# Patient Record
Sex: FEMALE | Race: BLACK OR AFRICAN AMERICAN | NOT HISPANIC OR LATINO
[De-identification: names, ages, dates, MRNs, and addresses within clinical notes are randomized per-mention and may not be internally consistent; named-entity substitution may affect disease eponyms.]

---

## 2017-03-21 ENCOUNTER — APPOINTMENT (OUTPATIENT)
Dept: OPHTHALMOLOGY | Facility: CLINIC | Age: 47
End: 2017-03-21

## 2017-03-21 PROBLEM — Z00.00 ENCOUNTER FOR PREVENTIVE HEALTH EXAMINATION: Status: ACTIVE | Noted: 2017-03-21

## 2017-08-11 ENCOUNTER — EMERGENCY (EMERGENCY)
Facility: HOSPITAL | Age: 47
LOS: 1 days | Discharge: HOME | End: 2017-08-11
Attending: EMERGENCY MEDICINE | Admitting: EMERGENCY MEDICINE
Payer: COMMERCIAL

## 2017-08-11 VITALS
HEART RATE: 98 BPM | OXYGEN SATURATION: 98 % | RESPIRATION RATE: 16 BRPM | SYSTOLIC BLOOD PRESSURE: 114 MMHG | DIASTOLIC BLOOD PRESSURE: 78 MMHG | TEMPERATURE: 98 F

## 2017-08-11 VITALS
DIASTOLIC BLOOD PRESSURE: 89 MMHG | RESPIRATION RATE: 18 BRPM | OXYGEN SATURATION: 100 % | HEART RATE: 89 BPM | TEMPERATURE: 98 F | SYSTOLIC BLOOD PRESSURE: 137 MMHG

## 2017-08-11 LAB
ALBUMIN SERPL ELPH-MCNC: 4 G/DL — SIGNIFICANT CHANGE UP (ref 3.3–5)
ALP SERPL-CCNC: 54 U/L — SIGNIFICANT CHANGE UP (ref 40–120)
ALT FLD-CCNC: 15 U/L — SIGNIFICANT CHANGE UP (ref 4–33)
AST SERPL-CCNC: 23 U/L — SIGNIFICANT CHANGE UP (ref 4–32)
BASE EXCESS BLDV CALC-SCNC: 0.7 MMOL/L — SIGNIFICANT CHANGE UP
BASOPHILS # BLD AUTO: 0.01 K/UL — SIGNIFICANT CHANGE UP (ref 0–0.2)
BASOPHILS NFR BLD AUTO: 0.2 % — SIGNIFICANT CHANGE UP (ref 0–2)
BILIRUB SERPL-MCNC: 0.2 MG/DL — SIGNIFICANT CHANGE UP (ref 0.2–1.2)
BLOOD GAS VENOUS - CREATININE: 0.87 MG/DL — SIGNIFICANT CHANGE UP (ref 0.5–1.3)
BUN SERPL-MCNC: 11 MG/DL — SIGNIFICANT CHANGE UP (ref 7–23)
CALCIUM SERPL-MCNC: 8.9 MG/DL — SIGNIFICANT CHANGE UP (ref 8.4–10.5)
CHLORIDE BLDV-SCNC: 106 MMOL/L — SIGNIFICANT CHANGE UP (ref 96–108)
CHLORIDE SERPL-SCNC: 103 MMOL/L — SIGNIFICANT CHANGE UP (ref 98–107)
CO2 SERPL-SCNC: 23 MMOL/L — SIGNIFICANT CHANGE UP (ref 22–31)
CREAT SERPL-MCNC: 0.85 MG/DL — SIGNIFICANT CHANGE UP (ref 0.5–1.3)
EOSINOPHIL # BLD AUTO: 0 K/UL — SIGNIFICANT CHANGE UP (ref 0–0.5)
EOSINOPHIL NFR BLD AUTO: 0 % — SIGNIFICANT CHANGE UP (ref 0–6)
GAS PNL BLDV: 139 MMOL/L — SIGNIFICANT CHANGE UP (ref 136–146)
GLUCOSE BLDV-MCNC: 104 — HIGH (ref 70–99)
GLUCOSE SERPL-MCNC: 95 MG/DL — SIGNIFICANT CHANGE UP (ref 70–99)
HCO3 BLDV-SCNC: 24 MMOL/L — SIGNIFICANT CHANGE UP (ref 20–27)
HCT VFR BLD CALC: 42.6 % — SIGNIFICANT CHANGE UP (ref 34.5–45)
HCT VFR BLDV CALC: 45 % — SIGNIFICANT CHANGE UP (ref 34.5–45)
HGB BLD-MCNC: 14.5 G/DL — SIGNIFICANT CHANGE UP (ref 11.5–15.5)
HGB BLDV-MCNC: 14.7 G/DL — SIGNIFICANT CHANGE UP (ref 11.5–15.5)
IMM GRANULOCYTES # BLD AUTO: 0.01 # — SIGNIFICANT CHANGE UP
IMM GRANULOCYTES NFR BLD AUTO: 0.2 % — SIGNIFICANT CHANGE UP (ref 0–1.5)
LACTATE BLDV-MCNC: 0.9 MMOL/L — SIGNIFICANT CHANGE UP (ref 0.5–2)
LIDOCAIN IGE QN: 33.9 U/L — SIGNIFICANT CHANGE UP (ref 7–60)
LYMPHOCYTES # BLD AUTO: 2.71 K/UL — SIGNIFICANT CHANGE UP (ref 1–3.3)
LYMPHOCYTES # BLD AUTO: 45.9 % — HIGH (ref 13–44)
MCHC RBC-ENTMCNC: 29.7 PG — SIGNIFICANT CHANGE UP (ref 27–34)
MCHC RBC-ENTMCNC: 34 % — SIGNIFICANT CHANGE UP (ref 32–36)
MCV RBC AUTO: 87.3 FL — SIGNIFICANT CHANGE UP (ref 80–100)
MONOCYTES # BLD AUTO: 0.47 K/UL — SIGNIFICANT CHANGE UP (ref 0–0.9)
MONOCYTES NFR BLD AUTO: 8 % — SIGNIFICANT CHANGE UP (ref 2–14)
NEUTROPHILS # BLD AUTO: 2.71 K/UL — SIGNIFICANT CHANGE UP (ref 1.8–7.4)
NEUTROPHILS NFR BLD AUTO: 45.7 % — SIGNIFICANT CHANGE UP (ref 43–77)
NRBC # FLD: 0 — SIGNIFICANT CHANGE UP
PCO2 BLDV: 43 MMHG — SIGNIFICANT CHANGE UP (ref 41–51)
PH BLDV: 7.39 PH — SIGNIFICANT CHANGE UP (ref 7.32–7.43)
PLATELET # BLD AUTO: 132 K/UL — LOW (ref 150–400)
PMV BLD: 9.9 FL — SIGNIFICANT CHANGE UP (ref 7–13)
PO2 BLDV: 35 MMHG — SIGNIFICANT CHANGE UP (ref 35–40)
POTASSIUM BLDV-SCNC: 4 MMOL/L — SIGNIFICANT CHANGE UP (ref 3.4–4.5)
POTASSIUM SERPL-MCNC: 4.5 MMOL/L — SIGNIFICANT CHANGE UP (ref 3.5–5.3)
POTASSIUM SERPL-SCNC: 4.5 MMOL/L — SIGNIFICANT CHANGE UP (ref 3.5–5.3)
PROT SERPL-MCNC: 7.5 G/DL — SIGNIFICANT CHANGE UP (ref 6–8.3)
RBC # BLD: 4.88 M/UL — SIGNIFICANT CHANGE UP (ref 3.8–5.2)
RBC # FLD: 16.1 % — HIGH (ref 10.3–14.5)
SAO2 % BLDV: 66 % — SIGNIFICANT CHANGE UP (ref 60–85)
SODIUM SERPL-SCNC: 139 MMOL/L — SIGNIFICANT CHANGE UP (ref 135–145)
WBC # BLD: 5.91 K/UL — SIGNIFICANT CHANGE UP (ref 3.8–10.5)
WBC # FLD AUTO: 5.91 K/UL — SIGNIFICANT CHANGE UP (ref 3.8–10.5)

## 2017-08-11 PROCEDURE — 99285 EMERGENCY DEPT VISIT HI MDM: CPT | Mod: 25

## 2017-08-11 PROCEDURE — 76705 ECHO EXAM OF ABDOMEN: CPT | Mod: 26

## 2017-08-11 PROCEDURE — 93010 ELECTROCARDIOGRAM REPORT: CPT

## 2017-08-11 PROCEDURE — 71020: CPT | Mod: 26

## 2017-08-11 PROCEDURE — 73130 X-RAY EXAM OF HAND: CPT | Mod: 26,RT

## 2017-08-11 RX ORDER — SODIUM CHLORIDE 9 MG/ML
1000 INJECTION INTRAMUSCULAR; INTRAVENOUS; SUBCUTANEOUS ONCE
Qty: 0 | Refills: 0 | Status: COMPLETED | OUTPATIENT
Start: 2017-08-11 | End: 2017-08-11

## 2017-08-11 RX ORDER — FAMOTIDINE 10 MG/ML
20 INJECTION INTRAVENOUS ONCE
Qty: 0 | Refills: 0 | Status: COMPLETED | OUTPATIENT
Start: 2017-08-11 | End: 2017-08-11

## 2017-08-11 RX ORDER — ACETAMINOPHEN 500 MG
650 TABLET ORAL ONCE
Qty: 0 | Refills: 0 | Status: COMPLETED | OUTPATIENT
Start: 2017-08-11 | End: 2017-08-11

## 2017-08-11 RX ADMIN — Medication 100 MILLIGRAM(S): at 15:04

## 2017-08-11 RX ADMIN — Medication 30 MILLILITER(S): at 15:04

## 2017-08-11 RX ADMIN — Medication 650 MILLIGRAM(S): at 15:04

## 2017-08-11 RX ADMIN — FAMOTIDINE 20 MILLIGRAM(S): 10 INJECTION INTRAVENOUS at 15:04

## 2017-08-11 RX ADMIN — SODIUM CHLORIDE 1000 MILLILITER(S): 9 INJECTION INTRAMUSCULAR; INTRAVENOUS; SUBCUTANEOUS at 14:10

## 2017-08-11 NOTE — ED PROVIDER NOTE - PMH
GERD (Gastroesophageal Reflux Disease)    GERD (Gastroesophageal Reflux Disease)    Hydrosalpinx    Ulcer

## 2017-08-11 NOTE — ED ADULT TRIAGE NOTE - CHIEF COMPLAINT QUOTE
epigastric pain x 1 mo.  uri symptoms x 3 days,reports upon cough,pain increases. denies n,v.. reports injury r 1st finger since injury 3 wks ago

## 2017-08-11 NOTE — ED ADULT NURSE NOTE - OBJECTIVE STATEMENT
pt c.o. epigastric pain, abd soft, non tender on palpation. states she has been having epigastric pain on and off for 1 month. denies fever, vomiting, diarrhea.  placed labs sent. to [facility] via wheelchair. to return to .

## 2017-08-11 NOTE — ED PROVIDER NOTE - OBJECTIVE STATEMENT
46F PMH GERD (not on any medications) p/w several complaints. Pt first notes 1-2 months epigastric discomfort with no clear exacerbating or relieving factors. Was told by her PMD to see a GI specialist for evaluation but has been unable to follow up. +Tolerating normal PO intake. Taking ibuprofen and left over oxycodone at home without improvement in pain. No NVD. No fevers or recent travel. No chest pain or SOB. Pt also notes 3 weeks pain to palmar surface of thenar eminence of R hand since slamming that hand in a car door 3 weeks ago. No numbness/weakness, but notes pain to palmar region and snapping sensation of IP joint to R thumb. ROM intact.   Pt also c/o 3 days of rhinorrhea and nasal congestion. No fever/chills. Has been coughing (nonproductive) and notes epigastric pain is aggravated by coughing.   Pt also states has been having several weeks of vaginal bleeding (2 pads/day). LMP in february. +sexually active. No lower abd pain or cramping. No clots passed. Periods have been becoming more irregular and is concerned she may be entering menopause. No chest pain, lightheadedness or SOB.

## 2017-08-11 NOTE — ED PROVIDER NOTE - ATTENDING CONTRIBUTION TO CARE
46F presents with complaints of epigastric pain, hand pain and vaginal bleeding.  Reports 1mo of epigastric pain, unresponsive to NSAIDs and oxycodone.  Unchanged w PO intake.  H/o GERD, no longer on medications.  Seen by PCP who referred her to GI, but has not yet followed up.  Also reports pain in R hand since slamming her thumb in the door 3wks ago, has sensation of snapping sensation when moving her thumb, has not been seen for the injury previously.  Also reports several weeks of daily vaginal bleeding, 2 pads a day .  No abd cramping.  Last LMP was 6mo ago.  On exam well appearing, nad, mmm, OP clear, rrr, lungs clear, abd minimal epigastric ttp, no rash, FROM R thumb,  2+ pulses, no edema, alert and oriented, speech clear. Labs reassuring without biliary disease or anemia, RUQ u/s negative for gallstones.  CXR and hand XR no fracture or acute pathology.  EKG no acute ischemia.  Plan for o/p referral to hand, OB and GI.

## 2017-08-11 NOTE — ED PROVIDER NOTE - MEDICAL DECISION MAKING DETAILS
46F with multiple complaints:  1) Epigastric pain--will evaluate with cmp to assess bilirubin and LFTs, lipase for r/o pancreatitis, blood gas to evaluate lactate, US for eval for cholelithiasis. Give pepcid/maalox/IVF. Give GI f/u.   2) R thumb pain -- XR to r/o fx, tylenol for pain, hand f/u.   3) URI symptoms-- tessalon perle for cough since exacerbates epigastric pain.   4) Vaginal bleeding -- ucg to evaluate for pregnancy, cbc to assess for anemia. gyn f/u.

## 2021-04-05 ENCOUNTER — EMERGENCY (EMERGENCY)
Facility: HOSPITAL | Age: 51
LOS: 1 days | Discharge: HOME | End: 2021-04-05
Attending: INTERNAL MEDICINE | Admitting: INTERNAL MEDICINE
Payer: COMMERCIAL

## 2021-04-05 VITALS
SYSTOLIC BLOOD PRESSURE: 120 MMHG | DIASTOLIC BLOOD PRESSURE: 81 MMHG | OXYGEN SATURATION: 99 % | HEIGHT: 66 IN | TEMPERATURE: 98 F | HEART RATE: 90 BPM | RESPIRATION RATE: 16 BRPM

## 2021-04-05 VITALS
SYSTOLIC BLOOD PRESSURE: 104 MMHG | RESPIRATION RATE: 17 BRPM | TEMPERATURE: 99 F | OXYGEN SATURATION: 100 % | DIASTOLIC BLOOD PRESSURE: 72 MMHG | HEART RATE: 86 BPM

## 2021-04-05 LAB
ANION GAP SERPL CALC-SCNC: 9 MMOL/L — SIGNIFICANT CHANGE UP (ref 7–14)
BASOPHILS # BLD AUTO: 0.04 K/UL — SIGNIFICANT CHANGE UP (ref 0–0.2)
BASOPHILS NFR BLD AUTO: 0.5 % — SIGNIFICANT CHANGE UP (ref 0–2)
BUN SERPL-MCNC: 20 MG/DL — SIGNIFICANT CHANGE UP (ref 7–23)
CALCIUM SERPL-MCNC: 9.2 MG/DL — SIGNIFICANT CHANGE UP (ref 8.4–10.5)
CHLORIDE SERPL-SCNC: 104 MMOL/L — SIGNIFICANT CHANGE UP (ref 98–107)
CO2 SERPL-SCNC: 25 MMOL/L — SIGNIFICANT CHANGE UP (ref 22–31)
CREAT SERPL-MCNC: 0.93 MG/DL — SIGNIFICANT CHANGE UP (ref 0.5–1.3)
EOSINOPHIL # BLD AUTO: 0 K/UL — SIGNIFICANT CHANGE UP (ref 0–0.5)
EOSINOPHIL NFR BLD AUTO: 0 % — SIGNIFICANT CHANGE UP (ref 0–6)
GLUCOSE SERPL-MCNC: 104 MG/DL — HIGH (ref 70–99)
HCT VFR BLD CALC: 39.3 % — SIGNIFICANT CHANGE UP (ref 34.5–45)
HGB BLD-MCNC: 13 G/DL — SIGNIFICANT CHANGE UP (ref 11.5–15.5)
IANC: 3.07 K/UL — SIGNIFICANT CHANGE UP (ref 1.5–8.5)
IMM GRANULOCYTES NFR BLD AUTO: 0.4 % — SIGNIFICANT CHANGE UP (ref 0–1.5)
LYMPHOCYTES # BLD AUTO: 3.96 K/UL — HIGH (ref 1–3.3)
LYMPHOCYTES # BLD AUTO: 50.8 % — HIGH (ref 13–44)
MCHC RBC-ENTMCNC: 29.1 PG — SIGNIFICANT CHANGE UP (ref 27–34)
MCHC RBC-ENTMCNC: 33.1 GM/DL — SIGNIFICANT CHANGE UP (ref 32–36)
MCV RBC AUTO: 88.1 FL — SIGNIFICANT CHANGE UP (ref 80–100)
MONOCYTES # BLD AUTO: 0.7 K/UL — SIGNIFICANT CHANGE UP (ref 0–0.9)
MONOCYTES NFR BLD AUTO: 9 % — SIGNIFICANT CHANGE UP (ref 2–14)
NEUTROPHILS # BLD AUTO: 3.07 K/UL — SIGNIFICANT CHANGE UP (ref 1.8–7.4)
NEUTROPHILS NFR BLD AUTO: 39.3 % — LOW (ref 43–77)
NRBC # BLD: 0 /100 WBCS — SIGNIFICANT CHANGE UP
NRBC # FLD: 0 K/UL — SIGNIFICANT CHANGE UP
PLATELET # BLD AUTO: 157 K/UL — SIGNIFICANT CHANGE UP (ref 150–400)
POTASSIUM SERPL-MCNC: 4.2 MMOL/L — SIGNIFICANT CHANGE UP (ref 3.5–5.3)
POTASSIUM SERPL-SCNC: 4.2 MMOL/L — SIGNIFICANT CHANGE UP (ref 3.5–5.3)
RBC # BLD: 4.46 M/UL — SIGNIFICANT CHANGE UP (ref 3.8–5.2)
RBC # FLD: 16.1 % — HIGH (ref 10.3–14.5)
SODIUM SERPL-SCNC: 138 MMOL/L — SIGNIFICANT CHANGE UP (ref 135–145)
TROPONIN T, HIGH SENSITIVITY RESULT: <6 NG/L — SIGNIFICANT CHANGE UP
WBC # BLD: 7.8 K/UL — SIGNIFICANT CHANGE UP (ref 3.8–10.5)
WBC # FLD AUTO: 7.8 K/UL — SIGNIFICANT CHANGE UP (ref 3.8–10.5)

## 2021-04-05 PROCEDURE — 71046 X-RAY EXAM CHEST 2 VIEWS: CPT | Mod: 26

## 2021-04-05 PROCEDURE — 99285 EMERGENCY DEPT VISIT HI MDM: CPT | Mod: 25

## 2021-04-05 PROCEDURE — 93010 ELECTROCARDIOGRAM REPORT: CPT

## 2021-04-05 PROCEDURE — 73120 X-RAY EXAM OF HAND: CPT | Mod: 26,RT

## 2021-04-05 RX ORDER — FAMOTIDINE 10 MG/ML
20 INJECTION INTRAVENOUS ONCE
Refills: 0 | Status: COMPLETED | OUTPATIENT
Start: 2021-04-05 | End: 2021-04-05

## 2021-04-05 RX ORDER — FAMOTIDINE 10 MG/ML
1 INJECTION INTRAVENOUS
Qty: 60 | Refills: 0
Start: 2021-04-05

## 2021-04-05 RX ORDER — IBUPROFEN 200 MG
1 TABLET ORAL
Qty: 24 | Refills: 0
Start: 2021-04-05

## 2021-04-05 NOTE — ED PROVIDER NOTE - OBJECTIVE STATEMENT
50F Hx GERD, Ulcer P/W Left chest discomfort present for several months, more frequent and severe over the past week, episodes last 20-30 minutes, most recent this AM.  Pain is burning, non-radiating, provoked by eating or by drinking spicy foods.  No abdominal pain. No bloody or melanous stools. Passing normal stool and flatus.  No lower extremity swelling.

## 2021-04-05 NOTE — ED PROVIDER NOTE - PROGRESS NOTE DETAILS
Dr. Underwood: Labs, imaging and EKG unremarkable.  Patient feels improved.  Tolerated PO well.  The patient's questions were answered and return precautions, follow-up instructions, and further care discussed.

## 2021-04-05 NOTE — ED ADULT NURSE NOTE - OBJECTIVE STATEMENT
Patient received in RW area c/o chest pain. Pt alert and oriented X 4, ambulatory on assessment, Phx of GERD, ulcers. Pt DC on pepcid. MD Underwood at bedside for discharge paper work. IV pulled.

## 2021-04-05 NOTE — ED PROVIDER NOTE - NS ED ROS FT
Constitutional: No fever or Chills.  No unexpected weight loss.  Head, Eyes, Ears, Nose, Throat: No visual changes.  No tongue or throat swelling or pain.  Neck: No neck stiffness.  Pulmonary: No shortness of breath or cough.  No wheezing.  Cardiovascular: No chest pain, palpitations, or diaphoresis.  Abdominal: No abdominal pain. No nausea, vomiting, diarrhea.  No bloody or melenic stools.  Genitourinary: No dysuria or hematuria.  No discharge.  Endocrine: No frequent urination or unusual thirst.  No hair or skin changes.  Hematologic: No lymph node swelling, easy bruising, or bleeding.  Dermatologic: No rashes.  Allergic: No new exposures, mucosal swelling, or pruritis.  Neurologic: No headache, weakness, visual changes, speech changes, or sensory abnormalities.  No fainting episodes.  No gait imbalance.  Psychiatric: No depression or insomnia.

## 2021-04-05 NOTE — ED PROVIDER NOTE - CLINICAL SUMMARY MEDICAL DECISION MAKING FREE TEXT BOX
50F Hx GERD, Ulcer P/W Left chest discomfort.  Likely GERD vs gastritis.  Will R/O ACS.  Low suspicion for pneumothorax but will R/O.  Not consistent with clinically significant PE given lack of tachycardia or hypoxemia, no risk factors.  No evidence of infectious etiology at this time.    Plan for labs, EKG, CXR, pepcid, re-eval.

## 2021-04-05 NOTE — ED PROVIDER NOTE - PATIENT PORTAL LINK FT
You can access the FollowMyHealth Patient Portal offered by [facility] by registering at the following website: [email or web address]. By joining [employer]’s FollowMyHealth portal, you will also be able to view your health information using other applications (apps) compatible with our system.

## 2021-12-09 ENCOUNTER — EMERGENCY (EMERGENCY)
Facility: HOSPITAL | Age: 51
LOS: 1 days | Discharge: HOME | End: 2021-12-09
Attending: EMERGENCY MEDICINE | Admitting: EMERGENCY MEDICINE
Payer: COMMERCIAL

## 2021-12-09 VITALS
OXYGEN SATURATION: 99 % | HEART RATE: 95 BPM | TEMPERATURE: 98 F | SYSTOLIC BLOOD PRESSURE: 127 MMHG | RESPIRATION RATE: 15 BRPM | DIASTOLIC BLOOD PRESSURE: 83 MMHG | HEIGHT: 66 IN

## 2021-12-09 VITALS
OXYGEN SATURATION: 100 % | SYSTOLIC BLOOD PRESSURE: 125 MMHG | HEART RATE: 87 BPM | TEMPERATURE: 98 F | DIASTOLIC BLOOD PRESSURE: 85 MMHG | RESPIRATION RATE: 16 BRPM

## 2021-12-09 LAB
ALBUMIN SERPL ELPH-MCNC: 3.9 G/DL — SIGNIFICANT CHANGE UP (ref 3.3–5)
ALP SERPL-CCNC: 76 U/L — SIGNIFICANT CHANGE UP (ref 40–120)
ALT FLD-CCNC: 8 U/L — SIGNIFICANT CHANGE UP (ref 4–33)
ANION GAP SERPL CALC-SCNC: 10 MMOL/L — SIGNIFICANT CHANGE UP (ref 7–14)
APTT BLD: 33.4 SEC — SIGNIFICANT CHANGE UP (ref 27–36.3)
AST SERPL-CCNC: 12 U/L — SIGNIFICANT CHANGE UP (ref 4–32)
BASOPHILS # BLD AUTO: 0.03 K/UL — SIGNIFICANT CHANGE UP (ref 0–0.2)
BASOPHILS NFR BLD AUTO: 0.4 % — SIGNIFICANT CHANGE UP (ref 0–2)
BILIRUB SERPL-MCNC: <0.2 MG/DL — SIGNIFICANT CHANGE UP (ref 0.2–1.2)
BUN SERPL-MCNC: 15 MG/DL — SIGNIFICANT CHANGE UP (ref 7–23)
CALCIUM SERPL-MCNC: 9.1 MG/DL — SIGNIFICANT CHANGE UP (ref 8.4–10.5)
CHLORIDE SERPL-SCNC: 106 MMOL/L — SIGNIFICANT CHANGE UP (ref 98–107)
CO2 SERPL-SCNC: 23 MMOL/L — SIGNIFICANT CHANGE UP (ref 22–31)
CREAT SERPL-MCNC: 0.91 MG/DL — SIGNIFICANT CHANGE UP (ref 0.5–1.3)
EOSINOPHIL # BLD AUTO: 0 K/UL — SIGNIFICANT CHANGE UP (ref 0–0.5)
EOSINOPHIL NFR BLD AUTO: 0 % — SIGNIFICANT CHANGE UP (ref 0–6)
GLUCOSE SERPL-MCNC: 117 MG/DL — HIGH (ref 70–99)
HCG SERPL-ACNC: <5 MIU/ML — SIGNIFICANT CHANGE UP
HCT VFR BLD CALC: 39.8 % — SIGNIFICANT CHANGE UP (ref 34.5–45)
HGB BLD-MCNC: 13.7 G/DL — SIGNIFICANT CHANGE UP (ref 11.5–15.5)
IANC: 3.67 K/UL — SIGNIFICANT CHANGE UP (ref 1.5–8.5)
IMM GRANULOCYTES NFR BLD AUTO: 0.4 % — SIGNIFICANT CHANGE UP (ref 0–1.5)
INR BLD: 1.18 RATIO — HIGH (ref 0.88–1.16)
LYMPHOCYTES # BLD AUTO: 3.4 K/UL — HIGH (ref 1–3.3)
LYMPHOCYTES # BLD AUTO: 43.3 % — SIGNIFICANT CHANGE UP (ref 13–44)
MAGNESIUM SERPL-MCNC: 2 MG/DL — SIGNIFICANT CHANGE UP (ref 1.6–2.6)
MCHC RBC-ENTMCNC: 30 PG — SIGNIFICANT CHANGE UP (ref 27–34)
MCHC RBC-ENTMCNC: 34.4 GM/DL — SIGNIFICANT CHANGE UP (ref 32–36)
MCV RBC AUTO: 87.1 FL — SIGNIFICANT CHANGE UP (ref 80–100)
MONOCYTES # BLD AUTO: 0.72 K/UL — SIGNIFICANT CHANGE UP (ref 0–0.9)
MONOCYTES NFR BLD AUTO: 9.2 % — SIGNIFICANT CHANGE UP (ref 2–14)
NEUTROPHILS # BLD AUTO: 3.67 K/UL — SIGNIFICANT CHANGE UP (ref 1.8–7.4)
NEUTROPHILS NFR BLD AUTO: 46.7 % — SIGNIFICANT CHANGE UP (ref 43–77)
NRBC # BLD: 0 /100 WBCS — SIGNIFICANT CHANGE UP
NRBC # FLD: 0 K/UL — SIGNIFICANT CHANGE UP
PLATELET # BLD AUTO: 190 K/UL — SIGNIFICANT CHANGE UP (ref 150–400)
POTASSIUM SERPL-MCNC: 3.9 MMOL/L — SIGNIFICANT CHANGE UP (ref 3.5–5.3)
POTASSIUM SERPL-SCNC: 3.9 MMOL/L — SIGNIFICANT CHANGE UP (ref 3.5–5.3)
PROT SERPL-MCNC: 7.2 G/DL — SIGNIFICANT CHANGE UP (ref 6–8.3)
PROTHROM AB SERPL-ACNC: 13.3 SEC — SIGNIFICANT CHANGE UP (ref 10.6–13.6)
RBC # BLD: 4.57 M/UL — SIGNIFICANT CHANGE UP (ref 3.8–5.2)
RBC # FLD: 15.6 % — HIGH (ref 10.3–14.5)
SODIUM SERPL-SCNC: 139 MMOL/L — SIGNIFICANT CHANGE UP (ref 135–145)
TROPONIN T, HIGH SENSITIVITY RESULT: <6 NG/L — SIGNIFICANT CHANGE UP
WBC # BLD: 7.85 K/UL — SIGNIFICANT CHANGE UP (ref 3.8–10.5)
WBC # FLD AUTO: 7.85 K/UL — SIGNIFICANT CHANGE UP (ref 3.8–10.5)

## 2021-12-09 PROCEDURE — 71045 X-RAY EXAM CHEST 1 VIEW: CPT | Mod: 26

## 2021-12-09 PROCEDURE — 99285 EMERGENCY DEPT VISIT HI MDM: CPT

## 2021-12-09 RX ORDER — ONDANSETRON 8 MG/1
4 TABLET, FILM COATED ORAL ONCE
Refills: 0 | Status: COMPLETED | OUTPATIENT
Start: 2021-12-09 | End: 2021-12-09

## 2021-12-09 RX ORDER — FAMOTIDINE 10 MG/ML
20 INJECTION INTRAVENOUS ONCE
Refills: 0 | Status: COMPLETED | OUTPATIENT
Start: 2021-12-09 | End: 2021-12-09

## 2021-12-09 RX ORDER — SUCRALFATE 1 G
1 TABLET ORAL ONCE
Refills: 0 | Status: COMPLETED | OUTPATIENT
Start: 2021-12-09 | End: 2021-12-09

## 2021-12-09 RX ADMIN — Medication 30 MILLILITER(S): at 10:38

## 2021-12-09 RX ADMIN — FAMOTIDINE 20 MILLIGRAM(S): 10 INJECTION INTRAVENOUS at 10:38

## 2021-12-09 RX ADMIN — ONDANSETRON 4 MILLIGRAM(S): 8 TABLET, FILM COATED ORAL at 10:38

## 2021-12-09 RX ADMIN — Medication 1 GRAM(S): at 10:38

## 2021-12-09 NOTE — ED PROVIDER NOTE - NSPTACCESSSVCSAPPTDETAILS_ED_ALL_ED_FT
In 1-2 weeks to be evaluated for Endoscopy for PUD 1. Gastroenterology: In 1-2 weeks to be evaluated for Endoscopy for PUD  2. Primary care physician - within 1 week pls. Chest x-ray concerning for possible R lung mass, needs to arrange for outpatient CT. States that she will be leaving for vacation on 12/26, would like to see a PCP before then

## 2021-12-09 NOTE — ED POST DISCHARGE NOTE - DETAILS
Attempted to contact pt as above Discussed CT findings with patient, states she is a previous smoker, was not aware of mass. Knows to f/u with a PCP and possible pulmonologist  to arrange for outpatient CT and further eval. Left pt's information in urgent referral f/u with discharge center; pt would like to establish care with new PCP as she is leaving for vacation on 12/26/21 her PCP is away. Discussed CXR findings with patient, states she is a previous smoker, was not aware of possible mass. Knows to f/u with a PCP and possible pulmonologist  to arrange for outpatient CT and further eval. Left pt's information in urgent referral f/u with discharge center; pt would like to establish care with new PCP as she is leaving for vacation on 12/26/21 her PCP is away. Discussed CXR findings with patient, states she is a previous smoker, was not aware of possible mass. States she feels well, denies cardiac/respiratory symptoms at present. Knows to f/u with a PCP and possible pulmonologist  to arrange for outpatient CT and further eval. Left pt's information in urgent referral f/u with discharge center; pt would like to establish care with new PCP as she is leaving for vacation on 12/26/21 her PCP is away. Discussed CXR findings with patient, states she is a previous smoker, was not aware of possible mass. States she feels well, denies cardiac/respiratory symptoms at present. Knows to f/u with a PCP and possible pulmonologist  to arrange for outpatient CT and further eval. Left pt's information in urgent referral f/u with discharge center; pt would like to establish care with new PCP as she is leaving for vacation on 12/26/21 and her PCP will be away for extended period of time.

## 2021-12-09 NOTE — ED PROVIDER NOTE - CLINICAL SUMMARY MEDICAL DECISION MAKING FREE TEXT BOX
Sanaz, PGY3: Likely gastritis/GERD/Ulcer (pt with history, not on ppi's, taking nsaids), low suspicion ACS (not exertional, pt with recent negative stress), low supspicion PNA/infx, low suspicion ptx, low suspicion of PE (not tachy/not hypoxic, denies sob). Will do labs, cxr, ekg/trop, gi cocktail. Likely tbdc GI f/u.

## 2021-12-09 NOTE — ED PROVIDER NOTE - ATTENDING CONTRIBUTION TO CARE
agree with resident note    "50F PMH GERD (takes sucralfate), presents to the ED with L sided chest pain worse with swallowing liquids, burning sensation, pt has had it before, states she has it for the past few months. Denies exertional component, denies any associated diaphoresis/sob. Pt states she's been taking ibuprofen for R thumb pain (dx with fx in the recent past). States sucrulfate only slightly helps. Pt is active smoker. Pt had stress tests 4 months ago reportedly negative. Denies any fevers/chills, uri sxs, cough, abd pain, v/d, dark/bloody stools, urinary sxs, orhtopnea/leg swelling. Pt seen in the past for similar presentation, never followed with GI."    pt states for 4-5 months feels lightheaded on postural change.  Has had stress test which was negative.      PE: well appearing; VSS; CTAB/L; s1 s2 no m/r/g abd soft/NT/ND exT: no edema Neuro: CNs intact 5/5 motor Ue and LE; sensation intact; FTN wnl; no drift; EOMI;    Imp: chest pain, lightheadedness, no neuro deficits; will get labs, CXR, EKG wnl (LAE?)  follow up with cards

## 2021-12-09 NOTE — ED PROVIDER NOTE - PHYSICAL EXAMINATION
GENERAL: no acute distress, non-toxic appearing  HEENT: normal conjunctiva, oral mucosa moist  CARDIAC: regular rate and regular rhythm, bp reassuring  PULM: clear to ascultation bilaterally, no appreciable crackles, rales, rhonchi, or wheezing, sats 100% on RA, no increased work of breathing  GI: abdomen nondistended, soft, nontender  : no CVA tenderness, no suprapubic tenderness  NEURO: alert and oriented x 3, normal speech, moving all extremities without lateralization  MSK: no visible deformities, no peripheral edema, calf tenderness/redness/swelling  SKIN: no visible rashes  PSYCH: appropriate mood and affect

## 2021-12-09 NOTE — ED PROVIDER NOTE - PROGRESS NOTE DETAILS
Sanaz, PGY3: pt feels better after meds, trop <6, low suspicion cardiac etiology, rapid referral placed for GI f/u, explained to pt anticipatory guidance, pending cxr Sanaz, PGY3: xray having difficulty uploading images, reviewed CXR in xray suite: no focal consolidations, no PTX, no cardiomegaly or pulm edema

## 2021-12-09 NOTE — ED ADULT NURSE NOTE - OBJECTIVE STATEMENT
Chong RN: Received pt to Rm 1 with c/o chest pain and abdominal pain intermittent x 6 months. Pt reports pain is worse after drinking anything. Pt denies N/V, fever, cough, shortness of breath. Pt denies past medical hx. Pt is A&ox4, skin warm dry unremarkable, + strong regular radial pulses bi laterally. Pt changed into gown and placed on cardiac monitor showing NSR, #18g IV placed to R forearm, lab work collected as ordered. Report given to primary RN. MD at bedside for eval.

## 2021-12-09 NOTE — ED PROVIDER NOTE - WR ORDER STATUS 2
Health Maintenance Due   Topic Date Due   • COVID-19 Vaccine (1) Never done   • Meningococcal Vaccine (2 - 2-dose series) 11/02/2020   • Influenza Vaccine (1) 09/01/2021   • Annual Physical (ages 3-18)  10/14/2021       Patient is due for topics as listed above but is not proceeding with Immunization(s) COVID-19, Influenza and Meningococcal at this time.     Recent PHQ 2/9 Score    PHQ 2:  Date Peds PHQ 2 Score Peds PHQ 2 Interpretation   12/8/2021 0 No further screening needed       PHQ 9:  Date Peds PHQ 9 Score Peds PHQ 9 Interpretation   10/14/2020 6 Mild Depression        Performed

## 2021-12-09 NOTE — ED POST DISCHARGE NOTE - RESULT SUMMARY
Received call from radiologist regarding c/f RUL mass. Patient seen in ED for L CP and felt improved upon discharge, provided rapid GI f/u. Attempted to contact pt regarding CXR findings, left callback number [phone] for both numbers listed in chart. Patient should be informed to arrange for outpatient CT chest.

## 2021-12-09 NOTE — ED PROVIDER NOTE - PATIENT PORTAL LINK FT
You can access the FollowMyHealth Patient Portal offered by [facility] by registering at the following website: [email or web address]. By joining [employer]’s FollowMyHealth portal, you will also be able to view your health information using other applications (apps) compatible with our system.

## 2021-12-09 NOTE — ED ADULT TRIAGE NOTE - CHIEF COMPLAINT QUOTE
pt c/o L sided cp x 6mo with intermittent dizziness. Pt denies sob, betts, palpations. Pt denies pmhx

## 2021-12-09 NOTE — ED PROVIDER NOTE - NSFOLLOWUPCLINICS_GEN_ALL_ED_FT
[facility]  Cardiology  [address] [place] [address]  Phone: [phone]  Fax:   Follow Up Time: 4-6 Days    [facility]  Gastroenterology  [address] [place] [id number]  Phone: [phone]  Fax:   Follow Up Time: 7-10 Days

## 2021-12-09 NOTE — ED PROVIDER NOTE - NSFOLLOWUPINSTRUCTIONS_ED_ALL_ED_FT
- Please follow up with your Primary Care Doctor within 48 hours. Bring your results from today.    - Please follow up with a Cardiologist within 72 hours. Bring your results from today.    - Please follow up with Gastroenterologist. A rapid referral was made for you, you should receive a phone call within 48 hours with an upcoming appointment. If you don't receive this call please call the phone number provided.     - Please stop taking Ibuprofen for pain as this may make your symptoms worse. You may take Tylenol for pain (read package insert for dosing instructions).     - You may take over the counter Maalox or Mylanta for your symptoms -- read package insert for dosing instructions.    - You may also take over the counter Pepcid Complete for your symptoms -- read package insert for dosing instructions.     - Be sure to return to the ED if you develop new, worsening, or any distressing symptoms.

## 2021-12-09 NOTE — ED PROVIDER NOTE - OBJECTIVE STATEMENT
50F PMH GERD (takes sucralfate), presents to the ED with L sided chest pain worse with swallowing liquids, burning sensation, pt has had it before, states she has it for the past few months. Denies exertional component, denies any associated diaphoresis/sob. Pt states she's been taking ibuprofen for R thumb pain (dx with fx in the recent past). States sucrulfate only slightly helps. Pt is active smoker. Pt had stress tests 4 months ago reportedly negative. Denies any fevers/chills, uri sxs, cough, abd pain, v/d, dark/bloody stools, urinary sxs, orhtopnea/leg swelling. Pt seen in the past for similar presentation, never followed with GI.

## 2021-12-15 ENCOUNTER — APPOINTMENT (OUTPATIENT)
Dept: GASTROENTEROLOGY | Facility: CLINIC | Age: 51
End: 2021-12-15
Payer: COMMERCIAL

## 2021-12-15 VITALS
DIASTOLIC BLOOD PRESSURE: 73 MMHG | SYSTOLIC BLOOD PRESSURE: 109 MMHG | OXYGEN SATURATION: 99 % | HEIGHT: 66 IN | RESPIRATION RATE: 18 BRPM | WEIGHT: 166 LBS | TEMPERATURE: 97 F | HEART RATE: 89 BPM | BODY MASS INDEX: 26.68 KG/M2

## 2021-12-15 PROCEDURE — 99204 OFFICE O/P NEW MOD 45 MIN: CPT

## 2021-12-15 NOTE — PHYSICAL EXAM
[General Appearance - Alert] : alert [General Appearance - In No Acute Distress] : in no acute distress [Sclera] : the sclera and conjunctiva were normal [PERRL With Normal Accommodation] : pupils were equal in size, round, and reactive to light [Extraocular Movements] : extraocular movements were intact [Outer Ear] : the ears and nose were normal in appearance [Oropharynx] : the oropharynx was normal [Neck Appearance] : the appearance of the neck was normal [Neck Cervical Mass (___cm)] : no neck mass was observed [Jugular Venous Distention Increased] : there was no jugular-venous distention [Thyroid Diffuse Enlargement] : the thyroid was not enlarged [Thyroid Nodule] : there were no palpable thyroid nodules [] : no respiratory distress [Auscultation Breath Sounds / Voice Sounds] : lungs were clear to auscultation bilaterally [Heart Rate And Rhythm] : heart rate was normal and rhythm regular [Heart Sounds] : normal S1 and S2 [Heart Sounds Gallop] : no gallops [Murmurs] : no murmurs [Heart Sounds Pericardial Friction Rub] : no pericardial rub [Flat] : flat [Normal] : normal to percussion [Epigastric] : in the epigastric area [Cervical Lymph Nodes Enlarged Posterior Bilaterally] : posterior cervical [Cervical Lymph Nodes Enlarged Anterior Bilaterally] : anterior cervical [Supraclavicular Lymph Nodes Enlarged Bilaterally] : supraclavicular [Axillary Lymph Nodes Enlarged Bilaterally] : axillary [Femoral Lymph Nodes Enlarged Bilaterally] : femoral [Inguinal Lymph Nodes Enlarged Bilaterally] : inguinal [No CVA Tenderness] : no ~M costovertebral angle tenderness [No Spinal Tenderness] : no spinal tenderness [Abnormal Walk] : normal gait [Nail Clubbing] : no clubbing  or cyanosis of the fingernails [Musculoskeletal - Swelling] : no joint swelling seen [Motor Tone] : muscle strength and tone were normal [Deep Tendon Reflexes (DTR)] : deep tendon reflexes were 2+ and symmetric [Sensation] : the sensory exam was normal to light touch and pinprick [No Focal Deficits] : no focal deficits [Oriented To Time, Place, And Person] : oriented to person, place, and time [Impaired Insight] : insight and judgment were intact [Affect] : the affect was normal [Dilated Collat. Veins] : no collateral vein dilation [Striae] : no striae [Scar] : no scars [Firm] : not firm [Rigid] : not rigid [Rebound] : no rebound [Guarding] : no guarding [Maki's] : a negative Amki's sign

## 2021-12-15 NOTE — REVIEW OF SYSTEMS
[As Noted in HPI] : as noted in HPI [Negative] : Heme/Lymph [Abdominal Pain] : abdominal pain [Heartburn] : heartburn

## 2021-12-15 NOTE — ASSESSMENT
[FreeTextEntry1] : Attending Attestation \par \par GERD \par A low acid / reflux diet was discussed in great detail including not smoking, not drinking alcohol, and not\par consuming foods that irritate the esophagus. It is helpful to eat small meals throughout the day instead\par of large meals. You should avoid eating before bedtime or lying down after you eat. It can be helpful to\par raise the head of your bed six inches. Additionally, you should maintain a healthy weight and good\par posture.. The patient was given written material to take home and review. \par \par Pantoprazole 40 mg PO daily. Medication reviewed side effects and adverse reactions. \par \par EGD and Colonoscopy procedure ordered The risks benefits alternatives and complications of the procedure/s were explained to the patient at length. The patient was agreeable and we will proceed. Preparation reviewed side effects and adverse reactions to prep. \par \par Time spent with patient 45 min \par \par Patient verbalized understanding of all information provided. All questions answered and reviewed. \par \par

## 2021-12-15 NOTE — HISTORY OF PRESENT ILLNESS
[Heartburn] : heartburn worsened Quality 130: Documentation Of Current Medications In The Medical Record: Current Medications Documented [Nausea] : denies nausea Quality 131: Pain Assessment And Follow-Up: Pain assessment using a standardized tool is documented as negative, no follow-up plan required [Vomiting] : denies vomiting Detail Level: Detailed [Diarrhea] : denies diarrhea Quality 110: Preventive Care And Screening: Influenza Immunization: Influenza Immunization Administered during Influenza season [Constipation] : denies constipation [Yellow Skin Or Eyes (Jaundice)] : denies jaundice [Abdominal Pain] : abdominal pain worsened [Abdominal Swelling] : denies abdominal swelling [Rectal Pain] : denies rectal pain [GERD] : gastroesophageal reflux disease [Hiatus Hernia] : no hiatus hernia [Peptic Ulcer Disease] : no peptic ulcer disease [Pancreatitis] : no pancreatitis [Cholelithiasis] : no cholelithiasis [Kidney Stone] : no kidney stone [Inflammatory Bowel Disease] : no inflammatory bowel disease [Irritable Bowel Syndrome] : no irritable bowel syndrome [Diverticulitis] : no diverticulitis [Alcohol Abuse] : no alcohol abuse [Malignancy] : no malignancy [Abdominal Surgery] : no abdominal surgery [Appendectomy] : no appendectomy [Cholecystectomy] : no cholecystectomy [de-identified] : 50F PMH GERD (takes sucralfate), presents to the ED\par with L sided chest pain worse with swallowing liquids, burning sensation, pt\par has had it before, states she has it for the past few months. Denies exertional\par component, denies any associated diaphoresis/sob. Pt states she's been taking\par ibuprofen for R thumb pain (dx with fx in the recent past). States sucrulfate\par only slightly helps. Pt is active smoker. Pt had stress tests 4 months ago\par reportedly negative. Denies any fevers/chills, uri sxs, cough, abd pain, v/d,\par dark/bloody stools, urinary sxs, orhtopnea/leg swelling. Pt seen in the past\par for similar presentation, never followed with GI.\par

## 2022-01-18 ENCOUNTER — APPOINTMENT (OUTPATIENT)
Dept: PULMONOLOGY | Facility: CLINIC | Age: 52
End: 2022-01-18

## 2022-01-26 ENCOUNTER — APPOINTMENT (OUTPATIENT)
Dept: GASTROENTEROLOGY | Facility: CLINIC | Age: 52
End: 2022-01-26

## 2022-01-31 ENCOUNTER — APPOINTMENT (OUTPATIENT)
Dept: GASTROENTEROLOGY | Facility: AMBULATORY MEDICAL SERVICES | Age: 52
End: 2022-01-31
Payer: COMMERCIAL

## 2022-01-31 PROCEDURE — 43239 EGD BIOPSY SINGLE/MULTIPLE: CPT

## 2022-01-31 PROCEDURE — 45380 COLONOSCOPY AND BIOPSY: CPT

## 2022-03-29 ENCOUNTER — APPOINTMENT (OUTPATIENT)
Dept: GASTROENTEROLOGY | Facility: CLINIC | Age: 52
End: 2022-03-29
Payer: COMMERCIAL

## 2022-03-29 VITALS
HEART RATE: 97 BPM | TEMPERATURE: 97 F | DIASTOLIC BLOOD PRESSURE: 80 MMHG | RESPIRATION RATE: 18 BRPM | SYSTOLIC BLOOD PRESSURE: 120 MMHG | BODY MASS INDEX: 26.36 KG/M2 | OXYGEN SATURATION: 96 % | HEIGHT: 66 IN | WEIGHT: 164 LBS

## 2022-03-29 DIAGNOSIS — Z20.822 CONTACT WITH AND (SUSPECTED) EXPOSURE TO COVID-19: ICD-10-CM

## 2022-03-29 DIAGNOSIS — Z12.11 ENCOUNTER FOR SCREENING FOR MALIGNANT NEOPLASM OF COLON: ICD-10-CM

## 2022-03-29 DIAGNOSIS — K21.9 GASTRO-ESOPHAGEAL REFLUX DISEASE W/OUT ESOPHAGITIS: ICD-10-CM

## 2022-03-29 DIAGNOSIS — R10.9 UNSPECIFIED ABDOMINAL PAIN: ICD-10-CM

## 2022-03-29 PROCEDURE — 99214 OFFICE O/P EST MOD 30 MIN: CPT

## 2022-03-29 RX ORDER — SODIUM SULFATE, POTASSIUM SULFATE, MAGNESIUM SULFATE 17.5; 3.13; 1.6 G/ML; G/ML; G/ML
17.5-3.13-1.6 SOLUTION, CONCENTRATE ORAL
Qty: 1 | Refills: 0 | Status: DISCONTINUED | COMMUNITY
Start: 2022-01-25 | End: 2022-03-29

## 2022-03-29 NOTE — HISTORY OF PRESENT ILLNESS
[de-identified] : 50F PMH GERD (takes sucralfate), presents to the ED\par with L sided chest pain worse with swallowing liquids, burning sensation, pt\par has had it before, states she has it for the past few months. Denies exertional\par component, denies any associated diaphoresis/sob. Pt states she's been taking\par ibuprofen for R thumb pain (dx with fx in the recent past). States sucrulfate\par only slightly helps. Pt is active smoker. Pt had stress tests 4 months ago\par reportedly negative. Denies any fevers/chills, uri sxs, cough, abd pain, v/d,\par dark/bloody stools, urinary sxs, orhtopnea/leg swelling. Pt seen in the past\par for similar presentation, never followed with GI.\par

## 2022-03-29 NOTE — ASSESSMENT
[FreeTextEntry1] : \par GERD \par A low acid / reflux diet was discussed in great detail including not smoking, not drinking alcohol, and not\par consuming foods that irritate the esophagus. It is helpful to eat small meals throughout the day instead\par of large meals. You should avoid eating before bedtime or lying down after you eat. It can be helpful to\par raise the head of your bed six inches. Additionally, you should maintain a healthy weight and good\par posture.. The patient was given written material to take home and review. \par \par Pantoprazole 40 mg PO daily. Medication reviewed side effects and adverse reactions. \par \par

## 2022-03-29 NOTE — PHYSICAL EXAM
[General Appearance - Alert] : alert [General Appearance - In No Acute Distress] : in no acute distress [Sclera] : the sclera and conjunctiva were normal [PERRL With Normal Accommodation] : pupils were equal in size, round, and reactive to light [Extraocular Movements] : extraocular movements were intact [Outer Ear] : the ears and nose were normal in appearance [Oropharynx] : the oropharynx was normal [Neck Appearance] : the appearance of the neck was normal [Neck Cervical Mass (___cm)] : no neck mass was observed [Jugular Venous Distention Increased] : there was no jugular-venous distention [Thyroid Diffuse Enlargement] : the thyroid was not enlarged [Thyroid Nodule] : there were no palpable thyroid nodules [] : no respiratory distress [Auscultation Breath Sounds / Voice Sounds] : lungs were clear to auscultation bilaterally [Heart Rate And Rhythm] : heart rate was normal and rhythm regular [Heart Sounds] : normal S1 and S2 [Heart Sounds Gallop] : no gallops [Murmurs] : no murmurs [Heart Sounds Pericardial Friction Rub] : no pericardial rub [Flat] : flat [Normal] : normal to percussion [Epigastric] : in the epigastric area [Cervical Lymph Nodes Enlarged Posterior Bilaterally] : posterior cervical [Cervical Lymph Nodes Enlarged Anterior Bilaterally] : anterior cervical [Supraclavicular Lymph Nodes Enlarged Bilaterally] : supraclavicular [Axillary Lymph Nodes Enlarged Bilaterally] : axillary [Femoral Lymph Nodes Enlarged Bilaterally] : femoral [Inguinal Lymph Nodes Enlarged Bilaterally] : inguinal [No CVA Tenderness] : no ~M costovertebral angle tenderness [No Spinal Tenderness] : no spinal tenderness [Abnormal Walk] : normal gait [Nail Clubbing] : no clubbing  or cyanosis of the fingernails [Musculoskeletal - Swelling] : no joint swelling seen [Motor Tone] : muscle strength and tone were normal [Deep Tendon Reflexes (DTR)] : deep tendon reflexes were 2+ and symmetric [Sensation] : the sensory exam was normal to light touch and pinprick [No Focal Deficits] : no focal deficits [Oriented To Time, Place, And Person] : oriented to person, place, and time [Impaired Insight] : insight and judgment were intact [Affect] : the affect was normal [Dilated Collat. Veins] : no collateral vein dilation [Striae] : no striae [Scar] : no scars [Firm] : not firm [Rigid] : not rigid [Rebound] : no rebound [Guarding] : no guarding [Maki's] : a negative Maki's sign

## 2022-04-21 ENCOUNTER — RX RENEWAL (OUTPATIENT)
Age: 52
End: 2022-04-21

## 2022-05-04 ENCOUNTER — APPOINTMENT (OUTPATIENT)
Dept: PULMONOLOGY | Facility: CLINIC | Age: 52
End: 2022-05-04

## 2022-05-18 ENCOUNTER — APPOINTMENT (OUTPATIENT)
Dept: PULMONOLOGY | Facility: CLINIC | Age: 52
End: 2022-05-18
Payer: COMMERCIAL

## 2022-05-18 VITALS
SYSTOLIC BLOOD PRESSURE: 120 MMHG | HEIGHT: 66 IN | OXYGEN SATURATION: 98 % | WEIGHT: 154 LBS | DIASTOLIC BLOOD PRESSURE: 76 MMHG | TEMPERATURE: 98 F | BODY MASS INDEX: 24.75 KG/M2 | HEART RATE: 106 BPM

## 2022-05-18 DIAGNOSIS — F17.200 NICOTINE DEPENDENCE, UNSPECIFIED, UNCOMPLICATED: ICD-10-CM

## 2022-05-18 DIAGNOSIS — R07.89 OTHER CHEST PAIN: ICD-10-CM

## 2022-05-18 LAB — POCT - HEMOGLOBIN (HGB), QUANTITATIVE, TRANSCUTANEOUS: 14.9

## 2022-05-18 PROCEDURE — ZZZZZ: CPT

## 2022-05-18 PROCEDURE — 94727 GAS DIL/WSHOT DETER LNG VOL: CPT

## 2022-05-18 PROCEDURE — 88738 HGB QUANT TRANSCUTANEOUS: CPT

## 2022-05-18 PROCEDURE — 71046 X-RAY EXAM CHEST 2 VIEWS: CPT

## 2022-05-18 PROCEDURE — 94010 BREATHING CAPACITY TEST: CPT

## 2022-05-18 PROCEDURE — 94729 DIFFUSING CAPACITY: CPT

## 2022-05-18 PROCEDURE — 99215 OFFICE O/P EST HI 40 MIN: CPT | Mod: 25

## 2022-05-19 PROBLEM — F17.200 CURRENT SMOKER: Status: ACTIVE | Noted: 2021-12-15

## 2022-05-20 NOTE — PHYSICAL EXAM
[No Acute Distress] : no acute distress [Normal Oropharynx] : normal oropharynx [Normal Appearance] : normal appearance [No Neck Mass] : no neck mass [Normal Rate/Rhythm] : normal rate/rhythm [Normal S1, S2] : normal s1, s2 [No Murmurs] : no murmurs [No Resp Distress] : no resp distress [No Abnormalities] : no abnormalities [Benign] : benign [Normal Gait] : normal gait [No Cyanosis] : no cyanosis [No Edema] : no edema [FROM] : FROM [Normal Color/ Pigmentation] : normal color/ pigmentation [No Focal Deficits] : no focal deficits [Oriented x3] : oriented x3 [Normal Affect] : normal affect [Normal to Percussion] : normal to percussion [TextBox_68] : Occasional wheeze on right.  Good air entry. [TextBox_105] : Clubbing

## 2022-05-20 NOTE — ASSESSMENT
[FreeTextEntry1] : Obtain CT PET scan.\par Follow-up post pet imaging for reevaluation.\par Discontinue smoking.\par Further recommendations after review of above.

## 2022-05-20 NOTE — DISCUSSION/SUMMARY
[FreeTextEntry1] : Abnormal CXR. \par Increasing lesion right upper lobe suspicious for primary bronchogenic carcinoma.\par Current every day smoker.\par Chest discomfort may be related to above.

## 2022-05-20 NOTE — HISTORY OF PRESENT ILLNESS
[Current] : current [TextBox_4] : HARJEET WHITMORE is a 51 year old  F referred for pulmonary evaluation for abnormal CXR\par \par Having GERD. Had studies. Told negative. Improved on Pantoprazole. \par Some mild cough 1 week. Not chronic\par No CP or SOB.\par Some wheeze past week with cough. Was ill last week. Illness improved.\par Now occ. nocturnal cough. Mucous white. \par COVID negative.\par Occasional right sided chest discomfort.\par \par Past pulmonary history. N\par Occupational Exposure. N\par Family history of pulmonary disease. N\par Recent travel N\par Pets N\par \par Restarted smoking few months ago. \par  [TextBox_11] : 1/4 [TextBox_13] : 24

## 2022-05-20 NOTE — PROCEDURE
[FreeTextEntry1] : Pulmonary function testing of May 18, 2022.\par Normal Flow Rates Normal Lung Volumes. There is a moderate diffusion impairment. \par \par \par  1 / 1 Kirill Martines   \par  \par Report date: 12/9/2021 \par  \par  View Order\par \par \par (Report matches study selected on Patient History pane)\par \par \par   \par \par \par \par \par ACC: [id number] EXAM: XR CHEST AP OR PA 1V\par \par PROCEDURE DATE: 12/09/2021\par \par \par \par INTERPRETATION: CLINICAL INFORMATION: Chest pain.\par \par TECHNIQUE: PA view of the chest.\par \par COMPARISON: Chest radiograph from 4/5/2021 and 8/11/2017\par \par FINDINGS:\par \par A right upper lung rounded opacity is increased in size and conspicuity from prior imaging. No pleural effusion or pneumothorax. Heart size is normal. No acute bone or soft tissue pathology.\par \par IMPRESSION:\par \par A right upper lung rounded opacity is increased in size and conspicuity from prior imaging. Findings are concerning for neoplastic etiology. A CT chest is recommended for further evaluation.\par \par Dr. SHANNON Ly was informed with readback confirmation on 12/9/2021 at 3:32 PM.\par \par --- End of Report ---\par \par \par \par \par CIRO FRITZ MD; Resident Radiology\par This document has been electronically signed.\par KIRILL MARTINES MD; Attending Radiologist\par This document has been electronically signed. Dec 9 2021 5:01PM

## 2022-05-25 ENCOUNTER — APPOINTMENT (OUTPATIENT)
Dept: NUCLEAR MEDICINE | Facility: IMAGING CENTER | Age: 52
End: 2022-05-25

## 2022-06-01 ENCOUNTER — OUTPATIENT (OUTPATIENT)
Dept: OUTPATIENT SERVICES | Facility: HOSPITAL | Age: 52
LOS: 1 days | End: 2022-06-01
Payer: COMMERCIAL

## 2022-06-01 ENCOUNTER — APPOINTMENT (OUTPATIENT)
Dept: NUCLEAR MEDICINE | Facility: IMAGING CENTER | Age: 52
End: 2022-06-01
Payer: COMMERCIAL

## 2022-06-01 DIAGNOSIS — R91.8 OTHER NONSPECIFIC ABNORMAL FINDING OF LUNG FIELD: ICD-10-CM

## 2022-06-01 PROCEDURE — 78815 PET IMAGE W/CT SKULL-THIGH: CPT | Mod: 26,PI

## 2022-06-01 PROCEDURE — 78815 PET IMAGE W/CT SKULL-THIGH: CPT

## 2022-06-01 PROCEDURE — A9552: CPT

## 2022-06-15 ENCOUNTER — APPOINTMENT (OUTPATIENT)
Dept: PULMONOLOGY | Facility: CLINIC | Age: 52
End: 2022-06-15
Payer: COMMERCIAL

## 2022-06-15 VITALS
BODY MASS INDEX: 24.75 KG/M2 | HEART RATE: 99 BPM | DIASTOLIC BLOOD PRESSURE: 76 MMHG | OXYGEN SATURATION: 95 % | SYSTOLIC BLOOD PRESSURE: 109 MMHG | HEIGHT: 66 IN | WEIGHT: 154 LBS | TEMPERATURE: 97.7 F

## 2022-06-15 PROCEDURE — 99213 OFFICE O/P EST LOW 20 MIN: CPT | Mod: 25

## 2022-06-17 ENCOUNTER — NON-APPOINTMENT (OUTPATIENT)
Age: 52
End: 2022-06-17

## 2022-06-17 LAB
ALBUMIN SERPL ELPH-MCNC: 4.3 G/DL
ALP BLD-CCNC: 77 U/L
ALT SERPL-CCNC: 8 U/L
ANION GAP SERPL CALC-SCNC: 13 MMOL/L
APTT BLD: 33.8 SEC
AST SERPL-CCNC: 17 U/L
BASOPHILS # BLD AUTO: 0.03 K/UL
BASOPHILS NFR BLD AUTO: 0.3 %
BILIRUB SERPL-MCNC: 0.2 MG/DL
BUN SERPL-MCNC: 19 MG/DL
CALCIUM SERPL-MCNC: 9.5 MG/DL
CHLORIDE SERPL-SCNC: 105 MMOL/L
CO2 SERPL-SCNC: 23 MMOL/L
CREAT SERPL-MCNC: 0.98 MG/DL
EGFR: 69 ML/MIN/1.73M2
EOSINOPHIL # BLD AUTO: 0 K/UL
EOSINOPHIL NFR BLD AUTO: 0 %
GLUCOSE SERPL-MCNC: 121 MG/DL
HCT VFR BLD CALC: 38.2 %
HGB BLD-MCNC: 12.5 G/DL
IMM GRANULOCYTES NFR BLD AUTO: 0.2 %
INR PPP: 1.18 RATIO
LYMPHOCYTES # BLD AUTO: 3.32 K/UL
LYMPHOCYTES NFR BLD AUTO: 38.2 %
MAN DIFF?: NORMAL
MCHC RBC-ENTMCNC: 28.5 PG
MCHC RBC-ENTMCNC: 32.7 GM/DL
MCV RBC AUTO: 87 FL
MONOCYTES # BLD AUTO: 0.79 K/UL
MONOCYTES NFR BLD AUTO: 9.1 %
NEUTROPHILS # BLD AUTO: 4.54 K/UL
NEUTROPHILS NFR BLD AUTO: 52.2 %
PLATELET # BLD AUTO: 235 K/UL
POTASSIUM SERPL-SCNC: 4.3 MMOL/L
PROT SERPL-MCNC: 7.8 G/DL
PT BLD: 14 SEC
RBC # BLD: 4.39 M/UL
RBC # FLD: 17.9 %
SODIUM SERPL-SCNC: 141 MMOL/L
WBC # FLD AUTO: 8.7 K/UL

## 2022-06-20 ENCOUNTER — APPOINTMENT (OUTPATIENT)
Dept: PULMONOLOGY | Facility: CLINIC | Age: 52
End: 2022-06-20
Payer: COMMERCIAL

## 2022-06-20 ENCOUNTER — OUTPATIENT (OUTPATIENT)
Dept: OUTPATIENT SERVICES | Facility: HOSPITAL | Age: 52
LOS: 1 days | End: 2022-06-20

## 2022-06-20 VITALS
HEIGHT: 66 IN | BODY MASS INDEX: 25.71 KG/M2 | OXYGEN SATURATION: 99 % | WEIGHT: 160 LBS | DIASTOLIC BLOOD PRESSURE: 83 MMHG | RESPIRATION RATE: 16 BRPM | HEART RATE: 94 BPM | TEMPERATURE: 97.2 F | SYSTOLIC BLOOD PRESSURE: 125 MMHG

## 2022-06-20 VITALS
SYSTOLIC BLOOD PRESSURE: 102 MMHG | OXYGEN SATURATION: 99 % | WEIGHT: 160.06 LBS | DIASTOLIC BLOOD PRESSURE: 72 MMHG | RESPIRATION RATE: 14 BRPM | HEIGHT: 66 IN | HEART RATE: 79 BPM | TEMPERATURE: 97 F

## 2022-06-20 DIAGNOSIS — R94.2 ABNORMAL RESULTS OF PULMONARY FUNCTION STUDIES: ICD-10-CM

## 2022-06-20 DIAGNOSIS — Z98.890 OTHER SPECIFIED POSTPROCEDURAL STATES: Chronic | ICD-10-CM

## 2022-06-20 DIAGNOSIS — R05.9 COUGH, UNSPECIFIED: ICD-10-CM

## 2022-06-20 DIAGNOSIS — R91.8 OTHER NONSPECIFIC ABNORMAL FINDING OF LUNG FIELD: ICD-10-CM

## 2022-06-20 DIAGNOSIS — Z01.818 ENCOUNTER FOR OTHER PREPROCEDURAL EXAMINATION: ICD-10-CM

## 2022-06-20 DIAGNOSIS — R59.0 LOCALIZED ENLARGED LYMPH NODES: ICD-10-CM

## 2022-06-20 LAB
HCG SERPL-ACNC: <5 MIU/ML — SIGNIFICANT CHANGE UP
RAPID RVP RESULT: NOT DETECTED
SARS-COV-2 RNA PNL RESP NAA+PROBE: NOT DETECTED

## 2022-06-20 PROCEDURE — 99205 OFFICE O/P NEW HI 60 MIN: CPT

## 2022-06-20 PROCEDURE — 99406 BEHAV CHNG SMOKING 3-10 MIN: CPT

## 2022-06-20 RX ORDER — SODIUM CHLORIDE 9 MG/ML
1000 INJECTION, SOLUTION INTRAVENOUS
Refills: 0 | Status: DISCONTINUED | OUTPATIENT
Start: 2022-06-22 | End: 2022-07-06

## 2022-06-20 NOTE — DISCUSSION/SUMMARY
[FreeTextEntry1] : CT and PET scan highly suggestive of primary bronchogenic carcinoma with lymph node involvement.\par Current every day smoker.\par Chest discomfort may be related to above.

## 2022-06-20 NOTE — HISTORY OF PRESENT ILLNESS
[Current] : current [TextBox_4] : \par Interventional Pulmonology Consultation/Visit Note\par \par This is a 51-year-old female referred to the interventional pulmonology clinic for mediastinal adenopathy lung mass abnormal PET CT.  The history of cough for 1 to 2 weeks and was seen by her pulmonologist where imaging was done and the lung mass and adenopathy was noted.  The patient did have an x-ray about 6 months ago which demonstrated a right upper lobe pulmonary nodule but has not received any follow-up for the same.  Given the nodule CT was obtained which started showed a right upper lobe mass with associated mediastinal adenopathy.  A PET/CT was performed which showed intense uptake in the right upper lobe lesion as well as in the mediastinum.\par \par The patient was thus referred to the interventional pulmonology clinic for flexible bronchoscopy with endobronchial ultrasound-guided lymph node biopsy.  The patient currently is minimally symptomatic.  Does have active history of smoking.  Is working on quitting.

## 2022-06-20 NOTE — PHYSICAL EXAM
[No Acute Distress] : no acute distress [Normal Oropharynx] : normal oropharynx [Normal Appearance] : normal appearance [No Neck Mass] : no neck mass [Normal Rate/Rhythm] : normal rate/rhythm [Normal S1, S2] : normal s1, s2 [No Murmurs] : no murmurs [No Resp Distress] : no resp distress [Normal to Percussion] : normal to percussion [No Abnormalities] : no abnormalities [Benign] : benign [Normal Gait] : normal gait [No Cyanosis] : no cyanosis [No Edema] : no edema [FROM] : FROM [Normal Color/ Pigmentation] : normal color/ pigmentation [No Focal Deficits] : no focal deficits [Oriented x3] : oriented x3 [Normal Affect] : normal affect [TextBox_68] : Occasional wheeze on right.  Good air entry. [TextBox_105] : Clubbing

## 2022-06-20 NOTE — ASSESSMENT
[FreeTextEntry1] : Discussed results with patient.\par Recommend fiberoptic bronchoscopy with EBUS for diagnosis and staging.  Procedure was discussed as well will be risks and benefits.  Patient agrees and will schedule.\par Discontinue smoking.\par \par June 20, 2022.\par Addendum: No medical or pulmonary contraindications to Bronchoscopy.

## 2022-06-20 NOTE — HISTORY OF PRESENT ILLNESS
[Current] : current [TextBox_4] : Right chest sore\par Sore lying on right side.\par Smoking.\par Denies shortness of breath.\par \par  [TextBox_11] : 1/4 [TextBox_13] : 24

## 2022-06-20 NOTE — H&P PST ADULT - HISTORY OF PRESENT ILLNESS
52y/o female scheduled for flexible endobronchial US bronchoscopy on 6/22/2022.  Pt states, "cxr shows small mass on right lung 12/2021.  Now having further evaluation."

## 2022-06-20 NOTE — H&P PST ADULT - NSANTHOSAYNRD_GEN_A_CORE
No. VAL screening performed.  STOP BANG Legend: 0-2 = LOW Risk; 3-4 = INTERMEDIATE Risk; 5-8 = HIGH Risk

## 2022-06-20 NOTE — REVIEW OF SYSTEMS
[Fever] : no fever [Fatigue] : no fatigue [Dry Eyes] : no dry eyes [Cough] : cough [Hemoptysis] : no hemoptysis [Sputum] : no sputum [Dyspnea] : no dyspnea [Pleuritic Pain] : no pleuritic pain [Wheezing] : no wheezing [SOB on Exertion] : no sob on exertion [Chest Discomfort] : no chest discomfort [Orthopnea] : no orthopnea [Palpitations] : no palpitations [Hay Fever] : no hay fever [GERD] : gerd [Negative] : Endocrine

## 2022-06-20 NOTE — ASSESSMENT
[FreeTextEntry1] : This a 51-year-old female with active smoking history who was presented to the interventional pulmonology clinic for abnormal lung findings including a lung mass and mediastinal adenopathy.  The primary concern at this point is a malignancy with locally advanced disease involving the lymph nodes.\par \par The patient will need a diagnostic and staging EBUS to both determine the histopathology as well as the extent of disease.  The patient does not have any extrathoracic disease on the PET scan.  Once the biopsy is performed the patient will need referral to medical oncology radiation oncology and thoracic surgery depending upon the histologic findings.\par \par We discussed the role of flexible bronchoscopy with endobronchial sound guided lymph node biopsy for diagnosis and staging. The risk and benefits of EBUS including the risk of bleeding and risk of pneumothorax was discussed with the patient and he demonstrated understanding.  The patient is agreeable to proceed with the planned procedure.\par \par She had labs drawn in the last week.  She will need medical clearance.  COVID swab was done in the office today.  She is planned for procedure on this Wednesday.  Further plans based upon the histopathology results.  She will need MRI brain to finish staging if this is a malignancy.\par \par Patrice Castaneda MD\par Director of Interventional Pulmonology & Bronchoscopy\par . \par Division of Pulmonary, Critical Care & Sleep Medicine\par [facility].\par \par \par

## 2022-06-20 NOTE — REASON FOR VISIT
[Consultation] : a consultation [TextBox_44] : Interventional pulmonology consultation for lung nodule mediastinal adenopathy flexible bronchoscopy with EBUS [TextBox_13] : Dr. Grayson Savage

## 2022-06-20 NOTE — COUNSELING
[Cessation strategies including cessation program discussed] : Cessation strategies including cessation program discussed [Encouraged to pick a quit date and identify support needed to quit] : Encouraged to pick a quit date and identify support needed to quit [Yes] : Willing to quit smoking [FreeTextEntry1] : 8

## 2022-06-20 NOTE — DISCUSSION/SUMMARY
[FreeTextEntry1] : Patient CT scan and PET scan were independently reviewed.  FDG avid right upper lobe partially necrotic lung mass with associated mediastinal and right perihilar adenopathy concerning for metastatic disease.

## 2022-06-20 NOTE — H&P PST ADULT - PROBLEM SELECTOR PLAN 1
Pt scheduled for flexible endobronchial US bronchoscopy on 6/22/2022.  labs done results pending, Urine cup provided, pt instructed to obtain preop covid testing.  Preop teaching done, pt able to verbalize understanding.   medications day of procedure- pepcid

## 2022-06-20 NOTE — CONSULT LETTER
[Dear  ___] : Dear  [unfilled], [Consult Letter:] : I had the pleasure of evaluating your patient, [unfilled]. [Please see my note below.] : Please see my note below. [Consult Closing:] : Thank you very much for allowing me to participate in the care of this patient.  If you have any questions, please do not hesitate to contact me. [Sincerely,] : Sincerely, [FreeTextEntry3] : Patrice Castaneda MD\par Director of Interventional Pulmonology & Bronchoscopy\par . \par Division of Pulmonary, Critical Care & Sleep Medicine\par [facility].\par \par

## 2022-06-20 NOTE — PROCEDURE
[FreeTextEntry1] : Pulmonary function testing of May 18, 2022.\par Normal Flow Rates Normal Lung Volumes. There is a moderate diffusion impairment. \par \par \par  1 / 1 Kirill Martines   \par  \par Report date: 12/9/2021 \par  \par  View Order\par \par \par (Report matches study selected on Patient History pane)\par \par \par   \par \par \par \par \par ACC: [id number] EXAM: XR CHEST AP OR PA 1V\par \par PROCEDURE DATE: 12/09/2021\par \par \par \par INTERPRETATION: CLINICAL INFORMATION: Chest pain.\par \par TECHNIQUE: PA view of the chest.\par \par COMPARISON: Chest radiograph from 4/5/2021 and 8/11/2017\par \par FINDINGS:\par \par A right upper lung rounded opacity is increased in size and conspicuity from prior imaging. No pleural effusion or pneumothorax. Heart size is normal. No acute bone or soft tissue pathology.\par \par IMPRESSION:\par \par A right upper lung rounded opacity is increased in size and conspicuity from prior imaging. Findings are concerning for neoplastic etiology. A CT chest is recommended for further evaluation.\par \par Dr. SHANNON Ly was informed with readback confirmation on 12/9/2021 at 3:32 PM.\par \par --- End of Report ---\par \par \par \par \par CIRO FRITZ MD; Resident Radiology\par This document has been electronically signed.\par KIRILL MARTINES MD; Attending Radiologist\par This document has been electronically signed. Dec 9 2021 5:01PM

## 2022-06-20 NOTE — H&P PST ADULT - RESPIRATORY
normal/clear to auscultation bilaterally/no wheezes/no rales/no rhonchi/breath sounds equal/good air movement

## 2022-06-22 ENCOUNTER — TRANSCRIPTION ENCOUNTER (OUTPATIENT)
Age: 52
End: 2022-06-22

## 2022-06-22 ENCOUNTER — RESULT REVIEW (OUTPATIENT)
Age: 52
End: 2022-06-22

## 2022-06-22 ENCOUNTER — OUTPATIENT (OUTPATIENT)
Dept: OUTPATIENT SERVICES | Facility: HOSPITAL | Age: 52
LOS: 1 days | Discharge: HOME | End: 2022-06-22
Payer: COMMERCIAL

## 2022-06-22 ENCOUNTER — APPOINTMENT (OUTPATIENT)
Dept: PULMONOLOGY | Facility: HOSPITAL | Age: 52
End: 2022-06-22

## 2022-06-22 VITALS
DIASTOLIC BLOOD PRESSURE: 83 MMHG | OXYGEN SATURATION: 97 % | RESPIRATION RATE: 16 BRPM | HEART RATE: 89 BPM | SYSTOLIC BLOOD PRESSURE: 111 MMHG

## 2022-06-22 VITALS
HEART RATE: 97 BPM | TEMPERATURE: 98 F | DIASTOLIC BLOOD PRESSURE: 72 MMHG | OXYGEN SATURATION: 97 % | SYSTOLIC BLOOD PRESSURE: 110 MMHG | WEIGHT: 160.06 LBS | HEIGHT: 66 IN | RESPIRATION RATE: 17 BRPM

## 2022-06-22 DIAGNOSIS — Z98.890 OTHER SPECIFIED POSTPROCEDURAL STATES: Chronic | ICD-10-CM

## 2022-06-22 DIAGNOSIS — R59.0 LOCALIZED ENLARGED LYMPH NODES: ICD-10-CM

## 2022-06-22 LAB — HCG UR QL: NEGATIVE — SIGNIFICANT CHANGE UP

## 2022-06-22 PROCEDURE — 88173 CYTOPATH EVAL FNA REPORT: CPT | Mod: 26

## 2022-06-22 PROCEDURE — 31645 BRNCHSC W/THER ASPIR 1ST: CPT | Mod: GC

## 2022-06-22 PROCEDURE — 88305 TISSUE EXAM BY PATHOLOGIST: CPT | Mod: 26

## 2022-06-22 PROCEDURE — 31641 BRONCHOSCOPY TREAT BLOCKAGE: CPT | Mod: GC

## 2022-06-22 PROCEDURE — 31652 BRONCH EBUS SAMPLNG 1/2 NODE: CPT | Mod: GC

## 2022-06-22 PROCEDURE — 88342 IMHCHEM/IMCYTCHM 1ST ANTB: CPT | Mod: 26,59

## 2022-06-22 PROCEDURE — 88341 IMHCHEM/IMCYTCHM EA ADD ANTB: CPT | Mod: 26,59

## 2022-06-22 PROCEDURE — 88360 TUMOR IMMUNOHISTOCHEM/MANUAL: CPT | Mod: 26

## 2022-06-22 RX ORDER — FENTANYL CITRATE 50 UG/ML
25 INJECTION INTRAVENOUS
Refills: 0 | Status: DISCONTINUED | OUTPATIENT
Start: 2022-06-22 | End: 2022-06-22

## 2022-06-22 RX ORDER — ONDANSETRON 8 MG/1
4 TABLET, FILM COATED ORAL ONCE
Refills: 0 | Status: DISCONTINUED | OUTPATIENT
Start: 2022-06-22 | End: 2022-07-06

## 2022-06-22 RX ORDER — FENTANYL CITRATE 50 UG/ML
50 INJECTION INTRAVENOUS
Refills: 0 | Status: DISCONTINUED | OUTPATIENT
Start: 2022-06-22 | End: 2022-06-22

## 2022-06-22 RX ORDER — SODIUM CHLORIDE 9 MG/ML
1000 INJECTION, SOLUTION INTRAVENOUS
Refills: 0 | Status: DISCONTINUED | OUTPATIENT
Start: 2022-06-22 | End: 2022-07-06

## 2022-06-22 RX ADMIN — SODIUM CHLORIDE 75 MILLILITER(S): 9 INJECTION, SOLUTION INTRAVENOUS at 13:44

## 2022-06-22 NOTE — BRIEF OPERATIVE NOTE - COMMENTS
Extrinsic compression of anterior trachea noted. Pt will need to be evaluated for stent in future. She should follow up with interventional pulmonology and oncology

## 2022-06-22 NOTE — ASU DISCHARGE PLAN (ADULT/PEDIATRIC) - PROCEDURE
Flexible bronchoscopy with EBUS guided biopsy of 4R and 11L lymph nodes. Argon plasma coagulation of RUL

## 2022-06-22 NOTE — ASU DISCHARGE PLAN (ADULT/PEDIATRIC) - CARE PROVIDER_API CALL
Patrice Castaneda)  Critical Care Medicine; Internal Medicine; Pulmonary Disease  [address] [place] [address]  Phone: [phone]  Fax: [phone]  Follow Up Time:

## 2022-06-22 NOTE — ASU DISCHARGE PLAN (ADULT/PEDIATRIC) - FOLLOW UP APPOINTMENTS
[id number] may also call Recovery Room (PACU) 24/7 @ [phone]/[facility]

## 2022-06-22 NOTE — ASU DISCHARGE PLAN (ADULT/PEDIATRIC) - ASU DC SPECIAL INSTRUCTIONSFT
Pt will need to follow up with Dr. Castaneda 7/18/22 Pt will need to follow up with Dr. Castaneda 7/18/22. She should also follow up with Oncology

## 2022-06-22 NOTE — ASU DISCHARGE PLAN (ADULT/PEDIATRIC) - NURSING INSTRUCTIONS
You received IV Tylenol for pain management at _1230__. Please DO NOT take any Tylenol (Acetaminophen) containing products, such as Vicodin, Percocet, Excedrin, and cold medications for the next 6 hours (until _630__ PM). DO NOT TAKE MORE THAN 3000 MG OF TYLENOL in a 24 hour period.

## 2022-06-22 NOTE — BRIEF OPERATIVE NOTE - OPERATION/FINDINGS
Flexible bronchoscopy with EBUS guided biopsy of 4R and 11L lymph nodes. Argon plasma coagulation of RUL Flexible bronchoscopy with EBUS guided biopsy of 4R and 11L lymph nodes. Argon plasma coagulation of RUL endobronchial lesion    #[id number]

## 2022-06-22 NOTE — BRIEF OPERATIVE NOTE - NSICDXBRIEFPROCEDURE_GEN_ALL_CORE_FT
PROCEDURES:  Flexible bronchoscopy 22-Jun-2022 12:48:24  Rojelio Solis  Bronchoscopy, with argon plasma coagulation 22-Jun-2022 12:48:41  Rojelio Solis  Bronchoscopy, with EBUS and 1 or 2 lymph node sampling 22-Jun-2022 12:49:12  Rojelio Solis

## 2022-06-23 LAB — NON-GYNECOLOGICAL CYTOLOGY STUDY: SIGNIFICANT CHANGE UP

## 2022-06-30 PROBLEM — R91.8 OTHER NONSPECIFIC ABNORMAL FINDING OF LUNG FIELD: Chronic | Status: ACTIVE | Noted: 2022-06-20

## 2022-07-01 ENCOUNTER — APPOINTMENT (OUTPATIENT)
Dept: PULMONOLOGY | Facility: CLINIC | Age: 52
End: 2022-07-01

## 2022-07-01 DIAGNOSIS — C34.90 MALIGNANT NEOPLASM OF UNSPECIFIED PART OF UNSPECIFIED BRONCHUS OR LUNG: ICD-10-CM

## 2022-07-01 DIAGNOSIS — R59.0 LOCALIZED ENLARGED LYMPH NODES: ICD-10-CM

## 2022-07-01 PROCEDURE — 99214 OFFICE O/P EST MOD 30 MIN: CPT | Mod: 95

## 2022-07-05 ENCOUNTER — OUTPATIENT (OUTPATIENT)
Dept: OUTPATIENT SERVICES | Facility: HOSPITAL | Age: 52
LOS: 1 days | Discharge: HOME | End: 2022-07-05

## 2022-07-05 DIAGNOSIS — D02.20 CARCINOMA IN SITU OF UNSPECIFIED BRONCHUS AND LUNG: ICD-10-CM

## 2022-07-05 DIAGNOSIS — Z98.890 OTHER SPECIFIED POSTPROCEDURAL STATES: Chronic | ICD-10-CM

## 2022-07-07 ENCOUNTER — NON-APPOINTMENT (OUTPATIENT)
Age: 52
End: 2022-07-07

## 2022-07-07 ENCOUNTER — RESULT REVIEW (OUTPATIENT)
Age: 52
End: 2022-07-07

## 2022-07-07 ENCOUNTER — APPOINTMENT (OUTPATIENT)
Dept: HEMATOLOGY ONCOLOGY | Facility: CLINIC | Age: 52
End: 2022-07-07

## 2022-07-07 VITALS
RESPIRATION RATE: 16 BRPM | TEMPERATURE: 97.2 F | BODY MASS INDEX: 25.65 KG/M2 | SYSTOLIC BLOOD PRESSURE: 93 MMHG | OXYGEN SATURATION: 99 % | WEIGHT: 159.61 LBS | DIASTOLIC BLOOD PRESSURE: 60 MMHG | HEIGHT: 65.98 IN | HEART RATE: 87 BPM

## 2022-07-07 DIAGNOSIS — Z87.19 PERSONAL HISTORY OF OTHER DISEASES OF THE DIGESTIVE SYSTEM: ICD-10-CM

## 2022-07-07 LAB
BASOPHILS # BLD AUTO: 0.03 K/UL — SIGNIFICANT CHANGE UP (ref 0–0.2)
BASOPHILS NFR BLD AUTO: 0.4 % — SIGNIFICANT CHANGE UP (ref 0–2)
EOSINOPHIL # BLD AUTO: 0.14 K/UL — SIGNIFICANT CHANGE UP (ref 0–0.5)
EOSINOPHIL NFR BLD AUTO: 2 % — SIGNIFICANT CHANGE UP (ref 0–6)
HCT VFR BLD CALC: 35.5 % — SIGNIFICANT CHANGE UP (ref 34.5–45)
HGB BLD-MCNC: 11.5 G/DL — SIGNIFICANT CHANGE UP (ref 11.5–15.5)
IMM GRANULOCYTES NFR BLD AUTO: 0.3 % — SIGNIFICANT CHANGE UP (ref 0–1.5)
LYMPHOCYTES # BLD AUTO: 2.46 K/UL — SIGNIFICANT CHANGE UP (ref 1–3.3)
LYMPHOCYTES # BLD AUTO: 34.3 % — SIGNIFICANT CHANGE UP (ref 13–44)
MCHC RBC-ENTMCNC: 28.2 PG — SIGNIFICANT CHANGE UP (ref 27–34)
MCHC RBC-ENTMCNC: 32.4 G/DL — SIGNIFICANT CHANGE UP (ref 32–36)
MCV RBC AUTO: 87 FL — SIGNIFICANT CHANGE UP (ref 80–100)
MONOCYTES # BLD AUTO: 0.68 K/UL — SIGNIFICANT CHANGE UP (ref 0–0.9)
MONOCYTES NFR BLD AUTO: 9.5 % — SIGNIFICANT CHANGE UP (ref 2–14)
NEUTROPHILS # BLD AUTO: 3.84 K/UL — SIGNIFICANT CHANGE UP (ref 1.8–7.4)
NEUTROPHILS NFR BLD AUTO: 53.5 % — SIGNIFICANT CHANGE UP (ref 43–77)
NRBC # BLD: 0 /100 WBCS — SIGNIFICANT CHANGE UP (ref 0–0)
PLATELET # BLD AUTO: 221 K/UL — SIGNIFICANT CHANGE UP (ref 150–400)
RBC # BLD: 4.08 M/UL — SIGNIFICANT CHANGE UP (ref 3.8–5.2)
RBC # FLD: 17.1 % — HIGH (ref 10.3–14.5)
WBC # BLD: 7.17 K/UL — SIGNIFICANT CHANGE UP (ref 3.8–10.5)
WBC # FLD AUTO: 7.17 K/UL — SIGNIFICANT CHANGE UP (ref 3.8–10.5)

## 2022-07-07 PROCEDURE — 99205 OFFICE O/P NEW HI 60 MIN: CPT

## 2022-07-07 PROCEDURE — G2212 PROLONG OUTPT/OFFICE VIS: CPT

## 2022-07-07 RX ORDER — FOLIC ACID 1 MG/1
1 TABLET ORAL DAILY
Qty: 30 | Refills: 5 | Status: COMPLETED | COMMUNITY
Start: 2022-07-07 | End: 2023-01-01

## 2022-07-07 NOTE — PHYSICAL EXAM
[Fully active, able to carry on all pre-disease performance without restriction] : Status 0 - Fully active, able to carry on all pre-disease performance without restriction [Normal] : affect appropriate [de-identified] : MMM O/P Clear [de-identified] : No icterus  [de-identified] : S1 S2  [de-identified] : Supple No LAD  [de-identified] : No edema [de-identified] : Soft NT/ND No masses [de-identified] : No spine/CVA tenderness

## 2022-07-07 NOTE — RESULTS/DATA
[FreeTextEntry1] : Images Reviewed/Interpreted:\par \par -CXR 12/9/21:  A right upper lung rounded opacity is increased in size and conspicuity from prior imaging. Findings are concerning for neoplastic etiology. A CT chest is recommended for further evaluation.\par \par -PET/CT 6/1/22:  Abnormal FDG-PET/CT scan.\par 1. FDG avid lobulated partially necrotic right upper lung mass compatible with malignancy.\par 2. FDG avid mediastinal and right perihilar masses, suspicious for metastases.\par \par

## 2022-07-07 NOTE — ASSESSMENT
[Palliative Care Plan] : not applicable at this time [FreeTextEntry1] : NSCLC with adenocarcinoma histology with clinically stage IIIB (T4N2) disease with bulky intrathoracic lymphadenopathy that is unresectable.  Tumor tested PD-L1 negative.  Recommend:\par – Obtain an MRI of her brain to complete her staging work-up.  This was already ordered by Dr. Marie and authorization was obtained.  Provided her with the phone number to schedule an appointment.\par – Follow-up molecular testing on her biopsy, which is currently pending.\par – Recommend treatment with definitive concurrent chemotherapy and thoracic RT.  Patient needs to see radiation oncology; request for appointment was sent\par – Recommend systemic chemotherapy with a combination of carboplatin AUC 5 and pemetrexed 500 mg per metered squared administered IV every 3 weeks x 4 planned cycles.\par – Risks, benefits and side effects of systemic therapy were discussed with the patient who agreed to proceed and signed the consent form; drug info sheets provided.\par – The appropriate antiemetic and prophylactic medications were prescribed\par – Obtain blood work in the office today in preparation for systemic therapy.\par – We will review her case at thoracic tumor board\par – We will arrange for the start of chemotherapy for next week.  Assuming radiation oncology agrees with this management, her treatment can roll into concurrent thoracic RT.\par – Patient will need follow-up following C1 of chemotherapy to assess tolerability midcycle.\par – Patient does have compression of the distal trachea from the enlarged pretracheal lymph node noted during bronchoscopy which will require clinical and radiologic follow-up and possibly airway intervention.\par – All questions answered to her apparent satisfaction

## 2022-07-07 NOTE — HISTORY OF PRESENT ILLNESS
[Disease: _____________________] : Disease: [unfilled] [T: ___] : T[unfilled] [N: ___] : N[unfilled] [AJCC Stage: ____] : AJCC Stage: [unfilled] [de-identified] : Ms. Rojas is a 51F with a past medical history of GERD and current everyday smoker (1/4 PPD x 24 years) who presents to the medical oncology clinic for an initial evaluation. She was referred for evaluation of a mass in the upper lobe of her right lung, biopsy proven to be adenocarcinoma with neuroendocrine features. Her history goes back about 6 months when she was evaluated for chronic GERD and a cough, for which a CXR was done and demonstrated a nodule. No follow up was done at that time. She represented in May for persistent GERD and a cough. A CXR was done and revealed a large RUL mass increased in size from the previous study. Her pulmonologist then sent her for a PET/CT, which revealed an intensely FDG-avid RUL mass. She was referred to interventional pulmonology who obtained a tissue sample, confirming NSCLC, adenocarcinoma subtype. She is pending a MRI Brain for staging, but clinically is a Stage IIIB, T4 N2 M0. She has great funtional status, ECOG 0, lives at home with her fiance, works as a home health aid. \par \par She reports a cough that is mostly dry, occasionally productive of whitish sputum.  No hemoptysis.  Denies SOB.  Weight stable.  \par \par All other ROS otherwise as outlined or N/C.   [de-identified] : – Lymph node, 4R EBUS guided FNA 6/22/2022: Positive for malignant cells.  Metastatic malignant neoplasm.  IHC is positive for TTF-1 and synaptophysin while negative for p40, chromogranin and CD56.  Ki-67 is 80%.  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  Molecular studies are pending.

## 2022-07-08 LAB
ALBUMIN SERPL ELPH-MCNC: 4.3 G/DL
ALP BLD-CCNC: 80 U/L
ALT SERPL-CCNC: 7 U/L
ANION GAP SERPL CALC-SCNC: 11 MMOL/L
AST SERPL-CCNC: 15 U/L
BILIRUB SERPL-MCNC: <0.2 MG/DL
BUN SERPL-MCNC: 20 MG/DL
CALCIUM SERPL-MCNC: 9.4 MG/DL
CEA SERPL-MCNC: 1.8 NG/ML
CHLORIDE SERPL-SCNC: 105 MMOL/L
CO2 SERPL-SCNC: 25 MMOL/L
CREAT SERPL-MCNC: 1 MG/DL
EGFR: 68 ML/MIN/1.73M2
GLUCOSE SERPL-MCNC: 104 MG/DL
HBV CORE IGG+IGM SER QL: NONREACTIVE
HBV SURFACE AB SER QL: NONREACTIVE
HBV SURFACE AG SER QL: NONREACTIVE
HCV AB SER QL: NONREACTIVE
HCV S/CO RATIO: 0.17 S/CO
LDH SERPL-CCNC: 224 U/L
POTASSIUM SERPL-SCNC: 4.6 MMOL/L
PROT SERPL-MCNC: 7.6 G/DL
SODIUM SERPL-SCNC: 142 MMOL/L

## 2022-07-11 PROBLEM — C34.90 MALIGNANT NEOPLASM OF UNSPECIFIED PART OF UNSPECIFIED BRONCHUS OR LUNG: Chronic | Status: ACTIVE | Noted: 2022-07-08

## 2022-07-11 PROBLEM — C34.90 ENDOBRONCHIAL CANCER: Status: ACTIVE | Noted: 2022-07-11

## 2022-07-11 PROBLEM — R59.0 MEDIASTINAL ADENOPATHY: Status: ACTIVE | Noted: 2022-06-20

## 2022-07-11 NOTE — REASON FOR VISIT
[Home] : at home, [unfilled] , at the time of the visit. [Medical Office: ([facility])___] : at the medical office located in  [Patient] : the patient [Consultation] : a consultation [TextBox_44] : Interventional pulmonology consultation for lung nodule mediastinal adenopathy flexible bronchoscopy with EBUS [TextBox_13] : Dr. Grayson Savage

## 2022-07-11 NOTE — PHYSICAL EXAM
[No Acute Distress] : no acute distress [No Resp Distress] : no resp distress [Oriented x3] : oriented x3 [Normal Insight/judgment] : normal insight/judgment [TextBox_2] : Limited exam - Tele visit [TextBox_44] : Limited exam - Tele visit [TextBox_11] : Limited exam - Tele visit [TextBox_54] : Limited exam - Tele visit [TextBox_68] : Limited exam - Tele visit [TextBox_80] : Limited exam - Tele visit [TextBox_89] : Limited exam - Tele visit [TextBox_99] : Limited exam - Tele visit [TextBox_105] : Limited exam - Tele visit [TextBox_125] : Limited exam - Tele visit [TextBox_132] : Limited exam - Tele visit [TextBox_140] : Limited exam - Tele visit

## 2022-07-11 NOTE — ASSESSMENT
[FreeTextEntry1] : This a 51-year-old female with active smoking history who was presented to the interventional pulmonology clinic for abnormal lung findings including a lung mass and mediastinal adenopathy concerning for lung cancer.\par \par The patient underwent flexible bronchoscopy with endobronchial sound guided lymph node biopsy.  The pathology is demonstrating non-small cell carcinoma with neuroendocrine features.  Molecular testing is pending.  Ki-67 is 80%.  PD-L1 is negative.  On the bronchoscopy the patient was also noted to have endobronchial component in the right upper lobe with extrinsic compression of the right upper lobe.  She underwent ablation of the endobronchial component.  There is also noted to be narrowing of the distal trachea from the pretracheal lymph node.  This was noted to be about 40%.  The patient does not currently have any symptoms of airway compression or dyspnea from the airway compression.  She will require close monitoring of the airway clinically and radiographically.\par \par She was referred to the thoracic medical oncology service for consideration for systemic therapy as well as referred to radiation oncology.  She should be considered for radiation treatment given airway narrowing and airway involvement.  She will require continued close clinical, radiographic and possible bronchoscopic surveillance to evaluate the airway. \par \par The patient was advised to contact the clinic immediately for any worsening of dyspnea was noted or any hemoptysis was noted.  All the questions were answered.  MRI of the brain was ordered to complete staging.  Emotional support was provided.  Continued smoking cessation was encouraged.  Case was discussed extensively with the thoracic oncology and radiation oncology teams.\par \par Patrice Castaneda MD\par Director of Interventional Pulmonology & Bronchoscopy\par . \par Division of Pulmonary, Critical Care & Sleep Medicine\par [facility].\par \par \par

## 2022-07-11 NOTE — HISTORY OF PRESENT ILLNESS
[Current] : current [TextBox_4] : \par Interventional Pulmonology Consultation/Visit Note\par \par This is a 51-year-old female referred to the interventional pulmonology clinic for mediastinal adenopathy lung mass abnormal PET CT.  The history of cough for 1 to 2 weeks and was seen by her pulmonologist where imaging was done and the lung mass and adenopathy was noted.  The patient did have an x-ray about 6 months ago which demonstrated a right upper lobe pulmonary nodule but has not received any follow-up for the same.  Given the nodule CT was obtained which started showed a right upper lobe mass with associated mediastinal adenopathy.  A PET/CT was performed which showed intense uptake in the right upper lobe lesion as well as in the mediastinum.\par \par The patient was thus referred to the interventional pulmonology clinic for flexible bronchoscopy with endobronchial ultrasound-guided lymph node biopsy.  The patient underwent flexible bronchoscopy with airway inspection tumor ablation as well as endobronchial ultrasound-guided lymph node biopsy.  She was noted to extrinsic compression of the right upper lobe with endobronchial component.  In addition she was noted to extrinsic compression of the distal trachea from the enlarged pretracheal l lymphadenopathy.  The patient tolerated the procedure well.\par \par Currently she has a cough which is mostly dry occasionally productive of whitish sputum.  No hemoptysis.  Denies shortness of breath.  Is anxious about her diagnosis.

## 2022-07-11 NOTE — REVIEW OF SYSTEMS
[Fever] : no fever [Fatigue] : no fatigue [Dry Eyes] : no dry eyes [Cough] : cough [Hemoptysis] : no hemoptysis [Sputum] : no sputum [Dyspnea] : no dyspnea [Wheezing] : no wheezing [Pleuritic Pain] : no pleuritic pain [SOB on Exertion] : no sob on exertion [Chest Discomfort] : no chest discomfort [Orthopnea] : no orthopnea [Palpitations] : no palpitations [Hay Fever] : no hay fever [GERD] : gerd [Negative] : Endocrine

## 2022-07-12 ENCOUNTER — NON-APPOINTMENT (OUTPATIENT)
Age: 52
End: 2022-07-12

## 2022-07-13 ENCOUNTER — NON-APPOINTMENT (OUTPATIENT)
Age: 52
End: 2022-07-13

## 2022-07-15 ENCOUNTER — APPOINTMENT (OUTPATIENT)
Dept: MRI IMAGING | Facility: CLINIC | Age: 52
End: 2022-07-15

## 2022-07-15 ENCOUNTER — OUTPATIENT (OUTPATIENT)
Dept: OUTPATIENT SERVICES | Facility: HOSPITAL | Age: 52
LOS: 1 days | End: 2022-07-15
Payer: COMMERCIAL

## 2022-07-15 ENCOUNTER — NON-APPOINTMENT (OUTPATIENT)
Age: 52
End: 2022-07-15

## 2022-07-15 DIAGNOSIS — Z98.890 OTHER SPECIFIED POSTPROCEDURAL STATES: Chronic | ICD-10-CM

## 2022-07-15 DIAGNOSIS — R05.9 COUGH, UNSPECIFIED: ICD-10-CM

## 2022-07-15 DIAGNOSIS — R91.8 OTHER NONSPECIFIC ABNORMAL FINDING OF LUNG FIELD: ICD-10-CM

## 2022-07-15 DIAGNOSIS — R94.8 ABNORMAL RESULTS OF FUNCTION STUDIES OF OTHER ORGANS AND SYSTEMS: ICD-10-CM

## 2022-07-15 PROCEDURE — 70553 MRI BRAIN STEM W/O & W/DYE: CPT

## 2022-07-15 PROCEDURE — 70553 MRI BRAIN STEM W/O & W/DYE: CPT | Mod: 26

## 2022-07-18 ENCOUNTER — APPOINTMENT (OUTPATIENT)
Dept: HEMATOLOGY ONCOLOGY | Facility: CLINIC | Age: 52
End: 2022-07-18

## 2022-07-18 ENCOUNTER — APPOINTMENT (OUTPATIENT)
Dept: INFUSION THERAPY | Facility: HOSPITAL | Age: 52
End: 2022-07-18

## 2022-07-18 ENCOUNTER — NON-APPOINTMENT (OUTPATIENT)
Age: 52
End: 2022-07-18

## 2022-07-18 DIAGNOSIS — R11.2 NAUSEA WITH VOMITING, UNSPECIFIED: ICD-10-CM

## 2022-07-18 DIAGNOSIS — C34.11 MALIGNANT NEOPLASM OF UPPER LOBE, RIGHT BRONCHUS OR LUNG: ICD-10-CM

## 2022-07-18 DIAGNOSIS — Z51.11 ENCOUNTER FOR ANTINEOPLASTIC CHEMOTHERAPY: ICD-10-CM

## 2022-07-18 PROCEDURE — 93010 ELECTROCARDIOGRAM REPORT: CPT

## 2022-07-19 ENCOUNTER — APPOINTMENT (OUTPATIENT)
Dept: RADIATION ONCOLOGY | Facility: CLINIC | Age: 52
End: 2022-07-19

## 2022-07-21 ENCOUNTER — NON-APPOINTMENT (OUTPATIENT)
Age: 52
End: 2022-07-21

## 2022-08-03 ENCOUNTER — APPOINTMENT (OUTPATIENT)
Dept: HEMATOLOGY ONCOLOGY | Facility: CLINIC | Age: 52
End: 2022-08-03

## 2022-08-03 ENCOUNTER — RESULT REVIEW (OUTPATIENT)
Age: 52
End: 2022-08-03

## 2022-08-03 VITALS
WEIGHT: 155.43 LBS | HEIGHT: 65.98 IN | HEART RATE: 82 BPM | OXYGEN SATURATION: 98 % | RESPIRATION RATE: 16 BRPM | SYSTOLIC BLOOD PRESSURE: 106 MMHG | BODY MASS INDEX: 24.98 KG/M2 | TEMPERATURE: 97.2 F | DIASTOLIC BLOOD PRESSURE: 69 MMHG

## 2022-08-03 LAB
BASOPHILS # BLD AUTO: 0.01 K/UL — SIGNIFICANT CHANGE UP (ref 0–0.2)
BASOPHILS NFR BLD AUTO: 0.2 % — SIGNIFICANT CHANGE UP (ref 0–2)
EOSINOPHIL # BLD AUTO: 0 K/UL — SIGNIFICANT CHANGE UP (ref 0–0.5)
EOSINOPHIL NFR BLD AUTO: 0 % — SIGNIFICANT CHANGE UP (ref 0–6)
HCT VFR BLD CALC: 35.8 % — SIGNIFICANT CHANGE UP (ref 34.5–45)
HGB BLD-MCNC: 12 G/DL — SIGNIFICANT CHANGE UP (ref 11.5–15.5)
IMM GRANULOCYTES NFR BLD AUTO: 0.2 % — SIGNIFICANT CHANGE UP (ref 0–1.5)
LYMPHOCYTES # BLD AUTO: 3.08 K/UL — SIGNIFICANT CHANGE UP (ref 1–3.3)
LYMPHOCYTES # BLD AUTO: 59.7 % — HIGH (ref 13–44)
MCHC RBC-ENTMCNC: 29.3 PG — SIGNIFICANT CHANGE UP (ref 27–34)
MCHC RBC-ENTMCNC: 33.5 G/DL — SIGNIFICANT CHANGE UP (ref 32–36)
MCV RBC AUTO: 87.5 FL — SIGNIFICANT CHANGE UP (ref 80–100)
MONOCYTES # BLD AUTO: 0.57 K/UL — SIGNIFICANT CHANGE UP (ref 0–0.9)
MONOCYTES NFR BLD AUTO: 11 % — SIGNIFICANT CHANGE UP (ref 2–14)
NEUTROPHILS # BLD AUTO: 1.49 K/UL — LOW (ref 1.8–7.4)
NEUTROPHILS NFR BLD AUTO: 28.9 % — LOW (ref 43–77)
NRBC # BLD: 0 /100 WBCS — SIGNIFICANT CHANGE UP (ref 0–0)
PLATELET # BLD AUTO: 154 K/UL — SIGNIFICANT CHANGE UP (ref 150–400)
RBC # BLD: 4.09 M/UL — SIGNIFICANT CHANGE UP (ref 3.8–5.2)
RBC # FLD: 16.6 % — HIGH (ref 10.3–14.5)
WBC # BLD: 5.16 K/UL — SIGNIFICANT CHANGE UP (ref 3.8–10.5)
WBC # FLD AUTO: 5.16 K/UL — SIGNIFICANT CHANGE UP (ref 3.8–10.5)

## 2022-08-03 PROCEDURE — 99214 OFFICE O/P EST MOD 30 MIN: CPT

## 2022-08-03 NOTE — PHYSICAL EXAM
[Fully active, able to carry on all pre-disease performance without restriction] : Status 0 - Fully active, able to carry on all pre-disease performance without restriction [Normal] : affect appropriate [de-identified] : No icterus  [de-identified] : MMM O/P Clear [de-identified] : Supple No LAD  [de-identified] : S1 S2  [de-identified] : No edema [de-identified] : No spine/CVA tenderness

## 2022-08-03 NOTE — HISTORY OF PRESENT ILLNESS
[Disease: _____________________] : Disease: [unfilled] [T: ___] : T[unfilled] [N: ___] : N[unfilled] [AJCC Stage: ____] : AJCC Stage: [unfilled] [de-identified] : 51F with significant smoking history who was evaluated for chronic GERD and a cough going on for about 6 months prior to her initial consultation.  A CXR was done and demonstrated a nodule. No follow up was done at that time. She returned in May 2022 for persistent GERD and a cough. A CXR was done and revealed a large RUL mass increased in size from the previous study. Her pulmonologist then sent her for a PET/CT, which revealed an intensely FDG-avid RUL mass. 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  Patient with clinical Stage IIIB, T4 N2 M0 disease. Pathology reviewed at tumor board and clarified the tumor represents non-small cell lung cancer, either adenocarcinoma with neuroendocrine features versus large cell neuroendocrine cancer.  Brain MRI was negative.  Tumor tested PDL1 Negative and molecular testing is negative for actionable mutations.  Patient started treatment with carboplatin/pemetrexed in mid-July 2022.    [de-identified] : – Lymph node, 4R EBUS guided FNA 6/22/2022: Positive for malignant cells.  Metastatic malignant neoplasm.  IHC is positive for TTF-1 and synaptophysin while negative for p40, chromogranin and CD56.  Ki-67 is 80%.  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  Foundation:  Positive for TP53, STK11, among others.   [de-identified] : Patient is status post C1 of chemotherapy with carboplatin/pemetrexed on 7/18.\par Dry cough has resolved.  Reports a decrease in her appetite.  Weight is roughly stable.  She has been waking up earlier than usual but reports still feeling refreshed.  Experiencing mild nausea for which metoclopramide has been helpful.\par \par Brain MRI was negative for metastatic disease.\par Molecular testing negative for actionable mutations (Positive for TP53, STK11, among others).  \par Patient has not yet seen Rad-Onc.

## 2022-08-03 NOTE — RESULTS/DATA
Patent
[FreeTextEntry1] : -CXR 12/9/21:  A right upper lung rounded opacity is increased in size and conspicuity from prior imaging. Findings are concerning for neoplastic etiology. A CT chest is recommended for further evaluation.\par \par -PET/CT 6/1/22:  Abnormal FDG-PET/CT scan.\par 1. FDG avid lobulated partially necrotic right upper lung mass compatible with malignancy.\par 2. FDG avid mediastinal and right perihilar masses, suspicious for metastases.\par \par Images Reviewed/Interpreted:\par \par –MRI Brain 7/15/22:  NO EVIDENCE OF A MASS, ABNORMAL ENHANCEMENT, OR CURRENT EVIDENCE OF ACUTE ISCHEMIA. CHRONIC WHITE MATTER ISCHEMIC CHANGES\par \par

## 2022-08-03 NOTE — ASSESSMENT
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  Tumor tested PD-L1 negative and is negative for actionable mutations (TP53, STK11 positive, among others).  Started chemotherapy with Carboplatin/Pemetrexed in mid-July 2022.  \par Now s/p C1 on 7/18.  Recommend:\par – Continue systemic chemotherapy as scheduled.  For C2 next week.  Plan for 4 cycles.  \par –Repeat labs today\par – Rad-Onc consultation is coming up.  Recommend treatment with definitive concurrent chemotherapy and thoracic RT as outlined in her initial consultation note.  \par – Follow-up following C2 of chemotherapy midcycle or sooner should problems arise\par – Patient does have compression of the distal trachea from the enlarged pretracheal lymph node noted during bronchoscopy which will require clinical and radiologic follow-up and possibly airway intervention.

## 2022-08-04 LAB
ALBUMIN SERPL ELPH-MCNC: 4.3 G/DL
ALP BLD-CCNC: 78 U/L
ALT SERPL-CCNC: 14 U/L
ANION GAP SERPL CALC-SCNC: 9 MMOL/L
AST SERPL-CCNC: 16 U/L
BILIRUB SERPL-MCNC: 0.2 MG/DL
BUN SERPL-MCNC: 17 MG/DL
CALCIUM SERPL-MCNC: 9.9 MG/DL
CHLORIDE SERPL-SCNC: 103 MMOL/L
CO2 SERPL-SCNC: 24 MMOL/L
CREAT SERPL-MCNC: 0.83 MG/DL
EGFR: 85 ML/MIN/1.73M2
GLUCOSE SERPL-MCNC: 86 MG/DL
POTASSIUM SERPL-SCNC: 4.6 MMOL/L
PROT SERPL-MCNC: 7.7 G/DL
SODIUM SERPL-SCNC: 137 MMOL/L

## 2022-08-05 ENCOUNTER — OUTPATIENT (OUTPATIENT)
Dept: OUTPATIENT SERVICES | Facility: HOSPITAL | Age: 52
LOS: 1 days | Discharge: HOME | End: 2022-08-05

## 2022-08-05 DIAGNOSIS — Z98.890 OTHER SPECIFIED POSTPROCEDURAL STATES: Chronic | ICD-10-CM

## 2022-08-09 ENCOUNTER — NON-APPOINTMENT (OUTPATIENT)
Age: 52
End: 2022-08-09

## 2022-08-09 ENCOUNTER — RESULT REVIEW (OUTPATIENT)
Age: 52
End: 2022-08-09

## 2022-08-09 ENCOUNTER — APPOINTMENT (OUTPATIENT)
Dept: INFUSION THERAPY | Facility: HOSPITAL | Age: 52
End: 2022-08-09

## 2022-08-09 ENCOUNTER — APPOINTMENT (OUTPATIENT)
Dept: RADIATION ONCOLOGY | Facility: CLINIC | Age: 52
End: 2022-08-09

## 2022-08-09 ENCOUNTER — APPOINTMENT (OUTPATIENT)
Dept: HEMATOLOGY ONCOLOGY | Facility: CLINIC | Age: 52
End: 2022-08-09

## 2022-08-09 VITALS
OXYGEN SATURATION: 100 % | DIASTOLIC BLOOD PRESSURE: 76 MMHG | WEIGHT: 152.78 LBS | HEART RATE: 88 BPM | TEMPERATURE: 97.34 F | RESPIRATION RATE: 17 BRPM | SYSTOLIC BLOOD PRESSURE: 111 MMHG | BODY MASS INDEX: 25.45 KG/M2 | HEIGHT: 65 IN

## 2022-08-09 LAB
BASOPHILS # BLD AUTO: 0.02 K/UL — SIGNIFICANT CHANGE UP (ref 0–0.2)
BASOPHILS NFR BLD AUTO: 0.4 % — SIGNIFICANT CHANGE UP (ref 0–2)
EOSINOPHIL # BLD AUTO: 0.01 K/UL — SIGNIFICANT CHANGE UP (ref 0–0.5)
EOSINOPHIL NFR BLD AUTO: 0.2 % — SIGNIFICANT CHANGE UP (ref 0–6)
HCT VFR BLD CALC: 39.2 % — SIGNIFICANT CHANGE UP (ref 34.5–45)
HGB BLD-MCNC: 13.4 G/DL — SIGNIFICANT CHANGE UP (ref 11.5–15.5)
IMM GRANULOCYTES NFR BLD AUTO: 1.5 % — SIGNIFICANT CHANGE UP (ref 0–1.5)
LYMPHOCYTES # BLD AUTO: 1.75 K/UL — SIGNIFICANT CHANGE UP (ref 1–3.3)
LYMPHOCYTES # BLD AUTO: 32.4 % — SIGNIFICANT CHANGE UP (ref 13–44)
MCHC RBC-ENTMCNC: 29.1 PG — SIGNIFICANT CHANGE UP (ref 27–34)
MCHC RBC-ENTMCNC: 34.2 G/DL — SIGNIFICANT CHANGE UP (ref 32–36)
MCV RBC AUTO: 85 FL — SIGNIFICANT CHANGE UP (ref 80–100)
MONOCYTES # BLD AUTO: 0.36 K/UL — SIGNIFICANT CHANGE UP (ref 0–0.9)
MONOCYTES NFR BLD AUTO: 6.7 % — SIGNIFICANT CHANGE UP (ref 2–14)
NEUTROPHILS # BLD AUTO: 3.18 K/UL — SIGNIFICANT CHANGE UP (ref 1.8–7.4)
NEUTROPHILS NFR BLD AUTO: 58.8 % — SIGNIFICANT CHANGE UP (ref 43–77)
NRBC # BLD: 0 /100 WBCS — SIGNIFICANT CHANGE UP (ref 0–0)
PLATELET # BLD AUTO: 224 K/UL — SIGNIFICANT CHANGE UP (ref 150–400)
RBC # BLD: 4.61 M/UL — SIGNIFICANT CHANGE UP (ref 3.8–5.2)
RBC # FLD: 16.9 % — HIGH (ref 10.3–14.5)
WBC # BLD: 5.4 K/UL — SIGNIFICANT CHANGE UP (ref 3.8–10.5)
WBC # FLD AUTO: 5.4 K/UL — SIGNIFICANT CHANGE UP (ref 3.8–10.5)

## 2022-08-09 PROCEDURE — 77263 THER RADIOLOGY TX PLNG CPLX: CPT

## 2022-08-09 PROCEDURE — 99205 OFFICE O/P NEW HI 60 MIN: CPT | Mod: 25

## 2022-08-09 NOTE — HISTORY OF PRESENT ILLNESS
[FreeTextEntry1] : 50 y/o F with PMH of GERD and current active smoker (1/4 PPD x 24 yrs) with stage IIIB lung adenocarcinoma \par \par \par Oncologic history:\par \par She evaluated for chronic GERD and a cough late 2021, for which a CXR was done and demonstrated A right upper lung rounded opacity is increased in size and conspicuity from prior imaging. Findings are concerning for neoplastic etiology.\par \par Cough and GERD continued in May 2022 and repeat CXR showed large RUL mass increased in size from the previous study.\par \par Patient's pulmonary ordered PET done 6/1/22 which showed FDG avid lobulated partially necrotic right upper lung mass compatible with malignancy (5 x 4.1 x 7.9 cm (AP x TR , (SUV 14.1).  FDG avid mediastinal and right perihilar masses (Reference 2.4 x 2.4 cm right upper paratracheal lymph node (SUV 14.5), 5 x 2.6 cm precarinal mass (SUV 15.9) and 2.6 x 3 cm right perihilar mass (SUV 16.7) suspicious for metastases.\par \par \par flex bronch with EBUS s/p biopsy of 4R (+) and 11L (-) LN as well as RUL endobronchial lesion w/Dr Castaneda on 6/22/2022\par confirming NSCLC, adenocarcinoma subtype.\par \par Patient consulted Dr Beckman on 7/7/2022 and started on definitive chemotherapy w/Carboplatin/Pemetrexed on 7/18/2022\par \par His case was discussed at thoracic tumor board on 7/13/22 and confirmed not to be small cell but either adenocarcinoma with neuroendocrine features vs large cell neuroendocrine cancer. \par \par MR brain from  7/15/2022 showed chronic white matter ischemic changes but no mass, abnormal enhancement or current evidence of acute ischemia. \par \par patient presents to discuss thoracic RT on 8/9/2022. Today, she has 3 lbs weight loss and  less appetite,\par \par \par

## 2022-08-09 NOTE — VITALS
[Maximal Pain Intensity: 0/10] : 0/10 [Least Pain Intensity: 0/10] : 0/10 [100: Normal, no complaints, no evidence of disease.] : 100: Normal, no complaints, no evidence of disease. [ECOG Performance Status: 0 - Fully active, able to carry on all pre-disease performance without restriction] : Performance Status: 0 - Fully active, able to carry on all pre-disease performance without restriction [Date: ____________] : Patient's last distress assessment performed on [unfilled]. [2 - Distress Level] : Distress Level: 2

## 2022-08-09 NOTE — REVIEW OF SYSTEMS
[Fatigue] : fatigue [Constipation: Grade 0] : Constipation: Grade 0 [Diarrhea: Grade 0] : Diarrhea: Grade 0 [Dyspepsia: Grade 0] : Dyspepsia: Grade 0 [Dysphagia: Grade 0] : Dysphagia: Grade 0 [Esophagitis: Grade 0] : Esophagitis: Grade 0 [Cough: Grade 0] : Cough: Grade 0 [Dyspnea: Grade 0] : Dyspnea: Grade 0 [Hiccups: Grade 0] : Hiccups: Grade 0 [Cough] : no cough [SOB on Exertion] : no shortness of breath during exertion [Abdominal Pain] : no abdominal pain [Vomiting] : no vomiting

## 2022-08-17 ENCOUNTER — NON-APPOINTMENT (OUTPATIENT)
Age: 52
End: 2022-08-17

## 2022-08-17 PROCEDURE — 77334 RADIATION TREATMENT AID(S): CPT | Mod: 26

## 2022-08-19 PROCEDURE — 77470 SPECIAL RADIATION TREATMENT: CPT | Mod: 26

## 2022-08-23 ENCOUNTER — APPOINTMENT (OUTPATIENT)
Dept: HEMATOLOGY ONCOLOGY | Facility: CLINIC | Age: 52
End: 2022-08-23

## 2022-08-25 ENCOUNTER — APPOINTMENT (OUTPATIENT)
Dept: HEMATOLOGY ONCOLOGY | Facility: CLINIC | Age: 52
End: 2022-08-25

## 2022-08-25 VITALS
HEART RATE: 88 BPM | WEIGHT: 154.32 LBS | HEIGHT: 65 IN | OXYGEN SATURATION: 98 % | BODY MASS INDEX: 25.71 KG/M2 | TEMPERATURE: 97.9 F | RESPIRATION RATE: 18 BRPM | SYSTOLIC BLOOD PRESSURE: 101 MMHG | DIASTOLIC BLOOD PRESSURE: 67 MMHG

## 2022-08-25 PROCEDURE — 99214 OFFICE O/P EST MOD 30 MIN: CPT

## 2022-08-29 PROCEDURE — 77301 RADIOTHERAPY DOSE PLAN IMRT: CPT | Mod: 26

## 2022-08-29 PROCEDURE — 77300 RADIATION THERAPY DOSE PLAN: CPT | Mod: 26

## 2022-08-29 PROCEDURE — 77338 DESIGN MLC DEVICE FOR IMRT: CPT | Mod: 26

## 2022-08-30 ENCOUNTER — APPOINTMENT (OUTPATIENT)
Dept: INFUSION THERAPY | Facility: HOSPITAL | Age: 52
End: 2022-08-30

## 2022-08-30 ENCOUNTER — APPOINTMENT (OUTPATIENT)
Dept: HEMATOLOGY ONCOLOGY | Facility: CLINIC | Age: 52
End: 2022-08-30

## 2022-08-30 ENCOUNTER — RESULT REVIEW (OUTPATIENT)
Age: 52
End: 2022-08-30

## 2022-08-30 LAB
BASOPHILS # BLD AUTO: 0.01 K/UL — SIGNIFICANT CHANGE UP (ref 0–0.2)
BASOPHILS NFR BLD AUTO: 0.2 % — SIGNIFICANT CHANGE UP (ref 0–2)
EOSINOPHIL # BLD AUTO: 0 K/UL — SIGNIFICANT CHANGE UP (ref 0–0.5)
EOSINOPHIL NFR BLD AUTO: 0 % — SIGNIFICANT CHANGE UP (ref 0–6)
HCT VFR BLD CALC: 34.9 % — SIGNIFICANT CHANGE UP (ref 34.5–45)
HGB BLD-MCNC: 11.8 G/DL — SIGNIFICANT CHANGE UP (ref 11.5–15.5)
IMM GRANULOCYTES NFR BLD AUTO: 0.5 % — SIGNIFICANT CHANGE UP (ref 0–1.5)
LYMPHOCYTES # BLD AUTO: 1.93 K/UL — SIGNIFICANT CHANGE UP (ref 1–3.3)
LYMPHOCYTES # BLD AUTO: 30.7 % — SIGNIFICANT CHANGE UP (ref 13–44)
MCHC RBC-ENTMCNC: 29.5 PG — SIGNIFICANT CHANGE UP (ref 27–34)
MCHC RBC-ENTMCNC: 33.8 G/DL — SIGNIFICANT CHANGE UP (ref 32–36)
MCV RBC AUTO: 87.3 FL — SIGNIFICANT CHANGE UP (ref 80–100)
MONOCYTES # BLD AUTO: 0.54 K/UL — SIGNIFICANT CHANGE UP (ref 0–0.9)
MONOCYTES NFR BLD AUTO: 8.6 % — SIGNIFICANT CHANGE UP (ref 2–14)
NEUTROPHILS # BLD AUTO: 3.77 K/UL — SIGNIFICANT CHANGE UP (ref 1.8–7.4)
NEUTROPHILS NFR BLD AUTO: 60 % — SIGNIFICANT CHANGE UP (ref 43–77)
NRBC # BLD: 0 /100 WBCS — SIGNIFICANT CHANGE UP (ref 0–0)
PLATELET # BLD AUTO: 195 K/UL — SIGNIFICANT CHANGE UP (ref 150–400)
RBC # BLD: 4 M/UL — SIGNIFICANT CHANGE UP (ref 3.8–5.2)
RBC # FLD: 18 % — HIGH (ref 10.3–14.5)
WBC # BLD: 6.28 K/UL — SIGNIFICANT CHANGE UP (ref 3.8–10.5)
WBC # FLD AUTO: 6.28 K/UL — SIGNIFICANT CHANGE UP (ref 3.8–10.5)

## 2022-08-31 LAB
ALBUMIN SERPL ELPH-MCNC: 4 G/DL — SIGNIFICANT CHANGE UP (ref 3.3–5)
ALP SERPL-CCNC: 75 U/L — SIGNIFICANT CHANGE UP (ref 40–120)
ALT FLD-CCNC: 11 U/L — SIGNIFICANT CHANGE UP (ref 10–45)
ANION GAP SERPL CALC-SCNC: 13 MMOL/L — SIGNIFICANT CHANGE UP (ref 5–17)
AST SERPL-CCNC: 15 U/L — SIGNIFICANT CHANGE UP (ref 10–40)
BILIRUB SERPL-MCNC: <0.2 MG/DL — SIGNIFICANT CHANGE UP (ref 0.2–1.2)
BUN SERPL-MCNC: 15 MG/DL — SIGNIFICANT CHANGE UP (ref 7–23)
CALCIUM SERPL-MCNC: 9.7 MG/DL — SIGNIFICANT CHANGE UP (ref 8.4–10.5)
CHLORIDE SERPL-SCNC: 106 MMOL/L — SIGNIFICANT CHANGE UP (ref 96–108)
CO2 SERPL-SCNC: 22 MMOL/L — SIGNIFICANT CHANGE UP (ref 22–31)
CREAT SERPL-MCNC: 0.81 MG/DL — SIGNIFICANT CHANGE UP (ref 0.5–1.3)
EGFR: 87 ML/MIN/1.73M2 — SIGNIFICANT CHANGE UP
GLUCOSE SERPL-MCNC: 118 MG/DL — HIGH (ref 70–99)
POTASSIUM SERPL-MCNC: 4.7 MMOL/L — SIGNIFICANT CHANGE UP (ref 3.5–5.3)
POTASSIUM SERPL-SCNC: 4.7 MMOL/L — SIGNIFICANT CHANGE UP (ref 3.5–5.3)
PROT SERPL-MCNC: 7.4 G/DL — SIGNIFICANT CHANGE UP (ref 6–8.3)
SODIUM SERPL-SCNC: 141 MMOL/L — SIGNIFICANT CHANGE UP (ref 135–145)

## 2022-09-01 PROCEDURE — 77387B: CUSTOM | Mod: 26

## 2022-09-01 NOTE — RESULTS/DATA
[FreeTextEntry1] : -CXR 12/9/21:  A right upper lung rounded opacity is increased in size and conspicuity from prior imaging. Findings are concerning for neoplastic etiology. A CT chest is recommended for further evaluation.\par \par -PET/CT 6/1/22:  Abnormal FDG-PET/CT scan.\par 1. FDG avid lobulated partially necrotic right upper lung mass compatible with malignancy.\par 2. FDG avid mediastinal and right perihilar masses, suspicious for metastases.\par \par Images Reviewed/Interpreted:\par \par –MRI Brain 7/15/22:  NO EVIDENCE OF A MASS, ABNORMAL ENHANCEMENT, OR CURRENT EVIDENCE OF ACUTE ISCHEMIA. CHRONIC WHITE MATTER ISCHEMIC CHANGES\par \par

## 2022-09-01 NOTE — PHYSICAL EXAM
[Fully active, able to carry on all pre-disease performance without restriction] : Status 0 - Fully active, able to carry on all pre-disease performance without restriction [Normal] : affect appropriate [de-identified] : No icterus  [de-identified] : MMM O/P Clear [de-identified] : Supple No LAD  [de-identified] : S1 S2  [de-identified] : No edema [de-identified] : No spine/CVA tenderness

## 2022-09-01 NOTE — HISTORY OF PRESENT ILLNESS
[Disease: _____________________] : Disease: [unfilled] [T: ___] : T[unfilled] [N: ___] : N[unfilled] [AJCC Stage: ____] : AJCC Stage: [unfilled] [de-identified] : 51F with significant smoking history who was evaluated for chronic GERD and a cough going on for about 6 months prior to her initial consultation.  A CXR was done and demonstrated a nodule. No follow up was done at that time. She returned in May 2022 for persistent GERD and a cough. A CXR was done and revealed a large RUL mass increased in size from the previous study. Her pulmonologist then sent her for a PET/CT, which revealed an intensely FDG-avid RUL mass. 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  Patient with clinical Stage IIIB, T4 N2 M0 disease. Pathology reviewed at tumor board and clarified the tumor represents non-small cell lung cancer, either adenocarcinoma with neuroendocrine features versus large cell neuroendocrine cancer.  Brain MRI was negative.  Tumor tested PDL1 Negative and molecular testing is negative for actionable mutations.  Patient started treatment with carboplatin/pemetrexed in mid-July 2022.    [de-identified] : – Lymph node, 4R EBUS guided FNA 6/22/2022: Positive for malignant cells.  Metastatic malignant neoplasm.  IHC is positive for TTF-1 and synaptophysin while negative for p40, chromogranin and CD56.  Ki-67 is 80%.  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  Foundation:  Positive for TP53, STK11, among others.   [de-identified] : Patient is status post C2 of chemotherapy with carboplatin/pemetrexed on 8/9.\par .\par Reports feeling well. Dry cough has resolved.  Reports a decrease in her appetite.  Weight is roughly stable.  Experiencing mild nausea for which metoclopramide has been helpful. Denies D/C.\par \par Patient had consult with Rad-Onc earlier today and expresses concern regarding RT; reassurance provided.

## 2022-09-01 NOTE — ASSESSMENT
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  Tumor tested PD-L1 negative and is negative for actionable mutations (TP53, STK11 positive, among others).  Started chemotherapy with Carboplatin/Pemetrexed in mid-July 2022.  \par Now s/p C1 on 7/18.  Recommend:\par – Continue systemic chemotherapy as scheduled.  For C3 next week.  Plan for 4 cycles.  \par –Repeat labs today\par – Rad-Onc consultation earlier today.  Recommend treatment with definitive concurrent chemotherapy and thoracic RT as outlined in her initial consultation note.  \par – Follow-up following C3 of chemotherapy midcycle or sooner should problems arise\par – Patient does have compression of the distal trachea from the enlarged pretracheal lymph node noted during bronchoscopy which will require clinical and radiologic follow-up and possibly airway intervention.  \par -Appointments scheduled appropriately.

## 2022-09-02 PROCEDURE — 77387B: CUSTOM | Mod: 26

## 2022-09-06 ENCOUNTER — NON-APPOINTMENT (OUTPATIENT)
Age: 52
End: 2022-09-06

## 2022-09-07 PROCEDURE — 77387B: CUSTOM | Mod: 26

## 2022-09-08 ENCOUNTER — NON-APPOINTMENT (OUTPATIENT)
Age: 52
End: 2022-09-08

## 2022-09-09 ENCOUNTER — NON-APPOINTMENT (OUTPATIENT)
Age: 52
End: 2022-09-09

## 2022-09-09 ENCOUNTER — OUTPATIENT (OUTPATIENT)
Dept: OUTPATIENT SERVICES | Facility: HOSPITAL | Age: 52
LOS: 1 days | Discharge: HOME | End: 2022-09-09

## 2022-09-09 VITALS
HEART RATE: 77 BPM | RESPIRATION RATE: 18 BRPM | TEMPERATURE: 97.16 F | SYSTOLIC BLOOD PRESSURE: 108 MMHG | OXYGEN SATURATION: 100 % | BODY MASS INDEX: 25.66 KG/M2 | DIASTOLIC BLOOD PRESSURE: 72 MMHG | WEIGHT: 154 LBS | HEIGHT: 65 IN

## 2022-09-09 DIAGNOSIS — C34.11 MALIGNANT NEOPLASM OF UPPER LOBE, RIGHT BRONCHUS OR LUNG: ICD-10-CM

## 2022-09-09 DIAGNOSIS — Z98.890 OTHER SPECIFIED POSTPROCEDURAL STATES: Chronic | ICD-10-CM

## 2022-09-09 PROCEDURE — 77387B: CUSTOM | Mod: 26

## 2022-09-09 RX ORDER — ONDANSETRON 8 MG/1
8 TABLET ORAL
Qty: 28 | Refills: 0 | Status: DISCONTINUED | COMMUNITY
Start: 2022-09-06 | End: 2022-09-09

## 2022-09-12 PROCEDURE — 77387B: CUSTOM | Mod: 26

## 2022-09-12 PROCEDURE — 77427 RADIATION TX MANAGEMENT X5: CPT

## 2022-09-13 ENCOUNTER — NON-APPOINTMENT (OUTPATIENT)
Age: 52
End: 2022-09-13

## 2022-09-13 PROCEDURE — 77387B: CUSTOM | Mod: 26

## 2022-09-13 NOTE — REVIEW OF SYSTEMS
[Constipation: Grade 0] : Constipation: Grade 0 [Diarrhea: Grade 0] : Diarrhea: Grade 0 [Dyspepsia: Grade 0] : Dyspepsia: Grade 0 [Dysphagia: Grade 0] : Dysphagia: Grade 0 [Esophagitis: Grade 0] : Esophagitis: Grade 0 [Fatigue: Grade 1 - Fatigue relieved by rest] : Fatigue: Grade 1 - Fatigue relieved by rest [Cough: Grade 0] : Cough: Grade 0 [Dyspnea: Grade 0] : Dyspnea: Grade 0 [Hiccups: Grade 0] : Hiccups: Grade 0

## 2022-09-14 ENCOUNTER — APPOINTMENT (OUTPATIENT)
Dept: HEMATOLOGY ONCOLOGY | Facility: CLINIC | Age: 52
End: 2022-09-14

## 2022-09-14 VITALS
TEMPERATURE: 97 F | OXYGEN SATURATION: 100 % | SYSTOLIC BLOOD PRESSURE: 105 MMHG | WEIGHT: 157.41 LBS | HEART RATE: 75 BPM | HEIGHT: 65 IN | DIASTOLIC BLOOD PRESSURE: 72 MMHG | BODY MASS INDEX: 26.23 KG/M2 | RESPIRATION RATE: 18 BRPM

## 2022-09-14 PROCEDURE — 99215 OFFICE O/P EST HI 40 MIN: CPT

## 2022-09-14 PROCEDURE — 77387B: CUSTOM | Mod: 26

## 2022-09-15 ENCOUNTER — NON-APPOINTMENT (OUTPATIENT)
Age: 52
End: 2022-09-15

## 2022-09-15 VITALS
SYSTOLIC BLOOD PRESSURE: 100 MMHG | DIASTOLIC BLOOD PRESSURE: 69 MMHG | OXYGEN SATURATION: 100 % | HEART RATE: 78 BPM | RESPIRATION RATE: 16 BRPM | BODY MASS INDEX: 26.19 KG/M2 | WEIGHT: 157.41 LBS

## 2022-09-15 PROCEDURE — 77014: CPT | Mod: 26

## 2022-09-15 NOTE — HISTORY OF PRESENT ILLNESS
[Disease: _____________________] : Disease: [unfilled] [T: ___] : T[unfilled] [N: ___] : N[unfilled] [AJCC Stage: ____] : AJCC Stage: [unfilled] [de-identified] : 51F with significant smoking history who was evaluated for chronic GERD and a cough going on for about 6 months prior to her initial consultation.  A CXR was done and demonstrated a nodule. No follow up was done at that time. She returned in May 2022 for persistent GERD and a cough. A CXR was done and revealed a large RUL mass increased in size from the previous study. Her pulmonologist then sent her for a PET/CT, which revealed an intensely FDG-avid RUL mass. 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  Patient with clinical Stage IIIB, T4 N2 M0 disease. Pathology reviewed at tumor board and clarified the tumor represents non-small cell lung cancer, either adenocarcinoma with neuroendocrine features versus large cell neuroendocrine cancer.  Brain MRI was negative.  Tumor tested PDL1 Negative and molecular testing is negative for actionable mutations.  Patient started treatment with carboplatin/pemetrexed in mid-July 2022.    [de-identified] : – Lymph node, 4R EBUS guided FNA 6/22/2022: Positive for malignant cells.  Metastatic malignant neoplasm.  IHC is positive for TTF-1 and synaptophysin while negative for p40, chromogranin and CD56.  Ki-67 is 80%.  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  Foundation:  Positive for TP53, STK11, among others.   [de-identified] : Patient is status post C3 of chemotherapy with carboplatin/pemetrexed on 8/30.\par RT planned 9/1-10/13. \par .\par Reports feeling well. Endorses left sided back pain and weakness as well as left axillary discomfort that began on 9/6 and has now completely resolved. Dry cough has resolved. Appetite improved and weight stable. \par Has right arm discomfort where IV had been placed; ?thrombophlebitis. \par States that she has some pelvic discomfort and requests GYN referral. Denies dysuria/F/C. \par Denies D/C/N/V.\par \par Patient had consult with Rad-Onc earlier today and expresses concern regarding RT; reassurance provided.

## 2022-09-15 NOTE — DISEASE MANAGEMENT
[Clinical] : TNM Stage: c [IIIB] : IIIB [TTNM] : 4 [NTNM] : 2 [MTNM] : 0 [de-identified] : 8fx/1600 cGy [de-identified] : 30 fx/6000 cGy [de-identified] : R lung

## 2022-09-15 NOTE — DISEASE MANAGEMENT
[TTNM] : 4 [NTNM] : 2 [MTNM] : 0 [de-identified] : 6fx/1200 cGy [de-identified] : 30 fx/6000 cGy [de-identified] : R lung

## 2022-09-15 NOTE — RESULTS/DATA
[FreeTextEntry1] : -CXR 12/9/21:  A right upper lung rounded opacity is increased in size and conspicuity from prior imaging. Findings are concerning for neoplastic etiology. A CT chest is recommended for further evaluation.\par \par -PET/CT 6/1/22:  Abnormal FDG-PET/CT scan.\par 1. FDG avid lobulated partially necrotic right upper lung mass compatible with malignancy.\par 2. FDG avid mediastinal and right perihilar masses, suspicious for metastases.\par \par –MRI Brain 7/15/22:  NO EVIDENCE OF A MASS, ABNORMAL ENHANCEMENT, OR CURRENT EVIDENCE OF ACUTE ISCHEMIA. CHRONIC WHITE MATTER ISCHEMIC CHANGES\par \par

## 2022-09-15 NOTE — HISTORY OF PRESENT ILLNESS
[FreeTextEntry1] : 50 yo F with newly diagnosed [id number], Stage IIIB adenocarcinoma of the RUL lung with unresectable bulky intrathoracic lymphadenopathy, PDL1 negative and negative for actionable mutations, presents for consideration of definitive concurrent chemoRT and is s/p one cycle of carbo/pemetrexed. KPS is 100\par \par Given her stage with unresectable bulky intrathoracic lymphadenopathy, relative good performance status and pulmonary function, definitive concurrent chemoRT was recommended with IMRT 60Gy/30fx starting 9/1/2022\par \par 9/8/2022:  Patient presents for OTV s/p 6/30 fx to R lung. Instead of switching zofran, she used reglan more frequently. She does not have nausea or dizziness now. Denies SOB, dysphagia. \par \par 9/15/2022:  Patient is seen for OTV s/p 8/30 fx to R lung.  She feels well overall and her nausea is well controlled, ends up taking antiemetics 1-2 times a day.  Her weight is stable as well as appetite.  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

## 2022-09-15 NOTE — HISTORY OF PRESENT ILLNESS
[FreeTextEntry1] : 52 yo F with newly diagnosed [id number], Stage IIIB adenocarcinoma of the RUL lung with unresectable bulky intrathoracic lymphadenopathy, PDL1 negative and negative for actionable mutations, presents for consideration of definitive concurrent chemoRT and is s/p one cycle of carbo/pemetrexed. KPS is 100\par \par Given her stage with unresectable bulky intrathoracic lymphadenopathy, relative good performance status and pulmonary function, definitive concurrent chemoRT was recommended with IMRT 60Gy/30fx starting 9/1/2022\par \par 9/8/2022:  Patient presents for OTV s/p 6/30 fx to R lung. Instead of switching zofran, she used reglan more frequently. She does not have nausea or dizziness now. Denies SOB, dysphagia. \par \par

## 2022-09-15 NOTE — ASSESSMENT
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  Tumor tested PD-L1 negative and is negative for actionable mutations (TP53, STK11 positive, among others).  Started chemotherapy with Carboplatin/Pemetrexed in mid-July 2022.  \par Now s/p C3 on 8/30.  Recommend:\par – Continue systemic chemotherapy as scheduled.  For C4 next week.  Plan for 4 cycles.  \par –Repeat labs today\par -Right forearm throbophlebitis? recommend U/S RUE; will arrange for 9/15 per patient request. Ibuprofen 200mg bid and warm soaks for now. \par – Rad-Onc consultation earlier today.  Recommend treatment with definitive concurrent chemotherapy and thoracic RT as outlined in her initial consultation note.  \par – Follow-up following C4 of chemotherapy midcycle or sooner should problems arise\par – Patient does have compression of the distal trachea from the enlarged pretracheal lymph node noted during bronchoscopy which will require clinical and radiologic follow-up and possibly airway intervention.  \par -Appointments scheduled appropriately.

## 2022-09-15 NOTE — PHYSICAL EXAM
[Fully active, able to carry on all pre-disease performance without restriction] : Status 0 - Fully active, able to carry on all pre-disease performance without restriction [Normal] : affect appropriate [de-identified] : No icterus  [de-identified] : MMM O/P Clear [de-identified] : Supple No LAD  [de-identified] : S1 S2  [de-identified] : No edema [de-identified] : No spine/CVA tenderness

## 2022-09-16 ENCOUNTER — RESULT REVIEW (OUTPATIENT)
Age: 52
End: 2022-09-16

## 2022-09-16 ENCOUNTER — APPOINTMENT (OUTPATIENT)
Dept: HEMATOLOGY ONCOLOGY | Facility: CLINIC | Age: 52
End: 2022-09-16

## 2022-09-16 LAB
BASOPHILS # BLD AUTO: 0 K/UL — SIGNIFICANT CHANGE UP (ref 0–0.2)
BASOPHILS NFR BLD AUTO: 0 % — SIGNIFICANT CHANGE UP (ref 0–2)
EOSINOPHIL # BLD AUTO: 0 K/UL — SIGNIFICANT CHANGE UP (ref 0–0.5)
EOSINOPHIL NFR BLD AUTO: 0 % — SIGNIFICANT CHANGE UP (ref 0–6)
HCT VFR BLD CALC: 33.3 % — LOW (ref 34.5–45)
HGB BLD-MCNC: 11 G/DL — LOW (ref 11.5–15.5)
IMM GRANULOCYTES NFR BLD AUTO: 0 % — SIGNIFICANT CHANGE UP (ref 0–0.9)
LYMPHOCYTES # BLD AUTO: 1.49 K/UL — SIGNIFICANT CHANGE UP (ref 1–3.3)
LYMPHOCYTES # BLD AUTO: 36.9 % — SIGNIFICANT CHANGE UP (ref 13–44)
MCHC RBC-ENTMCNC: 30 PG — SIGNIFICANT CHANGE UP (ref 27–34)
MCHC RBC-ENTMCNC: 33 G/DL — SIGNIFICANT CHANGE UP (ref 32–36)
MCV RBC AUTO: 90.7 FL — SIGNIFICANT CHANGE UP (ref 80–100)
MONOCYTES # BLD AUTO: 0.7 K/UL — SIGNIFICANT CHANGE UP (ref 0–0.9)
MONOCYTES NFR BLD AUTO: 17.3 % — HIGH (ref 2–14)
NEUTROPHILS # BLD AUTO: 1.85 K/UL — SIGNIFICANT CHANGE UP (ref 1.8–7.4)
NEUTROPHILS NFR BLD AUTO: 45.8 % — SIGNIFICANT CHANGE UP (ref 43–77)
NRBC # BLD: 0 /100 WBCS — SIGNIFICANT CHANGE UP (ref 0–0)
PLATELET # BLD AUTO: 101 K/UL — LOW (ref 150–400)
RBC # BLD: 3.67 M/UL — LOW (ref 3.8–5.2)
RBC # FLD: 19.7 % — HIGH (ref 10.3–14.5)
WBC # BLD: 4.04 K/UL — SIGNIFICANT CHANGE UP (ref 3.8–10.5)
WBC # FLD AUTO: 4.04 K/UL — SIGNIFICANT CHANGE UP (ref 3.8–10.5)

## 2022-09-16 PROCEDURE — 77387B: CUSTOM | Mod: 26

## 2022-09-19 LAB
ALBUMIN SERPL ELPH-MCNC: 4 G/DL
ALP BLD-CCNC: 73 U/L
ALT SERPL-CCNC: 17 U/L
ANION GAP SERPL CALC-SCNC: 13 MMOL/L
AST SERPL-CCNC: 19 U/L
BILIRUB SERPL-MCNC: 0.2 MG/DL
BUN SERPL-MCNC: 10 MG/DL
CALCIUM SERPL-MCNC: 9.4 MG/DL
CHLORIDE SERPL-SCNC: 105 MMOL/L
CO2 SERPL-SCNC: 22 MMOL/L
CREAT SERPL-MCNC: 0.82 MG/DL
EGFR: 86 ML/MIN/1.73M2
GLUCOSE SERPL-MCNC: 102 MG/DL
POTASSIUM SERPL-SCNC: 4.7 MMOL/L
PROT SERPL-MCNC: 7.3 G/DL
SODIUM SERPL-SCNC: 141 MMOL/L

## 2022-09-19 PROCEDURE — 77387B: CUSTOM | Mod: 26

## 2022-09-19 PROCEDURE — 77427 RADIATION TX MANAGEMENT X5: CPT

## 2022-09-20 ENCOUNTER — RESULT REVIEW (OUTPATIENT)
Age: 52
End: 2022-09-20

## 2022-09-20 ENCOUNTER — APPOINTMENT (OUTPATIENT)
Dept: HEMATOLOGY ONCOLOGY | Facility: CLINIC | Age: 52
End: 2022-09-20

## 2022-09-20 ENCOUNTER — APPOINTMENT (OUTPATIENT)
Dept: INFUSION THERAPY | Facility: HOSPITAL | Age: 52
End: 2022-09-20

## 2022-09-20 LAB
BASOPHILS # BLD AUTO: 0.01 K/UL — SIGNIFICANT CHANGE UP (ref 0–0.2)
BASOPHILS NFR BLD AUTO: 0.2 % — SIGNIFICANT CHANGE UP (ref 0–2)
EOSINOPHIL # BLD AUTO: 0 K/UL — SIGNIFICANT CHANGE UP (ref 0–0.5)
EOSINOPHIL NFR BLD AUTO: 0 % — SIGNIFICANT CHANGE UP (ref 0–6)
HCT VFR BLD CALC: 30.7 % — LOW (ref 34.5–45)
HGB BLD-MCNC: 10.3 G/DL — LOW (ref 11.5–15.5)
IMM GRANULOCYTES NFR BLD AUTO: 0.2 % — SIGNIFICANT CHANGE UP (ref 0–0.9)
LYMPHOCYTES # BLD AUTO: 0.8 K/UL — LOW (ref 1–3.3)
LYMPHOCYTES # BLD AUTO: 19.2 % — SIGNIFICANT CHANGE UP (ref 13–44)
MCHC RBC-ENTMCNC: 29.9 PG — SIGNIFICANT CHANGE UP (ref 27–34)
MCHC RBC-ENTMCNC: 33.6 G/DL — SIGNIFICANT CHANGE UP (ref 32–36)
MCV RBC AUTO: 89 FL — SIGNIFICANT CHANGE UP (ref 80–100)
MONOCYTES # BLD AUTO: 0.48 K/UL — SIGNIFICANT CHANGE UP (ref 0–0.9)
MONOCYTES NFR BLD AUTO: 11.5 % — SIGNIFICANT CHANGE UP (ref 2–14)
NEUTROPHILS # BLD AUTO: 2.86 K/UL — SIGNIFICANT CHANGE UP (ref 1.8–7.4)
NEUTROPHILS NFR BLD AUTO: 68.9 % — SIGNIFICANT CHANGE UP (ref 43–77)
NRBC # BLD: 0 /100 WBCS — SIGNIFICANT CHANGE UP (ref 0–0)
PLATELET # BLD AUTO: 144 K/UL — LOW (ref 150–400)
RBC # BLD: 3.45 M/UL — LOW (ref 3.8–5.2)
RBC # FLD: 19.9 % — HIGH (ref 10.3–14.5)
WBC # BLD: 4.16 K/UL — SIGNIFICANT CHANGE UP (ref 3.8–10.5)
WBC # FLD AUTO: 4.16 K/UL — SIGNIFICANT CHANGE UP (ref 3.8–10.5)

## 2022-09-20 PROCEDURE — 77387B: CUSTOM | Mod: 26

## 2022-09-21 ENCOUNTER — OUTPATIENT (OUTPATIENT)
Dept: OUTPATIENT SERVICES | Facility: HOSPITAL | Age: 52
LOS: 1 days | End: 2022-09-21
Payer: COMMERCIAL

## 2022-09-21 ENCOUNTER — NON-APPOINTMENT (OUTPATIENT)
Age: 52
End: 2022-09-21

## 2022-09-21 ENCOUNTER — APPOINTMENT (OUTPATIENT)
Dept: ULTRASOUND IMAGING | Facility: IMAGING CENTER | Age: 52
End: 2022-09-21

## 2022-09-21 DIAGNOSIS — R11.2 NAUSEA WITH VOMITING, UNSPECIFIED: ICD-10-CM

## 2022-09-21 DIAGNOSIS — C34.11 MALIGNANT NEOPLASM OF UPPER LOBE, RIGHT BRONCHUS OR LUNG: ICD-10-CM

## 2022-09-21 DIAGNOSIS — Z98.890 OTHER SPECIFIED POSTPROCEDURAL STATES: Chronic | ICD-10-CM

## 2022-09-21 DIAGNOSIS — Z51.11 ENCOUNTER FOR ANTINEOPLASTIC CHEMOTHERAPY: ICD-10-CM

## 2022-09-21 PROCEDURE — 93971 EXTREMITY STUDY: CPT

## 2022-09-21 PROCEDURE — 77387B: CUSTOM | Mod: 26

## 2022-09-22 ENCOUNTER — NON-APPOINTMENT (OUTPATIENT)
Age: 52
End: 2022-09-22

## 2022-09-22 VITALS
TEMPERATURE: 36.4 F | SYSTOLIC BLOOD PRESSURE: 109 MMHG | DIASTOLIC BLOOD PRESSURE: 71 MMHG | RESPIRATION RATE: 16 BRPM | WEIGHT: 157 LBS | HEART RATE: 67 BPM | OXYGEN SATURATION: 100 % | BODY MASS INDEX: 26.13 KG/M2

## 2022-09-22 PROCEDURE — 77014: CPT | Mod: 26

## 2022-09-22 NOTE — REVIEW OF SYSTEMS
[Constipation: Grade 0] : Constipation: Grade 0 [Diarrhea: Grade 0] : Diarrhea: Grade 0 [Dyspepsia: Grade 0] : Dyspepsia: Grade 0 [Dysphagia: Grade 0] : Dysphagia: Grade 0 [Esophagitis: Grade 0] : Esophagitis: Grade 0 [Fatigue: Grade 1 - Fatigue relieved by rest] : Fatigue: Grade 1 - Fatigue relieved by rest [Cough: Grade 0] : Cough: Grade 0 [Dyspnea: Grade 0] : Dyspnea: Grade 0 [Hiccups: Grade 0] : Hiccups: Grade 0 [Nausea: Grade 0] : Nausea: Grade 0 [Vomiting: Grade 1 - 1 - 2 episodes ( by 5 minutes) in 24 hrs] : Vomiting: Grade 1 - 1 - 2 episodes ( by 5 minutes) in 24 hrs [Dermatitis Radiation: Grade 0] : Dermatitis Radiation: Grade 0

## 2022-09-22 NOTE — PHYSICAL EXAM
[Bowel Sounds] : normal bowel sounds [Normal] : oriented to person, place and time, the affect was normal, the mood was normal and not anxious

## 2022-09-23 PROCEDURE — 77387B: CUSTOM | Mod: 26

## 2022-09-26 PROCEDURE — 77387B: CUSTOM | Mod: 26

## 2022-09-26 PROCEDURE — 77427 RADIATION TX MANAGEMENT X5: CPT

## 2022-09-28 PROCEDURE — 77387B: CUSTOM | Mod: 26

## 2022-09-29 ENCOUNTER — NON-APPOINTMENT (OUTPATIENT)
Age: 52
End: 2022-09-29

## 2022-09-29 PROCEDURE — 77387B: CUSTOM | Mod: 26

## 2022-09-29 NOTE — REVIEW OF SYSTEMS
[Constipation: Grade 0] : Constipation: Grade 0 [Diarrhea: Grade 0] : Diarrhea: Grade 0 [Dyspepsia: Grade 0] : Dyspepsia: Grade 0 [Nausea: Grade 0] : Nausea: Grade 0 [Vomiting: Grade 1 - 1 - 2 episodes ( by 5 minutes) in 24 hrs] : Vomiting: Grade 1 - 1 - 2 episodes ( by 5 minutes) in 24 hrs [Fatigue: Grade 1 - Fatigue relieved by rest] : Fatigue: Grade 1 - Fatigue relieved by rest [Cough: Grade 0] : Cough: Grade 0 [Dyspnea: Grade 0] : Dyspnea: Grade 0 [Hiccups: Grade 0] : Hiccups: Grade 0 [Dermatitis Radiation: Grade 0] : Dermatitis Radiation: Grade 0 [Dysphagia: Grade 2 - Symptomatic and altered eating/swallowing] : Dysphagia: Grade 2 - Symptomatic and altered eating/swallowing [Esophagitis: Grade 2 - Symptomatic; altered eating/swallowing;  oral supplements indicated] : Esophagitis: Grade 2 - Symptomatic; altered eating/swallowing;  oral supplements indicated

## 2022-09-30 PROCEDURE — 77014: CPT | Mod: 26

## 2022-10-03 PROCEDURE — 77387B: CUSTOM | Mod: 26

## 2022-10-04 ENCOUNTER — APPOINTMENT (OUTPATIENT)
Dept: HEMATOLOGY ONCOLOGY | Facility: CLINIC | Age: 52
End: 2022-10-04

## 2022-10-04 ENCOUNTER — RESULT REVIEW (OUTPATIENT)
Age: 52
End: 2022-10-04

## 2022-10-04 VITALS
SYSTOLIC BLOOD PRESSURE: 101 MMHG | HEART RATE: 85 BPM | HEIGHT: 65 IN | WEIGHT: 158.73 LBS | OXYGEN SATURATION: 100 % | DIASTOLIC BLOOD PRESSURE: 70 MMHG | BODY MASS INDEX: 26.45 KG/M2 | TEMPERATURE: 206.6 F | RESPIRATION RATE: 16 BRPM

## 2022-10-04 LAB
BASOPHILS # BLD AUTO: 0 K/UL — SIGNIFICANT CHANGE UP (ref 0–0.2)
BASOPHILS NFR BLD AUTO: 0 % — SIGNIFICANT CHANGE UP (ref 0–2)
EOSINOPHIL # BLD AUTO: 0 K/UL — SIGNIFICANT CHANGE UP (ref 0–0.5)
EOSINOPHIL NFR BLD AUTO: 0 % — SIGNIFICANT CHANGE UP (ref 0–6)
HCT VFR BLD CALC: 29.3 % — LOW (ref 34.5–45)
HGB BLD-MCNC: 9.6 G/DL — LOW (ref 11.5–15.5)
IMM GRANULOCYTES NFR BLD AUTO: 1.1 % — HIGH (ref 0–0.9)
LYMPHOCYTES # BLD AUTO: 0.93 K/UL — LOW (ref 1–3.3)
LYMPHOCYTES # BLD AUTO: 24.9 % — SIGNIFICANT CHANGE UP (ref 13–44)
MCHC RBC-ENTMCNC: 30.4 PG — SIGNIFICANT CHANGE UP (ref 27–34)
MCHC RBC-ENTMCNC: 32.8 G/DL — SIGNIFICANT CHANGE UP (ref 32–36)
MCV RBC AUTO: 92.7 FL — SIGNIFICANT CHANGE UP (ref 80–100)
MONOCYTES # BLD AUTO: 0.81 K/UL — SIGNIFICANT CHANGE UP (ref 0–0.9)
MONOCYTES NFR BLD AUTO: 21.7 % — HIGH (ref 2–14)
NEUTROPHILS # BLD AUTO: 1.95 K/UL — SIGNIFICANT CHANGE UP (ref 1.8–7.4)
NEUTROPHILS NFR BLD AUTO: 52.3 % — SIGNIFICANT CHANGE UP (ref 43–77)
NRBC # BLD: 0 /100 WBCS — SIGNIFICANT CHANGE UP (ref 0–0)
PLATELET # BLD AUTO: 61 K/UL — LOW (ref 150–400)
RBC # BLD: 3.16 M/UL — LOW (ref 3.8–5.2)
RBC # FLD: 21.1 % — HIGH (ref 10.3–14.5)
WBC # BLD: 3.73 K/UL — LOW (ref 3.8–10.5)
WBC # FLD AUTO: 3.73 K/UL — LOW (ref 3.8–10.5)

## 2022-10-04 PROCEDURE — 77427 RADIATION TX MANAGEMENT X5: CPT

## 2022-10-04 PROCEDURE — 99214 OFFICE O/P EST MOD 30 MIN: CPT

## 2022-10-04 PROCEDURE — 77387B: CUSTOM | Mod: 26

## 2022-10-05 LAB
ALBUMIN SERPL ELPH-MCNC: 4.2 G/DL
ALP BLD-CCNC: 68 U/L
ALT SERPL-CCNC: 14 U/L
ANION GAP SERPL CALC-SCNC: 12 MMOL/L
AST SERPL-CCNC: 17 U/L
BILIRUB SERPL-MCNC: <0.2 MG/DL
BUN SERPL-MCNC: 22 MG/DL
CALCIUM SERPL-MCNC: 9.4 MG/DL
CHLORIDE SERPL-SCNC: 105 MMOL/L
CO2 SERPL-SCNC: 22 MMOL/L
CREAT SERPL-MCNC: 0.9 MG/DL
EGFR: 77 ML/MIN/1.73M2
GLUCOSE SERPL-MCNC: 97 MG/DL
POTASSIUM SERPL-SCNC: 4.7 MMOL/L
PROT SERPL-MCNC: 7.5 G/DL
SODIUM SERPL-SCNC: 139 MMOL/L

## 2022-10-06 ENCOUNTER — NON-APPOINTMENT (OUTPATIENT)
Age: 52
End: 2022-10-06

## 2022-10-06 VITALS
DIASTOLIC BLOOD PRESSURE: 64 MMHG | HEART RATE: 87 BPM | BODY MASS INDEX: 26.29 KG/M2 | OXYGEN SATURATION: 100 % | HEIGHT: 65 IN | SYSTOLIC BLOOD PRESSURE: 94 MMHG | RESPIRATION RATE: 18 BRPM | TEMPERATURE: 96.98 F

## 2022-10-06 VITALS — WEIGHT: 158 LBS | BODY MASS INDEX: 26.29 KG/M2

## 2022-10-06 PROCEDURE — 77387B: CUSTOM | Mod: 26

## 2022-10-06 NOTE — PHYSICAL EXAM
[General Appearance - Alert] : alert [General Appearance - In No Acute Distress] : in no acute distress [Sclera] : the sclera and conjunctiva were normal [] : no respiratory distress [Respiration, Rhythm And Depth] : normal respiratory rhythm and effort [Exaggerated Use Of Accessory Muscles For Inspiration] : no accessory muscle use [Abdomen Soft] : soft [No Focal Deficits] : no focal deficits [Oriented To Time, Place, And Person] : oriented to person, place, and time

## 2022-10-06 NOTE — REVIEW OF SYSTEMS
[Constipation: Grade 0] : Constipation: Grade 0 [Diarrhea: Grade 0] : Diarrhea: Grade 0 [Dyspepsia: Grade 0] : Dyspepsia: Grade 0 [Dysphagia: Grade 2 - Symptomatic and altered eating/swallowing] : Dysphagia: Grade 2 - Symptomatic and altered eating/swallowing [Esophagitis: Grade 2 - Symptomatic; altered eating/swallowing;  oral supplements indicated] : Esophagitis: Grade 2 - Symptomatic; altered eating/swallowing;  oral supplements indicated [Nausea: Grade 0] : Nausea: Grade 0 [Vomiting: Grade 1 - 1 - 2 episodes ( by 5 minutes) in 24 hrs] : Vomiting: Grade 1 - 1 - 2 episodes ( by 5 minutes) in 24 hrs [Fatigue: Grade 1 - Fatigue relieved by rest] : Fatigue: Grade 1 - Fatigue relieved by rest [Dyspnea: Grade 0] : Dyspnea: Grade 0 [Hiccups: Grade 0] : Hiccups: Grade 0 [Dermatitis Radiation: Grade 0] : Dermatitis Radiation: Grade 0 [Cough: Grade 1 - Mild symptoms; nonprescription intervention indicated] : Cough: Grade 1 - Mild symptoms; nonprescription intervention indicated

## 2022-10-07 PROCEDURE — 77387B: CUSTOM | Mod: 26

## 2022-10-10 NOTE — PHYSICAL EXAM
[Fully active, able to carry on all pre-disease performance without restriction] : Status 0 - Fully active, able to carry on all pre-disease performance without restriction [Normal] : affect appropriate [de-identified] : No icterus  [de-identified] : MMM O/P Clear [de-identified] : Supple No LAD  [de-identified] : S1 S2  [de-identified] : No edema [de-identified] : No spine/CVA tenderness

## 2022-10-10 NOTE — ASSESSMENT
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  Tumor tested PD-L1 negative and is negative for actionable mutations (TP53, STK11 positive, among others).  Started chemotherapy with Carboplatin/Pemetrexed in mid-July 2022.  \par Now s/p C4 on 9/20.  Recommend:\par –Repeat labs today\par -Right forearm thrombophlebitis: Mostly improved. U/S RUE consistent with thrombophlebitis; can continue Ibuprofen 200mg bid and warm soaks for now. \par – Rad-Onc: definitive RT planned through 10/13\par – Patient does have compression of the distal trachea from the enlarged pretracheal lymph node noted during bronchoscopy which will require clinical and radiologic follow-up and possibly airway intervention.  \par -Patient to f/u in late October upon completion of definitive treatment; can discuss next steps at that appointment. \par

## 2022-10-10 NOTE — HISTORY OF PRESENT ILLNESS
[Disease: _____________________] : Disease: [unfilled] [T: ___] : T[unfilled] [N: ___] : N[unfilled] [AJCC Stage: ____] : AJCC Stage: [unfilled] [de-identified] : 51F with significant smoking history who was evaluated for chronic GERD and a cough going on for about 6 months prior to her initial consultation.  A CXR was done and demonstrated a nodule. No follow up was done at that time. She returned in May 2022 for persistent GERD and a cough. A CXR was done and revealed a large RUL mass increased in size from the previous study. Her pulmonologist then sent her for a PET/CT, which revealed an intensely FDG-avid RUL mass. 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  Patient with clinical Stage IIIB, T4 N2 M0 disease. Pathology reviewed at tumor board and clarified the tumor represents non-small cell lung cancer, either adenocarcinoma with neuroendocrine features versus large cell neuroendocrine cancer.  Brain MRI was negative.  Tumor tested PDL1 Negative and molecular testing is negative for actionable mutations.  Patient started treatment with carboplatin/pemetrexed in mid-July 2022.  Completed 4 cycles with Carbo/Pem in late Sept 2022.  [de-identified] : – Lymph node, 4R EBUS guided FNA 6/22/2022: Positive for malignant cells.  Metastatic malignant neoplasm.  IHC is positive for TTF-1 and synaptophysin while negative for p40, chromogranin and CD56.  Ki-67 is 80%.  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  Foundation:  Positive for TP53, STK11, among others.   [de-identified] : Patient is status post C4 of chemotherapy with carboplatin/pemetrexed on 9/20/22.\par RT planned 9/1-10/13. \par .\par Patient reports nausea QOD that responds to metoclopramide. Sucralfate effective for epigastric pain. Not taking oxycodone. Denies C/D. She has a GYN appointment on 10/5 for reported pelvic pain. U/S of RUE last visit revealed thrombophlebitis; discomfort has mostly resolved. Appetite and weight stable. No significant SOB or cough.  Patient states that she plans on taking off from work for roughly 1 month to allow for recovery from chemo/RT. \par \par \par \par \par

## 2022-10-11 VITALS
RESPIRATION RATE: 18 BRPM | HEART RATE: 83 BPM | SYSTOLIC BLOOD PRESSURE: 119 MMHG | TEMPERATURE: 97.52 F | OXYGEN SATURATION: 100 % | DIASTOLIC BLOOD PRESSURE: 80 MMHG

## 2022-10-11 PROCEDURE — 77014: CPT | Mod: 26

## 2022-10-12 ENCOUNTER — APPOINTMENT (OUTPATIENT)
Dept: OBGYN | Facility: CLINIC | Age: 52
End: 2022-10-12

## 2022-10-12 ENCOUNTER — RESULT REVIEW (OUTPATIENT)
Age: 52
End: 2022-10-12

## 2022-10-12 PROCEDURE — 81002 URINALYSIS NONAUTO W/O SCOPE: CPT

## 2022-10-12 PROCEDURE — 76830 TRANSVAGINAL US NON-OB: CPT

## 2022-10-12 PROCEDURE — 77387B: CUSTOM | Mod: 26

## 2022-10-12 PROCEDURE — 99386 PREV VISIT NEW AGE 40-64: CPT | Mod: 25

## 2022-10-13 ENCOUNTER — NON-APPOINTMENT (OUTPATIENT)
Age: 52
End: 2022-10-13

## 2022-10-13 VITALS
HEIGHT: 65 IN | HEART RATE: 79 BPM | RESPIRATION RATE: 18 BRPM | SYSTOLIC BLOOD PRESSURE: 110 MMHG | BODY MASS INDEX: 26.16 KG/M2 | OXYGEN SATURATION: 100 % | DIASTOLIC BLOOD PRESSURE: 73 MMHG | TEMPERATURE: 97.34 F | WEIGHT: 157 LBS

## 2022-10-13 PROCEDURE — 77427 RADIATION TX MANAGEMENT X5: CPT

## 2022-10-13 PROCEDURE — 77387B: CUSTOM | Mod: 26

## 2022-10-13 NOTE — HISTORY OF PRESENT ILLNESS
[FreeTextEntry1] : 50 yo F with newly diagnosed [id number], Stage IIIB adenocarcinoma of the RUL lung with unresectable bulky intrathoracic lymphadenopathy, PDL1 negative and negative for actionable mutations, presents for consideration of definitive concurrent chemoRT and is s/p one cycle of carbo/pemetrexed. KPS is 100\par \par Given her stage with unresectable bulky intrathoracic lymphadenopathy, relative good performance status and pulmonary function, definitive concurrent chemoRT was recommended with IMRT 60Gy/30fx starting 9/1/2022\par \par 9/8/2022:  Patient presents for OTV s/p 6/30 fx to R lung. Instead of switching zofran, she used reglan more frequently. She does not have nausea or dizziness now. Denies SOB, dysphagia. \par \par 9/15/2022:  Patient is seen for OTV s/p 8/30 fx to R lung.  She feels well overall and her nausea is well controlled, ends up taking antiemetics 1-2 times a day.  Her weight is stable as well as appetite.  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  \par \par 9/20/2022:  Patient is seen for OTV and is s/p 14/30 fx to R lung.  She states she took her Reglan prophylactically this morning and ended up vomiting twice.  She attributes this to having taken Reglan with grape soda for the first time.  She has since been feeling better except for chills but afebrile on temperature check.  She has taken time off from work and also states she noticed some dysphagia while eating chinese food last night.  \par \par 9/29/2022:  Patient is seen for OTV and is s/p 17/30 fractions to R lung.  She c/o odynophagia with pills/foods since last 2 days, severe to the level of 8/10 with eating.  She states with liquids she is doing ok.  She otherwise denies any new complaints.

## 2022-10-13 NOTE — DISEASE MANAGEMENT
[Clinical] : TNM Stage: c [IIIB] : IIIB [TTNM] : 4 [NTNM] : 2 [MTNM] : 0 [de-identified] : 14fx/2800 cGy [de-identified] : 30 fx/6000 cGy [de-identified] : R lung

## 2022-10-13 NOTE — VITALS
[ECOG Performance Status: 0 - Fully active, able to carry on all pre-disease performance without restriction] : Performance Status: 0 - Fully active, able to carry on all pre-disease performance without restriction [Maximal Pain Intensity: 4/10] : 4/10 [Least Pain Intensity: 0/10] : 0/10 [Pain Location: ___] : Pain Location: [unfilled] [Opioid] : opioid [80: Normal activity with effort; some signs or symptoms of disease.] : 80: Normal activity with effort; some signs or symptoms of disease.

## 2022-10-13 NOTE — VITALS
[ECOG Performance Status: 0 - Fully active, able to carry on all pre-disease performance without restriction] : Performance Status: 0 - Fully active, able to carry on all pre-disease performance without restriction [Maximal Pain Intensity: 8/10] : 8/10 [Least Pain Intensity: 4/10] : 4/10 [90: Able to carry normal activity; minor signs or symptoms of disease.] : 90: Able to carry normal activity; minor signs or symptoms of disease.

## 2022-10-13 NOTE — REVIEW OF SYSTEMS
[Constipation: Grade 0] : Constipation: Grade 0 [Diarrhea: Grade 0] : Diarrhea: Grade 0 [Dyspepsia: Grade 0] : Dyspepsia: Grade 0 [Dysphagia: Grade 2 - Symptomatic and altered eating/swallowing] : Dysphagia: Grade 2 - Symptomatic and altered eating/swallowing [Esophagitis: Grade 2 - Symptomatic; altered eating/swallowing;  oral supplements indicated] : Esophagitis: Grade 2 - Symptomatic; altered eating/swallowing;  oral supplements indicated [Nausea: Grade 0] : Nausea: Grade 0 [Vomiting: Grade 1 - 1 - 2 episodes ( by 5 minutes) in 24 hrs] : Vomiting: Grade 1 - 1 - 2 episodes ( by 5 minutes) in 24 hrs [Fatigue: Grade 1 - Fatigue relieved by rest] : Fatigue: Grade 1 - Fatigue relieved by rest [Cough: Grade 1 - Mild symptoms; nonprescription intervention indicated] : Cough: Grade 1 - Mild symptoms; nonprescription intervention indicated [Dyspnea: Grade 0] : Dyspnea: Grade 0 [Hiccups: Grade 0] : Hiccups: Grade 0 [Dermatitis Radiation: Grade 0] : Dermatitis Radiation: Grade 0 [Hoarseness: Grade 0] : Hoarseness: Grade 0

## 2022-10-13 NOTE — DISEASE MANAGEMENT
[Clinical] : TNM Stage: c [IIIB] : IIIB [TTNM] : 4 [NTNM] : 2 [MTNM] : 0 [de-identified] : 21fx/3400 cGy [de-identified] : 30 fx/6000 cGy [de-identified] : R lung

## 2022-10-13 NOTE — DISEASE MANAGEMENT
[Clinical] : TNM Stage: c [IIIB] : IIIB [TTNM] : 4 [NTNM] : 2 [MTNM] : 0 [de-identified] : 17fx/3400 cGy [de-identified] : 30 fx/6000 cGy [de-identified] : R lung

## 2022-10-13 NOTE — HISTORY OF PRESENT ILLNESS
[FreeTextEntry1] : 52 yo F with newly diagnosed [id number], Stage IIIB adenocarcinoma of the RUL lung with unresectable bulky intrathoracic lymphadenopathy, PDL1 negative and negative for actionable mutations, presents for consideration of definitive concurrent chemoRT and is s/p one cycle of carbo/pemetrexed. KPS is 100\par \par Given her stage with unresectable bulky intrathoracic lymphadenopathy, relative good performance status and pulmonary function, definitive concurrent chemoRT was recommended with IMRT 60Gy/30fx starting 9/1/2022\par \par 9/8/2022:  Patient presents for OTV s/p 6/30 fx to R lung. Instead of switching zofran, she used reglan more frequently. She does not have nausea or dizziness now. Denies SOB, dysphagia. \par \par 9/15/2022:  Patient is seen for OTV s/p 8/30 fx to R lung.  She feels well overall and her nausea is well controlled, ends up taking antiemetics 1-2 times a day.  Her weight is stable as well as appetite.  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  \par \par 9/20/2022:  Patient is seen for OTV and is s/p 14/30 fx to R lung.  She states she took her Reglan prophylactically this morning and ended up vomiting twice.  She attributes this to having taken Reglan with grape soda for the first time.  She has since been feeling better except for chills but afebrile on temperature check.  She has taken time off from work and also states she noticed some dysphagia while eating chinese food last night.  \par \par 9/29/2022:  Patient is seen for OTV and is s/p 17/30 fractions to R lung.  She c/o odynophagia with pills/foods since last 2 days, severe to the level of 8/10 with eating.  She states with liquids she is doing ok.  She otherwise denies any new complaints. \par \par 10/6/22:21/30 fx: Notes chest pain with swallowing. Odynophagia  better with first mouthwash, sucralfate & oxycodone as needed. Cough with white secretions. No fever/chills. Eating small meals/small bites

## 2022-10-13 NOTE — HISTORY OF PRESENT ILLNESS
[FreeTextEntry1] : 50 yo F with newly diagnosed [id number], Stage IIIB adenocarcinoma of the RUL lung with unresectable bulky intrathoracic lymphadenopathy, PDL1 negative and negative for actionable mutations, presents for consideration of definitive concurrent chemoRT and is s/p one cycle of carbo/pemetrexed. KPS is 100\par \par Given her stage with unresectable bulky intrathoracic lymphadenopathy, relative good performance status and pulmonary function, definitive concurrent chemoRT was recommended with IMRT 60Gy/30fx starting 9/1/2022\par \par 9/8/2022:  Patient presents for OTV s/p 6/30 fx to R lung. Instead of switching zofran, she used reglan more frequently. She does not have nausea or dizziness now. Denies SOB, dysphagia. \par \par 9/15/2022:  Patient is seen for OTV s/p 8/30 fx to R lung.  She feels well overall and her nausea is well controlled, ends up taking antiemetics 1-2 times a day.  Her weight is stable as well as appetite.  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  \par \par 9/20/2022:  Patient is seen for OTV and is s/p 14/30 fx to R lung.  She states she took her Reglan prophylactically this morning and ended up vomiting twice.  She attributes this to having taken Reglan with grape soda for the first time.  She has since been feeling better except for chills but afebrile on temperature check.  She has taken time off from work and also states she noticed some dysphagia while eating chinese food last night.

## 2022-10-14 PROCEDURE — 77387B: CUSTOM | Mod: 26

## 2022-10-17 PROCEDURE — 77387B: CUSTOM | Mod: 26

## 2022-10-19 PROCEDURE — 77014: CPT | Mod: 26

## 2022-10-20 ENCOUNTER — NON-APPOINTMENT (OUTPATIENT)
Age: 52
End: 2022-10-20

## 2022-10-20 VITALS
OXYGEN SATURATION: 100 % | DIASTOLIC BLOOD PRESSURE: 67 MMHG | SYSTOLIC BLOOD PRESSURE: 103 MMHG | HEIGHT: 65 IN | RESPIRATION RATE: 18 BRPM | WEIGHT: 158.62 LBS | BODY MASS INDEX: 26.43 KG/M2 | HEART RATE: 76 BPM | TEMPERATURE: 98.78 F

## 2022-10-20 PROCEDURE — 77387B: CUSTOM | Mod: 26

## 2022-10-20 NOTE — REVIEW OF SYSTEMS
[Constipation: Grade 0] : Constipation: Grade 0 [Diarrhea: Grade 0] : Diarrhea: Grade 0 [Dyspepsia: Grade 0] : Dyspepsia: Grade 0 [Cough: Grade 1 - Mild symptoms; nonprescription intervention indicated] : Cough: Grade 1 - Mild symptoms; nonprescription intervention indicated [Dyspnea: Grade 0] : Dyspnea: Grade 0 [Hiccups: Grade 0] : Hiccups: Grade 0 [Hoarseness: Grade 0] : Hoarseness: Grade 0 [Dermatitis Radiation: Grade 0] : Dermatitis Radiation: Grade 0 [Dysphagia: Grade 2 - Symptomatic and altered eating/swallowing] : Dysphagia: Grade 2 - Symptomatic and altered eating/swallowing [Esophagitis: Grade 1 - Asymptomatic; clinical or diagnostic observations only; intervention not indicated] : Esophagitis: Grade 1 - Asymptomatic; clinical or diagnostic observations only; intervention not indicated [Nausea: Grade 1 - Loss of appetite without alteration in eating habits] : Nausea: Grade 1 - Loss of appetite without alteration in eating habits [Vomiting: Grade 0] : Vomiting: Grade 0 [Fatigue: Grade 2 - Fatigue not relieved by rest; limiting instrumental ADL] : Fatigue: Grade 2 - Fatigue not relieved by rest; limiting instrumental ADL

## 2022-10-20 NOTE — PHYSICAL EXAM
[Sclera] : the sclera and conjunctiva were normal [Outer Ear] : the ears and nose were normal in appearance [Normal] : oriented to person, place and time, the affect was normal, the mood was normal and not anxious

## 2022-10-21 PROCEDURE — 77427 RADIATION TX MANAGEMENT X5: CPT

## 2022-10-21 PROCEDURE — 77387B: CUSTOM | Mod: 26

## 2022-10-25 ENCOUNTER — NON-APPOINTMENT (OUTPATIENT)
Age: 52
End: 2022-10-25

## 2022-10-26 ENCOUNTER — RESULT REVIEW (OUTPATIENT)
Age: 52
End: 2022-10-26

## 2022-10-26 ENCOUNTER — APPOINTMENT (OUTPATIENT)
Dept: HEMATOLOGY ONCOLOGY | Facility: CLINIC | Age: 52
End: 2022-10-26

## 2022-10-26 VITALS
RESPIRATION RATE: 16 BRPM | SYSTOLIC BLOOD PRESSURE: 114 MMHG | HEIGHT: 65 IN | DIASTOLIC BLOOD PRESSURE: 73 MMHG | HEART RATE: 80 BPM | WEIGHT: 157.85 LBS | BODY MASS INDEX: 26.3 KG/M2 | OXYGEN SATURATION: 99 % | TEMPERATURE: 97.2 F

## 2022-10-26 DIAGNOSIS — D64.9 ANEMIA, UNSPECIFIED: ICD-10-CM

## 2022-10-26 LAB
BASOPHILS # BLD AUTO: 0.02 K/UL — SIGNIFICANT CHANGE UP (ref 0–0.2)
BASOPHILS NFR BLD AUTO: 0.5 % — SIGNIFICANT CHANGE UP (ref 0–2)
EOSINOPHIL # BLD AUTO: 0 K/UL — SIGNIFICANT CHANGE UP (ref 0–0.5)
EOSINOPHIL NFR BLD AUTO: 0 % — SIGNIFICANT CHANGE UP (ref 0–6)
HCT VFR BLD CALC: 29.7 % — LOW (ref 34.5–45)
HGB BLD-MCNC: 10 G/DL — LOW (ref 11.5–15.5)
IMM GRANULOCYTES NFR BLD AUTO: 0.5 % — SIGNIFICANT CHANGE UP (ref 0–0.9)
LYMPHOCYTES # BLD AUTO: 0.75 K/UL — LOW (ref 1–3.3)
LYMPHOCYTES # BLD AUTO: 18.1 % — SIGNIFICANT CHANGE UP (ref 13–44)
MCHC RBC-ENTMCNC: 31.6 PG — SIGNIFICANT CHANGE UP (ref 27–34)
MCHC RBC-ENTMCNC: 33.7 G/DL — SIGNIFICANT CHANGE UP (ref 32–36)
MCV RBC AUTO: 94 FL — SIGNIFICANT CHANGE UP (ref 80–100)
MONOCYTES # BLD AUTO: 0.93 K/UL — HIGH (ref 0–0.9)
MONOCYTES NFR BLD AUTO: 22.4 % — HIGH (ref 2–14)
NEUTROPHILS # BLD AUTO: 2.43 K/UL — SIGNIFICANT CHANGE UP (ref 1.8–7.4)
NEUTROPHILS NFR BLD AUTO: 58.5 % — SIGNIFICANT CHANGE UP (ref 43–77)
NRBC # BLD: 0 /100 WBCS — SIGNIFICANT CHANGE UP (ref 0–0)
PLATELET # BLD AUTO: 171 K/UL — SIGNIFICANT CHANGE UP (ref 150–400)
RBC # BLD: 3.16 M/UL — LOW (ref 3.8–5.2)
RBC # FLD: 23.3 % — HIGH (ref 10.3–14.5)
WBC # BLD: 4.15 K/UL — SIGNIFICANT CHANGE UP (ref 3.8–10.5)
WBC # FLD AUTO: 4.15 K/UL — SIGNIFICANT CHANGE UP (ref 3.8–10.5)

## 2022-10-26 PROCEDURE — 99214 OFFICE O/P EST MOD 30 MIN: CPT

## 2022-10-27 ENCOUNTER — NON-APPOINTMENT (OUTPATIENT)
Age: 52
End: 2022-10-27

## 2022-10-28 LAB
ALBUMIN SERPL ELPH-MCNC: 4.2 G/DL
ALP BLD-CCNC: 66 U/L
ALT SERPL-CCNC: 12 U/L
ANION GAP SERPL CALC-SCNC: 13 MMOL/L
AST SERPL-CCNC: 20 U/L
BILIRUB SERPL-MCNC: 0.2 MG/DL
BUN SERPL-MCNC: 14 MG/DL
CALCIUM SERPL-MCNC: 9.2 MG/DL
CHLORIDE SERPL-SCNC: 107 MMOL/L
CO2 SERPL-SCNC: 22 MMOL/L
CREAT SERPL-MCNC: 0.83 MG/DL
EGFR: 85 ML/MIN/1.73M2
FERRITIN SERPL-MCNC: 181 NG/ML
FOLATE SERPL-MCNC: 19.6 NG/ML
GLUCOSE SERPL-MCNC: 108 MG/DL
IRON SATN MFR SERPL: 13 %
IRON SERPL-MCNC: 40 UG/DL
POTASSIUM SERPL-SCNC: 4.6 MMOL/L
PROT SERPL-MCNC: 7.2 G/DL
SODIUM SERPL-SCNC: 143 MMOL/L
TIBC SERPL-MCNC: 312 UG/DL
TSH SERPL-ACNC: 0.67 UIU/ML
UIBC SERPL-MCNC: 272 UG/DL
VIT B12 SERPL-MCNC: 862 PG/ML

## 2022-11-03 NOTE — ASSESSMENT
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  Tumor tested PD-L1 negative and is negative for actionable mutations (TP53, STK11 positive, among others).  Started chemotherapy with Carboplatin/Pemetrexed in mid-July 2022 x 4 cycles completed Sept 2022.  Treated with concurrent Thoracic RT started early Sept through late October 2022.  \par Recommend:\par –Repeat labs today including anemia evaluation\par -Continue iron supplementation for iron deficiency anemia\par -Restaging scan in 1 month to assess treatment response\par – Patient does have compression of the distal trachea from the enlarged pretracheal lymph node noted during bronchoscopy which will require clinical and radiologic follow-up and possibly airway intervention.  \par -F/U with Rad-Onc\par -F/U in 1 month to review restaging scan results.  Would plan on treatment with maintenance Durvalumab if she has at least stable disease.

## 2022-11-03 NOTE — HISTORY OF PRESENT ILLNESS
[Disease: _____________________] : Disease: [unfilled] [T: ___] : T[unfilled] [N: ___] : N[unfilled] [AJCC Stage: ____] : AJCC Stage: [unfilled] [de-identified] : 51F with significant smoking history who was evaluated for chronic GERD and a cough going on for about 6 months prior to her initial consultation.  A CXR was done and demonstrated a nodule. No follow up was done at that time. She returned in May 2022 for persistent GERD and a cough. A CXR was done and revealed a large RUL mass increased in size from the previous study. Her pulmonologist then sent her for a PET/CT, which revealed an intensely FDG-avid RUL mass. 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  Patient with clinical Stage IIIB, T4 N2 M0 disease. Pathology reviewed at tumor board and clarified the tumor represents non-small cell lung cancer, either adenocarcinoma with neuroendocrine features versus large cell neuroendocrine cancer.  Brain MRI was negative.  Tumor tested PDL1 Negative and molecular testing is negative for actionable mutations.  Patient started treatment with carboplatin/pemetrexed in July 2022.  Thoracic RT started in Sept 2022.  Completed 4 cycles with Carbo/Pem in late Sept 2022.  Thoracic RT completed late October 2022.  [de-identified] : – Lymph node, 4R EBUS guided FNA 6/22/2022: Positive for malignant cells.  Metastatic malignant neoplasm.  IHC is positive for TTF-1 and synaptophysin while negative for p40, chromogranin and CD56.  Ki-67 is 80%.  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  Foundation:  Positive for TP53, STK11, among others.   [de-identified] : Patient is status post C4 of chemotherapy with carboplatin/pemetrexed on 9/20/22.\par Completed Thoracic RT on 10/21.  \par No F/C/N/V/D.  Appetite and weight stable. No significant SOB or cough.  Having some esophagitis symptoms managed with sucralfate.  \par Needs disability forms done for work. \par \par \par \par

## 2022-11-03 NOTE — PHYSICAL EXAM
[Fully active, able to carry on all pre-disease performance without restriction] : Status 0 - Fully active, able to carry on all pre-disease performance without restriction [Normal] : affect appropriate [de-identified] : No icterus  [de-identified] : MMM O/P Clear [de-identified] : Supple No LAD  [de-identified] : S1 S2  [de-identified] : No edema [de-identified] : No spine/CVA tenderness

## 2022-11-25 ENCOUNTER — OUTPATIENT (OUTPATIENT)
Dept: OUTPATIENT SERVICES | Facility: HOSPITAL | Age: 52
LOS: 1 days | Discharge: HOME | End: 2022-11-25

## 2022-11-25 DIAGNOSIS — C34.11 MALIGNANT NEOPLASM OF UPPER LOBE, RIGHT BRONCHUS OR LUNG: ICD-10-CM

## 2022-11-25 DIAGNOSIS — Z98.890 OTHER SPECIFIED POSTPROCEDURAL STATES: Chronic | ICD-10-CM

## 2022-12-01 ENCOUNTER — OUTPATIENT (OUTPATIENT)
Dept: OUTPATIENT SERVICES | Facility: HOSPITAL | Age: 52
LOS: 1 days | End: 2022-12-01
Payer: COMMERCIAL

## 2022-12-01 ENCOUNTER — APPOINTMENT (OUTPATIENT)
Dept: CT IMAGING | Facility: IMAGING CENTER | Age: 52
End: 2022-12-01

## 2022-12-01 ENCOUNTER — APPOINTMENT (OUTPATIENT)
Dept: RADIATION ONCOLOGY | Facility: CLINIC | Age: 52
End: 2022-12-01

## 2022-12-01 DIAGNOSIS — Z98.890 OTHER SPECIFIED POSTPROCEDURAL STATES: Chronic | ICD-10-CM

## 2022-12-01 DIAGNOSIS — C34.11 MALIGNANT NEOPLASM OF UPPER LOBE, RIGHT BRONCHUS OR LUNG: ICD-10-CM

## 2022-12-01 PROCEDURE — 71260 CT THORAX DX C+: CPT | Mod: 26

## 2022-12-01 PROCEDURE — 74177 CT ABD & PELVIS W/CONTRAST: CPT

## 2022-12-01 PROCEDURE — 71260 CT THORAX DX C+: CPT

## 2022-12-01 PROCEDURE — 74177 CT ABD & PELVIS W/CONTRAST: CPT | Mod: 26

## 2022-12-09 ENCOUNTER — OUTPATIENT (OUTPATIENT)
Dept: OUTPATIENT SERVICES | Facility: HOSPITAL | Age: 52
LOS: 1 days | Discharge: HOME | End: 2022-12-09

## 2022-12-09 ENCOUNTER — APPOINTMENT (OUTPATIENT)
Dept: HEMATOLOGY ONCOLOGY | Facility: CLINIC | Age: 52
End: 2022-12-09

## 2022-12-09 ENCOUNTER — NON-APPOINTMENT (OUTPATIENT)
Age: 52
End: 2022-12-09

## 2022-12-09 VITALS
HEART RATE: 85 BPM | RESPIRATION RATE: 16 BRPM | TEMPERATURE: 98 F | SYSTOLIC BLOOD PRESSURE: 108 MMHG | DIASTOLIC BLOOD PRESSURE: 70 MMHG | HEIGHT: 65 IN | WEIGHT: 154.32 LBS | OXYGEN SATURATION: 98 % | BODY MASS INDEX: 25.71 KG/M2

## 2022-12-09 DIAGNOSIS — Z98.890 OTHER SPECIFIED POSTPROCEDURAL STATES: Chronic | ICD-10-CM

## 2022-12-09 DIAGNOSIS — C34.11 MALIGNANT NEOPLASM OF UPPER LOBE, RIGHT BRONCHUS OR LUNG: ICD-10-CM

## 2022-12-09 PROCEDURE — 99215 OFFICE O/P EST HI 40 MIN: CPT

## 2022-12-09 NOTE — HISTORY OF PRESENT ILLNESS
[FreeTextEntry1] : 50 yo F with newly diagnosed [id number], Stage IIIB adenocarcinoma of the RUL lung with unresectable bulky intrathoracic lymphadenopathy, PDL1 negative and negative for actionable mutations, presents for consideration of definitive concurrent chemoRT and is s/p one cycle of carbo/pemetrexed. KPS is 100\par \par Given her stage with unresectable bulky intrathoracic lymphadenopathy, relative good performance status and pulmonary function, definitive concurrent chemoRT was recommended with IMRT 60Gy/30fx starting 9/1/2022\par \par 9/8/2022:  Patient presents for OTV s/p 6/30 fx to R lung. Instead of switching zofran, she used reglan more frequently. She does not have nausea or dizziness now. Denies SOB, dysphagia. \par \par 9/15/2022:  Patient is seen for OTV s/p 8/30 fx to R lung.  She feels well overall and her nausea is well controlled, ends up taking antiemetics 1-2 times a day.  Her weight is stable as well as appetite.  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  \par \par 9/20/2022:  Patient is seen for OTV and is s/p 14/30 fx to R lung.  She states she took her Reglan prophylactically this morning and ended up vomiting twice.  She attributes this to having taken Reglan with grape soda for the first time.  She has since been feeling better except for chills but afebrile on temperature check.  She has taken time off from work and also states she noticed some dysphagia while eating chinese food last night.  \par \par 9/29/2022:  Patient is seen for OTV and is s/p 17/30 fractions to R lung.  She c/o odynophagia with pills/foods since last 2 days, severe to the level of 8/10 with eating.  She states with liquids she is doing ok.  She otherwise denies any new complaints. \par \par 10/6/22:21/30 fx: Notes chest pain with swallowing. Odynophagia  better with first mouthwash, sucralfate & oxycodone as needed. Cough with white secretions. No fever/chills. Eating small meals/small bites\par \par 10/13/2022:  Patient is seen for OTV s/p 25/30 fractions to R lung. No complaints of pain.Eating well. slight cokugh ,no SOB. Will complete next week.

## 2022-12-09 NOTE — HISTORY OF PRESENT ILLNESS
[FreeTextEntry1] : 50 yo F with newly diagnosed [id number], Stage IIIB adenocarcinoma of the RUL lung with unresectable bulky intrathoracic lymphadenopathy, PDL1 negative and negative for actionable mutations, presents for consideration of definitive concurrent chemoRT and is s/p one cycle of carbo/pemetrexed. KPS is 100\par \par Given her stage with unresectable bulky intrathoracic lymphadenopathy, relative good performance status and pulmonary function, definitive concurrent chemoRT was recommended with IMRT 60Gy/30fx starting 9/1/2022\par \par 9/8/2022:  Patient presents for OTV s/p 6/30 fx to R lung. Instead of switching zofran, she used reglan more frequently. She does not have nausea or dizziness now. Denies SOB, dysphagia. \par \par 9/15/2022:  Patient is seen for OTV s/p 8/30 fx to R lung.  She feels well overall and her nausea is well controlled, ends up taking antiemetics 1-2 times a day.  Her weight is stable as well as appetite.  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  \par \par 9/20/2022:  Patient is seen for OTV and is s/p 14/30 fx to R lung.  She states she took her Reglan prophylactically this morning and ended up vomiting twice.  She attributes this to having taken Reglan with grape soda for the first time.  She has since been feeling better except for chills but afebrile on temperature check.  She has taken time off from work and also states she noticed some dysphagia while eating chinese food last night.  \par \par 9/29/2022:  Patient is seen for OTV and is s/p 17/30 fractions to R lung.  She c/o odynophagia with pills/foods since last 2 days, severe to the level of 8/10 with eating.  She states with liquids she is doing ok.  She otherwise denies any new complaints. \par \par 10/6/22:21/30 fx: Notes chest pain with swallowing. Odynophagia  better with first mouthwash, sucralfate & oxycodone as needed. Cough with white secretions. No fever/chills. Eating small meals/small bites\par \par 10/13/2022:  Patient is seen for OTV s/p 25/30 fractions to R lung. No complaints of pain.Eating well. slight cough ,no SOB. Will complete next week.\par \par 10/20/2022:  Patient is seen for OTV s/p 29/30 fractions to R lung.  She no longer has soreness in her throat, her esophagitis is better but continues to have dysphagia and has modified her diet which is helping and she has maintained her weight.

## 2022-12-09 NOTE — DISEASE MANAGEMENT
[Clinical] : TNM Stage: c [IIIB] : IIIB [TTNM] : 4 [NTNM] : 2 [MTNM] : 0 [de-identified] : 29fx/2770 cGy [de-identified] : 30 fx/6000 cGy [de-identified] : R lung

## 2022-12-09 NOTE — DISEASE MANAGEMENT
[Clinical] : TNM Stage: c [IIIB] : IIIB [TTNM] : 4 [NTNM] : 2 [MTNM] : 0 [de-identified] : 25fx/5000 cGy [de-identified] : 30 fx/6000 cGy [de-identified] : R lung

## 2022-12-18 NOTE — ASSESSMENT
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  Tumor tested PD-L1 negative and is negative for actionable mutations (TP53, STK11 positive, among others).  Started chemotherapy with Carboplatin/Pemetrexed in mid-July 2022 x 4 cycles completed Sept 2022.  Treated with concurrent Thoracic RT started early Sept through late October 2022.  CT C/A/P 12/1/22: Decreased right apical mass and confluent mediastinal/right hilar lymphadenopathy.Mildly increased right apical pleural thickening, possibly treatment related.\par No new disease in the abdomen or pelvis.\par Recommend:\par -Would plan on treatment with maintenance Durvalumab as she has had a nice MI to chemo/RT. \par Risks, benefits and side effects reviewed with patient who is unsure at this time if she wishes to proceed; she however signed the consent form in preparation.  Drug info sheets provided.\par -Continue iron supplementation for iron deficiency anemia\par – Patient does have compression of the distal trachea from the enlarged pretracheal lymph node noted during bronchoscopy which will require clinical and radiologic follow-up and possibly airway intervention.  \par -F/U with Rad-Onc\par -Patient will let us know if she wishes to proceed with maintenance Durvalumab\par -F/U next week if continued swelling in left axillary area.

## 2022-12-18 NOTE — PHYSICAL EXAM
[Fully active, able to carry on all pre-disease performance without restriction] : Status 0 - Fully active, able to carry on all pre-disease performance without restriction [Normal] : affect appropriate [de-identified] : No icterus  [de-identified] : MMM O/P Clear [de-identified] : Supple No LAD  [de-identified] : S1 S2  [de-identified] : No edema [de-identified] : Mild left axillary swelling; no prominent nodes appreciated [de-identified] : No spine/CVA tenderness

## 2022-12-18 NOTE — RESULTS/DATA
[FreeTextEntry1] : -CXR 12/9/21:  A right upper lung rounded opacity is increased in size and conspicuity from prior imaging. Findings are concerning for neoplastic etiology. A CT chest is recommended for further evaluation.\par \par -PET/CT 6/1/22:  Abnormal FDG-PET/CT scan.\par 1. FDG avid lobulated partially necrotic right upper lung mass compatible with malignancy.\par 2. FDG avid mediastinal and right perihilar masses, suspicious for metastases.\par \par –MRI Brain 7/15/22:  NO EVIDENCE OF A MASS, ABNORMAL ENHANCEMENT, OR CURRENT EVIDENCE OF ACUTE ISCHEMIA. CHRONIC WHITE MATTER ISCHEMIC CHANGES\par \par Reviewed:\par -CT C/A/P 12/1/22: Decreased right apical mass and confluent mediastinal/right hilar lymphadenopathy.Mildly increased right apical pleural thickening, possibly treatment related.\par No new disease in the abdomen or pelvis.

## 2022-12-18 NOTE — HISTORY OF PRESENT ILLNESS
[Disease: _____________________] : Disease: [unfilled] [T: ___] : T[unfilled] [N: ___] : N[unfilled] [AJCC Stage: ____] : AJCC Stage: [unfilled] [de-identified] : 51F with significant smoking history who was evaluated for chronic GERD and a cough going on for about 6 months prior to her initial consultation.  A CXR was done and demonstrated a nodule. No follow up was done at that time. She returned in May 2022 for persistent GERD and a cough. A CXR was done and revealed a large RUL mass increased in size from the previous study. Her pulmonologist then sent her for a PET/CT, which revealed an intensely FDG-avid RUL mass. 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  Patient with clinical Stage IIIB, T4 N2 M0 disease. Pathology reviewed at tumor board and clarified the tumor represents non-small cell lung cancer, either adenocarcinoma with neuroendocrine features versus large cell neuroendocrine cancer.  Brain MRI was negative.  Tumor tested PDL1 Negative and molecular testing is negative for actionable mutations.  Patient started treatment with carboplatin/pemetrexed in July 2022.  Thoracic RT started in Sept 2022.  Completed 4 cycles with Carbo/Pem in late Sept 2022.  Thoracic RT completed late October 2022. CT C/A/P 12/1/22 with IA.  [de-identified] : – Lymph node, 4R EBUS guided FNA 6/22/2022: Positive for malignant cells.  Metastatic malignant neoplasm.  IHC is positive for TTF-1 and synaptophysin while negative for p40, chromogranin and CD56.  Ki-67 is 80%.  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  Foundation:  Positive for TP53, STK11, among others.   [de-identified] : Patient is status post C4 of chemotherapy with carboplatin/pemetrexed on 9/20/22.\par Completed Thoracic RT on 10/21.  \par \par Reports left axillary/chest pain/swelling x 1week for which she takes motrin bid; denies trauma. \par Breathing stable, however occasional discomfort with deep inspiration; denies cough.\par No F/C/C/D.  Appetite good and gaining weight. Had 2 episodes of vomiting for whichh she took no meds.  Having some esophagitis symptoms managed with sucralfate.  \par \par CT C/A/P 12/1/22: Decreased right apical mass and confluent mediastinal/right hilar lymphadenopathy.Mildly increased right apical pleural thickening, possibly treatment related.\par No new disease in the abdomen or pelvis.\par \par Discussed recommendation to start maintenance Durvalumab in Jan; she is unsure if she wants to do this despite benefits. \par \par

## 2023-01-01 ENCOUNTER — APPOINTMENT (OUTPATIENT)
Dept: HEMATOLOGY ONCOLOGY | Facility: CLINIC | Age: 53
End: 2023-01-01

## 2023-01-01 ENCOUNTER — RESULT REVIEW (OUTPATIENT)
Age: 53
End: 2023-01-01

## 2023-01-01 ENCOUNTER — NON-APPOINTMENT (OUTPATIENT)
Age: 53
End: 2023-01-01

## 2023-01-01 ENCOUNTER — APPOINTMENT (OUTPATIENT)
Dept: INFUSION THERAPY | Facility: HOSPITAL | Age: 53
End: 2023-01-01

## 2023-01-01 ENCOUNTER — APPOINTMENT (OUTPATIENT)
Dept: NEUROSURGERY | Facility: CLINIC | Age: 53
End: 2023-01-01

## 2023-01-01 ENCOUNTER — APPOINTMENT (OUTPATIENT)
Dept: NEUROSURGERY | Facility: CLINIC | Age: 53
End: 2023-01-01
Payer: MEDICAID

## 2023-01-01 ENCOUNTER — APPOINTMENT (OUTPATIENT)
Dept: RADIATION ONCOLOGY | Facility: CLINIC | Age: 53
End: 2023-01-01
Payer: MEDICAID

## 2023-01-01 ENCOUNTER — OUTPATIENT (OUTPATIENT)
Dept: OUTPATIENT SERVICES | Facility: HOSPITAL | Age: 53
LOS: 1 days | Discharge: HOME | End: 2023-01-01

## 2023-01-01 ENCOUNTER — EMERGENCY (EMERGENCY)
Facility: HOSPITAL | Age: 53
LOS: 0 days | Discharge: ANOTHER HOSPITAL | End: 2023-12-19
Attending: STUDENT IN AN ORGANIZED HEALTH CARE EDUCATION/TRAINING PROGRAM
Payer: MEDICAID

## 2023-01-01 ENCOUNTER — TRANSCRIPTION ENCOUNTER (OUTPATIENT)
Age: 53
End: 2023-01-01

## 2023-01-01 ENCOUNTER — APPOINTMENT (OUTPATIENT)
Dept: NUCLEAR MEDICINE | Facility: CLINIC | Age: 53
End: 2023-01-01
Payer: MEDICAID

## 2023-01-01 ENCOUNTER — INPATIENT (INPATIENT)
Facility: HOSPITAL | Age: 53
LOS: 7 days | Discharge: NURSING, REHAB OR LONG-TERM CARE | DRG: 54 | End: 2023-11-08
Attending: STUDENT IN AN ORGANIZED HEALTH CARE EDUCATION/TRAINING PROGRAM | Admitting: STUDENT IN AN ORGANIZED HEALTH CARE EDUCATION/TRAINING PROGRAM
Payer: MEDICAID

## 2023-01-01 ENCOUNTER — OUTPATIENT (OUTPATIENT)
Dept: OUTPATIENT SERVICES | Facility: HOSPITAL | Age: 53
LOS: 1 days | End: 2023-01-01
Payer: COMMERCIAL

## 2023-01-01 ENCOUNTER — APPOINTMENT (OUTPATIENT)
Dept: NUCLEAR MEDICINE | Facility: IMAGING CENTER | Age: 53
End: 2023-01-01
Payer: SELF-PAY

## 2023-01-01 ENCOUNTER — EMERGENCY (EMERGENCY)
Facility: HOSPITAL | Age: 53
LOS: 1 days | Discharge: HOME | End: 2023-01-01
Attending: EMERGENCY MEDICINE | Admitting: EMERGENCY MEDICINE
Payer: COMMERCIAL

## 2023-01-01 ENCOUNTER — INPATIENT (INPATIENT)
Facility: HOSPITAL | Age: 53
LOS: 8 days | Discharge: HOME | End: 2023-09-13
Attending: STUDENT IN AN ORGANIZED HEALTH CARE EDUCATION/TRAINING PROGRAM | Admitting: STUDENT IN AN ORGANIZED HEALTH CARE EDUCATION/TRAINING PROGRAM
Payer: MEDICAID

## 2023-01-01 ENCOUNTER — APPOINTMENT (OUTPATIENT)
Dept: ULTRASOUND IMAGING | Facility: IMAGING CENTER | Age: 53
End: 2023-01-01
Payer: COMMERCIAL

## 2023-01-01 ENCOUNTER — APPOINTMENT (OUTPATIENT)
Dept: HEMATOLOGY ONCOLOGY | Facility: CLINIC | Age: 53
End: 2023-01-01
Payer: MEDICAID

## 2023-01-01 ENCOUNTER — OUTPATIENT (OUTPATIENT)
Dept: OUTPATIENT SERVICES | Facility: HOSPITAL | Age: 53
LOS: 1 days | Discharge: HOME | End: 2023-01-01
Payer: MEDICAID

## 2023-01-01 ENCOUNTER — APPOINTMENT (OUTPATIENT)
Dept: RADIATION ONCOLOGY | Facility: CLINIC | Age: 53
End: 2023-01-01

## 2023-01-01 ENCOUNTER — APPOINTMENT (OUTPATIENT)
Dept: HEMATOLOGY ONCOLOGY | Facility: CLINIC | Age: 53
End: 2023-01-01
Payer: COMMERCIAL

## 2023-01-01 ENCOUNTER — APPOINTMENT (OUTPATIENT)
Dept: MRI IMAGING | Facility: CLINIC | Age: 53
End: 2023-01-01

## 2023-01-01 ENCOUNTER — INPATIENT (INPATIENT)
Facility: HOSPITAL | Age: 53
LOS: 12 days | Discharge: HOME HEALTH | DRG: 949 | End: 2023-10-18
Attending: PHYSICAL MEDICINE & REHABILITATION | Admitting: PHYSICAL MEDICINE & REHABILITATION
Payer: MEDICAID

## 2023-01-01 ENCOUNTER — APPOINTMENT (OUTPATIENT)
Dept: NEUROSURGERY | Facility: HOSPITAL | Age: 53
End: 2023-01-01

## 2023-01-01 ENCOUNTER — INPATIENT (INPATIENT)
Facility: HOSPITAL | Age: 53
LOS: 15 days | Discharge: NURSING, REHAB OR LONG-TERM CARE | DRG: 25 | End: 2023-10-05
Attending: NEUROLOGICAL SURGERY | Admitting: HOSPITALIST
Payer: MEDICAID

## 2023-01-01 ENCOUNTER — LABORATORY RESULT (OUTPATIENT)
Age: 53
End: 2023-01-01

## 2023-01-01 ENCOUNTER — INPATIENT (INPATIENT)
Facility: HOSPITAL | Age: 53
LOS: 4 days | Discharge: HOME | End: 2023-08-31
Attending: STUDENT IN AN ORGANIZED HEALTH CARE EDUCATION/TRAINING PROGRAM | Admitting: STUDENT IN AN ORGANIZED HEALTH CARE EDUCATION/TRAINING PROGRAM
Payer: COMMERCIAL

## 2023-01-01 ENCOUNTER — APPOINTMENT (OUTPATIENT)
Dept: NEUROLOGY | Facility: CLINIC | Age: 53
End: 2023-01-01
Payer: MEDICAID

## 2023-01-01 ENCOUNTER — APPOINTMENT (OUTPATIENT)
Dept: HEMATOLOGY ONCOLOGY | Facility: CLINIC | Age: 53
End: 2023-01-01
Payer: SELF-PAY

## 2023-01-01 ENCOUNTER — APPOINTMENT (OUTPATIENT)
Dept: MRI IMAGING | Facility: IMAGING CENTER | Age: 53
End: 2023-01-01

## 2023-01-01 ENCOUNTER — OUTPATIENT (OUTPATIENT)
Dept: OUTPATIENT SERVICES | Facility: HOSPITAL | Age: 53
LOS: 1 days | End: 2023-01-01
Payer: MEDICAID

## 2023-01-01 ENCOUNTER — INPATIENT (INPATIENT)
Facility: HOSPITAL | Age: 53
LOS: 9 days | DRG: 871 | End: 2023-12-29
Attending: STUDENT IN AN ORGANIZED HEALTH CARE EDUCATION/TRAINING PROGRAM | Admitting: HOSPITALIST
Payer: MEDICAID

## 2023-01-01 ENCOUNTER — INPATIENT (INPATIENT)
Facility: HOSPITAL | Age: 53
LOS: 12 days | Discharge: HOME HEALTH | DRG: 54 | End: 2023-11-21
Attending: INTERNAL MEDICINE | Admitting: INTERNAL MEDICINE
Payer: MEDICAID

## 2023-01-01 ENCOUNTER — INPATIENT (INPATIENT)
Facility: HOSPITAL | Age: 53
LOS: 6 days | Discharge: HOME | End: 2023-06-07
Attending: HOSPITALIST | Admitting: HOSPITALIST
Payer: MEDICAID

## 2023-01-01 ENCOUNTER — APPOINTMENT (OUTPATIENT)
Dept: PULMONOLOGY | Facility: CLINIC | Age: 53
End: 2023-01-01
Payer: COMMERCIAL

## 2023-01-01 ENCOUNTER — INPATIENT (INPATIENT)
Facility: HOSPITAL | Age: 53
LOS: 6 days | Discharge: HOME | End: 2023-08-26
Attending: HOSPITALIST | Admitting: HOSPITALIST
Payer: MEDICAID

## 2023-01-01 VITALS
RESPIRATION RATE: 18 BRPM | OXYGEN SATURATION: 100 % | SYSTOLIC BLOOD PRESSURE: 116 MMHG | DIASTOLIC BLOOD PRESSURE: 84 MMHG | HEART RATE: 73 BPM | TEMPERATURE: 98 F

## 2023-01-01 VITALS
OXYGEN SATURATION: 98 % | DIASTOLIC BLOOD PRESSURE: 78 MMHG | SYSTOLIC BLOOD PRESSURE: 123 MMHG | HEART RATE: 101 BPM | TEMPERATURE: 100 F | RESPIRATION RATE: 20 BRPM

## 2023-01-01 VITALS
WEIGHT: 145.06 LBS | OXYGEN SATURATION: 99 % | RESPIRATION RATE: 16 BRPM | TEMPERATURE: 97.3 F | HEART RATE: 102 BPM | BODY MASS INDEX: 23.04 KG/M2 | DIASTOLIC BLOOD PRESSURE: 86 MMHG | SYSTOLIC BLOOD PRESSURE: 128 MMHG

## 2023-01-01 VITALS
HEART RATE: 112 BPM | OXYGEN SATURATION: 100 % | SYSTOLIC BLOOD PRESSURE: 111 MMHG | RESPIRATION RATE: 20 BRPM | TEMPERATURE: 101 F | DIASTOLIC BLOOD PRESSURE: 86 MMHG

## 2023-01-01 VITALS
SYSTOLIC BLOOD PRESSURE: 136 MMHG | WEIGHT: 149.91 LBS | HEART RATE: 104 BPM | HEIGHT: 66 IN | OXYGEN SATURATION: 98 % | RESPIRATION RATE: 18 BRPM | DIASTOLIC BLOOD PRESSURE: 83 MMHG | TEMPERATURE: 98 F

## 2023-01-01 VITALS
TEMPERATURE: 98 F | DIASTOLIC BLOOD PRESSURE: 83 MMHG | SYSTOLIC BLOOD PRESSURE: 120 MMHG | HEART RATE: 93 BPM | OXYGEN SATURATION: 100 % | RESPIRATION RATE: 18 BRPM

## 2023-01-01 VITALS
RESPIRATION RATE: 17 BRPM | TEMPERATURE: 98 F | SYSTOLIC BLOOD PRESSURE: 102 MMHG | HEART RATE: 101 BPM | DIASTOLIC BLOOD PRESSURE: 61 MMHG | OXYGEN SATURATION: 97 %

## 2023-01-01 VITALS
SYSTOLIC BLOOD PRESSURE: 130 MMHG | HEART RATE: 116 BPM | RESPIRATION RATE: 24 BRPM | DIASTOLIC BLOOD PRESSURE: 87 MMHG | HEIGHT: 66 IN | OXYGEN SATURATION: 97 % | TEMPERATURE: 98 F

## 2023-01-01 VITALS
SYSTOLIC BLOOD PRESSURE: 110 MMHG | OXYGEN SATURATION: 97 % | WEIGHT: 130 LBS | RESPIRATION RATE: 16 BRPM | BODY MASS INDEX: 20.65 KG/M2 | HEIGHT: 66.54 IN | HEART RATE: 87 BPM | TEMPERATURE: 98.4 F | DIASTOLIC BLOOD PRESSURE: 78 MMHG

## 2023-01-01 VITALS
SYSTOLIC BLOOD PRESSURE: 124 MMHG | HEART RATE: 124 BPM | HEIGHT: 65 IN | OXYGEN SATURATION: 100 % | DIASTOLIC BLOOD PRESSURE: 83 MMHG | TEMPERATURE: 99 F | RESPIRATION RATE: 19 BRPM

## 2023-01-01 VITALS
WEIGHT: 122.13 LBS | BODY MASS INDEX: 19.4 KG/M2 | HEART RATE: 105 BPM | OXYGEN SATURATION: 99 % | SYSTOLIC BLOOD PRESSURE: 104 MMHG | DIASTOLIC BLOOD PRESSURE: 74 MMHG | TEMPERATURE: 97.1 F | RESPIRATION RATE: 16 BRPM

## 2023-01-01 VITALS
HEART RATE: 96 BPM | WEIGHT: 139.99 LBS | RESPIRATION RATE: 16 BRPM | HEIGHT: 66 IN | OXYGEN SATURATION: 97 % | DIASTOLIC BLOOD PRESSURE: 89 MMHG | SYSTOLIC BLOOD PRESSURE: 123 MMHG | TEMPERATURE: 98 F

## 2023-01-01 VITALS
DIASTOLIC BLOOD PRESSURE: 82 MMHG | RESPIRATION RATE: 18 BRPM | WEIGHT: 128.09 LBS | TEMPERATURE: 97.9 F | SYSTOLIC BLOOD PRESSURE: 114 MMHG | OXYGEN SATURATION: 100 % | BODY MASS INDEX: 20.59 KG/M2 | HEART RATE: 113 BPM | HEIGHT: 66 IN

## 2023-01-01 VITALS
OXYGEN SATURATION: 98 % | TEMPERATURE: 97.1 F | WEIGHT: 132.94 LBS | HEART RATE: 97 BPM | SYSTOLIC BLOOD PRESSURE: 124 MMHG | BODY MASS INDEX: 21.11 KG/M2 | DIASTOLIC BLOOD PRESSURE: 81 MMHG | RESPIRATION RATE: 16 BRPM

## 2023-01-01 VITALS
WEIGHT: 128 LBS | BODY MASS INDEX: 20.66 KG/M2 | SYSTOLIC BLOOD PRESSURE: 109 MMHG | OXYGEN SATURATION: 100 % | HEART RATE: 123 BPM | RESPIRATION RATE: 18 BRPM | TEMPERATURE: 97.5 F | DIASTOLIC BLOOD PRESSURE: 77 MMHG

## 2023-01-01 VITALS
TEMPERATURE: 98 F | OXYGEN SATURATION: 98 % | HEART RATE: 120 BPM | WEIGHT: 179.9 LBS | HEIGHT: 66 IN | RESPIRATION RATE: 20 BRPM | SYSTOLIC BLOOD PRESSURE: 109 MMHG | DIASTOLIC BLOOD PRESSURE: 77 MMHG

## 2023-01-01 VITALS
DIASTOLIC BLOOD PRESSURE: 68 MMHG | RESPIRATION RATE: 18 BRPM | TEMPERATURE: 99 F | OXYGEN SATURATION: 97 % | HEART RATE: 137 BPM | HEART RATE: 108 BPM | SYSTOLIC BLOOD PRESSURE: 100 MMHG | OXYGEN SATURATION: 97 % | RESPIRATION RATE: 16 BRPM | BODY MASS INDEX: 17.75 KG/M2 | WEIGHT: 110 LBS | SYSTOLIC BLOOD PRESSURE: 112 MMHG | DIASTOLIC BLOOD PRESSURE: 81 MMHG | TEMPERATURE: 97.3 F

## 2023-01-01 VITALS
DIASTOLIC BLOOD PRESSURE: 83 MMHG | RESPIRATION RATE: 18 BRPM | SYSTOLIC BLOOD PRESSURE: 136 MMHG | OXYGEN SATURATION: 100 % | HEART RATE: 98 BPM

## 2023-01-01 VITALS
TEMPERATURE: 96.4 F | SYSTOLIC BLOOD PRESSURE: 115 MMHG | RESPIRATION RATE: 16 BRPM | DIASTOLIC BLOOD PRESSURE: 76 MMHG | BODY MASS INDEX: 23.85 KG/M2 | HEART RATE: 83 BPM | WEIGHT: 143.3 LBS | OXYGEN SATURATION: 99 %

## 2023-01-01 VITALS
OXYGEN SATURATION: 100 % | SYSTOLIC BLOOD PRESSURE: 131 MMHG | TEMPERATURE: 98 F | HEIGHT: 63 IN | WEIGHT: 130.07 LBS | DIASTOLIC BLOOD PRESSURE: 93 MMHG | HEART RATE: 109 BPM | RESPIRATION RATE: 16 BRPM

## 2023-01-01 VITALS
WEIGHT: 120.81 LBS | SYSTOLIC BLOOD PRESSURE: 125 MMHG | RESPIRATION RATE: 16 BRPM | HEIGHT: 66 IN | TEMPERATURE: 98 F | OXYGEN SATURATION: 97 % | HEART RATE: 104 BPM | DIASTOLIC BLOOD PRESSURE: 81 MMHG

## 2023-01-01 VITALS
HEIGHT: 66 IN | TEMPERATURE: 98 F | RESPIRATION RATE: 16 BRPM | SYSTOLIC BLOOD PRESSURE: 147 MMHG | OXYGEN SATURATION: 100 % | DIASTOLIC BLOOD PRESSURE: 99 MMHG | HEART RATE: 87 BPM

## 2023-01-01 VITALS — SYSTOLIC BLOOD PRESSURE: 117 MMHG | OXYGEN SATURATION: 100 % | HEART RATE: 82 BPM | DIASTOLIC BLOOD PRESSURE: 70 MMHG

## 2023-01-01 VITALS
SYSTOLIC BLOOD PRESSURE: 122 MMHG | WEIGHT: 143.3 LBS | DIASTOLIC BLOOD PRESSURE: 79 MMHG | OXYGEN SATURATION: 99 % | RESPIRATION RATE: 16 BRPM | HEIGHT: 66 IN | HEART RATE: 88 BPM | TEMPERATURE: 99 F

## 2023-01-01 VITALS
SYSTOLIC BLOOD PRESSURE: 104 MMHG | TEMPERATURE: 98 F | DIASTOLIC BLOOD PRESSURE: 71 MMHG | OXYGEN SATURATION: 97 % | RESPIRATION RATE: 16 BRPM | HEART RATE: 113 BPM

## 2023-01-01 VITALS
SYSTOLIC BLOOD PRESSURE: 122 MMHG | HEART RATE: 78 BPM | RESPIRATION RATE: 16 BRPM | WEIGHT: 147.05 LBS | BODY MASS INDEX: 24.5 KG/M2 | TEMPERATURE: 97.8 F | OXYGEN SATURATION: 98 % | HEIGHT: 65 IN | DIASTOLIC BLOOD PRESSURE: 66 MMHG

## 2023-01-01 VITALS
OXYGEN SATURATION: 98 % | HEART RATE: 84 BPM | TEMPERATURE: 99 F | RESPIRATION RATE: 18 BRPM | DIASTOLIC BLOOD PRESSURE: 68 MMHG | SYSTOLIC BLOOD PRESSURE: 102 MMHG

## 2023-01-01 VITALS
OXYGEN SATURATION: 92 % | DIASTOLIC BLOOD PRESSURE: 63 MMHG | RESPIRATION RATE: 24 BRPM | SYSTOLIC BLOOD PRESSURE: 92 MMHG | TEMPERATURE: 100 F | HEART RATE: 141 BPM

## 2023-01-01 VITALS
TEMPERATURE: 99 F | OXYGEN SATURATION: 95 % | SYSTOLIC BLOOD PRESSURE: 113 MMHG | RESPIRATION RATE: 19 BRPM | DIASTOLIC BLOOD PRESSURE: 75 MMHG | HEART RATE: 99 BPM

## 2023-01-01 VITALS
WEIGHT: 128 LBS | SYSTOLIC BLOOD PRESSURE: 99 MMHG | HEART RATE: 106 BPM | BODY MASS INDEX: 20.57 KG/M2 | OXYGEN SATURATION: 100 % | HEIGHT: 66 IN | DIASTOLIC BLOOD PRESSURE: 72 MMHG

## 2023-01-01 VITALS
TEMPERATURE: 98 F | OXYGEN SATURATION: 97 % | DIASTOLIC BLOOD PRESSURE: 79 MMHG | RESPIRATION RATE: 20 BRPM | HEART RATE: 116 BPM | SYSTOLIC BLOOD PRESSURE: 108 MMHG

## 2023-01-01 VITALS
SYSTOLIC BLOOD PRESSURE: 102 MMHG | HEIGHT: 66 IN | RESPIRATION RATE: 18 BRPM | HEART RATE: 116 BPM | TEMPERATURE: 98 F | DIASTOLIC BLOOD PRESSURE: 81 MMHG | WEIGHT: 128.09 LBS | OXYGEN SATURATION: 100 %

## 2023-01-01 DIAGNOSIS — Z98.890 OTHER SPECIFIED POSTPROCEDURAL STATES: Chronic | ICD-10-CM

## 2023-01-01 DIAGNOSIS — G92 TOXIC ENCEPHALOPATHY: ICD-10-CM

## 2023-01-01 DIAGNOSIS — R05.9 COUGH, UNSPECIFIED: ICD-10-CM

## 2023-01-01 DIAGNOSIS — C34.90 MALIGNANT NEOPLASM OF UNSPECIFIED PART OF UNSPECIFIED BRONCHUS OR LUNG: ICD-10-CM

## 2023-01-01 DIAGNOSIS — C78.7 SECONDARY MALIGNANT NEOPLASM OF LIVER AND INTRAHEPATIC BILE DUCT: ICD-10-CM

## 2023-01-01 DIAGNOSIS — K29.60 OTHER GASTRITIS W/OUT BLEEDING: ICD-10-CM

## 2023-01-01 DIAGNOSIS — R53.83 OTHER FATIGUE: ICD-10-CM

## 2023-01-01 DIAGNOSIS — I63.9 CEREBRAL INFARCTION, UNSPECIFIED: ICD-10-CM

## 2023-01-01 DIAGNOSIS — E83.39 OTHER DISORDERS OF PHOSPHORUS METABOLISM: ICD-10-CM

## 2023-01-01 DIAGNOSIS — R06.00 DYSPNEA, UNSPECIFIED: ICD-10-CM

## 2023-01-01 DIAGNOSIS — C34.11 MALIGNANT NEOPLASM OF UPPER LOBE, RIGHT BRONCHUS OR LUNG: ICD-10-CM

## 2023-01-01 DIAGNOSIS — Z29.9 ENCOUNTER FOR PROPHYLACTIC MEASURES, UNSPECIFIED: ICD-10-CM

## 2023-01-01 DIAGNOSIS — Z20.822 CONTACT WITH AND (SUSPECTED) EXPOSURE TO COVID-19: ICD-10-CM

## 2023-01-01 DIAGNOSIS — E11.649 TYPE 2 DIABETES MELLITUS WITH HYPOGLYCEMIA WITHOUT COMA: ICD-10-CM

## 2023-01-01 DIAGNOSIS — R55 SYNCOPE AND COLLAPSE: ICD-10-CM

## 2023-01-01 DIAGNOSIS — R41.82 ALTERED MENTAL STATUS, UNSPECIFIED: ICD-10-CM

## 2023-01-01 DIAGNOSIS — E43 UNSPECIFIED SEVERE PROTEIN-CALORIE MALNUTRITION: ICD-10-CM

## 2023-01-01 DIAGNOSIS — R26.2 DIFFICULTY IN WALKING, NOT ELSEWHERE CLASSIFIED: ICD-10-CM

## 2023-01-01 DIAGNOSIS — D64.9 ANEMIA, UNSPECIFIED: ICD-10-CM

## 2023-01-01 DIAGNOSIS — Z51.5 ENCOUNTER FOR PALLIATIVE CARE: ICD-10-CM

## 2023-01-01 DIAGNOSIS — H53.8 OTHER VISUAL DISTURBANCES: ICD-10-CM

## 2023-01-01 DIAGNOSIS — R53.1 WEAKNESS: ICD-10-CM

## 2023-01-01 DIAGNOSIS — G93.89 OTHER SPECIFIED DISORDERS OF BRAIN: ICD-10-CM

## 2023-01-01 DIAGNOSIS — I10 ESSENTIAL (PRIMARY) HYPERTENSION: ICD-10-CM

## 2023-01-01 DIAGNOSIS — Z71.89 OTHER SPECIFIED COUNSELING: ICD-10-CM

## 2023-01-01 DIAGNOSIS — D50.9 IRON DEFICIENCY ANEMIA, UNSPECIFIED: ICD-10-CM

## 2023-01-01 DIAGNOSIS — R56.9 UNSPECIFIED CONVULSIONS: ICD-10-CM

## 2023-01-01 DIAGNOSIS — K21.9 GASTRO-ESOPHAGEAL REFLUX DISEASE WITHOUT ESOPHAGITIS: ICD-10-CM

## 2023-01-01 DIAGNOSIS — E11.65 TYPE 2 DIABETES MELLITUS WITH HYPERGLYCEMIA: ICD-10-CM

## 2023-01-01 DIAGNOSIS — R73.9 HYPERGLYCEMIA, UNSPECIFIED: ICD-10-CM

## 2023-01-01 DIAGNOSIS — N39.0 URINARY TRACT INFECTION, SITE NOT SPECIFIED: ICD-10-CM

## 2023-01-01 DIAGNOSIS — C79.31 SECONDARY MALIGNANT NEOPLASM OF BRAIN: ICD-10-CM

## 2023-01-01 DIAGNOSIS — R42 DIZZINESS AND GIDDINESS: ICD-10-CM

## 2023-01-01 DIAGNOSIS — I82.462 ACUTE EMBOLISM AND THROMBOSIS OF LEFT CALF MUSCULAR VEIN: ICD-10-CM

## 2023-01-01 DIAGNOSIS — E11.9 TYPE 2 DIABETES MELLITUS WITHOUT COMPLICATIONS: ICD-10-CM

## 2023-01-01 DIAGNOSIS — E78.5 HYPERLIPIDEMIA, UNSPECIFIED: ICD-10-CM

## 2023-01-01 DIAGNOSIS — D72.829 ELEVATED WHITE BLOOD CELL COUNT, UNSPECIFIED: ICD-10-CM

## 2023-01-01 DIAGNOSIS — C79.89 SECONDARY MALIGNANT NEOPLASM OF OTHER SPECIFIED SITES: ICD-10-CM

## 2023-01-01 DIAGNOSIS — G89.3 NEOPLASM RELATED PAIN (ACUTE) (CHRONIC): ICD-10-CM

## 2023-01-01 DIAGNOSIS — R63.4 ABNORMAL WEIGHT LOSS: ICD-10-CM

## 2023-01-01 DIAGNOSIS — J98.11 ATELECTASIS: ICD-10-CM

## 2023-01-01 DIAGNOSIS — R10.9 UNSPECIFIED ABDOMINAL PAIN: ICD-10-CM

## 2023-01-01 DIAGNOSIS — E83.42 HYPOMAGNESEMIA: ICD-10-CM

## 2023-01-01 DIAGNOSIS — R79.89 OTHER SPECIFIED ABNORMAL FINDINGS OF BLOOD CHEMISTRY: ICD-10-CM

## 2023-01-01 DIAGNOSIS — Z86.39 PERSONAL HISTORY OF OTHER ENDOCRINE, NUTRITIONAL AND METABOLIC DISEASE: ICD-10-CM

## 2023-01-01 DIAGNOSIS — R47.1 DYSARTHRIA AND ANARTHRIA: ICD-10-CM

## 2023-01-01 DIAGNOSIS — R41.840 ATTENTION AND CONCENTRATION DEFICIT: ICD-10-CM

## 2023-01-01 DIAGNOSIS — R26.81 UNSTEADINESS ON FEET: ICD-10-CM

## 2023-01-01 DIAGNOSIS — K29.70 GASTRITIS, UNSPECIFIED, WITHOUT BLEEDING: ICD-10-CM

## 2023-01-01 DIAGNOSIS — R94.8 ABNORMAL RESULTS OF FUNCTION STUDIES OF OTHER ORGANS AND SYSTEMS: ICD-10-CM

## 2023-01-01 DIAGNOSIS — R50.9 FEVER, UNSPECIFIED: ICD-10-CM

## 2023-01-01 DIAGNOSIS — R11.2 NAUSEA WITH VOMITING, UNSPECIFIED: ICD-10-CM

## 2023-01-01 DIAGNOSIS — E87.1 HYPO-OSMOLALITY AND HYPONATREMIA: ICD-10-CM

## 2023-01-01 DIAGNOSIS — R52 PAIN, UNSPECIFIED: ICD-10-CM

## 2023-01-01 DIAGNOSIS — R29.898 OTHER SYMPTOMS AND SIGNS INVOLVING THE MUSCULOSKELETAL SYSTEM: ICD-10-CM

## 2023-01-01 DIAGNOSIS — R00.2 PALPITATIONS: ICD-10-CM

## 2023-01-01 DIAGNOSIS — R41.3 OTHER AMNESIA: ICD-10-CM

## 2023-01-01 DIAGNOSIS — R00.0 TACHYCARDIA, UNSPECIFIED: ICD-10-CM

## 2023-01-01 DIAGNOSIS — G93.6 CEREBRAL EDEMA: ICD-10-CM

## 2023-01-01 DIAGNOSIS — Z51.89 ENCOUNTER FOR OTHER SPECIFIED AFTERCARE: ICD-10-CM

## 2023-01-01 DIAGNOSIS — R91.8 OTHER NONSPECIFIC ABNORMAL FINDING OF LUNG FIELD: ICD-10-CM

## 2023-01-01 DIAGNOSIS — G92.8 OTHER TOXIC ENCEPHALOPATHY: ICD-10-CM

## 2023-01-01 DIAGNOSIS — R10.13 EPIGASTRIC PAIN: ICD-10-CM

## 2023-01-01 DIAGNOSIS — E44.0 MODERATE PROTEIN-CALORIE MALNUTRITION: ICD-10-CM

## 2023-01-01 DIAGNOSIS — R20.0 ANESTHESIA OF SKIN: ICD-10-CM

## 2023-01-01 DIAGNOSIS — G81.91 HEMIPLEGIA, UNSPECIFIED AFFECTING RIGHT DOMINANT SIDE: ICD-10-CM

## 2023-01-01 DIAGNOSIS — Z51.11 ENCOUNTER FOR ANTINEOPLASTIC CHEMOTHERAPY: ICD-10-CM

## 2023-01-01 DIAGNOSIS — E09.9 DRUG OR CHEMICAL INDUCED DIABETES MELLITUS WITHOUT COMPLICATIONS: ICD-10-CM

## 2023-01-01 DIAGNOSIS — Z48.3 AFTERCARE FOLLOWING SURGERY FOR NEOPLASM: ICD-10-CM

## 2023-01-01 DIAGNOSIS — H53.2 DIPLOPIA: ICD-10-CM

## 2023-01-01 DIAGNOSIS — D43.2 NEOPLASM OF UNCERTAIN BEHAVIOR OF BRAIN, UNSPECIFIED: ICD-10-CM

## 2023-01-01 DIAGNOSIS — T38.0X5D ADVERSE EFFECT OF GLUCOCORTICOIDS AND SYNTHETIC ANALOGUES, SUBSEQUENT ENCOUNTER: ICD-10-CM

## 2023-01-01 DIAGNOSIS — E22.2 SYNDROME OF INAPPROPRIATE SECRETION OF ANTIDIURETIC HORMONE: ICD-10-CM

## 2023-01-01 DIAGNOSIS — A41.9 SEPSIS, UNSPECIFIED ORGANISM: ICD-10-CM

## 2023-01-01 DIAGNOSIS — Z79.84 LONG TERM (CURRENT) USE OF ORAL HYPOGLYCEMIC DRUGS: ICD-10-CM

## 2023-01-01 DIAGNOSIS — T38.0X5A OTHER GASTRITIS W/OUT BLEEDING: ICD-10-CM

## 2023-01-01 DIAGNOSIS — R41.844 FRONTAL LOBE AND EXECUTIVE FUNCTION DEFICIT: ICD-10-CM

## 2023-01-01 DIAGNOSIS — R07.9 CHEST PAIN, UNSPECIFIED: ICD-10-CM

## 2023-01-01 DIAGNOSIS — C77.1 SECONDARY AND UNSPECIFIED MALIGNANT NEOPLASM OF INTRATHORACIC LYMPH NODES: ICD-10-CM

## 2023-01-01 DIAGNOSIS — Z02.9 ENCOUNTER FOR ADMINISTRATIVE EXAMINATIONS, UNSPECIFIED: ICD-10-CM

## 2023-01-01 DIAGNOSIS — B95.2 ENTEROCOCCUS AS THE CAUSE OF DISEASES CLASSIFIED ELSEWHERE: ICD-10-CM

## 2023-01-01 DIAGNOSIS — R53.2 FUNCTIONAL QUADRIPLEGIA: ICD-10-CM

## 2023-01-01 DIAGNOSIS — D72.819 DECREASED WHITE BLOOD CELL COUNT, UNSPECIFIED: ICD-10-CM

## 2023-01-01 DIAGNOSIS — R27.8 OTHER LACK OF COORDINATION: ICD-10-CM

## 2023-01-01 LAB
-  AMPICILLIN: SIGNIFICANT CHANGE UP
-  AMPICILLIN: SIGNIFICANT CHANGE UP
-  CIPROFLOXACIN: SIGNIFICANT CHANGE UP
-  CIPROFLOXACIN: SIGNIFICANT CHANGE UP
-  LEVOFLOXACIN: SIGNIFICANT CHANGE UP
-  LEVOFLOXACIN: SIGNIFICANT CHANGE UP
-  NITROFURANTOIN: SIGNIFICANT CHANGE UP
-  NITROFURANTOIN: SIGNIFICANT CHANGE UP
-  TETRACYCLINE: SIGNIFICANT CHANGE UP
-  TETRACYCLINE: SIGNIFICANT CHANGE UP
-  VANCOMYCIN: SIGNIFICANT CHANGE UP
-  VANCOMYCIN: SIGNIFICANT CHANGE UP
A1C WITH ESTIMATED AVERAGE GLUCOSE RESULT: 5.7 % — HIGH (ref 4–5.6)
A1C WITH ESTIMATED AVERAGE GLUCOSE RESULT: 7.2 % — HIGH (ref 4–5.6)
A1C WITH ESTIMATED AVERAGE GLUCOSE RESULT: 7.3 % — HIGH (ref 4–5.6)
A1C WITH ESTIMATED AVERAGE GLUCOSE RESULT: 7.7 % — HIGH (ref 4–5.6)
A1C WITH ESTIMATED AVERAGE GLUCOSE RESULT: 7.7 % — HIGH (ref 4–5.6)
A1C WITH ESTIMATED AVERAGE GLUCOSE RESULT: 8 % — HIGH (ref 4–5.6)
ALBUMIN SERPL ELPH-MCNC: 1.1 G/DL — LOW (ref 3.3–5)
ALBUMIN SERPL ELPH-MCNC: 1.1 G/DL — LOW (ref 3.3–5)
ALBUMIN SERPL ELPH-MCNC: 1.7 G/DL — LOW (ref 3.3–5)
ALBUMIN SERPL ELPH-MCNC: 1.7 G/DL — LOW (ref 3.3–5)
ALBUMIN SERPL ELPH-MCNC: 1.8 G/DL — LOW (ref 3.3–5)
ALBUMIN SERPL ELPH-MCNC: 1.8 G/DL — LOW (ref 3.3–5)
ALBUMIN SERPL ELPH-MCNC: 1.9 G/DL — LOW (ref 3.3–5)
ALBUMIN SERPL ELPH-MCNC: 2 G/DL — LOW (ref 3.3–5)
ALBUMIN SERPL ELPH-MCNC: 2.1 G/DL — LOW (ref 3.3–5)
ALBUMIN SERPL ELPH-MCNC: 2.2 G/DL — LOW (ref 3.3–5)
ALBUMIN SERPL ELPH-MCNC: 2.3 G/DL — LOW (ref 3.3–5)
ALBUMIN SERPL ELPH-MCNC: 2.4 G/DL — LOW (ref 3.3–5)
ALBUMIN SERPL ELPH-MCNC: 2.5 G/DL — LOW (ref 3.3–5)
ALBUMIN SERPL ELPH-MCNC: 2.6 G/DL — LOW (ref 3.3–5)
ALBUMIN SERPL ELPH-MCNC: 2.6 G/DL — LOW (ref 3.3–5)
ALBUMIN SERPL ELPH-MCNC: 2.7 G/DL — LOW (ref 3.3–5)
ALBUMIN SERPL ELPH-MCNC: 2.8 G/DL — LOW (ref 3.3–5)
ALBUMIN SERPL ELPH-MCNC: 2.9 G/DL — LOW (ref 3.3–5)
ALBUMIN SERPL ELPH-MCNC: 2.9 G/DL — LOW (ref 3.3–5)
ALBUMIN SERPL ELPH-MCNC: 3 G/DL — LOW (ref 3.3–5)
ALBUMIN SERPL ELPH-MCNC: 3.1 G/DL — LOW (ref 3.3–5)
ALBUMIN SERPL ELPH-MCNC: 3.2 G/DL — LOW (ref 3.3–5)
ALBUMIN SERPL ELPH-MCNC: 3.3 G/DL — SIGNIFICANT CHANGE UP (ref 3.3–5)
ALBUMIN SERPL ELPH-MCNC: 3.3 G/DL — SIGNIFICANT CHANGE UP (ref 3.3–5)
ALBUMIN SERPL ELPH-MCNC: 3.4 G/DL
ALBUMIN SERPL ELPH-MCNC: 3.4 G/DL — SIGNIFICANT CHANGE UP (ref 3.3–5)
ALBUMIN SERPL ELPH-MCNC: 3.5 G/DL — SIGNIFICANT CHANGE UP (ref 3.3–5)
ALBUMIN SERPL ELPH-MCNC: 3.6 G/DL
ALBUMIN SERPL ELPH-MCNC: 3.6 G/DL — SIGNIFICANT CHANGE UP (ref 3.3–5)
ALBUMIN SERPL ELPH-MCNC: 4.1 G/DL — SIGNIFICANT CHANGE UP (ref 3.3–5)
ALBUMIN SERPL ELPH-MCNC: 4.2 G/DL — SIGNIFICANT CHANGE UP (ref 3.3–5)
ALBUMIN SERPL ELPH-MCNC: 4.2 G/DL — SIGNIFICANT CHANGE UP (ref 3.3–5)
ALBUMIN SERPL ELPH-MCNC: 4.3 G/DL — SIGNIFICANT CHANGE UP (ref 3.3–5)
ALBUMIN SERPL ELPH-MCNC: 4.4 G/DL — SIGNIFICANT CHANGE UP (ref 3.3–5)
ALP BLD-CCNC: 126 U/L
ALP BLD-CCNC: 165 U/L
ALP SERPL-CCNC: 100 U/L — SIGNIFICANT CHANGE UP (ref 40–120)
ALP SERPL-CCNC: 100 U/L — SIGNIFICANT CHANGE UP (ref 40–120)
ALP SERPL-CCNC: 101 U/L — SIGNIFICANT CHANGE UP (ref 40–120)
ALP SERPL-CCNC: 101 U/L — SIGNIFICANT CHANGE UP (ref 40–120)
ALP SERPL-CCNC: 112 U/L — SIGNIFICANT CHANGE UP (ref 40–120)
ALP SERPL-CCNC: 112 U/L — SIGNIFICANT CHANGE UP (ref 40–120)
ALP SERPL-CCNC: 113 U/L — SIGNIFICANT CHANGE UP (ref 40–120)
ALP SERPL-CCNC: 123 U/L — HIGH (ref 40–120)
ALP SERPL-CCNC: 123 U/L — HIGH (ref 40–120)
ALP SERPL-CCNC: 145 U/L — HIGH (ref 40–120)
ALP SERPL-CCNC: 145 U/L — HIGH (ref 40–120)
ALP SERPL-CCNC: 146 U/L — HIGH (ref 40–120)
ALP SERPL-CCNC: 146 U/L — HIGH (ref 40–120)
ALP SERPL-CCNC: 154 U/L — HIGH (ref 40–120)
ALP SERPL-CCNC: 154 U/L — HIGH (ref 40–120)
ALP SERPL-CCNC: 166 U/L — HIGH (ref 40–120)
ALP SERPL-CCNC: 166 U/L — HIGH (ref 40–120)
ALP SERPL-CCNC: 167 U/L — HIGH (ref 40–120)
ALP SERPL-CCNC: 167 U/L — HIGH (ref 40–120)
ALP SERPL-CCNC: 171 U/L — HIGH (ref 40–120)
ALP SERPL-CCNC: 171 U/L — HIGH (ref 40–120)
ALP SERPL-CCNC: 172 U/L — HIGH (ref 40–120)
ALP SERPL-CCNC: 172 U/L — HIGH (ref 40–120)
ALP SERPL-CCNC: 175 U/L — HIGH (ref 40–120)
ALP SERPL-CCNC: 175 U/L — HIGH (ref 40–120)
ALP SERPL-CCNC: 178 U/L — HIGH (ref 40–120)
ALP SERPL-CCNC: 178 U/L — HIGH (ref 40–120)
ALP SERPL-CCNC: 179 U/L — HIGH (ref 40–120)
ALP SERPL-CCNC: 179 U/L — HIGH (ref 40–120)
ALP SERPL-CCNC: 201 U/L — HIGH (ref 40–120)
ALP SERPL-CCNC: 201 U/L — HIGH (ref 40–120)
ALP SERPL-CCNC: 222 U/L — HIGH (ref 40–120)
ALP SERPL-CCNC: 222 U/L — HIGH (ref 40–120)
ALP SERPL-CCNC: 245 U/L — HIGH (ref 40–120)
ALP SERPL-CCNC: 245 U/L — HIGH (ref 40–120)
ALP SERPL-CCNC: 46 U/L — SIGNIFICANT CHANGE UP (ref 40–120)
ALP SERPL-CCNC: 46 U/L — SIGNIFICANT CHANGE UP (ref 40–120)
ALP SERPL-CCNC: 52 U/L — SIGNIFICANT CHANGE UP (ref 40–120)
ALP SERPL-CCNC: 53 U/L — SIGNIFICANT CHANGE UP (ref 40–120)
ALP SERPL-CCNC: 54 U/L — SIGNIFICANT CHANGE UP (ref 40–120)
ALP SERPL-CCNC: 56 U/L — SIGNIFICANT CHANGE UP (ref 40–120)
ALP SERPL-CCNC: 58 U/L — SIGNIFICANT CHANGE UP (ref 40–120)
ALP SERPL-CCNC: 59 U/L — SIGNIFICANT CHANGE UP (ref 40–120)
ALP SERPL-CCNC: 60 U/L — SIGNIFICANT CHANGE UP (ref 40–120)
ALP SERPL-CCNC: 60 U/L — SIGNIFICANT CHANGE UP (ref 40–120)
ALP SERPL-CCNC: 63 U/L — SIGNIFICANT CHANGE UP (ref 40–120)
ALP SERPL-CCNC: 64 U/L — SIGNIFICANT CHANGE UP (ref 40–120)
ALP SERPL-CCNC: 66 U/L — SIGNIFICANT CHANGE UP (ref 40–120)
ALP SERPL-CCNC: 68 U/L — SIGNIFICANT CHANGE UP (ref 40–120)
ALP SERPL-CCNC: 70 U/L — SIGNIFICANT CHANGE UP (ref 40–120)
ALP SERPL-CCNC: 78 U/L — SIGNIFICANT CHANGE UP (ref 40–120)
ALP SERPL-CCNC: 84 U/L — SIGNIFICANT CHANGE UP (ref 40–120)
ALP SERPL-CCNC: 84 U/L — SIGNIFICANT CHANGE UP (ref 40–120)
ALP SERPL-CCNC: 97 U/L — SIGNIFICANT CHANGE UP (ref 40–120)
ALP SERPL-CCNC: 97 U/L — SIGNIFICANT CHANGE UP (ref 40–120)
ALT FLD-CCNC: 13 U/L — SIGNIFICANT CHANGE UP (ref 10–45)
ALT FLD-CCNC: 13 U/L — SIGNIFICANT CHANGE UP (ref 4–33)
ALT FLD-CCNC: 13 U/L — SIGNIFICANT CHANGE UP (ref 4–33)
ALT FLD-CCNC: 14 U/L — SIGNIFICANT CHANGE UP (ref 10–45)
ALT FLD-CCNC: 14 U/L — SIGNIFICANT CHANGE UP (ref 10–45)
ALT FLD-CCNC: 14 U/L — SIGNIFICANT CHANGE UP (ref 4–33)
ALT FLD-CCNC: 15 U/L — SIGNIFICANT CHANGE UP (ref 10–45)
ALT FLD-CCNC: 15 U/L — SIGNIFICANT CHANGE UP (ref 4–33)
ALT FLD-CCNC: 16 U/L — SIGNIFICANT CHANGE UP (ref 10–45)
ALT FLD-CCNC: 16 U/L — SIGNIFICANT CHANGE UP (ref 4–33)
ALT FLD-CCNC: 17 U/L — SIGNIFICANT CHANGE UP (ref 12–78)
ALT FLD-CCNC: 17 U/L — SIGNIFICANT CHANGE UP (ref 4–33)
ALT FLD-CCNC: 18 U/L — SIGNIFICANT CHANGE UP (ref 10–45)
ALT FLD-CCNC: 21 U/L — SIGNIFICANT CHANGE UP (ref 4–33)
ALT FLD-CCNC: 22 U/L — SIGNIFICANT CHANGE UP (ref 10–45)
ALT FLD-CCNC: 23 U/L — SIGNIFICANT CHANGE UP (ref 10–45)
ALT FLD-CCNC: 24 U/L — SIGNIFICANT CHANGE UP (ref 10–45)
ALT FLD-CCNC: 25 U/L — SIGNIFICANT CHANGE UP (ref 10–45)
ALT FLD-CCNC: 26 U/L — SIGNIFICANT CHANGE UP (ref 10–45)
ALT FLD-CCNC: 27 U/L — SIGNIFICANT CHANGE UP (ref 10–45)
ALT FLD-CCNC: 27 U/L — SIGNIFICANT CHANGE UP (ref 12–78)
ALT FLD-CCNC: 28 U/L — SIGNIFICANT CHANGE UP (ref 10–45)
ALT FLD-CCNC: 29 U/L — SIGNIFICANT CHANGE UP (ref 10–45)
ALT FLD-CCNC: 29 U/L — SIGNIFICANT CHANGE UP (ref 10–45)
ALT FLD-CCNC: 32 U/L — SIGNIFICANT CHANGE UP (ref 10–45)
ALT FLD-CCNC: 32 U/L — SIGNIFICANT CHANGE UP (ref 12–78)
ALT FLD-CCNC: 33 U/L — SIGNIFICANT CHANGE UP (ref 10–45)
ALT FLD-CCNC: 34 U/L — SIGNIFICANT CHANGE UP (ref 10–45)
ALT FLD-CCNC: 35 U/L — SIGNIFICANT CHANGE UP (ref 10–45)
ALT FLD-CCNC: 35 U/L — SIGNIFICANT CHANGE UP (ref 10–45)
ALT FLD-CCNC: 38 U/L — SIGNIFICANT CHANGE UP (ref 10–45)
ALT FLD-CCNC: 38 U/L — SIGNIFICANT CHANGE UP (ref 10–45)
ALT FLD-CCNC: 48 U/L — SIGNIFICANT CHANGE UP (ref 12–78)
ALT FLD-CCNC: 6 U/L — LOW (ref 10–45)
ALT FLD-CCNC: 6 U/L — LOW (ref 10–45)
ALT SERPL-CCNC: 22 U/L
ALT SERPL-CCNC: 34 U/L
ANION GAP SERPL CALC-SCNC: 10 MMOL/L — SIGNIFICANT CHANGE UP (ref 5–17)
ANION GAP SERPL CALC-SCNC: 10 MMOL/L — SIGNIFICANT CHANGE UP (ref 7–14)
ANION GAP SERPL CALC-SCNC: 11 MMOL/L — SIGNIFICANT CHANGE UP (ref 5–17)
ANION GAP SERPL CALC-SCNC: 11 MMOL/L — SIGNIFICANT CHANGE UP (ref 7–14)
ANION GAP SERPL CALC-SCNC: 12 MMOL/L — SIGNIFICANT CHANGE UP (ref 5–17)
ANION GAP SERPL CALC-SCNC: 12 MMOL/L — SIGNIFICANT CHANGE UP (ref 7–14)
ANION GAP SERPL CALC-SCNC: 13 MMOL/L — SIGNIFICANT CHANGE UP (ref 5–17)
ANION GAP SERPL CALC-SCNC: 13 MMOL/L — SIGNIFICANT CHANGE UP (ref 7–14)
ANION GAP SERPL CALC-SCNC: 14 MMOL/L — SIGNIFICANT CHANGE UP (ref 5–17)
ANION GAP SERPL CALC-SCNC: 14 MMOL/L — SIGNIFICANT CHANGE UP (ref 7–14)
ANION GAP SERPL CALC-SCNC: 14 MMOL/L — SIGNIFICANT CHANGE UP (ref 7–14)
ANION GAP SERPL CALC-SCNC: 15 MMOL/L — HIGH (ref 7–14)
ANION GAP SERPL CALC-SCNC: 15 MMOL/L — SIGNIFICANT CHANGE UP (ref 5–17)
ANION GAP SERPL CALC-SCNC: 16 MMOL/L — HIGH (ref 7–14)
ANION GAP SERPL CALC-SCNC: 16 MMOL/L — SIGNIFICANT CHANGE UP (ref 5–17)
ANION GAP SERPL CALC-SCNC: 16 MMOL/L — SIGNIFICANT CHANGE UP (ref 5–17)
ANION GAP SERPL CALC-SCNC: 17 MMOL/L — SIGNIFICANT CHANGE UP (ref 5–17)
ANION GAP SERPL CALC-SCNC: 18 MMOL/L
ANION GAP SERPL CALC-SCNC: 18 MMOL/L
ANION GAP SERPL CALC-SCNC: 18 MMOL/L — HIGH (ref 5–17)
ANION GAP SERPL CALC-SCNC: 2 MMOL/L — LOW (ref 5–17)
ANION GAP SERPL CALC-SCNC: 2 MMOL/L — LOW (ref 5–17)
ANION GAP SERPL CALC-SCNC: 3 MMOL/L — LOW (ref 7–14)
ANION GAP SERPL CALC-SCNC: 5 MMOL/L — SIGNIFICANT CHANGE UP (ref 5–17)
ANION GAP SERPL CALC-SCNC: 6 MMOL/L — SIGNIFICANT CHANGE UP (ref 5–17)
ANION GAP SERPL CALC-SCNC: 6 MMOL/L — SIGNIFICANT CHANGE UP (ref 5–17)
ANION GAP SERPL CALC-SCNC: 7 MMOL/L — SIGNIFICANT CHANGE UP (ref 5–17)
ANION GAP SERPL CALC-SCNC: 8 MMOL/L — SIGNIFICANT CHANGE UP (ref 5–17)
ANION GAP SERPL CALC-SCNC: 8 MMOL/L — SIGNIFICANT CHANGE UP (ref 5–17)
ANION GAP SERPL CALC-SCNC: 9 MMOL/L — SIGNIFICANT CHANGE UP (ref 5–17)
ANISOCYTOSIS BLD QL: SIGNIFICANT CHANGE UP
ANISOCYTOSIS BLD QL: SLIGHT — SIGNIFICANT CHANGE UP
APPEARANCE UR: ABNORMAL
APPEARANCE UR: ABNORMAL
APPEARANCE UR: CLEAR — SIGNIFICANT CHANGE UP
APTT BLD: 21.2 SEC — LOW (ref 24.5–35.6)
APTT BLD: 21.2 SEC — LOW (ref 24.5–35.6)
APTT BLD: 23.6 SEC — LOW (ref 24.5–35.6)
APTT BLD: 24.4 SEC — LOW (ref 24.5–35.6)
APTT BLD: 24.7 SEC — SIGNIFICANT CHANGE UP (ref 24.5–35.6)
APTT BLD: 26.8 SEC — SIGNIFICANT CHANGE UP (ref 24.5–35.6)
APTT BLD: 28 SEC — SIGNIFICANT CHANGE UP (ref 24.5–35.6)
APTT BLD: 28 SEC — SIGNIFICANT CHANGE UP (ref 24.5–35.6)
APTT BLD: 28.1 SEC — SIGNIFICANT CHANGE UP (ref 24.5–35.6)
APTT BLD: 28.1 SEC — SIGNIFICANT CHANGE UP (ref 24.5–35.6)
APTT BLD: 30 SEC — SIGNIFICANT CHANGE UP (ref 24.5–35.6)
APTT BLD: 31 SEC — SIGNIFICANT CHANGE UP (ref 24.5–35.6)
APTT BLD: 33.2 SEC — SIGNIFICANT CHANGE UP (ref 27–36.3)
AST SERPL-CCNC: 101 U/L — HIGH (ref 10–40)
AST SERPL-CCNC: 101 U/L — HIGH (ref 10–40)
AST SERPL-CCNC: 104 U/L — HIGH (ref 10–40)
AST SERPL-CCNC: 104 U/L — HIGH (ref 10–40)
AST SERPL-CCNC: 11 U/L — SIGNIFICANT CHANGE UP (ref 4–32)
AST SERPL-CCNC: 125 U/L — HIGH (ref 10–40)
AST SERPL-CCNC: 125 U/L — HIGH (ref 10–40)
AST SERPL-CCNC: 16 U/L — SIGNIFICANT CHANGE UP (ref 10–40)
AST SERPL-CCNC: 17 U/L — SIGNIFICANT CHANGE UP (ref 10–40)
AST SERPL-CCNC: 17 U/L — SIGNIFICANT CHANGE UP (ref 15–37)
AST SERPL-CCNC: 17 U/L — SIGNIFICANT CHANGE UP (ref 15–37)
AST SERPL-CCNC: 17 U/L — SIGNIFICANT CHANGE UP (ref 4–32)
AST SERPL-CCNC: 18 U/L — SIGNIFICANT CHANGE UP (ref 10–40)
AST SERPL-CCNC: 18 U/L — SIGNIFICANT CHANGE UP (ref 4–32)
AST SERPL-CCNC: 19 U/L — SIGNIFICANT CHANGE UP (ref 10–40)
AST SERPL-CCNC: 20 U/L — SIGNIFICANT CHANGE UP (ref 4–32)
AST SERPL-CCNC: 23 U/L — SIGNIFICANT CHANGE UP (ref 10–40)
AST SERPL-CCNC: 23 U/L — SIGNIFICANT CHANGE UP (ref 4–32)
AST SERPL-CCNC: 24 U/L — SIGNIFICANT CHANGE UP (ref 10–40)
AST SERPL-CCNC: 27 U/L — SIGNIFICANT CHANGE UP (ref 10–40)
AST SERPL-CCNC: 27 U/L — SIGNIFICANT CHANGE UP (ref 10–40)
AST SERPL-CCNC: 28 U/L — SIGNIFICANT CHANGE UP (ref 15–37)
AST SERPL-CCNC: 29 U/L — SIGNIFICANT CHANGE UP (ref 4–32)
AST SERPL-CCNC: 31 U/L — SIGNIFICANT CHANGE UP (ref 10–40)
AST SERPL-CCNC: 33 U/L — SIGNIFICANT CHANGE UP (ref 10–40)
AST SERPL-CCNC: 33 U/L — SIGNIFICANT CHANGE UP (ref 10–40)
AST SERPL-CCNC: 35 U/L — SIGNIFICANT CHANGE UP (ref 10–40)
AST SERPL-CCNC: 35 U/L — SIGNIFICANT CHANGE UP (ref 10–40)
AST SERPL-CCNC: 37 U/L — SIGNIFICANT CHANGE UP (ref 10–40)
AST SERPL-CCNC: 38 U/L — SIGNIFICANT CHANGE UP (ref 10–40)
AST SERPL-CCNC: 38 U/L — SIGNIFICANT CHANGE UP (ref 10–40)
AST SERPL-CCNC: 41 U/L — HIGH (ref 10–40)
AST SERPL-CCNC: 41 U/L — HIGH (ref 10–40)
AST SERPL-CCNC: 42 U/L — HIGH (ref 10–40)
AST SERPL-CCNC: 44 U/L — HIGH (ref 10–40)
AST SERPL-CCNC: 44 U/L — HIGH (ref 10–40)
AST SERPL-CCNC: 45 U/L — HIGH (ref 10–40)
AST SERPL-CCNC: 45 U/L — HIGH (ref 10–40)
AST SERPL-CCNC: 46 U/L — HIGH (ref 10–40)
AST SERPL-CCNC: 47 U/L — HIGH (ref 10–40)
AST SERPL-CCNC: 47 U/L — HIGH (ref 10–40)
AST SERPL-CCNC: 47 U/L — HIGH (ref 4–32)
AST SERPL-CCNC: 50 U/L — HIGH (ref 10–40)
AST SERPL-CCNC: 50 U/L — HIGH (ref 10–40)
AST SERPL-CCNC: 51 U/L — HIGH (ref 10–40)
AST SERPL-CCNC: 51 U/L — HIGH (ref 10–40)
AST SERPL-CCNC: 52 U/L — HIGH (ref 10–40)
AST SERPL-CCNC: 52 U/L — HIGH (ref 10–40)
AST SERPL-CCNC: 56 U/L
AST SERPL-CCNC: 56 U/L
AST SERPL-CCNC: 62 U/L — HIGH (ref 10–40)
AST SERPL-CCNC: 62 U/L — HIGH (ref 10–40)
AST SERPL-CCNC: 66 U/L — HIGH (ref 10–40)
AST SERPL-CCNC: 66 U/L — HIGH (ref 10–40)
AST SERPL-CCNC: 69 U/L — HIGH (ref 10–40)
AST SERPL-CCNC: 69 U/L — HIGH (ref 10–40)
AST SERPL-CCNC: 69 U/L — HIGH (ref 15–37)
AST SERPL-CCNC: 95 U/L — HIGH (ref 10–40)
AST SERPL-CCNC: 95 U/L — HIGH (ref 10–40)
AST SERPL-CCNC: 99 U/L — HIGH (ref 10–40)
AST SERPL-CCNC: 99 U/L — HIGH (ref 10–40)
B PERT DNA SPEC QL NAA+PROBE: SIGNIFICANT CHANGE UP
B PERT+PARAPERT DNA PNL SPEC NAA+PROBE: SIGNIFICANT CHANGE UP
BACTERIA # UR AUTO: ABNORMAL /HPF
BACTERIA # UR AUTO: NEGATIVE /HPF — SIGNIFICANT CHANGE UP
BASE EXCESS BLDA CALC-SCNC: -2.4 MMOL/L — LOW (ref -2–3)
BASE EXCESS BLDA CALC-SCNC: -2.4 MMOL/L — LOW (ref -2–3)
BASE EXCESS BLDV CALC-SCNC: -1.2 MMOL/L — SIGNIFICANT CHANGE UP (ref -2–3)
BASE EXCESS BLDV CALC-SCNC: -1.9 MMOL/L — SIGNIFICANT CHANGE UP (ref -2–3)
BASE EXCESS BLDV CALC-SCNC: -1.9 MMOL/L — SIGNIFICANT CHANGE UP (ref -2–3)
BASE EXCESS BLDV CALC-SCNC: -12.7 MMOL/L — LOW (ref -2–3)
BASE EXCESS BLDV CALC-SCNC: -12.7 MMOL/L — LOW (ref -2–3)
BASE EXCESS BLDV CALC-SCNC: -2 MMOL/L — SIGNIFICANT CHANGE UP (ref -2–3)
BASE EXCESS BLDV CALC-SCNC: -2 MMOL/L — SIGNIFICANT CHANGE UP (ref -2–3)
BASE EXCESS BLDV CALC-SCNC: -2.2 MMOL/L — LOW (ref -2–3)
BASE EXCESS BLDV CALC-SCNC: -2.3 MMOL/L — LOW (ref -2–3)
BASE EXCESS BLDV CALC-SCNC: 0.7 MMOL/L — SIGNIFICANT CHANGE UP (ref -2–3)
BASE EXCESS BLDV CALC-SCNC: 0.7 MMOL/L — SIGNIFICANT CHANGE UP (ref -2–3)
BASE EXCESS BLDV CALC-SCNC: 0.8 MMOL/L — SIGNIFICANT CHANGE UP (ref -2–3)
BASE EXCESS BLDV CALC-SCNC: 1 MMOL/L — SIGNIFICANT CHANGE UP (ref -2–3)
BASE EXCESS BLDV CALC-SCNC: 1 MMOL/L — SIGNIFICANT CHANGE UP (ref -2–3)
BASE EXCESS BLDV CALC-SCNC: 1.4 MMOL/L — SIGNIFICANT CHANGE UP (ref -2–3)
BASE EXCESS BLDV CALC-SCNC: 1.7 MMOL/L — SIGNIFICANT CHANGE UP (ref -2–3)
BASOPHILS # BLD AUTO: 0 K/UL — SIGNIFICANT CHANGE UP (ref 0–0.2)
BASOPHILS # BLD AUTO: 0.01 K/UL — SIGNIFICANT CHANGE UP (ref 0–0.2)
BASOPHILS # BLD AUTO: 0.02 K/UL — SIGNIFICANT CHANGE UP (ref 0–0.2)
BASOPHILS # BLD AUTO: 0.03 K/UL — SIGNIFICANT CHANGE UP (ref 0–0.2)
BASOPHILS # BLD AUTO: 0.04 K/UL — SIGNIFICANT CHANGE UP (ref 0–0.2)
BASOPHILS # BLD AUTO: 0.05 K/UL — SIGNIFICANT CHANGE UP (ref 0–0.2)
BASOPHILS # BLD AUTO: 0.07 K/UL — SIGNIFICANT CHANGE UP (ref 0–0.2)
BASOPHILS NFR BLD AUTO: 0 % — SIGNIFICANT CHANGE UP (ref 0–2)
BASOPHILS NFR BLD AUTO: 0.1 % — SIGNIFICANT CHANGE UP (ref 0–2)
BASOPHILS NFR BLD AUTO: 0.1 % — SIGNIFICANT CHANGE UP (ref 0–2)
BASOPHILS NFR BLD AUTO: 0.2 % — SIGNIFICANT CHANGE UP (ref 0–2)
BASOPHILS NFR BLD AUTO: 0.3 % — SIGNIFICANT CHANGE UP (ref 0–2)
BASOPHILS NFR BLD AUTO: 0.4 % — SIGNIFICANT CHANGE UP (ref 0–2)
BASOPHILS NFR BLD AUTO: 0.5 % — SIGNIFICANT CHANGE UP (ref 0–2)
BASOPHILS NFR BLD AUTO: 0.9 % — SIGNIFICANT CHANGE UP (ref 0–2)
BASOPHILS NFR BLD AUTO: 1.1 % — SIGNIFICANT CHANGE UP (ref 0–2)
BASOPHILS NFR BLD AUTO: 1.1 % — SIGNIFICANT CHANGE UP (ref 0–2)
BILIRUB DIRECT SERPL-MCNC: 0 MG/DL — SIGNIFICANT CHANGE UP (ref 0–0.3)
BILIRUB DIRECT SERPL-MCNC: 0 MG/DL — SIGNIFICANT CHANGE UP (ref 0–0.3)
BILIRUB DIRECT SERPL-MCNC: 0.1 MG/DL — SIGNIFICANT CHANGE UP (ref 0–0.3)
BILIRUB DIRECT SERPL-MCNC: 0.1 MG/DL — SIGNIFICANT CHANGE UP (ref 0–0.3)
BILIRUB INDIRECT FLD-MCNC: 0.1 MG/DL — LOW (ref 0.2–1)
BILIRUB INDIRECT FLD-MCNC: 0.1 MG/DL — LOW (ref 0.2–1)
BILIRUB INDIRECT FLD-MCNC: 0.3 MG/DL — SIGNIFICANT CHANGE UP (ref 0.2–1)
BILIRUB INDIRECT FLD-MCNC: 0.3 MG/DL — SIGNIFICANT CHANGE UP (ref 0.2–1)
BILIRUB SERPL-MCNC: 0.1 MG/DL — LOW (ref 0.2–1.2)
BILIRUB SERPL-MCNC: 0.2 MG/DL
BILIRUB SERPL-MCNC: 0.2 MG/DL
BILIRUB SERPL-MCNC: 0.2 MG/DL — SIGNIFICANT CHANGE UP (ref 0.2–1.2)
BILIRUB SERPL-MCNC: 0.3 MG/DL — SIGNIFICANT CHANGE UP (ref 0.2–1.2)
BILIRUB SERPL-MCNC: 0.4 MG/DL — SIGNIFICANT CHANGE UP (ref 0.2–1.2)
BILIRUB SERPL-MCNC: <0.1 MG/DL — LOW (ref 0.2–1.2)
BILIRUB UR-MCNC: NEGATIVE — SIGNIFICANT CHANGE UP
BLD GP AB SCN SERPL QL: NEGATIVE — SIGNIFICANT CHANGE UP
BLD GP AB SCN SERPL QL: SIGNIFICANT CHANGE UP
BLOOD GAS COMMENTS, VENOUS: SIGNIFICANT CHANGE UP
BLOOD GAS VENOUS COMPREHENSIVE RESULT: SIGNIFICANT CHANGE UP
BORDETELLA PARAPERTUSSIS (RAPRVP): SIGNIFICANT CHANGE UP
BUN SERPL-MCNC: 1 MG/DL — LOW (ref 7–23)
BUN SERPL-MCNC: 1 MG/DL — LOW (ref 7–23)
BUN SERPL-MCNC: 10 MG/DL
BUN SERPL-MCNC: 10 MG/DL — SIGNIFICANT CHANGE UP (ref 7–23)
BUN SERPL-MCNC: 12 MG/DL — SIGNIFICANT CHANGE UP (ref 7–23)
BUN SERPL-MCNC: 13 MG/DL — SIGNIFICANT CHANGE UP (ref 7–23)
BUN SERPL-MCNC: 14 MG/DL — SIGNIFICANT CHANGE UP (ref 7–23)
BUN SERPL-MCNC: 15 MG/DL — SIGNIFICANT CHANGE UP (ref 7–23)
BUN SERPL-MCNC: 16 MG/DL — SIGNIFICANT CHANGE UP (ref 7–23)
BUN SERPL-MCNC: 17 MG/DL — SIGNIFICANT CHANGE UP (ref 7–23)
BUN SERPL-MCNC: 18 MG/DL — SIGNIFICANT CHANGE UP (ref 7–23)
BUN SERPL-MCNC: 18 MG/DL — SIGNIFICANT CHANGE UP (ref 7–23)
BUN SERPL-MCNC: 20 MG/DL — SIGNIFICANT CHANGE UP (ref 7–23)
BUN SERPL-MCNC: 20 MG/DL — SIGNIFICANT CHANGE UP (ref 7–23)
BUN SERPL-MCNC: 22 MG/DL — SIGNIFICANT CHANGE UP (ref 7–23)
BUN SERPL-MCNC: 23 MG/DL — SIGNIFICANT CHANGE UP (ref 7–23)
BUN SERPL-MCNC: 24 MG/DL — HIGH (ref 7–23)
BUN SERPL-MCNC: 27 MG/DL — HIGH (ref 7–23)
BUN SERPL-MCNC: 3 MG/DL — LOW (ref 7–23)
BUN SERPL-MCNC: 3 MG/DL — LOW (ref 7–23)
BUN SERPL-MCNC: 4 MG/DL — LOW (ref 7–23)
BUN SERPL-MCNC: 5 MG/DL — LOW (ref 7–23)
BUN SERPL-MCNC: 6 MG/DL — LOW (ref 7–23)
BUN SERPL-MCNC: 7 MG/DL
BUN SERPL-MCNC: 7 MG/DL — SIGNIFICANT CHANGE UP (ref 7–23)
BUN SERPL-MCNC: 8 MG/DL — SIGNIFICANT CHANGE UP (ref 7–23)
BUN SERPL-MCNC: 8 MG/DL — SIGNIFICANT CHANGE UP (ref 7–23)
BUN SERPL-MCNC: 9 MG/DL — SIGNIFICANT CHANGE UP (ref 7–23)
BUN SERPL-MCNC: 9 MG/DL — SIGNIFICANT CHANGE UP (ref 7–23)
BUN SERPL-MCNC: <4 MG/DL — LOW (ref 7–23)
BURR CELLS BLD QL SMEAR: PRESENT — SIGNIFICANT CHANGE UP
BURR CELLS BLD QL SMEAR: SLIGHT — SIGNIFICANT CHANGE UP
BURR CELLS BLD QL SMEAR: SLIGHT — SIGNIFICANT CHANGE UP
C PNEUM DNA SPEC QL NAA+PROBE: SIGNIFICANT CHANGE UP
CA-I BLD-SCNC: 1.26 MMOL/L — SIGNIFICANT CHANGE UP (ref 1.15–1.29)
CA-I SERPL-SCNC: 0.69 MMOL/L — CRITICAL LOW (ref 1.15–1.33)
CA-I SERPL-SCNC: 0.69 MMOL/L — CRITICAL LOW (ref 1.15–1.33)
CA-I SERPL-SCNC: 1.13 MMOL/L — LOW (ref 1.15–1.33)
CA-I SERPL-SCNC: 1.13 MMOL/L — LOW (ref 1.15–1.33)
CA-I SERPL-SCNC: 1.24 MMOL/L — SIGNIFICANT CHANGE UP (ref 1.15–1.33)
CA-I SERPL-SCNC: 1.3 MMOL/L — SIGNIFICANT CHANGE UP (ref 1.15–1.33)
CA-I SERPL-SCNC: 1.32 MMOL/L — SIGNIFICANT CHANGE UP (ref 1.15–1.33)
CA-I SERPL-SCNC: 1.32 MMOL/L — SIGNIFICANT CHANGE UP (ref 1.15–1.33)
CALCIUM SERPL-MCNC: 10 MG/DL — SIGNIFICANT CHANGE UP (ref 8.4–10.5)
CALCIUM SERPL-MCNC: 10.1 MG/DL — SIGNIFICANT CHANGE UP (ref 8.4–10.5)
CALCIUM SERPL-MCNC: 10.2 MG/DL — SIGNIFICANT CHANGE UP (ref 8.4–10.5)
CALCIUM SERPL-MCNC: 10.3 MG/DL
CALCIUM SERPL-MCNC: 10.8 MG/DL — HIGH (ref 8.4–10.5)
CALCIUM SERPL-MCNC: 10.8 MG/DL — HIGH (ref 8.4–10.5)
CALCIUM SERPL-MCNC: 3.9 MG/DL — CRITICAL LOW (ref 8.4–10.5)
CALCIUM SERPL-MCNC: 3.9 MG/DL — CRITICAL LOW (ref 8.4–10.5)
CALCIUM SERPL-MCNC: 7.7 MG/DL — LOW (ref 8.4–10.5)
CALCIUM SERPL-MCNC: 7.7 MG/DL — LOW (ref 8.4–10.5)
CALCIUM SERPL-MCNC: 8.4 MG/DL — SIGNIFICANT CHANGE UP (ref 8.4–10.5)
CALCIUM SERPL-MCNC: 8.5 MG/DL — SIGNIFICANT CHANGE UP (ref 8.4–10.5)
CALCIUM SERPL-MCNC: 8.6 MG/DL — SIGNIFICANT CHANGE UP (ref 8.4–10.5)
CALCIUM SERPL-MCNC: 8.6 MG/DL — SIGNIFICANT CHANGE UP (ref 8.4–10.5)
CALCIUM SERPL-MCNC: 8.7 MG/DL — SIGNIFICANT CHANGE UP (ref 8.4–10.5)
CALCIUM SERPL-MCNC: 8.8 MG/DL — SIGNIFICANT CHANGE UP (ref 8.4–10.5)
CALCIUM SERPL-MCNC: 8.9 MG/DL — SIGNIFICANT CHANGE UP (ref 8.4–10.5)
CALCIUM SERPL-MCNC: 9 MG/DL — SIGNIFICANT CHANGE UP (ref 8.4–10.5)
CALCIUM SERPL-MCNC: 9.1 MG/DL — SIGNIFICANT CHANGE UP (ref 8.4–10.5)
CALCIUM SERPL-MCNC: 9.2 MG/DL — SIGNIFICANT CHANGE UP (ref 8.4–10.5)
CALCIUM SERPL-MCNC: 9.2 MG/DL — SIGNIFICANT CHANGE UP (ref 8.5–10.1)
CALCIUM SERPL-MCNC: 9.3 MG/DL — SIGNIFICANT CHANGE UP (ref 8.4–10.5)
CALCIUM SERPL-MCNC: 9.4 MG/DL — SIGNIFICANT CHANGE UP (ref 8.4–10.5)
CALCIUM SERPL-MCNC: 9.4 MG/DL — SIGNIFICANT CHANGE UP (ref 8.5–10.1)
CALCIUM SERPL-MCNC: 9.5 MG/DL — SIGNIFICANT CHANGE UP (ref 8.4–10.5)
CALCIUM SERPL-MCNC: 9.5 MG/DL — SIGNIFICANT CHANGE UP (ref 8.4–10.5)
CALCIUM SERPL-MCNC: 9.5 MG/DL — SIGNIFICANT CHANGE UP (ref 8.5–10.1)
CALCIUM SERPL-MCNC: 9.6 MG/DL — SIGNIFICANT CHANGE UP (ref 8.4–10.5)
CALCIUM SERPL-MCNC: 9.7 MG/DL — SIGNIFICANT CHANGE UP (ref 8.4–10.5)
CALCIUM SERPL-MCNC: 9.8 MG/DL
CALCIUM SERPL-MCNC: 9.8 MG/DL — SIGNIFICANT CHANGE UP (ref 8.4–10.5)
CALCIUM SERPL-MCNC: 9.8 MG/DL — SIGNIFICANT CHANGE UP (ref 8.5–10.1)
CALCIUM SERPL-MCNC: 9.9 MG/DL — SIGNIFICANT CHANGE UP (ref 8.4–10.5)
CAST: 0 /LPF — SIGNIFICANT CHANGE UP (ref 0–4)
CAST: 1 /LPF — SIGNIFICANT CHANGE UP (ref 0–4)
CAST: 2 /LPF — SIGNIFICANT CHANGE UP (ref 0–4)
CAST: 2 /LPF — SIGNIFICANT CHANGE UP (ref 0–4)
CEA SERPL-MCNC: 28.7 NG/ML
CHLORIDE BLDV-SCNC: 100 MMOL/L — SIGNIFICANT CHANGE UP (ref 98–107)
CHLORIDE BLDV-SCNC: 101 MMOL/L — SIGNIFICANT CHANGE UP (ref 96–108)
CHLORIDE BLDV-SCNC: 103 MMOL/L — SIGNIFICANT CHANGE UP (ref 96–108)
CHLORIDE BLDV-SCNC: 104 MMOL/L — SIGNIFICANT CHANGE UP (ref 96–108)
CHLORIDE BLDV-SCNC: 106 MMOL/L — SIGNIFICANT CHANGE UP (ref 96–108)
CHLORIDE BLDV-SCNC: 124 MMOL/L — HIGH (ref 96–108)
CHLORIDE BLDV-SCNC: 124 MMOL/L — HIGH (ref 96–108)
CHLORIDE SERPL-SCNC: 100 MMOL/L
CHLORIDE SERPL-SCNC: 100 MMOL/L — SIGNIFICANT CHANGE UP (ref 96–108)
CHLORIDE SERPL-SCNC: 100 MMOL/L — SIGNIFICANT CHANGE UP (ref 98–107)
CHLORIDE SERPL-SCNC: 100 MMOL/L — SIGNIFICANT CHANGE UP (ref 98–107)
CHLORIDE SERPL-SCNC: 101 MMOL/L — SIGNIFICANT CHANGE UP (ref 96–108)
CHLORIDE SERPL-SCNC: 101 MMOL/L — SIGNIFICANT CHANGE UP (ref 98–107)
CHLORIDE SERPL-SCNC: 102 MMOL/L — SIGNIFICANT CHANGE UP (ref 96–108)
CHLORIDE SERPL-SCNC: 102 MMOL/L — SIGNIFICANT CHANGE UP (ref 98–107)
CHLORIDE SERPL-SCNC: 103 MMOL/L — SIGNIFICANT CHANGE UP (ref 96–108)
CHLORIDE SERPL-SCNC: 103 MMOL/L — SIGNIFICANT CHANGE UP (ref 98–107)
CHLORIDE SERPL-SCNC: 104 MMOL/L — SIGNIFICANT CHANGE UP (ref 96–108)
CHLORIDE SERPL-SCNC: 104 MMOL/L — SIGNIFICANT CHANGE UP (ref 98–107)
CHLORIDE SERPL-SCNC: 105 MMOL/L — SIGNIFICANT CHANGE UP (ref 96–108)
CHLORIDE SERPL-SCNC: 106 MMOL/L — SIGNIFICANT CHANGE UP (ref 96–108)
CHLORIDE SERPL-SCNC: 106 MMOL/L — SIGNIFICANT CHANGE UP (ref 96–108)
CHLORIDE SERPL-SCNC: 106 MMOL/L — SIGNIFICANT CHANGE UP (ref 98–107)
CHLORIDE SERPL-SCNC: 107 MMOL/L — SIGNIFICANT CHANGE UP (ref 96–108)
CHLORIDE SERPL-SCNC: 107 MMOL/L — SIGNIFICANT CHANGE UP (ref 96–108)
CHLORIDE SERPL-SCNC: 108 MMOL/L — SIGNIFICANT CHANGE UP (ref 96–108)
CHLORIDE SERPL-SCNC: 109 MMOL/L — HIGH (ref 96–108)
CHLORIDE SERPL-SCNC: 109 MMOL/L — HIGH (ref 98–107)
CHLORIDE SERPL-SCNC: 111 MMOL/L — HIGH (ref 96–108)
CHLORIDE SERPL-SCNC: 111 MMOL/L — HIGH (ref 96–108)
CHLORIDE SERPL-SCNC: 126 MMOL/L — HIGH (ref 96–108)
CHLORIDE SERPL-SCNC: 126 MMOL/L — HIGH (ref 96–108)
CHLORIDE SERPL-SCNC: 95 MMOL/L — LOW (ref 98–107)
CHLORIDE SERPL-SCNC: 95 MMOL/L — LOW (ref 98–107)
CHLORIDE SERPL-SCNC: 96 MMOL/L — LOW (ref 98–107)
CHLORIDE SERPL-SCNC: 98 MMOL/L — SIGNIFICANT CHANGE UP (ref 96–108)
CHLORIDE SERPL-SCNC: 98 MMOL/L — SIGNIFICANT CHANGE UP (ref 98–107)
CHLORIDE SERPL-SCNC: 99 MMOL/L
CHLORIDE SERPL-SCNC: 99 MMOL/L — SIGNIFICANT CHANGE UP (ref 96–108)
CHLORIDE SERPL-SCNC: 99 MMOL/L — SIGNIFICANT CHANGE UP (ref 96–108)
CHLORIDE SERPL-SCNC: 99 MMOL/L — SIGNIFICANT CHANGE UP (ref 98–107)
CHOLEST SERPL-MCNC: 163 MG/DL — SIGNIFICANT CHANGE UP
CK MB BLD-MCNC: 1.2 % — SIGNIFICANT CHANGE UP (ref 0–3.5)
CK MB BLD-MCNC: 1.2 % — SIGNIFICANT CHANGE UP (ref 0–3.5)
CK MB CFR SERPL CALC: 6 NG/ML — HIGH (ref 0–3.8)
CK MB CFR SERPL CALC: 6 NG/ML — HIGH (ref 0–3.8)
CK SERPL-CCNC: 482 U/L — HIGH (ref 25–170)
CK SERPL-CCNC: 482 U/L — HIGH (ref 25–170)
CO2 BLDA-SCNC: 23 MMOL/L — SIGNIFICANT CHANGE UP (ref 19–24)
CO2 BLDA-SCNC: 23 MMOL/L — SIGNIFICANT CHANGE UP (ref 19–24)
CO2 BLDV-SCNC: 12 MMOL/L — LOW (ref 22–26)
CO2 BLDV-SCNC: 12 MMOL/L — LOW (ref 22–26)
CO2 BLDV-SCNC: 22.1 MMOL/L — SIGNIFICANT CHANGE UP (ref 22–26)
CO2 BLDV-SCNC: 23 MMOL/L — SIGNIFICANT CHANGE UP (ref 22–26)
CO2 BLDV-SCNC: 23.7 MMOL/L — SIGNIFICANT CHANGE UP (ref 22–26)
CO2 BLDV-SCNC: 24.8 MMOL/L — SIGNIFICANT CHANGE UP (ref 22–26)
CO2 BLDV-SCNC: 25 MMOL/L — SIGNIFICANT CHANGE UP (ref 22–26)
CO2 BLDV-SCNC: 26 MMOL/L — SIGNIFICANT CHANGE UP (ref 22–26)
CO2 BLDV-SCNC: 26 MMOL/L — SIGNIFICANT CHANGE UP (ref 22–26)
CO2 BLDV-SCNC: 29 MMOL/L — HIGH (ref 22–26)
CO2 BLDV-SCNC: 30.2 MMOL/L — HIGH (ref 22–26)
CO2 SERPL-SCNC: 10 MMOL/L — CRITICAL LOW (ref 22–31)
CO2 SERPL-SCNC: 10 MMOL/L — CRITICAL LOW (ref 22–31)
CO2 SERPL-SCNC: 16 MMOL/L
CO2 SERPL-SCNC: 17 MMOL/L — LOW (ref 22–31)
CO2 SERPL-SCNC: 18 MMOL/L — LOW (ref 22–31)
CO2 SERPL-SCNC: 19 MMOL/L — LOW (ref 22–31)
CO2 SERPL-SCNC: 20 MMOL/L — LOW (ref 22–31)
CO2 SERPL-SCNC: 21 MMOL/L — LOW (ref 22–31)
CO2 SERPL-SCNC: 22 MMOL/L
CO2 SERPL-SCNC: 22 MMOL/L — SIGNIFICANT CHANGE UP (ref 22–31)
CO2 SERPL-SCNC: 23 MMOL/L — SIGNIFICANT CHANGE UP (ref 22–31)
CO2 SERPL-SCNC: 24 MMOL/L — SIGNIFICANT CHANGE UP (ref 22–31)
CO2 SERPL-SCNC: 25 MMOL/L — SIGNIFICANT CHANGE UP (ref 22–31)
CO2 SERPL-SCNC: 26 MMOL/L — SIGNIFICANT CHANGE UP (ref 22–31)
CO2 SERPL-SCNC: 27 MMOL/L — SIGNIFICANT CHANGE UP (ref 22–31)
CO2 SERPL-SCNC: 29 MMOL/L — SIGNIFICANT CHANGE UP (ref 22–31)
COLOR SPEC: YELLOW — SIGNIFICANT CHANGE UP
CORTICOSTEROID BINDING GLOBULIN RESULT: 1.9 MG/DL — SIGNIFICANT CHANGE UP
CORTICOSTEROID BINDING GLOBULIN RESULT: 1.9 MG/DL — SIGNIFICANT CHANGE UP
CORTIS F/TOTAL MFR SERPL: 5.2 % — SIGNIFICANT CHANGE UP
CORTIS F/TOTAL MFR SERPL: 5.2 % — SIGNIFICANT CHANGE UP
CORTIS SERPL-MCNC: 6.8 UG/DL — SIGNIFICANT CHANGE UP
CORTIS SERPL-MCNC: 6.8 UG/DL — SIGNIFICANT CHANGE UP
CORTISOL, FREE RESULT: 0.35 UG/DL — SIGNIFICANT CHANGE UP
CORTISOL, FREE RESULT: 0.35 UG/DL — SIGNIFICANT CHANGE UP
CREAT ?TM UR-MCNC: 76 MG/DL — SIGNIFICANT CHANGE UP
CREAT ?TM UR-MCNC: 76 MG/DL — SIGNIFICANT CHANGE UP
CREAT SERPL-MCNC: 0.31 MG/DL — LOW (ref 0.5–1.3)
CREAT SERPL-MCNC: 0.31 MG/DL — LOW (ref 0.5–1.3)
CREAT SERPL-MCNC: 0.32 MG/DL — LOW (ref 0.5–1.3)
CREAT SERPL-MCNC: 0.32 MG/DL — LOW (ref 0.5–1.3)
CREAT SERPL-MCNC: 0.33 MG/DL — LOW (ref 0.5–1.3)
CREAT SERPL-MCNC: 0.34 MG/DL — LOW (ref 0.5–1.3)
CREAT SERPL-MCNC: 0.35 MG/DL — LOW (ref 0.5–1.3)
CREAT SERPL-MCNC: 0.37 MG/DL — LOW (ref 0.5–1.3)
CREAT SERPL-MCNC: 0.39 MG/DL — LOW (ref 0.5–1.3)
CREAT SERPL-MCNC: 0.39 MG/DL — LOW (ref 0.5–1.3)
CREAT SERPL-MCNC: 0.4 MG/DL — LOW (ref 0.5–1.3)
CREAT SERPL-MCNC: 0.41 MG/DL
CREAT SERPL-MCNC: 0.41 MG/DL — LOW (ref 0.5–1.3)
CREAT SERPL-MCNC: 0.44 MG/DL — LOW (ref 0.5–1.3)
CREAT SERPL-MCNC: 0.46 MG/DL — LOW (ref 0.5–1.3)
CREAT SERPL-MCNC: 0.5 MG/DL — SIGNIFICANT CHANGE UP (ref 0.5–1.3)
CREAT SERPL-MCNC: 0.5 MG/DL — SIGNIFICANT CHANGE UP (ref 0.5–1.3)
CREAT SERPL-MCNC: 0.51 MG/DL — SIGNIFICANT CHANGE UP (ref 0.5–1.3)
CREAT SERPL-MCNC: 0.52 MG/DL — SIGNIFICANT CHANGE UP (ref 0.5–1.3)
CREAT SERPL-MCNC: 0.54 MG/DL — SIGNIFICANT CHANGE UP (ref 0.5–1.3)
CREAT SERPL-MCNC: 0.55 MG/DL — SIGNIFICANT CHANGE UP (ref 0.5–1.3)
CREAT SERPL-MCNC: 0.56 MG/DL — SIGNIFICANT CHANGE UP (ref 0.5–1.3)
CREAT SERPL-MCNC: 0.59 MG/DL — SIGNIFICANT CHANGE UP (ref 0.5–1.3)
CREAT SERPL-MCNC: 0.6 MG/DL — SIGNIFICANT CHANGE UP (ref 0.5–1.3)
CREAT SERPL-MCNC: 0.62 MG/DL — SIGNIFICANT CHANGE UP (ref 0.5–1.3)
CREAT SERPL-MCNC: 0.63 MG/DL — SIGNIFICANT CHANGE UP (ref 0.5–1.3)
CREAT SERPL-MCNC: 0.65 MG/DL — SIGNIFICANT CHANGE UP (ref 0.5–1.3)
CREAT SERPL-MCNC: 0.66 MG/DL — SIGNIFICANT CHANGE UP (ref 0.5–1.3)
CREAT SERPL-MCNC: 0.68 MG/DL — SIGNIFICANT CHANGE UP (ref 0.5–1.3)
CREAT SERPL-MCNC: 0.7 MG/DL — SIGNIFICANT CHANGE UP (ref 0.5–1.3)
CREAT SERPL-MCNC: 0.71 MG/DL — SIGNIFICANT CHANGE UP (ref 0.5–1.3)
CREAT SERPL-MCNC: 0.73 MG/DL — SIGNIFICANT CHANGE UP (ref 0.5–1.3)
CREAT SERPL-MCNC: 0.73 MG/DL — SIGNIFICANT CHANGE UP (ref 0.5–1.3)
CREAT SERPL-MCNC: 0.74 MG/DL — SIGNIFICANT CHANGE UP (ref 0.5–1.3)
CREAT SERPL-MCNC: 0.74 MG/DL — SIGNIFICANT CHANGE UP (ref 0.5–1.3)
CREAT SERPL-MCNC: 0.77 MG/DL
CREAT SERPL-MCNC: 0.77 MG/DL — SIGNIFICANT CHANGE UP (ref 0.5–1.3)
CREAT SERPL-MCNC: 0.78 MG/DL — SIGNIFICANT CHANGE UP (ref 0.5–1.3)
CREAT SERPL-MCNC: 0.78 MG/DL — SIGNIFICANT CHANGE UP (ref 0.5–1.3)
CREAT SERPL-MCNC: 0.81 MG/DL — SIGNIFICANT CHANGE UP (ref 0.5–1.3)
CREAT SERPL-MCNC: <0.3 MG/DL — LOW (ref 0.5–1.3)
CULTURE RESULTS: ABNORMAL
CULTURE RESULTS: ABNORMAL
CULTURE RESULTS: SIGNIFICANT CHANGE UP
D DIMER BLD IA.RAPID-MCNC: 1565 NG/ML DDU — HIGH
D DIMER BLD IA.RAPID-MCNC: 1565 NG/ML DDU — HIGH
DACRYOCYTES BLD QL SMEAR: SLIGHT — SIGNIFICANT CHANGE UP
DIFF PNL FLD: ABNORMAL
DIFF PNL FLD: NEGATIVE — SIGNIFICANT CHANGE UP
EGFR: 102 ML/MIN/1.73M2 — SIGNIFICANT CHANGE UP
EGFR: 103 ML/MIN/1.73M2 — SIGNIFICANT CHANGE UP
EGFR: 104 ML/MIN/1.73M2 — SIGNIFICANT CHANGE UP
EGFR: 105 ML/MIN/1.73M2 — SIGNIFICANT CHANGE UP
EGFR: 106 ML/MIN/1.73M2 — SIGNIFICANT CHANGE UP
EGFR: 106 ML/MIN/1.73M2 — SIGNIFICANT CHANGE UP
EGFR: 107 ML/MIN/1.73M2 — SIGNIFICANT CHANGE UP
EGFR: 108 ML/MIN/1.73M2 — SIGNIFICANT CHANGE UP
EGFR: 109 ML/MIN/1.73M2 — SIGNIFICANT CHANGE UP
EGFR: 109 ML/MIN/1.73M2 — SIGNIFICANT CHANGE UP
EGFR: 110 ML/MIN/1.73M2 — SIGNIFICANT CHANGE UP
EGFR: 111 ML/MIN/1.73M2 — SIGNIFICANT CHANGE UP
EGFR: 112 ML/MIN/1.73M2 — SIGNIFICANT CHANGE UP
EGFR: 114 ML/MIN/1.73M2 — SIGNIFICANT CHANGE UP
EGFR: 116 ML/MIN/1.73M2 — SIGNIFICANT CHANGE UP
EGFR: 118 ML/MIN/1.73M2
EGFR: 118 ML/MIN/1.73M2 — SIGNIFICANT CHANGE UP
EGFR: 119 ML/MIN/1.73M2 — SIGNIFICANT CHANGE UP
EGFR: 119 ML/MIN/1.73M2 — SIGNIFICANT CHANGE UP
EGFR: 121 ML/MIN/1.73M2 — SIGNIFICANT CHANGE UP
EGFR: 122 ML/MIN/1.73M2 — SIGNIFICANT CHANGE UP
EGFR: 123 ML/MIN/1.73M2 — SIGNIFICANT CHANGE UP
EGFR: 124 ML/MIN/1.73M2 — SIGNIFICANT CHANGE UP
EGFR: 125 ML/MIN/1.73M2 — SIGNIFICANT CHANGE UP
EGFR: 125 ML/MIN/1.73M2 — SIGNIFICANT CHANGE UP
EGFR: 126 ML/MIN/1.73M2 — SIGNIFICANT CHANGE UP
EGFR: 126 ML/MIN/1.73M2 — SIGNIFICANT CHANGE UP
EGFR: 127 ML/MIN/1.73M2 — SIGNIFICANT CHANGE UP
EGFR: 87 ML/MIN/1.73M2 — SIGNIFICANT CHANGE UP
EGFR: 91 ML/MIN/1.73M2 — SIGNIFICANT CHANGE UP
EGFR: 91 ML/MIN/1.73M2 — SIGNIFICANT CHANGE UP
EGFR: 92 ML/MIN/1.73M2
EGFR: 92 ML/MIN/1.73M2 — SIGNIFICANT CHANGE UP
EGFR: 97 ML/MIN/1.73M2 — SIGNIFICANT CHANGE UP
EGFR: 97 ML/MIN/1.73M2 — SIGNIFICANT CHANGE UP
EGFR: 98 ML/MIN/1.73M2 — SIGNIFICANT CHANGE UP
EGFR: 98 ML/MIN/1.73M2 — SIGNIFICANT CHANGE UP
ELLIPTOCYTES BLD QL SMEAR: SLIGHT — SIGNIFICANT CHANGE UP
EOSINOPHIL # BLD AUTO: 0 K/UL — SIGNIFICANT CHANGE UP (ref 0–0.5)
EOSINOPHIL # BLD AUTO: 0.13 K/UL — SIGNIFICANT CHANGE UP (ref 0–0.5)
EOSINOPHIL NFR BLD AUTO: 0 % — SIGNIFICANT CHANGE UP (ref 0–6)
EOSINOPHIL NFR BLD AUTO: 1 % — SIGNIFICANT CHANGE UP (ref 0–6)
EPI CELLS # UR: 2 — SIGNIFICANT CHANGE UP
EPI CELLS # UR: 3 — SIGNIFICANT CHANGE UP
EPI CELLS # UR: 3 — SIGNIFICANT CHANGE UP
EPI CELLS # UR: PRESENT
EPI CELLS # UR: SIGNIFICANT CHANGE UP
ESTIMATED AVERAGE GLUCOSE: 117 — SIGNIFICANT CHANGE UP
ESTIMATED AVERAGE GLUCOSE: 160 — SIGNIFICANT CHANGE UP
ESTIMATED AVERAGE GLUCOSE: 163 — SIGNIFICANT CHANGE UP
ESTIMATED AVERAGE GLUCOSE: 174 MG/DL — HIGH (ref 68–114)
ESTIMATED AVERAGE GLUCOSE: 174 MG/DL — HIGH (ref 68–114)
ESTIMATED AVERAGE GLUCOSE: 183 MG/DL — HIGH (ref 68–114)
ETHANOL SERPL-MCNC: <10 MG/DL — SIGNIFICANT CHANGE UP (ref 0–10)
FLUAV AG NPH QL: SIGNIFICANT CHANGE UP
FLUAV SUBTYP SPEC NAA+PROBE: SIGNIFICANT CHANGE UP
FLUBV AG NPH QL: SIGNIFICANT CHANGE UP
FLUBV RNA SPEC QL NAA+PROBE: SIGNIFICANT CHANGE UP
GAS PNL BLDA: SIGNIFICANT CHANGE UP
GAS PNL BLDV: 132 MMOL/L — LOW (ref 136–145)
GAS PNL BLDV: 135 MMOL/L — LOW (ref 136–145)
GAS PNL BLDV: 135 MMOL/L — LOW (ref 136–145)
GAS PNL BLDV: 136 MMOL/L — SIGNIFICANT CHANGE UP (ref 136–145)
GAS PNL BLDV: 136 MMOL/L — SIGNIFICANT CHANGE UP (ref 136–145)
GAS PNL BLDV: 137 MMOL/L — SIGNIFICANT CHANGE UP (ref 136–145)
GAS PNL BLDV: 139 MMOL/L — SIGNIFICANT CHANGE UP (ref 136–145)
GAS PNL BLDV: 139 MMOL/L — SIGNIFICANT CHANGE UP (ref 136–145)
GAS PNL BLDV: SIGNIFICANT CHANGE UP
GIANT PLATELETS BLD QL SMEAR: PRESENT — SIGNIFICANT CHANGE UP
GLUCOSE BLDC GLUCOMTR-MCNC: 100 MG/DL — HIGH (ref 70–99)
GLUCOSE BLDC GLUCOMTR-MCNC: 101 MG/DL — HIGH (ref 70–99)
GLUCOSE BLDC GLUCOMTR-MCNC: 102 MG/DL — HIGH (ref 70–99)
GLUCOSE BLDC GLUCOMTR-MCNC: 102 MG/DL — HIGH (ref 70–99)
GLUCOSE BLDC GLUCOMTR-MCNC: 103 MG/DL — HIGH (ref 70–99)
GLUCOSE BLDC GLUCOMTR-MCNC: 103 MG/DL — HIGH (ref 70–99)
GLUCOSE BLDC GLUCOMTR-MCNC: 104 MG/DL — HIGH (ref 70–99)
GLUCOSE BLDC GLUCOMTR-MCNC: 105 MG/DL — HIGH (ref 70–99)
GLUCOSE BLDC GLUCOMTR-MCNC: 107 MG/DL — HIGH (ref 70–99)
GLUCOSE BLDC GLUCOMTR-MCNC: 108 MG/DL — HIGH (ref 70–99)
GLUCOSE BLDC GLUCOMTR-MCNC: 109 MG/DL — HIGH (ref 70–99)
GLUCOSE BLDC GLUCOMTR-MCNC: 109 MG/DL — HIGH (ref 70–99)
GLUCOSE BLDC GLUCOMTR-MCNC: 110 MG/DL — HIGH (ref 70–99)
GLUCOSE BLDC GLUCOMTR-MCNC: 111 MG/DL — HIGH (ref 70–99)
GLUCOSE BLDC GLUCOMTR-MCNC: 113 MG/DL — HIGH (ref 70–99)
GLUCOSE BLDC GLUCOMTR-MCNC: 114 MG/DL — HIGH (ref 70–99)
GLUCOSE BLDC GLUCOMTR-MCNC: 114 MG/DL — HIGH (ref 70–99)
GLUCOSE BLDC GLUCOMTR-MCNC: 115 MG/DL — HIGH (ref 70–99)
GLUCOSE BLDC GLUCOMTR-MCNC: 116 MG/DL — HIGH (ref 70–99)
GLUCOSE BLDC GLUCOMTR-MCNC: 116 MG/DL — HIGH (ref 70–99)
GLUCOSE BLDC GLUCOMTR-MCNC: 117 MG/DL — HIGH (ref 70–99)
GLUCOSE BLDC GLUCOMTR-MCNC: 120 MG/DL — HIGH (ref 70–99)
GLUCOSE BLDC GLUCOMTR-MCNC: 121 MG/DL — HIGH (ref 70–99)
GLUCOSE BLDC GLUCOMTR-MCNC: 121 MG/DL — HIGH (ref 70–99)
GLUCOSE BLDC GLUCOMTR-MCNC: 122 MG/DL — HIGH (ref 70–99)
GLUCOSE BLDC GLUCOMTR-MCNC: 122 MG/DL — HIGH (ref 70–99)
GLUCOSE BLDC GLUCOMTR-MCNC: 123 MG/DL — HIGH (ref 70–99)
GLUCOSE BLDC GLUCOMTR-MCNC: 125 MG/DL — HIGH (ref 70–99)
GLUCOSE BLDC GLUCOMTR-MCNC: 126 MG/DL — HIGH (ref 70–99)
GLUCOSE BLDC GLUCOMTR-MCNC: 127 MG/DL — HIGH (ref 70–99)
GLUCOSE BLDC GLUCOMTR-MCNC: 128 MG/DL — HIGH (ref 70–99)
GLUCOSE BLDC GLUCOMTR-MCNC: 130 MG/DL — HIGH (ref 70–99)
GLUCOSE BLDC GLUCOMTR-MCNC: 131 MG/DL — HIGH (ref 70–99)
GLUCOSE BLDC GLUCOMTR-MCNC: 131 MG/DL — HIGH (ref 70–99)
GLUCOSE BLDC GLUCOMTR-MCNC: 133 MG/DL — HIGH (ref 70–99)
GLUCOSE BLDC GLUCOMTR-MCNC: 134 MG/DL — HIGH (ref 70–99)
GLUCOSE BLDC GLUCOMTR-MCNC: 136 MG/DL — HIGH (ref 70–99)
GLUCOSE BLDC GLUCOMTR-MCNC: 138 MG/DL — HIGH (ref 70–99)
GLUCOSE BLDC GLUCOMTR-MCNC: 138 MG/DL — HIGH (ref 70–99)
GLUCOSE BLDC GLUCOMTR-MCNC: 139 MG/DL — HIGH (ref 70–99)
GLUCOSE BLDC GLUCOMTR-MCNC: 140 MG/DL — HIGH (ref 70–99)
GLUCOSE BLDC GLUCOMTR-MCNC: 140 MG/DL — HIGH (ref 70–99)
GLUCOSE BLDC GLUCOMTR-MCNC: 141 MG/DL — HIGH (ref 70–99)
GLUCOSE BLDC GLUCOMTR-MCNC: 141 MG/DL — HIGH (ref 70–99)
GLUCOSE BLDC GLUCOMTR-MCNC: 142 MG/DL — HIGH (ref 70–99)
GLUCOSE BLDC GLUCOMTR-MCNC: 143 MG/DL — HIGH (ref 70–99)
GLUCOSE BLDC GLUCOMTR-MCNC: 144 MG/DL — HIGH (ref 70–99)
GLUCOSE BLDC GLUCOMTR-MCNC: 145 MG/DL — HIGH (ref 70–99)
GLUCOSE BLDC GLUCOMTR-MCNC: 145 MG/DL — HIGH (ref 70–99)
GLUCOSE BLDC GLUCOMTR-MCNC: 147 MG/DL — HIGH (ref 70–99)
GLUCOSE BLDC GLUCOMTR-MCNC: 148 MG/DL — HIGH (ref 70–99)
GLUCOSE BLDC GLUCOMTR-MCNC: 149 MG/DL — HIGH (ref 70–99)
GLUCOSE BLDC GLUCOMTR-MCNC: 150 MG/DL — HIGH (ref 70–99)
GLUCOSE BLDC GLUCOMTR-MCNC: 151 MG/DL — HIGH (ref 70–99)
GLUCOSE BLDC GLUCOMTR-MCNC: 151 MG/DL — HIGH (ref 70–99)
GLUCOSE BLDC GLUCOMTR-MCNC: 152 MG/DL — HIGH (ref 70–99)
GLUCOSE BLDC GLUCOMTR-MCNC: 153 MG/DL — HIGH (ref 70–99)
GLUCOSE BLDC GLUCOMTR-MCNC: 154 MG/DL — HIGH (ref 70–99)
GLUCOSE BLDC GLUCOMTR-MCNC: 156 MG/DL — HIGH (ref 70–99)
GLUCOSE BLDC GLUCOMTR-MCNC: 156 MG/DL — HIGH (ref 70–99)
GLUCOSE BLDC GLUCOMTR-MCNC: 157 MG/DL — HIGH (ref 70–99)
GLUCOSE BLDC GLUCOMTR-MCNC: 158 MG/DL — HIGH (ref 70–99)
GLUCOSE BLDC GLUCOMTR-MCNC: 159 MG/DL — HIGH (ref 70–99)
GLUCOSE BLDC GLUCOMTR-MCNC: 160 MG/DL — HIGH (ref 70–99)
GLUCOSE BLDC GLUCOMTR-MCNC: 162 MG/DL — HIGH (ref 70–99)
GLUCOSE BLDC GLUCOMTR-MCNC: 162 MG/DL — HIGH (ref 70–99)
GLUCOSE BLDC GLUCOMTR-MCNC: 163 MG/DL — HIGH (ref 70–99)
GLUCOSE BLDC GLUCOMTR-MCNC: 164 MG/DL — HIGH (ref 70–99)
GLUCOSE BLDC GLUCOMTR-MCNC: 167 MG/DL — HIGH (ref 70–99)
GLUCOSE BLDC GLUCOMTR-MCNC: 168 MG/DL — HIGH (ref 70–99)
GLUCOSE BLDC GLUCOMTR-MCNC: 168 MG/DL — HIGH (ref 70–99)
GLUCOSE BLDC GLUCOMTR-MCNC: 169 MG/DL — HIGH (ref 70–99)
GLUCOSE BLDC GLUCOMTR-MCNC: 170 MG/DL — HIGH (ref 70–99)
GLUCOSE BLDC GLUCOMTR-MCNC: 171 MG/DL — HIGH (ref 70–99)
GLUCOSE BLDC GLUCOMTR-MCNC: 171 MG/DL — HIGH (ref 70–99)
GLUCOSE BLDC GLUCOMTR-MCNC: 174 MG/DL — HIGH (ref 70–99)
GLUCOSE BLDC GLUCOMTR-MCNC: 175 MG/DL — HIGH (ref 70–99)
GLUCOSE BLDC GLUCOMTR-MCNC: 176 MG/DL — HIGH (ref 70–99)
GLUCOSE BLDC GLUCOMTR-MCNC: 176 MG/DL — HIGH (ref 70–99)
GLUCOSE BLDC GLUCOMTR-MCNC: 177 MG/DL — HIGH (ref 70–99)
GLUCOSE BLDC GLUCOMTR-MCNC: 179 MG/DL — HIGH (ref 70–99)
GLUCOSE BLDC GLUCOMTR-MCNC: 179 MG/DL — HIGH (ref 70–99)
GLUCOSE BLDC GLUCOMTR-MCNC: 180 MG/DL — HIGH (ref 70–99)
GLUCOSE BLDC GLUCOMTR-MCNC: 181 MG/DL — HIGH (ref 70–99)
GLUCOSE BLDC GLUCOMTR-MCNC: 182 MG/DL — HIGH (ref 70–99)
GLUCOSE BLDC GLUCOMTR-MCNC: 184 MG/DL — HIGH (ref 70–99)
GLUCOSE BLDC GLUCOMTR-MCNC: 188 MG/DL — HIGH (ref 70–99)
GLUCOSE BLDC GLUCOMTR-MCNC: 188 MG/DL — HIGH (ref 70–99)
GLUCOSE BLDC GLUCOMTR-MCNC: 189 MG/DL — HIGH (ref 70–99)
GLUCOSE BLDC GLUCOMTR-MCNC: 190 MG/DL — HIGH (ref 70–99)
GLUCOSE BLDC GLUCOMTR-MCNC: 190 MG/DL — HIGH (ref 70–99)
GLUCOSE BLDC GLUCOMTR-MCNC: 191 MG/DL — HIGH (ref 70–99)
GLUCOSE BLDC GLUCOMTR-MCNC: 194 MG/DL — HIGH (ref 70–99)
GLUCOSE BLDC GLUCOMTR-MCNC: 195 MG/DL — HIGH (ref 70–99)
GLUCOSE BLDC GLUCOMTR-MCNC: 197 MG/DL — HIGH (ref 70–99)
GLUCOSE BLDC GLUCOMTR-MCNC: 197 MG/DL — HIGH (ref 70–99)
GLUCOSE BLDC GLUCOMTR-MCNC: 198 MG/DL — HIGH (ref 70–99)
GLUCOSE BLDC GLUCOMTR-MCNC: 199 MG/DL — HIGH (ref 70–99)
GLUCOSE BLDC GLUCOMTR-MCNC: 199 MG/DL — HIGH (ref 70–99)
GLUCOSE BLDC GLUCOMTR-MCNC: 200 MG/DL — HIGH (ref 70–99)
GLUCOSE BLDC GLUCOMTR-MCNC: 201 MG/DL — HIGH (ref 70–99)
GLUCOSE BLDC GLUCOMTR-MCNC: 201 MG/DL — HIGH (ref 70–99)
GLUCOSE BLDC GLUCOMTR-MCNC: 202 MG/DL — HIGH (ref 70–99)
GLUCOSE BLDC GLUCOMTR-MCNC: 203 MG/DL — HIGH (ref 70–99)
GLUCOSE BLDC GLUCOMTR-MCNC: 205 MG/DL — HIGH (ref 70–99)
GLUCOSE BLDC GLUCOMTR-MCNC: 206 MG/DL — HIGH (ref 70–99)
GLUCOSE BLDC GLUCOMTR-MCNC: 207 MG/DL — HIGH (ref 70–99)
GLUCOSE BLDC GLUCOMTR-MCNC: 209 MG/DL — HIGH (ref 70–99)
GLUCOSE BLDC GLUCOMTR-MCNC: 210 MG/DL — HIGH (ref 70–99)
GLUCOSE BLDC GLUCOMTR-MCNC: 210 MG/DL — HIGH (ref 70–99)
GLUCOSE BLDC GLUCOMTR-MCNC: 211 MG/DL — HIGH (ref 70–99)
GLUCOSE BLDC GLUCOMTR-MCNC: 219 MG/DL — HIGH (ref 70–99)
GLUCOSE BLDC GLUCOMTR-MCNC: 221 MG/DL — HIGH (ref 70–99)
GLUCOSE BLDC GLUCOMTR-MCNC: 222 MG/DL — HIGH (ref 70–99)
GLUCOSE BLDC GLUCOMTR-MCNC: 223 MG/DL — HIGH (ref 70–99)
GLUCOSE BLDC GLUCOMTR-MCNC: 224 MG/DL — HIGH (ref 70–99)
GLUCOSE BLDC GLUCOMTR-MCNC: 224 MG/DL — HIGH (ref 70–99)
GLUCOSE BLDC GLUCOMTR-MCNC: 225 MG/DL — HIGH (ref 70–99)
GLUCOSE BLDC GLUCOMTR-MCNC: 226 MG/DL — HIGH (ref 70–99)
GLUCOSE BLDC GLUCOMTR-MCNC: 227 MG/DL — HIGH (ref 70–99)
GLUCOSE BLDC GLUCOMTR-MCNC: 228 MG/DL — HIGH (ref 70–99)
GLUCOSE BLDC GLUCOMTR-MCNC: 231 MG/DL — HIGH (ref 70–99)
GLUCOSE BLDC GLUCOMTR-MCNC: 231 MG/DL — HIGH (ref 70–99)
GLUCOSE BLDC GLUCOMTR-MCNC: 232 MG/DL — HIGH (ref 70–99)
GLUCOSE BLDC GLUCOMTR-MCNC: 233 MG/DL — HIGH (ref 70–99)
GLUCOSE BLDC GLUCOMTR-MCNC: 233 MG/DL — HIGH (ref 70–99)
GLUCOSE BLDC GLUCOMTR-MCNC: 235 MG/DL — HIGH (ref 70–99)
GLUCOSE BLDC GLUCOMTR-MCNC: 236 MG/DL — HIGH (ref 70–99)
GLUCOSE BLDC GLUCOMTR-MCNC: 242 MG/DL — HIGH (ref 70–99)
GLUCOSE BLDC GLUCOMTR-MCNC: 247 MG/DL — HIGH (ref 70–99)
GLUCOSE BLDC GLUCOMTR-MCNC: 248 MG/DL — HIGH (ref 70–99)
GLUCOSE BLDC GLUCOMTR-MCNC: 251 MG/DL — HIGH (ref 70–99)
GLUCOSE BLDC GLUCOMTR-MCNC: 251 MG/DL — HIGH (ref 70–99)
GLUCOSE BLDC GLUCOMTR-MCNC: 253 MG/DL — HIGH (ref 70–99)
GLUCOSE BLDC GLUCOMTR-MCNC: 254 MG/DL — HIGH (ref 70–99)
GLUCOSE BLDC GLUCOMTR-MCNC: 255 MG/DL — HIGH (ref 70–99)
GLUCOSE BLDC GLUCOMTR-MCNC: 256 MG/DL — HIGH (ref 70–99)
GLUCOSE BLDC GLUCOMTR-MCNC: 257 MG/DL — HIGH (ref 70–99)
GLUCOSE BLDC GLUCOMTR-MCNC: 264 MG/DL — HIGH (ref 70–99)
GLUCOSE BLDC GLUCOMTR-MCNC: 267 MG/DL — HIGH (ref 70–99)
GLUCOSE BLDC GLUCOMTR-MCNC: 274 MG/DL — HIGH (ref 70–99)
GLUCOSE BLDC GLUCOMTR-MCNC: 279 MG/DL — HIGH (ref 70–99)
GLUCOSE BLDC GLUCOMTR-MCNC: 284 MG/DL — HIGH (ref 70–99)
GLUCOSE BLDC GLUCOMTR-MCNC: 286 MG/DL — HIGH (ref 70–99)
GLUCOSE BLDC GLUCOMTR-MCNC: 293 MG/DL — HIGH (ref 70–99)
GLUCOSE BLDC GLUCOMTR-MCNC: 293 MG/DL — HIGH (ref 70–99)
GLUCOSE BLDC GLUCOMTR-MCNC: 296 MG/DL — HIGH (ref 70–99)
GLUCOSE BLDC GLUCOMTR-MCNC: 298 MG/DL — HIGH (ref 70–99)
GLUCOSE BLDC GLUCOMTR-MCNC: 299 MG/DL — HIGH (ref 70–99)
GLUCOSE BLDC GLUCOMTR-MCNC: 304 MG/DL — HIGH (ref 70–99)
GLUCOSE BLDC GLUCOMTR-MCNC: 309 MG/DL — HIGH (ref 70–99)
GLUCOSE BLDC GLUCOMTR-MCNC: 311 MG/DL — HIGH (ref 70–99)
GLUCOSE BLDC GLUCOMTR-MCNC: 311 MG/DL — HIGH (ref 70–99)
GLUCOSE BLDC GLUCOMTR-MCNC: 312 MG/DL — HIGH (ref 70–99)
GLUCOSE BLDC GLUCOMTR-MCNC: 323 MG/DL — HIGH (ref 70–99)
GLUCOSE BLDC GLUCOMTR-MCNC: 324 MG/DL — HIGH (ref 70–99)
GLUCOSE BLDC GLUCOMTR-MCNC: 326 MG/DL — HIGH (ref 70–99)
GLUCOSE BLDC GLUCOMTR-MCNC: 337 MG/DL — HIGH (ref 70–99)
GLUCOSE BLDC GLUCOMTR-MCNC: 340 MG/DL — HIGH (ref 70–99)
GLUCOSE BLDC GLUCOMTR-MCNC: 356 MG/DL — HIGH (ref 70–99)
GLUCOSE BLDC GLUCOMTR-MCNC: 375 MG/DL — HIGH (ref 70–99)
GLUCOSE BLDC GLUCOMTR-MCNC: 399 MG/DL — HIGH (ref 70–99)
GLUCOSE BLDC GLUCOMTR-MCNC: 399 MG/DL — HIGH (ref 70–99)
GLUCOSE BLDC GLUCOMTR-MCNC: 66 MG/DL — LOW (ref 70–99)
GLUCOSE BLDC GLUCOMTR-MCNC: 66 MG/DL — LOW (ref 70–99)
GLUCOSE BLDC GLUCOMTR-MCNC: 67 MG/DL — LOW (ref 70–99)
GLUCOSE BLDC GLUCOMTR-MCNC: 68 MG/DL — LOW (ref 70–99)
GLUCOSE BLDC GLUCOMTR-MCNC: 71 MG/DL — SIGNIFICANT CHANGE UP (ref 70–99)
GLUCOSE BLDC GLUCOMTR-MCNC: 73 MG/DL — SIGNIFICANT CHANGE UP (ref 70–99)
GLUCOSE BLDC GLUCOMTR-MCNC: 74 MG/DL — SIGNIFICANT CHANGE UP (ref 70–99)
GLUCOSE BLDC GLUCOMTR-MCNC: 74 MG/DL — SIGNIFICANT CHANGE UP (ref 70–99)
GLUCOSE BLDC GLUCOMTR-MCNC: 77 MG/DL — SIGNIFICANT CHANGE UP (ref 70–99)
GLUCOSE BLDC GLUCOMTR-MCNC: 77 MG/DL — SIGNIFICANT CHANGE UP (ref 70–99)
GLUCOSE BLDC GLUCOMTR-MCNC: 78 MG/DL — SIGNIFICANT CHANGE UP (ref 70–99)
GLUCOSE BLDC GLUCOMTR-MCNC: 79 MG/DL — SIGNIFICANT CHANGE UP (ref 70–99)
GLUCOSE BLDC GLUCOMTR-MCNC: 79 MG/DL — SIGNIFICANT CHANGE UP (ref 70–99)
GLUCOSE BLDC GLUCOMTR-MCNC: 83 MG/DL — SIGNIFICANT CHANGE UP (ref 70–99)
GLUCOSE BLDC GLUCOMTR-MCNC: 85 MG/DL — SIGNIFICANT CHANGE UP (ref 70–99)
GLUCOSE BLDC GLUCOMTR-MCNC: 86 MG/DL — SIGNIFICANT CHANGE UP (ref 70–99)
GLUCOSE BLDC GLUCOMTR-MCNC: 87 MG/DL — SIGNIFICANT CHANGE UP (ref 70–99)
GLUCOSE BLDC GLUCOMTR-MCNC: 88 MG/DL — SIGNIFICANT CHANGE UP (ref 70–99)
GLUCOSE BLDC GLUCOMTR-MCNC: 88 MG/DL — SIGNIFICANT CHANGE UP (ref 70–99)
GLUCOSE BLDC GLUCOMTR-MCNC: 90 MG/DL — SIGNIFICANT CHANGE UP (ref 70–99)
GLUCOSE BLDC GLUCOMTR-MCNC: 92 MG/DL — SIGNIFICANT CHANGE UP (ref 70–99)
GLUCOSE BLDC GLUCOMTR-MCNC: 93 MG/DL — SIGNIFICANT CHANGE UP (ref 70–99)
GLUCOSE BLDC GLUCOMTR-MCNC: 94 MG/DL — SIGNIFICANT CHANGE UP (ref 70–99)
GLUCOSE BLDC GLUCOMTR-MCNC: 95 MG/DL — SIGNIFICANT CHANGE UP (ref 70–99)
GLUCOSE BLDC GLUCOMTR-MCNC: 95 MG/DL — SIGNIFICANT CHANGE UP (ref 70–99)
GLUCOSE BLDC GLUCOMTR-MCNC: 96 MG/DL — SIGNIFICANT CHANGE UP (ref 70–99)
GLUCOSE BLDC GLUCOMTR-MCNC: 96 MG/DL — SIGNIFICANT CHANGE UP (ref 70–99)
GLUCOSE BLDC GLUCOMTR-MCNC: 97 MG/DL — SIGNIFICANT CHANGE UP (ref 70–99)
GLUCOSE BLDC GLUCOMTR-MCNC: 97 MG/DL — SIGNIFICANT CHANGE UP (ref 70–99)
GLUCOSE BLDC GLUCOMTR-MCNC: 98 MG/DL — SIGNIFICANT CHANGE UP (ref 70–99)
GLUCOSE BLDC GLUCOMTR-MCNC: 99 MG/DL — SIGNIFICANT CHANGE UP (ref 70–99)
GLUCOSE BLDC GLUCOMTR-MCNC: 99 MG/DL — SIGNIFICANT CHANGE UP (ref 70–99)
GLUCOSE BLDV-MCNC: 111 MG/DL — HIGH (ref 70–99)
GLUCOSE BLDV-MCNC: 111 MG/DL — HIGH (ref 70–99)
GLUCOSE BLDV-MCNC: 117 MG/DL — HIGH (ref 70–99)
GLUCOSE BLDV-MCNC: 147 MG/DL — HIGH (ref 70–99)
GLUCOSE BLDV-MCNC: 147 MG/DL — HIGH (ref 70–99)
GLUCOSE BLDV-MCNC: 173 MG/DL — HIGH (ref 70–99)
GLUCOSE BLDV-MCNC: 213 MG/DL — HIGH (ref 70–99)
GLUCOSE BLDV-MCNC: 214 MG/DL — HIGH (ref 70–99)
GLUCOSE BLDV-MCNC: 228 MG/DL — HIGH (ref 70–99)
GLUCOSE BLDV-MCNC: 228 MG/DL — HIGH (ref 70–99)
GLUCOSE BLDV-MCNC: 250 MG/DL — HIGH (ref 65–95)
GLUCOSE BLDV-MCNC: 71 MG/DL — SIGNIFICANT CHANGE UP (ref 70–99)
GLUCOSE BLDV-MCNC: 71 MG/DL — SIGNIFICANT CHANGE UP (ref 70–99)
GLUCOSE BLDV-MCNC: 88 MG/DL — SIGNIFICANT CHANGE UP (ref 70–99)
GLUCOSE SERPL-MCNC: 102 MG/DL — HIGH (ref 70–99)
GLUCOSE SERPL-MCNC: 104 MG/DL
GLUCOSE SERPL-MCNC: 107 MG/DL — HIGH (ref 70–99)
GLUCOSE SERPL-MCNC: 108 MG/DL — HIGH (ref 70–99)
GLUCOSE SERPL-MCNC: 108 MG/DL — HIGH (ref 70–99)
GLUCOSE SERPL-MCNC: 113 MG/DL — HIGH (ref 70–99)
GLUCOSE SERPL-MCNC: 113 MG/DL — HIGH (ref 70–99)
GLUCOSE SERPL-MCNC: 115 MG/DL — HIGH (ref 70–99)
GLUCOSE SERPL-MCNC: 116 MG/DL — HIGH (ref 70–99)
GLUCOSE SERPL-MCNC: 117 MG/DL — HIGH (ref 70–99)
GLUCOSE SERPL-MCNC: 118 MG/DL — HIGH (ref 70–99)
GLUCOSE SERPL-MCNC: 118 MG/DL — HIGH (ref 70–99)
GLUCOSE SERPL-MCNC: 119 MG/DL — HIGH (ref 70–99)
GLUCOSE SERPL-MCNC: 121 MG/DL — HIGH (ref 70–99)
GLUCOSE SERPL-MCNC: 123 MG/DL — HIGH (ref 70–99)
GLUCOSE SERPL-MCNC: 125 MG/DL — HIGH (ref 70–99)
GLUCOSE SERPL-MCNC: 125 MG/DL — HIGH (ref 70–99)
GLUCOSE SERPL-MCNC: 126 MG/DL — HIGH (ref 70–99)
GLUCOSE SERPL-MCNC: 126 MG/DL — HIGH (ref 70–99)
GLUCOSE SERPL-MCNC: 130 MG/DL — HIGH (ref 70–99)
GLUCOSE SERPL-MCNC: 131 MG/DL — HIGH (ref 70–99)
GLUCOSE SERPL-MCNC: 132 MG/DL — HIGH (ref 70–99)
GLUCOSE SERPL-MCNC: 139 MG/DL — HIGH (ref 70–99)
GLUCOSE SERPL-MCNC: 141 MG/DL — HIGH (ref 70–99)
GLUCOSE SERPL-MCNC: 142 MG/DL — HIGH (ref 70–99)
GLUCOSE SERPL-MCNC: 142 MG/DL — HIGH (ref 70–99)
GLUCOSE SERPL-MCNC: 146 MG/DL — HIGH (ref 70–99)
GLUCOSE SERPL-MCNC: 146 MG/DL — HIGH (ref 70–99)
GLUCOSE SERPL-MCNC: 147 MG/DL
GLUCOSE SERPL-MCNC: 149 MG/DL — HIGH (ref 70–99)
GLUCOSE SERPL-MCNC: 155 MG/DL — HIGH (ref 70–99)
GLUCOSE SERPL-MCNC: 155 MG/DL — HIGH (ref 70–99)
GLUCOSE SERPL-MCNC: 161 MG/DL — HIGH (ref 70–99)
GLUCOSE SERPL-MCNC: 162 MG/DL — HIGH (ref 70–99)
GLUCOSE SERPL-MCNC: 163 MG/DL — HIGH (ref 70–99)
GLUCOSE SERPL-MCNC: 173 MG/DL — HIGH (ref 70–99)
GLUCOSE SERPL-MCNC: 179 MG/DL — HIGH (ref 70–99)
GLUCOSE SERPL-MCNC: 186 MG/DL — HIGH (ref 70–99)
GLUCOSE SERPL-MCNC: 186 MG/DL — HIGH (ref 70–99)
GLUCOSE SERPL-MCNC: 187 MG/DL — HIGH (ref 70–99)
GLUCOSE SERPL-MCNC: 190 MG/DL — HIGH (ref 70–99)
GLUCOSE SERPL-MCNC: 194 MG/DL — HIGH (ref 70–99)
GLUCOSE SERPL-MCNC: 199 MG/DL — HIGH (ref 70–99)
GLUCOSE SERPL-MCNC: 204 MG/DL — HIGH (ref 70–99)
GLUCOSE SERPL-MCNC: 216 MG/DL — HIGH (ref 70–99)
GLUCOSE SERPL-MCNC: 217 MG/DL — HIGH (ref 70–99)
GLUCOSE SERPL-MCNC: 223 MG/DL — HIGH (ref 70–99)
GLUCOSE SERPL-MCNC: 236 MG/DL — HIGH (ref 70–99)
GLUCOSE SERPL-MCNC: 246 MG/DL — HIGH (ref 70–99)
GLUCOSE SERPL-MCNC: 250 MG/DL — HIGH (ref 70–99)
GLUCOSE SERPL-MCNC: 261 MG/DL — HIGH (ref 70–99)
GLUCOSE SERPL-MCNC: 265 MG/DL — HIGH (ref 70–99)
GLUCOSE SERPL-MCNC: 271 MG/DL — HIGH (ref 70–99)
GLUCOSE SERPL-MCNC: 275 MG/DL — HIGH (ref 70–99)
GLUCOSE SERPL-MCNC: 284 MG/DL — HIGH (ref 70–99)
GLUCOSE SERPL-MCNC: 321 MG/DL — HIGH (ref 70–99)
GLUCOSE SERPL-MCNC: 332 MG/DL — HIGH (ref 70–99)
GLUCOSE SERPL-MCNC: 72 MG/DL — SIGNIFICANT CHANGE UP (ref 70–99)
GLUCOSE SERPL-MCNC: 72 MG/DL — SIGNIFICANT CHANGE UP (ref 70–99)
GLUCOSE SERPL-MCNC: 74 MG/DL — SIGNIFICANT CHANGE UP (ref 70–99)
GLUCOSE SERPL-MCNC: 74 MG/DL — SIGNIFICANT CHANGE UP (ref 70–99)
GLUCOSE SERPL-MCNC: 78 MG/DL — SIGNIFICANT CHANGE UP (ref 70–99)
GLUCOSE SERPL-MCNC: 78 MG/DL — SIGNIFICANT CHANGE UP (ref 70–99)
GLUCOSE SERPL-MCNC: 80 MG/DL — SIGNIFICANT CHANGE UP (ref 70–99)
GLUCOSE SERPL-MCNC: 80 MG/DL — SIGNIFICANT CHANGE UP (ref 70–99)
GLUCOSE SERPL-MCNC: 83 MG/DL — SIGNIFICANT CHANGE UP (ref 70–99)
GLUCOSE SERPL-MCNC: 86 MG/DL — SIGNIFICANT CHANGE UP (ref 70–99)
GLUCOSE SERPL-MCNC: 92 MG/DL — SIGNIFICANT CHANGE UP (ref 70–99)
GLUCOSE SERPL-MCNC: 92 MG/DL — SIGNIFICANT CHANGE UP (ref 70–99)
GLUCOSE SERPL-MCNC: 97 MG/DL — SIGNIFICANT CHANGE UP (ref 70–99)
GLUCOSE SERPL-MCNC: 97 MG/DL — SIGNIFICANT CHANGE UP (ref 70–99)
GLUCOSE UR QL: 100 MG/DL
GLUCOSE UR QL: 500 MG/DL
GLUCOSE UR QL: NEGATIVE MG/DL — SIGNIFICANT CHANGE UP
GLUCOSE UR QL: NEGATIVE — SIGNIFICANT CHANGE UP
HADV DNA SPEC QL NAA+PROBE: SIGNIFICANT CHANGE UP
HAPTOGLOB SERPL-MCNC: 441 MG/DL — HIGH (ref 34–200)
HAPTOGLOB SERPL-MCNC: 441 MG/DL — HIGH (ref 34–200)
HBV CORE IGG+IGM SER QL: NONREACTIVE
HBV SURFACE AB SER QL: NONREACTIVE
HBV SURFACE AG SER QL: NONREACTIVE
HCG SERPL-ACNC: 3 MIU/ML — SIGNIFICANT CHANGE UP
HCG SERPL-ACNC: <2 MIU/ML — SIGNIFICANT CHANGE UP
HCG SERPL-ACNC: <2 MIU/ML — SIGNIFICANT CHANGE UP
HCO3 BLDA-SCNC: 22 MMOL/L — SIGNIFICANT CHANGE UP (ref 21–28)
HCO3 BLDA-SCNC: 22 MMOL/L — SIGNIFICANT CHANGE UP (ref 21–28)
HCO3 BLDV-SCNC: 11 MMOL/L — LOW (ref 22–29)
HCO3 BLDV-SCNC: 11 MMOL/L — LOW (ref 22–29)
HCO3 BLDV-SCNC: 21 MMOL/L — LOW (ref 22–29)
HCO3 BLDV-SCNC: 22 MMOL/L — SIGNIFICANT CHANGE UP (ref 22–29)
HCO3 BLDV-SCNC: 24 MMOL/L — SIGNIFICANT CHANGE UP (ref 22–29)
HCO3 BLDV-SCNC: 25 MMOL/L — SIGNIFICANT CHANGE UP (ref 22–29)
HCO3 BLDV-SCNC: 25 MMOL/L — SIGNIFICANT CHANGE UP (ref 22–29)
HCO3 BLDV-SCNC: 28 MMOL/L — SIGNIFICANT CHANGE UP (ref 22–28)
HCO3 BLDV-SCNC: 28 MMOL/L — SIGNIFICANT CHANGE UP (ref 22–29)
HCOV 229E RNA SPEC QL NAA+PROBE: SIGNIFICANT CHANGE UP
HCOV HKU1 RNA SPEC QL NAA+PROBE: SIGNIFICANT CHANGE UP
HCOV NL63 RNA SPEC QL NAA+PROBE: SIGNIFICANT CHANGE UP
HCOV OC43 RNA SPEC QL NAA+PROBE: SIGNIFICANT CHANGE UP
HCT VFR BLD CALC: 27.6 % — LOW (ref 34.5–45)
HCT VFR BLD CALC: 27.6 % — LOW (ref 34.5–45)
HCT VFR BLD CALC: 27.8 % — LOW (ref 34.5–45)
HCT VFR BLD CALC: 27.8 % — LOW (ref 34.5–45)
HCT VFR BLD CALC: 29.3 % — LOW (ref 34.5–45)
HCT VFR BLD CALC: 29.3 % — LOW (ref 34.5–45)
HCT VFR BLD CALC: 29.7 % — LOW (ref 34.5–45)
HCT VFR BLD CALC: 29.7 % — LOW (ref 34.5–45)
HCT VFR BLD CALC: 29.8 % — LOW (ref 34.5–45)
HCT VFR BLD CALC: 29.8 % — LOW (ref 34.5–45)
HCT VFR BLD CALC: 29.9 % — LOW (ref 34.5–45)
HCT VFR BLD CALC: 29.9 % — LOW (ref 34.5–45)
HCT VFR BLD CALC: 30 % — LOW (ref 34.5–45)
HCT VFR BLD CALC: 30 % — LOW (ref 34.5–45)
HCT VFR BLD CALC: 30.1 % — LOW (ref 34.5–45)
HCT VFR BLD CALC: 30.2 % — LOW (ref 34.5–45)
HCT VFR BLD CALC: 30.2 % — LOW (ref 34.5–45)
HCT VFR BLD CALC: 30.3 % — LOW (ref 34.5–45)
HCT VFR BLD CALC: 30.3 % — LOW (ref 34.5–45)
HCT VFR BLD CALC: 30.6 % — LOW (ref 34.5–45)
HCT VFR BLD CALC: 30.7 % — LOW (ref 34.5–45)
HCT VFR BLD CALC: 30.7 % — LOW (ref 34.5–45)
HCT VFR BLD CALC: 30.8 % — LOW (ref 34.5–45)
HCT VFR BLD CALC: 31.4 % — LOW (ref 34.5–45)
HCT VFR BLD CALC: 31.4 % — LOW (ref 34.5–45)
HCT VFR BLD CALC: 31.5 % — LOW (ref 34.5–45)
HCT VFR BLD CALC: 31.6 % — LOW (ref 34.5–45)
HCT VFR BLD CALC: 31.6 % — LOW (ref 34.5–45)
HCT VFR BLD CALC: 31.7 % — LOW (ref 34.5–45)
HCT VFR BLD CALC: 31.7 % — LOW (ref 34.5–45)
HCT VFR BLD CALC: 32.1 % — LOW (ref 34.5–45)
HCT VFR BLD CALC: 32.1 % — LOW (ref 34.5–45)
HCT VFR BLD CALC: 32.2 % — LOW (ref 34.5–45)
HCT VFR BLD CALC: 32.3 % — LOW (ref 34.5–45)
HCT VFR BLD CALC: 32.3 % — LOW (ref 34.5–45)
HCT VFR BLD CALC: 32.4 % — LOW (ref 34.5–45)
HCT VFR BLD CALC: 32.4 % — LOW (ref 34.5–45)
HCT VFR BLD CALC: 32.8 % — LOW (ref 34.5–45)
HCT VFR BLD CALC: 33.2 % — LOW (ref 34.5–45)
HCT VFR BLD CALC: 33.4 % — LOW (ref 34.5–45)
HCT VFR BLD CALC: 33.4 % — LOW (ref 34.5–45)
HCT VFR BLD CALC: 33.6 % — LOW (ref 34.5–45)
HCT VFR BLD CALC: 34 % — LOW (ref 34.5–45)
HCT VFR BLD CALC: 34 % — LOW (ref 34.5–45)
HCT VFR BLD CALC: 34.1 % — LOW (ref 34.5–45)
HCT VFR BLD CALC: 34.2 % — LOW (ref 34.5–45)
HCT VFR BLD CALC: 34.2 % — LOW (ref 34.5–45)
HCT VFR BLD CALC: 34.3 % — LOW (ref 34.5–45)
HCT VFR BLD CALC: 34.4 % — LOW (ref 34.5–45)
HCT VFR BLD CALC: 34.4 % — LOW (ref 34.5–45)
HCT VFR BLD CALC: 34.6 % — SIGNIFICANT CHANGE UP (ref 34.5–45)
HCT VFR BLD CALC: 34.6 % — SIGNIFICANT CHANGE UP (ref 34.5–45)
HCT VFR BLD CALC: 34.7 % — SIGNIFICANT CHANGE UP (ref 34.5–45)
HCT VFR BLD CALC: 34.7 % — SIGNIFICANT CHANGE UP (ref 34.5–45)
HCT VFR BLD CALC: 34.8 % — SIGNIFICANT CHANGE UP (ref 34.5–45)
HCT VFR BLD CALC: 34.8 % — SIGNIFICANT CHANGE UP (ref 34.5–45)
HCT VFR BLD CALC: 34.9 % — SIGNIFICANT CHANGE UP (ref 34.5–45)
HCT VFR BLD CALC: 35.1 % — SIGNIFICANT CHANGE UP (ref 34.5–45)
HCT VFR BLD CALC: 35.1 % — SIGNIFICANT CHANGE UP (ref 34.5–45)
HCT VFR BLD CALC: 35.2 % — SIGNIFICANT CHANGE UP (ref 34.5–45)
HCT VFR BLD CALC: 35.2 % — SIGNIFICANT CHANGE UP (ref 34.5–45)
HCT VFR BLD CALC: 35.3 % — SIGNIFICANT CHANGE UP (ref 34.5–45)
HCT VFR BLD CALC: 36 % — SIGNIFICANT CHANGE UP (ref 34.5–45)
HCT VFR BLD CALC: 36.6 % — SIGNIFICANT CHANGE UP (ref 34.5–45)
HCT VFR BLD CALC: 36.8 % — SIGNIFICANT CHANGE UP (ref 34.5–45)
HCT VFR BLD CALC: 37.5 % — SIGNIFICANT CHANGE UP (ref 34.5–45)
HCT VFR BLD CALC: 38.1 % — SIGNIFICANT CHANGE UP (ref 34.5–45)
HCT VFR BLD CALC: 38.1 % — SIGNIFICANT CHANGE UP (ref 34.5–45)
HCT VFR BLD CALC: 38.4 % — SIGNIFICANT CHANGE UP (ref 34.5–45)
HCT VFR BLD CALC: 38.4 % — SIGNIFICANT CHANGE UP (ref 34.5–45)
HCT VFR BLD CALC: 38.8 % — SIGNIFICANT CHANGE UP (ref 34.5–45)
HCT VFR BLD CALC: 39.2 % — SIGNIFICANT CHANGE UP (ref 34.5–45)
HCT VFR BLD CALC: 39.2 % — SIGNIFICANT CHANGE UP (ref 34.5–45)
HCT VFR BLD CALC: 40.4 % — SIGNIFICANT CHANGE UP (ref 34.5–45)
HCT VFR BLD CALC: 40.5 % — SIGNIFICANT CHANGE UP (ref 34.5–45)
HCT VFR BLD CALC: 41.3 % — SIGNIFICANT CHANGE UP (ref 34.5–45)
HCT VFR BLD CALC: 41.9 % — SIGNIFICANT CHANGE UP (ref 34.5–45)
HCT VFR BLDA CALC: 16 % — CRITICAL LOW (ref 34.5–46.5)
HCT VFR BLDA CALC: 16 % — CRITICAL LOW (ref 34.5–46.5)
HCT VFR BLDA CALC: 28 % — LOW (ref 34.5–46.5)
HCT VFR BLDA CALC: 28 % — LOW (ref 34.5–46.5)
HCT VFR BLDA CALC: 31 % — LOW (ref 34.5–46.5)
HCT VFR BLDA CALC: 31 % — LOW (ref 34.5–46.5)
HCT VFR BLDA CALC: 32 % — LOW (ref 37–47)
HCT VFR BLDA CALC: 33 % — LOW (ref 34.5–46.5)
HCT VFR BLDA CALC: 33 % — LOW (ref 34.5–46.5)
HCT VFR BLDA CALC: 35 % — SIGNIFICANT CHANGE UP (ref 34.5–46.5)
HCT VFR BLDA CALC: 36 % — SIGNIFICANT CHANGE UP (ref 34.5–46.5)
HCT VFR BLDA CALC: 36 % — SIGNIFICANT CHANGE UP (ref 34.5–46.5)
HCT VFR BLDA CALC: 39 % — SIGNIFICANT CHANGE UP (ref 34.5–46.5)
HCT VFR BLDA CALC: 41 % — SIGNIFICANT CHANGE UP (ref 34.5–46.5)
HCV AB SER QL: NONREACTIVE
HCV S/CO RATIO: 0.15 S/CO
HDLC SERPL-MCNC: 38 MG/DL — LOW
HGB BLD CALC-MCNC: 10.2 G/DL — LOW (ref 11.7–16.1)
HGB BLD CALC-MCNC: 10.2 G/DL — LOW (ref 11.7–16.1)
HGB BLD CALC-MCNC: 10.8 G/DL — LOW (ref 11.7–16.1)
HGB BLD CALC-MCNC: 11 G/DL — LOW (ref 11.7–16.1)
HGB BLD CALC-MCNC: 11 G/DL — LOW (ref 11.7–16.1)
HGB BLD CALC-MCNC: 11.5 G/DL — LOW (ref 11.7–16.1)
HGB BLD CALC-MCNC: 11.9 G/DL — SIGNIFICANT CHANGE UP (ref 11.7–16.1)
HGB BLD CALC-MCNC: 12 G/DL — SIGNIFICANT CHANGE UP (ref 11.7–16.1)
HGB BLD CALC-MCNC: 13 G/DL — SIGNIFICANT CHANGE UP (ref 11.5–15.5)
HGB BLD CALC-MCNC: 13.7 G/DL — SIGNIFICANT CHANGE UP (ref 11.7–16.1)
HGB BLD CALC-MCNC: 5.3 G/DL — CRITICAL LOW (ref 11.7–16.1)
HGB BLD CALC-MCNC: 5.3 G/DL — CRITICAL LOW (ref 11.7–16.1)
HGB BLD CALC-MCNC: 9.2 G/DL — LOW (ref 11.7–16.1)
HGB BLD CALC-MCNC: 9.2 G/DL — LOW (ref 11.7–16.1)
HGB BLD-MCNC: 10.2 G/DL — LOW (ref 11.5–15.5)
HGB BLD-MCNC: 10.3 G/DL — LOW (ref 11.5–15.5)
HGB BLD-MCNC: 10.4 G/DL — LOW (ref 11.5–15.5)
HGB BLD-MCNC: 10.4 G/DL — LOW (ref 11.5–15.5)
HGB BLD-MCNC: 10.6 G/DL — LOW (ref 11.5–15.5)
HGB BLD-MCNC: 10.6 G/DL — LOW (ref 11.5–15.5)
HGB BLD-MCNC: 10.7 G/DL — LOW (ref 11.5–15.5)
HGB BLD-MCNC: 10.8 G/DL — LOW (ref 11.5–15.5)
HGB BLD-MCNC: 10.9 G/DL — LOW (ref 11.5–15.5)
HGB BLD-MCNC: 11 G/DL — LOW (ref 11.5–15.5)
HGB BLD-MCNC: 11.1 G/DL — LOW (ref 11.5–15.5)
HGB BLD-MCNC: 11.2 G/DL — LOW (ref 11.5–15.5)
HGB BLD-MCNC: 11.2 G/DL — LOW (ref 11.5–15.5)
HGB BLD-MCNC: 11.3 G/DL — LOW (ref 11.5–15.5)
HGB BLD-MCNC: 11.4 G/DL — LOW (ref 11.5–15.5)
HGB BLD-MCNC: 11.5 G/DL — SIGNIFICANT CHANGE UP (ref 11.5–15.5)
HGB BLD-MCNC: 11.6 G/DL — SIGNIFICANT CHANGE UP (ref 11.5–15.5)
HGB BLD-MCNC: 11.8 G/DL — SIGNIFICANT CHANGE UP (ref 11.5–15.5)
HGB BLD-MCNC: 11.9 G/DL — SIGNIFICANT CHANGE UP (ref 11.5–15.5)
HGB BLD-MCNC: 12.1 G/DL — SIGNIFICANT CHANGE UP (ref 11.5–15.5)
HGB BLD-MCNC: 12.2 G/DL — SIGNIFICANT CHANGE UP (ref 11.5–15.5)
HGB BLD-MCNC: 12.2 G/DL — SIGNIFICANT CHANGE UP (ref 11.5–15.5)
HGB BLD-MCNC: 12.3 G/DL — SIGNIFICANT CHANGE UP (ref 11.5–15.5)
HGB BLD-MCNC: 12.5 G/DL — SIGNIFICANT CHANGE UP (ref 11.5–15.5)
HGB BLD-MCNC: 12.8 G/DL — SIGNIFICANT CHANGE UP (ref 11.5–15.5)
HGB BLD-MCNC: 12.9 G/DL — SIGNIFICANT CHANGE UP (ref 11.5–15.5)
HGB BLD-MCNC: 13.3 G/DL — SIGNIFICANT CHANGE UP (ref 11.5–15.5)
HGB BLD-MCNC: 13.4 G/DL — SIGNIFICANT CHANGE UP (ref 11.5–15.5)
HGB BLD-MCNC: 13.7 G/DL — SIGNIFICANT CHANGE UP (ref 11.5–15.5)
HGB BLD-MCNC: 14 G/DL — SIGNIFICANT CHANGE UP (ref 11.5–15.5)
HGB BLD-MCNC: 14.2 G/DL — SIGNIFICANT CHANGE UP (ref 11.5–15.5)
HGB BLD-MCNC: 9 G/DL — LOW (ref 11.5–15.5)
HGB BLD-MCNC: 9.2 G/DL — LOW (ref 11.5–15.5)
HGB BLD-MCNC: 9.3 G/DL — LOW (ref 11.5–15.5)
HGB BLD-MCNC: 9.3 G/DL — LOW (ref 11.5–15.5)
HGB BLD-MCNC: 9.4 G/DL — LOW (ref 11.5–15.5)
HGB BLD-MCNC: 9.4 G/DL — LOW (ref 11.5–15.5)
HGB BLD-MCNC: 9.5 G/DL — LOW (ref 11.5–15.5)
HGB BLD-MCNC: 9.5 G/DL — LOW (ref 11.5–15.5)
HGB BLD-MCNC: 9.6 G/DL — LOW (ref 11.5–15.5)
HGB BLD-MCNC: 9.7 G/DL — LOW (ref 11.5–15.5)
HGB BLD-MCNC: 9.8 G/DL — LOW (ref 11.5–15.5)
HGB BLD-MCNC: 9.9 G/DL — LOW (ref 11.5–15.5)
HGB BLD-MCNC: 9.9 G/DL — LOW (ref 11.5–15.5)
HMPV RNA SPEC QL NAA+PROBE: SIGNIFICANT CHANGE UP
HOROWITZ INDEX BLDA+IHG-RTO: 44 — SIGNIFICANT CHANGE UP
HOROWITZ INDEX BLDA+IHG-RTO: 44 — SIGNIFICANT CHANGE UP
HPIV1 RNA SPEC QL NAA+PROBE: SIGNIFICANT CHANGE UP
HPIV2 RNA SPEC QL NAA+PROBE: SIGNIFICANT CHANGE UP
HPIV3 RNA SPEC QL NAA+PROBE: SIGNIFICANT CHANGE UP
HPIV4 RNA SPEC QL NAA+PROBE: SIGNIFICANT CHANGE UP
HYPOCHROMIA BLD QL: SLIGHT — SIGNIFICANT CHANGE UP
IANC: 1.58 K/UL — LOW (ref 1.8–7.4)
IANC: 10.22 K/UL — HIGH (ref 1.8–7.4)
IANC: 11.4 K/UL — HIGH (ref 1.8–7.4)
IANC: 12.08 K/UL — HIGH (ref 1.8–7.4)
IANC: 12.75 K/UL — HIGH (ref 1.8–7.4)
IANC: 2.43 K/UL — SIGNIFICANT CHANGE UP (ref 1.8–7.4)
IANC: 4.3 K/UL — SIGNIFICANT CHANGE UP (ref 1.8–7.4)
IANC: 5.84 K/UL — SIGNIFICANT CHANGE UP (ref 1.8–7.4)
IANC: 8.62 K/UL — HIGH (ref 1.8–7.4)
IMM GRANULOCYTES NFR BLD AUTO: 0.3 % — SIGNIFICANT CHANGE UP (ref 0–0.9)
IMM GRANULOCYTES NFR BLD AUTO: 0.4 % — SIGNIFICANT CHANGE UP (ref 0–0.9)
IMM GRANULOCYTES NFR BLD AUTO: 1.5 % — HIGH (ref 0–0.9)
IMM GRANULOCYTES NFR BLD AUTO: 1.5 % — HIGH (ref 0–0.9)
IMM GRANULOCYTES NFR BLD AUTO: 1.7 % — HIGH (ref 0–0.9)
IMM GRANULOCYTES NFR BLD AUTO: 1.7 % — HIGH (ref 0–0.9)
IMM GRANULOCYTES NFR BLD AUTO: 1.8 % — HIGH (ref 0–0.9)
IMM GRANULOCYTES NFR BLD AUTO: 2.1 % — HIGH (ref 0–0.9)
IMM GRANULOCYTES NFR BLD AUTO: 2.3 % — HIGH (ref 0–0.9)
IMM GRANULOCYTES NFR BLD AUTO: 2.5 % — HIGH (ref 0–0.9)
IMM GRANULOCYTES NFR BLD AUTO: 2.7 % — HIGH (ref 0–0.9)
IMM GRANULOCYTES NFR BLD AUTO: 2.7 % — HIGH (ref 0–0.9)
IMM GRANULOCYTES NFR BLD AUTO: 4 % — HIGH (ref 0–0.9)
IMM GRANULOCYTES NFR BLD AUTO: 4.1 % — HIGH (ref 0–0.9)
IMM GRANULOCYTES NFR BLD AUTO: 4.4 % — HIGH (ref 0–0.9)
IMM GRANULOCYTES NFR BLD AUTO: 5.3 % — HIGH (ref 0–0.9)
IMM GRANULOCYTES NFR BLD AUTO: 5.3 % — HIGH (ref 0–0.9)
IMM GRANULOCYTES NFR BLD AUTO: 5.6 % — HIGH (ref 0–0.9)
IMM GRANULOCYTES NFR BLD AUTO: 5.6 % — HIGH (ref 0–0.9)
IMM GRANULOCYTES NFR BLD AUTO: 6.1 % — HIGH (ref 0–0.9)
IMM GRANULOCYTES NFR BLD AUTO: 6.4 % — HIGH (ref 0–0.9)
INR BLD: 1.13 RATIO — SIGNIFICANT CHANGE UP (ref 0.85–1.18)
INR BLD: 1.14 RATIO — SIGNIFICANT CHANGE UP (ref 0.85–1.18)
INR BLD: 1.17 RATIO — SIGNIFICANT CHANGE UP (ref 0.85–1.18)
INR BLD: 1.18 RATIO — SIGNIFICANT CHANGE UP (ref 0.85–1.18)
INR BLD: 1.25 RATIO — HIGH (ref 0.85–1.18)
INR BLD: 1.26 RATIO — HIGH (ref 0.88–1.16)
INR BLD: 1.35 RATIO — HIGH (ref 0.85–1.18)
INR BLD: 1.35 RATIO — HIGH (ref 0.85–1.18)
INR BLD: 1.39 RATIO — HIGH (ref 0.85–1.18)
INR BLD: 1.39 RATIO — HIGH (ref 0.85–1.18)
INR BLD: 1.47 RATIO — HIGH (ref 0.85–1.18)
INR BLD: 1.47 RATIO — HIGH (ref 0.85–1.18)
IRON SATN MFR SERPL: 90 UG/DL — SIGNIFICANT CHANGE UP (ref 30–160)
KETONES UR-MCNC: 15 MG/DL
KETONES UR-MCNC: ABNORMAL
KETONES UR-MCNC: ABNORMAL MG/DL
KETONES UR-MCNC: NEGATIVE MG/DL — SIGNIFICANT CHANGE UP
LACTATE BLDV-MCNC: 0.7 MMOL/L — SIGNIFICANT CHANGE UP (ref 0.5–2)
LACTATE BLDV-MCNC: 0.7 MMOL/L — SIGNIFICANT CHANGE UP (ref 0.5–2)
LACTATE BLDV-MCNC: 1.3 MMOL/L — SIGNIFICANT CHANGE UP (ref 0.5–2)
LACTATE BLDV-MCNC: 1.4 MMOL/L — SIGNIFICANT CHANGE UP (ref 0.5–2)
LACTATE BLDV-MCNC: 1.4 MMOL/L — SIGNIFICANT CHANGE UP (ref 0.5–2)
LACTATE BLDV-MCNC: 1.7 MMOL/L — SIGNIFICANT CHANGE UP (ref 0.5–2)
LACTATE BLDV-MCNC: 1.9 MMOL/L — SIGNIFICANT CHANGE UP (ref 0.5–2)
LACTATE BLDV-MCNC: 2.3 MMOL/L — HIGH (ref 0.5–2)
LACTATE BLDV-MCNC: 2.5 MMOL/L — HIGH (ref 0.5–2)
LACTATE BLDV-MCNC: 2.5 MMOL/L — HIGH (ref 0.5–2)
LACTATE BLDV-MCNC: 3.9 MMOL/L — HIGH (ref 0.56–1.39)
LACTATE BLDV-MCNC: 4.6 MMOL/L — CRITICAL HIGH (ref 0.56–1.39)
LACTATE SERPL-SCNC: 1.8 MMOL/L — SIGNIFICANT CHANGE UP (ref 0.5–2)
LACTATE SERPL-SCNC: 1.8 MMOL/L — SIGNIFICANT CHANGE UP (ref 0.5–2)
LACTATE SERPL-SCNC: 2.2 MMOL/L — HIGH (ref 0.7–2)
LACTATE SERPL-SCNC: 2.5 MMOL/L — HIGH (ref 0.7–2)
LACTATE SERPL-SCNC: 3.5 MMOL/L — HIGH (ref 0.7–2)
LDH SERPL L TO P-CCNC: 1107 U/L — HIGH (ref 50–242)
LDH SERPL L TO P-CCNC: 1107 U/L — HIGH (ref 50–242)
LDH SERPL L TO P-CCNC: 1207 U/L — HIGH (ref 50–242)
LDH SERPL L TO P-CCNC: 1207 U/L — HIGH (ref 50–242)
LDH SERPL-CCNC: 1662 U/L
LEUKOCYTE ESTERASE UR-ACNC: ABNORMAL
LEUKOCYTE ESTERASE UR-ACNC: NEGATIVE — SIGNIFICANT CHANGE UP
LIDOCAIN IGE QN: 12 U/L — SIGNIFICANT CHANGE UP (ref 7–60)
LIDOCAIN IGE QN: 12 U/L — SIGNIFICANT CHANGE UP (ref 7–60)
LIDOCAIN IGE QN: 18 U/L — SIGNIFICANT CHANGE UP (ref 7–60)
LIDOCAIN IGE QN: 20 U/L — SIGNIFICANT CHANGE UP (ref 7–60)
LIDOCAIN IGE QN: 20 U/L — SIGNIFICANT CHANGE UP (ref 7–60)
LIDOCAIN IGE QN: 33 U/L — SIGNIFICANT CHANGE UP (ref 7–60)
LIPID PNL WITH DIRECT LDL SERPL: 107 MG/DL — HIGH
LYMPHOCYTES # BLD AUTO: 0.28 K/UL — LOW (ref 1–3.3)
LYMPHOCYTES # BLD AUTO: 0.28 K/UL — LOW (ref 1–3.3)
LYMPHOCYTES # BLD AUTO: 0.33 K/UL — LOW (ref 1–3.3)
LYMPHOCYTES # BLD AUTO: 0.34 K/UL — LOW (ref 1–3.3)
LYMPHOCYTES # BLD AUTO: 0.38 K/UL — LOW (ref 1–3.3)
LYMPHOCYTES # BLD AUTO: 0.39 K/UL — LOW (ref 1–3.3)
LYMPHOCYTES # BLD AUTO: 0.39 K/UL — LOW (ref 1–3.3)
LYMPHOCYTES # BLD AUTO: 0.41 K/UL — LOW (ref 1–3.3)
LYMPHOCYTES # BLD AUTO: 0.48 K/UL — LOW (ref 1–3.3)
LYMPHOCYTES # BLD AUTO: 0.51 K/UL — LOW (ref 1–3.3)
LYMPHOCYTES # BLD AUTO: 0.51 K/UL — LOW (ref 1–3.3)
LYMPHOCYTES # BLD AUTO: 0.52 K/UL — LOW (ref 1–3.3)
LYMPHOCYTES # BLD AUTO: 0.52 K/UL — LOW (ref 1–3.3)
LYMPHOCYTES # BLD AUTO: 0.57 K/UL — LOW (ref 1–3.3)
LYMPHOCYTES # BLD AUTO: 0.59 K/UL — LOW (ref 1–3.3)
LYMPHOCYTES # BLD AUTO: 0.59 K/UL — LOW (ref 1–3.3)
LYMPHOCYTES # BLD AUTO: 0.63 K/UL — LOW (ref 1–3.3)
LYMPHOCYTES # BLD AUTO: 0.68 K/UL — LOW (ref 1–3.3)
LYMPHOCYTES # BLD AUTO: 0.69 K/UL — LOW (ref 1–3.3)
LYMPHOCYTES # BLD AUTO: 0.69 K/UL — LOW (ref 1–3.3)
LYMPHOCYTES # BLD AUTO: 0.7 K/UL — LOW (ref 1–3.3)
LYMPHOCYTES # BLD AUTO: 0.72 K/UL — LOW (ref 1–3.3)
LYMPHOCYTES # BLD AUTO: 0.72 K/UL — LOW (ref 1–3.3)
LYMPHOCYTES # BLD AUTO: 0.73 K/UL — LOW (ref 1–3.3)
LYMPHOCYTES # BLD AUTO: 0.73 K/UL — LOW (ref 1–3.3)
LYMPHOCYTES # BLD AUTO: 0.76 K/UL — LOW (ref 1–3.3)
LYMPHOCYTES # BLD AUTO: 0.76 K/UL — LOW (ref 1–3.3)
LYMPHOCYTES # BLD AUTO: 0.77 K/UL — LOW (ref 1–3.3)
LYMPHOCYTES # BLD AUTO: 0.77 K/UL — LOW (ref 1–3.3)
LYMPHOCYTES # BLD AUTO: 0.78 K/UL — LOW (ref 1–3.3)
LYMPHOCYTES # BLD AUTO: 0.8 K/UL — LOW (ref 1–3.3)
LYMPHOCYTES # BLD AUTO: 0.81 K/UL — LOW (ref 1–3.3)
LYMPHOCYTES # BLD AUTO: 0.84 K/UL — LOW (ref 1–3.3)
LYMPHOCYTES # BLD AUTO: 0.84 K/UL — LOW (ref 1–3.3)
LYMPHOCYTES # BLD AUTO: 0.9 K/UL — LOW (ref 1–3.3)
LYMPHOCYTES # BLD AUTO: 0.92 K/UL — LOW (ref 1–3.3)
LYMPHOCYTES # BLD AUTO: 0.92 K/UL — LOW (ref 1–3.3)
LYMPHOCYTES # BLD AUTO: 0.93 K/UL — LOW (ref 1–3.3)
LYMPHOCYTES # BLD AUTO: 1 K/UL — SIGNIFICANT CHANGE UP (ref 1–3.3)
LYMPHOCYTES # BLD AUTO: 1 K/UL — SIGNIFICANT CHANGE UP (ref 1–3.3)
LYMPHOCYTES # BLD AUTO: 1.03 K/UL — SIGNIFICANT CHANGE UP (ref 1–3.3)
LYMPHOCYTES # BLD AUTO: 1.22 K/UL — SIGNIFICANT CHANGE UP (ref 1–3.3)
LYMPHOCYTES # BLD AUTO: 1.22 K/UL — SIGNIFICANT CHANGE UP (ref 1–3.3)
LYMPHOCYTES # BLD AUTO: 1.54 K/UL — SIGNIFICANT CHANGE UP (ref 1–3.3)
LYMPHOCYTES # BLD AUTO: 10 % — LOW (ref 13–44)
LYMPHOCYTES # BLD AUTO: 10.1 % — LOW (ref 13–44)
LYMPHOCYTES # BLD AUTO: 10.4 % — LOW (ref 13–44)
LYMPHOCYTES # BLD AUTO: 10.4 % — LOW (ref 13–44)
LYMPHOCYTES # BLD AUTO: 11 % — LOW (ref 13–44)
LYMPHOCYTES # BLD AUTO: 11 % — LOW (ref 13–44)
LYMPHOCYTES # BLD AUTO: 11.2 % — LOW (ref 13–44)
LYMPHOCYTES # BLD AUTO: 11.3 % — LOW (ref 13–44)
LYMPHOCYTES # BLD AUTO: 13 % — SIGNIFICANT CHANGE UP (ref 13–44)
LYMPHOCYTES # BLD AUTO: 13.1 % — SIGNIFICANT CHANGE UP (ref 13–44)
LYMPHOCYTES # BLD AUTO: 14.8 % — SIGNIFICANT CHANGE UP (ref 13–44)
LYMPHOCYTES # BLD AUTO: 14.8 % — SIGNIFICANT CHANGE UP (ref 13–44)
LYMPHOCYTES # BLD AUTO: 15 % — SIGNIFICANT CHANGE UP (ref 13–44)
LYMPHOCYTES # BLD AUTO: 15 % — SIGNIFICANT CHANGE UP (ref 13–44)
LYMPHOCYTES # BLD AUTO: 15.2 % — SIGNIFICANT CHANGE UP (ref 13–44)
LYMPHOCYTES # BLD AUTO: 15.2 % — SIGNIFICANT CHANGE UP (ref 13–44)
LYMPHOCYTES # BLD AUTO: 16.3 % — SIGNIFICANT CHANGE UP (ref 13–44)
LYMPHOCYTES # BLD AUTO: 2.6 % — LOW (ref 13–44)
LYMPHOCYTES # BLD AUTO: 20 % — SIGNIFICANT CHANGE UP (ref 13–44)
LYMPHOCYTES # BLD AUTO: 20 % — SIGNIFICANT CHANGE UP (ref 13–44)
LYMPHOCYTES # BLD AUTO: 22 % — SIGNIFICANT CHANGE UP (ref 13–44)
LYMPHOCYTES # BLD AUTO: 23.8 % — SIGNIFICANT CHANGE UP (ref 13–44)
LYMPHOCYTES # BLD AUTO: 26 % — SIGNIFICANT CHANGE UP (ref 13–44)
LYMPHOCYTES # BLD AUTO: 26 % — SIGNIFICANT CHANGE UP (ref 13–44)
LYMPHOCYTES # BLD AUTO: 3.3 % — LOW (ref 13–44)
LYMPHOCYTES # BLD AUTO: 3.7 % — LOW (ref 13–44)
LYMPHOCYTES # BLD AUTO: 5 % — LOW (ref 13–44)
LYMPHOCYTES # BLD AUTO: 5.1 % — LOW (ref 13–44)
LYMPHOCYTES # BLD AUTO: 5.1 % — LOW (ref 13–44)
LYMPHOCYTES # BLD AUTO: 5.3 % — LOW (ref 13–44)
LYMPHOCYTES # BLD AUTO: 5.3 % — LOW (ref 13–44)
LYMPHOCYTES # BLD AUTO: 5.8 % — LOW (ref 13–44)
LYMPHOCYTES # BLD AUTO: 6.1 % — LOW (ref 13–44)
LYMPHOCYTES # BLD AUTO: 6.1 % — LOW (ref 13–44)
LYMPHOCYTES # BLD AUTO: 6.2 % — LOW (ref 13–44)
LYMPHOCYTES # BLD AUTO: 6.4 % — LOW (ref 13–44)
LYMPHOCYTES # BLD AUTO: 6.4 % — LOW (ref 13–44)
LYMPHOCYTES # BLD AUTO: 6.6 % — LOW (ref 13–44)
LYMPHOCYTES # BLD AUTO: 7.5 % — LOW (ref 13–44)
LYMPHOCYTES # BLD AUTO: 7.8 % — LOW (ref 13–44)
LYMPHOCYTES # BLD AUTO: 7.8 % — LOW (ref 13–44)
LYMPHOCYTES # BLD AUTO: 8 % — LOW (ref 13–44)
LYMPHOCYTES # BLD AUTO: 8 % — LOW (ref 13–44)
LYMPHOCYTES # BLD AUTO: 8.7 % — LOW (ref 13–44)
LYMPHOCYTES # BLD AUTO: 8.7 % — LOW (ref 13–44)
LYMPHOCYTES # BLD AUTO: 8.8 % — LOW (ref 13–44)
LYMPHOCYTES # BLD AUTO: 8.9 % — LOW (ref 13–44)
LYMPHOCYTES # BLD AUTO: 8.9 % — LOW (ref 13–44)
LYMPHOCYTES # BLD AUTO: 9.7 % — LOW (ref 13–44)
LYMPHOCYTES # BLD AUTO: 9.9 % — LOW (ref 13–44)
LYMPHOCYTES # BLD AUTO: 9.9 % — LOW (ref 13–44)
M PNEUMO DNA SPEC QL NAA+PROBE: SIGNIFICANT CHANGE UP
MACROCYTES BLD QL: SIGNIFICANT CHANGE UP
MACROCYTES BLD QL: SLIGHT — SIGNIFICANT CHANGE UP
MAGNESIUM SERPL-MCNC: 0.9 MG/DL — CRITICAL LOW (ref 1.6–2.6)
MAGNESIUM SERPL-MCNC: 0.9 MG/DL — CRITICAL LOW (ref 1.6–2.6)
MAGNESIUM SERPL-MCNC: 1.2 MG/DL — LOW (ref 1.6–2.6)
MAGNESIUM SERPL-MCNC: 1.2 MG/DL — LOW (ref 1.6–2.6)
MAGNESIUM SERPL-MCNC: 1.3 MG/DL — LOW (ref 1.6–2.6)
MAGNESIUM SERPL-MCNC: 1.3 MG/DL — LOW (ref 1.6–2.6)
MAGNESIUM SERPL-MCNC: 1.4 MG/DL — LOW (ref 1.6–2.6)
MAGNESIUM SERPL-MCNC: 1.5 MG/DL — LOW (ref 1.6–2.6)
MAGNESIUM SERPL-MCNC: 1.5 MG/DL — LOW (ref 1.6–2.6)
MAGNESIUM SERPL-MCNC: 1.6 MG/DL — SIGNIFICANT CHANGE UP (ref 1.6–2.6)
MAGNESIUM SERPL-MCNC: 1.7 MG/DL — SIGNIFICANT CHANGE UP (ref 1.6–2.6)
MAGNESIUM SERPL-MCNC: 1.8 MG/DL — SIGNIFICANT CHANGE UP (ref 1.6–2.6)
MAGNESIUM SERPL-MCNC: 1.9 MG/DL — SIGNIFICANT CHANGE UP (ref 1.6–2.6)
MAGNESIUM SERPL-MCNC: 2 MG/DL — SIGNIFICANT CHANGE UP (ref 1.6–2.6)
MAGNESIUM SERPL-MCNC: 2.1 MG/DL — SIGNIFICANT CHANGE UP (ref 1.6–2.6)
MAGNESIUM SERPL-MCNC: 2.2 MG/DL — SIGNIFICANT CHANGE UP (ref 1.6–2.6)
MAGNESIUM SERPL-MCNC: 2.3 MG/DL — SIGNIFICANT CHANGE UP (ref 1.6–2.6)
MANUAL SMEAR VERIFICATION: SIGNIFICANT CHANGE UP
MANUAL SMEAR VERIFICATION: YES — SIGNIFICANT CHANGE UP
MCHC RBC-ENTMCNC: 27.3 PG — SIGNIFICANT CHANGE UP (ref 27–34)
MCHC RBC-ENTMCNC: 27.3 PG — SIGNIFICANT CHANGE UP (ref 27–34)
MCHC RBC-ENTMCNC: 27.4 PG — SIGNIFICANT CHANGE UP (ref 27–34)
MCHC RBC-ENTMCNC: 27.4 PG — SIGNIFICANT CHANGE UP (ref 27–34)
MCHC RBC-ENTMCNC: 27.5 PG — SIGNIFICANT CHANGE UP (ref 27–34)
MCHC RBC-ENTMCNC: 27.6 PG — SIGNIFICANT CHANGE UP (ref 27–34)
MCHC RBC-ENTMCNC: 27.7 PG — SIGNIFICANT CHANGE UP (ref 27–34)
MCHC RBC-ENTMCNC: 27.8 PG — SIGNIFICANT CHANGE UP (ref 27–34)
MCHC RBC-ENTMCNC: 27.9 PG — SIGNIFICANT CHANGE UP (ref 27–34)
MCHC RBC-ENTMCNC: 28.1 PG — SIGNIFICANT CHANGE UP (ref 27–34)
MCHC RBC-ENTMCNC: 28.3 PG — SIGNIFICANT CHANGE UP (ref 27–34)
MCHC RBC-ENTMCNC: 28.3 PG — SIGNIFICANT CHANGE UP (ref 27–34)
MCHC RBC-ENTMCNC: 28.4 PG — SIGNIFICANT CHANGE UP (ref 27–34)
MCHC RBC-ENTMCNC: 28.6 PG — SIGNIFICANT CHANGE UP (ref 27–34)
MCHC RBC-ENTMCNC: 28.6 PG — SIGNIFICANT CHANGE UP (ref 27–34)
MCHC RBC-ENTMCNC: 28.8 PG — SIGNIFICANT CHANGE UP (ref 27–34)
MCHC RBC-ENTMCNC: 29 PG — SIGNIFICANT CHANGE UP (ref 27–34)
MCHC RBC-ENTMCNC: 29 PG — SIGNIFICANT CHANGE UP (ref 27–34)
MCHC RBC-ENTMCNC: 29.1 PG — SIGNIFICANT CHANGE UP (ref 27–34)
MCHC RBC-ENTMCNC: 29.1 PG — SIGNIFICANT CHANGE UP (ref 27–34)
MCHC RBC-ENTMCNC: 29.2 PG — SIGNIFICANT CHANGE UP (ref 27–34)
MCHC RBC-ENTMCNC: 29.3 PG — SIGNIFICANT CHANGE UP (ref 27–34)
MCHC RBC-ENTMCNC: 29.4 PG — SIGNIFICANT CHANGE UP (ref 27–34)
MCHC RBC-ENTMCNC: 29.5 GM/DL — LOW (ref 32–36)
MCHC RBC-ENTMCNC: 29.5 GM/DL — LOW (ref 32–36)
MCHC RBC-ENTMCNC: 29.5 PG — SIGNIFICANT CHANGE UP (ref 27–34)
MCHC RBC-ENTMCNC: 29.6 PG — SIGNIFICANT CHANGE UP (ref 27–34)
MCHC RBC-ENTMCNC: 29.7 PG — SIGNIFICANT CHANGE UP (ref 27–34)
MCHC RBC-ENTMCNC: 29.8 PG — SIGNIFICANT CHANGE UP (ref 27–34)
MCHC RBC-ENTMCNC: 29.9 PG — SIGNIFICANT CHANGE UP (ref 27–34)
MCHC RBC-ENTMCNC: 29.9 PG — SIGNIFICANT CHANGE UP (ref 27–34)
MCHC RBC-ENTMCNC: 30 GM/DL — LOW (ref 32–36)
MCHC RBC-ENTMCNC: 30 GM/DL — LOW (ref 32–36)
MCHC RBC-ENTMCNC: 30 PG — SIGNIFICANT CHANGE UP (ref 27–34)
MCHC RBC-ENTMCNC: 30.1 GM/DL — LOW (ref 32–36)
MCHC RBC-ENTMCNC: 30.1 GM/DL — LOW (ref 32–36)
MCHC RBC-ENTMCNC: 30.1 PG — SIGNIFICANT CHANGE UP (ref 27–34)
MCHC RBC-ENTMCNC: 30.1 PG — SIGNIFICANT CHANGE UP (ref 27–34)
MCHC RBC-ENTMCNC: 30.2 PG — SIGNIFICANT CHANGE UP (ref 27–34)
MCHC RBC-ENTMCNC: 30.3 GM/DL — LOW (ref 32–36)
MCHC RBC-ENTMCNC: 30.3 GM/DL — LOW (ref 32–36)
MCHC RBC-ENTMCNC: 30.4 PG — SIGNIFICANT CHANGE UP (ref 27–34)
MCHC RBC-ENTMCNC: 30.5 PG — SIGNIFICANT CHANGE UP (ref 27–34)
MCHC RBC-ENTMCNC: 30.7 G/DL — LOW (ref 32–36)
MCHC RBC-ENTMCNC: 30.7 GM/DL — LOW (ref 32–36)
MCHC RBC-ENTMCNC: 30.7 GM/DL — LOW (ref 32–36)
MCHC RBC-ENTMCNC: 30.8 GM/DL — LOW (ref 32–36)
MCHC RBC-ENTMCNC: 31 GM/DL — LOW (ref 32–36)
MCHC RBC-ENTMCNC: 31 GM/DL — LOW (ref 32–36)
MCHC RBC-ENTMCNC: 31.2 G/DL — LOW (ref 32–36)
MCHC RBC-ENTMCNC: 31.2 G/DL — LOW (ref 32–36)
MCHC RBC-ENTMCNC: 31.2 GM/DL — LOW (ref 32–36)
MCHC RBC-ENTMCNC: 31.4 GM/DL — LOW (ref 32–36)
MCHC RBC-ENTMCNC: 31.4 GM/DL — LOW (ref 32–36)
MCHC RBC-ENTMCNC: 31.5 GM/DL — LOW (ref 32–36)
MCHC RBC-ENTMCNC: 31.5 GM/DL — LOW (ref 32–36)
MCHC RBC-ENTMCNC: 31.6 GM/DL — LOW (ref 32–36)
MCHC RBC-ENTMCNC: 31.6 GM/DL — LOW (ref 32–36)
MCHC RBC-ENTMCNC: 31.8 GM/DL — LOW (ref 32–36)
MCHC RBC-ENTMCNC: 31.9 GM/DL — LOW (ref 32–36)
MCHC RBC-ENTMCNC: 31.9 GM/DL — LOW (ref 32–36)
MCHC RBC-ENTMCNC: 32 GM/DL — SIGNIFICANT CHANGE UP (ref 32–36)
MCHC RBC-ENTMCNC: 32.1 G/DL — SIGNIFICANT CHANGE UP (ref 32–36)
MCHC RBC-ENTMCNC: 32.1 G/DL — SIGNIFICANT CHANGE UP (ref 32–36)
MCHC RBC-ENTMCNC: 32.1 GM/DL — SIGNIFICANT CHANGE UP (ref 32–36)
MCHC RBC-ENTMCNC: 32.2 G/DL — SIGNIFICANT CHANGE UP (ref 32–36)
MCHC RBC-ENTMCNC: 32.2 G/DL — SIGNIFICANT CHANGE UP (ref 32–36)
MCHC RBC-ENTMCNC: 32.2 GM/DL — SIGNIFICANT CHANGE UP (ref 32–36)
MCHC RBC-ENTMCNC: 32.3 GM/DL — SIGNIFICANT CHANGE UP (ref 32–36)
MCHC RBC-ENTMCNC: 32.4 GM/DL — SIGNIFICANT CHANGE UP (ref 32–36)
MCHC RBC-ENTMCNC: 32.5 GM/DL — SIGNIFICANT CHANGE UP (ref 32–36)
MCHC RBC-ENTMCNC: 32.6 GM/DL — SIGNIFICANT CHANGE UP (ref 32–36)
MCHC RBC-ENTMCNC: 32.7 GM/DL — SIGNIFICANT CHANGE UP (ref 32–36)
MCHC RBC-ENTMCNC: 32.8 GM/DL — SIGNIFICANT CHANGE UP (ref 32–36)
MCHC RBC-ENTMCNC: 32.8 GM/DL — SIGNIFICANT CHANGE UP (ref 32–36)
MCHC RBC-ENTMCNC: 32.9 GM/DL — SIGNIFICANT CHANGE UP (ref 32–36)
MCHC RBC-ENTMCNC: 33 GM/DL — SIGNIFICANT CHANGE UP (ref 32–36)
MCHC RBC-ENTMCNC: 33 GM/DL — SIGNIFICANT CHANGE UP (ref 32–36)
MCHC RBC-ENTMCNC: 33.1 GM/DL — SIGNIFICANT CHANGE UP (ref 32–36)
MCHC RBC-ENTMCNC: 33.3 GM/DL — SIGNIFICANT CHANGE UP (ref 32–36)
MCHC RBC-ENTMCNC: 33.7 G/DL — SIGNIFICANT CHANGE UP (ref 32–36)
MCHC RBC-ENTMCNC: 33.7 GM/DL — SIGNIFICANT CHANGE UP (ref 32–36)
MCHC RBC-ENTMCNC: 33.9 GM/DL — SIGNIFICANT CHANGE UP (ref 32–36)
MCHC RBC-ENTMCNC: 34 GM/DL — SIGNIFICANT CHANGE UP (ref 32–36)
MCHC RBC-ENTMCNC: 34.2 G/DL — SIGNIFICANT CHANGE UP (ref 32–36)
MCV RBC AUTO: 86.9 FL — SIGNIFICANT CHANGE UP (ref 80–100)
MCV RBC AUTO: 87.1 FL — SIGNIFICANT CHANGE UP (ref 80–100)
MCV RBC AUTO: 87.6 FL — SIGNIFICANT CHANGE UP (ref 80–100)
MCV RBC AUTO: 87.9 FL — SIGNIFICANT CHANGE UP (ref 80–100)
MCV RBC AUTO: 88.3 FL — SIGNIFICANT CHANGE UP (ref 80–100)
MCV RBC AUTO: 88.4 FL — SIGNIFICANT CHANGE UP (ref 80–100)
MCV RBC AUTO: 88.5 FL — SIGNIFICANT CHANGE UP (ref 80–100)
MCV RBC AUTO: 88.6 FL — SIGNIFICANT CHANGE UP (ref 80–100)
MCV RBC AUTO: 88.6 FL — SIGNIFICANT CHANGE UP (ref 80–100)
MCV RBC AUTO: 88.7 FL — SIGNIFICANT CHANGE UP (ref 80–100)
MCV RBC AUTO: 88.8 FL — SIGNIFICANT CHANGE UP (ref 80–100)
MCV RBC AUTO: 88.8 FL — SIGNIFICANT CHANGE UP (ref 80–100)
MCV RBC AUTO: 88.9 FL — SIGNIFICANT CHANGE UP (ref 80–100)
MCV RBC AUTO: 88.9 FL — SIGNIFICANT CHANGE UP (ref 80–100)
MCV RBC AUTO: 89 FL — SIGNIFICANT CHANGE UP (ref 80–100)
MCV RBC AUTO: 89.1 FL — SIGNIFICANT CHANGE UP (ref 80–100)
MCV RBC AUTO: 89.3 FL — SIGNIFICANT CHANGE UP (ref 80–100)
MCV RBC AUTO: 89.3 FL — SIGNIFICANT CHANGE UP (ref 80–100)
MCV RBC AUTO: 89.4 FL — SIGNIFICANT CHANGE UP (ref 80–100)
MCV RBC AUTO: 89.5 FL — SIGNIFICANT CHANGE UP (ref 80–100)
MCV RBC AUTO: 89.6 FL — SIGNIFICANT CHANGE UP (ref 80–100)
MCV RBC AUTO: 89.6 FL — SIGNIFICANT CHANGE UP (ref 80–100)
MCV RBC AUTO: 89.7 FL — SIGNIFICANT CHANGE UP (ref 80–100)
MCV RBC AUTO: 89.8 FL — SIGNIFICANT CHANGE UP (ref 80–100)
MCV RBC AUTO: 89.8 FL — SIGNIFICANT CHANGE UP (ref 80–100)
MCV RBC AUTO: 89.9 FL — SIGNIFICANT CHANGE UP (ref 80–100)
MCV RBC AUTO: 90 FL — SIGNIFICANT CHANGE UP (ref 80–100)
MCV RBC AUTO: 90 FL — SIGNIFICANT CHANGE UP (ref 80–100)
MCV RBC AUTO: 90.1 FL — SIGNIFICANT CHANGE UP (ref 80–100)
MCV RBC AUTO: 90.2 FL — SIGNIFICANT CHANGE UP (ref 80–100)
MCV RBC AUTO: 90.2 FL — SIGNIFICANT CHANGE UP (ref 80–100)
MCV RBC AUTO: 90.3 FL — SIGNIFICANT CHANGE UP (ref 80–100)
MCV RBC AUTO: 90.5 FL — SIGNIFICANT CHANGE UP (ref 80–100)
MCV RBC AUTO: 90.6 FL — SIGNIFICANT CHANGE UP (ref 80–100)
MCV RBC AUTO: 90.8 FL — SIGNIFICANT CHANGE UP (ref 80–100)
MCV RBC AUTO: 90.9 FL — SIGNIFICANT CHANGE UP (ref 80–100)
MCV RBC AUTO: 91 FL — SIGNIFICANT CHANGE UP (ref 80–100)
MCV RBC AUTO: 91.1 FL — SIGNIFICANT CHANGE UP (ref 80–100)
MCV RBC AUTO: 91.2 FL — SIGNIFICANT CHANGE UP (ref 80–100)
MCV RBC AUTO: 91.3 FL — SIGNIFICANT CHANGE UP (ref 80–100)
MCV RBC AUTO: 91.4 FL — SIGNIFICANT CHANGE UP (ref 80–100)
MCV RBC AUTO: 91.6 FL — SIGNIFICANT CHANGE UP (ref 80–100)
MCV RBC AUTO: 91.7 FL — SIGNIFICANT CHANGE UP (ref 80–100)
MCV RBC AUTO: 92 FL — SIGNIFICANT CHANGE UP (ref 80–100)
MCV RBC AUTO: 92.2 FL — SIGNIFICANT CHANGE UP (ref 80–100)
MCV RBC AUTO: 92.5 FL — SIGNIFICANT CHANGE UP (ref 80–100)
MCV RBC AUTO: 92.6 FL — SIGNIFICANT CHANGE UP (ref 80–100)
METAMYELOCYTES # FLD: 0.9 % — HIGH (ref 0–0)
METAMYELOCYTES # FLD: 1 % — HIGH (ref 0–0)
METAMYELOCYTES # FLD: 1.8 % — HIGH (ref 0–1)
METAMYELOCYTES # FLD: 1.8 % — HIGH (ref 0–1)
METAMYELOCYTES # FLD: 2 % — HIGH (ref 0–0)
METAMYELOCYTES # FLD: 2 % — HIGH (ref 0–0)
METHOD TYPE: SIGNIFICANT CHANGE UP
METHOD TYPE: SIGNIFICANT CHANGE UP
MICROCYTES BLD QL: SLIGHT — SIGNIFICANT CHANGE UP
MONOCYTES # BLD AUTO: 0.11 K/UL — SIGNIFICANT CHANGE UP (ref 0–0.9)
MONOCYTES # BLD AUTO: 0.11 K/UL — SIGNIFICANT CHANGE UP (ref 0–0.9)
MONOCYTES # BLD AUTO: 0.13 K/UL — SIGNIFICANT CHANGE UP (ref 0–0.9)
MONOCYTES # BLD AUTO: 0.13 K/UL — SIGNIFICANT CHANGE UP (ref 0–0.9)
MONOCYTES # BLD AUTO: 0.19 K/UL — SIGNIFICANT CHANGE UP (ref 0–0.9)
MONOCYTES # BLD AUTO: 0.19 K/UL — SIGNIFICANT CHANGE UP (ref 0–0.9)
MONOCYTES # BLD AUTO: 0.21 K/UL — SIGNIFICANT CHANGE UP (ref 0–0.9)
MONOCYTES # BLD AUTO: 0.32 K/UL — SIGNIFICANT CHANGE UP (ref 0–0.9)
MONOCYTES # BLD AUTO: 0.32 K/UL — SIGNIFICANT CHANGE UP (ref 0–0.9)
MONOCYTES # BLD AUTO: 0.39 K/UL — SIGNIFICANT CHANGE UP (ref 0–0.9)
MONOCYTES # BLD AUTO: 0.41 K/UL — SIGNIFICANT CHANGE UP (ref 0–0.9)
MONOCYTES # BLD AUTO: 0.42 K/UL — SIGNIFICANT CHANGE UP (ref 0–0.9)
MONOCYTES # BLD AUTO: 0.42 K/UL — SIGNIFICANT CHANGE UP (ref 0–0.9)
MONOCYTES # BLD AUTO: 0.47 K/UL — SIGNIFICANT CHANGE UP (ref 0–0.9)
MONOCYTES # BLD AUTO: 0.49 K/UL — SIGNIFICANT CHANGE UP (ref 0–0.9)
MONOCYTES # BLD AUTO: 0.5 K/UL — SIGNIFICANT CHANGE UP (ref 0–0.9)
MONOCYTES # BLD AUTO: 0.54 K/UL — SIGNIFICANT CHANGE UP (ref 0–0.9)
MONOCYTES # BLD AUTO: 0.54 K/UL — SIGNIFICANT CHANGE UP (ref 0–0.9)
MONOCYTES # BLD AUTO: 0.55 K/UL — SIGNIFICANT CHANGE UP (ref 0–0.9)
MONOCYTES # BLD AUTO: 0.64 K/UL — SIGNIFICANT CHANGE UP (ref 0–0.9)
MONOCYTES # BLD AUTO: 0.64 K/UL — SIGNIFICANT CHANGE UP (ref 0–0.9)
MONOCYTES # BLD AUTO: 0.74 K/UL — SIGNIFICANT CHANGE UP (ref 0–0.9)
MONOCYTES # BLD AUTO: 0.76 K/UL — SIGNIFICANT CHANGE UP (ref 0–0.9)
MONOCYTES # BLD AUTO: 0.76 K/UL — SIGNIFICANT CHANGE UP (ref 0–0.9)
MONOCYTES # BLD AUTO: 0.77 K/UL — SIGNIFICANT CHANGE UP (ref 0–0.9)
MONOCYTES # BLD AUTO: 0.79 K/UL — SIGNIFICANT CHANGE UP (ref 0–0.9)
MONOCYTES # BLD AUTO: 0.79 K/UL — SIGNIFICANT CHANGE UP (ref 0–0.9)
MONOCYTES # BLD AUTO: 0.81 K/UL — SIGNIFICANT CHANGE UP (ref 0–0.9)
MONOCYTES # BLD AUTO: 0.83 K/UL — SIGNIFICANT CHANGE UP (ref 0–0.9)
MONOCYTES # BLD AUTO: 0.84 K/UL — SIGNIFICANT CHANGE UP (ref 0–0.9)
MONOCYTES # BLD AUTO: 0.85 K/UL — SIGNIFICANT CHANGE UP (ref 0–0.9)
MONOCYTES # BLD AUTO: 0.86 K/UL — SIGNIFICANT CHANGE UP (ref 0–0.9)
MONOCYTES # BLD AUTO: 0.94 K/UL — HIGH (ref 0–0.9)
MONOCYTES # BLD AUTO: 0.94 K/UL — HIGH (ref 0–0.9)
MONOCYTES # BLD AUTO: 1.01 K/UL — HIGH (ref 0–0.9)
MONOCYTES # BLD AUTO: 1.01 K/UL — HIGH (ref 0–0.9)
MONOCYTES # BLD AUTO: 1.08 K/UL — HIGH (ref 0–0.9)
MONOCYTES # BLD AUTO: 1.08 K/UL — HIGH (ref 0–0.9)
MONOCYTES # BLD AUTO: 1.11 K/UL — HIGH (ref 0–0.9)
MONOCYTES # BLD AUTO: 1.12 K/UL — HIGH (ref 0–0.9)
MONOCYTES # BLD AUTO: 1.12 K/UL — HIGH (ref 0–0.9)
MONOCYTES # BLD AUTO: 1.16 K/UL — HIGH (ref 0–0.9)
MONOCYTES # BLD AUTO: 1.2 K/UL — HIGH (ref 0–0.9)
MONOCYTES # BLD AUTO: 1.2 K/UL — HIGH (ref 0–0.9)
MONOCYTES # BLD AUTO: 1.26 K/UL — HIGH (ref 0–0.9)
MONOCYTES # BLD AUTO: 1.34 K/UL — HIGH (ref 0–0.9)
MONOCYTES # BLD AUTO: 1.52 K/UL — HIGH (ref 0–0.9)
MONOCYTES # BLD AUTO: 1.52 K/UL — HIGH (ref 0–0.9)
MONOCYTES NFR BLD AUTO: 10 % — SIGNIFICANT CHANGE UP (ref 2–14)
MONOCYTES NFR BLD AUTO: 10.4 % — SIGNIFICANT CHANGE UP (ref 2–14)
MONOCYTES NFR BLD AUTO: 10.4 % — SIGNIFICANT CHANGE UP (ref 2–14)
MONOCYTES NFR BLD AUTO: 11.2 % — SIGNIFICANT CHANGE UP (ref 2–14)
MONOCYTES NFR BLD AUTO: 11.5 % — SIGNIFICANT CHANGE UP (ref 2–14)
MONOCYTES NFR BLD AUTO: 11.5 % — SIGNIFICANT CHANGE UP (ref 2–14)
MONOCYTES NFR BLD AUTO: 12.2 % — SIGNIFICANT CHANGE UP (ref 2–14)
MONOCYTES NFR BLD AUTO: 12.3 % — SIGNIFICANT CHANGE UP (ref 2–14)
MONOCYTES NFR BLD AUTO: 12.5 % — SIGNIFICANT CHANGE UP (ref 2–14)
MONOCYTES NFR BLD AUTO: 12.5 % — SIGNIFICANT CHANGE UP (ref 2–14)
MONOCYTES NFR BLD AUTO: 12.7 % — SIGNIFICANT CHANGE UP (ref 2–14)
MONOCYTES NFR BLD AUTO: 13 % — SIGNIFICANT CHANGE UP (ref 2–14)
MONOCYTES NFR BLD AUTO: 14 % — SIGNIFICANT CHANGE UP (ref 2–14)
MONOCYTES NFR BLD AUTO: 14.5 % — HIGH (ref 2–14)
MONOCYTES NFR BLD AUTO: 14.5 % — HIGH (ref 2–14)
MONOCYTES NFR BLD AUTO: 16.5 % — HIGH (ref 2–14)
MONOCYTES NFR BLD AUTO: 16.5 % — HIGH (ref 2–14)
MONOCYTES NFR BLD AUTO: 2.8 % — SIGNIFICANT CHANGE UP (ref 2–14)
MONOCYTES NFR BLD AUTO: 2.8 % — SIGNIFICANT CHANGE UP (ref 2–14)
MONOCYTES NFR BLD AUTO: 20 % — HIGH (ref 2–14)
MONOCYTES NFR BLD AUTO: 20 % — HIGH (ref 2–14)
MONOCYTES NFR BLD AUTO: 21 % — HIGH (ref 2–14)
MONOCYTES NFR BLD AUTO: 21 % — HIGH (ref 2–14)
MONOCYTES NFR BLD AUTO: 3 % — SIGNIFICANT CHANGE UP (ref 2–14)
MONOCYTES NFR BLD AUTO: 4.1 % — SIGNIFICANT CHANGE UP (ref 2–14)
MONOCYTES NFR BLD AUTO: 5 % — SIGNIFICANT CHANGE UP (ref 2–14)
MONOCYTES NFR BLD AUTO: 5 % — SIGNIFICANT CHANGE UP (ref 2–14)
MONOCYTES NFR BLD AUTO: 5.1 % — SIGNIFICANT CHANGE UP (ref 2–14)
MONOCYTES NFR BLD AUTO: 5.3 % — SIGNIFICANT CHANGE UP (ref 2–14)
MONOCYTES NFR BLD AUTO: 5.7 % — SIGNIFICANT CHANGE UP (ref 2–14)
MONOCYTES NFR BLD AUTO: 6 % — SIGNIFICANT CHANGE UP (ref 2–14)
MONOCYTES NFR BLD AUTO: 6.2 % — SIGNIFICANT CHANGE UP (ref 2–14)
MONOCYTES NFR BLD AUTO: 6.5 % — SIGNIFICANT CHANGE UP (ref 2–14)
MONOCYTES NFR BLD AUTO: 6.6 % — SIGNIFICANT CHANGE UP (ref 2–14)
MONOCYTES NFR BLD AUTO: 7 % — SIGNIFICANT CHANGE UP (ref 2–14)
MONOCYTES NFR BLD AUTO: 7.1 % — SIGNIFICANT CHANGE UP (ref 2–14)
MONOCYTES NFR BLD AUTO: 7.6 % — SIGNIFICANT CHANGE UP (ref 2–14)
MONOCYTES NFR BLD AUTO: 7.9 % — SIGNIFICANT CHANGE UP (ref 2–14)
MONOCYTES NFR BLD AUTO: 8.3 % — SIGNIFICANT CHANGE UP (ref 2–14)
MONOCYTES NFR BLD AUTO: 8.8 % — SIGNIFICANT CHANGE UP (ref 2–14)
MONOCYTES NFR BLD AUTO: 9.2 % — SIGNIFICANT CHANGE UP (ref 2–14)
MONOCYTES NFR BLD AUTO: 9.2 % — SIGNIFICANT CHANGE UP (ref 2–14)
MONOCYTES NFR BLD AUTO: 9.5 % — SIGNIFICANT CHANGE UP (ref 2–14)
MRSA PCR RESULT.: DETECTED
MRSA PCR RESULT.: DETECTED
MRSA PCR RESULT.: SIGNIFICANT CHANGE UP
MYELOCYTES NFR BLD: 0.9 % — HIGH (ref 0–0)
MYELOCYTES NFR BLD: 1.7 % — HIGH (ref 0–0)
MYELOCYTES NFR BLD: 1.7 % — HIGH (ref 0–0)
MYELOCYTES NFR BLD: 1.8 % — HIGH (ref 0–0)
MYELOCYTES NFR BLD: 2.7 % — HIGH (ref 0–0)
MYELOCYTES NFR BLD: 2.7 % — HIGH (ref 0–0)
MYELOCYTES NFR BLD: 3 % — HIGH (ref 0–0)
MYELOCYTES NFR BLD: 3.4 % — HIGH (ref 0–0)
MYELOCYTES NFR BLD: 3.4 % — HIGH (ref 0–0)
MYELOCYTES NFR BLD: 5 % — HIGH (ref 0–0)
MYELOCYTES NFR BLD: 9 % — HIGH (ref 0–0)
MYELOCYTES NFR BLD: 9 % — HIGH (ref 0–0)
NEUTROPHILS # BLD AUTO: 1.66 K/UL — LOW (ref 1.8–7.4)
NEUTROPHILS # BLD AUTO: 1.66 K/UL — LOW (ref 1.8–7.4)
NEUTROPHILS # BLD AUTO: 1.83 K/UL — SIGNIFICANT CHANGE UP (ref 1.8–7.4)
NEUTROPHILS # BLD AUTO: 1.83 K/UL — SIGNIFICANT CHANGE UP (ref 1.8–7.4)
NEUTROPHILS # BLD AUTO: 1.93 K/UL — SIGNIFICANT CHANGE UP (ref 1.8–7.4)
NEUTROPHILS # BLD AUTO: 10.74 K/UL — HIGH (ref 1.8–7.4)
NEUTROPHILS # BLD AUTO: 11.4 K/UL — HIGH (ref 1.8–7.4)
NEUTROPHILS # BLD AUTO: 12.69 K/UL — HIGH (ref 1.8–7.4)
NEUTROPHILS # BLD AUTO: 12.75 K/UL — HIGH (ref 1.8–7.4)
NEUTROPHILS # BLD AUTO: 12.81 K/UL — HIGH (ref 1.8–7.4)
NEUTROPHILS # BLD AUTO: 2.43 K/UL — SIGNIFICANT CHANGE UP (ref 1.8–7.4)
NEUTROPHILS # BLD AUTO: 2.63 K/UL — SIGNIFICANT CHANGE UP (ref 1.8–7.4)
NEUTROPHILS # BLD AUTO: 2.8 K/UL — SIGNIFICANT CHANGE UP (ref 1.8–7.4)
NEUTROPHILS # BLD AUTO: 2.8 K/UL — SIGNIFICANT CHANGE UP (ref 1.8–7.4)
NEUTROPHILS # BLD AUTO: 2.83 K/UL — SIGNIFICANT CHANGE UP (ref 1.8–7.4)
NEUTROPHILS # BLD AUTO: 2.83 K/UL — SIGNIFICANT CHANGE UP (ref 1.8–7.4)
NEUTROPHILS # BLD AUTO: 3.21 K/UL — SIGNIFICANT CHANGE UP (ref 1.8–7.4)
NEUTROPHILS # BLD AUTO: 3.21 K/UL — SIGNIFICANT CHANGE UP (ref 1.8–7.4)
NEUTROPHILS # BLD AUTO: 3.79 K/UL — SIGNIFICANT CHANGE UP (ref 1.8–7.4)
NEUTROPHILS # BLD AUTO: 3.87 K/UL — SIGNIFICANT CHANGE UP (ref 1.8–7.4)
NEUTROPHILS # BLD AUTO: 3.87 K/UL — SIGNIFICANT CHANGE UP (ref 1.8–7.4)
NEUTROPHILS # BLD AUTO: 4.3 K/UL — SIGNIFICANT CHANGE UP (ref 1.8–7.4)
NEUTROPHILS # BLD AUTO: 4.55 K/UL — SIGNIFICANT CHANGE UP (ref 1.8–7.4)
NEUTROPHILS # BLD AUTO: 4.55 K/UL — SIGNIFICANT CHANGE UP (ref 1.8–7.4)
NEUTROPHILS # BLD AUTO: 4.85 K/UL — SIGNIFICANT CHANGE UP (ref 1.8–7.4)
NEUTROPHILS # BLD AUTO: 4.85 K/UL — SIGNIFICANT CHANGE UP (ref 1.8–7.4)
NEUTROPHILS # BLD AUTO: 5.52 K/UL — SIGNIFICANT CHANGE UP (ref 1.8–7.4)
NEUTROPHILS # BLD AUTO: 5.52 K/UL — SIGNIFICANT CHANGE UP (ref 1.8–7.4)
NEUTROPHILS # BLD AUTO: 5.71 K/UL — SIGNIFICANT CHANGE UP (ref 1.8–7.4)
NEUTROPHILS # BLD AUTO: 5.71 K/UL — SIGNIFICANT CHANGE UP (ref 1.8–7.4)
NEUTROPHILS # BLD AUTO: 5.84 K/UL — SIGNIFICANT CHANGE UP (ref 1.8–7.4)
NEUTROPHILS # BLD AUTO: 6.24 K/UL — SIGNIFICANT CHANGE UP (ref 1.8–7.4)
NEUTROPHILS # BLD AUTO: 6.24 K/UL — SIGNIFICANT CHANGE UP (ref 1.8–7.4)
NEUTROPHILS # BLD AUTO: 6.49 K/UL — SIGNIFICANT CHANGE UP (ref 1.8–7.4)
NEUTROPHILS # BLD AUTO: 6.49 K/UL — SIGNIFICANT CHANGE UP (ref 1.8–7.4)
NEUTROPHILS # BLD AUTO: 6.71 K/UL — SIGNIFICANT CHANGE UP (ref 1.8–7.4)
NEUTROPHILS # BLD AUTO: 7.21 K/UL — SIGNIFICANT CHANGE UP (ref 1.8–7.4)
NEUTROPHILS # BLD AUTO: 7.21 K/UL — SIGNIFICANT CHANGE UP (ref 1.8–7.4)
NEUTROPHILS # BLD AUTO: 7.36 K/UL — SIGNIFICANT CHANGE UP (ref 1.8–7.4)
NEUTROPHILS # BLD AUTO: 7.46 K/UL — HIGH (ref 1.8–7.4)
NEUTROPHILS # BLD AUTO: 7.46 K/UL — HIGH (ref 1.8–7.4)
NEUTROPHILS # BLD AUTO: 7.72 K/UL — HIGH (ref 1.8–7.4)
NEUTROPHILS # BLD AUTO: 7.96 K/UL — HIGH (ref 1.8–7.4)
NEUTROPHILS # BLD AUTO: 8.14 K/UL — HIGH (ref 1.8–7.4)
NEUTROPHILS # BLD AUTO: 8.62 K/UL — HIGH (ref 1.8–7.4)
NEUTROPHILS # BLD AUTO: 8.65 K/UL — HIGH (ref 1.8–7.4)
NEUTROPHILS # BLD AUTO: 8.65 K/UL — HIGH (ref 1.8–7.4)
NEUTROPHILS # BLD AUTO: 8.94 K/UL — HIGH (ref 1.8–7.4)
NEUTROPHILS # BLD AUTO: 9.38 K/UL — HIGH (ref 1.8–7.4)
NEUTROPHILS # BLD AUTO: 9.38 K/UL — HIGH (ref 1.8–7.4)
NEUTROPHILS # BLD AUTO: 9.5 K/UL — HIGH (ref 1.8–7.4)
NEUTROPHILS # BLD AUTO: 9.79 K/UL — HIGH (ref 1.8–7.4)
NEUTROPHILS # BLD AUTO: 9.85 K/UL — HIGH (ref 1.8–7.4)
NEUTROPHILS NFR BLD AUTO: 43 % — SIGNIFICANT CHANGE UP (ref 43–77)
NEUTROPHILS NFR BLD AUTO: 43 % — SIGNIFICANT CHANGE UP (ref 43–77)
NEUTROPHILS NFR BLD AUTO: 58 % — SIGNIFICANT CHANGE UP (ref 43–77)
NEUTROPHILS NFR BLD AUTO: 59.7 % — SIGNIFICANT CHANGE UP (ref 43–77)
NEUTROPHILS NFR BLD AUTO: 59.7 % — SIGNIFICANT CHANGE UP (ref 43–77)
NEUTROPHILS NFR BLD AUTO: 60.5 % — SIGNIFICANT CHANGE UP (ref 43–77)
NEUTROPHILS NFR BLD AUTO: 62.9 % — SIGNIFICANT CHANGE UP (ref 43–77)
NEUTROPHILS NFR BLD AUTO: 63 % — SIGNIFICANT CHANGE UP (ref 43–77)
NEUTROPHILS NFR BLD AUTO: 63 % — SIGNIFICANT CHANGE UP (ref 43–77)
NEUTROPHILS NFR BLD AUTO: 67.1 % — SIGNIFICANT CHANGE UP (ref 43–77)
NEUTROPHILS NFR BLD AUTO: 69.9 % — SIGNIFICANT CHANGE UP (ref 43–77)
NEUTROPHILS NFR BLD AUTO: 69.9 % — SIGNIFICANT CHANGE UP (ref 43–77)
NEUTROPHILS NFR BLD AUTO: 70.8 % — SIGNIFICANT CHANGE UP (ref 43–77)
NEUTROPHILS NFR BLD AUTO: 70.8 % — SIGNIFICANT CHANGE UP (ref 43–77)
NEUTROPHILS NFR BLD AUTO: 72.5 % — SIGNIFICANT CHANGE UP (ref 43–77)
NEUTROPHILS NFR BLD AUTO: 72.5 % — SIGNIFICANT CHANGE UP (ref 43–77)
NEUTROPHILS NFR BLD AUTO: 73 % — SIGNIFICANT CHANGE UP (ref 43–77)
NEUTROPHILS NFR BLD AUTO: 73 % — SIGNIFICANT CHANGE UP (ref 43–77)
NEUTROPHILS NFR BLD AUTO: 74.3 % — SIGNIFICANT CHANGE UP (ref 43–77)
NEUTROPHILS NFR BLD AUTO: 74.8 % — SIGNIFICANT CHANGE UP (ref 43–77)
NEUTROPHILS NFR BLD AUTO: 74.8 % — SIGNIFICANT CHANGE UP (ref 43–77)
NEUTROPHILS NFR BLD AUTO: 75 % — SIGNIFICANT CHANGE UP (ref 43–77)
NEUTROPHILS NFR BLD AUTO: 76 % — SIGNIFICANT CHANGE UP (ref 43–77)
NEUTROPHILS NFR BLD AUTO: 76.5 % — SIGNIFICANT CHANGE UP (ref 43–77)
NEUTROPHILS NFR BLD AUTO: 76.5 % — SIGNIFICANT CHANGE UP (ref 43–77)
NEUTROPHILS NFR BLD AUTO: 77 % — SIGNIFICANT CHANGE UP (ref 43–77)
NEUTROPHILS NFR BLD AUTO: 78.1 % — HIGH (ref 43–77)
NEUTROPHILS NFR BLD AUTO: 78.1 % — HIGH (ref 43–77)
NEUTROPHILS NFR BLD AUTO: 78.5 % — HIGH (ref 43–77)
NEUTROPHILS NFR BLD AUTO: 79 % — HIGH (ref 43–77)
NEUTROPHILS NFR BLD AUTO: 80 % — HIGH (ref 43–77)
NEUTROPHILS NFR BLD AUTO: 80 % — HIGH (ref 43–77)
NEUTROPHILS NFR BLD AUTO: 80.6 % — HIGH (ref 43–77)
NEUTROPHILS NFR BLD AUTO: 80.6 % — HIGH (ref 43–77)
NEUTROPHILS NFR BLD AUTO: 81.4 % — HIGH (ref 43–77)
NEUTROPHILS NFR BLD AUTO: 81.4 % — HIGH (ref 43–77)
NEUTROPHILS NFR BLD AUTO: 81.6 % — HIGH (ref 43–77)
NEUTROPHILS NFR BLD AUTO: 82.2 % — HIGH (ref 43–77)
NEUTROPHILS NFR BLD AUTO: 82.4 % — HIGH (ref 43–77)
NEUTROPHILS NFR BLD AUTO: 83.2 % — HIGH (ref 43–77)
NEUTROPHILS NFR BLD AUTO: 84.1 % — HIGH (ref 43–77)
NEUTROPHILS NFR BLD AUTO: 84.1 % — HIGH (ref 43–77)
NEUTROPHILS NFR BLD AUTO: 84.6 % — HIGH (ref 43–77)
NEUTROPHILS NFR BLD AUTO: 84.6 % — HIGH (ref 43–77)
NEUTROPHILS NFR BLD AUTO: 85.3 % — HIGH (ref 43–77)
NEUTROPHILS NFR BLD AUTO: 85.3 % — HIGH (ref 43–77)
NEUTROPHILS NFR BLD AUTO: 85.8 % — HIGH (ref 43–77)
NEUTROPHILS NFR BLD AUTO: 86 % — HIGH (ref 43–77)
NEUTROPHILS NFR BLD AUTO: 86 % — HIGH (ref 43–77)
NEUTROPHILS NFR BLD AUTO: 86.7 % — HIGH (ref 43–77)
NEUTROPHILS NFR BLD AUTO: 87.3 % — HIGH (ref 43–77)
NEUTROPHILS NFR BLD AUTO: 88.6 % — HIGH (ref 43–77)
NEUTROPHILS NFR BLD AUTO: 88.6 % — HIGH (ref 43–77)
NEUTS BAND # BLD: 0.9 % — SIGNIFICANT CHANGE UP (ref 0–6)
NEUTS BAND # BLD: 0.9 % — SIGNIFICANT CHANGE UP (ref 0–8)
NEUTS BAND # BLD: 0.9 % — SIGNIFICANT CHANGE UP (ref 0–8)
NEUTS BAND # BLD: 1 % — SIGNIFICANT CHANGE UP (ref 0–8)
NEUTS BAND # BLD: 1 % — SIGNIFICANT CHANGE UP (ref 0–8)
NEUTS BAND # BLD: 1.7 % — SIGNIFICANT CHANGE UP (ref 0–8)
NEUTS BAND # BLD: 1.8 % — SIGNIFICANT CHANGE UP (ref 0–6)
NEUTS BAND # BLD: 2 % — SIGNIFICANT CHANGE UP (ref 0–8)
NEUTS BAND # BLD: 3 % — SIGNIFICANT CHANGE UP (ref 0–8)
NEUTS BAND # BLD: 3.5 % — SIGNIFICANT CHANGE UP (ref 0–8)
NEUTS BAND # BLD: 3.5 % — SIGNIFICANT CHANGE UP (ref 0–8)
NEUTS BAND # BLD: 4 % — SIGNIFICANT CHANGE UP (ref 0–8)
NEUTS BAND # BLD: 5 % — SIGNIFICANT CHANGE UP (ref 0–8)
NEUTS BAND # BLD: 5 % — SIGNIFICANT CHANGE UP (ref 0–8)
NITRITE UR-MCNC: NEGATIVE — SIGNIFICANT CHANGE UP
NON HDL CHOLESTEROL: 125 MG/DL — SIGNIFICANT CHANGE UP
NON-GYNECOLOGICAL CYTOLOGY STUDY: SIGNIFICANT CHANGE UP
NRBC # BLD: 0 /100 WBCS — SIGNIFICANT CHANGE UP (ref 0–0)
NRBC # BLD: 0 /100 — SIGNIFICANT CHANGE UP (ref 0–0)
NRBC # BLD: 1 /100 — HIGH (ref 0–0)
NRBC # BLD: 5 /100 — HIGH (ref 0–0)
NRBC # BLD: 5 /100 — HIGH (ref 0–0)
NRBC # BLD: SIGNIFICANT CHANGE UP /100 WBCS (ref 0–0)
NRBC # FLD: 0 K/UL — SIGNIFICANT CHANGE UP (ref 0–0)
NRBC # FLD: 0.02 K/UL — HIGH (ref 0–0)
NRBC # FLD: 0.02 K/UL — HIGH (ref 0–0)
ORGANISM # SPEC MICROSCOPIC CNT: ABNORMAL
ORGANISM # SPEC MICROSCOPIC CNT: ABNORMAL
ORGANISM # SPEC MICROSCOPIC CNT: SIGNIFICANT CHANGE UP
ORGANISM # SPEC MICROSCOPIC CNT: SIGNIFICANT CHANGE UP
OSMOLALITY UR: 389 MOS/KG — SIGNIFICANT CHANGE UP (ref 300–900)
OSMOLALITY UR: 389 MOS/KG — SIGNIFICANT CHANGE UP (ref 300–900)
OVALOCYTES BLD QL SMEAR: SLIGHT — SIGNIFICANT CHANGE UP
PCO2 BLDA: 34 MMHG — SIGNIFICANT CHANGE UP (ref 32–45)
PCO2 BLDA: 34 MMHG — SIGNIFICANT CHANGE UP (ref 32–45)
PCO2 BLDV: 31 MMHG — LOW (ref 39–42)
PCO2 BLDV: 32 MMHG — LOW (ref 39–42)
PCO2 BLDV: 32 MMHG — LOW (ref 39–42)
PCO2 BLDV: 32 MMHG — LOW (ref 39–52)
PCO2 BLDV: 33 MMHG — LOW (ref 39–42)
PCO2 BLDV: 36 MMHG — LOW (ref 39–42)
PCO2 BLDV: 36 MMHG — LOW (ref 39–42)
PCO2 BLDV: 37 MMHG — LOW (ref 39–42)
PCO2 BLDV: 37 MMHG — LOW (ref 39–42)
PCO2 BLDV: 38 MMHG — LOW (ref 39–52)
PCO2 BLDV: 43 MMHG — HIGH (ref 39–42)
PCO2 BLDV: 43 MMHG — HIGH (ref 39–42)
PCO2 BLDV: 49 MMHG — SIGNIFICANT CHANGE UP (ref 42–55)
PCO2 BLDV: 54 MMHG — HIGH (ref 39–52)
PCO2 BLDV: <19 MMHG — LOW (ref 39–42)
PCO2 BLDV: <19 MMHG — LOW (ref 39–42)
PH BLDA: 7.41 — SIGNIFICANT CHANGE UP (ref 7.35–7.45)
PH BLDA: 7.41 — SIGNIFICANT CHANGE UP (ref 7.35–7.45)
PH BLDV: 7.33 — SIGNIFICANT CHANGE UP (ref 7.32–7.43)
PH BLDV: 7.35 — SIGNIFICANT CHANGE UP (ref 7.32–7.43)
PH BLDV: 7.35 — SIGNIFICANT CHANGE UP (ref 7.32–7.43)
PH BLDV: 7.36 — SIGNIFICANT CHANGE UP (ref 7.32–7.43)
PH BLDV: 7.38 — SIGNIFICANT CHANGE UP (ref 7.32–7.43)
PH BLDV: 7.4 — SIGNIFICANT CHANGE UP (ref 7.32–7.43)
PH BLDV: 7.44 — HIGH (ref 7.32–7.43)
PH BLDV: 7.48 — HIGH (ref 7.32–7.43)
PH UR: 6.5 — SIGNIFICANT CHANGE UP (ref 5–8)
PH UR: 6.5 — SIGNIFICANT CHANGE UP (ref 5–8)
PH UR: 7 — SIGNIFICANT CHANGE UP (ref 5–8)
PH UR: 7.5 — SIGNIFICANT CHANGE UP (ref 5–8)
PH UR: 8 — SIGNIFICANT CHANGE UP (ref 5–8)
PH UR: 8 — SIGNIFICANT CHANGE UP (ref 5–8)
PHOSPHATE SERPL-MCNC: 2.1 MG/DL — LOW (ref 2.5–4.5)
PHOSPHATE SERPL-MCNC: 2.1 MG/DL — LOW (ref 2.5–4.5)
PHOSPHATE SERPL-MCNC: 2.2 MG/DL — LOW (ref 2.5–4.5)
PHOSPHATE SERPL-MCNC: 2.2 MG/DL — LOW (ref 2.5–4.5)
PHOSPHATE SERPL-MCNC: 2.3 MG/DL — LOW (ref 2.5–4.5)
PHOSPHATE SERPL-MCNC: 2.4 MG/DL — LOW (ref 2.5–4.5)
PHOSPHATE SERPL-MCNC: 2.5 MG/DL — SIGNIFICANT CHANGE UP (ref 2.5–4.5)
PHOSPHATE SERPL-MCNC: 2.6 MG/DL — SIGNIFICANT CHANGE UP (ref 2.5–4.5)
PHOSPHATE SERPL-MCNC: 2.6 MG/DL — SIGNIFICANT CHANGE UP (ref 2.5–4.5)
PHOSPHATE SERPL-MCNC: 2.7 MG/DL — SIGNIFICANT CHANGE UP (ref 2.5–4.5)
PHOSPHATE SERPL-MCNC: 2.7 MG/DL — SIGNIFICANT CHANGE UP (ref 2.5–4.5)
PHOSPHATE SERPL-MCNC: 2.8 MG/DL — SIGNIFICANT CHANGE UP (ref 2.5–4.5)
PHOSPHATE SERPL-MCNC: 2.9 MG/DL — SIGNIFICANT CHANGE UP (ref 2.5–4.5)
PHOSPHATE SERPL-MCNC: 2.9 MG/DL — SIGNIFICANT CHANGE UP (ref 2.5–4.5)
PHOSPHATE SERPL-MCNC: 3 MG/DL — SIGNIFICANT CHANGE UP (ref 2.5–4.5)
PHOSPHATE SERPL-MCNC: 3.2 MG/DL — SIGNIFICANT CHANGE UP (ref 2.5–4.5)
PHOSPHATE SERPL-MCNC: 3.3 MG/DL — SIGNIFICANT CHANGE UP (ref 2.5–4.5)
PHOSPHATE SERPL-MCNC: 3.5 MG/DL — SIGNIFICANT CHANGE UP (ref 2.5–4.5)
PHOSPHATE SERPL-MCNC: 3.6 MG/DL — SIGNIFICANT CHANGE UP (ref 2.5–4.5)
PHOSPHATE SERPL-MCNC: 3.8 MG/DL — SIGNIFICANT CHANGE UP (ref 2.5–4.5)
PHOSPHATE SERPL-MCNC: 3.9 MG/DL — SIGNIFICANT CHANGE UP (ref 2.5–4.5)
PHOSPHATE SERPL-MCNC: 4.7 MG/DL — HIGH (ref 2.5–4.5)
PHOSPHATE SERPL-MCNC: 4.7 MG/DL — HIGH (ref 2.5–4.5)
PLAT MORPH BLD: NORMAL — SIGNIFICANT CHANGE UP
PLATELET # BLD AUTO: 102 K/UL — LOW (ref 150–400)
PLATELET # BLD AUTO: 109 K/UL — LOW (ref 150–400)
PLATELET # BLD AUTO: 113 K/UL — LOW (ref 150–400)
PLATELET # BLD AUTO: 122 K/UL — LOW (ref 150–400)
PLATELET # BLD AUTO: 128 K/UL — LOW (ref 150–400)
PLATELET # BLD AUTO: 129 K/UL — LOW (ref 150–400)
PLATELET # BLD AUTO: 129 K/UL — LOW (ref 150–400)
PLATELET # BLD AUTO: 130 K/UL — LOW (ref 150–400)
PLATELET # BLD AUTO: 131 K/UL — LOW (ref 150–400)
PLATELET # BLD AUTO: 132 K/UL — LOW (ref 150–400)
PLATELET # BLD AUTO: 132 K/UL — LOW (ref 150–400)
PLATELET # BLD AUTO: 133 K/UL — LOW (ref 150–400)
PLATELET # BLD AUTO: 134 K/UL — LOW (ref 150–400)
PLATELET # BLD AUTO: 136 K/UL — LOW (ref 150–400)
PLATELET # BLD AUTO: 137 K/UL — LOW (ref 150–400)
PLATELET # BLD AUTO: 139 K/UL — LOW (ref 150–400)
PLATELET # BLD AUTO: 139 K/UL — LOW (ref 150–400)
PLATELET # BLD AUTO: 143 K/UL — LOW (ref 150–400)
PLATELET # BLD AUTO: 144 K/UL — LOW (ref 150–400)
PLATELET # BLD AUTO: 147 K/UL — LOW (ref 150–400)
PLATELET # BLD AUTO: 151 K/UL — SIGNIFICANT CHANGE UP (ref 150–400)
PLATELET # BLD AUTO: 151 K/UL — SIGNIFICANT CHANGE UP (ref 150–400)
PLATELET # BLD AUTO: 153 K/UL — SIGNIFICANT CHANGE UP (ref 150–400)
PLATELET # BLD AUTO: 153 K/UL — SIGNIFICANT CHANGE UP (ref 150–400)
PLATELET # BLD AUTO: 154 K/UL — SIGNIFICANT CHANGE UP (ref 150–400)
PLATELET # BLD AUTO: 154 K/UL — SIGNIFICANT CHANGE UP (ref 150–400)
PLATELET # BLD AUTO: 155 K/UL — SIGNIFICANT CHANGE UP (ref 150–400)
PLATELET # BLD AUTO: 157 K/UL — SIGNIFICANT CHANGE UP (ref 150–400)
PLATELET # BLD AUTO: 158 K/UL — SIGNIFICANT CHANGE UP (ref 150–400)
PLATELET # BLD AUTO: 158 K/UL — SIGNIFICANT CHANGE UP (ref 150–400)
PLATELET # BLD AUTO: 159 K/UL — SIGNIFICANT CHANGE UP (ref 150–400)
PLATELET # BLD AUTO: 161 K/UL — SIGNIFICANT CHANGE UP (ref 150–400)
PLATELET # BLD AUTO: 161 K/UL — SIGNIFICANT CHANGE UP (ref 150–400)
PLATELET # BLD AUTO: 163 K/UL — SIGNIFICANT CHANGE UP (ref 150–400)
PLATELET # BLD AUTO: 163 K/UL — SIGNIFICANT CHANGE UP (ref 150–400)
PLATELET # BLD AUTO: 164 K/UL — SIGNIFICANT CHANGE UP (ref 150–400)
PLATELET # BLD AUTO: 164 K/UL — SIGNIFICANT CHANGE UP (ref 150–400)
PLATELET # BLD AUTO: 165 K/UL — SIGNIFICANT CHANGE UP (ref 150–400)
PLATELET # BLD AUTO: 165 K/UL — SIGNIFICANT CHANGE UP (ref 150–400)
PLATELET # BLD AUTO: 167 K/UL — SIGNIFICANT CHANGE UP (ref 150–400)
PLATELET # BLD AUTO: 169 K/UL — SIGNIFICANT CHANGE UP (ref 150–400)
PLATELET # BLD AUTO: 173 K/UL — SIGNIFICANT CHANGE UP (ref 150–400)
PLATELET # BLD AUTO: 173 K/UL — SIGNIFICANT CHANGE UP (ref 150–400)
PLATELET # BLD AUTO: 176 K/UL — SIGNIFICANT CHANGE UP (ref 150–400)
PLATELET # BLD AUTO: 176 K/UL — SIGNIFICANT CHANGE UP (ref 150–400)
PLATELET # BLD AUTO: 180 K/UL — SIGNIFICANT CHANGE UP (ref 150–400)
PLATELET # BLD AUTO: 180 K/UL — SIGNIFICANT CHANGE UP (ref 150–400)
PLATELET # BLD AUTO: 182 K/UL — SIGNIFICANT CHANGE UP (ref 150–400)
PLATELET # BLD AUTO: 183 K/UL — SIGNIFICANT CHANGE UP (ref 150–400)
PLATELET # BLD AUTO: 184 K/UL — SIGNIFICANT CHANGE UP (ref 150–400)
PLATELET # BLD AUTO: 188 K/UL — SIGNIFICANT CHANGE UP (ref 150–400)
PLATELET # BLD AUTO: 191 K/UL — SIGNIFICANT CHANGE UP (ref 150–400)
PLATELET # BLD AUTO: 191 K/UL — SIGNIFICANT CHANGE UP (ref 150–400)
PLATELET # BLD AUTO: 197 K/UL — SIGNIFICANT CHANGE UP (ref 150–400)
PLATELET # BLD AUTO: 199 K/UL — SIGNIFICANT CHANGE UP (ref 150–400)
PLATELET # BLD AUTO: 199 K/UL — SIGNIFICANT CHANGE UP (ref 150–400)
PLATELET # BLD AUTO: 203 K/UL — SIGNIFICANT CHANGE UP (ref 150–400)
PLATELET # BLD AUTO: 207 K/UL — SIGNIFICANT CHANGE UP (ref 150–400)
PLATELET # BLD AUTO: 207 K/UL — SIGNIFICANT CHANGE UP (ref 150–400)
PLATELET # BLD AUTO: 210 K/UL — SIGNIFICANT CHANGE UP (ref 150–400)
PLATELET # BLD AUTO: 217 K/UL — SIGNIFICANT CHANGE UP (ref 150–400)
PLATELET # BLD AUTO: 218 K/UL — SIGNIFICANT CHANGE UP (ref 150–400)
PLATELET # BLD AUTO: 224 K/UL — SIGNIFICANT CHANGE UP (ref 150–400)
PLATELET # BLD AUTO: 224 K/UL — SIGNIFICANT CHANGE UP (ref 150–400)
PLATELET # BLD AUTO: 233 K/UL — SIGNIFICANT CHANGE UP (ref 150–400)
PLATELET COUNT - ESTIMATE: NORMAL — SIGNIFICANT CHANGE UP
PO2 BLDA: 93 MMHG — SIGNIFICANT CHANGE UP (ref 83–108)
PO2 BLDA: 93 MMHG — SIGNIFICANT CHANGE UP (ref 83–108)
PO2 BLDV: 32 MMHG — SIGNIFICANT CHANGE UP (ref 25–45)
PO2 BLDV: 37 MMHG — SIGNIFICANT CHANGE UP (ref 25–45)
PO2 BLDV: 37 MMHG — SIGNIFICANT CHANGE UP (ref 25–45)
PO2 BLDV: 39 MMHG — SIGNIFICANT CHANGE UP (ref 25–45)
PO2 BLDV: 39 MMHG — SIGNIFICANT CHANGE UP (ref 25–45)
PO2 BLDV: 42 MMHG — SIGNIFICANT CHANGE UP (ref 25–45)
PO2 BLDV: 54 MMHG — SIGNIFICANT CHANGE UP
PO2 BLDV: 60 MMHG — HIGH (ref 25–45)
PO2 BLDV: 60 MMHG — HIGH (ref 25–45)
PO2 BLDV: 64 MMHG — HIGH (ref 25–45)
PO2 BLDV: 67 MMHG — HIGH (ref 25–45)
PO2 BLDV: 67 MMHG — HIGH (ref 25–45)
PO2 BLDV: 69 MMHG — HIGH (ref 25–45)
PO2 BLDV: <20 MMHG — LOW (ref 25–45)
POIKILOCYTOSIS BLD QL AUTO: SIGNIFICANT CHANGE UP
POIKILOCYTOSIS BLD QL AUTO: SLIGHT — SIGNIFICANT CHANGE UP
POLYCHROMASIA BLD QL SMEAR: SIGNIFICANT CHANGE UP
POLYCHROMASIA BLD QL SMEAR: SIGNIFICANT CHANGE UP
POLYCHROMASIA BLD QL SMEAR: SLIGHT — SIGNIFICANT CHANGE UP
POTASSIUM BLDV-SCNC: 1.3 MMOL/L — CRITICAL LOW (ref 3.5–5.1)
POTASSIUM BLDV-SCNC: 1.3 MMOL/L — CRITICAL LOW (ref 3.5–5.1)
POTASSIUM BLDV-SCNC: 3 MMOL/L — LOW (ref 3.5–5.1)
POTASSIUM BLDV-SCNC: 3 MMOL/L — LOW (ref 3.5–5.1)
POTASSIUM BLDV-SCNC: 3.3 MMOL/L — LOW (ref 3.5–5.1)
POTASSIUM BLDV-SCNC: 3.8 MMOL/L — SIGNIFICANT CHANGE UP (ref 3.5–5.1)
POTASSIUM BLDV-SCNC: 4.1 MMOL/L — SIGNIFICANT CHANGE UP (ref 3.5–5.1)
POTASSIUM BLDV-SCNC: 4.6 MMOL/L — SIGNIFICANT CHANGE UP (ref 3.5–5.1)
POTASSIUM BLDV-SCNC: 4.8 MMOL/L — SIGNIFICANT CHANGE UP (ref 3.5–5.1)
POTASSIUM BLDV-SCNC: 5.3 MMOL/L — HIGH (ref 3.5–5.1)
POTASSIUM BLDV-SCNC: 5.3 MMOL/L — HIGH (ref 3.5–5.1)
POTASSIUM BLDV-SCNC: 5.7 MMOL/L — HIGH (ref 3.5–5.1)
POTASSIUM SERPL-MCNC: 3 MMOL/L — LOW (ref 3.5–5.3)
POTASSIUM SERPL-MCNC: 3 MMOL/L — LOW (ref 3.5–5.3)
POTASSIUM SERPL-MCNC: 3.1 MMOL/L — LOW (ref 3.5–5.3)
POTASSIUM SERPL-MCNC: 3.1 MMOL/L — LOW (ref 3.5–5.3)
POTASSIUM SERPL-MCNC: 3.2 MMOL/L — LOW (ref 3.5–5.3)
POTASSIUM SERPL-MCNC: 3.2 MMOL/L — LOW (ref 3.5–5.3)
POTASSIUM SERPL-MCNC: 3.3 MMOL/L — LOW (ref 3.5–5.3)
POTASSIUM SERPL-MCNC: 3.3 MMOL/L — LOW (ref 3.5–5.3)
POTASSIUM SERPL-MCNC: 3.5 MMOL/L — SIGNIFICANT CHANGE UP (ref 3.5–5.3)
POTASSIUM SERPL-MCNC: 3.6 MMOL/L — SIGNIFICANT CHANGE UP (ref 3.5–5.3)
POTASSIUM SERPL-MCNC: 3.7 MMOL/L — SIGNIFICANT CHANGE UP (ref 3.5–5.3)
POTASSIUM SERPL-MCNC: 3.8 MMOL/L — SIGNIFICANT CHANGE UP (ref 3.5–5.3)
POTASSIUM SERPL-MCNC: 3.9 MMOL/L — SIGNIFICANT CHANGE UP (ref 3.5–5.3)
POTASSIUM SERPL-MCNC: 3.9 MMOL/L — SIGNIFICANT CHANGE UP (ref 3.5–5.3)
POTASSIUM SERPL-MCNC: 4 MMOL/L — SIGNIFICANT CHANGE UP (ref 3.5–5.3)
POTASSIUM SERPL-MCNC: 4.1 MMOL/L — SIGNIFICANT CHANGE UP (ref 3.5–5.3)
POTASSIUM SERPL-MCNC: 4.2 MMOL/L — SIGNIFICANT CHANGE UP (ref 3.5–5.3)
POTASSIUM SERPL-MCNC: 4.3 MMOL/L — SIGNIFICANT CHANGE UP (ref 3.5–5.3)
POTASSIUM SERPL-MCNC: 4.4 MMOL/L — SIGNIFICANT CHANGE UP (ref 3.5–5.3)
POTASSIUM SERPL-MCNC: 4.5 MMOL/L — SIGNIFICANT CHANGE UP (ref 3.5–5.3)
POTASSIUM SERPL-MCNC: 4.6 MMOL/L — SIGNIFICANT CHANGE UP (ref 3.5–5.3)
POTASSIUM SERPL-MCNC: 4.8 MMOL/L — SIGNIFICANT CHANGE UP (ref 3.5–5.3)
POTASSIUM SERPL-MCNC: 4.9 MMOL/L — SIGNIFICANT CHANGE UP (ref 3.5–5.3)
POTASSIUM SERPL-MCNC: 4.9 MMOL/L — SIGNIFICANT CHANGE UP (ref 3.5–5.3)
POTASSIUM SERPL-MCNC: 5.1 MMOL/L — SIGNIFICANT CHANGE UP (ref 3.5–5.3)
POTASSIUM SERPL-MCNC: 5.5 MMOL/L — HIGH (ref 3.5–5.3)
POTASSIUM SERPL-MCNC: 5.5 MMOL/L — HIGH (ref 3.5–5.3)
POTASSIUM SERPL-MCNC: <2 MMOL/L — CRITICAL LOW (ref 3.5–5.3)
POTASSIUM SERPL-MCNC: <2 MMOL/L — CRITICAL LOW (ref 3.5–5.3)
POTASSIUM SERPL-SCNC: 3 MMOL/L — LOW (ref 3.5–5.3)
POTASSIUM SERPL-SCNC: 3 MMOL/L — LOW (ref 3.5–5.3)
POTASSIUM SERPL-SCNC: 3.1 MMOL/L — LOW (ref 3.5–5.3)
POTASSIUM SERPL-SCNC: 3.1 MMOL/L — LOW (ref 3.5–5.3)
POTASSIUM SERPL-SCNC: 3.2 MMOL/L — LOW (ref 3.5–5.3)
POTASSIUM SERPL-SCNC: 3.2 MMOL/L — LOW (ref 3.5–5.3)
POTASSIUM SERPL-SCNC: 3.3 MMOL/L — LOW (ref 3.5–5.3)
POTASSIUM SERPL-SCNC: 3.3 MMOL/L — LOW (ref 3.5–5.3)
POTASSIUM SERPL-SCNC: 3.5 MMOL/L — SIGNIFICANT CHANGE UP (ref 3.5–5.3)
POTASSIUM SERPL-SCNC: 3.6 MMOL/L — SIGNIFICANT CHANGE UP (ref 3.5–5.3)
POTASSIUM SERPL-SCNC: 3.7 MMOL/L — SIGNIFICANT CHANGE UP (ref 3.5–5.3)
POTASSIUM SERPL-SCNC: 3.8 MMOL/L — SIGNIFICANT CHANGE UP (ref 3.5–5.3)
POTASSIUM SERPL-SCNC: 3.9 MMOL/L — SIGNIFICANT CHANGE UP (ref 3.5–5.3)
POTASSIUM SERPL-SCNC: 3.9 MMOL/L — SIGNIFICANT CHANGE UP (ref 3.5–5.3)
POTASSIUM SERPL-SCNC: 4 MMOL/L — SIGNIFICANT CHANGE UP (ref 3.5–5.3)
POTASSIUM SERPL-SCNC: 4.1 MMOL/L — SIGNIFICANT CHANGE UP (ref 3.5–5.3)
POTASSIUM SERPL-SCNC: 4.2 MMOL/L — SIGNIFICANT CHANGE UP (ref 3.5–5.3)
POTASSIUM SERPL-SCNC: 4.3 MMOL/L — SIGNIFICANT CHANGE UP (ref 3.5–5.3)
POTASSIUM SERPL-SCNC: 4.4 MMOL/L — SIGNIFICANT CHANGE UP (ref 3.5–5.3)
POTASSIUM SERPL-SCNC: 4.5 MMOL/L — SIGNIFICANT CHANGE UP (ref 3.5–5.3)
POTASSIUM SERPL-SCNC: 4.6 MMOL/L — SIGNIFICANT CHANGE UP (ref 3.5–5.3)
POTASSIUM SERPL-SCNC: 4.7 MMOL/L
POTASSIUM SERPL-SCNC: 4.8 MMOL/L
POTASSIUM SERPL-SCNC: 4.8 MMOL/L — SIGNIFICANT CHANGE UP (ref 3.5–5.3)
POTASSIUM SERPL-SCNC: 4.9 MMOL/L — SIGNIFICANT CHANGE UP (ref 3.5–5.3)
POTASSIUM SERPL-SCNC: 4.9 MMOL/L — SIGNIFICANT CHANGE UP (ref 3.5–5.3)
POTASSIUM SERPL-SCNC: 5.1 MMOL/L — SIGNIFICANT CHANGE UP (ref 3.5–5.3)
POTASSIUM SERPL-SCNC: 5.5 MMOL/L — HIGH (ref 3.5–5.3)
POTASSIUM SERPL-SCNC: 5.5 MMOL/L — HIGH (ref 3.5–5.3)
POTASSIUM SERPL-SCNC: <2 MMOL/L — CRITICAL LOW (ref 3.5–5.3)
POTASSIUM SERPL-SCNC: <2 MMOL/L — CRITICAL LOW (ref 3.5–5.3)
POTASSIUM UR-SCNC: 24 MMOL/L — SIGNIFICANT CHANGE UP
POTASSIUM UR-SCNC: 24 MMOL/L — SIGNIFICANT CHANGE UP
PROCALCITONIN SERPL-MCNC: 25.8 NG/ML — HIGH
PROCALCITONIN SERPL-MCNC: 27 NG/ML — HIGH
PROLACTIN SERPL-MCNC: 115 NG/ML — HIGH (ref 3.4–24.1)
PROLACTIN SERPL-MCNC: 115 NG/ML — HIGH (ref 3.4–24.1)
PROMYELOCYTES # FLD: 0.9 % — HIGH (ref 0–0)
PROT ?TM UR-MCNC: 25 MG/DL — HIGH (ref 0–12)
PROT ?TM UR-MCNC: 25 MG/DL — HIGH (ref 0–12)
PROT SERPL-MCNC: 2.9 G/DL — LOW (ref 6–8.3)
PROT SERPL-MCNC: 2.9 G/DL — LOW (ref 6–8.3)
PROT SERPL-MCNC: 5.5 G/DL — LOW (ref 6–8.3)
PROT SERPL-MCNC: 5.5 G/DL — LOW (ref 6–8.3)
PROT SERPL-MCNC: 6 G/DL — SIGNIFICANT CHANGE UP (ref 6–8.3)
PROT SERPL-MCNC: 6.1 G/DL — SIGNIFICANT CHANGE UP (ref 6–8.3)
PROT SERPL-MCNC: 6.2 G/DL — SIGNIFICANT CHANGE UP (ref 6–8.3)
PROT SERPL-MCNC: 6.3 G/DL — SIGNIFICANT CHANGE UP (ref 6–8.3)
PROT SERPL-MCNC: 6.4 G/DL — SIGNIFICANT CHANGE UP (ref 6–8.3)
PROT SERPL-MCNC: 6.5 G/DL — SIGNIFICANT CHANGE UP (ref 6–8.3)
PROT SERPL-MCNC: 6.6 G/DL — SIGNIFICANT CHANGE UP (ref 6–8.3)
PROT SERPL-MCNC: 6.7 G/DL — SIGNIFICANT CHANGE UP (ref 6–8.3)
PROT SERPL-MCNC: 6.9 G/DL
PROT SERPL-MCNC: 6.9 G/DL — SIGNIFICANT CHANGE UP (ref 6–8.3)
PROT SERPL-MCNC: 7 G/DL — SIGNIFICANT CHANGE UP (ref 6–8.3)
PROT SERPL-MCNC: 7 G/DL — SIGNIFICANT CHANGE UP (ref 6–8.3)
PROT SERPL-MCNC: 7.1 G/DL — SIGNIFICANT CHANGE UP (ref 6–8.3)
PROT SERPL-MCNC: 7.2 G/DL — SIGNIFICANT CHANGE UP (ref 6–8.3)
PROT SERPL-MCNC: 7.2 G/DL — SIGNIFICANT CHANGE UP (ref 6–8.3)
PROT SERPL-MCNC: 7.2 GM/DL — SIGNIFICANT CHANGE UP (ref 6–8.3)
PROT SERPL-MCNC: 7.3 G/DL — SIGNIFICANT CHANGE UP (ref 6–8.3)
PROT SERPL-MCNC: 7.4 GM/DL — SIGNIFICANT CHANGE UP (ref 6–8.3)
PROT SERPL-MCNC: 7.5 G/DL — SIGNIFICANT CHANGE UP (ref 6–8.3)
PROT SERPL-MCNC: 7.6 G/DL — SIGNIFICANT CHANGE UP (ref 6–8.3)
PROT SERPL-MCNC: 7.6 G/DL — SIGNIFICANT CHANGE UP (ref 6–8.3)
PROT SERPL-MCNC: 7.7 G/DL
PROT SERPL-MCNC: 7.7 GM/DL — SIGNIFICANT CHANGE UP (ref 6–8.3)
PROT SERPL-MCNC: 8.1 G/DL — SIGNIFICANT CHANGE UP (ref 6–8.3)
PROT SERPL-MCNC: 8.1 G/DL — SIGNIFICANT CHANGE UP (ref 6–8.3)
PROT SERPL-MCNC: 8.2 G/DL — SIGNIFICANT CHANGE UP (ref 6–8.3)
PROT UR-MCNC: 30 MG/DL
PROT UR-MCNC: ABNORMAL
PROT UR-MCNC: NEGATIVE MG/DL — SIGNIFICANT CHANGE UP
PROT UR-MCNC: SIGNIFICANT CHANGE UP MG/DL
PROT/CREAT UR-RTO: 0.3 RATIO — HIGH (ref 0–0.2)
PROT/CREAT UR-RTO: 0.3 RATIO — HIGH (ref 0–0.2)
PROTHROM AB SERPL-ACNC: 12.3 SEC — SIGNIFICANT CHANGE UP (ref 9.5–13)
PROTHROM AB SERPL-ACNC: 12.5 SEC — SIGNIFICANT CHANGE UP (ref 9.5–13)
PROTHROM AB SERPL-ACNC: 12.8 SEC — SIGNIFICANT CHANGE UP (ref 9.5–13)
PROTHROM AB SERPL-ACNC: 13.5 SEC — HIGH (ref 9.5–13)
PROTHROM AB SERPL-ACNC: 13.7 SEC — HIGH (ref 9.5–13)
PROTHROM AB SERPL-ACNC: 14.7 SEC — HIGH (ref 10.5–13.4)
PROTHROM AB SERPL-ACNC: 14.7 SEC — HIGH (ref 9.5–13)
PROTHROM AB SERPL-ACNC: 14.7 SEC — HIGH (ref 9.5–13)
PROTHROM AB SERPL-ACNC: 15.1 SEC — HIGH (ref 9.5–13)
PROTHROM AB SERPL-ACNC: 15.1 SEC — HIGH (ref 9.5–13)
PROTHROM AB SERPL-ACNC: 15.3 SEC — HIGH (ref 9.5–13)
PROTHROM AB SERPL-ACNC: 15.3 SEC — HIGH (ref 9.5–13)
RAPID RVP RESULT: SIGNIFICANT CHANGE UP
RBC # BLD: 3.12 M/UL — LOW (ref 3.8–5.2)
RBC # BLD: 3.12 M/UL — LOW (ref 3.8–5.2)
RBC # BLD: 3.17 M/UL — LOW (ref 3.8–5.2)
RBC # BLD: 3.17 M/UL — LOW (ref 3.8–5.2)
RBC # BLD: 3.3 M/UL — LOW (ref 3.8–5.2)
RBC # BLD: 3.31 M/UL — LOW (ref 3.8–5.2)
RBC # BLD: 3.31 M/UL — LOW (ref 3.8–5.2)
RBC # BLD: 3.35 M/UL — LOW (ref 3.8–5.2)
RBC # BLD: 3.35 M/UL — LOW (ref 3.8–5.2)
RBC # BLD: 3.38 M/UL — LOW (ref 3.8–5.2)
RBC # BLD: 3.38 M/UL — LOW (ref 3.8–5.2)
RBC # BLD: 3.39 M/UL — LOW (ref 3.8–5.2)
RBC # BLD: 3.4 M/UL — LOW (ref 3.8–5.2)
RBC # BLD: 3.4 M/UL — LOW (ref 3.8–5.2)
RBC # BLD: 3.41 M/UL — LOW (ref 3.8–5.2)
RBC # BLD: 3.41 M/UL — LOW (ref 3.8–5.2)
RBC # BLD: 3.44 M/UL — LOW (ref 3.8–5.2)
RBC # BLD: 3.45 M/UL — LOW (ref 3.8–5.2)
RBC # BLD: 3.46 M/UL — LOW (ref 3.8–5.2)
RBC # BLD: 3.49 M/UL — LOW (ref 3.8–5.2)
RBC # BLD: 3.49 M/UL — LOW (ref 3.8–5.2)
RBC # BLD: 3.5 M/UL — LOW (ref 3.8–5.2)
RBC # BLD: 3.51 M/UL — LOW (ref 3.8–5.2)
RBC # BLD: 3.51 M/UL — LOW (ref 3.8–5.2)
RBC # BLD: 3.56 M/UL — LOW (ref 3.8–5.2)
RBC # BLD: 3.59 M/UL — LOW (ref 3.8–5.2)
RBC # BLD: 3.59 M/UL — LOW (ref 3.8–5.2)
RBC # BLD: 3.6 M/UL — LOW (ref 3.8–5.2)
RBC # BLD: 3.6 M/UL — LOW (ref 3.8–5.2)
RBC # BLD: 3.61 M/UL — LOW (ref 3.8–5.2)
RBC # BLD: 3.63 M/UL — LOW (ref 3.8–5.2)
RBC # BLD: 3.63 M/UL — LOW (ref 3.8–5.2)
RBC # BLD: 3.66 M/UL — LOW (ref 3.8–5.2)
RBC # BLD: 3.67 M/UL — LOW (ref 3.8–5.2)
RBC # BLD: 3.71 M/UL — LOW (ref 3.8–5.2)
RBC # BLD: 3.71 M/UL — LOW (ref 3.8–5.2)
RBC # BLD: 3.73 M/UL — LOW (ref 3.8–5.2)
RBC # BLD: 3.73 M/UL — LOW (ref 3.8–5.2)
RBC # BLD: 3.74 M/UL — LOW (ref 3.8–5.2)
RBC # BLD: 3.76 M/UL — LOW (ref 3.8–5.2)
RBC # BLD: 3.76 M/UL — LOW (ref 3.8–5.2)
RBC # BLD: 3.79 M/UL — LOW (ref 3.8–5.2)
RBC # BLD: 3.8 M/UL — SIGNIFICANT CHANGE UP (ref 3.8–5.2)
RBC # BLD: 3.81 M/UL — SIGNIFICANT CHANGE UP (ref 3.8–5.2)
RBC # BLD: 3.83 M/UL — SIGNIFICANT CHANGE UP (ref 3.8–5.2)
RBC # BLD: 3.86 M/UL — SIGNIFICANT CHANGE UP (ref 3.8–5.2)
RBC # BLD: 3.86 M/UL — SIGNIFICANT CHANGE UP (ref 3.8–5.2)
RBC # BLD: 3.88 M/UL — SIGNIFICANT CHANGE UP (ref 3.8–5.2)
RBC # BLD: 3.9 M/UL — SIGNIFICANT CHANGE UP (ref 3.8–5.2)
RBC # BLD: 3.95 M/UL — SIGNIFICANT CHANGE UP (ref 3.8–5.2)
RBC # BLD: 3.95 M/UL — SIGNIFICANT CHANGE UP (ref 3.8–5.2)
RBC # BLD: 4.05 M/UL — SIGNIFICANT CHANGE UP (ref 3.8–5.2)
RBC # BLD: 4.07 M/UL — SIGNIFICANT CHANGE UP (ref 3.8–5.2)
RBC # BLD: 4.09 M/UL — SIGNIFICANT CHANGE UP (ref 3.8–5.2)
RBC # BLD: 4.11 M/UL — SIGNIFICANT CHANGE UP (ref 3.8–5.2)
RBC # BLD: 4.15 M/UL — SIGNIFICANT CHANGE UP (ref 3.8–5.2)
RBC # BLD: 4.21 M/UL — SIGNIFICANT CHANGE UP (ref 3.8–5.2)
RBC # BLD: 4.26 M/UL — SIGNIFICANT CHANGE UP (ref 3.8–5.2)
RBC # BLD: 4.38 M/UL — SIGNIFICANT CHANGE UP (ref 3.8–5.2)
RBC # BLD: 4.46 M/UL — SIGNIFICANT CHANGE UP (ref 3.8–5.2)
RBC # BLD: 4.55 M/UL — SIGNIFICANT CHANGE UP (ref 3.8–5.2)
RBC # BLD: 4.61 M/UL — SIGNIFICANT CHANGE UP (ref 3.8–5.2)
RBC # BLD: 4.74 M/UL — SIGNIFICANT CHANGE UP (ref 3.8–5.2)
RBC # BLD: 4.82 M/UL — SIGNIFICANT CHANGE UP (ref 3.8–5.2)
RBC # FLD: 13.5 % — SIGNIFICANT CHANGE UP (ref 10.3–14.5)
RBC # FLD: 14.3 % — SIGNIFICANT CHANGE UP (ref 10.3–14.5)
RBC # FLD: 14.4 % — SIGNIFICANT CHANGE UP (ref 10.3–14.5)
RBC # FLD: 14.6 % — HIGH (ref 10.3–14.5)
RBC # FLD: 14.6 % — HIGH (ref 10.3–14.5)
RBC # FLD: 14.9 % — HIGH (ref 10.3–14.5)
RBC # FLD: 15 % — HIGH (ref 10.3–14.5)
RBC # FLD: 16 % — HIGH (ref 10.3–14.5)
RBC # FLD: 16.2 % — HIGH (ref 10.3–14.5)
RBC # FLD: 16.3 % — HIGH (ref 10.3–14.5)
RBC # FLD: 16.4 % — HIGH (ref 10.3–14.5)
RBC # FLD: 16.6 % — HIGH (ref 10.3–14.5)
RBC # FLD: 16.8 % — HIGH (ref 10.3–14.5)
RBC # FLD: 16.8 % — HIGH (ref 10.3–14.5)
RBC # FLD: 17.2 % — HIGH (ref 10.3–14.5)
RBC # FLD: 17.4 % — HIGH (ref 10.3–14.5)
RBC # FLD: 17.5 % — HIGH (ref 10.3–14.5)
RBC # FLD: 17.5 % — HIGH (ref 10.3–14.5)
RBC # FLD: 17.6 % — HIGH (ref 10.3–14.5)
RBC # FLD: 17.9 % — HIGH (ref 10.3–14.5)
RBC # FLD: 18 % — HIGH (ref 10.3–14.5)
RBC # FLD: 18.1 % — HIGH (ref 10.3–14.5)
RBC # FLD: 18.4 % — HIGH (ref 10.3–14.5)
RBC # FLD: 18.5 % — HIGH (ref 10.3–14.5)
RBC # FLD: 18.5 % — HIGH (ref 10.3–14.5)
RBC # FLD: 18.6 % — HIGH (ref 10.3–14.5)
RBC # FLD: 18.7 % — HIGH (ref 10.3–14.5)
RBC # FLD: 18.7 % — HIGH (ref 10.3–14.5)
RBC # FLD: 18.8 % — HIGH (ref 10.3–14.5)
RBC # FLD: 18.9 % — HIGH (ref 10.3–14.5)
RBC # FLD: 19 % — HIGH (ref 10.3–14.5)
RBC # FLD: 19 % — HIGH (ref 10.3–14.5)
RBC # FLD: 19.1 % — HIGH (ref 10.3–14.5)
RBC # FLD: 19.6 % — HIGH (ref 10.3–14.5)
RBC # FLD: 19.6 % — HIGH (ref 10.3–14.5)
RBC # FLD: 19.8 % — HIGH (ref 10.3–14.5)
RBC # FLD: 19.8 % — HIGH (ref 10.3–14.5)
RBC # FLD: 21.2 % — HIGH (ref 10.3–14.5)
RBC # FLD: 21.6 % — HIGH (ref 10.3–14.5)
RBC # FLD: 21.6 % — HIGH (ref 10.3–14.5)
RBC # FLD: 22 % — HIGH (ref 10.3–14.5)
RBC # FLD: 22 % — HIGH (ref 10.3–14.5)
RBC # FLD: 22.1 % — HIGH (ref 10.3–14.5)
RBC # FLD: 22.3 % — HIGH (ref 10.3–14.5)
RBC # FLD: 22.3 % — HIGH (ref 10.3–14.5)
RBC # FLD: 22.5 % — HIGH (ref 10.3–14.5)
RBC # FLD: 22.5 % — HIGH (ref 10.3–14.5)
RBC # FLD: 22.8 % — HIGH (ref 10.3–14.5)
RBC # FLD: 22.8 % — HIGH (ref 10.3–14.5)
RBC # FLD: 23.1 % — HIGH (ref 10.3–14.5)
RBC # FLD: 23.2 % — HIGH (ref 10.3–14.5)
RBC # FLD: 23.2 % — HIGH (ref 10.3–14.5)
RBC BLD AUTO: ABNORMAL
RBC BLD AUTO: NORMAL — SIGNIFICANT CHANGE UP
RBC BLD AUTO: NORMAL — SIGNIFICANT CHANGE UP
RBC BLD AUTO: SIGNIFICANT CHANGE UP
RBC CASTS # UR COMP ASSIST: 0 /HPF — SIGNIFICANT CHANGE UP (ref 0–4)
RBC CASTS # UR COMP ASSIST: 1 /HPF — SIGNIFICANT CHANGE UP (ref 0–4)
RBC CASTS # UR COMP ASSIST: 2 /HPF — SIGNIFICANT CHANGE UP (ref 0–4)
RBC CASTS # UR COMP ASSIST: 3 /HPF — SIGNIFICANT CHANGE UP (ref 0–4)
RBC CASTS # UR COMP ASSIST: 3 /HPF — SIGNIFICANT CHANGE UP (ref 0–4)
RBC CASTS # UR COMP ASSIST: SIGNIFICANT CHANGE UP /HPF
RBC CASTS # UR COMP ASSIST: SIGNIFICANT CHANGE UP /HPF (ref 0–4)
RETICS #: 123.4 K/UL — SIGNIFICANT CHANGE UP (ref 25–125)
RETICS #: 123.4 K/UL — SIGNIFICANT CHANGE UP (ref 25–125)
RETICS/RBC NFR: 3.6 % — HIGH (ref 0.5–2.5)
RETICS/RBC NFR: 3.6 % — HIGH (ref 0.5–2.5)
RH IG SCN BLD-IMP: POSITIVE — SIGNIFICANT CHANGE UP
RSV RNA NPH QL NAA+NON-PROBE: SIGNIFICANT CHANGE UP
RSV RNA SPEC QL NAA+PROBE: SIGNIFICANT CHANGE UP
RV+EV RNA SPEC QL NAA+PROBE: SIGNIFICANT CHANGE UP
S AUREUS DNA NOSE QL NAA+PROBE: DETECTED
S AUREUS DNA NOSE QL NAA+PROBE: DETECTED
S AUREUS DNA NOSE QL NAA+PROBE: SIGNIFICANT CHANGE UP
SAO2 % BLDA: 98.5 % — HIGH (ref 94–98)
SAO2 % BLDA: 98.5 % — HIGH (ref 94–98)
SAO2 % BLDV: 20.2 % — LOW (ref 67–88)
SAO2 % BLDV: 34.7 % — LOW (ref 94–98)
SAO2 % BLDV: 46.8 % — LOW (ref 67–88)
SAO2 % BLDV: 46.8 % — LOW (ref 67–88)
SAO2 % BLDV: 56.7 % — LOW (ref 67–88)
SAO2 % BLDV: 56.7 % — LOW (ref 67–88)
SAO2 % BLDV: 71 % — SIGNIFICANT CHANGE UP (ref 67–88)
SAO2 % BLDV: 85 % — SIGNIFICANT CHANGE UP
SAO2 % BLDV: 89.8 % — HIGH (ref 67–88)
SAO2 % BLDV: 89.8 % — HIGH (ref 67–88)
SAO2 % BLDV: 92.8 % — HIGH (ref 67–88)
SAO2 % BLDV: 92.8 % — HIGH (ref 67–88)
SAO2 % BLDV: 93 % — HIGH (ref 67–88)
SAO2 % BLDV: 93.8 % — HIGH (ref 67–88)
SAO2 % BLDV: 93.8 % — HIGH (ref 67–88)
SAO2 % BLDV: 95.6 % — HIGH (ref 67–88)
SARS-COV-2 RNA SPEC QL NAA+PROBE: DETECTED
SARS-COV-2 RNA SPEC QL NAA+PROBE: SIGNIFICANT CHANGE UP
SCHISTOCYTES BLD QL AUTO: SLIGHT — SIGNIFICANT CHANGE UP
SMUDGE CELLS # BLD: PRESENT — SIGNIFICANT CHANGE UP
SODIUM SERPL-SCNC: 131 MMOL/L — LOW (ref 135–145)
SODIUM SERPL-SCNC: 132 MMOL/L — LOW (ref 135–145)
SODIUM SERPL-SCNC: 133 MMOL/L — LOW (ref 135–145)
SODIUM SERPL-SCNC: 134 MMOL/L
SODIUM SERPL-SCNC: 134 MMOL/L — LOW (ref 135–145)
SODIUM SERPL-SCNC: 135 MMOL/L — SIGNIFICANT CHANGE UP (ref 135–145)
SODIUM SERPL-SCNC: 136 MMOL/L — SIGNIFICANT CHANGE UP (ref 135–145)
SODIUM SERPL-SCNC: 137 MMOL/L — SIGNIFICANT CHANGE UP (ref 135–145)
SODIUM SERPL-SCNC: 138 MMOL/L — SIGNIFICANT CHANGE UP (ref 135–145)
SODIUM SERPL-SCNC: 139 MMOL/L
SODIUM SERPL-SCNC: 139 MMOL/L — SIGNIFICANT CHANGE UP (ref 135–145)
SODIUM SERPL-SCNC: 140 MMOL/L — SIGNIFICANT CHANGE UP (ref 135–145)
SODIUM SERPL-SCNC: 141 MMOL/L — SIGNIFICANT CHANGE UP (ref 135–145)
SODIUM SERPL-SCNC: 142 MMOL/L — SIGNIFICANT CHANGE UP (ref 135–145)
SODIUM SERPL-SCNC: 142 MMOL/L — SIGNIFICANT CHANGE UP (ref 135–145)
SODIUM SERPL-SCNC: 143 MMOL/L — SIGNIFICANT CHANGE UP (ref 135–145)
SODIUM SERPL-SCNC: 144 MMOL/L — SIGNIFICANT CHANGE UP (ref 135–145)
SODIUM SERPL-SCNC: 144 MMOL/L — SIGNIFICANT CHANGE UP (ref 135–145)
SODIUM SERPL-SCNC: 145 MMOL/L — SIGNIFICANT CHANGE UP (ref 135–145)
SODIUM SERPL-SCNC: 146 MMOL/L — HIGH (ref 135–145)
SODIUM SERPL-SCNC: 146 MMOL/L — HIGH (ref 135–145)
SODIUM UR-SCNC: 29 MMOL/L — SIGNIFICANT CHANGE UP
SODIUM UR-SCNC: 29 MMOL/L — SIGNIFICANT CHANGE UP
SP GR SPEC: 1.01 — SIGNIFICANT CHANGE UP (ref 1–1.03)
SP GR SPEC: 1.02 — SIGNIFICANT CHANGE UP (ref 1–1.03)
SP GR SPEC: >1.05 (ref 1.01–1.05)
SPECIMEN SOURCE: SIGNIFICANT CHANGE UP
SQUAMOUS # UR AUTO: 2 /HPF — SIGNIFICANT CHANGE UP (ref 0–5)
SQUAMOUS # UR AUTO: 3 /HPF — SIGNIFICANT CHANGE UP (ref 0–5)
SQUAMOUS # UR AUTO: 4 /HPF — SIGNIFICANT CHANGE UP (ref 0–5)
SQUAMOUS # UR AUTO: 4 /HPF — SIGNIFICANT CHANGE UP (ref 0–5)
SQUAMOUS # UR AUTO: 6 /HPF — HIGH (ref 0–5)
SQUAMOUS # UR AUTO: 6 /HPF — HIGH (ref 0–5)
T3FREE SERPL-MCNC: 2.19 PG/ML — SIGNIFICANT CHANGE UP (ref 2–4.4)
T3FREE SERPL-MCNC: 2.19 PG/ML — SIGNIFICANT CHANGE UP (ref 2–4.4)
T4 FREE SERPL-MCNC: 1.3 NG/DL — SIGNIFICANT CHANGE UP (ref 0.9–1.8)
T4 FREE SERPL-MCNC: 1.3 NG/DL — SIGNIFICANT CHANGE UP (ref 0.9–1.8)
T4 FREE SERPL-MCNC: 1.6 NG/DL — SIGNIFICANT CHANGE UP (ref 0.9–1.8)
T4 FREE SERPL-MCNC: 1.6 NG/DL — SIGNIFICANT CHANGE UP (ref 0.9–1.8)
TARGETS BLD QL SMEAR: SLIGHT — SIGNIFICANT CHANGE UP
TRIGL SERPL-MCNC: 88 MG/DL — SIGNIFICANT CHANGE UP
TROPONIN I, HIGH SENSITIVITY RESULT: 221 NG/L — HIGH
TROPONIN I, HIGH SENSITIVITY RESULT: 221 NG/L — HIGH
TROPONIN I, HIGH SENSITIVITY RESULT: 257 NG/L — HIGH
TROPONIN I, HIGH SENSITIVITY RESULT: 257 NG/L — HIGH
TROPONIN I, HIGH SENSITIVITY RESULT: 32.3 NG/L — SIGNIFICANT CHANGE UP
TROPONIN I, HIGH SENSITIVITY RESULT: 56.9 NG/L — HIGH
TROPONIN I, HIGH SENSITIVITY RESULT: 9.8 NG/L — SIGNIFICANT CHANGE UP
TROPONIN T, HIGH SENSITIVITY RESULT: 11 NG/L — SIGNIFICANT CHANGE UP
TROPONIN T, HIGH SENSITIVITY RESULT: 16 NG/L — SIGNIFICANT CHANGE UP (ref 0–51)
TROPONIN T, HIGH SENSITIVITY RESULT: 56 NG/L — HIGH (ref 0–51)
TROPONIN T, HIGH SENSITIVITY RESULT: 56 NG/L — HIGH (ref 0–51)
TROPONIN T, HIGH SENSITIVITY RESULT: 72 NG/L — HIGH (ref 0–51)
TROPONIN T, HIGH SENSITIVITY RESULT: 72 NG/L — HIGH (ref 0–51)
TROPONIN T, HIGH SENSITIVITY RESULT: 78 NG/L — HIGH (ref 0–51)
TROPONIN T, HIGH SENSITIVITY RESULT: 78 NG/L — HIGH (ref 0–51)
TROPONIN T, HIGH SENSITIVITY RESULT: 8 NG/L — SIGNIFICANT CHANGE UP
TROPONIN T, HIGH SENSITIVITY RESULT: 8 NG/L — SIGNIFICANT CHANGE UP
TROPONIN T, HIGH SENSITIVITY RESULT: 98 NG/L — HIGH (ref 0–51)
TROPONIN T, HIGH SENSITIVITY RESULT: 98 NG/L — HIGH (ref 0–51)
TSH SERPL-ACNC: 0.14 UIU/ML
TSH SERPL-MCNC: 0.18 UIU/ML — LOW (ref 0.27–4.2)
TSH SERPL-MCNC: 0.18 UIU/ML — LOW (ref 0.27–4.2)
TSH SERPL-MCNC: 0.22 UIU/ML — LOW (ref 0.36–3.74)
TSH SERPL-MCNC: 0.49 UIU/ML — SIGNIFICANT CHANGE UP (ref 0.36–3.74)
TSH SERPL-MCNC: 0.49 UIU/ML — SIGNIFICANT CHANGE UP (ref 0.36–3.74)
UROBILINOGEN FLD QL: 1 E.U./DL — SIGNIFICANT CHANGE UP (ref 0.2–1)
UROBILINOGEN FLD QL: 1 E.U./DL — SIGNIFICANT CHANGE UP (ref 0.2–1)
UROBILINOGEN FLD QL: 1 MG/DL — SIGNIFICANT CHANGE UP (ref 0.2–1)
UROBILINOGEN FLD QL: SIGNIFICANT CHANGE UP
UUN UR-MCNC: 576 MG/DL — SIGNIFICANT CHANGE UP
UUN UR-MCNC: 576 MG/DL — SIGNIFICANT CHANGE UP
VALPROATE SERPL-MCNC: 18 UG/ML — LOW (ref 50–100)
VALPROATE SERPL-MCNC: 18 UG/ML — LOW (ref 50–100)
VALPROATE SERPL-MCNC: 19 UG/ML — LOW (ref 50–100)
VALPROATE SERPL-MCNC: 19 UG/ML — LOW (ref 50–100)
VALPROATE SERPL-MCNC: 34 UG/ML — LOW (ref 50–100)
VALPROATE SERPL-MCNC: 34 UG/ML — LOW (ref 50–100)
VALPROATE SERPL-MCNC: 55 UG/ML — SIGNIFICANT CHANGE UP (ref 50–100)
VALPROATE SERPL-MCNC: 59 UG/ML — SIGNIFICANT CHANGE UP (ref 50–100)
VALPROATE SERPL-MCNC: 59 UG/ML — SIGNIFICANT CHANGE UP (ref 50–100)
VALPROATE SERPL-MCNC: 67 UG/ML — SIGNIFICANT CHANGE UP (ref 50–100)
VALPROATE SERPL-MCNC: 67 UG/ML — SIGNIFICANT CHANGE UP (ref 50–100)
VALPROATE SERPL-MCNC: 68 UG/ML — SIGNIFICANT CHANGE UP (ref 50–100)
VALPROATE SERPL-MCNC: 68 UG/ML — SIGNIFICANT CHANGE UP (ref 50–100)
VARIANT LYMPHS # BLD: 12.3 % — HIGH (ref 0–6)
VARIANT LYMPHS # BLD: 2 % — SIGNIFICANT CHANGE UP (ref 0–6)
VARIANT LYMPHS # BLD: 2 % — SIGNIFICANT CHANGE UP (ref 0–6)
VIT B1 SERPL-MCNC: 71.8 NMOL/L — SIGNIFICANT CHANGE UP (ref 66.5–200)
VIT B1 SERPL-MCNC: 71.8 NMOL/L — SIGNIFICANT CHANGE UP (ref 66.5–200)
VIT B12 SERPL-MCNC: 1789 PG/ML — HIGH (ref 232–1245)
VIT B12 SERPL-MCNC: 1789 PG/ML — HIGH (ref 232–1245)
WBC # BLD: 10.29 K/UL — SIGNIFICANT CHANGE UP (ref 3.8–10.5)
WBC # BLD: 10.3 K/UL — SIGNIFICANT CHANGE UP (ref 3.8–10.5)
WBC # BLD: 10.81 K/UL — HIGH (ref 3.8–10.5)
WBC # BLD: 10.81 K/UL — HIGH (ref 3.8–10.5)
WBC # BLD: 11.38 K/UL — HIGH (ref 3.8–10.5)
WBC # BLD: 11.73 K/UL — HIGH (ref 3.8–10.5)
WBC # BLD: 11.73 K/UL — HIGH (ref 3.8–10.5)
WBC # BLD: 11.85 K/UL — HIGH (ref 3.8–10.5)
WBC # BLD: 11.87 K/UL — HIGH (ref 3.8–10.5)
WBC # BLD: 12.18 K/UL — HIGH (ref 3.8–10.5)
WBC # BLD: 12.44 K/UL — HIGH (ref 3.8–10.5)
WBC # BLD: 12.55 K/UL — HIGH (ref 3.8–10.5)
WBC # BLD: 13.06 K/UL — HIGH (ref 3.8–10.5)
WBC # BLD: 13.2 K/UL — HIGH (ref 3.8–10.5)
WBC # BLD: 14.49 K/UL — HIGH (ref 3.8–10.5)
WBC # BLD: 15.21 K/UL — HIGH (ref 3.8–10.5)
WBC # BLD: 15.38 K/UL — HIGH (ref 3.8–10.5)
WBC # BLD: 15.48 K/UL — HIGH (ref 3.8–10.5)
WBC # BLD: 15.93 K/UL — HIGH (ref 3.8–10.5)
WBC # BLD: 16.75 K/UL — HIGH (ref 3.8–10.5)
WBC # BLD: 2.63 K/UL — LOW (ref 3.8–10.5)
WBC # BLD: 2.63 K/UL — LOW (ref 3.8–10.5)
WBC # BLD: 3.15 K/UL — LOW (ref 3.8–10.5)
WBC # BLD: 3.59 K/UL — LOW (ref 3.8–10.5)
WBC # BLD: 3.59 K/UL — LOW (ref 3.8–10.5)
WBC # BLD: 3.82 K/UL — SIGNIFICANT CHANGE UP (ref 3.8–10.5)
WBC # BLD: 3.82 K/UL — SIGNIFICANT CHANGE UP (ref 3.8–10.5)
WBC # BLD: 3.86 K/UL — SIGNIFICANT CHANGE UP (ref 3.8–10.5)
WBC # BLD: 3.87 K/UL — SIGNIFICANT CHANGE UP (ref 3.8–10.5)
WBC # BLD: 3.87 K/UL — SIGNIFICANT CHANGE UP (ref 3.8–10.5)
WBC # BLD: 3.98 K/UL — SIGNIFICANT CHANGE UP (ref 3.8–10.5)
WBC # BLD: 3.98 K/UL — SIGNIFICANT CHANGE UP (ref 3.8–10.5)
WBC # BLD: 4.24 K/UL — SIGNIFICANT CHANGE UP (ref 3.8–10.5)
WBC # BLD: 4.26 K/UL — SIGNIFICANT CHANGE UP (ref 3.8–10.5)
WBC # BLD: 4.26 K/UL — SIGNIFICANT CHANGE UP (ref 3.8–10.5)
WBC # BLD: 4.37 K/UL — SIGNIFICANT CHANGE UP (ref 3.8–10.5)
WBC # BLD: 4.37 K/UL — SIGNIFICANT CHANGE UP (ref 3.8–10.5)
WBC # BLD: 4.52 K/UL — SIGNIFICANT CHANGE UP (ref 3.8–10.5)
WBC # BLD: 4.52 K/UL — SIGNIFICANT CHANGE UP (ref 3.8–10.5)
WBC # BLD: 4.69 K/UL — SIGNIFICANT CHANGE UP (ref 3.8–10.5)
WBC # BLD: 4.69 K/UL — SIGNIFICANT CHANGE UP (ref 3.8–10.5)
WBC # BLD: 5.11 K/UL — SIGNIFICANT CHANGE UP (ref 3.8–10.5)
WBC # BLD: 5.33 K/UL — SIGNIFICANT CHANGE UP (ref 3.8–10.5)
WBC # BLD: 5.33 K/UL — SIGNIFICANT CHANGE UP (ref 3.8–10.5)
WBC # BLD: 5.4 K/UL — SIGNIFICANT CHANGE UP (ref 3.8–10.5)
WBC # BLD: 5.47 K/UL — SIGNIFICANT CHANGE UP (ref 3.8–10.5)
WBC # BLD: 5.47 K/UL — SIGNIFICANT CHANGE UP (ref 3.8–10.5)
WBC # BLD: 5.65 K/UL — SIGNIFICANT CHANGE UP (ref 3.8–10.5)
WBC # BLD: 5.67 K/UL — SIGNIFICANT CHANGE UP (ref 3.8–10.5)
WBC # BLD: 5.9 K/UL — SIGNIFICANT CHANGE UP (ref 3.8–10.5)
WBC # BLD: 5.9 K/UL — SIGNIFICANT CHANGE UP (ref 3.8–10.5)
WBC # BLD: 5.91 K/UL — SIGNIFICANT CHANGE UP (ref 3.8–10.5)
WBC # BLD: 5.92 K/UL — SIGNIFICANT CHANGE UP (ref 3.8–10.5)
WBC # BLD: 5.92 K/UL — SIGNIFICANT CHANGE UP (ref 3.8–10.5)
WBC # BLD: 6.03 K/UL — SIGNIFICANT CHANGE UP (ref 3.8–10.5)
WBC # BLD: 6.34 K/UL — SIGNIFICANT CHANGE UP (ref 3.8–10.5)
WBC # BLD: 6.34 K/UL — SIGNIFICANT CHANGE UP (ref 3.8–10.5)
WBC # BLD: 6.75 K/UL — SIGNIFICANT CHANGE UP (ref 3.8–10.5)
WBC # BLD: 6.75 K/UL — SIGNIFICANT CHANGE UP (ref 3.8–10.5)
WBC # BLD: 6.81 K/UL — SIGNIFICANT CHANGE UP (ref 3.8–10.5)
WBC # BLD: 6.95 K/UL — SIGNIFICANT CHANGE UP (ref 3.8–10.5)
WBC # BLD: 6.95 K/UL — SIGNIFICANT CHANGE UP (ref 3.8–10.5)
WBC # BLD: 7.16 K/UL — SIGNIFICANT CHANGE UP (ref 3.8–10.5)
WBC # BLD: 7.48 K/UL — SIGNIFICANT CHANGE UP (ref 3.8–10.5)
WBC # BLD: 7.48 K/UL — SIGNIFICANT CHANGE UP (ref 3.8–10.5)
WBC # BLD: 7.68 K/UL — SIGNIFICANT CHANGE UP (ref 3.8–10.5)
WBC # BLD: 7.75 K/UL — SIGNIFICANT CHANGE UP (ref 3.8–10.5)
WBC # BLD: 8.06 K/UL — SIGNIFICANT CHANGE UP (ref 3.8–10.5)
WBC # BLD: 8.22 K/UL — SIGNIFICANT CHANGE UP (ref 3.8–10.5)
WBC # BLD: 8.22 K/UL — SIGNIFICANT CHANGE UP (ref 3.8–10.5)
WBC # BLD: 8.5 K/UL — SIGNIFICANT CHANGE UP (ref 3.8–10.5)
WBC # BLD: 8.5 K/UL — SIGNIFICANT CHANGE UP (ref 3.8–10.5)
WBC # BLD: 8.61 K/UL — SIGNIFICANT CHANGE UP (ref 3.8–10.5)
WBC # BLD: 8.61 K/UL — SIGNIFICANT CHANGE UP (ref 3.8–10.5)
WBC # BLD: 8.64 K/UL — SIGNIFICANT CHANGE UP (ref 3.8–10.5)
WBC # BLD: 9.24 K/UL — SIGNIFICANT CHANGE UP (ref 3.8–10.5)
WBC # BLD: 9.24 K/UL — SIGNIFICANT CHANGE UP (ref 3.8–10.5)
WBC # BLD: 9.69 K/UL — SIGNIFICANT CHANGE UP (ref 3.8–10.5)
WBC # BLD: 9.76 K/UL — SIGNIFICANT CHANGE UP (ref 3.8–10.5)
WBC # BLD: 9.8 K/UL — SIGNIFICANT CHANGE UP (ref 3.8–10.5)
WBC # BLD: 9.86 K/UL — SIGNIFICANT CHANGE UP (ref 3.8–10.5)
WBC # BLD: 9.86 K/UL — SIGNIFICANT CHANGE UP (ref 3.8–10.5)
WBC # BLD: 9.91 K/UL — SIGNIFICANT CHANGE UP (ref 3.8–10.5)
WBC # BLD: 9.94 K/UL — SIGNIFICANT CHANGE UP (ref 3.8–10.5)
WBC # FLD AUTO: 10.29 K/UL — SIGNIFICANT CHANGE UP (ref 3.8–10.5)
WBC # FLD AUTO: 10.3 K/UL — SIGNIFICANT CHANGE UP (ref 3.8–10.5)
WBC # FLD AUTO: 10.81 K/UL — HIGH (ref 3.8–10.5)
WBC # FLD AUTO: 10.81 K/UL — HIGH (ref 3.8–10.5)
WBC # FLD AUTO: 11.38 K/UL — HIGH (ref 3.8–10.5)
WBC # FLD AUTO: 11.73 K/UL — HIGH (ref 3.8–10.5)
WBC # FLD AUTO: 11.73 K/UL — HIGH (ref 3.8–10.5)
WBC # FLD AUTO: 11.85 K/UL — HIGH (ref 3.8–10.5)
WBC # FLD AUTO: 11.87 K/UL — HIGH (ref 3.8–10.5)
WBC # FLD AUTO: 12.18 K/UL — HIGH (ref 3.8–10.5)
WBC # FLD AUTO: 12.44 K/UL — HIGH (ref 3.8–10.5)
WBC # FLD AUTO: 12.55 K/UL — HIGH (ref 3.8–10.5)
WBC # FLD AUTO: 13.06 K/UL — HIGH (ref 3.8–10.5)
WBC # FLD AUTO: 13.2 K/UL — HIGH (ref 3.8–10.5)
WBC # FLD AUTO: 14.49 K/UL — HIGH (ref 3.8–10.5)
WBC # FLD AUTO: 15.21 K/UL — HIGH (ref 3.8–10.5)
WBC # FLD AUTO: 15.38 K/UL — HIGH (ref 3.8–10.5)
WBC # FLD AUTO: 15.48 K/UL — HIGH (ref 3.8–10.5)
WBC # FLD AUTO: 15.93 K/UL — HIGH (ref 3.8–10.5)
WBC # FLD AUTO: 16.75 K/UL — HIGH (ref 3.8–10.5)
WBC # FLD AUTO: 2.63 K/UL — LOW (ref 3.8–10.5)
WBC # FLD AUTO: 2.63 K/UL — LOW (ref 3.8–10.5)
WBC # FLD AUTO: 3.15 K/UL — LOW (ref 3.8–10.5)
WBC # FLD AUTO: 3.59 K/UL — LOW (ref 3.8–10.5)
WBC # FLD AUTO: 3.59 K/UL — LOW (ref 3.8–10.5)
WBC # FLD AUTO: 3.82 K/UL — SIGNIFICANT CHANGE UP (ref 3.8–10.5)
WBC # FLD AUTO: 3.82 K/UL — SIGNIFICANT CHANGE UP (ref 3.8–10.5)
WBC # FLD AUTO: 3.86 K/UL — SIGNIFICANT CHANGE UP (ref 3.8–10.5)
WBC # FLD AUTO: 3.87 K/UL — SIGNIFICANT CHANGE UP (ref 3.8–10.5)
WBC # FLD AUTO: 3.87 K/UL — SIGNIFICANT CHANGE UP (ref 3.8–10.5)
WBC # FLD AUTO: 3.98 K/UL — SIGNIFICANT CHANGE UP (ref 3.8–10.5)
WBC # FLD AUTO: 3.98 K/UL — SIGNIFICANT CHANGE UP (ref 3.8–10.5)
WBC # FLD AUTO: 4.24 K/UL — SIGNIFICANT CHANGE UP (ref 3.8–10.5)
WBC # FLD AUTO: 4.26 K/UL — SIGNIFICANT CHANGE UP (ref 3.8–10.5)
WBC # FLD AUTO: 4.26 K/UL — SIGNIFICANT CHANGE UP (ref 3.8–10.5)
WBC # FLD AUTO: 4.37 K/UL — SIGNIFICANT CHANGE UP (ref 3.8–10.5)
WBC # FLD AUTO: 4.37 K/UL — SIGNIFICANT CHANGE UP (ref 3.8–10.5)
WBC # FLD AUTO: 4.52 K/UL — SIGNIFICANT CHANGE UP (ref 3.8–10.5)
WBC # FLD AUTO: 4.52 K/UL — SIGNIFICANT CHANGE UP (ref 3.8–10.5)
WBC # FLD AUTO: 4.69 K/UL — SIGNIFICANT CHANGE UP (ref 3.8–10.5)
WBC # FLD AUTO: 4.69 K/UL — SIGNIFICANT CHANGE UP (ref 3.8–10.5)
WBC # FLD AUTO: 5.11 K/UL — SIGNIFICANT CHANGE UP (ref 3.8–10.5)
WBC # FLD AUTO: 5.33 K/UL — SIGNIFICANT CHANGE UP (ref 3.8–10.5)
WBC # FLD AUTO: 5.33 K/UL — SIGNIFICANT CHANGE UP (ref 3.8–10.5)
WBC # FLD AUTO: 5.4 K/UL — SIGNIFICANT CHANGE UP (ref 3.8–10.5)
WBC # FLD AUTO: 5.47 K/UL — SIGNIFICANT CHANGE UP (ref 3.8–10.5)
WBC # FLD AUTO: 5.47 K/UL — SIGNIFICANT CHANGE UP (ref 3.8–10.5)
WBC # FLD AUTO: 5.65 K/UL — SIGNIFICANT CHANGE UP (ref 3.8–10.5)
WBC # FLD AUTO: 5.67 K/UL — SIGNIFICANT CHANGE UP (ref 3.8–10.5)
WBC # FLD AUTO: 5.9 K/UL — SIGNIFICANT CHANGE UP (ref 3.8–10.5)
WBC # FLD AUTO: 5.9 K/UL — SIGNIFICANT CHANGE UP (ref 3.8–10.5)
WBC # FLD AUTO: 5.91 K/UL — SIGNIFICANT CHANGE UP (ref 3.8–10.5)
WBC # FLD AUTO: 5.92 K/UL — SIGNIFICANT CHANGE UP (ref 3.8–10.5)
WBC # FLD AUTO: 5.92 K/UL — SIGNIFICANT CHANGE UP (ref 3.8–10.5)
WBC # FLD AUTO: 6.03 K/UL — SIGNIFICANT CHANGE UP (ref 3.8–10.5)
WBC # FLD AUTO: 6.34 K/UL — SIGNIFICANT CHANGE UP (ref 3.8–10.5)
WBC # FLD AUTO: 6.34 K/UL — SIGNIFICANT CHANGE UP (ref 3.8–10.5)
WBC # FLD AUTO: 6.75 K/UL — SIGNIFICANT CHANGE UP (ref 3.8–10.5)
WBC # FLD AUTO: 6.75 K/UL — SIGNIFICANT CHANGE UP (ref 3.8–10.5)
WBC # FLD AUTO: 6.81 K/UL — SIGNIFICANT CHANGE UP (ref 3.8–10.5)
WBC # FLD AUTO: 6.95 K/UL — SIGNIFICANT CHANGE UP (ref 3.8–10.5)
WBC # FLD AUTO: 6.95 K/UL — SIGNIFICANT CHANGE UP (ref 3.8–10.5)
WBC # FLD AUTO: 7.16 K/UL — SIGNIFICANT CHANGE UP (ref 3.8–10.5)
WBC # FLD AUTO: 7.48 K/UL — SIGNIFICANT CHANGE UP (ref 3.8–10.5)
WBC # FLD AUTO: 7.48 K/UL — SIGNIFICANT CHANGE UP (ref 3.8–10.5)
WBC # FLD AUTO: 7.68 K/UL — SIGNIFICANT CHANGE UP (ref 3.8–10.5)
WBC # FLD AUTO: 7.75 K/UL — SIGNIFICANT CHANGE UP (ref 3.8–10.5)
WBC # FLD AUTO: 8.06 K/UL — SIGNIFICANT CHANGE UP (ref 3.8–10.5)
WBC # FLD AUTO: 8.22 K/UL — SIGNIFICANT CHANGE UP (ref 3.8–10.5)
WBC # FLD AUTO: 8.22 K/UL — SIGNIFICANT CHANGE UP (ref 3.8–10.5)
WBC # FLD AUTO: 8.5 K/UL — SIGNIFICANT CHANGE UP (ref 3.8–10.5)
WBC # FLD AUTO: 8.5 K/UL — SIGNIFICANT CHANGE UP (ref 3.8–10.5)
WBC # FLD AUTO: 8.61 K/UL — SIGNIFICANT CHANGE UP (ref 3.8–10.5)
WBC # FLD AUTO: 8.61 K/UL — SIGNIFICANT CHANGE UP (ref 3.8–10.5)
WBC # FLD AUTO: 8.64 K/UL — SIGNIFICANT CHANGE UP (ref 3.8–10.5)
WBC # FLD AUTO: 9.24 K/UL — SIGNIFICANT CHANGE UP (ref 3.8–10.5)
WBC # FLD AUTO: 9.24 K/UL — SIGNIFICANT CHANGE UP (ref 3.8–10.5)
WBC # FLD AUTO: 9.69 K/UL — SIGNIFICANT CHANGE UP (ref 3.8–10.5)
WBC # FLD AUTO: 9.76 K/UL — SIGNIFICANT CHANGE UP (ref 3.8–10.5)
WBC # FLD AUTO: 9.8 K/UL — SIGNIFICANT CHANGE UP (ref 3.8–10.5)
WBC # FLD AUTO: 9.86 K/UL — SIGNIFICANT CHANGE UP (ref 3.8–10.5)
WBC # FLD AUTO: 9.86 K/UL — SIGNIFICANT CHANGE UP (ref 3.8–10.5)
WBC # FLD AUTO: 9.91 K/UL — SIGNIFICANT CHANGE UP (ref 3.8–10.5)
WBC # FLD AUTO: 9.94 K/UL — SIGNIFICANT CHANGE UP (ref 3.8–10.5)
WBC UR QL: 0 /HPF — SIGNIFICANT CHANGE UP (ref 0–5)
WBC UR QL: 3 /HPF — SIGNIFICANT CHANGE UP (ref 0–5)
WBC UR QL: 31 /HPF — HIGH (ref 0–5)
WBC UR QL: 31 /HPF — HIGH (ref 0–5)
WBC UR QL: 44 /HPF — HIGH (ref 0–5)
WBC UR QL: 44 /HPF — HIGH (ref 0–5)
WBC UR QL: 56 /HPF — HIGH (ref 0–5)
WBC UR QL: 56 /HPF — HIGH (ref 0–5)
WBC UR QL: 6 /HPF — HIGH (ref 0–5)
WBC UR QL: 6 /HPF — HIGH (ref 0–5)
WBC UR QL: 63 /HPF — HIGH (ref 0–5)
WBC UR QL: 63 /HPF — HIGH (ref 0–5)
WBC UR QL: 8 /HPF — HIGH (ref 0–5)
WBC UR QL: 8 /HPF — HIGH (ref 0–5)
WBC UR QL: SIGNIFICANT CHANGE UP /HPF (ref 0–5)

## 2023-01-01 PROCEDURE — 0225U NFCT DS DNA&RNA 21 SARSCOV2: CPT

## 2023-01-01 PROCEDURE — 80053 COMPREHEN METABOLIC PANEL: CPT

## 2023-01-01 PROCEDURE — 99232 SBSQ HOSP IP/OBS MODERATE 35: CPT

## 2023-01-01 PROCEDURE — 78815 PET IMAGE W/CT SKULL-THIGH: CPT

## 2023-01-01 PROCEDURE — 93010 ELECTROCARDIOGRAM REPORT: CPT

## 2023-01-01 PROCEDURE — 70498 CT ANGIOGRAPHY NECK: CPT

## 2023-01-01 PROCEDURE — 70450 CT HEAD/BRAIN W/O DYE: CPT

## 2023-01-01 PROCEDURE — 84439 ASSAY OF FREE THYROXINE: CPT

## 2023-01-01 PROCEDURE — 99231 SBSQ HOSP IP/OBS SF/LOW 25: CPT

## 2023-01-01 PROCEDURE — 80048 BASIC METABOLIC PNL TOTAL CA: CPT

## 2023-01-01 PROCEDURE — 83036 HEMOGLOBIN GLYCOSYLATED A1C: CPT

## 2023-01-01 PROCEDURE — 83605 ASSAY OF LACTIC ACID: CPT

## 2023-01-01 PROCEDURE — 84146 ASSAY OF PROLACTIN: CPT

## 2023-01-01 PROCEDURE — 99254 IP/OBS CNSLTJ NEW/EST MOD 60: CPT

## 2023-01-01 PROCEDURE — 99222 1ST HOSP IP/OBS MODERATE 55: CPT

## 2023-01-01 PROCEDURE — 88112 CYTOPATH CELL ENHANCE TECH: CPT | Mod: 26

## 2023-01-01 PROCEDURE — 71275 CT ANGIOGRAPHY CHEST: CPT | Mod: MA

## 2023-01-01 PROCEDURE — 84100 ASSAY OF PHOSPHORUS: CPT

## 2023-01-01 PROCEDURE — 99291 CRITICAL CARE FIRST HOUR: CPT

## 2023-01-01 PROCEDURE — 82553 CREATINE MB FRACTION: CPT

## 2023-01-01 PROCEDURE — 97167 OT EVAL HIGH COMPLEX 60 MIN: CPT

## 2023-01-01 PROCEDURE — 96375 TX/PRO/DX INJ NEW DRUG ADDON: CPT

## 2023-01-01 PROCEDURE — 99233 SBSQ HOSP IP/OBS HIGH 50: CPT

## 2023-01-01 PROCEDURE — 99215 OFFICE O/P EST HI 40 MIN: CPT | Mod: 25

## 2023-01-01 PROCEDURE — 99233 SBSQ HOSP IP/OBS HIGH 50: CPT | Mod: GC

## 2023-01-01 PROCEDURE — 72131 CT LUMBAR SPINE W/O DYE: CPT | Mod: 26

## 2023-01-01 PROCEDURE — 71260 CT THORAX DX C+: CPT | Mod: 26,MA

## 2023-01-01 PROCEDURE — 84443 ASSAY THYROID STIM HORMONE: CPT

## 2023-01-01 PROCEDURE — 70450 CT HEAD/BRAIN W/O DYE: CPT | Mod: 26,MG

## 2023-01-01 PROCEDURE — 99223 1ST HOSP IP/OBS HIGH 75: CPT

## 2023-01-01 PROCEDURE — 99285 EMERGENCY DEPT VISIT HI MDM: CPT

## 2023-01-01 PROCEDURE — 82803 BLOOD GASES ANY COMBINATION: CPT

## 2023-01-01 PROCEDURE — 78815 PET IMAGE W/CT SKULL-THIGH: CPT | Mod: 26,PS

## 2023-01-01 PROCEDURE — 70552 MRI BRAIN STEM W/DYE: CPT

## 2023-01-01 PROCEDURE — 84481 FREE ASSAY (FT-3): CPT

## 2023-01-01 PROCEDURE — 36415 COLL VENOUS BLD VENIPUNCTURE: CPT

## 2023-01-01 PROCEDURE — 95700 EEG CONT REC W/VID EEG TECH: CPT

## 2023-01-01 PROCEDURE — 99024 POSTOP FOLLOW-UP VISIT: CPT

## 2023-01-01 PROCEDURE — 97163 PT EVAL HIGH COMPLEX 45 MIN: CPT

## 2023-01-01 PROCEDURE — G1004: CPT

## 2023-01-01 PROCEDURE — 71275 CT ANGIOGRAPHY CHEST: CPT | Mod: 26,MG

## 2023-01-01 PROCEDURE — 93306 TTE W/DOPPLER COMPLETE: CPT | Mod: 26

## 2023-01-01 PROCEDURE — 84484 ASSAY OF TROPONIN QUANT: CPT

## 2023-01-01 PROCEDURE — 82962 GLUCOSE BLOOD TEST: CPT

## 2023-01-01 PROCEDURE — 87086 URINE CULTURE/COLONY COUNT: CPT

## 2023-01-01 PROCEDURE — 80164 ASSAY DIPROPYLACETIC ACD TOT: CPT

## 2023-01-01 PROCEDURE — 97162 PT EVAL MOD COMPLEX 30 MIN: CPT

## 2023-01-01 PROCEDURE — 71275 CT ANGIOGRAPHY CHEST: CPT | Mod: 26,MA

## 2023-01-01 PROCEDURE — A9585: CPT

## 2023-01-01 PROCEDURE — 82550 ASSAY OF CK (CPK): CPT

## 2023-01-01 PROCEDURE — 93970 EXTREMITY STUDY: CPT | Mod: 26

## 2023-01-01 PROCEDURE — 93971 EXTREMITY STUDY: CPT

## 2023-01-01 PROCEDURE — 70553 MRI BRAIN STEM W/O & W/DYE: CPT | Mod: 26

## 2023-01-01 PROCEDURE — 83735 ASSAY OF MAGNESIUM: CPT

## 2023-01-01 PROCEDURE — 85730 THROMBOPLASTIN TIME PARTIAL: CPT

## 2023-01-01 PROCEDURE — 73120 X-RAY EXAM OF HAND: CPT | Mod: 26,RT

## 2023-01-01 PROCEDURE — 85027 COMPLETE CBC AUTOMATED: CPT

## 2023-01-01 PROCEDURE — 99215 OFFICE O/P EST HI 40 MIN: CPT

## 2023-01-01 PROCEDURE — 97530 THERAPEUTIC ACTIVITIES: CPT

## 2023-01-01 PROCEDURE — 99497 ADVNCD CARE PLAN 30 MIN: CPT | Mod: GC,25

## 2023-01-01 PROCEDURE — G2212 PROLONG OUTPT/OFFICE VIS: CPT

## 2023-01-01 PROCEDURE — 82947 ASSAY GLUCOSE BLOOD QUANT: CPT

## 2023-01-01 PROCEDURE — 72128 CT CHEST SPINE W/O DYE: CPT | Mod: 26

## 2023-01-01 PROCEDURE — 99232 SBSQ HOSP IP/OBS MODERATE 35: CPT | Mod: GC

## 2023-01-01 PROCEDURE — 99285 EMERGENCY DEPT VISIT HI MDM: CPT | Mod: 25

## 2023-01-01 PROCEDURE — 97112 NEUROMUSCULAR REEDUCATION: CPT

## 2023-01-01 PROCEDURE — 70553 MRI BRAIN STEM W/O & W/DYE: CPT

## 2023-01-01 PROCEDURE — 71045 X-RAY EXAM CHEST 1 VIEW: CPT | Mod: 26

## 2023-01-01 PROCEDURE — 70552 MRI BRAIN STEM W/DYE: CPT | Mod: 26

## 2023-01-01 PROCEDURE — 99497 ADVNCD CARE PLAN 30 MIN: CPT | Mod: 25

## 2023-01-01 PROCEDURE — 70450 CT HEAD/BRAIN W/O DYE: CPT | Mod: 26

## 2023-01-01 PROCEDURE — 93970 EXTREMITY STUDY: CPT

## 2023-01-01 PROCEDURE — 88341 IMHCHEM/IMCYTCHM EA ADD ANTB: CPT | Mod: 26

## 2023-01-01 PROCEDURE — 82330 ASSAY OF CALCIUM: CPT

## 2023-01-01 PROCEDURE — 77300 RADIATION THERAPY DOSE PLAN: CPT | Mod: 26

## 2023-01-01 PROCEDURE — 97110 THERAPEUTIC EXERCISES: CPT

## 2023-01-01 PROCEDURE — 77295 3-D RADIOTHERAPY PLAN: CPT | Mod: 26

## 2023-01-01 PROCEDURE — 99255 IP/OBS CONSLTJ NEW/EST HI 80: CPT | Mod: GC

## 2023-01-01 PROCEDURE — 61781 SCAN PROC CRANIAL INTRA: CPT

## 2023-01-01 PROCEDURE — 99213 OFFICE O/P EST LOW 20 MIN: CPT

## 2023-01-01 PROCEDURE — 97535 SELF CARE MNGMENT TRAINING: CPT

## 2023-01-01 PROCEDURE — 85018 HEMOGLOBIN: CPT

## 2023-01-01 PROCEDURE — 92610 EVALUATE SWALLOWING FUNCTION: CPT

## 2023-01-01 PROCEDURE — 88307 TISSUE EXAM BY PATHOLOGIST: CPT

## 2023-01-01 PROCEDURE — C8929: CPT

## 2023-01-01 PROCEDURE — 92507 TX SP LANG VOICE COMM INDIV: CPT

## 2023-01-01 PROCEDURE — 70496 CT ANGIOGRAPHY HEAD: CPT

## 2023-01-01 PROCEDURE — 70546 MR ANGIOGRAPH HEAD W/O&W/DYE: CPT | Mod: 26

## 2023-01-01 PROCEDURE — 61510 CRNEC TREPH EXC BRN TUM STTL: CPT

## 2023-01-01 PROCEDURE — 93005 ELECTROCARDIOGRAM TRACING: CPT

## 2023-01-01 PROCEDURE — 99232 SBSQ HOSP IP/OBS MODERATE 35: CPT | Mod: 24

## 2023-01-01 PROCEDURE — 71045 X-RAY EXAM CHEST 1 VIEW: CPT

## 2023-01-01 PROCEDURE — 74177 CT ABD & PELVIS W/CONTRAST: CPT | Mod: 26,MA

## 2023-01-01 PROCEDURE — C9399: CPT

## 2023-01-01 PROCEDURE — 97166 OT EVAL MOD COMPLEX 45 MIN: CPT

## 2023-01-01 PROCEDURE — 84207 ASSAY OF VITAMIN B-6: CPT

## 2023-01-01 PROCEDURE — 99222 1ST HOSP IP/OBS MODERATE 55: CPT | Mod: GC

## 2023-01-01 PROCEDURE — 87641 MR-STAPH DNA AMP PROBE: CPT

## 2023-01-01 PROCEDURE — 86901 BLOOD TYPING SEROLOGIC RH(D): CPT

## 2023-01-01 PROCEDURE — 74177 CT ABD & PELVIS W/CONTRAST: CPT | Mod: 26

## 2023-01-01 PROCEDURE — 92523 SPEECH SOUND LANG COMPREHEN: CPT

## 2023-01-01 PROCEDURE — 84295 ASSAY OF SERUM SODIUM: CPT

## 2023-01-01 PROCEDURE — 84132 ASSAY OF SERUM POTASSIUM: CPT

## 2023-01-01 PROCEDURE — 76705 ECHO EXAM OF ABDOMEN: CPT

## 2023-01-01 PROCEDURE — 84540 ASSAY OF URINE/UREA-N: CPT

## 2023-01-01 PROCEDURE — 84300 ASSAY OF URINE SODIUM: CPT

## 2023-01-01 PROCEDURE — 85014 HEMATOCRIT: CPT

## 2023-01-01 PROCEDURE — 83690 ASSAY OF LIPASE: CPT

## 2023-01-01 PROCEDURE — 83010 ASSAY OF HAPTOGLOBIN QUANT: CPT

## 2023-01-01 PROCEDURE — C1713: CPT

## 2023-01-01 PROCEDURE — 85025 COMPLETE CBC W/AUTO DIFF WBC: CPT

## 2023-01-01 PROCEDURE — 95816 EEG AWAKE AND DROWSY: CPT | Mod: 26

## 2023-01-01 PROCEDURE — 99239 HOSP IP/OBS DSCHRG MGMT >30: CPT

## 2023-01-01 PROCEDURE — 93306 TTE W/DOPPLER COMPLETE: CPT

## 2023-01-01 PROCEDURE — 82435 ASSAY OF BLOOD CHLORIDE: CPT

## 2023-01-01 PROCEDURE — A9552: CPT

## 2023-01-01 PROCEDURE — 71275 CT ANGIOGRAPHY CHEST: CPT | Mod: 26,ME

## 2023-01-01 PROCEDURE — 97129 THER IVNTJ 1ST 15 MIN: CPT

## 2023-01-01 PROCEDURE — 70551 MRI BRAIN STEM W/O DYE: CPT | Mod: 26

## 2023-01-01 PROCEDURE — 97130 THER IVNTJ EA ADDL 15 MIN: CPT

## 2023-01-01 PROCEDURE — 87635 SARS-COV-2 COVID-19 AMP PRB: CPT

## 2023-01-01 PROCEDURE — 95720 EEG PHY/QHP EA INCR W/VEEG: CPT

## 2023-01-01 PROCEDURE — 82570 ASSAY OF URINE CREATININE: CPT

## 2023-01-01 PROCEDURE — 74177 CT ABD & PELVIS W/CONTRAST: CPT | Mod: MA

## 2023-01-01 PROCEDURE — 87640 STAPH A DNA AMP PROBE: CPT

## 2023-01-01 PROCEDURE — 71275 CT ANGIOGRAPHY CHEST: CPT | Mod: 26

## 2023-01-01 PROCEDURE — 77290 THER RAD SIMULAJ FIELD CPLX: CPT | Mod: 26

## 2023-01-01 PROCEDURE — 70498 CT ANGIOGRAPHY NECK: CPT | Mod: 26

## 2023-01-01 PROCEDURE — 76705 ECHO EXAM OF ABDOMEN: CPT | Mod: 26

## 2023-01-01 PROCEDURE — 70543 MRI ORBT/FAC/NCK W/O &W/DYE: CPT | Mod: 26

## 2023-01-01 PROCEDURE — 99223 1ST HOSP IP/OBS HIGH 75: CPT | Mod: GC

## 2023-01-01 PROCEDURE — 71260 CT THORAX DX C+: CPT | Mod: 26

## 2023-01-01 PROCEDURE — 93971 EXTREMITY STUDY: CPT | Mod: 26,LT

## 2023-01-01 PROCEDURE — 80076 HEPATIC FUNCTION PANEL: CPT

## 2023-01-01 PROCEDURE — 88342 IMHCHEM/IMCYTCHM 1ST ANTB: CPT | Mod: 26

## 2023-01-01 PROCEDURE — 90791 PSYCH DIAGNOSTIC EVALUATION: CPT

## 2023-01-01 PROCEDURE — 85610 PROTHROMBIN TIME: CPT

## 2023-01-01 PROCEDURE — C9254: CPT

## 2023-01-01 PROCEDURE — 97168 OT RE-EVAL EST PLAN CARE: CPT

## 2023-01-01 PROCEDURE — 71275 CT ANGIOGRAPHY CHEST: CPT

## 2023-01-01 PROCEDURE — C1769: CPT

## 2023-01-01 PROCEDURE — 97116 GAIT TRAINING THERAPY: CPT

## 2023-01-01 PROCEDURE — 71275 CT ANGIOGRAPHY CHEST: CPT | Mod: MG

## 2023-01-01 PROCEDURE — 71046 X-RAY EXAM CHEST 2 VIEWS: CPT

## 2023-01-01 PROCEDURE — 85379 FIBRIN DEGRADATION QUANT: CPT

## 2023-01-01 PROCEDURE — 71260 CT THORAX DX C+: CPT | Mod: MA

## 2023-01-01 PROCEDURE — 82607 VITAMIN B-12: CPT

## 2023-01-01 PROCEDURE — 84145 PROCALCITONIN (PCT): CPT

## 2023-01-01 PROCEDURE — 81001 URINALYSIS AUTO W/SCOPE: CPT

## 2023-01-01 PROCEDURE — 82040 ASSAY OF SERUM ALBUMIN: CPT

## 2023-01-01 PROCEDURE — 87040 BLOOD CULTURE FOR BACTERIA: CPT

## 2023-01-01 PROCEDURE — 70546 MR ANGIOGRAPH HEAD W/O&W/DYE: CPT

## 2023-01-01 PROCEDURE — 88342 IMHCHEM/IMCYTCHM 1ST ANTB: CPT

## 2023-01-01 PROCEDURE — 70450 CT HEAD/BRAIN W/O DYE: CPT | Mod: 26,MA

## 2023-01-01 PROCEDURE — 93010 ELECTROCARDIOGRAM REPORT: CPT | Mod: 76

## 2023-01-01 PROCEDURE — 70450 CT HEAD/BRAIN W/O DYE: CPT | Mod: MA

## 2023-01-01 PROCEDURE — 77334 RADIATION TREATMENT AID(S): CPT | Mod: 26

## 2023-01-01 PROCEDURE — 70551 MRI BRAIN STEM W/O DYE: CPT

## 2023-01-01 PROCEDURE — 96374 THER/PROPH/DIAG INJ IV PUSH: CPT

## 2023-01-01 PROCEDURE — 84156 ASSAY OF PROTEIN URINE: CPT

## 2023-01-01 PROCEDURE — 99254 IP/OBS CNSLTJ NEW/EST MOD 60: CPT | Mod: GC,24

## 2023-01-01 PROCEDURE — 99253 IP/OBS CNSLTJ NEW/EST LOW 45: CPT | Mod: GC

## 2023-01-01 PROCEDURE — 88305 TISSUE EXAM BY PATHOLOGIST: CPT | Mod: 26

## 2023-01-01 PROCEDURE — 88305 TISSUE EXAM BY PATHOLOGIST: CPT

## 2023-01-01 PROCEDURE — 83935 ASSAY OF URINE OSMOLALITY: CPT

## 2023-01-01 PROCEDURE — 70450 CT HEAD/BRAIN W/O DYE: CPT | Mod: 26,59

## 2023-01-01 PROCEDURE — 99497 ADVNCD CARE PLAN 30 MIN: CPT

## 2023-01-01 PROCEDURE — 86900 BLOOD TYPING SEROLOGIC ABO: CPT

## 2023-01-01 PROCEDURE — 83615 LACTATE (LD) (LDH) ENZYME: CPT

## 2023-01-01 PROCEDURE — 88341 IMHCHEM/IMCYTCHM EA ADD ANTB: CPT

## 2023-01-01 PROCEDURE — 87186 SC STD MICRODIL/AGAR DIL: CPT

## 2023-01-01 PROCEDURE — 70496 CT ANGIOGRAPHY HEAD: CPT | Mod: 26

## 2023-01-01 PROCEDURE — 84425 ASSAY OF VITAMIN B-1: CPT

## 2023-01-01 PROCEDURE — 99214 OFFICE O/P EST MOD 30 MIN: CPT | Mod: 25

## 2023-01-01 PROCEDURE — 88307 TISSUE EXAM BY PATHOLOGIST: CPT | Mod: 26

## 2023-01-01 PROCEDURE — 84702 CHORIONIC GONADOTROPIN TEST: CPT

## 2023-01-01 PROCEDURE — 99254 IP/OBS CNSLTJ NEW/EST MOD 60: CPT | Mod: GC

## 2023-01-01 PROCEDURE — 99239 HOSP IP/OBS DSCHRG MGMT >30: CPT | Mod: GC

## 2023-01-01 PROCEDURE — 95711 VEEG 2-12 HR UNMONITORED: CPT

## 2023-01-01 PROCEDURE — 77435 SBRT MANAGEMENT: CPT

## 2023-01-01 PROCEDURE — 99498 ADVNCD CARE PLAN ADDL 30 MIN: CPT | Mod: 25

## 2023-01-01 PROCEDURE — 87077 CULTURE AEROBIC IDENTIFY: CPT

## 2023-01-01 PROCEDURE — 99283 EMERGENCY DEPT VISIT LOW MDM: CPT

## 2023-01-01 PROCEDURE — 86850 RBC ANTIBODY SCREEN: CPT

## 2023-01-01 PROCEDURE — 99221 1ST HOSP IP/OBS SF/LOW 40: CPT

## 2023-01-01 PROCEDURE — 84133 ASSAY OF URINE POTASSIUM: CPT

## 2023-01-01 PROCEDURE — C1889: CPT

## 2023-01-01 PROCEDURE — 95718 EEG PHYS/QHP 2-12 HR W/VEEG: CPT

## 2023-01-01 PROCEDURE — 88112 CYTOPATH CELL ENHANCE TECH: CPT

## 2023-01-01 PROCEDURE — 76498 UNLISTED MR PROCEDURE: CPT

## 2023-01-01 PROCEDURE — 85045 AUTOMATED RETICULOCYTE COUNT: CPT

## 2023-01-01 PROCEDURE — 82533 TOTAL CORTISOL: CPT

## 2023-01-01 PROCEDURE — 70543 MRI ORBT/FAC/NCK W/O &W/DYE: CPT

## 2023-01-01 PROCEDURE — 97165 OT EVAL LOW COMPLEX 30 MIN: CPT

## 2023-01-01 PROCEDURE — 95714 VEEG EA 12-26 HR UNMNTR: CPT

## 2023-01-01 PROCEDURE — 99214 OFFICE O/P EST MOD 30 MIN: CPT

## 2023-01-01 PROCEDURE — 99255 IP/OBS CONSLTJ NEW/EST HI 80: CPT

## 2023-01-01 DEVICE — GELFOAM SZ 100 UNCOMPRESSED: Type: IMPLANTABLE DEVICE | Site: RIGHT | Status: FUNCTIONAL

## 2023-01-01 DEVICE — KIT A-LINE 1LUM 20G X 12CM SAFE KIT: Type: IMPLANTABLE DEVICE | Site: RIGHT | Status: FUNCTIONAL

## 2023-01-01 DEVICE — SURGICEL 2 X 14": Type: IMPLANTABLE DEVICE | Site: RIGHT | Status: FUNCTIONAL

## 2023-01-01 DEVICE — SURGIFLO MATRIX WITH THROMBIN KIT: Type: IMPLANTABLE DEVICE | Site: RIGHT | Status: FUNCTIONAL

## 2023-01-01 DEVICE — COVER LARGE BUR HOLE 18.5MM: Type: IMPLANTABLE DEVICE | Site: RIGHT | Status: FUNCTIONAL

## 2023-01-01 DEVICE — MAYFIELD SKULL PIN ADULT PLASTIC: Type: IMPLANTABLE DEVICE | Site: RIGHT | Status: FUNCTIONAL

## 2023-01-01 DEVICE — SURGICEL FIBRILLAR 2 X 4": Type: IMPLANTABLE DEVICE | Site: RIGHT | Status: FUNCTIONAL

## 2023-01-01 DEVICE — SCRX-DRIVE 1.5X4MM: Type: IMPLANTABLE DEVICE | Site: RIGHT | Status: FUNCTIONAL

## 2023-01-01 RX ORDER — ACETAMINOPHEN 500 MG
650 TABLET ORAL EVERY 6 HOURS
Refills: 0 | Status: DISCONTINUED | OUTPATIENT
Start: 2023-01-01 | End: 2023-01-01

## 2023-01-01 RX ORDER — MORPHINE SULFATE 50 MG/1
1 CAPSULE, EXTENDED RELEASE ORAL
Refills: 0 | Status: DISCONTINUED | OUTPATIENT
Start: 2023-01-01 | End: 2023-01-01

## 2023-01-01 RX ORDER — METOPROLOL TARTRATE 50 MG
2.5 TABLET ORAL EVERY 12 HOURS
Refills: 0 | Status: DISCONTINUED | OUTPATIENT
Start: 2023-01-01 | End: 2023-01-01

## 2023-01-01 RX ORDER — LEVETIRACETAM 250 MG/1
1 TABLET, FILM COATED ORAL
Qty: 60 | Refills: 0
Start: 2023-01-01 | End: 2023-01-01

## 2023-01-01 RX ORDER — LANOLIN ALCOHOL/MO/W.PET/CERES
3 CREAM (GRAM) TOPICAL AT BEDTIME
Refills: 0 | Status: DISCONTINUED | OUTPATIENT
Start: 2023-01-01 | End: 2023-01-01

## 2023-01-01 RX ORDER — ROBINUL 0.2 MG/ML
0.4 INJECTION INTRAMUSCULAR; INTRAVENOUS EVERY 6 HOURS
Refills: 0 | Status: DISCONTINUED | OUTPATIENT
Start: 2023-01-01 | End: 2023-01-01

## 2023-01-01 RX ORDER — METOCLOPRAMIDE 10 MG/1
10 TABLET ORAL
Qty: 60 | Refills: 5 | Status: ACTIVE | COMMUNITY
Start: 2022-07-07 | End: 1900-01-01

## 2023-01-01 RX ORDER — DEXTROSE 50 % IN WATER 50 %
25 SYRINGE (ML) INTRAVENOUS ONCE
Refills: 0 | Status: DISCONTINUED | OUTPATIENT
Start: 2023-01-01 | End: 2023-01-01

## 2023-01-01 RX ORDER — SENNA PLUS 8.6 MG/1
2 TABLET ORAL AT BEDTIME
Refills: 0 | Status: DISCONTINUED | OUTPATIENT
Start: 2023-01-01 | End: 2023-01-01

## 2023-01-01 RX ORDER — INSULIN LISPRO 100/ML
0 VIAL (ML) SUBCUTANEOUS
Qty: 0 | Refills: 0 | DISCHARGE
Start: 2023-01-01

## 2023-01-01 RX ORDER — DEXAMETHASONE 0.5 MG/5ML
4 ELIXIR ORAL EVERY 6 HOURS
Refills: 0 | Status: DISCONTINUED | OUTPATIENT
Start: 2023-01-01 | End: 2023-01-01

## 2023-01-01 RX ORDER — OXYCODONE HYDROCHLORIDE 5 MG/1
1 TABLET ORAL
Qty: 0 | Refills: 0 | DISCHARGE
Start: 2023-01-01

## 2023-01-01 RX ORDER — SODIUM CHLORIDE 9 MG/ML
1000 INJECTION INTRAMUSCULAR; INTRAVENOUS; SUBCUTANEOUS ONCE
Refills: 0 | Status: COMPLETED | OUTPATIENT
Start: 2023-01-01 | End: 2023-01-01

## 2023-01-01 RX ORDER — TRAMADOL HYDROCHLORIDE 50 MG/1
25 TABLET ORAL EVERY 8 HOURS
Refills: 0 | Status: DISCONTINUED | OUTPATIENT
Start: 2023-01-01 | End: 2023-01-01

## 2023-01-01 RX ORDER — DEXTROSE 50 % IN WATER 50 %
15 SYRINGE (ML) INTRAVENOUS ONCE
Refills: 0 | Status: DISCONTINUED | OUTPATIENT
Start: 2023-01-01 | End: 2023-01-01

## 2023-01-01 RX ORDER — INSULIN GLARGINE 100 [IU]/ML
15 INJECTION, SOLUTION SUBCUTANEOUS AT BEDTIME
Refills: 0 | Status: DISCONTINUED | OUTPATIENT
Start: 2023-01-01 | End: 2023-01-01

## 2023-01-01 RX ORDER — BEVACIZUMAB 400 MG/16ML
800 INJECTION, SOLUTION INTRAVENOUS ONCE
Refills: 0 | Status: COMPLETED | OUTPATIENT
Start: 2023-01-01 | End: 2023-01-01

## 2023-01-01 RX ORDER — HYDROMORPHONE HYDROCHLORIDE 2 MG/ML
0.5 INJECTION INTRAMUSCULAR; INTRAVENOUS; SUBCUTANEOUS EVERY 4 HOURS
Refills: 0 | Status: DISCONTINUED | OUTPATIENT
Start: 2023-01-01 | End: 2023-01-01

## 2023-01-01 RX ORDER — INSULIN LISPRO 100/ML
5 VIAL (ML) SUBCUTANEOUS
Refills: 0 | Status: DISCONTINUED | OUTPATIENT
Start: 2023-01-01 | End: 2023-01-01

## 2023-01-01 RX ORDER — PIPERACILLIN AND TAZOBACTAM 4; .5 G/20ML; G/20ML
3.38 INJECTION, POWDER, LYOPHILIZED, FOR SOLUTION INTRAVENOUS ONCE
Refills: 0 | Status: COMPLETED | OUTPATIENT
Start: 2023-01-01 | End: 2023-01-01

## 2023-01-01 RX ORDER — POLYETHYLENE GLYCOL 3350 17 G/17G
17 POWDER, FOR SOLUTION ORAL DAILY
Refills: 0 | Status: DISCONTINUED | OUTPATIENT
Start: 2023-01-01 | End: 2023-01-01

## 2023-01-01 RX ORDER — SODIUM CHLORIDE 9 MG/ML
1000 INJECTION, SOLUTION INTRAVENOUS
Refills: 0 | Status: DISCONTINUED | OUTPATIENT
Start: 2023-01-01 | End: 2023-01-01

## 2023-01-01 RX ORDER — INSULIN LISPRO 100/ML
VIAL (ML) SUBCUTANEOUS
Refills: 0 | Status: DISCONTINUED | OUTPATIENT
Start: 2023-01-01 | End: 2023-01-01

## 2023-01-01 RX ORDER — LEVETIRACETAM 250 MG/1
500 TABLET, FILM COATED ORAL EVERY 12 HOURS
Refills: 0 | Status: DISCONTINUED | OUTPATIENT
Start: 2023-01-01 | End: 2023-01-01

## 2023-01-01 RX ORDER — MUPIROCIN 20 MG/G
1 OINTMENT TOPICAL
Refills: 0 | Status: DISCONTINUED | OUTPATIENT
Start: 2023-01-01 | End: 2023-01-01

## 2023-01-01 RX ORDER — INSULIN GLARGINE 100 [IU]/ML
3 INJECTION, SOLUTION SUBCUTANEOUS AT BEDTIME
Refills: 0 | Status: DISCONTINUED | OUTPATIENT
Start: 2023-01-01 | End: 2023-01-01

## 2023-01-01 RX ORDER — MUPIROCIN 20 MG/G
1 OINTMENT TOPICAL EVERY 12 HOURS
Refills: 0 | Status: DISCONTINUED | OUTPATIENT
Start: 2023-01-01 | End: 2023-01-01

## 2023-01-01 RX ORDER — DEXAMETHASONE 0.5 MG/5ML
4 ELIXIR ORAL EVERY 6 HOURS
Refills: 0 | Status: COMPLETED | OUTPATIENT
Start: 2023-01-01 | End: 2023-01-01

## 2023-01-01 RX ORDER — POLYETHYLENE GLYCOL 3350 17 G/17G
17 POWDER, FOR SOLUTION ORAL EVERY 24 HOURS
Refills: 0 | Status: DISCONTINUED | OUTPATIENT
Start: 2023-01-01 | End: 2023-01-01

## 2023-01-01 RX ORDER — PANTOPRAZOLE SODIUM 20 MG/1
40 TABLET, DELAYED RELEASE ORAL
Refills: 0 | Status: DISCONTINUED | OUTPATIENT
Start: 2023-01-01 | End: 2023-01-01

## 2023-01-01 RX ORDER — VANCOMYCIN HCL 1 G
1000 VIAL (EA) INTRAVENOUS ONCE
Refills: 0 | Status: COMPLETED | OUTPATIENT
Start: 2023-01-01 | End: 2023-01-01

## 2023-01-01 RX ORDER — ACETAMINOPHEN 500 MG
975 TABLET ORAL ONCE
Refills: 0 | Status: COMPLETED | OUTPATIENT
Start: 2023-01-01 | End: 2023-01-01

## 2023-01-01 RX ORDER — INSULIN GLARGINE 100 [IU]/ML
8 INJECTION, SOLUTION SUBCUTANEOUS AT BEDTIME
Refills: 0 | Status: DISCONTINUED | OUTPATIENT
Start: 2023-01-01 | End: 2023-01-01

## 2023-01-01 RX ORDER — PETROLATUM,WHITE
1 JELLY (GRAM) TOPICAL
Refills: 0 | Status: DISCONTINUED | OUTPATIENT
Start: 2023-01-01 | End: 2023-01-01

## 2023-01-01 RX ORDER — CHLORHEXIDINE GLUCONATE 213 G/1000ML
1 SOLUTION TOPICAL DAILY
Refills: 0 | Status: DISCONTINUED | OUTPATIENT
Start: 2023-01-01 | End: 2023-01-01

## 2023-01-01 RX ORDER — DEXAMETHASONE 0.5 MG/5ML
2 ELIXIR ORAL EVERY 6 HOURS
Refills: 0 | Status: DISCONTINUED | OUTPATIENT
Start: 2023-01-01 | End: 2023-01-01

## 2023-01-01 RX ORDER — ENOXAPARIN SODIUM 100 MG/ML
40 INJECTION SUBCUTANEOUS EVERY 24 HOURS
Refills: 0 | Status: DISCONTINUED | OUTPATIENT
Start: 2023-01-01 | End: 2023-01-01

## 2023-01-01 RX ORDER — DEXTROSE 50 % IN WATER 50 %
15 SYRINGE (ML) INTRAVENOUS ONCE
Refills: 0 | Status: COMPLETED | OUTPATIENT
Start: 2023-01-01 | End: 2023-01-01

## 2023-01-01 RX ORDER — SENNOSIDES 8.6 MG TABLETS 8.6 MG/1
8.6 TABLET ORAL
Refills: 0 | Status: ACTIVE | COMMUNITY
Start: 2023-01-01

## 2023-01-01 RX ORDER — OXYCODONE HYDROCHLORIDE 5 MG/1
10 TABLET ORAL ONCE
Refills: 0 | Status: DISCONTINUED | OUTPATIENT
Start: 2023-01-01 | End: 2023-01-01

## 2023-01-01 RX ORDER — ATORVASTATIN CALCIUM 40 MG/1
40 TABLET, FILM COATED ORAL
Refills: 0 | Status: ACTIVE | COMMUNITY
Start: 2023-01-01

## 2023-01-01 RX ORDER — MORPHINE SULFATE 50 MG/1
4 CAPSULE, EXTENDED RELEASE ORAL
Refills: 0 | Status: DISCONTINUED | OUTPATIENT
Start: 2023-01-01 | End: 2023-01-01

## 2023-01-01 RX ORDER — CARBOPLATIN 50 MG
520 VIAL (EA) INTRAVENOUS ONCE
Refills: 0 | Status: COMPLETED | OUTPATIENT
Start: 2023-01-01 | End: 2023-01-01

## 2023-01-01 RX ORDER — DEXAMETHASONE 0.5 MG/5ML
2 ELIXIR ORAL EVERY 12 HOURS
Refills: 0 | Status: DISCONTINUED | OUTPATIENT
Start: 2023-01-01 | End: 2023-01-01

## 2023-01-01 RX ORDER — OXYCODONE HYDROCHLORIDE 5 MG/1
5 TABLET ORAL ONCE
Refills: 0 | Status: DISCONTINUED | OUTPATIENT
Start: 2023-01-01 | End: 2023-01-01

## 2023-01-01 RX ORDER — DEXAMETHASONE 0.5 MG/5ML
1 ELIXIR ORAL
Qty: 60 | Refills: 0
Start: 2023-01-01 | End: 2023-01-01

## 2023-01-01 RX ORDER — LEVETIRACETAM 250 MG/1
2000 TABLET, FILM COATED ORAL ONCE
Refills: 0 | Status: DISCONTINUED | OUTPATIENT
Start: 2023-01-01 | End: 2023-01-01

## 2023-01-01 RX ORDER — SODIUM CHLORIDE 9 MG/ML
1000 INJECTION INTRAMUSCULAR; INTRAVENOUS; SUBCUTANEOUS
Refills: 0 | Status: DISCONTINUED | OUTPATIENT
Start: 2023-01-01 | End: 2023-01-01

## 2023-01-01 RX ORDER — ACETAMINOPHEN 500 MG
1000 TABLET ORAL ONCE
Refills: 0 | Status: DISCONTINUED | OUTPATIENT
Start: 2023-01-01 | End: 2023-01-01

## 2023-01-01 RX ORDER — SODIUM ZIRCONIUM CYCLOSILICATE 10 G/10G
5 POWDER, FOR SUSPENSION ORAL ONCE
Refills: 0 | Status: COMPLETED | OUTPATIENT
Start: 2023-01-01 | End: 2023-01-01

## 2023-01-01 RX ORDER — MIDAZOLAM HYDROCHLORIDE 1 MG/ML
2 INJECTION, SOLUTION INTRAMUSCULAR; INTRAVENOUS ONCE
Refills: 0 | Status: DISCONTINUED | OUTPATIENT
Start: 2023-01-01 | End: 2023-01-01

## 2023-01-01 RX ORDER — ONDANSETRON 8 MG/1
4 TABLET, FILM COATED ORAL EVERY 8 HOURS
Refills: 0 | Status: DISCONTINUED | OUTPATIENT
Start: 2023-01-01 | End: 2023-01-01

## 2023-01-01 RX ORDER — MORPHINE SULFATE 50 MG/1
4 CAPSULE, EXTENDED RELEASE ORAL ONCE
Refills: 0 | Status: DISCONTINUED | OUTPATIENT
Start: 2023-01-01 | End: 2023-01-01

## 2023-01-01 RX ORDER — DEXAMETHASONE 0.5 MG/5ML
1 ELIXIR ORAL
Qty: 12 | Refills: 0
Start: 2023-01-01

## 2023-01-01 RX ORDER — PANTOPRAZOLE 40 MG/1
40 TABLET, DELAYED RELEASE ORAL
Qty: 30 | Refills: 1 | Status: ACTIVE | COMMUNITY
Start: 2023-01-01 | End: 1900-01-01

## 2023-01-01 RX ORDER — INSULIN LISPRO 100/ML
6 VIAL (ML) SUBCUTANEOUS
Refills: 0 | Status: DISCONTINUED | OUTPATIENT
Start: 2023-01-01 | End: 2023-01-01

## 2023-01-01 RX ORDER — INSULIN GLARGINE 100 [IU]/ML
10 INJECTION, SOLUTION SUBCUTANEOUS AT BEDTIME
Refills: 0 | Status: DISCONTINUED | OUTPATIENT
Start: 2023-01-01 | End: 2023-01-01

## 2023-01-01 RX ORDER — OXYCODONE HYDROCHLORIDE 5 MG/1
5 TABLET ORAL EVERY 4 HOURS
Refills: 0 | Status: DISCONTINUED | OUTPATIENT
Start: 2023-01-01 | End: 2023-01-01

## 2023-01-01 RX ORDER — DEXAMETHASONE 0.5 MG/5ML
2 ELIXIR ORAL EVERY 8 HOURS
Refills: 0 | Status: DISCONTINUED | OUTPATIENT
Start: 2023-01-01 | End: 2023-01-01

## 2023-01-01 RX ORDER — PETROLATUM,WHITE
1 JELLY (GRAM) TOPICAL
Qty: 0 | Refills: 0 | DISCHARGE
Start: 2023-01-01

## 2023-01-01 RX ORDER — DEXAMETHASONE 0.5 MG/5ML
2 ELIXIR ORAL
Refills: 0 | Status: DISCONTINUED | OUTPATIENT
Start: 2023-01-01 | End: 2023-01-01

## 2023-01-01 RX ORDER — INSULIN LISPRO 100/ML
2 VIAL (ML) SUBCUTANEOUS
Refills: 0 | Status: DISCONTINUED | OUTPATIENT
Start: 2023-01-01 | End: 2023-01-01

## 2023-01-01 RX ORDER — DEXAMETHASONE 4 MG/1
4 TABLET ORAL
Qty: 30 | Refills: 5 | Status: DISCONTINUED | COMMUNITY
Start: 2022-07-07 | End: 2023-01-01

## 2023-01-01 RX ORDER — INSULIN LISPRO 100/ML
VIAL (ML) SUBCUTANEOUS EVERY 6 HOURS
Refills: 0 | Status: DISCONTINUED | OUTPATIENT
Start: 2023-01-01 | End: 2023-01-01

## 2023-01-01 RX ORDER — LEVETIRACETAM 250 MG/1
1500 TABLET, FILM COATED ORAL EVERY 12 HOURS
Refills: 0 | Status: DISCONTINUED | OUTPATIENT
Start: 2023-01-01 | End: 2023-01-01

## 2023-01-01 RX ORDER — PANTOPRAZOLE SODIUM 40 MG/10ML
40 INJECTION, POWDER, FOR SOLUTION INTRAVENOUS
Qty: 30 | Refills: 3 | Status: DISCONTINUED | COMMUNITY
End: 2023-01-01

## 2023-01-01 RX ORDER — POTASSIUM CHLORIDE 20 MEQ
10 PACKET (EA) ORAL
Refills: 0 | Status: COMPLETED | OUTPATIENT
Start: 2023-01-01 | End: 2023-01-01

## 2023-01-01 RX ORDER — REPAGLINIDE 1 MG/1
2 TABLET ORAL
Qty: 60 | Refills: 0
Start: 2023-01-01 | End: 2023-01-01

## 2023-01-01 RX ORDER — INSULIN LISPRO 100/ML
8 VIAL (ML) SUBCUTANEOUS
Refills: 0 | Status: DISCONTINUED | OUTPATIENT
Start: 2023-01-01 | End: 2023-01-01

## 2023-01-01 RX ORDER — INSULIN LISPRO 100/ML
13 VIAL (ML) SUBCUTANEOUS
Refills: 0 | Status: DISCONTINUED | OUTPATIENT
Start: 2023-01-01 | End: 2023-01-01

## 2023-01-01 RX ORDER — VANCOMYCIN HCL 1 G
VIAL (EA) INTRAVENOUS
Refills: 0 | Status: DISCONTINUED | OUTPATIENT
Start: 2023-01-01 | End: 2023-01-01

## 2023-01-01 RX ORDER — METOPROLOL TARTRATE 50 MG
12.5 TABLET ORAL ONCE
Refills: 0 | Status: COMPLETED | OUTPATIENT
Start: 2023-01-01 | End: 2023-01-01

## 2023-01-01 RX ORDER — ACETAMINOPHEN 500 MG
1000 TABLET ORAL ONCE
Refills: 0 | Status: COMPLETED | OUTPATIENT
Start: 2023-01-01 | End: 2023-01-01

## 2023-01-01 RX ORDER — DEXTROSE 50 % IN WATER 50 %
12.5 SYRINGE (ML) INTRAVENOUS ONCE
Refills: 0 | Status: DISCONTINUED | OUTPATIENT
Start: 2023-01-01 | End: 2023-01-01

## 2023-01-01 RX ORDER — FAMOTIDINE 10 MG/ML
20 INJECTION INTRAVENOUS ONCE
Refills: 0 | Status: COMPLETED | OUTPATIENT
Start: 2023-01-01 | End: 2023-01-01

## 2023-01-01 RX ORDER — OXYCODONE HYDROCHLORIDE 5 MG/1
10 TABLET ORAL EVERY 4 HOURS
Refills: 0 | Status: DISCONTINUED | OUTPATIENT
Start: 2023-01-01 | End: 2023-01-01

## 2023-01-01 RX ORDER — DIVALPROEX SODIUM 500 MG/1
500 TABLET, DELAYED RELEASE ORAL EVERY 12 HOURS
Refills: 0 | Status: DISCONTINUED | OUTPATIENT
Start: 2023-01-01 | End: 2023-01-01

## 2023-01-01 RX ORDER — LANOLIN ALCOHOL/MO/W.PET/CERES
1 CREAM (GRAM) TOPICAL
Qty: 0 | Refills: 0 | DISCHARGE
Start: 2023-01-01

## 2023-01-01 RX ORDER — HYDROMORPHONE HYDROCHLORIDE 2 MG/ML
1 INJECTION INTRAMUSCULAR; INTRAVENOUS; SUBCUTANEOUS
Refills: 0 | Status: DISCONTINUED | OUTPATIENT
Start: 2023-01-01 | End: 2023-01-01

## 2023-01-01 RX ORDER — METFORMIN HYDROCHLORIDE 850 MG/1
500 TABLET ORAL
Refills: 0 | Status: DISCONTINUED | OUTPATIENT
Start: 2023-01-01 | End: 2023-01-01

## 2023-01-01 RX ORDER — MORPHINE SULFATE 50 MG/1
2 CAPSULE, EXTENDED RELEASE ORAL
Refills: 0 | Status: DISCONTINUED | OUTPATIENT
Start: 2023-01-01 | End: 2023-01-01

## 2023-01-01 RX ORDER — PANTOPRAZOLE SODIUM 20 MG/1
1 TABLET, DELAYED RELEASE ORAL
Qty: 0 | Refills: 0 | DISCHARGE

## 2023-01-01 RX ORDER — MAGNESIUM SULFATE 500 MG/ML
2 VIAL (ML) INJECTION ONCE
Refills: 0 | Status: COMPLETED | OUTPATIENT
Start: 2023-01-01 | End: 2023-01-01

## 2023-01-01 RX ORDER — SODIUM CHLORIDE 9 MG/ML
1000 INJECTION, SOLUTION INTRAVENOUS ONCE
Refills: 0 | Status: COMPLETED | OUTPATIENT
Start: 2023-01-01 | End: 2023-01-01

## 2023-01-01 RX ORDER — CHLORHEXIDINE GLUCONATE 213 G/1000ML
1 SOLUTION TOPICAL
Refills: 0 | Status: DISCONTINUED | OUTPATIENT
Start: 2023-01-01 | End: 2023-01-01

## 2023-01-01 RX ORDER — DEXAMETHASONE 4 MG/1
4 TABLET ORAL
Qty: 0 | Refills: 0 | Status: COMPLETED | OUTPATIENT
Start: 2023-01-01

## 2023-01-01 RX ORDER — MAGNESIUM SULFATE 500 MG/ML
1 VIAL (ML) INJECTION ONCE
Refills: 0 | Status: COMPLETED | OUTPATIENT
Start: 2023-01-01 | End: 2023-01-01

## 2023-01-01 RX ORDER — LEVOFLOXACIN 5 MG/ML
500 INJECTION, SOLUTION INTRAVENOUS EVERY 24 HOURS
Refills: 0 | Status: DISCONTINUED | OUTPATIENT
Start: 2023-01-01 | End: 2023-01-01

## 2023-01-01 RX ORDER — VALPROIC ACID (AS SODIUM SALT) 250 MG/5ML
250 SOLUTION, ORAL ORAL EVERY 6 HOURS
Refills: 0 | Status: DISCONTINUED | OUTPATIENT
Start: 2023-01-01 | End: 2023-01-01

## 2023-01-01 RX ORDER — INSULIN GLARGINE 100 [IU]/ML
14 INJECTION, SOLUTION SUBCUTANEOUS AT BEDTIME
Refills: 0 | Status: DISCONTINUED | OUTPATIENT
Start: 2023-01-01 | End: 2023-01-01

## 2023-01-01 RX ORDER — INSULIN LISPRO 100/ML
VIAL (ML) SUBCUTANEOUS AT BEDTIME
Refills: 0 | Status: DISCONTINUED | OUTPATIENT
Start: 2023-01-01 | End: 2023-01-01

## 2023-01-01 RX ORDER — DEXTROSE 50 % IN WATER 50 %
25 SYRINGE (ML) INTRAVENOUS ONCE
Refills: 0 | Status: COMPLETED | OUTPATIENT
Start: 2023-01-01 | End: 2023-01-01

## 2023-01-01 RX ORDER — ONDANSETRON 8 MG/1
4 TABLET, FILM COATED ORAL ONCE
Refills: 0 | Status: DISCONTINUED | OUTPATIENT
Start: 2023-01-01 | End: 2023-01-01

## 2023-01-01 RX ORDER — SODIUM CHLORIDE 5 G/100ML
1000 INJECTION, SOLUTION INTRAVENOUS
Refills: 0 | Status: DISCONTINUED | OUTPATIENT
Start: 2023-01-01 | End: 2023-01-01

## 2023-01-01 RX ORDER — FOSAPREPITANT DIMEGLUMINE 150 MG/5ML
150 INJECTION, POWDER, LYOPHILIZED, FOR SOLUTION INTRAVENOUS ONCE
Refills: 0 | Status: COMPLETED | OUTPATIENT
Start: 2023-01-01 | End: 2023-01-01

## 2023-01-01 RX ORDER — HYDROMORPHONE HYDROCHLORIDE 2 MG/ML
0.5 INJECTION INTRAMUSCULAR; INTRAVENOUS; SUBCUTANEOUS
Refills: 0 | Status: DISCONTINUED | OUTPATIENT
Start: 2023-01-01 | End: 2023-01-01

## 2023-01-01 RX ORDER — PANTOPRAZOLE SODIUM 20 MG/1
1 TABLET, DELAYED RELEASE ORAL
Qty: 30 | Refills: 0
Start: 2023-01-01 | End: 2023-01-01

## 2023-01-01 RX ORDER — POLYETHYLENE GLYCOL 3350 17 G/17G
17 POWDER, FOR SOLUTION ORAL
Refills: 0 | DISCHARGE

## 2023-01-01 RX ORDER — HYDROMORPHONE HYDROCHLORIDE 2 MG/ML
0.2 INJECTION INTRAMUSCULAR; INTRAVENOUS; SUBCUTANEOUS EVERY 4 HOURS
Refills: 0 | Status: DISCONTINUED | OUTPATIENT
Start: 2023-01-01 | End: 2023-01-01

## 2023-01-01 RX ORDER — GLUCAGON INJECTION, SOLUTION 0.5 MG/.1ML
1 INJECTION, SOLUTION SUBCUTANEOUS ONCE
Refills: 0 | Status: DISCONTINUED | OUTPATIENT
Start: 2023-01-01 | End: 2023-01-01

## 2023-01-01 RX ORDER — DEXAMETHASONE 0.5 MG/5ML
4 ELIXIR ORAL
Refills: 0 | Status: DISCONTINUED | OUTPATIENT
Start: 2023-01-01 | End: 2023-01-01

## 2023-01-01 RX ORDER — FERROUS GLUCONATE 324(38)MG
324 (38 FE) TABLET ORAL TWICE DAILY
Qty: 60 | Refills: 3 | Status: DISCONTINUED | COMMUNITY
Start: 2022-10-27 | End: 2023-01-01

## 2023-01-01 RX ORDER — DEXAMETHASONE 0.5 MG/5ML
3 ELIXIR ORAL
Refills: 0 | Status: DISCONTINUED | OUTPATIENT
Start: 2023-01-01 | End: 2023-01-01

## 2023-01-01 RX ORDER — METFORMIN HYDROCHLORIDE 500 MG/1
500 TABLET, COATED ORAL TWICE DAILY
Refills: 0 | Status: ACTIVE | COMMUNITY
Start: 2023-01-01

## 2023-01-01 RX ORDER — DEXAMETHASONE 0.5 MG/5ML
3 ELIXIR ORAL
Qty: 0 | Refills: 0 | DISCHARGE
Start: 2023-01-01

## 2023-01-01 RX ORDER — DIVALPROEX SODIUM 500 MG/1
1 TABLET, DELAYED RELEASE ORAL
Qty: 60 | Refills: 0
Start: 2023-01-01 | End: 2023-01-01

## 2023-01-01 RX ORDER — HYDROMORPHONE HYDROCHLORIDE 2 MG/ML
1 INJECTION INTRAMUSCULAR; INTRAVENOUS; SUBCUTANEOUS ONCE
Refills: 0 | Status: DISCONTINUED | OUTPATIENT
Start: 2023-01-01 | End: 2023-01-01

## 2023-01-01 RX ORDER — INSULIN LISPRO 100/ML
10 VIAL (ML) SUBCUTANEOUS
Refills: 0 | Status: DISCONTINUED | OUTPATIENT
Start: 2023-01-01 | End: 2023-01-01

## 2023-01-01 RX ORDER — INSULIN GLARGINE 100 [IU]/ML
15 INJECTION, SOLUTION SUBCUTANEOUS EVERY MORNING
Refills: 0 | Status: DISCONTINUED | OUTPATIENT
Start: 2023-01-01 | End: 2023-01-01

## 2023-01-01 RX ORDER — SODIUM CHLORIDE 9 MG/ML
1000 INJECTION, SOLUTION INTRAVENOUS
Refills: 0 | Status: COMPLETED | OUTPATIENT
Start: 2023-01-01 | End: 2023-01-01

## 2023-01-01 RX ORDER — METFORMIN HYDROCHLORIDE 850 MG/1
1000 TABLET ORAL
Refills: 0 | Status: DISCONTINUED | OUTPATIENT
Start: 2023-01-01 | End: 2023-01-01

## 2023-01-01 RX ORDER — SODIUM CHLORIDE 9 MG/ML
1000 INJECTION INTRAMUSCULAR; INTRAVENOUS; SUBCUTANEOUS ONCE
Refills: 0 | Status: DISCONTINUED | OUTPATIENT
Start: 2023-01-01 | End: 2023-01-01

## 2023-01-01 RX ORDER — ATORVASTATIN CALCIUM 80 MG/1
40 TABLET, FILM COATED ORAL AT BEDTIME
Refills: 0 | Status: DISCONTINUED | OUTPATIENT
Start: 2023-01-01 | End: 2023-01-01

## 2023-01-01 RX ORDER — METFORMIN HYDROCHLORIDE 850 MG/1
1 TABLET ORAL
Refills: 0 | DISCHARGE

## 2023-01-01 RX ORDER — SODIUM CHLORIDE 9 MG/ML
500 INJECTION INTRAMUSCULAR; INTRAVENOUS; SUBCUTANEOUS ONCE
Refills: 0 | Status: COMPLETED | OUTPATIENT
Start: 2023-01-01 | End: 2023-01-01

## 2023-01-01 RX ORDER — SODIUM CHLORIDE 9 MG/ML
500 INJECTION, SOLUTION INTRAVENOUS
Refills: 0 | Status: DISCONTINUED | OUTPATIENT
Start: 2023-01-01 | End: 2023-01-01

## 2023-01-01 RX ORDER — ACETAMINOPHEN 500 MG
650 TABLET ORAL ONCE
Refills: 0 | Status: COMPLETED | OUTPATIENT
Start: 2023-01-01 | End: 2023-01-01

## 2023-01-01 RX ORDER — CEFTRIAXONE 500 MG/1
1000 INJECTION, POWDER, FOR SOLUTION INTRAMUSCULAR; INTRAVENOUS ONCE
Refills: 0 | Status: COMPLETED | OUTPATIENT
Start: 2023-01-01 | End: 2023-01-01

## 2023-01-01 RX ORDER — DIVALPROEX SODIUM 500 MG/1
500 TABLET, DELAYED RELEASE ORAL
Refills: 0 | Status: DISCONTINUED | OUTPATIENT
Start: 2023-01-01 | End: 2023-01-01

## 2023-01-01 RX ORDER — DEXAMETHASONE 0.5 MG/5ML
ELIXIR ORAL
Refills: 0 | Status: DISCONTINUED | OUTPATIENT
Start: 2023-01-01 | End: 2023-01-01

## 2023-01-01 RX ORDER — LEVETIRACETAM 250 MG/1
2000 TABLET, FILM COATED ORAL EVERY 12 HOURS
Refills: 0 | Status: DISCONTINUED | OUTPATIENT
Start: 2023-01-01 | End: 2023-01-01

## 2023-01-01 RX ORDER — HYDROCORTISONE 20 MG
100 TABLET ORAL ONCE
Refills: 0 | Status: DISCONTINUED | OUTPATIENT
Start: 2023-01-01 | End: 2023-01-01

## 2023-01-01 RX ORDER — OXYCODONE HYDROCHLORIDE 5 MG/1
5 TABLET ORAL EVERY 6 HOURS
Refills: 0 | Status: DISCONTINUED | OUTPATIENT
Start: 2023-01-01 | End: 2023-01-01

## 2023-01-01 RX ORDER — CHLORHEXIDINE GLUCONATE 213 G/1000ML
1 SOLUTION TOPICAL ONCE
Refills: 0 | Status: COMPLETED | OUTPATIENT
Start: 2023-01-01 | End: 2023-01-01

## 2023-01-01 RX ORDER — METOPROLOL TARTRATE 50 MG
0.5 TABLET ORAL
Qty: 30 | Refills: 0
Start: 2023-01-01 | End: 2023-01-01

## 2023-01-01 RX ORDER — DIPHENHYDRAMINE HCL 50 MG
50 CAPSULE ORAL ONCE
Refills: 0 | Status: COMPLETED | OUTPATIENT
Start: 2023-01-01 | End: 2023-01-01

## 2023-01-01 RX ORDER — MORPHINE SULFATE 50 MG/1
2 CAPSULE, EXTENDED RELEASE ORAL EVERY 6 HOURS
Refills: 0 | Status: DISCONTINUED | OUTPATIENT
Start: 2023-01-01 | End: 2023-01-01

## 2023-01-01 RX ORDER — SENNA PLUS 8.6 MG/1
2 TABLET ORAL
Qty: 0 | Refills: 0 | DISCHARGE
Start: 2023-01-01

## 2023-01-01 RX ORDER — LACOSAMIDE 50 MG/1
200 TABLET ORAL EVERY 12 HOURS
Refills: 0 | Status: DISCONTINUED | OUTPATIENT
Start: 2023-01-01 | End: 2023-01-01

## 2023-01-01 RX ORDER — SODIUM,POTASSIUM PHOSPHATES 278-250MG
1 POWDER IN PACKET (EA) ORAL ONCE
Refills: 0 | Status: COMPLETED | OUTPATIENT
Start: 2023-01-01 | End: 2023-01-01

## 2023-01-01 RX ORDER — INSULIN LISPRO 100/ML
4 VIAL (ML) SUBCUTANEOUS
Refills: 0 | Status: DISCONTINUED | OUTPATIENT
Start: 2023-01-01 | End: 2023-01-01

## 2023-01-01 RX ORDER — METFORMIN HYDROCHLORIDE 850 MG/1
1 TABLET ORAL
Qty: 60 | Refills: 0
Start: 2023-01-01 | End: 2023-01-01

## 2023-01-01 RX ORDER — KETOROLAC TROMETHAMINE 30 MG/ML
15 SYRINGE (ML) INJECTION ONCE
Refills: 0 | Status: DISCONTINUED | OUTPATIENT
Start: 2023-01-01 | End: 2023-01-01

## 2023-01-01 RX ORDER — INSULIN GLARGINE 100 [IU]/ML
17 INJECTION, SOLUTION SUBCUTANEOUS AT BEDTIME
Refills: 0 | Status: DISCONTINUED | OUTPATIENT
Start: 2023-01-01 | End: 2023-01-01

## 2023-01-01 RX ORDER — SODIUM CHLORIDE 9 MG/ML
1000 INJECTION INTRAMUSCULAR; INTRAVENOUS; SUBCUTANEOUS
Refills: 0 | Status: COMPLETED | OUTPATIENT
Start: 2023-01-01 | End: 2023-01-01

## 2023-01-01 RX ORDER — LEVETIRACETAM 250 MG/1
500 TABLET, FILM COATED ORAL
Refills: 0 | Status: DISCONTINUED | OUTPATIENT
Start: 2023-01-01 | End: 2023-01-01

## 2023-01-01 RX ORDER — LEVETIRACETAM 250 MG/1
1000 TABLET, FILM COATED ORAL ONCE
Refills: 0 | Status: COMPLETED | OUTPATIENT
Start: 2023-01-01 | End: 2023-01-01

## 2023-01-01 RX ORDER — HYDROMORPHONE HYDROCHLORIDE 2 MG/ML
0.5 INJECTION INTRAMUSCULAR; INTRAVENOUS; SUBCUTANEOUS ONCE
Refills: 0 | Status: DISCONTINUED | OUTPATIENT
Start: 2023-01-01 | End: 2023-01-01

## 2023-01-01 RX ORDER — VANCOMYCIN HCL 1 G
1000 VIAL (EA) INTRAVENOUS EVERY 24 HOURS
Refills: 0 | Status: DISCONTINUED | OUTPATIENT
Start: 2023-01-01 | End: 2023-01-01

## 2023-01-01 RX ORDER — DIPHENHYDRAMINE HYDROCHLORIDE AND LIDOCAINE HYDROCHLORIDE AND ALUMINUM HYDROXIDE AND MAGNESIUM HYDRO
KIT
Qty: 600 | Refills: 3 | Status: DISCONTINUED | COMMUNITY
Start: 2022-09-29 | End: 2023-01-01

## 2023-01-01 RX ORDER — MORPHINE SULFATE 50 MG/1
2 CAPSULE, EXTENDED RELEASE ORAL ONCE
Refills: 0 | Status: DISCONTINUED | OUTPATIENT
Start: 2023-01-01 | End: 2023-01-01

## 2023-01-01 RX ORDER — CEFUROXIME AXETIL 250 MG
500 TABLET ORAL EVERY 12 HOURS
Refills: 0 | Status: COMPLETED | OUTPATIENT
Start: 2023-01-01 | End: 2023-01-01

## 2023-01-01 RX ORDER — PANTOPRAZOLE SODIUM 20 MG/1
1 TABLET, DELAYED RELEASE ORAL
Qty: 0 | Refills: 0 | DISCHARGE
Start: 2023-01-01

## 2023-01-01 RX ORDER — MAGNESIUM SULFATE 500 MG/ML
2 VIAL (ML) INJECTION ONCE
Refills: 0 | Status: DISCONTINUED | OUTPATIENT
Start: 2023-01-01 | End: 2023-01-01

## 2023-01-01 RX ORDER — DEXAMETHASONE 0.5 MG/5ML
1 ELIXIR ORAL
Qty: 90 | Refills: 0
Start: 2023-01-01 | End: 2023-01-01

## 2023-01-01 RX ORDER — SUMATRIPTAN SUCCINATE 4 MG/.5ML
50 INJECTION, SOLUTION SUBCUTANEOUS EVERY 6 HOURS
Refills: 0 | Status: DISCONTINUED | OUTPATIENT
Start: 2023-01-01 | End: 2023-01-01

## 2023-01-01 RX ORDER — OXYCODONE HYDROCHLORIDE 5 MG/1
7.5 TABLET ORAL EVERY 4 HOURS
Refills: 0 | Status: DISCONTINUED | OUTPATIENT
Start: 2023-01-01 | End: 2023-01-01

## 2023-01-01 RX ORDER — PACLITAXEL 6 MG/ML
240 INJECTION, SOLUTION, CONCENTRATE INTRAVENOUS ONCE
Refills: 0 | Status: COMPLETED | OUTPATIENT
Start: 2023-01-01 | End: 2023-01-01

## 2023-01-01 RX ORDER — INSULIN LISPRO 100/ML
7 VIAL (ML) SUBCUTANEOUS
Refills: 0 | Status: DISCONTINUED | OUTPATIENT
Start: 2023-01-01 | End: 2023-01-01

## 2023-01-01 RX ORDER — LEVETIRACETAM 250 MG/1
1000 TABLET, FILM COATED ORAL ONCE
Refills: 0 | Status: DISCONTINUED | OUTPATIENT
Start: 2023-01-01 | End: 2023-01-01

## 2023-01-01 RX ORDER — POTASSIUM CHLORIDE 20 MEQ
40 PACKET (EA) ORAL EVERY 4 HOURS
Refills: 0 | Status: DISCONTINUED | OUTPATIENT
Start: 2023-01-01 | End: 2023-01-01

## 2023-01-01 RX ORDER — DEXAMETHASONE 0.5 MG/5ML
1 ELIXIR ORAL
Qty: 28 | Refills: 0
Start: 2023-01-01 | End: 2023-01-01

## 2023-01-01 RX ORDER — INSULIN GLARGINE 100 [IU]/ML
15 INJECTION, SOLUTION SUBCUTANEOUS
Qty: 0 | Refills: 0 | DISCHARGE
Start: 2023-01-01

## 2023-01-01 RX ORDER — INSULIN GLARGINE 100 [IU]/ML
7 INJECTION, SOLUTION SUBCUTANEOUS AT BEDTIME
Refills: 0 | Status: DISCONTINUED | OUTPATIENT
Start: 2023-01-01 | End: 2023-01-01

## 2023-01-01 RX ORDER — DIVALPROEX SODIUM 500 MG/1
500 TABLET, DELAYED RELEASE ORAL
Refills: 0 | Status: ACTIVE | COMMUNITY
Start: 2023-01-01

## 2023-01-01 RX ORDER — POTASSIUM CHLORIDE 20 MEQ
40 PACKET (EA) ORAL ONCE
Refills: 0 | Status: COMPLETED | OUTPATIENT
Start: 2023-01-01 | End: 2023-01-01

## 2023-01-01 RX ORDER — DEXAMETHASONE 0.5 MG/5ML
4 ELIXIR ORAL EVERY 8 HOURS
Refills: 0 | Status: DISCONTINUED | OUTPATIENT
Start: 2023-01-01 | End: 2023-01-01

## 2023-01-01 RX ORDER — INSULIN LISPRO 100/ML
12 VIAL (ML) SUBCUTANEOUS
Refills: 0 | Status: DISCONTINUED | OUTPATIENT
Start: 2023-01-01 | End: 2023-01-01

## 2023-01-01 RX ORDER — VANCOMYCIN HCL 1 G
750 VIAL (EA) INTRAVENOUS ONCE
Refills: 0 | Status: COMPLETED | OUTPATIENT
Start: 2023-01-01 | End: 2023-01-01

## 2023-01-01 RX ORDER — VANCOMYCIN HCL 1 G
750 VIAL (EA) INTRAVENOUS EVERY 12 HOURS
Refills: 0 | Status: DISCONTINUED | OUTPATIENT
Start: 2023-01-01 | End: 2023-01-01

## 2023-01-01 RX ORDER — PETROLATUM,WHITE
1 JELLY (GRAM) TOPICAL DAILY
Refills: 0 | Status: DISCONTINUED | OUTPATIENT
Start: 2023-01-01 | End: 2023-01-01

## 2023-01-01 RX ORDER — DEXAMETHASONE 0.5 MG/5ML
3 ELIXIR ORAL
Refills: 0 | Status: COMPLETED | OUTPATIENT
Start: 2023-01-01 | End: 2023-01-01

## 2023-01-01 RX ORDER — LACOSAMIDE 50 MG/1
100 TABLET ORAL EVERY 12 HOURS
Refills: 0 | Status: DISCONTINUED | OUTPATIENT
Start: 2023-01-01 | End: 2023-01-01

## 2023-01-01 RX ORDER — METOPROLOL TARTRATE 50 MG
12.5 TABLET ORAL
Refills: 0 | Status: DISCONTINUED | OUTPATIENT
Start: 2023-01-01 | End: 2023-01-01

## 2023-01-01 RX ORDER — POLYETHYLENE GLYCOL 3350 17 G/17G
17 POWDER, FOR SOLUTION ORAL
Qty: 0 | Refills: 0 | DISCHARGE
Start: 2023-01-01

## 2023-01-01 RX ORDER — VALPROIC ACID (AS SODIUM SALT) 250 MG/5ML
500 SOLUTION, ORAL ORAL EVERY 12 HOURS
Refills: 0 | Status: DISCONTINUED | OUTPATIENT
Start: 2023-01-01 | End: 2023-01-01

## 2023-01-01 RX ORDER — PIPERACILLIN AND TAZOBACTAM 4; .5 G/20ML; G/20ML
3.38 INJECTION, POWDER, LYOPHILIZED, FOR SOLUTION INTRAVENOUS EVERY 8 HOURS
Refills: 0 | Status: DISCONTINUED | OUTPATIENT
Start: 2023-01-01 | End: 2023-01-01

## 2023-01-01 RX ORDER — ACETAMINOPHEN 500 MG
2 TABLET ORAL
Qty: 0 | Refills: 0 | DISCHARGE
Start: 2023-01-01

## 2023-01-01 RX ORDER — PANTOPRAZOLE SODIUM 20 MG/1
40 TABLET, DELAYED RELEASE ORAL DAILY
Refills: 0 | Status: DISCONTINUED | OUTPATIENT
Start: 2023-01-01 | End: 2023-01-01

## 2023-01-01 RX ORDER — ISOPROPYL ALCOHOL, BENZOCAINE .7; .06 ML/ML; ML/ML
0 SWAB TOPICAL
Qty: 100 | Refills: 1
Start: 2023-01-01

## 2023-01-01 RX ORDER — DEXTROSE 50 % IN WATER 50 %
50 SYRINGE (ML) INTRAVENOUS ONCE
Refills: 0 | Status: COMPLETED | OUTPATIENT
Start: 2023-01-01 | End: 2023-01-01

## 2023-01-01 RX ORDER — METOCLOPRAMIDE HCL 10 MG
10 TABLET ORAL ONCE
Refills: 0 | Status: COMPLETED | OUTPATIENT
Start: 2023-01-01 | End: 2023-01-01

## 2023-01-01 RX ORDER — SUCRALFATE 1 G/10ML
1 SUSPENSION ORAL 4 TIMES DAILY
Qty: 600 | Refills: 5 | Status: DISCONTINUED | COMMUNITY
Start: 2022-09-29 | End: 2023-01-01

## 2023-01-01 RX ORDER — CEFAZOLIN SODIUM 1 G
2000 VIAL (EA) INJECTION EVERY 8 HOURS
Refills: 0 | Status: COMPLETED | OUTPATIENT
Start: 2023-01-01 | End: 2023-01-01

## 2023-01-01 RX ORDER — POTASSIUM PHOSPHATE, MONOBASIC POTASSIUM PHOSPHATE, DIBASIC 236; 224 MG/ML; MG/ML
30 INJECTION, SOLUTION INTRAVENOUS ONCE
Refills: 0 | Status: COMPLETED | OUTPATIENT
Start: 2023-01-01 | End: 2023-01-01

## 2023-01-01 RX ORDER — LEVETIRACETAM 250 MG/1
2000 TABLET, FILM COATED ORAL ONCE
Refills: 0 | Status: COMPLETED | OUTPATIENT
Start: 2023-01-01 | End: 2023-01-01

## 2023-01-01 RX ORDER — ATORVASTATIN CALCIUM 80 MG/1
1 TABLET, FILM COATED ORAL
Qty: 30 | Refills: 0
Start: 2023-01-01 | End: 2023-01-01

## 2023-01-01 RX ORDER — ACETAMINOPHEN 500 MG/1
500 TABLET ORAL
Qty: 1 | Refills: 0 | Status: DISCONTINUED | COMMUNITY
Start: 2022-12-09 | End: 2023-01-01

## 2023-01-01 RX ORDER — REPAGLINIDE 1 MG/1
1 TABLET ORAL
Qty: 90 | Refills: 0
Start: 2023-01-01 | End: 2023-01-01

## 2023-01-01 RX ORDER — DEXAMETHASONE 0.5 MG/5ML
1 ELIXIR ORAL
Qty: 0 | Refills: 0 | DISCHARGE
Start: 2023-01-01

## 2023-01-01 RX ORDER — HEPARIN SODIUM 5000 [USP'U]/ML
5000 INJECTION INTRAVENOUS; SUBCUTANEOUS EVERY 12 HOURS
Refills: 0 | Status: DISCONTINUED | OUTPATIENT
Start: 2023-01-01 | End: 2023-01-01

## 2023-01-01 RX ORDER — DEXAMETHASONE 2 MG/1
2 TABLET ORAL
Qty: 30 | Refills: 0 | Status: DISCONTINUED | COMMUNITY
Start: 2023-01-01 | End: 2023-01-01

## 2023-01-01 RX ORDER — INSULIN GLARGINE 100 [IU]/ML
6 INJECTION, SOLUTION SUBCUTANEOUS AT BEDTIME
Refills: 0 | Status: ACTIVE | OUTPATIENT
Start: 2023-01-01 | End: 2024-09-08

## 2023-01-01 RX ORDER — DEXAMETHASONE 4 MG/1
4 TABLET ORAL
Qty: 28 | Refills: 0 | Status: DISCONTINUED | COMMUNITY
End: 2023-01-01

## 2023-01-01 RX ORDER — HALOPERIDOL DECANOATE 100 MG/ML
1 INJECTION INTRAMUSCULAR ONCE
Refills: 0 | Status: COMPLETED | OUTPATIENT
Start: 2023-01-01 | End: 2023-01-01

## 2023-01-01 RX ORDER — METFORMIN HYDROCHLORIDE 850 MG/1
1 TABLET ORAL
Qty: 20 | Refills: 0
Start: 2023-01-01 | End: 2023-01-01

## 2023-01-01 RX ORDER — POTASSIUM CHLORIDE 20 MEQ
10 PACKET (EA) ORAL
Refills: 0 | Status: DISCONTINUED | OUTPATIENT
Start: 2023-01-01 | End: 2023-01-01

## 2023-01-01 RX ORDER — DEXAMETHASONE 2 MG/1
2 TABLET ORAL TWICE DAILY
Qty: 60 | Refills: 0 | Status: ACTIVE | COMMUNITY
Start: 2023-01-01

## 2023-01-01 RX ORDER — INSULIN LISPRO 100/ML
14 VIAL (ML) SUBCUTANEOUS
Refills: 0 | Status: DISCONTINUED | OUTPATIENT
Start: 2023-01-01 | End: 2023-01-01

## 2023-01-01 RX ORDER — DEXAMETHASONE 0.5 MG/5ML
4 ELIXIR ORAL ONCE
Refills: 0 | Status: COMPLETED | OUTPATIENT
Start: 2023-01-01 | End: 2023-01-01

## 2023-01-01 RX ORDER — PANTOPRAZOLE 40 MG/1
40 TABLET, DELAYED RELEASE ORAL
Qty: 30 | Refills: 1 | Status: DISCONTINUED | COMMUNITY
Start: 2021-12-15 | End: 2023-01-01

## 2023-01-01 RX ORDER — LACOSAMIDE 50 MG/1
200 TABLET ORAL ONCE
Refills: 0 | Status: DISCONTINUED | OUTPATIENT
Start: 2023-01-01 | End: 2023-01-01

## 2023-01-01 RX ORDER — OXYCODONE HYDROCHLORIDE 5 MG/1
1 TABLET ORAL
Qty: 18 | Refills: 0
Start: 2023-01-01 | End: 2023-01-01

## 2023-01-01 RX ORDER — INSULIN GLARGINE 100 [IU]/ML
4 INJECTION, SOLUTION SUBCUTANEOUS AT BEDTIME
Refills: 0 | Status: DISCONTINUED | OUTPATIENT
Start: 2023-01-01 | End: 2023-01-01

## 2023-01-01 RX ORDER — OXYCODONE 5 MG/1
5 TABLET ORAL
Qty: 80 | Refills: 0 | Status: DISCONTINUED | COMMUNITY
Start: 2022-09-29 | End: 2023-01-01

## 2023-01-01 RX ORDER — DEXAMETHASONE 0.5 MG/5ML
12 ELIXIR ORAL ONCE
Refills: 0 | Status: COMPLETED | OUTPATIENT
Start: 2023-01-01 | End: 2023-01-01

## 2023-01-01 RX ORDER — DEXAMETHASONE 0.5 MG/5ML
2 ELIXIR ORAL DAILY
Refills: 0 | Status: DISCONTINUED | OUTPATIENT
Start: 2023-01-01 | End: 2023-01-01

## 2023-01-01 RX ORDER — DIVALPROEX SODIUM 500 MG/1
1 TABLET, DELAYED RELEASE ORAL
Qty: 0 | Refills: 0 | DISCHARGE
Start: 2023-01-01

## 2023-01-01 RX ORDER — PANTOPRAZOLE SODIUM 20 MG/1
40 TABLET, DELAYED RELEASE ORAL ONCE
Refills: 0 | Status: COMPLETED | OUTPATIENT
Start: 2023-01-01 | End: 2023-01-01

## 2023-01-01 RX ORDER — DEXAMETHASONE 0.5 MG/5ML
6 ELIXIR ORAL EVERY 6 HOURS
Refills: 0 | Status: DISCONTINUED | OUTPATIENT
Start: 2023-01-01 | End: 2023-01-01

## 2023-01-01 RX ORDER — TRAMADOL HYDROCHLORIDE 50 MG/1
50 TABLET, COATED ORAL
Qty: 60 | Refills: 2 | Status: DISCONTINUED | COMMUNITY
Start: 2023-01-01 | End: 2023-01-01

## 2023-01-01 RX ORDER — DEXAMETHASONE 0.5 MG/5ML
4 ELIXIR ORAL EVERY 24 HOURS
Refills: 0 | Status: DISCONTINUED | OUTPATIENT
Start: 2023-01-01 | End: 2023-01-01

## 2023-01-01 RX ORDER — ENOXAPARIN SODIUM 100 MG/ML
40 INJECTION SUBCUTANEOUS
Refills: 0 | Status: DISCONTINUED | OUTPATIENT
Start: 2023-01-01 | End: 2023-01-01

## 2023-01-01 RX ORDER — SODIUM CHLORIDE 9 MG/ML
500 INJECTION INTRAMUSCULAR; INTRAVENOUS; SUBCUTANEOUS ONCE
Refills: 0 | Status: DISCONTINUED | OUTPATIENT
Start: 2023-01-01 | End: 2023-01-01

## 2023-01-01 RX ORDER — ENOXAPARIN SODIUM 100 MG/ML
40 INJECTION SUBCUTANEOUS
Qty: 0 | Refills: 0 | DISCHARGE
Start: 2023-01-01

## 2023-01-01 RX ORDER — INSULIN GLARGINE 100 [IU]/ML
5 INJECTION, SOLUTION SUBCUTANEOUS ONCE
Refills: 0 | Status: COMPLETED | OUTPATIENT
Start: 2023-01-01 | End: 2023-01-01

## 2023-01-01 RX ORDER — ONDANSETRON 8 MG/1
8 TABLET, FILM COATED ORAL ONCE
Refills: 0 | Status: COMPLETED | OUTPATIENT
Start: 2023-01-01 | End: 2023-01-01

## 2023-01-01 RX ORDER — METFORMIN HYDROCHLORIDE 850 MG/1
1 TABLET ORAL
Qty: 42 | Refills: 0
Start: 2023-01-01 | End: 2023-01-01

## 2023-01-01 RX ORDER — KETOROLAC TROMETHAMINE 30 MG/ML
15 SYRINGE (ML) INJECTION EVERY 6 HOURS
Refills: 0 | Status: DISCONTINUED | OUTPATIENT
Start: 2023-01-01 | End: 2023-01-01

## 2023-01-01 RX ADMIN — Medication 1 DROP(S): at 13:48

## 2023-01-01 RX ADMIN — ATORVASTATIN CALCIUM 40 MILLIGRAM(S): 80 TABLET, FILM COATED ORAL at 20:10

## 2023-01-01 RX ADMIN — Medication 650 MILLIGRAM(S): at 11:04

## 2023-01-01 RX ADMIN — DIVALPROEX SODIUM 500 MILLIGRAM(S): 500 TABLET, DELAYED RELEASE ORAL at 17:57

## 2023-01-01 RX ADMIN — Medication 100 MILLIGRAM(S): at 05:17

## 2023-01-01 RX ADMIN — Medication 1 APPLICATION(S): at 18:01

## 2023-01-01 RX ADMIN — ENOXAPARIN SODIUM 40 MILLIGRAM(S): 100 INJECTION SUBCUTANEOUS at 17:14

## 2023-01-01 RX ADMIN — METFORMIN HYDROCHLORIDE 500 MILLIGRAM(S): 850 TABLET ORAL at 09:01

## 2023-01-01 RX ADMIN — Medication 1 DROP(S): at 17:29

## 2023-01-01 RX ADMIN — ENOXAPARIN SODIUM 40 MILLIGRAM(S): 100 INJECTION SUBCUTANEOUS at 17:35

## 2023-01-01 RX ADMIN — Medication 1000 MILLIGRAM(S): at 09:34

## 2023-01-01 RX ADMIN — Medication 4 MILLIGRAM(S): at 17:24

## 2023-01-01 RX ADMIN — Medication 650 MILLIGRAM(S): at 20:06

## 2023-01-01 RX ADMIN — PANTOPRAZOLE SODIUM 40 MILLIGRAM(S): 20 TABLET, DELAYED RELEASE ORAL at 05:51

## 2023-01-01 RX ADMIN — Medication 4: at 13:07

## 2023-01-01 RX ADMIN — Medication 4 MILLIGRAM(S): at 05:44

## 2023-01-01 RX ADMIN — Medication 2 MILLIGRAM(S): at 22:04

## 2023-01-01 RX ADMIN — Medication 52.5 MILLIGRAM(S): at 23:57

## 2023-01-01 RX ADMIN — ENOXAPARIN SODIUM 40 MILLIGRAM(S): 100 INJECTION SUBCUTANEOUS at 14:59

## 2023-01-01 RX ADMIN — Medication 650 MILLIGRAM(S): at 09:10

## 2023-01-01 RX ADMIN — ATORVASTATIN CALCIUM 40 MILLIGRAM(S): 80 TABLET, FILM COATED ORAL at 21:45

## 2023-01-01 RX ADMIN — PANTOPRAZOLE SODIUM 40 MILLIGRAM(S): 20 TABLET, DELAYED RELEASE ORAL at 05:48

## 2023-01-01 RX ADMIN — ENOXAPARIN SODIUM 40 MILLIGRAM(S): 100 INJECTION SUBCUTANEOUS at 15:05

## 2023-01-01 RX ADMIN — Medication 4 MILLIGRAM(S): at 17:15

## 2023-01-01 RX ADMIN — Medication 2 MILLIGRAM(S): at 17:34

## 2023-01-01 RX ADMIN — FOSAPREPITANT DIMEGLUMINE 300 MILLIGRAM(S): 150 INJECTION, POWDER, LYOPHILIZED, FOR SOLUTION INTRAVENOUS at 13:29

## 2023-01-01 RX ADMIN — OXYCODONE HYDROCHLORIDE 5 MILLIGRAM(S): 5 TABLET ORAL at 08:24

## 2023-01-01 RX ADMIN — Medication 2 MILLIGRAM(S): at 05:53

## 2023-01-01 RX ADMIN — Medication 650 MILLIGRAM(S): at 16:46

## 2023-01-01 RX ADMIN — METFORMIN HYDROCHLORIDE 500 MILLIGRAM(S): 850 TABLET ORAL at 05:30

## 2023-01-01 RX ADMIN — Medication 50 MILLIGRAM(S): at 11:15

## 2023-01-01 RX ADMIN — Medication 5 UNIT(S): at 12:29

## 2023-01-01 RX ADMIN — CHLORHEXIDINE GLUCONATE 1 APPLICATION(S): 213 SOLUTION TOPICAL at 11:49

## 2023-01-01 RX ADMIN — Medication 8 UNIT(S): at 07:55

## 2023-01-01 RX ADMIN — Medication 52.5 MILLIGRAM(S): at 05:48

## 2023-01-01 RX ADMIN — Medication 1 DROP(S): at 06:40

## 2023-01-01 RX ADMIN — METFORMIN HYDROCHLORIDE 500 MILLIGRAM(S): 850 TABLET ORAL at 18:17

## 2023-01-01 RX ADMIN — Medication 1 DROP(S): at 05:38

## 2023-01-01 RX ADMIN — BEVACIZUMAB 800 MILLIGRAM(S): 400 INJECTION, SOLUTION INTRAVENOUS at 13:03

## 2023-01-01 RX ADMIN — Medication 2 MILLIGRAM(S): at 17:04

## 2023-01-01 RX ADMIN — Medication 2 MILLIGRAM(S): at 17:00

## 2023-01-01 RX ADMIN — Medication 52.5 MILLIGRAM(S): at 23:22

## 2023-01-01 RX ADMIN — PANTOPRAZOLE SODIUM 40 MILLIGRAM(S): 20 TABLET, DELAYED RELEASE ORAL at 07:39

## 2023-01-01 RX ADMIN — Medication 2 MILLIGRAM(S): at 18:03

## 2023-01-01 RX ADMIN — Medication 1 DROP(S): at 22:10

## 2023-01-01 RX ADMIN — Medication 4 MILLIGRAM(S): at 05:37

## 2023-01-01 RX ADMIN — Medication 1: at 08:03

## 2023-01-01 RX ADMIN — OXYCODONE HYDROCHLORIDE 7.5 MILLIGRAM(S): 5 TABLET ORAL at 22:07

## 2023-01-01 RX ADMIN — Medication 100 MILLIEQUIVALENT(S): at 00:37

## 2023-01-01 RX ADMIN — Medication 15 MILLIGRAM(S): at 12:31

## 2023-01-01 RX ADMIN — ENOXAPARIN SODIUM 40 MILLIGRAM(S): 100 INJECTION SUBCUTANEOUS at 22:30

## 2023-01-01 RX ADMIN — Medication 4: at 16:27

## 2023-01-01 RX ADMIN — Medication 2 MILLIGRAM(S): at 08:09

## 2023-01-01 RX ADMIN — ENOXAPARIN SODIUM 40 MILLIGRAM(S): 100 INJECTION SUBCUTANEOUS at 14:25

## 2023-01-01 RX ADMIN — PANTOPRAZOLE SODIUM 40 MILLIGRAM(S): 20 TABLET, DELAYED RELEASE ORAL at 05:36

## 2023-01-01 RX ADMIN — Medication 1000 MILLIGRAM(S): at 05:26

## 2023-01-01 RX ADMIN — DIVALPROEX SODIUM 500 MILLIGRAM(S): 500 TABLET, DELAYED RELEASE ORAL at 06:00

## 2023-01-01 RX ADMIN — PANTOPRAZOLE SODIUM 40 MILLIGRAM(S): 20 TABLET, DELAYED RELEASE ORAL at 06:04

## 2023-01-01 RX ADMIN — CHLORHEXIDINE GLUCONATE 1 APPLICATION(S): 213 SOLUTION TOPICAL at 22:32

## 2023-01-01 RX ADMIN — DIVALPROEX SODIUM 500 MILLIGRAM(S): 500 TABLET, DELAYED RELEASE ORAL at 17:30

## 2023-01-01 RX ADMIN — Medication 2: at 09:28

## 2023-01-01 RX ADMIN — OXYCODONE HYDROCHLORIDE 5 MILLIGRAM(S): 5 TABLET ORAL at 11:56

## 2023-01-01 RX ADMIN — ENOXAPARIN SODIUM 40 MILLIGRAM(S): 100 INJECTION SUBCUTANEOUS at 13:51

## 2023-01-01 RX ADMIN — DIVALPROEX SODIUM 500 MILLIGRAM(S): 500 TABLET, DELAYED RELEASE ORAL at 17:08

## 2023-01-01 RX ADMIN — ATORVASTATIN CALCIUM 40 MILLIGRAM(S): 80 TABLET, FILM COATED ORAL at 22:15

## 2023-01-01 RX ADMIN — DIVALPROEX SODIUM 500 MILLIGRAM(S): 500 TABLET, DELAYED RELEASE ORAL at 17:15

## 2023-01-01 RX ADMIN — DIVALPROEX SODIUM 500 MILLIGRAM(S): 500 TABLET, DELAYED RELEASE ORAL at 17:11

## 2023-01-01 RX ADMIN — Medication 1: at 12:46

## 2023-01-01 RX ADMIN — Medication 1 APPLICATION(S): at 06:21

## 2023-01-01 RX ADMIN — HYDROMORPHONE HYDROCHLORIDE 1 MILLIGRAM(S): 2 INJECTION INTRAMUSCULAR; INTRAVENOUS; SUBCUTANEOUS at 10:24

## 2023-01-01 RX ADMIN — Medication 650 MILLIGRAM(S): at 06:11

## 2023-01-01 RX ADMIN — Medication 2 MILLIGRAM(S): at 17:57

## 2023-01-01 RX ADMIN — MORPHINE SULFATE 4 MILLIGRAM(S): 50 CAPSULE, EXTENDED RELEASE ORAL at 14:35

## 2023-01-01 RX ADMIN — PANTOPRAZOLE SODIUM 40 MILLIGRAM(S): 20 TABLET, DELAYED RELEASE ORAL at 05:50

## 2023-01-01 RX ADMIN — Medication 650 MILLIGRAM(S): at 14:02

## 2023-01-01 RX ADMIN — Medication 2 MILLIGRAM(S): at 06:15

## 2023-01-01 RX ADMIN — MUPIROCIN 1 APPLICATION(S): 20 OINTMENT TOPICAL at 17:14

## 2023-01-01 RX ADMIN — OXYCODONE HYDROCHLORIDE 7.5 MILLIGRAM(S): 5 TABLET ORAL at 22:17

## 2023-01-01 RX ADMIN — POLYETHYLENE GLYCOL 3350 17 GRAM(S): 17 POWDER, FOR SOLUTION ORAL at 12:25

## 2023-01-01 RX ADMIN — LEVETIRACETAM 500 MILLIGRAM(S): 250 TABLET, FILM COATED ORAL at 06:15

## 2023-01-01 RX ADMIN — METFORMIN HYDROCHLORIDE 500 MILLIGRAM(S): 850 TABLET ORAL at 18:19

## 2023-01-01 RX ADMIN — SODIUM CHLORIDE 75 MILLILITER(S): 5 INJECTION, SOLUTION INTRAVENOUS at 16:34

## 2023-01-01 RX ADMIN — POLYETHYLENE GLYCOL 3350 17 GRAM(S): 17 POWDER, FOR SOLUTION ORAL at 12:13

## 2023-01-01 RX ADMIN — Medication 1: at 12:06

## 2023-01-01 RX ADMIN — Medication 650 MILLIGRAM(S): at 16:36

## 2023-01-01 RX ADMIN — DIVALPROEX SODIUM 500 MILLIGRAM(S): 500 TABLET, DELAYED RELEASE ORAL at 17:52

## 2023-01-01 RX ADMIN — Medication 1 DROP(S): at 05:35

## 2023-01-01 RX ADMIN — Medication 1: at 16:55

## 2023-01-01 RX ADMIN — Medication 500 MILLIGRAM(S): at 06:00

## 2023-01-01 RX ADMIN — PANTOPRAZOLE SODIUM 40 MILLIGRAM(S): 20 TABLET, DELAYED RELEASE ORAL at 18:06

## 2023-01-01 RX ADMIN — Medication 1 APPLICATION(S): at 05:31

## 2023-01-01 RX ADMIN — Medication 3: at 11:58

## 2023-01-01 RX ADMIN — DIVALPROEX SODIUM 500 MILLIGRAM(S): 500 TABLET, DELAYED RELEASE ORAL at 18:19

## 2023-01-01 RX ADMIN — DIVALPROEX SODIUM 500 MILLIGRAM(S): 500 TABLET, DELAYED RELEASE ORAL at 05:21

## 2023-01-01 RX ADMIN — DIVALPROEX SODIUM 500 MILLIGRAM(S): 500 TABLET, DELAYED RELEASE ORAL at 06:35

## 2023-01-01 RX ADMIN — Medication 2 MILLIGRAM(S): at 00:04

## 2023-01-01 RX ADMIN — PANTOPRAZOLE SODIUM 40 MILLIGRAM(S): 20 TABLET, DELAYED RELEASE ORAL at 06:43

## 2023-01-01 RX ADMIN — Medication 2 MILLIGRAM(S): at 17:58

## 2023-01-01 RX ADMIN — Medication 3: at 08:25

## 2023-01-01 RX ADMIN — Medication 2 MILLIGRAM(S): at 05:38

## 2023-01-01 RX ADMIN — MUPIROCIN 1 APPLICATION(S): 20 OINTMENT TOPICAL at 04:43

## 2023-01-01 RX ADMIN — TRAMADOL HYDROCHLORIDE 25 MILLIGRAM(S): 50 TABLET ORAL at 00:05

## 2023-01-01 RX ADMIN — Medication 2 MILLIGRAM(S): at 05:09

## 2023-01-01 RX ADMIN — LACOSAMIDE 140 MILLIGRAM(S): 50 TABLET ORAL at 17:04

## 2023-01-01 RX ADMIN — SODIUM CHLORIDE 1000 MILLILITER(S): 9 INJECTION INTRAMUSCULAR; INTRAVENOUS; SUBCUTANEOUS at 03:31

## 2023-01-01 RX ADMIN — MUPIROCIN 1 APPLICATION(S): 20 OINTMENT TOPICAL at 17:06

## 2023-01-01 RX ADMIN — DIVALPROEX SODIUM 500 MILLIGRAM(S): 500 TABLET, DELAYED RELEASE ORAL at 17:01

## 2023-01-01 RX ADMIN — PANTOPRAZOLE SODIUM 40 MILLIGRAM(S): 20 TABLET, DELAYED RELEASE ORAL at 11:25

## 2023-01-01 RX ADMIN — DIVALPROEX SODIUM 500 MILLIGRAM(S): 500 TABLET, DELAYED RELEASE ORAL at 06:42

## 2023-01-01 RX ADMIN — Medication 100 MILLIGRAM(S): at 06:33

## 2023-01-01 RX ADMIN — DIVALPROEX SODIUM 500 MILLIGRAM(S): 500 TABLET, DELAYED RELEASE ORAL at 06:02

## 2023-01-01 RX ADMIN — Medication 2 UNIT(S): at 16:46

## 2023-01-01 RX ADMIN — DIVALPROEX SODIUM 500 MILLIGRAM(S): 500 TABLET, DELAYED RELEASE ORAL at 17:31

## 2023-01-01 RX ADMIN — Medication 2 MILLIGRAM(S): at 12:29

## 2023-01-01 RX ADMIN — Medication 3: at 12:02

## 2023-01-01 RX ADMIN — Medication 4 MILLIGRAM(S): at 20:12

## 2023-01-01 RX ADMIN — Medication 4 MILLIGRAM(S): at 05:53

## 2023-01-01 RX ADMIN — Medication 15 MILLIGRAM(S): at 00:20

## 2023-01-01 RX ADMIN — Medication 4 MILLIGRAM(S): at 13:08

## 2023-01-01 RX ADMIN — Medication 1 DROP(S): at 13:08

## 2023-01-01 RX ADMIN — Medication 4 MILLIGRAM(S): at 06:39

## 2023-01-01 RX ADMIN — Medication 52.5 MILLIGRAM(S): at 11:50

## 2023-01-01 RX ADMIN — Medication 1 DROP(S): at 05:34

## 2023-01-01 RX ADMIN — DIVALPROEX SODIUM 500 MILLIGRAM(S): 500 TABLET, DELAYED RELEASE ORAL at 06:39

## 2023-01-01 RX ADMIN — DIVALPROEX SODIUM 500 MILLIGRAM(S): 500 TABLET, DELAYED RELEASE ORAL at 04:41

## 2023-01-01 RX ADMIN — MUPIROCIN 1 APPLICATION(S): 20 OINTMENT TOPICAL at 06:09

## 2023-01-01 RX ADMIN — Medication 4 MILLIGRAM(S): at 23:02

## 2023-01-01 RX ADMIN — Medication 2 MILLIGRAM(S): at 17:33

## 2023-01-01 RX ADMIN — DIVALPROEX SODIUM 500 MILLIGRAM(S): 500 TABLET, DELAYED RELEASE ORAL at 06:17

## 2023-01-01 RX ADMIN — Medication 6: at 18:07

## 2023-01-01 RX ADMIN — DIVALPROEX SODIUM 500 MILLIGRAM(S): 500 TABLET, DELAYED RELEASE ORAL at 06:49

## 2023-01-01 RX ADMIN — Medication 520 MILLIGRAM(S): at 18:00

## 2023-01-01 RX ADMIN — Medication 1: at 21:01

## 2023-01-01 RX ADMIN — Medication 650 MILLIGRAM(S): at 02:32

## 2023-01-01 RX ADMIN — Medication 2 MILLIGRAM(S): at 17:09

## 2023-01-01 RX ADMIN — LACOSAMIDE 140 MILLIGRAM(S): 50 TABLET ORAL at 18:59

## 2023-01-01 RX ADMIN — METFORMIN HYDROCHLORIDE 500 MILLIGRAM(S): 850 TABLET ORAL at 08:44

## 2023-01-01 RX ADMIN — OXYCODONE HYDROCHLORIDE 5 MILLIGRAM(S): 5 TABLET ORAL at 21:26

## 2023-01-01 RX ADMIN — Medication 650 MILLIGRAM(S): at 23:08

## 2023-01-01 RX ADMIN — Medication 4 MILLIGRAM(S): at 06:00

## 2023-01-01 RX ADMIN — OXYCODONE HYDROCHLORIDE 5 MILLIGRAM(S): 5 TABLET ORAL at 21:17

## 2023-01-01 RX ADMIN — MORPHINE SULFATE 4 MILLIGRAM(S): 50 CAPSULE, EXTENDED RELEASE ORAL at 04:18

## 2023-01-01 RX ADMIN — OXYCODONE HYDROCHLORIDE 7.5 MILLIGRAM(S): 5 TABLET ORAL at 11:00

## 2023-01-01 RX ADMIN — CHLORHEXIDINE GLUCONATE 1 APPLICATION(S): 213 SOLUTION TOPICAL at 11:20

## 2023-01-01 RX ADMIN — OXYCODONE HYDROCHLORIDE 5 MILLIGRAM(S): 5 TABLET ORAL at 16:08

## 2023-01-01 RX ADMIN — Medication 2: at 12:40

## 2023-01-01 RX ADMIN — DIVALPROEX SODIUM 500 MILLIGRAM(S): 500 TABLET, DELAYED RELEASE ORAL at 05:17

## 2023-01-01 RX ADMIN — SODIUM ZIRCONIUM CYCLOSILICATE 5 GRAM(S): 10 POWDER, FOR SUSPENSION ORAL at 10:20

## 2023-01-01 RX ADMIN — Medication 400 MILLIGRAM(S): at 13:55

## 2023-01-01 RX ADMIN — OXYCODONE HYDROCHLORIDE 7.5 MILLIGRAM(S): 5 TABLET ORAL at 09:41

## 2023-01-01 RX ADMIN — Medication 1 APPLICATION(S): at 11:38

## 2023-01-01 RX ADMIN — SENNA PLUS 2 TABLET(S): 8.6 TABLET ORAL at 22:17

## 2023-01-01 RX ADMIN — Medication 2 MILLIGRAM(S): at 05:33

## 2023-01-01 RX ADMIN — Medication 2.5 MILLIGRAM(S): at 17:17

## 2023-01-01 RX ADMIN — LEVETIRACETAM 600 MILLIGRAM(S): 250 TABLET, FILM COATED ORAL at 17:07

## 2023-01-01 RX ADMIN — Medication 5 UNIT(S): at 12:19

## 2023-01-01 RX ADMIN — Medication 30 MILLILITER(S): at 08:46

## 2023-01-01 RX ADMIN — OXYCODONE HYDROCHLORIDE 7.5 MILLIGRAM(S): 5 TABLET ORAL at 20:52

## 2023-01-01 RX ADMIN — Medication 2 MILLIGRAM(S): at 18:30

## 2023-01-01 RX ADMIN — HYDROMORPHONE HYDROCHLORIDE 0.5 MILLIGRAM(S): 2 INJECTION INTRAMUSCULAR; INTRAVENOUS; SUBCUTANEOUS at 12:37

## 2023-01-01 RX ADMIN — TRAMADOL HYDROCHLORIDE 25 MILLIGRAM(S): 50 TABLET ORAL at 12:30

## 2023-01-01 RX ADMIN — PANTOPRAZOLE SODIUM 40 MILLIGRAM(S): 20 TABLET, DELAYED RELEASE ORAL at 04:42

## 2023-01-01 RX ADMIN — Medication 2 MILLIGRAM(S): at 17:14

## 2023-01-01 RX ADMIN — LACOSAMIDE 140 MILLIGRAM(S): 50 TABLET ORAL at 18:02

## 2023-01-01 RX ADMIN — Medication 5 UNIT(S): at 08:28

## 2023-01-01 RX ADMIN — Medication 2 UNIT(S): at 12:39

## 2023-01-01 RX ADMIN — Medication 4 MILLIGRAM(S): at 17:31

## 2023-01-01 RX ADMIN — DIVALPROEX SODIUM 500 MILLIGRAM(S): 500 TABLET, DELAYED RELEASE ORAL at 05:26

## 2023-01-01 RX ADMIN — Medication 2 MILLIGRAM(S): at 11:35

## 2023-01-01 RX ADMIN — Medication 2 MILLIGRAM(S): at 17:15

## 2023-01-01 RX ADMIN — DIVALPROEX SODIUM 500 MILLIGRAM(S): 500 TABLET, DELAYED RELEASE ORAL at 17:33

## 2023-01-01 RX ADMIN — OXYCODONE HYDROCHLORIDE 10 MILLIGRAM(S): 5 TABLET ORAL at 15:15

## 2023-01-01 RX ADMIN — Medication 8 UNIT(S): at 12:16

## 2023-01-01 RX ADMIN — DIVALPROEX SODIUM 500 MILLIGRAM(S): 500 TABLET, DELAYED RELEASE ORAL at 06:23

## 2023-01-01 RX ADMIN — ATORVASTATIN CALCIUM 40 MILLIGRAM(S): 80 TABLET, FILM COATED ORAL at 21:23

## 2023-01-01 RX ADMIN — DIVALPROEX SODIUM 500 MILLIGRAM(S): 500 TABLET, DELAYED RELEASE ORAL at 17:04

## 2023-01-01 RX ADMIN — DIVALPROEX SODIUM 500 MILLIGRAM(S): 500 TABLET, DELAYED RELEASE ORAL at 06:30

## 2023-01-01 RX ADMIN — Medication 2 MILLIGRAM(S): at 17:03

## 2023-01-01 RX ADMIN — MUPIROCIN 1 APPLICATION(S): 20 OINTMENT TOPICAL at 17:25

## 2023-01-01 RX ADMIN — DIVALPROEX SODIUM 500 MILLIGRAM(S): 500 TABLET, DELAYED RELEASE ORAL at 05:48

## 2023-01-01 RX ADMIN — INSULIN GLARGINE 15 UNIT(S): 100 INJECTION, SOLUTION SUBCUTANEOUS at 08:00

## 2023-01-01 RX ADMIN — Medication 15 MILLIGRAM(S): at 08:18

## 2023-01-01 RX ADMIN — Medication 2 MILLIGRAM(S): at 05:42

## 2023-01-01 RX ADMIN — CHLORHEXIDINE GLUCONATE 1 APPLICATION(S): 213 SOLUTION TOPICAL at 12:23

## 2023-01-01 RX ADMIN — POLYETHYLENE GLYCOL 3350 17 GRAM(S): 17 POWDER, FOR SOLUTION ORAL at 12:29

## 2023-01-01 RX ADMIN — Medication 4 MILLIGRAM(S): at 23:40

## 2023-01-01 RX ADMIN — SODIUM CHLORIDE 75 MILLILITER(S): 9 INJECTION, SOLUTION INTRAVENOUS at 17:00

## 2023-01-01 RX ADMIN — LEVETIRACETAM 500 MILLIGRAM(S): 250 TABLET, FILM COATED ORAL at 05:45

## 2023-01-01 RX ADMIN — MUPIROCIN 1 APPLICATION(S): 20 OINTMENT TOPICAL at 05:36

## 2023-01-01 RX ADMIN — LACOSAMIDE 120 MILLIGRAM(S): 50 TABLET ORAL at 02:42

## 2023-01-01 RX ADMIN — Medication 1 DROP(S): at 21:30

## 2023-01-01 RX ADMIN — Medication 650 MILLIGRAM(S): at 08:51

## 2023-01-01 RX ADMIN — Medication 5 UNIT(S): at 11:14

## 2023-01-01 RX ADMIN — METFORMIN HYDROCHLORIDE 500 MILLIGRAM(S): 850 TABLET ORAL at 07:51

## 2023-01-01 RX ADMIN — Medication 1 APPLICATION(S): at 06:43

## 2023-01-01 RX ADMIN — Medication 2 MILLIGRAM(S): at 18:09

## 2023-01-01 RX ADMIN — Medication 3 MILLIGRAM(S): at 06:54

## 2023-01-01 RX ADMIN — DIVALPROEX SODIUM 500 MILLIGRAM(S): 500 TABLET, DELAYED RELEASE ORAL at 18:02

## 2023-01-01 RX ADMIN — DIVALPROEX SODIUM 500 MILLIGRAM(S): 500 TABLET, DELAYED RELEASE ORAL at 17:38

## 2023-01-01 RX ADMIN — PANTOPRAZOLE SODIUM 40 MILLIGRAM(S): 20 TABLET, DELAYED RELEASE ORAL at 06:35

## 2023-01-01 RX ADMIN — Medication 650 MILLIGRAM(S): at 19:32

## 2023-01-01 RX ADMIN — Medication 2 MILLIGRAM(S): at 05:45

## 2023-01-01 RX ADMIN — Medication 1: at 13:12

## 2023-01-01 RX ADMIN — Medication 2 MILLIGRAM(S): at 17:52

## 2023-01-01 RX ADMIN — OXYCODONE HYDROCHLORIDE 7.5 MILLIGRAM(S): 5 TABLET ORAL at 10:28

## 2023-01-01 RX ADMIN — Medication 2 MILLIGRAM(S): at 17:26

## 2023-01-01 RX ADMIN — Medication 1 DROP(S): at 13:15

## 2023-01-01 RX ADMIN — Medication 4 MILLIGRAM(S): at 17:52

## 2023-01-01 RX ADMIN — INSULIN GLARGINE 15 UNIT(S): 100 INJECTION, SOLUTION SUBCUTANEOUS at 07:55

## 2023-01-01 RX ADMIN — Medication 15 MILLIGRAM(S): at 16:52

## 2023-01-01 RX ADMIN — DIVALPROEX SODIUM 500 MILLIGRAM(S): 500 TABLET, DELAYED RELEASE ORAL at 18:03

## 2023-01-01 RX ADMIN — OXYCODONE HYDROCHLORIDE 7.5 MILLIGRAM(S): 5 TABLET ORAL at 19:40

## 2023-01-01 RX ADMIN — Medication 2: at 17:34

## 2023-01-01 RX ADMIN — Medication 2 MILLIGRAM(S): at 00:09

## 2023-01-01 RX ADMIN — DIVALPROEX SODIUM 500 MILLIGRAM(S): 500 TABLET, DELAYED RELEASE ORAL at 17:21

## 2023-01-01 RX ADMIN — HALOPERIDOL DECANOATE 1 MILLIGRAM(S): 100 INJECTION INTRAMUSCULAR at 05:36

## 2023-01-01 RX ADMIN — Medication 1: at 13:31

## 2023-01-01 RX ADMIN — Medication 2 MILLIGRAM(S): at 05:04

## 2023-01-01 RX ADMIN — LEVETIRACETAM 600 MILLIGRAM(S): 250 TABLET, FILM COATED ORAL at 17:09

## 2023-01-01 RX ADMIN — Medication 0.5 MILLIGRAM(S): at 16:40

## 2023-01-01 RX ADMIN — LEVETIRACETAM 500 MILLIGRAM(S): 250 TABLET, FILM COATED ORAL at 17:20

## 2023-01-01 RX ADMIN — Medication 25 GRAM(S): at 01:20

## 2023-01-01 RX ADMIN — PANTOPRAZOLE SODIUM 40 MILLIGRAM(S): 20 TABLET, DELAYED RELEASE ORAL at 05:35

## 2023-01-01 RX ADMIN — CHLORHEXIDINE GLUCONATE 1 APPLICATION(S): 213 SOLUTION TOPICAL at 12:10

## 2023-01-01 RX ADMIN — OXYCODONE HYDROCHLORIDE 5 MILLIGRAM(S): 5 TABLET ORAL at 06:16

## 2023-01-01 RX ADMIN — OXYCODONE HYDROCHLORIDE 7.5 MILLIGRAM(S): 5 TABLET ORAL at 23:17

## 2023-01-01 RX ADMIN — OXYCODONE HYDROCHLORIDE 5 MILLIGRAM(S): 5 TABLET ORAL at 05:29

## 2023-01-01 RX ADMIN — Medication 2 UNIT(S): at 09:03

## 2023-01-01 RX ADMIN — TRAMADOL HYDROCHLORIDE 25 MILLIGRAM(S): 50 TABLET ORAL at 01:42

## 2023-01-01 RX ADMIN — ENOXAPARIN SODIUM 40 MILLIGRAM(S): 100 INJECTION SUBCUTANEOUS at 18:06

## 2023-01-01 RX ADMIN — OXYCODONE HYDROCHLORIDE 10 MILLIGRAM(S): 5 TABLET ORAL at 18:42

## 2023-01-01 RX ADMIN — OXYCODONE HYDROCHLORIDE 5 MILLIGRAM(S): 5 TABLET ORAL at 23:43

## 2023-01-01 RX ADMIN — Medication 4 MILLIGRAM(S): at 00:32

## 2023-01-01 RX ADMIN — SODIUM CHLORIDE 100 MILLILITER(S): 9 INJECTION INTRAMUSCULAR; INTRAVENOUS; SUBCUTANEOUS at 22:45

## 2023-01-01 RX ADMIN — Medication 2 MILLIGRAM(S): at 06:39

## 2023-01-01 RX ADMIN — Medication 650 MILLIGRAM(S): at 17:46

## 2023-01-01 RX ADMIN — HYDROMORPHONE HYDROCHLORIDE 0.5 MILLIGRAM(S): 2 INJECTION INTRAMUSCULAR; INTRAVENOUS; SUBCUTANEOUS at 13:00

## 2023-01-01 RX ADMIN — METFORMIN HYDROCHLORIDE 500 MILLIGRAM(S): 850 TABLET ORAL at 05:41

## 2023-01-01 RX ADMIN — Medication 250 MILLIGRAM(S): at 08:39

## 2023-01-01 RX ADMIN — INSULIN GLARGINE 17 UNIT(S): 100 INJECTION, SOLUTION SUBCUTANEOUS at 22:19

## 2023-01-01 RX ADMIN — DIVALPROEX SODIUM 500 MILLIGRAM(S): 500 TABLET, DELAYED RELEASE ORAL at 06:18

## 2023-01-01 RX ADMIN — DIVALPROEX SODIUM 500 MILLIGRAM(S): 500 TABLET, DELAYED RELEASE ORAL at 18:45

## 2023-01-01 RX ADMIN — Medication 1 DROP(S): at 13:50

## 2023-01-01 RX ADMIN — LACOSAMIDE 140 MILLIGRAM(S): 50 TABLET ORAL at 05:46

## 2023-01-01 RX ADMIN — Medication 1 APPLICATION(S): at 18:38

## 2023-01-01 RX ADMIN — Medication 650 MILLIGRAM(S): at 12:01

## 2023-01-01 RX ADMIN — Medication 1 DROP(S): at 13:09

## 2023-01-01 RX ADMIN — Medication 400 MILLIGRAM(S): at 11:22

## 2023-01-01 RX ADMIN — Medication 52.5 MILLIGRAM(S): at 12:07

## 2023-01-01 RX ADMIN — SODIUM CHLORIDE 1000 MILLILITER(S): 9 INJECTION INTRAMUSCULAR; INTRAVENOUS; SUBCUTANEOUS at 13:05

## 2023-01-01 RX ADMIN — PANTOPRAZOLE SODIUM 40 MILLIGRAM(S): 20 TABLET, DELAYED RELEASE ORAL at 05:22

## 2023-01-01 RX ADMIN — Medication 4 MILLIGRAM(S): at 12:00

## 2023-01-01 RX ADMIN — HYDROMORPHONE HYDROCHLORIDE 1 MILLIGRAM(S): 2 INJECTION INTRAMUSCULAR; INTRAVENOUS; SUBCUTANEOUS at 19:10

## 2023-01-01 RX ADMIN — PANTOPRAZOLE SODIUM 40 MILLIGRAM(S): 20 TABLET, DELAYED RELEASE ORAL at 05:27

## 2023-01-01 RX ADMIN — MUPIROCIN 1 APPLICATION(S): 20 OINTMENT TOPICAL at 05:41

## 2023-01-01 RX ADMIN — DIVALPROEX SODIUM 500 MILLIGRAM(S): 500 TABLET, DELAYED RELEASE ORAL at 05:54

## 2023-01-01 RX ADMIN — Medication 2 MILLIGRAM(S): at 17:32

## 2023-01-01 RX ADMIN — OXYCODONE HYDROCHLORIDE 7.5 MILLIGRAM(S): 5 TABLET ORAL at 18:00

## 2023-01-01 RX ADMIN — PANTOPRAZOLE SODIUM 40 MILLIGRAM(S): 20 TABLET, DELAYED RELEASE ORAL at 05:12

## 2023-01-01 RX ADMIN — Medication 650 MILLIGRAM(S): at 02:29

## 2023-01-01 RX ADMIN — Medication 4 MILLIGRAM(S): at 05:36

## 2023-01-01 RX ADMIN — Medication 3 MILLIGRAM(S): at 18:59

## 2023-01-01 RX ADMIN — Medication 2 MILLIGRAM(S): at 06:35

## 2023-01-01 RX ADMIN — OXYCODONE HYDROCHLORIDE 5 MILLIGRAM(S): 5 TABLET ORAL at 17:31

## 2023-01-01 RX ADMIN — Medication 1: at 16:39

## 2023-01-01 RX ADMIN — Medication 2 MILLIGRAM(S): at 05:30

## 2023-01-01 RX ADMIN — OXYCODONE HYDROCHLORIDE 7.5 MILLIGRAM(S): 5 TABLET ORAL at 13:57

## 2023-01-01 RX ADMIN — LEVETIRACETAM 600 MILLIGRAM(S): 250 TABLET, FILM COATED ORAL at 05:05

## 2023-01-01 RX ADMIN — METFORMIN HYDROCHLORIDE 500 MILLIGRAM(S): 850 TABLET ORAL at 06:30

## 2023-01-01 RX ADMIN — Medication 2 MILLIGRAM(S): at 06:31

## 2023-01-01 RX ADMIN — Medication 4 MILLIGRAM(S): at 17:19

## 2023-01-01 RX ADMIN — Medication 4 MILLIGRAM(S): at 05:31

## 2023-01-01 RX ADMIN — SODIUM CHLORIDE 75 MILLILITER(S): 9 INJECTION INTRAMUSCULAR; INTRAVENOUS; SUBCUTANEOUS at 20:27

## 2023-01-01 RX ADMIN — DIVALPROEX SODIUM 500 MILLIGRAM(S): 500 TABLET, DELAYED RELEASE ORAL at 18:07

## 2023-01-01 RX ADMIN — Medication 12: at 11:13

## 2023-01-01 RX ADMIN — Medication 12.5 MILLIGRAM(S): at 17:46

## 2023-01-01 RX ADMIN — Medication 4 MILLIGRAM(S): at 22:24

## 2023-01-01 RX ADMIN — LEVETIRACETAM 400 MILLIGRAM(S): 250 TABLET, FILM COATED ORAL at 05:33

## 2023-01-01 RX ADMIN — LEVETIRACETAM 500 MILLIGRAM(S): 250 TABLET, FILM COATED ORAL at 05:42

## 2023-01-01 RX ADMIN — METFORMIN HYDROCHLORIDE 500 MILLIGRAM(S): 850 TABLET ORAL at 18:37

## 2023-01-01 RX ADMIN — Medication 2 MILLIGRAM(S): at 18:40

## 2023-01-01 RX ADMIN — Medication 100 MILLIEQUIVALENT(S): at 12:57

## 2023-01-01 RX ADMIN — Medication 2 UNIT(S): at 13:06

## 2023-01-01 RX ADMIN — Medication 30 MILLILITER(S): at 03:31

## 2023-01-01 RX ADMIN — Medication 2: at 22:05

## 2023-01-01 RX ADMIN — Medication 650 MILLIGRAM(S): at 00:14

## 2023-01-01 RX ADMIN — Medication 650 MILLIGRAM(S): at 16:32

## 2023-01-01 RX ADMIN — Medication 4 MILLIGRAM(S): at 17:21

## 2023-01-01 RX ADMIN — METFORMIN HYDROCHLORIDE 500 MILLIGRAM(S): 850 TABLET ORAL at 17:57

## 2023-01-01 RX ADMIN — PANTOPRAZOLE SODIUM 40 MILLIGRAM(S): 20 TABLET, DELAYED RELEASE ORAL at 06:42

## 2023-01-01 RX ADMIN — Medication 2 MILLIGRAM(S): at 17:49

## 2023-01-01 RX ADMIN — PIPERACILLIN AND TAZOBACTAM 25 GRAM(S): 4; .5 INJECTION, POWDER, LYOPHILIZED, FOR SOLUTION INTRAVENOUS at 03:25

## 2023-01-01 RX ADMIN — Medication 1 DROP(S): at 22:05

## 2023-01-01 RX ADMIN — Medication 2 MILLIGRAM(S): at 05:28

## 2023-01-01 RX ADMIN — Medication 2: at 17:35

## 2023-01-01 RX ADMIN — HYDROMORPHONE HYDROCHLORIDE 0.5 MILLIGRAM(S): 2 INJECTION INTRAMUSCULAR; INTRAVENOUS; SUBCUTANEOUS at 12:35

## 2023-01-01 RX ADMIN — Medication 1 APPLICATION(S): at 11:41

## 2023-01-01 RX ADMIN — HYDROMORPHONE HYDROCHLORIDE 1 MILLIGRAM(S): 2 INJECTION INTRAMUSCULAR; INTRAVENOUS; SUBCUTANEOUS at 15:13

## 2023-01-01 RX ADMIN — SODIUM CHLORIDE 1000 MILLILITER(S): 9 INJECTION INTRAMUSCULAR; INTRAVENOUS; SUBCUTANEOUS at 17:22

## 2023-01-01 RX ADMIN — Medication 650 MILLIGRAM(S): at 20:55

## 2023-01-01 RX ADMIN — Medication 2 MILLIGRAM(S): at 05:27

## 2023-01-01 RX ADMIN — SODIUM CHLORIDE 100 MILLILITER(S): 9 INJECTION INTRAMUSCULAR; INTRAVENOUS; SUBCUTANEOUS at 10:54

## 2023-01-01 RX ADMIN — Medication 2 MILLIGRAM(S): at 10:01

## 2023-01-01 RX ADMIN — OXYCODONE HYDROCHLORIDE 5 MILLIGRAM(S): 5 TABLET ORAL at 04:02

## 2023-01-01 RX ADMIN — SODIUM CHLORIDE 75 MILLILITER(S): 9 INJECTION, SOLUTION INTRAVENOUS at 11:15

## 2023-01-01 RX ADMIN — Medication 4 MILLIGRAM(S): at 00:08

## 2023-01-01 RX ADMIN — Medication 6: at 12:19

## 2023-01-01 RX ADMIN — LACOSAMIDE 140 MILLIGRAM(S): 50 TABLET ORAL at 06:24

## 2023-01-01 RX ADMIN — Medication 25 GRAM(S): at 01:00

## 2023-01-01 RX ADMIN — DIVALPROEX SODIUM 500 MILLIGRAM(S): 500 TABLET, DELAYED RELEASE ORAL at 05:40

## 2023-01-01 RX ADMIN — ENOXAPARIN SODIUM 40 MILLIGRAM(S): 100 INJECTION SUBCUTANEOUS at 17:11

## 2023-01-01 RX ADMIN — DIVALPROEX SODIUM 500 MILLIGRAM(S): 500 TABLET, DELAYED RELEASE ORAL at 06:03

## 2023-01-01 RX ADMIN — ATORVASTATIN CALCIUM 40 MILLIGRAM(S): 80 TABLET, FILM COATED ORAL at 22:17

## 2023-01-01 RX ADMIN — Medication 1 PACKET(S): at 18:36

## 2023-01-01 RX ADMIN — Medication 1 DROP(S): at 16:55

## 2023-01-01 RX ADMIN — OXYCODONE HYDROCHLORIDE 5 MILLIGRAM(S): 5 TABLET ORAL at 07:50

## 2023-01-01 RX ADMIN — PANTOPRAZOLE SODIUM 40 MILLIGRAM(S): 20 TABLET, DELAYED RELEASE ORAL at 06:01

## 2023-01-01 RX ADMIN — Medication 260 MILLIGRAM(S): at 21:56

## 2023-01-01 RX ADMIN — METFORMIN HYDROCHLORIDE 500 MILLIGRAM(S): 850 TABLET ORAL at 05:53

## 2023-01-01 RX ADMIN — POTASSIUM PHOSPHATE, MONOBASIC POTASSIUM PHOSPHATE, DIBASIC 83.33 MILLIMOLE(S): 236; 224 INJECTION, SOLUTION INTRAVENOUS at 18:50

## 2023-01-01 RX ADMIN — OXYCODONE HYDROCHLORIDE 7.5 MILLIGRAM(S): 5 TABLET ORAL at 19:04

## 2023-01-01 RX ADMIN — Medication 15 MILLIGRAM(S): at 15:21

## 2023-01-01 RX ADMIN — OXYCODONE HYDROCHLORIDE 5 MILLIGRAM(S): 5 TABLET ORAL at 17:15

## 2023-01-01 RX ADMIN — DIVALPROEX SODIUM 500 MILLIGRAM(S): 500 TABLET, DELAYED RELEASE ORAL at 17:19

## 2023-01-01 RX ADMIN — Medication 3 MILLIGRAM(S): at 17:14

## 2023-01-01 RX ADMIN — OXYCODONE HYDROCHLORIDE 7.5 MILLIGRAM(S): 5 TABLET ORAL at 11:10

## 2023-01-01 RX ADMIN — OXYCODONE HYDROCHLORIDE 7.5 MILLIGRAM(S): 5 TABLET ORAL at 00:29

## 2023-01-01 RX ADMIN — INSULIN GLARGINE 15 UNIT(S): 100 INJECTION, SOLUTION SUBCUTANEOUS at 21:17

## 2023-01-01 RX ADMIN — Medication 2.5 MILLIGRAM(S): at 17:04

## 2023-01-01 RX ADMIN — Medication 2 MILLIGRAM(S): at 06:42

## 2023-01-01 RX ADMIN — Medication 52.5 MILLIGRAM(S): at 06:26

## 2023-01-01 RX ADMIN — DIVALPROEX SODIUM 500 MILLIGRAM(S): 500 TABLET, DELAYED RELEASE ORAL at 05:41

## 2023-01-01 RX ADMIN — Medication 4 MILLIGRAM(S): at 11:21

## 2023-01-01 RX ADMIN — Medication 650 MILLIGRAM(S): at 18:40

## 2023-01-01 RX ADMIN — Medication 2 MILLIGRAM(S): at 18:16

## 2023-01-01 RX ADMIN — Medication 4 MILLIGRAM(S): at 13:26

## 2023-01-01 RX ADMIN — Medication 1 DROP(S): at 21:32

## 2023-01-01 RX ADMIN — ENOXAPARIN SODIUM 40 MILLIGRAM(S): 100 INJECTION SUBCUTANEOUS at 05:12

## 2023-01-01 RX ADMIN — LACOSAMIDE 200 MILLIGRAM(S): 50 TABLET ORAL at 20:19

## 2023-01-01 RX ADMIN — Medication 1: at 17:24

## 2023-01-01 RX ADMIN — METFORMIN HYDROCHLORIDE 500 MILLIGRAM(S): 850 TABLET ORAL at 08:07

## 2023-01-01 RX ADMIN — Medication 52.5 MILLIGRAM(S): at 23:24

## 2023-01-01 RX ADMIN — METFORMIN HYDROCHLORIDE 500 MILLIGRAM(S): 850 TABLET ORAL at 05:50

## 2023-01-01 RX ADMIN — Medication 500 MILLIGRAM(S): at 18:40

## 2023-01-01 RX ADMIN — DIVALPROEX SODIUM 500 MILLIGRAM(S): 500 TABLET, DELAYED RELEASE ORAL at 18:28

## 2023-01-01 RX ADMIN — POLYETHYLENE GLYCOL 3350 17 GRAM(S): 17 POWDER, FOR SOLUTION ORAL at 12:19

## 2023-01-01 RX ADMIN — LACOSAMIDE 140 MILLIGRAM(S): 50 TABLET ORAL at 05:45

## 2023-01-01 RX ADMIN — Medication 2: at 17:39

## 2023-01-01 RX ADMIN — Medication 4 MILLIGRAM(S): at 17:27

## 2023-01-01 RX ADMIN — Medication 52.5 MILLIGRAM(S): at 12:54

## 2023-01-01 RX ADMIN — METFORMIN HYDROCHLORIDE 500 MILLIGRAM(S): 850 TABLET ORAL at 07:54

## 2023-01-01 RX ADMIN — Medication 4 MILLIGRAM(S): at 12:02

## 2023-01-01 RX ADMIN — METFORMIN HYDROCHLORIDE 500 MILLIGRAM(S): 850 TABLET ORAL at 17:01

## 2023-01-01 RX ADMIN — Medication 4 MILLIGRAM(S): at 17:36

## 2023-01-01 RX ADMIN — Medication 1 APPLICATION(S): at 13:08

## 2023-01-01 RX ADMIN — Medication 1 DROP(S): at 13:11

## 2023-01-01 RX ADMIN — Medication 650 MILLIGRAM(S): at 09:30

## 2023-01-01 RX ADMIN — Medication 3: at 16:57

## 2023-01-01 RX ADMIN — Medication 1 APPLICATION(S): at 17:07

## 2023-01-01 RX ADMIN — Medication 52.5 MILLIGRAM(S): at 18:48

## 2023-01-01 RX ADMIN — ENOXAPARIN SODIUM 40 MILLIGRAM(S): 100 INJECTION SUBCUTANEOUS at 14:12

## 2023-01-01 RX ADMIN — Medication 2 MILLIGRAM(S): at 22:03

## 2023-01-01 RX ADMIN — Medication 2.5 MILLIGRAM(S): at 17:48

## 2023-01-01 RX ADMIN — Medication 4: at 12:10

## 2023-01-01 RX ADMIN — POLYETHYLENE GLYCOL 3350 17 GRAM(S): 17 POWDER, FOR SOLUTION ORAL at 12:38

## 2023-01-01 RX ADMIN — DIVALPROEX SODIUM 500 MILLIGRAM(S): 500 TABLET, DELAYED RELEASE ORAL at 05:38

## 2023-01-01 RX ADMIN — OXYCODONE HYDROCHLORIDE 5 MILLIGRAM(S): 5 TABLET ORAL at 23:13

## 2023-01-01 RX ADMIN — Medication 2 MILLIGRAM(S): at 17:23

## 2023-01-01 RX ADMIN — PANTOPRAZOLE SODIUM 40 MILLIGRAM(S): 20 TABLET, DELAYED RELEASE ORAL at 11:02

## 2023-01-01 RX ADMIN — Medication 52.5 MILLIGRAM(S): at 08:10

## 2023-01-01 RX ADMIN — Medication 1: at 22:28

## 2023-01-01 RX ADMIN — LEVETIRACETAM 500 MILLIGRAM(S): 250 TABLET, FILM COATED ORAL at 18:46

## 2023-01-01 RX ADMIN — OXYCODONE HYDROCHLORIDE 5 MILLIGRAM(S): 5 TABLET ORAL at 03:32

## 2023-01-01 RX ADMIN — Medication 1 DROP(S): at 06:17

## 2023-01-01 RX ADMIN — ATORVASTATIN CALCIUM 40 MILLIGRAM(S): 80 TABLET, FILM COATED ORAL at 21:19

## 2023-01-01 RX ADMIN — Medication 650 MILLIGRAM(S): at 21:06

## 2023-01-01 RX ADMIN — Medication 1 APPLICATION(S): at 05:12

## 2023-01-01 RX ADMIN — Medication 6 MILLIGRAM(S): at 13:30

## 2023-01-01 RX ADMIN — Medication 2 MILLIGRAM(S): at 05:18

## 2023-01-01 RX ADMIN — Medication 25 GRAM(S): at 10:05

## 2023-01-01 RX ADMIN — OXYCODONE HYDROCHLORIDE 5 MILLIGRAM(S): 5 TABLET ORAL at 05:31

## 2023-01-01 RX ADMIN — SENNA PLUS 2 TABLET(S): 8.6 TABLET ORAL at 21:48

## 2023-01-01 RX ADMIN — SODIUM CHLORIDE 75 MILLILITER(S): 9 INJECTION, SOLUTION INTRAVENOUS at 16:46

## 2023-01-01 RX ADMIN — Medication 52.5 MILLIGRAM(S): at 11:25

## 2023-01-01 RX ADMIN — Medication 2 MILLIGRAM(S): at 19:24

## 2023-01-01 RX ADMIN — DIVALPROEX SODIUM 500 MILLIGRAM(S): 500 TABLET, DELAYED RELEASE ORAL at 22:05

## 2023-01-01 RX ADMIN — LACOSAMIDE 140 MILLIGRAM(S): 50 TABLET ORAL at 17:49

## 2023-01-01 RX ADMIN — METFORMIN HYDROCHLORIDE 500 MILLIGRAM(S): 850 TABLET ORAL at 08:20

## 2023-01-01 RX ADMIN — SODIUM CHLORIDE 100 MILLILITER(S): 9 INJECTION, SOLUTION INTRAVENOUS at 05:13

## 2023-01-01 RX ADMIN — Medication 4: at 12:20

## 2023-01-01 RX ADMIN — OXYCODONE HYDROCHLORIDE 5 MILLIGRAM(S): 5 TABLET ORAL at 12:10

## 2023-01-01 RX ADMIN — ENOXAPARIN SODIUM 40 MILLIGRAM(S): 100 INJECTION SUBCUTANEOUS at 17:09

## 2023-01-01 RX ADMIN — OXYCODONE HYDROCHLORIDE 10 MILLIGRAM(S): 5 TABLET ORAL at 20:35

## 2023-01-01 RX ADMIN — Medication 650 MILLIGRAM(S): at 17:52

## 2023-01-01 RX ADMIN — Medication 1 DROP(S): at 06:31

## 2023-01-01 RX ADMIN — Medication 650 MILLIGRAM(S): at 23:44

## 2023-01-01 RX ADMIN — Medication 2 MILLIGRAM(S): at 17:39

## 2023-01-01 RX ADMIN — HYDROMORPHONE HYDROCHLORIDE 0.5 MILLIGRAM(S): 2 INJECTION INTRAMUSCULAR; INTRAVENOUS; SUBCUTANEOUS at 13:22

## 2023-01-01 RX ADMIN — Medication 2: at 22:09

## 2023-01-01 RX ADMIN — METFORMIN HYDROCHLORIDE 500 MILLIGRAM(S): 850 TABLET ORAL at 08:54

## 2023-01-01 RX ADMIN — OXYCODONE HYDROCHLORIDE 7.5 MILLIGRAM(S): 5 TABLET ORAL at 07:22

## 2023-01-01 RX ADMIN — Medication 1 DROP(S): at 14:45

## 2023-01-01 RX ADMIN — Medication 2 MILLIGRAM(S): at 05:37

## 2023-01-01 RX ADMIN — Medication 100 GRAM(S): at 01:07

## 2023-01-01 RX ADMIN — Medication 250 MILLIGRAM(S): at 01:28

## 2023-01-01 RX ADMIN — Medication 52.5 MILLIGRAM(S): at 06:41

## 2023-01-01 RX ADMIN — Medication 2 MILLIGRAM(S): at 06:05

## 2023-01-01 RX ADMIN — Medication 100 MILLIGRAM(S): at 08:45

## 2023-01-01 RX ADMIN — Medication 4: at 09:45

## 2023-01-01 RX ADMIN — OXYCODONE HYDROCHLORIDE 5 MILLIGRAM(S): 5 TABLET ORAL at 05:00

## 2023-01-01 RX ADMIN — OXYCODONE HYDROCHLORIDE 7.5 MILLIGRAM(S): 5 TABLET ORAL at 01:49

## 2023-01-01 RX ADMIN — ENOXAPARIN SODIUM 40 MILLIGRAM(S): 100 INJECTION SUBCUTANEOUS at 16:29

## 2023-01-01 RX ADMIN — Medication 2 MILLIGRAM(S): at 17:46

## 2023-01-01 RX ADMIN — METFORMIN HYDROCHLORIDE 500 MILLIGRAM(S): 850 TABLET ORAL at 18:28

## 2023-01-01 RX ADMIN — PANTOPRAZOLE SODIUM 40 MILLIGRAM(S): 20 TABLET, DELAYED RELEASE ORAL at 06:15

## 2023-01-01 RX ADMIN — DIVALPROEX SODIUM 500 MILLIGRAM(S): 500 TABLET, DELAYED RELEASE ORAL at 03:14

## 2023-01-01 RX ADMIN — Medication 1: at 13:54

## 2023-01-01 RX ADMIN — DIVALPROEX SODIUM 500 MILLIGRAM(S): 500 TABLET, DELAYED RELEASE ORAL at 05:46

## 2023-01-01 RX ADMIN — DIVALPROEX SODIUM 500 MILLIGRAM(S): 500 TABLET, DELAYED RELEASE ORAL at 05:36

## 2023-01-01 RX ADMIN — Medication 5 UNIT(S): at 12:32

## 2023-01-01 RX ADMIN — DIVALPROEX SODIUM 500 MILLIGRAM(S): 500 TABLET, DELAYED RELEASE ORAL at 05:37

## 2023-01-01 RX ADMIN — Medication 1 DROP(S): at 05:28

## 2023-01-01 RX ADMIN — Medication 2 MILLIGRAM(S): at 05:36

## 2023-01-01 RX ADMIN — DIVALPROEX SODIUM 500 MILLIGRAM(S): 500 TABLET, DELAYED RELEASE ORAL at 05:53

## 2023-01-01 RX ADMIN — PANTOPRAZOLE SODIUM 40 MILLIGRAM(S): 20 TABLET, DELAYED RELEASE ORAL at 05:55

## 2023-01-01 RX ADMIN — Medication 1 APPLICATION(S): at 17:31

## 2023-01-01 RX ADMIN — Medication 2 MILLIGRAM(S): at 06:17

## 2023-01-01 RX ADMIN — Medication 1 DROP(S): at 14:43

## 2023-01-01 RX ADMIN — DIVALPROEX SODIUM 500 MILLIGRAM(S): 500 TABLET, DELAYED RELEASE ORAL at 17:00

## 2023-01-01 RX ADMIN — Medication 1000 MILLIGRAM(S): at 23:26

## 2023-01-01 RX ADMIN — Medication 30 MILLILITER(S): at 21:37

## 2023-01-01 RX ADMIN — Medication 650 MILLIGRAM(S): at 05:07

## 2023-01-01 RX ADMIN — LACOSAMIDE 140 MILLIGRAM(S): 50 TABLET ORAL at 16:46

## 2023-01-01 RX ADMIN — METFORMIN HYDROCHLORIDE 500 MILLIGRAM(S): 850 TABLET ORAL at 18:39

## 2023-01-01 RX ADMIN — CHLORHEXIDINE GLUCONATE 1 APPLICATION(S): 213 SOLUTION TOPICAL at 13:40

## 2023-01-01 RX ADMIN — Medication 15 MILLIGRAM(S): at 09:19

## 2023-01-01 RX ADMIN — Medication 650 MILLIGRAM(S): at 16:26

## 2023-01-01 RX ADMIN — Medication 3 MILLIGRAM(S): at 05:48

## 2023-01-01 RX ADMIN — Medication 4: at 16:46

## 2023-01-01 RX ADMIN — OXYCODONE HYDROCHLORIDE 7.5 MILLIGRAM(S): 5 TABLET ORAL at 02:00

## 2023-01-01 RX ADMIN — Medication 2 MILLIGRAM(S): at 17:06

## 2023-01-01 RX ADMIN — CHLORHEXIDINE GLUCONATE 1 APPLICATION(S): 213 SOLUTION TOPICAL at 17:05

## 2023-01-01 RX ADMIN — DIVALPROEX SODIUM 500 MILLIGRAM(S): 500 TABLET, DELAYED RELEASE ORAL at 17:39

## 2023-01-01 RX ADMIN — Medication 2.5 MILLIGRAM(S): at 05:33

## 2023-01-01 RX ADMIN — HYDROMORPHONE HYDROCHLORIDE 0.5 MILLIGRAM(S): 2 INJECTION INTRAMUSCULAR; INTRAVENOUS; SUBCUTANEOUS at 13:12

## 2023-01-01 RX ADMIN — Medication 1 DROP(S): at 17:12

## 2023-01-01 RX ADMIN — Medication 1 DROP(S): at 05:03

## 2023-01-01 RX ADMIN — Medication 4 MILLIGRAM(S): at 05:21

## 2023-01-01 RX ADMIN — OXYCODONE HYDROCHLORIDE 10 MILLIGRAM(S): 5 TABLET ORAL at 07:54

## 2023-01-01 RX ADMIN — Medication 2: at 16:50

## 2023-01-01 RX ADMIN — PANTOPRAZOLE SODIUM 40 MILLIGRAM(S): 20 TABLET, DELAYED RELEASE ORAL at 06:34

## 2023-01-01 RX ADMIN — Medication 400 MILLIGRAM(S): at 05:05

## 2023-01-01 RX ADMIN — SODIUM CHLORIDE 1000 MILLILITER(S): 9 INJECTION INTRAMUSCULAR; INTRAVENOUS; SUBCUTANEOUS at 12:36

## 2023-01-01 RX ADMIN — Medication 2 MILLIGRAM(S): at 11:36

## 2023-01-01 RX ADMIN — CHLORHEXIDINE GLUCONATE 1 APPLICATION(S): 213 SOLUTION TOPICAL at 11:27

## 2023-01-01 RX ADMIN — PANTOPRAZOLE SODIUM 40 MILLIGRAM(S): 20 TABLET, DELAYED RELEASE ORAL at 06:22

## 2023-01-01 RX ADMIN — LEVETIRACETAM 400 MILLIGRAM(S): 250 TABLET, FILM COATED ORAL at 17:26

## 2023-01-01 RX ADMIN — Medication 1 DROP(S): at 21:45

## 2023-01-01 RX ADMIN — Medication 2 MILLIGRAM(S): at 10:49

## 2023-01-01 RX ADMIN — Medication 100 MILLIGRAM(S): at 17:33

## 2023-01-01 RX ADMIN — Medication 2 MILLIGRAM(S): at 17:12

## 2023-01-01 RX ADMIN — Medication 52.5 MILLIGRAM(S): at 12:45

## 2023-01-01 RX ADMIN — MUPIROCIN 1 APPLICATION(S): 20 OINTMENT TOPICAL at 17:09

## 2023-01-01 RX ADMIN — Medication 2 MILLIGRAM(S): at 17:28

## 2023-01-01 RX ADMIN — Medication 1 DROP(S): at 21:50

## 2023-01-01 RX ADMIN — DIVALPROEX SODIUM 500 MILLIGRAM(S): 500 TABLET, DELAYED RELEASE ORAL at 05:03

## 2023-01-01 RX ADMIN — Medication 4 MILLIGRAM(S): at 13:30

## 2023-01-01 RX ADMIN — ATORVASTATIN CALCIUM 40 MILLIGRAM(S): 80 TABLET, FILM COATED ORAL at 21:11

## 2023-01-01 RX ADMIN — Medication 12 UNIT(S): at 09:00

## 2023-01-01 RX ADMIN — METFORMIN HYDROCHLORIDE 500 MILLIGRAM(S): 850 TABLET ORAL at 06:17

## 2023-01-01 RX ADMIN — OXYCODONE HYDROCHLORIDE 7.5 MILLIGRAM(S): 5 TABLET ORAL at 20:25

## 2023-01-01 RX ADMIN — ENOXAPARIN SODIUM 40 MILLIGRAM(S): 100 INJECTION SUBCUTANEOUS at 06:15

## 2023-01-01 RX ADMIN — SODIUM CHLORIDE 1000 MILLILITER(S): 9 INJECTION, SOLUTION INTRAVENOUS at 15:05

## 2023-01-01 RX ADMIN — Medication 15 MILLIGRAM(S): at 22:10

## 2023-01-01 RX ADMIN — Medication 5 UNIT(S): at 08:22

## 2023-01-01 RX ADMIN — Medication 2: at 22:32

## 2023-01-01 RX ADMIN — Medication 1: at 12:09

## 2023-01-01 RX ADMIN — DIVALPROEX SODIUM 500 MILLIGRAM(S): 500 TABLET, DELAYED RELEASE ORAL at 19:25

## 2023-01-01 RX ADMIN — Medication 4: at 07:19

## 2023-01-01 RX ADMIN — LEVETIRACETAM 600 MILLIGRAM(S): 250 TABLET, FILM COATED ORAL at 06:41

## 2023-01-01 RX ADMIN — INSULIN GLARGINE 15 UNIT(S): 100 INJECTION, SOLUTION SUBCUTANEOUS at 08:07

## 2023-01-01 RX ADMIN — SENNA PLUS 2 TABLET(S): 8.6 TABLET ORAL at 21:45

## 2023-01-01 RX ADMIN — CHLORHEXIDINE GLUCONATE 1 APPLICATION(S): 213 SOLUTION TOPICAL at 12:43

## 2023-01-01 RX ADMIN — OXYCODONE HYDROCHLORIDE 7.5 MILLIGRAM(S): 5 TABLET ORAL at 07:01

## 2023-01-01 RX ADMIN — OXYCODONE HYDROCHLORIDE 7.5 MILLIGRAM(S): 5 TABLET ORAL at 17:15

## 2023-01-01 RX ADMIN — Medication 650 MILLIGRAM(S): at 06:47

## 2023-01-01 RX ADMIN — Medication 15 MILLIGRAM(S): at 03:58

## 2023-01-01 RX ADMIN — Medication 5 UNIT(S): at 17:31

## 2023-01-01 RX ADMIN — Medication 650 MILLIGRAM(S): at 20:09

## 2023-01-01 RX ADMIN — PANTOPRAZOLE SODIUM 40 MILLIGRAM(S): 20 TABLET, DELAYED RELEASE ORAL at 06:17

## 2023-01-01 RX ADMIN — PANTOPRAZOLE SODIUM 40 MILLIGRAM(S): 20 TABLET, DELAYED RELEASE ORAL at 05:39

## 2023-01-01 RX ADMIN — PANTOPRAZOLE SODIUM 40 MILLIGRAM(S): 20 TABLET, DELAYED RELEASE ORAL at 06:00

## 2023-01-01 RX ADMIN — Medication 8 UNIT(S): at 16:42

## 2023-01-01 RX ADMIN — Medication 6: at 08:06

## 2023-01-01 RX ADMIN — Medication 2: at 09:07

## 2023-01-01 RX ADMIN — Medication 1 DROP(S): at 14:24

## 2023-01-01 RX ADMIN — Medication 6 UNIT(S): at 17:30

## 2023-01-01 RX ADMIN — Medication 2 MILLIGRAM(S): at 00:58

## 2023-01-01 RX ADMIN — PANTOPRAZOLE SODIUM 40 MILLIGRAM(S): 20 TABLET, DELAYED RELEASE ORAL at 05:13

## 2023-01-01 RX ADMIN — ATORVASTATIN CALCIUM 40 MILLIGRAM(S): 80 TABLET, FILM COATED ORAL at 21:04

## 2023-01-01 RX ADMIN — Medication 3 MILLIGRAM(S): at 17:31

## 2023-01-01 RX ADMIN — ENOXAPARIN SODIUM 40 MILLIGRAM(S): 100 INJECTION SUBCUTANEOUS at 05:41

## 2023-01-01 RX ADMIN — METFORMIN HYDROCHLORIDE 500 MILLIGRAM(S): 850 TABLET ORAL at 17:15

## 2023-01-01 RX ADMIN — Medication 650 MILLIGRAM(S): at 04:26

## 2023-01-01 RX ADMIN — LEVETIRACETAM 400 MILLIGRAM(S): 250 TABLET, FILM COATED ORAL at 06:09

## 2023-01-01 RX ADMIN — DIVALPROEX SODIUM 500 MILLIGRAM(S): 500 TABLET, DELAYED RELEASE ORAL at 17:26

## 2023-01-01 RX ADMIN — DIVALPROEX SODIUM 500 MILLIGRAM(S): 500 TABLET, DELAYED RELEASE ORAL at 18:40

## 2023-01-01 RX ADMIN — TRAMADOL HYDROCHLORIDE 25 MILLIGRAM(S): 50 TABLET ORAL at 07:53

## 2023-01-01 RX ADMIN — Medication 4 MILLIGRAM(S): at 11:38

## 2023-01-01 RX ADMIN — DIVALPROEX SODIUM 500 MILLIGRAM(S): 500 TABLET, DELAYED RELEASE ORAL at 06:54

## 2023-01-01 RX ADMIN — DIVALPROEX SODIUM 500 MILLIGRAM(S): 500 TABLET, DELAYED RELEASE ORAL at 18:37

## 2023-01-01 RX ADMIN — Medication 1: at 08:26

## 2023-01-01 RX ADMIN — OXYCODONE HYDROCHLORIDE 5 MILLIGRAM(S): 5 TABLET ORAL at 16:15

## 2023-01-01 RX ADMIN — Medication 1 DROP(S): at 05:41

## 2023-01-01 RX ADMIN — Medication 1 PACKET(S): at 11:46

## 2023-01-01 RX ADMIN — PANTOPRAZOLE SODIUM 40 MILLIGRAM(S): 20 TABLET, DELAYED RELEASE ORAL at 05:41

## 2023-01-01 RX ADMIN — Medication 1: at 17:46

## 2023-01-01 RX ADMIN — Medication 25 GRAM(S): at 13:08

## 2023-01-01 RX ADMIN — OXYCODONE HYDROCHLORIDE 5 MILLIGRAM(S): 5 TABLET ORAL at 13:17

## 2023-01-01 RX ADMIN — Medication 13 UNIT(S): at 12:05

## 2023-01-01 RX ADMIN — Medication 2.5 MILLIGRAM(S): at 06:39

## 2023-01-01 RX ADMIN — Medication 52.5 MILLIGRAM(S): at 18:52

## 2023-01-01 RX ADMIN — Medication 55 MILLIGRAM(S): at 17:08

## 2023-01-01 RX ADMIN — Medication 30 MILLILITER(S): at 04:32

## 2023-01-01 RX ADMIN — Medication 52.5 MILLIGRAM(S): at 06:21

## 2023-01-01 RX ADMIN — Medication 2.5 MILLIGRAM(S): at 05:05

## 2023-01-01 RX ADMIN — Medication 1 DROP(S): at 14:59

## 2023-01-01 RX ADMIN — Medication 8 UNIT(S): at 08:10

## 2023-01-01 RX ADMIN — INSULIN GLARGINE 3 UNIT(S): 100 INJECTION, SOLUTION SUBCUTANEOUS at 23:12

## 2023-01-01 RX ADMIN — Medication 4 MILLIGRAM(S): at 05:46

## 2023-01-01 RX ADMIN — ATORVASTATIN CALCIUM 40 MILLIGRAM(S): 80 TABLET, FILM COATED ORAL at 22:33

## 2023-01-01 RX ADMIN — DIVALPROEX SODIUM 500 MILLIGRAM(S): 500 TABLET, DELAYED RELEASE ORAL at 05:32

## 2023-01-01 RX ADMIN — HYDROMORPHONE HYDROCHLORIDE 0.5 MILLIGRAM(S): 2 INJECTION INTRAMUSCULAR; INTRAVENOUS; SUBCUTANEOUS at 13:48

## 2023-01-01 RX ADMIN — Medication 500 MILLIGRAM(S): at 05:49

## 2023-01-01 RX ADMIN — LACOSAMIDE 140 MILLIGRAM(S): 50 TABLET ORAL at 05:29

## 2023-01-01 RX ADMIN — METFORMIN HYDROCHLORIDE 500 MILLIGRAM(S): 850 TABLET ORAL at 17:03

## 2023-01-01 RX ADMIN — Medication 4 MILLIGRAM(S): at 06:03

## 2023-01-01 RX ADMIN — OXYCODONE HYDROCHLORIDE 7.5 MILLIGRAM(S): 5 TABLET ORAL at 14:20

## 2023-01-01 RX ADMIN — OXYCODONE HYDROCHLORIDE 7.5 MILLIGRAM(S): 5 TABLET ORAL at 03:00

## 2023-01-01 RX ADMIN — LEVETIRACETAM 500 MILLIGRAM(S): 250 TABLET, FILM COATED ORAL at 17:49

## 2023-01-01 RX ADMIN — PANTOPRAZOLE SODIUM 40 MILLIGRAM(S): 20 TABLET, DELAYED RELEASE ORAL at 05:38

## 2023-01-01 RX ADMIN — ENOXAPARIN SODIUM 40 MILLIGRAM(S): 100 INJECTION SUBCUTANEOUS at 06:44

## 2023-01-01 RX ADMIN — PANTOPRAZOLE SODIUM 40 MILLIGRAM(S): 20 TABLET, DELAYED RELEASE ORAL at 05:06

## 2023-01-01 RX ADMIN — CHLORHEXIDINE GLUCONATE 1 APPLICATION(S): 213 SOLUTION TOPICAL at 12:00

## 2023-01-01 RX ADMIN — DIVALPROEX SODIUM 500 MILLIGRAM(S): 500 TABLET, DELAYED RELEASE ORAL at 05:35

## 2023-01-01 RX ADMIN — LEVETIRACETAM 400 MILLIGRAM(S): 250 TABLET, FILM COATED ORAL at 01:55

## 2023-01-01 RX ADMIN — Medication 1: at 22:06

## 2023-01-01 RX ADMIN — Medication 4 MILLIGRAM(S): at 05:56

## 2023-01-01 RX ADMIN — FAMOTIDINE 20 MILLIGRAM(S): 10 INJECTION INTRAVENOUS at 13:30

## 2023-01-01 RX ADMIN — DIVALPROEX SODIUM 500 MILLIGRAM(S): 500 TABLET, DELAYED RELEASE ORAL at 05:13

## 2023-01-01 RX ADMIN — ATORVASTATIN CALCIUM 40 MILLIGRAM(S): 80 TABLET, FILM COATED ORAL at 21:15

## 2023-01-01 RX ADMIN — Medication 1 MILLIGRAM(S): at 02:53

## 2023-01-01 RX ADMIN — Medication 4 MILLIGRAM(S): at 23:06

## 2023-01-01 RX ADMIN — INSULIN GLARGINE 15 UNIT(S): 100 INJECTION, SOLUTION SUBCUTANEOUS at 22:09

## 2023-01-01 RX ADMIN — DIVALPROEX SODIUM 500 MILLIGRAM(S): 500 TABLET, DELAYED RELEASE ORAL at 17:14

## 2023-01-01 RX ADMIN — Medication 2 MILLIGRAM(S): at 18:46

## 2023-01-01 RX ADMIN — Medication 650 MILLIGRAM(S): at 05:57

## 2023-01-01 RX ADMIN — Medication 3 MILLIGRAM(S): at 00:09

## 2023-01-01 RX ADMIN — Medication 2 MILLIGRAM(S): at 05:23

## 2023-01-01 RX ADMIN — Medication 1: at 12:38

## 2023-01-01 RX ADMIN — Medication 5 UNIT(S): at 08:14

## 2023-01-01 RX ADMIN — PANTOPRAZOLE SODIUM 40 MILLIGRAM(S): 20 TABLET, DELAYED RELEASE ORAL at 06:03

## 2023-01-01 RX ADMIN — Medication 2: at 11:58

## 2023-01-01 RX ADMIN — Medication 2 UNIT(S): at 16:55

## 2023-01-01 RX ADMIN — LEVETIRACETAM 500 MILLIGRAM(S): 250 TABLET, FILM COATED ORAL at 07:03

## 2023-01-01 RX ADMIN — INSULIN GLARGINE 7 UNIT(S): 100 INJECTION, SOLUTION SUBCUTANEOUS at 22:31

## 2023-01-01 RX ADMIN — Medication 8 UNIT(S): at 17:07

## 2023-01-01 RX ADMIN — Medication 15 MILLIGRAM(S): at 21:40

## 2023-01-01 RX ADMIN — DIVALPROEX SODIUM 500 MILLIGRAM(S): 500 TABLET, DELAYED RELEASE ORAL at 05:30

## 2023-01-01 RX ADMIN — Medication 100 MILLIGRAM(S): at 07:55

## 2023-01-01 RX ADMIN — OXYCODONE HYDROCHLORIDE 5 MILLIGRAM(S): 5 TABLET ORAL at 07:00

## 2023-01-01 RX ADMIN — HYDROMORPHONE HYDROCHLORIDE 0.5 MILLIGRAM(S): 2 INJECTION INTRAMUSCULAR; INTRAVENOUS; SUBCUTANEOUS at 12:33

## 2023-01-01 RX ADMIN — LEVETIRACETAM 600 MILLIGRAM(S): 250 TABLET, FILM COATED ORAL at 05:53

## 2023-01-01 RX ADMIN — Medication 2: at 13:32

## 2023-01-01 RX ADMIN — Medication 250 MILLIGRAM(S): at 14:35

## 2023-01-01 RX ADMIN — DIVALPROEX SODIUM 500 MILLIGRAM(S): 500 TABLET, DELAYED RELEASE ORAL at 05:31

## 2023-01-01 RX ADMIN — PANTOPRAZOLE SODIUM 40 MILLIGRAM(S): 20 TABLET, DELAYED RELEASE ORAL at 06:09

## 2023-01-01 RX ADMIN — ATORVASTATIN CALCIUM 40 MILLIGRAM(S): 80 TABLET, FILM COATED ORAL at 20:27

## 2023-01-01 RX ADMIN — Medication 2 MILLIGRAM(S): at 05:48

## 2023-01-01 RX ADMIN — POLYETHYLENE GLYCOL 3350 17 GRAM(S): 17 POWDER, FOR SOLUTION ORAL at 11:47

## 2023-01-01 RX ADMIN — Medication 2 UNIT(S): at 12:46

## 2023-01-01 RX ADMIN — Medication 2: at 08:22

## 2023-01-01 RX ADMIN — CHLORHEXIDINE GLUCONATE 1 APPLICATION(S): 213 SOLUTION TOPICAL at 13:06

## 2023-01-01 RX ADMIN — Medication 1: at 12:55

## 2023-01-01 RX ADMIN — ENOXAPARIN SODIUM 40 MILLIGRAM(S): 100 INJECTION SUBCUTANEOUS at 06:23

## 2023-01-01 RX ADMIN — Medication 52.5 MILLIGRAM(S): at 00:37

## 2023-01-01 RX ADMIN — Medication 1 DROP(S): at 14:54

## 2023-01-01 RX ADMIN — Medication 4 MILLIGRAM(S): at 23:50

## 2023-01-01 RX ADMIN — METFORMIN HYDROCHLORIDE 500 MILLIGRAM(S): 850 TABLET ORAL at 09:28

## 2023-01-01 RX ADMIN — OXYCODONE HYDROCHLORIDE 7.5 MILLIGRAM(S): 5 TABLET ORAL at 16:25

## 2023-01-01 RX ADMIN — PANTOPRAZOLE SODIUM 40 MILLIGRAM(S): 20 TABLET, DELAYED RELEASE ORAL at 05:18

## 2023-01-01 RX ADMIN — Medication 650 MILLIGRAM(S): at 07:44

## 2023-01-01 RX ADMIN — Medication 4 MILLIGRAM(S): at 13:11

## 2023-01-01 RX ADMIN — DIVALPROEX SODIUM 500 MILLIGRAM(S): 500 TABLET, DELAYED RELEASE ORAL at 17:09

## 2023-01-01 RX ADMIN — Medication 1 DROP(S): at 21:06

## 2023-01-01 RX ADMIN — ENOXAPARIN SODIUM 40 MILLIGRAM(S): 100 INJECTION SUBCUTANEOUS at 12:06

## 2023-01-01 RX ADMIN — METFORMIN HYDROCHLORIDE 500 MILLIGRAM(S): 850 TABLET ORAL at 17:09

## 2023-01-01 RX ADMIN — Medication 2 MILLIGRAM(S): at 17:50

## 2023-01-01 RX ADMIN — Medication 30 MILLILITER(S): at 11:11

## 2023-01-01 RX ADMIN — Medication 2 MILLIGRAM(S): at 06:00

## 2023-01-01 RX ADMIN — Medication 52.5 MILLIGRAM(S): at 19:30

## 2023-01-01 RX ADMIN — Medication 650 MILLIGRAM(S): at 16:39

## 2023-01-01 RX ADMIN — PANTOPRAZOLE SODIUM 40 MILLIGRAM(S): 20 TABLET, DELAYED RELEASE ORAL at 07:04

## 2023-01-01 RX ADMIN — Medication 1 DROP(S): at 14:23

## 2023-01-01 RX ADMIN — Medication 100 MILLIEQUIVALENT(S): at 02:21

## 2023-01-01 RX ADMIN — LEVETIRACETAM 500 MILLIGRAM(S): 250 TABLET, FILM COATED ORAL at 17:06

## 2023-01-01 RX ADMIN — TRAMADOL HYDROCHLORIDE 25 MILLIGRAM(S): 50 TABLET ORAL at 15:35

## 2023-01-01 RX ADMIN — Medication 4 MILLIGRAM(S): at 17:07

## 2023-01-01 RX ADMIN — ENOXAPARIN SODIUM 40 MILLIGRAM(S): 100 INJECTION SUBCUTANEOUS at 18:19

## 2023-01-01 RX ADMIN — Medication 2 MILLIGRAM(S): at 23:23

## 2023-01-01 RX ADMIN — Medication 1 DROP(S): at 05:30

## 2023-01-01 RX ADMIN — Medication 2: at 16:38

## 2023-01-01 RX ADMIN — Medication 4: at 17:49

## 2023-01-01 RX ADMIN — OXYCODONE HYDROCHLORIDE 5 MILLIGRAM(S): 5 TABLET ORAL at 07:19

## 2023-01-01 RX ADMIN — OXYCODONE HYDROCHLORIDE 5 MILLIGRAM(S): 5 TABLET ORAL at 12:26

## 2023-01-01 RX ADMIN — Medication 650 MILLIGRAM(S): at 19:31

## 2023-01-01 RX ADMIN — POLYETHYLENE GLYCOL 3350 17 GRAM(S): 17 POWDER, FOR SOLUTION ORAL at 11:19

## 2023-01-01 RX ADMIN — Medication 650 MILLIGRAM(S): at 15:56

## 2023-01-01 RX ADMIN — OXYCODONE HYDROCHLORIDE 7.5 MILLIGRAM(S): 5 TABLET ORAL at 13:20

## 2023-01-01 RX ADMIN — SODIUM CHLORIDE 75 MILLILITER(S): 9 INJECTION, SOLUTION INTRAVENOUS at 12:10

## 2023-01-01 RX ADMIN — Medication 2 MILLIGRAM(S): at 05:22

## 2023-01-01 RX ADMIN — Medication 15 GRAM(S): at 12:07

## 2023-01-01 RX ADMIN — SODIUM CHLORIDE 50 MILLILITER(S): 9 INJECTION INTRAMUSCULAR; INTRAVENOUS; SUBCUTANEOUS at 13:50

## 2023-01-01 RX ADMIN — Medication 650 MILLIGRAM(S): at 07:51

## 2023-01-01 RX ADMIN — Medication 7 UNIT(S): at 11:58

## 2023-01-01 RX ADMIN — Medication 1 DROP(S): at 22:39

## 2023-01-01 RX ADMIN — Medication 4 MILLIGRAM(S): at 17:09

## 2023-01-01 RX ADMIN — OXYCODONE HYDROCHLORIDE 7.5 MILLIGRAM(S): 5 TABLET ORAL at 17:00

## 2023-01-01 RX ADMIN — Medication 4 MILLIGRAM(S): at 23:35

## 2023-01-01 RX ADMIN — PANTOPRAZOLE SODIUM 40 MILLIGRAM(S): 20 TABLET, DELAYED RELEASE ORAL at 12:55

## 2023-01-01 RX ADMIN — PANTOPRAZOLE SODIUM 40 MILLIGRAM(S): 20 TABLET, DELAYED RELEASE ORAL at 06:39

## 2023-01-01 RX ADMIN — LEVETIRACETAM 400 MILLIGRAM(S): 250 TABLET, FILM COATED ORAL at 05:17

## 2023-01-01 RX ADMIN — PANTOPRAZOLE SODIUM 40 MILLIGRAM(S): 20 TABLET, DELAYED RELEASE ORAL at 17:08

## 2023-01-01 RX ADMIN — PANTOPRAZOLE SODIUM 40 MILLIGRAM(S): 20 TABLET, DELAYED RELEASE ORAL at 05:31

## 2023-01-01 RX ADMIN — Medication 650 MILLIGRAM(S): at 06:46

## 2023-01-01 RX ADMIN — MORPHINE SULFATE 2 MILLIGRAM(S): 50 CAPSULE, EXTENDED RELEASE ORAL at 02:51

## 2023-01-01 RX ADMIN — PIPERACILLIN AND TAZOBACTAM 25 GRAM(S): 4; .5 INJECTION, POWDER, LYOPHILIZED, FOR SOLUTION INTRAVENOUS at 22:16

## 2023-01-01 RX ADMIN — SODIUM CHLORIDE 75 MILLILITER(S): 9 INJECTION, SOLUTION INTRAVENOUS at 18:18

## 2023-01-01 RX ADMIN — Medication 2 MILLIGRAM(S): at 13:50

## 2023-01-01 RX ADMIN — PANTOPRAZOLE SODIUM 40 MILLIGRAM(S): 20 TABLET, DELAYED RELEASE ORAL at 05:17

## 2023-01-01 RX ADMIN — METFORMIN HYDROCHLORIDE 500 MILLIGRAM(S): 850 TABLET ORAL at 17:28

## 2023-01-01 RX ADMIN — SODIUM CHLORIDE 1000 MILLILITER(S): 9 INJECTION, SOLUTION INTRAVENOUS at 11:21

## 2023-01-01 RX ADMIN — ENOXAPARIN SODIUM 40 MILLIGRAM(S): 100 INJECTION SUBCUTANEOUS at 14:24

## 2023-01-01 RX ADMIN — Medication 1 DROP(S): at 22:04

## 2023-01-01 RX ADMIN — Medication 1 DROP(S): at 21:27

## 2023-01-01 RX ADMIN — OXYCODONE HYDROCHLORIDE 5 MILLIGRAM(S): 5 TABLET ORAL at 16:48

## 2023-01-01 RX ADMIN — LEVETIRACETAM 400 MILLIGRAM(S): 250 TABLET, FILM COATED ORAL at 17:02

## 2023-01-01 RX ADMIN — LACOSAMIDE 140 MILLIGRAM(S): 50 TABLET ORAL at 06:31

## 2023-01-01 RX ADMIN — Medication 4: at 17:38

## 2023-01-01 RX ADMIN — PANTOPRAZOLE SODIUM 40 MILLIGRAM(S): 20 TABLET, DELAYED RELEASE ORAL at 06:10

## 2023-01-01 RX ADMIN — Medication 1 DROP(S): at 22:03

## 2023-01-01 RX ADMIN — Medication 7 UNIT(S): at 09:45

## 2023-01-01 RX ADMIN — PIPERACILLIN AND TAZOBACTAM 25 GRAM(S): 4; .5 INJECTION, POWDER, LYOPHILIZED, FOR SOLUTION INTRAVENOUS at 05:08

## 2023-01-01 RX ADMIN — Medication 4 MILLIGRAM(S): at 17:08

## 2023-01-01 RX ADMIN — DIVALPROEX SODIUM 500 MILLIGRAM(S): 500 TABLET, DELAYED RELEASE ORAL at 05:07

## 2023-01-01 RX ADMIN — Medication 4 MILLIGRAM(S): at 00:19

## 2023-01-01 RX ADMIN — DIVALPROEX SODIUM 500 MILLIGRAM(S): 500 TABLET, DELAYED RELEASE ORAL at 05:28

## 2023-01-01 RX ADMIN — METFORMIN HYDROCHLORIDE 500 MILLIGRAM(S): 850 TABLET ORAL at 06:18

## 2023-01-01 RX ADMIN — Medication 4 MILLIGRAM(S): at 12:24

## 2023-01-01 RX ADMIN — Medication 1 MILLIGRAM(S): at 01:19

## 2023-01-01 RX ADMIN — Medication 6: at 13:43

## 2023-01-01 RX ADMIN — Medication 1 APPLICATION(S): at 17:05

## 2023-01-01 RX ADMIN — Medication 4 MILLIGRAM(S): at 05:24

## 2023-01-01 RX ADMIN — OXYCODONE HYDROCHLORIDE 7.5 MILLIGRAM(S): 5 TABLET ORAL at 10:20

## 2023-01-01 RX ADMIN — PANTOPRAZOLE SODIUM 40 MILLIGRAM(S): 20 TABLET, DELAYED RELEASE ORAL at 17:11

## 2023-01-01 RX ADMIN — Medication 2.5 MILLIGRAM(S): at 05:27

## 2023-01-01 RX ADMIN — Medication 2 MILLIGRAM(S): at 05:21

## 2023-01-01 RX ADMIN — LEVETIRACETAM 500 MILLIGRAM(S): 250 TABLET, FILM COATED ORAL at 05:25

## 2023-01-01 RX ADMIN — Medication 1: at 08:14

## 2023-01-01 RX ADMIN — Medication 3: at 16:42

## 2023-01-01 RX ADMIN — Medication 12.5 MILLIGRAM(S): at 10:15

## 2023-01-01 RX ADMIN — ENOXAPARIN SODIUM 40 MILLIGRAM(S): 100 INJECTION SUBCUTANEOUS at 22:05

## 2023-01-01 RX ADMIN — Medication 1: at 13:15

## 2023-01-01 RX ADMIN — Medication 1 DROP(S): at 22:31

## 2023-01-01 RX ADMIN — ENOXAPARIN SODIUM 40 MILLIGRAM(S): 100 INJECTION SUBCUTANEOUS at 06:27

## 2023-01-01 RX ADMIN — Medication 12 UNIT(S): at 12:47

## 2023-01-01 RX ADMIN — Medication 650 MILLIGRAM(S): at 08:46

## 2023-01-01 RX ADMIN — Medication 0.5 MILLIGRAM(S): at 02:51

## 2023-01-01 RX ADMIN — Medication 5 UNIT(S): at 12:30

## 2023-01-01 RX ADMIN — Medication 15 MILLIGRAM(S): at 13:31

## 2023-01-01 RX ADMIN — DIVALPROEX SODIUM 500 MILLIGRAM(S): 500 TABLET, DELAYED RELEASE ORAL at 05:27

## 2023-01-01 RX ADMIN — DIVALPROEX SODIUM 500 MILLIGRAM(S): 500 TABLET, DELAYED RELEASE ORAL at 17:36

## 2023-01-01 RX ADMIN — ATORVASTATIN CALCIUM 40 MILLIGRAM(S): 80 TABLET, FILM COATED ORAL at 22:04

## 2023-01-01 RX ADMIN — OXYCODONE HYDROCHLORIDE 5 MILLIGRAM(S): 5 TABLET ORAL at 12:17

## 2023-01-01 RX ADMIN — Medication 1 APPLICATION(S): at 05:34

## 2023-01-01 RX ADMIN — MORPHINE SULFATE 4 MILLIGRAM(S): 50 CAPSULE, EXTENDED RELEASE ORAL at 08:58

## 2023-01-01 RX ADMIN — HYDROMORPHONE HYDROCHLORIDE 0.2 MILLIGRAM(S): 2 INJECTION INTRAMUSCULAR; INTRAVENOUS; SUBCUTANEOUS at 12:30

## 2023-01-01 RX ADMIN — Medication 2: at 09:03

## 2023-01-01 RX ADMIN — METFORMIN HYDROCHLORIDE 500 MILLIGRAM(S): 850 TABLET ORAL at 06:16

## 2023-01-01 RX ADMIN — Medication 2 MILLIGRAM(S): at 23:08

## 2023-01-01 RX ADMIN — Medication 4 MILLIGRAM(S): at 17:04

## 2023-01-01 RX ADMIN — CHLORHEXIDINE GLUCONATE 1 APPLICATION(S): 213 SOLUTION TOPICAL at 12:29

## 2023-01-01 RX ADMIN — Medication 1 DROP(S): at 22:00

## 2023-01-01 RX ADMIN — Medication 1: at 12:36

## 2023-01-01 RX ADMIN — TRAMADOL HYDROCHLORIDE 25 MILLIGRAM(S): 50 TABLET ORAL at 17:20

## 2023-01-01 RX ADMIN — DIVALPROEX SODIUM 500 MILLIGRAM(S): 500 TABLET, DELAYED RELEASE ORAL at 17:02

## 2023-01-01 RX ADMIN — Medication 55 MILLIGRAM(S): at 05:01

## 2023-01-01 RX ADMIN — Medication 2: at 13:48

## 2023-01-01 RX ADMIN — Medication 1 DROP(S): at 05:09

## 2023-01-01 RX ADMIN — ATORVASTATIN CALCIUM 40 MILLIGRAM(S): 80 TABLET, FILM COATED ORAL at 21:35

## 2023-01-01 RX ADMIN — SODIUM CHLORIDE 75 MILLILITER(S): 5 INJECTION, SOLUTION INTRAVENOUS at 01:40

## 2023-01-01 RX ADMIN — Medication 2.5 MILLIGRAM(S): at 17:26

## 2023-01-01 RX ADMIN — PANTOPRAZOLE SODIUM 40 MILLIGRAM(S): 20 TABLET, DELAYED RELEASE ORAL at 06:24

## 2023-01-01 RX ADMIN — OXYCODONE HYDROCHLORIDE 5 MILLIGRAM(S): 5 TABLET ORAL at 07:42

## 2023-01-01 RX ADMIN — CHLORHEXIDINE GLUCONATE 1 APPLICATION(S): 213 SOLUTION TOPICAL at 12:24

## 2023-01-01 RX ADMIN — PANTOPRAZOLE SODIUM 40 MILLIGRAM(S): 20 TABLET, DELAYED RELEASE ORAL at 09:35

## 2023-01-01 RX ADMIN — CHLORHEXIDINE GLUCONATE 1 APPLICATION(S): 213 SOLUTION TOPICAL at 12:05

## 2023-01-01 RX ADMIN — ENOXAPARIN SODIUM 40 MILLIGRAM(S): 100 INJECTION SUBCUTANEOUS at 13:47

## 2023-01-01 RX ADMIN — Medication 100 MILLIEQUIVALENT(S): at 14:05

## 2023-01-01 RX ADMIN — MORPHINE SULFATE 2 MILLIGRAM(S): 50 CAPSULE, EXTENDED RELEASE ORAL at 09:48

## 2023-01-01 RX ADMIN — Medication 4 MILLIGRAM(S): at 22:46

## 2023-01-01 RX ADMIN — Medication 52.5 MILLIGRAM(S): at 16:49

## 2023-01-01 RX ADMIN — Medication 8 UNIT(S): at 12:25

## 2023-01-01 RX ADMIN — Medication 650 MILLIGRAM(S): at 22:36

## 2023-01-01 RX ADMIN — PANTOPRAZOLE SODIUM 40 MILLIGRAM(S): 20 TABLET, DELAYED RELEASE ORAL at 05:24

## 2023-01-01 RX ADMIN — MORPHINE SULFATE 2 MILLIGRAM(S): 50 CAPSULE, EXTENDED RELEASE ORAL at 08:15

## 2023-01-01 RX ADMIN — Medication 2.5 MILLIGRAM(S): at 05:35

## 2023-01-01 RX ADMIN — MORPHINE SULFATE 4 MILLIGRAM(S): 50 CAPSULE, EXTENDED RELEASE ORAL at 09:15

## 2023-01-01 RX ADMIN — OXYCODONE HYDROCHLORIDE 7.5 MILLIGRAM(S): 5 TABLET ORAL at 20:22

## 2023-01-01 RX ADMIN — SODIUM CHLORIDE 75 MILLILITER(S): 9 INJECTION, SOLUTION INTRAVENOUS at 21:56

## 2023-01-01 RX ADMIN — LEVETIRACETAM 400 MILLIGRAM(S): 250 TABLET, FILM COATED ORAL at 19:30

## 2023-01-01 RX ADMIN — Medication 4 UNIT(S): at 17:34

## 2023-01-01 RX ADMIN — OXYCODONE HYDROCHLORIDE 10 MILLIGRAM(S): 5 TABLET ORAL at 01:31

## 2023-01-01 RX ADMIN — ATORVASTATIN CALCIUM 40 MILLIGRAM(S): 80 TABLET, FILM COATED ORAL at 21:57

## 2023-01-01 RX ADMIN — ROBINUL 0.4 MILLIGRAM(S): 0.2 INJECTION INTRAMUSCULAR; INTRAVENOUS at 08:15

## 2023-01-01 RX ADMIN — OXYCODONE HYDROCHLORIDE 7.5 MILLIGRAM(S): 5 TABLET ORAL at 01:15

## 2023-01-01 RX ADMIN — LEVETIRACETAM 600 MILLIGRAM(S): 250 TABLET, FILM COATED ORAL at 05:29

## 2023-01-01 RX ADMIN — OXYCODONE HYDROCHLORIDE 5 MILLIGRAM(S): 5 TABLET ORAL at 21:48

## 2023-01-01 RX ADMIN — INSULIN GLARGINE 5 UNIT(S): 100 INJECTION, SOLUTION SUBCUTANEOUS at 11:12

## 2023-01-01 RX ADMIN — Medication 15 MILLIGRAM(S): at 20:14

## 2023-01-01 RX ADMIN — Medication 1 DROP(S): at 20:10

## 2023-01-01 RX ADMIN — Medication 52.5 MILLIGRAM(S): at 22:46

## 2023-01-01 RX ADMIN — LEVETIRACETAM 1000 MILLIGRAM(S): 250 TABLET, FILM COATED ORAL at 01:18

## 2023-01-01 RX ADMIN — PANTOPRAZOLE SODIUM 40 MILLIGRAM(S): 20 TABLET, DELAYED RELEASE ORAL at 12:06

## 2023-01-01 RX ADMIN — Medication 3: at 16:37

## 2023-01-01 RX ADMIN — DIVALPROEX SODIUM 500 MILLIGRAM(S): 500 TABLET, DELAYED RELEASE ORAL at 18:13

## 2023-01-01 RX ADMIN — Medication 1 DROP(S): at 08:34

## 2023-01-01 RX ADMIN — HYDROMORPHONE HYDROCHLORIDE 0.2 MILLIGRAM(S): 2 INJECTION INTRAMUSCULAR; INTRAVENOUS; SUBCUTANEOUS at 11:51

## 2023-01-01 RX ADMIN — OXYCODONE HYDROCHLORIDE 10 MILLIGRAM(S): 5 TABLET ORAL at 07:41

## 2023-01-01 RX ADMIN — TRAMADOL HYDROCHLORIDE 25 MILLIGRAM(S): 50 TABLET ORAL at 08:51

## 2023-01-01 RX ADMIN — ENOXAPARIN SODIUM 40 MILLIGRAM(S): 100 INJECTION SUBCUTANEOUS at 18:12

## 2023-01-01 RX ADMIN — PANTOPRAZOLE SODIUM 40 MILLIGRAM(S): 20 TABLET, DELAYED RELEASE ORAL at 05:37

## 2023-01-01 RX ADMIN — Medication 25 GRAM(S): at 22:43

## 2023-01-01 RX ADMIN — PANTOPRAZOLE SODIUM 40 MILLIGRAM(S): 20 TABLET, DELAYED RELEASE ORAL at 06:16

## 2023-01-01 RX ADMIN — Medication 1 APPLICATION(S): at 18:02

## 2023-01-01 RX ADMIN — Medication 1000 MILLIGRAM(S): at 10:15

## 2023-01-01 RX ADMIN — Medication 1 APPLICATION(S): at 06:48

## 2023-01-01 RX ADMIN — Medication 500 MILLIGRAM(S): at 17:57

## 2023-01-01 RX ADMIN — HYDROMORPHONE HYDROCHLORIDE 0.5 MILLIGRAM(S): 2 INJECTION INTRAMUSCULAR; INTRAVENOUS; SUBCUTANEOUS at 13:47

## 2023-01-01 RX ADMIN — ENOXAPARIN SODIUM 40 MILLIGRAM(S): 100 INJECTION SUBCUTANEOUS at 05:20

## 2023-01-01 RX ADMIN — ENOXAPARIN SODIUM 40 MILLIGRAM(S): 100 INJECTION SUBCUTANEOUS at 17:25

## 2023-01-01 RX ADMIN — Medication 1 DROP(S): at 21:16

## 2023-01-01 RX ADMIN — METFORMIN HYDROCHLORIDE 500 MILLIGRAM(S): 850 TABLET ORAL at 07:31

## 2023-01-01 RX ADMIN — Medication 2 MILLIGRAM(S): at 18:19

## 2023-01-01 RX ADMIN — Medication 5 MILLIGRAM(S): at 05:40

## 2023-01-01 RX ADMIN — TRAMADOL HYDROCHLORIDE 25 MILLIGRAM(S): 50 TABLET ORAL at 05:12

## 2023-01-01 RX ADMIN — ATORVASTATIN CALCIUM 40 MILLIGRAM(S): 80 TABLET, FILM COATED ORAL at 22:39

## 2023-01-01 RX ADMIN — Medication 15 MILLIGRAM(S): at 20:46

## 2023-01-01 RX ADMIN — TRAMADOL HYDROCHLORIDE 25 MILLIGRAM(S): 50 TABLET ORAL at 16:21

## 2023-01-01 RX ADMIN — PANTOPRAZOLE SODIUM 40 MILLIGRAM(S): 20 TABLET, DELAYED RELEASE ORAL at 09:18

## 2023-01-01 RX ADMIN — PANTOPRAZOLE SODIUM 40 MILLIGRAM(S): 20 TABLET, DELAYED RELEASE ORAL at 17:22

## 2023-01-01 RX ADMIN — Medication 52.5 MILLIGRAM(S): at 01:34

## 2023-01-01 RX ADMIN — Medication 2 MILLIGRAM(S): at 06:09

## 2023-01-01 RX ADMIN — ENOXAPARIN SODIUM 40 MILLIGRAM(S): 100 INJECTION SUBCUTANEOUS at 13:50

## 2023-01-01 RX ADMIN — Medication 8 UNIT(S): at 16:39

## 2023-01-01 RX ADMIN — MORPHINE SULFATE 4 MILLIGRAM(S): 50 CAPSULE, EXTENDED RELEASE ORAL at 12:48

## 2023-01-01 RX ADMIN — OXYCODONE HYDROCHLORIDE 5 MILLIGRAM(S): 5 TABLET ORAL at 16:55

## 2023-01-01 RX ADMIN — Medication 2.5 MILLIGRAM(S): at 18:01

## 2023-01-01 RX ADMIN — Medication 52.5 MILLIGRAM(S): at 18:10

## 2023-01-01 RX ADMIN — Medication 52.5 MILLIGRAM(S): at 17:31

## 2023-01-01 RX ADMIN — Medication 15 MILLIGRAM(S): at 00:05

## 2023-01-01 RX ADMIN — OXYCODONE HYDROCHLORIDE 5 MILLIGRAM(S): 5 TABLET ORAL at 11:08

## 2023-01-01 RX ADMIN — Medication 2 MILLIGRAM(S): at 17:38

## 2023-01-01 RX ADMIN — Medication 3: at 12:29

## 2023-01-01 RX ADMIN — Medication 4 MILLIGRAM(S): at 21:52

## 2023-01-01 RX ADMIN — ONDANSETRON 8 MILLIGRAM(S): 8 TABLET, FILM COATED ORAL at 13:29

## 2023-01-01 RX ADMIN — Medication 4 MILLIGRAM(S): at 05:48

## 2023-01-01 RX ADMIN — OXYCODONE HYDROCHLORIDE 10 MILLIGRAM(S): 5 TABLET ORAL at 23:02

## 2023-01-01 RX ADMIN — CHLORHEXIDINE GLUCONATE 1 APPLICATION(S): 213 SOLUTION TOPICAL at 11:14

## 2023-01-01 RX ADMIN — LACOSAMIDE 140 MILLIGRAM(S): 50 TABLET ORAL at 19:42

## 2023-01-01 RX ADMIN — Medication 40 MILLIEQUIVALENT(S): at 17:08

## 2023-01-01 RX ADMIN — Medication 4 MILLIGRAM(S): at 06:38

## 2023-01-01 RX ADMIN — Medication 52.5 MILLIGRAM(S): at 11:01

## 2023-01-01 RX ADMIN — PANTOPRAZOLE SODIUM 40 MILLIGRAM(S): 20 TABLET, DELAYED RELEASE ORAL at 12:10

## 2023-01-01 RX ADMIN — TRAMADOL HYDROCHLORIDE 25 MILLIGRAM(S): 50 TABLET ORAL at 23:08

## 2023-01-01 RX ADMIN — Medication 650 MILLIGRAM(S): at 01:29

## 2023-01-01 RX ADMIN — Medication 1 DROP(S): at 06:43

## 2023-01-01 RX ADMIN — Medication 1 DROP(S): at 05:12

## 2023-01-01 RX ADMIN — Medication 6 MILLIGRAM(S): at 05:26

## 2023-01-01 RX ADMIN — LACOSAMIDE 140 MILLIGRAM(S): 50 TABLET ORAL at 17:40

## 2023-01-01 RX ADMIN — Medication 2: at 11:36

## 2023-01-01 RX ADMIN — Medication 650 MILLIGRAM(S): at 19:10

## 2023-01-01 RX ADMIN — Medication 6: at 12:31

## 2023-01-01 RX ADMIN — PANTOPRAZOLE SODIUM 40 MILLIGRAM(S): 20 TABLET, DELAYED RELEASE ORAL at 06:18

## 2023-01-01 RX ADMIN — METFORMIN HYDROCHLORIDE 500 MILLIGRAM(S): 850 TABLET ORAL at 18:30

## 2023-01-01 RX ADMIN — Medication 2 MILLIGRAM(S): at 17:30

## 2023-01-01 RX ADMIN — OXYCODONE HYDROCHLORIDE 10 MILLIGRAM(S): 5 TABLET ORAL at 14:34

## 2023-01-01 RX ADMIN — Medication 4 MILLIGRAM(S): at 23:16

## 2023-01-01 RX ADMIN — Medication 4 MILLIGRAM(S): at 05:04

## 2023-01-01 RX ADMIN — POLYETHYLENE GLYCOL 3350 17 GRAM(S): 17 POWDER, FOR SOLUTION ORAL at 13:52

## 2023-01-01 RX ADMIN — Medication 25 GRAM(S): at 17:14

## 2023-01-01 RX ADMIN — Medication 250 MILLIGRAM(S): at 22:21

## 2023-01-01 RX ADMIN — METFORMIN HYDROCHLORIDE 500 MILLIGRAM(S): 850 TABLET ORAL at 17:30

## 2023-01-01 RX ADMIN — Medication 25 GRAM(S): at 21:29

## 2023-01-01 RX ADMIN — Medication 1 DROP(S): at 14:21

## 2023-01-01 RX ADMIN — SODIUM CHLORIDE 75 MILLILITER(S): 9 INJECTION INTRAMUSCULAR; INTRAVENOUS; SUBCUTANEOUS at 07:19

## 2023-01-01 RX ADMIN — Medication 5 UNIT(S): at 16:27

## 2023-01-01 RX ADMIN — HYDROMORPHONE HYDROCHLORIDE 0.5 MILLIGRAM(S): 2 INJECTION INTRAMUSCULAR; INTRAVENOUS; SUBCUTANEOUS at 02:33

## 2023-01-01 RX ADMIN — MUPIROCIN 1 APPLICATION(S): 20 OINTMENT TOPICAL at 17:31

## 2023-01-01 RX ADMIN — Medication 650 MILLIGRAM(S): at 00:30

## 2023-01-01 RX ADMIN — OXYCODONE HYDROCHLORIDE 5 MILLIGRAM(S): 5 TABLET ORAL at 05:40

## 2023-01-01 RX ADMIN — Medication 1 DROP(S): at 05:22

## 2023-01-01 RX ADMIN — HYDROMORPHONE HYDROCHLORIDE 1 MILLIGRAM(S): 2 INJECTION INTRAMUSCULAR; INTRAVENOUS; SUBCUTANEOUS at 20:41

## 2023-01-01 RX ADMIN — Medication 1: at 11:55

## 2023-01-01 RX ADMIN — Medication 4 MILLIGRAM(S): at 18:04

## 2023-01-01 RX ADMIN — OXYCODONE HYDROCHLORIDE 5 MILLIGRAM(S): 5 TABLET ORAL at 21:07

## 2023-01-01 RX ADMIN — Medication 4 MILLIGRAM(S): at 07:52

## 2023-01-01 RX ADMIN — INSULIN GLARGINE 14 UNIT(S): 100 INJECTION, SOLUTION SUBCUTANEOUS at 22:26

## 2023-01-01 RX ADMIN — Medication 4 MILLIGRAM(S): at 00:44

## 2023-01-01 RX ADMIN — Medication 100 MILLIGRAM(S): at 05:44

## 2023-01-01 RX ADMIN — Medication 650 MILLIGRAM(S): at 13:48

## 2023-01-01 RX ADMIN — ENOXAPARIN SODIUM 40 MILLIGRAM(S): 100 INJECTION SUBCUTANEOUS at 06:55

## 2023-01-01 RX ADMIN — MORPHINE SULFATE 2 MILLIGRAM(S): 50 CAPSULE, EXTENDED RELEASE ORAL at 12:06

## 2023-01-01 RX ADMIN — Medication 4 MILLIGRAM(S): at 23:12

## 2023-01-01 RX ADMIN — Medication 6: at 17:06

## 2023-01-01 RX ADMIN — ROBINUL 0.4 MILLIGRAM(S): 0.2 INJECTION INTRAMUSCULAR; INTRAVENOUS at 12:06

## 2023-01-01 RX ADMIN — OXYCODONE HYDROCHLORIDE 10 MILLIGRAM(S): 5 TABLET ORAL at 21:32

## 2023-01-01 RX ADMIN — Medication 2 MILLIGRAM(S): at 11:55

## 2023-01-01 RX ADMIN — METFORMIN HYDROCHLORIDE 500 MILLIGRAM(S): 850 TABLET ORAL at 08:46

## 2023-01-01 RX ADMIN — Medication 2 MILLIGRAM(S): at 05:41

## 2023-01-01 RX ADMIN — Medication 2: at 12:47

## 2023-01-01 RX ADMIN — ATORVASTATIN CALCIUM 40 MILLIGRAM(S): 80 TABLET, FILM COATED ORAL at 21:12

## 2023-01-01 RX ADMIN — Medication 8 UNIT(S): at 08:00

## 2023-01-01 RX ADMIN — HYDROMORPHONE HYDROCHLORIDE 0.5 MILLIGRAM(S): 2 INJECTION INTRAMUSCULAR; INTRAVENOUS; SUBCUTANEOUS at 02:06

## 2023-01-01 RX ADMIN — POLYETHYLENE GLYCOL 3350 17 GRAM(S): 17 POWDER, FOR SOLUTION ORAL at 06:09

## 2023-01-01 RX ADMIN — Medication 52.5 MILLIGRAM(S): at 05:35

## 2023-01-01 RX ADMIN — LEVETIRACETAM 400 MILLIGRAM(S): 250 TABLET, FILM COATED ORAL at 08:30

## 2023-01-01 RX ADMIN — METFORMIN HYDROCHLORIDE 500 MILLIGRAM(S): 850 TABLET ORAL at 17:00

## 2023-01-01 RX ADMIN — Medication 650 MILLIGRAM(S): at 18:10

## 2023-01-01 RX ADMIN — DIVALPROEX SODIUM 500 MILLIGRAM(S): 500 TABLET, DELAYED RELEASE ORAL at 18:17

## 2023-01-01 RX ADMIN — INSULIN GLARGINE 10 UNIT(S): 100 INJECTION, SOLUTION SUBCUTANEOUS at 22:20

## 2023-01-01 RX ADMIN — Medication 1 DROP(S): at 13:28

## 2023-01-01 RX ADMIN — CHLORHEXIDINE GLUCONATE 1 APPLICATION(S): 213 SOLUTION TOPICAL at 21:58

## 2023-01-01 RX ADMIN — POLYETHYLENE GLYCOL 3350 17 GRAM(S): 17 POWDER, FOR SOLUTION ORAL at 12:04

## 2023-01-01 RX ADMIN — ENOXAPARIN SODIUM 40 MILLIGRAM(S): 100 INJECTION SUBCUTANEOUS at 17:52

## 2023-01-01 RX ADMIN — OXYCODONE HYDROCHLORIDE 10 MILLIGRAM(S): 5 TABLET ORAL at 11:14

## 2023-01-01 RX ADMIN — LEVETIRACETAM 600 MILLIGRAM(S): 250 TABLET, FILM COATED ORAL at 18:01

## 2023-01-01 RX ADMIN — Medication 1: at 08:48

## 2023-01-01 RX ADMIN — Medication 2 MILLIGRAM(S): at 05:51

## 2023-01-01 RX ADMIN — Medication 1 DROP(S): at 21:02

## 2023-01-01 RX ADMIN — Medication 52.5 MILLIGRAM(S): at 17:33

## 2023-01-01 RX ADMIN — OXYCODONE HYDROCHLORIDE 5 MILLIGRAM(S): 5 TABLET ORAL at 21:43

## 2023-01-01 RX ADMIN — Medication 1 DROP(S): at 05:20

## 2023-01-01 RX ADMIN — Medication 250 MILLIGRAM(S): at 10:53

## 2023-01-01 RX ADMIN — DIVALPROEX SODIUM 500 MILLIGRAM(S): 500 TABLET, DELAYED RELEASE ORAL at 06:22

## 2023-01-01 RX ADMIN — METFORMIN HYDROCHLORIDE 500 MILLIGRAM(S): 850 TABLET ORAL at 17:39

## 2023-01-01 RX ADMIN — DIVALPROEX SODIUM 500 MILLIGRAM(S): 500 TABLET, DELAYED RELEASE ORAL at 05:12

## 2023-01-01 RX ADMIN — Medication 1: at 21:54

## 2023-01-01 RX ADMIN — OXYCODONE HYDROCHLORIDE 5 MILLIGRAM(S): 5 TABLET ORAL at 14:24

## 2023-01-01 RX ADMIN — CHLORHEXIDINE GLUCONATE 1 APPLICATION(S): 213 SOLUTION TOPICAL at 11:45

## 2023-01-01 RX ADMIN — LEVETIRACETAM 500 MILLIGRAM(S): 250 TABLET, FILM COATED ORAL at 06:05

## 2023-01-01 RX ADMIN — Medication 2 MILLIGRAM(S): at 06:27

## 2023-01-01 RX ADMIN — Medication 4 MILLIGRAM(S): at 06:34

## 2023-01-01 RX ADMIN — OXYCODONE HYDROCHLORIDE 10 MILLIGRAM(S): 5 TABLET ORAL at 00:21

## 2023-01-01 RX ADMIN — PANTOPRAZOLE SODIUM 40 MILLIGRAM(S): 20 TABLET, DELAYED RELEASE ORAL at 18:13

## 2023-01-01 RX ADMIN — SODIUM CHLORIDE 50 MILLILITER(S): 9 INJECTION, SOLUTION INTRAVENOUS at 12:26

## 2023-01-01 RX ADMIN — Medication 1 DROP(S): at 05:06

## 2023-01-01 RX ADMIN — Medication 1 DROP(S): at 05:46

## 2023-01-01 RX ADMIN — Medication 1 DROP(S): at 14:51

## 2023-01-01 RX ADMIN — Medication 2 UNIT(S): at 12:08

## 2023-01-01 RX ADMIN — Medication 5 UNIT(S): at 17:36

## 2023-01-01 RX ADMIN — OXYCODONE HYDROCHLORIDE 7.5 MILLIGRAM(S): 5 TABLET ORAL at 14:36

## 2023-01-01 RX ADMIN — Medication 52.5 MILLIGRAM(S): at 00:59

## 2023-01-01 RX ADMIN — DIVALPROEX SODIUM 500 MILLIGRAM(S): 500 TABLET, DELAYED RELEASE ORAL at 06:34

## 2023-01-01 RX ADMIN — Medication 4 MILLIGRAM(S): at 21:42

## 2023-01-01 RX ADMIN — HYDROMORPHONE HYDROCHLORIDE 0.2 MILLIGRAM(S): 2 INJECTION INTRAMUSCULAR; INTRAVENOUS; SUBCUTANEOUS at 23:29

## 2023-01-01 RX ADMIN — ENOXAPARIN SODIUM 40 MILLIGRAM(S): 100 INJECTION SUBCUTANEOUS at 06:32

## 2023-01-01 RX ADMIN — TRAMADOL HYDROCHLORIDE 25 MILLIGRAM(S): 50 TABLET ORAL at 02:18

## 2023-01-01 RX ADMIN — Medication 1 DROP(S): at 21:58

## 2023-01-01 RX ADMIN — PANTOPRAZOLE SODIUM 40 MILLIGRAM(S): 20 TABLET, DELAYED RELEASE ORAL at 17:33

## 2023-01-01 RX ADMIN — Medication 2 UNIT(S): at 09:28

## 2023-01-01 RX ADMIN — Medication 4 MILLIGRAM(S): at 05:28

## 2023-01-01 RX ADMIN — Medication 4 MILLIGRAM(S): at 17:45

## 2023-01-01 RX ADMIN — DIVALPROEX SODIUM 500 MILLIGRAM(S): 500 TABLET, DELAYED RELEASE ORAL at 06:27

## 2023-01-01 RX ADMIN — INSULIN GLARGINE 14 UNIT(S): 100 INJECTION, SOLUTION SUBCUTANEOUS at 21:52

## 2023-01-01 RX ADMIN — Medication 4: at 12:02

## 2023-01-01 RX ADMIN — Medication 2: at 12:24

## 2023-01-01 RX ADMIN — Medication 0.5 MILLIGRAM(S): at 23:25

## 2023-01-01 RX ADMIN — CHLORHEXIDINE GLUCONATE 1 APPLICATION(S): 213 SOLUTION TOPICAL at 12:02

## 2023-01-01 RX ADMIN — MUPIROCIN 1 APPLICATION(S): 20 OINTMENT TOPICAL at 06:34

## 2023-01-01 RX ADMIN — Medication 2 MILLIGRAM(S): at 17:27

## 2023-01-01 RX ADMIN — PANTOPRAZOLE SODIUM 40 MILLIGRAM(S): 20 TABLET, DELAYED RELEASE ORAL at 05:19

## 2023-01-01 RX ADMIN — OXYCODONE HYDROCHLORIDE 5 MILLIGRAM(S): 5 TABLET ORAL at 17:50

## 2023-01-01 RX ADMIN — LEVETIRACETAM 400 MILLIGRAM(S): 250 TABLET, FILM COATED ORAL at 05:48

## 2023-01-01 RX ADMIN — ATORVASTATIN CALCIUM 40 MILLIGRAM(S): 80 TABLET, FILM COATED ORAL at 21:06

## 2023-01-01 RX ADMIN — Medication 52.5 MILLIGRAM(S): at 12:52

## 2023-01-01 RX ADMIN — Medication 1: at 12:18

## 2023-01-01 RX ADMIN — Medication 4 MILLIGRAM(S): at 17:11

## 2023-01-01 RX ADMIN — INSULIN GLARGINE 3 UNIT(S): 100 INJECTION, SOLUTION SUBCUTANEOUS at 21:57

## 2023-01-01 RX ADMIN — Medication 4 MILLIGRAM(S): at 05:19

## 2023-01-01 RX ADMIN — INSULIN GLARGINE 10 UNIT(S): 100 INJECTION, SOLUTION SUBCUTANEOUS at 21:54

## 2023-01-01 RX ADMIN — ATORVASTATIN CALCIUM 40 MILLIGRAM(S): 80 TABLET, FILM COATED ORAL at 21:01

## 2023-01-01 RX ADMIN — POLYETHYLENE GLYCOL 3350 17 GRAM(S): 17 POWDER, FOR SOLUTION ORAL at 12:05

## 2023-01-01 RX ADMIN — OXYCODONE HYDROCHLORIDE 7.5 MILLIGRAM(S): 5 TABLET ORAL at 15:10

## 2023-01-01 RX ADMIN — Medication 2 UNIT(S): at 17:38

## 2023-01-01 RX ADMIN — OXYCODONE HYDROCHLORIDE 7.5 MILLIGRAM(S): 5 TABLET ORAL at 06:01

## 2023-01-01 RX ADMIN — Medication 500 MILLIGRAM(S): at 17:47

## 2023-01-01 RX ADMIN — Medication 2 MILLIGRAM(S): at 05:08

## 2023-01-01 RX ADMIN — Medication 4: at 08:59

## 2023-01-01 RX ADMIN — METFORMIN HYDROCHLORIDE 500 MILLIGRAM(S): 850 TABLET ORAL at 17:26

## 2023-01-01 RX ADMIN — CHLORHEXIDINE GLUCONATE 1 APPLICATION(S): 213 SOLUTION TOPICAL at 11:21

## 2023-01-01 RX ADMIN — Medication 1 DROP(S): at 05:48

## 2023-01-01 RX ADMIN — Medication 4: at 09:08

## 2023-01-01 RX ADMIN — Medication 100 MILLIGRAM(S): at 21:46

## 2023-01-01 RX ADMIN — Medication 2 UNIT(S): at 18:06

## 2023-01-01 RX ADMIN — PANTOPRAZOLE SODIUM 40 MILLIGRAM(S): 20 TABLET, DELAYED RELEASE ORAL at 05:32

## 2023-01-01 RX ADMIN — Medication 52.5 MILLIGRAM(S): at 00:10

## 2023-01-01 RX ADMIN — CHLORHEXIDINE GLUCONATE 1 APPLICATION(S): 213 SOLUTION TOPICAL at 11:54

## 2023-01-01 RX ADMIN — Medication 4: at 16:31

## 2023-01-01 RX ADMIN — TRAMADOL HYDROCHLORIDE 25 MILLIGRAM(S): 50 TABLET ORAL at 08:45

## 2023-01-01 RX ADMIN — MUPIROCIN 1 APPLICATION(S): 20 OINTMENT TOPICAL at 17:11

## 2023-01-01 RX ADMIN — Medication 1 MILLIGRAM(S): at 06:14

## 2023-01-01 RX ADMIN — OXYCODONE HYDROCHLORIDE 5 MILLIGRAM(S): 5 TABLET ORAL at 11:38

## 2023-01-01 RX ADMIN — ENOXAPARIN SODIUM 40 MILLIGRAM(S): 100 INJECTION SUBCUTANEOUS at 06:54

## 2023-01-01 RX ADMIN — HYDROMORPHONE HYDROCHLORIDE 0.2 MILLIGRAM(S): 2 INJECTION INTRAMUSCULAR; INTRAVENOUS; SUBCUTANEOUS at 23:59

## 2023-01-01 RX ADMIN — MORPHINE SULFATE 4 MILLIGRAM(S): 50 CAPSULE, EXTENDED RELEASE ORAL at 03:31

## 2023-01-01 RX ADMIN — Medication 100 MILLIEQUIVALENT(S): at 04:22

## 2023-01-01 RX ADMIN — SODIUM CHLORIDE 100 MILLILITER(S): 9 INJECTION, SOLUTION INTRAVENOUS at 20:23

## 2023-01-01 RX ADMIN — Medication 4 MILLIGRAM(S): at 06:08

## 2023-01-01 RX ADMIN — Medication 1 DROP(S): at 14:42

## 2023-01-01 RX ADMIN — PIPERACILLIN AND TAZOBACTAM 25 GRAM(S): 4; .5 INJECTION, POWDER, LYOPHILIZED, FOR SOLUTION INTRAVENOUS at 18:39

## 2023-01-01 RX ADMIN — Medication 650 MILLIGRAM(S): at 05:47

## 2023-01-01 RX ADMIN — HYDROMORPHONE HYDROCHLORIDE 0.5 MILLIGRAM(S): 2 INJECTION INTRAMUSCULAR; INTRAVENOUS; SUBCUTANEOUS at 12:03

## 2023-01-01 RX ADMIN — Medication 2: at 08:07

## 2023-01-01 RX ADMIN — DIVALPROEX SODIUM 500 MILLIGRAM(S): 500 TABLET, DELAYED RELEASE ORAL at 17:12

## 2023-01-01 RX ADMIN — Medication 7 UNIT(S): at 07:58

## 2023-01-01 RX ADMIN — Medication 4 MILLIGRAM(S): at 06:35

## 2023-01-01 RX ADMIN — Medication 4: at 22:27

## 2023-01-01 RX ADMIN — LACOSAMIDE 140 MILLIGRAM(S): 50 TABLET ORAL at 05:04

## 2023-01-01 RX ADMIN — Medication 2 MILLIGRAM(S): at 17:01

## 2023-01-01 RX ADMIN — Medication 2 MILLIGRAM(S): at 05:05

## 2023-01-01 RX ADMIN — ENOXAPARIN SODIUM 40 MILLIGRAM(S): 100 INJECTION SUBCUTANEOUS at 14:42

## 2023-01-01 RX ADMIN — Medication 1 DROP(S): at 14:30

## 2023-01-01 RX ADMIN — OXYCODONE HYDROCHLORIDE 7.5 MILLIGRAM(S): 5 TABLET ORAL at 12:45

## 2023-01-01 RX ADMIN — POLYETHYLENE GLYCOL 3350 17 GRAM(S): 17 POWDER, FOR SOLUTION ORAL at 06:33

## 2023-01-01 RX ADMIN — PANTOPRAZOLE SODIUM 40 MILLIGRAM(S): 20 TABLET, DELAYED RELEASE ORAL at 05:45

## 2023-01-01 RX ADMIN — Medication 2: at 22:22

## 2023-01-01 RX ADMIN — Medication 1 DROP(S): at 06:39

## 2023-01-01 RX ADMIN — LEVETIRACETAM 500 MILLIGRAM(S): 250 TABLET, FILM COATED ORAL at 17:24

## 2023-01-01 RX ADMIN — Medication 1 DROP(S): at 13:19

## 2023-01-01 RX ADMIN — METFORMIN HYDROCHLORIDE 500 MILLIGRAM(S): 850 TABLET ORAL at 06:35

## 2023-01-01 RX ADMIN — PIPERACILLIN AND TAZOBACTAM 200 GRAM(S): 4; .5 INJECTION, POWDER, LYOPHILIZED, FOR SOLUTION INTRAVENOUS at 22:32

## 2023-01-01 RX ADMIN — Medication 1 APPLICATION(S): at 17:50

## 2023-01-01 RX ADMIN — SODIUM CHLORIDE 1000 MILLILITER(S): 9 INJECTION INTRAMUSCULAR; INTRAVENOUS; SUBCUTANEOUS at 20:22

## 2023-01-01 RX ADMIN — Medication 4 MILLIGRAM(S): at 11:29

## 2023-01-01 RX ADMIN — Medication 4 MILLIGRAM(S): at 05:35

## 2023-01-01 RX ADMIN — Medication 2: at 12:51

## 2023-01-01 RX ADMIN — PANTOPRAZOLE SODIUM 40 MILLIGRAM(S): 20 TABLET, DELAYED RELEASE ORAL at 17:14

## 2023-01-01 RX ADMIN — DIVALPROEX SODIUM 500 MILLIGRAM(S): 500 TABLET, DELAYED RELEASE ORAL at 06:08

## 2023-01-01 RX ADMIN — OXYCODONE HYDROCHLORIDE 7.5 MILLIGRAM(S): 5 TABLET ORAL at 06:22

## 2023-01-01 RX ADMIN — OXYCODONE HYDROCHLORIDE 5 MILLIGRAM(S): 5 TABLET ORAL at 06:45

## 2023-01-01 RX ADMIN — Medication 1 DROP(S): at 05:17

## 2023-01-01 RX ADMIN — Medication 2 UNIT(S): at 13:42

## 2023-01-01 RX ADMIN — ATORVASTATIN CALCIUM 40 MILLIGRAM(S): 80 TABLET, FILM COATED ORAL at 21:37

## 2023-01-01 RX ADMIN — Medication 2.5 MILLIGRAM(S): at 05:21

## 2023-01-01 RX ADMIN — DIVALPROEX SODIUM 500 MILLIGRAM(S): 500 TABLET, DELAYED RELEASE ORAL at 17:28

## 2023-01-01 RX ADMIN — FAMOTIDINE 20 MILLIGRAM(S): 10 INJECTION INTRAVENOUS at 03:31

## 2023-01-01 RX ADMIN — PANTOPRAZOLE SODIUM 40 MILLIGRAM(S): 20 TABLET, DELAYED RELEASE ORAL at 06:06

## 2023-01-01 RX ADMIN — Medication 0.5 MILLIGRAM(S): at 12:22

## 2023-01-01 RX ADMIN — Medication 15 MILLIGRAM(S): at 03:35

## 2023-01-01 RX ADMIN — Medication 10: at 12:24

## 2023-01-01 RX ADMIN — INSULIN GLARGINE 15 UNIT(S): 100 INJECTION, SOLUTION SUBCUTANEOUS at 08:28

## 2023-01-01 RX ADMIN — CHLORHEXIDINE GLUCONATE 1 APPLICATION(S): 213 SOLUTION TOPICAL at 12:19

## 2023-01-01 RX ADMIN — ATORVASTATIN CALCIUM 40 MILLIGRAM(S): 80 TABLET, FILM COATED ORAL at 22:00

## 2023-01-01 RX ADMIN — LEVETIRACETAM 500 MILLIGRAM(S): 250 TABLET, FILM COATED ORAL at 18:16

## 2023-01-01 RX ADMIN — PIPERACILLIN AND TAZOBACTAM 200 GRAM(S): 4; .5 INJECTION, POWDER, LYOPHILIZED, FOR SOLUTION INTRAVENOUS at 13:55

## 2023-01-01 RX ADMIN — Medication 650 MILLIGRAM(S): at 17:39

## 2023-01-01 RX ADMIN — Medication 650 MILLIGRAM(S): at 17:15

## 2023-01-01 RX ADMIN — Medication 1 DROP(S): at 21:04

## 2023-01-01 RX ADMIN — OXYCODONE HYDROCHLORIDE 5 MILLIGRAM(S): 5 TABLET ORAL at 04:41

## 2023-01-01 RX ADMIN — Medication 1 DROP(S): at 15:06

## 2023-01-01 RX ADMIN — DIVALPROEX SODIUM 500 MILLIGRAM(S): 500 TABLET, DELAYED RELEASE ORAL at 05:52

## 2023-01-01 RX ADMIN — Medication 100 MILLIEQUIVALENT(S): at 15:40

## 2023-01-01 RX ADMIN — Medication 4 MILLIGRAM(S): at 11:23

## 2023-01-01 RX ADMIN — OXYCODONE HYDROCHLORIDE 5 MILLIGRAM(S): 5 TABLET ORAL at 15:50

## 2023-01-01 RX ADMIN — Medication 1: at 16:50

## 2023-01-01 RX ADMIN — Medication 10 MILLIGRAM(S): at 16:52

## 2023-01-01 RX ADMIN — Medication 10 UNIT(S): at 09:08

## 2023-01-01 RX ADMIN — Medication 1 DROP(S): at 21:15

## 2023-01-01 RX ADMIN — HYDROMORPHONE HYDROCHLORIDE 1 MILLIGRAM(S): 2 INJECTION INTRAMUSCULAR; INTRAVENOUS; SUBCUTANEOUS at 09:58

## 2023-01-01 RX ADMIN — OXYCODONE HYDROCHLORIDE 5 MILLIGRAM(S): 5 TABLET ORAL at 11:35

## 2023-01-01 RX ADMIN — Medication 1 DROP(S): at 14:33

## 2023-01-01 RX ADMIN — Medication 2 MILLIGRAM(S): at 06:43

## 2023-01-01 RX ADMIN — Medication 2 UNIT(S): at 09:08

## 2023-01-01 RX ADMIN — PIPERACILLIN AND TAZOBACTAM 25 GRAM(S): 4; .5 INJECTION, POWDER, LYOPHILIZED, FOR SOLUTION INTRAVENOUS at 10:38

## 2023-01-01 RX ADMIN — PANTOPRAZOLE SODIUM 40 MILLIGRAM(S): 20 TABLET, DELAYED RELEASE ORAL at 05:20

## 2023-01-01 RX ADMIN — DIVALPROEX SODIUM 500 MILLIGRAM(S): 500 TABLET, DELAYED RELEASE ORAL at 05:56

## 2023-01-01 RX ADMIN — Medication 1 MILLIGRAM(S): at 08:27

## 2023-01-01 RX ADMIN — Medication 2 MILLIGRAM(S): at 23:51

## 2023-01-01 RX ADMIN — INSULIN GLARGINE 15 UNIT(S): 100 INJECTION, SOLUTION SUBCUTANEOUS at 08:21

## 2023-01-01 RX ADMIN — Medication 1 DROP(S): at 21:35

## 2023-01-01 RX ADMIN — ENOXAPARIN SODIUM 40 MILLIGRAM(S): 100 INJECTION SUBCUTANEOUS at 08:36

## 2023-01-01 RX ADMIN — DIVALPROEX SODIUM 500 MILLIGRAM(S): 500 TABLET, DELAYED RELEASE ORAL at 06:16

## 2023-01-01 RX ADMIN — Medication 4 MILLIGRAM(S): at 05:27

## 2023-01-01 RX ADMIN — Medication 1 DROP(S): at 21:37

## 2023-01-01 RX ADMIN — DIVALPROEX SODIUM 500 MILLIGRAM(S): 500 TABLET, DELAYED RELEASE ORAL at 17:23

## 2023-01-01 RX ADMIN — INSULIN GLARGINE 6 UNIT(S): 100 INJECTION, SOLUTION SUBCUTANEOUS at 21:23

## 2023-01-01 RX ADMIN — Medication 7 UNIT(S): at 16:31

## 2023-01-01 RX ADMIN — TRAMADOL HYDROCHLORIDE 25 MILLIGRAM(S): 50 TABLET ORAL at 11:39

## 2023-01-01 RX ADMIN — DIVALPROEX SODIUM 500 MILLIGRAM(S): 500 TABLET, DELAYED RELEASE ORAL at 18:09

## 2023-01-01 RX ADMIN — DIVALPROEX SODIUM 500 MILLIGRAM(S): 500 TABLET, DELAYED RELEASE ORAL at 17:25

## 2023-01-01 RX ADMIN — Medication 1 APPLICATION(S): at 11:23

## 2023-01-01 RX ADMIN — DIVALPROEX SODIUM 500 MILLIGRAM(S): 500 TABLET, DELAYED RELEASE ORAL at 17:48

## 2023-01-01 RX ADMIN — Medication 4 MILLIGRAM(S): at 05:07

## 2023-01-01 RX ADMIN — Medication 52.5 MILLIGRAM(S): at 00:23

## 2023-01-01 RX ADMIN — ATORVASTATIN CALCIUM 40 MILLIGRAM(S): 80 TABLET, FILM COATED ORAL at 20:43

## 2023-01-01 RX ADMIN — Medication 15 MILLIGRAM(S): at 18:41

## 2023-01-01 RX ADMIN — PANTOPRAZOLE SODIUM 40 MILLIGRAM(S): 20 TABLET, DELAYED RELEASE ORAL at 11:50

## 2023-01-01 RX ADMIN — SODIUM CHLORIDE 75 MILLILITER(S): 9 INJECTION, SOLUTION INTRAVENOUS at 10:05

## 2023-01-01 RX ADMIN — OXYCODONE HYDROCHLORIDE 7.5 MILLIGRAM(S): 5 TABLET ORAL at 10:14

## 2023-01-01 RX ADMIN — OXYCODONE HYDROCHLORIDE 5 MILLIGRAM(S): 5 TABLET ORAL at 22:30

## 2023-01-01 RX ADMIN — OXYCODONE HYDROCHLORIDE 5 MILLIGRAM(S): 5 TABLET ORAL at 11:31

## 2023-01-01 RX ADMIN — DIVALPROEX SODIUM 500 MILLIGRAM(S): 500 TABLET, DELAYED RELEASE ORAL at 06:09

## 2023-01-01 RX ADMIN — Medication 4 MILLIGRAM(S): at 23:19

## 2023-01-01 RX ADMIN — METFORMIN HYDROCHLORIDE 500 MILLIGRAM(S): 850 TABLET ORAL at 17:32

## 2023-01-01 RX ADMIN — Medication 1 DROP(S): at 13:07

## 2023-01-01 RX ADMIN — Medication 2: at 08:10

## 2023-01-01 RX ADMIN — Medication 1 DROP(S): at 05:44

## 2023-01-01 RX ADMIN — Medication 1 MILLIGRAM(S): at 08:01

## 2023-01-01 RX ADMIN — Medication 55 MILLIGRAM(S): at 07:00

## 2023-01-01 RX ADMIN — Medication 52.5 MILLIGRAM(S): at 19:24

## 2023-01-01 RX ADMIN — Medication 1 DROP(S): at 05:54

## 2023-01-01 RX ADMIN — OXYCODONE HYDROCHLORIDE 10 MILLIGRAM(S): 5 TABLET ORAL at 02:30

## 2023-01-01 RX ADMIN — Medication 52.5 MILLIGRAM(S): at 12:10

## 2023-01-01 RX ADMIN — Medication 3 MILLIGRAM(S): at 04:44

## 2023-01-01 RX ADMIN — TRAMADOL HYDROCHLORIDE 25 MILLIGRAM(S): 50 TABLET ORAL at 06:12

## 2023-01-01 RX ADMIN — Medication 2 MILLIGRAM(S): at 07:03

## 2023-01-01 RX ADMIN — Medication 2 MILLIGRAM(S): at 23:10

## 2023-01-01 RX ADMIN — OXYCODONE HYDROCHLORIDE 10 MILLIGRAM(S): 5 TABLET ORAL at 11:45

## 2023-01-01 RX ADMIN — Medication 0.5 MILLIGRAM(S): at 14:35

## 2023-01-01 RX ADMIN — Medication 6: at 08:28

## 2023-01-01 RX ADMIN — Medication 52.5 MILLIGRAM(S): at 05:05

## 2023-01-01 RX ADMIN — PANTOPRAZOLE SODIUM 40 MILLIGRAM(S): 20 TABLET, DELAYED RELEASE ORAL at 06:49

## 2023-01-01 RX ADMIN — Medication 650 MILLIGRAM(S): at 19:55

## 2023-01-01 RX ADMIN — Medication 400 MILLIGRAM(S): at 20:48

## 2023-01-01 RX ADMIN — PIPERACILLIN AND TAZOBACTAM 25 GRAM(S): 4; .5 INJECTION, POWDER, LYOPHILIZED, FOR SOLUTION INTRAVENOUS at 10:04

## 2023-01-01 RX ADMIN — LACOSAMIDE 120 MILLIGRAM(S): 50 TABLET ORAL at 10:53

## 2023-01-01 RX ADMIN — PANTOPRAZOLE SODIUM 40 MILLIGRAM(S): 20 TABLET, DELAYED RELEASE ORAL at 06:55

## 2023-01-01 RX ADMIN — Medication 650 MILLIGRAM(S): at 17:14

## 2023-01-01 RX ADMIN — Medication 2 MILLIGRAM(S): at 18:37

## 2023-01-01 RX ADMIN — Medication 12 UNIT(S): at 18:07

## 2023-01-01 RX ADMIN — HYDROMORPHONE HYDROCHLORIDE 0.2 MILLIGRAM(S): 2 INJECTION INTRAMUSCULAR; INTRAVENOUS; SUBCUTANEOUS at 17:34

## 2023-01-01 RX ADMIN — ATORVASTATIN CALCIUM 40 MILLIGRAM(S): 80 TABLET, FILM COATED ORAL at 21:27

## 2023-01-01 RX ADMIN — LEVETIRACETAM 400 MILLIGRAM(S): 250 TABLET, FILM COATED ORAL at 18:16

## 2023-01-01 RX ADMIN — Medication 2: at 09:15

## 2023-01-01 RX ADMIN — PANTOPRAZOLE SODIUM 40 MILLIGRAM(S): 20 TABLET, DELAYED RELEASE ORAL at 06:30

## 2023-01-01 RX ADMIN — DIVALPROEX SODIUM 500 MILLIGRAM(S): 500 TABLET, DELAYED RELEASE ORAL at 05:22

## 2023-01-01 RX ADMIN — Medication 2 MILLIGRAM(S): at 06:30

## 2023-01-01 RX ADMIN — INSULIN GLARGINE 3 UNIT(S): 100 INJECTION, SOLUTION SUBCUTANEOUS at 21:22

## 2023-01-01 RX ADMIN — Medication 15 MILLIGRAM(S): at 17:22

## 2023-01-01 RX ADMIN — OXYCODONE HYDROCHLORIDE 10 MILLIGRAM(S): 5 TABLET ORAL at 09:09

## 2023-01-01 RX ADMIN — LEVETIRACETAM 500 MILLIGRAM(S): 250 TABLET, FILM COATED ORAL at 06:28

## 2023-01-01 RX ADMIN — Medication 650 MILLIGRAM(S): at 10:10

## 2023-01-01 RX ADMIN — Medication 250 MILLIGRAM(S): at 21:24

## 2023-01-01 RX ADMIN — Medication 1 DROP(S): at 05:16

## 2023-01-01 RX ADMIN — Medication 1: at 18:02

## 2023-01-01 RX ADMIN — Medication 100 MILLIEQUIVALENT(S): at 03:30

## 2023-01-01 RX ADMIN — TRAMADOL HYDROCHLORIDE 25 MILLIGRAM(S): 50 TABLET ORAL at 09:00

## 2023-01-01 RX ADMIN — SODIUM CHLORIDE 50 MILLILITER(S): 9 INJECTION INTRAMUSCULAR; INTRAVENOUS; SUBCUTANEOUS at 23:43

## 2023-01-01 RX ADMIN — Medication 12 MILLIGRAM(S): at 13:32

## 2023-01-01 RX ADMIN — Medication 25 GRAM(S): at 18:36

## 2023-01-01 RX ADMIN — ENOXAPARIN SODIUM 40 MILLIGRAM(S): 100 INJECTION SUBCUTANEOUS at 17:15

## 2023-01-01 RX ADMIN — Medication 1: at 08:41

## 2023-01-01 RX ADMIN — METFORMIN HYDROCHLORIDE 500 MILLIGRAM(S): 850 TABLET ORAL at 08:15

## 2023-01-01 RX ADMIN — Medication 4 MILLIGRAM(S): at 17:57

## 2023-01-01 RX ADMIN — CHLORHEXIDINE GLUCONATE 1 APPLICATION(S): 213 SOLUTION TOPICAL at 12:14

## 2023-01-01 RX ADMIN — DIVALPROEX SODIUM 500 MILLIGRAM(S): 500 TABLET, DELAYED RELEASE ORAL at 07:03

## 2023-01-01 RX ADMIN — Medication 30 MILLILITER(S): at 23:12

## 2023-01-01 RX ADMIN — DIVALPROEX SODIUM 500 MILLIGRAM(S): 500 TABLET, DELAYED RELEASE ORAL at 05:50

## 2023-01-01 RX ADMIN — MIDAZOLAM HYDROCHLORIDE 2 MILLIGRAM(S): 1 INJECTION, SOLUTION INTRAMUSCULAR; INTRAVENOUS at 11:18

## 2023-01-01 RX ADMIN — Medication 4 MILLIGRAM(S): at 23:30

## 2023-01-01 RX ADMIN — Medication 1 DROP(S): at 13:33

## 2023-01-01 RX ADMIN — Medication 5 UNIT(S): at 12:03

## 2023-01-01 RX ADMIN — Medication 2: at 21:23

## 2023-01-01 RX ADMIN — Medication 100 MILLIEQUIVALENT(S): at 16:59

## 2023-01-01 RX ADMIN — ENOXAPARIN SODIUM 40 MILLIGRAM(S): 100 INJECTION SUBCUTANEOUS at 06:42

## 2023-01-01 RX ADMIN — OXYCODONE HYDROCHLORIDE 5 MILLIGRAM(S): 5 TABLET ORAL at 18:32

## 2023-01-01 RX ADMIN — MUPIROCIN 1 APPLICATION(S): 20 OINTMENT TOPICAL at 05:37

## 2023-01-01 RX ADMIN — PIPERACILLIN AND TAZOBACTAM 25 GRAM(S): 4; .5 INJECTION, POWDER, LYOPHILIZED, FOR SOLUTION INTRAVENOUS at 02:21

## 2023-01-01 RX ADMIN — OXYCODONE HYDROCHLORIDE 7.5 MILLIGRAM(S): 5 TABLET ORAL at 04:38

## 2023-01-01 RX ADMIN — CHLORHEXIDINE GLUCONATE 1 APPLICATION(S): 213 SOLUTION TOPICAL at 14:18

## 2023-01-01 RX ADMIN — METFORMIN HYDROCHLORIDE 500 MILLIGRAM(S): 850 TABLET ORAL at 05:25

## 2023-01-01 RX ADMIN — Medication 5 UNIT(S): at 16:37

## 2023-01-01 RX ADMIN — DIVALPROEX SODIUM 500 MILLIGRAM(S): 500 TABLET, DELAYED RELEASE ORAL at 17:34

## 2023-01-01 RX ADMIN — PANTOPRAZOLE SODIUM 40 MILLIGRAM(S): 20 TABLET, DELAYED RELEASE ORAL at 05:00

## 2023-01-01 RX ADMIN — Medication 15 MILLIGRAM(S): at 04:45

## 2023-01-01 RX ADMIN — Medication 1 DROP(S): at 13:13

## 2023-01-01 RX ADMIN — OXYCODONE HYDROCHLORIDE 7.5 MILLIGRAM(S): 5 TABLET ORAL at 21:30

## 2023-01-01 RX ADMIN — Medication 1 MILLIGRAM(S): at 15:13

## 2023-01-01 RX ADMIN — DIVALPROEX SODIUM 500 MILLIGRAM(S): 500 TABLET, DELAYED RELEASE ORAL at 18:16

## 2023-01-01 RX ADMIN — Medication 4: at 12:08

## 2023-01-01 RX ADMIN — Medication 4 MILLIGRAM(S): at 11:17

## 2023-01-01 RX ADMIN — OXYCODONE HYDROCHLORIDE 5 MILLIGRAM(S): 5 TABLET ORAL at 21:37

## 2023-01-01 RX ADMIN — CHLORHEXIDINE GLUCONATE 1 APPLICATION(S): 213 SOLUTION TOPICAL at 13:00

## 2023-01-01 RX ADMIN — Medication 2 MILLIGRAM(S): at 11:15

## 2023-01-01 RX ADMIN — Medication 400 MILLIGRAM(S): at 23:07

## 2023-01-01 RX ADMIN — OXYCODONE HYDROCHLORIDE 7.5 MILLIGRAM(S): 5 TABLET ORAL at 21:07

## 2023-01-01 RX ADMIN — SODIUM CHLORIDE 1000 MILLILITER(S): 9 INJECTION INTRAMUSCULAR; INTRAVENOUS; SUBCUTANEOUS at 12:59

## 2023-01-01 RX ADMIN — HYDROMORPHONE HYDROCHLORIDE 0.5 MILLIGRAM(S): 2 INJECTION INTRAMUSCULAR; INTRAVENOUS; SUBCUTANEOUS at 14:02

## 2023-01-01 RX ADMIN — MORPHINE SULFATE 2 MILLIGRAM(S): 50 CAPSULE, EXTENDED RELEASE ORAL at 10:08

## 2023-01-01 RX ADMIN — DIVALPROEX SODIUM 500 MILLIGRAM(S): 500 TABLET, DELAYED RELEASE ORAL at 05:08

## 2023-01-01 RX ADMIN — Medication 1 DROP(S): at 14:19

## 2023-01-01 RX ADMIN — Medication 6 MILLIGRAM(S): at 03:14

## 2023-01-01 RX ADMIN — Medication 2 MILLIGRAM(S): at 06:18

## 2023-01-01 RX ADMIN — INSULIN GLARGINE 3 UNIT(S): 100 INJECTION, SOLUTION SUBCUTANEOUS at 22:30

## 2023-01-01 RX ADMIN — DIVALPROEX SODIUM 500 MILLIGRAM(S): 500 TABLET, DELAYED RELEASE ORAL at 17:46

## 2023-01-01 RX ADMIN — Medication 4 MILLIGRAM(S): at 12:03

## 2023-01-01 RX ADMIN — Medication 1 DROP(S): at 05:49

## 2023-01-01 RX ADMIN — INSULIN GLARGINE 15 UNIT(S): 100 INJECTION, SOLUTION SUBCUTANEOUS at 08:20

## 2023-01-01 RX ADMIN — DIVALPROEX SODIUM 500 MILLIGRAM(S): 500 TABLET, DELAYED RELEASE ORAL at 20:11

## 2023-01-01 RX ADMIN — Medication 4 MILLIGRAM(S): at 12:29

## 2023-01-01 RX ADMIN — Medication 975 MILLIGRAM(S): at 16:15

## 2023-01-01 RX ADMIN — OXYCODONE HYDROCHLORIDE 5 MILLIGRAM(S): 5 TABLET ORAL at 13:22

## 2023-01-01 RX ADMIN — DIVALPROEX SODIUM 500 MILLIGRAM(S): 500 TABLET, DELAYED RELEASE ORAL at 18:30

## 2023-01-01 RX ADMIN — DIVALPROEX SODIUM 500 MILLIGRAM(S): 500 TABLET, DELAYED RELEASE ORAL at 17:06

## 2023-01-01 RX ADMIN — OXYCODONE HYDROCHLORIDE 5 MILLIGRAM(S): 5 TABLET ORAL at 17:21

## 2023-01-01 RX ADMIN — PANTOPRAZOLE SODIUM 40 MILLIGRAM(S): 20 TABLET, DELAYED RELEASE ORAL at 05:30

## 2023-01-01 RX ADMIN — HYDROMORPHONE HYDROCHLORIDE 0.2 MILLIGRAM(S): 2 INJECTION INTRAMUSCULAR; INTRAVENOUS; SUBCUTANEOUS at 17:55

## 2023-01-01 RX ADMIN — METFORMIN HYDROCHLORIDE 500 MILLIGRAM(S): 850 TABLET ORAL at 17:34

## 2023-01-01 RX ADMIN — TRAMADOL HYDROCHLORIDE 25 MILLIGRAM(S): 50 TABLET ORAL at 18:21

## 2023-01-01 RX ADMIN — Medication 52.5 MILLIGRAM(S): at 11:21

## 2023-01-01 RX ADMIN — Medication 2 MILLIGRAM(S): at 17:22

## 2023-01-01 RX ADMIN — Medication 2 MILLIGRAM(S): at 05:12

## 2023-01-01 RX ADMIN — LACOSAMIDE 140 MILLIGRAM(S): 50 TABLET ORAL at 06:41

## 2023-01-01 RX ADMIN — Medication 2 MILLIGRAM(S): at 12:55

## 2023-01-01 RX ADMIN — Medication 52.5 MILLIGRAM(S): at 05:28

## 2023-01-01 RX ADMIN — OXYCODONE HYDROCHLORIDE 5 MILLIGRAM(S): 5 TABLET ORAL at 17:30

## 2023-01-01 RX ADMIN — PACLITAXEL 240 MILLIGRAM(S): 6 INJECTION, SOLUTION, CONCENTRATE INTRAVENOUS at 14:25

## 2023-01-01 RX ADMIN — Medication 650 MILLIGRAM(S): at 22:06

## 2023-01-01 RX ADMIN — Medication 1 APPLICATION(S): at 17:28

## 2023-01-01 RX ADMIN — Medication 2 MILLIGRAM(S): at 18:27

## 2023-01-01 RX ADMIN — Medication 1: at 08:20

## 2023-01-01 RX ADMIN — Medication 50 MILLILITER(S): at 11:16

## 2023-01-01 RX ADMIN — Medication 13 UNIT(S): at 16:40

## 2023-01-01 RX ADMIN — OXYCODONE HYDROCHLORIDE 7.5 MILLIGRAM(S): 5 TABLET ORAL at 02:49

## 2023-01-01 RX ADMIN — DIVALPROEX SODIUM 500 MILLIGRAM(S): 500 TABLET, DELAYED RELEASE ORAL at 05:19

## 2023-01-01 RX ADMIN — Medication 15 MILLIGRAM(S): at 02:17

## 2023-01-01 RX ADMIN — METFORMIN HYDROCHLORIDE 500 MILLIGRAM(S): 850 TABLET ORAL at 08:08

## 2023-01-01 RX ADMIN — Medication 100 MILLIGRAM(S): at 20:27

## 2023-01-01 RX ADMIN — Medication 5 UNIT(S): at 12:10

## 2023-01-01 RX ADMIN — Medication 100 MILLIEQUIVALENT(S): at 22:31

## 2023-01-01 RX ADMIN — DIVALPROEX SODIUM 500 MILLIGRAM(S): 500 TABLET, DELAYED RELEASE ORAL at 07:52

## 2023-01-01 RX ADMIN — INSULIN GLARGINE 3 UNIT(S): 100 INJECTION, SOLUTION SUBCUTANEOUS at 23:41

## 2023-01-01 RX ADMIN — Medication 4 MILLIGRAM(S): at 11:14

## 2023-01-01 RX ADMIN — DIVALPROEX SODIUM 500 MILLIGRAM(S): 500 TABLET, DELAYED RELEASE ORAL at 05:49

## 2023-01-01 RX ADMIN — Medication 4 MILLIGRAM(S): at 11:58

## 2023-01-01 RX ADMIN — LEVETIRACETAM 500 MILLIGRAM(S): 250 TABLET, FILM COATED ORAL at 17:04

## 2023-01-01 RX ADMIN — METFORMIN HYDROCHLORIDE 500 MILLIGRAM(S): 850 TABLET ORAL at 17:12

## 2023-01-01 RX ADMIN — Medication 25 GRAM(S): at 17:10

## 2023-01-01 RX ADMIN — Medication 7 UNIT(S): at 13:08

## 2023-01-01 RX ADMIN — OXYCODONE HYDROCHLORIDE 5 MILLIGRAM(S): 5 TABLET ORAL at 12:00

## 2023-01-01 RX ADMIN — CHLORHEXIDINE GLUCONATE 1 APPLICATION(S): 213 SOLUTION TOPICAL at 11:51

## 2023-01-01 RX ADMIN — DIVALPROEX SODIUM 500 MILLIGRAM(S): 500 TABLET, DELAYED RELEASE ORAL at 17:27

## 2023-01-01 RX ADMIN — OXYCODONE HYDROCHLORIDE 7.5 MILLIGRAM(S): 5 TABLET ORAL at 05:38

## 2023-01-01 RX ADMIN — TRAMADOL HYDROCHLORIDE 25 MILLIGRAM(S): 50 TABLET ORAL at 16:35

## 2023-01-01 RX ADMIN — Medication 50 MILLIGRAM(S): at 13:29

## 2023-01-01 RX ADMIN — DIVALPROEX SODIUM 500 MILLIGRAM(S): 500 TABLET, DELAYED RELEASE ORAL at 06:28

## 2023-01-01 RX ADMIN — Medication 975 MILLIGRAM(S): at 15:15

## 2023-01-01 RX ADMIN — Medication 650 MILLIGRAM(S): at 15:06

## 2023-01-01 RX ADMIN — Medication 52.5 MILLIGRAM(S): at 05:04

## 2023-01-01 RX ADMIN — DIVALPROEX SODIUM 500 MILLIGRAM(S): 500 TABLET, DELAYED RELEASE ORAL at 17:45

## 2023-01-01 RX ADMIN — Medication 12 UNIT(S): at 13:09

## 2023-01-01 RX ADMIN — Medication 4: at 12:31

## 2023-01-01 RX ADMIN — CHLORHEXIDINE GLUCONATE 1 APPLICATION(S): 213 SOLUTION TOPICAL at 12:03

## 2023-01-01 RX ADMIN — SODIUM CHLORIDE 500 MILLILITER(S): 9 INJECTION INTRAMUSCULAR; INTRAVENOUS; SUBCUTANEOUS at 01:21

## 2023-01-01 RX ADMIN — POLYETHYLENE GLYCOL 3350 17 GRAM(S): 17 POWDER, FOR SOLUTION ORAL at 04:42

## 2023-01-01 RX ADMIN — Medication 2: at 08:30

## 2023-01-01 RX ADMIN — LACOSAMIDE 140 MILLIGRAM(S): 50 TABLET ORAL at 06:29

## 2023-01-01 RX ADMIN — Medication 4: at 07:57

## 2023-01-01 RX ADMIN — Medication 4 MILLIGRAM(S): at 17:35

## 2023-01-01 RX ADMIN — Medication 2 MILLIGRAM(S): at 11:47

## 2023-01-01 RX ADMIN — Medication 3: at 09:21

## 2023-01-01 RX ADMIN — DIVALPROEX SODIUM 500 MILLIGRAM(S): 500 TABLET, DELAYED RELEASE ORAL at 07:11

## 2023-01-01 RX ADMIN — SODIUM CHLORIDE 50 MILLILITER(S): 9 INJECTION, SOLUTION INTRAVENOUS at 05:12

## 2023-01-01 RX ADMIN — Medication 1: at 08:52

## 2023-01-01 RX ADMIN — INSULIN GLARGINE 8 UNIT(S): 100 INJECTION, SOLUTION SUBCUTANEOUS at 21:48

## 2023-01-01 RX ADMIN — SODIUM CHLORIDE 500 MILLILITER(S): 9 INJECTION INTRAMUSCULAR; INTRAVENOUS; SUBCUTANEOUS at 16:07

## 2023-01-01 RX ADMIN — Medication 2 MILLIGRAM(S): at 14:45

## 2023-01-01 RX ADMIN — LEVETIRACETAM 600 MILLIGRAM(S): 250 TABLET, FILM COATED ORAL at 18:33

## 2023-01-01 RX ADMIN — OXYCODONE HYDROCHLORIDE 10 MILLIGRAM(S): 5 TABLET ORAL at 08:22

## 2023-01-01 RX ADMIN — Medication 4: at 11:57

## 2023-01-01 RX ADMIN — LEVETIRACETAM 400 MILLIGRAM(S): 250 TABLET, FILM COATED ORAL at 10:01

## 2023-01-01 RX ADMIN — Medication 1 DROP(S): at 12:57

## 2023-01-01 RX ADMIN — OXYCODONE HYDROCHLORIDE 5 MILLIGRAM(S): 5 TABLET ORAL at 04:32

## 2023-01-01 RX ADMIN — Medication 2: at 18:06

## 2023-01-01 RX ADMIN — CEFTRIAXONE 100 MILLIGRAM(S): 500 INJECTION, POWDER, FOR SOLUTION INTRAMUSCULAR; INTRAVENOUS at 14:55

## 2023-01-01 RX ADMIN — Medication 2 MILLIGRAM(S): at 05:49

## 2023-01-01 RX ADMIN — DIVALPROEX SODIUM 500 MILLIGRAM(S): 500 TABLET, DELAYED RELEASE ORAL at 17:47

## 2023-01-01 RX ADMIN — OXYCODONE HYDROCHLORIDE 5 MILLIGRAM(S): 5 TABLET ORAL at 07:20

## 2023-01-01 RX ADMIN — Medication 3: at 22:18

## 2023-01-01 RX ADMIN — SODIUM CHLORIDE 75 MILLILITER(S): 9 INJECTION, SOLUTION INTRAVENOUS at 05:32

## 2023-01-01 RX ADMIN — PANTOPRAZOLE SODIUM 40 MILLIGRAM(S): 20 TABLET, DELAYED RELEASE ORAL at 12:42

## 2023-01-01 RX ADMIN — Medication 650 MILLIGRAM(S): at 18:12

## 2023-01-01 RX ADMIN — PIPERACILLIN AND TAZOBACTAM 200 GRAM(S): 4; .5 INJECTION, POWDER, LYOPHILIZED, FOR SOLUTION INTRAVENOUS at 21:45

## 2023-01-09 NOTE — ED ADULT NURSE NOTE - OBJECTIVE STATEMENT
Pt receievd rm 12, a&ox4, ambulatory/ c/o chest tightness and cough x3 days. Ppmhx lung ca (in remission x1 month as per pt). Pt denies SOB, n/v, n/t, fever/chills, headache, dizziness. Breathing even, unlabored. Pulses equal, bilaterally. Pt febrile, MD Candelario aware. Pt pending MD order. Fall precautions in place.

## 2023-01-09 NOTE — ED PROVIDER NOTE - CLINICAL SUMMARY MEDICAL DECISION MAKING FREE TEXT BOX
Kimmie Candelario MD attending physician.  52-year-old woman comes in with chest tightness and some dyspnea.  She has a history of lung cancer diagnosed last June has her last CAT scan from December 1, 2022 that showed some shrinking of her tumor.  Of note she also has a bronc that was done during her diagnosis that showed that she might need some tracheal stenting at some point because there was extrinsic pressure on the trachea.  The patient here has a nonischemic EKG feels some chest tightness lungs are clear without wheezing.  She is persistently tachycardic.  She has no stridor on exam.  O2 sat is 100 blood pressure is normal.  Patient is at high risk for PE.  Her temperature is 100.3 so she will also need infectious work-up.  I would scan her chest looking for PE in addition.  Signing out to Dr. Duvall at change of shift with all results pending Kimmie Candelario MD attending physician.  52-year-old woman comes in with chest tightness and some dyspnea.  She has a history of lung cancer diagnosed last June has her last CAT scan from December 1, 2022 that showed some shrinking of her tumor.  Of note she also has a bronc that was done during her diagnosis that showed that she might need some tracheal stenting at some point because there was extrinsic pressure on the trachea.  The patient here has a nonischemic EKG feels some chest tightness lungs are clear without wheezing.  She is persistently tachycardic.  She has no stridor on exam.  O2 sat is 100 blood pressure is normal.  Patient is at high risk for PE.  Her temperature is 100.3 so she will also need infectious work-up.  I would scan her chest looking for PE in addition.  Signing out to Dr. Duvall at change of shift with all results pending      [id number]- patient w hx of lung cancer in remission presenting tachycardic febrile and with chest pain. Patient denying hemoptysis, but endorsing dry cough. Pain is somewhat exertional. Patient with significant risk for PE- will CTA, in addition to trop, cbc, cmp, vbg, coags, cxr.

## 2023-01-09 NOTE — ED ADULT NURSE REASSESSMENT NOTE - NS ED NURSE REASSESS COMMENT FT1
Report received from kody Amaro. Pt resting comfortably in stretcher. Pt denies cp, SOB, n/v, n/t, fever/chills. Breathing even unlabored; no signs of distress noted.

## 2023-01-09 NOTE — ED PROVIDER NOTE - NS ED ROS FT
ROS:  -Constitutional: +fever  -Head: Denies headache  -Eyes: Denies blurry vision  -Cardiovascular: + chest pain  -Pulmonary: +shortness of breath  -Gastrointestinal: Denies nausea or diarrhea  -Genitourinary: Denies dysuria  -Skin: Denies new rashes  -Neuro: Denies numbness or tingling

## 2023-01-09 NOTE — ED PROVIDER NOTE - PROGRESS NOTE DETAILS
Lin Gibbs MD, PGY-3: pt clinically stable. ambulatory sat 100%. pt informed of stable lung mass and increased interstitial markings. pt without sxs of UTI, will dc w/o UA results, pt will receive callback if positive. ready for dc home.

## 2023-01-09 NOTE — ED PROVIDER NOTE - CHIEF COMPLAINT
The patient is a 52y Female complaining of  The patient is a 52y Female complaining of chest tightness

## 2023-01-09 NOTE — ED ADULT TRIAGE NOTE - CHIEF COMPLAINT QUOTE
Pt co chest tightness and sob x few days. Pt with HX of lung cancer. pt also co feeling a new lump under her left axillary area x few weeks. Pt with no fever, tachycardic in triage.

## 2023-01-09 NOTE — ED PROVIDER NOTE - NSFOLLOWUPCLINICS_GEN_ALL_ED_FT
[facility]  Cardiology  [address] [place] [id number]  Phone: [phone]  Fax:     [facility] [place]  Internal Medicine  [address] [address]  Phone: [phone]  Fax: [phone]  Follow Up Time: 7-10 Days

## 2023-01-09 NOTE — ED PROVIDER NOTE - ATTENDING CONTRIBUTION TO CARE
Kimmie Candelario MD attending physician.  52-year-old woman comes in with chest tightness and some dyspnea.  She has a history of lung cancer diagnosed last June has her last CAT scan from December 1, 2022 that showed some shrinking of her tumor.  Of note she also has a bronc that was done during her diagnosis that showed that she might need some tracheal stenting at some point because there was extrinsic pressure on the trachea.  The patient here has a nonischemic EKG feels some chest tightness lungs are clear without wheezing.  She is persistently tachycardic.  She has no stridor on exam.  O2 sat is 100 blood pressure is normal.  Patient is at high risk for PE.  Her temperature is 100.3 so she will also need infectious work-up.  I would scan her chest looking for PE in addition.  Signing out to Dr. Duvall at change of shift with all results pending    I performed a history and physical exam of the patient and discussed their management with the resident and /or advanced care provider. I reviewed the resident and /or ACP's note and agree with the documented findings and plan of care. My medical decison making and observations are found above.

## 2023-01-09 NOTE — ED PROVIDER NOTE - PATIENT PORTAL LINK FT
You can access the FollowMyHealth Patient Portal offered by [facility] by registering at the following website: [email or web address]. By joining [facility]’s FollowMyHealth portal, you will also be able to view your health information using other applications (apps) compatible with our system.

## 2023-01-09 NOTE — ED PROVIDER NOTE - OBJECTIVE STATEMENT
52F with hx of lung cancer currently in remisssion (s/p chemo, RTx), presenting with chest tightness, onset apprx 4 days ago, intermittent, nonpleuritic. Described as radiating from the left axilla to the substernal area. Patient notes that she feels a lump in the axilla, tightness seems to be extending from axilla. Tightness associated with a dry cough, fever/chills. Patient denies any pmh apart from cancer.

## 2023-01-09 NOTE — ED PROVIDER NOTE - NSICDXPASTMEDICALHX_GEN_ALL_CORE_FT
PAST MEDICAL HISTORY:  GERD (Gastroesophageal Reflux Disease)     Lung mass right    Non-small cell lung cancer with metastasis

## 2023-01-09 NOTE — ED PROVIDER NOTE - PHYSICAL EXAMINATION
PHYSICAL EXAM:  CONSTITUTIONAL:  awake, alert, oriented to person, place, time/situation and in no apparent distress.  HEAD: Atraumatic  EYES: Clear bilaterally, pupils equal, round and reactive to light.  ENMT: Airway patent, Nasal mucosa clear. Mouth with normal mucosa. Uvula is midline.   CARDIAC: Normal rate, no appreciable murmur   RESPIRATORY: tachnypic. Breath sounds clear and equal bilaterally.  LEFT AXILLA: no evidence of edema, inflammation, or masses. nontender to palpation   ABDOMEN:  Soft, nontender, nondistended. No rebound tenderness or guarding.  NEUROLOGICAL: Alert and oriented, no focal deficits, no motor or sensory deficits. CN2-12 intact. Sensation intact x4 extremities.  SKIN: Skin warm and dry. No evidence of rashes or lesions.

## 2023-01-09 NOTE — ED PROVIDER NOTE - NSFOLLOWUPINSTRUCTIONS_ED_ALL_ED_FT
--take tylenol or motrin as needed for pain/fevers. Take tylenol 1000mg every 6 hours alternating with motrin 600 mg every 6 hours (take tylenol or motrin then three hours later take the other then three hours later take the first).  --today, the lab tests we did include basic blood tests, the imaging tests we did include chest xray, chest ct. results significant for no bacterial pneumonia, no blood clot in the lungs, + covid-19. we have included these test results in your paperwork. please take them to your follow-up appointment  --given that you were in the ED today, we recommend a followup visit with your general doctor (primary care doctor) AND cardiology within 7 days.   --your diagnosis is: covid-19  --return to the ED if your current symptoms worsen, if chest pain changes in type/nature, if short of breath.

## 2023-01-24 NOTE — ASSESSMENT
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  Tumor tested PD-L1 negative and is negative for actionable mutations (TP53, STK11 positive, among others).  Started chemotherapy with Carboplatin/Pemetrexed in mid-July 2022 x 4 cycles completed Sept 2022.  Treated with concurrent Thoracic RT started early Sept through late October 2022.  CT C/A/P 12/1/22: Decreased right apical mass and confluent mediastinal/right hilar lymphadenopathy.Mildly increased right apical pleural thickening, possibly treatment related.\par No new disease in the abdomen or pelvis.\par Recommend:\par -Covid symptoms: supportive care; OTC cough syrup. \par -LUE swelling: recommend LUE venous U/S\par -Would plan on treatment with maintenance Durvalumab as she has had a nice MS to chemo/RT. Patient states that she will consider this when feeling improved. \par -Continue iron supplementation for iron deficiency anemia\par – Patient does have compression of the distal trachea from the enlarged pretracheal lymph node noted during bronchoscopy which will require clinical and radiologic follow-up and possibly airway intervention.  \par -F/U with Rad-Onc\par -Patient will let us know if she wishes to proceed with maintenance Durvalumab\par

## 2023-01-24 NOTE — PHYSICAL EXAM
[Fully active, able to carry on all pre-disease performance without restriction] : Status 0 - Fully active, able to carry on all pre-disease performance without restriction [Normal] : affect appropriate [de-identified] : No icterus  [de-identified] : MMM O/P Clear [de-identified] : Supple No LAD  [de-identified] : S1 S2  [de-identified] : No edema [de-identified] : Mild left axillary swelling; no prominent nodes appreciated [de-identified] : No spine/CVA tenderness

## 2023-01-24 NOTE — HISTORY OF PRESENT ILLNESS
[Disease: _____________________] : Disease: [unfilled] [T: ___] : T[unfilled] [N: ___] : N[unfilled] [AJCC Stage: ____] : AJCC Stage: [unfilled] [de-identified] : 51F with significant smoking history who was evaluated for chronic GERD and a cough going on for about 6 months prior to her initial consultation.  A CXR was done and demonstrated a nodule. No follow up was done at that time. She returned in May 2022 for persistent GERD and a cough. A CXR was done and revealed a large RUL mass increased in size from the previous study. Her pulmonologist then sent her for a PET/CT, which revealed an intensely FDG-avid RUL mass. 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  Patient with clinical Stage IIIB, T4 N2 M0 disease. Pathology reviewed at tumor board and clarified the tumor represents non-small cell lung cancer, either adenocarcinoma with neuroendocrine features versus large cell neuroendocrine cancer.  Brain MRI was negative.  Tumor tested PDL1 Negative and molecular testing is negative for actionable mutations.  Patient started treatment with carboplatin/pemetrexed in July 2022.  Thoracic RT started in Sept 2022.  Completed 4 cycles with Carbo/Pem in late Sept 2022.  Thoracic RT completed late October 2022. CT C/A/P 12/1/22 with ID.  [de-identified] : – Lymph node, 4R EBUS guided FNA 6/22/2022: Positive for malignant cells.  Metastatic malignant neoplasm.  IHC is positive for TTF-1 and synaptophysin while negative for p40, chromogranin and CD56.  Ki-67 is 80%.  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  Foundation:  Positive for TP53, STK11, among others.   [de-identified] : Patient is status post C4 of chemotherapy with carboplatin/pemetrexed on 9/20/22.\par Completed Thoracic RT on 10/21.  \par \par Patient went to [facility] ED on 1/9 due to several days of chest tightness and congestion and was diagnosed with Covid; A CT Angio Chest revealed no pulmonary embolism.\par Mediastinal adenopathy is slightly enlarged from the prior study. She was not discharged with any antiviral medications, steroids or antibiotics. Today, she feels improved but has a continued cough which causes right sided chest discomfort and for which she has used Oxy 5mg prn for pain. She denies SOB and O2 is 98%; she is afebrile. Endorses continued loss of taste and reduced appetite. States that she continues to test Covid positive. \par Left axillary/chest pain/swelling reported last visit has improved somewhat.\par \par Again, discussed recommendation to start maintenance Durvalumab in Jan; she states that she will not make that decision until she feels improved. \par \[facility] labs 1/9/23: ~WBC 8.62  ~H/H 12.8/38.8  ~PLTS 144  Na 139  ~K 3.7  ~BUN/Cr 13/0.81  ~Ca 9.7\par \[facility]

## 2023-01-24 NOTE — RESULTS/DATA
[FreeTextEntry1] : -CXR 12/9/21:  A right upper lung rounded opacity is increased in size and conspicuity from prior imaging. Findings are concerning for neoplastic etiology. A CT chest is recommended for further evaluation.\par \par -PET/CT 6/1/22:  Abnormal FDG-PET/CT scan.\par 1. FDG avid lobulated partially necrotic right upper lung mass compatible with malignancy.\par 2. FDG avid mediastinal and right perihilar masses, suspicious for metastases.\par \par –MRI Brain 7/15/22:  NO EVIDENCE OF A MASS, ABNORMAL ENHANCEMENT, OR CURRENT EVIDENCE OF ACUTE ISCHEMIA. CHRONIC WHITE MATTER ISCHEMIC CHANGES\par \par -CT C/A/P 12/1/22: Decreased right apical mass and confluent mediastinal/right hilar lymphadenopathy.Mildly increased right apical pleural thickening, possibly treatment related.\par No new disease in the abdomen or pelvis.\par \par -Hospital labs 1/9/23: ~WBC 8.62  ~H/H 12.8/38.8  ~PLTS 144  Na 139  ~K 3.7  ~BUN/Cr 13/0.81  ~Ca 9.7\par \par -CT Chest Angio 1/9/23: CT Angio Chest revealed no pulmonary embolism. Mediastinal adenopathy is slightly enlarged from the prior study

## 2023-02-03 PROBLEM — R94.8 ABNORMAL PET SCAN, MEDIASTINUM: Status: ACTIVE | Noted: 2022-06-20

## 2023-02-03 PROBLEM — R07.9 CHEST PAIN: Status: ACTIVE | Noted: 2023-01-01

## 2023-02-04 PROBLEM — R91.8 LUNG MASS: Status: ACTIVE | Noted: 2022-05-18

## 2023-02-04 NOTE — DISCUSSION/SUMMARY
[FreeTextEntry1] : \par Lung Carcinoma\par Chest discomfort.  Etiology not absolutely clear.  Had negative Dopplers and no evidence of pulmonary embolic disease.  Pain clinically most likely musculoskeletal in origin however it is possible possible patient senses restrictive effects of radiation and or airway obstruction.\par Former  smoker.\par

## 2023-02-04 NOTE — PHYSICAL EXAM
[No Acute Distress] : no acute distress [Normal Oropharynx] : normal oropharynx [Normal Appearance] : normal appearance [No Neck Mass] : no neck mass [Normal Rate/Rhythm] : normal rate/rhythm [Normal S1, S2] : normal s1, s2 [No Murmurs] : no murmurs [No Resp Distress] : no resp distress [Normal to Percussion] : normal to percussion [No Abnormalities] : no abnormalities [Benign] : benign [Normal Gait] : normal gait [No Cyanosis] : no cyanosis [No Edema] : no edema [FROM] : FROM [Normal Color/ Pigmentation] : normal color/ pigmentation [No Focal Deficits] : no focal deficits [Oriented x3] : oriented x3 [Normal Affect] : normal affect [Clear to Auscultation Bilaterally] : clear to auscultation bilaterally [[id number]] : Clubbing

## 2023-02-04 NOTE — PROCEDURE
Pre-Anesthesia VS, & Labs





- Diagnosis





R EPL tendinosis





- Procedure





R extensor pollicis longus release


Vital Signs: 





                                        











Temp Pulse Resp BP Pulse Ox


 


 36.6 C   78   12   140/86 H  98 


 


 02/14/20 07:52  02/14/20 07:52  02/14/20 07:52  02/14/20 07:52  02/14/20 07:52














                                        





Height                           5 ft 9 in


Weight (kg)                      82.1 kg











- NPO


>8 hours


Last Fluid Intake: sips H20 0600





Home Medications and Allergies


Home Medications: 


Ambulatory Orders





Ascorbic Acid [Vitamin C] 500 mg PO DAILY 02/04/20 


Turmeric 400 mg PO DAILY 02/04/20 


Yeast,Dried (S. Cerevisiae) [Ochoa's Yeast] 680 mg PO DAILY 02/04/20 











                                        





Ascorbic Acid [Vitamin C] 500 mg PO DAILY 02/04/20 


Turmeric 400 mg PO DAILY 02/04/20 


Yeast,Dried (S. Cerevisiae) [Ochoa's Yeast] 680 mg PO DAILY 02/04/20 








Allergies/Adverse Reactions: 


                                    Allergies











Allergy/AdvReac Type Severity Reaction Status Date / Time


 


No Known Drug Allergies Allergy   Verified 02/04/20 09:05














Anes History & Medical History





- Anesthetic History


Anesthesia Complications: reports: No previous complications (no previous 

surgery)


Family history of Anesthesia Complications: Denies


Family history of Malignant Hyperthermia: Denies





- Medical History


Cardiovascular: reports: None, Hypertension (remote hx, no meds, resolved)


Pulmonary: reports: Sleep apnea, CPAP use


Gastrointestinal: reports: None


Urinary: reports: None


Musculoskeletal: reports: Other


Endocrine/Autoimmune: reports: None


Skin: reports: None


Psychosocial: reports: Alcohol





Exam


General: Alert, Oriented x3, Cooperative


Dental: WNL


Mouth Opening: 3 Fingerbreadth


Neck Mobility: Normal


Mallampati classification: II


Thyromental Distance: greater than 6 cm


Respiratory: Lungs clear, Normal breath sounds, No respiratory distress


Cardiovascular: Regular rate


Mental/Cognitive Status: Alert/Oriented X3, Normal for patient


Cognitive Status: Within normal limits





Plan


Anesthesia Type: General


Consent for Procedure(s) Verified and Reviewed: Yes


Code Status: Attempt Resuscitation


ASA classification: 2-Mild systemic disease


Is this case an emergency?: No
[FreeTextEntry1] : Pulmonary function testing of May 18, 2022.\par Normal Flow Rates Normal Lung Volumes. There is a moderate diffusion impairment. \par \par \par 1 / 1 Vera Zheng   \par  \par \par \par \par Report date: 1/9/2023 \par  \par  View Order\par \par \par (Report matches study selected on Patient History pane)\par \par \par   \par \par \par \par \par ACC: [id number] EXAM: CT ANGIO CHEST PULM ART WAWIC\par \par PROCEDURE DATE: 01/09/2023\par \par \par \par INTERPRETATION: CLINICAL INFORMATION: Tachycardia, chest pain, history of lung cancer. Evaluate for pulmonary embolism.\par \par COMPARISON: CT chest 12/1/2022.\par \par CONTRAST/COMPLICATIONS:\par IV Contrast: Omnipaque 350 80 cc administered 20 cc discarded\par Oral Contrast: NONE\par Complications: None reported at time of study completion\par \par PROCEDURE:\par CT Angiography of the Chest.\par Sagittal and coronal reformats were performed as well as 3D (MIP) reconstructions.\par \par FINDINGS:\par \par LUNGS AND AIRWAYS: Persistent narrowing of the right upper lobe bronchus is mildly increased from 12/1/2022. Right apical mass with pleural and hilar extension measures 4.9 x 3.5 x 3.2 cm, overall unchanged from 12/1/2022 when remeasured in a similar fashion. Associated right apical pleural thickening. Emphysematous changes greatest at the lung apices.\par PLEURA: No pleural effusion.\par MEDIASTINUM AND YULIET: Mediastinal adenopathy is slightly enlarged from the prior study, for example a low paratracheal node measures 1.4 cm in short axis, previously 1.2 cm (2-38). A subcarinal node measures 0.9 cm, previously 0.8 cm.\par VESSELS: No filling defect to suggest pulmonary embolism. The main pulmonary artery is normal in caliber.\par HEART: Heart size is normal. No pericardial effusion.\par CHEST WALL AND LOWER NECK: Partially visualized nodular thyroid gland.\par VISUALIZED UPPER ABDOMEN: Within normal limits.\par BONES: Within normal limits.\par \par IMPRESSION:\par No pulmonary embolism.\par \par Mediastinal adenopathy is slightly enlarged from the prior study\par \par --- End of Report ---\par \par \par \par \par HAKEEM RUBY MD; Resident Radiologist\par This document has been electronically signed.\par VERA ZHENG MD; Attending Radiologist\par This document has been electronically signed. Jan 9 2023 7:56PM

## 2023-02-04 NOTE — HISTORY OF PRESENT ILLNESS
[Former] : former [TextBox_4] : Had biopsy. Then had chemotherapy followed by RT.\par Developed left anterior chest pain past month. Pain with sneezing and deep breath or cough. Pain with bending over. Present 6 weeks. \par Feels difficulty getting full inhalation due to some tightness and discomfort.\par No wheezing.\par No significant cough.\par Was in hospital 2 weeks ago with COVID. Not admitted.\par \par Not smoking.  Former smoker.\par \par \par \par

## 2023-02-04 NOTE — ASSESSMENT
[FreeTextEntry1] : Obtain CT PET scan.\par Follow-up post pet imaging for reevaluation.\par Trial of tramadol for pain.  Presently on oxycodone.  We will hold oxycodone and try tramadol.\par Further recommendations after review of above.\par Likely will benefit from lung function testing in the future.\par \par 35 minutes spent in evaluation management and review of studies.\par

## 2023-03-09 NOTE — PHYSICAL EXAM
[Fully active, able to carry on all pre-disease performance without restriction] : Status 0 - Fully active, able to carry on all pre-disease performance without restriction [Normal] : affect appropriate [de-identified] : No icterus  [de-identified] : MMM O/P Clear [de-identified] : Supple No LAD  [de-identified] : S1 S2  [de-identified] : No edema [de-identified] : Mild left axillary swelling; no prominent nodes appreciated [de-identified] : No spine/CVA tenderness

## 2023-03-09 NOTE — HISTORY OF PRESENT ILLNESS
[Disease: _____________________] : Disease: [unfilled] [T: ___] : T[unfilled] [N: ___] : N[unfilled] [AJCC Stage: ____] : AJCC Stage: [unfilled] [de-identified] : 51F with significant smoking history who was evaluated for chronic GERD and a cough going on for about 6 months prior to her initial consultation.  A CXR was done and demonstrated a nodule. No follow up was done at that time. She returned in May 2022 for persistent GERD and a cough. A CXR was done and revealed a large RUL mass increased in size from the previous study. Her pulmonologist then sent her for a PET/CT, which revealed an intensely FDG-avid RUL mass. 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  Patient with clinical Stage IIIB, T4 N2 M0 disease. Pathology reviewed at tumor board and clarified the tumor represents non-small cell lung cancer, either adenocarcinoma with neuroendocrine features versus large cell neuroendocrine cancer.  Brain MRI was negative.  Tumor tested PDL1 Negative and molecular testing is negative for actionable mutations.  Patient started treatment with carboplatin/pemetrexed in July 2022.  Thoracic RT started in Sept 2022.  Completed 4 cycles with Carbo/Pem in late Sept 2022.  Thoracic RT completed late October 2022. CT C/A/P 12/1/22 with HI.  [de-identified] : – Lymph node, 4R EBUS guided FNA 6/22/2022: Positive for malignant cells.  Metastatic malignant neoplasm.  IHC is positive for TTF-1 and synaptophysin while negative for p40, chromogranin and CD56.  Ki-67 is 80%.  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  Foundation:  Positive for TP53, STK11, among others.   [de-identified] : Patient completed chemo/RT in October 2022.  Achieved NV.\par LUE Doppler from late January 2023 was negative for DVT.\par \par Patient presents today feeling well. States that previous left-sided discomfort has resolved. She requests a letter of oncologic clearance to resume work on 3/11/23. \par \par She saw pulmonary in February 2023 and was sent for PET CT scan revealing nice response to interval therapy.\par \par Again, discussed recommendation to start maintenance Durvalumab; she states that she will review the drug information (this is the 3rd time she has requested this) and will call us when she gets her work schedule. Discussion reviewed benefits of immunotherapy in this setting.

## 2023-03-09 NOTE — RESULTS/DATA
[FreeTextEntry1] : -CXR 12/9/21:  A right upper lung rounded opacity is increased in size and conspicuity from prior imaging. Findings are concerning for neoplastic etiology. A CT chest is recommended for further evaluation.\par \par -PET/CT 6/1/22:  Abnormal FDG-PET/CT scan.\par 1. FDG avid lobulated partially necrotic right upper lung mass compatible with malignancy.\par 2. FDG avid mediastinal and right perihilar masses, suspicious for metastases.\par \par –MRI Brain 7/15/22:  NO EVIDENCE OF A MASS, ABNORMAL ENHANCEMENT, OR CURRENT EVIDENCE OF ACUTE ISCHEMIA. CHRONIC WHITE MATTER ISCHEMIC CHANGES\par \par -CT C/A/P 12/1/22: Decreased right apical mass and confluent mediastinal/right hilar lymphadenopathy.Mildly increased right apical pleural thickening, possibly treatment related.  No new disease in the abdomen or pelvis.\par \par -CT Chest Angio 1/9/23: CT Angio Chest revealed no pulmonary embolism. Mediastinal adenopathy is slightly enlarged from the prior study\par \par Images Reviewed/Interpreted:\par \par -LUE Doppler 1/30/23:  No evidence of left upper extremity deep venous thrombosis.\par \par -PET/CT 3/1/23: Compared to FDG-PET/CT scan dated 6/1/2022:\par 1. Overall interval decrease in size and metabolism of right upper lobe mass, right hilar adenopathy, and right-sided mediastinal lymphadenopathy, compatible with a partial response to interval therapy. Findings are similar in appearance to 1/9/2023 chest CT. Hypermetabolism is inseparable from apical pleura and extends into the right 1st-2nd and 2nd-3rd intercostal spaces. This activity may be secondary to inflammation related to interval radiation therapy versus extension of disease.\par 2. Remainder of study is unchanged.\par \par

## 2023-03-09 NOTE — ASSESSMENT
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  Tumor tested PD-L1 negative and is negative for actionable mutations (TP53, STK11 positive, among others).  Started chemotherapy with Carboplatin/Pemetrexed in mid-July 2022 x 4 cycles completed Sept 2022.  Treated with concurrent Thoracic RT started early Sept through late October 2022.  Achieved CO.  \par Restaging PET/CT March 2023 confirming response to interval therapy.   \par Recommend:\par -LUE swelling/pain resolved\par -Letter with clearance to return to work on 3/11/23 given to patient today\par -Again, strongly recommended treatment with maintenance Durvalumab as standard of care for treatment of her disease following chemo/RT.  She would not schedule the start of this during today's discussion.  Patient states that she will call us when she get her work schedule. Discussed that this has been recommended since Dec. \par -Continue iron supplementation for iron deficiency anemia\par – Patient does have compression of the distal trachea from the enlarged pretracheal lymph node noted during bronchoscopy which will require clinical and radiologic follow-up and possibly airway intervention.  \par -F/U with Rad-Onc\par -Patient will let us know when she wishes to proceed scheduling with maintenance Durvalumab\par

## 2023-05-31 NOTE — H&P ADULT - PROBLEM SELECTOR PLAN 2
F/u with Heme/onc in the AM for further eval   - was offered treatment with maintenance Durvalumab but pt deferred  -Now interested in pursuing further treatment

## 2023-05-31 NOTE — CONSULT NOTE ADULT - ATTENDING COMMENTS
I discussed the case with the Resident and agree with the findings and plan as documented in the Resident's note except below.  Oncology and radiation oncology evalaution.   Patient would benefit from routine EEG  Meclizine 25 mg TID for PRN dizziness.  Tylenol or naproxen for PRN Headache   Continue medical management, neuro- check and fall precaution.  Goal of care.     Patient is at high risk for complications and morbidity or mortality. There is high probability of imminent or life threatening deterioration in the patient's condition.  I discussed the diagnosis and treatment plan with the patient. All questions and concerns were addressed. The patient's family demonstrated good understanding of the treatment plan.  My cumulative time taking care of this critically ill patient is 40 minutes  If you have any further questions, please do not hesitate to contact our team.  Thank you for allowing us to participate in this patient care. I discussed the case with the Resident and agree with the findings and plan as documented in the Resident's note except below.  Oncology and radiation oncology evaluation.   Patient would benefit from routine EEG  Meclizine 25 mg TID for PRN dizziness.  Tylenol or naproxen for PRN Headache   Continue medical management, neuro- check and fall precaution.  Goal of care.     Patient is at high risk for complications and morbidity or mortality. There is high probability of imminent or life threatening deterioration in the patient's condition.  I discussed the diagnosis and treatment plan with the patient. All questions and concerns were addressed. The patient's family demonstrated good understanding of the treatment plan.  My cumulative time taking care of this critically ill patient is 40 minutes  If you have any further questions, please do not hesitate to contact our team.  Thank you for allowing us to participate in this patient care.

## 2023-05-31 NOTE — H&P ADULT - NSHPLABSRESULTS_GEN_ALL_CORE
12.9   3.86  )-----------( 180      ( 31 May 2023 11:34 )             39.2     Hgb Trend: 12.9<--  05-31    141  |  106  |  14  ----------------------------<  86  4.8   |  24  |  0.78    Ca    9.7      31 May 2023 11:34  Mg     2.10     05-31    TPro  6.9  /  Alb  4.2  /  TBili  0.2  /  DBili  x   /  AST  23  /  ALT  16  /  AlkPhos  59  05-31    Creatinine Trend: 0.78<--            CT head as reviewed by the radiologist:  Multiple new supra and infratentorial low-attenuation masses,   compatible with intracranial metastatic disease, new when compared with   7/15/2022.    Recommend further evaluation with a contrast enhanced brain MRI study.      MRI brain is ordered.

## 2023-05-31 NOTE — ED PROVIDER NOTE - NSTIMEPROVIDERCAREINITIATE_GEN_ER
Pt's [place] pharmacy did not get the recent vitamin D script.  Can you please resend this to the new pharmacy on file?  
31-May-2023 10:30

## 2023-05-31 NOTE — H&P ADULT - NSHPPHYSICALEXAM_GEN_ALL_CORE
Vital Signs Last 24 Hrs  T(C): 36.8 (31 May 2023 18:30), Max: 36.8 (31 May 2023 09:41)  T(F): 98.3 (31 May 2023 18:30), Max: 98.3 (31 May 2023 09:41)  HR: 70 (31 May 2023 18:30) (68 - 74)  BP: 124/78 (31 May 2023 18:30) (111/78 - 126/82)  BP(mean): --  RR: 16 (31 May 2023 18:30) (16 - 18)  SpO2: 100% (31 May 2023 18:30) (100% - 100%)    Parameters below as of 31 May 2023 18:30  Patient On (Oxygen Delivery Method): room air    GENERAL: NAD, well-developed  HEENT:  Atraumatic, Normocephalic, EOMI, PERRLA, conjunctiva and sclera clear, oral mucosa moist, clear w/o any exudate   NECK: Supple, No JVD  CHEST/LUNG: Clear to auscultation bilaterally; No wheeze  HEART: Regular rate and rhythm; No murmurs, rubs, or gallops  ABDOMEN: Soft, Nontender, Nondistended; Bowel sounds present  EXTREMITIES:  2+ Peripheral Pulses, No clubbing, cyanosis, or edema  PSYCH: AAOx3  NEUROLOGY: cranial nerve 2-12 gross intact. Strenght 5/5 in UE and LE. sensation grossly intact. RUE ataxia and pronator drift noted.   SKIN: No rashes or lesions

## 2023-05-31 NOTE — CONSULT NOTE ADULT - SUBJECTIVE AND OBJECTIVE BOX
53 yo F with Pmhx of NSCLC with adenocarcinoma with neuroendocrine feature s/p chemo and radiation presents to the ED with RLE weakness and dizziness. Patient reports that for the last 3 weeks she has been feeling weak in her RLE. Whenever she walks or jog, she feels as if her leg is giving out. He also reports to have diffuse headache that is intermittent and pressure like. No associated loss of vision, photophobia, fever, neck pain/stiffness, blurry vision, seizure, nausea, vomiting, loss of sensation or unstable gait. No recent fall or trauma. She finished her chemotherapy in 9/2022 and afterwards was offered treatment with maintenance Durvalumab but pt deferred.     WDWN female in NAD  Vital Signs Last 24 Hrs  T(C): 36.8 (31 May 2023 18:30), Max: 36.8 (31 May 2023 09:41)  T(F): 98.3 (31 May 2023 18:30), Max: 98.3 (31 May 2023 09:41)  HR: 70 (31 May 2023 18:30) (68 - 74)  BP: 124/78 (31 May 2023 18:30) (111/78 - 126/82)  BP(mean): --  RR: 16 (31 May 2023 18:30) (16 - 18)  SpO2: 100% (31 May 2023 18:30) (100% - 100%)    Parameters below as of 31 May 2023 18:30  Patient On (Oxygen Delivery Method): room air    AAO X 3  PERRLA, EOMI  CN 2-12 grossly intact  NEWTON strength 5/5  No dysmetria or drift  SILT    < from: CT Head No Cont (05.31.23 @ 12:03) >  COMPARISON: Prior contrast enhanced brain MRI study dated 7/15/2022.    FINDINGS: Multiple new low-attenuation masses are seen within the   supratentorial compartment, the largest of which measures up to 3.7 cm in   the right temporal lobe. Findings are compatible with metastatic disease.    Abnormal low attenuation changes also seen within the bilateral   cerebellar hemispheres also suspicious for presence of metastatic disease.    There is no acute intracranial hemorrhage.    There is no hydrocephalus, shift of midline structures, or herniation.    The paranasal sinuses and mastoid air cells are clear. The calvarium is   intact. The orbits appear unremarkable.    IMPRESSION: Multiple new supra and infratentorial low-attenuation masses,   compatible with intracranial metastatic disease, new when comparedwith   7/15/2022.    < end of copied text >

## 2023-05-31 NOTE — CONSULT NOTE ADULT - NS ATTEND AMEND GEN_ALL_CORE FT
Pt seen on 6/1/23.  Agree with PA evaluation and assessment.  Pt with h/o lung CA with right leg weakness, noted to have multiple cystic lesions on CT.  Pt has right pronator drift and mild leg weakness on right.  While not the largest, there is a lesion in the left motor cortex region which is the likely culprit for pt's weakness.  There is a sizeable right temporal cyst with no significant mass effect.  Pending MRI.

## 2023-05-31 NOTE — H&P ADULT - PROBLEM SELECTOR PLAN 1
CT head showed multiple brain masses c/w brain mets in the setting of hx of lung cancer   -Neurology and neurosurgery consulted   -MR brain ordered   -F/u with neurosurgery recs  -Neuro checks q4hrs   -Seizure precautions   -Fall precautions

## 2023-05-31 NOTE — ED ADULT NURSE REASSESSMENT NOTE - NS ED NURSE REASSESS COMMENT FT1
Pt with no acute changes. VSS. Pt denies HA, chest pain, SOB, dizziness, blurred vision. Pt pending bed to medicine. Safety maintained, continue with plan of care.

## 2023-05-31 NOTE — ED ADULT NURSE REASSESSMENT NOTE - NSFALLUNIVINTERV_ED_ALL_ED
Bed/Stretcher in lowest position, wheels locked, appropriate side rails in place/Call bell, personal items and telephone in reach/Instruct patient to call for assistance before getting out of bed/chair/stretcher/Non-slip footwear applied when patient is off stretcher/[place] to call system/Physically safe environment - no spills, clutter or unnecessary equipment/Purposeful proactive rounding/Room/bathroom lighting operational, light cord in reach

## 2023-05-31 NOTE — ED PROVIDER NOTE - OBJECTIVE STATEMENT
52-year-old woman with history of stage IV lung cancer status post chemo regimen, currently on remission, presents for evaluation of 3 weeks of intermittent room spinning sensation, right lower extremity discomfort.  She reports that symptoms began shortly after finishing chemo, noting intermittent episodes of room spinning sensation, associated with right lower extremity muscle spasm.  She denies any nausea, vomiting, chest pain, shortness of breath.  She reports generally being active, but noting some difficulty ambulating secondary to right lower extremity inadvertent leg movement.  She denies any difficulty sleeping, or inadvertent leg movements while at rest. She denies any back pain, lower extremity paresthesias, or any other focal weakness.

## 2023-05-31 NOTE — ED PROVIDER NOTE - MDM ORDERS SUBMITTED SELECTION
Labs/Imaging Studies/Medications Straightforward: Basic instructions, no meds, no home treatment/Verbalized Understanding

## 2023-05-31 NOTE — ED ADULT NURSE NOTE - OBJECTIVE STATEMENT
Patient is a 52-year-old female, A&OX3, ambulatory with a past medical history of lung cancer status post chemotherapy and radiation X 2 months ago completed in remission presenting to the ED c/o dizziness, "off balance" X 3 weeks. Pt reports she felt a "pop" to her right leg and since then has been experiencing these symptoms. Pt able to ambulate on her own, though reports almost fell X couple of days ago but was able to catch her self. Pt is able to hold b/l legs > 5 seconds. Denies numbness/tingling. Neuro sensory intact. Face appearing symmetric. Respirations even and unlabored, chest rise equal b/l. Placed on cardiac monitor. No chest pain, SOB, fevers, chills, nausea coming. Is endorsing headache. Bed set In lowest position. Call bell with in reach. Safety being maintained. Will continue to monitor.

## 2023-05-31 NOTE — H&P ADULT - HISTORY OF PRESENT ILLNESS
51 yo F with Pmhx of NSCLC with adenocarcinoma with neuroendocrine feature s/p chemo and radiation presents to the ED with RLE weakness and dizziness. Patient reports that for the last 3 weeks she has been feeling weak in her RLE. Whenever she walks or jog, she feels as if her leg is giving out. He also reports to have diffuse headache that is intermittent and pressure like. No associated loss of vision, photophobia, fever, neck pain/stiffness, blurry vision, seizure, nausea, vomiting, loss of sensation or unstable gait. No recent fall or trauma. She finished her chemotherapy in 9/2022 and afterwards was offered treatment with maintenance Durvalumab but pt deferred.   In the ED, her vitals were WNL. Labs were WNL as well. CT head showed new brain mets. Neurology and neurosurgery were consulted.

## 2023-05-31 NOTE — ED ADULT TRIAGE NOTE - CHIEF COMPLAINT QUOTE
Pt presents to ED ambulatory from home with c/o dizziness x 1 month and RLE pain. Pt has hx of Lung CA finished chemo treatment 2 months ago. Pt denies chest pain numbness, tingling, weakness or other neuro deficits.

## 2023-05-31 NOTE — ED ADULT NURSE REASSESSMENT NOTE - NS ED NURSE REASSESS COMMENT FT1
PT is resting in stretcher, easily arousable to verbal stimuli. no apparent distress noted at this time. hand off will be given to primary nurse when return to area.

## 2023-05-31 NOTE — ED PROVIDER NOTE - CLINICAL SUMMARY MEDICAL DECISION MAKING FREE TEXT BOX
52-year-old woman with history of stage IV lung cancer status post chemo regimen, currently on remission, presents for evaluation of 3 weeks of intermittent room spinning sensation, right lower extremity discomfort, well appearing on exam with steady gait and no focal neuro deficits on exam. Ddx hypocalcemia, hyponatremia, vertigo, complex migraine. Will treat symptomatically, evaluate basic labs as well as calcium, magnesium levels, and CT head given recent cancer history. Disposition pending labs, imaging, reassessment.

## 2023-05-31 NOTE — CONSULT NOTE ADULT - SUBJECTIVE AND OBJECTIVE BOX
Neurology - Consult Note    -  Spectra: [id number] ([facility]), [phone] ([facility])  -    HPI: Patient HARJEET WHITMORE is a 52y (1970) woman with a PMHx significant for stage IV lung cancer, who presents w/ CC of dizziness. She was diagnosed w/ stage IV lung cancer about 8 months ago, underwent chemo and RT, and has been off treatment for past 2 months. She follows w/ oncologist Dr. Beckman. She has known mets in the abdomen. For the past 3 weeks, she has been getting dizzy whenever she stands up too fast. She describes it as lightheadedness, like she is going to pass out. She tries to steady herself or sit down until the feeling passes. Has not had any falls. No associated nausea, vomiting, blurry vision, or diplopia. She does experience a few seconds of feeling like "my ear is clogged" at the onset of each episode. She has never had dizziness before. Over the past few weeks, she has been feeling like her RLE is weak, making it difficult to run or walk. She describes it as like "one foot is dipped lower than the other one." She is ambulating without assistance. Denies numbness, tingling, slurred speech. She also reports have intermittent L-sided headaches, feels like throbbing, relieved by Tylenol, and lasting 30 minutes. She has no family history of cancer or neurological disease. Not taking any medications at home. She denies any staring episodes, LOC, or convulsions.      Review of Systems:    CONSTITUTIONAL: No fevers or chills; +dizziness  EYES AND ENT: No visual changes or no throat pain   NECK: No pain or stiffness  RESPIRATORY: No hemoptysis or shortness of breath  CARDIOVASCULAR: No chest pain or palpitations  GASTROINTESTINAL: No melena or hematochezia  GENITOURINARY: No dysuria or hematuria  NEUROLOGICAL: +As stated in HPI above  SKIN: No itching, burning, rashes, or lesions   All other review of systems is negative unless indicated above.    Allergies:  No Known Allergies      PMHx/PSHx/Family Hx: As above, otherwise see below   GERD (Gastroesophageal Reflux Disease)    Hydrosalpinx    GERD (Gastroesophageal Reflux Disease)    Ulcer    Lung mass    Non-small cell lung cancer with metastasis        Social Hx:  Former cigarette smoker for years  Marijuana user    Works as home health aide    Medications:  MEDICATIONS  (STANDING):    MEDICATIONS  (PRN):      Vitals:  T(C): 36.8 (05-31-23 @ 10:46), Max: 36.8 (05-31-23 @ 09:41)  HR: 74 (05-31-23 @ 10:46) (73 - 74)  BP: 126/82 (05-31-23 @ 10:46) (116/84 - 126/82)  RR: 17 (05-31-23 @ 10:46) (17 - 18)  SpO2: 100% (05-31-23 @ 10:46) (100% - 100%)    Physical Examination:   General - NAD, pleasant well-developed female  Cardiovascular - Peripheral pulses palpable, no edema  Eyes - Fundoscopy not performed due to safety precautions in the setting of the COVID-19 pandemic    Neurologic Exam:  Mental status - Awake, Alert, Oriented to person, place, and time. Speech fluent. Follows simple and complex commands. Attention/concentration, recent and remote memory (including registration and recall), and fund of knowledge intact    Cranial nerves - PERRLA, VFF, EOMI, face sensation (V1-V3) intact b/l, facial strength intact without asymmetry b/l, hearing intact b/l, palate with symmetric elevation, trapezius 5/5 strength b/l, tongue midline on protrusion    Motor - Normal bulk and tone throughout. Slight pronator drift in RUE.  Strength testing            Deltoid      Biceps      Triceps     Wrist Extension    Wrist Flexion     Interossei         R            5                 5               5                     5                              5                        5                 5  L             5                 5               5                     5                              5                        5                 5              Hip Flexion    Hip Extension    Knee Flexion    Knee Extension    Dorsiflexion    Plantar Flexion  R              5                           5                       5                           5                            5                          5  L              5                           5                        5                           5                            5                          5    Sensation - Light touch intact throughout    Coordination - mild ataxia w/ FTN on R, intact on L. No tremors appreciated    Gait and station - Normal casual gait. Romberg (-). Intact tandem, tip-toes, and on-heel gait.    Labs:                        12.9   3.86  )-----------( 180      ( 31 May 2023 11:34 )             39.2     05-31    141  |  106  |  14  ----------------------------<  86  4.8   |  24  |  0.78    Ca    9.7      31 May 2023 11:34  Mg     2.10     05-31    TPro  6.9  /  Alb  4.2  /  TBili  0.2  /  DBili  x   /  AST  23  /  ALT  16  /  AlkPhos  59  05-31    CAPILLARY BLOOD GLUCOSE        LIVER FUNCTIONS - ( 31 May 2023 11:34 )  Alb: 4.2 g/dL / Pro: 6.9 g/dL / ALK PHOS: 59 U/L / ALT: 16 U/L / AST: 23 U/L / GGT: x                         Radiology:  CT Head No Cont:  (31 May 2023 12:03)  Multiple new supra and infratentorial low-attenuation masses,   compatible with intracranial metastatic disease, new when comparedwith   7/15/2022.    Recommend further evaluation with a contrast enhanced brain MRI study.

## 2023-05-31 NOTE — ED ADULT NURSE NOTE - NSFALLHARMRISKINTERV_ED_ALL_ED

## 2023-05-31 NOTE — CONSULT NOTE ADULT - ASSESSMENT
52y F with Hx stage IV lung cancer, presenting w/ CC of dizziness and RLE weakness. Found to have new brain mets.    Impression: few weeks of dizziness and RLE. Exam significant for RUE ataxia and pronator drift. CTH showing multiple new mets (b/l cerebellar, R parietal, L occipital, L frontal), concerning for progression of disease. Being admitted for further work-up    Recommendations:  [] MRI brain w/ w/o  [] rEEG for 2h (at risk for seizure due to brain mets)  [] monitor off AEDs for now  [] rescue medications: 1) Ativan 1mg 1x for GTC>3 min, 2) Vimpat 200mg 1x  [] orthostatic testing  [] mIVF  [] medical oncology consult    Recommendations preliminary until attested. Will be formally staffed in AM.

## 2023-05-31 NOTE — ED PROVIDER NOTE - ATTENDING CONTRIBUTION TO CARE
I (Jabari) agree with above, I performed a history and physical. Counseled mel medical staff, physician assistant, and/or medical student on medical decision making as documented. Medical decisions and treatment interventions were made in real time during the patient encounter. Additionally and/or with the following exceptions: Patient is a 52-year-old past medical history of stage IV lung cancer who is presenting to the emergency department with room spinning dizziness.  She also had some twitching of her right leg.  This was witnessed during physical exam.  Consider partial seizure versus myoclonus.  CT imaging was obtained which showed likely metastases to the brain.  Patient was admitted for neurology work-up and further imaging including MRI. The patient's condition was not amenable to outpatient treatment due either the lack of feasibility of outpatient care coordination, possibility for further decompensation with adverse outcome if discharge, or treatments and diagnostic  modalities only available during an inpatient hospitalization.

## 2023-05-31 NOTE — ED ADULT NURSE REASSESSMENT NOTE - NS ED NURSE REASSESS COMMENT FT1
Neuro consult for pt with new mets to brain shown on CT. Pt to be admitted for further workup. Pt denies pain, HA, SOB, dizziness, blurred vision. VSS. safety maintained, continue with plan of care.

## 2023-05-31 NOTE — H&P ADULT - ASSESSMENT
51 yo F with Pmhx of NSCLC with adenocarcinoma with neuroendocrine feature s/p chemo and radiation presents to the ED with RLE weakness and dizziness, found to have brain mass.

## 2023-05-31 NOTE — H&P ADULT - NSHPREVIEWOFSYSTEMS_GEN_ALL_CORE
REVIEW OF SYSTEMS:    CONSTITUTIONAL: No weakness, fevers or chills  EYES/ENT: No visual changes;  No vertigo or throat pain   NECK: No pain or stiffness  RESPIRATORY: No cough, wheezing, hemoptysis; No shortness of breath  CARDIOVASCULAR: No chest pain or palpitations  GASTROINTESTINAL: No abdominal or epigastric pain. No nausea, vomiting, or hematemesis; No diarrhea or constipation. No melena or hematochezia.  GENITOURINARY: No dysuria, frequency or hematuria  NEUROLOGICAL: +RLE weakness   MUSCULOSKELETAL: No joint pain, no muscle ache   SKIN: No itching, burning, rashes, or lesions   All other review of systems is negative unless indicated above.

## 2023-05-31 NOTE — CONSULT NOTE ADULT - PROBLEM SELECTOR RECOMMENDATION 9
Start decadron 2mg Q6H  Start keppra for seizure prophylaxis 500mg BID  Brain MRI with and without contrast  Radiation oncology consult

## 2023-06-01 NOTE — PROGRESS NOTE ADULT - PROBLEM SELECTOR PLAN 1
CT head showed multiple brain masses c/w brain mets in the setting of hx of lung cancer   -Neurology and neurosurgery consulted   -MR brain ordered   -F/u with neurosurgery recs - started on decadron 2mg q6H and keppra 500mg BID for seizure ppx   -Neuro checks q4hrs   -Seizure precautions   -Fall precautions  -Onc consulted  -rEEG for 2h (at risk for seizure due to brain mets)

## 2023-06-01 NOTE — PROGRESS NOTE ADULT - SUBJECTIVE AND OBJECTIVE BOX
[facility] Division of Hospital Medicine  Dora Amin MD  Hospitalist   Pager [phone]  Avaliable via MS teams     SUBJECTIVE / OVERNIGHT EVENTS: Pt seen and examined. Patient states shes had 3 episodes of dizziness in the past 3 weeks, continues to have RLE weakness when she ambulates- no associated pain.     ADDITIONAL REVIEW OF SYSTEMS:    MEDICATIONS  (STANDING):  dexAMETHasone     Tablet 2 milliGRAM(s) Oral every 6 hours  levETIRAcetam 500 milliGRAM(s) Oral two times a day    MEDICATIONS  (PRN):  acetaminophen     Tablet .. 650 milliGRAM(s) Oral every 6 hours PRN Temp greater or equal to 38C (100.4F), Mild Pain (1 - 3)  aluminum hydroxide/magnesium hydroxide/simethicone Suspension 30 milliLiter(s) Oral every 4 hours PRN Dyspepsia  melatonin 3 milliGRAM(s) Oral at bedtime PRN Insomnia  ondansetron Injectable 4 milliGRAM(s) IV Push every 8 hours PRN Nausea and/or Vomiting      I&O's Summary      PHYSICAL EXAM:  Vital Signs Last 24 Hrs  T(C): 36.7 (01 Jun 2023 10:56), Max: 37 (31 May 2023 22:56)  T(F): 98 (01 Jun 2023 10:56), Max: 98.6 (31 May 2023 22:56)  HR: 74 (01 Jun 2023 10:56) (65 - 80)  BP: 113/68 (01 Jun 2023 10:56) (110/66 - 124/78)  BP(mean): --  RR: 17 (01 Jun 2023 10:56) (16 - 17)  SpO2: 100% (01 Jun 2023 10:56) (100% - 100%)    Parameters below as of 01 Jun 2023 10:56  Patient On (Oxygen Delivery Method): room air      CONSTITUTIONAL: NAD  EYES: PERRLA; conjunctiva and sclera clear  ENMT: Moist oral mucosa, no pharyngeal injection or exudates; normal dentition  NECK: Supple, no palpable masses; no thyromegaly  RESPIRATORY: Normal respiratory effort; lungs are clear to auscultation bilaterally  CARDIOVASCULAR: Regular rate and rhythm, normal S1 and S2, no murmur/rub/gallop; No lower extremity edema; Peripheral pulses are 2+ bilaterally  ABDOMEN: Nontender to palpation, normoactive bowel sounds, no rebound/guarding; No hepatosplenomegaly  MUSCULOSKELETAL:  Normal gait; no clubbing or cyanosis of digits; no joint swelling or tenderness to palpation  PSYCH: A+O to person, place, and time; affect appropriate  NEUROLOGY: CN 2-12 are intact and symmetric; no gross sensory deficits   SKIN: No rashes; no palpable lesions    LABS:                        13.4   3.15  )-----------( 183      ( 01 Jun 2023 06:19 )             40.5     06-01    137  |  104  |  10  ----------------------------<  102<H>  4.5   |  18<L>  |  0.70    Ca    9.6      01 Jun 2023 06:19  Mg     2.10     05-31    TPro  7.5  /  Alb  4.2  /  TBili  0.3  /  DBili  x   /  AST  29  /  ALT  17  /  AlkPhos  63  06-01

## 2023-06-01 NOTE — PATIENT PROFILE ADULT - FUNCTIONAL ASSESSMENT - BASIC MOBILITY 6.
4-calculated by average/Not able to assess (calculate score using [facility] averaging method)

## 2023-06-01 NOTE — CHART NOTE - NSCHARTNOTEFT_GEN_A_CORE
Patient seen and examined at bedside with attending and neurology team. Please refer to attending attestation of neurology consult note from the day prior for final recommendations.    Will follow up on MRI brain w/w/o and CT CAP w/w/o

## 2023-06-01 NOTE — CHART NOTE - NSCHARTNOTEFT_GEN_A_CORE
Please obtain CT chest/abdomen/pelvis with IV contrast and thoracic and lumbar spine recons r/o spinal mets and establish disease burden.   Should start decadron 2q6 for mild perilesional edema and keppra 500 bid for sz ppx. Likely will need GKRS and rad onc consult. Will notify neuro oncology about patient.   discussed with attending. Will cont to follow for completed imaging.

## 2023-06-01 NOTE — PATIENT PROFILE ADULT - NSTOBACCONEVERSMOKERY/N_GEN_A
"    DATE: 12/13/2021    Post Operative Day  3 Open Cholecystectomy..    INTERVAL EVENTS:  Patient is complain of being very tired remains on supplemental oxygen  Nursing to encourage out of bed for meals and adequate pain management  Okay to remove drain  -Per nursing documentation 35 cc over last 24 hours of serosanguineous drainage    PHYSICAL EXAMINATION:  Vital Signs: /73   Pulse 61   Temp 36.3 °C (97.3 °F) (Temporal)   Resp 16   Ht 1.854 m (6' 1\")   Wt 118 kg (261 lb 0.4 oz)   SpO2 94%     General appearance: Sleeping female on supplemental oxygen  I-S 1000  Abdomen: JORGE LUIS drain with serosanguineous drainage, no experience of bile, Abdomen soft, bowel sounds active, dressings clean and dry.  Skin: Warm and dry        LABORATORY VALUES:   Recent Labs     12/11/21  0459 12/12/21 0759 12/13/21  0410   WBC 9.7 9.7 7.0   RBC 4.01* 3.84* 3.82*   HEMOGLOBIN 12.3 11.8* 11.5*   HEMATOCRIT 38.2 37.4 36.8*   MCV 95.3 97.4 96.3   MCH 30.7 30.7 30.1   MCHC 32.2* 31.6* 31.3*   RDW 43.7 46.5 44.9   PLATELETCT 327 300 285   MPV 9.1 8.8* 8.9*     Recent Labs     12/11/21  0722 12/12/21  0759 12/13/21  0410   SODIUM 138 139 138   POTASSIUM 4.1 4.0 4.0   CHLORIDE 103 101 102   CO2 28 30 27   GLUCOSE 157* 117* 119*   BUN 8 11 9   CREATININE 0.61 0.65 0.55   CALCIUM 8.8 8.9 8.8     Recent Labs     12/11/21  0722 12/12/21  0759 12/13/21  0410   ASTSGOT 99* 41 32   ALTSGPT 184* 122* 94*   TBILIRUBIN 0.4 0.5 0.4   ALKPHOSPHAT 146* 142* 163*   GLOBULIN 2.3 2.4 2.5            IMAGING:   US-RUQ   Final Result      1.  Marked gallbladder wall thickening and reportedly tenderness over the gallbladder without evidence of gallstone suggesting acalculous cholecystitis.      2.  Echogenic liver suggesting hepatic steatosis.          ASSESSMENT AND PLAN:     * Cholecystitis, acute- (present on admission)  Assessment & Plan  RUQ pain  U/S shows: Marked gallbladder wall thickening and reportedly tenderness over the gallbladder without " evidence of gallstone suggesting acalculous cholecystitis.   Echogenic liver suggesting hepatic steatosis.  12/10 to OR for lap khanh converted to open khanh    Obesity (BMI 30.0-34.9)- (present on admission)  Assessment & Plan  12/10  BMI 34.44      Plan:  Wean off oxygen  Encourage out of bed  Plan for DC 12/14     ____________________________________     FLORES Torres.    DD: 12/13/2021  10:13 AM     Yes

## 2023-06-01 NOTE — PATIENT PROFILE ADULT - FUNCTIONAL ASSESSMENT - DAILY ACTIVITY ASSESSMENT TYPE
"Subjective   Patient ID: Anastacia Eldridge is a 13 y.o. female who presents for Follow-up (HERE FOR A FOLLOW UP ON MEDICATION CURRENTLY TAKING ZOLOFT 50 MG . STATED FEELS LIKE NOT REALLY HELPING ANXIETY OR DEPRESSION. MOTHER AGREES. ).  Anastacia is in today with her mom for a medication check. She states that her Zoloft is not working. Her mom would like her to restart her \"focus pill\"but Anastacia would like to hold off and adjust her medication for depression/anxiety.    9TH GRADE [employer].   Math F  History D  Art C  Science F  English F  Summer school for Science  Grades in Science and English will go up to D  She forgets to do work and then nor motivated; exhausted after school.   No naps after school; goes to bed at 1 am.  She played tennis but did not like it.   Friends at school; not too social outside of school.  Camping this Summer  Talking with Cyndee at [employer]; virtual x 1 so far; liked her.        Review of Systems   All other systems reviewed and are negative.      Objective   Physical Exam  Vitals reviewed.   Constitutional:       General: She is not in acute distress.     Appearance: She is well-developed. She is not toxic-appearing.   HENT:      Head: Normocephalic and atraumatic.      Right Ear: Tympanic membrane, ear canal and external ear normal.      Left Ear: Tympanic membrane, ear canal and external ear normal.      Nose: Nose normal.      Mouth/Throat:      Mouth: Mucous membranes are moist.      Pharynx: Oropharynx is clear. No oropharyngeal exudate or posterior oropharyngeal erythema.   Eyes:      Extraocular Movements: Extraocular movements intact.      Conjunctiva/sclera: Conjunctivae normal.      Pupils: Pupils are equal, round, and reactive to light.   Cardiovascular:      Rate and Rhythm: Normal rate and regular rhythm.      Heart sounds: Normal heart sounds. No murmur heard.  Pulmonary:      Effort: Pulmonary effort is normal. No respiratory distress.      Breath sounds: Normal breath " sounds.   Musculoskeletal:      Cervical back: Normal range of motion and neck supple.   Lymphadenopathy:      Cervical: No cervical adenopathy.   Skin:     General: Skin is warm and dry.   Neurological:      Mental Status: She is alert.   Psychiatric:         Mood and Affect: Mood normal.         Behavior: Behavior normal.         Thought Content: Thought content normal.         Judgment: Judgment normal.      Comments: Well groomed, pleasant and talkative.         Assessment/Plan   Diagnoses and all orders for this visit:  Depression, unspecified depression type  Anxiety  -     sertraline (Zoloft) 100 mg tablet; Take 1 tablet (100 mg) by mouth once daily.  Attention deficit hyperactivity disorder (ADHD), combined type    Discussed medication increase.  Continue talking with therapist.  Follow up in 3 months; mom to call in 2 weeks with an update.      Admission

## 2023-06-01 NOTE — PROGRESS NOTE ADULT - ASSESSMENT
53 yo F with Pmhx of NSCLC with adenocarcinoma with neuroendocrine feature s/p chemo and radiation presents to the ED with RLE weakness and dizziness, found to have brain mass.

## 2023-06-02 NOTE — CONSULT NOTE ADULT - ASSESSMENT
Pt is a 53 yo F with hx of NSCLLC with adenocarcinoma with neuroendocrine feature s/p chemoRT 9/2022 but deferred Durvaluma , now admitted for  RLE weakness and dizziness, found to have brain masses.  CT head showed  Multiple new supra and infratentorial low-attenuation masses, compatible with intracranial metastatic disease. CT spine showed no cord or caudal compression, Pending MRI brain.   Pt was examined at bedside. CN 2-12 are intact and symmetric. All exts. full strength 5/5.     Will follow up MRI results to determine if GK SRS is feasible.   pt to be started on decadron 2 mg q 6 hrs snd keppra 500 mg bid for seizure prophylaxis per NSG rec.   Rest of care per primary team.      Case was discussed with attending Dr. Bernard Pt is a 51 yo F smoker with hx of NSCLLC with adenocarcinoma with neuroendocrine feature s/p chemoRT 9/2022 (Rad onc Dr. Sorensen, Med onc Dr. Beckman)  but deferred Durvaluma after completion of chemoRT , now admitted for  RLE weakness, unsteady gait and dizziness.  CT head showed  Multiple new supra and infratentorial low-attenuation masses, compatible with intracranial metastatic disease. CT spine showed no cord or caudal compression, Pending MRI brain.   Pt was examined at bedside. CN 2-12 are intact and symmetric. All exts. full strength 5/5.     Will follow up MRI results to determine if GK SRS is feasible. If so, plan GK SRS outpatient after discharge home. If not, likely WBRT is needed, outpatient RT consult is preferred.    pt to be started on decadron 2 mg q 6 hrs snd keppra 500 mg bid for seizure prophylaxis per NSG rec.   Rest of care per primary team.      Case was discussed with attending Dr. Bernard

## 2023-06-02 NOTE — PROGRESS NOTE ADULT - ASSESSMENT
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  She is otherwise functionally well.  Leg weakness due to edema from high left frontal lesion.  Favor fractionated SRT over WBRT if possible, as outpatient.      I discussed with her that I favor resuming systemic therapy with durvalumab even in the absence of current systemic tumor    Would dc (when cleared by nsgy)  can be on decadron 6 once daily for dc  keppra ppx 500 bid until after RT  Needs outpatient eval by Dr. Bernard (with whom I spoke)  Can f/u with me outpatient as well  should f/u wtih her med onc as well

## 2023-06-02 NOTE — PROGRESS NOTE ADULT - PROBLEM SELECTOR PLAN 1
CT head showed multiple brain masses c/w brain mets in the setting of hx of lung cancer   -Neurology and neurosurgery following   -MR brain with similar findings of CT   -F/u with neurosurgery recs - started on decadron 2mg q6H and keppra 500mg BID for seizure ppx   -Neuro checks q4hrs   -Seizure precautions   -Fall precautions  -Onc consulted  -rEEG for 2h (at risk for seizure due to brain mets)

## 2023-06-02 NOTE — CONSULT NOTE ADULT - SUBJECTIVE AND OBJECTIVE BOX
53 yo F with Pmhx of NSCLC with adenocarcinoma with neuroendocrine feature s/p chemo and radiation presents to the ED with RLE weakness and dizziness. Patient reports that for the last 3 weeks she has been feeling weak in her RLE. Whenever she walks or jog, she feels as if her leg is giving out. He also reports to have diffuse headache that is intermittent and pressure like. No associated loss of vision, photophobia, fever, neck pain/stiffness, blurry vision, seizure, nausea, vomiting, loss of sensation or unstable gait. No recent fall or trauma. She finished her chemotherapy in 9/2022 and afterwards was offered treatment with maintenance Durvalumab but pt deferred.   In the ED, her vitals were WNL. Labs were WNL as well. CT head showed new brain mets. Neurology and neurosurgery were consulted.        Review of Systems:  Review of Systems: REVIEW OF SYSTEMS:    CONSTITUTIONAL: No weakness, fevers or chills  EYES/ENT: No visual changes;  No vertigo or throat pain   NECK: No pain or stiffness  RESPIRATORY: No cough, wheezing, hemoptysis; No shortness of breath  CARDIOVASCULAR: No chest pain or palpitations  GASTROINTESTINAL: No abdominal or epigastric pain. No nausea, vomiting, or hematemesis; No diarrhea or constipation. No melena or hematochezia.  GENITOURINARY: No dysuria, frequency or hematuria  NEUROLOGICAL: +RLE weakness   MUSCULOSKELETAL: No joint pain, no muscle ache   SKIN: No itching, burning, rashes, or lesions   All other review of systems is negative unless indicated above.      Allergies and Intolerances:        Allergies:  	No Known Allergies:       MEDICATIONS  (STANDING):  dexAMETHasone     Tablet 2 milliGRAM(s) Oral every 6 hours  levETIRAcetam 500 milliGRAM(s) Oral two times a day    MEDICATIONS  (PRN):  acetaminophen     Tablet .. 650 milliGRAM(s) Oral every 6 hours PRN Temp greater or equal to 38C (100.4F), Mild Pain (1 - 3)  aluminum hydroxide/magnesium hydroxide/simethicone Suspension 30 milliLiter(s) Oral every 4 hours PRN Dyspepsia  melatonin 3 milliGRAM(s) Oral at bedtime PRN Insomnia  ondansetron Injectable 4 milliGRAM(s) IV Push every 8 hours PRN Nausea and/or Vomiting      I&O's Summary      PHYSICAL EXAM:  Vital Signs Last 24 Hrs  T(C): 36.7 (01 Jun 2023 10:56), Max: 37 (31 May 2023 22:56)  T(F): 98 (01 Jun 2023 10:56), Max: 98.6 (31 May 2023 22:56)  HR: 74 (01 Jun 2023 10:56) (65 - 80)  BP: 113/68 (01 Jun 2023 10:56) (110/66 - 124/78)  BP(mean): --  RR: 17 (01 Jun 2023 10:56) (16 - 17)  SpO2: 100% (01 Jun 2023 10:56) (100% - 100%)    Parameters below as of 01 Jun 2023 10:56  Patient On (Oxygen Delivery Method): room air      CONSTITUTIONAL: NAD  EYES: PERRLA; conjunctiva and sclera clear  ENMT: Moist oral mucosa, no pharyngeal injection or exudates; normal dentition  NECK: Supple, no palpable masses; no thyromegaly  RESPIRATORY: Normal respiratory effort; lungs are clear to auscultation bilaterally  CARDIOVASCULAR: Regular rate and rhythm, normal S1 and S2, no murmur/rub/gallop; No lower extremity edema; Peripheral pulses are 2+ bilaterally  ABDOMEN: Nontender to palpation, normoactive bowel sounds, no rebound/guarding; No hepatosplenomegaly  MUSCULOSKELETAL:  Normal gait; no clubbing or cyanosis of digits; no joint swelling or tenderness to palpation  PSYCH: A+O to person, place, and time; affect appropriate  NEUROLOGY: CN 2-12 are intact and symmetric; no gross sensory deficits   SKIN: No rashes; no palpable lesions    LABS:                        13.4   3.15  )-----------( 183      ( 01 Jun 2023 06:19 )             40.5     06-01    137  |  104  |  10  ----------------------------<  102<H>  4.5   |  18<L>  |  0.70    Ca    9.6      01 Jun 2023 06:19  Mg     2.10     05-31    TPro  7.5  /  Alb  4.2  /  TBili  0.3  /  DBili  x   /  AST  29  /  ALT  17  /  AlkPhos  63  06-01    < from: CT Head No Cont (05.31.23 @ 12:03) >  COMPARISON: Prior contrast enhanced brain MRI study dated 7/15/2022.    FINDINGS: Multiple new low-attenuation masses are seen within the   supratentorial compartment, the largest of which measures up to 3.7 cm in   the right temporal lobe. Findings are compatible with metastatic disease.    Abnormal low attenuation changes also seen within the bilateral   cerebellar hemispheres also suspicious for presence of metastatic disease.    There is no acute intracranial hemorrhage.    There is no hydrocephalus, shift of midline structures, or herniation.    The paranasal sinuses and mastoid air cells are clear. The calvarium is   intact. The orbits appear unremarkable.    IMPRESSION: Multiple new supra and infratentorial low-attenuation masses,   compatible with intracranial metastatic disease, new when comparedwith   7/15/2022.

## 2023-06-02 NOTE — PROGRESS NOTE ADULT - SUBJECTIVE AND OBJECTIVE BOX
[facility] Division of Hospital Medicine  Dora Amin MD  Hospitalist   Pager [phone]  Avaliable via MS teams     SUBJECTIVE / OVERNIGHT EVENTS: Patient seen and examined. Pt states she feels better, walked without any imbalance today.     ADDITIONAL REVIEW OF SYSTEMS:    MEDICATIONS  (STANDING):  chlorhexidine 2% Cloths 1 Application(s) Topical daily  dexAMETHasone     Tablet 2 milliGRAM(s) Oral every 6 hours  levETIRAcetam 500 milliGRAM(s) Oral two times a day    MEDICATIONS  (PRN):  acetaminophen     Tablet .. 650 milliGRAM(s) Oral every 6 hours PRN Temp greater or equal to 38C (100.4F), Mild Pain (1 - 3)  aluminum hydroxide/magnesium hydroxide/simethicone Suspension 30 milliLiter(s) Oral every 4 hours PRN Dyspepsia  lacosamide Injectable 200 milliGRAM(s) IV Push once PRN GTC > 3 min that does not respond to Ativan  LORazepam   Injectable 1 milliGRAM(s) IV Push once PRN GTC > 3 min  melatonin 3 milliGRAM(s) Oral at bedtime PRN Insomnia  ondansetron Injectable 4 milliGRAM(s) IV Push every 8 hours PRN Nausea and/or Vomiting      I&O's Summary      PHYSICAL EXAM:  Vital Signs Last 24 Hrs  T(C): 36.7 (02 Jun 2023 15:04), Max: 36.7 (02 Jun 2023 15:04)  T(F): 98.1 (02 Jun 2023 15:04), Max: 98.1 (02 Jun 2023 15:04)  HR: 84 (02 Jun 2023 15:04) (76 - 88)  BP: 111/82 (02 Jun 2023 15:04) (106/70 - 119/79)  BP(mean): --  RR: 17 (02 Jun 2023 15:04) (16 - 18)  SpO2: 100% (02 Jun 2023 15:04) (99% - 100%)    Parameters below as of 02 Jun 2023 15:04  Patient On (Oxygen Delivery Method): room air      CONSTITUTIONAL: NAD, well-developed, well-groomed  EYES: PERRLA; conjunctiva and sclera clear  ENMT: Moist oral mucosa, no pharyngeal injection or exudates; normal dentition  NECK: Supple, no palpable masses; no thyromegaly  RESPIRATORY: Normal respiratory effort; lungs are clear to auscultation bilaterally  CARDIOVASCULAR: Regular rate and rhythm, normal S1 and S2, no murmur/rub/gallop; No lower extremity edema; Peripheral pulses are 2+ bilaterally  ABDOMEN: Nontender to palpation, normoactive bowel sounds, no rebound/guarding; No hepatosplenomegaly  MUSCULOSKELETAL:  Normal gait; no clubbing or cyanosis of digits; no joint swelling or tenderness to palpation  PSYCH: A+O to person, place, and time; affect appropriate  NEUROLOGY: CN 2-12 are intact and symmetric; no gross sensory deficits   SKIN: No rashes; no palpable lesions    LABS:                        13.7   5.11  )-----------( 180      ( 02 Jun 2023 06:00 )             40.4     06-02    135  |  101  |  12  ----------------------------<  119<H>  4.2   |  19<L>  |  0.68    Ca    9.5      02 Jun 2023 06:00  Phos  3.9     06-02  Mg     1.90     06-02    TPro  7.2  /  Alb  4.3  /  TBili  0.2  /  DBili  x   /  AST  18  /  ALT  13  /  AlkPhos  64  06-02              MR head: : Abnormal lesions involving the posterior fossa and   supratentorial region are identified which are consistent with patient's   known metastasis.

## 2023-06-02 NOTE — CHART NOTE - NSCHARTNOTEFT_GEN_A_CORE
Patient seen by Neurosurgery attending Dr Sheldon Figueroa's note reviewed.  Agree with SRS as outpatient with DR. Bernard  Decadron 6mg daily  Keppra 500 BID until after RT  Outpatient follow up with Dr. Metcalf (neurosurgery), Dr. Figueroa(neuro-oncology) and Dr. Bernard (radiation oncology) after discharge

## 2023-06-02 NOTE — EEG REPORT - NS EEG TEXT BOX
HARJEET WHITMORE [id number]     Study Date: 		06-1-23      --------------------------------------------------------------------------------------------------  History:  CC/ HPI Patient is a 52y old  Female who presents with a chief complaint of RLE weakness (01 Jun 2023 12:33)    MEDICATIONS  (STANDING):  chlorhexidine 2% Cloths 1 Application(s) Topical daily  dexAMETHasone     Tablet 2 milliGRAM(s) Oral every 6 hours  levETIRAcetam 500 milliGRAM(s) Oral two times a day    --------------------------------------------------------------------------------------------------  Study Interpretation:    [Abbreviation Key:  PDR=alpha rhythm/posterior dominant rhythm. A-P=anterior posterior.  Amplitude: ‘very low’:<20; ‘low’:20-49; ‘medium’:; ‘high’:>150uV.  Persistence for periodic/rhythmic patterns (% of epoch) ‘rare’:<1%; ‘occasional’:1-10%; ‘frequent’:10-50%; ‘abundant’:50-90%; ‘continuous’:>90%.  Persistence for sporadic discharges: ‘rare’:<1/hr; ‘occasional’:1/min-1/hr; ‘frequent’:>1/min; ‘abundant’:>1/10 sec.  RPP=rhythmic and periodic patterns; GRDA=generalized rhythmic delta activity; FIRDA=frontal intermittent GRDA; LRDA=lateralized rhythmic delta activity; TIRDA=temporal intermittent rhythmic delta activity;  LPD=PLED=lateralized periodic discharges; GPD=generalized periodic discharges; BIPDs =bilateral independent periodic discharges; Mf=multifocal; SIRPDs=stimulus induced rhythmic, periodic, or ictal appearing discharges; BIRDs=brief potentially ictal rhythmic discharges >4 Hz, lasting .5-10s; PFA (paroxysmal bursts >13 Hz or =8 Hz <10s).  Modifiers: +F=with fast component; +S=with spike component; +R=with rhythmic component.  S-B=burst suppression pattern.  Max=maximal. N1-drowsy; N2-stage II sleep; N3-slow wave sleep. SSS/BETS=small sharp spikes/benign epileptiform transients of sleep. HV=hyperventilation; PS=photic stimulation]    FINDINGS:      Background:  Continuity: continuous  Symmetry: symmetric  PDR: 9Hz activity, with amplitude to 40 uV, that attenuated to eye opening.    Reactivity: present  Voltage: normal (between 20-150uV)  Anterior Posterior Gradient: present  Other background findings: none  Breach: absent    Background Slowing:  Generalized slowing: diffuse irregular delta and theta activity.  Focal slowing: right temporal maximal delta    State Changes:   -Drowsiness noted with increased slowing, attenuation of fast activity, vertex transients.  -Present with N2 sleep transients with symmetric spindles and K-complexes.    Sporadic Epileptiform Discharges:    None    Rhythmic and Periodic Patterns (RPPs):  None     Electrographic and Electroclinical seizures:  None    Other Clinical Events:  None    Activation Procedures:   -Hyperventilation was not performed.    -Photic stimulation was not performed.     Artifacts:  Intermittent myogenic and movement artifacts were noted.    ECG:  The heart rate on single channel ECG was predominantly between 60-70 BPM.    EEG Classification / Summary:  Abnormal EEG study  Right temporal slowing  Mild generalized background slowing    -----------------------------------------------------------------------------------------------------    Clinical Impression:  Right temporal focal cerebral dysfunction   Mild diffuse/multi-focal cerebral dysfunction, not specific as to etiology.  There were no epileptiform abnormalities recorded.      -------------------------------------------------------------------------------------------------------  [facility] EEG Reading Room Ph#: [phone]  Epilepsy Answering Service after 5PM and before 8:30AM: Ph#: [phone]    Lokesh Campo M.D.   of Neurology, [facility]ile[facility] [place]

## 2023-06-02 NOTE — CONSULT NOTE ADULT - ATTENDING COMMENTS
Care d/w Dr. Figueroa.  MRI reviewed.  Limited volume brain metastases seen on imaging.  Neurologically asymptomatic on steroids.  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      Treatment options include whole brain radiation vs SRS.  Given size of lesions and volume of disease, would be candidate for mask based hypofractionated SRS.  I believe this would be the optimal treatment approach.  Would taper steroids following treatment.  Will coordinate outpatient treatment. Care d/w Dr. Figueroa and Dr. Amezquita.  MRI reviewed.  Limited volume brain metastases seen on imaging.  Neurologically asymptomatic on steroids, though subjectively does endorse the leg feeling weak while standing.  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      Treatment options include whole brain radiation vs SRS.  Given size of lesions and volume of disease, would be candidate for mask based hypofractionated SRS.  I believe this would be the optimal treatment approach.  Would taper steroids following treatment.  Will coordinate outpatient treatment.    Risks/benefits/alternatives reviewed.  Will request authorization for outpatient SRS.  Will coordinate care with Dr. Metcalf from [facility] – [place], [place].

## 2023-06-02 NOTE — CHART NOTE - NSCHARTNOTEFT_GEN_A_CORE
EEG report reviewed this AM. R temporal focal cerebral dysfunction, no epileptiform abnormalities. Continue to monitor off AEDs. Will continue to follow. Awaiting MRI brain. Call [phone] with any questions. EEG report reviewed this AM. R temporal focal cerebral dysfunction, no epileptiform abnormalities. Continue w/ Keppra for now. Will continue to follow. Awaiting MRI brain. Call [phone] with any questions.

## 2023-06-02 NOTE — PROGRESS NOTE ADULT - SUBJECTIVE AND OBJECTIVE BOX
NEURO-ONCOLOGY    52RHF, history of nsclc with NE features, s/p carbo/alimta and RT in 7/22 (MRI brain neg), refused maint immunotherapy  presented with RLE weakness  no seizure  brain mets on CT    (PMH none  PSH:none  SHX:  prior tob works as HHA  FHX no tumors    INTERVAL HISTORY:  Improved clinically on steroids  MRI personally reviewed showing at least 6 lesions, some cystic, including some edema in left motor strip    MEDICATIONS  (STANDING):  chlorhexidine 2% Cloths 1 Application(s) Topical daily  dexAMETHasone     Tablet 2 milliGRAM(s) Oral every 6 hours  levETIRAcetam 500 milliGRAM(s) Oral two times a day    Vital Signs Last 24 Hrs  T(C): 36.7 (02 Jun 2023 15:04), Max: 36.7 (02 Jun 2023 15:04)  T(F): 98.1 (02 Jun 2023 15:04), Max: 98.1 (02 Jun 2023 15:04)  HR: 84 (02 Jun 2023 15:04) (76 - 88)  BP: 111/82 (02 Jun 2023 15:04) (106/70 - 119/79)  BP(mean): --  RR: 17 (02 Jun 2023 15:04) (16 - 18)  SpO2: 100% (02 Jun 2023 15:04) (99% - 100%)    Parameters below as of 02 Jun 2023 15:04  Patient On (Oxygen Delivery Method): room air    (Exam as noted with exceptions in parentheses)    General:  Healthy appearing woman well groomed and without deformities    Mental Status:  Awake, alert and attentive. Oriented to person, place and time. Recent and remote memory intact. Normal concentration. Fluent spontaneous speech with intact naming and repetition. Normal fund of knowledge.      Cranial Nerves: II: Full visual fields. III,IV,VI:Pupils round, reactive to light. Full extraocular movements. No nystagmus. V: Normal bilateral bite, facial sensation. VII: No facial weakness. VIII: Hearing intact. IX,X: Palate midline, intact gag. XI:  Sternocleidomastoids normal. XII: Tongue protrudes midline. No dysarthria.     Motor:  Normal tone, bulk and power throughout including arms and legs, proximal and distal. No pronator drift. No abnormal movements.  (trace RUE drift)    Sensation: Normal in arms, legs and trunk to pin, proprioception and vibration. Negative Romberg.     Coordination: No dysmetria or dysdiadochokinesis bilaterally. Normal heel-shin testing.     Gait: Normal including heel and toe walking. Normal station.  (deferred)    Reflexes: Normoactive and symmetric throughout. Absent Babinski bilaterally.     NEUROIMAGING:  reviewed MRI 6/2/23 personally as above

## 2023-06-03 NOTE — CONSULT NOTE ADULT - SUBJECTIVE AND OBJECTIVE BOX
Hematology Consult Note  ***recs not final until attending attestation***    Margarito Mason MD PGY-4  Reachable on TEAMS    HPI:  53 yo F with Pmhx of NSCLC with adenocarcinoma with neuroendocrine feature s/p chemo and radiation presents to the ED with RLE weakness and dizziness. Patient reports that for the last 3 weeks she has been feeling weak in her RLE. Whenever she walks or jog, she feels as if her leg is giving out. He also reports to have diffuse headache that is intermittent and pressure like. No associated loss of vision, photophobia, fever, neck pain/stiffness, blurry vision, seizure, nausea, vomiting, loss of sensation or unstable gait. No recent fall or trauma. She finished her chemotherapy in 9/2022 and afterwards was offered treatment with maintenance Durvalumab but pt deferred.   In the ED, her vitals were WNL. Labs were WNL as well. CT head showed new brain mets. Neurology and neurosurgery were consulted.  (31 May 2023 19:06)    Patient seen and examined. Feels still a bit wobbly but is ambulatory. Headache has resolved.       Allergies    No Known Allergies    Intolerances        MEDICATIONS  (STANDING):  chlorhexidine 2% Cloths 1 Application(s) Topical daily  dexAMETHasone     Tablet 2 milliGRAM(s) Oral every 6 hours  levETIRAcetam 500 milliGRAM(s) Oral two times a day    MEDICATIONS  (PRN):  acetaminophen     Tablet .. 650 milliGRAM(s) Oral every 6 hours PRN Temp greater or equal to 38C (100.4F), Mild Pain (1 - 3)  aluminum hydroxide/magnesium hydroxide/simethicone Suspension 30 milliLiter(s) Oral every 4 hours PRN Dyspepsia  lacosamide Injectable 200 milliGRAM(s) IV Push once PRN GTC > 3 min that does not respond to Ativan  LORazepam   Injectable 1 milliGRAM(s) IV Push once PRN GTC > 3 min  melatonin 3 milliGRAM(s) Oral at bedtime PRN Insomnia  ondansetron Injectable 4 milliGRAM(s) IV Push every 8 hours PRN Nausea and/or Vomiting      PAST MEDICAL & SURGICAL HISTORY:  GERD (Gastroesophageal Reflux Disease)      Lung mass  right      Non-small cell lung cancer with metastasis      S/P endoscopy  2022          FAMILY HISTORY:  No pertinent family history in first degree relatives        SOCIAL HISTORY: No EtOH, no tobacco    REVIEW OF SYSTEMS:  All other systems were reviewed and are negative, except as noted        T(F): 98.8 (06-03-23 @ 13:35), Max: 98.8 (06-03-23 @ 13:35)  HR: 66 (06-03-23 @ 13:35)  BP: 109/73 (06-03-23 @ 13:35)  RR: 16 (06-03-23 @ 06:20)  SpO2: 97% (06-03-23 @ 13:35)  Wt(kg): --    Constitutional: NAD  Eyes: EOMI, sclera non-icteric  Neck: supple  Respiratory: no inc wob  Cardiovascular: RRR,   Gastrointestinal: soft, NTND, no masses palpable,  Extremities: no cyanosis, no clubbing                          13.3   7.16  )-----------( 169      ( 03 Jun 2023 06:18 )             39.2       06-03    132<L>  |  109<H>  |  15  ----------------------------<  115<H>  4.1   |  20<L>  |  0.74    Ca    9.6      03 Jun 2023 06:18  Phos  3.8     06-03  Mg     1.90     06-03    TPro  7.2  /  Alb  4.1  /  TBili  0.2  /  DBili  x   /  AST  17  /  ALT  13  /  AlkPhos  59  06-03      Magnesium, Serum: 1.90 mg/dL (06-03 @ 06:18)  Phosphorus Level, Serum: 3.8 mg/dL (06-03 @ 06:18)      RADIOLOGY & ADDITIONAL TESTS:  Studies reviewed.     Hematology Consult Note      Margarito Mason MD PGY-4  Reachable on TEAMS    HPI:  53 yo F with Pmhx of NSCLC with adenocarcinoma with neuroendocrine feature s/p chemo and radiation presents to the ED with RLE weakness and dizziness. Patient reports that for the last 3 weeks she has been feeling weak in her RLE. Whenever she walks or jog, she feels as if her leg is giving out. He also reports to have diffuse headache that is intermittent and pressure like. No associated loss of vision, photophobia, fever, neck pain/stiffness, blurry vision, seizure, nausea, vomiting, loss of sensation or unstable gait. No recent fall or trauma. She finished her chemotherapy in 9/2022 and afterwards was offered treatment with maintenance Durvalumab but pt deferred.   In the ED, her vitals were WNL. Labs were WNL as well. CT head showed new brain mets. Neurology and neurosurgery were consulted.  (31 May 2023 19:06)    Patient seen and examined. Feels still a bit wobbly but is ambulatory. Headache has resolved.       Allergies    No Known Allergies    Intolerances        MEDICATIONS  (STANDING):  chlorhexidine 2% Cloths 1 Application(s) Topical daily  dexAMETHasone     Tablet 2 milliGRAM(s) Oral every 6 hours  levETIRAcetam 500 milliGRAM(s) Oral two times a day    MEDICATIONS  (PRN):  acetaminophen     Tablet .. 650 milliGRAM(s) Oral every 6 hours PRN Temp greater or equal to 38C (100.4F), Mild Pain (1 - 3)  aluminum hydroxide/magnesium hydroxide/simethicone Suspension 30 milliLiter(s) Oral every 4 hours PRN Dyspepsia  lacosamide Injectable 200 milliGRAM(s) IV Push once PRN GTC > 3 min that does not respond to Ativan  LORazepam   Injectable 1 milliGRAM(s) IV Push once PRN GTC > 3 min  melatonin 3 milliGRAM(s) Oral at bedtime PRN Insomnia  ondansetron Injectable 4 milliGRAM(s) IV Push every 8 hours PRN Nausea and/or Vomiting      PAST MEDICAL & SURGICAL HISTORY:  GERD (Gastroesophageal Reflux Disease)      Lung mass  right      Non-small cell lung cancer with metastasis      S/P endoscopy  2022          FAMILY HISTORY:  No pertinent family history in first degree relatives        SOCIAL HISTORY: No EtOH, no tobacco    REVIEW OF SYSTEMS:  All other systems were reviewed and are negative, except as noted        T(F): 98.8 (06-03-23 @ 13:35), Max: 98.8 (06-03-23 @ 13:35)  HR: 66 (06-03-23 @ 13:35)  BP: 109/73 (06-03-23 @ 13:35)  RR: 16 (06-03-23 @ 06:20)  SpO2: 97% (06-03-23 @ 13:35)  Wt(kg): --    Constitutional: NAD  Eyes: EOMI, sclera non-icteric  Neck: supple  Respiratory: no inc wob  Cardiovascular: RRR,   Gastrointestinal: soft, NTND, no masses palpable,  Extremities: no cyanosis, no clubbing                          13.3   7.16  )-----------( 169      ( 03 Jun 2023 06:18 )             39.2       06-03    132<L>  |  109<H>  |  15  ----------------------------<  115<H>  4.1   |  20<L>  |  0.74    Ca    9.6      03 Jun 2023 06:18  Phos  3.8     06-03  Mg     1.90     06-03    TPro  7.2  /  Alb  4.1  /  TBili  0.2  /  DBili  x   /  AST  17  /  ALT  13  /  AlkPhos  59  06-03      Magnesium, Serum: 1.90 mg/dL (06-03 @ 06:18)  Phosphorus Level, Serum: 3.8 mg/dL (06-03 @ 06:18)      RADIOLOGY & ADDITIONAL TESTS:  Studies reviewed.

## 2023-06-03 NOTE — CONSULT NOTE ADULT - ASSESSMENT
52F hx of NSCLC with adenocarcinoma with neuroendocrine feature s/p chemoRT 9/2022 (Rad onc Dr. Sorensen, Med onc Dr. Beckman)  but deferred Durvaluma after completion of chemoRT , now admitted for  RLE weakness, unsteady gait and dizziness.  CT head showed  Multiple new supra and infratentorial low-attenuation masses, compatible with intracranial metastatic disease. MRI brain showing numerous lesions, largest of these lesions are seen involving the right temporal region measures approximately 4.2 x 3.1 x 2.8 cm. There is localized mass effect seen without significant shift or herniation. Surrounding edema is associated with most of these lesions. Hematology consulted for NSCLC    NSCLC  - f/w Dr. Beckman, NSCLC with adenocarcinoma with neuroendocrine features histology with clinically stage IIIB (T4N2) disease with bulky intrathoracic lymphadenopathy that is unresectable, PD-L1 negative and is negative for actionable mutations (TP53, STK11 positive, among others). s/p Carboplatin/Pemetrexed in mid-July 2022 x 4 cycles completed Sept 2022 and concurrent Thoracic RT started early Sept through late October 2022 did not want maintenance durvulumab treatment, now w/ brain metastatic dz  - neurology and NSG following, on decadron 2mg q6h, plan for decadron 6mg once daily on discharge, keppra ppx 500 bid until after RT  - rad onc following, plan for outpatient mask based hypofractionated SRS  - Outpatient follow up with Dr. Metcalf (neurosurgery), Dr. Figueroa(neuro-oncology) and Dr. Bernard (radiation oncology) after discharge.  - outpatient follow up w/ Dr. Beckman (medical oncology), please include hematology oncology f/u at [facility] in discharge note

## 2023-06-03 NOTE — PROGRESS NOTE ADULT - SUBJECTIVE AND OBJECTIVE BOX
Neurology Progress    HARJEET [id number]    HPI:  53 yo F with Pmhx of NSCLC with adenocarcinoma with neuroendocrine feature s/p chemo and radiation presents to the ED with RLE weakness and dizziness. Patient reports that for the last 3 weeks she has been feeling weak in her RLE. Whenever she walks or jog, she feels as if her leg is giving out. He also reports to have diffuse headache that is intermittent and pressure like. No associated loss of vision, photophobia, fever, neck pain/stiffness, blurry vision, seizure, nausea, vomiting, loss of sensation or unstable gait. No recent fall or trauma. She finished her chemotherapy in 9/2022 and afterwards was offered treatment with maintenance Durvalumab but pt deferred.   In the ED, her vitals were WNL. Labs were WNL as well. CT head showed new brain mets. Neurology and neurosurgery were consulted.  (31 May 2023 19:06)      Past Medical History  GERD (Gastroesophageal Reflux Disease)    Hydrosalpinx    GERD (Gastroesophageal Reflux Disease)    Ulcer    Lung mass    Non-small cell lung cancer with metastasis        Past Surgical History  No significant past surgical history    S/P endoscopy        MEDICATIONS    acetaminophen     Tablet .. 650 milliGRAM(s) Oral every 6 hours PRN  aluminum hydroxide/magnesium hydroxide/simethicone Suspension 30 milliLiter(s) Oral every 4 hours PRN  chlorhexidine 2% Cloths 1 Application(s) Topical daily  dexAMETHasone     Tablet 2 milliGRAM(s) Oral every 6 hours  lacosamide Injectable 200 milliGRAM(s) IV Push once PRN  levETIRAcetam 500 milliGRAM(s) Oral two times a day  LORazepam   Injectable 1 milliGRAM(s) IV Push once PRN  melatonin 3 milliGRAM(s) Oral at bedtime PRN  ondansetron Injectable 4 milliGRAM(s) IV Push every 8 hours PRN         Family history: No history of dementia, strokes, or seizures   FAMILY HISTORY:  No pertinent family history in first degree relatives      SOCIAL HISTORY -- No history of tobacco or alcohol use     Allergies    No Known Allergies    Intolerances            Vital Signs Last 24 Hrs  T(C): 36.9 (03 Jun 2023 06:20), Max: 36.9 (02 Jun 2023 21:47)  T(F): 98.4 (03 Jun 2023 06:20), Max: 98.4 (02 Jun 2023 21:47)  HR: 68 (03 Jun 2023 06:20) (62 - 84)  BP: 114/72 (03 Jun 2023 06:20) (111/82 - 120/65)  BP(mean): --  RR: 16 (03 Jun 2023 06:20) (16 - 17)  SpO2: 99% (03 Jun 2023 06:20) (98% - 100%)    Parameters below as of 03 Jun 2023 06:20  Patient On (Oxygen Delivery Method): room air            On Neurological Examination:    Mental Status - Patient is alert, awake, oriented X3. .   Follows commands well and able to answer questions appropriately. Mood and affect  normal  Follow simple commands able to repeat  able to name.  Speech - Fluent no Dysarthria  no  Aphasia                              Cranial Nerves - Extraocular muscle intact  JULIETTE Facial symmetry Tongue midline, CnV1to V3 intact gross hearing intact      Motor Exam -   Right upper  5/5 throughout  Left upper 5/5 throughtout  Right lower- 5/5 throughout  Left lower 5/5 throughout  Coordination -finger to nose intact  Muscle tone - is normal all over. No asymmetry is seen.      Sensory    Bilateral intact to light touch    Gait -  normal  no ataxia     GENERAL Exam:     Nontoxic , No Acute Distress   	  HEENT:  normocephalic, atraumatic  		  LUNGS:	Clear bilaterally  No Wheeze      VASCULAR: no carotid brui  	  HEART:	 Normal S1S2   No murmur RRR        	  MUSCULOSKELETAL: Normal Range of Motion  	   SKIN:      Normal   No Ecchymosis               LABS:  CBC Full  -  ( 03 Jun 2023 06:18 )  WBC Count : 7.16 K/uL  RBC Count : 4.46 M/uL  Hemoglobin : 13.3 g/dL  Hematocrit : 39.2 %  Platelet Count - Automated : 169 K/uL  Mean Cell Volume : 87.9 fL  Mean Cell Hemoglobin : 29.8 pg  Mean Cell Hemoglobin Concentration : 33.9 gm/dL  Auto Neutrophil # : 5.84 K/uL  Auto Lymphocyte # : 0.81 K/uL  Auto Monocyte # : 0.47 K/uL  Auto Eosinophil # : 0.00 K/uL  Auto Basophil # : 0.01 K/uL  Auto Neutrophil % : 81.6 %  Auto Lymphocyte % : 11.3 %  Auto Monocyte % : 6.6 %  Auto Eosinophil % : 0.0 %  Auto Basophil % : 0.1 %      06-03    132<L>  |  109<H>  |  15  ----------------------------<  115<H>  4.1   |  20<L>  |  0.74    Ca    9.6      03 Jun 2023 06:18  Phos  3.8     06-03  Mg     1.90     06-03    TPro  7.2  /  Alb  4.1  /  TBili  0.2  /  DBili  x   /  AST  17  /  ALT  13  /  AlkPhos  59  06-03    Hemoglobin A1C:     LIVER FUNCTIONS - ( 03 Jun 2023 06:18 )  Alb: 4.1 g/dL / Pro: 7.2 g/dL / ALK PHOS: 59 U/L / ALT: 13 U/L / AST: 17 U/L / GGT: x           Vitamin B12         RADIOLOGY    EEGThis is a Routine EEG.     Placement and Labeling of Electrodes: The EEG was performed utilizing at least 20 channel referential EEG connections (coronal over temporal over parasagittal montage) with inferior temporal electrodes when indicting and using all standard 10-20 electrode placements with EKG, with additional electrodes placed in the inferior temporal region using the modified 10-10 montage electrode placements for elective admissions, or if deemed necessary.  Recording was at  a sampling rate of 256 samples per second per channel. Time synchronized digital video recording was done simultaneously with EEG recording.  A low light infrared camera was used for low light recording..    EEG REPORT:   EEG Report:  · EEG Report	  HARJEET WHITMORE [id number]     Study Date: 		06-1-23      --------------------------------------------------------------------------------------------------  History:  CC/ HPI Patient is a 52y old  Female who presents with a chief complaint of RLE weakness (01 Jun 2023 12:33)    MEDICATIONS  (STANDING):  chlorhexidine 2% Cloths 1 Application(s) Topical daily  dexAMETHasone     Tablet 2 milliGRAM(s) Oral every 6 hours  levETIRAcetam 500 milliGRAM(s) Oral two times a day    --------------------------------------------------------------------------------------------------  Study Interpretation:    [Abbreviation Key:  PDR=alpha rhythm/posterior dominant rhythm. A-P=anterior posterior.  Amplitude: ‘very low’:<20; ‘low’:20-49; ‘medium’:; ‘high’:>150uV.  Persistence for periodic/rhythmic patterns (% of epoch) ‘rare’:<1%; ‘occasional’:1-10%; ‘frequent’:10-50%; ‘abundant’:50-90%; ‘continuous’:>90%.  Persistence for sporadic discharges: ‘rare’:<1/hr; ‘occasional’:1/min-1/hr; ‘frequent’:>1/min; ‘abundant’:>1/10 sec.  RPP=rhythmic and periodic patterns; GRDA=generalized rhythmic delta activity; FIRDA=frontal intermittent GRDA; LRDA=lateralized rhythmic delta activity; TIRDA=temporal intermittent rhythmic delta activity;  LPD=PLED=lateralized periodic discharges; GPD=generalized periodic discharges; BIPDs =bilateral independent periodic discharges; Mf=multifocal; SIRPDs=stimulus induced rhythmic, periodic, or ictal appearing discharges; BIRDs=brief potentially ictal rhythmic discharges >4 Hz, lasting .5-10s; PFA (paroxysmal bursts >13 Hz or =8 Hz <10s).  Modifiers: +F=with fast component; +S=with spike component; +R=with rhythmic component.  S-B=burst suppression pattern.  Max=maximal. N1-drowsy; N2-stage II sleep; N3-slow wave sleep. SSS/BETS=small sharp spikes/benign epileptiform transients of sleep. HV=hyperventilation; PS=photic stimulation]    FINDINGS:      Background:  Continuity: continuous  Symmetry: symmetric  PDR: 9Hz activity, with amplitude to 40 uV, that attenuated to eye opening.    Reactivity: present  Voltage: normal (between 20-150uV)  Anterior Posterior Gradient: present  Other background findings: none  Breach: absent    Background Slowing:  Generalized slowing: diffuse irregular delta and theta activity.  Focal slowing: right temporal maximal delta    State Changes:   -Drowsiness noted with increased slowing, attenuation of fast activity, vertex transients.  -Present with N2 sleep transients with symmetric spindles and K-complexes.    Sporadic Epileptiform Discharges:    None    Rhythmic and Periodic Patterns (RPPs):  None     Electrographic and Electroclinical seizures:  None    Other Clinical Events:  None    Activation Procedures:   -Hyperventilation was not performed.    -Photic stimulation was not performed.     Artifacts:  Intermittent myogenic and movement artifacts were noted.    ECG:  The heart rate on single channel ECG was predominantly between 60-70 BPM.    EEG Classification / Summary:  Abnormal EEG study  Right temporal slowing  Mild generalized background slowing    -----------------------------------------------------------------------------------------------------    Clinical Impression:  Right temporal focal cerebral dysfunction   Mild diffuse/multi-focal cerebral dysfunction, not specific as to etiology.  There were no epileptiform abnormalities recorded.      -------------------------------------------------------------------------------------------------------  [facility] EEG Reading Room Ph#: [phone]  Epilepsy Answering Service after 5PM and before 8:30AM: Ph#: [phone]    Lokesh Campo M.D.   of Neurology, [facility]	          Electronic Signatures:  Lokesh Campo)  (Signed 02-Jun-2023 10:32)  	Authored: TECH INFORMATION, EEG REPORT      Last Updated: 02-Jun-2023 10:32 by Hwa schoenberg

## 2023-06-03 NOTE — CONSULT NOTE ADULT - ATTENDING COMMENTS
This is a 52 year old woman with a history of NSCLC with adenocarcinoma s/p chemoRT  with carboplatin/pemetrexed9/2022, deferred Durvaluma maintenance therapy.  Patient now admitted for Right leg weakness, unsteady gait, and dizziness.  Patient found to have new supra and infratentorial masses consistent with metastatic disease. Brain MRI demonstrated numerous lesions with localized mass effect without midline shift or herniation.  Continue decadron 2mg Q 6 hours.  Keppra 500mg BID for seizure prophylaxis.  Patient to follow up with radiation oncology for hypofractionated radiation therapy.

## 2023-06-03 NOTE — PROGRESS NOTE ADULT - ASSESSMENT
51 yo F with Pmhx of NSCLC with adenocarcinoma with neuroendocrine feature s/p chemo and radiation presents to the ED with RLE weakness and dizziness. Patient reports that for the last 3 weeks she has been feeling weak in her RLE. Whenever she walks or jog, she feels as if her leg is giving out. He also reports to have diffuse headache that is intermittent and pressure like. No associated loss of vision, photophobia, fever, neck pain/stiffness, blurry vision, seizure, nausea, vomiting, loss of sensation or unstable gait. No recent fall or trauma. She finished her chemotherapy in 9/2022 and afterwards was offered treatment with maintenance Durvalumab but pt deferred.   In the ED, her vitals were WNL. Labs were WNL as well. CT head showed new brain mets. Neurology and neurosurgery were consulted.  (31 May 2023 19:06)    continue keppra

## 2023-06-03 NOTE — PROGRESS NOTE ADULT - ASSESSMENT
53 yo F with Pmhx of NSCLC with adenocarcinoma with neuroendocrine feature s/p chemo and radiation presents to the ED with RLE weakness and dizziness, found to have brain mass.     Pending Finance / SW to resolve insurance issue as patient has a lot of follow-up appointments upon discharge.

## 2023-06-03 NOTE — PROGRESS NOTE ADULT - SUBJECTIVE AND OBJECTIVE BOX
[facility] Division of Hospital Medicine  Dora Amin MD  Hospitalist   Pager [phone]  Avaliable via MS teams     SUBJECTIVE / OVERNIGHT EVENTS: Patient seen and examined. Pt states she feels better, walked without any imbalance today. Pt medically stable for dc as per neurosurgery, however patient pending Finance for insurance issue.     ADDITIONAL REVIEW OF SYSTEMS:    MEDICATIONS  (STANDING):  chlorhexidine 2% Cloths 1 Application(s) Topical daily  dexAMETHasone     Tablet 2 milliGRAM(s) Oral every 6 hours  levETIRAcetam 500 milliGRAM(s) Oral two times a day    MEDICATIONS  (PRN):  acetaminophen     Tablet .. 650 milliGRAM(s) Oral every 6 hours PRN Temp greater or equal to 38C (100.4F), Mild Pain (1 - 3)  aluminum hydroxide/magnesium hydroxide/simethicone Suspension 30 milliLiter(s) Oral every 4 hours PRN Dyspepsia  lacosamide Injectable 200 milliGRAM(s) IV Push once PRN GTC > 3 min that does not respond to Ativan  LORazepam   Injectable 1 milliGRAM(s) IV Push once PRN GTC > 3 min  melatonin 3 milliGRAM(s) Oral at bedtime PRN Insomnia  ondansetron Injectable 4 milliGRAM(s) IV Push every 8 hours PRN Nausea and/or Vomiting      I&O's Summary      PHYSICAL EXAM:  Vital Signs Last 24 Hrs  T(C): 37.1 (03 Jun 2023 13:35), Max: 37.1 (03 Jun 2023 13:35)  T(F): 98.8 (03 Jun 2023 13:35), Max: 98.8 (03 Jun 2023 13:35)  HR: 66 (03 Jun 2023 13:35) (62 - 68)  BP: 109/73 (03 Jun 2023 13:35) (109/73 - 120/65)  BP(mean): --  RR: 16 (03 Jun 2023 06:20) (16 - 17)  SpO2: 97% (03 Jun 2023 13:35) (97% - 99%)    Parameters below as of 03 Jun 2023 13:35  Patient On (Oxygen Delivery Method): room air        CONSTITUTIONAL: NAD, well-developed, well-groomed  EYES: PERRLA; conjunctiva and sclera clear  ENMT: Moist oral mucosa, no pharyngeal injection or exudates; normal dentition  NECK: Supple, no palpable masses; no thyromegaly  RESPIRATORY: Normal respiratory effort; lungs are clear to auscultation bilaterally  CARDIOVASCULAR: Regular rate and rhythm, normal S1 and S2, no murmur/rub/gallop; No lower extremity edema; Peripheral pulses are 2+ bilaterally  ABDOMEN: Nontender to palpation, normoactive bowel sounds, no rebound/guarding; No hepatosplenomegaly  MUSCULOSKELETAL:  Normal gait; no clubbing or cyanosis of digits; no joint swelling or tenderness to palpation  PSYCH: A+O to person, place, and time; affect appropriate  NEUROLOGY: CN 2-12 are intact and symmetric; no gross sensory deficits   SKIN: No rashes; no palpable lesions      LABS:                          13.3   7.16  )-----------( 169      ( 03 Jun 2023 06:18 )             39.2     06-03    132<L>  |  109<H>  |  15  ----------------------------<  115<H>  4.1   |  20<L>  |  0.74    Ca    9.6      03 Jun 2023 06:18  Phos  3.8     06-03  Mg     1.90     06-03    TPro  7.2  /  Alb  4.1  /  TBili  0.2  /  DBili  x   /  AST  17  /  ALT  13  /  AlkPhos  59  06-03          MR head: : Abnormal lesions involving the posterior fossa and   supratentorial region are identified which are consistent with patient's   known metastasis.

## 2023-06-04 NOTE — DISCHARGE NOTE PROVIDER - NSDCCPCAREPLAN_GEN_ALL_CORE_FT
PRINCIPAL DISCHARGE DIAGNOSIS  Diagnosis: Dizziness  Assessment and Plan of Treatment: You were admitted to the hospital due to dizziness and unsteadiness on your R leg. You had a Ct scan done which showed brain masses. You were seen by neurosurgery, neurology and oncology team. You were started on steroid which helped your symptoms. You will continue taking steroids and [facility].      SECONDARY DISCHARGE DIAGNOSES  Diagnosis: Brain mass  Assessment and Plan of Treatment: You will continue taking steroids and [facility]. Including Neurosurgery, Radonc , neuroonc and medonc.     PRINCIPAL DISCHARGE DIAGNOSIS  Diagnosis: Dizziness  Assessment and Plan of Treatment: You were admitted to the hospital due to dizziness and unsteadiness on your R leg. You had a Ct scan done which showed brain masses. You were seen by neurosurgery, neurology and oncology team. You were started on steroid which helped your symptoms. You will continue taking steroids and will have to follow up with juliano fitzgerald for further treament. Please continue on Dexa 2 mg q8 with protonix and follow up with Oncology Dr Beckman and Rt- Onc Dr Bernard      SECONDARY DISCHARGE DIAGNOSES  Diagnosis: Brain mass  Assessment and Plan of Treatment: You will continue taking steroids and will have to follow up with juliano fitzgerald for further treament. Including Neurosurgery, Radonc , neuroonc and medonc.  Please keep appointment with Dr Beckman 7/21/23 at 1:40 Pm.  Dr Beckman office will call you for a sooner appointment. otherwise please keep 7/21 appointment  dr Bernard will call you for Radiation appointment

## 2023-06-04 NOTE — PROGRESS NOTE ADULT - CONVERSATION DETAILS
Goals of Care discussed at length with the patient.  This conversation included current condition, prognosis, and options for therapy. Discussed patient's desires for further care and patient's wishes were expressed.  Advanced directives discussed with and explained to pt.  Code status was discussed with patient; explained risks of CPR and intubation; patient understood. Patient wants to pursue with available treatments and would like to be FULL CODE.      Length of Conversation ~35 min.

## 2023-06-04 NOTE — DISCHARGE NOTE PROVIDER - PROVIDER TOKENS
PROVIDER:[TOKEN:[[id number]],ESTABLISHEDPATIENT:[T]],FREE:[LAST:[[facility]],PHONE:[phone]],FAX:[(   )    -]],PROVIDER:[TOKEN:[[id number]]] PROVIDER:[TOKEN:[[id number]],SCHEDULEDAPPT:[07/21/2023],SCHEDULEDAPPTTIME:[01:40 PM],ESTABLISHEDPATIENT:[T]],PROVIDER:[TOKEN:[[id number]]],FREE:[LAST:[[facility]],PHONE:[phone]],FAX:[(   )    -]]

## 2023-06-04 NOTE — PROGRESS NOTE ADULT - ASSESSMENT
51 yo F with Pmhx of NSCLC with adenocarcinoma with neuroendocrine feature s/p chemo and radiation presents to the ED with RLE weakness and dizziness, found to have brain mass.     Pending Finance / SW to resolve insurance issue as patient has a lot of follow-up appointments upon discharge.

## 2023-06-04 NOTE — DISCHARGE NOTE PROVIDER - DETAILS OF MALNUTRITION DIAGNOSIS/DIAGNOSES
This patient has been assessed with a concern for Malnutrition and was treated during this hospitalization for the following Nutrition diagnosis/diagnoses:     -  06/06/2023: Severe protein-calorie malnutrition

## 2023-06-04 NOTE — DISCHARGE NOTE PROVIDER - HOSPITAL COURSE
51 yo F with Pmhx of NSCLC with adenocarcinoma with neuroendocrine feature s/p chemo and radiation presents to the ED with RLE weakness and dizziness, found to have brain mass.      Problem/Plan - 1:  ·  Problem: Brain mass.   ·  Plan: CT head showed multiple brain masses c/w brain mets in the setting of hx of lung cancer   -Neurology and neurosurgery following   -MR brain with similar findings of CT   -F/u with neurosurgery recs - started on decadron 2mg q6H and keppra 500mg BID for seizure ppx   -Neuro checks q4hrs   -Seizure precautions   -Fall precautions  -Onc consulted  -rEEG for 2h (at risk for seizure due to brain mets).     Problem/Plan - 2:  ·  Problem: Lung cancer.   ·  Plan: F/u with Heme/onc in the AM for further eval   - was offered treatment with maintenance Durvalumab but pt deferred  -Now interested in pursuing further treatment.     Problem/Plan - 3:  ·  Problem: Preventive measure.   ·  Plan: DVT-SCD.

## 2023-06-04 NOTE — DISCHARGE NOTE PROVIDER - CARE PROVIDER_API CALL
Margarito Beckman  Medical Oncology  [address] [place] [address]  Phone: [phone]  Fax: [phone]  Established Patient  Follow Up Time:     [facility],   Phone: [phone]  Fax: (   )    -  Follow Up Time:     Zurdo Bernard  Radiation Oncology  [address] [place] [id number]  Phone: [phone]  Fax: [phone]  Follow Up Time:    Margarito Beckman  Medical Oncology  [address] [place] [id number]  Phone: [phone]  Fax: [phone]  Established Patient  Scheduled Appointment: 07/21/2023 01:40 PM    Zurdo Bernard  Radiation Oncology  [address] [place] [address]  Phone: [phone]  Fax: [phone]  Follow Up Time:     [facility],   Phone: [phone]  Fax: (   )    -  Follow Up Time:

## 2023-06-04 NOTE — DISCHARGE NOTE PROVIDER - NSDCMRMEDTOKEN_GEN_ALL_CORE_FT
dexAMETHasone 2 mg oral tablet: 1 tab(s) orally every 8 hours  levETIRAcetam 500 mg oral tablet: 1 tab(s) orally 2 times a day  pantoprazole 40 mg oral delayed release tablet: 1 tab(s) orally once a day (before a meal)

## 2023-06-04 NOTE — DISCHARGE NOTE PROVIDER - NSDCFUADDAPPT_GEN_ALL_CORE_FT
Patient was advised of follow up requests and asked for scheduling assistance. Will await a call back from [facility] to confirm appointment details.    Please follow up with your primary care provider in 1-2 weeks following discharge from the hospital for continued monitoring and management. You have an appointment with Dr. Margarito Beckman on 07/21/23. Please call the office to attempt to arrange for an earlier appointment date.                     Patient was advised of follow up requests and asked for scheduling assistance. Will await a call back from [facility] to confirm appointment details.    Please follow up with your primary care provider in 1-2 weeks following discharge from the hospital for continued monitoring and management. You have an appointment with Dr. Margarito Beckman on 07/21/23. Please call the office to attempt to arrange for an earlier appointment date.                     Patient was advised of follow up requests and asked for scheduling assistance. Will await a call back from [facility] to confirm appointment details.     Patient was scheduled with Dr. Margarito Beckman 07/21/23 at 1:40pm at [address] [place] [address] [phone]. Patient advised of appointment details.       Please follow up with your primary care provider in 1-2 weeks following discharge from the hospital for continued monitoring and management. You have an appointment with Dr. Margarito Beckman on 07/21/23. Please call the office to attempt to arrange for an earlier appointment date.       Patient was advised of follow up requests and asked for scheduling assistance. Will await a call back from [facility] to confirm appointment details.     Patient was scheduled with Dr. Margarito Beckman 07/21/23 at 1:40pm at [address] [place] [address] [phone]. Patient advised of appointment details.

## 2023-06-04 NOTE — PROGRESS NOTE ADULT - SUBJECTIVE AND OBJECTIVE BOX
PI:  53 yo F with Pmhx of NSCLC with adenocarcinoma with neuroendocrine feature s/p chemo and radiation presents to the ED with RLE weakness and dizziness. Patient reports that for the last 3 weeks she has been feeling weak in her RLE. Whenever she walks or jog, she feels as if her leg is giving out. He also reports to have diffuse headache that is intermittent and pressure like. No associated loss of vision, photophobia, fever, neck pain/stiffness, blurry vision, seizure, nausea, vomiting, loss of sensation or unstable gait. No recent fall or trauma. She finished her chemotherapy in 9/2022 and afterwards was offered treatment with maintenance Durvalumab but pt deferred.   In the ED, her vitals were WNL. Labs were WNL as well. CT head showed new brain mets. Neurology and neurosurgery were consulted.  (31 May 2023 19:06)      Past Medical History  GERD (Gastroesophageal Reflux Disease)    Hydrosalpinx    GERD (Gastroesophageal Reflux Disease)    Ulcer    Lung mass    Non-small cell lung cancer with metastasis        Past Surgical History  No significant past surgical history    S/P endoscopy        MEDICATIONS    acetaminophen     Tablet .. 650 milliGRAM(s) Oral every 6 hours PRN  aluminum hydroxide/magnesium hydroxide/simethicone Suspension 30 milliLiter(s) Oral every 4 hours PRN  chlorhexidine 2% Cloths 1 Application(s) Topical daily  dexAMETHasone     Tablet 2 milliGRAM(s) Oral every 6 hours  lacosamide Injectable 200 milliGRAM(s) IV Push once PRN  levETIRAcetam 500 milliGRAM(s) Oral two times a day  LORazepam   Injectable 1 milliGRAM(s) IV Push once PRN  melatonin 3 milliGRAM(s) Oral at bedtime PRN  ondansetron Injectable 4 milliGRAM(s) IV Push every 8 hours PRN         Family history: No history of dementia, strokes, or seizures   FAMILY HISTORY:  No pertinent family history in first degree relatives      SOCIAL HISTORY -- No history of tobacco or alcohol use     Allergies    No Known Allergies    Intolerances            Vital Signs Last 24 Hrs  T(C): 36.9 (03 Jun 2023 06:20), Max: 36.9 (02 Jun 2023 21:47)  T(F): 98.4 (03 Jun 2023 06:20), Max: 98.4 (02 Jun 2023 21:47)  HR: 68 (03 Jun 2023 06:20) (62 - 84)  BP: 114/72 (03 Jun 2023 06:20) (111/82 - 120/65)  BP(mean): --  RR: 16 (03 Jun 2023 06:20) (16 - 17)  SpO2: 99% (03 Jun 2023 06:20) (98% - 100%)    Parameters below as of 03 Jun 2023 06:20  Patient On (Oxygen Delivery Method): room air            On Neurological Examination:    Mental Status - Patient is alert, awake, oriented X3. .   Follows commands well and able to answer questions appropriately. Mood and affect  normal  Follow simple commands able to repeat  able to name.  Speech - Fluent no Dysarthria  no  Aphasia                              Cranial Nerves - Extraocular muscle intact  JULIETTE Facial symmetry Tongue midline, CnV1to V3 intact gross hearing intact      Motor Exam -   Right upper  5/5 throughout  Left upper 5/5 throughtout  Right lower- 5/5 throughout  Left lower 5/5 throughout  Coordination -finger to nose intact  Muscle tone - is normal all over. No asymmetry is seen.      Sensory    Bilateral intact to light touch    Gait -  normal  no ataxia     GENERAL Exam:     Nontoxic , No Acute Distress   	  HEENT:  normocephalic, atraumatic  		  LUNGS:	Clear bilaterally  No Wheeze      VASCULAR: no carotid brui  	  HEART:	 Normal S1S2   No murmur RRR        	  MUSCULOSKELETAL: Normal Range of Motion  	   SKIN:      Normal   No Ecchymosis               LABS:  CBC Full  -  ( 03 Jun 2023 06:18 )  WBC Count : 7.16 K/uL  RBC Count : 4.46 M/uL  Hemoglobin : 13.3 g/dL  Hematocrit : 39.2 %  Platelet Count - Automated : 169 K/uL  Mean Cell Volume : 87.9 fL  Mean Cell Hemoglobin : 29.8 pg  Mean Cell Hemoglobin Concentration : 33.9 gm/dL  Auto Neutrophil # : 5.84 K/uL  Auto Lymphocyte # : 0.81 K/uL  Auto Monocyte # : 0.47 K/uL  Auto Eosinophil # : 0.00 K/uL  Auto Basophil # : 0.01 K/uL  Auto Neutrophil % : 81.6 %  Auto Lymphocyte % : 11.3 %  Auto Monocyte % : 6.6 %  Auto Eosinophil % : 0.0 %  Auto Basophil % : 0.1 %      06-03    132<L>  |  109<H>  |  15  ----------------------------<  115<H>  4.1   |  20<L>  |  0.74    Ca    9.6      03 Jun 2023 06:18  Phos  3.8     06-03  Mg     1.90     06-03    TPro  7.2  /  Alb  4.1  /  TBili  0.2  /  DBili  x   /  AST  17  /  ALT  13  /  AlkPhos  59  06-03    Hemoglobin A1C:     LIVER FUNCTIONS - ( 03 Jun 2023 06:18 )  Alb: 4.1 g/dL / Pro: 7.2 g/dL / ALK PHOS: 59 U/L / ALT: 13 U/L / AST: 17 U/L / GGT: x           Vitamin B12         RADIOLOGY    EEGThis is a Routine EEG.     Placement and Labeling of Electrodes: The EEG was performed utilizing at least 20 channel referential EEG connections (coronal over temporal over parasagittal montage) with inferior temporal electrodes when indicting and using all standard 10-20 electrode placements with EKG, with additional electrodes placed in the inferior temporal region using the modified 10-10 montage electrode placements for elective admissions, or if deemed necessary.  Recording was at  a sampling rate of 256 samples per second per channel. Time synchronized digital video recording was done simultaneously with EEG recording.  A low light infrared camera was used for low light recording..    EEG REPORT:    EEG Report:  · EEG Report	  HARJEET WHITMORE [id number]     Study Date: 		06-1-23      --------------------------------------------------------------------------------------------------  History:  CC/ HPI Patient is a 52y old  Female who presents with a chief complaint of RLE weakness (01 Jun 2023 12:33)    MEDICATIONS  (STANDING):  chlorhexidine 2% Cloths 1 Application(s) Topical daily  dexAMETHasone     Tablet 2 milliGRAM(s) Oral every 6 hours  levETIRAcetam 500 milliGRAM(s) Oral two times a day    --------------------------------------------------------------------------------------------------  Study Interpretation:    [Abbreviation Key:  PDR=alpha rhythm/posterior dominant rhythm. A-P=anterior posterior.  Amplitude: ‘very low’:<20; ‘low’:20-49; ‘medium’:; ‘high’:>150uV.  Persistence for periodic/rhythmic patterns (% of epoch) ‘rare’:<1%; ‘occasional’:1-10%; ‘frequent’:10-50%; ‘abundant’:50-90%; ‘continuous’:>90%.  Persistence for sporadic discharges: ‘rare’:<1/hr; ‘occasional’:1/min-1/hr; ‘frequent’:>1/min; ‘abundant’:>1/10 sec.  RPP=rhythmic and periodic patterns; GRDA=generalized rhythmic delta activity; FIRDA=frontal intermittent GRDA; LRDA=lateralized rhythmic delta activity; TIRDA=temporal intermittent rhythmic delta activity;  LPD=PLED=lateralized periodic discharges; GPD=generalized periodic discharges; BIPDs =bilateral independent periodic discharges; Mf=multifocal; SIRPDs=stimulus induced rhythmic, periodic, or ictal appearing discharges; BIRDs=brief potentially ictal rhythmic discharges >4 Hz, lasting .5-10s; PFA (paroxysmal bursts >13 Hz or =8 Hz <10s).  Modifiers: +F=with fast component; +S=with spike component; +R=with rhythmic component.  S-B=burst suppression pattern.  Max=maximal. N1-drowsy; N2-stage II sleep; N3-slow wave sleep. SSS/BETS=small sharp spikes/benign epileptiform transients of sleep. HV=hyperventilation; PS=photic stimulation]    FINDINGS:      Background:  Continuity: continuous  Symmetry: symmetric  PDR: 9Hz activity, with amplitude to 40 uV, that attenuated to eye opening.    Reactivity: present  Voltage: normal (between 20-150uV)  Anterior Posterior Gradient: present  Other background findings: none  Breach: absent    Background Slowing:  Generalized slowing: diffuse irregular delta and theta activity.  Focal slowing: right temporal maximal delta    State Changes:   -Drowsiness noted with increased slowing, attenuation of fast activity, vertex transients.  -Present with N2 sleep transients with symmetric spindles and K-complexes.    Sporadic Epileptiform Discharges:    None    Rhythmic and Periodic Patterns (RPPs):  None     Electrographic and Electroclinical seizures:  None    Other Clinical Events:  None    Activation Procedures:   -Hyperventilation was not performed.    -Photic stimulation was not performed.     Artifacts:  Intermittent myogenic and movement artifacts were noted.    ECG:  The heart rate on single channel ECG was predominantly between 60-70 BPM.    EEG Classification / Summary:  Abnormal EEG study  Right temporal slowing  Mild generalized background slowing    -----------------------------------------------------------------------------------------------------    Clinical Impression:  Right temporal focal cerebral dysfunction   Mild diffuse/multi-focal cerebral dysfunction, not specific as to etiology.  There were no epileptiform abnormalities recorded.      -------------------------------------------------------------------------------------------------------  [facility], [place] EEG Reading Room Ph#: [phone]  Epilepsy Answering Service after 5PM and before 8:30AM: Ph#: [phone]    Lokesh Campo M.D.   of Neurology, [facility]	          Electronic Signatures:  Lokesh Campo)  (Signed 02-Jun-2023 10:32)  	Authored: TECH INFORMATION, EEG REPORT      Last Updated: 02-Jun-2023 10:32 by Hwa schoenberg       Assessment and Plan:   · Assessment	  53 yo F with Pmhx of NSCLC with adenocarcinoma with neuroendocrine feature s/p chemo and radiation presents to the ED with RLE weakness and dizziness. Patient reports that for the last 3 weeks she has been feeling weak in her RLE. Whenever she walks or jog, she feels as if her leg is giving out. He also reports to have diffuse headache that is intermittent and pressure like. No associated loss of vision, photophobia, fever, neck pain/stiffness, blurry vision, seizure, nausea, vomiting, loss of sensation or unstable gait. No recent fall or trauma. She finished her chemotherapy in 9/2022 and afterwards was offered treatment with maintenance Durvalumab but pt deferred.   In the ED, her vitals were WNL. Labs were WNL as well. CT head showed new brain mets. Neurology and neurosurgery were consulted.  (31 May 2023 19:06)    gonsalo [email or web address]        Electronic Signatures:  Schoenberg, Laura Gray (MD)

## 2023-06-04 NOTE — PROGRESS NOTE ADULT - SUBJECTIVE AND OBJECTIVE BOX
[facility] Division of Hospital Medicine  Dora Amin MD  Hospitalist   Pager [phone]  Avaliable via MS teams     SUBJECTIVE / OVERNIGHT EVENTS: Patient seen and examined. Pt states she feels better, walked without any imbalance today. Pt medically stable for dc as per neurosurgery, however patient pending Finance for insurance issue.     ADDITIONAL REVIEW OF SYSTEMS:    MEDICATIONS  (STANDING):  chlorhexidine 2% Cloths 1 Application(s) Topical daily  dexAMETHasone     Tablet 2 milliGRAM(s) Oral every 6 hours  levETIRAcetam 500 milliGRAM(s) Oral two times a day    MEDICATIONS  (PRN):  acetaminophen     Tablet .. 650 milliGRAM(s) Oral every 6 hours PRN Temp greater or equal to 38C (100.4F), Mild Pain (1 - 3)  aluminum hydroxide/magnesium hydroxide/simethicone Suspension 30 milliLiter(s) Oral every 4 hours PRN Dyspepsia  lacosamide Injectable 200 milliGRAM(s) IV Push once PRN GTC > 3 min that does not respond to Ativan  LORazepam   Injectable 1 milliGRAM(s) IV Push once PRN GTC > 3 min  melatonin 3 milliGRAM(s) Oral at bedtime PRN Insomnia  ondansetron Injectable 4 milliGRAM(s) IV Push every 8 hours PRN Nausea and/or Vomiting      I&O's Summary      PHYSICAL EXAM:  Vital Signs Last 24 Hrs  T(C): 36.9 (04 Jun 2023 13:12), Max: 36.9 (04 Jun 2023 13:12)  T(F): 98.4 (04 Jun 2023 13:12), Max: 98.4 (04 Jun 2023 13:12)  HR: 79 (04 Jun 2023 13:12) (68 - 88)  BP: 115/79 (04 Jun 2023 13:12) (108/87 - 116/72)  BP(mean): --  RR: 18 (04 Jun 2023 05:30) (18 - 18)  SpO2: 100% (04 Jun 2023 13:12) (100% - 100%)    Parameters below as of 04 Jun 2023 13:12  Patient On (Oxygen Delivery Method): room air          CONSTITUTIONAL: NAD, well-developed, well-groomed  EYES: PERRLA; conjunctiva and sclera clear  ENMT: Moist oral mucosa, no pharyngeal injection or exudates; normal dentition  NECK: Supple, no palpable masses; no thyromegaly  RESPIRATORY: Normal respiratory effort; lungs are clear to auscultation bilaterally  CARDIOVASCULAR: Regular rate and rhythm, normal S1 and S2, no murmur/rub/gallop; No lower extremity edema; Peripheral pulses are 2+ bilaterally  ABDOMEN: Nontender to palpation, normoactive bowel sounds, no rebound/guarding; No hepatosplenomegaly  MUSCULOSKELETAL:  Normal gait; no clubbing or cyanosis of digits; no joint swelling or tenderness to palpation  PSYCH: A+O to person, place, and time; affect appropriate  NEUROLOGY: CN 2-12 are intact and symmetric; no gross sensory deficits   SKIN: No rashes; no palpable lesions        LABS:                          13.3   7.16  )-----------( 169      ( 03 Jun 2023 06:18 )             39.2     06-03    132<L>  |  109<H>  |  15  ----------------------------<  115<H>  4.1   |  20<L>  |  0.74    Ca    9.6      03 Jun 2023 06:18  Phos  3.8     06-03  Mg     1.90     06-03    TPro  7.2  /  Alb  4.1  /  TBili  0.2  /  DBili  x   /  AST  17  /  ALT  13  /  AlkPhos  59  06-03              MR head: : Abnormal lesions involving the posterior fossa and   supratentorial region are identified which are consistent with patient's   known metastasis.

## 2023-06-05 NOTE — PROGRESS NOTE ADULT - PROBLEM SELECTOR PLAN 4
Keppra  Decadron  Lung ca with brain  mets with plan for out patient f/u with Oncology Dr Beckman  Patient lack of insurance is a barrier to d/c  CM/SW to HELP

## 2023-06-05 NOTE — PROGRESS NOTE ADULT - SUBJECTIVE AND OBJECTIVE BOX
INTERVAL HPI/OVERNIGHT EVENTS:  Patient seen at bedside.  Patient with some improvement of lower extremity strength  No HA or dizziness  Complaining of R finger pain    VITAL SIGNS:  T(F): 97.7 (06-05-23 @ 12:55)  HR: 98 (06-05-23 @ 12:55)  BP: 150/84 (06-05-23 @ 12:55)  RR: 18 (06-05-23 @ 12:55)  SpO2: 100% (06-05-23 @ 12:55)  Wt(kg): --    PHYSICAL EXAM:  GENERAL: NAD, well-developed  HEAD:  Atraumatic, Normocephalic  EYES: EOMI, PERRLA, conjunctiva and sclera clear  NECK: Supple, No JVD  CHEST/LUNG: Clear to auscultation bilaterally; No wheeze  HEART: Regular rate and rhythm; No murmurs, rubs, or gallops  ABDOMEN: Soft, Nontender, Nondistended; Bowel sounds present  EXTREMITIES:  2+ Peripheral Pulses, No clubbing, cyanosis, or edema  PSYCH: AAOx3  NEUROLOGY: non-focal  SKIN: No rashes or lesions      MEDICATIONS  (STANDING):  chlorhexidine 2% Cloths 1 Application(s) Topical daily  dexAMETHasone     Tablet 2 milliGRAM(s) Oral every 6 hours  levETIRAcetam 500 milliGRAM(s) Oral two times a day    MEDICATIONS  (PRN):  acetaminophen     Tablet .. 650 milliGRAM(s) Oral every 6 hours PRN Temp greater or equal to 38C (100.4F), Mild Pain (1 - 3)  aluminum hydroxide/magnesium hydroxide/simethicone Suspension 30 milliLiter(s) Oral every 4 hours PRN Dyspepsia  lacosamide Injectable 200 milliGRAM(s) IV Push once PRN GTC > 3 min that does not respond to Ativan  LORazepam   Injectable 1 milliGRAM(s) IV Push once PRN GTC > 3 min  melatonin 3 milliGRAM(s) Oral at bedtime PRN Insomnia  ondansetron Injectable 4 milliGRAM(s) IV Push every 8 hours PRN Nausea and/or Vomiting      Allergies    No Known Allergies    Intolerances        LABS:                        14.2   5.91  )-----------( 164      ( 05 Jun 2023 05:30 )             41.9     06-05    135  |  102  |  17  ----------------------------<  118<H>  4.2   |  21<L>  |  0.71    Ca    9.7      05 Jun 2023 05:30  Phos  3.5     06-05  Mg     2.10     06-05            RADIOLOGY & ADDITIONAL TESTS:  Studies reviewed.

## 2023-06-05 NOTE — PROGRESS NOTE ADULT - PROBLEM SELECTOR PLAN 1
CT head showed multiple brain masses c/w brain mets in the setting of hx of lung cancer   -Neurology and neurosurgery following   -MR brain with similar findings of CT   -F/u with neurosurgery recs - started on decadron 2mg q6H and keppra 500mg BID for seizure ppx   -Neuro checks q4hrs   -Seizure precautions   -Fall precautions  -Onc consulted  -rEEG for 2h (at risk for seizure due to brain mets) CT head showed multiple brain masses c/w brain mets in the setting of hx of lung cancer   -Neurology and neurosurgery following   -MR brain with similar findings of CT   -F/u with neurosurgery recs - started on decadron 2mg q6H and keppra 500mg BID for seizure ppx   -Neuro checks q4hrs   -Seizure precautions   -Fall precautions  -f/u Onc consult   -rEEG for 2h (at risk for seizure due to brain mets)

## 2023-06-05 NOTE — PROGRESS NOTE ADULT - SUBJECTIVE AND OBJECTIVE BOX
Keppra  Decadron  Lung ca with matthewin mets Patient is a 52y old  Female who presents with a chief complaint of RLE weakness (05 Jun 2023 10:00)      SUBJECTIVE / OVERNIGHT EVENTS:  resting in bed.   notes she is calling her insurance to arrange for out patient f/u  Oncology is Dr Beckman at [facility]    MEDICATIONS  (STANDING):  chlorhexidine 2% Cloths 1 Application(s) Topical daily  dexAMETHasone     Tablet 2 milliGRAM(s) Oral every 6 hours  levETIRAcetam 500 milliGRAM(s) Oral two times a day    MEDICATIONS  (PRN):  acetaminophen     Tablet .. 650 milliGRAM(s) Oral every 6 hours PRN Temp greater or equal to 38C (100.4F), Mild Pain (1 - 3)  aluminum hydroxide/magnesium hydroxide/simethicone Suspension 30 milliLiter(s) Oral every 4 hours PRN Dyspepsia  lacosamide Injectable 200 milliGRAM(s) IV Push once PRN GTC > 3 min that does not respond to Ativan  LORazepam   Injectable 1 milliGRAM(s) IV Push once PRN GTC > 3 min  melatonin 3 milliGRAM(s) Oral at bedtime PRN Insomnia  ondansetron Injectable 4 milliGRAM(s) IV Push every 8 hours PRN Nausea and/or Vomiting    I&O's Summary    05 Jun 2023 07:01  -  05 Jun 2023 14:55  --------------------------------------------------------  IN: 300 mL / OUT: 0 mL / NET: 300 mL      Vital Signs Last 24 Hrs  T(F): 97.7 (05 Jun 2023 12:55), Max: 98.9 (04 Jun 2023 21:18)  HR: 98 (05 Jun 2023 12:55) (73 - 98)  BP: 150/84 (05 Jun 2023 12:55) (112/80 - 150/84)  RR: 18 (05 Jun 2023 12:55) (16 - 18)  SpO2: 100% (05 Jun 2023 12:55) (96% - 100%)    Parameters below as of 05 Jun 2023 12:55  Patient On (Oxygen Delivery Method): room air      PHYSICAL EXAM:  GENERAL: NAD, well-developed  HEAD:  Atraumatic, Normocephalic  EYES: EOMI, PERRLA, conjunctiva and sclera clear  NECK: Supple, No JVD  CHEST/LUNG: Clear to auscultation bilaterally; No wheeze  HEART: Regular rate and rhythm; No murmurs, rubs, or gallops  ABDOMEN: Soft, Nontender, Nondistended; Bowel sounds present  EXTREMITIES:  2+ Peripheral Pulses, No clubbing, cyanosis, or edema  PSYCH: AAOx3  NEUROLOGY: non-focal  SKIN: No rashes or lesions    LABS:                        14.2   5.91  )-----------( 164      ( 05 Jun 2023 05:30 )             41.9     06-05    135  |  102  |  17  ----------------------------<  118<H>  4.2   |  21<L>  |  0.71    Ca    9.7      05 Jun 2023 05:30  Phos  3.5     06-05  Mg     2.10     06-05      RADIOLOGY & ADDITIONAL TESTS:  rd< from: MR Head w/wo IV Cont (06.02.23 @ 14:05) >  IMPRESSION: Abnormal lesions involving the posterior fossa and   supratentorial region are identified which are consistent with patient's     < end of copied text >  < from: CT Lumbar Spine No Cont (06.01.23 @ 06:37) >  IMPRESSION: No acute fractures or dislocations seen  Sclerotic densities involving multiple vertebral bodies as described   above.  Large lung lesion as described above.    < end of copied text >  < from: CT Abdomen and Pelvis w/ IV Cont (06.01.23 @ 06:37) >  IMPRESSION:  *  Stable right upper lobe mass measuring up to 3.5 cm with adjacent   apical pleural thickening corresponding with known malignancy.  *  Stable mediastinal and hilar adenopathy..  *  No evidence of suspicious mass or adenopathy in the abdomen and pelvis.          Care Discussed with Consultants/Other Providers:  Keppra  Decadron  Lung ca with brain  mets with plan for out patient f/u with Oncology Dr Beckman

## 2023-06-05 NOTE — PROGRESS NOTE ADULT - ASSESSMENT
52F hx of NSCLC with adenocarcinoma with neuroendocrine feature s/p chemoRT 9/2022 (Rad onc Dr. Sorensen, Med onc Dr. Beckman)  but deferred Durvaluma after completion of chemoRT , now admitted for  RLE weakness, unsteady gait and dizziness.  CT head showed  Multiple new supra and infratentorial low-attenuation masses, compatible with intracranial metastatic disease. MRI brain showing numerous lesions, largest of these lesions are seen involving the right temporal region measures approximately 4.2 x 3.1 x 2.8 cm. There is localized mass effect seen without significant shift or herniation. Surrounding edema is associated with most of these lesions. Hematology consulted for NSCLC    NSCLC  - f/w Dr. Beckman, NSCLC with adenocarcinoma with neuroendocrine features histology with clinically stage IIIB (T4N2) disease with bulky intrathoracic lymphadenopathy that is unresectable, PD-L1 negative and is negative for actionable mutations (TP53, STK11 positive, among others). s/p Carboplatin/Pemetrexed in mid-July 2022 x 4 cycles completed Sept 2022 and concurrent Thoracic RT started early Sept through late October 2022 did not want maintenance durvulumab treatment, now w/ brain metastatic dz  - neurology and NSG following, on decadron 2mg q6h, plan for decadron 6mg once daily on discharge, keppra ppx 500 bid until after RT  -Steroid taper as per Rad Onc  - rad onc following, plan for outpatient mask based hypofractionated SRS  - Outpatient follow up with Dr. Metcalf (neurosurgery), Dr. Figueroa(neuro-oncology) and Dr. Bernard (radiation oncology) after discharge.  - outpatient follow up w/ Dr. Beckman (medical oncology), please include hematology oncology f/u at [facility] in discharge note      R hand pain  Consider R hand xray  -No plans for inpatient chemotherapy  -C/w Supportive care, pain control, Nutrition, PT, DVT ppx  -Rest of care as per primary team  -Oncology will continue to follow with you    Case d/w oncology attending      Prince BURTON  Oncology Physician Assistant  [facility]amberto PATINO/[facility]    Pager [phone] also available on TEAMS as Prince BURTON    If before 8am/after 5pm or on weekends please page On-call Oncology Fellow

## 2023-06-06 NOTE — PROGRESS NOTE ADULT - SUBJECTIVE AND OBJECTIVE BOX
Patient is a 52y old  Female who presents with a chief complaint of RLE weakness (06 Jun 2023 09:11)      SUBJECTIVE / OVERNIGHT EVENTS:  c/o r 3rd finger pain--> getting xray today    MEDICATIONS  (STANDING):  chlorhexidine 2% Cloths 1 Application(s) Topical daily  dexAMETHasone     Tablet 2 milliGRAM(s) Oral every 6 hours  levETIRAcetam 500 milliGRAM(s) Oral two times a day    MEDICATIONS  (PRN):  acetaminophen     Tablet .. 650 milliGRAM(s) Oral every 6 hours PRN Temp greater or equal to 38C (100.4F), Mild Pain (1 - 3)  aluminum hydroxide/magnesium hydroxide/simethicone Suspension 30 milliLiter(s) Oral every 4 hours PRN Dyspepsia  lacosamide Injectable 200 milliGRAM(s) IV Push once PRN GTC > 3 min that does not respond to Ativan  LORazepam   Injectable 1 milliGRAM(s) IV Push once PRN GTC > 3 min  melatonin 3 milliGRAM(s) Oral at bedtime PRN Insomnia  ondansetron Injectable 4 milliGRAM(s) IV Push every 8 hours PRN Nausea and/or Vomiting        I&O's Summary    05 Jun 2023 07:01  -  06 Jun 2023 07:00  --------------------------------------------------------  IN: 900 mL / OUT: 0 mL / NET: 900 mL      Vital Signs Last 24 Hrs  T(F): 98.2 (06 Jun 2023 05:27), Max: 98.2 (06 Jun 2023 05:27)  HR: 83 (06 Jun 2023 05:27) (67 - 98)  BP: 107/83 (06 Jun 2023 05:27) (107/83 - 150/84)  RR: 17 (06 Jun 2023 05:27) (17 - 18)  SpO2: 100%  room air      PHYSICAL EXAM:  GENERAL: NAD, well-developed  HEAD:  Atraumatic, Normocephalic  EYES: EOMI, PERRLA, conjunctiva and sclera clear  NECK: Supple, No JVD  CHEST/LUNG: Clear to auscultation bilaterally; No wheeze  HEART: Regular rate and rhythm; No murmurs, rubs, or gallops  ABDOMEN: Soft, Nontender, Nondistended; Bowel sounds present  EXTREMITIES:  2+ Peripheral Pulses, No clubbing, cyanosis, or edema  PSYCH: AAOx3  NEUROLOGY: non-focal    LABS:                        14.0   6.81  )-----------( 164      ( 06 Jun 2023 05:31 )             41.3     06-06    135  |  100  |  20  ----------------------------<  130<H>  4.5   |  21<L>  |  0.73    Ca    9.7      06 Jun 2023 05:31  Phos  3.3     06-06  Mg     2.00     06-06        Care Discussed with Consultants/Other Providers:  C/OTF working on insurance for out patient f/u

## 2023-06-06 NOTE — PROGRESS NOTE ADULT - ASSESSMENT
51 yo F with Pmhx of NSCLC with adenocarcinoma with neuroendocrine feature s/p chemo and radiation presents to the ED with RLE weakness and dizziness, found to have brain mass.     Pending Finance / SW to resolve insurance issue as patient has a lot of follow-up appointments upon discharge.  53 yo F with Pmhx of NSCLC with adenocarcinoma with neuroendocrine feature s/p chemo and radiation presents to the ED with RLE weakness and dizziness, found to have brain mass.     Pending Finance / SW to resolve insurance issue as patient has a lot of follow-up appointments upon discharge.     r 3rd finger pain--> f/u xray 51 yo F with Pmhx of NSCLC with adenocarcinoma with neuroendocrine feature s/p chemo and radiation presents to the ED with RLE weakness and dizziness, found to have brain mass.   6/1/23 EEG Clinical Impression:  Right temporal focal cerebral dysfunction   Mild diffuse/multi-focal cerebral dysfunction, not specific as to etiology.  There were no epileptiform abnormalities recorded.      Pending Finance / SW to resolve insurance issue as patient has a lot of follow-up appointments upon discharge.     6/6/23 r 3rd finger pain--> Xray--> no Fracture

## 2023-06-06 NOTE — DIETITIAN INITIAL EVALUATION ADULT - ADD RECOMMEND
1. Monitor weights, labs, BM's, skin integrity, p.o. intake.   2. Please document % PO intake in nursing flowsheet.    3. Honor food and beverage preferences within diet restriction of patient in an effort to maximize level of nutrient intake.

## 2023-06-06 NOTE — DIETITIAN INITIAL EVALUATION ADULT - NS FNS DIET ORDER
Diet, Regular:   Supplement Feeding Modality:  Oral  Ensure Plus High Protein Cans or Servings Per Day:  1       Frequency:  Two Times a day (06-04-23 @ 15:28)

## 2023-06-06 NOTE — DIETITIAN INITIAL EVALUATION ADULT - PERTINENT MEDS FT
MEDICATIONS  (STANDING):  chlorhexidine 2% Cloths 1 Application(s) Topical daily  dexAMETHasone     Tablet 2 milliGRAM(s) Oral every 6 hours  levETIRAcetam 500 milliGRAM(s) Oral two times a day    MEDICATIONS  (PRN):  acetaminophen     Tablet .. 650 milliGRAM(s) Oral every 6 hours PRN Temp greater or equal to 38C (100.4F), Mild Pain (1 - 3)  aluminum hydroxide/magnesium hydroxide/simethicone Suspension 30 milliLiter(s) Oral every 4 hours PRN Dyspepsia  lacosamide Injectable 200 milliGRAM(s) IV Push once PRN GTC > 3 min that does not respond to Ativan  LORazepam   Injectable 1 milliGRAM(s) IV Push once PRN GTC > 3 min  melatonin 3 milliGRAM(s) Oral at bedtime PRN Insomnia  ondansetron Injectable 4 milliGRAM(s) IV Push every 8 hours PRN Nausea and/or Vomiting

## 2023-06-06 NOTE — GOALS OF CARE CONVERSATION - ADVANCED CARE PLANNING - CONVERSATION DETAILS
Patient wants James Meza [phone] to be HCP---> Form completed.  Patient wants t be FULL CODE 53 yo F with Pmhx of NSCLC with adenocarcinoma with neuroendocrine feature s/p chemo and radiation presents to the ED with RLE weakness and dizziness, found to have brain mass.     Pending Finance / SW to resolve insurance issue as patient has a lot of follow-up appointments upon discharge.     r 3rd finger pain--> f/u xray     Problem/Plan - 1:  ·  Problem: Brain mass.   ·  Plan: CT head showed multiple brain masses c/w brain mets in the setting of hx of lung cancer   -Neurology and neurosurgery following   -MR brain with similar findings of CT   -F/u with neurosurgery recs - started on decadron 2mg q6H and keppra 500mg BID for seizure ppx   -Neuro checks q4hrs   -Seizure precautions   -Fall precautions      Patient wants James Meza [phone] to be HCP---> Form completed.  Patient wants to be FULL CODE 51 yo F with Pmhx of NSCLC with adenocarcinoma with neuroendocrine feature s/p chemo and radiation presents to the ED with RLE weakness and dizziness, found to have brain mass.     Pending Finance / SW to resolve insurance issue as patient has a lot of follow-up appointments upon discharge.     r 3rd finger pain--> f/u xray     Problem/Plan - 1:  ·  Problem: Brain mass.   ·  Plan: CT head showed multiple brain masses c/w brain mets in the setting of hx of lung cancer   -Neurology and neurosurgery following   -MR brain with similar findings of CT   -F/u with neurosurgery recs - started on decadron 2mg q6H and keppra 500mg BID for seizure ppx   -Neuro checks q4hrs   -Seizure precautions   -Fall precautions  6/6/23 Did GOC with patient  Patient Wants Aba villarreal at [phone] to be HCP--> FORM completed  Patient Wants FULL CODE  Told by OTF her ins of medicaid will be effective in 1 week--> SW/ Np to help get Keppra and Steroids and PPI   Will work on appointment for [facility] with Dr Gonzalez and [facility] [phone]      Patient wants James Meza [phone] to be HCP---> Form completed.  Patient wants to be FULL CODE

## 2023-06-06 NOTE — DIETITIAN INITIAL EVALUATION ADULT - OTHER INFO
Patient reports fair appetite and PO intake. Does not like all food being received. However, she has not been selecting items from menu at bedside. Menu selection encouraged to optimize po intake. Patient denies any nausea/vomiting/diarrhea/constipation or difficulty chewing and swallowing. NKFA. Continue good po intake of nutrient and protein dense food (non-animal protein sources provided) encouraged. Strategies to increase kcal and protein intake discussed. PO supplement between meals as added calories and protein encouraged.

## 2023-06-06 NOTE — DIETITIAN NUTRITION RISK NOTIFICATION - TREATMENT: THE FOLLOWING DIET HAS BEEN RECOMMENDED
Diet, Regular:   Supplement Feeding Modality:  Oral  Ensure Plus High Protein Cans or Servings Per Day:  1       Frequency:  Two Times a day (06-04-23 @ 15:28) [Active]

## 2023-06-06 NOTE — PROGRESS NOTE ADULT - PROBLEM SELECTOR PLAN 1
CT head showed multiple brain masses c/w brain mets in the setting of hx of lung cancer   -Neurology and neurosurgery following   -MR brain with similar findings of CT   -F/u with neurosurgery recs - started on decadron 2mg q6H and keppra 500mg BID for seizure ppx   -Neuro checks q4hrs   -Seizure precautions   -Fall precautions  -f/u Onc consult   -rEEG for 2h (at risk for seizure due to brain mets) CT head showed multiple brain masses c/w brain mets in the setting of hx of lung cancer   -Neurology and neurosurgery following   -MR brain with similar findings of CT   -F/u with neurosurgery recs - started on decadron 2mg q6H and keppra 500mg BID for seizure ppx   -Neuro checks q4hrs   -Seizure precautions   -Fall precautions  -f/u Onc consult   -rEEG for 2h (at risk for seizure due to brain mets)--- 6/1/23--> no siezure

## 2023-06-06 NOTE — DIETITIAN INITIAL EVALUATION ADULT - REASON FOR ADMISSION
Dizziness and giddiness    53 y/o Female with medical history of NSCLC with adenocarcinoma with neuroendocrine feature s/p chemo and radiation presents to the ED with RLE weakness and dizziness, found to have brain mass.

## 2023-06-06 NOTE — DIETITIAN INITIAL EVALUATION ADULT - SIGNS/SYMPTOMS
75% nutritional needs >1month, weight loss 19% over 6-8months NSCLC with adenocarcinoma s/p chemo/RT

## 2023-06-06 NOTE — DIETITIAN INITIAL EVALUATION ADULT - ORAL INTAKE PTA/DIET HISTORY
patient reports prolonged decreased po intake due to trouble with denture fitting and chewing. She was able to get it adjusted last month. Now chewing difficulty resolved. NKFA-just does not like eating poultry and red meat. Prefers fish and egg as choice of protein. Patient also notes weight loss but unable to quantify.   Per Lamberto DE LA CRUZ: progressively declining weight over 8 months.  10/27/22- 71.6kg  70kg 12/10/22  66.7kg  1/20/23   65kg 3/10/23  58kg 6/1/23.

## 2023-06-06 NOTE — PROGRESS NOTE ADULT - PROBLEM SELECTOR PLAN 4
Keppra  Decadron  Lung ca with brain  mets with plan for out patient f/u with Oncology Dr Beckman  Patient lack of insurance is a barrier to d/c  CM/SW to HELP Keppra  Decadron  Lung ca with brain  mets with plan for out patient f/u with Oncology Dr Beckman  Patient lack of insurance is a barrier to d/c  CM/SW to HELP  6/6/23 Did GOC with patient  Patient Wants Aba villarreal at [phone] to be HCP--> FORM completed  Patient Wants FULL CODE  Told by SW her ins of medicaid will be effective in 1 week--> SW/ Np to help get Keppra and Steroids and PPI   Will work on appointment for [facility] with Dr Gonzalez and [facility] [phone]

## 2023-06-06 NOTE — DIETITIAN INITIAL EVALUATION ADULT - PERTINENT LABORATORY DATA
06-06    135  |  100  |  20  ----------------------------<  130<H>  4.5   |  21<L>  |  0.73    Ca    9.7      06 Jun 2023 05:31  Phos  3.3     06-06  Mg     2.00     06-06    A1C with Estimated Average Glucose Result: 5.7 % (06-01-23 @ 06:19)

## 2023-06-06 NOTE — DIETITIAN INITIAL EVALUATION ADULT - REASON
In patient Neurology consult        [facility] Neurology      MD Daniela Comer  1946    Date of Service: 4/13/2022    Referring Physician: Brittney Houston MD      Reason for the consult and CC: New onset dizziness and blurred vision    HPI:   The patient is a 67y.o.  years old male with history of hypertension hyperlipidemia who came to the hospital yesterday with new onset dizziness and blurred vision. Symptoms started hours prior to admission. Description feeling dizzy and lightheaded upon standing followed by blurred vision from both eyes more right than left. No visual loss or severe headache. No other relieving or aggravating factors. Degree was moderate. By time he came to the ED, symptoms improved. Initial neuroimaging showed no acute stroke or large vessel occlusion. His blood pressure was elevated above 180. He was admitted to the hospital.  Today he feels back to his baseline. No residual symptoms. Awaiting MRI. Carotid Doppler showed no severe ICA stenosis. Other review of system was unremarkable.       Family History   Problem Relation Age of Onset    Stroke Mother 47    High Blood Pressure Mother     High Cholesterol Mother     Cancer Father         lung    Cancer Sister         breast    Cancer Brother         multiple myeloma     Past Surgical History:   Procedure Laterality Date    CAROTID ENDARTERECTOMY Right 6/7/2021    RIGHT SIDED CAROTID ENDARTERECTOMY performed by Betina Mora MD at [facility]      ESOPHAGOGASTRIC FUNDOPLICATION      EXTERNAL EAR SURGERY      cancer spots    EYE SURGERY Left     for strabsismus    KNEE ARTHROSCOPY Right 1/7/2020    EXAM UNDER ANESTHESIA VIDEO ARTHROSCOPY RIGHT KNEE, PARTIAL MEDIAL MENISCECTOMY, PATELLA FEMORAL CHONDROPLASTY, SYNOVECTOMY performed by Reyna Katz MD at [address] [facility] END[facility]COPY  2011
 UPPER GASTROINTESTINAL ENDOSCOPY  9/26/2014    gastritis barretts biopsy taken        Past Medical History:   Diagnosis Date    Acute renal failure (ARF) ([facility] [address] 4/12/2022    Bradycardia     Carotid artery occlusion without infarction, unspecified laterality 5/30/2021    ED (erectile dysfunction)     Hypercholesteremia     Hypertension     Low back pain 7/7/2014    Strabismus      Social History     Tobacco Use    Smoking status: Never Smoker    Smokeless tobacco: Never Used   Vaping Use    Vaping Use: Never used   Substance Use Topics    Alcohol use:  Yes     Alcohol/week: 2.5 standard drinks     Types: 3 Standard drinks or equivalent per week     Comment: 2 drinks about 4 nights a week    Drug use: No     Allergies   Allergen Reactions    Sulfa Antibiotics Anaphylaxis     hives    Amlodipine Hives     Severe hives  Patient tolerates nicardipine    Lisinopril Swelling     Face swelled/arms and legs swelled     Current Facility-Administered Medications   Medication Dose Route Frequency Provider Last Rate Last Admin    enoxaparin (LOVENOX) injection 30 mg  30 mg SubCUTAneous BID Kevon Braxton MD        metoclopramide (REGLAN) injection 10 mg  10 mg IntraVENous Once Irais Smart MD        pantoprazole (PROTONIX) tablet 40 mg  40 mg Oral QAM AC Irais Smart MD   40 mg at 04/13/22 0826    NIFEdipine (PROCARDIA XL) extended release tablet 60 mg  60 mg Oral Daily Irais Smart MD   60 mg at 04/13/22 0826    citalopram (CELEXA) tablet 40 mg  40 mg Oral Daily Irais Smart MD   40 mg at 04/13/22 0826    atorvastatin (LIPITOR) tablet 80 mg  80 mg Oral Daily Irais Smart MD   80 mg at 04/12/22 2145    sodium chloride flush 0.9 % injection 10 mL  10 mL IntraVENous 2 times per day Irais Smart MD   10 mL at 04/13/22 0826    sodium chloride flush 0.9 % injection 10 mL  10 mL IntraVENous PRN Irais Smart MD   10 mL at 04/13/22 1155    0.9 % sodium chloride infusion
IntraVENous PRN Rick Hutton MD        polyethylene glycol [facility]) packet 17 g  17 g Oral Daily PRN Rick Hutton MD        acetaminophen (TYLENOL) tablet 650 mg  650 mg Oral Q4H PRN Rick Hutton MD   650 mg at 04/13/22 1152    Or    acetaminophen (TYLENOL) suppository 650 mg  650 mg Rectal Q4H PRN Rick Hutton MD        ondansetron (ZOFRAN-ODT) disintegrating tablet 4 mg  4 mg Oral Q8H PRN Rick Hutton MD        Or    ondansetron [facility]) injection 4 mg  4 mg IntraVENous Q6H PRN Rick Hutton MD        aspirin EC tablet 81 mg  81 mg Oral Daily Rick Hutton MD   81 mg at 04/13/22 1535    Or    aspirin suppository 300 mg  300 mg Rectal Daily Rick Hutton MD        hydrALAZINE (APRESOLINE) injection 10 mg  10 mg IntraVENous Q4H PRN Rick Hutton MD   10 mg at 04/13/22 1155       ROS : A 10-14 system review of constitutional, cardiovascular, respiratory, eyes, musculoskeletal, endocrine, GI, ENT, skin, hematological, genitourinary, psychiatric and neurologic systems was obtained and updated today and is unremarkable except as mentioned in my HPI      Exam:     Constitutional:   Vitals:    04/13/22 1106 04/13/22 1352 04/13/22 1356 04/13/22 1400   BP: (!) 193/91 (!) 191/95 (!) 175/89 (!) 166/94   Pulse: 51 59 56 60   Resp: 19      Temp: 97.7 °F (36.5 °C)      TempSrc: Oral      SpO2: 97%      Weight:       Height:           General appearance:  Normal development and appear in no acute distress. Eye:  Fundus of the eye: Funduscopic examination cannot be performed due to COVID19 restrictions and precautions. Neck: supple  Cardiovascular: No lower leg edema with good pulsation. Mental Status:   Oriented to person, place, problem, and time. Memory: Good immediate recall. Intact remote memory  Normal attention span and concentration. Language: intact naming, repeating and fluency   Good fund of Knowledge.  Aware of current events and vocabulary   Cranial Nerves:   II:
Visual fields: Full. Pupils: equal, round, reactive to light  III,IV,VI: Extra Ocular Movements are intact. No nystagmus  V: Facial sensation is intact   VII: Facial strength and movements: intact and symmetric  VIII: Hearing: Intact  IX: Palate elevation is symmetric  XI: Shoulder shrug is intact  XII: Tongue movements are normal  Musculoskeletal: 5/5 in all 4 extremities. Tone: Normal tone. Reflexes: 2+ in the upper extremity and 2+ in the lower extremity   Planters: flexor bilaterally. Coordination: no pronator drift, no dysmetria with FNF in upper extremities. Normal REM. Sensation: normal to all modalities in both arms and legs. Gait/Posture: steady gait    Data:  LABS:   Lab Results   Component Value Date     04/13/2022    K 4.2 04/13/2022     04/13/2022    CO2 29 04/13/2022    BUN 19 04/13/2022    CREATININE 1.1 04/13/2022    GFRAA >60 04/13/2022    LABGLOM >60 04/13/2022    GLUCOSE 113 04/13/2022    PHOS 3.2 06/08/2021    MG 1.90 06/08/2021    CALCIUM 8.9 04/13/2022     Lab Results   Component Value Date    WBC 4.7 04/13/2022    RBC 4.08 04/13/2022    HGB 13.1 04/13/2022    HCT 39.0 04/13/2022    MCV 95.8 04/13/2022    RDW 13.6 04/13/2022     04/13/2022   No results found for: INR, PROTIME    Neuroimaging were independently reviewed by me and discussed results with the patient  Reviewed notes from different physicians  Reviewed lab and blood testing    Impression:  New onset dizziness with ataxia likely secondary to hypertensive urgency. Hypertension, not controlled  Hyperlipidemia    Recommendation:  MRI brain  Continue aspirin and statin  Blood pressure control closely at home  Continue current blood pressure medications  DVT and GI prophylaxis  Telemetry  PT and OT  Follow-up carotid Doppler with his vascular surgeon.   Blood sugar monitor  Follow A1c and lipid panel outpatient  Can be discharged from neurology once medically stable  No further recommendation      Thank you for
referring such patient. If you have any questions regarding my consult note, please don't hesitate to call me. Rhianna Atkinson MD  [phone]    This dictation was generated by voice recognition computer software.  Although all attempts are made to edit the dictation for accuracy, there may be errors in the  transcription that are not intended
Refused at this time.
no

## 2023-06-06 NOTE — DIETITIAN INITIAL EVALUATION ADULT - NS FNS REASON FOR WEIGHT CHANG
chewing difficulty due to denture fitting, inability to consume adequate protein-energy intake.  Patient w/ NSCLC with adenocarcinoma with neuroendocrine feature s/p chemo and radiation./decreased po intake

## 2023-06-07 NOTE — PROGRESS NOTE ADULT - PROBLEM SELECTOR PLAN 1
CT head showed multiple brain masses c/w brain mets in the setting of hx of lung cancer   -Neurology and neurosurgery following   -MR brain with similar findings of CT   -F/u with neurosurgery recs - started on decadron 2mg q6H and keppra 500mg BID for seizure ppx   -Neuro checks q4hrs   -Seizure precautions   -Fall precautions  -f/u Onc consult   -rEEG for 2h (at risk for seizure due to brain mets) CT head showed multiple brain masses c/w brain mets in the setting of hx of lung cancer   -Neurology and neurosurgery following   -MR brain with similar findings of CT   -F/u with neurosurgery recs - started on decadron 2mg q6H and keppra 500mg BID for seizure ppx   decadron to 8 q8 hrs.  -Neuro checks q4hrs   -Seizure precautions   Patient ambulates w/o help  -Onc consult ---. out patient f/u Dr Beckman  -Razia --> no siezure CT head showed multiple brain masses c/w brain mets in the setting of hx of lung cancer   -Neurology and neurosurgery following   -MR brain with similar findings of CT   -F/u with neurosurgery recs - started on decadron 2mg q6H and keppra 500mg BID for seizure ppx   decadron to 8 q8 hrs.  -Neuro checks q4hrs   -Seizure precautions   Patient ambulates w/o help  -Onc consult ---. out patient f/u Dr Beckman  -Hemanth --> no siezure  D/w Neuro at [address], Keppra is prophylactic and patient can drive  Patient will get further instructions from [facility] Onc

## 2023-06-07 NOTE — PROGRESS NOTE ADULT - NUTRITIONAL ASSESSMENT
This patient has been assessed with a concern for Malnutrition and has been determined to have a diagnosis/diagnoses of Severe protein-calorie malnutrition.    This patient is being managed with:   Diet Regular-  Supplement Feeding Modality:  Oral  Ensure Plus High Protein Cans or Servings Per Day:  1       Frequency:  Two Times a day  Entered: Jun 4 2023  3:28PM

## 2023-06-07 NOTE — DISCHARGE NOTE NURSING/CASE MANAGEMENT/SOCIAL WORK - PATIENT PORTAL LINK FT
You can access the FollowMyHealth Patient Portal offered by [facility] by registering at the following website: [email or web address]. By joining [facility]’s FollowMyHealth portal, you will also be able to view your health information using other applications (apps) compatible with our system.

## 2023-06-07 NOTE — CHART NOTE - NSCHARTNOTEFT_GEN_A_CORE
Pt seen on 6/6/23.  Pt was alert and feeling ok.  Exam was same with mild right pronator drift.  MRI with multiple cystic rim-enhancing lesion as per CT and pt will need, ideally [facility].  Pt is uninsured and SW is working on her insurance status and medicaid.  Discussed pt in Tumor Board 6/7 and agreed with SRS as first choice.  If insurance is still an issue and cannot be obtained in a timely manner, pt likely to benefit from transfer to [facility] for IMRT.  Pt was, in fact, discharged, but rad onc/ on team is reaching out to patient and social work to follow up on insurance status to strive towards timely treatment of brain lesions.

## 2023-06-07 NOTE — DISCHARGE NOTE NURSING/CASE MANAGEMENT/SOCIAL WORK - NSDCPEWEB_GEN_ALL_CORE
[facility] for Tobacco Control website --- [email or web address] website --- [email or web address]

## 2023-06-07 NOTE — DISCHARGE NOTE NURSING/CASE MANAGEMENT/SOCIAL WORK - NSDCFUADDAPPT_GEN_ALL_CORE_FT
Patient was advised of follow up requests and asked for scheduling assistance. Will await a call back from [facility] to confirm appointment details.

## 2023-06-07 NOTE — PROGRESS NOTE ADULT - PROVIDER SPECIALTY LIST ADULT
Neurology
Hospitalist
Heme/Onc
Neurology
Neurology
Hospitalist

## 2023-06-07 NOTE — PROGRESS NOTE ADULT - ASSESSMENT
53 yo F with Pmhx of NSCLC with adenocarcinoma with neuroendocrine feature s/p chemo and radiation presents to the ED with RLE weakness and dizziness, found to have brain mass.     Pending Finance / SW to resolve insurance issue as patient has a lot of follow-up appointments upon discharge.  51 yo F with Pmhx of NSCLC with adenocarcinoma with neuroendocrine feature s/p chemo and radiation presents to the ED with RLE weakness and dizziness, found to have brain mass.     6/4 notes says---> Pending Finance / SW to resolve insurance issue as patient has a lot of follow-up appointments upon discharge.

## 2023-06-07 NOTE — PROGRESS NOTE ADULT - PROBLEM SELECTOR PLAN 4
Keppra  Decadron  Lung ca with brain  mets with plan for out patient f/u with Oncology Dr Beckman  Patient lack of insurance is a barrier to d/c  CM/SW to HELP Keppra  Decadron 2 mg q8  Protonix   [facility]. need to have before d/c  D/w Jose F -RT---> Dr Bernard will call with appointment for out patient RT  Lung ca with brain  mets with plan for out patient f/u with Oncology Dr Beckman  Patient lack of insurance is a barrier to d/c  CM/SW to HELP  Patient is motivated to do out patient f/u but has no appointments.  SW notes , she has a SIM number that will soon "kick in for medicare" and she will see about coverage of medication. No need to send patient to [facility]  Patient was followed by Dr Beckman at [facility] prior to this admission , years ago  Reached out to appointment schedulers--> Medical Clinic and [facility] - dr Beckman will reach out to patient with appointments.   80 minutes to coord d/c Keppra  Decadron 2 mg q8  Protonix   [facility]. need to have before d/c  D/w Jose F -RT---> Dr Bernard will call with appointment for out patient RT  Lung ca with brain  mets with plan for out patient f/u with Oncology Dr Beckman  Patient lack of insurance is a barrier to d/c  CM/SW to HELP  Patient is motivated to do out patient f/u but has no appointments.  SW notes , she has a SIM number that will soon "kick in for medicare" and she will see about coverage of medication. No need to send patient to [facility]  Patient was followed by Dr Beckman at [facility] prior to this admission , years ago  Called [phone] and spoke with [place], got appointment 7/21/23 at 1:40 pm with Dr Beckman at [facility], they will call her with sooner appointment aftter consulting with Dr Beckman   Reached out to appointment schedulers--> Medical Clinic and [facility] -  will reach out to patient with appointments.   80 minutes to coord d/c

## 2023-06-07 NOTE — DISCHARGE NOTE NURSING/CASE MANAGEMENT/SOCIAL WORK - NSDCPEEMAIL_GEN_ALL_CORE
[facility] for Tobacco Control email [email or web address]

## 2023-06-07 NOTE — DISCHARGE NOTE NURSING/CASE MANAGEMENT/SOCIAL WORK - NSDCPEFALRISK_GEN_ALL_CORE
For information on Fall & Injury Prevention, visit: [email or web address] OR  [email or web address] OR  https://www.cdc.gov/steadi/patient.html

## 2023-06-07 NOTE — PROGRESS NOTE ADULT - SUBJECTIVE AND OBJECTIVE BOX
Patient is a 52y old  Female who presents with a chief complaint of Dizziness and giddiness    53 y/o Female with medical history of NSCLC with adenocarcinoma with neuroendocrine feature s/p chemo and radiation presents to the ED with RLE weakness and dizziness, found to have brain mass.    (06 Jun 2023 13:41)      SUBJECTIVE / OVERNIGHT EVENTS:    MEDICATIONS  (STANDING):  chlorhexidine 2% Cloths 1 Application(s) Topical daily  dexAMETHasone     Tablet 2 milliGRAM(s) Oral every 6 hours  levETIRAcetam 500 milliGRAM(s) Oral two times a day    MEDICATIONS  (PRN):  acetaminophen     Tablet .. 650 milliGRAM(s) Oral every 6 hours PRN Temp greater or equal to 38C (100.4F), Mild Pain (1 - 3)  aluminum hydroxide/magnesium hydroxide/simethicone Suspension 30 milliLiter(s) Oral every 4 hours PRN Dyspepsia  lacosamide Injectable 200 milliGRAM(s) IV Push once PRN GTC > 3 min that does not respond to Ativan  LORazepam   Injectable 1 milliGRAM(s) IV Push once PRN GTC > 3 min  melatonin 3 milliGRAM(s) Oral at bedtime PRN Insomnia  ondansetron Injectable 4 milliGRAM(s) IV Push every 8 hours PRN Nausea and/or Vomiting      Vital Signs Last 24 Hrs  T(F): 97.7 (07 Jun 2023 05:57), Max: 98.4 (06 Jun 2023 21:17)  HR: 61 (07 Jun 2023 05:57) (61 - 86)  BP: 106/69 (07 Jun 2023 05:57) (106/69 - 116/63)  RR: 18 (07 Jun 2023 05:57) (18 - 18)  SpO2: 98% (07 Jun 2023 05:57) (98% - 100%)   room air        PHYSICAL EXAM:  GENERAL: NAD, well-developed  HEAD:  Atraumatic, Normocephalic  EYES: EOMI, PERRLA, conjunctiva and sclera clear  NECK: Supple, No JVD  CHEST/LUNG: Clear to auscultation bilaterally; No wheeze  HEART: Regular rate and rhythm; No murmurs, rubs, or gallops  ABDOMEN: Soft, Nontender, Nondistended; Bowel sounds present  EXTREMITIES:  2+ Peripheral Pulses, No clubbing, cyanosis, or edema  PSYCH: AAOx3  NEUROLOGY: non-focal  SKIN: No rashes or lesions    LABS:                        14.0   6.81  )-----------( 164      ( 06 Jun 2023 05:31 )             41.3     06-06    135  |  100  |  20  ----------------------------<  130<H>  4.5   |  21<L>  |  0.73    Ca    9.7      06 Jun 2023 05:31  Phos  3.3     06-06  Mg     2.00     06-06          Care Discussed with Consultants/Other Providers:   Patient is a 52y old  Female who presents with a chief complaint of Dizziness and giddiness    53 y/o Female with medical history of NSCLC with adenocarcinoma with neuroendocrine feature s/p chemo and radiation presents to the ED with RLE weakness and dizziness, found to have brain mass.    (06 Jun 2023 13:41)      SUBJECTIVE / OVERNIGHT EVENTS:  Explained to patient , no fx to R 3rd finger.  Patient is motivated to do out patient f/u but has no appointments.  SW notes , she has a SIM number that will soon "kick in for medicare" and she will see about coverage of medication. No need to send patient to [facility]  Patient was followed by Dr Beckman at [facility] prior o this admission , years ago        MEDICATIONS  (STANDING):  chlorhexidine 2% Cloths 1 Application(s) Topical daily  dexAMETHasone     Tablet 2 milliGRAM(s) Oral every 6 hours  levETIRAcetam 500 milliGRAM(s) Oral two times a day    MEDICATIONS  (PRN):  acetaminophen     Tablet .. 650 milliGRAM(s) Oral every 6 hours PRN Temp greater or equal to 38C (100.4F), Mild Pain (1 - 3)  aluminum hydroxide/magnesium hydroxide/simethicone Suspension 30 milliLiter(s) Oral every 4 hours PRN Dyspepsia  lacosamide Injectable 200 milliGRAM(s) IV Push once PRN GTC > 3 min that does not respond to Ativan  LORazepam   Injectable 1 milliGRAM(s) IV Push once PRN GTC > 3 min  melatonin 3 milliGRAM(s) Oral at bedtime PRN Insomnia  ondansetron Injectable 4 milliGRAM(s) IV Push every 8 hours PRN Nausea and/or Vomiting      Vital Signs Last 24 Hrs  T(F): 97.7 (07 Jun 2023 05:57), Max: 98.4 (06 Jun 2023 21:17)  HR: 61 (07 Jun 2023 05:57) (61 - 86)  BP: 106/69 (07 Jun 2023 05:57) (106/69 - 116/63)  RR: 18 (07 Jun 2023 05:57) (18 - 18)  SpO2: 98% (07 Jun 2023 05:57) (98% - 100%)   room air        PHYSICAL EXAM:  GENERAL: NAD, well-developed  HEAD:  Atraumatic, Normocephalic  EYES: EOMI, PERRLA, conjunctiva and sclera clear  NECK: Supple, No JVD  CHEST/LUNG: Clear to auscultation bilaterally; No wheeze  HEART: Regular rate and rhythm; No murmurs, rubs, or gallops  ABDOMEN: Soft, Nontender, Nondistended; Bowel sounds present  EXTREMITIES:  2+ Peripheral Pulses, No clubbing, cyanosis, or edema  PSYCH: AAOx3  NEUROLOGY: non-focal  SKIN: No rashes or lesions    LABS:                        14.0   6.81  )-----------( 164      ( 06 Jun 2023 05:31 )             41.3     06-06    135  |  100  |  20  ----------------------------<  130<H>  4.5   |  21<L>  |  0.73    Ca    9.7      06 Jun 2023 05:31  Phos  3.3     06-06  Mg     2.00     06-06      Care Discussed with Consultants/Other Providers:

## 2023-06-07 NOTE — PROGRESS NOTE ADULT - PROBLEM SELECTOR PLAN 2
F/u with Heme/onc in the AM for further eval   - was offered treatment with maintenance Durvalumab but pt deferred  -Now interested in pursuing further treatment F/u with Heme/onc in the AM for further eval   - was offered treatment with maintenance Durvalumab but pt deferred  -Now interested in pursuing further treatment  Out patient f/u with Dr Beckman at [facility]

## 2023-06-07 NOTE — PROGRESS NOTE ADULT - REASON FOR ADMISSION
RLE weakness

## 2023-06-08 NOTE — CHART NOTE - NSCHARTNOTESELECT_GEN_ALL_CORE
Neurosurgery/Event Note
Post-Discharge Note
Event Note
Neurology
Neurology/Event Note
Neurosurgery/Event Note

## 2023-06-08 NOTE — CHART NOTE - NSCHARTNOTEFT_GEN_A_CORE
Post-Discharge Medication Review    Patient contacted to offer medication counseling post-discharge.    Medication reconciliation completed. Per patient, current medications as of 6/8/23 include:  1. Dexamethasone 2 mg oral tablet - 1 tablet orally every 8 hours  2. Levetiracetam 500 mg oral tablet - 1 tablet orally 2 times a day  3. Pantoprazole 40 mg oral delayed release tablet - 1 tablet orally once a day (before a meal)    Medication name, indication, dose, administration, side effects, and monitoring reviewed for new medications post-discharge with patient. Patient demonstrated understanding. Counseling offered for all medications.    Patient notes she is working on obtaining follow-up appointments required per discharge paperwork - patient to follow up with Medical Oncology, Radiation Oncology, Neuro-Oncology, Neurosurgery, and PCP post-discharge per discharge paperwork. Encouraged patient to follow up with providers and keep appointments.

## 2023-06-13 PROBLEM — R29.898 LEG WEAKNESS: Status: ACTIVE | Noted: 2023-01-01

## 2023-06-13 NOTE — HISTORY OF PRESENT ILLNESS
[FreeTextEntry1] : This 52 year old right handed woman is seen in hospital follow up for evaluation and management of brain met\par \par She has NSCLC with neuroendocrine features.  She was diagnosed in 7/22, and was treated with carbo/alimta, RT to lung and then alimta/pembro till 11/22. She declined durvalumab maintenance.  MRI brain at baseline was clear.\par \par She developed right leg weakness in 5/23 and was found to have 3 cystic brain lesions, largest in right temporal lobe and symptomatic edema in left frontal lobe. Since hospital d/c she has been well, albeit very emotional on steroids.  No seizures. \par \par PMH:  none\par \par PSH:  none\par \par SHX:  nursing aide.  Nondrinker.  prior tob\par \par FHX:  no brain tumors\par

## 2023-06-13 NOTE — PHYSICAL EXAM
[FreeTextEntry1] : \par Exam as below (with documented exceptions in parentheses):\par \par General:  Healthy appearing woman well groomed and without deformities. KPS is 70\par \par Mental Status:  Awake, alert and attentive. Oriented to person, place and time. Recent and remote memory intact. Normal concentration. Fluent spontaneous speech with intact naming and repetition. Normal fund of knowledge.  Emotionally labile and tearful\par \par Cranial Nerves: II: Full visual fields. III,IV,VI:Pupils round, reactive to light. Full extraocular movements. No nystagmus. V: Normal bilateral bite, facial sensation. VII: No facial weakness. VIII: Hearing intact. IX,X: Palate midline, intact gag. XI:  Sternocleidomastoids normal. XII: Tongue protrudes midline. No dysarthria. \par \par Motor:  Normal tone, bulk and power throughout including arms and legs, proximal and distal. No pronator drift. No abnormal movements. \par \par Sensation: Normal in arms, legs and trunk to pin, proprioception and vibration. Negative Romberg. \par \par Coordination: No dysmetria or dysdiadochokinesis bilaterally. Normal heel-shin testing. \par \par Gait: Normal including heel and toe walking. Normal station. \par \par Reflexes: Normoactive and symmetric throughout. Absent Babinski bilaterally. \par \par Vascular: No peripheral edema/calf tenderness. \par \par Eyes: Funduscopic exam without papilledema (deferred)\par \par Heart: Regular rate and rhythm. Normal s1-s2. No murmurs, rubs or gallops. \par \par Respiratory: Lungs clear to auscultation bilaterally. \par \par Abdomen: Nontender, non-distended. No hepatosplenomegaly. Normal bowel sounds in four quadrants. \par \par

## 2023-06-13 NOTE — DISCUSSION/SUMMARY
[FreeTextEntry1] : Brain mets of NSCLC with NE features.   She will be treated with SRT, rather than WBRT.  She has improved with steroids.  I did discuss eventual taper of seizure meds and steroids.  I do think she should consider starting maintenance immunotherapy after RT.\par \par SRT planning underway\par taper steroids to 4 mg daily, taper further after SRT\par Keppra taper after SRT\par Emotional support provided\par RTC 3w

## 2023-06-14 PROBLEM — D50.9 ANEMIA, IRON DEFICIENCY: Status: ACTIVE | Noted: 2022-10-27

## 2023-06-29 PROBLEM — K29.60 STEROID-INDUCED GASTRITIS: Status: ACTIVE | Noted: 2023-01-01

## 2023-06-29 NOTE — PROCEDURE
[FreeTextEntry1] : Stereotactic Radiosurgery [FreeTextEntry2] : Metastatic non-small cell lung CA to brain. [FreeTextEntry3] : Pt underwent first fraction of face mask (frameless) stereotactic radiosurgery with the Leksell Gamma Knife in a single fraction for multiple metastases to the brain from non-small cell lung carcinoma. A conformal plan was created in collaboration with Dr. Bernard, radiation oncology, and the radiation physicist.  7 lesions were targeted: \par 1.  Left frontal: 17 shots, 27.5 Gy@50%\par 2.  Left parietal: 3 shots, 27.5 Gy@80%\par 3.  Left occipital:  27 shots, 27.5 Gy@55%\par 4.  Right frontal: 3 shots, 27.5 Gy@88%\par 5.  Right superior temporal: 7 shots 27.5 Gy@68%\par 6.  Right temporal: 38 shots, 27.5 Gy@62%\par 7.  Right cerebellar:  12 shots, 27.5 Gy@55%\par to be performed in 5 fractions, each lesion at 5.5 Gy per fraction.\par Beam -on time: 71.9 min per fraction\par

## 2023-07-13 NOTE — ASSESSMENT
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  Tumor tested PD-L1 negative and is negative for actionable mutations (TP53, STK11 positive, among others).  Started chemotherapy with Carboplatin/Pemetrexed in mid-July 2022 x 4 cycles completed Sept 2022.  Treated with concurrent Thoracic RT started early Sept through late October 2022.  Achieved IA. Restaging PET/CT March 2023 confirming response to interval therapy.  She declined maintenance Durvalumab offered on numerous occasions from Dec 2022-March 2023. Hospitalized at  [facility] on 5/31-6/7/23 with RLE weakness and dizziness; found to have numerous brain metastasis. An extensive discussion with the patient today regarding her stage of disease and that while it is not curable at this juncture, a next line of therapy can be employed with goal of prolonging life.\par Recommend:\par -Follow up with neuro and Rad/Onc with plan for SRS/GK on 6/19; Neuro to recommend steroid taper following that procedure. Currently taking Dex 2mg q8h as per discharge instructions as well as Keppra 500mg bid for seizure prophylaxis.\par -Continue Pantoprazole 40mg daily for GI prophylaxis. \par -Recommend 2nd line Nivolumab 480mg IV q4 weeks. Risks, benefits and side effects reviewed with patient who agreed to proceed and signed the consent form.  Drug info sheets provided.\par -Can avoid labs today given recent hospital labs on 6/6.\par – Patient does have compression of the distal trachea from the enlarged pretracheal lymph node noted during bronchoscopy which will require clinical and radiologic follow-up and possibly airway intervention.  \par -CT C/A/P with stable RUL mass and no evidence of disease in the abdomen or pelvis, however will obtain new baseline PET/CT as per NCCN guidelines as pre-treatment baseline.  \par -Follow up pending completion of SRS/GK and PET/CT. \par – The treatment of her brain metastases takes priority.  We will plan to get the PET CT scan in the meantime.  Plan to start immunotherapy with nivolumab after PET/CT is obtained and after treatment for the brain metastases.\par – It is very important that radiation oncology or neuro-oncology outlined a steroid taper for the patient.  It would be better for the patient to be off steroids by the time the immunotherapy begins.

## 2023-07-13 NOTE — RESULTS/DATA
[FreeTextEntry1] : -CXR 12/9/21:  A right upper lung rounded opacity is increased in size and conspicuity from prior imaging. Findings are concerning for neoplastic etiology. A CT chest is recommended for further evaluation.\par \par -PET/CT 6/1/22:  Abnormal FDG-PET/CT scan.\par 1. FDG avid lobulated partially necrotic right upper lung mass compatible with malignancy.\par 2. FDG avid mediastinal and right perihilar masses, suspicious for metastases.\par \par –MRI Brain 7/15/22:  NO EVIDENCE OF A MASS, ABNORMAL ENHANCEMENT, OR CURRENT EVIDENCE OF ACUTE ISCHEMIA. CHRONIC WHITE MATTER ISCHEMIC CHANGES\par \par -CT C/A/P 12/1/22: Decreased right apical mass and confluent mediastinal/right hilar lymphadenopathy.Mildly increased right apical pleural thickening, possibly treatment related.  No new disease in the abdomen or pelvis.\par \par -CT Chest Angio 1/9/23: CT Angio Chest revealed no pulmonary embolism. Mediastinal adenopathy is slightly enlarged from the prior study\par \par -LUE Doppler 1/30/23:  No evidence of left upper extremity deep venous thrombosis.\par \par -PET/CT 3/1/23: Compared to FDG-PET/CT scan dated 6/1/2022:\par 1. Overall interval decrease in size and metabolism of right upper lobe mass, right hilar adenopathy, and right-sided mediastinal lymphadenopathy, compatible with a partial response to interval therapy. Findings are similar in appearance to 1/9/2023 chest CT. Hypermetabolism is inseparable from apical pleura and extends into the right 1st-2nd and 2nd-3rd intercostal spaces. This activity may be secondary to inflammation related to interval radiation therapy versus extension of disease.\par 2. Remainder of study is unchanged.\par \par Reviewed/interpreted:\par \par -CT Brain 5/31/23: Multiple new supra and infratentorial low-attenuation masses, compatible with intracranial metastatic disease, new when compared with 7/15/2022.\par Recommend further evaluation with a contrast enhanced brain MRI study.\par \par Images Reviewed/Interpreted:\par \par -CT Thoracic Spine 6/1/23: No acute fractures or dislocations seen. Sclerotic densities involving multiple vertebral bodies as described above.\par \par -CT C/A/P 6/1/23: Stable right upper lobe mass measuring up to 3.5 cm with adjacent apical pleural thickening corresponding with known malignancy.\par Stable mediastinal and hilar adenopathy..\par No evidence of suspicious mass or adenopathy in the abdomen and pelvis.\par \par --MRI Brain 6/2/23:  Abnormal lesions involving the posterior fossa and supratentorial region are identified which are consistent with patient's known metastasis\par \par -XR Right Hand 6/6/23: No fractures or dislocations. Preserved joint spaces and no joint margin erosions. Carpal bones normally aligned. Neutral ulnar variance. No lytic or blastic lesions. IV cannula with attached tubing in distal radial forearm soft tissues\par \par In Hospital Labs 6/6/23: ~WBC 6.81  ~H/H 14.0/41.3  ~Platelets 164  ~Sodium 135  ~Potassium 4.5  ~BUN/Cr 20/0.73  ~Calcium 9.7  ~Mag 2.00

## 2023-07-13 NOTE — PHYSICAL EXAM
[Fully active, able to carry on all pre-disease performance without restriction] : Status 0 - Fully active, able to carry on all pre-disease performance without restriction [Normal] : affect appropriate [de-identified] : No icterus  [de-identified] : MMM O/P Clear [de-identified] : Supple No LAD  [de-identified] : S1 S2  [de-identified] : No edema [de-identified] : Mild left axillary swelling; no prominent nodes appreciated [de-identified] : No spine/CVA tenderness

## 2023-07-13 NOTE — PHYSICAL EXAM
[Fully active, able to carry on all pre-disease performance without restriction] : Status 0 - Fully active, able to carry on all pre-disease performance without restriction [Normal] : affect appropriate [de-identified] : No icterus  [de-identified] : MMM O/P Clear [de-identified] : Supple No LAD  [de-identified] : S1 S2  [de-identified] : No edema [de-identified] : Mild left axillary swelling; no prominent nodes appreciated [de-identified] : No spine/CVA tenderness

## 2023-07-13 NOTE — ASSESSMENT
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  Tumor tested PD-L1 negative and is negative for actionable mutations (TP53, STK11 positive, among others).  Started chemotherapy with Carboplatin/Pemetrexed in mid-July 2022 x 4 cycles completed Sept 2022.  Treated with concurrent Thoracic RT started early Sept through late October 2022.  Achieved DE. Restaging PET/CT March 2023 confirming response to interval therapy.  She declined maintenance Durvalumab offered on numerous occasions from Dec 2022-March 2023. Hospitalized at  [facility] on 5/31-6/7/23 with RLE weakness and dizziness; found to have numerous brain metastasis. An extensive discussion with the patient today regarding her stage of disease and that while it is not curable at this juncture, a next line of therapy can be employed with goal of prolonging life.\par Recommend:\par -Follow up with neuro and Rad/Onc with plan for SRS/GK on 6/19; Neuro to recommend steroid taper following that procedure. Currently taking Dex 2mg q8h as per discharge instructions as well as Keppra 500mg bid for seizure prophylaxis.\par -Continue Pantoprazole 40mg daily for GI prophylaxis. \par -Recommend 2nd line Nivolumab 480mg IV q4 weeks. Risks, benefits and side effects reviewed with patient who agreed to proceed and signed the consent form.  Drug info sheets provided.\par -Can avoid labs today given recent hospital labs on 6/6.\par – Patient does have compression of the distal trachea from the enlarged pretracheal lymph node noted during bronchoscopy which will require clinical and radiologic follow-up and possibly airway intervention.  \par -CT C/A/P with stable RUL mass and no evidence of disease in the abdomen or pelvis, however will obtain new baseline PET/CT as per NCCN guidelines as pre-treatment baseline.  \par -Follow up pending completion of SRS/GK and PET/CT. \par – The treatment of her brain metastases takes priority.  We will plan to get the PET CT scan in the meantime.  Plan to start immunotherapy with nivolumab after PET/CT is obtained and after treatment for the brain metastases.\par – It is very important that radiation oncology or neuro-oncology outlined a steroid taper for the patient.  It would be better for the patient to be off steroids by the time the immunotherapy begins.

## 2023-07-13 NOTE — HISTORY OF PRESENT ILLNESS
[Disease: _____________________] : Disease: [unfilled] [T: ___] : T[unfilled] [N: ___] : N[unfilled] [AJCC Stage: ____] : AJCC Stage: [unfilled] [de-identified] : 51F with significant smoking history who was evaluated for chronic GERD and a cough going on for about 6 months prior to her initial consultation.  A CXR was done and demonstrated a nodule. No follow up was done at that time. She returned in May 2022 for persistent GERD and a cough. A CXR was done and revealed a large RUL mass increased in size from the previous study. Her pulmonologist then sent her for a PET/CT, which revealed an intensely FDG-avid RUL mass. 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  Patient with clinical Stage IIIB, T4 N2 M0 disease. Pathology reviewed at tumor board and clarified the tumor represents non-small cell lung cancer, either adenocarcinoma with neuroendocrine features versus large cell neuroendocrine cancer.  Brain MRI was negative.  Tumor tested PDL1 Negative and molecular testing is negative for actionable mutations.  Patient started treatment with carboplatin/pemetrexed in July 2022.  Thoracic RT started in Sept 2022.  Completed 4 cycles with Carbo/Pem in late Sept 2022.  Thoracic RT completed late October 2022. CT C/A/P 12/1/22 with FL.  [de-identified] : – Lymph node, 4R EBUS guided FNA 6/22/2022: Positive for malignant cells.  Metastatic malignant neoplasm.  IHC is positive for TTF-1 and synaptophysin while negative for p40, chromogranin and CD56.  Ki-67 is 80%.  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  Foundation:  Positive for TP53, STK11, among others.   [de-identified] : Patient completed chemo/RT in October 2022.  Achieved NE.\par LUE Doppler from late January 2023 was negative for DVT.\par Upon completion of CRT in Sept 2022, she declined maintenance Durvalumab despite numerous discussions between Dec 2022 and March 2023.\par Since her last visit, she was hospitalized on 5/31 with  RLE weakness and dizziness, subsequently found to have brain mass on CT; MRI Brain identified abnormal lesions in the posterior fossa and supratentorial regions. CT C/A/P revealed a stable RUL mass and no disease in the abdomen or pelvis. T-spine imaging with no evidence of disease. She was followed by neurology and neurosurgery and was started on decadron 2mg q6H and keppra 500mg BID for seizure prophylaxis with discharged on 6/7/23.\par \par The patient saw Neuro-Onc Dr. Figueroa earlier today with plan for SRS/GK on 6/19.\par She endorses feeling fairly well, just having come from shopping at a mall. Reports compliance with Keppra bid, Pantoprazole daily and Dexamethasone 2mg q8h as recommended on discharge; she states that her dexamethasone dosing was not changed at her neuro appointment. Since her hospitalization, she has had occasional headaches for which she has taken no additional pain meds. She notes right hand weakness and reports that she is able to  items but often drops them. She reports that her gait is mildly impaired but denies falls. : \par \par -CT Brain 5/31/23: Multiple new supra and infratentorial low-attenuation masses, compatible with intracranial metastatic disease, new when compared with 7/15/2022.\par Recommend further evaluation with a contrast enhanced brain MRI study.\par \par -CT Thoracic Spine 6/1/23: No acute fractures or dislocations seen. Sclerotic densities involving multiple vertebral bodies as described above.\par \par -CT C/A/P 6/1/23: Stable right upper lobe mass measuring up to 3.5 cm with adjacent apical pleural thickening corresponding with known malignancy.\par Stable mediastinal and hilar adenopathy.. No evidence of suspicious mass or adenopathy in the abdomen and pelvis.\par \par --MRI Brain 6/2/23:  Abnormal lesions involving the posterior fossa and supratentorial region are identified which are consistent with patient's known metastasis\par \par -XR Right Hand 6/6/23: No fractures or dislocations. Preserved joint spaces and no joint margin erosions. Carpal bones normally aligned. Neutral ulnar variance. No lytic or blastic lesions. IV cannula with attached tubing in distal radial forearm soft tissues\par \par In Hospital Labs 6/6/23: ~WBC 6.81  ~H/H 14.0/41.3  ~Platelets 164  ~Sodium 135  ~Potassium 4.5  ~BUN/Cr 20/0.73  ~Calcium 9.7  ~Mag 2.00

## 2023-07-13 NOTE — HISTORY OF PRESENT ILLNESS
[Disease: _____________________] : Disease: [unfilled] [T: ___] : T[unfilled] [N: ___] : N[unfilled] [AJCC Stage: ____] : AJCC Stage: [unfilled] [de-identified] : 51F with significant smoking history who was evaluated for chronic GERD and a cough going on for about 6 months prior to her initial consultation.  A CXR was done and demonstrated a nodule. No follow up was done at that time. She returned in May 2022 for persistent GERD and a cough. A CXR was done and revealed a large RUL mass increased in size from the previous study. Her pulmonologist then sent her for a PET/CT, which revealed an intensely FDG-avid RUL mass. 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  Patient with clinical Stage IIIB, T4 N2 M0 disease. Pathology reviewed at tumor board and clarified the tumor represents non-small cell lung cancer, either adenocarcinoma with neuroendocrine features versus large cell neuroendocrine cancer.  Brain MRI was negative.  Tumor tested PDL1 Negative and molecular testing is negative for actionable mutations.  Patient started treatment with carboplatin/pemetrexed in July 2022.  Thoracic RT started in Sept 2022.  Completed 4 cycles with Carbo/Pem in late Sept 2022.  Thoracic RT completed late October 2022. CT C/A/P 12/1/22 with [place].  [de-identified] : – Lymph node, 4R EBUS guided FNA 6/22/2022: Positive for malignant cells.  Metastatic malignant neoplasm.  IHC is positive for TTF-1 and synaptophysin while negative for p40, chromogranin and CD56.  Ki-67 is 80%.  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  Foundation:  Positive for TP53, STK11, among others.   [de-identified] : Patient completed chemo/RT in October 2022.  Achieved [place].\par LUE Doppler from late January 2023 was negative for DVT.\par Upon completion of CRT in Sept 2022, she declined maintenance Durvalumab despite numerous discussions between Dec 2022 and March 2023.\par Since her last visit, she was hospitalized on 5/31 with  RLE weakness and dizziness, subsequently found to have brain mass on CT; MRI Brain identified abnormal lesions in the posterior fossa and supratentorial regions. CT C/A/P revealed a stable RUL mass and no disease in the abdomen or pelvis. T-spine imaging with no evidence of disease. She was followed by neurology and neurosurgery and was started on decadron 2mg q6H and keppra 500mg BID for seizure prophylaxis with discharged on 6/7/23.\par \par The patient saw Neuro-Onc Dr. Figueroa earlier today with plan for SRS/GK on 6/19.\par She endorses feeling fairly well, just having come from shopping at a mall. Reports compliance with Keppra bid, Pantoprazole daily and Dexamethasone 2mg q8h as recommended on discharge; she states that her dexamethasone dosing was not changed at her neuro appointment. Since her hospitalization, she has had occasional headaches for which she has taken no additional pain meds. She notes right hand weakness and reports that she is able to  items but often drops them. She reports that her gait is mildly impaired but denies falls. : \par \par -CT Brain 5/31/23: Multiple new supra and infratentorial low-attenuation masses, compatible with intracranial metastatic disease, new when compared with 7/15/2022.\par Recommend further evaluation with a contrast enhanced brain MRI study.\par \par -CT Thoracic Spine 6/1/23: No acute fractures or dislocations seen. Sclerotic densities involving multiple vertebral bodies as described above.\par \par -CT C/A/P 6/1/23: Stable right upper lobe mass measuring up to 3.5 cm with adjacent apical pleural thickening corresponding with known malignancy.\par Stable mediastinal and hilar adenopathy.. No evidence of suspicious mass or adenopathy in the abdomen and pelvis.\par \par --MRI Brain 6/2/23:  Abnormal lesions involving the posterior fossa and supratentorial region are identified which are consistent with patient's known metastasis\par \par -XR Right Hand 6/6/23: No fractures or dislocations. Preserved joint spaces and no joint margin erosions. Carpal bones normally aligned. Neutral ulnar variance. No lytic or blastic lesions. IV cannula with attached tubing in distal radial forearm soft tissues\par \par In Hospital Labs 6/6/23: ~WBC 6.81  ~H/H 14.0/41.3  ~Platelets 164  ~Sodium 135  ~Potassium 4.5  ~BUN/Cr 20/0.73  ~Calcium 9.7  ~Mag 2.00

## 2023-07-21 PROBLEM — R53.83 FATIGUE: Status: ACTIVE | Noted: 2023-01-01

## 2023-07-21 NOTE — PHYSICAL EXAM
[Normal] : affect appropriate [Restricted in physically strenuous activity but ambulatory and able to carry out work of a light or sedentary nature] : Status 1- Restricted in physically strenuous activity but ambulatory and able to carry out work of a light or sedentary nature, e.g., light house work, office work [de-identified] : No icterus  [de-identified] : MMM O/P Clear [de-identified] : Supple No LAD  [de-identified] : No edema [de-identified] : S1 S2  [de-identified] : No spine/CVA tenderness [de-identified] : Ambulatory [de-identified] : RLE weakness

## 2023-07-21 NOTE — RESULTS/DATA
[FreeTextEntry1] : -CXR 12/9/21:  A right upper lung rounded opacity is increased in size and conspicuity from prior imaging. Findings are concerning for neoplastic etiology. A CT chest is recommended for further evaluation.\par \par -PET/CT 6/1/22:  Abnormal FDG-PET/CT scan.\par 1. FDG avid lobulated partially necrotic right upper lung mass compatible with malignancy.\par 2. FDG avid mediastinal and right perihilar masses, suspicious for metastases.\par \par –MRI Brain 7/15/22:  NO EVIDENCE OF A MASS, ABNORMAL ENHANCEMENT, OR CURRENT EVIDENCE OF ACUTE ISCHEMIA. CHRONIC WHITE MATTER ISCHEMIC CHANGES\par \par -CT C/A/P 12/1/22: Decreased right apical mass and confluent mediastinal/right hilar lymphadenopathy.Mildly increased right apical pleural thickening, possibly treatment related.  No new disease in the abdomen or pelvis.\par \par -CT Chest Angio 1/9/23: CT Angio Chest revealed no pulmonary embolism. Mediastinal adenopathy is slightly enlarged from the prior study\par \par -LUE Doppler 1/30/23:  No evidence of left upper extremity deep venous thrombosis.\par \par -PET/CT 3/1/23: Compared to FDG-PET/CT scan dated 6/1/2022:\par 1. Overall interval decrease in size and metabolism of right upper lobe mass, right hilar adenopathy, and right-sided mediastinal lymphadenopathy, compatible with a partial response to interval therapy. Findings are similar in appearance to 1/9/2023 chest CT. Hypermetabolism is inseparable from apical pleura and extends into the right 1st-2nd and 2nd-3rd intercostal spaces. This activity may be secondary to inflammation related to interval radiation therapy versus extension of disease.\par 2. Remainder of study is unchanged.\par \par -CT Brain 5/31/23: Multiple new supra and infratentorial low-attenuation masses, compatible with intracranial metastatic disease, new when compared with 7/15/2022.\par Recommend further evaluation with a contrast enhanced brain MRI study.\par \par -CT Thoracic Spine 6/1/23: No acute fractures or dislocations seen. Sclerotic densities involving multiple vertebral bodies as described above.\par \par -CT C/A/P 6/1/23: Stable right upper lobe mass measuring up to 3.5 cm with adjacent apical pleural thickening corresponding with known malignancy.\par Stable mediastinal and hilar adenopathy..\par No evidence of suspicious mass or adenopathy in the abdomen and pelvis.\par \par --MRI Brain 6/2/23:  Abnormal lesions involving the posterior fossa and supratentorial region are identified which are consistent with patient's known metastasis\par \par -XR Right Hand 6/6/23: No fractures or dislocations. Preserved joint spaces and no joint margin erosions. Carpal bones normally aligned. Neutral ulnar variance. No lytic or blastic lesions. IV cannula with attached tubing in distal radial forearm soft tissues\par \par Images Reviewed/Interpreted:\par \par -PET/CT 6/26/23:  Intensely FDG avid right upper lobe mass is most consistent with residual/recurrent tumor with no definite evidence of additional FDG avid lesions excluding the brain.\par \par

## 2023-07-21 NOTE — ASSESSMENT
[FreeTextEntry1] : NSCLC with adenocarcinoma with neuroendocrine features histology with clinically stage IIIB (T4N2) disease with bulky intrathoracic lymphadenopathy that was unresectable.  Tumor tested PD-L1 negative and is negative for actionable mutations (TP53, STK11 positive, among others).  Started chemotherapy with Carboplatin/Pemetrexed in July 2022 x 4 cycles completed Sept 2022.  Treated with concurrent Thoracic RT started Sept through October 2022.  Achieved IN. Restaging PET/CT March 2023 confirming response to interval therapy.  She declined maintenance Durvalumab recommended on numerous occasions through March 2023. Hospitalized at  [facility] on 5/31-6/7/23 with symptomatic numerous brain metastases.  Treated with GK–SRS to 7 brain metastases in 5 fractions from 6/21 - 6/29/2023. \par Recommend:\par –Patient continues on dexamethasone 4 mg twice daily as per radiation oncology.  She continues on pantoprazole PPI for GI prophylaxis while on steroids.\par – Follow-up with radiation oncology status post GK–SRS for numerous brain metastases completed late June 2023.  Steroid dosing/taper is being outlined by them.\par – Discussed that she may need to establish care with neuro-oncology however she does not wish to at this time.  She is off the levetiracetam which she previously took for seizure prophylaxis; plan documented by neuro–oncology stated this should be tapered following SRS.\par – Previously recommended treatment with nivolumab.  Patient was previously consented to this.  Ideally, discussed that it would be better to start this agent when she is on a lower dose of steroids.  She is currently scheduled for treatment on 8/4/2023 which might be an adequate date for her to reach a lower steroid dosing following her definitive treatment for her brain metastases.  Can attempt to coordinate with radiation oncology.\par – Recommended we obtain labs today however it appears the patient left the office visit without having them done\par – Had SW meet with the patient today to address her nonmedical needs.  Form filled out for patient and scanned into chart for her to obtain relief from credit agency.  Return to work letter provided to the patient for 7/30/2023 as requested by the patient.\par – We will aim for the patient to hopefully start immunotherapy with nivolumab as scheduled on 8/4/2023 when she will hopefully be on a lower dose of steroids.  We will have the patient follow-up prior to each cycle to assess tolerability.  Plan to obtain a restaging scan prior to C4 to assess response to nivolumab.\par – Restaging CNS scans to be arranged by radiation oncology.

## 2023-07-21 NOTE — HISTORY OF PRESENT ILLNESS
[Disease: _____________________] : Disease: [unfilled] [T: ___] : T[unfilled] [N: ___] : N[unfilled] [AJCC Stage: ____] : AJCC Stage: [unfilled] [de-identified] : 51F with significant smoking history who was evaluated for chronic GERD and a cough going on for about 6 months prior to her initial consultation.  A CXR was done and demonstrated a nodule. No follow up was done at that time. She returned in May 2022 for persistent GERD and a cough. A CXR was done and revealed a large RUL mass increased in size from the previous study. Her pulmonologist then sent her for a PET/CT, which revealed an intensely FDG-avid RUL mass. 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  Patient with clinical Stage IIIB, T4 N2 M0 disease. Pathology reviewed at tumor board and clarified the tumor represents non-small cell lung cancer, either adenocarcinoma with neuroendocrine features versus large cell neuroendocrine cancer.  Brain MRI was negative.  Tumor tested PDL1 Negative and molecular testing is negative for actionable mutations.  Patient started treatment with carboplatin/pemetrexed in July 2022.  Thoracic RT started in Sept 2022.  Completed 4 cycles with Carbo/Pem in late Sept 2022.  Thoracic RT completed late October 2022. Achieved LA.  Upon completion of definitive therapy, she declined recommended treatment with Durvalumab despite numerous discussions.  Patient was hospitalized in late May 2023 and found to have brain metastases.  She was started on steroids as well as levetiracetam for seizure prophylaxis with subsequent discharge in early June 2023.  Patient is status post GK–SRS to 7 brain metastases in 5 fractions from 6/21 - 6/29/2023.\par  [de-identified] : – Lymph node, 4R EBUS guided FNA 6/22/2022: Positive for malignant cells.  Metastatic malignant neoplasm.  IHC is positive for TTF-1 and synaptophysin while negative for p40, chromogranin and CD56.  Ki-67 is 80%.  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  Foundation:  Positive for TP53, STK11, among others.   [de-identified] : Patient completed definitive concurrent chemo/RT in October 2022; achieved [place].\par It was recommended that she be treated with durvalumab which she declined to begin.\par Patient was hospitalized in late May 2023 and found to have brain metastases.  She was started on steroids as well as levetiracetam for seizure prophylaxis with subsequent discharge in early June.\par Patient sought neuro–oncology as an outpatient but does not plan to follow-up.\par Patient is status post GK–SRS to 7 brain metastases in 5 fractions from 6/21 - 6/29/2023.\par She was put on a steroid taper however when she got to a lower dose of steroids–which may have been dexamethasone 4 mg daily, although this is not clear given the patient is a poor historian–she reports becoming more symptomatic, particularly with RLE weakness.  She reports the dexamethasone was recently increased to 4 mg twice daily for which she is feeling better.\par She is off the levetiracetam; she is unclear when she stopped this but may have just completed the prescription that was provided to her upon hospital discharge.\par She reports blurry vision that is actually improving.\par She does report some vague discomfort in her hands of unclear etiology.\par \par Patient has a number of concerns if she has not been working and does not have income.  Discussed that she would need to discuss the nonmedical issues with SW; arranged for  to meet with the patient today.  Form filled out for patient to pursue relief from credit card company.\par She also wants a letter clearing her to return to work on 7/30; letter provided by practice RN.\par \par Patient went for a restaging PET/CT in late June 2023 for a new extent–of–disease baseline revealing residual/recurrent disease in her known RUL primary tumor.

## 2023-08-19 NOTE — H&P ADULT - NSHPPHYSICALEXAM_GEN_ALL_CORE
PHYSICAL EXAMINATION:  Vital Signs Last 24 Hrs  T(C): 36.9 (19 Aug 2023 18:50), Max: 36.9 (19 Aug 2023 18:50)  T(F): 98.4 (19 Aug 2023 18:50), Max: 98.4 (19 Aug 2023 18:50)  HR: 103 (19 Aug 2023 18:50) (97 - 104)  BP: 142/92 (19 Aug 2023 18:50) (122/82 - 142/92)  BP(mean): --  RR: 17 (19 Aug 2023 18:50) (17 - 18)  SpO2: 97% (19 Aug 2023 18:50) (97% - 98%)    Parameters below as of 19 Aug 2023 18:50  Patient On (Oxygen Delivery Method): room air      CAPILLARY BLOOD GLUCOSE          GENERAL: NAD,   ENMT: mucous membranes moist  NECK: supple, No JVD  CHEST/LUNG: clear to auscultation bilaterally; no rales, rhonchi, or wheezing b/l  HEART: normal S1, S2  ABDOMEN: BS+, soft, ND, NT   EXTREMITIES:  pulses palpable; no clubbing, cyanosis, or edema b/l LEs  NEURO: awake, alert, interactive; moves all extremities PHYSICAL EXAMINATION:  Vital Signs Last 24 Hrs  T(C): 36.9 (19 Aug 2023 18:50), Max: 36.9 (19 Aug 2023 18:50)  T(F): 98.4 (19 Aug 2023 18:50), Max: 98.4 (19 Aug 2023 18:50)  HR: 103 (19 Aug 2023 18:50) (97 - 104)  BP: 142/92 (19 Aug 2023 18:50) (122/82 - 142/92)  BP(mean): --  RR: 17 (19 Aug 2023 18:50) (17 - 18)  SpO2: 97% (19 Aug 2023 18:50) (97% - 98%)    Parameters below as of 19 Aug 2023 18:50  Patient On (Oxygen Delivery Method): room air      CAPILLARY BLOOD GLUCOSE          GENERAL: NAD, seen in ER, comfortable in bed, answers all questions.   ENMT: mucous membranes moist  NECK: supple, No JVD  CHEST/LUNG: clear to auscultation bilaterally; no rales, rhonchi, or wheezing b/l  HEART: normal S1, S2  ABDOMEN: BS+, soft, ND, NT   EXTREMITIES:  pulses palpable; no clubbing, cyanosis, or edema b/l LEs  NEURO: awake, alert, interactive; moves all extremities

## 2023-08-19 NOTE — ED ADULT NURSE REASSESSMENT NOTE - NS ED NURSE REASSESS COMMENT FT1
Received report from JOHANA Brito. pt on bed awake alert and oriented. pt identified self introduced. pt states she feels better compared earlier, dizziness subsided. pt still sensitive to lights. waiting for disposition. Received report from JOHANA Brito. pt on bed awake alert and oriented. pt identified self introduced. pt states she feels better compared earlier, dizziness subsided. waiting for disposition.

## 2023-08-19 NOTE — H&P ADULT - NSHPLABSRESULTS_GEN_ALL_CORE
LABS:                        11.9   11.87 )-----------( 207      ( 19 Aug 2023 16:40 )             35.3     08-19    139  |  105  |  15  ----------------------------<  117<H>  5.1   |  29  |  0.74    Ca    9.5      19 Aug 2023 16:40    TPro  7.7  /  Alb  2.4<L>  /  TBili  0.2  /  DBili  x   /  AST  28  /  ALT  48  /  AlkPhos  63  08-19      Urinalysis Basic - ( 19 Aug 2023 16:40 )    Color: x / Appearance: x / SG: x / pH: x  Gluc: 117 mg/dL / Ketone: x  / Bili: x / Urobili: x   Blood: x / Protein: x / Nitrite: x   Leuk Esterase: x / RBC: x / WBC x   Sq Epi: x / Non Sq Epi: x / Bacteria: x          RADIOLOGY & ADDITIONAL TESTS:

## 2023-08-19 NOTE — ED ADULT TRIAGE NOTE - BP NONINVASIVE SYSTOLIC (MM HG)
Start of Shift

Writer received  report  on 22 year old male admitted to [facility] on 2/1/18 for 
Benzodiazepine, Heroin and Methamphetamine detoxification. Pt reports NKDA, full code and 
eats a regular diet. Pt reports PMH of  bipolar and a withdrawal related seizure 2 years 
ago. Pt has tested positive for Hep C. Pt currently on a Valium and Subutex taper, 
tolerating well. Last COWS 5, CIWA 3 recorded at 0400, per NOC report. Pt was administered 
Milk of Magnesia on NOC, with no reports of BM. Writer encounters pt in room resting on bed 
with eyes closed. Even and unlabored respirations with rise and fall of chest noted. Bed in 
low position, wheels locked and side rails up x2. Will continue to monitor, support and 
encourage according to plan of care. 136

## 2023-08-19 NOTE — ED PROVIDER NOTE - CLINICAL SUMMARY MEDICAL DECISION MAKING FREE TEXT BOX
Headaches more frequent and severe with syncope - patient neurologically intact.  DDx includes: vasovagal, dehydration, ACS, PE.  Will check EKG, Labs, Orthostatics, CT; Re-eval. Headaches more frequent and severe with syncope - patient neurologically intact.  DDx includes: vasovagal, dehydration, ACS, PE.  Will check EKG, Labs, Orthostatics, CT; Re-eval.    Orthostatic negative.  EKG non-ischemic.  Troponin negative.  CT shows brain mets.  Patient admitted to hospital for syncope.

## 2023-08-19 NOTE — PATIENT PROFILE ADULT - FALL HARM RISK - RISK INTERVENTIONS

## 2023-08-19 NOTE — H&P ADULT - HISTORY OF PRESENT ILLNESS
52 year old woman hx of lung cancer reported by patient to be in remission who presents to the ER after a syncopal episode.  She reports that this is the 3rd episode this week.  She has been experiencing over the past 2 months headaches but worse over the past 2 weeks and more frequent in the past 3 days.  She denies fever, neck stiffness, URI symptoms, head injury, vision changes, speech changes and focal weakness.  Patient also reporting about 2 weeks of exertional dyspnea, lightheadedness and palpitations.  She denies cough and chest pain. 52 year old woman hx of lung cancer reported by patient to be in remission who presents to the ER after a syncopal episode.  She reports that this is the 3rd episode this week.  She has been experiencing over the past 2 months headaches but worse over the past 2 weeks and more frequent in the past 3 days.  She denies fever, neck stiffness, URI symptoms, head injury, vision changes, speech changes and focal weakness.  Patient also reporting about 2 weeks of exertional dyspnea, lightheadedness and palpitations.  She denies cough and chest pain. Patient states she was told in the past she has stage IV lung cancer.

## 2023-08-19 NOTE — ED PROVIDER NOTE - OBJECTIVE STATEMENT
This patient is a 52 year old woman hx of lung cancer reported by patient to be in remission who presents to the ER This patient is a 52 year old woman hx of lung cancer reported by patient to be in remission who presents to the ER after a syncopal episode.  She reports that this is the 3rd episode this week.  She has been experiencing over 2 months of headaches but worse over the past 2 weeks and more frequent in the past 3 days.  She denies fever, neck stiffness, URI symptoms, head injury, vision changes, speech changes and focal weakness.  Patient also reporting about 2 weeks of exertional dyspnea, lightheadedness and palpitations.  She denies cough and chest pain.

## 2023-08-19 NOTE — H&P ADULT - ASSESSMENT
52 year old woman hx of lung cancer reported by patient to be in remission who presents to the ER after a syncopal episode.  She reports that this is the 3rd episode this week.  She has been experiencing over the past 2 months headaches but worse over the past 2 weeks and more frequent in the past 3 days.  She denies fever, neck stiffness, URI symptoms, head injury, vision changes, speech changes and focal weakness.  Patient also reporting about 2 weeks of exertional dyspnea, lightheadedness and palpitations.  She denies cough and chest pain.    Plan:   52 year old woman hx of lung cancer reported by patient to be in remission who presents to the ER after a syncopal episode.  She reports that this is the 3rd episode this week.  She has been experiencing over the past 2 months headaches but worse over the past 2 weeks and more frequent in the past 3 days.  She denies fever, neck stiffness, URI symptoms, head injury, vision changes, speech changes and focal weakness.  Patient also reporting about 2 weeks of exertional dyspnea, lightheadedness and palpitations.  She denies cough and chest pain.      Plan:  Admit to tele for syncope, HA and lung cancer. Follows with Oncologist at [facility]. Had CT early June no abdominal mets. CTA here in ER notes no   PE, enlarging RUL lung mass and new left lung lesion. CT head cannot r/o new mets. MRI with and without contrast ordered.  Will likely need updated staging   CT of abdomen and pelvis after that. Day team will need to call Oncology Consult on Monday. Last Chemo and XRT finished three months ago.  Calcium normal   at 9.2. No clear infections.  TTE for syncope eval, no prior cardiac history.      Continue home regimen of Decadron 2 mg/day taper to off slowly.  Was recently started on Decadron by her Oncologist. States she is not on Kepra at home.

## 2023-08-19 NOTE — ED ADULT NURSE REASSESSMENT NOTE - NS ED NURSE REASSESS COMMENT FT1
property sheet done. pt has $150 cash and 8 credit cards. offered to keep by security for safety. pt wants to keep it with her.

## 2023-08-19 NOTE — ED PROVIDER NOTE - WR ORDER NAME 1
This office note has been dictated.  Past Medical History:   Diagnosis Date   • Anxiety    • Apnea, sleep    • Diabetes type 2, controlled (CMS/[facility]) 8/1/2012   • Essential (primary) hypertension    • Gastroesophageal reflux disease      Current Outpatient Medications   Medication Sig Dispense Refill   • atorvastatin (LIPITOR) 20 MG tablet TAKE 1 TABLET BY MOUTH EVERY DAY 90 tablet 0   • glimepiride (AMARYL) 4 MG tablet TAKE 1 TABLET BY MOUTH EVERY DAY BEFORE BREAKFAST 90 tablet 0   • citalopram (CeleXA) 20 MG tablet Take 1 tablet by mouth daily. 90 tablet 1   • omeprazole (PriLOSEC) 40 MG capsule Take 1 capsule by mouth daily. 90 capsule 3   • metformin (GLUCOPHAGE) 1000 MG tablet Take 1 tablet by mouth 2 times daily (with meals). 180 tablet 3   • losartan (COZAAR) 50 MG tablet Take 1 tablet by mouth daily. 90 tablet 3   • sildenafil (REVATIO) 20 MG tablet 20-100mg po qday prn ED 30 tablet 3   • albuterol 108 (90 Base) MCG/ACT inhaler Inhale 2 puffs into the lungs every 4 hours as needed for Shortness of Breath or Wheezing. 1 Inhaler 5   • indomethacin (INDOCIN) 50 MG capsule Take 1 capsule by mouth 3 times daily (with meals). 30 capsule 0   • blood glucose test strip Test blood sugar 1-2 times daily as directed. Diagnosis: e11.9. Meter: Accu-check Guide or preferred meter 300 strip 0   • ACCU-CHEK FASTCLIX LANCETS Misc To test 1-2 times daily. E11.9 dispense fastclix or preferred lancets. 306 each 1   • Multiple Vitamin (ONE-A-DAY MENS) tablet Take by mouth daily.      • B Complex Vitamins (VITAMIN B COMPLEX) tablet Take 1 tablet by mouth daily.     • Red Yeast Rice Extract 600 MG Tab Take 2 tablets by mouth daily.      • tadalafil (CIALIS) 20 MG tablet Take 1 tablet by mouth every 3 days as needed for Erectile Dysfunction. 10 tablet 0   • atorvastatin (LIPITOR) 20 MG tablet Take 1 tablet by mouth daily. 30 tablet 5     No current facility-administered medications for this visit.        Xray Chest 1 View- PORTABLE-Urgent

## 2023-08-20 NOTE — PROGRESS NOTE ADULT - PROBLEM SELECTOR PLAN 1
Syncope with concern for lung cancer with metastatic disease to the brain. Reports 3 syncopal episodes in the week prior to admission.  - MR brain ordered, will need transfer for imaging, which is currently pending

## 2023-08-20 NOTE — PROGRESS NOTE ADULT - PROBLEM SELECTOR PLAN 2
Last chemotherapy and radiation 3 months ago. Likely with worsening of disease as described above   - Decadron as above

## 2023-08-20 NOTE — PROGRESS NOTE ADULT - SUBJECTIVE AND OBJECTIVE BOX
[facility] Division of Hospital Medicine  Jeremy Kay MD  Available via MS Teams    SUBJECTIVE / OVERNIGHT EVENTS: Patient seen and examined at bedside, resting comfortably and in no acute distress. Denies chest pain or palpitations. Denies shortness of breath or cough. Endorses continued headache, denies visual changes. ROS otherwise noncontributory.     MEDICATIONS  (STANDING):  dexAMETHasone     Tablet 2 milliGRAM(s) Oral daily  enoxaparin Injectable 40 milliGRAM(s) SubCutaneous every 24 hours  pantoprazole    Tablet 40 milliGRAM(s) Oral before breakfast    MEDICATIONS  (PRN):  acetaminophen     Tablet .. 650 milliGRAM(s) Oral every 6 hours PRN Moderate Pain (4 - 6)  traMADol 25 milliGRAM(s) Oral every 8 hours PRN Severe Pain (7 - 10)      I&O's Summary    20 Aug 2023 07:01  -  20 Aug 2023 12:40  --------------------------------------------------------  IN: 300 mL / OUT: 0 mL / NET: 300 mL        PHYSICAL EXAM:  Vital Signs Last 24 Hrs  T(C): 36.4 (20 Aug 2023 10:43), Max: 37.1 (20 Aug 2023 05:10)  T(F): 97.5 (20 Aug 2023 10:43), Max: 98.7 (20 Aug 2023 05:10)  HR: 87 (20 Aug 2023 10:43) (87 - 104)  BP: 121/80 (20 Aug 2023 10:43) (115/75 - 142/92)  RR: 17 (20 Aug 2023 10:43) (17 - 18)  SpO2: 96% (20 Aug 2023 10:43) (96% - 98%)    Parameters below as of 20 Aug 2023 10:43  Patient On (Oxygen Delivery Method): room air      CONSTITUTIONAL: NAD, well-groomed  EYES: PERRLA; conjunctiva and sclera clear  ENMT: Moist oral mucosa, no pharyngeal injection or exudates; normal dentition  NECK: Supple, no palpable masses; no thyromegaly  RESPIRATORY: Normal respiratory effort; lungs are clear to auscultation bilaterally  CARDIOVASCULAR: normal S1 and S2, no murmur/rub/gallop; No lower extremity edema  ABDOMEN: Nontender to palpation, normoactive bowel sounds, no rebound/guarding; No hepatosplenomegaly  MUSCULOSKELETAL:  no clubbing or cyanosis of digits; no joint swelling or tenderness to palpation  PSYCH: A+O to person, place, and time; affect appropriate  NEUROLOGY: CN 2-12 are intact and symmetric; no gross sensory deficits   SKIN: No rashes; no palpable lesions    LABS:                        11.9   11.87 )-----------( 207      ( 19 Aug 2023 16:40 )             35.3     08-19    139  |  105  |  15  ----------------------------<  117<H>  5.1   |  29  |  0.74    Ca    9.5      19 Aug 2023 16:40    TPro  7.7  /  Alb  2.4<L>  /  TBili  0.2  /  DBili  x   /  AST  28  /  ALT  48  /  AlkPhos  63  08-19          Urinalysis Basic - ( 19 Aug 2023 16:40 )    Color: x / Appearance: x / SG: x / pH: x  Gluc: 117 mg/dL / Ketone: x  / Bili: x / Urobili: x   Blood: x / Protein: x / Nitrite: x   Leuk Esterase: x / RBC: x / WBC x   Sq Epi: x / Non Sq Epi: x / Bacteria: x

## 2023-08-21 NOTE — PROGRESS NOTE ADULT - SUBJECTIVE AND OBJECTIVE BOX
INTERVAL HPI/OVERNIGHT EVENTS:  Pt seen and examined at bedside.     Allergies/Intolerance: No Known Allergies      MEDICATIONS  (STANDING):  dexAMETHasone     Tablet 2 milliGRAM(s) Oral daily  enoxaparin Injectable 40 milliGRAM(s) SubCutaneous every 24 hours  pantoprazole    Tablet 40 milliGRAM(s) Oral before breakfast    MEDICATIONS  (PRN):  acetaminophen     Tablet .. 650 milliGRAM(s) Oral every 6 hours PRN Moderate Pain (4 - 6)  ketorolac   Injectable 15 milliGRAM(s) IV Push every 6 hours PRN Moderate Pain (4 - 6)  traMADol 25 milliGRAM(s) Oral every 8 hours PRN Severe Pain (7 - 10)        ROS: all systems reviewed and wnl      PHYSICAL EXAMINATION:  Vital Signs Last 24 Hrs  T(C): 36.8 (21 Aug 2023 05:21), Max: 37.1 (20 Aug 2023 17:01)  T(F): 98.3 (21 Aug 2023 05:21), Max: 98.7 (20 Aug 2023 17:01)  HR: 93 (21 Aug 2023 05:21) (86 - 108)  BP: 121/81 (21 Aug 2023 05:21) (121/80 - 121/81)  BP(mean): --  RR: 17 (21 Aug 2023 05:21) (17 - 17)  SpO2: 96% (21 Aug 2023 05:21) (94% - 97%)    Parameters below as of 21 Aug 2023 05:21  Patient On (Oxygen Delivery Method): room air      CAPILLARY BLOOD GLUCOSE          08-20 @ 07:01  -  08-21 @ 07:00  --------------------------------------------------------  IN: 840 mL / OUT: 0 mL / NET: 840 mL        GENERAL:   NECK: supple, No JVD  CHEST/LUNG: clear to auscultation bilaterally; no rales, rhonchi, or wheezing b/l  HEART: normal S1, S2  ABDOMEN: BS+, soft, ND, NT   EXTREMITIES:  pulses palpable; no clubbing, cyanosis, or edema b/l LEs  SKIN: no rashes or lesions      LABS:                        11.9   11.87 )-----------( 207      ( 19 Aug 2023 16:40 )             35.3     08-19    139  |  105  |  15  ----------------------------<  117<H>  5.1   |  29  |  0.74    Ca    9.5      19 Aug 2023 16:40    TPro  7.7  /  Alb  2.4<L>  /  TBili  0.2  /  DBili  x   /  AST  28  /  ALT  48  /  AlkPhos  63  08-19      Urinalysis Basic - ( 19 Aug 2023 16:40 )    Color: x / Appearance: x / SG: x / pH: x  Gluc: 117 mg/dL / Ketone: x  / Bili: x / Urobili: x   Blood: x / Protein: x / Nitrite: x   Leuk Esterase: x / RBC: x / WBC x   Sq Epi: x / Non Sq Epi: x / Bacteria: x             INTERVAL HPI/OVERNIGHT EVENTS:  Pt seen and examined at bedside.     Allergies/Intolerance: No Known Allergies      MEDICATIONS  (STANDING):  dexAMETHasone     Tablet 2 milliGRAM(s) Oral daily  enoxaparin Injectable 40 milliGRAM(s) SubCutaneous every 24 hours  pantoprazole    Tablet 40 milliGRAM(s) Oral before breakfast    MEDICATIONS  (PRN):  acetaminophen     Tablet .. 650 milliGRAM(s) Oral every 6 hours PRN Moderate Pain (4 - 6)  ketorolac   Injectable 15 milliGRAM(s) IV Push every 6 hours PRN Moderate Pain (4 - 6)  traMADol 25 milliGRAM(s) Oral every 8 hours PRN Severe Pain (7 - 10)        ROS: all systems reviewed and wnl      PHYSICAL EXAMINATION:  Vital Signs Last 24 Hrs  T(C): 36.8 (21 Aug 2023 05:21), Max: 37.1 (20 Aug 2023 17:01)  T(F): 98.3 (21 Aug 2023 05:21), Max: 98.7 (20 Aug 2023 17:01)  HR: 93 (21 Aug 2023 05:21) (86 - 108)  BP: 121/81 (21 Aug 2023 05:21) (121/80 - 121/81)  BP(mean): --  RR: 17 (21 Aug 2023 05:21) (17 - 17)  SpO2: 96% (21 Aug 2023 05:21) (94% - 97%)    Parameters below as of 21 Aug 2023 05:21  Patient On (Oxygen Delivery Method): room air      CAPILLARY BLOOD GLUCOSE          08-20 @ 07:01  -  08-21 @ 07:00  --------------------------------------------------------  IN: 840 mL / OUT: 0 mL / NET: 840 mL        GENERAL: some HA still, no fevers.    NECK: supple, No JVD  CHEST/LUNG: clear to auscultation bilaterally; no rales, rhonchi, or wheezing b/l  HEART: normal S1, S2  ABDOMEN: BS+, soft, ND, NT   EXTREMITIES:  pulses palpable; no clubbing, cyanosis, or edema b/l LEs    LABS:                        11.9   11.87 )-----------( 207      ( 19 Aug 2023 16:40 )             35.3     08-19    139  |  105  |  15  ----------------------------<  117<H>  5.1   |  29  |  0.74    Ca    9.5      19 Aug 2023 16:40    TPro  7.7  /  Alb  2.4<L>  /  TBili  0.2  /  DBili  x   /  AST  28  /  ALT  48  /  AlkPhos  63  08-19      Urinalysis Basic - ( 19 Aug 2023 16:40 )    Color: x / Appearance: x / SG: x / pH: x  Gluc: 117 mg/dL / Ketone: x  / Bili: x / Urobili: x   Blood: x / Protein: x / Nitrite: x   Leuk Esterase: x / RBC: x / WBC x   Sq Epi: x / Non Sq Epi: x / Bacteria: x

## 2023-08-21 NOTE — PROGRESS NOTE ADULT - ASSESSMENT
52 year old woman hx of lung cancer reported by patient to be in remission who presents to the ER after a syncopal episode.  She reports that this is the 3rd episode this week.  She has been experiencing over the past 2 months headaches but worse over the past 2 weeks and more frequent in the past 3 days.  She denies fever, neck stiffness, URI symptoms, head injury, vision changes, speech changes and focal weakness.  Patient also reporting about 2 weeks of exertional dyspnea, lightheadedness and palpitations.  She denies cough and chest pain.      Plan:  Admit to tele for syncope, HA and lung cancer. Follows with Oncologist at [facility]. Had CT early June no abdominal mets. CTA here in ER notes no   PE, enlarging RUL lung mass and new left lung lesion. CT head cannot r/o new mets. MRI with and without contrast ordered.  Will likely need updated staging   CT of abdomen and pelvis after that. Day team will need to call Oncology Consult on Monday. Last Chemo and XRT finished three months ago.  Calcium normal   at 9.2. No clear infections.  TTE for syncope eval, no prior cardiac history.      Continue home regimen of Decadron 2 mg/day taper to off slowly.  Was recently started on Decadron by her Oncologist. States she is not on Kepra at home.   52 year old woman hx of lung cancer reported by patient to be in remission who presents to the ER after a syncopal episode.  She reports that this is the 3rd episode this week.  She has been experiencing over the past 2 months headaches but worse over the past 2 weeks and more frequent in the past 3 days.  She denies fever, neck stiffness, URI symptoms, head injury, vision changes, speech changes and focal weakness.  Patient also reporting about 2 weeks of exertional dyspnea, lightheadedness and palpitations.  She denies cough and chest pain.      Plan:  Admit to tele for syncope, HA and lung cancer. Follows with Oncologist at [facility] Had CT early June no abdominal mets. CTA here in ER notes no PE, enlarging RUL lung mass and new left lung lesion. CT head cannot r/o new mets. MRI with and without contrast ordered.  Will likely need updated staging CT of abdomen and pelvis after that. Day team will need to call Oncology Consult on Monday. Last Chemo and XRT finished three months ago.  Calcium normal   at 9.2. No clear infections.  TTE for syncope eval, no prior cardiac history.      Continue home regimen of Decadron 2 mg/day taper to off slowly.  Was recently started on Decadron by her Oncologist. States she is not on Kepra at home.   52 year old woman hx of lung cancer reported by patient to be in remission who presents to the ER after a syncopal episode.  She reports that this is the 3rd episode this week.  She has been experiencing over the past 2 months headaches but worse over the past 2 weeks and more frequent in the past 3 days.  She denies fever, neck stiffness, URI symptoms, head injury, vision changes, speech changes and focal weakness.  Patient also reporting about 2 weeks of exertional dyspnea, lightheadedness and palpitations.  She denies cough and chest pain.      Plan:  Admit to tele for syncope, HA and lung cancer. Follows with Oncologist at [facility]. Had CT early June no abdominal mets. CTA here in ER notes no PE, enlarging RUL lung mass and new left lung lesion. CT head cannot r/o new mets. MRI with and without contrast ordered.  Will likely need updated staging CT of abdomen and pelvis after that. Day team will need to call Oncology Consult on Monday. Last Chemo and XRT finished three months ago.  Calcium normal   at 9.2. No clear infections.  TTE for syncope eval, no prior cardiac history.      Continue home regimen of Decadron 2 mg/day taper to off slowly.  Was recently started on Decadron by her Oncologist. States she is not on Kepra at home.      Discussed with Oncology Service today, will increase Decadron to 4 mg bid, spoke to Neuro Dr. Baum, will restart Kepra 500 mg bid as CNS mets are worse.     She will see as Neuro consult in AM.

## 2023-08-21 NOTE — CONSULT NOTE ADULT - ASSESSMENT
52 year old woman hx of lung cancer who presents to the ER after a syncopal episode.     #NSCLC  52 year old woman hx of lung cancer who presents to the ER after a syncopal episode.     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 s/p 4 cycles chemo (carboplatin/pemetrexed -completed 9/22) and Thoracic RT completed 10/22.  declined Durvalumab at the time. Then hospitalized 5/23 and found to have brain mets, s/p GK-SRS to 7 brain metastases in 5 fractions from 6/21 - 6/29/2023. Patient started on steroid layla at that time. Plan to start on immunotherapy with nivolumab but patient decline 52 year old woman hx of lung cancer who presents to the ER after a syncopal episode.     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 s/p 4 cycles chemo (carboplatin/pemetrexed -completed 9/22) and Thoracic RT completed 10/22.  declined Durvalumab at the time. Then hospitalized 5/23 and found to have brain mets, s/p GK-SRS to 7 brain metastases in 5 fractions from 6/21 - 6/29/2023. Patient started on steroid layla at that time. Plan to start on immunotherapy with nivolumab but patient decline as of now.  -patient was having increasing neurological symptoms including HA and syncope presented to [facility] ER after a syncopal episode.  -CT-H showing High left posterior frontal, left posterior temporal occipital, and right temporal mass lesions, described in detail above, all appear slightly decreased in size compared with prior MRI imaging dated 6/2/2023 and 6/19/2023. However, prior smaller intraparenchymal mass lesions and a right cerebellar process appreciated on the prior MRI imaging of 6/2/2023 and 6/19/2023, New decreased attenuation appreciated in a left posterior temporal occipital location on the current CTA examination may represent an edematous versus ischemic changes which are new in the intervalsince prior imaging.  MRI-H showing Multiple intracranial metastasis, Peripherally enhancing cystic mass in the right temporal lobe measures 4.0 x 3.4 x 3.1 cm with subtle susceptibility artifact. Cystic enhancing mass in the left occipital lobe measures 3.1 x 2.2 x 2.3 cm. High left frontal lobe cystic mass measures 1.9 x 1.5 cm. Superior right temporal   lobe enhancing lesion measures 9.6 mm. Tiny enhancing lesion in the anterior frontal lobe measures 6.2 mm. High posterior frontal lobe lesion   measures 6.8 mm. Moderate vasogenic edema in the left frontal lobe, right temporal lobe, left occipital lobe.  -will contact radiology for comparison to previous MRI  -rec Eval by Rad ONC/ Neurology, and Neuro sx - discussed with Dr. Bautista (8/21)  -rec palliative eval   -would rec increasing dexamethasone to 4mg BID  -patient can f/u with [facility] (Dr. Beckman)  [address] [place] [address]· [phone] upon d/c for further tx options          Thank you for the referral. Will continue to monitor the patient.  Please call with any questions [phone]  Above reviewed with Attending Dr. Shakira GONSALES/[facility]  [address], [address], [place], [place]  [phone]  *Note not finalized until signed by Attending Physician

## 2023-08-21 NOTE — CONSULT NOTE ADULT - SUBJECTIVE AND OBJECTIVE BOX
incomplete       Hematology Consult Note    HPI:  52 year old woman hx of lung cancer reported by patient to be in remission who presents to the ER after a syncopal episode.  She reports that this is the 3rd episode this week.  She has been experiencing over the past 2 months headaches but worse over the past 2 weeks and more frequent in the past 3 days.  She denies fever, neck stiffness, URI symptoms, head injury, vision changes, speech changes and focal weakness.  Patient also reporting about 2 weeks of exertional dyspnea, lightheadedness and palpitations.  She denies cough and chest pain. Patient states she was told in the past she has stage IV lung cancer.    (19 Aug 2023 20:04)    Heme ONC HX:  Oncologist Dr. Beckman  NSCLC-adenocarcinoma with neuroendocrine features l Stage IIIB, T4 N2 M0 disease  7/22- started on carboplatin/pemetrexed -completed 4 cycles 9/22 9/22 started Thoracic RT -completed October 2022.  -Upon completion of definitive therapy, she declined recommended treatment with Durvalumab despite numerous discussions  May 2023-hopsitlized and found to have brain metastases-started on steroids as well as levetiracetam for seizure prophylaxis   - Patient is status post GK-SRS to 7 brain metastases in 5 fractions from 6/21 - 6/29/2023. and steroid layla dex 2mg concluded 8/5  -plan to start immunotherapy with nivolumab as scheduled on 8/4/2023 but patient hesitant at time and not started as of yet        Allergies    No Known Allergies    Intolerances        MEDICATIONS  (STANDING):  dexAMETHasone     Tablet 2 milliGRAM(s) Oral daily  enoxaparin Injectable 40 milliGRAM(s) SubCutaneous every 24 hours  pantoprazole    Tablet 40 milliGRAM(s) Oral before breakfast    MEDICATIONS  (PRN):  acetaminophen     Tablet .. 650 milliGRAM(s) Oral every 6 hours PRN Moderate Pain (4 - 6)  ketorolac   Injectable 15 milliGRAM(s) IV Push every 6 hours PRN Moderate Pain (4 - 6)  traMADol 25 milliGRAM(s) Oral every 8 hours PRN Severe Pain (7 - 10)      PAST MEDICAL & SURGICAL HISTORY:  GERD (Gastroesophageal Reflux Disease)      Lung mass  right      Non-small cell lung cancer with metastasis      S/P endoscopy  2022          FAMILY HISTORY:  No pertinent family history in first degree relatives        SOCIAL HISTORY: No EtOH, no tobacco    REVIEW OF SYSTEMS:    CONSTITUTIONAL: No weakness, fevers or chills  EYES/ENT: No visual changes;  No vertigo or throat pain   NECK: No pain or stiffness  RESPIRATORY: No cough, wheezing, hemoptysis; No shortness of breath  CARDIOVASCULAR: No chest pain or palpitations  GASTROINTESTINAL: No abdominal or epigastric pain. No nausea, vomiting, or hematemesis; No diarrhea or constipation. No melena or hematochezia.  GENITOURINARY: No dysuria, frequency or hematuria  NEUROLOGICAL: No numbness or weakness  SKIN: No itching, burning, rashes, or lesions   All other review of systems is negative unless indicated above.        T(F): 97.8 (08-21-23 @ 10:19), Max: 98.7 (08-20-23 @ 17:01)  HR: 115 (08-21-23 @ 10:19)  BP: 100/63 (08-21-23 @ 10:19)  RR: 17 (08-21-23 @ 10:19)  SpO2: 97% (08-21-23 @ 10:19)  Wt(kg): --    GENERAL: NAD, well-developed  HEAD:  Atraumatic, Normocephalic  EYES: EOMI, PERRLA, conjunctiva and sclera clear  NECK: Supple, No JVD  CHEST/LUNG: Clear to auscultation bilaterally; No wheeze  HEART: Regular rate and rhythm; No murmurs, rubs, or gallops  ABDOMEN: Soft, Nontender, Nondistended; Bowel sounds present  EXTREMITIES:  2+ Peripheral Pulses, No clubbing, cyanosis, or edema  NEUROLOGY: non-focal  SKIN: No rashes or lesions                          11.9   11.87 )-----------( 207      ( 19 Aug 2023 16:40 )             35.3       08-19    139  |  105  |  15  ----------------------------<  117<H>  5.1   |  29  |  0.74    Ca    9.5      19 Aug 2023 16:40    TPro  7.7  /  Alb  2.4<L>  /  TBili  0.2  /  DBili  x   /  AST  28  /  ALT  48  /  AlkPhos  63  08-19           incomplete       Hematology Consult Note    HPI:  52 year old woman hx of lung cancer reported by patient to be in remission who presents to the ER after a syncopal episode.  She reports that this is the 3rd episode this week.  She has been experiencing over the past 2 months headaches but worse over the past 2 weeks and more frequent in the past 3 days.  She denies fever, neck stiffness, URI symptoms, head injury, vision changes, speech changes and focal weakness.  Patient also reporting about 2 weeks of exertional dyspnea, lightheadedness and palpitations.  She denies cough and chest pain. Patient states she was told in the past she has stage IV lung cancer.    (19 Aug 2023 20:04)    Heme ONC HX:  Oncologist Dr. Beckman  NSCLC-adenocarcinoma with neuroendocrine features l Stage IIIB, T4 N2 M0 disease  7/22- started on carboplatin/pemetrexed -completed 4 cycles 9/22 9/22 started Thoracic RT -completed October 2022.  -Upon completion of definitive therapy, she declined recommended treatment with Durvalumab despite numerous discussions  -5/2023-hopsitlized and found to have brain metastases-started on steroids as well as levetiracetam for seizure prophylaxis   - Patient is status post GK-SRS to 7 brain metastases in 5 fractions from 6/21 - 6/29/2023. and steroid layla dex 2mg plan for OP MRI-H 8/22  -plan to start immunotherapy with nivolumab as scheduled on 8/4/2023 but patient hesitant at time and not started as of yet        Allergies    No Known Allergies    Intolerances        MEDICATIONS  (STANDING):  dexAMETHasone     Tablet 2 milliGRAM(s) Oral daily  enoxaparin Injectable 40 milliGRAM(s) SubCutaneous every 24 hours  pantoprazole    Tablet 40 milliGRAM(s) Oral before breakfast    MEDICATIONS  (PRN):  acetaminophen     Tablet .. 650 milliGRAM(s) Oral every 6 hours PRN Moderate Pain (4 - 6)  ketorolac   Injectable 15 milliGRAM(s) IV Push every 6 hours PRN Moderate Pain (4 - 6)  traMADol 25 milliGRAM(s) Oral every 8 hours PRN Severe Pain (7 - 10)      PAST MEDICAL & SURGICAL HISTORY:  GERD (Gastroesophageal Reflux Disease)      Lung mass  right      Non-small cell lung cancer with metastasis      S/P endoscopy  2022          FAMILY HISTORY:  No pertinent family history in first degree relatives        SOCIAL HISTORY: No EtOH, no tobacco    REVIEW OF SYSTEMS:    CONSTITUTIONAL: No weakness, fevers or chills  EYES/ENT: No visual changes;  No vertigo or throat pain   NECK: No pain or stiffness  RESPIRATORY: No cough, wheezing, hemoptysis; No shortness of breath  CARDIOVASCULAR: No chest pain or palpitations  GASTROINTESTINAL: No abdominal or epigastric pain. No nausea, vomiting, or hematemesis; No diarrhea or constipation. No melena or hematochezia.  GENITOURINARY: No dysuria, frequency or hematuria  NEUROLOGICAL: No numbness or weakness  SKIN: No itching, burning, rashes, or lesions   All other review of systems is negative unless indicated above.        T(F): 97.8 (08-21-23 @ 10:19), Max: 98.7 (08-20-23 @ 17:01)  HR: 115 (08-21-23 @ 10:19)  BP: 100/63 (08-21-23 @ 10:19)  RR: 17 (08-21-23 @ 10:19)  SpO2: 97% (08-21-23 @ 10:19)  Wt(kg): --    GENERAL: NAD, well-developed  HEAD:  Atraumatic, Normocephalic  EYES: EOMI, PERRLA, conjunctiva and sclera clear  NECK: Supple, No JVD  CHEST/LUNG: Clear to auscultation bilaterally; No wheeze  HEART: Regular rate and rhythm; No murmurs, rubs, or gallops  ABDOMEN: Soft, Nontender, Nondistended; Bowel sounds present  EXTREMITIES:  2+ Peripheral Pulses, No clubbing, cyanosis, or edema  NEUROLOGY: non-focal  SKIN: No rashes or lesions                          11.9   11.87 )-----------( 207      ( 19 Aug 2023 16:40 )             35.3       08-19    139  |  105  |  15  ----------------------------<  117<H>  5.1   |  29  |  0.74    Ca    9.5      19 Aug 2023 16:40    TPro  7.7  /  Alb  2.4<L>  /  TBili  0.2  /  DBili  x   /  AST  28  /  ALT  48  /  AlkPhos  63  08-19             Hematology Consult Note    HPI:  52 year old woman hx of lung cancer  presents to the ER after a syncopal episode.  She reports that this is the 3rd episode this week.  She has been experiencing over the past 2 months headaches but worse over the past 2 weeks and more frequent in the past 3 days.  She denies fever, neck stiffness, URI symptoms, head injury, vision changes, speech changes and focal weakness.  Patient also reporting about 2 weeks of exertional dyspnea, lightheadedness and palpitations.  She denies cough and chest pain. Patient states she was told in the past she has stage IV lung cancer.    (19 Aug 2023 20:04)    Heme ONC HX:  Oncologist Dr. Beckman  NSCLC-adenocarcinoma with neuroendocrine features l Stage IIIB, T4 N2 M0 disease  7/22- started on carboplatin/pemetrexed -completed 4 cycles 9/22 9/22 started Thoracic RT -completed October 2022.  -Upon completion of definitive therapy, she declined recommended treatment with Durvalumab despite numerous discussions  -5/2023-hopsitlized and found to have brain metastases-started on steroids as well as levetiracetam for seizure prophylaxis   - Patient is status post GK-SRS to 7 brain metastases in 5 fractions from 6/21 - 6/29/2023. and steroid layla dex 2mg plan for OP MRI-H 8/22  -plan to start immunotherapy with nivolumab as scheduled on 8/4/2023 but patient hesitant at time and not started as of yet        Allergies    No Known Allergies    Intolerances        MEDICATIONS  (STANDING):  dexAMETHasone     Tablet 2 milliGRAM(s) Oral daily  enoxaparin Injectable 40 milliGRAM(s) SubCutaneous every 24 hours  pantoprazole    Tablet 40 milliGRAM(s) Oral before breakfast    MEDICATIONS  (PRN):  acetaminophen     Tablet .. 650 milliGRAM(s) Oral every 6 hours PRN Moderate Pain (4 - 6)  ketorolac   Injectable 15 milliGRAM(s) IV Push every 6 hours PRN Moderate Pain (4 - 6)  traMADol 25 milliGRAM(s) Oral every 8 hours PRN Severe Pain (7 - 10)      PAST MEDICAL & SURGICAL HISTORY:  GERD (Gastroesophageal Reflux Disease)      Lung mass  right      Non-small cell lung cancer with metastasis      S/P endoscopy  2022          FAMILY HISTORY:  No pertinent family history in first degree relatives        SOCIAL HISTORY: No EtOH, no tobacco    REVIEW OF SYSTEMS:    CONSTITUTIONAL: c/op dizziness and HA  EYES/ENT: No visual changes;  No vertigo or throat pain   NECK: No pain or stiffness  RESPIRATORY: No cough, wheezing, hemoptysis; No shortness of breath  CARDIOVASCULAR: No chest pain or palpitations  GASTROINTESTINAL: No abdominal or epigastric pain. No nausea, vomiting, or hematemesis; No diarrhea or constipation. No melena or hematochezia.  GENITOURINARY: No dysuria, frequency or hematuria  NEUROLOGICAL: No numbness or weakness  SKIN: No itching, burning, rashes, or lesions   All other review of systems is negative unless indicated above.        T(F): 97.8 (08-21-23 @ 10:19), Max: 98.7 (08-20-23 @ 17:01)  HR: 115 (08-21-23 @ 10:19)  BP: 100/63 (08-21-23 @ 10:19)  RR: 17 (08-21-23 @ 10:19)  SpO2: 97% (08-21-23 @ 10:19)  Wt(kg): --    GENERAL: NAD,   HEAD:  Atraumatic, Normocephalic  EYES: EOMI, PERRLA, conjunctiva and sclera clear  NECK: Supple, No JVD  CHEST/LUNG: Clear to auscultation bilaterally; No wheeze  HEART: Regular rate and rhythm; No murmurs, rubs, or gallops  ABDOMEN: Soft, Nontender, Nondistended; Bowel sounds present  EXTREMITIES:  2+ Peripheral Pulses, No clubbing, cyanosis, or edema  NEUROLOGY: non-focal  SKIN: No rashes or lesions                          11.9   11.87 )-----------( 207      ( 19 Aug 2023 16:40 )             35.3       08-19    139  |  105  |  15  ----------------------------<  117<H>  5.1   |  29  |  0.74    Ca    9.5      19 Aug 2023 16:40    TPro  7.7  /  Alb  2.4<L>  /  TBili  0.2  /  DBili  x   /  AST  28  /  ALT  48  /  AlkPhos  63  08-19    < from: CT Angio Chest PE Protocol w/ IV Cont (08.19.23 @ 17:44) >  ACC: [id number] EXAM:  CT ANGIO CHEST PULM ART WAWIC   ORDERED BY: KENNETH MONACO     PROCEDURE DATE:  08/19/2023          INTERPRETATION:  CLINICAL INFORMATION: Syncope, shortness of breath    COMPARISON: Chest CT 6/1/2023    CONTRAST/COMPLICATIONS:  IV Contrast: Omnipaque 350  70 cc administered   30 cc discarded  Oral Contrast: NONE  Complications: None reported at time of study completion    PROCEDURE:  CT Angiography of the Chest.  Sagittal and coronal reformats were performed as well as 3D(MIP)   reconstructions.    FINDINGS:    PULMONARY ARTERIES: No pulmonary embolism.    LUNGS AND LARGE AIRWAYS: The central airways are patent. Enlarging right   apical neoplasm measuring 5.0 x 3.8 cm (13-43), previously 3.4 x 2.6 cm.   Interval development of a small right middle lobe consolidative opacity   and surrounding patchy opacities, possibly infectious or neoplastic.   Interval development of a 0.9 cm left lower lobe basilar nodule, which   could be metastatic ().  PLEURA: No pleural abnormality.  VESSELS: Normal caliber aorta.  HEART: Normal heart size. No pericardial effusion.  MEDIASTINUM AND YULIET: No adenopathy.  CHEST WALL AND LOWER NECK: No masses.  VISUALIZED UPPER ABDOMEN: Stable left lateral segment cyst.  BONES: No acute bony abnormality.    IMPRESSION:  *  No pulmonary embolism.  *  Enlarging right apical neoplasm  *  Development of right middle lobe consolidative opacity, infectious   versus neoplastic.  *  Development of a 0.9 cm left lower lobe nodule, possibly metastatic      --- End of Report ---          < from: CT Head No Cont (08.19.23 @ 17:41) >  ACC: [id number] EXAM:  CT BRAIN   ORDERED BY: KENNETH MONACO     PROCEDURE DATE:  08/19/2023          INTERPRETATION:  CT BRAIN WITHOUT CONTRAST    INDICATIONS:  Syncope and headache. Patient with known history of   non-small cell lung cancer and intracranial mass lesions.    TECHNIQUE:  Serial axial images were obtained from the skull base to the   vertex without the use of contrast.    COMPARISON EXAM: Noncontrast CT brain 5/31/2023, MRI of the brain dated   6/2/2023 and 6/19/2023.    FINDINGS: There is a high left posterior frontal mass lesion measuring   approximately 0.9 x 0.7 x 0.9 mm in diameter. This is decreased in size   compared with the earlier CT of 5/31/2023 and neck MRI dated 6/19/2023. A   left posterior temporal mass lesion with associated calcification   measures approximately 3 x 1.7 x 1.9 cm and is slightly decreased in size   compared with prior MRI dated 6/19/2023. A right temporal mass lesion   measures approximately 3.6 x 2.6 x 2.6 cm, and also appears mildly   decreased compared with the prior. Please note that smaller lesions   appreciated on the prior postcontrast MRI study, inclusive of a right   cerebellar process, are not clearly appreciated on the current   noncontrast CT of the brain.    Findings includedecreased attenuation in a left posterior temporal   occipital location, not clearly present on the prior noncontrast CT of   5/31/2023 or the earlier MRI imaging dated 6/2/2023. Question new onset   ischemic changes versus edematous changes in this region. Recommend   follow-up pre and postcontrast MR imaging for further characterization of   findings.    There is suggestion of mass effect on the right lateral ventricle without   significant right to left midline shift and these findings are stable in   appearance compared with the prior MRI of 6/19/2023.    Bilateral optic globes are intact. Imaged paranasal sinuses, bilateral   mastoid air cells and middle ear cavities are predominantly clear.    IMPRESSION:  1. High left posterior frontal, left posterior temporal occipital, and   right temporal mass lesions, described in detail above, all appear   slightly decreased in size compared with prior MRI imaging dated 6/2/2023   and 6/19/2023. However, prior smaller intraparenchymal mass lesions and a   right cerebellar process appreciated on the prior MRI imaging of 6/2/2023   and 6/19/2023, are not clearly appreciated on the current examination. As   such, recommend follow-up pre and postcontrast MRI imaging of the brain   for more accurate characterization of patient's findings.  2. New decreased attenuation appreciated in a left posterior temporal   occipital location on the current CTA examination may represent an   edematous versus ischemic changes which are new in the intervalsince   prior imaging. Once again, follow-up MR imaging would likely be helpful   for further characterization of this finding, as well.    Findings were communicated by Dr. Behr Ventura to Dr. Kenneth Monaco in   the emergency department at approximately 6:18 PM on 8/19/2023.    --- End of Report ---      < from: MR Head w/wo IV Cont (08.20.23 @ 13:53) >  ACC: [id number] EXAM:  MR BRAIN WAW IC   ORDERED BY: LORA RODRIGUEZ     PROCEDURE DATE:  08/20/2023          INTERPRETATION:  CLINICAL INDICATIONS: headaches, follow up to admission   CT head    COMPARISON: CT dated 8/19/2023    TECHNIQUE: MRI brain: Multiplanar, multisequence MR imaging of the brain   are obtained with and without the administration of 6.5 cc intravenous   Gadavist contrast. 3.3 cc of contrast was discarded    FINDINGS:  Peripherally enhancing cystic mass in the right temporal lobe measures   4.0 x 3.4 x 3.1 cm with subtle susceptibility artifact. Cystic enhancing   mass in the left occipital lobe measures 3.1 x 2.2 x 2.3 cm. High left   frontal lobe cystic mass measures 1.9 x 1.5 cm. Superior right temporal   lobe enhancing lesion measures 9.6 mm. Tiny enhancing lesion in the   anterior frontal lobe measures 6.2 mm. High posterior frontal lobe lesion   measures 6.8 mm. Moderate vasogenic edema in the left frontal lobe, right   temporal lobe, left occipital lobe.  There is no abnormal restricted diffusion to suggest acute infarction.   Scattered periventricular and subcortical white matter T2 /FLAIR   hyperintensities are seen without mass effect, nonspecific, likely   representing mild to moderate chronic microvascular changes. Normal T2   flow-voids are seen within  the intracranial vasculature. The lateral   ventricles and cortical sulci are age-appropriate in size and   configuration. There is no susceptibility artifact to suggest hemorrhage.   Midline structuresare normal.  The visualized paranasal sinuses, mastoid   air cells and orbits are unremarkable.      IMPRESSION: Multiple intracranial metastasis.    --- End of Report ---

## 2023-08-21 NOTE — CONSULT NOTE ADULT - NS ATTEND AMEND GEN_ALL_CORE FT
# NSCLC   # Brain Mets:   Detailed Onc history as above   Patient A&Ox3, symptoms of headache.   MRI Brain reviewed; and comparisons noted.   Spoke with Dr. Zurdo Bernard (out-pt Rad-Onc).   Neurology eval; on Keppra.   -Increase Dex to 4 mg po bid   If symptoms improve, will consider out-pt f/u with Dr. Bernard and Dr. Rk CHAVEZ after discharge. Patient in agreement   #DVT ppx

## 2023-08-22 NOTE — CONSULT NOTE ADULT - SUBJECTIVE AND OBJECTIVE BOX
HPI: 52 year old woman with hx of lung cancer and mets presenting with syncope on 8/19/23. She walked out of an air conditioned car into the heat and while walking into a store, she passed out. She does not recall the episode. She had 2 other syncopal episodes within the week. She states she was never told to take Keppra at home in the past. She was told to wean off steroids about 2 weeks prior to the syncope. She started getting severe morning headaches. MRI brain shows multiple intracranial mets with edema. Her dexamethasone was increased.     PMHx: Lung cancer with mets, GERD  PSHx: chemo and radiation  FHx: none  Allergies: NKDA  Meds: See EMR  ROS: syncope  Social Hx: non-smoker, no etoh, no illicit drug use    Vitals: Temp 98.4F      RR 18              /92  General: NAD  Neuro Exam: AOx4. Follows commands. No dysarthria. No aphasia EOM intact. PERRL. VFF. Tongue is midline. Palate elevates symmetrically. Shoulder shrug is intact. Moving all four extremities. No focal weakness. Finger to nose and heel to shin intact. Reflexes symmetric and toes down. Gait exam deferred. Mild positional tremor in left more than right hand.     NIHSS- 0    MRI brain and labs reviewed

## 2023-08-22 NOTE — PROGRESS NOTE ADULT - ASSESSMENT
52 year old woman hx of lung cancer reported by patient to be in remission who presents to the ER after a syncopal episode.  She reports that this is the 3rd episode this week.  She has been experiencing over the past 2 months headaches but worse over the past 2 weeks and more frequent in the past 3 days.  She denies fever, neck stiffness, URI symptoms, head injury, vision changes, speech changes and focal weakness.  Patient also reporting about 2 weeks of exertional dyspnea, lightheadedness and palpitations.  She denies cough and chest pain.      Plan:  Admit to tele for syncope, HA and lung cancer. Follows with Oncologist at [facility]. Had CT early June no abdominal mets. CTA here in ER notes no PE, enlarging RUL lung mass and new left lung lesion. CT head cannot r/o new mets. MRI with and without contrast ordered.  Will likely need updated staging CT of abdomen and pelvis after that. Day team will need to call Oncology Consult on Monday. Last Chemo and XRT finished three months ago.  Calcium normal   at 9.2. No clear infections.  TTE for syncope eval, no prior cardiac history.      Continue home regimen of Decadron 2 mg/day taper to off slowly.  Was recently started on Decadron by her Oncologist. States she is not on Kepra at home.      Discussed with Oncology Service today, will increase Decadron to 4 mg bid, spoke to Neuro Dr. Baum, will restart Kepra 500 mg bid as CNS mets are worse.     She will see as Neuro consult in AM.  52 year old woman hx of lung cancer reported by patient to be in remission who presents to the ER after a syncopal episode.  She reports that this is the 3rd episode this week.  She has been experiencing over the past 2 months headaches but worse over the past 2 weeks and more frequent in the past 3 days.  She denies fever, neck stiffness, URI symptoms, head injury, vision changes, speech changes and focal weakness.  Patient also reporting about 2 weeks of exertional dyspnea, lightheadedness and palpitations.  She denies cough and chest pain.      Plan:  Admit to tele for syncope, HA and lung cancer. Follows with Oncologist at [facility]. Had CT early June no abdominal mets. CTA here in ER notes no PE, enlarging RUL lung mass and new left lung lesion. CT head cannot r/o new mets. MRI with and without contrast ordered.  Will likely need updated staging CT of abdomen and pelvis after that. Day team will need to call Oncology Consult on Monday. Last Chemo and XRT finished three months ago.  Calcium normal   at 9.2. No clear infections.  TTE for syncope eval, no prior cardiac history.      Increase Decadron to 4 mg bid. Was recently started on Decadron by her Oncologist.     Restart Kepra 500 mg bid for multiple brain mets.        Discussed with Oncology Service today, will increase Decadron to 4 mg bid, spoke to Neuro Dr. Baum, will restart Kepra 500 mg bid as CNS mets are worse.     She will see as Neuro consult in AM.     Likely discharge home today.  52 year old woman hx of lung cancer reported by patient to be in remission who presents to the ER after a syncopal episode.  She reports that this is the 3rd episode this week.  She has been experiencing over the past 2 months headaches but worse over the past 2 weeks and more frequent in the past 3 days.  She denies fever, neck stiffness, URI symptoms, head injury, vision changes, speech changes and focal weakness.  Patient also reporting about 2 weeks of exertional dyspnea, lightheadedness and palpitations.  She denies cough and chest pain.      Plan:  Admit to tele for syncope, HA and lung cancer. Follows with Oncologist at [facility]. Had CT early June no abdominal mets. CTA here in ER notes no PE, enlarging RUL lung mass and new left lung lesion. CT head cannot r/o new mets. MRI with and without contrast ordered.  Will likely need updated staging CT of abdomen and pelvis after that. Day team will need to call Oncology Consult on Monday. Last Chemo and XRT finished three months ago.  Calcium normal   at 9.2. No clear infections.  TTE for syncope eval, no prior cardiac history.      Increase Decadron to 4 mg bid. Was recently started on Decadron by her Oncologist.     Restart Kepra 500 mg bid for multiple brain mets.        Discussed with Oncology Service today, will increase Decadron to 4 mg bid, spoke to Neuro Dr. Baum, will restart Kepra 500 mg bid as CNS mets are worse.     She will see as Neuro consult in AM.     Not ready for discharge today, still dizzy with ambulation.     Discussed with radiation oncology at [facility], no need for transfer, can see them in one week after discharge.   Will consider more brain XRT then.

## 2023-08-22 NOTE — PROGRESS NOTE ADULT - SUBJECTIVE AND OBJECTIVE BOX
Heme/Onc Progress note    INTERVAL HPI/OVERNIGHT EVENTS:  Patient S&E at bedside. No o/n events, still having slight HA, denies change invision, sensation or movement. Patient denies fever, chills, , weakness, CP, palpitations, SOB, cough, N/V/D/C, dysuria, changes in bowel movements, LE edema.    VITAL SIGNS:  T(F): 98 (08-22-23 @ 04:17)  HR: 84 (08-22-23 @ 04:17)  BP: 127/78 (08-22-23 @ 04:17)  RR: 18 (08-22-23 @ 04:17)  SpO2: 94% (08-22-23 @ 04:17)  Wt(kg): --    PHYSICAL EXAM:    Constitutional: slight HA  Eyes: EOMI, sclera non-icteric  Neck: supple, no masses, no JVD  Respiratory: CTA b/l, good air entry b/l  Cardiovascular: RRR, no M/R/G  Gastrointestinal: soft, NTND, no masses palpable, + BS,   Extremities: no c/c/e  Neurological: AAOx3      MEDICATIONS  (STANDING):  dexAMETHasone     Tablet 4 milliGRAM(s) Oral two times a day  enoxaparin Injectable 40 milliGRAM(s) SubCutaneous every 24 hours  levETIRAcetam 500 milliGRAM(s) Oral two times a day  pantoprazole    Tablet 40 milliGRAM(s) Oral before breakfast    MEDICATIONS  (PRN):  acetaminophen     Tablet .. 650 milliGRAM(s) Oral every 6 hours PRN Moderate Pain (4 - 6)  ketorolac   Injectable 15 milliGRAM(s) IV Push every 6 hours PRN Moderate Pain (4 - 6)  traMADol 25 milliGRAM(s) Oral every 8 hours PRN Severe Pain (7 - 10)      Allergies    No Known Allergies    Intolerances        LABS:                RADIOLOGY & ADDITIONAL TESTS:  Studies reviewed.    < from: MR Head w/wo IV Cont (08.20.23 @ 13:53) >  ACC: [id number] EXAM:  MR BRAIN WAW IC   ORDERED BY: LORA RODRIGUEZ     *** ADDENDUM # 1 ***    Impression:    COMPARISON: MRI brain dated 6/2/2023.    Right cerebellar hemisphere metastasis on image 29 of series 9 measures 1   cm, smaller in size. The right temporal lobe and left occipital lobe   metastasis demonstrates decreased enhancement and slightly smaller in   size. Posterior right temporal lobe, left frontal lobe lesions are   smaller in size. High right frontal lobe lesion on image 100 of series 9   measures 6.1 mm, larger in size, previously measuring 3.8 mm..    --- End of Report ---    *** END OF ADDENDUM # 1 ***      PROCEDURE DATE:  08/20/2023          INTERPRETATION:  CLINICAL INDICATIONS: headaches, follow up to admission   CT head    COMPARISON: CT dated 8/19/2023    TECHNIQUE: MRI brain: Multiplanar, multisequence MR imaging of the brain   are obtained with and without the administration of 6.5 cc intravenous   Gadavist contrast. 3.3 cc of contrast was discarded    FINDINGS:  Peripherally enhancing cystic mass in the right temporal lobe measures   4.0 x 3.4 x 3.1 cm with subtle susceptibility artifact. Cystic enhancing   mass in the left occipital lobe measures 3.1 x 2.2 x 2.3 cm. High left   frontal lobe cystic mass measures 1.9 x 1.5 cm. Superior right temporal   lobe enhancing lesion measures 9.6 mm. Tiny enhancing lesion in the   anterior frontal lobe measures 6.2 mm. High posterior frontal lobe lesion   measures 6.8 mm. Moderate vasogenic edema in the left frontal lobe, right   temporal lobe, left occipital lobe.  There is no abnormal restricted diffusion to suggest acute infarction.   Scattered periventricular and subcortical white matter T2 /FLAIR   hyperintensities are seen without mass effect, nonspecific, likely   representing mild to moderate chronic microvascular changes. Normal T2   flow-voids are seen within  the intracranial vasculature. The lateral   ventricles and cortical sulci are age-appropriate in size and   configuration. There is no susceptibility artifact to suggest hemorrhage.   Midline structures are normal.  The visualized paranasal sinuses, mastoid   air cells and orbits are unremarkable.      IMPRESSION: Multiple intracranial metastasis.    --- End of Report ---    < end of copied text >

## 2023-08-22 NOTE — PROGRESS NOTE ADULT - SUBJECTIVE AND OBJECTIVE BOX
INTERVAL HPI/OVERNIGHT EVENTS:  Pt seen and examined at bedside.     Allergies/Intolerance: No Known Allergies      MEDICATIONS  (STANDING):  dexAMETHasone     Tablet 4 milliGRAM(s) Oral two times a day  enoxaparin Injectable 40 milliGRAM(s) SubCutaneous every 24 hours  levETIRAcetam 500 milliGRAM(s) Oral two times a day  pantoprazole    Tablet 40 milliGRAM(s) Oral before breakfast    MEDICATIONS  (PRN):  acetaminophen     Tablet .. 650 milliGRAM(s) Oral every 6 hours PRN Moderate Pain (4 - 6)  ketorolac   Injectable 15 milliGRAM(s) IV Push every 6 hours PRN Moderate Pain (4 - 6)  traMADol 25 milliGRAM(s) Oral every 8 hours PRN Severe Pain (7 - 10)        ROS: all systems reviewed and wnl      PHYSICAL EXAMINATION:  Vital Signs Last 24 Hrs  T(C): 36.7 (22 Aug 2023 04:17), Max: 37.1 (21 Aug 2023 23:27)  T(F): 98 (22 Aug 2023 04:17), Max: 98.7 (21 Aug 2023 23:27)  HR: 84 (22 Aug 2023 04:17) (84 - 131)  BP: 127/78 (22 Aug 2023 04:17) (100/63 - 148/89)  BP(mean): --  RR: 18 (22 Aug 2023 04:17) (17 - 19)  SpO2: 94% (22 Aug 2023 04:17) (94% - 100%)    Parameters below as of 22 Aug 2023 04:17  Patient On (Oxygen Delivery Method): room air      CAPILLARY BLOOD GLUCOSE          08-21 @ 07:01  -  08-22 @ 07:00  --------------------------------------------------------  IN: 240 mL / OUT: 0 mL / NET: 240 mL        GENERAL: stable, less HA, no syncope here on tele.    NECK: supple, No JVD  CHEST/LUNG: clear to auscultation bilaterally; no rales, rhonchi, or wheezing b/l  HEART: normal S1, S2  ABDOMEN: BS+, soft, ND, NT   EXTREMITIES:  pulses palpable; no clubbing, cyanosis, or edema b/l LEs    LABS:

## 2023-08-22 NOTE — CONSULT NOTE ADULT - ASSESSMENT
Recurrent syncope  Lung cancer with brain mets  Headaches  GERD    - continue Keppra 500mg BID. I would continue this as a home med as well.   - headaches likely due to brain edema. If not better in 1 week, then consider switching Keppra to Depakote 500mg BID (to prevent seizures and helps with headaches).   - follow up with oncology  - neuro stable and non-focal    Thank you for the consult.

## 2023-08-23 NOTE — PROGRESS NOTE ADULT - ASSESSMENT
52 year old woman hx of lung cancer who presents to the ER after a syncopal episode.     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 s/p 4 cycles chemo (carboplatin/pemetrexed -completed 9/22) and Thoracic RT completed 10/22.  declined Durvalumab at the time. Then hospitalized 5/23 and found to have brain mets, s/p GK-SRS to 7 brain metastases in 5 fractions from 6/21 - 6/29/2023. Patient started on steroid layla at that time. Plan to start on immunotherapy with nivolumab but patient decline as of now.  -patient was having increasing neurological symptoms including HA and syncope presented to [facility] ER after a syncopal episode.  -CT-H showing High left posterior frontal, left posterior temporal occipital, and right temporal mass lesions, described in detail above, all appear slightly decreased in size compared with prior MRI imaging dated 6/2/2023 and 6/19/2023. However, prior smaller intraparenchymal mass lesions and a right cerebellar process appreciated on the prior MRI imaging of 6/2/2023 and 6/19/2023, New decreased attenuation appreciated in a left posterior temporal occipital location on the current CTA examination may represent an edematous versus ischemic changes which are new in the intervalsince prior imaging.  MRI-H showing Multiple intracranial metastasis, Peripherally enhancing cystic mass in the right temporal lobe measures 4.0 x 3.4 x 3.1 cm with subtle susceptibility artifact. Cystic enhancing mass in the left occipital lobe measures 3.1 x 2.2 x 2.3 cm. High left frontal lobe cystic mass measures 1.9 x 1.5 cm. Superior right temporal   lobe enhancing lesion measures 9.6 mm. Tiny enhancing lesion in the anterior frontal lobe measures 6.2 mm. High posterior frontal lobe lesion   measures 6.8 mm. Moderate vasogenic edema in the left frontal lobe, right temporal lobe, left occipital lobe.  -addendum made to MRI-COMPARISON: MRI brain dated 6/2/2023.Right cerebellar hemisphere metastasis on image 29 of series 9 measures 1 cm, smaller in size. The right temporal lobe and left occipital lobe metastasis demonstrates decreased enhancement and slightly smaller in size. Posterior right temporal lobe, left frontal lobe lesions are smaller in size. High right frontal lobe lesion on image 100 of series 9 measures 6.1 mm, larger in size, previously measuring 3.8 mm..  -neurology onboard-rec apprec.   -rec palliative eval   -increased dexamethasone to 4mg BID- d/c on 4mg BID for OP taper with med. onc  -patient can f/u with [facility] (Dr. Beckman)  [address] [place] [address]· [phone] upon d/c for further tx options    -patient will nee close f/u with Rad ONC Dr. Bernard        Will continue to monitor the patient.  Please call with any questions [phone]  Above reviewed with Attending Dr. Cheung   [facility]  [address] [facility], [address], [place], [place]  [phone]  *Note not finalized until signed by Attending Physician    52 year old woman hx of lung cancer who presents to the ER after a syncopal episode.     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 s/p 4 cycles chemo (carboplatin/pemetrexed -completed 9/22) and Thoracic RT completed 10/22.  declined Durvalumab at the time. Then hospitalized 5/23 and found to have brain mets, s/p GK-SRS to 7 brain metastases in 5 fractions from 6/21 - 6/29/2023. Patient started on steroid layla at that time. Plan to start on immunotherapy with nivolumab but patient decline as of now.  -patient was having increasing neurological symptoms including HA and syncope presented to [facility] ER after a syncopal episode.  -CT-H showing High left posterior frontal, left posterior temporal occipital, and right temporal mass lesions, described in detail above, all appear slightly decreased in size compared with prior MRI imaging dated 6/2/2023 and 6/19/2023. However, prior smaller intraparenchymal mass lesions and a right cerebellar process appreciated on the prior MRI imaging of 6/2/2023 and 6/19/2023, New decreased attenuation appreciated in a left posterior temporal occipital location on the current CTA examination may represent an edematous versus ischemic changes which are new in the intervalsince prior imaging.  MRI-H showing Multiple intracranial metastasis, Peripherally enhancing cystic mass in the right temporal lobe measures 4.0 x 3.4 x 3.1 cm with subtle susceptibility artifact. Cystic enhancing mass in the left occipital lobe measures 3.1 x 2.2 x 2.3 cm. High left frontal lobe cystic mass measures 1.9 x 1.5 cm. Superior right temporal   lobe enhancing lesion measures 9.6 mm. Tiny enhancing lesion in the anterior frontal lobe measures 6.2 mm. High posterior frontal lobe lesion   measures 6.8 mm. Moderate vasogenic edema in the left frontal lobe, right temporal lobe, left occipital lobe.  -addendum made to MRI-COMPARISON: MRI brain dated 6/2/2023.Right cerebellar hemisphere metastasis on image 29 of series 9 measures 1 cm, smaller in size. The right temporal lobe and left occipital lobe metastasis demonstrates decreased enhancement and slightly smaller in size. Posterior right temporal lobe, left frontal lobe lesions are smaller in size. High right frontal lobe lesion on image 100 of series 9 measures 6.1 mm, larger in size, previously measuring 3.8 mm..  -neurology onboard-rec apprec.   -rec palliative eval   -increased dexamethasone to 4mg BID- d/c on 4mg BID for OP taper with med. onc  -pending rad-onc/ neuro sx eval-consider transfer to tertiary facility -patient symptoms persist w/u much improvement, patient feels unsafe to go home  -now with new onset back pain. consider MRI spine  -patient can f/u with [facility] (Dr. Beckman)  [address] [place] [address]· [phone] upon d/c for further tx options    -patient will nee close f/u with Rad ONC Dr. Bernard        Will continue to monitor the patient.  Please call with any questions [phone]  Above reviewed with Attending Dr. Cheung   [facility]  [address] [facility], [address], [place], [place]  [phone]  *Note not finalized until signed by Attending Physician

## 2023-08-23 NOTE — PROVIDER CONTACT NOTE (OTHER) - SITUATION
Patient reports severe headache, unrelieved by tramadol 25mg po and Tylenol 650mg PO Patient reports severe headache 8/10, unrelieved by tramadol 25mg po and Tylenol 650mg PO, requests more pain medication.

## 2023-08-23 NOTE — PROGRESS NOTE ADULT - ASSESSMENT
52 year old woman hx of lung cancer reported by patient to be in remission who presents to the ER after a syncopal episode.  She reports that this is the 3rd episode this week.  She has been experiencing over the past 2 months headaches but worse over the past 2 weeks and more frequent in the past 3 days.  She denies fever, neck stiffness, URI symptoms, head injury, vision changes, speech changes and focal weakness.  Patient also reporting about 2 weeks of exertional dyspnea, lightheadedness and palpitations.  She denies cough and chest pain.      Plan:  Admit to tele for syncope, HA and lung cancer. Follows with Oncologist at [facility]. Had CT early June no abdominal mets. CTA here in ER notes no PE, enlarging RUL lung mass and new left lung lesion. CT head cannot r/o new mets. MRI with and without contrast ordered.  Will likely need updated staging CT of abdomen and pelvis after that. Day team will need to call Oncology Consult on Monday. Last Chemo and XRT finished three months ago.  Calcium normal   at 9.2. No clear infections.  TTE for syncope eval, no prior cardiac history.      Increase Decadron to 4 mg bid. Was recently started on Decadron by her Oncologist.     Restart Kepra 500 mg bid for multiple brain mets.        Discussed with Oncology Service , will increase Decadron to 4 mg bid, spoke to Neuro Dr. Baum, will restart Kepra 500 mg bid as CNS mets are worse.       Previous hospitalist Discussed with radiation oncology at [facility], no need for transfer, can see them in one week after discharge. Due to patients concerns and symptoms will reach out to rad on again

## 2023-08-23 NOTE — PROGRESS NOTE ADULT - SUBJECTIVE AND OBJECTIVE BOX
Heme/Onc Progress note    INTERVAL HPI/OVERNIGHT EVENTS:  Patient S&E at bedside.  patient continues to have HA and now with some back pain. Stated she was going to tyr and eat and take some pain medication ot see if it resolves, Continues to deny visual changes, weakness or loss of sensations.       VITAL SIGNS:  T(F): 97.5 (08-23-23 @ 04:52)  HR: 78 (08-23-23 @ 04:52)  BP: 143/84 (08-23-23 @ 04:52)  RR: 18 (08-23-23 @ 04:52)  SpO2: 99% (08-23-23 @ 04:52)  Wt(kg): --    PHYSICAL EXAM:    Constitutional: C/o HA  Eyes: EOMI, sclera non-icteric  Neck: supple, no masses, no JVD  Respiratory: CTA b/l, good air entry b/l  Cardiovascular: RRR, no M/R/G  Gastrointestinal: soft, NTND, no masses palpable, + BS,   Extremities: no c/c/e  Neurological: AAOx3, 4/4 strength in all 4 ext.      MEDICATIONS  (STANDING):  dexAMETHasone     Tablet 4 milliGRAM(s) Oral two times a day  divalproex  milliGRAM(s) Oral two times a day  enoxaparin Injectable 40 milliGRAM(s) SubCutaneous every 24 hours  pantoprazole    Tablet 40 milliGRAM(s) Oral before breakfast    MEDICATIONS  (PRN):  acetaminophen     Tablet .. 650 milliGRAM(s) Oral every 6 hours PRN Moderate Pain (4 - 6)  traMADol 25 milliGRAM(s) Oral every 8 hours PRN Severe Pain (7 - 10)      Allergies    No Known Allergies    Intolerances        LABS:                RADIOLOGY & ADDITIONAL TESTS:  Studies reviewed.    ASSESSMENT & PLAN:

## 2023-08-23 NOTE — PROVIDER CONTACT NOTE (OTHER) - ACTION/TREATMENT ORDERED:
Ketorolac IV Push 15mg ordered.   patient refused medication, refuses all pain intervention at this time.

## 2023-08-23 NOTE — PROGRESS NOTE ADULT - SUBJECTIVE AND OBJECTIVE BOX
Patient seen and examined  denies dizziness headache  reports she feels unsafe to go home. no one is home to take care of her  and she is a fallrisk  vitals stable  on decadron    Review of Systems:  General:denies fever chills, headache, weakness  HEENT: denies blurry vision,diffculty swallowing, difficulty hearing, tinnitus  Cardiovascular: denies chest pain  ,palpitations  Pulmonary:denies shortness of breath, cough, wheezing, hemoptysis  Gastrointestinal: denies abdominal pain, constipation, diarrhea,nausea , vomiting, hematochezia  : denies hematuria, dysuria, or incontinence  Neurological: denies weakness, numbness , tingling, dizziness, tremors  MSK: denies muscle pain, difficulty ambulating, swelling, back pain  skin: denies skin rash, itching, burning, or  skin lesions  Psychiatrical: denies mood disturbances, anxierty, feeling depressed, depression , or difficulty sleeping    Objective:  Vitals  T(C): 36.6 (08-23-23 @ 11:09), Max: 37.1 (08-22-23 @ 23:25)  HR: 95 (08-23-23 @ 11:09) (75 - 97)  BP: 118/71 (08-23-23 @ 11:09) (118/71 - 143/84)  RR: 18 (08-23-23 @ 11:09) (18 - 18)  SpO2: 98% (08-23-23 @ 11:09) (97% - 99%)    Physical Exam:  General: comfortable, no acute distress  HEENT: Atraumatic, no LAD, trachea midline, PERRLA  Cardiovascular: normal s1s2, no murmurs, gallops or fricition rubs  Pulmonary: clear to ausculation Bilaterally, no wheezing , rhonchi  Gastrointestinal: soft non tender non distended, no masses felt, no organomegally  Muscloskeletal: no lower extremity edema, intact bilateral lower extremity pulses  Neurological: CN II-12 intact. No focal weakness  Psychiatrical: normal mood, cooperative  SKIN: no rash, lesions or ulcers    Labs:                    Active Medications  MEDICATIONS  (STANDING):  dexAMETHasone     Tablet 4 milliGRAM(s) Oral two times a day  divalproex  milliGRAM(s) Oral two times a day  enoxaparin Injectable 40 milliGRAM(s) SubCutaneous every 24 hours  pantoprazole    Tablet 40 milliGRAM(s) Oral before breakfast    MEDICATIONS  (PRN):  acetaminophen     Tablet .. 650 milliGRAM(s) Oral every 6 hours PRN Moderate Pain (4 - 6)  traMADol 25 milliGRAM(s) Oral every 8 hours PRN Severe Pain (7 - 10)

## 2023-08-24 NOTE — PROGRESS NOTE ADULT - SUBJECTIVE AND OBJECTIVE BOX
Heme/Onc Progress note    INTERVAL HPI/OVERNIGHT EVENTS:  Patient S&E at bedside. patient still c/o HA, dizziness and lower back pain  VITAL SIGNS:  T(F): 98.5 (08-24-23 @ 04:47)  HR: 78 (08-24-23 @ 04:47)  BP: 119/73 (08-24-23 @ 04:47)  RR: 18 (08-24-23 @ 04:47)  SpO2: 98% (08-24-23 @ 04:47)  Wt(kg): --    PHYSICAL EXAM:    Constitutional: c/o HA/ dizziness   Eyes: EOMI, sclera non-icteric  Neck: supple, no masses, no JVD  Respiratory: CTA b/l, good air entry b/l  Cardiovascular: RRR, no M/R/G  Gastrointestinal: soft, NTND, no masses palpable, + BS,   Extremities: no c/c/e  Neurological: AAOx3      MEDICATIONS  (STANDING):  dexAMETHasone     Tablet 4 milliGRAM(s) Oral two times a day  divalproex  milliGRAM(s) Oral two times a day  enoxaparin Injectable 40 milliGRAM(s) SubCutaneous every 24 hours  pantoprazole    Tablet 40 milliGRAM(s) Oral before breakfast    MEDICATIONS  (PRN):  acetaminophen     Tablet .. 650 milliGRAM(s) Oral every 6 hours PRN Moderate Pain (4 - 6)  traMADol 25 milliGRAM(s) Oral every 8 hours PRN Severe Pain (7 - 10)      Allergies    No Known Allergies    Intolerances        LABS:    08-24    132<L>  |  101  |  15  ----------------------------<  141<H>  4.8   |  25  |  0.73    Ca    9.4      24 Aug 2023 06:45    TPro  7.4  /  Alb  2.5<L>  /  TBili  0.2  /  DBili  x   /  AST  17  /  ALT  32  /  AlkPhos  52  08-24      Urinalysis Basic - ( 24 Aug 2023 06:45 )    Color: x / Appearance: x / SG: x / pH: x  Gluc: 141 mg/dL / Ketone: x  / Bili: x / Urobili: x   Blood: x / Protein: x / Nitrite: x   Leuk Esterase: x / RBC: x / WBC x   Sq Epi: x / Non Sq Epi: x / Bacteria: x        RADIOLOGY & ADDITIONAL TESTS:  Studies reviewed.    ASSESSMENT & PLAN:

## 2023-08-24 NOTE — PROGRESS NOTE ADULT - NS ATTEND AMEND GEN_ALL_CORE FT
# NSCLC   # Brain Mets:   see  Onc history as above   pt with persistent headaches despite increasing steroid-Dex dose   MRI Brain reviewed; and comparisons noted.   Neurology eval; on Keppra.   -can increase Dex 4 mg po qid    recommend transfer to [facility] for consideration of brain XRT if no improvement with increased dose of sterids   new back pain- eval with MRI spine    #DVT ppx
# NSCLC   # Brain Mets:   Detailed Onc history as above   Patient A&Ox3, symptoms of headache.   MRI Brain reviewed; and comparisons noted.   Spoke with Dr. Zurdo Bernard (out-pt Rad-Onc).   Neurology eval; on Keppra.   -Dex 4 mg po bid; out-pt taper   If symptoms continue to improve, will need out-pt f/u with Dr. Bernard, neuro-Sx and Dr. Beckman ASAP after discharge. Patient in agreement   #DVT ppx   We will follow while in-patient. Dr. Cheung starting service from tomorrow
# NSCLC   # Brain Mets:   see  Onc history as above   pt with persistent headaches despite increasing steroid-Dex dose   MRI Brain reviewed; and comparisons noted.   Neurology eval; on Keppra.   -Dex 4 mg po bid; out-pt  recommend transfer to [facility] for consideration of brain XRT   new back pain- eval with MRI spine    #DVT ppx

## 2023-08-24 NOTE — PROGRESS NOTE ADULT - ASSESSMENT
52 year old woman hx of lung cancer reported by patient to be in remission who presents to the ER after a syncopal episode.  She reports that this is the 3rd episode this week.  She has been experiencing over the past 2 months headaches but worse over the past 2 weeks and more frequent in the past 3 days.  She denies fever, neck stiffness, URI symptoms, head injury, vision changes, speech changes and focal weakness.  Patient also reporting about 2 weeks of exertional dyspnea, lightheadedness and palpitations.  She denies cough and chest pain.      Plan:  Admit to tele for syncope, HA and lung cancer. Follows with Oncologist at [facility]. Had CT early June no abdominal mets. CTA here in ER notes no PE, enlarging RUL lung mass and new left lung lesion. CT head cannot r/o new mets. MRI with and without contrast ordered.  Will likely need updated staging CT of abdomen and pelvis after that. Day team will need to call Oncology Consult on Monday. Last Chemo and XRT finished three months ago.  Calcium normal   at 9.2. No clear infections.  TTE for syncope eval, no prior cardiac history.      Increase Decadron to 4 mg bid. Was recently started on Decadron by her Oncologist.     Restart Kepra 500 mg bid for multiple brain mets.        Discussed with Oncology Service , will increase Decadron to 4 mg bid, spoke to Neuro Dr. Baum, will restart Kepra 500 mg bid as CNS mets are worse.       Previous hospitalist Discussed with radiation oncology at [facility], no need for transfer, can see them in one week after discharge. Due to patients concerns and symptoms will reach out to rad on again

## 2023-08-24 NOTE — PROGRESS NOTE ADULT - SUBJECTIVE AND OBJECTIVE BOX
Neurology Progress Note    No acute events. Has a headache. Very stressed. Headaches worse in AM    Neuro Exam: AOx3. Follows commands. No dysarthria. No facial droop. PERRL. Moving all four extremities.     MRI brain- stable brain mets with right frontal lesion increased in size from 3.8mm to 6mm.    A/P:  Fall  Headaches  Lung cancer with brain mets    - increase dexamethasone to 4mg QID  - continue Depakote 500mg BID for seizure prophylaxis and headache treatment  - suspect symptoms due to increased stress and brain mets. Headaches worse in AM are usually due to brain mass.   - follow up with Oncology once discharged.

## 2023-08-24 NOTE — CONSULT NOTE ADULT - CONVERSATION DETAILS
Met with patient who is alert and ambulatory regarding current condition, goals of care. 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 She has a fiancee, no children, other family lives in [facility]. she states that she spoke w her rad-onc Dr Bernard today. States she was previously cured from Stage 4 cancer, then recurred and now again has been told that her cancer is "gone." Encouraged pt to focus on her health right now until she is feeling better, she acknowledged understanding, stating she is bad at taking time for herself. Discussed HCP, she expressed that she does not really trust anyone, would have to think about it. She will follow up w her oncologist and rad onc as outpt.

## 2023-08-24 NOTE — CONSULT NOTE ADULT - PROBLEM SELECTOR RECOMMENDATION 2
C discussion as above. ECOG 1-2. Patient did not wish to assign a HCP at this time. To f/u w onc and rad/onc as outpt. Dispo per primary team. Palliative team remains available as needed.

## 2023-08-24 NOTE — CONSULT NOTE ADULT - SUBJECTIVE AND OBJECTIVE BOX
Consult to: Discuss complex medical decision making related to goals of care    [facility] Palliative Care Consult Service:   MD Kayla Peacock NP    May contact any member of Palliative Care team via Microsoft Teams for consults or questions       HPI:  52 year old woman hx of lung cancer reported by patient to be in remission who presents to the ER after a syncopal episode.  She reports that this is the 3rd episode this week.  She has been experiencing over the past 2 months headaches but worse over the past 2 weeks and more frequent in the past 3 days.  She denies fever, neck stiffness, URI symptoms, head injury, vision changes, speech changes and focal weakness.  Patient also reporting about 2 weeks of exertional dyspnea, lightheadedness and palpitations.  She denies cough and chest pain. Patient states she was told in the past she has stage IV lung cancer.    (19 Aug 2023 20:04)    Interval history: pt ambulating to bathroom, states headache and back pain better. Wants to go home.       PAST MEDICAL & SURGICAL HISTORY:  GERD (Gastroesophageal Reflux Disease)      Lung mass  right      Non-small cell lung cancer with metastasis      S/P endoscopy  2022          FAMILY HISTORY:  No pertinent family history in first degree relatives       SOCIAL HISTORY: engaged, has no children, extended family in [facility]  Admitted from:  home     Gnosticism/spirituality:   [ none ] Substance abuse, [ none ] Tobacco hx, [ none ] Alcohol hx  [ none ] Home Opioid use      Baseline ADLs (prior to admission): alert, ambulatory    Review of systems:     Pain:  [ ] yes [ ] no -improved  QOL impact -   Location -   head                Aggravating factors -  Quality -  Radiation -  Timing-  Severity (0-10 scale):  Minimal acceptable level (0-10 scale):     PAIN AD Score:     [email or web address] (press ctrl +  left click to view)  CPOT:    https://www.[facility].org/getattachment/ieu13o39-4z4i-2k4p-6z4k-6867f1923h1u/Critical-Care-Pain-Observation-Tool-(CPOT)      Dyspnea:      denies                        Anxiety:         denies                      Depression:   denies  Fatigue:      denies                         Nausea:      denies                         Loss of appetite:    denies            Constipation:     denies             Diarrhea:     denies    All other systems reviewed and negative       Allergies    No Known Allergies    Intolerances           MEDICATIONS  (STANDING):  dexAMETHasone     Tablet 4 milliGRAM(s) Oral two times a day  divalproex  milliGRAM(s) Oral two times a day  enoxaparin Injectable 40 milliGRAM(s) SubCutaneous every 24 hours  pantoprazole    Tablet 40 milliGRAM(s) Oral before breakfast    MEDICATIONS  (PRN):  acetaminophen     Tablet .. 650 milliGRAM(s) Oral every 6 hours PRN Moderate Pain (4 - 6)  traMADol 25 milliGRAM(s) Oral every 8 hours PRN Severe Pain (7 - 10)      PHYSICAL EXAM:  Vital Signs Last 24 Hrs  T(C): 36.6 (24 Aug 2023 10:20), Max: 37 (24 Aug 2023 00:10)  T(F): 97.8 (24 Aug 2023 10:20), Max: 98.6 (24 Aug 2023 00:10)  HR: 105 (24 Aug 2023 10:20) (77 - 105)  BP: 129/90 (24 Aug 2023 10:20) (115/73 - 129/90)  BP(mean): --  RR: 19 (24 Aug 2023 10:20) (18 - 19)  SpO2: 99% (24 Aug 2023 10:20) (97% - 99%)    Parameters below as of 24 Aug 2023 10:20  Patient On (Oxygen Delivery Method): room air         Palliative Performance Scale/Karnofsky Score: 80  ECOG Performance: 1-2    GENERAL: alert, NAD  HEENT: Atraumatic, oropharynx clear, neck supple  CHEST/LUNG: unlabored  HEART: Regular rate and rhythm    ABDOMEN: Soft, Nontender, Nondistended, PEG, last BM  MUSCULOSKELETAL:  No  edema, ambulatory   NERVOUS SYSTEM:  Alert & Oriented X3,  follows commands  SKIN: No rashes or lesions noted  Oral intake: fair     LABS:    08-24    132<L>  |  101  |  15  ----------------------------<  141<H>  4.8   |  25  |  0.73    Ca    9.4      24 Aug 2023 06:45    TPro  7.4  /  Alb  2.5<L>  /  TBili  0.2  /  DBili  x   /  AST  17  /  ALT  32  /  AlkPhos  52  08-24    Urinalysis Basic - ( 24 Aug 2023 06:45 )    Color: x / Appearance: x / SG: x / pH: x  Gluc: 141 mg/dL / Ketone: x  / Bili: x / Urobili: x   Blood: x / Protein: x / Nitrite: x   Leuk Esterase: x / RBC: x / WBC x   Sq Epi: x / Non Sq Epi: x / Bacteria: x        RADIOLOGY & ADDITIONAL STUDIES:  < from: MR Head w/wo IV Cont (08.20.23 @ 13:53) >  ACC: [id number] EXAM:  MR BRAIN WAW IC   ORDERED BY: OLRA RODRIGUEZ     *** ADDENDUM # 1 ***    Impression:    COMPARISON: MRI brain dated 6/2/2023.    Right cerebellar hemisphere metastasis on image 29 of series 9 measures 1   cm, smaller in size. The right temporal lobe and left occipital lobe   metastasis demonstrates decreased enhancement and slightly smaller in   size. Posterior right temporal lobe, left frontal lobe lesions are   smaller in size. High right frontal lobe lesion on image 100 of series 9   measures 6.1 mm, larger in size, previously measuring 3.8 mm..    --- End of Report ---    *** END OF ADDENDUM # 1 ***      PROCEDURE DATE:  08/20/2023          INTERPRETATION:  CLINICAL INDICATIONS: headaches, follow up to admission   CT head    COMPARISON: CT dated 8/19/2023    TECHNIQUE: MRI brain: Multiplanar, multisequence MR imaging of the brain   are obtained with and without the administration of 6.5 cc intravenous   Gadavist contrast. 3.3 cc of contrast was discarded    FINDINGS:  Peripherally enhancing cystic mass in the right temporal lobe measures   4.0 x 3.4 x 3.1 cm with subtle susceptibility artifact. Cystic enhancing   mass in the left occipital lobe measures 3.1 x 2.2 x 2.3 cm. High left   frontal lobe cystic mass measures 1.9 x 1.5 cm. Superior right temporal   lobe enhancing lesion measures 9.6 mm. Tiny enhancing lesion in the   anterior frontal lobe measures 6.2 mm. High posterior frontal lobe lesion   measures 6.8 mm. Moderate vasogenic edema in the left frontal lobe, right   temporal lobe, left occipital lobe.  There is no abnormal restricted diffusion to suggest acute infarction.   Scattered periventricular and subcortical white matter T2 /FLAIR   hyperintensities are seen without mass effect, nonspecific, likely   representing mild to moderate chronic microvascular changes. Normal T2   flow-voids are seen within  the intracranial vasculature. The lateral   ventricles and cortical sulci are age-appropriate in size and   configuration. There is no susceptibility artifact to suggest hemorrhage.   Midline structures are normal.  The visualized paranasal sinuses, mastoid   air cells and orbits are unremarkable.      IMPRESSION: Multiple intracranial metastasis.    --- End of Report ---    ***Please see the addendum at the top of this report. It may contain   additional important information or changes.****        LUCY FARR MD; Attending Radiologist  This document has been electronically signed. Aug 20 2023  3:23PM  1st Addendum: LUCY FARR MD; Attending Radiologist  The first addendum was electronically signed on: Aug 21 2023  3:01PM.    < end of copied text >  < from: CT Angio Chest PE Protocol w/ IV Cont (08.19.23 @ 17:44) >  ACC: [id number] EXAM:  CT ANGIO CHEST PULM ART [facility]   ORDERED BY: KENNETH MONACO     PROCEDURE DATE:  08/19/2023          INTERPRETATION:  CLINICAL INFORMATION: Syncope, shortness of breath    COMPARISON: Chest CT 6/1/2023    CONTRAST/COMPLICATIONS:  IV Contrast: Omnipaque 350  70 cc administered   30 cc discarded  Oral Contrast: NONE  Complications: None reported at time of study completion    PROCEDURE:  CT Angiography of the Chest.  Sagittal and coronal reformats were performed as well as 3D(MIP)   reconstructions.    FINDINGS:    PULMONARY ARTERIES: No pulmonary embolism.    LUNGS AND LARGE AIRWAYS: The central airways are patent. Enlarging right   apical neoplasm measuring 5.0 x 3.8 cm (13-43), previously 3.4 x 2.6 cm.   Interval development of a small right middle lobe consolidative opacity   and surrounding patchy opacities, possibly infectious or neoplastic.   Interval development of a 0.9 cm left lower lobe basilar nodule, which   could be metastatic ().  PLEURA: No pleural abnormality.  VESSELS: Normal caliber aorta.  HEART: Normal heart size. No pericardial effusion.  MEDIASTINUM AND YULIET: No adenopathy.  CHEST WALL AND LOWER NECK: No masses.  VISUALIZED UPPER ABDOMEN: Stable left lateral segment cyst.  BONES: No acute bony abnormality.    IMPRESSION:  *  No pulmonary embolism.  *  Enlarging right apical neoplasm  *  Development of right middle lobe consolidative opacity, infectious   versus neoplastic.  *  Development of a 0.9 cm left lower lobe nodule, possibly metastatic      --- End of Report ---            DERRELL CARVAJAL MD; Attending Radiologist  This document has been electronically signed. Aug 19 2023  6:29PM    < end of copied text >

## 2023-08-24 NOTE — CONSULT NOTE ADULT - PROBLEM SELECTOR RECOMMENDATION 9
foll by Dr Rk Solares, Dr Bernard rad/onc, dx'd 2022 w NSCLC w neuroendocrine features, s/p chemo/xrt, patient declined immunotherapy after definitive treatment, subsequently found to have brain mets 5/23, s/p xrt, completed in June, plan to start immunotherapy tx. on decadron taper, pt stopped keppra at some point as outpt.   adm w syncopal episodes, headache. Neuro and onc following. MRI showed mult brain mets, inc in size of frontal lesions w mod vasogenic edema, decadron dose increased, resumed on keppra. pt reports h/a improving. Primary team has discussed w Dr Bernard, for f/u as outpt.

## 2023-08-24 NOTE — PROGRESS NOTE ADULT - ASSESSMENT
52 year old woman hx of lung cancer who presents to the ER after a syncopal episode.     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 s/p 4 cycles chemo (carboplatin/pemetrexed -completed 9/22) and Thoracic RT completed 10/22.  declined Durvalumab at the time. Then hospitalized 5/23 and found to have brain mets, s/p GK-SRS to 7 brain metastases in 5 fractions from 6/21 - 6/29/2023. Patient started on steroid layla at that time. Plan to start on immunotherapy with nivolumab but patient decline as of now.  -patient was having increasing neurological symptoms including HA and syncope presented to [facility] ER after a syncopal episode.  -CT-H showing High left posterior frontal, left posterior temporal occipital, and right temporal mass lesions, described in detail above, all appear slightly decreased in size compared with prior MRI imaging dated 6/2/2023 and 6/19/2023. However, prior smaller intraparenchymal mass lesions and a right cerebellar process appreciated on the prior MRI imaging of 6/2/2023 and 6/19/2023, New decreased attenuation appreciated in a left posterior temporal occipital location on the current CTA examination may represent an edematous versus ischemic changes which are new in the intervalsince prior imaging.  MRI-H showing Multiple intracranial metastasis, Peripherally enhancing cystic mass in the right temporal lobe measures 4.0 x 3.4 x 3.1 cm with subtle susceptibility artifact. Cystic enhancing mass in the left occipital lobe measures 3.1 x 2.2 x 2.3 cm. High left frontal lobe cystic mass measures 1.9 x 1.5 cm. Superior right temporal   lobe enhancing lesion measures 9.6 mm. Tiny enhancing lesion in the anterior frontal lobe measures 6.2 mm. High posterior frontal lobe lesion   measures 6.8 mm. Moderate vasogenic edema in the left frontal lobe, right temporal lobe, left occipital lobe.  -addendum made to MRI-COMPARISON: MRI brain dated 6/2/2023.Right cerebellar hemisphere metastasis on image 29 of series 9 measures 1 cm, smaller in size. The right temporal lobe and left occipital lobe metastasis demonstrates decreased enhancement and slightly smaller in size. Posterior right temporal lobe, left frontal lobe lesions are smaller in size. High right frontal lobe lesion on image 100 of series 9 measures 6.1 mm, larger in size, previously measuring 3.8 mm..  -neurology onboard-rec apprec.   -rec palliative eval   -increased dexamethasone to 4mg BID- d/c on 4mg BID for OP taper with med. onc  -rad-onc contacted doesn't believe appropriate for rad-onc transfer   -pending neuro sx eval-consider transfer to tertiary facility -patient symptoms persist w/u much improvement, patient feels unsafe to go home  -now with new onset back pain. consider MRI spine  -patient can f/u with [facility] (Dr. Beckman)  [address] [place] [place] [address]· [phone] upon d/c for further tx options    -patient will nee close f/u with Rad ONC Dr. Bernard        Will continue to monitor the patient.  Please call with any questions [phone]  Above reviewed with Attending Dr. Cheung   [facility]  [address] [facility], [address], [place], [place]  [phone]  *Note not finalized until signed by Attending Physician    52 year old woman hx of lung cancer who presents to the ER after a syncopal episode.     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 s/p 4 cycles chemo (carboplatin/pemetrexed -completed 9/22) and Thoracic RT completed 10/22.  declined Durvalumab at the time. Then hospitalized 5/23 and found to have brain mets, s/p GK-SRS to 7 brain metastases in 5 fractions from 6/21 - 6/29/2023. Patient started on steroid layla at that time. Plan to start on immunotherapy with nivolumab but patient decline as of now.  -patient was having increasing neurological symptoms including HA and syncope presented to [facility] ER after a syncopal episode.  -CT-H showing High left posterior frontal, left posterior temporal occipital, and right temporal mass lesions, described in detail above, all appear slightly decreased in size compared with prior MRI imaging dated 6/2/2023 and 6/19/2023. However, prior smaller intraparenchymal mass lesions and a right cerebellar process appreciated on the prior MRI imaging of 6/2/2023 and 6/19/2023, New decreased attenuation appreciated in a left posterior temporal occipital location on the current CTA examination may represent an edematous versus ischemic changes which are new in the intervalsince prior imaging.  MRI-H showing Multiple intracranial metastasis, Peripherally enhancing cystic mass in the right temporal lobe measures 4.0 x 3.4 x 3.1 cm with subtle susceptibility artifact. Cystic enhancing mass in the left occipital lobe measures 3.1 x 2.2 x 2.3 cm. High left frontal lobe cystic mass measures 1.9 x 1.5 cm. Superior right temporal   lobe enhancing lesion measures 9.6 mm. Tiny enhancing lesion in the anterior frontal lobe measures 6.2 mm. High posterior frontal lobe lesion   measures 6.8 mm. Moderate vasogenic edema in the left frontal lobe, right temporal lobe, left occipital lobe.  -addendum made to MRI-COMPARISON: MRI brain dated 6/2/2023.Right cerebellar hemisphere metastasis on image 29 of series 9 measures 1 cm, smaller in size. The right temporal lobe and left occipital lobe metastasis demonstrates decreased enhancement and slightly smaller in size. Posterior right temporal lobe, left frontal lobe lesions are smaller in size. High right frontal lobe lesion on image 100 of series 9 measures 6.1 mm, larger in size, previously measuring 3.8 mm..  -neurology onboard-rec apprec.   -rec palliative eval   -increased dexamethasone to 4mg BID- d/c on 4mg BID for OP taper with med. onc  -rad-onc contacted doesn't believe appropriate for rad-onc transfer   -pending neuro sx eval-consider transfer to tertiary facility -patient symptoms persist w/u much improvement, patient feels unsafe to go home  -now with new onset back pain. consider MRI spine  -patient can f/u with [facility] (Dr. Beckman)  [address] [place] [address]· [phone] upon d/c for further tx options    -patient will need close f/u with Rad ONC Dr. Bernard        Will continue to monitor the patient.  Please call with any questions [phone]  Above reviewed with Attending Dr. Cheung   [facility]  [address] [facility], [address], [place], [place]  [phone]  *Note not finalized until signed by Attending Physician

## 2023-08-24 NOTE — PROGRESS NOTE ADULT - SUBJECTIVE AND OBJECTIVE BOX
patient seen and examined  reports headache and dizziness  Review of Systems:  General:denies fever chills, headache, weakness  HEENT: denies blurry vision,diffculty swallowing, difficulty hearing, tinnitus  Cardiovascular: denies chest pain  ,palpitations  Pulmonary:denies shortness of breath, cough, wheezing, hemoptysis  Gastrointestinal: denies abdominal pain, constipation, diarrhea,nausea , vomiting, hematochezia  : denies hematuria, dysuria, or incontinence  Neurological: denies weakness, numbness , tingling, dizziness, tremors  MSK: denies muscle pain, difficulty ambulating, swelling, back pain  skin: denies skin rash, itching, burning, or  skin lesions  Psychiatrical: denies mood disturbances, anxierty, feeling depressed, depression , or difficulty sleeping    Objective:  Vitals  T(C): 36.2 (08-24-23 @ 15:55), Max: 37 (08-24-23 @ 00:10)  HR: 104 (08-24-23 @ 15:55) (77 - 105)  BP: 132/81 (08-24-23 @ 15:55) (119/73 - 132/81)  RR: 19 (08-24-23 @ 15:55) (18 - 19)  SpO2: 99% (08-24-23 @ 15:55) (98% - 99%)    Physical Exam:  General: comfortable, no acute distress  HEENT: Atraumatic, no LAD, trachea midline, PERRLA  Cardiovascular: normal s1s2, no murmurs, gallops or fricition rubs  Pulmonary: clear to ausculation Bilaterally, no wheezing , rhonchi  Gastrointestinal: soft non tender non distended, no masses felt, no organomegally  Muscloskeletal: no lower extremity edema, intact bilateral lower extremity pulses  Neurological: CN II-12 intact. No focal weakness  Psychiatrical: normal mood, cooperative  SKIN: no rash, lesions or ulcers    Labs:      08-24    132<L>  |  101  |  15  ----------------------------<  141<H>  4.8   |  25  |  0.73    Ca    9.4      24 Aug 2023 06:45    TPro  7.4  /  Alb  2.5<L>  /  TBili  0.2  /  DBili  x   /  AST  17  /  ALT  32  /  AlkPhos  52  08-24    LIVER FUNCTIONS - ( 24 Aug 2023 06:45 )  Alb: 2.5 g/dL / Pro: 7.4 gm/dL / ALK PHOS: 52 U/L / ALT: 32 U/L / AST: 17 U/L / GGT: x                 Active Medications  MEDICATIONS  (STANDING):  dexAMETHasone     Tablet 4 milliGRAM(s) Oral four times a day  divalproex  milliGRAM(s) Oral two times a day  enoxaparin Injectable 40 milliGRAM(s) SubCutaneous every 24 hours  pantoprazole    Tablet 40 milliGRAM(s) Oral before breakfast    MEDICATIONS  (PRN):  acetaminophen     Tablet .. 650 milliGRAM(s) Oral every 6 hours PRN Moderate Pain (4 - 6)  traMADol 25 milliGRAM(s) Oral every 8 hours PRN Severe Pain (7 - 10)

## 2023-08-24 NOTE — CONSULT NOTE ADULT - REASON FOR ADMISSION
Syncope with history of lung cancer.

## 2023-08-25 NOTE — DISCHARGE NOTE PROVIDER - NSDCCPCAREPLAN_GEN_ALL_CORE_FT
PRINCIPAL DISCHARGE DIAGNOSIS  Diagnosis: Syncope  Assessment and Plan of Treatment:       SECONDARY DISCHARGE DIAGNOSES  Diagnosis: Dyspnea on exertion  Assessment and Plan of Treatment:

## 2023-08-25 NOTE — PROGRESS NOTE ADULT - REASON FOR ADMISSION
Syncope with history of lung cancer.

## 2023-08-25 NOTE — DISCHARGE NOTE PROVIDER - NSDCFUSCHEDAPPT_GEN_ALL_CORE_FT
Marielena Villavicencio  [facility]  [facility] [address] [facility]  Scheduled Appointment: 08/30/2023

## 2023-08-25 NOTE — DISCHARGE NOTE PROVIDER - HOSPITAL COURSE
52 year old woman hx of lung cancer reported by patient to be in remission who presents to the ER after a syncopal episode.  She reports that this is the 3rd episode this week.  She has been experiencing over the past 2 months headaches but worse over the past 2 weeks and more frequent in the past 3 days.  She denies fever, neck stiffness, URI symptoms, head injury, vision changes, speech changes and focal weakness.  Patient also reporting about 2 weeks of exertional dyspnea, lightheadedness and palpitations.  She denies cough and chest pain.      Plan:  Admit to tele for syncope, HA and lung cancer. Follows with Oncologist at [facility]. Had CT early June no abdominal mets. CTA here in ER notes no PE, enlarging RUL lung mass and new left lung lesion. CT head cannot r/o new mets. MRI with and without contrast ordered.  Will likely need updated staging CT of abdomen and pelvis after that. Day team will need to call Oncology Consult on Monday. Last Chemo and XRT finished three months ago.  Calcium normal   at 9.2. No clear infections.  TTE for syncope eval, no prior cardiac history.      Increase Decadron to 4 mg bid. Was recently started on Decadron by her Oncologist.     Restart Kepra 500 mg bid for multiple brain mets.        Discussed with Oncology Service , will increase Decadron to 4 mg bid, spoke to Neuro Dr. Baum, will restart Kepra 500 mg bid as CNS mets are worse.       Previous hospitalist Discussed with radiation oncology at [facility], no need for transfer, can see them in one week after discharge. Due to patients concerns and symptoms will reach out to rad on again

## 2023-08-25 NOTE — DISCHARGE NOTE PROVIDER - NSDCMRMEDTOKEN_GEN_ALL_CORE_FT
dexAMETHasone 2 mg oral tablet: 1 tab(s) orally every 8 hours  dexAMETHasone 6 mg oral tablet: 1 tab(s) orally every 6 hours  divalproex sodium 500 mg oral delayed release tablet: 1 tab(s) orally 2 times a day  pantoprazole 40 mg oral delayed release tablet: 1 tab(s) orally once a day (before a meal)

## 2023-08-25 NOTE — DISCHARGE NOTE PROVIDER - PROVIDER TOKENS
FREE:[LAST:[pcp],PHONE:[(   )    -],FAX:[(   )    -]] PROVIDER:[TOKEN:[[id number]],FOLLOWUP:[1-3 days]]

## 2023-08-25 NOTE — PROGRESS NOTE ADULT - SUBJECTIVE AND OBJECTIVE BOX
patient seen and examined  reports finally feeling better  decadron increased    Review of Systems:  General:denies fever chills, headache, weakness  HEENT: denies blurry vision,diffculty swallowing, difficulty hearing, tinnitus  Cardiovascular: denies chest pain  ,palpitations  Pulmonary:denies shortness of breath, cough, wheezing, hemoptysis  Gastrointestinal: denies abdominal pain, constipation, diarrhea,nausea , vomiting, hematochezia  : denies hematuria, dysuria, or incontinence  Neurological: denies weakness, numbness , tingling, dizziness, tremors  MSK: denies muscle pain, difficulty ambulating, swelling, back pain  skin: denies skin rash, itching, burning, or  skin lesions  Psychiatrical: denies mood disturbances, anxierty, feeling depressed, depression , or difficulty sleeping    Objective:  Vitals  T(C): 37.2 (08-25-23 @ 11:02), Max: 37.2 (08-25-23 @ 11:02)  HR: 60 (08-25-23 @ 11:02) (60 - 86)  BP: 107/65 (08-25-23 @ 11:02) (107/65 - 128/84)  RR: 18 (08-25-23 @ 11:02) (18 - 18)  SpO2: 98% (08-25-23 @ 11:02) (97% - 98%)    Physical Exam:  General: comfortable, no acute distress  HEENT: Atraumatic, no LAD, trachea midline, PERRLA  Cardiovascular: normal s1s2, no murmurs, gallops or fricition rubs  Pulmonary: clear to ausculation Bilaterally, no wheezing , rhonchi  Gastrointestinal: soft non tender non distended, no masses felt, no organomegally  Muscloskeletal: no lower extremity edema, intact bilateral lower extremity pulses  Neurological: CN II-12 intact. No focal weakness  Psychiatrical: normal mood, cooperative  SKIN: no rash, lesions or ulcers    Labs:      08-24    132<L>  |  101  |  15  ----------------------------<  141<H>  4.8   |  25  |  0.73    Ca    9.4      24 Aug 2023 06:45    TPro  7.4  /  Alb  2.5<L>  /  TBili  0.2  /  DBili  x   /  AST  17  /  ALT  32  /  AlkPhos  52  08-24    LIVER FUNCTIONS - ( 24 Aug 2023 06:45 )  Alb: 2.5 g/dL / Pro: 7.4 gm/dL / ALK PHOS: 52 U/L / ALT: 32 U/L / AST: 17 U/L / GGT: x                 Active Medications  MEDICATIONS  (STANDING):  dexAMETHasone     Tablet 4 milliGRAM(s) Oral four times a day  divalproex  milliGRAM(s) Oral two times a day  enoxaparin Injectable 40 milliGRAM(s) SubCutaneous every 24 hours  pantoprazole    Tablet 40 milliGRAM(s) Oral before breakfast    MEDICATIONS  (PRN):  acetaminophen     Tablet .. 650 milliGRAM(s) Oral every 6 hours PRN Moderate Pain (4 - 6)  traMADol 25 milliGRAM(s) Oral every 8 hours PRN Severe Pain (7 - 10)

## 2023-08-25 NOTE — PROGRESS NOTE ADULT - PROVIDER SPECIALTY LIST ADULT
Hospitalist
Hospitalist
Heme/Onc
Neurology
Heme/Onc
Heme/Onc
Internal Medicine

## 2023-08-25 NOTE — PROGRESS NOTE ADULT - ASSESSMENT
52 year old woman hx of lung cancer reported by patient to be in remission who presents to the ER after a syncopal episode.  She reports that this is the 3rd episode this week.  She has been experiencing over the past 2 months headaches but worse over the past 2 weeks and more frequent in the past 3 days.  She denies fever, neck stiffness, URI symptoms, head injury, vision changes, speech changes and focal weakness.  Patient also reporting about 2 weeks of exertional dyspnea, lightheadedness and palpitations.  She denies cough and chest pain.      Plan:  Admit to tele for syncope, HA and lung cancer. Follows with Oncologist at [facility]. Had CT early June no abdominal mets. CTA here in ER notes no PE, enlarging RUL lung mass and new left lung lesion. CT head cannot r/o new mets. MRI with and without contrast ordered.  Will likely need updated staging CT of abdomen and pelvis after that. Day team will need to call Oncology Consult on Monday. Last Chemo and XRT finished three months ago.  Calcium normal   at 9.2. No clear infections.  TTE for syncope eval, no prior cardiac history.      Increase Decadron to 4 mg bid. Was recently started on Decadron by her Oncologist.     Restart Kepra 500 mg bid for multiple brain mets.        Discussed with Oncology Service , will increase Decadron to 4 mg bid, spoke to Neuro Dr. Baum, will restart Kepra 500 mg bid as CNS mets are worse.       Previous hospitalist Discussed with radiation oncology at [facility], no need for transfer, can see them in one week after discharge. Due to patients concerns and symptoms will reach out to rad on again

## 2023-08-26 NOTE — ED ADULT NURSE NOTE - OBJECTIVE STATEMENT
Report received from day shift RN. Pt a&ox4, ambulatory, received to ED for dizziness. Pt recently dc from [place]. Past medical history of Lung Cancer, currently in remission. Endorses dizziness and migraines, currently taking Po steroids. 20GIV placed to right wrist. Respirations are even and unlabored, no signs of respiratory distress.

## 2023-08-26 NOTE — H&P ADULT - TIME BILLING
I have spent a total of 80 minutes or greater to prepare to see the patient, obtaining and reviewing history, physical examination, explaining the diagnosis, prognosis and treatment plan with the patient/family/caregiver. I also have spent the time ordering studies and testing, interpreting results, medicine reconciliation and documentation as above.

## 2023-08-26 NOTE — ED PROVIDER NOTE - PHYSICAL EXAMINATION
Const: Awake, alert, no acute distress.  Well appearing.  Moving comfortably on stretcher.  HEENT: NC/AT.  Moist mucous membranes.  No pharyngeal erythema, no exudates.  Eyes: Extraocular movements intact b/l.  Conjunctiva pink.  No scleral icterus.  Neck: Neck supple, full ROM without pain.  Cardiac: Regular rate and regular rhythm. S1 S2 present.  Peripheral pulses 2+ and symmetric. No LE edema.  Resp: Speaking in full sentences. No evidence of respiratory distress.  Breath sounds clear to auscultation b/l. Normal chest excursion.   Abd: Non-distended, no overlying skin changes.  Soft, non-tender, no guarding, no rigidity, no rebound tenderness.  No palpable masses.  Normal bowel sounds in all 4 quadrants.  Back: Spine midline and non-tender. No CVAT.  Skin: Normal coloration.  No rashes, abrasions or lacerations.  Neuro: Awake, alert & oriented x 3. Unsteady gait with sway. Moves all extremities spontaneously. No dysmetria, no pronator drift. Normal Heel to shin.  No focal deficits.

## 2023-08-26 NOTE — ED PROVIDER NOTE - OBJECTIVE STATEMENT
52 year old woman hx of lung cancer reported by patient to be in remission who presents to the ED to due 1 weak of dizziness and migraines. As per chart review, patient has primary lung cancer with mets to the brain.  Patient states that she has had 3 episodes of fainting in the past 3 weeks, and on the 19th she had loss of consciousness.  Patient was then admitted to [facility] which where she had a full work-up for syncope.  Patient denied work-up at [facility] where they increased her Decadron and added Keppra. Patient denied most recommendations given to her at [facility] because of unfamiliarity with the hospital.  Patient says she is still experiencing migraines, dizziness and unsteadiness.

## 2023-08-26 NOTE — CHART NOTE - NSCHARTNOTEFT_GEN_A_CORE
Patient today reports feeling better. headache better  although wants transfer to [facility]  explained to patient writer has reached out to Rad Onc including medicine transfer to hospitalist transfer and patient was denied inpatient transfer. writer let CTC know this denial is not accepted by medicine.   Dr. Bernard, Radio after a long discussion with him and the resident reports patient does not need inpatient radiation and needs neuro followup.  Updated patient on challenging and disappointing situation  Gave patient options for  1. home care  2. rolling walker  3. physical therapy  4. FERNY (although this will delay treatment)    Patient refuses all of the above and would prefer following up with her outpatient Rad/onc  Advised patient to please return to [facility] if symptoms return, as coming back to [facility] will only restart the same cycle    Provided Dr. Silva contact info for neurology followup

## 2023-08-26 NOTE — H&P ADULT - NSHPLABSRESULTS_GEN_ALL_CORE
(08-26 @ 05:57)                      12.3  15.21 )-----------( 197                 36.0    Neutrophils = 12.81 (84.1%)  Lymphocytes = 0.80 (5.3%)  Eosinophils = 0.00 (0.0%)  Basophils = 0.07 (0.5%)  Monocytes = 0.86 (5.7%)  Bands = --%    08-26    133<L>  |  100  |  17  ----------------------------<  246<H>  4.5   |  27  |  0.77    Ca    9.8      26 Aug 2023 05:57  Phos  2.5     08-26  Mg     2.1     08-26    TPro  7.2  /  Alb  2.5<L>  /  TBili  0.2  /  DBili  x   /  AST  17  /  ALT  27  /  AlkPhos  56  08-26        Urinalysis Basic - ( 26 Aug 2023 05:57 )    Color: x / Appearance: x / SG: x / pH: x  Gluc: 246 mg/dL / Ketone: x  / Bili: x / Urobili: x   Blood: x / Protein: x / Nitrite: x   Leuk Esterase: x / RBC: x / WBC x   Sq Epi: x / Non Sq Epi: x / Bacteria: x    Labs reviewed  Prior imaging reviewed  EKG: NSR no change when compared to baseline EKG

## 2023-08-26 NOTE — H&P ADULT - NSHPREVIEWOFSYSTEMS_GEN_ALL_CORE
CONSTITUTIONAL:  +fatigue No weight loss, fever, chills  HEENT:  Eyes:  No visual loss, blurred vision, double vision or yellow sclerae. Ears, Nose, Throat:  No hearing loss, sneezing, congestion, runny nose or sore throat.  SKIN:  No rash or itching.  CARDIOVASCULAR:  No chest pain, chest pressure or chest discomfort. No palpitations or edema.  RESPIRATORY:  No shortness of breath, cough or sputum.  GASTROINTESTINAL:  No anorexia, nausea, vomiting or diarrhea. No abdominal pain or blood.  GENITOURINARY:  Denies hematuria, dysuria.   NEUROLOGICAL:  +headache +ataxia +lightheadedness No syncope, paralysis, numbness or tingling in the extremities. No change in bowel or bladder control.  LYMPHATICS:  No enlarged nodes. No history of splenectomy.  PSYCHIATRIC:  No history of depression or anxiety.  ENDOCRINOLOGIC:  No reports of sweating, cold or heat intolerance. No polyuria or polydipsia.  ALLERGIES:  No history of asthma, hives, eczema or rhinitis.

## 2023-08-26 NOTE — H&P ADULT - NSHPPHYSICALEXAM_GEN_ALL_CORE
GENERAL APPEARANCE: Well developed, alert and cooperative. NAD.   HEENT:  PERRL, EOMI. External auditory canals normal, hearing grossly intact.  NECK: Neck supple, non-tender   CARDIAC: Normal S1 and S2. No S3, S4 or murmurs. Rhythm is regular.  LUNGS: Clear to auscultation and percussion without rales, rhonchi, wheezing or diminished breath sounds.  ABDOMEN: Positive bowel sounds. Soft, nondistended, nontender. No guarding or rebound.   MUSCULOSKELETAL: ROM intact spine and extremities. No joint erythema or tenderness.   BACK: normal posture, no spinal deformity, symmetry of spinal muscles, without tenderness, decreased range of motion.  EXTREMITIES: No significant deformity or joint abnormality. No edema. Peripheral pulses intact. No varicosities.  NEUROLOGICAL: CN II-XII intact. Strength and sensation symmetric and intact throughout.   SKIN: Skin normal color, texture and turgor with no lesions or eruptions.  PSYCHIATRIC: AOx3.Normal affect and behavior.

## 2023-08-26 NOTE — ED PROVIDER NOTE - CLINICAL SUMMARY MEDICAL DECISION MAKING FREE TEXT BOX
52 year old woman hx of lung cancer reported by patient to be in remission who presents to the ED to due 1 weak of dizziness and migraines. As per chart review, patient has primary lung cancer with mets to the brain.  Patient states that she has had 3 episodes of fainting in the past 3 weeks, and on the 19th she had loss of consciousness.  Patient was then admitted to [facility] which where she had a full work-up for syncope.  Patient denied work-up at [facility] where they increased her Decadron and added Keppra. Patient denied most recommendations given to her at [facility] because of unfamiliarity with the hospital.  Patient says she is still experiencing migraines, dizziness and unsteadiness.     Vitals are stable and pt had labs done today at [facility]. Pt needs to be seen by oncology.

## 2023-08-26 NOTE — ED ADULT NURSE NOTE - CHIEF COMPLAINT QUOTE
Pt states was taken to [facility] by EMS Saturday 8/19, admitted for syncopal episode and dizziness. Pt signed out AMA and came directly to [facility] ED wanting care in this hospital due to her records are here. Pt c/o mid sternal, chest pain, dizziness and headaches x 1 week.  PHx right lung cancer, brain cancer in remission.

## 2023-08-26 NOTE — ED ADULT TRIAGE NOTE - CHIEF COMPLAINT QUOTE
Pt states was taken to [facility] by EMS Saturday 8/19, admitted for syncopal episode and dizziness. Pt states she requested to be brought here to [facility] because her records are here but in order for her to come she had to sign out AMA. Pt signed out and came directly to [facility] ED. Pt c/o mid sternal, chest pain, dizziness and headaches x 1 week.  PHx right lung cancer, brain cancer in remission. Pt states was taken to [facility] by EMS Saturday 8/19, admitted for syncopal episode and dizziness. Pt signed out AMA and came directly to [facility] ED wanting care in this hospital due to her records are here. Pt c/o mid sternal, chest pain, dizziness and headaches x 1 week.  PHx right lung cancer, brain cancer in remission.

## 2023-08-26 NOTE — H&P ADULT - PROBLEM SELECTOR PLAN 1
-pt p/w ongoing dizziness when standing, described as unsteadiness upon ambulating. Also with headaches  -recent cardiac w/u neg  -suspect symptoms all related to brain mets, vasogenic edema  -check orthostatics   -c/w decadron   -c/w depakote for migraines. Can also treat with reglan  -Rad onc consult in AM for consideration of treatment in house given poor outpt f/u   -PT evaluation, unsafe discharge home  -fall precautions -pt p/w ongoing dizziness when walking, described as unsteadiness upon ambulating. Also with headaches  -recent cardiac w/u neg  -suspect symptoms all related to brain mets, vasogenic edema  -check orthostatics   -c/w decadron   -c/w depakote for migraines. Can also treat with reglan  -Rad onc consult in AM for consideration of treatment in house given poor outpt f/u   -PT evaluation, unsafe discharge home  -fall precautions

## 2023-08-26 NOTE — DISCHARGE NOTE NURSING/CASE MANAGEMENT/SOCIAL WORK - PATIENT PORTAL LINK FT
You can access the FollowMyHealth Patient Portal offered by [facility] by registering at the following website: [email or web address]. By joining [facility]’s FollowMyHealth portal, you will also be able to view your health information using other applications (apps) compatible with our system.

## 2023-08-26 NOTE — PATIENT PROFILE ADULT - FALL HARM RISK - RISK INTERVENTIONS

## 2023-08-26 NOTE — H&P ADULT - PROBLEM SELECTOR PLAN 3
-likely steroid induced   -CC diet   -start on low ISS before meals and at bedtime  -f/u [id number]      #Leukocytosis - likely related to steroid use, monitor off antibiotics

## 2023-08-26 NOTE — H&P ADULT - PROBLEM SELECTOR PLAN 2
-Pt s/p chemo, RT, GK-SRS for brain mets. Pending immunotherapy txt  -Onc emailed for f/u   -Rad onc c/s as above.  -evaled by neurosurg previously (6/23) and recommended rad onc c/s  -pain control   -pt seems to have a poor understanding of extent of disease despite multiple providers explaining. Unclear if due to poor health literacy or element of denial?

## 2023-08-26 NOTE — ED ADULT NURSE REASSESSMENT NOTE - NS ED NURSE REASSESS COMMENT FT1
Pt a&ox4, Denies CP, SOB, dyspnea, or pain at this time. 20GIV placed to right wrist. Respirations are even and unlabored, no signs of respiratory distress.

## 2023-08-26 NOTE — H&P ADULT - HISTORY OF PRESENT ILLNESS
52 year old woman hx of lung cancer metastatic to brain p/w ongoing dizziness and migraines. As per chart review, patient has primary lung cancer with mets to the brain.  Patient states that she has had 3 episodes of fainting in the past 3 weeks, and on the 19th she had loss of consciousness.  Patient was then admitted to [facility] which where she had a full work-up for syncope that was unrevealing. At [facility], pt had an MRI that demonstrated multiple brain mets that were smaller in size however one right frontal lobe lesion that was increased in size. Pt was treated with decadron and valproic acid was added for both seizure ppx and migraine treatment. Patient reports she signed out of [facility] AMA because she did not trust the care. Upon discharge from [facility] she was still feeling dizzy  when standing, described as feeling off balance when ambulating, thus she presented to [facility] for further care.  Currently she reports a bilateral headache, states similar to prior headaches. She denies fevers, chills, chest pain, shortness of breath, abdominal pain, N/V, visual changes, back pain or LE edema.  In regards to oncological history, s/p 4 cycles chemo (carboplatin/pemetrexed -completed 9/22) and Thoracic RT completed 10/22.  declined Durvalumab at the time. Then hospitalized 5/23 and found to have brain mets, s/p GK-SRS to 7 brain metastases in 5 fractions from 6/21 - 6/29/2023. She was due to follow up with oncology to start immunotherapy this month.

## 2023-08-26 NOTE — ED PROVIDER NOTE - ATTENDING CONTRIBUTION TO CARE
Patient with history of lung cancer and brain cancer, status post chemo and radiation, reportedly in remission presents to the emergency department for headaches and dizziness.  Patient was discharged today after 1 week stay at [facility] for syncope.  I reviewed patient's medical records, imaging and labs from her recent hospitalization at [facility], she had a syncope work-up which was unremarkable for any cardiogenic cause of syncope, She had CT scans and MRIs showing recurrence of lung cancer as well as multiple brain mets.  Patient was started on Decadron and Keppra.  She was supposed to follow-up as an outpatient with oncology however upon speaking with patient there is a disconnect with her understanding of her current illness.  Patient states that she is currently in remission, Explained to patient recent findings from imaging at [facility] suggested that she has ongoing lung cancer with multiple brain mets which is the likely cause of her syncope, dizziness, headaches.  Patient states she has not been taking the Keppra and Decadron because she felt confused by what the doctors were telling her at [facility].  She was discharged with plan to follow-up with her radiation oncologist as well as her oncologist Dr. Iniguez.  Patient states she currently does not feel safe at home because of her headaches and her gait unsteadiness which is why she presented to this emergency department today.  Patient is a not able to ambulate but does appear unsteady and is often grabbing at objects to help steady herself.  Plan for admission for PT evaluation and possible subacute rehab as well as oncology evaluation To help with planning for treatment of recurrence.

## 2023-08-27 NOTE — DISCHARGE NOTE PROVIDER - NSDCCPTREATMENT_GEN_ALL_CORE_FT
PRINCIPAL PROCEDURE  Procedure: MRI head  Findings and Treatment: INDINGS:  Peripherally enhancing cystic mass in the right temporal lobe measures   4.0 x 3.4 x 3.1 cm with subtle susceptibility artifact. Cystic enhancing   mass in the left occipital lobe measures 3.1 x 2.2 x 2.3 cm. High left   frontal lobe cystic mass measures 1.9 x 1.5 cm. Superior right temporal   lobe enhancing lesion measures 9.6 mm. Tiny enhancing lesion in the   anterior frontal lobe measures 6.2 mm. High posterior frontal lobe lesion   measures 6.8 mm. Moderate vasogenic edema in the left frontal lobe, right   temporal lobe, left occipital lobe.  There is no abnormal restricted diffusion to suggest acute infarction.   Scattered periventricular and subcortical white matter T2 /FLAIR   hyperintensities are seen without mass effect, nonspecific, likely   representing mild to moderate chronic microvascular changes. Normal T2   flow-voids are seen within  the intracranial vasculature. The lateral   ventricles and cortical sulci are age-appropriate in size and   configuration. There is no susceptibility artifact to suggest hemorrhage.   Midline structures are normal.  The visualized paranasal sinuses, mastoid   air cells and orbits are unremarkable.

## 2023-08-27 NOTE — DISCHARGE NOTE PROVIDER - NSDCFUSCHEDAPPT_GEN_ALL_CORE_FT
Marielena Villavicencio  [facility]  [facility] [address] [facility]  Scheduled Appointment: 08/30/2023     Marcus Metcalf  [facility]  NEUROSURG [address] [place] [address]  Scheduled Appointment: 09/01/2023

## 2023-08-27 NOTE — DISCHARGE NOTE PROVIDER - NSDCMRMEDTOKEN_GEN_ALL_CORE_FT
dexAMETHasone 6 mg oral tablet: 1 tab(s) orally every 6 hours  divalproex sodium 500 mg oral delayed release tablet: 1 tab(s) orally 2 times a day  pantoprazole 40 mg oral delayed release tablet: 1 tab(s) orally once a day (before a meal)   dexAMETHasone 4 mg oral tablet: 1 tab(s) orally Please take as follows: 1 tab orally every 8 hours tomorrow (8/31), 1 tab orally twice a day (9/1-9/3), 1 tab orally once a day (9/4-9/7), STOP taking on the 7th. Thank you! MDD: 3  divalproex sodium 500 mg oral delayed release tablet: 1 tab(s) orally 2 times a day  pantoprazole 40 mg oral delayed release tablet: 1 tab(s) orally once a day (before a meal)   dexAMETHasone 4 mg oral tablet: 1 tab(s) orally 3 times a day Please take as follows: 1 tab orally every 8 hours tomorrow (8/31), 1 tab orally twice a day (9/1-9/3), 1 tab orally once a day (9/4-9/7), STOP taking on the 7th. Thank you! MDD: 12 mg  divalproex sodium 500 mg oral delayed release tablet: 1 tab(s) orally 2 times a day  metFORMIN 1000 mg oral tablet: 1 tab(s) orally 2 times a day  pantoprazole 40 mg oral delayed release tablet: 1 tab(s) orally once a day (before a meal)  repaglinide 1 mg oral tablet: 1 tab(s) orally 3 times a day (before meals) Take 1 tab before each meal. If blood sugar &gt;200, take 2 tabs before that meal   dexAMETHasone 4 mg oral tablet: 1 tab(s) orally 3 times a day Please take as follows: 1 tab orally every 8 hours tomorrow (8/31), 1 tab orally twice a day (9/1-9/3), 1 tab orally once a day (9/4-9/7), STOP taking on the 7th. Thank you! MDD: 12 mg  divalproex sodium 500 mg oral delayed release tablet: 1 tab(s) orally 2 times a day  MetFORMIN (Eqv-Fortamet) 500 mg oral tablet, extended release: 1 tab(s) orally 2 times a day  pantoprazole 40 mg oral delayed release tablet: 1 tab(s) orally once a day (before a meal)  repaglinide 1 mg oral tablet: 2 tab(s) orally 3 times a day (before meals) Take 2 tablets with every meal from (8/31 to 9/5) then take ONE table with every meal until 9/9

## 2023-08-27 NOTE — DISCHARGE NOTE PROVIDER - PROVIDER TOKENS
PROVIDER:[TOKEN:[[id number]],FOLLOWUP:[2 weeks],ESTABLISHEDPATIENT:[T]],PROVIDER:[TOKEN:[[id number]],FOLLOWUP:[2 weeks],ESTABLISHEDPATIENT:[T]] PROVIDER:[TOKEN:[[id number]],FOLLOWUP:[2 weeks],ESTABLISHEDPATIENT:[T]],PROVIDER:[TOKEN:[[id number]],FOLLOWUP:[2 weeks],ESTABLISHEDPATIENT:[T]],PROVIDER:[TOKEN:[[id number]],SCHEDULEDAPPT:[09/01/2023],SCHEDULEDAPPTTIME:[02:00 PM]]

## 2023-08-27 NOTE — CONSULT NOTE ADULT - ATTENDING COMMENTS
History and physical as documented.  I have had the opportunity to review images and have discussed management prior tothis hospitalization at [facility] with , who has treated her in the past with Gamma Knife radiation for her multiple brain mets. Recent imaging shows a good response . However, one lesion has grown but is still small. We did not believe that this lesion is responsible for her symptoms.  Tonight, she reports feeling much better with medical regimen here.. She reports being able to ambulate to the bathroom without difficulty.    She will follow up with her oncologist  for initiation os systemic therapy and with Dr. Zurdo Bernard for radiation medicine follow up.

## 2023-08-27 NOTE — DISCHARGE NOTE PROVIDER - CARE PROVIDERS DIRECT ADDRESSES
,[email or web address]'s [email or web address]

## 2023-08-27 NOTE — DISCHARGE NOTE PROVIDER - NSDCACTIVITY_GEN_ALL_CORE
Do not drive or operate machinery/Do not make important decisions/No heavy lifting/straining Do not drive or operate machinery/Do not make important decisions

## 2023-08-27 NOTE — CONSULT NOTE ADULT - ATTENDING COMMENTS
51 y/o woman with lung cancer metastatic to brain s/p recent radiation (June 2023) admitted with dizziness and migraines.  Steroids were started in the ED and she endorses symptomatic improvement, which suggest that her neurologic complaints may be due to brain mets, or edema associated with them or with treatment. She has stabilized and is being prepared for discharge. Recommend radiation oncology consult to advise regarding steroid taper instructions and establish out-patient follow-up with radiation oncology.  Red Thomas MD PhD  Oncology Attending

## 2023-08-27 NOTE — PROGRESS NOTE ADULT - SUBJECTIVE AND OBJECTIVE BOX
Patient is a 52y old  Female who presents with a chief complaint of dizziness, headache (26 Aug 2023 22:39)      SUBJECTIVE / OVERNIGHT EVENTS: NAEON.     MEDICATIONS  (STANDING):  dexAMETHasone     Tablet 6 milliGRAM(s) Oral every 6 hours  dextrose 5%. 1000 milliLiter(s) (100 mL/Hr) IV Continuous <Continuous>  dextrose 5%. 1000 milliLiter(s) (50 mL/Hr) IV Continuous <Continuous>  dextrose 50% Injectable 25 Gram(s) IV Push once  dextrose 50% Injectable 12.5 Gram(s) IV Push once  dextrose 50% Injectable 25 Gram(s) IV Push once  divalproex  milliGRAM(s) Oral two times a day  enoxaparin Injectable 40 milliGRAM(s) SubCutaneous every 24 hours  glucagon  Injectable 1 milliGRAM(s) IntraMuscular once  insulin lispro (ADMELOG) corrective regimen sliding scale   SubCutaneous three times a day before meals  insulin lispro (ADMELOG) corrective regimen sliding scale   SubCutaneous at bedtime  pantoprazole    Tablet 40 milliGRAM(s) Oral before breakfast    MEDICATIONS  (PRN):  acetaminophen     Tablet .. 650 milliGRAM(s) Oral every 6 hours PRN Temp greater or equal to 38C (100.4F), Mild Pain (1 - 3)  aluminum hydroxide/magnesium hydroxide/simethicone Suspension 30 milliLiter(s) Oral every 4 hours PRN Dyspepsia  dextrose Oral Gel 15 Gram(s) Oral once PRN Blood Glucose LESS THAN 70 milliGRAM(s)/deciliter  melatonin 3 milliGRAM(s) Oral at bedtime PRN Insomnia  ondansetron Injectable 4 milliGRAM(s) IV Push every 8 hours PRN Nausea and/or Vomiting      CAPILLARY BLOOD GLUCOSE      POCT Blood Glucose.: 298 mg/dL (27 Aug 2023 08:34)  POCT Blood Glucose.: 218 mg/dL (26 Aug 2023 17:20)    I&O's Summary      Vital Signs Last 24 Hrs  T(C): 36.2 (27 Aug 2023 04:59), Max: 37 (26 Aug 2023 10:51)  T(F): 97.2 (27 Aug 2023 04:59), Max: 98.6 (26 Aug 2023 10:51)  HR: 97 (27 Aug 2023 04:59) (52 - 108)  BP: 113/74 (27 Aug 2023 04:59) (112/68 - 147/99)  BP(mean): --  RR: 16 (27 Aug 2023 04:59) (16 - 18)  SpO2: 96% (27 Aug 2023 04:59) (96% - 100%)    Parameters below as of 27 Aug 2023 04:59  Patient On (Oxygen Delivery Method): room air        PHYSICAL EXAM:    HEAD: Atraumatic, Normocephalic  EYES: EOMI, PERRLA, conjunctiva and sclera clear  NECK: Supple, No JVD  CHEST/LUNG: Clear to auscultation bilaterally; No wheezes or crackles  HEART: Normal S1/S2; Regular rate and rhythm; No murmurs, rubs, or gallops  ABDOMEN: Soft, Nontender, Nondistended; Bowel sounds present  EXTREMITIES: 2+ Peripheral Pulses; No clubbing, cyanosis, or edema  PSYCH: A&Ox3  NEUROLOGY: no focal neurologic deficit  SKIN: No rashes or lesions    LABS:                        11.5   14.49 )-----------( 188      ( 27 Aug 2023 07:28 )             34.7      08-27    132<L>  |  95<L>  |  20  ----------------------------<  321<H>  4.8   |  21<L>  |  0.60    Ca    9.2      27 Aug 2023 07:28  Phos  2.4     08-27  Mg     2.00     08-27    TPro  7.2  /  Alb  2.5<L>  /  TBili  0.2  /  DBili  x   /  AST  17  /  ALT  27  /  AlkPhos  56  08-26          Urinalysis Basic - ( 27 Aug 2023 07:28 )    Color: x / Appearance: x / SG: x / pH: x  Gluc: 321 mg/dL / Ketone: x  / Bili: x / Urobili: x   Blood: x / Protein: x / Nitrite: x   Leuk Esterase: x / RBC: x / WBC x   Sq Epi: x / Non Sq Epi: x / Bacteria: x        RADIOLOGY & ADDITIONAL TESTS:    Imaging Personally Reviewed:    Consultant(s) Notes Reviewed:      Care Discussed with Consultants/Other Providers:   Patient is a 52y old  Female who presents with a chief complaint of dizziness, headache (26 Aug 2023 22:39)      SUBJECTIVE / OVERNIGHT EVENTS: NAEON. Patient does not have acute complaints. Patient initially did not want to take Lovenox or insulin, and was reminded about the importance of preventing hyperglycemia from steroids and preventing DVTs.    MEDICATIONS  (STANDING):  dexAMETHasone     Tablet 6 milliGRAM(s) Oral every 6 hours  dextrose 5%. 1000 milliLiter(s) (100 mL/Hr) IV Continuous <Continuous>  dextrose 5%. 1000 milliLiter(s) (50 mL/Hr) IV Continuous <Continuous>  dextrose 50% Injectable 25 Gram(s) IV Push once  dextrose 50% Injectable 12.5 Gram(s) IV Push once  dextrose 50% Injectable 25 Gram(s) IV Push once  divalproex  milliGRAM(s) Oral two times a day  enoxaparin Injectable 40 milliGRAM(s) SubCutaneous every 24 hours  glucagon  Injectable 1 milliGRAM(s) IntraMuscular once  insulin lispro (ADMELOG) corrective regimen sliding scale   SubCutaneous three times a day before meals  insulin lispro (ADMELOG) corrective regimen sliding scale   SubCutaneous at bedtime  pantoprazole    Tablet 40 milliGRAM(s) Oral before breakfast    MEDICATIONS  (PRN):  acetaminophen     Tablet .. 650 milliGRAM(s) Oral every 6 hours PRN Temp greater or equal to 38C (100.4F), Mild Pain (1 - 3)  aluminum hydroxide/magnesium hydroxide/simethicone Suspension 30 milliLiter(s) Oral every 4 hours PRN Dyspepsia  dextrose Oral Gel 15 Gram(s) Oral once PRN Blood Glucose LESS THAN 70 milliGRAM(s)/deciliter  melatonin 3 milliGRAM(s) Oral at bedtime PRN Insomnia  ondansetron Injectable 4 milliGRAM(s) IV Push every 8 hours PRN Nausea and/or Vomiting      CAPILLARY BLOOD GLUCOSE      POCT Blood Glucose.: 298 mg/dL (27 Aug 2023 08:34)  POCT Blood Glucose.: 218 mg/dL (26 Aug 2023 17:20)    I&O's Summary      Vital Signs Last 24 Hrs  T(C): 36.2 (27 Aug 2023 04:59), Max: 37 (26 Aug 2023 10:51)  T(F): 97.2 (27 Aug 2023 04:59), Max: 98.6 (26 Aug 2023 10:51)  HR: 97 (27 Aug 2023 04:59) (52 - 108)  BP: 113/74 (27 Aug 2023 04:59) (112/68 - 147/99)  BP(mean): --  RR: 16 (27 Aug 2023 04:59) (16 - 18)  SpO2: 96% (27 Aug 2023 04:59) (96% - 100%)    Parameters below as of 27 Aug 2023 04:59  Patient On (Oxygen Delivery Method): room air        PHYSICAL EXAM:    HEAD: Atraumatic, Normocephalic  EYES: EOMI, PERRLA, conjunctiva and sclera clear  NECK: Supple, No JVD  CHEST/LUNG: Clear to auscultation bilaterally; No wheezes or crackles  HEART: Normal S1/S2; Regular rate and rhythm; No murmurs, rubs, or gallops  ABDOMEN: Soft, Nontender, Nondistended; Bowel sounds present  EXTREMITIES: 2+ Peripheral Pulses; No clubbing, cyanosis, or edema  PSYCH: A&Ox3  NEUROLOGY: no focal neurologic deficit  SKIN: No rashes or lesions    LABS:                        11.5   14.49 )-----------( 188      ( 27 Aug 2023 07:28 )             34.7      08-27    132<L>  |  95<L>  |  20  ----------------------------<  321<H>  4.8   |  21<L>  |  0.60    Ca    9.2      27 Aug 2023 07:28  Phos  2.4     08-27  Mg     2.00     08-27    TPro  7.2  /  Alb  2.5<L>  /  TBili  0.2  /  DBili  x   /  AST  17  /  ALT  27  /  AlkPhos  56  08-26          Urinalysis Basic - ( 27 Aug 2023 07:28 )    Color: x / Appearance: x / SG: x / pH: x  Gluc: 321 mg/dL / Ketone: x  / Bili: x / Urobili: x   Blood: x / Protein: x / Nitrite: x   Leuk Esterase: x / RBC: x / WBC x   Sq Epi: x / Non Sq Epi: x / Bacteria: x        RADIOLOGY & ADDITIONAL TESTS:    Imaging Personally Reviewed:    Consultant(s) Notes Reviewed:      Care Discussed with Consultants/Other Providers:   amLODIPine 5 mg oral tablet: 1 tab(s) orally once a day  aspirin 325 mg oral delayed release tablet: 1 tab(s) orally once a day  atorvastatin 80 mg oral tablet: 1 tab(s) orally once a day (at bedtime)  famotidine 20 mg oral tablet: 1 tab(s) orally once a day  glimepiride 2 mg oral tablet: 1 tab(s) orally every 12 hours   metFORMIN 500 mg oral tablet: 1 tab(s) orally every 12 hours   metoprolol tartrate 25 mg oral tablet: 1 tab(s) orally 2 times a day  mirtazapine 7.5 mg oral tablet: 1 tab(s) orally once a day (at bedtime)  PARoxetine 10 mg oral tablet: 1 tab(s) orally once a day   Adult Aspirin 325 mg oral tablet: 1 tab(s) orally once a day   amLODIPine 5 mg oral tablet: 1 tab(s) orally once a day  aspirin 325 mg oral delayed release tablet: 1 tab(s) orally once a day  atorvastatin 80 mg oral tablet: 1 tab(s) orally once a day (at bedtime)  atorvastatin 80 mg oral tablet: 1 tab(s) orally once a day   famotidine 20 mg oral tablet: 1 tab(s) orally once a day  famotidine 20 mg oral tablet: 1 tab(s) orally once a day   glimepiride 2 mg oral tablet: 1 tab(s) orally every 12 hours   glimepiride 2 mg oral tablet: 1 tab(s) orally every 12 hours   metFORMIN 500 mg oral tablet: 1 tab(s) orally every 12 hours   metFORMIN 500 mg oral tablet: 1 tab(s) orally every 12 hours   metoprolol tartrate 25 mg oral tablet: 1 tab(s) orally every 12 hours   metoprolol tartrate 25 mg oral tablet: 1 tab(s) orally 2 times a day  mirtazapine 7.5 mg oral tablet: 1 tab(s) orally once a day (at bedtime)  mirtazapine 7.5 mg oral tablet: 1 tab(s) orally once a day   PARoxetine 10 mg oral tablet: 1 tab(s) orally once a day   PARoxetine 10 mg oral tablet: 1 tab(s) orally once a day   amLODIPine 5 mg oral tablet: 1 tab(s) orally once a day  aspirin 325 mg oral delayed release tablet: 1 tab(s) orally once a day  atorvastatin 80 mg oral tablet: 1 tab(s) orally once a day (at bedtime)  famotidine 20 mg oral tablet: 1 tab(s) orally once a day  glimepiride 2 mg oral tablet: 1 tab(s) orally every 12 hours   metFORMIN 500 mg oral tablet: 1 tab(s) orally 2 times a day  metoprolol tartrate 25 mg oral tablet: 1 tab(s) orally 2 times a day  mirtazapine 7.5 mg oral tablet: 1 tab(s) orally once a day   PARoxetine 10 mg oral tablet: 1 tab(s) orally once a day

## 2023-08-27 NOTE — DISCHARGE NOTE PROVIDER - NSDCFUADDAPPT_GEN_ALL_CORE_FT
Please see:  1. Primary care doctor. You may make an appointment at [facility] if you do not have a primary care doctor  2. Oncology Dr Beckman  3. Neurosurgery Dr Metcalf  4. Radiation Oncology Dr. Bernard

## 2023-08-27 NOTE — DISCHARGE NOTE PROVIDER - NSDCCPCAREPLAN_GEN_ALL_CORE_FT
PRINCIPAL DISCHARGE DIAGNOSIS  Diagnosis: Dizziness  Assessment and Plan of Treatment: You came to the hospital with dizziness, headaches, and falls. This was deemed to likely be in the setting of your known brain metastases from your lung cancer. For this, you were continued on steroids and Depakote. We also had the radiation oncologists evaluate you inpatient and they did not recommend any inpatient radiation treatments. You were evaluated by the oncologists who recommended __________. You were evaluted by the physical therapists who recommnended _________. Ultimately, you were discharged ________.  If you have falls, seizures, confusion, weakness, fevers or chills, or nausea/vomiting without the ability to eat or drink, please seek medical attention.     PRINCIPAL DISCHARGE DIAGNOSIS  Diagnosis: Dizziness  Assessment and Plan of Treatment: You came to the hospital with dizziness, headaches, and falls. This was deemed to likely be in the setting of your known brain metastases from your lung cancer. For this, you were continued on steroids and Depakote. We also had the radiation oncologists evaluate you inpatient and they did not recommend any inpatient radiation treatments. You were evaluated by the oncologists who recommended you see Dr. Bernard in RT clinic and Dr. Beckman upon discharge. You were evaluated by neurosurgery who do not recommend any surgical intervention at this time. You have an appointment to see Dr. Marcus Metcalf from neurosurgery on Friday at 2pm 9/1. Please call his office tomorrow to confirm your attendance. You were evaluted by the physical therapists who have cleared you for discharge home without need for home physical therapy.   If you have falls, seizures, confusion, weakness, fevers or chills, or nausea/vomiting without the ability to eat or drink, please seek medical attention.      SECONDARY DISCHARGE DIAGNOSES  Diagnosis: Non-small cell lung cancer with metastasis  Assessment and Plan of Treatment: You will be discharged with steroids until the 9/7. Take one tablet of dexamethasone three times a day tomorrow, then twice a day from 9/1 to 9/3, once a day from 9/4 to 9/7. On the 8th you will no longer require steroids as the course is completed. You may also review the instructions on your medication bottle.    Diagnosis: Blood glucose elevated  Assessment and Plan of Treatment: You will be sent home on oral medication to control your blood sugar while you are inpatient. Please take 1 tablet of Metformin (1000mg dose) twice a day, and 1 tablet of  Repaglinide (1mg dose) three times a day with every meal. Please check your blood sugar after eating. If it is above 200, you may take one additional tablet of Repaglinide the next time you eat.   You will take these medications while you are still taking steroids. You can stop taking the medications on 9/7 when your steroids are no longer needed.    Diagnosis: Non-small cell lung cancer with metastasis  Assessment and Plan of Treatment: You will be discharged with steroids until the 9/7. Take one tablet of dexamethasone three times a day tomorrow, then twice a day from 9/1 to 9/3, once a day from 9/4 to 9/7. On the 8th you will no longer require steroids as the course is completed. You may also review the instructions on your medication bottle.    Diagnosis: Unsteadiness  Assessment and Plan of Treatment: Please take caution when you are walking. Seek medical attention if you experience worsening confusion or weakness.     PRINCIPAL DISCHARGE DIAGNOSIS  Diagnosis: Dizziness  Assessment and Plan of Treatment: You came to the hospital with dizziness, headaches, and falls. This was deemed to likely be in the setting of your known brain metastases from your lung cancer. For this, you were continued on steroids and Depakote. We also had the radiation oncologists evaluate you inpatient and they did not recommend any inpatient radiation treatments. You were evaluated by the oncologists who recommended you see Dr. Bernard in RT clinic and Dr. Beckman upon discharge. You were evaluated by neurosurgery who do not recommend any surgical intervention at this time. You have an appointment to see Dr. Marcus Metcalf from neurosurgery on Friday at 2pm 9/1. Please call his office tomorrow to confirm your attendance. You were evaluted by the physical therapists who have cleared you for discharge home without need for home physical therapy.   If you have falls, seizures, confusion, weakness, fevers or chills, or nausea/vomiting without the ability to eat or drink, please seek medical attention.      SECONDARY DISCHARGE DIAGNOSES  Diagnosis: Non-small cell lung cancer with metastasis  Assessment and Plan of Treatment: You will be discharged with steroids until the 9/7. Take one tablet of dexamethasone three times a day tomorrow, then twice a day from 9/1 to 9/3, once a day from 9/4 to 9/7. On the 8th you will no longer require steroids as the course is completed. You may also review the instructions on your medication bottle.    Diagnosis: Unsteadiness  Assessment and Plan of Treatment: Please take caution when you are walking. Seek medical attention if you experience worsening confusion or weakness.    Diagnosis: Blood glucose elevated  Assessment and Plan of Treatment: You will be sent home on oral medication to control your blood sugar while you are inpatient. Please take one tablet of Metformin (500mg dose) twice a day, and 1 tablet of  Repaglinide (1mg dose) three times a day with every meal. Please check your blood sugar after eating. If it is above 200, you may take one additional tablet of Repaglinide the next time you eat.   You will take these medications while you are still taking steroids. You can stop taking the medications on 9/7 when your steroids are no longer needed.    Diagnosis: Non-small cell lung cancer with metastasis  Assessment and Plan of Treatment: You will be discharged with steroids until the 9/7. Take one tablet of dexamethasone three times a day tomorrow, then twice a day from 9/1 to 9/3, once a day from 9/4 to 9/7. On the 8th you will no longer require steroids as the course is completed. You may also review the instructions on your medication bottle.

## 2023-08-27 NOTE — DISCHARGE NOTE PROVIDER - CARE PROVIDER_API CALL
Zurdo Bernard  Radiation Oncology  [address] [place] [id number]  Phone: [phone]  Fax: [phone]  Established Patient  Follow Up Time: 2 weeks    Margarito Beckman  Medical Oncology  [address] [place], [place] [address]  Phone: [phone]  Fax: [phone]  Established Patient  Follow Up Time: 2 weeks   Zurdo Bernard  Radiation Oncology  [address] [place], [place] [address]  Phone: [phone]  Fax: [phone]  Established Patient  Follow Up Time: 2 weeks    Margarito Beckman  Medical Oncology  [address] [place], [place] [address]  Phone: [phone]  Fax: [phone]  Established Patient  Follow Up Time: 2 weeks    Marcus Metcalf  Neurosurgery  [address] [facility], [address]  [place] [address]  Phone: [phone]  Fax: [phone]  Scheduled Appointment: 09/01/2023 02:00 PM

## 2023-08-27 NOTE — PROGRESS NOTE ADULT - ASSESSMENT
52 year old woman hx of lung cancer metastatic to brain p/w ongoing dizziness and migraines admitted for further txt

## 2023-08-27 NOTE — PROGRESS NOTE ADULT - PROBLEM SELECTOR PLAN 1
-pt p/w ongoing dizziness when walking, described as unsteadiness upon ambulating. Also with headaches  -recent cardiac w/u neg  -suspect symptoms all related to brain mets, vasogenic edema  -check orthostatics   -c/w decadron   -c/w depakote for migraines. Can also treat with reglan  -Rad onc consult in AM for consideration of treatment in house given poor outpt f/u   -PT evaluation, unsafe discharge home  -fall precautions

## 2023-08-27 NOTE — DISCHARGE NOTE PROVIDER - HOSPITAL COURSE
52 year old woman hx of lung cancer metastatic to brain p/w ongoing dizziness and migraines. As per chart review, patient has primary lung cancer with mets to the brain.  Patient states that she has had 3 episodes of fainting in the past 3 weeks, and on the 19th she had loss of consciousness.  Patient was then admitted to [facility] which where she had a full work-up for syncope that was unrevealing. At [facility], pt had an MRI that demonstrated multiple brain mets that were smaller in size however one right frontal lobe lesion that was increased in size. Pt was treated with decadron and valproic acid was added for both seizure ppx and migraine treatment. Patient reports she signed out of [facility] AMA because she did not trust the care. Upon discharge from [facility] she was still feeling dizzy  when standing, described as feeling off balance when ambulating, thus she presented to [facility] for further care.  Currently she reports a bilateral headache, states similar to prior headaches. She denies fevers, chills, chest pain, shortness of breath, abdominal pain, N/V, visual changes, back pain or LE edema.    In regards to oncological history, s/p 4 cycles chemo (carboplatin/pemetrexed -completed 9/22) and Thoracic RT completed 10/22.  declined Durvalumab at the time. Then hospitalized 5/23 and found to have brain mets, s/p GK-SRS to 7 brain metastases in 5 fractions from 6/21 - 6/29/2023. She was due to follow up with oncology to start immunotherapy this month.     Hospital Course:  She was continued on Decadron 6mg q6hr and Depakote during her stay. Radiation oncology was consulted who did not recommend any inpatient intervention. She was evaluated by physical therapy who recommended _____________. Oncology recommended _________. Ultimately, she was deemed stable for d/c to _________. 52 year old woman hx of lung cancer metastatic to brain p/w ongoing dizziness and migraines. As per chart review, patient has primary lung cancer with mets to the brain.  Patient states that she has had 3 episodes of fainting in the past 3 weeks, and on the 19th she had loss of consciousness.  Patient was then admitted to [facility] which where she had a full work-up for syncope that was unrevealing. At [facility], pt had an MRI that demonstrated multiple brain mets that were smaller in size however one right frontal lobe lesion that was increased in size. Pt was treated with decadron and valproic acid was added for both seizure ppx and migraine treatment. Patient reports she signed out of [facility] AMA because she did not trust the care. Upon discharge from [facility] she was still feeling dizzy  when standing, described as feeling off balance when ambulating, thus she presented to [facility] for further care.  Currently she reports a bilateral headache, states similar to prior headaches. She denies fevers, chills, chest pain, shortness of breath, abdominal pain, N/V, visual changes, back pain or LE edema.    In regards to oncological history, s/p 4 cycles chemo (carboplatin/pemetrexed -completed 9/22) and Thoracic RT completed 10/22. Declined Durvalumab at the time. Then hospitalized 5/23 and found to have brain mets, s/p GK-SRS to 7 brain metastases in 5 fractions from 6/21 - 6/29/2023. She was due to follow up with oncology to start immunotherapy this month. \    Blood sugars persistently elevated during hospitalization with uptitration of insulin requirements. Endocrinology consulted for recommendations for outpatient management of hyperglycemia. Recommend discharge on oral antihyperglycemic agents, with discontinuation following steroid taper completion.      Hospital Course:  She was continued on Decadron 6mg q6hr and Depakote during her stay. Radiation oncology was consulted who did not recommend any inpatient intervention. She was evaluated by physical therapy who recommended discharge home without skilled PT. Will follow outpatient with Dr. Bernard from Rad-Onc in RT clinic for evaluation of RT SRS therapy, Dr. Beckman from Heme Onc. Evaluated by Endocrinology regarding  Ultimately, she was deemed stable for d/c to home. 52 year old woman hx of lung cancer metastatic to brain p/w ongoing dizziness and migraines. As per chart review, patient has primary lung cancer with mets to the brain.  Patient states that she has had 3 episodes of fainting in the past 3 weeks, and on the 19th she had loss of consciousness.  Patient was then admitted to [facility] which where she had a full work-up for syncope that was unrevealing. At [facility], pt had an MRI that demonstrated multiple brain mets that were smaller in size however one right frontal lobe lesion that was increased in size. Pt was treated with decadron and valproic acid was added for both seizure ppx and migraine treatment. Patient reports she signed out of [facility] AMA because she did not trust the care. Upon discharge from [facility] she was still feeling dizzy  when standing, described as feeling off balance when ambulating, thus she presented to [facility] for further care.  Currently she reports a bilateral headache, states similar to prior headaches. She denies fevers, chills, chest pain, shortness of breath, abdominal pain, N/V, visual changes, back pain or LE edema.    In regards to oncological history, s/p 4 cycles chemo (carboplatin/pemetrexed -completed 9/22) and Thoracic RT completed 10/22. Declined Durvalumab at the time. Then hospitalized 5/23 and found to have brain mets, s/p GK-SRS to 7 brain metastases in 5 fractions from 6/21 - 6/29/2023. She was due to follow up with oncology to start immunotherapy this month.    Blood sugars persistently elevated during hospitalization with uptitration of insulin requirements. Counseling provided regarding insulin administration given initial plan to discharge on home insulin. Endocrinology consulted for recommendations for outpatient management of hyperglycemia. Recommend discharge on oral medication metformin 1000 BID, repaglinide 1mg TID with meals advise discontinuation following steroid taper completion without need for outpatient endo followup.      Hospital Course:  She was continued on Decadron 6mg q6hr and Depakote on admission 8/26. Radiation oncology was consulted who did not recommend any inpatient intervention. On 8/27 decreased steroid dose to 4mg q6h, med-onc recommendations followed to pursue steroid taper, 4mg q8 (8/28-8/31), 4mg q12 (9/1-9/4), 4mg qd (9/5-9/8) and discontinuation 9/9. Evaluated by neurosurgery regarding evaluation of surgical resection of brain lesion with recommendations for no acute neurosurgical intervention, and followup in clinic. Will follow outpatient with Dr. Bernard from Rad-Onc in RT clinic for evaluation of RT SRS therapy, Dr. Beckman from [facility]. She was evaluated by physical therapy who recommended discharge home without skilled PT. Ultimately, she was deemed stable for d/c to home. 52 year old woman hx of lung cancer metastatic to brain p/w ongoing dizziness and migraines. As per chart review, patient has primary lung cancer with mets to the brain.  Patient states that she has had 3 episodes of fainting in the past 3 weeks, and on the 19th she had loss of consciousness.  Patient was then admitted to [facility] which where she had a full work-up for syncope that was unrevealing. At [facility], pt had an MRI that demonstrated multiple brain mets that were smaller in size however one right frontal lobe lesion that was increased in size. Pt was treated with decadron and valproic acid was added for both seizure ppx and migraine treatment. Patient reports she signed out of [facility] AMA because she did not trust the care. Upon discharge from [facility] she was still feeling dizzy  when standing, described as feeling off balance when ambulating, thus she presented to [facility] for further care.  Currently she reports a bilateral headache, states similar to prior headaches. She denies fevers, chills, chest pain, shortness of breath, abdominal pain, N/V, visual changes, back pain or LE edema.    In regards to oncological history, s/p 4 cycles chemo (carboplatin/pemetrexed -completed 9/22) and Thoracic RT completed 10/22. Declined Durvalumab at the time. Then hospitalized 5/23 and found to have brain mets, s/p GK-SRS to 7 brain metastases in 5 fractions from 6/21 - 6/29/2023. She was due to follow up with oncology to start immunotherapy this month.    Blood sugars persistently elevated during hospitalization with uptitration of insulin requirements. Counseling provided regarding insulin administration given initial plan to discharge on home insulin. Endocrinology consulted for recommendations for outpatient management of hyperglycemia. Given likelihood of steroid dependent hyperglycemia, recommended discharge on oral medication metformin 1000 BID, repaglinide 1mg TID with meals with intention to disconitnue following steroid taper completion. No need for outpatient endo followup.      Hospital Course:  She was started on Decadron 6mg q6hr and Depakote on admission 8/26. Radiation oncology was consulted who did not recommend any inpatient intervention. On 8/27 decreased steroid dose to 4mg q6h, med-onc recommendations followed to pursue steroid taper, 4mg q8 (8/28-8/31), plan for 4mg q12 (9/1-9/4), 4mg qd (9/5-9/8) and discontinuation 9/9.  Evaluated by neurosurgery regarding evaluation of surgical resection of brain lesion with recommendations for no acute neurosurgical intervention, and followup in clinic. Will follow outpatient with Dr. Bernard from Rad-Onc in RT clinic for evaluation of RT SRS therapy, Dr. Beckman from [facility]. She was evaluated by physical therapy who recommended discharge home without skilled PT. Ultimately, she was deemed stable for d/c to home. 52 year old woman hx of lung cancer metastatic to brain p/w ongoing dizziness and migraines 2/2 brain mets.    Hospital Course:  She was started on Decadron 6mg q6hr and Depakote on admission 8/26. Radiation oncology was consulted who did not recommend any inpatient intervention. On 8/27 decreased steroid dose to 4mg q6h, med-onc recommendations followed to pursue steroid taper, 4mg q8 (8/28-8/31), plan for 4mg q12 (9/1-9/4), 4mg qd (9/5-9/8) and discontinuation 9/9.  Evaluated by neurosurgery regarding evaluation of surgical resection of brain lesion with recommendations for no acute neurosurgical intervention, and followup in clinic. Will follow outpatient with Dr. Bernard from Rad-Onc in RT clinic for evaluation of RT SRS therapy, Dr. Beckman from Heme Onc. She was evaluated by physical therapy who recommended discharge home without skilled PT. Blood sugars persistently elevated during hospitalization with uptitration of insulin requirements. Counseling provided regarding insulin administration given initial plan to discharge on home insulin. Endocrinology consulted for recommendations for outpatient management of hyperglycemia. Given likelihood of steroid dependent hyperglycemia, recommended discharge on oral medication metformin 1000 BID, repaglinide 1mg TID with meals with intention to discontinue following steroid taper completion. No need for outpatient endo followup.   Ultimately, she was deemed stable for d/c to home. 52 year old woman hx of lung cancer metastatic to brain p/w ongoing dizziness and migraines 2/2 brain mets.    Hospital Course:  She was started on Decadron 6mg q6hr and Depakote on admission 8/26. Radiation oncology was consulted who did not recommend any inpatient intervention. On 8/27 decreased steroid dose to 4mg q6h, med-onc recommendations followed to pursue steroid taper, 4mg q8 (8/29-8/31), plan for 4mg q12 (9/1-9/3), 4mg qd (9/4-9/6) and discontinuation 9/7.  Evaluated by neurosurgery regarding evaluation of surgical resection of brain lesion with recommendations for no acute neurosurgical intervention, and followup in clinic. Will follow outpatient with Dr. Bernard from Rad-Onc in RT clinic for evaluation of RT SRS therapy, Dr. Beckman from Heme Onc. She was evaluated by physical therapy who recommended discharge home without skilled PT. Blood sugars persistently elevated during hospitalization with uptitration of insulin requirements. Counseling provided regarding insulin administration given initial plan to discharge on home insulin. Endocrinology consulted for recommendations for outpatient management of hyperglycemia. Given likelihood of steroid dependent hyperglycemia, recommended discharge on oral medication metformin 1000 BID, repaglinide 1mg TID with meals with intention to discontinue following steroid taper completion. No need for outpatient endo followup.   Ultimately, she was deemed stable for d/c to home.

## 2023-08-28 NOTE — PROGRESS NOTE ADULT - PROBLEM SELECTOR PLAN 1
-pt p/w ongoing dizziness when walking, described as unsteadiness upon ambulating. Also with headaches  -recent cardiac w/u neg  -suspect symptoms all related to brain mets, vasogenic edema  -check orthostatics   -c/w decadron   -c/w depakote for migraines. Can also treat with reglan  -Rad onc consult in AM for consideration of treatment in house given poor outpt f/u   -PT evaluation, unsafe discharge home  -fall precautions -pt p/w ongoing dizziness when walking, described as unsteadiness upon ambulating. Also with headaches  -recent cardiac w/u neg (EKG 8/19 NSR unremarkable, TTE 10/27 EF ~65%, no valvular abn or WMA)   - negative orthostatics   -suspect symptoms all related to brain mets, vasogenic edema  -c/w decadron 4mg q6, obtain reccs for steroid taper  -c/w depakote for migraines. Can also treat with reglan  -Rad onc evaluated, no indication for inpt RT, scheduled for outpt RT in clinic with Dr. Bernard   -PT evaluation, rec outpatient PT, discharge to home without skilled PT services   -fall precautions

## 2023-08-28 NOTE — PROGRESS NOTE ADULT - ASSESSMENT
52 year old woman hx of lung cancer metastatic to brain p/w ongoing dizziness and migraines admitted for further txt 52 year old woman hx of lung cancer metastatic to brain p/w ongoing dizziness and migraines admitted for further treatment.

## 2023-08-28 NOTE — PROGRESS NOTE ADULT - PROBLEM SELECTOR PLAN 3
-likely steroid induced   -CC diet   -start on low ISS before meals and at bedtime  -f/u hgba1c      #Leukocytosis - likely related to steroid use, monitor off antibiotics -likely steroid induced   -CC diet   -c/w low ISS before meals and at bedtime  - 2 units premeal admelog + 4 u lantus qhs   - hgba1c 7.2       #Leukocytosis - likely related to steroid use, monitor off antibiotics -likely steroid induced   -CC diet   - hgba1c 7.2   - Endo consulted reccs regarding outpt management of steroid induced hyperglycemia  - reccs appreciated: on wt based insulin dosing   - switched from low ISS to moderate ISS before meals and at bedtime 8/28   - 2 units premeal admelog + 4 u lantus qhs -> inc 7u premeal admelog + 14u lantus qhs     #Leukocytosis - likely related to steroid use, monitor off antibiotics

## 2023-08-28 NOTE — PROGRESS NOTE ADULT - PROBLEM SELECTOR PLAN 2
-Pt s/p chemo, RT, GK-SRS for brain mets. Pending immunotherapy txt  -Onc emailed for f/u   -Rad onc c/s as above.  -evaled by neurosurg previously (6/23) and recommended rad onc c/s  -pain control   -pt seems to have a poor understanding of extent of disease despite multiple providers explaining. Unclear if due to poor health literacy or element of denial? -Pt s/p chemo, RT, GK-SRS for brain mets. Pending immunotherapy txt  -Onc emailed for f/u   -Rad onc c/s as above  -evaled by neurosurg previously (6/23) and recommended rad onc c/s  -pain control   -pt seems to have a poor understanding of extent of disease despite multiple providers explaining. Unclear if due to poor health literacy or element of denial?

## 2023-08-28 NOTE — PHYSICAL THERAPY INITIAL EVALUATION ADULT - PERTINENT HX OF CURRENT PROBLEM, REHAB EVAL
52 year old woman hx of lung cancer reported by patient to be in remission who presents to the ED to due 1 weak of dizziness and migraines.

## 2023-08-28 NOTE — PROGRESS NOTE ADULT - SUBJECTIVE AND OBJECTIVE BOX
***************************************************************  Lani Greenwood, PGY1  Internal Medicine   ***************************************************************    HARJEET WHITMORE  52y  MRN: [id number]    Patient is a 52y old  Female who presents with a chief complaint of dizziness, headache (27 Aug 2023 18:28)      Subjective: no events ON. Denies fever, CP, SOB, abn pain, N/V, dysuria. Tolerating diet.      MEDICATIONS  (STANDING):  dexAMETHasone     Tablet 4 milliGRAM(s) Oral every 6 hours  dextrose 5%. 1000 milliLiter(s) (100 mL/Hr) IV Continuous <Continuous>  dextrose 5%. 1000 milliLiter(s) (50 mL/Hr) IV Continuous <Continuous>  dextrose 50% Injectable 25 Gram(s) IV Push once  dextrose 50% Injectable 12.5 Gram(s) IV Push once  dextrose 50% Injectable 25 Gram(s) IV Push once  divalproex  milliGRAM(s) Oral two times a day  enoxaparin Injectable 40 milliGRAM(s) SubCutaneous every 24 hours  glucagon  Injectable 1 milliGRAM(s) IntraMuscular once  insulin lispro (ADMELOG) corrective regimen sliding scale   SubCutaneous three times a day before meals  insulin lispro (ADMELOG) corrective regimen sliding scale   SubCutaneous at bedtime  pantoprazole    Tablet 40 milliGRAM(s) Oral before breakfast    MEDICATIONS  (PRN):  acetaminophen     Tablet .. 650 milliGRAM(s) Oral every 6 hours PRN Temp greater or equal to 38C (100.4F), Mild Pain (1 - 3)  aluminum hydroxide/magnesium hydroxide/simethicone Suspension 30 milliLiter(s) Oral every 4 hours PRN Dyspepsia  dextrose Oral Gel 15 Gram(s) Oral once PRN Blood Glucose LESS THAN 70 milliGRAM(s)/deciliter  melatonin 3 milliGRAM(s) Oral at bedtime PRN Insomnia  ondansetron Injectable 4 milliGRAM(s) IV Push every 8 hours PRN Nausea and/or Vomiting      Objective:    Vitals: Vital Signs Last 24 Hrs  T(C): 36.1 (08-28-23 @ 05:57), Max: 36.5 (08-27-23 @ 21:39)  T(F): 97 (08-28-23 @ 05:57), Max: 97.7 (08-27-23 @ 21:39)  HR: 73 (08-28-23 @ 05:57) (73 - 83)  BP: 133/69 (08-28-23 @ 05:57) (116/65 - 133/69)  BP(mean): --  RR: 16 (08-28-23 @ 05:57) (16 - 16)  SpO2: 97% (08-28-23 @ 05:57) (96% - 98%)            I&O's Summary      PHYSICAL EXAM:  GENERAL: NAD  HEAD:  Atraumatic, Normocephalic  EYES: EOMI, conjunctiva and sclera clear  CHEST/LUNG: Clear to auscultation bilaterally; No rales, rhonchi, wheezing, or rubs  HEART: Regular rate and rhythm; No murmurs, rubs, or gallops  ABDOMEN: Soft, Nontender, Nondistended;   SKIN: No rashes or lesions  NERVOUS SYSTEM:  Alert & Oriented X3, no focal deficits    LABS:  08-27    132<L>  |  95<L>  |  20  ----------------------------<  321<H>  4.8   |  21<L>  |  0.60  08-26    133<L>  |  100  |  17  ----------------------------<  246<H>  4.5   |  27  |  0.77    Ca    9.2      27 Aug 2023 07:28  Ca    9.8      26 Aug 2023 05:57  Phos  2.4     08-27  Mg     2.00     08-27    TPro  7.2  /  Alb  2.5<L>  /  TBili  0.2  /  DBili  x   /  AST  17  /  ALT  27  /  AlkPhos  56  08-26                    Urinalysis Basic - ( 27 Aug 2023 07:28 )    Color: x / Appearance: x / SG: x / pH: x  Gluc: 321 mg/dL / Ketone: x  / Bili: x / Urobili: x   Blood: x / Protein: x / Nitrite: x   Leuk Esterase: x / RBC: x / WBC x   Sq Epi: x / Non Sq Epi: x / Bacteria: x                              11.5   14.49 )-----------( 188      ( 27 Aug 2023 07:28 )             34.7                         12.3   15.21 )-----------( 197      ( 26 Aug 2023 05:57 )             36.0     CAPILLARY BLOOD GLUCOSE      POCT Blood Glucose.: 340 mg/dL (27 Aug 2023 22:04)  POCT Blood Glucose.: 309 mg/dL (27 Aug 2023 17:37)  POCT Blood Glucose.: 296 mg/dL (27 Aug 2023 12:28)  POCT Blood Glucose.: 298 mg/dL (27 Aug 2023 08:34)      RADIOLOGY & ADDITIONAL TESTS:    Imaging Personally Reviewed:  [x ] YES  [ ] NO    Consultants involved in case:   Consultant(s) Notes Reviewed:  [ x] YES  [ ] NO:   Care Discussed with Consultants/Other Providers [x ] YES  [ ] NO         ***************************************************************  Lani Greenwood, PGY1  Internal Medicine   ***************************************************************    HARJEET WHITMORE  52y  MRN: [id number]    Patient is a 52y old  Female who presents with a chief complaint of dizziness, headache (27 Aug 2023 18:28)      Subjective: Patient seen at bedside, no events ON. Denies fever, CP, SOB, abn pain, N/V, dysuria. Feels well, plan for PT eval today. Seen by rad onc 8/27 recommend outpatient management RT clinic.     MEDICATIONS  (STANDING):  dexAMETHasone     Tablet 4 milliGRAM(s) Oral every 6 hours  dextrose 5%. 1000 milliLiter(s) (100 mL/Hr) IV Continuous <Continuous>  dextrose 5%. 1000 milliLiter(s) (50 mL/Hr) IV Continuous <Continuous>  dextrose 50% Injectable 25 Gram(s) IV Push once  dextrose 50% Injectable 12.5 Gram(s) IV Push once  dextrose 50% Injectable 25 Gram(s) IV Push once  divalproex  milliGRAM(s) Oral two times a day  enoxaparin Injectable 40 milliGRAM(s) SubCutaneous every 24 hours  glucagon  Injectable 1 milliGRAM(s) IntraMuscular once  insulin lispro (ADMELOG) corrective regimen sliding scale   SubCutaneous three times a day before meals  insulin lispro (ADMELOG) corrective regimen sliding scale   SubCutaneous at bedtime  pantoprazole    Tablet 40 milliGRAM(s) Oral before breakfast    MEDICATIONS  (PRN):  acetaminophen     Tablet .. 650 milliGRAM(s) Oral every 6 hours PRN Temp greater or equal to 38C (100.4F), Mild Pain (1 - 3)  aluminum hydroxide/magnesium hydroxide/simethicone Suspension 30 milliLiter(s) Oral every 4 hours PRN Dyspepsia  dextrose Oral Gel 15 Gram(s) Oral once PRN Blood Glucose LESS THAN 70 milliGRAM(s)/deciliter  melatonin 3 milliGRAM(s) Oral at bedtime PRN Insomnia  ondansetron Injectable 4 milliGRAM(s) IV Push every 8 hours PRN Nausea and/or Vomiting      Objective:    Vitals: Vital Signs Last 24 Hrs  T(C): 36.1 (08-28-23 @ 05:57), Max: 36.5 (08-27-23 @ 21:39)  T(F): 97 (08-28-23 @ 05:57), Max: 97.7 (08-27-23 @ 21:39)  HR: 73 (08-28-23 @ 05:57) (73 - 83)  BP: 133/69 (08-28-23 @ 05:57) (116/65 - 133/69)  BP(mean): --  RR: 16 (08-28-23 @ 05:57) (16 - 16)  SpO2: 97% (08-28-23 @ 05:57) (96% - 98%)            I&O's Summary      PHYSICAL EXAM:  GENERAL: NAD  HEAD:  Atraumatic, Normocephalic  EYES: EOMI, conjunctiva and sclera clear  CHEST/LUNG: Clear to auscultation bilaterally; No rales, rhonchi, wheezing, or rubs  HEART: Regular rate and rhythm; No murmurs, rubs, or gallops  ABDOMEN: Soft, Nontender, Nondistended;   SKIN: No rashes or lesions  NERVOUS SYSTEM:  Alert & Oriented X3, no focal deficits    LABS:  08-27    132<L>  |  95<L>  |  20  ----------------------------<  321<H>  4.8   |  21<L>  |  0.60  08-26    133<L>  |  100  |  17  ----------------------------<  246<H>  4.5   |  27  |  0.77    Ca    9.2      27 Aug 2023 07:28  Ca    9.8      26 Aug 2023 05:57  Phos  2.4     08-27  Mg     2.00     08-27    TPro  7.2  /  Alb  2.5<L>  /  TBili  0.2  /  DBili  x   /  AST  17  /  ALT  27  /  AlkPhos  56  08-26                    Urinalysis Basic - ( 27 Aug 2023 07:28 )    Color: x / Appearance: x / SG: x / pH: x  Gluc: 321 mg/dL / Ketone: x  / Bili: x / Urobili: x   Blood: x / Protein: x / Nitrite: x   Leuk Esterase: x / RBC: x / WBC x   Sq Epi: x / Non Sq Epi: x / Bacteria: x                              11.5   14.49 )-----------( 188      ( 27 Aug 2023 07:28 )             34.7                         12.3   15.21 )-----------( 197      ( 26 Aug 2023 05:57 )             36.0     CAPILLARY BLOOD GLUCOSE      POCT Blood Glucose.: 340 mg/dL (27 Aug 2023 22:04)  POCT Blood Glucose.: 309 mg/dL (27 Aug 2023 17:37)  POCT Blood Glucose.: 296 mg/dL (27 Aug 2023 12:28)  POCT Blood Glucose.: 298 mg/dL (27 Aug 2023 08:34)      RADIOLOGY & ADDITIONAL TESTS:    Imaging Personally Reviewed:  [x ] YES  [ ] NO    Consultants involved in case:   Consultant(s) Notes Reviewed:  [ x] YES  [ ] NO:   Care Discussed with Consultants/Other Providers [x ] YES  [ ] NO         ***************************************************************  Lani Greenwood, PGY1  Internal Medicine   ***************************************************************    HARJEET WHITMORE  52y  MRN: [id number]    Patient is a 52y old  Female who presents with a chief complaint of dizziness, headache (27 Aug 2023 18:28)      Subjective: Patient seen at bedside, no events ON. Denies fever, CP, SOB, abn pain, N/V, dysuria. Feels well, plan for PT eval today. Seen by rad onc 8/27 recommend outpatient management RT clinic.     MEDICATIONS  (STANDING):  dexAMETHasone     Tablet 4 milliGRAM(s) Oral every 6 hours  dextrose 5%. 1000 milliLiter(s) (100 mL/Hr) IV Continuous <Continuous>  dextrose 5%. 1000 milliLiter(s) (50 mL/Hr) IV Continuous <Continuous>  dextrose 50% Injectable 25 Gram(s) IV Push once  dextrose 50% Injectable 12.5 Gram(s) IV Push once  dextrose 50% Injectable 25 Gram(s) IV Push once  divalproex  milliGRAM(s) Oral two times a day  enoxaparin Injectable 40 milliGRAM(s) SubCutaneous every 24 hours  glucagon  Injectable 1 milliGRAM(s) IntraMuscular once  insulin lispro (ADMELOG) corrective regimen sliding scale   SubCutaneous three times a day before meals  insulin lispro (ADMELOG) corrective regimen sliding scale   SubCutaneous at bedtime  pantoprazole    Tablet 40 milliGRAM(s) Oral before breakfast    MEDICATIONS  (PRN):  acetaminophen     Tablet .. 650 milliGRAM(s) Oral every 6 hours PRN Temp greater or equal to 38C (100.4F), Mild Pain (1 - 3)  aluminum hydroxide/magnesium hydroxide/simethicone Suspension 30 milliLiter(s) Oral every 4 hours PRN Dyspepsia  dextrose Oral Gel 15 Gram(s) Oral once PRN Blood Glucose LESS THAN 70 milliGRAM(s)/deciliter  melatonin 3 milliGRAM(s) Oral at bedtime PRN Insomnia  ondansetron Injectable 4 milliGRAM(s) IV Push every 8 hours PRN Nausea and/or Vomiting      Objective:    Vitals: Vital Signs Last 24 Hrs  T(C): 36.1 (08-28-23 @ 05:57), Max: 36.5 (08-27-23 @ 21:39)  T(F): 97 (08-28-23 @ 05:57), Max: 97.7 (08-27-23 @ 21:39)  HR: 73 (08-28-23 @ 05:57) (73 - 83)  BP: 133/69 (08-28-23 @ 05:57) (116/65 - 133/69)  BP(mean): --  RR: 16 (08-28-23 @ 05:57) (16 - 16)  SpO2: 97% (08-28-23 @ 05:57) (96% - 98%)            I&O's Summary      PHYSICAL EXAM:  GENERAL: NAD  HEAD:  Atraumatic, Normocephalic  EYES: EOMI, conjunctiva and sclera clear  CHEST/LUNG: Clear to auscultation bilaterally; No rales, rhonchi, wheezing, or rubs  HEART: Regular rate and rhythm; No murmurs, rubs, or gallops  ABDOMEN: Soft, Nontender, Nondistended; BS+   SKIN: No rashes or lesions  NERVOUS SYSTEM:  Alert & Oriented X3, no focal deficits    LABS:  08-27    132<L>  |  95<L>  |  20  ----------------------------<  321<H>  4.8   |  21<L>  |  0.60  08-26    133<L>  |  100  |  17  ----------------------------<  246<H>  4.5   |  27  |  0.77    Ca    9.2      27 Aug 2023 07:28  Ca    9.8      26 Aug 2023 05:57  Phos  2.4     08-27  Mg     2.00     08-27    TPro  7.2  /  Alb  2.5<L>  /  TBili  0.2  /  DBili  x   /  AST  17  /  ALT  27  /  AlkPhos  56  08-26                    Urinalysis Basic - ( 27 Aug 2023 07:28 )    Color: x / Appearance: x / SG: x / pH: x  Gluc: 321 mg/dL / Ketone: x  / Bili: x / Urobili: x   Blood: x / Protein: x / Nitrite: x   Leuk Esterase: x / RBC: x / WBC x   Sq Epi: x / Non Sq Epi: x / Bacteria: x                              11.5   14.49 )-----------( 188      ( 27 Aug 2023 07:28 )             34.7                         12.3   15.21 )-----------( 197      ( 26 Aug 2023 05:57 )             36.0     CAPILLARY BLOOD GLUCOSE      POCT Blood Glucose.: 340 mg/dL (27 Aug 2023 22:04)  POCT Blood Glucose.: 309 mg/dL (27 Aug 2023 17:37)  POCT Blood Glucose.: 296 mg/dL (27 Aug 2023 12:28)  POCT Blood Glucose.: 298 mg/dL (27 Aug 2023 08:34)      RADIOLOGY & ADDITIONAL TESTS:  reviewed.

## 2023-08-29 NOTE — PROVIDER CONTACT NOTE (OTHER) - BACKGROUND
pt admitted for dizziness and giddiness
Patient admitted weakness
Pt PMH lung cancer, unsteadiness, GERD and lung mass.
Patient PMH of lung cancer, GERD, lung mass. Patient admitted for dizziness and giddiness.

## 2023-08-29 NOTE — PROGRESS NOTE ADULT - SUBJECTIVE AND OBJECTIVE BOX
INTERVAL HPI/OVERNIGHT EVENTS:  Patient seen at bedside.  Patient with much improved symptoms  No further complaints of dizziness    VITAL SIGNS:  T(F): 98.1 (08-29-23 @ 12:49)  HR: 97 (08-29-23 @ 12:49)  BP: 131/91 (08-29-23 @ 12:49)  RR: 18 (08-29-23 @ 12:49)  SpO2: 100% (08-29-23 @ 12:49)  Wt(kg): --    PHYSICAL EXAM:  GENERAL: NAD  HEAD:  Atraumatic, Normocephalic  EYES: EOMI, conjunctiva and sclera clear  CHEST/LUNG: Clear to auscultation bilaterally; No rales, rhonchi, wheezing, or rubs  HEART: Regular rate and rhythm; No murmurs, rubs, or gallops  ABDOMEN: Soft, Nontender, Nondistended;   SKIN: No rashes or lesions  NERVOUS SYSTEM:  Alert & Oriented X3, no focal deficits      MEDICATIONS  (STANDING):  dexAMETHasone     Tablet 4 milliGRAM(s) Oral every 8 hours  dextrose 5%. 1000 milliLiter(s) (100 mL/Hr) IV Continuous <Continuous>  dextrose 5%. 1000 milliLiter(s) (50 mL/Hr) IV Continuous <Continuous>  dextrose 50% Injectable 25 Gram(s) IV Push once  dextrose 50% Injectable 12.5 Gram(s) IV Push once  dextrose 50% Injectable 25 Gram(s) IV Push once  divalproex  milliGRAM(s) Oral two times a day  enoxaparin Injectable 40 milliGRAM(s) SubCutaneous every 24 hours  glucagon  Injectable 1 milliGRAM(s) IntraMuscular once  insulin glargine Injectable (LANTUS) 14 Unit(s) SubCutaneous at bedtime  insulin lispro (ADMELOG) corrective regimen sliding scale   SubCutaneous three times a day before meals  insulin lispro (ADMELOG) corrective regimen sliding scale   SubCutaneous at bedtime  insulin lispro Injectable (ADMELOG) 10 Unit(s) SubCutaneous three times a day before meals  pantoprazole    Tablet 40 milliGRAM(s) Oral before breakfast    MEDICATIONS  (PRN):  acetaminophen     Tablet .. 650 milliGRAM(s) Oral every 6 hours PRN Temp greater or equal to 38C (100.4F), Mild Pain (1 - 3)  aluminum hydroxide/magnesium hydroxide/simethicone Suspension 30 milliLiter(s) Oral every 4 hours PRN Dyspepsia  dextrose Oral Gel 15 Gram(s) Oral once PRN Blood Glucose LESS THAN 70 milliGRAM(s)/deciliter  melatonin 3 milliGRAM(s) Oral at bedtime PRN Insomnia  ondansetron Injectable 4 milliGRAM(s) IV Push every 8 hours PRN Nausea and/or Vomiting  SUMAtriptan 50 milliGRAM(s) Oral every 6 hours PRN Migraine      Allergies    No Known Allergies    Intolerances        LABS:                        11.6   15.38 )-----------( 159      ( 29 Aug 2023 06:12 )             34.4     08-29    131<L>  |  95<L>  |  27<H>  ----------------------------<  275<H>  4.9   |  24  |  0.66    Ca    9.6      29 Aug 2023 06:12  Phos  3.0     08-29  Mg     2.10     08-29        Urinalysis Basic - ( 29 Aug 2023 06:12 )    Color: x / Appearance: x / SG: x / pH: x  Gluc: 275 mg/dL / Ketone: x  / Bili: x / Urobili: x   Blood: x / Protein: x / Nitrite: x   Leuk Esterase: x / RBC: x / WBC x   Sq Epi: x / Non Sq Epi: x / Bacteria: x        RADIOLOGY & ADDITIONAL TESTS:  Studies reviewed.

## 2023-08-29 NOTE — PROVIDER CONTACT NOTE (OTHER) - REASON
BG levels 344
pt refused insulin at lunch time and lovenox
BG levels 344
Patient refused lovenox injection

## 2023-08-29 NOTE — PROVIDER CONTACT NOTE (OTHER) - RECOMMENDATIONS
MD notified and aware. MD came and spoke to patient. She still refused. MD asked me to recheck her BG levels and she will not allow me to recheck. I attempted to speak to patient and she is refusing
MAR notified
notify md
MD notified and aware of situation. Stated that he will come and speak to her.

## 2023-08-29 NOTE — PROVIDER CONTACT NOTE (OTHER) - ACTION/TREATMENT ORDERED:
Risks and benefits explained,. Patient verbalized understanding, but continues to refuse
md made aware. no new interventions at this time.
MD notified and aware of situation.
MD notified and aware.

## 2023-08-29 NOTE — PROVIDER CONTACT NOTE (OTHER) - SITUATION
BG levels 344
BG levels 344
Patient refused lovenox injection
pt refused insulin at lunch time and lovenox

## 2023-08-29 NOTE — PROGRESS NOTE ADULT - SUBJECTIVE AND OBJECTIVE BOX
***************************************************************  Lani Greenwood, PGY 1   Internal Medicine   ***************************************************************    HARJEET WHITMORE  52y  MRN: [id number]    Patient is a 52y old  Female who presents with a chief complaint of dizziness, headache (28 Aug 2023 06:59)      Subjective: no events ON. Denies fever, CP, SOB, abn pain, N/V, dysuria. Tolerating diet.      MEDICATIONS  (STANDING):  dexAMETHasone     Tablet 4 milliGRAM(s) Oral every 6 hours  dextrose 5%. 1000 milliLiter(s) (100 mL/Hr) IV Continuous <Continuous>  dextrose 5%. 1000 milliLiter(s) (50 mL/Hr) IV Continuous <Continuous>  dextrose 50% Injectable 25 Gram(s) IV Push once  dextrose 50% Injectable 12.5 Gram(s) IV Push once  dextrose 50% Injectable 25 Gram(s) IV Push once  divalproex  milliGRAM(s) Oral two times a day  enoxaparin Injectable 40 milliGRAM(s) SubCutaneous every 24 hours  glucagon  Injectable 1 milliGRAM(s) IntraMuscular once  insulin glargine Injectable (LANTUS) 14 Unit(s) SubCutaneous at bedtime  insulin lispro (ADMELOG) corrective regimen sliding scale   SubCutaneous three times a day before meals  insulin lispro (ADMELOG) corrective regimen sliding scale   SubCutaneous at bedtime  insulin lispro Injectable (ADMELOG) 7 Unit(s) SubCutaneous three times a day before meals  pantoprazole    Tablet 40 milliGRAM(s) Oral before breakfast    MEDICATIONS  (PRN):  acetaminophen     Tablet .. 650 milliGRAM(s) Oral every 6 hours PRN Temp greater or equal to 38C (100.4F), Mild Pain (1 - 3)  aluminum hydroxide/magnesium hydroxide/simethicone Suspension 30 milliLiter(s) Oral every 4 hours PRN Dyspepsia  dextrose Oral Gel 15 Gram(s) Oral once PRN Blood Glucose LESS THAN 70 milliGRAM(s)/deciliter  melatonin 3 milliGRAM(s) Oral at bedtime PRN Insomnia  ondansetron Injectable 4 milliGRAM(s) IV Push every 8 hours PRN Nausea and/or Vomiting      Objective:    Vitals: Vital Signs Last 24 Hrs  T(C): 36.8 (08-29-23 @ 05:03), Max: 37 (08-28-23 @ 13:27)  T(F): 98.3 (08-29-23 @ 05:03), Max: 98.6 (08-28-23 @ 13:27)  HR: 77 (08-29-23 @ 05:03) (77 - 93)  BP: 135/80 (08-29-23 @ 05:03) (135/80 - 139/91)  BP(mean): --  RR: 17 (08-29-23 @ 05:03) (17 - 17)  SpO2: 98% (08-29-23 @ 05:03) (98% - 100%)            I&O's Summary      PHYSICAL EXAM:  GENERAL: NAD  HEAD:  Atraumatic, Normocephalic  EYES: EOMI, conjunctiva and sclera clear  CHEST/LUNG: Clear to auscultation bilaterally; No rales, rhonchi, wheezing, or rubs  HEART: Regular rate and rhythm; No murmurs, rubs, or gallops  ABDOMEN: Soft, Nontender, Nondistended;   SKIN: No rashes or lesions  NERVOUS SYSTEM:  Alert & Oriented X3, no focal deficits    LABS:  08-28    134<L>  |  96<L>  |  22  ----------------------------<  271<H>  5.5<H>   |  25  |  0.78  08-27    132<L>  |  95<L>  |  20  ----------------------------<  321<H>  4.8   |  21<L>  |  0.60    Ca    9.9      28 Aug 2023 07:20  Ca    9.2      27 Aug 2023 07:28  Phos  3.3     08-28  Mg     2.30     08-28                      Urinalysis Basic - ( 28 Aug 2023 07:20 )    Color: x / Appearance: x / SG: x / pH: x  Gluc: 271 mg/dL / Ketone: x  / Bili: x / Urobili: x   Blood: x / Protein: x / Nitrite: x   Leuk Esterase: x / RBC: x / WBC x   Sq Epi: x / Non Sq Epi: x / Bacteria: x                              12.1   15.48 )-----------( 182      ( 28 Aug 2023 07:20 )             36.6                         11.5   14.49 )-----------( 188      ( 27 Aug 2023 07:28 )             34.7     CAPILLARY BLOOD GLUCOSE      POCT Blood Glucose.: 334 mg/dL (28 Aug 2023 21:46)  POCT Blood Glucose.: 250 mg/dL (28 Aug 2023 18:31)  POCT Blood Glucose.: 422 mg/dL (28 Aug 2023 13:40)  POCT Blood Glucose.: 387 mg/dL (28 Aug 2023 12:13)  POCT Blood Glucose.: 223 mg/dL (28 Aug 2023 08:52)      RADIOLOGY & ADDITIONAL TESTS:    Imaging Personally Reviewed:  [x ] YES  [ ] NO    Consultants involved in case:   Consultant(s) Notes Reviewed:  [ x] YES  [ ] NO:   Care Discussed with Consultants/Other Providers [x ] YES  [ ] NO         ***************************************************************  Lani Greenwood, PGY 1   Internal Medicine   ***************************************************************    HARJEET WHITMORE  52y  MRN: [id number]    Patient is a 52y old  Female who presents with a chief complaint of dizziness, headache (28 Aug 2023 06:59)      Subjective: Transitioned to moderate ISS and increased premeal and basal insulin overnight deferred insulin however tolerating this AM. C/o migraine this morning. Otherwise denying fever, n/v, weakness, SOB.     MEDICATIONS  (STANDING):  dexAMETHasone     Tablet 4 milliGRAM(s) Oral every 6 hours  dextrose 5%. 1000 milliLiter(s) (100 mL/Hr) IV Continuous <Continuous>  dextrose 5%. 1000 milliLiter(s) (50 mL/Hr) IV Continuous <Continuous>  dextrose 50% Injectable 25 Gram(s) IV Push once  dextrose 50% Injectable 12.5 Gram(s) IV Push once  dextrose 50% Injectable 25 Gram(s) IV Push once  divalproex  milliGRAM(s) Oral two times a day  enoxaparin Injectable 40 milliGRAM(s) SubCutaneous every 24 hours  glucagon  Injectable 1 milliGRAM(s) IntraMuscular once  insulin glargine Injectable (LANTUS) 14 Unit(s) SubCutaneous at bedtime  insulin lispro (ADMELOG) corrective regimen sliding scale   SubCutaneous three times a day before meals  insulin lispro (ADMELOG) corrective regimen sliding scale   SubCutaneous at bedtime  insulin lispro Injectable (ADMELOG) 7 Unit(s) SubCutaneous three times a day before meals  pantoprazole    Tablet 40 milliGRAM(s) Oral before breakfast    MEDICATIONS  (PRN):  acetaminophen     Tablet .. 650 milliGRAM(s) Oral every 6 hours PRN Temp greater or equal to 38C (100.4F), Mild Pain (1 - 3)  aluminum hydroxide/magnesium hydroxide/simethicone Suspension 30 milliLiter(s) Oral every 4 hours PRN Dyspepsia  dextrose Oral Gel 15 Gram(s) Oral once PRN Blood Glucose LESS THAN 70 milliGRAM(s)/deciliter  melatonin 3 milliGRAM(s) Oral at bedtime PRN Insomnia  ondansetron Injectable 4 milliGRAM(s) IV Push every 8 hours PRN Nausea and/or Vomiting      Objective:    Vitals: Vital Signs Last 24 Hrs  T(C): 36.8 (08-29-23 @ 05:03), Max: 37 (08-28-23 @ 13:27)  T(F): 98.3 (08-29-23 @ 05:03), Max: 98.6 (08-28-23 @ 13:27)  HR: 77 (08-29-23 @ 05:03) (77 - 93)  BP: 135/80 (08-29-23 @ 05:03) (135/80 - 139/91)  BP(mean): --  RR: 17 (08-29-23 @ 05:03) (17 - 17)  SpO2: 98% (08-29-23 @ 05:03) (98% - 100%)            I&O's Summary      PHYSICAL EXAM:  GENERAL: NAD  HEAD:  Atraumatic, Normocephalic  EYES: EOMI, conjunctiva and sclera clear  CHEST/LUNG: Clear to auscultation bilaterally; No rales, rhonchi, wheezing, or rubs  HEART: Regular rate and rhythm; No murmurs, rubs, or gallops  ABDOMEN: Soft, Nontender, Nondistended;   SKIN: No rashes or lesions  NERVOUS SYSTEM:  Alert & Oriented X3, no focal deficits    LABS:  08-28    134<L>  |  96<L>  |  22  ----------------------------<  271<H>  5.5<H>   |  25  |  0.78  08-27    132<L>  |  95<L>  |  20  ----------------------------<  321<H>  4.8   |  21<L>  |  0.60    Ca    9.9      28 Aug 2023 07:20  Ca    9.2      27 Aug 2023 07:28  Phos  3.3     08-28  Mg     2.30     08-28                      Urinalysis Basic - ( 28 Aug 2023 07:20 )    Color: x / Appearance: x / SG: x / pH: x  Gluc: 271 mg/dL / Ketone: x  / Bili: x / Urobili: x   Blood: x / Protein: x / Nitrite: x   Leuk Esterase: x / RBC: x / WBC x   Sq Epi: x / Non Sq Epi: x / Bacteria: x                              12.1   15.48 )-----------( 182      ( 28 Aug 2023 07:20 )             36.6                         11.5   14.49 )-----------( 188      ( 27 Aug 2023 07:28 )             34.7     CAPILLARY BLOOD GLUCOSE      POCT Blood Glucose.: 334 mg/dL (28 Aug 2023 21:46)  POCT Blood Glucose.: 250 mg/dL (28 Aug 2023 18:31)  POCT Blood Glucose.: 422 mg/dL (28 Aug 2023 13:40)  POCT Blood Glucose.: 387 mg/dL (28 Aug 2023 12:13)  POCT Blood Glucose.: 223 mg/dL (28 Aug 2023 08:52)      RADIOLOGY & ADDITIONAL TESTS:  no new imaging. Prior reviewed

## 2023-08-29 NOTE — PROGRESS NOTE ADULT - PROBLEM SELECTOR PLAN 1
-pt p/w ongoing dizziness when walking, described as unsteadiness upon ambulating. Also with headaches  -recent cardiac w/u neg (EKG 8/19 NSR unremarkable, TTE 10/27 EF ~65%, no valvular abn or WMA)   - negative orthostatics   -suspect symptoms all related to brain mets, vasogenic edema  -c/w decadron 4mg q6, obtain reccs for steroid taper  -c/w depakote for migraines. Can also treat with reglan  -Rad onc evaluated, no indication for inpt RT, scheduled for outpt RT in clinic with Dr. Bernard   -PT evaluation, rec outpatient PT, discharge to home without skilled PT services   -fall precautions -pt p/w ongoing dizziness when walking, described as unsteadiness upon ambulating. Also with headaches  -recent cardiac w/u neg (EKG 8/19 NSR unremarkable, TTE 10/27 EF ~65%, no valvular abn or WMA)   - negative orthostatics   -suspect symptoms all related to brain mets, vasogenic edema  -decadron 4mg q6h -> q8h today 8/29. Taper as follows: 4mg q8h (8/29-8/31), 4mg q12h (9/1-9/4), 4mg qd (9/5- 9/8), discontinue on 9/9.   -c/w depakote for migraines. Can also treat with reglan  -Rad onc evaluated, no indication for inpt RT, scheduled for outpt RT in clinic with Dr. Bernard   -PT evaluation, rec outpatient PT, discharge to home without skilled PT services   -fall precautions

## 2023-08-29 NOTE — PROGRESS NOTE ADULT - ASSESSMENT
52 year old woman hx of lung cancer metastatic to brain p/w ongoing dizziness and migraines admitted for further treatment.

## 2023-08-29 NOTE — PROVIDER CONTACT NOTE (OTHER) - ASSESSMENT
pt denies any pain, sob or dizziness. no signs of acute distress noted. pt was educated on importance of both medications.
Pt presenting with no acute distress. Vitals are stable. BG levels high at 344. Patient refusing all insulin. MD notified and aware. Educated patient on reasons why she should take insulin. Still refused all.
Patient in no acute distress. BG level is 344. Patient is refusing all insulin.

## 2023-08-29 NOTE — PROGRESS NOTE ADULT - ASSESSMENT
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    #Dizziness / Non-Small Cell Lung Cancer Metastatic to the Brain  - Oncology history as previously documented   - Patient has had ongoing dizziness for the past few weeks. Suspect related to malignancy +/- prior RT, although it appears she has more of a lightheadedness  - In ED started on dexamethasone 6 mg Q6H and Depakote 500 mg BID for anti-seizure PPX  - Now on decreased dexamethasone to 4 mg Q6H, plan to taper q3 days till 9/9  - Recommend she follows with her Oncologist, Dr. Margarito Beckman, at [facility]. She does not have a follow-up appointment currently scheduled, so she will need to make one to start systemic treatment.   --No plans for inpatient chemotherapy  -C/w Supportive care, pain control, Nutrition, PT, DVT ppx  -Rest of care as per primary team  -Patient to followup with Dr. 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    #Dizziness / Non-Small Cell Lung Cancer Metastatic to the Brain  - Oncology history as previously documented   - Patient has had ongoing dizziness for the past few weeks. Suspect related to malignancy +/- prior RT, although it appears she has more of a lightheadedness  - In ED started on dexamethasone 6 mg Q6H and Depakote 500 mg BID for anti-seizure PPX  - Now on decreased dexamethasone to 4 mg Q6H, plan to taper q3 days till 9/9  -Endocrinology to comment on steroid induced hyperglycemia  - Recommend she follows with her Oncologist, Dr. Margarito Beckman, at [facility]. She does not have a follow-up appointment currently scheduled, so she will need to make one to start systemic treatment.   --No plans for inpatient chemotherapy  -C/w Supportive care, pain control, Nutrition, PT, DVT ppx  -Rest of care as per primary team  -Patient to followup with Dr. Beckman ([facility]) upon discharge  -Oncology will continue to follow with you    Case d/w oncology attending      Prince BURTON  Oncology Physician Assistant  [facility]INO/[facility]ARD [facility]    Pager [phone] also available on TEAMS as Prince BURTON    If before 8am/after 5pm or on weekends please page On-call Oncology Fellow

## 2023-08-29 NOTE — PROGRESS NOTE ADULT - PROBLEM SELECTOR PLAN 3
-likely steroid induced   -CC diet   - hgba1c 7.2   - Endo consulted reccs regarding outpt management of steroid induced hyperglycemia  - reccs appreciated: on wt based insulin dosing   - switched from low ISS to moderate ISS before meals and at bedtime 8/28   - 2 units premeal admelog + 4 u lantus qhs -> inc 7u premeal admelog + 14u lantus qhs     #Leukocytosis - likely related to steroid use, monitor off antibiotics -likely steroid induced   -CC diet   - hgba1c 7.2   - Endo consulted reccs regarding outpt management of steroid induced hyperglycemia based on taper (4mg dex q8 3d, q12 3d, qd 3d, d/c following)   - reccs appreciated: on wt based insulin dosing   - switched from low ISS to moderate ISS before meals and at bedtime 8/28   - 2 units premeal admelog + 4 u lantus qhs -> inc 7u premeal admelog + 14u lantus qhs     #Leukocytosis - likely related to steroid use, monitor off antibiotics

## 2023-08-30 NOTE — DISCHARGE NOTE NURSING/CASE MANAGEMENT/SOCIAL WORK - PATIENT PORTAL LINK FT
You can access the FollowMyHealth Patient Portal offered by [facility] by registering at the following website: [email or web address]. By joining [employer]’s FollowMyHealth portal, you will also be able to view your health information using other applications (apps) compatible with our system.

## 2023-08-30 NOTE — CONSULT NOTE ADULT - SUBJECTIVE AND OBJECTIVE BOX
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 Patient has primary lung cancer with mets to the brain. Patient states that she has had 3 episodes of fainting in the past 3 weeks, and on the 19th she had loss of consciousness. Patient was then admitted to [facility] ([facility]), which where she had a full work-up for syncope that was unrevealing. There, pt had an MRI that demonstrated multiple brain mets that were smaller in size but one right frontal lobe lesion that was increased in size. Pt was treated with Decadron. Valproic acid was added for both seizure PPX and migraine treatment. Patient reports she signed out of [facility] AMA because she did not trust the care. Upon discharge yesterday from [facility], she was still feeling dizzy when standing, described as feeling off balance when ambulating, and thus she presented to [facility] for further care. On admission, she reported a bilateral headache, states similar to prior headaches. She denies fevers, chills, chest pain, shortness of breath, abdominal pain, N/V, visual changes, back pain or LE edema.    In regards to oncological history, she was diagnosed in 2022 with stage IIIB disease (primary RUL mass). Her tumor was PD-L1 negative, positive for TP53, but negative for actionable mutations. She underwent 4 cycles chemo (carboplatin/pemetrexed - completed 09/2022) and thoracic RT completed 10/2022. She declined maintenance durvalumab at the time. She was then hospitalized in May 2023 and found to have brain mets, s/p GK-SRS to 7 brain metastases in 5 fractions from 6/21 - 6/29/2023. She was placed on Keppra for anti-seizure PPX but was not prescribed further doses after completing her initial course. She was placed on a steroid taper and was due to follow up with Oncology to start immunotherapy on 8/4/23, but she declined at that time (while being explained the risks of doing so) and completed her steroid taper 8/5/23. However, she felt dizziness on 8/16/23 and restarted her steroids then before being admitted to [facility] on 8/19.    After being admitted to [facility], her steroids (dexamethasone) that she had recently restarted at 2 mg QD was increased gradually to 4 mg Q6H. Upon arrival here at [facility], it was increased further to 6 mg Q6H.    Currently, she feels much better and is able to ambulate without assistance. She states the dizziness she was feeling before was not vertiginous, but was a lightheadedness. She denies N/V, diarrhea, palpitations, CP, cough, or SOB.      Allergies    No Known Allergies    Intolerances        MEDICATIONS  (STANDING):  dexAMETHasone     Tablet 6 milliGRAM(s) Oral every 6 hours  dextrose 5%. 1000 milliLiter(s) (100 mL/Hr) IV Continuous <Continuous>  dextrose 5%. 1000 milliLiter(s) (50 mL/Hr) IV Continuous <Continuous>  dextrose 50% Injectable 25 Gram(s) IV Push once  dextrose 50% Injectable 12.5 Gram(s) IV Push once  dextrose 50% Injectable 25 Gram(s) IV Push once  divalproex  milliGRAM(s) Oral two times a day  enoxaparin Injectable 40 milliGRAM(s) SubCutaneous every 24 hours  glucagon  Injectable 1 milliGRAM(s) IntraMuscular once  insulin lispro (ADMELOG) corrective regimen sliding scale   SubCutaneous three times a day before meals  insulin lispro (ADMELOG) corrective regimen sliding scale   SubCutaneous at bedtime  pantoprazole    Tablet 40 milliGRAM(s) Oral before breakfast    MEDICATIONS  (PRN):  acetaminophen     Tablet .. 650 milliGRAM(s) Oral every 6 hours PRN Temp greater or equal to 38C (100.4F), Mild Pain (1 - 3)  aluminum hydroxide/magnesium hydroxide/simethicone Suspension 30 milliLiter(s) Oral every 4 hours PRN Dyspepsia  dextrose Oral Gel 15 Gram(s) Oral once PRN Blood Glucose LESS THAN 70 milliGRAM(s)/deciliter  melatonin 3 milliGRAM(s) Oral at bedtime PRN Insomnia  ondansetron Injectable 4 milliGRAM(s) IV Push every 8 hours PRN Nausea and/or Vomiting      PAST MEDICAL & SURGICAL HISTORY:  GERD (Gastroesophageal Reflux Disease)      Lung mass  right      Non-small cell lung cancer with metastasis      S/P endoscopy  2022          FAMILY HISTORY:  No pertinent family history in first degree relatives of known cancer        SOCIAL HISTORY: Former smoker, 1/4 ppd x > 20 years. No significant alcohol or drug use.    REVIEW OF SYSTEMS:  CONSTITUTIONAL: no fever  EYES/ENT: No visual changes; no throat pain  NECK: No pain or stiffness  RESPIRATORY: no SOB or cough  CARDIOVASCULAR: No chest pain or palpitations  GASTROINTESTINAL: No abdominal pain. No N/V/D/C  GENITOURINARY: No dysuria, change in frequency, or hematuria  NEUROLOGICAL: + improved dizziness. No numbness or focal weakness  SKIN: No itching, burning, rashes, or lesions  Psych: No depression  MSK: no joint pain  Allergy: no urticaria    Height (cm): 167.6 (08-26 @ 22:33)  Weight (kg): 68.855 (08-26 @ 22:33)  BMI (kg/m2): 24.5 (08-26 @ 22:33)  BSA (m2): 1.78 (08-26 @ 22:33)    T(F): 97.2 (08-27-23 @ 04:59), Max: 98.1 (08-26-23 @ 21:12)  HR: 97 (08-27-23 @ 04:59)  BP: 113/74 (08-27-23 @ 04:59)  RR: 16 (08-27-23 @ 04:59)  SpO2: 96% (08-27-23 @ 04:59)  Wt(kg): --    GENERAL: NAD  HEENT: EOMI, MMM, no oropharyngeal lesions or erythema appreciated  Pulm: no increased WOB, CTAB/L  CV: RRR, S1, S2, no m/g/r  ABDOMEN: soft, NT, ND, no masses felt, no HSM  MSK: nl ROM  EXTREMITIES: no appreciable edema in b/l LE  Neuro: A&Ox3, CNs II-XII intact, no focal deficits  SKIN: warm and dry, no visible rash                          11.5   14.49 )-----------( 188      ( 27 Aug 2023 07:28 )             34.7       08-27    132<L>  |  95<L>  |  20  ----------------------------<  321<H>  4.8   |  21<L>  |  0.60    Ca    9.2      27 Aug 2023 07:28  Phos  2.4     08-27  Mg     2.00     08-27    TPro  7.2  /  Alb  2.5<L>  /  TBili  0.2  /  DBili  x   /  AST  17  /  ALT  27  /  AlkPhos  56  08-26      Phosphorus: 2.4 mg/dL (08-27 @ 07:28)  Magnesium: 2.00 mg/dL (08-27 @ 07:28)  
    HPI:  52 year old woman lung cancer, with metastases to brain, on high dose steroids with steroid induced diabetes. She was initially started on steroids in ~ June, they were tapered, and she resumed them before presenting to [facility] 8/19 and steroids were then increased.  Now being decreased again, currently on dexamethasone 4 mg every 8 hours  She reports no prior history of diabetes or hyperglycemia.  Does not check glucose at home.  Has been eating a lot of cake, cookies and chocolate recently.    She reports no polyuria, polydipsia, no blurry vision, no numbness in toes.  Has not seen an ophthalmologist    reports no hypertension or hyperlipidemia      PAST MEDICAL & SURGICAL HISTORY:  GERD (Gastroesophageal Reflux Disease)      Lung mass  right      Non-small cell lung cancer with metastasis      S/P endoscopy  2022          FAMILY HISTORY:  No pertinent family history in first degree relatives        Social History: lives with phil, works as home health aid. distant tobacco (25 years ago) no etoh or drug use    Outpatient Medications:  Per chart:  · 	divalproex sodium 500 mg oral delayed release tablet: Last Dose Taken:  , 1 tab(s) orally 2 times a day  · 	dexAMETHasone 6 mg oral tablet: Last Dose Taken:  , 1 tab(s) orally every 6 hours  · 	pantoprazole 40 mg oral delayed release tablet: Last Dose Taken:  , 1 tab(s) orally once a day (before a meal)      MEDICATIONS  (STANDING):  dexAMETHasone     Tablet 4 milliGRAM(s) Oral every 8 hours  dextrose 5%. 1000 milliLiter(s) (100 mL/Hr) IV Continuous <Continuous>  dextrose 5%. 1000 milliLiter(s) (50 mL/Hr) IV Continuous <Continuous>  dextrose 50% Injectable 25 Gram(s) IV Push once  dextrose 50% Injectable 12.5 Gram(s) IV Push once  dextrose 50% Injectable 25 Gram(s) IV Push once  divalproex  milliGRAM(s) Oral two times a day  enoxaparin Injectable 40 milliGRAM(s) SubCutaneous every 24 hours  glucagon  Injectable 1 milliGRAM(s) IntraMuscular once  insulin glargine Injectable (LANTUS) 14 Unit(s) SubCutaneous at bedtime  insulin lispro (ADMELOG) corrective regimen sliding scale   SubCutaneous three times a day before meals  insulin lispro (ADMELOG) corrective regimen sliding scale   SubCutaneous at bedtime  insulin lispro Injectable (ADMELOG) 7 Unit(s) SubCutaneous three times a day before meals  pantoprazole    Tablet 40 milliGRAM(s) Oral before breakfast    MEDICATIONS  (PRN):  acetaminophen     Tablet .. 650 milliGRAM(s) Oral every 6 hours PRN Temp greater or equal to 38C (100.4F), Mild Pain (1 - 3)  aluminum hydroxide/magnesium hydroxide/simethicone Suspension 30 milliLiter(s) Oral every 4 hours PRN Dyspepsia  dextrose Oral Gel 15 Gram(s) Oral once PRN Blood Glucose LESS THAN 70 milliGRAM(s)/deciliter  melatonin 3 milliGRAM(s) Oral at bedtime PRN Insomnia  ondansetron Injectable 4 milliGRAM(s) IV Push every 8 hours PRN Nausea and/or Vomiting      Allergies    No Known Allergies    Intolerances      Review of Systems:  Constitutional: No fever  Eyes: No blurry vision  Neuro: No current headache  HEENT: No throat pain  Cardiovascular: No chest pain  Respiratory: No SOB, no cough  GI: No abdominal pain  : No dysuria  Skin: no rash  Endocrine: as noted in HPI  Hem/lymph: no swelling      PHYSICAL EXAM:  VITALS: T(C): 36.7 (08-29-23 @ 12:49)  T(F): 98.1 (08-29-23 @ 12:49), Max: 98.3 (08-29-23 @ 05:03)  HR: 97 (08-29-23 @ 12:49) (77 - 97)  BP: 131/91 (08-29-23 @ 12:49) (131/91 - 139/91)  RR:  (17 - 18)  SpO2:  (98% - 100%)  Wt(kg): 69  GENERAL: Lying in bed in NAD,   EYES: No proptosis,  HEENT:  Atraumatic, Normocephalic,   THYROID: Normal size, no palpable nodules  RESPIRATORY: Clear to auscultation bilaterally  CARDIOVASCULAR: Regular rhythm;  no peripheral edema  GI: Soft, nontender, non distended, normal bowel sounds  SKIN: Dry, intact, No rashes or lesions  NEURO: extraocular movements intact,   PSYCH: Alert and oriented x 3,    POCT Blood Glucose.: 246 mg/dL (08-29-23 @ 12:26)  POCT Blood Glucose.: 246 mg/dL (08-29-23 @ 08:48)  POCT Blood Glucose.: 334 mg/dL (08-28-23 @ 21:46)  POCT Blood Glucose.: 250 mg/dL (08-28-23 @ 18:31)  POCT Blood Glucose.: 422 mg/dL (08-28-23 @ 13:40)  POCT Blood Glucose.: 387 mg/dL (08-28-23 @ 12:13)  POCT Blood Glucose.: 223 mg/dL (08-28-23 @ 08:52)  POCT Blood Glucose.: 340 mg/dL (08-27-23 @ 22:04)  POCT Blood Glucose.: 309 mg/dL (08-27-23 @ 17:37)  POCT Blood Glucose.: 296 mg/dL (08-27-23 @ 12:28)  POCT Blood Glucose.: 298 mg/dL (08-27-23 @ 08:34)  POCT Blood Glucose.: 218 mg/dL (08-26-23 @ 17:20)                            11.6   15.38 )-----------( 159      ( 29 Aug 2023 06:12 )             34.4       08-29    131<L>  |  95<L>  |  27<H>  ----------------------------<  275<H>  4.9   |  24  |  0.66    eGFR: 105    Ca    9.6      08-29  Mg     2.10     08-29  Phos  3.0     08-29      Thyroid Function Tests:      A1C with Estimated Average Glucose Result: 7.3 % (08-28-23 @ 07:00)  A1C with Estimated Average Glucose Result: 7.2 % (08-27-23 @ 07:28)  A1C with Estimated Average Glucose Result: 5.7 % (06-01-23 @ 06:19)      06-01 Chol 163 Direct LDL -- LDL calculated 107<H> HDL 38<L> Trig 88  Radiology:             
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 Patient has primary lung cancer with mets to the brain. Patient states that she has had 3 episodes of fainting in the past 3 weeks, and on the 19th she had loss of consciousness. Patient was then admitted to [facility] ([facility] which where she had a full work-up for syncope that was unrevealing. There, pt had an MRI that demonstrated multiple brain mets that were smaller in size but one right frontal lobe lesion that was increased in size. Pt was treated with Decadron. Valproic acid was added for both seizure PPX and migraine treatment. Patient reports she signed out of [facility] AMA because she did not trust the care. Upon discharge yesterday from [facility], she was still feeling dizzy when standing, described as feeling off balance when ambulating, and thus she presented to [facility] for further care. On admission, she reported a bilateral headache, states similar to prior headaches. She denies fevers, chills, chest pain, shortness of breath, abdominal pain, N/V, visual changes, back pain or LE edema.    In regards to oncological history, she was diagnosed in 2022 with stage IIIB disease (primary RUL mass). Her tumor was PD-L1 negative, positive for TP53, but negative for actionable mutations. She underwent 4 cycles chemo (carboplatin/pemetrexed - completed 09/2022) and thoracic RT completed 10/2022. She declined maintenance durvalumab at the time. She was then hospitalized in May 2023 and found to have brain mets, s/p GK-SRS to 7 brain metastases in 5 fractions from 6/21 - 6/29/2023. She was placed on Keppra for anti-seizure PPX but was not prescribed further doses after completing her initial course. She was placed on a steroid taper and was due to follow up with Oncology to start immunotherapy on 8/4/23, but she declined at that time (while being explained the risks of doing so) and completed her steroid taper 8/5/23. However, she felt dizziness on 8/16/23 and restarted her steroids then before being admitted to [facility] on 8/19.    After being admitted to [facility], her steroids (dexamethasone) that she had recently restarted at 2 mg QD was increased gradually to 4 mg Q6H. Upon arrival here at [facility], it was increased further to 6 mg Q6H.    Currently, she feels much better and is able to ambulate without assistance. She states the dizziness she was feeling before was not vertiginous, but was a lightheadedness. She denies N/V, diarrhea, palpitations, CP, cough, or SOB.      Allergies    No Known Allergies    Intolerances        MEDICATIONS  (STANDING):  dexAMETHasone     Tablet 6 milliGRAM(s) Oral every 6 hours  dextrose 5%. 1000 milliLiter(s) (100 mL/Hr) IV Continuous <Continuous>  dextrose 5%. 1000 milliLiter(s) (50 mL/Hr) IV Continuous <Continuous>  dextrose 50% Injectable 25 Gram(s) IV Push once  dextrose 50% Injectable 12.5 Gram(s) IV Push once  dextrose 50% Injectable 25 Gram(s) IV Push once  divalproex  milliGRAM(s) Oral two times a day  enoxaparin Injectable 40 milliGRAM(s) SubCutaneous every 24 hours  glucagon  Injectable 1 milliGRAM(s) IntraMuscular once  insulin lispro (ADMELOG) corrective regimen sliding scale   SubCutaneous three times a day before meals  insulin lispro (ADMELOG) corrective regimen sliding scale   SubCutaneous at bedtime  pantoprazole    Tablet 40 milliGRAM(s) Oral before breakfast    MEDICATIONS  (PRN):  acetaminophen     Tablet .. 650 milliGRAM(s) Oral every 6 hours PRN Temp greater or equal to 38C (100.4F), Mild Pain (1 - 3)  aluminum hydroxide/magnesium hydroxide/simethicone Suspension 30 milliLiter(s) Oral every 4 hours PRN Dyspepsia  dextrose Oral Gel 15 Gram(s) Oral once PRN Blood Glucose LESS THAN 70 milliGRAM(s)/deciliter  melatonin 3 milliGRAM(s) Oral at bedtime PRN Insomnia  ondansetron Injectable 4 milliGRAM(s) IV Push every 8 hours PRN Nausea and/or Vomiting      PAST MEDICAL & SURGICAL HISTORY:  GERD (Gastroesophageal Reflux Disease)      Lung mass  right      Non-small cell lung cancer with metastasis      S/P endoscopy  2022          FAMILY HISTORY:  No pertinent family history in first degree relatives of known cancer        SOCIAL HISTORY: Former smoker, 1/4 ppd x > 20 years. No significant alcohol or drug use.    REVIEW OF SYSTEMS:  CONSTITUTIONAL: no fever  EYES/ENT: No visual changes; no throat pain  NECK: No pain or stiffness  RESPIRATORY: no SOB or cough  CARDIOVASCULAR: No chest pain or palpitations  GASTROINTESTINAL: No abdominal pain. No N/V/D/C  GENITOURINARY: No dysuria, change in frequency, or hematuria  NEUROLOGICAL: + improved dizziness. No numbness or focal weakness  SKIN: No itching, burning, rashes, or lesions  Psych: No depression  MSK: no joint pain  Allergy: no urticaria    Height (cm): 167.6 (08-26 @ 22:33)  Weight (kg): 68.855 (08-26 @ 22:33)  BMI (kg/m2): 24.5 (08-26 @ 22:33)  BSA (m2): 1.78 (08-26 @ 22:33)    T(F): 97.2 (08-27-23 @ 04:59), Max: 98.1 (08-26-23 @ 21:12)  HR: 97 (08-27-23 @ 04:59)  BP: 113/74 (08-27-23 @ 04:59)  RR: 16 (08-27-23 @ 04:59)  SpO2: 96% (08-27-23 @ 04:59)  Wt(kg): --    GENERAL: NAD  HEENT: EOMI, MMM, no oropharyngeal lesions or erythema appreciated  Pulm: no increased WOB, CTAB/L  CV: RRR, S1, S2, no m/g/r  ABDOMEN: soft, NT, ND, no masses felt, no HSM  MSK: nl ROM  EXTREMITIES: no appreciable edema in b/l LE  Neuro: A&Ox3, CNs II-XII intact, no focal deficits  SKIN: warm and dry, no visible rash                          11.5   14.49 )-----------( 188      ( 27 Aug 2023 07:28 )             34.7       08-27    132<L>  |  95<L>  |  20  ----------------------------<  321<H>  4.8   |  21<L>  |  0.60    Ca    9.2      27 Aug 2023 07:28  Phos  2.4     08-27  Mg     2.00     08-27    TPro  7.2  /  Alb  2.5<L>  /  TBili  0.2  /  DBili  x   /  AST  17  /  ALT  27  /  AlkPhos  56  08-26      Phosphorus: 2.4 mg/dL (08-27 @ 07:28)  Magnesium: 2.00 mg/dL (08-27 @ 07:28)      
HPI:  52 year old woman hx of lung cancer metastatic to brain p/w ongoing dizziness and migraines. As per chart review, patient has primary lung cancer with mets to the brain.  Patient states that she has had 3 episodes of fainting in the past 3 weeks, and on the 19th she had loss of consciousness.  Patient was then admitted to [facility] which where she had a full work-up for syncope that was unrevealing. At [facility], pt had an MRI that demonstrated multiple brain mets that were smaller in size however one right frontal lobe lesion that was increased in size. Pt was treated with decadron and valproic acid was added for both seizure ppx and migraine treatment. Patient reports she signed out of [facility] AMA because she did not trust the care. Upon discharge from [facility] she was still feeling dizzy  when standing, described as feeling off balance when ambulating, thus she presented to [facility] for further care.  Currently she reports a bilateral headache, states similar to prior headaches. She denies fevers, chills, chest pain, shortness of breath, abdominal pain, N/V, visual changes, back pain or LE edema.    In regards to oncological history, s/p 4 cycles chemo (carboplatin/pemetrexed -completed 9/22) and Thoracic RT completed 10/22.  declined Durvalumab at the time. Then hospitalized 5/23 and found to have brain mets, s/p GK-SRS to 7 brain metastases in 5 fractions from 6/21 - 6/29/2023. She was due to follow up with oncology to start immunotherapy this month.  Consulted to oncology team for evaluation of surgical resection of brain lesion.     PAST MEDICAL & SURGICAL HISTORY:  GERD (Gastroesophageal Reflux Disease)  Lung mass right  Non-small cell lung cancer with metastasis  S/P endoscopy  2022  gamma knife - brain June 2023    Allergies  No Known Allergies    MEDICATIONS  (STANDING):  dexAMETHasone     Tablet 4 milliGRAM(s) Oral every 8 hours  dextrose 5%. 1000 milliLiter(s) (100 mL/Hr) IV Continuous <Continuous>  dextrose 5%. 1000 milliLiter(s) (50 mL/Hr) IV Continuous <Continuous>  dextrose 50% Injectable 25 Gram(s) IV Push once  dextrose 50% Injectable 12.5 Gram(s) IV Push once  dextrose 50% Injectable 25 Gram(s) IV Push once  divalproex  milliGRAM(s) Oral two times a day  enoxaparin Injectable 40 milliGRAM(s) SubCutaneous every 24 hours  glucagon  Injectable 1 milliGRAM(s) IntraMuscular once  insulin glargine Injectable (LANTUS) 14 Unit(s) SubCutaneous at bedtime  insulin lispro (ADMELOG) corrective regimen sliding scale   SubCutaneous three times a day before meals  insulin lispro (ADMELOG) corrective regimen sliding scale   SubCutaneous at bedtime  insulin lispro Injectable (ADMELOG) 14 Unit(s) SubCutaneous three times a day before meals  pantoprazole    Tablet 40 milliGRAM(s) Oral before breakfast    MEDICATIONS  (PRN):  acetaminophen     Tablet .. 650 milliGRAM(s) Oral every 6 hours PRN Temp greater or equal to 38C (100.4F), Mild Pain (1 - 3)  aluminum hydroxide/magnesium hydroxide/simethicone Suspension 30 milliLiter(s) Oral every 4 hours PRN Dyspepsia  dextrose Oral Gel 15 Gram(s) Oral once PRN Blood Glucose LESS THAN 70 milliGRAM(s)/deciliter  melatonin 3 milliGRAM(s) Oral at bedtime PRN Insomnia  ondansetron Injectable 4 milliGRAM(s) IV Push every 8 hours PRN Nausea and/or Vomiting  SUMAtriptan 50 milliGRAM(s) Oral every 6 hours PRN Migraine    Vital Signs Last 24 Hrs  T(C): 36.6 (30 Aug 2023 05:37), Max: 36.7 (29 Aug 2023 12:49)  T(F): 97.9 (30 Aug 2023 05:37), Max: 98.1 (29 Aug 2023 12:49)  HR: 74 (30 Aug 2023 05:37) (74 - 97)  BP: 109/73 (30 Aug 2023 05:37) (109/73 - 131/91)  RR: 17 (30 Aug 2023 05:37) (17 - 18)  SpO2: 99% (30 Aug 2023 05:37) (97% - 100%)    Parameters below as of 30 Aug 2023 05:37  Patient On (Oxygen Delivery Method): room air    PE:  AA&0 x 3, CN 2-12 grossly intact, speech clear, follows commands, PERRL  Motor: strength 5/5 throughout  Sensory: SILT  Gait: N/A  no drift, no ataxia     LABS:                        11.6   15.38 )-----------( 159      ( 29 Aug 2023 06:12 )             34.4     08-29    131<L>  |  95<L>  |  27<H>  ----------------------------<  275<H>  4.9   |  24  |  0.66    Ca    9.6      29 Aug 2023 06:12  Phos  3.0     08-29  Mg     2.10     08-29        Urinalysis Basic - ( 29 Aug 2023 06:12 )    Color: x / Appearance: x / SG: x / pH: x  Gluc: 275 mg/dL / Ketone: x  / Bili: x / Urobili: x   Blood: x / Protein: x / Nitrite: x   Leuk Esterase: x / RBC: x / WBC x   Sq Epi: x / Non Sq Epi: x / Bacteria: x

## 2023-08-30 NOTE — CONSULT NOTE ADULT - PROBLEM SELECTOR RECOMMENDATION 9
1. case to be d/w attending. 1. case d/w attending, no acute neurosurgical intervention, patient may f/u in Dr. Marcus Metcalf's office on friday at 2PM, call tomorrow to confirm appointment.

## 2023-08-30 NOTE — DIETITIAN INITIAL EVALUATION ADULT - OTHER INFO
52 year old female with a PMH of lung cancer metastatic to brain p/w ongoing dizziness and migraines admitted for further treatment, now w/ steroid induced DM per chart.    Patient reports good appetite. No GI distress or chewing/swallowing difficulties. Has no food allergies. Per previous RD note (6/6) noted w/ 58 kg. ABW is 68.9 kg (8/26) per chart indicating a +18.8% weight gain x 2 months, likely from steroid medication. No edema or pressure injuries per RN flow sheet.    Provided pt with handout Carbohydrate Counting for People with Diabetes. Discussed carbohydrate sources, carbohydrate portions, protein sources, mixed meals, and nutrition label reading. Stressed importance of balanced meals with adequate protein and fiber. Pt was informed of current A1c, goal A1c, and goal fingerstick range.

## 2023-08-30 NOTE — DIETITIAN INITIAL EVALUATION ADULT - ORAL INTAKE PTA/DIET HISTORY
Patient seen for assessment. Reports good appetite PTA. Per diet recall, patient eats healthy overall but does juice a lot (homemade). Consumes desserts at times, but not often.

## 2023-08-30 NOTE — PROGRESS NOTE ADULT - ASSESSMENT
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  Continue with lantus 14 units at bedtime  Increase Admelog to 12 units TID with meals    Recommend RD consult  Discharge plan:   Will depend on steroid tx at time of discharge.  If she is off steroids, can likely be discharged without medication.  If going home soon, will need basal bolus insulin and education on insulin administration and glucometer use.    If patient is on long term steroid, can consider starting metformin and Prandin as outpatient.  Please let us know if the steroid plan on discharge.     Discussed with primary team

## 2023-08-30 NOTE — PROGRESS NOTE ADULT - PROBLEM SELECTOR PLAN 1
-pt p/w ongoing dizziness when walking, described as unsteadiness upon ambulating. Also with headaches  -recent cardiac w/u neg (EKG 8/19 NSR unremarkable, TTE 10/27 EF ~65%, no valvular abn or WMA)   - negative orthostatics   -suspect symptoms all related to brain mets, vasogenic edema  -decadron 4mg q6h -> q8h today 8/29. Taper as follows: 4mg q8h (8/29-8/31), 4mg q12h (9/1-9/4), 4mg qd (9/5- 9/8), discontinue on 9/9.   -c/w depakote for migraines. Can also treat with reglan  -Rad onc evaluated, no indication for inpt RT, f/u appt Dr. Bernard   -Scheduled for outpt followup with Dr. Rk [facility] onc   -PT evaluation, rec outpatient PT, discharge to home without skilled PT services   -fall precautions -pt p/w ongoing dizziness when walking, described as unsteadiness upon ambulating. Also with headaches  -recent cardiac w/u neg (EKG 8/19 NSR unremarkable, TTE 10/27 EF ~65%, no valvular abn or WMA)   - negative orthostatics   -suspect symptoms all related to brain mets, vasogenic edema  -decadron 4mg q8h today 8/31. Taper as follows: 4mg q8h (8/29-8/31), 4mg q12h (9/1-9/3), 4mg qd (9/4- 9/6), discontinue on 9/7.   -c/w depakote for migraines. Can also treat with reglan  -Rad onc evaluated, no indication for inpt RT, f/u appt Dr. Bernard   -Heme onc scheduled for outpt followup with Dr. Beckman   -Neurosurgery evaluated, no ind for inpatient intervention scheduled for outpt followup with Dr. Metcalf 9/1 2pm   -PT evaluation, rec outpatient PT, discharge to home without skilled PT services   -fall precautions

## 2023-08-30 NOTE — PROGRESS NOTE ADULT - SUBJECTIVE AND OBJECTIVE BOX
***************************************************************  Lani Greenwood, PGY 1   Internal Medicine   ***************************************************************    HARJEET WHITMORE  52y  MRN: [id number]    Patient is a 52y old  Female who presents with a chief complaint of dizziness, headache (29 Aug 2023 14:03)      Subjective: no events ON. Denies fever, CP, SOB, abn pain, N/V, dysuria. Tolerating diet.      MEDICATIONS  (STANDING):  dexAMETHasone     Tablet 4 milliGRAM(s) Oral every 8 hours  dextrose 5%. 1000 milliLiter(s) (100 mL/Hr) IV Continuous <Continuous>  dextrose 5%. 1000 milliLiter(s) (50 mL/Hr) IV Continuous <Continuous>  dextrose 50% Injectable 25 Gram(s) IV Push once  dextrose 50% Injectable 12.5 Gram(s) IV Push once  dextrose 50% Injectable 25 Gram(s) IV Push once  divalproex  milliGRAM(s) Oral two times a day  enoxaparin Injectable 40 milliGRAM(s) SubCutaneous every 24 hours  glucagon  Injectable 1 milliGRAM(s) IntraMuscular once  insulin glargine Injectable (LANTUS) 14 Unit(s) SubCutaneous at bedtime  insulin lispro (ADMELOG) corrective regimen sliding scale   SubCutaneous three times a day before meals  insulin lispro (ADMELOG) corrective regimen sliding scale   SubCutaneous at bedtime  insulin lispro Injectable (ADMELOG) 10 Unit(s) SubCutaneous three times a day before meals  pantoprazole    Tablet 40 milliGRAM(s) Oral before breakfast    MEDICATIONS  (PRN):  acetaminophen     Tablet .. 650 milliGRAM(s) Oral every 6 hours PRN Temp greater or equal to 38C (100.4F), Mild Pain (1 - 3)  aluminum hydroxide/magnesium hydroxide/simethicone Suspension 30 milliLiter(s) Oral every 4 hours PRN Dyspepsia  dextrose Oral Gel 15 Gram(s) Oral once PRN Blood Glucose LESS THAN 70 milliGRAM(s)/deciliter  melatonin 3 milliGRAM(s) Oral at bedtime PRN Insomnia  ondansetron Injectable 4 milliGRAM(s) IV Push every 8 hours PRN Nausea and/or Vomiting  SUMAtriptan 50 milliGRAM(s) Oral every 6 hours PRN Migraine      Objective:    Vitals: Vital Signs Last 24 Hrs  T(C): 36.6 (08-30-23 @ 05:37), Max: 36.7 (08-29-23 @ 12:49)  T(F): 97.9 (08-30-23 @ 05:37), Max: 98.1 (08-29-23 @ 12:49)  HR: 74 (08-30-23 @ 05:37) (74 - 97)  BP: 109/73 (08-30-23 @ 05:37) (109/73 - 131/91)  BP(mean): --  RR: 17 (08-30-23 @ 05:37) (17 - 18)  SpO2: 99% (08-30-23 @ 05:37) (97% - 100%)            I&O's Summary      PHYSICAL EXAM:  GENERAL: NAD  HEAD:  Atraumatic, Normocephalic  EYES: EOMI, conjunctiva and sclera clear  CHEST/LUNG: Clear to auscultation bilaterally; No rales, rhonchi, wheezing, or rubs  HEART: Regular rate and rhythm; No murmurs, rubs, or gallops  ABDOMEN: Soft, Nontender, Nondistended;   SKIN: No rashes or lesions  NERVOUS SYSTEM:  Alert & Oriented X3, no focal deficits    LABS:  08-29    131<L>  |  95<L>  |  27<H>  ----------------------------<  275<H>  4.9   |  24  |  0.66  08-28    134<L>  |  96<L>  |  22  ----------------------------<  271<H>  5.5<H>   |  25  |  0.78  08-27    132<L>  |  95<L>  |  20  ----------------------------<  321<H>  4.8   |  21<L>  |  0.60    Ca    9.6      29 Aug 2023 06:12  Ca    9.9      28 Aug 2023 07:20  Ca    9.2      27 Aug 2023 07:28  Phos  3.0     08-29  Mg     2.10     08-29                      Urinalysis Basic - ( 29 Aug 2023 06:12 )    Color: x / Appearance: x / SG: x / pH: x  Gluc: 275 mg/dL / Ketone: x  / Bili: x / Urobili: x   Blood: x / Protein: x / Nitrite: x   Leuk Esterase: x / RBC: x / WBC x   Sq Epi: x / Non Sq Epi: x / Bacteria: x                              11.6   15.38 )-----------( 159      ( 29 Aug 2023 06:12 )             34.4                         12.1   15.48 )-----------( 182      ( 28 Aug 2023 07:20 )             36.6                         11.5   14.49 )-----------( 188      ( 27 Aug 2023 07:28 )             34.7     CAPILLARY BLOOD GLUCOSE      POCT Blood Glucose.: 349 mg/dL (29 Aug 2023 21:51)  POCT Blood Glucose.: 245 mg/dL (29 Aug 2023 17:38)  POCT Blood Glucose.: 246 mg/dL (29 Aug 2023 12:26)  POCT Blood Glucose.: 246 mg/dL (29 Aug 2023 08:48)      RADIOLOGY & ADDITIONAL TESTS:  none, reviewed prior        ***************************************************************  Lanibarry Greenwood, PGY 1   Internal Medicine   ***************************************************************    HARJEET WHITMORE  52y  MRN: [id number]    Patient is a 52y old  Female who presents with a chief complaint of dizziness, headache (29 Aug 2023 14:03)      Subjective: overnight no acute events, feels well pending discharge today.     MEDICATIONS  (STANDING):  dexAMETHasone     Tablet 4 milliGRAM(s) Oral every 8 hours  dextrose 5%. 1000 milliLiter(s) (100 mL/Hr) IV Continuous <Continuous>  dextrose 5%. 1000 milliLiter(s) (50 mL/Hr) IV Continuous <Continuous>  dextrose 50% Injectable 25 Gram(s) IV Push once  dextrose 50% Injectable 12.5 Gram(s) IV Push once  dextrose 50% Injectable 25 Gram(s) IV Push once  divalproex  milliGRAM(s) Oral two times a day  enoxaparin Injectable 40 milliGRAM(s) SubCutaneous every 24 hours  glucagon  Injectable 1 milliGRAM(s) IntraMuscular once  insulin glargine Injectable (LANTUS) 14 Unit(s) SubCutaneous at bedtime  insulin lispro (ADMELOG) corrective regimen sliding scale   SubCutaneous three times a day before meals  insulin lispro (ADMELOG) corrective regimen sliding scale   SubCutaneous at bedtime  insulin lispro Injectable (ADMELOG) 10 Unit(s) SubCutaneous three times a day before meals  pantoprazole    Tablet 40 milliGRAM(s) Oral before breakfast    MEDICATIONS  (PRN):  acetaminophen     Tablet .. 650 milliGRAM(s) Oral every 6 hours PRN Temp greater or equal to 38C (100.4F), Mild Pain (1 - 3)  aluminum hydroxide/magnesium hydroxide/simethicone Suspension 30 milliLiter(s) Oral every 4 hours PRN Dyspepsia  dextrose Oral Gel 15 Gram(s) Oral once PRN Blood Glucose LESS THAN 70 milliGRAM(s)/deciliter  melatonin 3 milliGRAM(s) Oral at bedtime PRN Insomnia  ondansetron Injectable 4 milliGRAM(s) IV Push every 8 hours PRN Nausea and/or Vomiting  SUMAtriptan 50 milliGRAM(s) Oral every 6 hours PRN Migraine      Objective:    Vitals: Vital Signs Last 24 Hrs  T(C): 36.6 (08-30-23 @ 05:37), Max: 36.7 (08-29-23 @ 12:49)  T(F): 97.9 (08-30-23 @ 05:37), Max: 98.1 (08-29-23 @ 12:49)  HR: 74 (08-30-23 @ 05:37) (74 - 97)  BP: 109/73 (08-30-23 @ 05:37) (109/73 - 131/91)  BP(mean): --  RR: 17 (08-30-23 @ 05:37) (17 - 18)  SpO2: 99% (08-30-23 @ 05:37) (97% - 100%)            I&O's Summary      PHYSICAL EXAM:  GENERAL: NAD  HEAD:  Atraumatic, Normocephalic  EYES: EOMI, conjunctiva and sclera clear  CHEST/LUNG: Clear to auscultation bilaterally; No rales, rhonchi, wheezing, or rubs  HEART: Regular rate and rhythm; No murmurs, rubs, or gallops  ABDOMEN: Soft, Nontender, Nondistended;   SKIN: No rashes or lesions  NERVOUS SYSTEM:  Alert & Oriented X3, no focal deficits    LABS:  08-29    131<L>  |  95<L>  |  27<H>  ----------------------------<  275<H>  4.9   |  24  |  0.66  08-28    134<L>  |  96<L>  |  22  ----------------------------<  271<H>  5.5<H>   |  25  |  0.78  08-27    132<L>  |  95<L>  |  20  ----------------------------<  321<H>  4.8   |  21<L>  |  0.60    Ca    9.6      29 Aug 2023 06:12  Ca    9.9      28 Aug 2023 07:20  Ca    9.2      27 Aug 2023 07:28  Phos  3.0     08-29  Mg     2.10     08-29                      Urinalysis Basic - ( 29 Aug 2023 06:12 )    Color: x / Appearance: x / SG: x / pH: x  Gluc: 275 mg/dL / Ketone: x  / Bili: x / Urobili: x   Blood: x / Protein: x / Nitrite: x   Leuk Esterase: x / RBC: x / WBC x   Sq Epi: x / Non Sq Epi: x / Bacteria: x                              11.6   15.38 )-----------( 159      ( 29 Aug 2023 06:12 )             34.4                         12.1   15.48 )-----------( 182      ( 28 Aug 2023 07:20 )             36.6                         11.5   14.49 )-----------( 188      ( 27 Aug 2023 07:28 )             34.7     CAPILLARY BLOOD GLUCOSE      POCT Blood Glucose.: 349 mg/dL (29 Aug 2023 21:51)  POCT Blood Glucose.: 245 mg/dL (29 Aug 2023 17:38)  POCT Blood Glucose.: 246 mg/dL (29 Aug 2023 12:26)  POCT Blood Glucose.: 246 mg/dL (29 Aug 2023 08:48)      RADIOLOGY & ADDITIONAL TESTS:  none, reviewed prior

## 2023-08-30 NOTE — DIETITIAN INITIAL EVALUATION ADULT - PERTINENT LABORATORY DATA
08-29    131<L>  |  95<L>  |  27<H>  ----------------------------<  275<H>  4.9   |  24  |  0.66    Ca    9.6      29 Aug 2023 06:12  Phos  3.0     08-29  Mg     2.10     08-29    POCT Blood Glucose.: 138 mg/dL (08-30-23 @ 13:07)  A1C with Estimated Average Glucose Result: 7.3 % (08-28-23 @ 07:00)  A1C with Estimated Average Glucose Result: 7.2 % (08-27-23 @ 07:28)  A1C with Estimated Average Glucose Result: 5.7 % (06-01-23 @ 06:19)

## 2023-08-30 NOTE — DISCHARGE NOTE NURSING/CASE MANAGEMENT/SOCIAL WORK - NSDCFUADDAPPT_GEN_ALL_CORE_FT
Please see:  1. Primary care doctor. You may make an appointment at [facility] if you do not have a primary care doctor  2. Oncology Dr Beckman  3. Neurosurgery Dr Metcalf  4. Radiation Oncology Dr. Bernard

## 2023-08-30 NOTE — CONSULT NOTE ADULT - ASSESSMENT
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  Recommend continue current dose of lantus - she declined last night, but now amenable  Increase admelog to 10 units before meals  Anticipate that insulin requirements will decrease as dexamethasone is decreased  Recommend RD consult  Discharge plan:   Will depend on steroid tx at time of discharge.  If she is off steroids, can likely be discharged without medication.  If going home soon, will need basal bolus insulin and education on insulin administration and glucometer use.    Discussed with primary team    Portia Negrete MD  pager  or via Teams on 8/29/23  Other times:  Diabetes team: [phone]   or email [email or web address]   
51 y/o, F, with NSC Lung CA with known mets to brain s/p gamma knife to brain in [place]
53 yo F with lung cancer s/p CRT but declined maintenance I/O, developed brain mets, s/p GK-SRS to 7 brain metastases in 5 fractions from 6/21 - 6/29/2023. She was on steroid taper completed 8/5/23, declined I/O at recent med onc follow up. She felt dizziness on 8/16/23 and restarted her steroids then before being admitted to [place] on 8/19. In [place], dexamethasone that she had recently restarted at 2 mg QD was increased gradually to 6 mg Q6H. She left [place] AMA  because she did not trust the care, and then arrived in [facility] yesterday. Glucose 309 today.  MRI that demonstrated multiple brain mets that were smaller in size but one right frontal lobe lesion that was slightly increased in size.     She reports  mild bilateral temporal headache and off-balance but denies vertigo,  as she didn't get meds on time when she was examined.   She is AO4, CNII-XII intact, full strength.     With slight increasing size of single lesion in the setting of most other lesion with good response, nature is indeterminate. There is no indication of RT in house, will follow up with outpatient RT clinic with Dr. Bernard.   Medical optimization per primary team and med onc        
53 yo F with a PMH of NSCLC (suspected adenocarcinoma with neuroendocrine features) metastatic to brain who p/w ongoing dizziness and migraines after a recent admission to [facility] (8/19-8/24/23), likely in the setting of persistent brain metastases.    #Dizziness / Non-Small Cell Lung Cancer Metastatic to the Brain  - Patient was diagnosed with NSCLC in 2022, PD-L1 negative, TP53 mutated but no actionable mutations. S/p carbo/pemetrexed x 4 (completed 09/2022) + thoracic RT (10/2022), declined maintenance treatment. Then, she developed numerous brain metastases in 05/2023  - PET scan 6/26/23 showed persistent 3.4 x 2.6 x 3.5 cm RUL mass, stable in size but increased FDG-activity compared to March, without other lesions (excluding brain)  - MRI brain 8/20/23 shows mixed response to prior RT treatment (had received 5 fractions of GK-SRS to 7 brain metastases from 6/21 - 6/29/2023)  - Patient was recommended for nivolumab immunotherapy, to begin after dexamethasone taper, but eventually temporarily declined  - Patient has had ongoing dizziness for the past few weeks. Suspect related to malignancy +/- prior RT, although it appears she has more of a lightheadedness  - Currently on dexamethasone 6 mg Q6H and Depakote 500 mg BID for anti-seizure PPX  - C/w Depakote but can decrease dexamethasone to 4 mg Q6H  - Recommend she follows with her Oncologist, Dr. Margarito Beckman, at [facility]. She does not have a follow-up appointment currently scheduled, so she will need to make one to start systemic treatment. Will also notify Dr. Beckman's team  - No plans for inpatient systemic treatment, but would follow up Radiation Oncology recommendations      Aryles Hedjar, MD, PGY-6  Hematology/Oncology Fellow  [facility]  Pager: [phone]  After 5PM and on weekends and holidays, please call the inpatient fellow on call.

## 2023-08-30 NOTE — PROGRESS NOTE ADULT - PROBLEM SELECTOR PLAN 2
-Pt s/p chemo, RT, GK-SRS for brain mets. Pending immunotherapy txt  -evaled by neurosurg previously (6/23) and recommended rad onc c/s  -pain control   -pt seems to have a poor understanding of extent of disease despite multiple providers explaining. Unclear if due to poor health literacy or element of denial?

## 2023-08-30 NOTE — DIETITIAN INITIAL EVALUATION ADULT - PERTINENT MEDS FT
MEDICATIONS  (STANDING):  dexAMETHasone     Tablet 4 milliGRAM(s) Oral every 8 hours  dextrose 5%. 1000 milliLiter(s) (100 mL/Hr) IV Continuous <Continuous>  dextrose 5%. 1000 milliLiter(s) (50 mL/Hr) IV Continuous <Continuous>  dextrose 50% Injectable 25 Gram(s) IV Push once  dextrose 50% Injectable 12.5 Gram(s) IV Push once  dextrose 50% Injectable 25 Gram(s) IV Push once  divalproex  milliGRAM(s) Oral two times a day  enoxaparin Injectable 40 milliGRAM(s) SubCutaneous every 24 hours  glucagon  Injectable 1 milliGRAM(s) IntraMuscular once  insulin glargine Injectable (LANTUS) 14 Unit(s) SubCutaneous at bedtime  insulin lispro (ADMELOG) corrective regimen sliding scale   SubCutaneous three times a day before meals  insulin lispro (ADMELOG) corrective regimen sliding scale   SubCutaneous at bedtime  insulin lispro Injectable (ADMELOG) 12 Unit(s) SubCutaneous three times a day before meals  pantoprazole    Tablet 40 milliGRAM(s) Oral before breakfast    MEDICATIONS  (PRN):  acetaminophen     Tablet .. 650 milliGRAM(s) Oral every 6 hours PRN Temp greater or equal to 38C (100.4F), Mild Pain (1 - 3)  aluminum hydroxide/magnesium hydroxide/simethicone Suspension 30 milliLiter(s) Oral every 4 hours PRN Dyspepsia  dextrose Oral Gel 15 Gram(s) Oral once PRN Blood Glucose LESS THAN 70 milliGRAM(s)/deciliter  melatonin 3 milliGRAM(s) Oral at bedtime PRN Insomnia  ondansetron Injectable 4 milliGRAM(s) IV Push every 8 hours PRN Nausea and/or Vomiting  SUMAtriptan 50 milliGRAM(s) Oral every 6 hours PRN Migraine

## 2023-08-30 NOTE — PROGRESS NOTE ADULT - PROBLEM SELECTOR PLAN 3
-likely steroid induced   -CC diet   - hgba1c 7.2   - Endo consulted reccs regarding outpt management of steroid induced hyperglycemia based on taper (4mg dex q8 3d, q12 3d, qd 3d, d/c following)   - reccs appreciated:   - moderate ISS   - 10u premeal admelog + 14u lantus qhs   - taper insulin regimen with steroid taper, not expected to require insulin after discontinuation   - diabetic counseling   - RD eval   #Leukocytosis - likely related to steroid use, monitor off antibiotics -likely steroid induced   -CC diet   - hgba1c 7.2   - Endo consulted reccs regarding outpt management of steroid induced hyperglycemia based on taper (4mg dex q8 3d, q12 3d, qd 3d, d/c following)   - reccs appreciated:   - moderate ISS   - 10u premeal admelog + 14u lantus qhs   - diabetic counseling provided  - outpatient antidiabetic regimen as follows: metformin 1000mg BID, repaglinide 1mg with meals, should FS>200, take 2mg with next meal  - outpatient endo consult not required  - RD eval   #Leukocytosis - likely related to steroid use, monitor off antibiotics

## 2023-08-30 NOTE — DIETITIAN INITIAL EVALUATION ADULT - HEIGHT FOR BMI (FEET)
5 CBC/Type and Screen/COVID-19 CBC/PT/PTT/INR/Type and Screen/COVID-19 BMP/CBC/PT/PTT/INR/Type and Screen/COVID-19

## 2023-08-30 NOTE — PROGRESS NOTE ADULT - SUBJECTIVE AND OBJECTIVE BOX
Kraig Roper MD  Pager: [phone]  Kindly leave a 10-digit callback number.  [employer] Teams during business hours. Patient may have a different provider tomorrow. If no response, call [phone] or email [email or web address]    History: No acute event overnight.  Patient is still on decadron.  Steroid tapered to be determined by rad onc team.  Patient may be discharged home later.      MEDICATIONS  (STANDING):  dexAMETHasone     Tablet 4 milliGRAM(s) Oral every 8 hours  dextrose 5%. 1000 milliLiter(s) (100 mL/Hr) IV Continuous <Continuous>  dextrose 5%. 1000 milliLiter(s) (50 mL/Hr) IV Continuous <Continuous>  dextrose 50% Injectable 25 Gram(s) IV Push once  dextrose 50% Injectable 12.5 Gram(s) IV Push once  dextrose 50% Injectable 25 Gram(s) IV Push once  divalproex  milliGRAM(s) Oral two times a day  enoxaparin Injectable 40 milliGRAM(s) SubCutaneous every 24 hours  glucagon  Injectable 1 milliGRAM(s) IntraMuscular once  insulin glargine Injectable (LANTUS) 14 Unit(s) SubCutaneous at bedtime  insulin lispro (ADMELOG) corrective regimen sliding scale   SubCutaneous three times a day before meals  insulin lispro (ADMELOG) corrective regimen sliding scale   SubCutaneous at bedtime  insulin lispro Injectable (ADMELOG) 12 Unit(s) SubCutaneous three times a day before meals  pantoprazole    Tablet 40 milliGRAM(s) Oral before breakfast    MEDICATIONS  (PRN):  acetaminophen     Tablet .. 650 milliGRAM(s) Oral every 6 hours PRN Temp greater or equal to 38C (100.4F), Mild Pain (1 - 3)  aluminum hydroxide/magnesium hydroxide/simethicone Suspension 30 milliLiter(s) Oral every 4 hours PRN Dyspepsia  dextrose Oral Gel 15 Gram(s) Oral once PRN Blood Glucose LESS THAN 70 milliGRAM(s)/deciliter  melatonin 3 milliGRAM(s) Oral at bedtime PRN Insomnia  ondansetron Injectable 4 milliGRAM(s) IV Push every 8 hours PRN Nausea and/or Vomiting  SUMAtriptan 50 milliGRAM(s) Oral every 6 hours PRN Migraine      Allergies    No Known Allergies    Intolerances      Review of Systems:  Constitutional: No fever  Eyes: No blurry vision  Neuro: No tremors  HEENT: No pain  Cardiovascular: No chest pain, palpitations  Respiratory: No SOB, no cough  GI: No nausea, vomiting, abdominal pain  : No dysuria  Skin: no rash  Psych: no depression  Endocrine: no polyuria, polydipsia  Hem/lymph: no swelling    ALL OTHER SYSTEMS REVIEWED AND NEGATIVE    PHYSICAL EXAM:  VITALS: T(C): 37.2 (08-30-23 @ 12:04)  T(F): 99 (08-30-23 @ 12:04), Max: 99 (08-30-23 @ 12:04)  HR: 95 (08-30-23 @ 12:04) (74 - 95)  BP: 137/91 (08-30-23 @ 12:04) (109/73 - 137/91)  RR:  (17 - 18)  SpO2:  (97% - 99%)  Wt(kg): --  GENERAL: NAD  EYES: No proptosis, no lid lag, anicteric  HEENT:  Atraumatic, Normocephalic, moist mucous membranes  RESPIRATORY: nonlabored respirations  PSYCH: Alert and oriented x 3, normal affect, normal mood      POCT Blood Glucose.: 138 mg/dL (08-30-23 @ 13:07)  POCT Blood Glucose.: 187 mg/dL (08-30-23 @ 11:48)  POCT Blood Glucose.: 202 mg/dL (08-30-23 @ 08:41)  POCT Blood Glucose.: 349 mg/dL (08-29-23 @ 21:51)  POCT Blood Glucose.: 245 mg/dL (08-29-23 @ 17:38)  POCT Blood Glucose.: 246 mg/dL (08-29-23 @ 12:26)  POCT Blood Glucose.: 246 mg/dL (08-29-23 @ 08:48)  POCT Blood Glucose.: 334 mg/dL (08-28-23 @ 21:46)  POCT Blood Glucose.: 250 mg/dL (08-28-23 @ 18:31)  POCT Blood Glucose.: 422 mg/dL (08-28-23 @ 13:40)  POCT Blood Glucose.: 387 mg/dL (08-28-23 @ 12:13)  POCT Blood Glucose.: 223 mg/dL (08-28-23 @ 08:52)  POCT Blood Glucose.: 340 mg/dL (08-27-23 @ 22:04)  POCT Blood Glucose.: 309 mg/dL (08-27-23 @ 17:37)      08-29    131<L>  |  95<L>  |  27<H>  ----------------------------<  275<H>  4.9   |  24  |  0.66    eGFR: 105    Ca    9.6      08-29  Mg     2.10     08-29  Phos  3.0     08-29          Thyroid Function Tests:

## 2023-08-31 NOTE — PROGRESS NOTE ADULT - PROBLEM SELECTOR PLAN 2
-Pt s/p chemo, RT, GK-SRS for brain mets. Pending immunotherapy txt  -evaled by neurosurg previously (6/23) and recommended rad onc c/s  -re-evaluated by neurosurg, no ind for inpatient intervention   -pain control   -pt seems to have a poor understanding of extent of disease despite multiple providers explaining. Unclear if due to poor health literacy or element of denial?

## 2023-08-31 NOTE — PROGRESS NOTE ADULT - PROBLEM SELECTOR PROBLEM 1
Unsteadiness
Type 2 diabetes mellitus with hyperglycemia
Unsteadiness
Unsteadiness
Type 2 diabetes mellitus with hyperglycemia
Unsteadiness
Unsteadiness

## 2023-08-31 NOTE — PROGRESS NOTE ADULT - PROBLEM SELECTOR PROBLEM 4
Need for prophylactic measure
Need for prophylactic measure
HLD (hyperlipidemia)
Need for prophylactic measure

## 2023-08-31 NOTE — PROGRESS NOTE ADULT - ASSESSMENT
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 New DM, steroid induced diabetes  A1c 7.3 (was 5.7 in June prior to steroids)  No medications at home    While inpatient:  BG target 100-180 mg/dl   Continue Lantus 14 units SQ qHS   Continue Admelog 12 units SQ TID before meals (Hold if NPO/skips meal)   Continue Admelog MODERATE dose correctional scale before meals, moderate at bedtime  Check BG before meals and bedtime  Consistent carbohydrate diet  Hypoglycemia protocol     Discharge Plan:   Patient cannot learn insulin per staff/primary team  Will be on steroids for 1 more week (until 9/7)  Ok to use oral regimen for discharge as steroid course is short duration and patient was not requiring treatment for BG prior to steroids. Patient's family will be helping her ensure she takes the correct DM medications/steroid doses  Ensure she has glucometer, glucose test strips, lancets, alcohol swabs, would ensure her family knows how to check FS for time on steroids  Recommend Metformin (extended release version): 500 mg x 2 tablets (for total of 1000 mg) PO BID - take from 9/1-9/9: STOP 9/10/23  Recommend Repaglinide 2 mg PO TID before meals (Hold if skips meal). When steroid dose is decreased by half (4 mg daily on 9/4), would recommend to reduce to Repaglinide 1 mg TID - STOP 9/7/23   Followup with PCP  If placed on long term steroids, will likely need endocrinology followup, can follow with [address] [place] [address], [address], [facility] [address], [phone]    2. Steroid induced hyperglycemia  Dexamethasone 4 mg q8h today, will be tapered as follows: Dexamethasone 4mg q12h (9/1-9/3), 4mg qd (9/4- 9/6), discontinue on 9/7.     3. HTN  BP target less than 130/80  Not on antihypertensives, continue to monitor     4. HLD  LDL target less than 70    Consider statin initiation     Discussed with team MD Krystyna Saleem  Nurse Practitioner  Division of Endocrinology & Diabetes  In house pager #[phone]/long range pager #[phone]    If before 9AM or after 6PM, or on weekends/holidays, please call endocrine answering service for assistance ([phone]).  For nonurgent matters email [email or web address] for assistance.

## 2023-08-31 NOTE — PROGRESS NOTE ADULT - PROBLEM SELECTOR PLAN 1
-pt p/w ongoing dizziness when walking, described as unsteadiness upon ambulating. Also with headaches  -recent cardiac w/u neg (EKG 8/19 NSR unremarkable, TTE 10/27 EF ~65%, no valvular abn or WMA)   - negative orthostatics   -suspect symptoms all related to brain mets, vasogenic edema  -decadron 4mg q8h (8/29-8/31) last day today, 4mg q12h (9/1-9/3), 4mg qd (9/4- 9/6), discontinue on 9/7.   -c/w depakote for migraines. Can also treat with reglan  -Rad onc evaluated, no indication for inpt RT, f/u appt Dr. Bernard   -Scheduled for outpt followup with Dr. Beckman heme onc   -Neurosurgery evaluation, no indication for inpatient intervention, f/u appt Dr. Metcalf 9/1 2pm   -PT evaluation, rec outpatient PT, discharge to home without skilled PT services   -fall precautions

## 2023-08-31 NOTE — PROGRESS NOTE ADULT - SUBJECTIVE AND OBJECTIVE BOX
***************************************************************  Lani Greenwood, PGY 1   Internal Medicine   ***************************************************************    HARJEET WHITMORE  52y  MRN: [id number]    Patient is a 52y old  Female who presents with a chief complaint of dizziness, headache (30 Aug 2023 15:22)      Subjective: no events ON. Pending ride for discharge, feels well and would like to go home.     MEDICATIONS  (STANDING):  dexAMETHasone     Tablet 4 milliGRAM(s) Oral every 8 hours  dextrose 5%. 1000 milliLiter(s) (100 mL/Hr) IV Continuous <Continuous>  dextrose 5%. 1000 milliLiter(s) (50 mL/Hr) IV Continuous <Continuous>  dextrose 50% Injectable 25 Gram(s) IV Push once  dextrose 50% Injectable 12.5 Gram(s) IV Push once  dextrose 50% Injectable 25 Gram(s) IV Push once  divalproex  milliGRAM(s) Oral two times a day  enoxaparin Injectable 40 milliGRAM(s) SubCutaneous every 24 hours  glucagon  Injectable 1 milliGRAM(s) IntraMuscular once  insulin glargine Injectable (LANTUS) 14 Unit(s) SubCutaneous at bedtime  insulin lispro (ADMELOG) corrective regimen sliding scale   SubCutaneous three times a day before meals  insulin lispro (ADMELOG) corrective regimen sliding scale   SubCutaneous at bedtime  insulin lispro Injectable (ADMELOG) 12 Unit(s) SubCutaneous three times a day before meals  pantoprazole    Tablet 40 milliGRAM(s) Oral before breakfast    MEDICATIONS  (PRN):  acetaminophen     Tablet .. 650 milliGRAM(s) Oral every 6 hours PRN Temp greater or equal to 38C (100.4F), Mild Pain (1 - 3)  aluminum hydroxide/magnesium hydroxide/simethicone Suspension 30 milliLiter(s) Oral every 4 hours PRN Dyspepsia  dextrose Oral Gel 15 Gram(s) Oral once PRN Blood Glucose LESS THAN 70 milliGRAM(s)/deciliter  melatonin 3 milliGRAM(s) Oral at bedtime PRN Insomnia  ondansetron Injectable 4 milliGRAM(s) IV Push every 8 hours PRN Nausea and/or Vomiting  SUMAtriptan 50 milliGRAM(s) Oral every 6 hours PRN Migraine      Objective:    Vitals: Vital Signs Last 24 Hrs  T(C): 36.4 (08-31-23 @ 05:57), Max: 37.2 (08-30-23 @ 12:04)  T(F): 97.6 (08-31-23 @ 05:57), Max: 99 (08-30-23 @ 12:04)  HR: 72 (08-31-23 @ 05:57) (72 - 101)  BP: 119/73 (08-31-23 @ 05:57) (119/73 - 137/91)  BP(mean): --  RR: 17 (08-31-23 @ 05:57) (17 - 18)  SpO2: 100% (08-31-23 @ 05:57) (98% - 100%)            I&O's Summary      PHYSICAL EXAM:  GENERAL: NAD  HEAD:  Atraumatic, Normocephalic  EYES: EOMI, conjunctiva and sclera clear  CHEST/LUNG: Clear to auscultation bilaterally; No rales, rhonchi, wheezing, or rubs  HEART: Regular rate and rhythm; No murmurs, rubs, or gallops  ABDOMEN: Soft, Nontender, Nondistended;   SKIN: No rashes or lesions  NERVOUS SYSTEM:  Alert & Oriented X3, no focal deficits    LABS:  08-29    131<L>  |  95<L>  |  27<H>  ----------------------------<  275<H>  4.9   |  24  |  0.66  08-28    134<L>  |  96<L>  |  22  ----------------------------<  271<H>  5.5<H>   |  25  |  0.78    Ca    9.6      29 Aug 2023 06:12  Ca    9.9      28 Aug 2023 07:20                            11.6   15.38 )-----------( 159      ( 29 Aug 2023 06:12 )             34.4                         12.1   15.48 )-----------( 182      ( 28 Aug 2023 07:20 )             36.6     CAPILLARY BLOOD GLUCOSE      POCT Blood Glucose.: 164 mg/dL (30 Aug 2023 21:44)  POCT Blood Glucose.: 279 mg/dL (30 Aug 2023 18:05)  POCT Blood Glucose.: 138 mg/dL (30 Aug 2023 13:07)  POCT Blood Glucose.: 187 mg/dL (30 Aug 2023 11:48)  POCT Blood Glucose.: 202 mg/dL (30 Aug 2023 08:41)      RADIOLOGY & ADDITIONAL TESTS:  reviewed

## 2023-08-31 NOTE — HISTORY OF PRESENT ILLNESS
[FreeTextEntry1] : Ms. Rojas completed gamma knife radiation for a total of 2750 cgy to 7 lesions over 5 fractions apiece from 6/21/2023-6/29/2023.   ONCOLOGY HISTORY Ms. Rojas has a NSCLCA with neuroendocrine features, stage IIIB at diagnosis 6/22/2022. Carbo/pemetrexed started in 7/2022, thoracic RT started in 9/2022. Completed 4 cycles with carbo/pem in late september 2022. [facility] She declined treatment with durvalumab. Hospitalized in 5/2023 and found to have brain mets.   CT head 5/31/2023 showed Multiple new supra and infratentorial low-attenuation masses, compatible with intracranial metastatic disease, new when compared with 7/15/2022.  MRI brain 6/2/2023 showed Abnormal lesions involving the posterior fossa and supratentorial region are identified which are consistent with patient's known metastasis.  8/30/2023- Ms. Rojas presents today for follow up.  Admitted 8/19-8/26/2023 at [facility] with 3 syncopal episodes.  MRI brain done 8/20/2023 showed smaller in size. Posterior right temporal lobe, left frontal lobe lesions are smaller in size. High right frontal lobe lesion on image 100 of series 9 measures 6.1 mm, larger in size, previously measuring 3.8 mm Seen by neurology 8/24 who noted symptoms likely due to increased right frontal brain met. Recommended continue depakote 500 BID and dexamethasone 4mg QID.  SIgned out AMA on 8/26, presented to [facility] 8/26 with continued dizziness. Remains admitted at [facility] on dex 4q8 and valproic acid.

## 2023-08-31 NOTE — PROGRESS NOTE ADULT - PROVIDER SPECIALTY LIST ADULT
Endocrinology
Endocrinology
Heme/Onc
Internal Medicine

## 2023-08-31 NOTE — PROGRESS NOTE ADULT - SUBJECTIVE AND OBJECTIVE BOX
Chief Complaint: DM 2 with hyperglycemia exacerbated by steroids     History: Patient seen at bedside. Patient not wanting to discuss diabetes care with provider, states "I am leaving and all my medications are all set"  Discussed plan with primary team. Patient was not able to learn insulin injection with RNs  Currently on Dexamethasone 4 mg q8h, will be tapered as follows: Dexamethasone 4mg q12h (9/1-9/3), 4mg qd (9/4- 9/6), discontinue on 9/7.   Patient's family will be helping her at home with diabetes care and steroids     MEDICATIONS  (STANDING):  dexAMETHasone     Tablet 4 milliGRAM(s) Oral every 8 hours  dextrose 5%. 1000 milliLiter(s) (50 mL/Hr) IV Continuous <Continuous>  dextrose 5%. 1000 milliLiter(s) (100 mL/Hr) IV Continuous <Continuous>  dextrose 50% Injectable 25 Gram(s) IV Push once  dextrose 50% Injectable 12.5 Gram(s) IV Push once  dextrose 50% Injectable 25 Gram(s) IV Push once  divalproex  milliGRAM(s) Oral two times a day  enoxaparin Injectable 40 milliGRAM(s) SubCutaneous every 24 hours  glucagon  Injectable 1 milliGRAM(s) IntraMuscular once  insulin glargine Injectable (LANTUS) 14 Unit(s) SubCutaneous at bedtime  insulin lispro (ADMELOG) corrective regimen sliding scale   SubCutaneous three times a day before meals  insulin lispro (ADMELOG) corrective regimen sliding scale   SubCutaneous at bedtime  insulin lispro Injectable (ADMELOG) 12 Unit(s) SubCutaneous three times a day before meals  pantoprazole    Tablet 40 milliGRAM(s) Oral before breakfast    MEDICATIONS  (PRN):  acetaminophen     Tablet .. 650 milliGRAM(s) Oral every 6 hours PRN Temp greater or equal to 38C (100.4F), Mild Pain (1 - 3)  aluminum hydroxide/magnesium hydroxide/simethicone Suspension 30 milliLiter(s) Oral every 4 hours PRN Dyspepsia  dextrose Oral Gel 15 Gram(s) Oral once PRN Blood Glucose LESS THAN 70 milliGRAM(s)/deciliter  melatonin 3 milliGRAM(s) Oral at bedtime PRN Insomnia  ondansetron Injectable 4 milliGRAM(s) IV Push every 8 hours PRN Nausea and/or Vomiting  SUMAtriptan 50 milliGRAM(s) Oral every 6 hours PRN Migraine    No Known Allergies    Review of Systems:  Cardiovascular: No chest pain  Respiratory: No SOB  GI: No nausea, vomiting  Endocrine: no hypoglycemia     PHYSICAL EXAM:  VITALS: T(C): 36.8 (08-31-23 @ 12:10)  T(F): 98.2 (08-31-23 @ 12:10), Max: 98.2 (08-31-23 @ 12:10)  HR: 93 (08-31-23 @ 12:10) (72 - 101)  BP: 120/83 (08-31-23 @ 12:10) (119/73 - 135/96)  RR:  (17 - 18)  SpO2:  (98% - 100%)  Wt(kg): --  GENERAL: NAD  EYES: No proptosis, anicteric  HEENT:  Atraumatic, Normocephalic  RESPIRATORY: unlabored respirations     CAPILLARY BLOOD GLUCOSE    POCT Blood Glucose.: 168 mg/dL (31 Aug 2023 11:54)  POCT Blood Glucose.: 230 mg/dL (31 Aug 2023 08:34)  POCT Blood Glucose.: 164 mg/dL (30 Aug 2023 21:44)  POCT Blood Glucose.: 279 mg/dL (30 Aug 2023 18:05)      08-29    131<L>  |  95<L>  |  27<H>  ----------------------------<  275<H>  4.9   |  24  |  0.66    eGFR: 105    Ca    9.6      08-29  Mg     2.10     08-29  Phos  3.0     08-29      Anion Gap: 12 mmol/L (08-29-23 @ 06:12)      A1C with Estimated Average Glucose Result: 7.3 % (08-28-23 @ 07:00)  A1C with Estimated Average Glucose Result: 7.2 % (08-27-23 @ 07:28)  A1C with Estimated Average Glucose Result: 5.7 % (06-01-23 @ 06:19)      Diet, Regular:   Consistent Carbohydrate Evening Snack (CSTCHOSN) (08-27-23 @ 02:07)

## 2023-08-31 NOTE — PROGRESS NOTE ADULT - PROBLEM SELECTOR PLAN 3
-likely steroid induced   -CC diet   - hgba1c 7.2   - Endo consulted reccs regarding outpt management of steroid induced hyperglycemia based on taper (4mg dex q8 3d, q12 3d, qd 3d, d/c following)   - reccs appreciated:   - moderate ISS   - 10u premeal admelog + 14u lantus qhs   - taper insulin regimen with steroid taper, not expected to require insulin after discontinuation   - diabetic counseling   - RD eval   #Leukocytosis - likely related to steroid use, monitor off antibiotics

## 2023-08-31 NOTE — PROGRESS NOTE ADULT - PROBLEM SELECTOR PROBLEM 2
Steroid-induced hyperglycemia
Steroid-induced hyperglycemia
Non-small cell lung cancer with metastasis

## 2023-08-31 NOTE — PROGRESS NOTE ADULT - ATTENDING COMMENTS
52 y.o. F w/ a hx of metastatic NSCLC to brain recently discharged from [facility] with ongoing dizziness presenting for Rad Onc tiffanie.    OSH course reviewed- transfer attempted at [facility] but was refused. Progress note with mentions of re-starting Keppra however patient only on Depakote at OSH for seizure ppx.     Patient feels well, no complaints.     # Metastatic NSCLC: Cont Decadron, Depakote. Oncology following, Rad Onc c/s  - rec outpt f/u. Discussed decadron taper with oncology. Will taper as follows: 4mg q8 x3d; 4mg q12 x3 days then 4mg qD x3day.  - Neurourgery consulted as per rad onc recs   - Endo consulted for management of steroid induced hyperglycemia  - PT rec outpt PT
52 y.o. F w/ a hx of metastatic NSCLC to brain recently discharged from [facility] with ongoing dizziness presenting for Rad Onc eval.    OSH course reviewed- transfer attempted at [facility] but was refused. Progress note with mentions of re-starting Keppra however patient only on Depakote at OSH for seizure ppx.     Patient feels well, no complaints.     # Metastatic NSCLC: Cont Decadron, Depakote. Oncology following, Rad Onc c/s  - rec outpt f/u. f/u oncology regarding decadron taper   - PT rec outpt PT  - Orthostatics neg
52 y.o. F w/ a hx of metastatic NSCLC to brain recently discharged from [facility] with ongoing dizziness presenting for Rad Onc eval.    OSH course reviewed- transfer attempted at [facility] but was refused. Progress note with mentions of re-starting Keppra however patient only on Depakote at OSH for seizure ppx.     Patient feels well, no complaints.     # Metastatic NSCLC: Cont Decadron, Depakote. Oncology following, Rad Onc c/s    - PT c/s   - Check orthostatics
52 y.o. F w/ a hx of metastatic NSCLC to brain recently discharged from [facility] with ongoing dizziness presenting for Rad Onc tiffanie.    OSH course reviewed- transfer attempted at [facility] but was refused. Progress note with mentions of re-starting Keppra however patient only on Depakote at OSH for seizure ppx.     Patient feels well, no complaints.     # Metastatic NSCLC: Cont Decadron, Depakote. Oncology following, Rad Onc c/s  - rec outpt f/u. Discussed decadron taper with oncology. Will taper as follows: 4mg q8 x3d; 4mg q12 x3 days then 4mg qD x3day.  - Neurourgery consulted as per rad onc recs   - Endo consulted for management of steroid induced hyperglycemia  - PT rec outpt PT       Time spent on discharge plannin min
52 y.o. F w/ a hx of metastatic NSCLC to brain recently discharged from [facility] with ongoing dizziness presenting for Rad Onc tiffanie.    OSH course reviewed- transfer attempted at [facility] but was refused. Progress note with mentions of re-starting Keppra however patient only on Depakote at OSH for seizure ppx.     Patient feels well, no complaints.     # Metastatic NSCLC: Cont Decadron, Depakote. Oncology following, Rad Onc c/s  - rec outpt f/u. Discussed decadron taper with oncology. Will taper as follows: 4mg q8 x3d; 4mg q12 x3 days then 4mg qD x3day.  - Endo consulted for management of steroid induced hyperglycemia. Will need insulin on discharge, new for pt. Insulin teaching.  - PT rec outpt PT  - Orthostatics neg

## 2023-08-31 NOTE — PROGRESS NOTE ADULT - REASON FOR ADMISSION
dizziness, headache

## 2023-08-31 NOTE — PROGRESS NOTE ADULT - PROBLEM SELECTOR PROBLEM 3
Blood glucose elevated
HTN (hypertension)
Blood glucose elevated
Blood glucose elevated

## 2023-09-04 NOTE — ED ADULT TRIAGE NOTE - CHIEF COMPLAINT QUOTE
pt c/o 3 days left chest pain "under ribs", becoming worse today. believe sit to be related to GERD. states she was not taking pantoprazole during her recent hospital admission.

## 2023-09-04 NOTE — ED ADULT NURSE NOTE - OBJECTIVE STATEMENT
Pt A&Ox4 ambulatory at baseline, PMH GERD, Lung CA w/ mets to brain, presenting to the ED (RM 5) c/o epigastrium pain. Pt states has been experiencing epigastrium pain x 3 days. Pt states feels like GERD symptoms, states was recently discharged from hospital and has not been taking pantoprazole. Pt states 10/10 pain, associated with SOB and chest pain. Pt denies any other complaints. Respirations are even and unlabored, NAD, pt placed on CM, 18G IV R Forearm, labs sent, medicated as per orders, pending CT and XR. Safety precautions implemented as per protocol, awaiting further MD orders, plan of care ongoing.

## 2023-09-04 NOTE — H&P ADULT - PROBLEM SELECTOR PLAN 2
- hgba1c 7.3 8/28   - will hold home PO recs (discharged on a home regimen that would be tapered off as steroids were tapered off)   - will just do SS for now   - carb cc diet   - endo consulted

## 2023-09-04 NOTE — H&P ADULT - HISTORY OF PRESENT ILLNESS
51 yo F with PMH stay IV lung cancer w/mets to brain presents with 3-day history of epigastric pain. Patient was just admitted from 08/26 – 08/30 for dizziness and migraines. She was managed with steroids as symptoms may have been related to brain mets. Rad onc and NSG did not recommend inpatient intervention. After being discharged, pt began to have epigastric pain described as like a muscle ache, worsens with flexing forward or taking a deep breath, intense, not presenting when staying still. Given the severity of the pain pt decided to come back to the hospital.     Denies any fevers, chills, night sweats. Some nausea, no vomiting. Has been having BMs every day although they are strained.     In the ED: CTA Chest with suboptimal imaging for PE but no central PE, shows worsening mets and rapid growth of a left lower lobe mass abutting the pleural surface. Patient admitted primarily for pain control

## 2023-09-04 NOTE — ED PROVIDER NOTE - CARE PLAN
Principal Discharge DX:	Metastatic primary lung cancer   1 Principal Discharge DX:	Metastatic primary lung cancer  Secondary Diagnosis:	Chest pain

## 2023-09-04 NOTE — H&P ADULT - CONVERSATION DETAILS
Discussed goals of care given diagnosis and prognosis at bedside. Patient is not yet ready to make a decision and would like to assess all treatment options and speak with oncology next. She would like to be full code for now and is open to all medical interventions.

## 2023-09-04 NOTE — ED PROVIDER NOTE - ATTENDING CONTRIBUTION TO CARE
Afebrile. Awake and Alert. Lungs CTA. Heart RRR. Abdomen soft NT. CN II-XII grossly intact. Moves all extremities without lateralization. Afebrile. Awake and Alert. Lungs crackles left base. Heart RRR. Abdomen soft NT mild distension. CN II-XII grossly intact. Moves all extremities without lateralization.

## 2023-09-04 NOTE — PATIENT PROFILE ADULT - DO YOU FEEL LIKE HURTING YOURSELF OR OTHERS?
Procedure Scheduling Information    Procedure:  EGD  Last Procedure:  n/a  Procedure Date:  9/3/2020  Procedure Time:  0800  Reason:  dysphagia  Referring provider:  Amparo Worthington MD  Location: [facility]   Prep:  N/A  Sedation:  MAC     If MAC, why:  Multiple Comorbidities  Pacemaker/Defibrillator:  n/a     Device Interrogation Form Faxed:  n/a  Anticoagulation:  n/a    Prescribing Provider:  n/a                  
no

## 2023-09-04 NOTE — PATIENT PROFILE ADULT - NSPROPOAURINARYCATHETER_GEN_A_NUR
[TextBox_4] : never smoker\par doing well, no fever, chill, \par no dyspnea\par has ED on cpap, now mild- cpap 8\par Cardiology Dr Poon advantage care\par  saw vascular, no DVT\par has atypical  CP x yrs, Cardiology work up negative\par used albuterol prn, states it doesn’t help\par \par 9/7/22\par doing well\par no dyspnea\par no fever, chill, chest pain\par no sig GERD, off omeprazole\par no active rhinitis\par has prn rescue no

## 2023-09-04 NOTE — H&P ADULT - ASSESSMENT
51 yo F with PMH stay IV lung cancer w/mets to brain, steroid induced hyperglycemia presents with 3-day history of epigastric pain.

## 2023-09-04 NOTE — H&P ADULT - PROBLEM SELECTOR PLAN 1
- presents with worsening epigastric pain   - CTA on admission: No PE. Stable right apical lung mass. Rapid interval growth of a left lower lobe mass. New hepatic metastases.  - patient was not taking protonix: pain could be related to worsening metastatic disease vs stress ulcer   - will start protonix 40 IV BID for now   - continue decadron taper for now, currently on 4mg qd (9/4- 9/6), discontinue on 9/7.   - c/w depakote for migraines. Can also treat with reglan  - needs inpatient onc eval for worsening metastatic diease   - pain control with oxy 5 q4 PRN for moderate and dialudid 0.2 IV q4 PRN for severe   - palliative care consult   - OP rad onc, NSG follow up

## 2023-09-04 NOTE — ED PROVIDER NOTE - OBJECTIVE STATEMENT
Patient is a 52 year-old-female with history of lung cancer w/mets to brain presents with 3-day history of LUQ and L lower chest pain. Reports that she was recently discharged from the hospital, however started having pain in the LUQ/left lower chest about 3 days ago, worsen over the weekend, with shortness of breath and worsen with inspiration. Denies fever at home, chills, vomiting, dysuria, hematuria, urinary frequency, bloody or black stools.

## 2023-09-04 NOTE — ED PROVIDER NOTE - PHYSICAL EXAMINATION
Vitals: I have reviewed the patients vital signs  General: uncomfortable appearing female  HEENT: Atraumatic, normocephalic, airway patent  Eyes: EOMI, tracking appropriately  Neck: no tracheal deviation, no JVD  Chest/Lungs: taking shallow breaths, anterior lung sounds clear, RR~25  Heart: skin and extremities well perfused, tachycardia   Abdomen: soft, LUQ tenderness   Neuro: A+Ox3, ambulating without difficulty, CN grossly intact  MSK: strength at baseline in all extremities, no muscle wasting or atrophy  Skin: no cyanosis, no jaundice, no new emergent lesions

## 2023-09-04 NOTE — H&P ADULT - NSHPLABSRESULTS_GEN_ALL_CORE
.  LABS:                         12.5   13.20 )-----------( 163      ( 04 Sep 2023 03:28 )             38.4     09-04    136  |  101  |  23  ----------------------------<  204<H>  3.9   |  22  |  0.63    Ca    8.8      04 Sep 2023 05:23    TPro  7.3  /  Alb  3.0<L>  /  TBili  0.2  /  DBili  x   /  AST  47<H>  /  ALT  21  /  AlkPhos  52  09-04    PTT - ( 04 Sep 2023 05:28 )  PTT:24.7 sec  Urinalysis Basic - ( 04 Sep 2023 05:23 )    Color: x / Appearance: x / SG: x / pH: x  Gluc: 204 mg/dL / Ketone: x  / Bili: x / Urobili: x   Blood: x / Protein: x / Nitrite: x   Leuk Esterase: x / RBC: x / WBC x   Sq Epi: x / Non Sq Epi: x / Bacteria: x                RADIOLOGY, EKG & ADDITIONAL TESTS:    BMP/CBC: personally reviewed. Cr WNL   CXR: personally reviewed. Changes c/w mets noted           FINDINGS:  CHEST:  LUNGS AND LARGE AIRWAYS: Patent central airways. Biapical emphysema.   Stable 4.9 x 3.9 cm right apical mass with surrounding nodular opacities   likely representing carcinomatosis. Marked interval increase in the size   of a left lower lobe juxtapleural mass measuring 3.5 x 2.8 cm, previously   measuring 1.0 x 0.8 cm). Right middle lobe subsegmental opacity,   decreased from prior study.  PLEURA: Small left pleural effusion with associated compressive   atelectasis..  VESSELS: Suboptimal pulmonary arterial phase limits evaluation there is   no main, lobar, or segmental pulmonary embolism.  HEART: Heart size is normal. No pericardial effusion.  MEDIASTINUM AND YULIET: Similar hilar and mediastinal lymphadenopathy.  CHEST WALL AND LOWER NECK: Within normal limits.    ABDOMEN AND PELVIS:  LIVER: New scattered subcentimeter hypodensities, too small to   characterize.  BILE DUCTS: Normal caliber.  GALLBLADDER: Within normal limits.  SPLEEN: Within normal limits.  PANCREAS: Within normal limits.  ADRENALS: Within normal limits.  KIDNEYS/URETERS: Within normal limits.    BLADDER: Within normal limits.  REPRODUCTIVE ORGANS: Uterus and adnexa within normal limits.    BOWEL: No bowel obstruction. Appendix is not visualized. No evidence of   inflammation in the pericecal region.  PERITONEUM: No ascites.  VESSELS: Atherosclerotic changes.  RETROPERITONEUM/LYMPH NODES: No lymphadenopathy.  ABDOMINAL WALL: Within normal limits.  BONES: Stable sclerotic foci in the distal sacrum and coccyx.    IMPRESSION:  Suboptimal opacification of subsegmental branches of the pulmonary   arteries. No central pulmonary embolism.    Grossly stable right apical lung mass. Rapid interval growth of a left   lower lobe mass abutting the pleural surface.    Small pleural effusion.    New hepatic metastases.

## 2023-09-04 NOTE — ED PROVIDER NOTE - CLINICAL SUMMARY MEDICAL DECISION MAKING FREE TEXT BOX
Patient is a 52 year-old-female with history of lung cancer w/mets to brain presents with 3-day history of LUQ and L lower chest pain. VS significant for tachycardia and tachypnea. Exam remarkable for LUQ tenderness. Concerning for PUD vs PE vs other intraabdominal pathologies. Workup includes labs, RVP, ECG, CXR, CTA chest and CTAP. Fluids, GI cocktail and pain control.

## 2023-09-04 NOTE — ED PROVIDER NOTE - PROGRESS NOTE DETAILS
Prabhjot PGY3  CT showed no central PE, R apical lung mass w/interval growth of L lower lobe mass abutting the pleural surface and new hepatic metastases. Discussed results with patient and explained that her pain is likely from worsening of her metastatic disease. Patient reports that her pain is still 7.5, offered admission but would like to trial oral pain medications first to see if she would be able to go home with oral medications. Prabhjot PGY3  Patient reassessed and reports that her pain is still persistent despite the oxycodone. Agreeable with admission for further pain control. Prabhjot PGY3  Hospitalist accepted admission.

## 2023-09-04 NOTE — H&P ADULT - TIME BILLING
Time-based billing (NON-critical care).     75 minutes spent on total encounter; more than 50% of the visit was spent counseling and / or coordinating care by the attending physician.  The necessity of the time spent during the encounter on this date of service was due to:     review of laboratory data, radiology results, consultants' recommendations, documentation in [place], discussion with patient/ACP and interdisciplinary staff (such as , social workers, etc). Interventions were performed as documented above.

## 2023-09-05 NOTE — CONSULT NOTE ADULT - ATTENDING COMMENTS
53 y/o woman with lung cancer who presented with epigastric pain. On exam and history today she describes left sided pain that is pleuritic in nature. There does not appear to be an imaging correlate corresponding to her site of pain. Appreciate palliative care support for pain and symptom management.  Red Thomas MD PhD  Oncology Attending

## 2023-09-05 NOTE — PHYSICAL THERAPY INITIAL EVALUATION ADULT - GAIT DEVIATIONS NOTED, PT EVAL
decreased gabe/increased time in double stance/decreased step length/decreased stride length/decreased weight-shifting ability

## 2023-09-05 NOTE — CONSULT NOTE ADULT - SUBJECTIVE AND OBJECTIVE BOX
Date of Service 09-05-23 @ 12:10     Reason for Consultation:	[] Pain		[] Goals of Care		[] Non-pain symptoms    [] End of life discussion		[] Other:    HPI:  51 yo F with PMH stay IV lung cancer w/mets to brain presents with 3-day history of epigastric pain. Patient was just admitted from 08/26 – 08/30 for dizziness and migraines. She was managed with steroids as symptoms may have been related to brain mets. Rad onc and NSG did not recommend inpatient intervention. After being discharged, pt began to have epigastric pain described as like a muscle ache, worsens with flexing forward or taking a deep breath, intense, not presenting when staying still. Given the severity of the pain pt decided to come back to the hospital.     Denies any fevers, chills, night sweats. Some nausea, no vomiting. Has been having BMs every day although they are strained.     In the ED: CTA Chest with suboptimal imaging for PE but no central PE, shows worsening mets and rapid growth of a left lower lobe mass abutting the pleural surface. Patient admitted primarily for pain control  (04 Sep 2023 14:29)      Interval History:       PERTINENT PM/SXH:   GERD (Gastroesophageal Reflux Disease)    Hydrosalpinx    GERD (Gastroesophageal Reflux Disease)    Ulcer    Lung mass    Non-small cell lung cancer with metastasis      No significant past surgical history    S/P endoscopy      FAMILY HISTORY:  No pertinent family history in first degree relatives        Family Hx substance abuse [ ]yes [x ]no    ITEMS NOT CHECKED ARE NOT PRESENT    SOCIAL HISTORY:   Significant other/partner[ ]  Children[ ]  Scientology/Spirituality:  Substance hx:  [ ]   Tobacco hx:  [ ]   Alcohol hx: [ ]   Home Opioid hx:  [ ] I-Stop Reference No:  [id number]  Prescription Information  PDI Filter:    PDI	Current Rx	Drug Type	Rx Written	Rx Dispensed	Drug	Quantity	Days Supply	Prescriber Name	Prescriber NIKITA #	Payment Method	Dispenser  A	N	O	02/03/2023	02/03/2023	tramadol hcl 50 mg tablet	60	30	Grayson Savage MD	[id number]	Insurance	[id number]  B	N	O	09/29/2022	09/29/2022	oxycodone hcl (ir) 5 mg tablet	80	20	Kenji Soto	[id number]	Insurance	[facility]  Living Situation: [ ]Home  [ ]Long term care  [ ]Rehab [ ]Other  Home Services: [ ] HHA [ ] Visting RN [ ] Hospice      ADVANCE DIRECTIVES:    MOLST  [ ]  Living Will  [ ]   DECISION MAKER(s):  [ ] Health Care Proxy(s)  [ ] Surrogate(s)  [ ] Guardian           Name(s): Phone Number(s):    BASELINE (I)ADL(s) (prior to admission):  Maries: [ ]Total  [ ] Moderate [ ]Dependent    Allergies    No Known Allergies    Intolerances    MEDICATIONS  (STANDING):  dexAMETHasone     Tablet 4 milliGRAM(s) Oral every 24 hours  dextrose 5%. 1000 milliLiter(s) (50 mL/Hr) IV Continuous <Continuous>  dextrose 50% Injectable 25 Gram(s) IV Push once  divalproex  milliGRAM(s) Oral two times a day  enoxaparin Injectable 40 milliGRAM(s) SubCutaneous every 24 hours  glucagon  Injectable 1 milliGRAM(s) IntraMuscular once  insulin glargine Injectable (LANTUS) 3 Unit(s) SubCutaneous at bedtime  insulin lispro (ADMELOG) corrective regimen sliding scale   SubCutaneous three times a day before meals  insulin lispro Injectable (ADMELOG) 2 Unit(s) SubCutaneous three times a day before meals  pantoprazole  Injectable 40 milliGRAM(s) IV Push two times a day    MEDICATIONS  (PRN):  aluminum hydroxide/magnesium hydroxide/simethicone Suspension 30 milliLiter(s) Oral every 4 hours PRN Dyspepsia  dextrose Oral Gel 15 Gram(s) Oral once PRN Blood Glucose LESS THAN 70 milliGRAM(s)/deciliter  HYDROmorphone  Injectable 0.2 milliGRAM(s) IV Push every 4 hours PRN Severe Pain (7 - 10)  melatonin 3 milliGRAM(s) Oral at bedtime PRN Insomnia  ondansetron Injectable 4 milliGRAM(s) IV Push every 8 hours PRN Nausea and/or Vomiting  oxycodone    5 mG/acetaminophen 325 mG 1 Tablet(s) Oral every 4 hours PRN Moderate Pain (4 - 6)        ITEMS UNCHECKED ARE NOT PRESENT     PRESENT SYMPTOMS: [ ]Unable to self-report due to altered mental status  [ ] CPOT [ ] PAINADs [ ] RDOS  Source if other than patient:  [ ]Family   [ ]Team     Pain: [ ]yes [ ]no  QOL impact -   Location -                    Aggravating factors -  Quality -  Radiation -  Timing-  Severity (0-10 scale):  Minimal acceptable level / Pain goal (0-10 scale):     CPOT:    https://www.[facility].org/getattachment/[email or web address]/Critical-Care-Pain-Observation-Tool-(CPOT)    Dyspnea:                           [ ]Mild [ ]Moderate [ ]Severe  Anxiety:                             [ ]Mild [ ]Moderate [ ]Severe  Agitation:                          [ ]Mild [ ]Moderate [ ]Severe  Fatigue:                             [ ]Mild [ ]Moderate [ ]Severe  Nausea:                             [ ]Mild [ ]Moderate [ ]Severe  Loss of appetite:              [ ]Mild [ ]Moderate [ ]Severe  Constipation:                   [ ]Mild [ ]Moderate [ ]Severe  Diarrhea:                          [ ]Mild [ ]Moderate [ ]Severe  Pruritus:                            [ ]Mild [ ]Moderate [ ]Severe  Depression:                      [ ]Mild [ ]Moderate [ ]Severe    PCSSQ[Palliative Care Spiritual Screening Question]   Severity (0-10):  Score of 4 or > indicate consideration of Chaplaincy referral.  Chaplaincy Referral: [ ] yes [ ] refused [ ] following [ x] deferred    Caregiver [place]? : [ ] yes [ ] no [ ] Declined   [x ] Deferred            Social work referral [ ] Patient & Family Centered Care Referral [ ]     Anticipatory Grief present?:  [ ] yes [ ] no  [x ] Deferred                  Social work referral [ ] Chaplaincy Referral[ ]    Other Symptoms:  [ ]All other review of systems negative     PHYSICAL EXAM:  Vital Signs Last 24 Hrs  T(C): 36.7 (05 Sep 2023 06:45), Max: 37.5 (04 Sep 2023 22:00)  T(F): 98 (05 Sep 2023 06:45), Max: 99.5 (04 Sep 2023 22:00)  HR: 98 (05 Sep 2023 06:45) (95 - 110)  BP: 111/74 (05 Sep 2023 06:45) (111/74 - 130/77)  BP(mean): --  RR: 18 (05 Sep 2023 06:45) (18 - 18)  SpO2: 98% (05 Sep 2023 06:45) (92% - 98%)    Parameters below as of 05 Sep 2023 06:45  Patient On (Oxygen Delivery Method): nasal cannula  O2 Flow (L/min): 2       I&O's Summary    04 Sep 2023 07:01  -  05 Sep 2023 07:00  --------------------------------------------------------  IN: 340 mL / OUT: 800 mL / NET: -460 mL        GENERAL:  [ ]Alert  [ ]Oriented x   [ ]Lethargic  [ ]Cachexia  [ ]Unarousable  [ ]Verbal  [ ]Non-Verbal  [ ] No Distress  Behavioral:   [ ] Anxiety  [ ] Delirium [ ] Agitation [ ] Calm  [ ] Other  HEENT:  [ ]Normal  [ ] Temporal Wasting  [ ]Dry mouth   [ ]ET Tube/Trach  [ ]Oral lesions  [ ] Mucositis  PULMONARY:   [ ]Clear [ ]Tachypnea  [ ]Audible excessive secretions   [ ]Rhonchi        [ ]Right [ ]Left [ ]Bilateral  [ ]Crackles        [ ]Right [ ]Left [ ]Bilateral  [ ]Wheezing     [ ]Right [ ]Left [ ]Bilateral  [ ]Diminished breath sounds [ ]right [ ]left [ ]bilateral  CARDIOVASCULAR:    [ ]Regular [ ]Irregular [ ]Tachy  [ ]Austin [ ]Murmur [ ]Other  GASTROINTESTINAL:  [ ]Soft  [ ]Distended   [ ]+BS  [ ]Non tender [ ]Tender  [ ]PEG [ ]OGT/ NGT  Last BM:   GENITOURINARY:  [ ]Normal [ ] Incontinent   [ ]Oliguria/Anuria   [ ]Mas  MUSCULOSKELETAL:   [ ]Normal   [ ]Weakness  [ ]Bed/Wheelchair bound [ ]Edema  [  ] amputation  [  ] contraction  NEUROLOGIC:   [ ]No focal deficits  [ ]Cognitive impairment  [ ]Dysphagia [ ]Dysarthria [ ]Paresis [ ]Other   SKIN: See Nursing Skin Assessment for further details  [ ]Normal    [ ]Rash  [ ]Pressure ulcer(s)       Present on admission [ ]y [ ]n   [  ]  Wound    [  ] hyperpigmentation    CRITICAL CARE:  [ ] Shock Present  [ ]Septic [ ]Cardiogenic [ ]Neurologic [ ]Hypovolemic  [ ]  Vasopressors [ ]  Inotropes   [ ]Respiratory failure present [ ]Mechanical ventilation [ ]Non-invasive ventilatory support [ ]High flow    [ ]Acute  [ ]Chronic [ ]Hypoxic  [ ]Hypercarbic [ ]Other  [ ]Other organ failure     LABS:  reviewed                         11.3   13.06 )-----------( 102      ( 05 Sep 2023 05:35 )             33.2   09-05    133<L>  |  101  |  16  ----------------------------<  199<H>  4.3   |  22  |  0.50    Ca    9.1      05 Sep 2023 05:35    TPro  6.7  /  Alb  3.1<L>  /  TBili  0.4  /  DBili  x   /  AST  11  /  ALT  15  /  AlkPhos  52  09-05  PTT - ( 04 Sep 2023 05:28 )  PTT:24.7 sec  Urinalysis Basic - ( 05 Sep 2023 05:35 )    Color: x / Appearance: x / SG: x / pH: x  Gluc: 199 mg/dL / Ketone: x  / Bili: x / Urobili: x   Blood: x / Protein: x / Nitrite: x   Leuk Esterase: x / RBC: x / WBC x   Sq Epi: x / Non Sq Epi: x / Bacteria: x      CAPILLARY BLOOD GLUCOSE      POCT Blood Glucose.: 133 mg/dL (05 Sep 2023 12:02)  POCT Blood Glucose.: 180 mg/dL (05 Sep 2023 08:36)  POCT Blood Glucose.: 210 mg/dL (04 Sep 2023 23:39)  POCT Blood Glucose.: 190 mg/dL (04 Sep 2023 21:15)  POCT Blood Glucose.: 148 mg/dL (04 Sep 2023 17:12)      RADIOLOGY & ADDITIONAL STUDIES: reviewed     PROTEIN CALORIE MALNUTRITION PRESENT: [ ]mild [ ]moderate [ ]severe [ ]underweight [ ]morbid obesity  https://www.andeal.org/vault/1390/web/files/ONC/Table_Clinical%20Characteristics%20to%20Document%20Malnutrition-White%20JV%20et%20al%488142.pdf    Height (cm): 167.6 (09-04-23 @ 02:06), 167.6 (08-26-23 @ 22:33), 167.6 (08-19-23 @ 14:30)  Weight (kg): 63.503 (09-04-23 @ 09:25), 68.855 (08-26-23 @ 22:33), 66.7 (08-19-23 @ 22:30)  BMI (kg/m2): 22.6 (09-04-23 @ 09:25), 24.5 (09-04-23 @ 02:06), 24.5 (08-26-23 @ 22:33)    [ ]PPSV2 < or = to 30% [ ]significant weight loss  [ ]poor nutritional intake  [ ]anasarca [ ]Artificial Nutrition      REFERRALS:   [ ]Chaplaincy  [ ]Hospice  [ ]Child Life  [ ]Social Work  [ ]Case management [ ]Holistic Therapy      Date of Service 09-05-23 @ 12:10    HPI:  51 yo F with PMH stay IV lung cancer w/mets to brain presents with 3-day history of epigastric pain. Patient was just admitted from 08/26 – 08/30 for dizziness and migraines. She was managed with steroids as symptoms may have been related to brain mets. Rad onc and NSG did not recommend inpatient intervention. After being discharged, pt began to have epigastric pain described as like a muscle ache, worsens with flexing forward or taking a deep breath, intense, not presenting when staying still. Given the severity of the pain pt decided to come back to the hospital.     Denies any fevers, chills, night sweats. Some nausea, no vomiting. Has been having BMs every day although they are strained.     In the ED: CTA Chest with suboptimal imaging for PE but no central PE, shows worsening mets and rapid growth of a left lower lobe mass abutting the pleural surface. Patient admitted primarily for pain control  (04 Sep 2023 14:29)    Interval History:       PERTINENT PM/SXH:   GERD (Gastroesophageal Reflux Disease)  Hydrosalpinx  GERD (Gastroesophageal Reflux Disease)  Ulcer  Lung mass  Non-small cell lung cancer with metastasis  No significant past surgical history  S/P endoscopy    FAMILY HISTORY:  No pertinent family history in first degree relatives    ITEMS NOT CHECKED ARE NOT PRESENT    SOCIAL HISTORY:   Significant other/partner[ ]  Children[ ]  Nondenominational/Spirituality:  Substance hx:  [ ]   Tobacco hx:  [x- former smoker [ ]   Home Opioid hx:  [ x I-Stop Reference No:  [id number]  Prescription Information  PDI Filter:    PDI	Current Rx	Drug Type	Rx Written	Rx Dispensed	Drug	Quantity	Days Supply	Prescriber Name	Prescriber NIKITA #	Payment Method	Dispenser  A	N	O	02/03/2023	02/03/2023	tramadol hcl 50 mg tablet	60	30	Grayson Savage MD	[id number]	Insurance	[facility] [id number]  B	N	O	09/29/2022	09/29/2022	oxycodone hcl (ir) 5 mg tablet	80	20	Kenji Soto	[id number]	Insurance	[facility]  Living Situation: [x]Home  [ ]Long term care  [ ]Rehab [ ]Other      ADVANCE DIRECTIVES:    MOLST  [ ]  Living Will  [ ]   DECISION MAKER(s):  [ ] Health Care Proxy(s)  [ ]Surrogate(s)  [ ] Guardian           Name(s): Phone Number(s):      BASELINE (I)ADL(s) (prior to admission):  [place]: [x]Total  [ ] Moderate [ ]Dependent    Allergies    No Known Allergies    Intolerances    MEDICATIONS  (STANDING):  dexAMETHasone     Tablet 4 milliGRAM(s) Oral every 24 hours  dextrose 5%. 1000 milliLiter(s) (50 mL/Hr) IV Continuous <Continuous>  dextrose 50% Injectable 25 Gram(s) IV Push once  divalproex  milliGRAM(s) Oral two times a day  enoxaparin Injectable 40 milliGRAM(s) SubCutaneous every 24 hours  glucagon  Injectable 1 milliGRAM(s) IntraMuscular once  insulin glargine Injectable (LANTUS) 3 Unit(s) SubCutaneous at bedtime  insulin lispro (ADMELOG) corrective regimen sliding scale   SubCutaneous three times a day before meals  insulin lispro Injectable (ADMELOG) 2 Unit(s) SubCutaneous three times a day before meals  pantoprazole  Injectable 40 milliGRAM(s) IV Push two times a day    MEDICATIONS  (PRN):  aluminum hydroxide/magnesium hydroxide/simethicone Suspension 30 milliLiter(s) Oral every 4 hours PRN Dyspepsia  dextrose Oral Gel 15 Gram(s) Oral once PRN Blood Glucose LESS THAN 70 milliGRAM(s)/deciliter  HYDROmorphone  Injectable 0.2 milliGRAM(s) IV Push every 4 hours PRN Severe Pain (7 - 10)  melatonin 3 milliGRAM(s) Oral at bedtime PRN Insomnia  ondansetron Injectable 4 milliGRAM(s) IV Push every 8 hours PRN Nausea and/or Vomiting  oxycodone    5 mG/acetaminophen 325 mG 1 Tablet(s) Oral every 4 hours PRN Moderate Pain (4 - 6)        ITEMS UNCHECKED ARE NOT PRESENT     PRESENT SYMPTOMS: [ ]Unable to self-report due to altered mental status  [ ] CPOT [ ] PAINADs [ ] RDOS  Source if other than patient:  [ ]Family   [ ]Team     Pain: [ x]yes [ ]no  QOL impact - moderate  Location -   epigastric                  Aggravating factors - coughing, deep inspiration, leaning forward   Quality - spasm  Radiation - none   Timing- intermittent   Severity (0-10 scale): 9  Minimal acceptable level / Pain goal (0-10 scale): 7    CPOT:    https://www.[facility].org/getattachment/plg63l21-5n1j-6u9o-8t5u-8551j9017l4e/Critical-Care-Pain-Observation-Tool-(CPOT)    Dyspnea:                           [ ]Mild [ ]Moderate [ ]Severe  Anxiety:                             [ ]Mild [ ]Moderate [ ]Severe  Agitation:                          [ ]Mild [ ]Moderate [ ]Severe  Fatigue:                             [ ]Mild [ ]Moderate [ ]Severe  Nausea:                             [ ]Mild [ ]Moderate [ ]Severe  Loss of appetite:              [ ]Mild [ ]Moderate [ ]Severe  Constipation:                   [ ]Mild [ ]Moderate [ ]Severe  Diarrhea:                          [ ]Mild [ ]Moderate [ ]Severe      PCSSQ[Palliative Care Spiritual Screening Question]   Severity (0-10):  Score of 4 or > indicate consideration of Chaplaincy referral.  Chaplaincy Referral: [ ] yes [ ] refused [ ] following [ x] deferred    Caregiver [place]? : [ ] yes [ ] no [ ] Declined   [x ] Deferred            Social work referral [ ] Patient & Family Centered Care Referral [ ]     Anticipatory Grief present?:  [ ] yes [ ] no  [x ] Deferred                  Social work referral [ ] Chaplaincy Referral[ ]    Other Symptoms:  [x ]All other review of systems negative     PHYSICAL EXAM:  Vital Signs Last 24 Hrs  T(C): 36.7 (05 Sep 2023 06:45), Max: 37.5 (04 Sep 2023 22:00)  T(F): 98 (05 Sep 2023 06:45), Max: 99.5 (04 Sep 2023 22:00)  HR: 98 (05 Sep 2023 06:45) (95 - 110)  BP: 111/74 (05 Sep 2023 06:45) (111/74 - 130/77)  BP(mean): --  RR: 18 (05 Sep 2023 06:45) (18 - 18)  SpO2: 98% (05 Sep 2023 06:45) (92% - 98%)    Parameters below as of 05 Sep 2023 06:45  Patient On (Oxygen Delivery Method): nasal cannula  O2 Flow (L/min): 2       I&O's Summary    04 Sep 2023 07:01  -  05 Sep 2023 07:00  --------------------------------------------------------  IN: 340 mL / OUT: 800 mL / NET: -460 mL        GENERAL:  [x ]Alert  [x ]Oriented x 3  [ ]Lethargic  [ ]Cachexia  [ ]Unarousable  [x ]Verbal  [ ]Non-Verbal  [ x] No Distress  Behavioral:   [ ] Anxiety  [ ] Delirium [ ] Agitation [x ] Calm  [ ] Other  HEENT:  [x ]Normal  [ ] Temporal Wasting  [ ]Dry mouth   [ ]ET Tube/Trach  [ ]Oral lesions  [ ] Mucositis  PULMONARY:   [x ]Clear [ ]Tachypnea  [ ]Audible excessive secretions   [ ]Rhonchi        [ ]Right [ ]Left [ ]Bilateral  [ ]Crackles        [ ]Right [ ]Left [ ]Bilateral  [ ]Wheezing     [ ]Right [ ]Left [ ]Bilateral  [ ]Diminished breath sounds [ ]right [ ]left [ ]bilateral  CARDIOVASCULAR:    [x ]Regular [ ]Irregular [ ]Tachy  [ ]Austin [ ]Murmur [ ]Other  GASTROINTESTINAL:  [x ]Soft  [ ]Distended   [ ]+BS  [x ]Non tender [ ]Tender  [ ]PEG [ ]OGT/ NGT  Last BM: today per patient   GENITOURINARY:  [x ]Normal [ ] Incontinent   [ ]Oliguria/Anuria   [ ]Mas  MUSCULOSKELETAL:   [ x]Normal   [ ]Weakness  [ ]Bed/Wheelchair bound [ ]Edema  [  ] amputation  [  ] contraction  NEUROLOGIC:   [ x]No focal deficits  [ ]Cognitive impairment  [ ]Dysphagia [ ]Dysarthria [ ]Paresis [ ]Other   SKIN: See Nursing Skin Assessment for further details  [ x]Normal    [ ]Rash  [ ]Pressure ulcer(s)       Present on admission [ ]y [ ]n   [  ]  Wound    [  ] hyperpigmentation    CRITICAL CARE:  [ ] Shock Present  [ ]Septic [ ]Cardiogenic [ ]Neurologic [ ]Hypovolemic  [ ]  Vasopressors [ ]  Inotropes   [ ]Respiratory failure present [ ]Mechanical ventilation [ ]Non-invasive ventilatory support [ ]High flow    [ ]Acute  [ ]Chronic [ ]Hypoxic  [ ]Hypercarbic [ ]Other  [ ]Other organ failure     LABS:  reviewed                         11.3   13.06 )-----------( 102      ( 05 Sep 2023 05:35 )             33.2   09-05    133<L>  |  101  |  16  ----------------------------<  199<H>  4.3   |  22  |  0.50    Ca    9.1      05 Sep 2023 05:35    TPro  6.7  /  Alb  3.1<L>  /  TBili  0.4  /  DBili  x   /  AST  11  /  ALT  15  /  AlkPhos  52  09-05  PTT - ( 04 Sep 2023 05:28 )  PTT:24.7 sec  Urinalysis Basic - ( 05 Sep 2023 05:35 )    Color: x / Appearance: x / SG: x / pH: x  Gluc: 199 mg/dL / Ketone: x  / Bili: x / Urobili: x   Blood: x / Protein: x / Nitrite: x   Leuk Esterase: x / RBC: x / WBC x   Sq Epi: x / Non Sq Epi: x / Bacteria: x      CAPILLARY BLOOD GLUCOSE      POCT Blood Glucose.: 133 mg/dL (05 Sep 2023 12:02)  POCT Blood Glucose.: 180 mg/dL (05 Sep 2023 08:36)  POCT Blood Glucose.: 210 mg/dL (04 Sep 2023 23:39)  POCT Blood Glucose.: 190 mg/dL (04 Sep 2023 21:15)  POCT Blood Glucose.: 148 mg/dL (04 Sep 2023 17:12)      RADIOLOGY & ADDITIONAL STUDIES: reviewed     PROTEIN CALORIE MALNUTRITION PRESENT: [ ]mild [ ]moderate [ ]severe [ ]underweight [ ]morbid obesity  https://www.andeal.org/vault/2440/web/files/ONC/Table_Clinical%20Characteristics%20to%20Document%20Malnutrition-White%20JV%20et%20al%170702.pdf    Height (cm): 167.6 (09-04-23 @ 02:06), 167.6 (08-26-23 @ 22:33), 167.6 (08-19-23 @ 14:30)  Weight (kg): 63.503 (09-04-23 @ 09:25), 68.855 (08-26-23 @ 22:33), 66.7 (08-19-23 @ 22:30)  BMI (kg/m2): 22.6 (09-04-23 @ 09:25), 24.5 (09-04-23 @ 02:06), 24.5 (08-26-23 @ 22:33)    [ ]PPSV2 < or = to 30% [ ]significant weight loss  [ ]poor nutritional intake  [ ]anasarca [ ]Artificial Nutrition      REFERRALS:   [ ]Chaplaincy  [ ]Hospice  [ ]Child Life  [ ]Social Work  [ x]Case management [ ]Holistic Therapy

## 2023-09-05 NOTE — CONSULT NOTE ADULT - ASSESSMENT
53 yo F with PMH stay IV lung cancer w/mets to brain, steroid induced hyperglycemia presents with 3-day history of epigastric pain.   Palliative Care consulted for complex decision making  related to goals of care discussions in the setting of advanced illness/ evaluation and management of pain/ symptoms

## 2023-09-05 NOTE — PROGRESS NOTE ADULT - PROBLEM SELECTOR PLAN 1
- presents with worsening epigastric pain in the setting of taking steroids without GI ppx  - CTA on admission: No PE. Stable right apical lung mass. Rapid interval growth of a left lower lobe mass. New hepatic metastases.  Plan:  - epigastric pain appears to be more consistent with gastritis vs PUD in the setting of taking steroids without PPI. reports pain improving with PPI. could also be likely 2/2 worsening metastatic disease but pain more in epigastric region than RUQ.   - continue protonix 40 IV BID for now   - continue decadron taper for now, currently on 4mg qd (9/4- 9/6), discontinue on 9/7.   - c/w depakote for migraines. Can also treat with reglan  - pain control with oxy 5 q4 PRN for moderate and dialudid 0.2 IV q4 PRN for severe   - palliative care consult   - OP rad onc, NSG follow up  - plans to start immunotherapy nivolumab as outpatient

## 2023-09-05 NOTE — PHYSICAL THERAPY INITIAL EVALUATION ADULT - MANUAL MUSCLE TESTING RESULTS, REHAB EVAL
mets and abdominal/epigastric pain. bilateral UE and bilateral LE strength grossly 3/5./grossly assessed due to

## 2023-09-05 NOTE — PHYSICAL THERAPY INITIAL EVALUATION ADULT - LEVEL OF INDEPENDENCE: STAIR NEGOTIATION, REHAB EVAL
Did not assess secondary to patient pain in left ribcage/upper quadrant and dizziness. Patient returned to semi-supine in bed, /83, 93% oxygen saturation, HR 98 BPM.

## 2023-09-05 NOTE — PHYSICAL THERAPY INITIAL EVALUATION ADULT - PERTINENT HX OF CURRENT PROBLEM, REHAB EVAL
Patient is a 52 year old female with prior medical history of stage IV lung cancer with mets to brain presents with 3-day history of epigastric pain worse with flexion and deep breathing.

## 2023-09-05 NOTE — PROGRESS NOTE ADULT - SUBJECTIVE AND OBJECTIVE BOX
CC: Patient is a 52y old  Female who presents with a chief complaint of epigastric pain (05 Sep 2023 09:56)      SUBJECTIVE / INTERVAL HPI: Patient seen and examined at bedside. Pt reports pain much improved since admission. Denies melena, BRBPR. No chest pain, sob.    ROS: negative unless otherwise stated above.    VITAL SIGNS:  Vital Signs Last 24 Hrs  T(C): 36.7 (05 Sep 2023 06:45), Max: 37.5 (04 Sep 2023 22:00)  T(F): 98 (05 Sep 2023 06:45), Max: 99.5 (04 Sep 2023 22:00)  HR: 98 (05 Sep 2023 06:45) (95 - 110)  BP: 111/74 (05 Sep 2023 06:45) (111/74 - 130/77)  BP(mean): --  RR: 18 (05 Sep 2023 06:45) (18 - 18)  SpO2: 98% (05 Sep 2023 06:45) (92% - 98%)    Parameters below as of 05 Sep 2023 06:45  Patient On (Oxygen Delivery Method): nasal cannula  O2 Flow (L/min): 2        09-04-23 @ 07:01  -  09-05-23 @ 07:00  --------------------------------------------------------  IN: 340 mL / OUT: 800 mL / NET: -460 mL    09-05-23 @ 07:01  -  09-05-23 @ 16:44  --------------------------------------------------------  IN: 480 mL / OUT: 0 mL / NET: 480 mL        PHYSICAL EXAM:    General: NAD but lying still to avoid pain  HEENT: MMM  Neck: supple  Cardiovascular: +S1/S2; RRR  Respiratory: CTA B/L; no W/R/R  Gastrointestinal: soft; epigastric tenderness without rebound or guarding  Extremities: WWP; no edema, clubbing or cyanosis  Vascular: 2+ radial, DP pulses B/L  Neurological: AAOx3; no focal deficits    MEDICATIONS:  MEDICATIONS  (STANDING):  dexAMETHasone     Tablet 4 milliGRAM(s) Oral every 24 hours  dextrose 5%. 1000 milliLiter(s) (50 mL/Hr) IV Continuous <Continuous>  dextrose 50% Injectable 25 Gram(s) IV Push once  divalproex  milliGRAM(s) Oral two times a day  enoxaparin Injectable 40 milliGRAM(s) SubCutaneous every 24 hours  glucagon  Injectable 1 milliGRAM(s) IntraMuscular once  insulin glargine Injectable (LANTUS) 3 Unit(s) SubCutaneous at bedtime  insulin lispro (ADMELOG) corrective regimen sliding scale   SubCutaneous three times a day before meals  insulin lispro Injectable (ADMELOG) 2 Unit(s) SubCutaneous three times a day before meals  pantoprazole  Injectable 40 milliGRAM(s) IV Push two times a day    MEDICATIONS  (PRN):  aluminum hydroxide/magnesium hydroxide/simethicone Suspension 30 milliLiter(s) Oral every 4 hours PRN Dyspepsia  dextrose Oral Gel 15 Gram(s) Oral once PRN Blood Glucose LESS THAN 70 milliGRAM(s)/deciliter  melatonin 3 milliGRAM(s) Oral at bedtime PRN Insomnia  ondansetron Injectable 4 milliGRAM(s) IV Push every 8 hours PRN Nausea and/or Vomiting  oxyCODONE    IR 5 milliGRAM(s) Oral every 4 hours PRN Moderate Pain (4 - 6)  oxyCODONE    IR 7.5 milliGRAM(s) Oral every 4 hours PRN Severe Pain (7 - 10)      ALLERGIES:  Allergies    No Known Allergies    Intolerances        LABS:                        11.3   13.06 )-----------( 102      ( 05 Sep 2023 05:35 )             33.2     09-05    133<L>  |  101  |  16  ----------------------------<  199<H>  4.3   |  22  |  0.50    Ca    9.1      05 Sep 2023 05:35    TPro  6.7  /  Alb  3.1<L>  /  TBili  0.4  /  DBili  x   /  AST  11  /  ALT  15  /  AlkPhos  52  09-05    PTT - ( 04 Sep 2023 05:28 )  PTT:24.7 sec  Urinalysis Basic - ( 05 Sep 2023 05:35 )    Color: x / Appearance: x / SG: x / pH: x  Gluc: 199 mg/dL / Ketone: x  / Bili: x / Urobili: x   Blood: x / Protein: x / Nitrite: x   Leuk Esterase: x / RBC: x / WBC x   Sq Epi: x / Non Sq Epi: x / Bacteria: x      CAPILLARY BLOOD GLUCOSE      POCT Blood Glucose.: 133 mg/dL (05 Sep 2023 12:02)      RADIOLOGY & ADDITIONAL TESTS: Reviewed. Declines

## 2023-09-05 NOTE — CONSULT NOTE ADULT - SUBJECTIVE AND OBJECTIVE BOX
HPI:  53 yo F with PMH stay IV lung cancer w/mets to brain presents with 3-day history of epigastric pain. Patient was just admitted from 08/26 – 08/30 for dizziness and migraines. She was managed with steroids as symptoms may have been related to brain mets. Rad onc and NSG did not recommend inpatient intervention. After being discharged, pt began to have epigastric pain described as like a muscle ache, worsens with flexing forward or taking a deep breath, intense, not presenting when staying still. Given the severity of the pain pt decided to come back to the hospital.     Denies any fevers, chills, night sweats. Some nausea, no vomiting. Has been having BMs every day although they are strained.     In the ED: CTA Chest with suboptimal imaging for PE but no central PE, shows worsening mets and rapid growth of a left lower lobe mass abutting the pleural surface. Patient admitted primarily for pain control  (04 Sep 2023 14:29)      PAST MEDICAL & SURGICAL HISTORY:  GERD (Gastroesophageal Reflux Disease)  Lung mass  right  Non-small cell lung cancer with metastasis  S/P endoscopy  2022    FAMILY HISTORY:  No pertinent family history in first degree relatives    Social History:  Never smoker  No alcohol  No drugs        MEDICATIONS  (STANDING):  dexAMETHasone     Tablet 4 milliGRAM(s) Oral every 24 hours  dextrose 5%. 1000 milliLiter(s) (50 mL/Hr) IV Continuous <Continuous>  dextrose 50% Injectable 25 Gram(s) IV Push once  divalproex  milliGRAM(s) Oral two times a day  enoxaparin Injectable 40 milliGRAM(s) SubCutaneous every 24 hours  glucagon  Injectable 1 milliGRAM(s) IntraMuscular once  insulin glargine Injectable (LANTUS) 3 Unit(s) SubCutaneous at bedtime  insulin lispro (ADMELOG) corrective regimen sliding scale   SubCutaneous three times a day before meals  insulin lispro Injectable (ADMELOG) 2 Unit(s) SubCutaneous three times a day before meals  pantoprazole  Injectable 40 milliGRAM(s) IV Push two times a day    MEDICATIONS  (PRN):  aluminum hydroxide/magnesium hydroxide/simethicone Suspension 30 milliLiter(s) Oral every 4 hours PRN Dyspepsia  dextrose Oral Gel 15 Gram(s) Oral once PRN Blood Glucose LESS THAN 70 milliGRAM(s)/deciliter  melatonin 3 milliGRAM(s) Oral at bedtime PRN Insomnia  ondansetron Injectable 4 milliGRAM(s) IV Push every 8 hours PRN Nausea and/or Vomiting  oxyCODONE    IR 7.5 milliGRAM(s) Oral every 4 hours PRN Severe Pain (7 - 10)  oxyCODONE    IR 5 milliGRAM(s) Oral every 4 hours PRN Moderate Pain (4 - 6)      Allergies    No Known Allergies    Intolerances        dexAMETHasone     Tablet   4 milliGRAM(s) Oral (09-04-23 @ 17:35)    insulin glargine Injectable (LANTUS)   3 Unit(s) SubCutaneous (09-04-23 @ 23:41)    insulin lispro (ADMELOG) corrective regimen sliding scale   2 Unit(s) SubCutaneous (09-05-23 @ 09:03)    insulin lispro Injectable (ADMELOG)   2 Unit(s) SubCutaneous (09-05-23 @ 13:06)   2 Unit(s) SubCutaneous (09-05-23 @ 09:03)        Review of Systems:  Constitutional: No fever  Eyes: No blurry vision  Neuro: No tremors  HEENT: No pain  Cardiovascular: No chest pain, palpitations  Respiratory: No SOB, no cough  GI: No nausea, vomiting, abdominal pain  : No dysuria  Skin: no rash  Psych: no depression  Endocrine: no polyuria, polydipsia  Hem/lymph: no swelling  Osteoporosis: no fractures    ALL OTHER SYSTEMS REVIEWED AND NEGATIVE    UNABLE TO OBTAIN    PHYSICAL EXAM:  -----------------------------  VITALS: T(C): 36.7 (09-05-23 @ 06:45)  T(F): 98 (09-05-23 @ 06:45), Max: 99.5 (09-04-23 @ 22:00)  HR: 98 (09-05-23 @ 06:45) (95 - 110)  BP: 111/74 (09-05-23 @ 06:45) (111/74 - 130/77)  RR:  (18 - 18)  SpO2:  (92% - 98%)  Wt(kg): --  GENERAL: NAD, well-groomed, well-developed  EYES: No proptosis, no lid lag, anicteric  HEENT:  Atraumatic, Normocephalic, moist mucous membranes  THYROID: Normal size, no palpable nodules  RESPIRATORY: Clear to auscultation bilaterally; No rales, rhonchi, wheezing, or rubs  CARDIOVASCULAR: Regular rate and rhythm; No murmurs; no peripheral edema  GI: Soft, nontender, non distended, normal bowel sounds  SKIN: Dry, intact, No rashes or lesions  MUSCULOSKELETAL: Full range of motion, normal strength  NEURO: sensation intact, extraocular movements intact, no tremor, normal reflexes  PSYCH: Alert and oriented x 3, normal affect, normal mood  CUSHING'S SIGNS: no striae    POCT Blood Glucose.: 133 mg/dL (09-05-23 @ 12:02)  POCT Blood Glucose.: 180 mg/dL (09-05-23 @ 08:36)  POCT Blood Glucose.: 210 mg/dL (09-04-23 @ 23:39)  POCT Blood Glucose.: 190 mg/dL (09-04-23 @ 21:15)  POCT Blood Glucose.: 148 mg/dL (09-04-23 @ 17:12)                            11.3   13.06 )-----------( 102      ( 05 Sep 2023 05:35 )             33.2       09-05    133<L>  |  101  |  16  ----------------------------<  199<H>  4.3   |  22  |  0.50    eGFR: 112    Ca    9.1      09-05    TPro  6.7  /  Alb  3.1<L>  /  TBili  0.4  /  DBili  x   /  AST  11  /  ALT  15  /  AlkPhos  52  09-05      Thyroid Function Tests:      A1C with Estimated Average Glucose Result: 7.3 % (08-28-23 @ 07:00)  A1C with Estimated Average Glucose Result: 7.2 % (08-27-23 @ 07:28)  A1C with Estimated Average Glucose Result: 5.7 % (06-01-23 @ 06:19)          Radiology:   ------------------------               HPI:  51 yo F with PMH stay IV lung cancer w/mets to brain presents with 3-day history of epigastric pain. Patient was just admitted from 08/26 – 08/30 for dizziness and migraines. She was managed with steroids as symptoms may have been related to brain mets. Rad onc and NSG did not recommend inpatient intervention. After being discharged, pt began to have epigastric pain described as like a muscle ache, worsens with flexing forward or taking a deep breath, intense, not presenting when staying still. Given the severity of the pain pt decided to come back to the hospital.     Denies any fevers, chills, night sweats. Some nausea, no vomiting. Has been having BMs every day although they are strained.     In the ED: CTA Chest with suboptimal imaging for PE but no central PE, shows worsening mets and rapid growth of a left lower lobe mass abutting the pleural surface. Patient admitted primarily for pain control  (04 Sep 2023 14:29)      ENDOCRINE HISTORY   Diagnosed with DM: About one month ago due to steroid use  Last HbA1c: A1C with Estimated Average Glucose Result: 7.3 % (08-28-23 @ 07:00)  Endocrinologist: None  Home DM Meds: Was discharged home on metformin and prandin last admission with plan to stop once she's off steroid.   Adherence: Good  Microvascular complications: None   Macrovascular complications: None  SMBG: She does not have a glucometer.    Hypoglycemia episodes: None  BG at home: Not checking  Diet at home: Good   Appetite in hospital: She states that's she's eating well.       PAST MEDICAL & SURGICAL HISTORY:  GERD (Gastroesophageal Reflux Disease)  Lung mass  right  Non-small cell lung cancer with metastasis  S/P endoscopy  2022    FAMILY HISTORY:  No pertinent family history in first degree relatives    Social History:  Never smoker  No alcohol  No drugs        MEDICATIONS  (STANDING):  dexAMETHasone     Tablet 4 milliGRAM(s) Oral every 24 hours  dextrose 5%. 1000 milliLiter(s) (50 mL/Hr) IV Continuous <Continuous>  dextrose 50% Injectable 25 Gram(s) IV Push once  divalproex  milliGRAM(s) Oral two times a day  enoxaparin Injectable 40 milliGRAM(s) SubCutaneous every 24 hours  glucagon  Injectable 1 milliGRAM(s) IntraMuscular once  insulin glargine Injectable (LANTUS) 3 Unit(s) SubCutaneous at bedtime  insulin lispro (ADMELOG) corrective regimen sliding scale   SubCutaneous three times a day before meals  insulin lispro Injectable (ADMELOG) 2 Unit(s) SubCutaneous three times a day before meals  pantoprazole  Injectable 40 milliGRAM(s) IV Push two times a day    MEDICATIONS  (PRN):  aluminum hydroxide/magnesium hydroxide/simethicone Suspension 30 milliLiter(s) Oral every 4 hours PRN Dyspepsia  dextrose Oral Gel 15 Gram(s) Oral once PRN Blood Glucose LESS THAN 70 milliGRAM(s)/deciliter  melatonin 3 milliGRAM(s) Oral at bedtime PRN Insomnia  ondansetron Injectable 4 milliGRAM(s) IV Push every 8 hours PRN Nausea and/or Vomiting  oxyCODONE    IR 7.5 milliGRAM(s) Oral every 4 hours PRN Severe Pain (7 - 10)  oxyCODONE    IR 5 milliGRAM(s) Oral every 4 hours PRN Moderate Pain (4 - 6)      Allergies    No Known Allergies    Intolerances        dexAMETHasone     Tablet   4 milliGRAM(s) Oral (09-04-23 @ 17:35)    insulin glargine Injectable (LANTUS)   3 Unit(s) SubCutaneous (09-04-23 @ 23:41)    insulin lispro (ADMELOG) corrective regimen sliding scale   2 Unit(s) SubCutaneous (09-05-23 @ 09:03)    insulin lispro Injectable (ADMELOG)   2 Unit(s) SubCutaneous (09-05-23 @ 13:06)   2 Unit(s) SubCutaneous (09-05-23 @ 09:03)        Review of Systems:  Constitutional: No fever  Eyes: No blurry vision  Neuro: No tremors  HEENT: No pain  Cardiovascular: No chest pain, palpitations  Respiratory: No SOB, no cough  GI: No nausea, vomiting, abdominal pain  : No dysuria  Skin: no rash  Psych: no depression  Endocrine: no polyuria, polydipsia  Hem/lymph: no swelling  Osteoporosis: no fractures    ALL OTHER SYSTEMS REVIEWED AND NEGATIVE    UNABLE TO OBTAIN    PHYSICAL EXAM:  -----------------------------  VITALS: T(C): 36.7 (09-05-23 @ 06:45)  T(F): 98 (09-05-23 @ 06:45), Max: 99.5 (09-04-23 @ 22:00)  HR: 98 (09-05-23 @ 06:45) (95 - 110)  BP: 111/74 (09-05-23 @ 06:45) (111/74 - 130/77)  RR:  (18 - 18)  SpO2:  (92% - 98%)  Wt(kg): --  GENERAL: NAD, well-groomed, well-developed  EYES: No proptosis, no lid lag, anicteric  HEENT:  Atraumatic, Normocephalic, moist mucous membranes  THYROID: Normal size, no palpable nodules  RESPIRATORY: Clear to auscultation bilaterally; No rales, rhonchi, wheezing, or rubs  CARDIOVASCULAR: Regular rate and rhythm; No murmurs; no peripheral edema  GI: Soft, nontender, non distended, normal bowel sounds  SKIN: Dry, intact, No rashes or lesions  MUSCULOSKELETAL: Full range of motion, normal strength  NEURO: sensation intact, extraocular movements intact, no tremor, normal reflexes  PSYCH: Alert and oriented x 3, normal affect, normal mood  CUSHING'S SIGNS: no striae    POCT Blood Glucose.: 133 mg/dL (09-05-23 @ 12:02)  POCT Blood Glucose.: 180 mg/dL (09-05-23 @ 08:36)  POCT Blood Glucose.: 210 mg/dL (09-04-23 @ 23:39)  POCT Blood Glucose.: 190 mg/dL (09-04-23 @ 21:15)  POCT Blood Glucose.: 148 mg/dL (09-04-23 @ 17:12)                            11.3   13.06 )-----------( 102      ( 05 Sep 2023 05:35 )             33.2       09-05    133<L>  |  101  |  16  ----------------------------<  199<H>  4.3   |  22  |  0.50    eGFR: 112    Ca    9.1      09-05    TPro  6.7  /  Alb  3.1<L>  /  TBili  0.4  /  DBili  x   /  AST  11  /  ALT  15  /  AlkPhos  52  09-05      Thyroid Function Tests:      A1C with Estimated Average Glucose Result: 7.3 % (08-28-23 @ 07:00)  A1C with Estimated Average Glucose Result: 7.2 % (08-27-23 @ 07:28)  A1C with Estimated Average Glucose Result: 5.7 % (06-01-23 @ 06:19)          Radiology:   ------------------------

## 2023-09-05 NOTE — PHYSICAL THERAPY INITIAL EVALUATION ADULT - ADDITIONAL COMMENTS
Patient lives in a private house, +3 steps to enter, with her fiance. Patient reports her bedroom is in the basement but she has been staying on the main floor for the past few weeks. Patient denies use of assistive device and reports independence with ADLs.    Patient left semi-supine in no apparent distress, +all lines intact, +call bell.

## 2023-09-05 NOTE — PHYSICAL THERAPY INITIAL EVALUATION ADULT - PHYSICAL ASSIST/NONPHYSICAL ASSIST: SIT/STAND, REHAB EVAL
supervision/verbal cues/nonverbal cues (demo/gestures) no assistive device/supervision/verbal cues/nonverbal cues (demo/gestures)

## 2023-09-05 NOTE — CONSULT NOTE ADULT - PROBLEM SELECTOR RECOMMENDATION 4
Case reviewed with primary team and oncology team  Thank you for allowing us to participate in your patient's care. Please page [phone] for any questions/concerns.

## 2023-09-05 NOTE — CONSULT NOTE ADULT - PROBLEM SELECTOR RECOMMENDATION 9
- CT 9/4/23- Grossly stable right apical lung mass. Rapid interval growth of a left lower lobe mass abutting the pleural surface. Small pleural effusion.  New hepatic metastases.  - Patient with progression of disease   - Oncology recommendations appreciated

## 2023-09-05 NOTE — PHYSICAL THERAPY INITIAL EVALUATION ADULT - ASSISTIVE DEVICE FOR TRANSFER: SIT/STAND, REHAB EVAL
no assistive device [MRI] : MRI [Cervical Spine] : cervical spine [Report was reviewed and noted in the chart] : The report was reviewed and noted in the chart [I independently reviewed and interpreted images and report] : I independently reviewed and interpreted images and report [FreeTextEntry1] : Multilevel cervical spondylosis \par HNP C4-7 with stenosis

## 2023-09-05 NOTE — CONSULT NOTE ADULT - ASSESSMENT
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 Admitted for pain control  Oncology consulted for further evaluation     CTAP on 9/4  Suboptimal opacification of subsegmental branches of the pulmonary   arteries. No central pulmonary embolism.  Grossly stable right apical lung mass. Rapid interval growth of a left   lower lobe mass abutting the pleural surface.  Small pleural effusion.  New hepatic metastases.    -In re to brain mets , on decadron taper to end 9/7  -In re to POD in abdomen , patient with plans to start immunotherapy nivolumab as outpatient once decadron taper ends  Of not patient has been off immuno/ chemotherapy since 9/2022  -No plans for inpatient chemotherapy  -Palliative Care consult for pain management  -C/w Supportive care, pain control, Nutrition, PT, DVT ppx  -Rest of care as per primary team  -Patient to followup with Dr. 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 Admitted for pain control  Oncology consulted for further evaluation     CTAP on 9/4  Suboptimal opacification of subsegmental branches of the pulmonary   arteries. No central pulmonary embolism.  Grossly stable right apical lung mass. Rapid interval growth of a left   lower lobe mass abutting the pleural surface.  Small pleural effusion.  New hepatic metastases.    -In re to brain mets , on decadron taper to end 9/7  -In re to POD in abdomen , patient with plans to start immunotherapy nivolumab as outpatient once decadron taper ends  Of note patient has been off immuno/ chemotherapy since 9/2022  -No plans for inpatient chemotherapy  -Palliative Care consult for pain management  -C/w Supportive care, pain control, Nutrition, PT, DVT ppx  -Rest of care as per primary team  -Patient to followup with Dr. Margarito Beckman ([facility]) upon discharge. Patient encouraged to call clinic to make an appointment  -Patient will also need Rad Onc and NeuroSX followup appointment--missed during this current admission.   -Oncology will continue to follow with you    Case d/w oncology attending      Prince BURTON  Oncology Physician Assistant  [facility]amberto PATINO/RICHARD [facility]    Pager [phone] also available on TEAMS as Prince BURTON    If before 8am/after 5pm or on weekends please page On-call Oncology Fellow

## 2023-09-05 NOTE — CONSULT NOTE ADULT - SUBJECTIVE AND OBJECTIVE BOX
Patient is a 52y old  Female who presents with a chief complaint of     HPI:  51 yo F with PMH stay IV lung cancer w/mets to brain presents with 3-day history of epigastric pain. Patient was just admitted from 08/26 – 08/30 for dizziness and migraines. She was managed with steroids as symptoms may have been related to brain mets. Rad onc and NSG did not recommend inpatient intervention. After being discharged, pt began to have epigastric pain described as like a muscle ache, worsens with flexing forward or taking a deep breath, intense, not presenting when staying still. Given the severity of the pain pt decided to come back to the hospital.     Denies any fevers, chills, night sweats. Some nausea, no vomiting. Has been having BMs every day although they are strained.     In the ED: CTA Chest with suboptimal imaging for PE but no central PE, shows worsening mets and rapid growth of a left lower lobe mass abutting the pleural surface. Patient admitted primarily for pain control  (04 Sep 2023 14:29)       Oncologic History:   Patient was diagnosed with NSCLC in 2022, PD-L1 negative, TP53 mutated but no actionable mutations. S/p carbo/pemetrexed x 4 (completed 09/2022) + thoracic RT (10/2022), declined maintenance treatment. Then, she developed numerous brain metastases in 05/2023  - PET scan 6/26/23 showed persistent 3.4 x 2.6 x 3.5 cm RUL mass, stable in size but increased FDG-activity compared to March, without other lesions (excluding brain)  - MRI brain 8/20/23 shows mixed response to prior RT treatment (had received 5 fractions of GK-SRS to 7 brain metastases from 6/21 - 6/29/2023)  - Patient was recommended for nivolumab immunotherapy, to begin after dexamethasone taper, but eventually temporarily declined  - Patient has had ongoing dizziness for the past few weeks. Suspect related to malignancy +/- prior RT, although it appears she has more of a lightheadedness  - Currently on dexamethasone and Depakote 500 mg BID for anti-seizure PPX  - Recommend she follows with her Oncologist, Dr. Margarito Beckman, at [facility]. She does not have a follow-up appointment currently scheduled, so she will need to make one to start systemic treatment. Will also notify Dr. Beckman's team      ROS: as above     PAST MEDICAL & SURGICAL HISTORY:  GERD (Gastroesophageal Reflux Disease)      Lung mass  right      Non-small cell lung cancer with metastasis      S/P endoscopy  2022          SOCIAL HISTORY:    FAMILY HISTORY:  No pertinent family history in first degree relatives        MEDICATIONS  (STANDING):  dexAMETHasone     Tablet 4 milliGRAM(s) Oral every 24 hours  dextrose 5%. 1000 milliLiter(s) (50 mL/Hr) IV Continuous <Continuous>  dextrose 50% Injectable 25 Gram(s) IV Push once  divalproex  milliGRAM(s) Oral two times a day  enoxaparin Injectable 40 milliGRAM(s) SubCutaneous every 24 hours  glucagon  Injectable 1 milliGRAM(s) IntraMuscular once  insulin glargine Injectable (LANTUS) 3 Unit(s) SubCutaneous at bedtime  insulin lispro (ADMELOG) corrective regimen sliding scale   SubCutaneous three times a day before meals  insulin lispro Injectable (ADMELOG) 2 Unit(s) SubCutaneous three times a day before meals  pantoprazole  Injectable 40 milliGRAM(s) IV Push two times a day    MEDICATIONS  (PRN):  aluminum hydroxide/magnesium hydroxide/simethicone Suspension 30 milliLiter(s) Oral every 4 hours PRN Dyspepsia  dextrose Oral Gel 15 Gram(s) Oral once PRN Blood Glucose LESS THAN 70 milliGRAM(s)/deciliter  HYDROmorphone  Injectable 0.2 milliGRAM(s) IV Push every 4 hours PRN Severe Pain (7 - 10)  melatonin 3 milliGRAM(s) Oral at bedtime PRN Insomnia  ondansetron Injectable 4 milliGRAM(s) IV Push every 8 hours PRN Nausea and/or Vomiting  oxycodone    5 mG/acetaminophen 325 mG 1 Tablet(s) Oral every 4 hours PRN Moderate Pain (4 - 6)      Allergies    No Known Allergies    Intolerances        Vital Signs Last 24 Hrs  T(C): 36.7 (05 Sep 2023 06:45), Max: 37.5 (04 Sep 2023 22:00)  T(F): 98 (05 Sep 2023 06:45), Max: 99.5 (04 Sep 2023 22:00)  HR: 98 (05 Sep 2023 06:45) (95 - 110)  BP: 111/74 (05 Sep 2023 06:45) (111/74 - 130/77)  BP(mean): --  RR: 18 (05 Sep 2023 06:45) (18 - 18)  SpO2: 98% (05 Sep 2023 06:45) (92% - 98%)    Parameters below as of 05 Sep 2023 06:45  Patient On (Oxygen Delivery Method): nasal cannula  O2 Flow (L/min): 2      PHYSICAL EXAM  General: adult in NAD  HEENT: clear oropharynx, anicteric sclera, pink conjunctiva  Neck: supple  CV: normal S1/S2 with no murmur rubs or gallops  Lungs: positive air movement b/l ant lungs, clear to auscultation, no wheezes, no rales  Abdomen: soft non-tender non-distended, no hepatosplenomegaly  Ext: no clubbing cyanosis or edema  Skin: no rashes and no petechiae  Neuro: alert and oriented X 3, none focal    LABS:                          11.3   13.06 )-----------( 102      ( 05 Sep 2023 05:35 )             33.2         Mean Cell Volume : 88.3 fL  Mean Cell Hemoglobin : 30.1 pg  Mean Cell Hemoglobin Concentration : 34.0 gm/dL  Auto Neutrophil # : 11.40 K/uL  Auto Lymphocyte # : 0.48 K/uL  Auto Monocyte # : 0.85 K/uL  Auto Eosinophil # : 0.00 K/uL  Auto Basophil # : 0.03 K/uL  Auto Neutrophil % : 87.3 %  Auto Lymphocyte % : 3.7 %  Auto Monocyte % : 6.5 %  Auto Eosinophil % : 0.0 %  Auto Basophil % : 0.2 %      Serial CBC's  09-05 @ 05:35  Hct-33.2 / Hgb-11.3 / Plat-102 / RBC-3.76 / WBC-13.06  Serial CBC's  09-04 @ 03:28  Hct-38.4 / Hgb-12.5 / Plat-163 / RBC-4.26 / WBC-13.20      09-05    133<L>  |  101  |  16  ----------------------------<  199<H>  4.3   |  22  |  0.50    Ca    9.1      05 Sep 2023 05:35    TPro  6.7  /  Alb  3.1<L>  /  TBili  0.4  /  DBili  x   /  AST  11  /  ALT  15  /  AlkPhos  52  09-05      PTT - ( 04 Sep 2023 05:28 )  PTT:24.7 sec                RADIOLOGY & ADDITIONAL STUDIES:    Patient is a 52y old  Female who presents with a chief complaint of     HPI:  51 yo F with PMH stay IV lung cancer w/mets to brain presents with 3-day history of epigastric pain. Patient was just admitted from 08/26 – 08/30 for dizziness and migraines. She was managed with steroids as symptoms may have been related to brain mets. Rad onc and NSG did not recommend inpatient intervention. After being discharged, pt began to have epigastric pain described as like a muscle ache, worsens with flexing forward or taking a deep breath, intense, not presenting when staying still. Given the severity of the pain pt decided to come back to the hospital.     Denies any fevers, chills, night sweats. Some nausea, no vomiting. Has been having BMs every day although they are strained.     In the ED: CTA Chest with suboptimal imaging for PE but no central PE, shows worsening mets and rapid growth of a left lower lobe mass abutting the pleural surface. Patient admitted primarily for pain control  (04 Sep 2023 14:29)       Oncologic History:      ROS: as above     PAST MEDICAL & SURGICAL HISTORY:  GERD (Gastroesophageal Reflux Disease)      Lung mass  right      Non-small cell lung cancer with metastasis      S/P endoscopy  2022          SOCIAL HISTORY:    FAMILY HISTORY:  No pertinent family history in first degree relatives        MEDICATIONS  (STANDING):  dexAMETHasone     Tablet 4 milliGRAM(s) Oral every 24 hours  dextrose 5%. 1000 milliLiter(s) (50 mL/Hr) IV Continuous <Continuous>  dextrose 50% Injectable 25 Gram(s) IV Push once  divalproex  milliGRAM(s) Oral two times a day  enoxaparin Injectable 40 milliGRAM(s) SubCutaneous every 24 hours  glucagon  Injectable 1 milliGRAM(s) IntraMuscular once  insulin glargine Injectable (LANTUS) 3 Unit(s) SubCutaneous at bedtime  insulin lispro (ADMELOG) corrective regimen sliding scale   SubCutaneous three times a day before meals  insulin lispro Injectable (ADMELOG) 2 Unit(s) SubCutaneous three times a day before meals  pantoprazole  Injectable 40 milliGRAM(s) IV Push two times a day    MEDICATIONS  (PRN):  aluminum hydroxide/magnesium hydroxide/simethicone Suspension 30 milliLiter(s) Oral every 4 hours PRN Dyspepsia  dextrose Oral Gel 15 Gram(s) Oral once PRN Blood Glucose LESS THAN 70 milliGRAM(s)/deciliter  HYDROmorphone  Injectable 0.2 milliGRAM(s) IV Push every 4 hours PRN Severe Pain (7 - 10)  melatonin 3 milliGRAM(s) Oral at bedtime PRN Insomnia  ondansetron Injectable 4 milliGRAM(s) IV Push every 8 hours PRN Nausea and/or Vomiting  oxycodone    5 mG/acetaminophen 325 mG 1 Tablet(s) Oral every 4 hours PRN Moderate Pain (4 - 6)      Allergies    No Known Allergies    Intolerances        Vital Signs Last 24 Hrs  T(C): 36.7 (05 Sep 2023 06:45), Max: 37.5 (04 Sep 2023 22:00)  T(F): 98 (05 Sep 2023 06:45), Max: 99.5 (04 Sep 2023 22:00)  HR: 98 (05 Sep 2023 06:45) (95 - 110)  BP: 111/74 (05 Sep 2023 06:45) (111/74 - 130/77)  BP(mean): --  RR: 18 (05 Sep 2023 06:45) (18 - 18)  SpO2: 98% (05 Sep 2023 06:45) (92% - 98%)    Parameters below as of 05 Sep 2023 06:45  Patient On (Oxygen Delivery Method): nasal cannula  O2 Flow (L/min): 2      PHYSICAL EXAM  General: adult in NAD  HEENT: clear oropharynx, anicteric sclera, pink conjunctiva  Neck: supple  CV: normal S1/S2 with no murmur rubs or gallops  Lungs: positive air movement b/l ant lungs, clear to auscultation, no wheezes, no rales  Abdomen: soft non-tender non-distended, no hepatosplenomegaly  Ext: no clubbing cyanosis or edema  Skin: no rashes and no petechiae  Neuro: alert and oriented X 3, none focal    LABS:                          11.3   13.06 )-----------( 102      ( 05 Sep 2023 05:35 )             33.2         Mean Cell Volume : 88.3 fL  Mean Cell Hemoglobin : 30.1 pg  Mean Cell Hemoglobin Concentration : 34.0 gm/dL  Auto Neutrophil # : 11.40 K/uL  Auto Lymphocyte # : 0.48 K/uL  Auto Monocyte # : 0.85 K/uL  Auto Eosinophil # : 0.00 K/uL  Auto Basophil # : 0.03 K/uL  Auto Neutrophil % : 87.3 %  Auto Lymphocyte % : 3.7 %  Auto Monocyte % : 6.5 %  Auto Eosinophil % : 0.0 %  Auto Basophil % : 0.2 %      Serial CBC's  09-05 @ 05:35  Hct-33.2 / Hgb-11.3 / Plat-102 / RBC-3.76 / WBC-13.06  Serial CBC's  09-04 @ 03:28  Hct-38.4 / Hgb-12.5 / Plat-163 / RBC-4.26 / WBC-13.20      09-05    133<L>  |  101  |  16  ----------------------------<  199<H>  4.3   |  22  |  0.50    Ca    9.1      05 Sep 2023 05:35    TPro  6.7  /  Alb  3.1<L>  /  TBili  0.4  /  DBili  x   /  AST  11  /  ALT  15  /  AlkPhos  52  09-05      PTT - ( 04 Sep 2023 05:28 )  PTT:24.7 sec                RADIOLOGY & ADDITIONAL STUDIES:

## 2023-09-05 NOTE — PHYSICAL THERAPY INITIAL EVALUATION ADULT - GENERAL OBSERVATIONS, REHAB EVAL
Patient received semi-supine in no apparent distress, +telemetry. JOHANA Medina cleared patient for PT session, patient agreeable. 95% oxygen saturation, HR 94 BPM.

## 2023-09-05 NOTE — CONSULT NOTE ADULT - PROBLEM SELECTOR RECOMMENDATION 3
- likely multifactorial from GERD (in setting of steroids) + neoplasm related pain   - In past 24 hours, received IV Morphine 4mg x1, IV Dilaudid 0.2mg x2, Percocet (5/325mg) - 1x.   - protonix for GERD management  - D/C IV Dilaudid 0.2mg and PO Percocet   - Start PO Oxy IR 5mg q4 PRN moderate pain (hold for hypotension, oversedation, respiratory depression)  - Start PO Oxy IR 7.5mg q4 PRN severe pain (hold for hypotension, oversedation, respiratory depression)  - Bowel regimen while on opiates

## 2023-09-05 NOTE — PHYSICAL THERAPY INITIAL EVALUATION ADULT - BED MOBILITY LIMITATIONS, REHAB EVAL
impaired ability to control trunk for mobility Opioid Counseling: I discussed with the patient the potential side effects of opioids including but not limited to addiction, altered mental status, and depression. I stressed avoiding alcohol, benzodiazepines, muscle relaxants and sleep aids unless specifically okayed by a physician. The patient verbalized understanding of the proper use and possible adverse effects of opioids. All of the patient's questions and concerns were addressed. They were instructed to flush the remaining pills down the toilet if they did not need them for pain.

## 2023-09-06 NOTE — PROGRESS NOTE ADULT - PROBLEM SELECTOR PLAN 4
- hgba1c 7.3 8/28   - breana following, recs appreciated  Plan:  - lantus 3u, lispro 2u  - ISS  - on o/p prescribe glucometer, testing strips, and lancing devices and lancets for patient to monitor glucose once to twice daily. Does not need to be on standing diabetes medication.  - Follow-up with outpatient primary care doctor to repeat A1c level in 2 to 3 months.

## 2023-09-06 NOTE — PROGRESS NOTE ADULT - SUBJECTIVE AND OBJECTIVE BOX
SUBJECTIVE AND OBJECTIVE: No acute events overnight. Patient reports that pain is better controlled today than yesterday. She attempted to ambulate with PT. Patient stated that pain worsened with excessive ambulation. She was able to discuss her current prognosis and treatment options with Heme/Onc yesterday. She anticipates being discharged soon to undergo outpatient follow-up and treament with Oncology.   Indication for Geriatrics and Palliative Care Services/INTERVAL HPI: Goals of care along with pain control.       Allergies    No Known Allergies    Intolerances    MEDICATIONS  (STANDING):  dexAMETHasone     Tablet 4 milliGRAM(s) Oral every 24 hours  dextrose 5%. 1000 milliLiter(s) (50 mL/Hr) IV Continuous <Continuous>  dextrose 50% Injectable 25 Gram(s) IV Push once  divalproex  milliGRAM(s) Oral two times a day  enoxaparin Injectable 40 milliGRAM(s) SubCutaneous every 24 hours  glucagon  Injectable 1 milliGRAM(s) IntraMuscular once  insulin glargine Injectable (LANTUS) 3 Unit(s) SubCutaneous at bedtime  insulin lispro (ADMELOG) corrective regimen sliding scale   SubCutaneous three times a day before meals  insulin lispro Injectable (ADMELOG) 2 Unit(s) SubCutaneous three times a day before meals  pantoprazole  Injectable 40 milliGRAM(s) IV Push two times a day  polyethylene glycol 3350 17 Gram(s) Oral daily  senna 2 Tablet(s) Oral at bedtime    MEDICATIONS  (PRN):  aluminum hydroxide/magnesium hydroxide/simethicone Suspension 30 milliLiter(s) Oral every 4 hours PRN Dyspepsia  dextrose Oral Gel 15 Gram(s) Oral once PRN Blood Glucose LESS THAN 70 milliGRAM(s)/deciliter  melatonin 3 milliGRAM(s) Oral at bedtime PRN Insomnia  ondansetron Injectable 4 milliGRAM(s) IV Push every 8 hours PRN Nausea and/or Vomiting  oxyCODONE    IR 7.5 milliGRAM(s) Oral every 4 hours PRN Severe Pain (7 - 10)  oxyCODONE    IR 5 milliGRAM(s) Oral every 4 hours PRN Moderate Pain (4 - 6)      ITEMS UNCHECKED ARE NOT PRESENT    PRESENT SYMPTOMS: [ ]Unable to self-report - see [ ] CPOT [ ] PAINADS [ ] RDOS  Source if other than patient:  [ ]Family   [ ]Team     Pain:  [x ]yes [ ]no  QOL impact - moderate  Location - Abdominal pain/Epigastric                   Aggravating factors - worsens with ambulation and movement, coughing and leaning forward  Quality -spasm like  Radiation -none  Timing-intermittent  Severity (0-10 scale):9 at worst  Minimal acceptable level (0-10 scale): 7    CPOT:    https://www.[facility].org/getattachment/[email or web address]/Critical-Care-Pain-Observation-Tool-(CPOT)    PAIN AD Score:	  http://geriatrictoolkit.[email or web address]/cog/painad.pdf (Ctrl + left click to view)    Dyspnea:                           [ ]Mild [ ]Moderate [ ]Severe    RDOS:  0 to 2  minimal or no respiratory distress   3  mild distress  4 to 6 moderate distress  >7 severe distress  https://homecareinformation.net/handouts/hen/Respiratory_Distress_Observation_Scale.pdf (Ctrl +  left click to view)     Anxiety:                             [ ]Mild [ ]Moderate [ ]Severe  Fatigue:                             [ ]Mild [ ]Moderate [ ]Severe  Nausea:                             [ ]Mild [ ]Moderate [ ]Severe  Loss of appetite:              [ ]Mild [ ]Moderate [ ]Severe  Constipation:                    [ ]Mild [ ]Moderate [ ]Severe    PCSSQ[Palliative Care Spiritual Screening Question]   Severity (0-10):  Score of 4 or > indicate consideration of Chaplaincy referral.    Chaplaincy Referral: [ ] yes [ ] refused [ ] following    Other Symptoms:  [x ]All other review of systems negative     Palliative Performance Status Version 2:         %      http://npcrc.org/files/news/palliative_performance_scale_ppsv2.pdf    PHYSICAL EXAM:  Vital Signs Last 24 Hrs  T(C): 36.7 (06 Sep 2023 09:30), Max: 36.8 (05 Sep 2023 18:00)  T(F): 98 (06 Sep 2023 09:30), Max: 98.2 (05 Sep 2023 18:00)  HR: 97 (06 Sep 2023 09:30) (93 - 97)  BP: 115/75 (06 Sep 2023 09:30) (106/68 - 115/75)  BP(mean): --  RR: 18 (06 Sep 2023 09:30) (18 - 18)  SpO2: 93% (06 Sep 2023 09:30) (93% - 95%)    Parameters below as of 06 Sep 2023 09:30  Patient On (Oxygen Delivery Method): room air     I&O's Summary    05 Sep 2023 07:01  -  06 Sep 2023 07:00  --------------------------------------------------------  IN: 1200 mL / OUT: 0 mL / NET: 1200 mL    06 Sep 2023 07:01  -  06 Sep 2023 12:51  --------------------------------------------------------  IN: 300 mL / OUT: 0 mL / NET: 300 mL         GENERAL: [ ]Cachexia    x[ ]Alert  [ x]Oriented x   [ ]Lethargic  [ ]Unarousable  [ ]Verbal  [ ]Non-Verbal  Behavioral:   [ ]Anxiety  [ ]Delirium [ ]Agitation [ ]Other  HEENT:  [ x]Normal   [ ]Dry mouth   [ ]ET Tube/Trach  [ ]Oral lesions  PULMONARY:   [x ]Clear [ ]Tachypnea  [ ]Audible excessive secretions   [ ]Rhonchi        [ ]Right [ ]Left [ ]Bilateral  [ ]Crackles        [ ]Right [ ]Left [ ]Bilateral  [ ]Wheezing     [ ]Right [ ]Left [ ]Bilateral  [ ]Diminished BS [ ] Right [ ]Left [ ]Bilateral  CARDIOVASCULAR:    [ x]Regular [ ]Irregular [ ]Tachy  [ ]Austin [ ]Murmur [ ]Other  GASTROINTESTINAL:  [x ]Soft  [ ]Distended   [ ]+BS  [ ]Non tender [ ]Tender  [ ]Other [ ]PEG [ ]OGT/ NGT   Last BM:   GENITOURINARY:  [ x]Normal [ ]Incontinent   [ ]Oliguria/Anuria   [ ]Mas  MUSCULOSKELETAL:   [x ]Normal   [ ]Weakness  [ ]Bed/Wheelchair bound [ ]Edema  NEUROLOGIC:   [ x]No focal deficits  [ ] Cognitive impairment  [ ] Dysphagia [ ]Dysarthria [ ] Paresis [ ]Other   SKIN:   [x ]Normal  [ ]Rash  [ ]Other  [ ]Pressure ulcer(s) [ ]y [ ]n present on admission    CRITICAL CARE:  [ ]Shock Present  [ ]Septic [ ]Cardiogenic [ ]Neurologic [ ]Hypovolemic  [ ]Vasopressors [ ]Inotropes  [ ]Respiratory failure present [ ]Mechanical Ventilation [ ]Non-invasive ventilatory support [ ]High-Flow   [ ]Acute  [ ]Chronic [ ]Hypoxic  [ ]Hypercarbic [ ]Other  [ ]Other organ failure     LABS:                        10.6   9.76  )-----------( 113      ( 06 Sep 2023 06:43 )             32.4   09-06    134<L>  |  101  |  17  ----------------------------<  194<H>  4.2   |  21<L>  |  0.51    Ca    9.3      06 Sep 2023 06:43  Phos  3.3     09-06  Mg     1.70     09-06    TPro  6.7  /  Alb  3.1<L>  /  TBili  0.4  /  DBili  x   /  AST  11  /  ALT  15  /  AlkPhos  52  09-05      Urinalysis Basic - ( 06 Sep 2023 06:43 )    Color: x / Appearance: x / SG: x / pH: x  Gluc: 194 mg/dL / Ketone: x  / Bili: x / Urobili: x   Blood: x / Protein: x / Nitrite: x   Leuk Esterase: x / RBC: x / WBC x   Sq Epi: x / Non Sq Epi: x / Bacteria: x        RADIOLOGY & ADDITIONAL STUDIES:    Protein Calorie Malnutrition Present: [ ]mild [ ]moderate [ ]severe [ ]underweight [ ]morbid obesity  https://www.andeal.org/vault/2440/web/files/ONC/Table_Clinical%20Characteristics%20to%20Document%20Malnutrition-White%20JV%20et%20al%202012.pdf    Height (cm): 167.6 (09-04-23 @ 02:06), 167.6 (08-26-23 @ 22:33), 167.6 (08-19-23 @ 14:30)  Weight (kg): 63.503 (09-04-23 @ 09:25), 68.855 (08-26-23 @ 22:33), 66.7 (08-19-23 @ 22:30)  BMI (kg/m2): 22.6 (09-04-23 @ 09:25), 24.5 (09-04-23 @ 02:06), 24.5 (08-26-23 @ 22:33)    [ ]PPSV2 < or = 30%  [ ]significant weight loss [ ]poor nutritional intake [ ]anasarca[ ]Artificial Nutrition    Other REFERRALS:  [ ]Hospice  [ ]Child Life  [ ]Social Work  [ ]Case management [ ]Holistic Therapy     Goals of Care Document:CHRIS Hernandes (06-06-23 @ 15:27)  Goals of Care Conversation:   Participants:  · Participants  Patient    Advance Directives:  · Does patient have Advance Directive  No  · Do you want to complete the HCP and name a [facility] Care Agent  Yes  Wants James Meza tel # [phone] to be HCP  · Name of person who assisted  RN Iris  · Does the Patient have a Court Appointed Guardian ([id number])  No  · Caregiver:  declines    Conversation Discussion:  · Conversation Details  51 yo F with Pmhx of NSCLC with adenocarcinoma with neuroendocrine feature s/p chemo and radiation presents to the ED with RLE weakness and dizziness, found to have brain mass.     Pending Finance / SW to resolve insurance issue as patient has a lot of follow-up appointments upon discharge.     r 3rd finger pain--> f/u xray     Problem/Plan - 1:  ·  Problem: Brain mass.   ·  Plan: CT head showed multiple brain masses c/w brain mets in the setting of hx of lung cancer   -Neurology and neurosurgery following   -MR brain with similar findings of CT   -F/u with neurosurgery recs - started on decadron 2mg q6H and keppra 500mg BID for seizure ppx   -Neuro checks q4hrs   -Seizure precautions   -Fall precautions  6/6/23 Did GOC with patient  Patient Wants Aba villarreal at [phone] to be HCP--> FORM completed  Patient Wants FULL CODE  Told by SW her ins of medicaid will be effective in 1 week--> SW/ Np to help get Keppra and Steroids and PPI   Will work on appointment for [facility] with Dr Gonzalez and [facility] [phone]      Patient wants James Meza [phone] to be HCP---> Form completed.  Patient wants to be FULL CODE    What Matters Most To Patient and Family:  · What matters most to patient and family  getting treatment with Dr Beckman at [facility]    Personal Advance Directives Treatment Guidelines:   Treatment Guidelines:  · Decision Maker  Patient  · Treatment Guideline Comments  Wants treatment with Dr Beckman at [facility]    Location of Discussion:   Time Spent on Advance Care Planning:  Attending or DB Only.     I personally spent 30 minutes on advance care planning services with the patient. This time is separate and distinct from any other care management services provided on this date.    Location of Discussion:  · Location of discussion  Face to face      Electronic Signatures:  Heaven Hernandes)  (Signed 06-Jun-2023 15:41)  	Authored: Goals of Care Conversation, Personal Advance Directives Treatment Guidelines, Location of Discussion      Last Updated: 06-Jun-2023 15:41 by Heaven Hernandes)     SUBJECTIVE AND OBJECTIVE: No acute events overnight. Patient reports that pain is better controlled today than yesterday. She attempted to ambulate with PT. Patient stated that pain worsened with excessive ambulation. She was able to discuss her current prognosis and treatment options with Heme/Onc yesterday. She anticipates being discharged soon to undergo outpatient follow-up and treatment with Oncology.     Indication for Geriatrics and Palliative Care Services/INTERVAL HPI: Goals of care along with pain control.       Allergies    No Known Allergies    Intolerances    MEDICATIONS  (STANDING):  dexAMETHasone     Tablet 4 milliGRAM(s) Oral every 24 hours  dextrose 5%. 1000 milliLiter(s) (50 mL/Hr) IV Continuous <Continuous>  dextrose 50% Injectable 25 Gram(s) IV Push once  divalproex  milliGRAM(s) Oral two times a day  enoxaparin Injectable 40 milliGRAM(s) SubCutaneous every 24 hours  glucagon  Injectable 1 milliGRAM(s) IntraMuscular once  insulin glargine Injectable (LANTUS) 3 Unit(s) SubCutaneous at bedtime  insulin lispro (ADMELOG) corrective regimen sliding scale   SubCutaneous three times a day before meals  insulin lispro Injectable (ADMELOG) 2 Unit(s) SubCutaneous three times a day before meals  pantoprazole  Injectable 40 milliGRAM(s) IV Push two times a day  polyethylene glycol 3350 17 Gram(s) Oral daily  senna 2 Tablet(s) Oral at bedtime    MEDICATIONS  (PRN):  aluminum hydroxide/magnesium hydroxide/simethicone Suspension 30 milliLiter(s) Oral every 4 hours PRN Dyspepsia  dextrose Oral Gel 15 Gram(s) Oral once PRN Blood Glucose LESS THAN 70 milliGRAM(s)/deciliter  melatonin 3 milliGRAM(s) Oral at bedtime PRN Insomnia  ondansetron Injectable 4 milliGRAM(s) IV Push every 8 hours PRN Nausea and/or Vomiting  oxyCODONE    IR 7.5 milliGRAM(s) Oral every 4 hours PRN Severe Pain (7 - 10)  oxyCODONE    IR 5 milliGRAM(s) Oral every 4 hours PRN Moderate Pain (4 - 6)      ITEMS UNCHECKED ARE NOT PRESENT    PRESENT SYMPTOMS: [ ]Unable to self-report - see [ ] CPOT [ ] PAINADS [ ] RDOS  Source if other than patient:  [ ]Family   [ ]Team     Pain:  [x ]yes [ ]no  QOL impact - moderate  Location - Abdominal pain/Epigastric                   Aggravating factors - worsens with ambulation and movement, coughing and leaning forward  Quality -spasm like  Radiation -none  Timing-intermittent  Severity (0-10 scale):9 at worst  Minimal acceptable level (0-10 scale): 7    CPOT:    https://www.[facility].org/getattachment/[email or web address]/Critical-Care-Pain-Observation-Tool-(CPOT)    PAIN AD Score:	  http://geriatrictoolkit.[facility]/cog/painad.pdf (Ctrl + left click to view)    Dyspnea:                           [ ]Mild [ ]Moderate [ ]Severe    RDOS:  0 to 2  minimal or no respiratory distress   3  mild distress  4 to 6 moderate distress  >7 severe distress  https://homecareinformation.net/handouts/hen/Respiratory_Distress_Observation_Scale.pdf (Ctrl +  left click to view)     Anxiety:                             [ ]Mild [ ]Moderate [ ]Severe  Fatigue:                             [ ]Mild [ ]Moderate [ ]Severe  Nausea:                             [ ]Mild [ ]Moderate [ ]Severe  Loss of appetite:              [ ]Mild [ ]Moderate [ ]Severe  Constipation:                    [ ]Mild [ ]Moderate [ ]Severe    PCSSQ[Palliative Care Spiritual Screening Question]   Severity (0-10):  Score of 4 or > indicate consideration of Chaplaincy referral.    Chaplaincy Referral: [ ] yes [ ] refused [ ] following [x] deferred     Other Symptoms:  [x ]All other review of systems negative       PHYSICAL EXAM:  Vital Signs Last 24 Hrs  T(C): 36.7 (06 Sep 2023 09:30), Max: 36.8 (05 Sep 2023 18:00)  T(F): 98 (06 Sep 2023 09:30), Max: 98.2 (05 Sep 2023 18:00)  HR: 97 (06 Sep 2023 09:30) (93 - 97)  BP: 115/75 (06 Sep 2023 09:30) (106/68 - 115/75)  BP(mean): --  RR: 18 (06 Sep 2023 09:30) (18 - 18)  SpO2: 93% (06 Sep 2023 09:30) (93% - 95%)    Parameters below as of 06 Sep 2023 09:30  Patient On (Oxygen Delivery Method): room air     I&O's Summary    05 Sep 2023 07:01  -  06 Sep 2023 07:00  --------------------------------------------------------  IN: 1200 mL / OUT: 0 mL / NET: 1200 mL    06 Sep 2023 07:01  -  06 Sep 2023 12:51  --------------------------------------------------------  IN: 300 mL / OUT: 0 mL / NET: 300 mL         GENERAL: [ ]Cachexia    x[ ]Alert  [ x]Oriented x3   [ ]Lethargic  [ ]Unarousable  [ x]Verbal  [ ]Non-Verbal  Behavioral:   [ ]Anxiety  [ ]Delirium [ ]Agitation [x ]Other - calm  HEENT:  [ x]Normal   [ ]Dry mouth   [ ]ET Tube/Trach  [ ]Oral lesions  PULMONARY:   [x ]Clear [ ]Tachypnea  [ ]Audible excessive secretions   [ ]Rhonchi        [ ]Right [ ]Left [ ]Bilateral  [ ]Crackles        [ ]Right [ ]Left [ ]Bilateral  [ ]Wheezing     [ ]Right [ ]Left [ ]Bilateral  [ ]Diminished BS [ ] Right [ ]Left [ ]Bilateral  CARDIOVASCULAR:    [ x]Regular [ ]Irregular [ ]Tachy  [ ]Austin [ ]Murmur [ ]Other  GASTROINTESTINAL:  [x ]Soft  [ ]Distended   [ ]+BS  [x ]Non tender [ ]Tender  [ ]Other [ ]PEG [ ]OGT/ NGT   Last BM: yesterday per patient   GENITOURINARY:  [ x]Normal [ ]Incontinent   [ ]Oliguria/Anuria   [ ]Mas  MUSCULOSKELETAL:   [x ]Normal   [ ]Weakness  [ ]Bed/Wheelchair bound [ ]Edema  NEUROLOGIC:   [ x]No focal deficits  [ ] Cognitive impairment  [ ] Dysphagia [ ]Dysarthria [ ] Paresis [ ]Other   SKIN:   [x ]Normal  [ ]Rash  [ ]Other  [ ]Pressure ulcer(s) [ ]y [ ]n present on admission    CRITICAL CARE:  [ ]Shock Present  [ ]Septic [ ]Cardiogenic [ ]Neurologic [ ]Hypovolemic  [ ]Vasopressors [ ]Inotropes  [ ]Respiratory failure present [ ]Mechanical Ventilation [ ]Non-invasive ventilatory support [ ]High-Flow   [ ]Acute  [ ]Chronic [ ]Hypoxic  [ ]Hypercarbic [ ]Other  [ ]Other organ failure     LABS:                        10.6   9.76  )-----------( 113      ( 06 Sep 2023 06:43 )             32.4   09-06    134<L>  |  101  |  17  ----------------------------<  194<H>  4.2   |  21<L>  |  0.51    Ca    9.3      06 Sep 2023 06:43  Phos  3.3     09-06  Mg     1.70     09-06    TPro  6.7  /  Alb  3.1<L>  /  TBili  0.4  /  DBili  x   /  AST  11  /  ALT  15  /  AlkPhos  52  09-05      Urinalysis Basic - ( 06 Sep 2023 06:43 )    Color: x / Appearance: x / SG: x / pH: x  Gluc: 194 mg/dL / Ketone: x  / Bili: x / Urobili: x   Blood: x / Protein: x / Nitrite: x   Leuk Esterase: x / RBC: x / WBC x   Sq Epi: x / Non Sq Epi: x / Bacteria: x        RADIOLOGY & ADDITIONAL STUDIES: reviewed     Protein Calorie Malnutrition Present: [ ]mild [ ]moderate [ ]severe [ ]underweight [ ]morbid obesity  https://www.andeal.org/vault/0658/web/files/ONC/Table_Clinical%20Characteristics%20to%20Document%20Malnutrition-White%20JV%20et%20al%202012.pdf    Height (cm): 167.6 (09-04-23 @ 02:06), 167.6 (08-26-23 @ 22:33), 167.6 (08-19-23 @ 14:30)  Weight (kg): 63.503 (09-04-23 @ 09:25), 68.855 (08-26-23 @ 22:33), 66.7 (08-19-23 @ 22:30)  BMI (kg/m2): 22.6 (09-04-23 @ 09:25), 24.5 (09-04-23 @ 02:06), 24.5 (08-26-23 @ 22:33)    [ ]PPSV2 < or = 30%  [ ]significant weight loss [ ]poor nutritional intake [ ]anasarca[ ]Artificial Nutrition    Other REFERRALS:  [ ]Hospice  [ ]Child Life  [ ]Social Work  [x ]Case management [ ]Holistic Therapy

## 2023-09-06 NOTE — PROGRESS NOTE ADULT - SUBJECTIVE AND OBJECTIVE BOX
seen earlier today     Chief Complaint: Type 2 Diabetes Mellitus     INTERVAL HX:  Patient reports that she is eating well.  She is currently on Decadron 4 mg once daily.  She wants to ensures that she has a glucometer, testing strips and lancets and lancing devices upon discharge.      Review of Systems:  General: As above  Cardiovascular: No chest pain  Respiratory: No SOB  GI: No nausea, vomiting  Endocrine: no  S&Sx of hypoglycemia    Allergies    No Known Allergies    Intolerances      MEDICATIONS  (STANDING):  dexAMETHasone     Tablet 4 milliGRAM(s) Oral once  dextrose 5%. 1000 milliLiter(s) (50 mL/Hr) IV Continuous <Continuous>  dextrose 50% Injectable 25 Gram(s) IV Push once  divalproex  milliGRAM(s) Oral two times a day  enoxaparin Injectable 40 milliGRAM(s) SubCutaneous every 24 hours  glucagon  Injectable 1 milliGRAM(s) IntraMuscular once  insulin glargine Injectable (LANTUS) 3 Unit(s) SubCutaneous at bedtime  insulin lispro (ADMELOG) corrective regimen sliding scale   SubCutaneous three times a day before meals  insulin lispro Injectable (ADMELOG) 2 Unit(s) SubCutaneous three times a day before meals  pantoprazole  Injectable 40 milliGRAM(s) IV Push two times a day  polyethylene glycol 3350 17 Gram(s) Oral daily  senna 2 Tablet(s) Oral at bedtime        dexAMETHasone     Tablet   4 milliGRAM(s) Oral (09-05-23 @ 17:15)   4 milliGRAM(s) Oral (09-04-23 @ 17:35)    insulin glargine Injectable (LANTUS)   3 Unit(s) SubCutaneous (09-05-23 @ 23:12)   3 Unit(s) SubCutaneous (09-04-23 @ 23:41)    insulin lispro (ADMELOG) corrective regimen sliding scale   2 Unit(s) SubCutaneous (09-06-23 @ 12:40)   4 Unit(s) SubCutaneous (09-05-23 @ 17:38)   2 Unit(s) SubCutaneous (09-05-23 @ 09:03)    insulin lispro Injectable (ADMELOG)   2 Unit(s) SubCutaneous (09-06-23 @ 12:39)   2 Unit(s) SubCutaneous (09-06-23 @ 09:08)   2 Unit(s) SubCutaneous (09-05-23 @ 17:38)   2 Unit(s) SubCutaneous (09-05-23 @ 13:06)   2 Unit(s) SubCutaneous (09-05-23 @ 09:03)        PHYSICAL EXAM:  VITALS: T(C): 36.7 (09-06-23 @ 09:30)  T(F): 98 (09-06-23 @ 09:30), Max: 98.2 (09-05-23 @ 18:00)  HR: 97 (09-06-23 @ 09:30) (93 - 97)  BP: 115/75 (09-06-23 @ 09:30) (106/68 - 115/75)  RR:  (18 - 18)  SpO2:  (93% - 95%)  Wt(kg): --  GENERAL: in NAD  Respiratory: Respirations unlabored   Extremities: Warm, no edema  NEURO: Alert , appropriate     LABS:  POCT Blood Glucose.: 188 mg/dL (09-06-23 @ 11:44)  POCT Blood Glucose.: 150 mg/dL (09-06-23 @ 08:37)  POCT Blood Glucose.: 227 mg/dL (09-05-23 @ 22:44)  POCT Blood Glucose.: 222 mg/dL (09-05-23 @ 17:31)  POCT Blood Glucose.: 133 mg/dL (09-05-23 @ 12:02)  POCT Blood Glucose.: 180 mg/dL (09-05-23 @ 08:36)  POCT Blood Glucose.: 210 mg/dL (09-04-23 @ 23:39)  POCT Blood Glucose.: 190 mg/dL (09-04-23 @ 21:15)  POCT Blood Glucose.: 148 mg/dL (09-04-23 @ 17:12)                          10.6   9.76  )-----------( 113      ( 06 Sep 2023 06:43 )             32.4     09-06    134<L>  |  101  |  17  ----------------------------<  194<H>  4.2   |  21<L>  |  0.51    Ca    9.3      06 Sep 2023 06:43  Phos  3.3     09-06  Mg     1.70     09-06    TPro  6.7  /  Alb  3.1<L>  /  TBili  0.4  /  DBili  x   /  AST  11  /  ALT  15  /  AlkPhos  52  09-05    eGFR: 112 mL/min/1.73m2 (06 Sep 2023 06:43)      Thyroid Function Tests:      A1C with Estimated Average Glucose Result: 7.3 % (08-28-23 @ 07:00)  A1C with Estimated Average Glucose Result: 7.2 % (08-27-23 @ 07:28)    Estimated Average Glucose: 163 (08-28-23 @ 07:00)  Estimated Average Glucose: 160 (08-27-23 @ 07:28)        Diet, Regular:   Consistent Carbohydrate Evening Snack (CSTCHOSN) (09-04-23 @ 14:48) [Active]

## 2023-09-06 NOTE — PROGRESS NOTE ADULT - ASSESSMENT
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  Consult for steroid induced DM management.    1. Steroid-induced hyperglycemia/diabetes mellitus  Steroid taper:  Dexamethasone 4 mg q8h prior to 9/1  Dexamethasone 4mg q12h (9/1-9/3)  4mg qd (9/4- 9/6)  discontinue on 9/7.     At previous admission, patient was on decadron 4mg q8h and inpatient regimen was:  -Lantus 14 units SQ qHS   -Admelog 12 units SQ TID before meals   -Admelog moderate dose correctional scale before meals, moderate at bedtime    Discharged with metformin 500mg BID to take from 9/1-9/9, Repaglinide 2mg TID (to be reduced to 1mg TID on 9/4).  - Recommend Lantus 3 units QHS  - Recommend Admelog 2 units TIDAC  - Recommend low dose correctional scale before meals, low dose at bedtime  Discharge recommendation:   Recommend to prescribe glucometer (PATIENT DID NOT GET ONE AT LAST DISCHARGE FROM THE HOSPTIAL)  Testing strips, and lancing devices and lancets for patient to monitor glucose once to twice daily.  Does not need to be on standing diabetes medication.  Follow-up with outpatient primary care doctor to repeat A1c level in 2 to 3 months.  Patient to call her doctor if she has any glucose greater than 200 mg/dL or less than 70 mg/dL.    2.  Hypertension  BP goal <130/80  Continue to monitor for now.    3.  Hyperlipidemia  LDL goal less than 100 mg/dL  Continue to monitor annual lipid panel.

## 2023-09-06 NOTE — PROGRESS NOTE ADULT - ASSESSMENT
51 yo F with PMH stay IV lung cancer w/mets to brain, steroid induced hyperglycemia presents with 3-day history of epigastric pain.   Palliative Care consulted for complex decision making  related to goals of care discussions in the setting of advanced illness/ evaluation and management of pain/ symptoms. Patients pain appears better controlled on her current PO Oxycodone regimen. Patient pending outpatient follow-up with Oncology for treatment of malignancy. From current GOC conversations, patient is full code and wants to pursue further treatment for malignancy.  51 yo F with PMH stay IV lung cancer w/mets to brain, steroid induced hyperglycemia presents with 3-day history of epigastric pain.   Palliative Care consulted for complex decision making  related to goals of care discussions in the setting of advanced illness/ evaluation and management of pain/ symptoms.

## 2023-09-06 NOTE — PROGRESS NOTE ADULT - PROBLEM SELECTOR PLAN 1
- presents with worsening epigastric pain in the setting of taking steroids without GI ppx  - CTA on admission: No PE. Stable right apical lung mass. Rapid interval growth of a left lower lobe mass. New hepatic metastases.  Plan:  - epigastric pain appears to be more consistent with gastritis in the setting of taking steroids without PPI. reports pain improving with PPI. could also be likely 2/2 worsening metastatic disease  - palliative following, recs appreciated  - continue protonix 40 IV BID for now   - continue decadron taper for now, currently on 4mg qd (9/4- 9/6), discontinue on 9/7.   - c/w depakote for migraines. Can also treat with reglan  - pain control with oxy 5 q4 PRN for moderate and oxy 7.5 q4 PRN for severe   - OP rad onc, NSG follow up  - plans to start immunotherapy nivolumab as outpatient

## 2023-09-06 NOTE — PROGRESS NOTE ADULT - SUBJECTIVE AND OBJECTIVE BOX
CC: Patient is a 52y old  Female who presents with a chief complaint of epigastric pain (05 Sep 2023 09:56)      INTERVAL EVENTS: JEANINE    SUBJECTIVE / INTERVAL HPI: Patient seen and examined at bedside. Reports continued improvement in abdominal pain. Reports feeling a little lightheaded on ambulation without LOC.    ROS: negative unless otherwise stated above.    VITAL SIGNS:  Vital Signs Last 24 Hrs  T(C): 36.7 (06 Sep 2023 09:30), Max: 36.8 (05 Sep 2023 18:00)  T(F): 98 (06 Sep 2023 09:30), Max: 98.2 (05 Sep 2023 18:00)  HR: 97 (06 Sep 2023 09:30) (93 - 97)  BP: 115/75 (06 Sep 2023 09:30) (106/68 - 115/75)  BP(mean): --  RR: 18 (06 Sep 2023 09:30) (18 - 18)  SpO2: 93% (06 Sep 2023 09:30) (93% - 95%)    Parameters below as of 06 Sep 2023 09:30  Patient On (Oxygen Delivery Method): room air          09-05-23 @ 07:01  -  09-06-23 @ 07:00  --------------------------------------------------------  IN: 1200 mL / OUT: 0 mL / NET: 1200 mL    09-06-23 @ 07:01  -  09-06-23 @ 13:32  --------------------------------------------------------  IN: 300 mL / OUT: 0 mL / NET: 300 mL        PHYSICAL EXAM:    General: NAD  HEENT: MMM  Neck: supple  Cardiovascular: +S1/S2; RRR  Respiratory: CTA B/L; no W/R/R  Gastrointestinal: soft, mild epigastric tenderness, improved from yesterday  Extremities: WWP; no edema, clubbing or cyanosis  Vascular: 2+ radial, DP pulses B/L  Neurological: AAOx3; no focal deficits    MEDICATIONS:  MEDICATIONS  (STANDING):  dexAMETHasone     Tablet 4 milliGRAM(s) Oral every 24 hours  dextrose 5%. 1000 milliLiter(s) (50 mL/Hr) IV Continuous <Continuous>  dextrose 50% Injectable 25 Gram(s) IV Push once  divalproex  milliGRAM(s) Oral two times a day  enoxaparin Injectable 40 milliGRAM(s) SubCutaneous every 24 hours  glucagon  Injectable 1 milliGRAM(s) IntraMuscular once  insulin glargine Injectable (LANTUS) 3 Unit(s) SubCutaneous at bedtime  insulin lispro (ADMELOG) corrective regimen sliding scale   SubCutaneous three times a day before meals  insulin lispro Injectable (ADMELOG) 2 Unit(s) SubCutaneous three times a day before meals  pantoprazole  Injectable 40 milliGRAM(s) IV Push two times a day  polyethylene glycol 3350 17 Gram(s) Oral daily  senna 2 Tablet(s) Oral at bedtime    MEDICATIONS  (PRN):  aluminum hydroxide/magnesium hydroxide/simethicone Suspension 30 milliLiter(s) Oral every 4 hours PRN Dyspepsia  dextrose Oral Gel 15 Gram(s) Oral once PRN Blood Glucose LESS THAN 70 milliGRAM(s)/deciliter  melatonin 3 milliGRAM(s) Oral at bedtime PRN Insomnia  ondansetron Injectable 4 milliGRAM(s) IV Push every 8 hours PRN Nausea and/or Vomiting  oxyCODONE    IR 7.5 milliGRAM(s) Oral every 4 hours PRN Severe Pain (7 - 10)  oxyCODONE    IR 5 milliGRAM(s) Oral every 4 hours PRN Moderate Pain (4 - 6)      ALLERGIES:  Allergies    No Known Allergies    Intolerances        LABS:                        10.6   9.76  )-----------( 113      ( 06 Sep 2023 06:43 )             32.4     09-06    134<L>  |  101  |  17  ----------------------------<  194<H>  4.2   |  21<L>  |  0.51    Ca    9.3      06 Sep 2023 06:43  Phos  3.3     09-06  Mg     1.70     09-06    TPro  6.7  /  Alb  3.1<L>  /  TBili  0.4  /  DBili  x   /  AST  11  /  ALT  15  /  AlkPhos  52  09-05      Urinalysis Basic - ( 06 Sep 2023 06:43 )    Color: x / Appearance: x / SG: x / pH: x  Gluc: 194 mg/dL / Ketone: x  / Bili: x / Urobili: x   Blood: x / Protein: x / Nitrite: x   Leuk Esterase: x / RBC: x / WBC x   Sq Epi: x / Non Sq Epi: x / Bacteria: x      CAPILLARY BLOOD GLUCOSE      POCT Blood Glucose.: 188 mg/dL (06 Sep 2023 11:44)      RADIOLOGY & ADDITIONAL TESTS: Reviewed.

## 2023-09-06 NOTE — PROGRESS NOTE ADULT - ATTENDING COMMENTS
Primary HCP: Friend/ Landlord Jonny Beltran: [phone]  Alternate HCP: James Meza : [phone] Patient was seen and evaluated with Dr. Persaud, Internal Medicine Resident, during bedside rounds.   Agree with above findings and recommendations, edited documentation as appropriate to reflect the plan of care discussed with patient and myself with the following additions:    In past 24 hours, received Percocet x1, Oxy IR 5mg x2 , Oxy IR 7.5mg x1, IV Dilaudid 0.2mg x1.   Patient seen and examined at bedside. She reports pain better controlled today and was able to ambulate with PT earlier.     Goals of care discussed with patient.   Discussed concern that her cancer has progressed, patient verbalized understanding. Patient interested in following up outpatient with Dr. Beckman to pursue immunotherapy.   Counseled patient regarding role of HCP.Shared with patient that a health care proxy is a legal document that appoints someone else to serve as your health care agent regarding healthcare decisions when you cannot do so for yourself. A HCP speaks on your behalf, expresses your wishes, and has the authority to make medical decisions. A HCP becomes your voice for all of your healthcare decisions.  Provided reassurance that patient will continue to make their own medical decisions and that a proxy would substitute only in the event that the patient was incapable of doing so.   Patient amenable to completing HCP paperwork to appoint the following:   Primary HCP: Friend/ Landlord Jonny Beltran: [phone]  Alternate HCP: James Meza : [phone]  Please see [facility] note for further details.   16 minutes spent on goals of care    > protonix for GERD management  > Continue PO Oxy IR 5mg q4 PRN moderate pain (hold for hypotension, oversedation, respiratory depression)  > Continue PO Oxy IR 7.5mg q4 PRN severe pain (hold for hypotension, oversedation, respiratory depression)  > Bowel regimen while on opiates.    Thank you for allowing us to participate in your patient's care. Please page [phone] for any questions/concerns.

## 2023-09-06 NOTE — PROGRESS NOTE ADULT - CONVERSATION DETAILS
Discussed with the patient regarding her current disease course and prognosis. She had a conversation with Heme/Onc yesterday and is aware that she has Metastatic Lung cancer that has spread to her brain and now liver. She states that she will undergo follow-up with Oncology once discharged, to possibly start Immunotherapy. At this time, it appears that the patient wants to pursue all possible treatment options and is also Full Code. She states that she will make sure to not miss any more outpatient Heme/Onc appointments. Discussed with the patient regarding her current disease course and prognosis. She had a conversation with Heme/Onc yesterday and is aware that she has Metastatic Lung cancer that has spread to her brain and now liver. She states that she will undergo follow-up with Oncology once discharged, to possibly start Immunotherapy. At this time, it appears that the patient wants to pursue all possible treatment options and is also Full Code. She states that she will make sure to not miss any more outpatient Heme/Onc appointments.  Role of HCP discussed with patient. She appointed Jonny Beltran as primary HCP and Aba Lyman as alternate HCP.

## 2023-09-06 NOTE — DIETITIAN INITIAL EVALUATION ADULT - PERTINENT MEDS FT
MEDICATIONS  (STANDING):  dexAMETHasone     Tablet 4 milliGRAM(s) Oral once  dextrose 5%. 1000 milliLiter(s) (50 mL/Hr) IV Continuous <Continuous>  dextrose 50% Injectable 25 Gram(s) IV Push once  divalproex  milliGRAM(s) Oral two times a day  enoxaparin Injectable 40 milliGRAM(s) SubCutaneous every 24 hours  glucagon  Injectable 1 milliGRAM(s) IntraMuscular once  insulin glargine Injectable (LANTUS) 3 Unit(s) SubCutaneous at bedtime  insulin lispro (ADMELOG) corrective regimen sliding scale   SubCutaneous three times a day before meals  insulin lispro Injectable (ADMELOG) 2 Unit(s) SubCutaneous three times a day before meals  pantoprazole  Injectable 40 milliGRAM(s) IV Push two times a day  polyethylene glycol 3350 17 Gram(s) Oral daily  senna 2 Tablet(s) Oral at bedtime    MEDICATIONS  (PRN):  aluminum hydroxide/magnesium hydroxide/simethicone Suspension 30 milliLiter(s) Oral every 4 hours PRN Dyspepsia  dextrose Oral Gel 15 Gram(s) Oral once PRN Blood Glucose LESS THAN 70 milliGRAM(s)/deciliter  melatonin 3 milliGRAM(s) Oral at bedtime PRN Insomnia  ondansetron Injectable 4 milliGRAM(s) IV Push every 8 hours PRN Nausea and/or Vomiting  oxyCODONE    IR 7.5 milliGRAM(s) Oral every 4 hours PRN Severe Pain (7 - 10)  oxyCODONE    IR 5 milliGRAM(s) Oral every 4 hours PRN Moderate Pain (4 - 6)

## 2023-09-06 NOTE — PROGRESS NOTE ADULT - PROBLEM SELECTOR PLAN 3
- likely multifactorial from GERD (in setting of steroids) + neoplasm related pain   - In past 24 hours, patient received Oxycodone 7.5mg x2 along with Oxycodone 5mg x2.   - protonix for GERD management  - Continue PO Oxy IR 5mg q4 PRN moderate pain (hold for hypotension, oversedation, respiratory depression)  - Continue PO Oxy IR 7.5mg q4 PRN severe pain (hold for hypotension, oversedation, respiratory depression)  - Bowel regimen while on opiates.

## 2023-09-06 NOTE — PROGRESS NOTE ADULT - PROBLEM SELECTOR PLAN 5
Case reviewed with primary team and oncology team  Thank you for allowing us to participate in your patient's care. Please page [phone] for any questions/concerns. Case reviewed with primary team   Thank you for allowing us to participate in your patient's care. Please page [phone] for any questions/concerns.

## 2023-09-06 NOTE — DIETITIAN INITIAL EVALUATION ADULT - ORAL INTAKE PTA/DIET HISTORY
Patient reports good appetite and PO intake PTA. States she never had issues with eating. Spouse and herself purchase/cook meals. NKFA

## 2023-09-06 NOTE — DIETITIAN INITIAL EVALUATION ADULT - OTHER INFO
Patient reports good appetite and PO intake at this time. Patient denies any nausea/vomiting/diarrhea/constipation or difficulty chewing and swallowing. Patient with steroid induced hyperglycemia. Reviewed DM diet education; including, sources of carbohydrate, DM Myplate method, carb counting, label reading, meal planning, pre-prandial and post-prandial finger stick goals, and HbA1c goal. Importance of good adherence to consistent carbohydrate diet and having protein at each meal and snack.

## 2023-09-06 NOTE — PROGRESS NOTE ADULT - PROBLEM SELECTOR PLAN 4
Case reviewed with primary team and oncology team  Thank you for allowing us to participate in your patient's care. Please page [phone] for any questions/concerns. Discussed with patient at bedside regarding her current understanding of medical situation. Patient is aware that she has metastatic lung cancer with spread to her liver and brain. At this time, she wishes to pursue further therapy against the malignancy including Immunotherapy and remains full code. Discussed with patient at bedside regarding her current understanding of medical situation. Patient is aware that she has metastatic lung cancer with spread to her liver and brain. At this time, she wishes to pursue further therapy for her malignancy including Immunotherapy and remains full code.

## 2023-09-06 NOTE — PROGRESS NOTE ADULT - PROBLEM SELECTOR PLAN 1
- CT 9/4/23- Grossly stable right apical lung mass. Rapid interval growth of a left lower lobe mass abutting the pleural surface. Small pleural effusion.  New hepatic metastases.  - Patient with progression of disease   - Patient will have outpatient f/u with Dr. Beckman  -Appreciate further recommendations from Oncology

## 2023-09-06 NOTE — PROGRESS NOTE ADULT - ASSESSMENT
53 yo F with PMH stay IV lung cancer w/mets to brain, steroid induced hyperglycemia presents with 3-day history of epigastric pain 2/2 increased cancer burden vs gastritis.

## 2023-09-06 NOTE — DIETITIAN INITIAL EVALUATION ADULT - PERTINENT LABORATORY DATA
09-06    134<L>  |  101  |  17  ----------------------------<  194<H>  4.2   |  21<L>  |  0.51    Ca    9.3      06 Sep 2023 06:43  Phos  3.3     09-06  Mg     1.70     09-06    TPro  6.7  /  Alb  3.1<L>  /  TBili  0.4  /  DBili  x   /  AST  11  /  ALT  15  /  AlkPhos  52  09-05  POCT Blood Glucose.: 188 mg/dL (09-06-23 @ 11:44)  A1C with Estimated Average Glucose Result: 7.3 % (08-28-23 @ 07:00)  A1C with Estimated Average Glucose Result: 7.2 % (08-27-23 @ 07:28)  A1C with Estimated Average Glucose Result: 5.7 % (06-01-23 @ 06:19)

## 2023-09-07 NOTE — PROGRESS NOTE ADULT - SUBJECTIVE AND OBJECTIVE BOX
Chief Complaint: Steroid induced DM    History: Patient seen at bedside. Patient states to provider "I can't talk to you right now"  Per RN, FS 85 mg/dl at lunch today  Last dose of Dexamethasone 4 mg given last night at 6 PM  No further steroids ordered     MEDICATIONS  (STANDING):  dextrose 5%. 1000 milliLiter(s) (50 mL/Hr) IV Continuous <Continuous>  dextrose 50% Injectable 25 Gram(s) IV Push once  divalproex  milliGRAM(s) Oral two times a day  enoxaparin Injectable 40 milliGRAM(s) SubCutaneous every 24 hours  glucagon  Injectable 1 milliGRAM(s) IntraMuscular once  insulin glargine Injectable (LANTUS) 3 Unit(s) SubCutaneous at bedtime  insulin lispro (ADMELOG) corrective regimen sliding scale   SubCutaneous three times a day before meals  pantoprazole  Injectable 40 milliGRAM(s) IV Push two times a day  polyethylene glycol 3350 17 Gram(s) Oral daily  senna 2 Tablet(s) Oral at bedtime    MEDICATIONS  (PRN):  aluminum hydroxide/magnesium hydroxide/simethicone Suspension 30 milliLiter(s) Oral every 4 hours PRN Dyspepsia  dextrose Oral Gel 15 Gram(s) Oral once PRN Blood Glucose LESS THAN 70 milliGRAM(s)/deciliter  melatonin 3 milliGRAM(s) Oral at bedtime PRN Insomnia  ondansetron Injectable 4 milliGRAM(s) IV Push every 8 hours PRN Nausea and/or Vomiting  oxyCODONE    IR 7.5 milliGRAM(s) Oral every 4 hours PRN Severe Pain (7 - 10)  oxyCODONE    IR 5 milliGRAM(s) Oral every 4 hours PRN Moderate Pain (4 - 6)    No Known Allergies    Review of Systems:  UNABLE TO OBTAIN, see HPI     PHYSICAL EXAM:  VITALS: T(C): 36.8 (09-07-23 @ 05:40)  T(F): 98.2 (09-07-23 @ 05:40), Max: 98.7 (09-06-23 @ 18:00)  HR: 97 (09-07-23 @ 05:40) (97 - 102)  BP: 128/83 (09-07-23 @ 05:40) (111/67 - 136/86)  RR:  (17 - 18)  SpO2:  (92% - 94%)  Wt(kg): --  GENERAL: NAD  EYES: No proptosis, anicteric  HEENT:  Atraumatic, Normocephalic  RESPIRATORY: unlabored respirations     CAPILLARY BLOOD GLUCOSE    POCT Blood Glucose.: 85 mg/dL (07 Sep 2023 12:35)  POCT Blood Glucose.: 170 mg/dL (07 Sep 2023 08:38)  POCT Blood Glucose.: 284 mg/dL (06 Sep 2023 22:28)  POCT Blood Glucose.: 182 mg/dL (06 Sep 2023 17:29)      09-07    134<L>  |  100  |  15  ----------------------------<  284<H>  4.0   |  19<L>  |  0.54    eGFR: 110    Ca    9.3      09-07  Mg     1.70     09-07  Phos  3.2     09-07    TPro  6.7  /  Alb  3.1<L>  /  TBili  0.4  /  DBili  x   /  AST  11  /  ALT  15  /  AlkPhos  52  09-05        Anion Gap: 15 mmol/L (09-07-23 @ 05:25)  Anion Gap: 12 mmol/L (09-06-23 @ 06:43)  Anion Gap: 10 mmol/L (09-05-23 @ 05:35)      A1C with Estimated Average Glucose Result: 7.3 % (08-28-23 @ 07:00)  A1C with Estimated Average Glucose Result: 7.2 % (08-27-23 @ 07:28)  A1C with Estimated Average Glucose Result: 5.7 % (06-01-23 @ 06:19)      Diet, Regular:   Consistent Carbohydrate Evening Snack (CSTCHOSN) (09-04-23 @ 14:48)

## 2023-09-07 NOTE — PROGRESS NOTE ADULT - PROBLEM SELECTOR PLAN 4
Discussed with patient at bedside regarding her current understanding of medical situation. Patient is aware that she has metastatic lung cancer with spread to her liver and brain. At this time, she wishes to pursue further therapy for her malignancy including Immunotherapy and remains full code. The patient's symptoms are well controlled. No acute symptoms at this time.   Thank you for allowing us to participate in your patient's care.  Patient to be discharged from [facility] team consult service today.   Please re-consult if we can be of further assistance, page [phone].

## 2023-09-07 NOTE — RESULTS/DATA
[FreeTextEntry1] :  ACC: [id number] EXAM: MR BRAIN WAW IC ORDERED BY: LORA RODRIGUEZ  *** ADDENDUM # 1 ***  Impression:  COMPARISON: MRI brain dated 6/2/2023.  Right cerebellar hemisphere metastasis on image 29 of series 9 measures 1 cm, smaller in size. The right temporal lobe and left occipital lobe metastasis demonstrates decreased enhancement and slightly smaller in size. Posterior right temporal lobe, left frontal lobe lesions are smaller in size. High right frontal lobe lesion on image 100 of series 9 measures 6.1 mm, larger in size, previously measuring 3.8 mm..  --- End of Report ---  *** END OF ADDENDUM # 1 ***   PROCEDURE DATE: 08/20/2023    INTERPRETATION: CLINICAL INDICATIONS: headaches, follow up to admission CT head  COMPARISON: CT dated 8/19/2023  TECHNIQUE: MRI brain: Multiplanar, multisequence MR imaging of the brain are obtained with and without the administration of 6.5 cc intravenous Gadavist contrast. 3.3 cc of contrast was discarded  FINDINGS: Peripherally enhancing cystic mass in the right temporal lobe measures 4.0 x 3.4 x 3.1 cm with subtle susceptibility artifact. Cystic enhancing mass in the left occipital lobe measures 3.1 x 2.2 x 2.3 cm. High left frontal lobe cystic mass measures 1.9 x 1.5 cm. Superior right temporal lobe enhancing lesion measures 9.6 mm. Tiny enhancing lesion in the anterior frontal lobe measures 6.2 mm. High posterior frontal lobe lesion measures 6.8 mm. Moderate vasogenic edema in the left frontal lobe, right temporal lobe, left occipital lobe. There is no abnormal restricted diffusion to suggest acute infarction. Scattered periventricular and subcortical white matter T2 /FLAIR hyperintensities are seen without mass effect, nonspecific, likely representing mild to moderate chronic microvascular changes. Normal T2 flow-voids are seen within the intracranial vasculature. The lateral ventricles and cortical sulci are age-appropriate in size and configuration. There is no susceptibility artifact to suggest hemorrhage. Midline structures are normal. The visualized paranasal sinuses, mastoid air cells and orbits are unremarkable.   IMPRESSION: Multiple intracranial metastasis

## 2023-09-07 NOTE — PROGRESS NOTE ADULT - ASSESSMENT
51 yo F with PMH stay IV lung cancer w/mets to brain, steroid induced hyperglycemia presents with 3-day history of epigastric pain.   Palliative Care consulted for complex decision making  related to goals of care discussions in the setting of advanced illness/ evaluation and management of pain/ symptoms.

## 2023-09-07 NOTE — PROGRESS NOTE ADULT - PROBLEM SELECTOR PLAN 3
- likely multifactorial from GERD (in setting of steroids) + neoplasm related pain   - In past 24 hours, patient received Oxycodone 7.5mg x2 along with Oxycodone 5mg x2.   - protonix for GERD management  - Continue PO Oxy IR 5mg q4 PRN moderate pain (hold for hypotension, oversedation, respiratory depression)  - Continue PO Oxy IR 7.5mg q4 PRN severe pain (hold for hypotension, oversedation, respiratory depression)  - Bowel regimen while on opiates. - likely multifactorial from GERD (in setting of steroids) + neoplasm related pain   - protonix for GERD management  - Continue PO Oxy IR 5mg q4 PRN moderate pain (hold for hypotension, oversedation, respiratory depression)  - Continue PO Oxy IR 7.5mg q4 PRN severe pain (hold for hypotension, oversedation, respiratory depression)  - Bowel regimen while on opiates.

## 2023-09-07 NOTE — PROGRESS NOTE ADULT - ASSESSMENT
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 Consult for steroid induced DM management.    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    While inpatient   Now OFF steroids   Most recent FS 85 mg/dl  STOP Admelog 2 units TID before meals  STOP Lantus 3 units SQ qHS   Recommend Admelog LOW dose correctional scale before meals, low dose at bedtime  Check BG before meals and bedtime  Consistent carbohydrate diet  Hypoglycemia protocol    Discharge Plan:   Now that she is off steroids, she likely does NOT need to be on standing diabetes medication.  Recommend for patient to check FS 1-2 times daily and record values. Patient to call her doctor if she has any glucose greater than 200 mg/dL or less than 70 mg/dL.  If FS are elevated outpatient, can consider restarting low dose Metformin   Follow-up with outpatient primary care doctor to repeat A1c level in 2 to 3 months.  Recommend to prescribe glucometer (PATIENT DID NOT GET ONE AT LAST DISCHARGE FROM THE HOSPITAL)  Will need test strips, lancets and alcohol swabs as well     2. Steroid induced hyperglycemia   Steroid taper as follows --> now completed   Dexamethasone 4 mg q8h prior to 9/1  Dexamethasone 4mg q12h (9/1-9/3)  Dexamethasone 4mg qd (9/4- 9/6)  STOP ON 9/7.     3. Hypertension  BP goal <130/80  Not on antihypertensives   Continue to monitor     4. Hyperlipidemia  LDL goal less than 100 mg/dL  Continue to monitor annual lipid panel  Consider statin     Sejal Saleem  Nurse Practitioner  Division of Endocrinology & Diabetes  In house pager #[phone]/long range pager #[phone]    If before 9AM or after 6PM, or on weekends/holidays, please call endocrine answering service for assistance ([phone]).  For nonurgent matters email [email or web address] for assistance.

## 2023-09-07 NOTE — PROGRESS NOTE ADULT - SUBJECTIVE AND OBJECTIVE BOX
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 (06 Sep 2023 14:30)      INTERVAL EVENTS: JEANINE    SUBJECTIVE / INTERVAL HPI: Patient seen and examined at bedside. Reports fatigue and a feeling of unsteadiness when walking. Still having cramps but overall feels improved.    ROS: negative unless otherwise stated above.    VITAL SIGNS:  Vital Signs Last 24 Hrs  T(C): 36.8 (07 Sep 2023 05:40), Max: 37.1 (06 Sep 2023 18:00)  T(F): 98.2 (07 Sep 2023 05:40), Max: 98.7 (06 Sep 2023 18:00)  HR: 97 (07 Sep 2023 05:40) (97 - 102)  BP: 128/83 (07 Sep 2023 05:40) (111/67 - 136/86)  BP(mean): --  RR: 17 (07 Sep 2023 05:40) (17 - 18)  SpO2: 94% (07 Sep 2023 05:40) (92% - 94%)    Parameters below as of 07 Sep 2023 05:40  Patient On (Oxygen Delivery Method): room air          09-06-23 @ 07:01  -  09-07-23 @ 07:00  --------------------------------------------------------  IN: 1440 mL / OUT: 0 mL / NET: 1440 mL        PHYSICAL EXAM:    General: NAD  HEENT: MMM  Neck: supple  Cardiovascular: +S1/S2; RRR  Respiratory: CTA B/L; no W/R/R  Gastrointestinal: soft, mildly tender epigastric region  Extremities: WWP; no edema, clubbing or cyanosis  Vascular: 2+ radial, DP pulses B/L  Neurological: AAOx3; no focal deficits    MEDICATIONS:  MEDICATIONS  (STANDING):  dextrose 5%. 1000 milliLiter(s) (50 mL/Hr) IV Continuous <Continuous>  dextrose 50% Injectable 25 Gram(s) IV Push once  divalproex  milliGRAM(s) Oral two times a day  enoxaparin Injectable 40 milliGRAM(s) SubCutaneous every 24 hours  glucagon  Injectable 1 milliGRAM(s) IntraMuscular once  insulin lispro (ADMELOG) corrective regimen sliding scale   SubCutaneous at bedtime  insulin lispro (ADMELOG) corrective regimen sliding scale   SubCutaneous three times a day before meals  pantoprazole  Injectable 40 milliGRAM(s) IV Push two times a day  polyethylene glycol 3350 17 Gram(s) Oral daily  senna 2 Tablet(s) Oral at bedtime    MEDICATIONS  (PRN):  aluminum hydroxide/magnesium hydroxide/simethicone Suspension 30 milliLiter(s) Oral every 4 hours PRN Dyspepsia  dextrose Oral Gel 15 Gram(s) Oral once PRN Blood Glucose LESS THAN 70 milliGRAM(s)/deciliter  melatonin 3 milliGRAM(s) Oral at bedtime PRN Insomnia  ondansetron Injectable 4 milliGRAM(s) IV Push every 8 hours PRN Nausea and/or Vomiting  oxyCODONE    IR 7.5 milliGRAM(s) Oral every 4 hours PRN Severe Pain (7 - 10)  oxyCODONE    IR 5 milliGRAM(s) Oral every 4 hours PRN Moderate Pain (4 - 6)      ALLERGIES:  Allergies    No Known Allergies    Intolerances        LABS:                        10.2   9.80  )-----------( 134      ( 07 Sep 2023 05:25 )             30.6     09-07    134<L>  |  100  |  15  ----------------------------<  284<H>  4.0   |  19<L>  |  0.54    Ca    9.3      07 Sep 2023 05:25  Phos  3.2     09-07  Mg     1.70     09-07        Urinalysis Basic - ( 07 Sep 2023 05:25 )    Color: x / Appearance: x / SG: x / pH: x  Gluc: 284 mg/dL / Ketone: x  / Bili: x / Urobili: x   Blood: x / Protein: x / Nitrite: x   Leuk Esterase: x / RBC: x / WBC x   Sq Epi: x / Non Sq Epi: x / Bacteria: x      CAPILLARY BLOOD GLUCOSE      POCT Blood Glucose.: 85 mg/dL (07 Sep 2023 12:35)      RADIOLOGY & ADDITIONAL TESTS: Reviewed.

## 2023-09-07 NOTE — HISTORY OF PRESENT ILLNESS
[FreeTextEntry1] : 51F with significant smoking history with PMH of GERD was diagnosed by pulmonologist with a  PET/CT, for  intensely FDG-avid RUL mass. 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 Patient with clinical Stage IIIB, T4 N2 M0 disease. Pathology reviewed at tumor board and clarified the tumor represents non-small cell lung cancer, either adenocarcinoma with neuroendocrine features versus large cell neuroendocrine cancer. Brain MRI was negative. Tumor tested PDL1 Negative and molecular testing is negative for actionable mutations. Patient started treatment with carboplatin/pemetrexed in July 2022. Thoracic RT started in Sept 2022. Completed 4 cycles with Carbo/Pem in late Sept 2022. Thoracic RT completed late October 2022. Achieved UT. Upon completion of definitive therapy, she declined recommended treatment with Durvalumab despite numerous discussions. Patient was hospitalized in late May 2023 with right leg weakness and found to have brain metastases (3 cystic brain lesions, largest in right temporal lobe and symptomatic edema in left frontal lobe.). She was started on steroids as well as levetiracetam for seizure prophylaxis with subsequent discharge in early June 2023. Patient is status post GK-SRS to 7 brain metastases in 5 fractions from 6/21 - 6/29/2023.

## 2023-09-07 NOTE — PROGRESS NOTE ADULT - SUBJECTIVE AND OBJECTIVE BOX
SUBJECTIVE AND OBJECTIVE: No acute events overnight. Patient reports pain overall has improved but intermittent ; able to ambulate earlier in hallway.     that pain is better controlled today than yesterday. She attempted to ambulate with PT. Patient stated that pain worsened with excessive ambulation. She was able to discuss her current prognosis and treatment options with Heme/Onc yesterday. She anticipates being discharged soon to undergo outpatient follow-up and treatment with Oncology.     Indication for Geriatrics and Palliative Care Services/INTERVAL HPI: Goals of care along with pain control.       Allergies    No Known Allergies    Intolerances    MEDICATIONS  (STANDING):  dexAMETHasone     Tablet 4 milliGRAM(s) Oral every 24 hours  dextrose 5%. 1000 milliLiter(s) (50 mL/Hr) IV Continuous <Continuous>  dextrose 50% Injectable 25 Gram(s) IV Push once  divalproex  milliGRAM(s) Oral two times a day  enoxaparin Injectable 40 milliGRAM(s) SubCutaneous every 24 hours  glucagon  Injectable 1 milliGRAM(s) IntraMuscular once  insulin glargine Injectable (LANTUS) 3 Unit(s) SubCutaneous at bedtime  insulin lispro (ADMELOG) corrective regimen sliding scale   SubCutaneous three times a day before meals  insulin lispro Injectable (ADMELOG) 2 Unit(s) SubCutaneous three times a day before meals  pantoprazole  Injectable 40 milliGRAM(s) IV Push two times a day  polyethylene glycol 3350 17 Gram(s) Oral daily  senna 2 Tablet(s) Oral at bedtime    MEDICATIONS  (PRN):  aluminum hydroxide/magnesium hydroxide/simethicone Suspension 30 milliLiter(s) Oral every 4 hours PRN Dyspepsia  dextrose Oral Gel 15 Gram(s) Oral once PRN Blood Glucose LESS THAN 70 milliGRAM(s)/deciliter  melatonin 3 milliGRAM(s) Oral at bedtime PRN Insomnia  ondansetron Injectable 4 milliGRAM(s) IV Push every 8 hours PRN Nausea and/or Vomiting  oxyCODONE    IR 7.5 milliGRAM(s) Oral every 4 hours PRN Severe Pain (7 - 10)  oxyCODONE    IR 5 milliGRAM(s) Oral every 4 hours PRN Moderate Pain (4 - 6)      ITEMS UNCHECKED ARE NOT PRESENT    PRESENT SYMPTOMS: [ ]Unable to self-report - see [ ] CPOT [ ] PAINADS [ ] RDOS  Source if other than patient:  [ ]Family   [ ]Team     Pain:  [x ]yes [ ]no  QOL impact - moderate  Location - Abdominal pain/Epigastric                   Aggravating factors - worsens with ambulation and movement, coughing and leaning forward  Quality -spasm like  Radiation -none  Timing-intermittent  Severity (0-10 scale):9 at worst  Minimal acceptable level (0-10 scale): 7    CPOT:    https://www.[facility].org/getattachment/[email or web address]/Critical-Care-Pain-Observation-Tool-(CPOT)    PAIN AD Score:	  http://geriatrictoolkit.[email or web address]/painad.pdf (Ctrl + left click to view)    Dyspnea:                           [ ]Mild [ ]Moderate [ ]Severe    RDOS:  0 to 2  minimal or no respiratory distress   3  mild distress  4 to 6 moderate distress  >7 severe distress  https://[email or web address]/handouts/hen/Respiratory_Distress_Observation_Scale.pdf (Ctrl +  left click to view)     Anxiety:                             [ ]Mild [ ]Moderate [ ]Severe  Fatigue:                             [ ]Mild [ ]Moderate [ ]Severe  Nausea:                             [ ]Mild [ ]Moderate [ ]Severe  Loss of appetite:              [ ]Mild [ ]Moderate [ ]Severe  Constipation:                    [ ]Mild [ ]Moderate [ ]Severe    PCSSQ[Palliative Care Spiritual Screening Question]   Severity (0-10):  Score of 4 or > indicate consideration of Chaplaincy referral.    Chaplaincy Referral: [ ] yes [ ] refused [ ] following [x] deferred     Other Symptoms:  [x ]All other review of systems negative       PHYSICAL EXAM:  Vital Signs Last 24 Hrs  T(C): 36.7 (06 Sep 2023 09:30), Max: 36.8 (05 Sep 2023 18:00)  T(F): 98 (06 Sep 2023 09:30), Max: 98.2 (05 Sep 2023 18:00)  HR: 97 (06 Sep 2023 09:30) (93 - 97)  BP: 115/75 (06 Sep 2023 09:30) (106/68 - 115/75)  BP(mean): --  RR: 18 (06 Sep 2023 09:30) (18 - 18)  SpO2: 93% (06 Sep 2023 09:30) (93% - 95%)    Parameters below as of 06 Sep 2023 09:30  Patient On (Oxygen Delivery Method): room air     I&O's Summary    05 Sep 2023 07:01  -  06 Sep 2023 07:00  --------------------------------------------------------  IN: 1200 mL / OUT: 0 mL / NET: 1200 mL    06 Sep 2023 07:01  -  06 Sep 2023 12:51  --------------------------------------------------------  IN: 300 mL / OUT: 0 mL / NET: 300 mL         GENERAL: [ ]Cachexia    x[ ]Alert  [ x]Oriented x3   [ ]Lethargic  [ ]Unarousable  [ x]Verbal  [ ]Non-Verbal  Behavioral:   [ ]Anxiety  [ ]Delirium [ ]Agitation [x ]Other - calm  HEENT:  [ x]Normal   [ ]Dry mouth   [ ]ET Tube/Trach  [ ]Oral lesions  PULMONARY:   [x ]Clear [ ]Tachypnea  [ ]Audible excessive secretions   [ ]Rhonchi        [ ]Right [ ]Left [ ]Bilateral  [ ]Crackles        [ ]Right [ ]Left [ ]Bilateral  [ ]Wheezing     [ ]Right [ ]Left [ ]Bilateral  [ ]Diminished BS [ ] Right [ ]Left [ ]Bilateral  CARDIOVASCULAR:    [ x]Regular [ ]Irregular [ ]Tachy  [ ]Austin [ ]Murmur [ ]Other  GASTROINTESTINAL:  [x ]Soft  [ ]Distended   [ ]+BS  [x ]Non tender [ ]Tender  [ ]Other [ ]PEG [ ]OGT/ NGT   Last BM: yesterday per patient   GENITOURINARY:  [ x]Normal [ ]Incontinent   [ ]Oliguria/Anuria   [ ]Mas  MUSCULOSKELETAL:   [x ]Normal   [ ]Weakness  [ ]Bed/Wheelchair bound [ ]Edema  NEUROLOGIC:   [ x]No focal deficits  [ ] Cognitive impairment  [ ] Dysphagia [ ]Dysarthria [ ] Paresis [ ]Other   SKIN:   [x ]Normal  [ ]Rash  [ ]Other  [ ]Pressure ulcer(s) [ ]y [ ]n present on admission    CRITICAL CARE:  [ ]Shock Present  [ ]Septic [ ]Cardiogenic [ ]Neurologic [ ]Hypovolemic  [ ]Vasopressors [ ]Inotropes  [ ]Respiratory failure present [ ]Mechanical Ventilation [ ]Non-invasive ventilatory support [ ]High-Flow   [ ]Acute  [ ]Chronic [ ]Hypoxic  [ ]Hypercarbic [ ]Other  [ ]Other organ failure     LABS:                        10.6   9.76  )-----------( 113      ( 06 Sep 2023 06:43 )             32.4   09-06    134<L>  |  101  |  17  ----------------------------<  194<H>  4.2   |  21<L>  |  0.51    Ca    9.3      06 Sep 2023 06:43  Phos  3.3     09-06  Mg     1.70     09-06    TPro  6.7  /  Alb  3.1<L>  /  TBili  0.4  /  DBili  x   /  AST  11  /  ALT  15  /  AlkPhos  52  09-05      Urinalysis Basic - ( 06 Sep 2023 06:43 )    Color: x / Appearance: x / SG: x / pH: x  Gluc: 194 mg/dL / Ketone: x  / Bili: x / Urobili: x   Blood: x / Protein: x / Nitrite: x   Leuk Esterase: x / RBC: x / WBC x   Sq Epi: x / Non Sq Epi: x / Bacteria: x        RADIOLOGY & ADDITIONAL STUDIES: reviewed     Protein Calorie Malnutrition Present: [ ]mild [ ]moderate [ ]severe [ ]underweight [ ]morbid obesity  https://www.andeal.org/vault/2440/web/files/ONC/Table_Clinical%20Characteristics%20to%20Document%20Malnutrition-White%20JV%20et%20al%202012.pdf    Height (cm): 167.6 (09-04-23 @ 02:06), 167.6 (08-26-23 @ 22:33), 167.6 (08-19-23 @ 14:30)  Weight (kg): 63.503 (09-04-23 @ 09:25), 68.855 (08-26-23 @ 22:33), 66.7 (08-19-23 @ 22:30)  BMI (kg/m2): 22.6 (09-04-23 @ 09:25), 24.5 (09-04-23 @ 02:06), 24.5 (08-26-23 @ 22:33)    [ ]PPSV2 < or = 30%  [ ]significant weight loss [ ]poor nutritional intake [ ]anasarca[ ]Artificial Nutrition    Other REFERRALS:  [ ]Hospice  [ ]Child Life  [ ]Social Work  [x ]Case management [ ]Holistic Therapy        Indication for Geriatrics and Palliative Care Services/INTERVAL HPI: Goals of care along with pain control.   In past 24 hours, received 3 prn doses of PO Oxy IR 7.5mg     SUBJECTIVE AND OBJECTIVE: No acute events overnight. Patient reports pain overall has improved but intermittent with sudden movement; able to ambulate earlier in hallway. Slight dizziness at times, but denies it being related to timing with prn oxycodone doses.       Allergies    No Known Allergies    Intolerances    MEDICATIONS  (STANDING):  dextrose 5%. 1000 milliLiter(s) (50 mL/Hr) IV Continuous <Continuous>  dextrose 50% Injectable 25 Gram(s) IV Push once  divalproex  milliGRAM(s) Oral two times a day  enoxaparin Injectable 40 milliGRAM(s) SubCutaneous every 24 hours  glucagon  Injectable 1 milliGRAM(s) IntraMuscular once  insulin glargine Injectable (LANTUS) 3 Unit(s) SubCutaneous at bedtime  insulin lispro (ADMELOG) corrective regimen sliding scale   SubCutaneous three times a day before meals  pantoprazole  Injectable 40 milliGRAM(s) IV Push two times a day  polyethylene glycol 3350 17 Gram(s) Oral daily  senna 2 Tablet(s) Oral at bedtime    MEDICATIONS  (PRN):  aluminum hydroxide/magnesium hydroxide/simethicone Suspension 30 milliLiter(s) Oral every 4 hours PRN Dyspepsia  dextrose Oral Gel 15 Gram(s) Oral once PRN Blood Glucose LESS THAN 70 milliGRAM(s)/deciliter  melatonin 3 milliGRAM(s) Oral at bedtime PRN Insomnia  ondansetron Injectable 4 milliGRAM(s) IV Push every 8 hours PRN Nausea and/or Vomiting  oxyCODONE    IR 7.5 milliGRAM(s) Oral every 4 hours PRN Severe Pain (7 - 10)  oxyCODONE    IR 5 milliGRAM(s) Oral every 4 hours PRN Moderate Pain (4 - 6)      ITEMS UNCHECKED ARE NOT PRESENT    PRESENT SYMPTOMS: [ ]Unable to self-report - see [ ] CPOT [ ] PAINADS [ ] RDOS  Source if other than patient:  [ ]Family   [ ]Team     Pain:  [x ]yes [ ]no  QOL impact - mild  Location - Abdominal pain/Epigastric                   Aggravating factors - worsens with ambulation and movement, coughing and leaning forward  Quality -spasm like  Radiation -none  Timing-intermittent  Severity (0-10 scale):9 at worst  Minimal acceptable level (0-10 scale): 7    CPOT:    https://www.[facility].org/getattachment/[email or web address]/Critical-Care-Pain-Observation-Tool-(CPOT)    PAIN AD Score:	  [email or web address]://[email or web address]/painad.pdf (Ctrl + left click to view)    Dyspnea:                           [ ]Mild [ ]Moderate [ ]Severe    RDOS:  0 to 2  minimal or no respiratory distress   3  mild distress  4 to 6 moderate distress  >7 severe distress  https://[email or web address]/handouts/hen/Respiratory_Distress_Observation_Scale.pdf (Ctrl +  left click to view)     Anxiety:                             [ ]Mild [ ]Moderate [ ]Severe  Fatigue:                             [ ]Mild [ ]Moderate [ ]Severe  Nausea:                             [ ]Mild [ ]Moderate [ ]Severe  Loss of appetite:              [ ]Mild [ ]Moderate [ ]Severe  Constipation:                    [ ]Mild [ ]Moderate [ ]Severe    PCSSQ[Palliative Care Spiritual Screening Question]   Severity (0-10):  Score of 4 or > indicate consideration of Chaplaincy referral.    Chaplaincy Referral: [ ] yes [ ] refused [ ] following [x] deferred     Other Symptoms:  [x ]All other review of systems negative       PHYSICAL EXAM:  Vital Signs Last 24 Hrs  T(C): 36.8 (07 Sep 2023 05:40), Max: 37.1 (06 Sep 2023 18:00)  T(F): 98.2 (07 Sep 2023 05:40), Max: 98.7 (06 Sep 2023 18:00)  HR: 97 (07 Sep 2023 05:40) (97 - 102)  BP: 128/83 (07 Sep 2023 05:40) (111/67 - 136/86)  BP(mean): --  RR: 17 (07 Sep 2023 05:40) (17 - 18)  SpO2: 94% (07 Sep 2023 05:40) (92% - 94%)    Parameters below as of 07 Sep 2023 05:40  Patient On (Oxygen Delivery Method): room air        GENERAL: [ ]Cachexia    x[ ]Alert  [ x]Oriented x3   [ ]Lethargic  [ ]Unarousable  [ x]Verbal  [ ]Non-Verbal  Behavioral:   [ ]Anxiety  [ ]Delirium [ ]Agitation [x ]Other - calm  HEENT:  [ x]Normal   [ ]Dry mouth   [ ]ET Tube/Trach  [ ]Oral lesions  PULMONARY:   [x ]Clear [ ]Tachypnea  [ ]Audible excessive secretions   [ ]Rhonchi        [ ]Right [ ]Left [ ]Bilateral  [ ]Crackles        [ ]Right [ ]Left [ ]Bilateral  [ ]Wheezing     [ ]Right [ ]Left [ ]Bilateral  [ ]Diminished BS [ ] Right [ ]Left [ ]Bilateral  CARDIOVASCULAR:    [ x]Regular [ ]Irregular [ ]Tachy  [ ]Austin [ ]Murmur [ ]Other  GASTROINTESTINAL:  [x ]Soft  [ ]Distended   [ ]+BS  [x ]Non tender [ ]Tender  [ ]Other [ ]PEG [ ]OGT/ NGT   Last BM: 9/6  GENITOURINARY:  [ x]Normal [ ]Incontinent   [ ]Oliguria/Anuria   [ ]Mas  MUSCULOSKELETAL:   [x ]Normal   [ ]Weakness  [ ]Bed/Wheelchair bound [ ]Edema  NEUROLOGIC:   [ x]No focal deficits  [ ] Cognitive impairment  [ ] Dysphagia [ ]Dysarthria [ ] Paresis [ ]Other   SKIN:   [x ]Normal  [ ]Rash  [ ]Other  [ ]Pressure ulcer(s) [ ]y [ ]n present on admission    CRITICAL CARE:  [ ]Shock Present  [ ]Septic [ ]Cardiogenic [ ]Neurologic [ ]Hypovolemic  [ ]Vasopressors [ ]Inotropes  [ ]Respiratory failure present [ ]Mechanical Ventilation [ ]Non-invasive ventilatory support [ ]High-Flow   [ ]Acute  [ ]Chronic [ ]Hypoxic  [ ]Hypercarbic [ ]Other  [ ]Other organ failure     LABS:                                 10.2   9.80  )-----------( 134      ( 07 Sep 2023 05:25 )             30.6       09-07    134<L>  |  100  |  15  ----------------------------<  284<H>  4.0   |  19<L>  |  0.54    Ca    9.3      07 Sep 2023 05:25  Phos  3.2     09-07  Mg     1.70     09-07      RADIOLOGY & ADDITIONAL STUDIES: reviewed     Protein Calorie Malnutrition Present: [ ]mild [ ]moderate [ ]severe [ ]underweight [ ]morbid obesity  https://www.andeal.org/vault/2440/web/files/ONC/Table_Clinical%20Characteristics%20to%20Document%20Malnutrition-White%20JV%20et%20al%202012.pdf    Height (cm): 167.6 (09-04-23 @ 02:06), 167.6 (08-26-23 @ 22:33), 167.6 (08-19-23 @ 14:30)  Weight (kg): 63.503 (09-04-23 @ 09:25), 68.855 (08-26-23 @ 22:33), 66.7 (08-19-23 @ 22:30)  BMI (kg/m2): 22.6 (09-04-23 @ 09:25), 24.5 (09-04-23 @ 02:06), 24.5 (08-26-23 @ 22:33)    [ ]PPSV2 < or = 30%  [ ]significant weight loss [ ]poor nutritional intake [ ]anasarca[ ]Artificial Nutrition    Other REFERRALS:  [ ]Hospice  [ ]Child Life  [ ]Social Work  [x ]Case management [ ]Holistic Therapy

## 2023-09-07 NOTE — PROGRESS NOTE ADULT - PROBLEM SELECTOR PLAN 1
- CT 9/4/23- Grossly stable right apical lung mass. Rapid interval growth of a left lower lobe mass abutting the pleural surface. Small pleural effusion.  New hepatic metastases.  - Patient with progression of disease   - Patient will have outpatient f/u with Dr. Beckman  -Appreciate further recommendations from Oncology - Patient with progression of disease   - CT 9/4/23- Grossly stable right apical lung mass. Rapid interval growth of a left lower lobe mass abutting the pleural surface. Small pleural effusion.  New hepatic metastases.  - Patient will have outpatient f/u with Dr. Beckman  -Appreciate further recommendations from Oncology  - Patient plans to start immunotherapy outpatient.

## 2023-09-07 NOTE — PROGRESS NOTE ADULT - PROBLEM SELECTOR PLAN 5
Case reviewed with primary team   Thank you for allowing us to participate in your patient's care. Please page [phone] for any questions/concerns.

## 2023-09-08 NOTE — DISCHARGE NOTE PROVIDER - CARE PROVIDER_API CALL
Marcus Metcalf  Neurosurgery  [address] [facility]  [place] [address]  Phone: [phone]  Fax: [phone]  Follow Up Time:     Jorje Claudio  NP in Primary Care Adult-Gerontology  [address] [place] [address]  Phone: [phone]  Fax: [phone]  Follow Up Time:     Margarito Beckman  Medical Oncology  [address] [place] [id number]  Phone: [phone]  Fax: [phone]  Follow Up Time:     Zurdo Bernard  Radiation Oncology  [address] [place] [id number]  Phone: [phone]  Fax: [phone]  Follow Up Time:

## 2023-09-08 NOTE — PROGRESS NOTE ADULT - SUBJECTIVE AND OBJECTIVE BOX
INTERVAL HPI/OVERNIGHT EVENTS:  Patient seen at bedside.  Sitting upright in bed  Complaining of 10/10 L epigastric pain  Worse with inspiration and exertion  Denies any nausea/vomting  Pain not associated with eating      VITAL SIGNS:  T(F): 99.4 (09-08-23 @ 12:45)  HR: 111 (09-08-23 @ 12:45)  BP: 123/76 (09-08-23 @ 12:45)  RR: 17 (09-08-23 @ 12:45)  SpO2: 100% (09-08-23 @ 12:45)  Wt(kg): --    PHYSICAL EXAM:  General: NAD  HEENT: MMM  Neck: supple  Cardiovascular: +S1/S2; RRR  Respiratory: CTA B/L; no W/R/R  Gastrointestinal: soft, mildly tender epigastric region  Extremities: WWP; no edema, clubbing or cyanosis  Vascular: 2+ radial, DP pulses B/L  Neurological: AAOx3; no focal deficits      MEDICATIONS  (STANDING):  chlorhexidine 2% Cloths 1 Application(s) Topical daily  dextrose 5%. 1000 milliLiter(s) (50 mL/Hr) IV Continuous <Continuous>  dextrose 50% Injectable 25 Gram(s) IV Push once  divalproex  milliGRAM(s) Oral two times a day  enoxaparin Injectable 40 milliGRAM(s) SubCutaneous every 24 hours  glucagon  Injectable 1 milliGRAM(s) IntraMuscular once  insulin lispro (ADMELOG) corrective regimen sliding scale   SubCutaneous at bedtime  insulin lispro (ADMELOG) corrective regimen sliding scale   SubCutaneous three times a day before meals  pantoprazole  Injectable 40 milliGRAM(s) IV Push two times a day  polyethylene glycol 3350 17 Gram(s) Oral daily  senna 2 Tablet(s) Oral at bedtime    MEDICATIONS  (PRN):  aluminum hydroxide/magnesium hydroxide/simethicone Suspension 30 milliLiter(s) Oral every 4 hours PRN Dyspepsia  dextrose Oral Gel 15 Gram(s) Oral once PRN Blood Glucose LESS THAN 70 milliGRAM(s)/deciliter  melatonin 3 milliGRAM(s) Oral at bedtime PRN Insomnia  ondansetron Injectable 4 milliGRAM(s) IV Push every 8 hours PRN Nausea and/or Vomiting  oxyCODONE    IR 7.5 milliGRAM(s) Oral every 4 hours PRN Severe Pain (7 - 10)  oxyCODONE    IR 5 milliGRAM(s) Oral every 4 hours PRN Moderate Pain (4 - 6)      Allergies    No Known Allergies    Intolerances        LABS:                        10.2   9.80  )-----------( 134      ( 07 Sep 2023 05:25 )             30.6     09-07    134<L>  |  100  |  15  ----------------------------<  284<H>  4.0   |  19<L>  |  0.54    Ca    9.3      07 Sep 2023 05:25  Phos  3.2     09-07  Mg     1.70     09-07        Urinalysis Basic - ( 07 Sep 2023 05:25 )    Color: x / Appearance: x / SG: x / pH: x  Gluc: 284 mg/dL / Ketone: x  / Bili: x / Urobili: x   Blood: x / Protein: x / Nitrite: x   Leuk Esterase: x / RBC: x / WBC x   Sq Epi: x / Non Sq Epi: x / Bacteria: x        RADIOLOGY & ADDITIONAL TESTS:  Studies reviewed.

## 2023-09-08 NOTE — DISCHARGE NOTE PROVIDER - CARE PROVIDERS DIRECT ADDRESSES
[email or web address]

## 2023-09-08 NOTE — PROGRESS NOTE ADULT - ASSESSMENT
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 Consult for steroid induced DM management.    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    While inpatient   Now OFF steroids   Recommend Admelog LOW dose correctional scale before meals, low dose at bedtime  Check BG before meals and bedtime  Consistent carbohydrate diet  Hypoglycemia protocol    Discharge Plan:   Discharge off diabetes medications  Now that she is off steroids, she likely does NOT need to be on standing diabetes medication.  Recommend for patient to check FS 1-2 times daily and record values. Patient to call her doctor if she has consistent glucose greater than 200 mg/dL or less than 70 mg/dL.  If FS are elevated outpatient, can consider restarting low dose Metformin (500 mg daily or 500 mg BID)  Follow-up with outpatient primary care doctor to repeat A1c level in 2 to 3 months.  Recommend to prescribe glucometer (PATIENT DID NOT GET ONE AT LAST DISCHARGE FROM THE HOSPITAL)  Will need test strips, lancets and alcohol swabs as well     2. Steroid induced hyperglycemia   Steroid taper as follows --> now completed   Dexamethasone 4 mg q8h prior to 9/1  Dexamethasone 4mg q12h (9/1-9/3)  Dexamethasone 4mg qd (9/4- 9/6)  STOP ON 9/7.     3. Hypertension  BP goal <130/80  Not on antihypertensives   Continue to monitor     4. Hyperlipidemia  LDL goal less than 100 mg/dL  Continue to monitor annual lipid panel  Consider statin     Sejal Saleem  Nurse Practitioner  Division of Endocrinology & Diabetes  In house pager #[phone]/long range pager #[phone]    If before 9AM or after 6PM, or on weekends/holidays, please call endocrine answering service for assistance ([phone]).  For nonurgent matters email [email or web address] for assistance.     ENDOCRINE SIGNING OFF   NO DIABETES MEDICATIONS FOR DISCHARGE BUT NEEDS A GLUCOMETER  Call back if questions/concerns

## 2023-09-08 NOTE — PROGRESS NOTE ADULT - SUBJECTIVE AND OBJECTIVE BOX
CC: Patient is a 52y old  Female who presents with a chief complaint of epigastric pain (08 Sep 2023 12:00)      INTERVAL EVENTS: JEANINE    SUBJECTIVE / INTERVAL HPI: Patient seen and examined at bedside. Pt reports that she had a difficult night of sleep last night due to changing rooms twice. Complaining of headache and worsening pain this AM.    ROS: negative unless otherwise stated above.    VITAL SIGNS:  Vital Signs Last 24 Hrs  T(C): 37.4 (08 Sep 2023 12:45), Max: 37.4 (08 Sep 2023 12:45)  T(F): 99.4 (08 Sep 2023 12:45), Max: 99.4 (08 Sep 2023 12:45)  HR: 111 (08 Sep 2023 12:45) (100 - 111)  BP: 123/76 (08 Sep 2023 12:45) (123/76 - 133/92)  BP(mean): --  RR: 17 (08 Sep 2023 12:45) (17 - 20)  SpO2: 100% (08 Sep 2023 12:45) (90% - 100%)    Parameters below as of 08 Sep 2023 12:45  Patient On (Oxygen Delivery Method): room air            PHYSICAL EXAM:    General: somnolent and uncomfortable  HEENT: MMM  Neck: supple  Cardiovascular: +S1/S2; RRR  Respiratory: CTA B/L; no W/R/R  Gastrointestinal: soft, NT/ND  Extremities: WWP; no edema, clubbing or cyanosis  Vascular: 2+ radial, DP pulses B/L  Neurological: AAOx3; no focal deficits    MEDICATIONS:  MEDICATIONS  (STANDING):  chlorhexidine 2% Cloths 1 Application(s) Topical daily  dextrose 5%. 1000 milliLiter(s) (50 mL/Hr) IV Continuous <Continuous>  dextrose 50% Injectable 25 Gram(s) IV Push once  divalproex  milliGRAM(s) Oral two times a day  enoxaparin Injectable 40 milliGRAM(s) SubCutaneous every 24 hours  glucagon  Injectable 1 milliGRAM(s) IntraMuscular once  insulin lispro (ADMELOG) corrective regimen sliding scale   SubCutaneous at bedtime  insulin lispro (ADMELOG) corrective regimen sliding scale   SubCutaneous three times a day before meals  pantoprazole  Injectable 40 milliGRAM(s) IV Push two times a day  polyethylene glycol 3350 17 Gram(s) Oral daily  senna 2 Tablet(s) Oral at bedtime    MEDICATIONS  (PRN):  aluminum hydroxide/magnesium hydroxide/simethicone Suspension 30 milliLiter(s) Oral every 4 hours PRN Dyspepsia  dextrose Oral Gel 15 Gram(s) Oral once PRN Blood Glucose LESS THAN 70 milliGRAM(s)/deciliter  melatonin 3 milliGRAM(s) Oral at bedtime PRN Insomnia  ondansetron Injectable 4 milliGRAM(s) IV Push every 8 hours PRN Nausea and/or Vomiting  oxyCODONE    IR 5 milliGRAM(s) Oral every 4 hours PRN Moderate Pain (4 - 6)  oxyCODONE    IR 7.5 milliGRAM(s) Oral every 4 hours PRN Severe Pain (7 - 10)      ALLERGIES:  Allergies    No Known Allergies    Intolerances        LABS:                        10.2   9.80  )-----------( 134      ( 07 Sep 2023 05:25 )             30.6     09-07    134<L>  |  100  |  15  ----------------------------<  284<H>  4.0   |  19<L>  |  0.54    Ca    9.3      07 Sep 2023 05:25  Phos  3.2     09-07  Mg     1.70     09-07        Urinalysis Basic - ( 07 Sep 2023 05:25 )    Color: x / Appearance: x / SG: x / pH: x  Gluc: 284 mg/dL / Ketone: x  / Bili: x / Urobili: x   Blood: x / Protein: x / Nitrite: x   Leuk Esterase: x / RBC: x / WBC x   Sq Epi: x / Non Sq Epi: x / Bacteria: x      CAPILLARY BLOOD GLUCOSE      POCT Blood Glucose.: 151 mg/dL (08 Sep 2023 12:46)      RADIOLOGY & ADDITIONAL TESTS: Reviewed.

## 2023-09-08 NOTE — DISCHARGE NOTE PROVIDER - NSDCCPCAREPLAN_GEN_ALL_CORE_FT
PRINCIPAL DISCHARGE DIAGNOSIS  Diagnosis: Abdominal pain  Assessment and Plan of Treatment: You came into the hospital with abdominal pain. This pain is likely a combination of things.   You probably irritated your stomach when you took your steroids without a stomach acid suppressor causing gastritis (stomach irritation). We put you on an acid suppressor and this helped with your pain.   Your pain could also be related to your cancer which showed a mass pushing down on your diaphragm. We started you on some pain medications to help with this cancer-related pain. Please continue to take this oxycodone as needed. Most importantly, you should follow up with your oncologist to help get you started on treatment that might help treat this cancer.      SECONDARY DISCHARGE DIAGNOSES  Diagnosis: Fatigue  Assessment and Plan of Treatment: While you were in the hospital, you reported fatigue, especially when you were walking. This is probably from your cancer using up a lot of your energy. You should follow up with your oncologist to help get you started on treatment that might help treat this cancer.     PRINCIPAL DISCHARGE DIAGNOSIS  Diagnosis: Abdominal pain  Assessment and Plan of Treatment: You came into the hospital with abdominal pain. This pain is likely a combination of things.   You probably irritated your stomach when you took your steroids without a stomach acid suppressor causing gastritis (stomach irritation). We put you on an acid suppressor and this helped with your pain.   Your pain could also be related to your cancer which showed a mass pushing down on your diaphragm. We started you on some pain medications to help with this cancer-related pain. Please continue to take this oxycodone as needed. Most importantly, you should follow up with your oncologist to help get you started on treatment that might help treat this cancer.      SECONDARY DISCHARGE DIAGNOSES  Diagnosis: Lung cancer metastatic to brain  Assessment and Plan of Treatment: Follow up with Dr. Beckman, oncology, Dr. Bernard, radiation/oncology, and Dr. Metcalf, neurosurgery on discharge.    Diagnosis: Fatigue  Assessment and Plan of Treatment: While you were in the hospital, you reported fatigue, especially when you were walking. This is probably from your cancer using up a lot of your energy. You should follow up with your oncologist to help get you started on treatment that might help treat this cancer.

## 2023-09-08 NOTE — DISCHARGE NOTE PROVIDER - NSDCMRMEDTOKEN_GEN_ALL_CORE_FT
dexAMETHasone 4 mg oral tablet: 1 tab(s) orally 3 times a day Please take as follows: 1 tab orally every 8 hours tomorrow (8/31), 1 tab orally twice a day (9/1-9/3), 1 tab orally once a day (9/4-9/7), STOP taking on the 7th. Thank you! MDD: 12 mg  divalproex sodium 500 mg oral delayed release tablet: 1 tab(s) orally 2 times a day  MetFORMIN (Eqv-Fortamet) 500 mg oral tablet, extended release: 1 tab(s) orally 2 times a day  pantoprazole 40 mg oral delayed release tablet: 1 tab(s) orally once a day (before a meal)   alcohol swabs: Apply topically to affected area 2 times a day  divalproex sodium 500 mg oral delayed release tablet: 1 tab(s) orally 2 times a day  glucometer (per patient&#x27;s insurance): Test blood sugars 2 times a day. Dispense #1 glucometer.  lancets: 1 application subcutaneously 2 times a day  melatonin 3 mg oral tablet: 1 tab(s) orally once a day (at bedtime) As needed Insomnia  oxyCODONE 5 mg oral tablet: 1 tab(s) orally every 4 hours as needed for  severe pain iSTOP Reference #: [id number] MDD: 6  pantoprazole 40 mg oral delayed release tablet: 1 tab(s) orally once a day (before a meal)  polyethylene glycol 3350 oral powder for reconstitution: 17 gram(s) orally once a day  senna leaf extract oral tablet: 2 tab(s) orally once a day (at bedtime)  test strips (per patient&#x27;s insurance): 1 application subcutaneously 2 times a day. ** Compatible with patient&#x27;s glucometer **

## 2023-09-08 NOTE — PROGRESS NOTE ADULT - ASSESSMENT
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 Admitted for pain control  Oncology consulted for further evaluation     CTAP on 9/4  Suboptimal opacification of subsegmental branches of the pulmonary   arteries. No central pulmonary embolism.  Grossly stable right apical lung mass. Rapid interval growth of a left   lower lobe mass abutting the pleural surface.  Small pleural effusion.  New hepatic metastases.    -In re to brain mets , was on decadron taper , completed on  9/7  -Patient with continued epigastric pain, despite PPI BID, unlikely GI in etiology. Would defer to Pall Care for further pain management  -In re to POD in abdomen , patient with plans to start immunotherapy nivolumab as outpatient once decadron taper ends  Of note patient has been off immuno/ chemotherapy since 9/2022  -No plans for inpatient chemotherapy  -C/w Supportive care, pain control, Nutrition, PT, DVT ppx  -Rest of care as per primary team  -Patient to followup with Dr. Margarito Beckman ([facility]) upon discharge. Patient encouraged to call clinic to make an appointment  -Patient will also need Rad Onc and NeuroSX followup appointment--missed during this current admission.   -Oncology will continue to follow with you    Case d/w oncology attending      Prince BURTON  Oncology Physician Assistant  [facility] PATINO/RICHARD [facility]    Pager [phone] also available on TEAMS as Prince BURTON    If before 8am/after 5pm or on weekends please page On-call Oncology Fellow

## 2023-09-08 NOTE — PROGRESS NOTE ADULT - SUBJECTIVE AND OBJECTIVE BOX
Chief Complaint: Steroid induced diabetes     History: Patient seen at bedside. Reports she feels tired today. Tolerating meals, denies n/v, denies s/s of hypoglycemia  Last dose of Dexamethasone on 9/6  Mild hyperglycemia this morning, now most recent  mg/dl  Patient reports understanding of glucose monitoring as outpatient and followup     MEDICATIONS  (STANDING):  chlorhexidine 2% Cloths 1 Application(s) Topical daily  dextrose 5%. 1000 milliLiter(s) (50 mL/Hr) IV Continuous <Continuous>  dextrose 50% Injectable 25 Gram(s) IV Push once  divalproex  milliGRAM(s) Oral two times a day  enoxaparin Injectable 40 milliGRAM(s) SubCutaneous every 24 hours  glucagon  Injectable 1 milliGRAM(s) IntraMuscular once  insulin lispro (ADMELOG) corrective regimen sliding scale   SubCutaneous at bedtime  insulin lispro (ADMELOG) corrective regimen sliding scale   SubCutaneous three times a day before meals  pantoprazole  Injectable 40 milliGRAM(s) IV Push two times a day  polyethylene glycol 3350 17 Gram(s) Oral daily  senna 2 Tablet(s) Oral at bedtime    MEDICATIONS  (PRN):  aluminum hydroxide/magnesium hydroxide/simethicone Suspension 30 milliLiter(s) Oral every 4 hours PRN Dyspepsia  dextrose Oral Gel 15 Gram(s) Oral once PRN Blood Glucose LESS THAN 70 milliGRAM(s)/deciliter  melatonin 3 milliGRAM(s) Oral at bedtime PRN Insomnia  ondansetron Injectable 4 milliGRAM(s) IV Push every 8 hours PRN Nausea and/or Vomiting  oxyCODONE    IR 5 milliGRAM(s) Oral every 4 hours PRN Moderate Pain (4 - 6)  oxyCODONE    IR 7.5 milliGRAM(s) Oral every 4 hours PRN Severe Pain (7 - 10)    No Known Allergies    Review of Systems:  Cardiovascular: No chest pain  Respiratory: No SOB  GI: No nausea, vomiting  Endocrine: no hypoglycemia     PHYSICAL EXAM:  VITALS: T(C): 37.4 (09-08-23 @ 12:45)  T(F): 99.4 (09-08-23 @ 12:45), Max: 99.4 (09-08-23 @ 12:45)  HR: 111 (09-08-23 @ 12:45) (100 - 111)  BP: 123/76 (09-08-23 @ 12:45) (123/76 - 133/92)  RR:  (17 - 20)  SpO2:  (90% - 100%)  Wt(kg): --  GENERAL: NAD  EYES: No proptosis, anicteric  HEENT:  Atraumatic, Normocephalic  RESPIRATORY: unlabored respirations     CAPILLARY BLOOD GLUCOSE    POCT Blood Glucose.: 151 mg/dL (08 Sep 2023 12:46)  POCT Blood Glucose.: 231 mg/dL (08 Sep 2023 08:39)  POCT Blood Glucose.: 248 mg/dL (07 Sep 2023 21:59)  POCT Blood Glucose.: 247 mg/dL (07 Sep 2023 17:37)      09-07    134<L>  |  100  |  15  ----------------------------<  284<H>  4.0   |  19<L>  |  0.54    eGFR: 110    Ca    9.3      09-07  Mg     1.70     09-07  Phos  3.2     09-07        Anion Gap: 15 mmol/L (09-07-23 @ 05:25)  Anion Gap: 12 mmol/L (09-06-23 @ 06:43)      A1C with Estimated Average Glucose Result: 7.3 % (08-28-23 @ 07:00)  A1C with Estimated Average Glucose Result: 7.2 % (08-27-23 @ 07:28)  A1C with Estimated Average Glucose Result: 5.7 % (06-01-23 @ 06:19)      Diet, Regular:   Consistent Carbohydrate Evening Snack (CSTCHOSN) (09-04-23 @ 14:48)

## 2023-09-08 NOTE — DISCHARGE NOTE PROVIDER - HOSPITAL COURSE
53 yo F with PMH stay IV lung cancer w/mets to brain, steroid induced hyperglycemia presents with 3-day history of epigastric pain 2/2 increased cancer burden vs gastritis.    #Lung cancer metastatic to brain   - presents with worsening epigastric pain in the setting of taking steroids without GI ppx  - CTA on admission: No PE. Stable right apical lung mass. Rapid interval growth of a left lower lobe mass abutting the pleural space. New hepatic metastases  - s/p dexamethasone taper  Plan:  - epigastric pain appears to be more consistent with gastritis in the setting of taking steroids without PPI. reports pain improving with PPI. could also be likely 2/2 worsening metastatic disease as LLL mass abutting pleural space.  - palliative recs appreciated  - continue protonix 40 PO BID for 4 weeks  - c/w depakote for migraines. Can also treat with reglan  - pain control with oxy 5 q4 PRN for moderate and oxy 7.5 q4 PRN for severe   - OP rad onc, NSG follow up  - plans to start immunotherapy nivolumab as outpatient.    #Diabetes mellitus.   - hgba1c 7.3 8/28   - endo recs appreciated  Plan:  - on o/p prescribe glucometer, testing strips, and lancing devices and lancets for patient to monitor glucose once to twice daily. Does not need to be on standing diabetes medication.  - Follow-up with outpatient primary care doctor to repeat A1c level in 2 to 3 months. 53 yo F with PMH stay IV lung cancer w/mets to brain, steroid induced hyperglycemia presents with 3-day history of epigastric pain 2/2 increased cancer burden vs gastritis.    #Lung cancer metastatic to brain   - presents with worsening epigastric pain in the setting of taking steroids without GI ppx  - CTA on admission: No PE. Stable right apical lung mass. Rapid interval growth of a left lower lobe mass abutting the pleural space. New hepatic metastases  - s/p dexamethasone taper  Plan:  - epigastric pain appears to be more consistent with gastritis in the setting of taking steroids without PPI. reports pain improving with PPI. could also be likely 2/2 worsening metastatic disease as LLL mass abutting pleural space.  - palliative recs appreciated  - continue Protonix 40 PO BID for 4 weeks  - c/w Depakote for migraines. Can also treat with Reglan  - pain control with oxy 5 q4 PRN for moderate and oxy 7.5 q4 PRN for severe   - OP rad onc, NSG follow up  - plans to start immunotherapy nivolumab as outpatient.    #Diabetes mellitus.   - hgba1c 7.3 8/28   - endo recs appreciated  Plan:  - on o/p prescribe glucometer, testing strips, and lancing devices and lancets for patient to monitor glucose once to twice daily. Does not need to be on standing diabetes medication.  - Follow-up with outpatient primary care doctor to repeat A1c level in 2 to 3 months. 53 yo F with PMH stay IV lung cancer w/mets to brain, steroid induced hyperglycemia presents with 3-day history of epigastric pain 2/2 increased cancer burden vs gastritis.    #Lung cancer metastatic to brain   - presents with worsening epigastric pain in the setting of taking steroids without GI ppx  - CTA on admission: No PE. Stable right apical lung mass. Rapid interval growth of a left lower lobe mass abutting the pleural space. New hepatic metastases  - s/p dexamethasone taper  Plan:  - epigastric pain appears to be more consistent with gastritis in the setting of taking steroids without PPI. reports pain improving with PPI. could also be likely 2/2 worsening metastatic disease as LLL mass abutting pleural space.  - palliative recs appreciated  - continue Protonix 40 PO BID for 4 weeks  - c/w Depakote for migraines. Can also treat with Reglan  - pain control with oxy 5 q4 PRN for moderate and oxy 7.5 q4 PRN for severe   - OP rad onc, NSG follow up  - plans to start immunotherapy nivolumab as outpatient.  -Pt does not need require oxygen, O2 sat 96%.    #Diabetes mellitus.   - hgba1c 7.3 8/28   - endo recs appreciated  Plan:  - on o/p prescribe glucometer, testing strips, and lancing devices and lancets for patient to monitor glucose once to twice daily. Does not need to be on standing diabetes medication.  - Follow-up with outpatient primary care doctor to repeat A1c level in 2 to 3 months. 53 yo F with PMH stay IV lung cancer w/mets to brain, steroid induced hyperglycemia presents with 3-day history of epigastric pain 2/2 increased cancer burden vs gastritis.    #Lung cancer metastatic to brain   - presents with worsening epigastric pain in the setting of taking steroids without GI ppx  - CTA on admission: No PE. Stable right apical lung mass. Rapid interval growth of a left lower lobe mass abutting the pleural space. New hepatic metastases  - s/p dexamethasone taper  Plan:  - epigastric pain appears to be more consistent with gastritis in the setting of taking steroids without PPI. reports pain improving with PPI. could also be likely 2/2 worsening metastatic disease as LLL mass abutting pleural space.  - palliative recs appreciated  - continue Protonix 40 PO BID for 4 weeks  - c/w Depakote for migraines. Can also treat with Reglan  - pain control with oxy 5 q4 PRN for moderate and oxy 7.5 q4 PRN for severe   - OP rad onc, NSG follow up  - plans to start immunotherapy nivolumab as outpatient.  -Pt does not need require oxygen, O2 sat 96%.    #Diabetes mellitus.   - hgba1c 7.3 8/28   - endo recs appreciated  Plan:  - on o/p prescribe glucometer, testing strips, and lancing devices and lancets for patient to monitor glucose once to twice daily. Does not need to be on standing diabetes medication.  - Follow-up with outpatient primary care doctor to repeat A1c level in 2 to 3 months.    #DVT ppx in hospital given hypercoaguability I/s/o malignancy

## 2023-09-08 NOTE — DISCHARGE NOTE PROVIDER - NSDCFUADDAPPT_GEN_ALL_CORE_FT
Follow up with your primary care provider in 1-2 weeks of discharge.    Follow up with your oncologist, Dr. Beckman, at [facility] on discharge.    Follow up with neurosurgery, Dr. Metcalf, on discharge.    Follow up with radiation oncology, Dr. Bernard, on discharge.

## 2023-09-08 NOTE — PROGRESS NOTE ADULT - PROBLEM SELECTOR PLAN 1
- presents with worsening epigastric pain in the setting of taking steroids without GI ppx  - CTA on admission: No PE. Stable right apical lung mass. Rapid interval growth of a left lower lobe mass. New hepatic metastases  - s/p dexamethasone taper  Plan:  - epigastric pain appears to be more consistent with gastritis in the setting of taking steroids without PPI. reports pain improving with PPI. could also be likely 2/2 worsening metastatic disease  - palliative following, recs appreciated  - continue protonix 40 IV BID for now   - c/w depakote for migraines. Can also treat with reglan  - pain control with oxy 5 q4 PRN for moderate and oxy 7.5 q4 PRN for severe   - OP rad onc, NSG follow up  - plans to start immunotherapy nivolumab as outpatient

## 2023-09-08 NOTE — DISCHARGE NOTE PROVIDER - PROVIDER TOKENS
1. Continue Lasix to 40 mg po daily. She was advised to take aspirin 81 mg po daily  2. Continue Eliquis 2.5 mg po BID (low dose due to hematuria)  3. Continue rest of current medications  4. There are no contraindications for flying. 5. Follow up with me in 6 months. PROVIDER:[TOKEN:[[id number]]],PROVIDER:[TOKEN:[[id number]]],PROVIDER:[TOKEN:[[id number]]],PROVIDER:[TOKEN:[[id number]]]

## 2023-09-08 NOTE — DISCHARGE NOTE PROVIDER - NSFOLLOWUPCLINICS_GEN_ALL_ED_FT
[facility]  Hematology/Oncology  [address] [id number]  Phone: [phone]  Fax:

## 2023-09-08 NOTE — DISCHARGE NOTE PROVIDER - NSDCFUSCHEDAPPT_GEN_ALL_CORE_FT
[facility]  NEUROSURG [address] [place] [address]  Scheduled Appointment: 09/14/2023

## 2023-09-09 NOTE — PROGRESS NOTE ADULT - SUBJECTIVE AND OBJECTIVE BOX
Patient is a 52y old  Female who presents with a chief complaint of abdominal pain (08 Sep 2023 16:09)      SUBJECTIVE / OVERNIGHT EVENTS:    No events overnight. This AM, patient without n/v/d/cp/sob.  Patient states she has epigastric pain and denies any acute complaints at this time.     MEDICATIONS  (STANDING):  chlorhexidine 2% Cloths 1 Application(s) Topical daily  dextrose 5%. 1000 milliLiter(s) (50 mL/Hr) IV Continuous <Continuous>  dextrose 50% Injectable 25 Gram(s) IV Push once  divalproex  milliGRAM(s) Oral two times a day  enoxaparin Injectable 40 milliGRAM(s) SubCutaneous every 24 hours  glucagon  Injectable 1 milliGRAM(s) IntraMuscular once  insulin lispro (ADMELOG) corrective regimen sliding scale   SubCutaneous three times a day before meals  insulin lispro (ADMELOG) corrective regimen sliding scale   SubCutaneous at bedtime  polyethylene glycol 3350 17 Gram(s) Oral daily  senna 2 Tablet(s) Oral at bedtime  sodium chloride 0.9%. 1000 milliLiter(s) (80 mL/Hr) IV Continuous <Continuous>    MEDICATIONS  (PRN):  aluminum hydroxide/magnesium hydroxide/simethicone Suspension 30 milliLiter(s) Oral every 4 hours PRN Dyspepsia  dextrose Oral Gel 15 Gram(s) Oral once PRN Blood Glucose LESS THAN 70 milliGRAM(s)/deciliter  melatonin 3 milliGRAM(s) Oral at bedtime PRN Insomnia  ondansetron Injectable 4 milliGRAM(s) IV Push every 8 hours PRN Nausea and/or Vomiting  oxyCODONE    IR 7.5 milliGRAM(s) Oral every 4 hours PRN Severe Pain (7 - 10)  oxyCODONE    IR 5 milliGRAM(s) Oral every 4 hours PRN Moderate Pain (4 - 6)      PHYSICAL EXAM:  T(C): 37.2 (09-09-23 @ 13:36), Max: 37.2 (09-09-23 @ 13:36)  HR: 111 (09-09-23 @ 13:36) (107 - 112)  BP: 114/76 (09-09-23 @ 13:36) (104/72 - 146/93)  RR: 18 (09-09-23 @ 13:36) (17 - 18)  SpO2: 99% (09-09-23 @ 13:36) (97% - 99%)  I&O's Summary    GENERAL: NAD, pt resting in bed   HEAD:  Atraumatic, Normocephalic, MMM  CHEST/LUNG: No use of accessory muscles, CTA B/L, breathing non-labored  COR: RR, no m/r/c/g  ABD: Soft, ND/ NT, +BS  PSYCH: AAOx3  NEUROLOGY: CN II-XII grossly intact, moving all extremities  SKIN: No rashes or lesions  EXT: no LE edema noted B/L     LABS:  CAPILLARY BLOOD GLUCOSE      POCT Blood Glucose.: 157 mg/dL (09 Sep 2023 12:32)  POCT Blood Glucose.: 175 mg/dL (09 Sep 2023 08:51)  POCT Blood Glucose.: 194 mg/dL (08 Sep 2023 23:20)  POCT Blood Glucose.: 144 mg/dL (08 Sep 2023 18:12)                        RADIOLOGY & ADDITIONAL TESTS:    Imaging Personally Reviewed -     Imaging Reviewed -     Consultant(s) Notes Reviewed -       Care Discussed with Consultants/Other Providers -

## 2023-09-09 NOTE — PROGRESS NOTE ADULT - PROBLEM SELECTOR PLAN 1
presents with worsening epigastric pain in the setting of taking steroids without GI ppx  CTA on admission: No PE. Stable right apical lung mass. Rapid interval growth of a left lower lobe mass. New hepatic metastases  s/p dexamethasone taper    epigastric pain appears to be more consistent with gastritis in the setting of taking steroids without PPI. reports pain improving with PPI. could also be likely 2/2 worsening metastatic disease    - Palliative following, recs appreciated  - Continue Protonix 40 IV BID for now   - Continue Depakote for migraines. Reglan as prn   - Continue pain control - Oxy 5 and 7.5 PRN   - Bowel regimen  - will need outpt Rad onc, NSG follow up  - plan to start immunotherapy nivolumab as outpatient

## 2023-09-09 NOTE — PROGRESS NOTE ADULT - ASSESSMENT
51 yo F with PMH stay IV lung cancer w/mets to brain, steroid induced hyperglycemia presents with 3-day history of epigastric pain 2/2 increased cancer burden vs gastritis.    Admitted for further evaluation.

## 2023-09-09 NOTE — PROGRESS NOTE ADULT - PROBLEM SELECTOR PLAN 4
on 8/28- HgbA1c 7.3      - Continue Lantus and Lispro premeal and ISS   - CC diet   - Endo following  - Upon discharge, will need glucometer, testing strips, and lancing devices and lancets for patient to monitor glucose once to twice daily. As per Endo, does not need to be on diabetes medication upon discharge   - Follow-up with outpatient primary care doctor to repeat A1c level in 2 to 3 months

## 2023-09-10 NOTE — PROGRESS NOTE ADULT - SUBJECTIVE AND OBJECTIVE BOX
Patient is a 52y old  Female who presents with a chief complaint of Abdominal pain (09 Sep 2023 15:25)      SUBJECTIVE / OVERNIGHT EVENTS:    No events overnight. This AM, patient without n/v/d/cp/sob.  Patient states pain is well controlled and feels well. Denies any acute complaints at this time.     MEDICATIONS  (STANDING):  chlorhexidine 2% Cloths 1 Application(s) Topical daily  dextrose 5%. 1000 milliLiter(s) (50 mL/Hr) IV Continuous <Continuous>  dextrose 50% Injectable 25 Gram(s) IV Push once  divalproex  milliGRAM(s) Oral two times a day  enoxaparin Injectable 40 milliGRAM(s) SubCutaneous every 24 hours  glucagon  Injectable 1 milliGRAM(s) IntraMuscular once  insulin lispro (ADMELOG) corrective regimen sliding scale   SubCutaneous at bedtime  insulin lispro (ADMELOG) corrective regimen sliding scale   SubCutaneous three times a day before meals  pantoprazole    Tablet 40 milliGRAM(s) Oral before breakfast  polyethylene glycol 3350 17 Gram(s) Oral daily  senna 2 Tablet(s) Oral at bedtime  sodium chloride 0.9%. 1000 milliLiter(s) (80 mL/Hr) IV Continuous <Continuous>    MEDICATIONS  (PRN):  aluminum hydroxide/magnesium hydroxide/simethicone Suspension 30 milliLiter(s) Oral every 4 hours PRN Dyspepsia  dextrose Oral Gel 15 Gram(s) Oral once PRN Blood Glucose LESS THAN 70 milliGRAM(s)/deciliter  melatonin 3 milliGRAM(s) Oral at bedtime PRN Insomnia  ondansetron Injectable 4 milliGRAM(s) IV Push every 8 hours PRN Nausea and/or Vomiting  oxyCODONE    IR 7.5 milliGRAM(s) Oral every 4 hours PRN Severe Pain (7 - 10)  oxyCODONE    IR 5 milliGRAM(s) Oral every 4 hours PRN Moderate Pain (4 - 6)      PHYSICAL EXAM:  T(C): 36.7 (09-10-23 @ 15:09), Max: 37.1 (09-09-23 @ 22:19)  HR: 99 (09-10-23 @ 15:09) (99 - 109)  BP: 115/80 (09-10-23 @ 15:09) (113/72 - 119/75)  RR: 18 (09-10-23 @ 15:09) (18 - 18)  SpO2: 100% (09-10-23 @ 15:09) (97% - 100%)  I&O's Summary    GENERAL: NAD, pt resting in bed   HEAD:  Atraumatic, Normocephalic, MMM  CHEST/LUNG: No use of accessory muscles, CTA B/L, breathing non-labored  COR: RR, no m/r/c/g  ABD: Soft, ND/ NT, +BS  PSYCH: AAOx3  NEUROLOGY: CN II-XII grossly intact, moving all extremities  SKIN: No rashes or lesions  EXT: no LE edema noted B/L       LABS:  CAPILLARY BLOOD GLUCOSE      POCT Blood Glucose.: 170 mg/dL (10 Sep 2023 12:47)  POCT Blood Glucose.: 171 mg/dL (10 Sep 2023 08:35)  POCT Blood Glucose.: 191 mg/dL (09 Sep 2023 21:51)  POCT Blood Glucose.: 164 mg/dL (09 Sep 2023 18:17)                        RADIOLOGY & ADDITIONAL TESTS:    Imaging Personally Reviewed -     Imaging Reviewed -     Consultant(s) Notes Reviewed -       Care Discussed with Consultants/Other Providers -

## 2023-09-10 NOTE — PROGRESS NOTE ADULT - PROBLEM SELECTOR PLAN 1
p/w worsening epigastric pain in the setting of taking steroids without GI ppx  CTA on admission: No PE. Stable right apical lung mass. Rapid interval growth of a left lower lobe mass. New hepatic metastases. s/p dexamethasone taper    epigastric pain appears to be more consistent with gastritis in the setting of taking steroids without PPI. reports pain improving with PPI. could also be likely 2/2 worsening metastatic disease    - Palliative following, recs appreciated  - Continue Protonix 40 IV BID transitioned to PO Protonix 40mg daily   - Continue Depakote for migraines. Reglan as prn   - Continue pain control - Oxy 5mg and 7.5mg PRN   - Bowel regimen  - will need outpt Rad onc, NSG follow up  - plan to start immunotherapy nivolumab as outpatient

## 2023-09-10 NOTE — PROGRESS NOTE ADULT - PROBLEM SELECTOR PLAN 3
CT A/P: Suboptimal opacification of subsegmental branches of the pulmonary arteries. No central pulmonary embolism. Grossly stable right apical lung mass. Rapid interval growth of a left  lower lobe mass abutting the pleural surface. Small pleural effusion. New hepatic metastases.  Now improving    - Continue PPI   - Pain control   - Bowel regimen

## 2023-09-11 NOTE — PROGRESS NOTE ADULT - ASSESSMENT
53 yo F with PMH stay IV lung cancer w/mets to brain, steroid induced hyperglycemia presents with 3-day history of epigastric pain 2/2 increased cancer burden vs gastritis.    Admitted for further evaluation.

## 2023-09-11 NOTE — PROVIDER CONTACT NOTE (OTHER) - BACKGROUND
Lung ca with mets to brain
malignant ,neoplasm of the Bronchus,, diabetes
UTI, suprapubic catheter, HTN, Paraplegia
3

## 2023-09-11 NOTE — PROVIDER CONTACT NOTE (OTHER) - REASON
, Patient refused 1 unit insulin coverage
Patient c/o pain 9/10, refused morphine IR 15mg, refused tylenol IV , refused dulcolax suppository, refused insulin coverage
Refusing Blood draws and IV placement

## 2023-09-11 NOTE — PROGRESS NOTE ADULT - PROBLEM SELECTOR PLAN 1
p/w worsening epigastric pain in the setting of taking steroids without GI ppx  CTA on admission: No PE. Stable right apical lung mass. Rapid interval growth of a left lower lobe mass. New hepatic metastases. s/p dexamethasone taper    epigastric pain appears to be more consistent with gastritis in the setting of taking steroids without PPI. reports pain improving with PPI. could also be likely 2/2 worsening metastatic disease    - Palliative following, recs appreciated  - Continue Protonix 40 IV BID transitioned to PO Protonix 40mg daily   - Continue Depakote for migraines. Reglan as prn   - Continue pain control - Oxy 5mg and 7.5mg PRN - will discharge with 3 days worth of pain meds   - Bowel regimen  - will need outpt Rad onc, NSG follow up  - plan to start immunotherapy nivolumab as outpatient

## 2023-09-11 NOTE — PROVIDER CONTACT NOTE (OTHER) - ACTION/TREATMENT ORDERED:
Covering ACP made aware; protonix iv replaced with po, teaching education done regarding blood draws/ iv placement; pt continues to refuse
No distress noted
Acp made aware, education provided.

## 2023-09-11 NOTE — PROGRESS NOTE ADULT - SUBJECTIVE AND OBJECTIVE BOX
[facility]  Division of Hospital Medicine  Nel Corbett MD  Pager: [phone]      Patient is a 52y old  Female who presents with a chief complaint of Abdominal pain (10 Sep 2023 16:18)      SUBJECTIVE / OVERNIGHT EVENTS: Patient seen and examined at bedside. Reports feeling better. Pain better controlled. Discussed that patient is on oral regimen and will be able to be discharged on same regimen. Reports that she felt unsteady on her feet when she went to the bathroom. Discussed PT recommendations and recommended to take her time when getting up. Discussed with ACP no report of fall   ADDITIONAL REVIEW OF SYSTEMS:    MEDICATIONS  (STANDING):  chlorhexidine 2% Cloths 1 Application(s) Topical daily  dextrose 5%. 1000 milliLiter(s) (50 mL/Hr) IV Continuous <Continuous>  dextrose 50% Injectable 25 Gram(s) IV Push once  divalproex  milliGRAM(s) Oral two times a day  enoxaparin Injectable 40 milliGRAM(s) SubCutaneous every 24 hours  glucagon  Injectable 1 milliGRAM(s) IntraMuscular once  insulin lispro (ADMELOG) corrective regimen sliding scale   SubCutaneous at bedtime  insulin lispro (ADMELOG) corrective regimen sliding scale   SubCutaneous three times a day before meals  pantoprazole    Tablet 40 milliGRAM(s) Oral before breakfast  polyethylene glycol 3350 17 Gram(s) Oral daily  senna 2 Tablet(s) Oral at bedtime    MEDICATIONS  (PRN):  aluminum hydroxide/magnesium hydroxide/simethicone Suspension 30 milliLiter(s) Oral every 4 hours PRN Dyspepsia  dextrose Oral Gel 15 Gram(s) Oral once PRN Blood Glucose LESS THAN 70 milliGRAM(s)/deciliter  melatonin 3 milliGRAM(s) Oral at bedtime PRN Insomnia  ondansetron Injectable 4 milliGRAM(s) IV Push every 8 hours PRN Nausea and/or Vomiting  oxyCODONE    IR 7.5 milliGRAM(s) Oral every 4 hours PRN Severe Pain (7 - 10)  oxyCODONE    IR 5 milliGRAM(s) Oral every 4 hours PRN Moderate Pain (4 - 6)      CAPILLARY BLOOD GLUCOSE      POCT Blood Glucose.: 136 mg/dL (11 Sep 2023 12:52)  POCT Blood Glucose.: 134 mg/dL (11 Sep 2023 08:59)  POCT Blood Glucose.: 174 mg/dL (10 Sep 2023 22:03)  POCT Blood Glucose.: 188 mg/dL (10 Sep 2023 17:35)    I&O's Summary      PHYSICAL EXAM:  Vital Signs Last 24 Hrs  T(C): 36.8 (11 Sep 2023 05:30), Max: 37.1 (10 Sep 2023 22:41)  T(F): 98.3 (11 Sep 2023 05:30), Max: 98.8 (10 Sep 2023 22:41)  HR: 108 (11 Sep 2023 05:30) (99 - 108)  BP: 108/74 (11 Sep 2023 05:30) (108/74 - 115/80)  BP(mean): --  RR: 18 (11 Sep 2023 05:30) (18 - 18)  SpO2: 96% (11 Sep 2023 05:30) (96% - 100%)    Parameters below as of 11 Sep 2023 05:30  Patient On (Oxygen Delivery Method): nasal cannula  O2 Flow (L/min): 2  GENERAL: NAD, pt resting in bed   HEAD:  Atraumatic, Normocephalic, MMM  CHEST/LUNG: No use of accessory muscles, CTA B/L, breathing non-labored  COR: RR, no m/r/c/g  ABD: Soft, ND/ NT, +BS  PSYCH: AAOx3  NEUROLOGY: CN II-XII grossly intact, moving all extremities  SKIN: No rashes or lesions  EXT: no LE edema noted B/L     LABS:                      RADIOLOGY & ADDITIONAL TESTS:  Results Reviewed:   Imaging Personally Reviewed:  Electrocardiogram Personally Reviewed:    COORDINATION OF CARE:  Care Discussed with Consultants/Other Providers [Y/N]:  Prior or Outpatient Records Reviewed [Y/N]:

## 2023-09-11 NOTE — PROVIDER CONTACT NOTE (OTHER) - SITUATION
, Patient refused 1 unit insulin coverage
Patient c/o pain 9/10, refused morphine IR 15mg, refused Tylenol IV , refused dulcolax suppository, refused insulin coverage
Pt refusing blood draws and IV Placement, states "we take blood everyday but I am not getting better"

## 2023-09-12 NOTE — PROGRESS NOTE ADULT - PROBLEM SELECTOR PLAN 2
- Continue Pantoprazole 40mg IV BID
- continue protonix 40mg BID
- continue protonix 40mg BID
- Continue Protonix 40mg daily  - follow up with GI outpt
- continue protonix 40mg BID
- hgba1c 7.3 8/28   - breana following, recs appreciated  Plan:  - lantus 3u, lispro 2u  - ISS  - on o/p prescribe glucometer, testing strips, and lancing devices and lancets for patient to monitor glucose once to twice daily. Does not need to be on standing diabetes medication.  - Follow-up with outpatient primary care doctor to repeat A1c level in 2 to 3 months.
- Continue Protonix 40mg daily  - follow up with GI outpt
- Continue Protonix 40mg BID  - Continue IVF
- Continue Protonix 40mg daily  - follow up with GI outpt
- Continue Pantoprazole 40mg IV BID

## 2023-09-12 NOTE — PROGRESS NOTE ADULT - PROVIDER SPECIALTY LIST ADULT
Heme/Onc
Hospitalist
Hospitalist
Palliative Care
Endocrinology
Hospitalist
Palliative Care
Hospitalist

## 2023-09-12 NOTE — PROGRESS NOTE ADULT - PROBLEM SELECTOR PROBLEM 5
Leukocytosis
Leukocytosis
Encounter for palliative care
Leukocytosis
Prophylactic measure
Leukocytosis
Leukocytosis
Encounter for palliative care

## 2023-09-12 NOTE — DISCHARGE NOTE NURSING/CASE MANAGEMENT/SOCIAL WORK - NSDCFUADDAPPT_GEN_ALL_CORE_FT
Follow up with your primary care provider in 1-2 weeks of discharge.    Follow up with your oncologist, Dr. Beckman, at [facility] on discharge.    Follow up with neurosurgery, Dr. Metcalf, on discharge.    Follow up with radiation oncology, Dr. Bernard, on discharge.

## 2023-09-12 NOTE — PROGRESS NOTE ADULT - PROBLEM SELECTOR PROBLEM 6
Prophylactic measure
GERD (gastroesophageal reflux disease)
Prophylactic measure

## 2023-09-12 NOTE — PROGRESS NOTE ADULT - PROBLEM SELECTOR PLAN 6
DVT ppx: lovenox.  Code status: full
DVT ppx: lovenox.  Code status: full
DVT ppx: Lovenox     Dispo: once medically optimized
DVT ppx: Lovenox     Dispo: can likely be discharged in am once pain is better controlled.
DVT ppx: Lovenox     Dispo: can likely be discharged in am once pain is better controlled.  35 minutes spent discharge planning. Patient may need discharge notice - awaiting ride from 
DVT ppx: Lovenox     Dispo: can likely be discharged in am once pain is better controlled.  35 minutes spent discharge planning. Patient may need discharge notice

## 2023-09-12 NOTE — PROGRESS NOTE ADULT - PROBLEM SELECTOR PROBLEM 1
Diabetes mellitus
Lung cancer metastatic to brain
Diabetes mellitus
Lung cancer metastatic to brain
Diabetes mellitus
Lung cancer metastatic to brain

## 2023-09-12 NOTE — PROGRESS NOTE ADULT - PROBLEM SELECTOR PROBLEM 2
Gastritis
Hyperlipidemia
Gastritis
Gastritis
Hyperlipidemia
Hyperlipidemia
GERD (gastroesophageal reflux disease)
Gastritis
GERD (gastroesophageal reflux disease)
Gastritis
Diabetes mellitus

## 2023-09-12 NOTE — CHART NOTE - NSCHARTNOTEFT_GEN_A_CORE
Patient seen and examined at bedside. Pain better controlled. Medically stable for discharge 35 minutes spent discharge planning. Does not qualify for home oxygen
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    Consult for steroid induced DM management.    Steroid taper:  Dexamethasone 4 mg q8h prior to 9/1  Dexamethasone 4mg q12h (9/1-9/3)  4mg qd (9/4- 9/6)  discontinue on 9/7.     At previous admission, patient was on decadron 4mg q8h and inpatient regimen was:  -Lantus 14 units SQ qHS   -Admelog 12 units SQ TID before meals   -Admelog moderate dose correctional scale before meals, moderate at bedtime    Discharged with metformin 500mg BID to take from 9/1-9/9, Repaglinide 2mg TID (to be reduced to 1mg TID on 9/4).    CAPILLARY BLOOD GLUCOSE  POCT Blood Glucose.: 148 mg/dL (04 Sep 2023 17:12)    Preliminary recommendations:  - Recommend Lantus 3 units QHS  - Recommend Admelog 2 units TIDAC  - Recommend low dose correctional scale before meals, low dose at bedtime    Full consult to follow tomorrow    Johnny Saldaña MD  Endocrine Fellow  Can be reached via teams. For follow up questions, discharge recommendations, or new consults, please call answering service at [phone] (weekdays); [phone] (nights/weekends).
I spoke with Pt's next of kin Hammad [phone] and confirmed he will be able to  Pt this evening.

## 2023-09-12 NOTE — PROGRESS NOTE ADULT - SUBJECTIVE AND OBJECTIVE BOX
[facility]  Division of Hospital Medicine  Nel Corbett MD  Pager: [phone]      Patient is a 52y old  Female who presents with a chief complaint of Abdominal pain (10 Sep 2023 16:18)      SUBJECTIVE / OVERNIGHT EVENTS: Patient examined at bedside. Oxygen sats examined. Does not qualify for oxygen. Awaiting ride for  for discharge   ADDITIONAL REVIEW OF SYSTEMS:    MEDICATIONS  (STANDING):  chlorhexidine 2% Cloths 1 Application(s) Topical daily  dextrose 5%. 1000 milliLiter(s) (50 mL/Hr) IV Continuous <Continuous>  dextrose 50% Injectable 25 Gram(s) IV Push once  divalproex  milliGRAM(s) Oral two times a day  enoxaparin Injectable 40 milliGRAM(s) SubCutaneous every 24 hours  glucagon  Injectable 1 milliGRAM(s) IntraMuscular once  insulin lispro (ADMELOG) corrective regimen sliding scale   SubCutaneous at bedtime  insulin lispro (ADMELOG) corrective regimen sliding scale   SubCutaneous three times a day before meals  pantoprazole    Tablet 40 milliGRAM(s) Oral before breakfast  polyethylene glycol 3350 17 Gram(s) Oral daily  senna 2 Tablet(s) Oral at bedtime    MEDICATIONS  (PRN):  aluminum hydroxide/magnesium hydroxide/simethicone Suspension 30 milliLiter(s) Oral every 4 hours PRN Dyspepsia  dextrose Oral Gel 15 Gram(s) Oral once PRN Blood Glucose LESS THAN 70 milliGRAM(s)/deciliter  melatonin 3 milliGRAM(s) Oral at bedtime PRN Insomnia  ondansetron Injectable 4 milliGRAM(s) IV Push every 8 hours PRN Nausea and/or Vomiting  oxyCODONE    IR 7.5 milliGRAM(s) Oral every 4 hours PRN Severe Pain (7 - 10)  oxyCODONE    IR 5 milliGRAM(s) Oral every 4 hours PRN Moderate Pain (4 - 6)      CAPILLARY BLOOD GLUCOSE      POCT Blood Glucose.: 128 mg/dL (13 Sep 2023 08:57)  POCT Blood Glucose.: 162 mg/dL (12 Sep 2023 21:44)  POCT Blood Glucose.: 167 mg/dL (12 Sep 2023 17:59)  POCT Blood Glucose.: 171 mg/dL (12 Sep 2023 13:12)    I&O's Summary      PHYSICAL EXAM:  Vital Signs Last 24 Hrs  T(C): 37 (13 Sep 2023 07:30), Max: 37 (13 Sep 2023 07:30)  T(F): 98.6 (13 Sep 2023 07:30), Max: 98.6 (13 Sep 2023 07:30)  HR: 99 (13 Sep 2023 07:30) (99 - 113)  BP: 113/75 (13 Sep 2023 07:30) (108/74 - 123/82)  BP(mean): --  RR: 19 (13 Sep 2023 07:30) (19 - 20)  SpO2: 95% (13 Sep 2023 07:30) (94% - 95%)    Parameters below as of 13 Sep 2023 07:30  Patient On (Oxygen Delivery Method): room air      GENERAL: NAD, pt resting in bed   HEAD:  Atraumatic, Normocephalic, MMM  CHEST/LUNG: No use of accessory muscles, CTA B/L, breathing non-labored  COR: RR, no m/r/c/g  ABD: Soft, ND/ NT, +BS  PSYCH: AAOx3  NEUROLOGY: CN II-XII grossly intact, moving all extremities  SKIN: No rashes or lesions  EXT: no LE edema noted B/L   LABS:                        11.4   6.03  )-----------( 109      ( 12 Sep 2023 06:30 )             34.7     09-12    137  |  99  |  10  ----------------------------<  131<H>  4.9   |  25  |  0.51    Ca    9.5      12 Sep 2023 06:30  Phos  3.6     09-12  Mg     1.90     09-12            Urinalysis Basic - ( 12 Sep 2023 06:30 )    Color: x / Appearance: x / SG: x / pH: x  Gluc: 131 mg/dL / Ketone: x  / Bili: x / Urobili: x   Blood: x / Protein: x / Nitrite: x   Leuk Esterase: x / RBC: x / WBC x   Sq Epi: x / Non Sq Epi: x / Bacteria: x          RADIOLOGY & ADDITIONAL TESTS:  Results Reviewed:   Imaging Personally Reviewed:  Electrocardiogram Personally Reviewed:    COORDINATION OF CARE:  Care Discussed with Consultants/Other Providers [Y/N]:  Prior or Outpatient Records Reviewed [Y/N]:

## 2023-09-12 NOTE — DISCHARGE NOTE NURSING/CASE MANAGEMENT/SOCIAL WORK - PATIENT PORTAL LINK FT
You can access the FollowMyHealth Patient Portal offered by [facility] by registering at the following website: [email or web address]. By joining [facility]’s FollowMyHealth portal, you will also be able to view your health information using other applications (apps) compatible with our system.

## 2023-09-12 NOTE — DISCHARGE NOTE NURSING/CASE MANAGEMENT/SOCIAL WORK - NSDCPEFALRISK_GEN_ALL_CORE
For information on Fall & Injury Prevention, visit: [email or web address] OR  [email or web address] OR  https://www.cdc.gov/steadi/patient.html

## 2023-09-12 NOTE — PROGRESS NOTE ADULT - PROBLEM SELECTOR PROBLEM 3
Abdominal pain
Hypertension
Abdominal pain
Hypertension
Hypertension
Abdominal pain
Abdominal pain
Leukocytosis
Abdominal pain

## 2023-09-12 NOTE — PROGRESS NOTE ADULT - TIME BILLING
Review of laboratory data, radiology results, consultants' recommendations, documentation in [place], discussion with patient/house staff/ACP and interdisciplinary staff (such as , social workers, etc). Interventions were performed as documented above.
Time-based billing (NON-critical care).     More than 50% of the visit was spent counseling and / or coordinating care by the attending physician.      The necessity of the time spent during the encounter on this date of service was due to: documentation in [place], reviewing chart and coordinating care with patient/ACPs and interdisciplinary staff (such as , social workers, etc) as well as reviewing vitals, laboratory data, radiology, medication list, consultants' recommendations and prior records. Interventions were performed as documented above.    Discussed etiology of patient's symptoms at length as pt does not feel comfortable going home until feeling 100% better. Discussed that this may be an unrealistic expectation as her cancer is likely causing some of her symptoms such as abdominal discomfort and fatigue.
Time-based billing (NON-critical care).     More than 50% of the visit was spent counseling and / or coordinating care by the attending physician.      The necessity of the time spent during the encounter on this date of service was due to: documentation in [place], reviewing chart and coordinating care with patient/ACPs and interdisciplinary staff (such as , social workers, etc) as well as reviewing vitals, laboratory data, radiology, medication list, consultants' recommendations and prior records. Interventions were performed as documented above.
Time-based billing (NON-critical care).     More than 50% of the visit was spent counseling and / or coordinating care by the attending physician.      The necessity of the time spent during the encounter on this date of service was due to: documentation in [place], reviewing chart and coordinating care with patient/ACPs and interdisciplinary staff (such as , social workers, etc) as well as reviewing vitals, laboratory data, radiology, medication list, consultants' recommendations and prior records. Interventions were performed as documented above.
Time-based billing (NON-critical care).     More than 50% of the visit was spent counseling and / or coordinating care by the attending physician.      The necessity of the time spent during the encounter on this date of service was due to: documentation in [place], reviewing chart and coordinating care with patient/ACPs and interdisciplinary staff (such as , social workers, etc) as well as reviewing vitals, laboratory data, radiology, medication list, consultants' recommendations and prior records. Interventions were performed as documented above.    Discussed etiology of patient's symptoms at length as pt does not feel comfortable going home until feeling 100% better. Discussed that this may be an unrealistic expectation as her cancer is likely causing some of her symptoms such as abdominal discomfort and fatigue.
Time spent for counseling and education with patient  Time spent discussing and coordinating care with primary team and interdisciplinary staff and floor staff

## 2023-09-19 NOTE — ED PROVIDER NOTE - PROGRESS NOTE
Spoke to patient about indication, directions, and side effect profile of medications (cephalexin, lantus, metformin). Informed patient of symptoms and treatment of hypoglycemia.    Himanshu Miller, PharmD  [phone]
Stable.

## 2023-09-19 NOTE — CONSULT NOTE ADULT - ASSESSMENT
52F w/ Stage 4 lung cancer (s/p 4 cycles chemo) and thoracic RT completed October 2022. w/ mets to the brain (s/p GK-SRS to 7 brain mets in 5 fractions from 6/21-6/29/2023), ongoing dizziness, syncope, H/As, 2 weeks of blurry vision, lower extremity weakness. Denies visual changes CT w/ L frontoparietal/L occipital (w/ calcifications)/R parietooccipital masses w/ surrounding edema; no MLS. MR 8/20/23 w/  Exam: neurointact except for dysmetria on FTN L>R, and a very fine tremor in both hands.   -adm med for evaluation of disease burden  -dex4 q6  -Keppra 500 BID  -oncology consult

## 2023-09-19 NOTE — ED ADULT NURSE NOTE - TEMPLATE LIST FOR HEAD TO TOE ASSESSMENT
New Patient Intake Form   Patient Details:    Luba Eaton  1968    Appointment Information   Who is calling to schedule? Patient   If not self, what is the caller's name? Please put name of RBC nurse as well  DID YOU CONFIRM INSURANCE WITH PATIENT? 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 ILDEFONSO Wallace     Is there a confirmed tissue diagnosis? Yes     4/11/22   Is there a biopsy ordered or pending? Please specify dates        Is patient aware of diagnosis? Yes   Have you had any imaging or labs done? If yes, where? (If imaging done outside of [facility], please remind patient to bring a disk ) Yes   4/11/22     If imaging done at outside facility, did you instruct patient to obtain discs and bring to visit? Have you been seen by another Oncologist/Hematologist?  If so, who and where? No   Are the records in [facility] or Care Everywhere? Yes   Does the patient have records at another facility/hospital? No   If yes, Name of facility, city and state where facility is located  Did you instruct patient to have records faxed to [facility] and provide rightfax number? Preferred [place]   Is the patient willing to be seen by another provider? (This is for breast patients only)      Did you send new patient paperwork? Email or mail?  Yes, mail    Miscellaneous Information: Appointment made for 8/1/22 at 10:30 with Dr Kassidy Parsons in [place]
69
General

## 2023-09-19 NOTE — ED PROVIDER NOTE - PROGRESS NOTE DETAILS
Called to bedside as patient was complaining of shortness of breath and chest pain, at bedside patient had moved herself down to into bed and was sitting up with noted tachypnea, EKG done at that time noticed sinus tachycardia with rate of 114.  Chest pain lasted several seconds and spontaneously resolved though tachycardia persisting.  Given recent admission and oncologic history will pursue CTA to rule out PE. - Ricardo Dangelo PA-C Received call from radiologist stating CT head noted intracranial mets as per previous MRI, although I was informed that the left high frontal parietal lesion has high attenuation concerning for hemorrhage, neurosurgery paged.  Final read pending. - Ricardo Dangelo PA-C neurosurg returned page, will see pt. - ENDY HerculesC Received call from radiologist stating CT head noted intracranial mets as per previous MRI, although I was informed that the left high frontal parietal lesion has high attenuation concerning for hemorrhage, neurosurgery paged.  Final read pending. Patient intermittently having episodes of the chest pain with heart rate as elevated as 140, appears sinus tach on monitor, attempting to capture.  ED attending aware. - Ricardo Dangelo PA-C Neuro surge evaluated patient and advised nothing from their standpoint at this time but will confirm with attending, ED attending recommended medicine admission at this time given tachycardia as well as progressively worsening weakness.  Patient endorsed to hospitalist Dr. Waite who was informed of the plan as above.  ED team does not feel this is a CDU candidate at this time given unsafe ambulation secondary to weakness. - Ricardo Dangelo PA-C

## 2023-09-19 NOTE — ED CLERICAL - NS ED CLERK UNITS
[facility]  Progress Note   Nursery    Patient Name: Naga Mederos  MRN: [id number]  Admission Date: 2020      Subjective:     Stable, no events noted overnight.    Feeding: Breastmilk and supplementing with formula per parental preference and medical indication of SERENITY scoring and baby needs supplementation until mom's milk supply established  Infant is voiding and stooling.    Objective:     Vital Signs (Most Recent)  Temp: 98.1 °F (36.7 °C) (20 09)  Pulse: 128 (20)  Resp: 40 (20)    Most Recent Weight: 3780 g (8 lb 5.3 oz) (20)  Percent Weight Change Since Birth: -6.1     Physical Exam  Constitutional:       General: She is sleeping. She is not in acute distress.  HENT:      Head: No cranial deformity. Anterior fontanelle is flat.      Nose: Nose normal.      Mouth/Throat:      Mouth: Mucous membranes are moist.   Eyes:      General:         Right eye: No discharge.         Left eye: No discharge.   Neck:      Musculoskeletal: Neck supple.   Cardiovascular:      Rate and Rhythm: Normal rate and regular rhythm.      Heart sounds: No murmur.   Pulmonary:      Effort: Pulmonary effort is normal. No respiratory distress.      Breath sounds: Normal breath sounds.   Abdominal:      General: Bowel sounds are normal. There is no distension.      Palpations: Abdomen is soft. There is no mass.      Tenderness: There is no abdominal tenderness. There is no guarding.      Comments: Baby with large bloody emesis w/ clots - on exam baby with very benign abdomen - mom is bleeding from left nipple   Skin:     Comments: Facial jaundcie   Neurological:      Primitive Reflexes: Suck normal.         Labs:  Recent Results (from the past 24 hour(s))   POCT bilirubinometry    Collection Time: 20  9:50 AM   Result Value Ref Range    Bilirubinometry Index 12.2        Assessment and Plan:     39w5d  , doing well. Continue routine   care.    Hematemesis without nausea  Mom with bleeding left nipple- baby had not fed from that side for a day due to soreness - mom did notice a large clot from her breast when she unlatched the baby.  Shortly after baby had a large blood emesis with some clots - see media for photo.  Exam is benign with good BS no distension and no tenderness with deep palpation.  Will get abdominal series and observe closely for more episodes.  If this continues at minimum baby will need H2 blocker and if abnl film or exam a surgical consultation.  Lactation will work with mom today to discuss methods to decrease the bleeding on the left and we will avoid feeding baby from that side until we have had a few successful feeds with no emesis and certainly no bloody emesis.    Intrauterine drug exposure  Mom has been on subutex - stable dose for years.  Her son was born here also and he did fine until day 3-4 when he developed withdrawal syndrome and had to be transferred to NICU.  Mom and dad are both aware of the screening for SERENITY.  Overnight slight increased scores 5 and 6 - improved a bit today.  UDS on baby is negative  Meconium tox sent  Social work to consult - no intervention needed see note  Plan to keep baby 96 hours     Single liveborn infant  Special  care  Baby is not LGA - she is 89th% - we did check glucose x 12 hours to assure any abnl symptoms which may have been detected on SERENITY were not due to hypoglycemia - she has been euglycemic and we have stopped checking  Mom is breast and bottle feeding per parental choice    24 hour bili = 5.7 low risk  TCB at 65 hours 12.2 low intermediate risk        Nyasia Livingston MD  Pediatrics  [facility], [place]   APER

## 2023-09-19 NOTE — ED PROVIDER NOTE - OBJECTIVE STATEMENT
51 yo female PMHx stage IV lung cancer with mets to brain, recent admission to [facility] for epigastric pain thought to be due to steroids presents to ED complaining of 2 weeks of lower extremity weakness and intermittent blurry vision.  Patient reports she was also recently admitted to [facility] and was found to be newly diagnosed diabetic and has had difficulty checking sugars at home.  Reports she has been having increased urinary frequency and has been unable to get to the bathroom in time, states she can feel when she has to urinate but is having difficulty getting to bathroom due to using a walker and progressive leg weakness.  Additionally endorses some slight blurry vision over the last 2 weeks which she describes as "vision feels off" though is able to see.  Of note patient has had multiple admissions over the last 1 month, 1 of which was for dizziness and migraines which were managed with steroids.  Does endorse bilateral mid back pain at times worse with movement.  Denies bowel incontinence, numbness/tingling, saddle anesthesia, fall/trauma, chest pain, shortness of breath, dizziness/lightheadedness, fever/chills, n/v/d. 53 yo female PMHx stage IV lung cancer with mets to brain, recent admission to [facility] for epigastric pain thought to be due to steroids presents to ED complaining of 2 weeks of lower extremity weakness and intermittent blurry vision.  Patient reports she was also recently admitted to [facility] and was found to be newly diagnosed diabetic and has had difficulty checking sugars at home.  Reports she has been having increased urinary frequency and has been unable to get to the bathroom in time, states she can feel when she has to urinate but is having difficulty getting to bathroom due to using a walker and progressive leg weakness.  Additionally endorses some slight blurry vision over the last 2 weeks which she describes as "vision feels off" though is able to see.  Of note patient has had multiple admissions over the last 1 month, one of which was for dizziness and migraines which were managed with steroids.  Does endorse bilateral mid back pain at times worse with movement.  Denies bowel incontinence, numbness/tingling, saddle anesthesia, fall/trauma, chest pain, shortness of breath, dizziness/lightheadedness, fever/chills, n/v/d.

## 2023-09-19 NOTE — ED PROVIDER NOTE - NS ED ATTENDING STATEMENT MOD
This was a shared visit with the DB. I reviewed and verified the documentation and independently performed the documented:

## 2023-09-19 NOTE — ED ADULT TRIAGE NOTE - AS TEMP SITE
----- Message from Loreto Sadners, [facility] [place] sent at 6/21/2023  2:41 PM EDT -----  06/21/23 2:41 PM    Hello, our patient Barbara Cabezas has had Diabetic Eye Exam completed/performed  Please assist in updating the patient chart by making an External outreach to [facility] facility located in [phone]  The date of service is within last year      Thank you,  Loreto Sanders MA   PRIMARY CARE [place] [id number] oral Hemigard Postcare Instructions: The HEMIGARD strips are to remain completely dry for at least 5-7 days.

## 2023-09-19 NOTE — CONSULT NOTE ADULT - SUBJECTIVE AND OBJECTIVE BOX
p (1480)     HPI:  52F w/ Stage 4 lung cancer (s/p 4 cycles chemo) and thoracic RT completed October 2022. w/ mets to the brain (s/p GK-SRS to 7 brain mets in 5 fractions from 6/21-6/29/2023), ongoing dizziness, syncope, H/As, 2 weeks of blurry vision, lower extremity weakness. Denies visual changes CT w/ L frontoparietal/L occipital (w/ calcifications)/R parietooccipital masses w/ surrounding edema; no MLS. MR 8/20/23 w/      Exam: neurointact except for dysmetria on FTN L>R, and a very fine tremor in both hands.       =====================  PAST MEDICAL HISTORY   GERD (Gastroesophageal Reflux Disease)    Lung mass    Non-small cell lung cancer with metastasis      PAST SURGICAL HISTORY   No significant past surgical history    S/P endoscopy      No Known Allergies      MEDICATIONS:  Antibiotics:    Neuro:  acetaminophen   IVPB .. 1000 milliGRAM(s) IV Intermittent once    Other:      SOCIAL HISTORY:   Occupation:   Marital Status:     FAMILY HISTORY:  No pertinent family history in first degree relatives        ROS: Negative except per HPI    LABS:  PT/INR - ( 19 Sep 2023 14:17 )   PT: 13.7 sec;   INR: 1.25 ratio         PTT - ( 19 Sep 2023 14:17 )  PTT:31.0 sec                        12.2   5.65  )-----------( 184      ( 19 Sep 2023 14:17 )             37.5     09-19    135  |  100  |  12  ----------------------------<  186<H>  4.1   |  17<L>  |  0.52    Ca    10.1      19 Sep 2023 14:17  Mg     1.8     09-19    TPro  8.1  /  Alb  3.5  /  TBili  0.4  /  DBili  x   /  AST  31  /  ALT  24  /  AlkPhos  68  09-19

## 2023-09-19 NOTE — ED ADULT NURSE NOTE - OBJECTIVE STATEMENT
52 year old female pt presented to the ED co b/l lower extremity weakness more the right side than the left x 2 weeks, blurred vision x 2 weeks, pt states she is unable to ambulate on her own recently got walker, pt advised to come to ED after seeing her oncologist this am, pt states she is recently diagnosed with diabetes and was not taught how to check her glucose, fs in ED 150s, pt denies any fever or chills states she get hotflashes abd soft nontender non distended

## 2023-09-19 NOTE — ED ADULT NURSE NOTE - IS THE PATIENT ABLE TO BE SCREENED?
Patient came in for chest pain, it started last night, It went away then came back at 1200 today. Pain was sharp in the middle of the chest. It lasted for 10 minutes then became like a pressure pain.    Yes

## 2023-09-19 NOTE — ED ADULT NURSE NOTE - NSFALLRISKINTERV_ED_ALL_ED

## 2023-09-19 NOTE — ED PROVIDER NOTE - ATTENDING APP SHARED VISIT CONTRIBUTION OF CARE
53 yo female PMHx stage IV lung cancer with mets to brain, recent admission to [facility] for epigastric pain thought to be due to steroids presents to ED complaining of 2 weeks of lower extremity weakness and urinary frequency with no abd pain or uti sts. Patient with no abdominal pain here motor strength is 5 out of 5 in lower extremities sensation intact patient was sent from the monitor center sounds like she was recently diagnosed with diabetes symptoms of her legs were thought maybe to be due to neuropathy with stocking glove distribution.  Pain control discussed with oncology plan check labs.  Patient also became short of breath and complained of chest pain on arrival tachycardic unclear etiology recently had a negative CTA on recent admission repeat CTA cardiac work-up ordered here no fever or cough or URI symptoms. pt s/o pending work up.

## 2023-09-20 NOTE — CONSULT NOTE ADULT - SUBJECTIVE AND OBJECTIVE BOX
[facility] DEPARTMENT OF OPHTHALMOLOGY - INITIAL ADULT CONSULT  ----------------------------------------------------------------------------------------------------  Thony Acuna PGY-2  Available on teams  ----------------------------------------------------------------------------------------------------    HPI:  51 yo female PMHx stage IV lung cancer with mets to brain s/p 4 cycles chemo and thoracic RT completed on oct 2022, gamma knife surgery to 7 brain mets , recent admission to [facility] from sept 4-13th for epigastric pain and hyperglycemia thought to be due to steroids presents to ED complaining of 1 weeks of lower extremity weakness and intermittent blurry vision.  Pt was initially seen at the end of August and admitted for hx of migraines and dizziness, was started on  decadron 6mg q6hr and Depakote at that time. Returned to [facility] again early september for worsening epigastric pain  in the setting of taking steroids without GI ppx. CTA at that time showing no PE, stable right apical lung mass and rapid interval growth of a left lower lobe mass and new hepatic mets. Pt finished dexamethasone taper on 9/7 and was discharged with protonix, depakote and follow up with outpatient rad onc Dr. Bernard, outpatient NSGY, and oncologist Dr. Beckman at [facility] with plans to start immunotherapy nivolumab as outpatient.  Since discharge on the 13th, pt noting weakness in right lower extremity 'muscle' pain which improves with massages, used walker at home however feels weak and requiring increasing support from fiancee to ambulate/ attend to daily needs. Notes also intermittent epigastric pain with radiation to the back. Denies numbness or tingling, saddle anesthesia, bowel incontinence, falls or trauma. Notes occasional chest pain and SOB which resolves. Additionally endorses some slight blurry vision over the last 2 weeks which she describes as "vision feels off" though is able to see.  Denies fevers, chills, N/V/D, recent sick contacts. Social hx: no smoker, previous marijuana use, no alcohol,  lives at home with phil.    ED vitals:   Pt tachy to 120's sinus rhythm otherwise VSS    Labs notable for ph 7.44, pco2 33 (20 Sep 2023 00:28)    Interval History: Consulted for intermittent blurry vision over the past few weeks. The patient reports she has had moments of vision blurriness over the past few weeks that are episodic and mild, reports vision is still sharp however "feels off." Denies eye pain, flashes, floaters, vision loss, temporal headache, jaw claudication, FBS, discharge. Reports at times vision feels double. Reports she got a new pair of glasses a month ago, reports no prior ocular surgery or ocular conditions requiring medication.     PAST MEDICAL & SURGICAL HISTORY:  GERD (Gastroesophageal Reflux Disease)      Lung mass  right      Non-small cell lung cancer with metastasis      S/P endoscopy  2022        Past Ocular History: None  Ophthalmic Medications: None  FAMILY HISTORY:  No pertinent family history in first degree relatives      MEDICATIONS  (STANDING):  dexAMETHasone     Tablet 4 milliGRAM(s) Oral every 6 hours  dextrose 5%. 1000 milliLiter(s) (100 mL/Hr) IV Continuous <Continuous>  dextrose 5%. 1000 milliLiter(s) (50 mL/Hr) IV Continuous <Continuous>  dextrose 50% Injectable 25 Gram(s) IV Push once  dextrose 50% Injectable 12.5 Gram(s) IV Push once  dextrose 50% Injectable 25 Gram(s) IV Push once  divalproex  milliGRAM(s) Oral two times a day  glucagon  Injectable 1 milliGRAM(s) IntraMuscular once  insulin lispro (ADMELOG) corrective regimen sliding scale   SubCutaneous at bedtime  insulin lispro (ADMELOG) corrective regimen sliding scale   SubCutaneous three times a day before meals  pantoprazole    Tablet 40 milliGRAM(s) Oral before breakfast  polyethylene glycol 3350 17 Gram(s) Oral daily  senna 2 Tablet(s) Oral at bedtime    MEDICATIONS  (PRN):  acetaminophen     Tablet .. 650 milliGRAM(s) Oral every 6 hours PRN Temp greater or equal to 38C (100.4F), Mild Pain (1 - 3)  aluminum hydroxide/magnesium hydroxide/simethicone Suspension 30 milliLiter(s) Oral every 4 hours PRN Dyspepsia  dextrose Oral Gel 15 Gram(s) Oral once PRN Blood Glucose LESS THAN 70 milliGRAM(s)/deciliter  melatonin 3 milliGRAM(s) Oral at bedtime PRN Insomnia  ondansetron Injectable 4 milliGRAM(s) IV Push every 8 hours PRN Nausea and/or Vomiting  oxyCODONE    IR 5 milliGRAM(s) Oral every 6 hours PRN Severe Pain (7 - 10)    Allergies & Intolerances:   No Known Allergies    Review of Systems:  Constitutional: No fever, chills  Eyes: See HPI   Neuro: No tremors  Cardiovascular: No chest pain, palpitations  Respiratory: No SOB, no cough  GI: No nausea, vomiting, abdominal pain  : No dysuria  Skin: no rash  Psych: no depression  Endocrine: no polyuria, polydipsia  Heme/lymph: no swelling    VITALS: T(C): 36.5 (09-20-23 @ 16:34)  T(F): 97.7 (09-20-23 @ 16:34), Max: 98.6 (09-19-23 @ 22:13)  HR: 95 (09-20-23 @ 16:34) (83 - 108)  BP: 121/82 (09-20-23 @ 16:34) (121/82 - 135/90)  RR:  (16 - 18)  SpO2:  (96% - 100%)  Wt(kg): --  General: AAO x 3, appropriate mood and affect    Ophthalmology Exam:  Visual acuity (cc): 20/40 PHNI ; 20/40 PH 20/30  Pupils: PERRL OU, no APD  Ttono: 16 OU  Extraocular movements (EOMs): Full OU, no pain, no diplopia  Confrontational Visual Field (CVF): Full OU  Color Plates: 8/12 OD ; 7/12 OS     Pen Light Exam (PLE)  External: Flat OU  Lids/Lashes/Lacrimal Ducts: Flat OU    Sclera/Conjunctiva: W+Q OU  Cornea: Cl OU  Anterior Chamber: D+F OU    Iris: Flat OU  Lens: Cl OU    Fundus Exam: dilated with 1% tropicamide and 2.5% phenylephrine  Approval obtained from primary team for dilation  Patient aware that pupils can remained dilated for at least 4-6 hours  Exam performed with 20D lens    Vitreous: wnl OU  Disc, cup/disc: sharp and pink, 0.4 OU  Macula: wnl OU  Vessels: wnl OU  Periphery: wnl OU    Labs/Imaging:  CT BRAIN   ORDERED BY:  JASSI KERR     PROCEDURE DATE:  09/20/2023          INTERPRETATION:  Clinical indication: History of lung cancer and brain   metastasis.    Multiple axial sections were performed from the base of vertex without   contrast enhancement. Coronal and sagittal reconstructed structures were   performed    This exam is compared with prior head CT performed on September 19, 2023    This exam is somewhat limited by motion    Areas of vasogenic edema involving the right temporal/posterior frontal,   left parietal occipital and high left posterior frontal/parietal region   are identified with associated lesions. The largest of these lesions   involve the right temporal/posterior frontal region measures  approximately 4.2 x 2.5 cm and previously measured approximately 4.2 x   2.5 cm. The lesion involving the left parietal-occipital region does   demonstrate associated calcification and measures approximately 2.4 x 2.0   cm and previously measured approximately 2.4 x 2.2 cm. The lesion   involves the high left posterior frontal parietal region and also   demonstrates some areas of associated high attenuation which could be   compatible with hemorrhage and/or mineralization. This lesion measures   approximately 2.2 x 1.7 cm and previously measured approximately 2.2 x   1.3 cm. There is localized mass effect seen consisting of sulcal   effacement.    The overall size and configuration of ventricles appears unchanged with   the left lateral ventricle more prominent than the right.    Evaluation of the osseous structures with the appropriate window appears   unremarkable    The visualized paranasal sinuses mastoid and middle ear regions appear   clear.    IMPRESSION: Vasogenic edema and underlying lesions are again seen as   described above.   [facility] DEPARTMENT OF OPHTHALMOLOGY - INITIAL ADULT CONSULT  ----------------------------------------------------------------------------------------------------  Thony Acuna PGY-2  Available on teams  ----------------------------------------------------------------------------------------------------    HPI:  51 yo female PMHx stage IV lung cancer with mets to brain s/p 4 cycles chemo and thoracic RT completed on oct 2022, gamma knife surgery to 7 brain mets , recent admission to [facility] from sept 4-13th for epigastric pain and hyperglycemia thought to be due to steroids presents to ED complaining of 1 weeks of lower extremity weakness and intermittent blurry vision.  Pt was initially seen at the end of August and admitted for hx of migraines and dizziness, was started on  decadron 6mg q6hr and Depakote at that time. Returned to [facility] again early september for worsening epigastric pain  in the setting of taking steroids without GI ppx. CTA at that time showing no PE, stable right apical lung mass and rapid interval growth of a left lower lobe mass and new hepatic mets. Pt finished dexamethasone taper on 9/7 and was discharged with protonix, depakote and follow up with outpatient rad onc Dr. Bernard, outpatient NSGY, and oncologist Dr. Beckman at [facility] with plans to start immunotherapy nivolumab as outpatient.  Since discharge on the 13th, pt noting weakness in right lower extremity 'muscle' pain which improves with massages, used walker at home however feels weak and requiring increasing support from fiancee to ambulate/ attend to daily needs. Notes also intermittent epigastric pain with radiation to the back. Denies numbness or tingling, saddle anesthesia, bowel incontinence, falls or trauma. Notes occasional chest pain and SOB which resolves. Additionally endorses some slight blurry vision over the last 2 weeks which she describes as "vision feels off" though is able to see.  Denies fevers, chills, N/V/D, recent sick contacts. Social hx: no smoker, previous marijuana use, no alcohol,  lives at home with phil.    ED vitals:   Pt tachy to 120's sinus rhythm otherwise VSS    Labs notable for ph 7.44, pco2 33 (20 Sep 2023 00:28)    Interval History: Consulted for intermittent blurry vision over the past few weeks. The patient reports she has had moments of vision blurriness over the past few weeks that are episodic and mild, reports vision is still sharp however "feels off." Denies eye pain, flashes, floaters, vision loss, temporal headache, jaw claudication, FBS, discharge. Reports at times vision feels double. Reports she got a new pair of glasses a month ago, reports no prior ocular surgery or ocular conditions requiring medication.     PAST MEDICAL & SURGICAL HISTORY:  GERD (Gastroesophageal Reflux Disease)      Lung mass  right      Non-small cell lung cancer with metastasis      S/P endoscopy  2022        Past Ocular History: None  Ophthalmic Medications: None  FAMILY HISTORY:  No pertinent family history in first degree relatives      MEDICATIONS  (STANDING):  dexAMETHasone     Tablet 4 milliGRAM(s) Oral every 6 hours  dextrose 5%. 1000 milliLiter(s) (100 mL/Hr) IV Continuous <Continuous>  dextrose 5%. 1000 milliLiter(s) (50 mL/Hr) IV Continuous <Continuous>  dextrose 50% Injectable 25 Gram(s) IV Push once  dextrose 50% Injectable 12.5 Gram(s) IV Push once  dextrose 50% Injectable 25 Gram(s) IV Push once  divalproex  milliGRAM(s) Oral two times a day  glucagon  Injectable 1 milliGRAM(s) IntraMuscular once  insulin lispro (ADMELOG) corrective regimen sliding scale   SubCutaneous at bedtime  insulin lispro (ADMELOG) corrective regimen sliding scale   SubCutaneous three times a day before meals  pantoprazole    Tablet 40 milliGRAM(s) Oral before breakfast  polyethylene glycol 3350 17 Gram(s) Oral daily  senna 2 Tablet(s) Oral at bedtime    MEDICATIONS  (PRN):  acetaminophen     Tablet .. 650 milliGRAM(s) Oral every 6 hours PRN Temp greater or equal to 38C (100.4F), Mild Pain (1 - 3)  aluminum hydroxide/magnesium hydroxide/simethicone Suspension 30 milliLiter(s) Oral every 4 hours PRN Dyspepsia  dextrose Oral Gel 15 Gram(s) Oral once PRN Blood Glucose LESS THAN 70 milliGRAM(s)/deciliter  melatonin 3 milliGRAM(s) Oral at bedtime PRN Insomnia  ondansetron Injectable 4 milliGRAM(s) IV Push every 8 hours PRN Nausea and/or Vomiting  oxyCODONE    IR 5 milliGRAM(s) Oral every 6 hours PRN Severe Pain (7 - 10)    Allergies & Intolerances:   No Known Allergies    Review of Systems:  Constitutional: No fever, chills  Eyes: See HPI   Neuro: No tremors  Cardiovascular: No chest pain, palpitations  Respiratory: No SOB, no cough  GI: No nausea, vomiting, abdominal pain  : No dysuria  Skin: no rash  Psych: no depression  Endocrine: no polyuria, polydipsia  Heme/lymph: no swelling    VITALS: T(C): 36.5 (09-20-23 @ 16:34)  T(F): 97.7 (09-20-23 @ 16:34), Max: 98.6 (09-19-23 @ 22:13)  HR: 95 (09-20-23 @ 16:34) (83 - 108)  BP: 121/82 (09-20-23 @ 16:34) (121/82 - 135/90)  RR:  (16 - 18)  SpO2:  (96% - 100%)  Wt(kg): --  General: AAO x 3, appropriate mood and affect    Ophthalmology Exam:  Visual acuity (cc): 20/40 PHNI ; 20/40 PH 20/30  Pupils: PERRL OU, no APD  Ttono: 16 OU  Extraocular movements (EOMs): Full OU, no pain, no diplopia  Confrontational Visual Field (CVF): Full OU  Color Plates: 8/12 OD ; 7/12 OS     Pen Light Exam (PLE)  External: Flat OU  Lids/Lashes/Lacrimal Ducts: Flat OU    Sclera/Conjunctiva: W+Q OU  Cornea: Cl OU  Anterior Chamber: D+F OU    Iris: Flat OU  Lens: Cl OU    Fundus Exam: dilated with 1% tropicamide and 2.5% phenylephrine  Approval obtained from primary team for dilation  Patient aware that pupils can remained dilated for at least 4-6 hours  Exam performed with 20D lens    Vitreous: wnl OU  Disc, cup/disc: OD: 2-3+ disc edema with superior hemorrhage arising from optic nerve ; OS: 2-3+ disc edema with superior hemorrhage arising from optic nerve  Macula: wnl OU  Vessels: wnl OU  Periphery: few DBH OU     Labs/Imaging:  CT BRAIN   ORDERED BY:  JASSI KERR     PROCEDURE DATE:  09/20/2023          INTERPRETATION:  Clinical indication: History of lung cancer and brain   metastasis.    Multiple axial sections were performed from the base of vertex without   contrast enhancement. Coronal and sagittal reconstructed structures were   performed    This exam is compared with prior head CT performed on September 19, 2023    This exam is somewhat limited by motion    Areas of vasogenic edema involving the right temporal/posterior frontal,   left parietal occipital and high left posterior frontal/parietal region   are identified with associated lesions. The largest of these lesions   involve the right temporal/posterior frontal region measures  approximately 4.2 x 2.5 cm and previously measured approximately 4.2 x   2.5 cm. The lesion involving the left parietal-occipital region does   demonstrate associated calcification and measures approximately 2.4 x 2.0   cm and previously measured approximately 2.4 x 2.2 cm. The lesion   involves the high left posterior frontal parietal region and also   demonstrates some areas of associated high attenuation which could be   compatible with hemorrhage and/or mineralization. This lesion measures   approximately 2.2 x 1.7 cm and previously measured approximately 2.2 x   1.3 cm. There is localized mass effect seen consisting of sulcal   effacement.    The overall size and configuration of ventricles appears unchanged with   the left lateral ventricle more prominent than the right.    Evaluation of the osseous structures with the appropriate window appears   unremarkable    The visualized paranasal sinuses mastoid and middle ear regions appear   clear.    IMPRESSION: Vasogenic edema and underlying lesions are again seen as   described above.

## 2023-09-20 NOTE — CHART NOTE - NSCHARTNOTEFT_GEN_A_CORE
Patient was seen and examined at bedside. Still with headache. No worsening changes in vision. Denies CP, SOB. Denies having any other acute complaints.   Initial CTH: CT w/ L frontoparietal/L occipital (w/ calcifications)/R parietooccipital masses w/ surrounding edema. Repeat CTH this morning. w/o significant change.  Seen by neurosurgery - rec decadron 4mg q6h for now, Keppra 500mg BID, c/w neurochecks, MRI Brain pending, oncology consulted; f/u recs. Optho consulted for visual changes; f/u recs.   See same day H&P for further details Patient was seen and examined at bedside. Still with headache. No worsening changes in vision. Denies CP, SOB. Denies having any other acute complaints.   Initial CTH: CT w/ L frontoparietal/L occipital (w/ calcifications)/R parietooccipital masses w/ surrounding edema. Repeat CTH this morning. w/o significant change.  Seen by neurosurgery - rec decadron 4mg q6h for now, Depakote 500mg BID, c/w neurochecks, MRI Brain pending, oncology consulted; f/u recs. Optho consulted for visual changes; f/u recs.   See same day H&P for further details.

## 2023-09-20 NOTE — H&P ADULT - NSHPLABSRESULTS_GEN_ALL_CORE
.  LABS:                         12.2   5.65  )-----------( 184      ( 19 Sep 2023 14:17 )             37.5     09-19    135  |  100  |  12  ----------------------------<  186<H>  4.1   |  17<L>  |  0.52    Ca    10.1      19 Sep 2023 14:17  Mg     1.8     09-19    TPro  8.1  /  Alb  3.5  /  TBili  0.4  /  DBili  x   /  AST  31  /  ALT  24  /  AlkPhos  68  09-19    PT/INR - ( 19 Sep 2023 14:17 )   PT: 13.7 sec;   INR: 1.25 ratio         PTT - ( 19 Sep 2023 14:17 )  PTT:31.0 sec  Urinalysis Basic - ( 19 Sep 2023 14:17 )    Color: x / Appearance: x / SG: x / pH: x  Gluc: 186 mg/dL / Ketone: x  / Bili: x / Urobili: x   Blood: x / Protein: x / Nitrite: x   Leuk Esterase: x / RBC: x / WBC x   Sq Epi: x / Non Sq Epi: x / Bacteria: x LABS:                       12.2   5.65  )-----------( 184      ( 19 Sep 2023 14:17 )             37.5     09-19    135  |  100  |  12  ----------------------------<  186<H>  4.1   |  17<L>  |  0.52    Ca    10.1      19 Sep 2023 14:17  Mg     1.8     09-19    TPro  8.1  /  Alb  3.5  /  TBili  0.4  /  DBili  x   /  AST  31  /  ALT  24  /  AlkPhos  68  09-19    PT/INR - ( 19 Sep 2023 14:17 )   PT: 13.7 sec;   INR: 1.25 ratio    PTT - ( 19 Sep 2023 14:17 )  PTT:31.0 sec    Urinalysis Basic - ( 19 Sep 2023 14:17 )  Color: x / Appearance: x / SG: x / pH: x  Gluc: 186 mg/dL / Ketone: x  / Bili: x / Urobili: x   Blood: x / Protein: x / Nitrite: x   Leuk Esterase: x / RBC: x / WBC x   Sq Epi: x / Non Sq Epi: x / Bacteria: x    CT Head No Cont (09.19.23 @ 17:04)  IMPRESSION:  - Left high frontoparietal mass has mildly increased in size with new high attenuating material within it concerning for new punctate hemorrhage and increased surrounding vasogenic edema.   - Mildly increased vasogenic edema surrounding the left occipital lesion.  - Grossly stable right parietotemporal lesion.  - Recommend MRI brain with IV contrast for further evaluation.    [X] Imaging personally reviewed by me   [X] ECG personally interpreted by me

## 2023-09-20 NOTE — PATIENT PROFILE ADULT - VISION (WITH CORRECTIVE LENSES IF THE PATIENT USUALLY WEARS THEM):
R sided blurry vision/Partially impaired: cannot see medication labels or newsprint, but can see obstacles in path, and the surrounding layout; can count fingers at arm's length
Unable to Assess

## 2023-09-20 NOTE — H&P ADULT - PROBLEM SELECTOR PLAN 1
previously treated with 4 rounds of chemo, developed brain mets 5/23, supposed to have followed up for immunotherapy with outpatient Dr. jarrod johnson  - now returning with dizziness, weakness  - Denies visual changes CT w/ L frontoparietal/L occipital (w/ calcifications)/R parietooccipital masses w/ surrounding edema; no MLS. MR 8/20/23 w/  Exam: neurointact except for dysmetria on FTN L>R, and a very fine tremor in both hands.   -adm med for evaluation of disease burden  -per nsgy: dex4 q6  -Keppra 500 BID  -oncology consult  - palliative consul previously treated with 4 rounds of chemo, developed brain mets 5/23, supposed to have followed up for immunotherapy with outpatient Dr. jarrod johnson  - now returning with dizziness, weakness  - Denies visual changes CT w/ L frontoparietal/L occipital (w/ calcifications)/R parietooccipital masses w/ surrounding edema; no MLS. MR 8/20/23 w/  Exam: neurointact except for dysmetria on FTN L>R, and a very fine tremor in both hands.   - obtain MRI head with IV contrast to eval further   -per nsgy: dex4 q6  -Keppra 500 BID  -oncology consult (emailed)  - palliative consult in the AM to help assist with GOC/ pain control if needed previously treated with 4 rounds of chemo, developed brain mets 5/23, supposed to have followed up for immunotherapy with outpatient Dr. jarrod johnson  - now returning with dizziness, weakness  - Denies visual changes CT w/ L frontoparietal/L occipital (w/ calcifications)/R parietooccipital masses w/ surrounding edema; no MLS. MR 8/20/23 w/  Exam: neurointact except for dysmetria on FTN L>R, and a very fine tremor in both hands.   - will obtain MRI head with IV contrast to eval further   -per nsgy: dex4 q6  -Keppra 500 BID as per NSGY however pt on Depakote at home (unclear is she is taking, please clarify in the AM with lilia)   -oncology consult (emailed)  - palliative consult in the AM to help assist with GOC/ pain control if needed previously treated with 4 rounds of chemo, developed brain mets 5/23, supposed to have followed up for immunotherapy with outpatient Dr. jarrod johnson  - now returning with dizziness, weakness  - Denies visual changes CT w/ L frontoparietal/L occipital (w/ calcifications)/R parietooccipital masses w/ surrounding edema; no MLS. MR 8/20/23 w/  Exam: neurointact except for dysmetria on FTN L>R, and a very fine tremor in both hands.   - will obtain MRI head with IV contrast to eval further   - obtain repeat CT non con in the AM eval punctate hemorrhage   -per nsgy: dex4 q6  -Keppra 500 BID initially  recommended by NSGY however pt on Depakote at home- can continue with Depakote 500 BID as per NSGY   -oncology consult (emailed)  - palliative consult in the AM to help assist with GOC/ pain control if needed

## 2023-09-20 NOTE — PHYSICAL THERAPY INITIAL EVALUATION ADULT - ADDITIONAL COMMENTS
Patient lives in a private house, +3 steps to enter, with her fiance. Patient reports her bedroom is in the basement but she has been staying on the main floor for the past few weeks. Patient denies use of assistive device and reports independence with ADLs. Patient lives in a private house, +3 steps to enter, with her fiance. Patient reports her bedroom is in the basement but she has been staying on the main floor for the past few weeks. Patient denies use of assistive device and reports independence with ADLs, owns rolling walker & shower chair, assist from fiance as able; [employer] works a lot. Pt recently working as a homecare attendant.

## 2023-09-20 NOTE — H&P ADULT - PROBLEM SELECTOR PLAN 2
no alarm signs of cord compression  - likely MSK vs Diabetic neuropathy  - PT consult  - check a1c  - oxy 5 home med no alarm signs of cord compression  - likely MSK vs Diabetic neuropathy  - PT consult  - check a1c  - oxy 5 home med for severe pain no alarm signs of cord compression  - likely MSK vs Diabetic neuropathy  - PT consult  - check a1c in the AM eval for diabetic neuropathy however less likely given unilateral leg pain and no glove/ stocking distribution   - oxy 5 home med for severe pain, Tylenol for moderate

## 2023-09-20 NOTE — H&P ADULT - PROBLEM SELECTOR PLAN 5
- diet: regular  dvt: ppx:  scds  PT to see - diet: regular  dvt: ppx:  scds (awaiting clarification by NSGY if pt can be on pharm AC given ct findings of hemorrhage)   PT to see - pt c/o intermittent luq pain and back pain localized to right upper scapula  - non specific differentials include gastritis given recent steroid use vs GERD   - however given usage of Depakote at home and side effect of pancreatitis, will obtain lipase - pt c/o intermittent luq pain and back pain localized to right upper scapula  - non specific differentials include gastritis given recent steroid use vs GERD   - however given usage of Depakote at home and side effect of pancreatitis, will obtain lipase  - if lipase is elevated, hold depakote and switch to keppra 500 BID

## 2023-09-20 NOTE — H&P ADULT - NSHPREVIEWOFSYSTEMS_GEN_ALL_CORE
General: denies fever, chills, weight loss/weight gain.  HENT: denies nasal congestion, sore throat, rhinorrhea, ear pain  Eyes: blurry vision   Neck: denies neck pain, neck swelling  Resp: denies difficulty breathing, cough  Abdominal: denies nausea, vomiting, diarrhea, abdominal pain, blood in stool, dark stool  MSK: right leg pain and weakness   Neuro: headaches, dizziness   Skin: denies rashes, cuts, bruises  Hematologic: denies unexplained bruises

## 2023-09-20 NOTE — H&P ADULT - PROBLEM SELECTOR PLAN 6
- diet: regular  dvt: ppx:  scds (awaiting clarification by NSGY if pt can be on pharm AC given ct findings of hemorrhage)   PT to see - diet: regular  dvt: ppx:  scds (as per  NSGY - obtain repeat non con head CT in the AM to evaluate for possible AC, however no AC as of now)   PT to see

## 2023-09-20 NOTE — CONSULT NOTE ADULT - ATTENDING COMMENTS
52F stage IV lung cancer with mets to brain s/p RT completed 6/2023, was planned for nivo but hasn't started yet, now a/w new brain mets. Neurosurgery planning resection of one of the larger lesions. Will need RT after that. Will plan IO as outpatient after recovery.
Pt seen earlier in afternoon on 9/20/23.  Agree with above resident evaluation and assessment.  PT, however, did have a hard time seeing finger in right eye fields, says light is dim there, and saw better from the left.  PT with mild right dysmetria and motor 5/5/  She had mild downward drift in right UE.  PT states that she has been having headaches, mostly at night and being woken up at night with headache towards the right and back of her head responsive to fanning her head and body.  These headaches have been going on since her admission at [facility].  PT was sent to ER by Rad onc for hypoglycemia, and patient was complaining also of some weakness when walking, especially on right side.  MRI done today shows very mild increase in right temporal 4.5x 3.6 cm cystic lesion and smaller right cerebellar lesion.  She has essentially stable lesions in the right temporoparietal region and left motor strip.  We discussed pt in tumor board this morning with a recommendation to remove the accessible largest right temporal cyst to reduce that source of vasogenic edema, which surrounds the other lesions mostly without change. There is no shift and very mild right radiographic uncal herniation.  This, in turn may reduce the edema burden in the brain, although it will likely not affect her right leg subjective weakness.  Pt now on dex 4 q6.  Pt desires for me to speak to her fiancee Aba Meza [phone].  He did not answer on my current attempt to reach him.  I will touch base with Dr. Beckman, her oncologist tomorrow to also discuss.  If pt is agreeable to surgery, can aim for next Tuesday or so pending OR availability.  Continue steroids for now and monitoring of blood glucose.  Will need medical clearance if surgical option is chosen.
51 yo female PMHx stage IV lung cancer with mets to brain s/p 4 cycles chemo and thoracic RT completed on oct 2022, gamma knife surgery to 7 brain mets , consulted for blurry vision    Pt has Bilateral grade 3 optic disc edema. Would recommend CTV to rule out VST, MRI orbits w/wo contrast and if all negative, possible lumbar Puncture to rule out increased ICP per neurology.   Pt's vision is reduced likely due to disc edema.     Will follow up results    Agree with rest of resident note

## 2023-09-20 NOTE — CONSULT NOTE ADULT - SUBJECTIVE AND OBJECTIVE BOX
Hematology Consult Note  ***recs not final until attending attestation***    Margarito Mason MD PGY-5  Reachable on TEAMS    HPI:  53 yo female PMHx stage IV lung cancer with mets to brain s/p 4 cycles chemo and thoracic RT completed on oct 2022, gamma knife surgery to 7 brain mets , recent admission to [facility] from sept 4-13th for epigastric pain and hyperglycemia thought to be due to steroids presents to ED complaining of 1 weeks of lower extremity weakness and intermittent blurry vision.  Pt was initially seen at the end of August and admitted for hx of migraines and dizziness, was started on  decadron 6mg q6hr and Depakote at that time. Returned to [facility] again early september for worsening epigastric pain  in the setting of taking steroids without GI ppx. CTA at that time showing no PE, stable right apical lung mass and rapid interval growth of a left lower lobe mass and new hepatic mets. Pt finished dexamethasone taper on 9/7 and was discharged with protonix, depakote and follow up with outpatient rad onc Dr. Bernard, outpatient NSGY, and oncologist Dr. Beckman at [facility] with plans to start immunotherapy nivolumab as outpatient.  Since discharge on the 13th, pt noting weakness in right lower extremity 'muscle' pain which improves with massages, used walker at home however feels weak and requiring increasing support from fiancee to ambulate/ attend to daily needs. Notes also intermittent epigastric pain with radiation to the back. Denies numbness or tingling, saddle anesthesia, bowel incontinence, falls or trauma. Notes occasional chest pain and SOB which resolves. Additionally endorses some slight blurry vision over the last 2 weeks which she describes as "vision feels off" though is able to see.  Denies fevers, chills, N/V/D, recent sick contacts. Social hx: no smoker, previous marijuana use, no alcohol,  lives at home with phil.    ED vitals:   Pt tachy to 120's sinus rhythm otherwise VSS    Labs notable for ph 7.44, pco2 33     Patient seen and examined. + weakness, denies new sxs.       Allergies    No Known Allergies    Intolerances        MEDICATIONS  (STANDING):  dexAMETHasone     Tablet 4 milliGRAM(s) Oral every 6 hours  dextrose 5%. 1000 milliLiter(s) (100 mL/Hr) IV Continuous <Continuous>  dextrose 5%. 1000 milliLiter(s) (50 mL/Hr) IV Continuous <Continuous>  dextrose 50% Injectable 25 Gram(s) IV Push once  dextrose 50% Injectable 25 Gram(s) IV Push once  dextrose 50% Injectable 12.5 Gram(s) IV Push once  divalproex  milliGRAM(s) Oral two times a day  glucagon  Injectable 1 milliGRAM(s) IntraMuscular once  insulin lispro (ADMELOG) corrective regimen sliding scale   SubCutaneous at bedtime  insulin lispro (ADMELOG) corrective regimen sliding scale   SubCutaneous three times a day before meals  pantoprazole    Tablet 40 milliGRAM(s) Oral before breakfast  polyethylene glycol 3350 17 Gram(s) Oral daily  senna 2 Tablet(s) Oral at bedtime    MEDICATIONS  (PRN):  acetaminophen     Tablet .. 650 milliGRAM(s) Oral every 6 hours PRN Temp greater or equal to 38C (100.4F), Mild Pain (1 - 3)  aluminum hydroxide/magnesium hydroxide/simethicone Suspension 30 milliLiter(s) Oral every 4 hours PRN Dyspepsia  dextrose Oral Gel 15 Gram(s) Oral once PRN Blood Glucose LESS THAN 70 milliGRAM(s)/deciliter  melatonin 3 milliGRAM(s) Oral at bedtime PRN Insomnia  ondansetron Injectable 4 milliGRAM(s) IV Push every 8 hours PRN Nausea and/or Vomiting  oxyCODONE    IR 5 milliGRAM(s) Oral every 6 hours PRN Severe Pain (7 - 10)      PAST MEDICAL & SURGICAL HISTORY:  GERD (Gastroesophageal Reflux Disease)      Lung mass  right      Non-small cell lung cancer with metastasis      S/P endoscopy  2022          FAMILY HISTORY:  No pertinent family history in first degree relatives        SOCIAL HISTORY: no tobacco use    REVIEW OF SYSTEMS:  All other systems were reviewed and are negative, except as noted        T(F): 97.7 (09-20-23 @ 16:34), Max: 98.6 (09-19-23 @ 22:13)  HR: 95 (09-20-23 @ 16:34)  BP: 121/82 (09-20-23 @ 16:34)  RR: 17 (09-20-23 @ 16:34)  SpO2: 97% (09-20-23 @ 16:34)  Wt(kg): --    Constitutional: NAD  Eyes: EOMI, sclera non-icteric  Neck: supple  Respiratory: no inc wob  Cardiovascular: RRR,   Gastrointestinal: soft, NTND, no masses palpable,  Extremities: no cyanosis, no clubbing                          12.2   5.40  )-----------( 218      ( 20 Sep 2023 06:52 )             38.4       09-20    137  |  102  |  12  ----------------------------<  149<H>  4.6   |  20<L>  |  0.60    Ca    10.1      20 Sep 2023 06:52  Phos  3.5     09-20  Mg     1.9     09-20    TPro  8.1  /  Alb  3.6  /  TBili  0.4  /  DBili  x   /  AST  23  /  ALT  23  /  AlkPhos  66  09-20      Magnesium: 1.9 mg/dL (09-20 @ 06:52)  Phosphorus: 3.5 mg/dL (09-20 @ 06:52)      RADIOLOGY & ADDITIONAL TESTS:  Studies reviewed.     Hematology Consult Note    Margarito Mason MD PGY-5  Reachable on TEAMS    HPI:  51 yo female PMHx stage IV lung cancer with mets to brain s/p 4 cycles chemo and thoracic RT completed on oct 2022, gamma knife surgery to 7 brain mets , recent admission to [facility] from sept 4-13th for epigastric pain and hyperglycemia thought to be due to steroids presents to ED complaining of 1 weeks of lower extremity weakness and intermittent blurry vision.  Pt was initially seen at the end of August and admitted for hx of migraines and dizziness, was started on  decadron 6mg q6hr and Depakote at that time. Returned to [facility] again early september for worsening epigastric pain  in the setting of taking steroids without GI ppx. CTA at that time showing no PE, stable right apical lung mass and rapid interval growth of a left lower lobe mass and new hepatic mets. Pt finished dexamethasone taper on 9/7 and was discharged with protonix, depakote and follow up with outpatient rad onc Dr. Bernard, outpatient NSGY, and oncologist Dr. Beckman at [facility] with plans to start immunotherapy nivolumab as outpatient.  Since discharge on the 13th, pt noting weakness in right lower extremity 'muscle' pain which improves with massages, used walker at home however feels weak and [facility]/ attend to daily needs. Notes also intermittent epigastric pain with radiation to the back. Denies numbness or tingling, saddle anesthesia, bowel incontinence, falls or trauma. Notes occasional chest pain and SOB which resolves. Additionally endorses some slight blurry vision over the last 2 weeks which she describes as "vision feels off" though is able to see.  Denies fevers, chills, N/V/D, recent sick contacts. Social hx: no smoker, previous marijuana use, no alcohol,  lives at home with phil.    ED vitals:   Pt tachy to 120's sinus rhythm otherwise VSS    Labs notable for ph 7.44, pco2 33     Patient seen and examined. + weakness, denies new sxs.       Allergies    No Known Allergies    Intolerances        MEDICATIONS  (STANDING):  dexAMETHasone     Tablet 4 milliGRAM(s) Oral every 6 hours  dextrose 5%. 1000 milliLiter(s) (100 mL/Hr) IV Continuous <Continuous>  dextrose 5%. 1000 milliLiter(s) (50 mL/Hr) IV Continuous <Continuous>  dextrose 50% Injectable 25 Gram(s) IV Push once  dextrose 50% Injectable 25 Gram(s) IV Push once  dextrose 50% Injectable 12.5 Gram(s) IV Push once  divalproex  milliGRAM(s) Oral two times a day  glucagon  Injectable 1 milliGRAM(s) IntraMuscular once  insulin lispro (ADMELOG) corrective regimen sliding scale   SubCutaneous at bedtime  insulin lispro (ADMELOG) corrective regimen sliding scale   SubCutaneous three times a day before meals  pantoprazole    Tablet 40 milliGRAM(s) Oral before breakfast  polyethylene glycol 3350 17 Gram(s) Oral daily  senna 2 Tablet(s) Oral at bedtime    MEDICATIONS  (PRN):  acetaminophen     Tablet .. 650 milliGRAM(s) Oral every 6 hours PRN Temp greater or equal to 38C (100.4F), Mild Pain (1 - 3)  aluminum hydroxide/magnesium hydroxide/simethicone Suspension 30 milliLiter(s) Oral every 4 hours PRN Dyspepsia  dextrose Oral Gel 15 Gram(s) Oral once PRN Blood Glucose LESS THAN 70 milliGRAM(s)/deciliter  melatonin 3 milliGRAM(s) Oral at bedtime PRN Insomnia  ondansetron Injectable 4 milliGRAM(s) IV Push every 8 hours PRN Nausea and/or Vomiting  oxyCODONE    IR 5 milliGRAM(s) Oral every 6 hours PRN Severe Pain (7 - 10)      PAST MEDICAL & SURGICAL HISTORY:  GERD (Gastroesophageal Reflux Disease)      Lung mass  right      Non-small cell lung cancer with metastasis      S/P endoscopy  2022          FAMILY HISTORY:  No pertinent family history in first degree relatives        SOCIAL HISTORY: no tobacco use    REVIEW OF SYSTEMS:  All other systems were reviewed and are negative, except as noted        T(F): 97.7 (09-20-23 @ 16:34), Max: 98.6 (09-19-23 @ 22:13)  HR: 95 (09-20-23 @ 16:34)  BP: 121/82 (09-20-23 @ 16:34)  RR: 17 (09-20-23 @ 16:34)  SpO2: 97% (09-20-23 @ 16:34)  Wt(kg): --    Constitutional: NAD  Eyes: EOMI, sclera non-icteric  Neck: supple  Respiratory: no inc wob  Cardiovascular: RRR,   Gastrointestinal: soft, NTND, no masses palpable,  Extremities: no cyanosis, no clubbing                          12.2   5.40  )-----------( 218      ( 20 Sep 2023 06:52 )             38.4       09-20    137  |  102  |  12  ----------------------------<  149<H>  4.6   |  20<L>  |  0.60    Ca    10.1      20 Sep 2023 06:52  Phos  3.5     09-20  Mg     1.9     09-20    TPro  8.1  /  Alb  3.6  /  TBili  0.4  /  DBili  x   /  AST  23  /  ALT  23  /  AlkPhos  66  09-20      Magnesium: 1.9 mg/dL (09-20 @ 06:52)  Phosphorus: 3.5 mg/dL (09-20 @ 06:52)      RADIOLOGY & ADDITIONAL TESTS:  Studies reviewed.

## 2023-09-20 NOTE — H&P ADULT - PROBLEM SELECTOR PLAN 4
- DM a1c 7.2 end of August 2023  - during august admission:, recommended discharge on oral medication metformin 1000 BID, repaglinide 1mg TID with meals with intention to discontinue following steroid taper completion.   - last seen by endo mid september, finished steroid taper on 7th, was on lantus 3 units and lispro 2 units at that time  - check a1c, endo consult - DM a1c 7.2 end of August 2023.   - last seen by endo mid september, finished steroid taper on 7th, was on lantus 3 units and lispro 2 units at that time  - BG wnl this admission   - check a1c, monitor FS's  - SSI for now   - consider addition of basal/ bolus if persistently hyperglycemic, consider endo consult

## 2023-09-20 NOTE — H&P ADULT - ASSESSMENT
53 yo female PMHx stage IV lung cancer with mets to brain s/p 4 cycles chemo and thoracic RT completed on oct 2022, Gamma knife surgery to 7 brain mets, 2 recent admissions for dizziness, headaches and epigastric pain presents with right lower extremity weakness and blurry vision, likely in setting of worsening metastatic cancer and physical deconditioning.

## 2023-09-20 NOTE — H&P ADULT - ATTENDING COMMENTS
51yo F w/ PMH of stage IV lung CA w/ mets to brain s/p 4 cycles chemo and thoracic RT 10/22, Gamma knife surgery to 7 brain mets, 2 recent admissions for dizziness, HA and epigastric pain pw RLE weakness and blurry vision, likely i/s/o worsening metastatic cancer and physical deconditioning    # Metastatic lung CA w/ mets to brain  - Imaging w/ c/f new punctate hemorrhage within left high frontoparietal mass  - Repeat CT in AM  - MRI w/ IV Contrast  - Hold AC pending repeat CT and discussion w/ NSGY  - Dex4 q6  - c/w home Depakote for seizure ppx  - heme/onc c/s   - Consider optho c/s for blurry vision w/ met ca to brain  - fall and seizure precautions   - PT    # Rest of plan as above

## 2023-09-20 NOTE — H&P ADULT - PROBLEM SELECTOR PLAN 3
- likely related to malignancy  - as above  - if worsening consider neurology consult - likely related to malignancy  - check a1/c  - start dex 4q6 and keppra 500BID   - if worsening consider neurology consult - likely related to malignancy  - check a1/c  - start dex 4q6 and keppra 500BID as per NSGY  - if worsening consider optho consult - likely related to malignancy  - check a1/c  - start dex 4q6 and continue depakote 500 BID as per NSGY  - if worsening consider optho consult

## 2023-09-20 NOTE — PHYSICAL THERAPY INITIAL EVALUATION ADULT - GENERAL OBSERVATIONS, REHAB EVAL
Pt a/w RLE weakness and blurry vision, hx stage 4 lung cancer with mets to brain s/p chemo and radiation. Pt received supine in bed on stretcher (orange 58L), +IVL, A&Ox3, follows commands

## 2023-09-20 NOTE — CHART NOTE - NSCHARTNOTEFT_GEN_A_CORE
[facility]   Home   My Content    Search   Help   Log out  Prescription Monitoring Program Registry  Weljim Hernandezsh  ×  Update Personal Info  FAQ  Search Results Help  Help  ×The Confidential Drug Utilization Report page now has a new look which includes enhanced features.   Important patient indicators are available at-a-glance along with improved search results filter options.  Hover over the patient indicators or column headings for a description of each data element.   Patient Search   Multi-Patient Search   MME Calculator   Reports   Drug List   Designation  Confidential Drug Utilization Report  Search Terms: renée carrizales, 1970Search Date: 09/20/2023 02:14:49 AM  Searching on behalf of: [address] [place] [address]  The Drug Utilization Report below displays all of the controlled substance prescriptions, if any, that your patient has filled in the last twelve months. The information displayed on this report is compiled from pharmacy submissions to the Department, and accurately reflects the information as submitted by the pharmacies.    This report was requested by: Isabel Mora | Reference #: [id number]    Practitioner Count: 1  Pharmacy Count: 1  Current Opioid Prescriptions: 0  Current Benzodiazepine Prescriptions: 0  Current Stimulant Prescriptions: 0      Patient Demographic Information (PDI)       PDI	First Name	Last Name	Birth Date	Gender	Street Address	[facility]  A	Renée Asencioer	1970	Female	[address]	[place]	[address]  B	Renée Asencioer	1970	Female	[id number]	[place]	[address]  C	Renée Asencioer	1970	Female	[address] [place] [id number]    Prescription Information      PDI Filter:    PDI	Current Rx	Drug Type	Rx Written	Rx Dispensed	Drug	Quantity	Days Supply	Prescriber Name	Prescriber NIKITA #	Payment Method	Dispenser  A	N	O	02/03/2023	02/03/2023	tramadol hcl 50 mg tablet	60	30	Grayson Savage MD	[id number]	Insurance	[facility] [id number]  B	N	O	09/29/2022	09/29/2022	oxycodone hcl (ir) 5 mg tablet	80	20	Kenji Soto	[id number]	Insurance	[facility]  C	N	O	09/11/2023	09/12/2023	oxycodone hcl (ir) 5 mg tablet	18	3	Aleta Hatfield	[id number]	Medicaid	[facility]    * - Details of Drug Type : O = Opioid, B = Benzodiazepine, S = Stimulant    * - Drugs marked with an asterisk are compound drugs. If the compound drug is made up of more than one controlled substance, then each controlled substance will be a separate row in the table.          † Click the ‘Report Suspicious Activity’ button to report information related to controlled substance suspicious activity to the [place] of [employer]arcotic Enforcement.    †† Click the ‘Send Questions/Comments’ button to send questions about this report to the [place] of [employer] Enforcement, or call [phone].    ††† Click the ‘Substance Use Disorder Treatment’ button to go to the Office of Addiction Services and Supports website, www.[place].gov or call 1-645.261.1376.    © 2017 [facility] Department of Health -[place] of Narcotic Aegdtduithy79/20/2023 02:14  .

## 2023-09-20 NOTE — CONSULT NOTE ADULT - ASSESSMENT
---Note incomplete ----     53 yo female PMHx stage IV lung cancer with mets to brain s/p 4 cycles chemo and thoracic RT completed on oct 2022, gamma knife surgery to 7 brain mets , recent admission to [facility] from sept 4-13th for epigastric pain and hyperglycemia thought to be due to steroids presents to ED complaining of 1 weeks of lower extremity weakness and intermittent blurry vision, reporting vision "feels off," found to have color plate defect OU and otherwise unremarkable ophthalmic exam ***     # Blurry vision  - Pt with hx lung cancer with mets to the brain seen on CT as well as MRI in 8/20/23 including occipital lobe with vasogenic edema, reports with 1-2 weeks episodic blurry vision  - VA grossly intact, 20/40 OD 20/30 OS, IOP wnl OU, no rAPD OU  - CVF full OU  - Color plates 8/12 OD, 7/12 OD  - Anterior exam and DFE unremarkable OU ***   - Episodic visual disturbances likely due to current extensive cerebral metastatic disease, with color plate deficiency, cannot rule out optic nerve dysfunction  - Recommend MRI orbits w/wo contrast (orbits not included on 8/2023 MRI, may also consider MRI brain w/wo given current visual symptoms occurred after 8/2023 MRI brain)  - No acute ophthalmic pathology at this time ***   - Recommend to c/w management per neuro    Discussed with Dr. Christiansen, chief resident.     Outpatient Follow-up: Patient should follow-up with his/her ophthalmologist or with [facility] Department of Ophthalmology within 1 week of after discharge at:    [address]  [place] [address]  [phone] ---Note incomplete -    51 yo female PMHx stage IV lung cancer with mets to brain s/p 4 cycles chemo and thoracic RT completed on oct 2022, gamma knife surgery to 7 brain mets , recent admission to [facility] from sept 4-13th for epigastric pain and hyperglycemia thought to be due to steroids presents to ED complaining of 1 weeks of lower extremity weakness and intermittent blurry vision, reporting vision "feels off," found to have color plate defect OU, 2-3+ optic disc edema OU with hemorrhages arising from optic nerves OU.     # Blurry vision  # Optic disc edema OU  - Pt with hx lung cancer with mets to the brain seen on CT as well as MRI in 8/20/23 including occipital lobe with vasogenic edema, reports with 1-2 weeks episodic blurry vision, found to have bilateral optic disc edema   - VA grossly intact, 20/40 OD 20/30 OS, IOP wnl OU, no rAPD OU  - CVF full OU  - Color plates 8/12 OD, 7/12 OD  - Anterior exam unremarkable OU   - DFE with 2-3+ disc edema with superior hemorrhage arising from optic nerve OU  - Differential includes elevated ICP leading to papilledema in the setting of brain mets with vasogenic edema, NAION, retinal vein occlusion (unlikely to be bilateral)  - Episodic visual disturbances likely due to current extensive cerebral metastatic disease, with color plate deficiency, cannot rule out optic nerve dysfunction  - Recommend MRI orbits w/wo contrast (orbits not included on 8/2023 MRI, may also consider MRI brain w/wo given current visual symptoms occurred after 8/2023 MRI brain)  - Optic disc edema does not always occur in the setting of papilledema (optic disc edema caused by elevated ICP). Pecommend imaging, followed by lumbar puncture with opening pressure and neurology management if findings suggestive of increased intracranial pressure and if further work-up is warranted   - Recommend to c/w management per neuro    Discussed with Dr. Christiansen, chief resident.     Outpatient Follow-up: Patient should follow-up with his/her ophthalmologist or with [facility] Department of Ophthalmology within 1 week of after discharge at:    [address]  [place] [address]  [phone] 51 yo female PMHx stage IV lung cancer with mets to brain s/p 4 cycles chemo and thoracic RT completed on oct 2022, gamma knife surgery to 7 brain mets , recent admission to [facility] from sept 4-13th for epigastric pain and hyperglycemia thought to be due to steroids presents to ED complaining of 1 weeks of lower extremity weakness and intermittent blurry vision, reporting vision "feels off," found to have color plate defect OU, 2-3+ optic disc edema OU with hemorrhages arising from optic nerves OU.     # Blurry vision  # Optic disc edema OU  - Pt with hx lung cancer with mets to the brain seen on CT as well as MRI in 8/20/23 including occipital lobe with vasogenic edema, reports with 1-2 weeks episodic blurry vision, found to have bilateral optic disc edema   - VA grossly intact, 20/40 OD 20/30 OS, IOP wnl OU, no rAPD OU  - CVF full OU  - Color plates 8/12 OD, 7/12 OD  - Anterior exam unremarkable OU   - DFE with 2-3+ disc edema with superior hemorrhage arising from optic nerve OU  - Differential includes elevated ICP leading to papilledema in the setting of brain mets with vasogenic edema, metastatic infiltration of CSF, retinal vein occlusion (unlikely to be bilateral)  - Episodic visual disturbances likely due to current extensive cerebral metastatic disease, with color plate deficiency, cannot rule out optic nerve dysfunction  - Recommend MRI brain w/wo and orbits w/wo contrast  - Optic disc edema does not always occur in the setting of papilledema (optic disc edema caused by elevated ICP). Recommend imaging, followed by lumbar puncture with opening pressure/CSF studies and neurology management if findings suggestive of increased intracranial pressure and if further work-up is warranted   - Recommend to c/w management per neuro    Discussed with Dr. Christiansen, chief resident and Dr. Stout, neuro-ophthalmology attending.    Outpatient Follow-up: Patient should follow-up with his/her ophthalmologist or with [facility] Department of Ophthalmology within 1 week of after discharge at:    [address]  [place] [address]  [phone] 53 yo female PMHx stage IV lung cancer with mets to brain s/p 4 cycles chemo and thoracic RT completed on oct 2022, gamma knife surgery to 7 brain mets , recent admission to [facility] from sept 4-13th for epigastric pain and hyperglycemia thought to be due to steroids presents to ED complaining of 1 weeks of lower extremity weakness and intermittent blurry vision, reporting vision "feels off," found to have color plate defect OU, 2-3+ optic disc edema OU with hemorrhages arising from optic nerves OU.     # Blurry vision  # Optic disc edema OU  - Pt with hx lung cancer with mets to the brain seen on CT as well as MRI in 8/20/23 including occipital lobe with vasogenic edema, reports with 1-2 weeks episodic blurry vision, found to have bilateral optic disc edema   - VA grossly intact, 20/40 OD 20/30 OS, IOP wnl OU, no rAPD OU  - CVF full OU  - Color plates 8/12 OD, 7/12 OD  - Anterior exam unremarkable OU   - DFE with 2-3+ disc edema with superior hemorrhage arising from optic nerve OU  - Differential includes elevated ICP leading to papilledema in the setting of brain mets with vasogenic edema, metastatic infiltration of CSF, retinal vein occlusion (unlikely to be bilateral)  - Episodic visual disturbances likely due to current extensive cerebral metastatic disease, with color plate deficiency, cannot rule out optic nerve dysfunction  - Recommend MRI orbits w/wo contrast  - Optic disc edema does not always occur in the setting of papilledema (optic disc edema caused by elevated ICP). Recommend imaging, followed by lumbar puncture with opening pressure/CSF studies and neurology management if findings suggestive of increased intracranial pressure and if further work-up is warranted   - Recommend to c/w management per neuro    Discussed with Dr. Christiansen, chief resident and Dr. Stout, neuro-ophthalmology attending.    Outpatient Follow-up: Patient should follow-up with his/her ophthalmologist or with [facility] Department of Ophthalmology within 1 week of after discharge at:    [address]  [place] [address]  [phone] 51 yo female PMHx stage IV lung cancer with mets to brain s/p 4 cycles chemo and thoracic RT completed on oct 2022, gamma knife surgery to 7 brain mets , recent admission to [facility] from sept 4-13th for epigastric pain and hyperglycemia thought to be due to steroids presents to ED complaining of 1 weeks of lower extremity weakness and intermittent blurry vision, reporting vision "feels off," found to have color plate defect OU, 2-3+ optic disc edema OU with hemorrhages arising from optic nerves OU.     # Blurry vision  # Optic disc edema OU  - Pt with hx lung cancer with mets to the brain seen on CT as well as MRI in 8/20/23 including occipital lobe with vasogenic edema, reports with 1-2 weeks episodic blurry vision, found to have bilateral optic disc edema   - VA grossly intact, 20/40 OD 20/30 OS, IOP wnl OU, no rAPD OU  - CVF full OU  - Color plates 8/12 OD, 7/12 OD  - Anterior exam unremarkable OU   - DFE with 2-3+ disc edema with superior hemorrhage arising from optic nerve OU  - Differential includes elevated ICP leading to papilledema in the setting of brain mets with vasogenic edema, metastatic infiltration of CSF, retinal vein occlusion (unlikely to be bilateral)  - Episodic visual disturbances likely due to current extensive cerebral metastatic disease, with color plate deficiency, cannot rule out optic nerve dysfunction  - Recommend MRI orbits w/wo contrast (MRI done this admission did not include dedicated orbital MR)  - Optic disc edema does not always occur in the setting of papilledema (optic disc edema caused by elevated ICP). Recommend imaging, followed by lumbar puncture with opening pressure/CSF studies and neurology/NSGY management if findings suggestive of increased intracranial pressure and if further work-up is warranted   - Recommend to c/w management per neuro/NSGY    Discussed with Dr. Christiansen, chief resident and Dr. Stout, neuro-ophthalmology attending.    Outpatient Follow-up: Patient should follow-up with his/her ophthalmologist or with [facility] Department of Ophthalmology within 1 week of after discharge at:    [address]  [place] [address]  [phone]

## 2023-09-20 NOTE — H&P ADULT - NSHPPHYSICALEXAM_GEN_ALL_CORE
Gen: NAD, AOx3  Head: NCAT  HEENT: PERRL, oral mucosa moist, normal conjunctiva  Lung: CTAB, no respiratory distress  CV: rrr, no murmurs, Normal perfusion  Abd: soft, NTND, no CVA tenderness  MSK: No edema, no visible deformities  Neuro: left sided dysmetria otherwise no focal deficits, strength 5/5 motor intact   Skin: No rash   Psych: normal affect

## 2023-09-20 NOTE — PHYSICAL THERAPY INITIAL EVALUATION ADULT - BED MOBILITY LIMITATIONS, REHAB EVAL
Shortness of breath and edema
decreased ability to use arms for pushing/pulling/decreased ability to use legs for bridging/pushing

## 2023-09-20 NOTE — PATIENT PROFILE ADULT - FALL HARM RISK - HARM RISK INTERVENTIONS

## 2023-09-20 NOTE — CONSULT NOTE ADULT - ASSESSMENT
52F stage IV lung cancer with mets to brain s/p 4 cycles chemo and thoracic RT completed on oct 2022, Gamma knife surgery to 7 brain mets (5 fractions of GK-SRS to 7 brain metastases from 6/21 - 6/29/2023), 2 recent admissions for dizziness, headaches and epigastric pain presents with right lower extremity weakness and blurry vision, CTH w/ L frontoparietal/L occipital (w/ calcifications)/R parietooccipital masses w/ surrounding edema. Oncology consulted for lung cancer    #lung cancer  - f/w Dr. Beckman, initially stage IIIB unresectable s/p Carboplatin/Pemetrexed in July 2022 x 4 cycles completed Sept 2022, concurrent Thoracic RT started Sept through October 2022. Achieved KS. Restaging PET/CT March 2023 confirming response to interval therapy. She declined maintenance Durvalumab recommended on numerous occasions through March 2023. Hospitalized at [facility] on 5/31-6/7/23 with symptomatic numerous brain metastases. Treated with GK-SRS to 7 brain metastases in 5 fractions from 6/21 - 6/29/2023, planned for durvalumab 8/4/2023 however appt cancelled  - recent CT C/A/P showing enlarging lung lesion and new subcentimeter liver lesion concerning for POD  - no inpatient systemic treatment planned  - would f/u NSG and radiation oncology recs for brain mets causing patient's sxs  - outpatient follow up once stable for discharge, please include hematology oncology f/u at [facility] in discharge note

## 2023-09-20 NOTE — H&P ADULT - HISTORY OF PRESENT ILLNESS
53 yo female PMHx stage IV lung cancer with mets to brain s/p 4 cycles chemo and thoracic RT completed on oct 2022, gamma knife surgery to 7 brain mets , recent admission to [facility] from sept 4-13th for epigastric pain and hyperglycemia thought to be due to steroids presents to ED complaining of 1 weeks of lower extremity weakness and intermittent blurry vision.  Pt was initially seen at the end of August and admitted for hx of migraines and dizziness, was started on  decadron 6mg q6hr and Depakote at that time. Returned to [facility] again early september for worsening epigastric pain  in the setting of taking steroids without GI ppx. CTA at that time showing no PE, stable right apical lung mass and rapid interval growth of a left lower lobe mass and new hepatic mets. Pt finished dexamethasone taper on 9/7 and was discharged with protonix, depakote and follow up with outpatient rad onc Dr. Bernard, outpatient NSGY, and oncologist Dr. Beckman at [facility] with plans to start immunotherapy nivolumab as outpatient.  Since discharge on the 13th, pt noting weakness in right lower extremity 'muscle' pain which improves with massages, used walker at home however feels weak and requiring increasing support from nicolase to ambulate/ attend to daily needs. Notes also intermittent epigastric pain with radiation to the back. Denies numbness or tingling, saddle anesthesia, bowel incontinence, falls or trauma. Notes occasional chest pain and SOB which resolves. Additionally endorses some slight blurry vision over the last 2 weeks which she describes as "vision feels off" though is able to see.  Denies fevers, chills, N/V/D, recent sick contacts. Social hx: no smoker, previous marijuana use, no alcohol,  lives at home with phil.    This admission  Pt tachy to 120's sinus rhythm otherwise VSS   51 yo female PMHx stage IV lung cancer with mets to brain s/p 4 cycles chemo and thoracic RT completed on oct 2022, gamma knife surgery to 7 brain mets , recent admission to [facility] from sept 4-13th for epigastric pain and hyperglycemia thought to be due to steroids presents to ED complaining of 1 weeks of lower extremity weakness and intermittent blurry vision.  Pt was initially seen at the end of August and admitted for hx of migraines and dizziness, was started on  decadron 6mg q6hr and Depakote at that time. Returned to [facility] again early september for worsening epigastric pain  in the setting of taking steroids without GI ppx. CTA at that time showing no PE, stable right apical lung mass and rapid interval growth of a left lower lobe mass and new hepatic mets. Pt finished dexamethasone taper on 9/7 and was discharged with protonix, depakote and follow up with outpatient rad onc Dr. Beranrd, outpatient NSGY, and oncologist Dr. Beckman at [facility] with plans to start immunotherapy nivolumab as outpatient.  Since discharge on the 13th, pt noting weakness in right lower extremity 'muscle' pain which improves with massages, used walker at home however feels weak and requiring increasing support from nicolase to ambulate/ attend to daily needs. Notes also intermittent epigastric pain with radiation to the back. Denies numbness or tingling, saddle anesthesia, bowel incontinence, falls or trauma. Notes occasional chest pain and SOB which resolves. Additionally endorses some slight blurry vision over the last 2 weeks which she describes as "vision feels off" though is able to see.  Denies fevers, chills, N/V/D, recent sick contacts. Social hx: no smoker, previous marijuana use, no alcohol,  lives at home with phil.    ED vitals:   Pt tachy to 120's sinus rhythm otherwise VSS    Labs notable for ph 7.44, pco2 33

## 2023-09-21 NOTE — OCCUPATIONAL THERAPY INITIAL EVALUATION ADULT - ADDITIONAL COMMENTS
Pt states up until 2 weeks ago pt was working/driving. Since being hospitalized, she has needed more assistance with mobility and ADLs.

## 2023-09-21 NOTE — PROGRESS NOTE ADULT - PROBLEM SELECTOR PLAN 1
previously treated with 4 rounds of chemo, developed brain mets 5/23, supposed to have followed up for immunotherapy with outpatient Dr. jarrod johnson  - now returning with dizziness, weakness, pain   - CT/MRI with enhancing lesions, quite a few of which demonstrate peripheral enhancement involving the posterior fossa and supratentorial region/edema. please see full report on [place]  - repeat CT re: punctate hemorrhage slight inc size. subsequent MRI without mention of hemorrhage.   -per nsgy: dex4 q6; Keppra 500 BID initially  recommended by NSGY however pt on Depakote at home- can continue with Depakote 500 BID as per NSGY   - Nsx Dr. Tea moreno appreciated. possible plan for surg resection of right sided cyst . plan to be discussed with family and outpt onc Dr. Johnson.   - onc tiffanie also appreciated from Dr. Sorensen. likely plan for further RT and eventual immunotherapy once recovered  - palliative consult to help assist with GOC/ pain control

## 2023-09-21 NOTE — PROGRESS NOTE ADULT - PROBLEM SELECTOR PLAN 5
- pt c/o intermittent luq pain and back pain localized to right upper scapula  - non specific differentials include gastritis given recent steroid use vs GERD   - lipase WNL.   will start on IV protonix 40mg BID and titrate pain meds   worse pain with po intake.

## 2023-09-21 NOTE — PROGRESS NOTE ADULT - ASSESSMENT
51 yo female PMHx stage IV lung cancer with mets to brain s/p 4 cycles chemo and thoracic RT completed on oct 2022, gamma knife surgery to 7 brain mets , recent admission to [facility] from sept 4-13th for epigastric pain and hyperglycemia thought to be due to steroids presents to ED complaining of 1 weeks of lower extremity weakness and intermittent blurry vision, reporting vision "feels off," found to have color plate defect OU, 2-3+ optic disc edema OU with hemorrhages arising from optic nerves OU.     Incomplete     # Blurry vision  # Optic disc edema OU  - Pt with hx lung cancer with mets to the brain seen on CT as well as MRI in 8/20/23 including occipital lobe with vasogenic edema, reports with 1-2 weeks episodic blurry vision, found to have bilateral optic disc edema   - VA grossly intact, 20/40 OD 20/30 OS, IOP wnl OU, no rAPD OU  - CVF full OU  - Color plates 8/12 OD, 7/12 OD  - Anterior exam unremarkable OU   - DFE with 2-3+ disc edema with superior hemorrhage arising from optic nerve OU  - Differential includes elevated ICP leading to papilledema in the setting of brain mets with vasogenic edema, metastatic infiltration of CSF, retinal vein occlusion (unlikely to be bilateral)  - Episodic visual disturbances likely due to current extensive cerebral metastatic disease, with color plate deficiency, cannot rule out optic nerve dysfunction  - Recommend MRI orbits w/wo contrast (MRI done this admission did not include dedicated orbital MR)  - Optic disc edema does not always occur in the setting of papilledema (optic disc edema caused by elevated ICP). Recommend imaging, followed by lumbar puncture with opening pressure/CSF studies and neurology/NSGY management if findings suggestive of increased intracranial pressure and if further work-up is warranted   - Recommend to c/w management per neuro/NSGY    Discussed with Dr. Christiansen, chief resident and Dr. Stout, neuro-ophthalmology attending.    Outpatient Follow-up: Patient should follow-up with his/her ophthalmologist or with [facility] Department of Ophthalmology within 1 week of after discharge at:    [address]  [place] [address]  [phone] 51 yo female PMHx stage IV lung cancer with mets to brain s/p 4 cycles chemo and thoracic RT completed on oct 2022, gamma knife surgery to 7 brain mets , recent admission to [facility] from sept 4-13th for epigastric pain and hyperglycemia thought to be due to steroids presents to ED complaining of 1 weeks of lower extremity weakness and intermittent blurry vision, reporting vision "feels off," found to have color plate defect OU, 2-3+ optic disc edema OU with hemorrhages arising from optic nerves OU.     Incomplete     # Blurry vision  # Optic disc edema OU  - Pt with hx lung cancer with mets to the brain seen on CT as well as MRI in 8/20/23 including occipital lobe with vasogenic edema, reports with 1-2 weeks episodic blurry vision, found to have bilateral optic disc edema   - VA grossly intact, 20/40 OD 20/30 OS, IOP wnl OU, no rAPD OU  - CVF full OU  - Color plates 8/12 OD, 7/12 OD  - Anterior exam unremarkable OU   - DFE with 3+ disc edema with superior hemorrhage arising from optic nerve OU  - Differential includes elevated ICP leading to papilledema in the setting of brain mets with vasogenic edema, metastatic infiltration of CSF, retinal vein occlusion (unlikely to be bilateral)  - Optic disc edema does not always occur in the setting of papilledema (optic disc edema caused by elevated ICP). Recommend imaging, followed by lumbar puncture with opening pressure/CSF studies and neurology/NSGY management if findings suggestive of increased intracranial pressure and if further work-up is warranted   - Recommend MRI orbits w/wo contrast (MRI done this admission did not include dedicated orbital MR) - Page ophthalmology with results  - If suspicion for ICP, would obtain LP  - Would also recommend MRV/CTV to rule out venous sinus thrombosis given patient is likely hypercoagulable in setting of malignancy.   - Recommend to c/w management per neuro/NSGY    SDW Dr. Collins, attending. Dr. Stout, neuro-ophthalmology attending.    Outpatient Follow-up: Patient should follow-up with his/her ophthalmologist or with [facility] Department of Ophthalmology within 1 week of after discharge at:    [address]  [place] [address]  [phone]

## 2023-09-21 NOTE — PROGRESS NOTE ADULT - SUBJECTIVE AND OBJECTIVE BOX
[facility] Division of Hospital Medicine  Ave Vazquez MD  Pager (M-F, 6S-5P): [phone]  Other Times:  [phone]    Patient is a 52y old  Female who presents with a chief complaint of leg weakness/ dizziness blurry vision (21 Sep 2023 09:47)      SUBJECTIVE / OVERNIGHT EVENTS:  feeling ok. still with headache and abd pain. feels slight improvement of blurry vision. afebrile.   epigastric abd pain worse with eating.     ADDITIONAL REVIEW OF SYSTEMS: otherwise neg    MEDICATIONS  (STANDING):  dexAMETHasone     Tablet 4 milliGRAM(s) Oral every 6 hours  dextrose 5%. 1000 milliLiter(s) (100 mL/Hr) IV Continuous <Continuous>  dextrose 5%. 1000 milliLiter(s) (50 mL/Hr) IV Continuous <Continuous>  dextrose 50% Injectable 25 Gram(s) IV Push once  dextrose 50% Injectable 25 Gram(s) IV Push once  dextrose 50% Injectable 12.5 Gram(s) IV Push once  divalproex  milliGRAM(s) Oral two times a day  glucagon  Injectable 1 milliGRAM(s) IntraMuscular once  insulin lispro (ADMELOG) corrective regimen sliding scale   SubCutaneous at bedtime  insulin lispro (ADMELOG) corrective regimen sliding scale   SubCutaneous three times a day before meals  pantoprazole  Injectable 40 milliGRAM(s) IV Push two times a day  polyethylene glycol 3350 17 Gram(s) Oral daily  senna 2 Tablet(s) Oral at bedtime    MEDICATIONS  (PRN):  acetaminophen     Tablet .. 650 milliGRAM(s) Oral every 6 hours PRN Temp greater or equal to 38C (100.4F), Mild Pain (1 - 3)  aluminum hydroxide/magnesium hydroxide/simethicone Suspension 30 milliLiter(s) Oral every 4 hours PRN Dyspepsia  dextrose Oral Gel 15 Gram(s) Oral once PRN Blood Glucose LESS THAN 70 milliGRAM(s)/deciliter  HYDROmorphone  Injectable 0.5 milliGRAM(s) IV Push every 4 hours PRN Severe Pain (7 - 10)  melatonin 3 milliGRAM(s) Oral at bedtime PRN Insomnia  ondansetron Injectable 4 milliGRAM(s) IV Push every 8 hours PRN Nausea and/or Vomiting  oxyCODONE    IR 10 milliGRAM(s) Oral every 4 hours PRN Moderate Pain (4 - 6)      CAPILLARY BLOOD GLUCOSE      POCT Blood Glucose.: 194 mg/dL (21 Sep 2023 08:28)  POCT Blood Glucose.: 169 mg/dL (20 Sep 2023 20:39)  POCT Blood Glucose.: 151 mg/dL (20 Sep 2023 18:29)  POCT Blood Glucose.: 144 mg/dL (20 Sep 2023 11:29)    I&O's Summary    21 Sep 2023 07:01  -  21 Sep 2023 11:24  --------------------------------------------------------  IN: 240 mL / OUT: 0 mL / NET: 240 mL        PHYSICAL EXAM:  Vital Signs Last 24 Hrs  T(C): 36.9 (21 Sep 2023 11:08), Max: 36.9 (21 Sep 2023 11:08)  T(F): 98.4 (21 Sep 2023 11:08), Max: 98.4 (21 Sep 2023 11:08)  HR: 110 (21 Sep 2023 11:08) (83 - 110)  BP: 125/82 (21 Sep 2023 11:08) (121/82 - 135/90)  BP(mean): 99 (20 Sep 2023 20:12) (99 - 99)  RR: 18 (21 Sep 2023 11:08) (16 - 18)  SpO2: 98% (21 Sep 2023 11:08) (97% - 99%)    Parameters below as of 21 Sep 2023 11:08  Patient On (Oxygen Delivery Method): room air        CONSTITUTIONAL: NAD, well-groomed  EYES:  conjunctiva and sclera clear  ENMT: Moist oral mucosa  NECK: Supple, no palpable masses; no JVD  RESPIRATORY: Normal respiratory effort; lungs are clear to auscultation bilaterally  CARDIOVASCULAR: Regular rate and rhythm, normal S1 and S2, no murmur/rub/gallop; No lower extremity edema  ABDOMEN: Nontender to palpation, normoactive bowel sounds, no rebound/guarding  MUSCULOSKELETAL:  no clubbing or cyanosis of digits; no joint swelling or tenderness to palpation  PSYCH: A+O to person, place, and time; affect appropriate  SKIN: No rashes; no palpable lesions  Neuro: left side dysmetria, strength grossly intact    LABS:                        10.6   5.67  )-----------( 210      ( 21 Sep 2023 06:36 )             32.2     09-21    134<L>  |  100  |  10  ----------------------------<  161<H>  4.1   |  21<L>  |  0.51    Ca    9.8      21 Sep 2023 06:36  Phos  3.5     09-20  Mg     1.9     09-20    TPro  8.1  /  Alb  3.6  /  TBili  0.4  /  DBili  x   /  AST  23  /  ALT  23  /  AlkPhos  66  09-20    PT/INR - ( 19 Sep 2023 14:17 )   PT: 13.7 sec;   INR: 1.25 ratio         PTT - ( 19 Sep 2023 14:17 )  PTT:31.0 sec      Urinalysis Basic - ( 21 Sep 2023 06:36 )    Color: x / Appearance: x / SG: x / pH: x  Gluc: 161 mg/dL / Ketone: x  / Bili: x / Urobili: x   Blood: x / Protein: x / Nitrite: x   Leuk Esterase: x / RBC: x / WBC x   Sq Epi: x / Non Sq Epi: x / Bacteria: x          RADIOLOGY & ADDITIONAL TESTS:  Results Reviewed:   Imaging Personally Reviewed:  Electrocardiogram Personally Reviewed:    COORDINATION OF CARE:  Care Discussed with Consultants/Other Providers [Y/N]:  Prior or Outpatient Records Reviewed [Y/N]:

## 2023-09-21 NOTE — PROGRESS NOTE ADULT - ASSESSMENT
52F stage IV lung cancer with mets to brain s/p 4 cycles chemo and thoracic RT completed on oct 2022, Gamma knife surgery to 7 brain mets (5 fractions of GK-SRS to 7 brain metastases from 6/21 - 6/29/2023), 2 recent admissions for dizziness, headaches and epigastric pain presents with right lower extremity weakness and blurry vision, CTH w/ L frontoparietal/L occipital (w/ calcifications)/R parietooccipital masses w/ surrounding edema. Oncology consulted for lung cancer    #lung cancer  - f/w Dr. Beckman, initially stage IIIB unresectable s/p Carboplatin/Pemetrexed in July 2022 x 4 cycles completed Sept 2022, concurrent Thoracic RT started Sept through October 2022. Achieved NC. Restaging PET/CT March 2023 confirming response to interval therapy. She declined maintenance Durvalumab recommended on numerous occasions through March 2023. Hospitalized at [facility] on 5/31-6/7/23 with symptomatic numerous brain metastases. Treated with GK-SRS to 7 brain metastases in 5 fractions from 6/21 - 6/29/2023, planned for durvalumab 8/4/2023 however appt cancelled  - recent CT C/A/P showing enlarging lung lesion and new subcentimeter liver lesion concerning for POD  - no inpatient systemic treatment planned  - NSG tentative plan for resection 9/26, would recommend radiation oncology assessment for post-op radiation. Plan for further immunotherapy treatment outpatient  - MRV/CTV to rule out venous sinus thrombosis, MRI orbits w/wo contrast +/- LP as per ophtho  - outpatient follow up once stable for discharge, please include hematology oncology f/u at [facility] in discharge note

## 2023-09-21 NOTE — OCCUPATIONAL THERAPY INITIAL EVALUATION ADULT - PERTINENT HX OF CURRENT PROBLEM, REHAB EVAL
Pt is a 53 yo female admitted to [facility] on 9/19/23 PMHx stage IV lung cancer with mets to brain s/p 4 cycles chemo and thoracic RT completed on oct 2022, gamma knife surgery to 7 brain mets, recent admission to [facility] from sept 4-13th for epigastric pain and hyperglycemia thought to be due to steroids presents to ED complaining of 1 weeks of lower extremity weakness and intermittent blurry vision.  Pt was initially seen at the end of August and admitted for hx of migraines and dizziness, was started on  decadron 6mg q6hr and Depakote at that time. Returned to [facility] again early september for worsening epigastric pain  in the setting of taking steroids without GI ppx. CTA at that time showing no PE, stable right apical lung mass and rapid interval growth of a left lower lobe mass and new hepatic mets. Pt finished dexamethasone taper on 9/7 and was discharged with protonix, depakote and follow up with outpatient rad onc Dr. Bernard, outpatient NSGY, and oncologist Dr. Beckman at [facility] with plans to start immunotherapy nivolumab as outpatient.  Since discharge on the 13th, pt noting weakness in right lower extremity 'muscle' pain which improves with massages, used walker at home however feels weak and [facility]/ [facility] to daily needs. Notes also intermittent epigastric pain with radiation to the back. Denies numbness or tingling, saddle anesthesia, bowel incontinence, falls or trauma. Notes occasional chest pain and SOB which resolves. Additionally endorses some slight blurry vision over the last 2 weeks which she describes as "vision feels off" though is able to see.  Denies fevers, chills, N/V/D, recent sick contacts. Social hx: no smoker, previous marijuana use, no alcohol,  lives at home with phil. Hospital course: ED vitals:  Pt tachy to 120's sinus rhythm otherwise VSS , Labs notable for ph 7.44, pco2 33; + 2 recent admissions for dizziness, headaches and epigastric pain presents with right lower extremity weakness and blurry vision, likely in setting of worsening metastatic cancer and physical deconditioning. Denies visual changes CT w/ L frontoparietal/L occipital (w/ calcifications)/R parietooccipital masses w/ surrounding edema; no MLS. MR 8/20/23 w/  Exam: neurointact except for dysmetria on FTN L>R, and a very fine tremor in both hands.  RLE weakness, no alarm signs of cord compression, likely MSK vs Diabetic neuropathy, dvt: ppx:  scds (as per  NSGY - obtain repeat non con head CT in the AM to evaluate for possible AC, however no AC as of now) CTA chest: No pulmonary arterial emboli. Left lower lobe previously described peripheral opacity abutting the pleural surface with interval decrease in size since September 4, 2023. Right upper lobe apical lobulated mass is without significant interval change since September 4, 2023. Hepatic hypodense lesions are not well evaluated on this chest CT due to the timing of contrast opacification although may have increased in size since September 4, 2023 given differences in technique. Dedicated contrast enhanced liver MRI can be performed for complete evaluation as clinically warranted. CTH: Left high frontoparietal mass has mildly increased in size with new high attenuating material within it concerning for new punctate hemorrhage and increased surrounding vasogenic edema. These findings were discussed with MICHEAL Dangelo at 9/19/2023 5:27 PM by Dr. Chong with read back confirmation. Mildly increased vasogenic edema surrounding the left occipital lesion. Grossly stable right parietotemporal lesion. Recommend MRI brain with IV contrast for further evaluation. Pt is a 51 yo female admitted to [facility] on 9/19/23 PMHx stage IV lung cancer with mets to brain s/p 4 cycles chemo and thoracic RT completed on oct 2022, gamma knife surgery to 7 brain mets, recent admission to [facility] from sept 4-13th for epigastric pain and hyperglycemia thought to be due to steroids presents to ED complaining of 1 weeks of lower extremity weakness and intermittent blurry vision.  Pt was initially seen at the end of August and admitted for hx of migraines and dizziness, was started on  decadron 6mg q6hr and Depakote at that time. Returned to [facility] again early september for worsening epigastric pain  in the setting of taking steroids without GI ppx. CTA at that time showing no PE, stable right apical lung mass and rapid interval growth of a left lower lobe mass and new hepatic mets. Pt finished dexamethasone taper on 9/7 and was discharged with protonix, depakote and follow up with outpatient rad onc Dr. Bernard, outpatient NSGY, and oncologist Dr. Beckman at [facility] with plans to start immunotherapy nivolumab as outpatient.  Since discharge on the 13th, pt noting weakness in right lower extremity 'muscle' pain which improves with massages, used walker at home however feels weak and requiring increasing support from fiancee to ambulate/ attend to daily needs. Notes also intermittent epigastric pain with radiation to the back. Additionally endorses some slight blurry vision over the last 2 weeks which she describes as "vision feels off" though is able to see. RLE weakness, no alarm signs of cord compression, likely MSK vs Diabetic neuropathy, dvt: ppx:  scds (as per  NSGY - obtain repeat non con head CT in the AM to evaluate for possible AC, however no AC as of now) CTA chest: No pulmonary arterial emboli. Left lower lobe previously described peripheral opacity abutting the pleural surface with interval decrease in size since September 4, 2023. Right upper lobe apical lobulated mass is without significant interval change since September 4, 2023. Hepatic hypodense lesions are not well evaluated on this chest CT due to the timing of contrast opacification although may have increased in size since September 4, 2023 given differences in technique. Dedicated contrast enhanced liver MRI can be performed for complete evaluation as clinically warranted. CTH: Left high frontoparietal mass has mildly increased in size with new high attenuating material within it concerning for new punctate hemorrhage and increased surrounding vasogenic edema. Mildly increased vasogenic edema surrounding the left occipital lesion. Grossly stable right parietotemporal lesion.

## 2023-09-21 NOTE — PROGRESS NOTE ADULT - SUBJECTIVE AND OBJECTIVE BOX
[facility] DEPARTMENT OF OPHTHALMOLOGY  ------------------------------------------------------------------------------  Paul Reina MD PGY 2  Contact via [employer]  ------------------------------------------------------------------------------    Interval History: No acute events overnight.     MEDICATIONS  (STANDING):  dexAMETHasone     Tablet 4 milliGRAM(s) Oral every 6 hours  dextrose 5%. 1000 milliLiter(s) (50 mL/Hr) IV Continuous <Continuous>  dextrose 5%. 1000 milliLiter(s) (100 mL/Hr) IV Continuous <Continuous>  dextrose 50% Injectable 25 Gram(s) IV Push once  dextrose 50% Injectable 25 Gram(s) IV Push once  dextrose 50% Injectable 12.5 Gram(s) IV Push once  divalproex  milliGRAM(s) Oral two times a day  glucagon  Injectable 1 milliGRAM(s) IntraMuscular once  insulin lispro (ADMELOG) corrective regimen sliding scale   SubCutaneous at bedtime  insulin lispro (ADMELOG) corrective regimen sliding scale   SubCutaneous three times a day before meals  pantoprazole    Tablet 40 milliGRAM(s) Oral before breakfast  polyethylene glycol 3350 17 Gram(s) Oral daily  senna 2 Tablet(s) Oral at bedtime    MEDICATIONS  (PRN):  acetaminophen     Tablet .. 650 milliGRAM(s) Oral every 6 hours PRN Temp greater or equal to 38C (100.4F), Mild Pain (1 - 3)  aluminum hydroxide/magnesium hydroxide/simethicone Suspension 30 milliLiter(s) Oral every 4 hours PRN Dyspepsia  dextrose Oral Gel 15 Gram(s) Oral once PRN Blood Glucose LESS THAN 70 milliGRAM(s)/deciliter  melatonin 3 milliGRAM(s) Oral at bedtime PRN Insomnia  ondansetron Injectable 4 milliGRAM(s) IV Push every 8 hours PRN Nausea and/or Vomiting  oxyCODONE    IR 5 milliGRAM(s) Oral every 6 hours PRN Severe Pain (7 - 10)      VITALS: T(C): 36.5 (09-21-23 @ 04:26)  T(F): 97.7 (09-21-23 @ 04:26), Max: 98 (09-20-23 @ 20:34)  HR: 106 (09-21-23 @ 04:26) (83 - 106)  BP: 127/88 (09-21-23 @ 04:26) (121/82 - 135/90)  RR:  (16 - 18)  SpO2:  (97% - 99%)  Wt(kg): --  General: AAO x 3, appropriate mood and affect      Ophthalmology Exam:  Visual acuity (cc): 20/40 PHNI ; 20/40 PH 20/30  Pupils: PERRL OU, no APD  Ttono: 16 OU  Extraocular movements (EOMs): Full OU, no pain, no diplopia  Confrontational Visual Field (CVF): Full OU  Color Plates: 8/12 OD ; 7/12 OS     Pen Light Exam (PLE)  External: Flat OU  Lids/Lashes/Lacrimal Ducts: Flat OU    Sclera/Conjunctiva: W+Q OU  Cornea: Cl OU  Anterior Chamber: D+F OU    Iris: Flat OU  Lens: Cl OU    Fundus Exam: dilated with 1% tropicamide and 2.5% phenylephrine  Approval obtained from primary team for dilation  Patient aware that pupils can remained dilated for at least 4-6 hours  Exam performed with 20D lens    Vitreous: wnl OU  Disc, cup/disc: OD: 2-3+ disc edema with superior hemorrhage arising from optic nerve ; OS: 2-3+ disc edema with superior hemorrhage arising from optic nerve  Macula: wnl OU  Vessels: wnl OU  Periphery: few DBH OU     Labs/Imaging:  CT BRAIN   ORDERED BY:  JASSI KERR     PROCEDURE DATE:  09/20/2023          INTERPRETATION:  Clinical indication: History of lung cancer and brain   metastasis.    Multiple axial sections were performed from the base of vertex without   contrast enhancement. Coronal and sagittal reconstructed structures were   performed    This exam is compared with prior head CT performed on September 19, 2023    This exam is somewhat limited by motion    Areas of vasogenic edema involving the right temporal/posterior frontal,   left parietal occipital and high left posterior frontal/parietal region   are identified with associated lesions. The largest of these lesions   involve the right temporal/posterior frontal region measures  approximately 4.2 x 2.5 cm and previously measured approximately 4.2 x   2.5 cm. The lesion involving the left parietal-occipital region does   demonstrate associated calcification and measures approximately 2.4 x 2.0   cm and previously measured approximately 2.4 x 2.2 cm. The lesion   involves the high left posterior frontal parietal region and also   demonstrates some areas of associated high attenuation which could be   compatible with hemorrhage and/or mineralization. This lesion measures   approximately 2.2 x 1.7 cm and previously measured approximately 2.2 x   1.3 cm. There is localized mass effect seen consisting of sulcal   effacement.    The overall size and configuration of ventricles appears unchanged with   the left lateral ventricle more prominent than the right.    Evaluation of the osseous structures with the appropriate window appears   unremarkable    The visualized paranasal sinuses mastoid and middle ear regions appear   clear.    IMPRESSION: Vasogenic edema and underlying lesions are again seen as   described above.   [facility] DEPARTMENT OF OPHTHALMOLOGY  ------------------------------------------------------------------------------  Paul Reina MD PGY 2  Contact via [employer] Teams  ------------------------------------------------------------------------------    Interval History: No acute events overnight.     MEDICATIONS  (STANDING):  dexAMETHasone     Tablet 4 milliGRAM(s) Oral every 6 hours  dextrose 5%. 1000 milliLiter(s) (50 mL/Hr) IV Continuous <Continuous>  dextrose 5%. 1000 milliLiter(s) (100 mL/Hr) IV Continuous <Continuous>  dextrose 50% Injectable 25 Gram(s) IV Push once  dextrose 50% Injectable 25 Gram(s) IV Push once  dextrose 50% Injectable 12.5 Gram(s) IV Push once  divalproex  milliGRAM(s) Oral two times a day  glucagon  Injectable 1 milliGRAM(s) IntraMuscular once  insulin lispro (ADMELOG) corrective regimen sliding scale   SubCutaneous at bedtime  insulin lispro (ADMELOG) corrective regimen sliding scale   SubCutaneous three times a day before meals  pantoprazole    Tablet 40 milliGRAM(s) Oral before breakfast  polyethylene glycol 3350 17 Gram(s) Oral daily  senna 2 Tablet(s) Oral at bedtime    MEDICATIONS  (PRN):  acetaminophen     Tablet .. 650 milliGRAM(s) Oral every 6 hours PRN Temp greater or equal to 38C (100.4F), Mild Pain (1 - 3)  aluminum hydroxide/magnesium hydroxide/simethicone Suspension 30 milliLiter(s) Oral every 4 hours PRN Dyspepsia  dextrose Oral Gel 15 Gram(s) Oral once PRN Blood Glucose LESS THAN 70 milliGRAM(s)/deciliter  melatonin 3 milliGRAM(s) Oral at bedtime PRN Insomnia  ondansetron Injectable 4 milliGRAM(s) IV Push every 8 hours PRN Nausea and/or Vomiting  oxyCODONE    IR 5 milliGRAM(s) Oral every 6 hours PRN Severe Pain (7 - 10)      VITALS: T(C): 36.5 (09-21-23 @ 04:26)  T(F): 97.7 (09-21-23 @ 04:26), Max: 98 (09-20-23 @ 20:34)  HR: 106 (09-21-23 @ 04:26) (83 - 106)  BP: 127/88 (09-21-23 @ 04:26) (121/82 - 135/90)  RR:  (16 - 18)  SpO2:  (97% - 99%)  Wt(kg): --  General: AAO x 3, appropriate mood and affect      Ophthalmology Exam:  Visual acuity (cc): 20/40 PHNI ; 20/40 PH 20/30  Pupils: PERRL OU, no APD  Ttono: 16 OU  Extraocular movements (EOMs): Full OU, no pain, no diplopia  Confrontational Visual Field (CVF): Full OU  Color Plates: 8/12 OD ; 7/12 OS     Pen Light Exam (PLE)  External: Flat OU  Lids/Lashes/Lacrimal Ducts: Flat OU    Sclera/Conjunctiva: W+Q OU  Cornea: Cl OU  Anterior Chamber: D+F OU    Iris: Flat OU  Lens: Cl OU    Fundus Exam: dilated with 1% tropicamide and 2.5% phenylephrine  Approval obtained from primary team for dilation  Patient aware that pupils can remained dilated for at least 4-6 hours  Exam performed with 20D lens    Vitreous: wnl OU  Disc, cup/disc: OD: 3+ disc edema with hemorrhage OU. arising from optic nerve  Macula: wnl OU  Vessels: wnl OU  Periphery: few DBH OU     Labs/Imaging:  CT BRAIN   ORDERED BY:  JASSI KERR     PROCEDURE DATE:  09/20/2023          INTERPRETATION:  Clinical indication: History of lung cancer and brain   metastasis.    Multiple axial sections were performed from the base of vertex without   contrast enhancement. Coronal and sagittal reconstructed structures were   performed    This exam is compared with prior head CT performed on September 19, 2023    This exam is somewhat limited by motion    Areas of vasogenic edema involving the right temporal/posterior frontal,   left parietal occipital and high left posterior frontal/parietal region   are identified with associated lesions. The largest of these lesions   involve the right temporal/posterior frontal region measures  approximately 4.2 x 2.5 cm and previously measured approximately 4.2 x   2.5 cm. The lesion involving the left parietal-occipital region does   demonstrate associated calcification and measures approximately 2.4 x 2.0   cm and previously measured approximately 2.4 x 2.2 cm. The lesion   involves the high left posterior frontal parietal region and also   demonstrates some areas of associated high attenuation which could be   compatible with hemorrhage and/or mineralization. This lesion measures   approximately 2.2 x 1.7 cm and previously measured approximately 2.2 x   1.3 cm. There is localized mass effect seen consisting of sulcal   effacement.    The overall size and configuration of ventricles appears unchanged with   the left lateral ventricle more prominent than the right.    Evaluation of the osseous structures with the appropriate window appears   unremarkable    The visualized paranasal sinuses mastoid and middle ear regions appear   clear.    IMPRESSION: Vasogenic edema and underlying lesions are again seen as   described above.

## 2023-09-21 NOTE — CONSULT NOTE ADULT - SUBJECTIVE AND OBJECTIVE BOX
HPI:  53 yo female PMHx stage IV lung cancer with mets to brain s/p 4 cycles chemo and thoracic RT completed on oct 2022, gamma knife surgery to 7 brain mets , recent admission to [facility] from sept 4-13th for epigastric pain and hyperglycemia thought to be due to steroids presents to ED complaining of 1 weeks of lower extremity weakness and intermittent blurry vision.  Pt was initially seen at the end of August and admitted for hx of migraines and dizziness, was started on  decadron 6mg q6hr and Depakote at that time. Returned to [facility] again early september for worsening epigastric pain  in the setting of taking steroids without GI ppx. CTA at that time showing no PE, stable right apical lung mass and rapid interval growth of a left lower lobe mass and new hepatic mets. Pt finished dexamethasone taper on 9/7 and was discharged with protonix, depakote and follow up with outpatient rad onc Dr. Bernard, outpatient NSGY, and oncologist Dr. Beckman at [facility] with plans to start immunotherapy nivolumab as outpatient.  Since discharge on the 13th, pt noting weakness in right lower extremity 'muscle' pain which improves with massages, used walker at home however feels weak and [facility]/ attend to daily needs. Notes also intermittent epigastric pain with radiation to the back. Denies numbness or tingling, saddle anesthesia, bowel incontinence, falls or trauma. Notes occasional chest pain and SOB which resolves. Additionally endorses some slight blurry vision over the last 2 weeks which she describes as "vision feels off" though is able to see.  Denies fevers, chills, N/V/D, recent sick contacts. Social hx: no smoker, previous marijuana use, no alcohol,  lives at home with phil.    Patient was admitted on 9/19, seen today, complains of abdominal pain, worse with eating, asked if I could return at a later time.     REVIEW OF SYSTEMS  unable to obtain     VITALS  T(C): 36.6 (09-21-23 @ 09:35), Max: 36.7 (09-20-23 @ 20:34)  HR: 105 (09-21-23 @ 09:35) (83 - 106)  BP: 122/84 (09-21-23 @ 09:35) (121/82 - 135/90)  RR: 18 (09-21-23 @ 09:35) (16 - 18)  SpO2: 99% (09-21-23 @ 09:35) (97% - 99%)  Wt(kg): --    PAST MEDICAL & SURGICAL HISTORY  GERD (Gastroesophageal Reflux Disease)    Hydrosalpinx    Ulcer    Lung mass    Non-small cell lung cancer with metastasis    No significant past surgical history    S/P endoscopy        SOCIAL HISTORY  Smoking - Denied  EtOH - Denied   Drugs - Denied    FUNCTIONAL HISTORY  Lives in private house, 3 steps to enter, with fiance, staying on main floor   Independent ADLs, has walker     CURRENT FUNCTIONAL STATUS  9/20 PT  bed mobility CG/min assist  transfers CG/min assist  gait min assist with RW x 15 feet     FAMILY HISTORY   No pertinent family history in first degree relatives        RECENT LABS/IMAGING  CBC Full  -  ( 21 Sep 2023 06:36 )  WBC Count : 5.67 K/uL  RBC Count : 3.60 M/uL  Hemoglobin : 10.6 g/dL  Hematocrit : 32.2 %  Platelet Count - Automated : 210 K/uL  Mean Cell Volume : 89.4 fl  Mean Cell Hemoglobin : 29.4 pg  Mean Cell Hemoglobin Concentration : 32.9 gm/dL  Auto Neutrophil # : x  Auto Lymphocyte # : x  Auto Monocyte # : x  Auto Eosinophil # : x  Auto Basophil # : x  Auto Neutrophil % : x  Auto Lymphocyte % : x  Auto Monocyte % : x  Auto Eosinophil % : x  Auto Basophil % : x    09-21    134<L>  |  100  |  10  ----------------------------<  161<H>  4.1   |  21<L>  |  0.51    Ca    9.8      21 Sep 2023 06:36  Phos  3.5     09-20  Mg     1.9     09-20    TPro  8.1  /  Alb  3.6  /  TBili  0.4  /  DBili  x   /  AST  23  /  ALT  23  /  AlkPhos  66  09-20    Urinalysis Basic - ( 21 Sep 2023 06:36 )    Color: x / Appearance: x / SG: x / pH: x  Gluc: 161 mg/dL / Ketone: x  / Bili: x / Urobili: x   Blood: x / Protein: x / Nitrite: x   Leuk Esterase: x / RBC: x / WBC x   Sq Epi: x / Non Sq Epi: x / Bacteria: x    < from: MR Head w/ IV Cont (09.20.23 @ 14:58) >    IMPRESSION: Abnormal lesions involving the posterior fossa and   supratentorial region are identified which are compatible with metastasis   given patient's history.    < end of copied text >      ALLERGIES  No Known Allergies      MEDICATIONS   acetaminophen     Tablet .. 650 milliGRAM(s) Oral every 6 hours PRN  aluminum hydroxide/magnesium hydroxide/simethicone Suspension 30 milliLiter(s) Oral every 4 hours PRN  dexAMETHasone     Tablet 4 milliGRAM(s) Oral every 6 hours  dextrose 5%. 1000 milliLiter(s) IV Continuous <Continuous>  dextrose 5%. 1000 milliLiter(s) IV Continuous <Continuous>  dextrose 50% Injectable 12.5 Gram(s) IV Push once  dextrose 50% Injectable 25 Gram(s) IV Push once  dextrose 50% Injectable 25 Gram(s) IV Push once  dextrose Oral Gel 15 Gram(s) Oral once PRN  divalproex  milliGRAM(s) Oral two times a day  glucagon  Injectable 1 milliGRAM(s) IntraMuscular once  insulin lispro (ADMELOG) corrective regimen sliding scale   SubCutaneous at bedtime  insulin lispro (ADMELOG) corrective regimen sliding scale   SubCutaneous three times a day before meals  melatonin 3 milliGRAM(s) Oral at bedtime PRN  morphine  - Injectable 2 milliGRAM(s) IV Push once  ondansetron Injectable 4 milliGRAM(s) IV Push every 8 hours PRN  oxyCODONE    IR 5 milliGRAM(s) Oral every 6 hours PRN  pantoprazole    Tablet 40 milliGRAM(s) Oral before breakfast  polyethylene glycol 3350 17 Gram(s) Oral daily  senna 2 Tablet(s) Oral at bedtime      ----------------------------------------------------------------------------------------  PHYSICAL EXAM  Constitutional - NAD, Comfortable, in bed   eating orange   Chest - Breathing comfortably, room air   Cardiovascular - S1S2   Abdomen - Soft   Extremities - No C/C/E, No calf tenderness   Neurologic Exam -                    Cognitive - Awake, Alert, AAO to self, place, date, year, situation     Communication - Fluent, No dysarthria       Motor -  moves all ext      Sensory - Intact to LT    Psychiatric - Mood stable, Affect WNL  ----------------------------------------------------------------------------------------  ASSESSMENT/PLAN  52yFemale h/o metastatic lung cancer, s/p chemo, RT with functional deficits due to brain mets  MRI with lesion, right temporal, cerebellar, neurosurgery, Heme/onc following, possible removal of right temporal cyst, radiation after   on dexamethasone  Pain - Tylenol, oxycodone  DVT PPX - SCDs  Rehab -    continued bedside therapy, OT evaluation pending   patient will likely benefit from inpatient rehabilitation, but need to know treatment plan first    Will continue to follow for ongoing rehab needs and recommendations.

## 2023-09-21 NOTE — PROGRESS NOTE ADULT - PROBLEM SELECTOR PLAN 4
- DM a1c 7.2 end of August 2023. now 8.0   - last seen by breana mid september, finished steroid taper on 7th, was on lantus 3 units and lispro 2 units at that time  - BG wnl this admission   - SSI for now   - consider addition of basal/ bolus if persistently hyperglycemic

## 2023-09-21 NOTE — OCCUPATIONAL THERAPY INITIAL EVALUATION ADULT - DIAGNOSIS, OT EVAL
Pt presents with decreased motor control, cognition, balance, strength, coordination and ROM impacting her ability to complete ADL/mobility.

## 2023-09-21 NOTE — PROGRESS NOTE ADULT - PROBLEM SELECTOR PLAN 3
- likely related to malignancy/mets. Seen by ophtho - check MR orbits and possible plans for LP thereafter to eval opening pressure and CSF infiltration of cancer etc.   - cont dex 4q6 and continue depakote 500 BID as per NSGY

## 2023-09-21 NOTE — PROVIDER CONTACT NOTE (OTHER) - ASSESSMENT
Pt a&ox4. VSS except HR tachycardic to 105, /84, O2 99% on room air, afebrile. Pt c/o 10/10 sharp, upper epigastric/chest pain, onset of pain precipitated by eating breakfast. Offered PRN Maalox, pt refused. Pt not yet due for PRN oxycodone.
Pt a&ox4. VSS except tachycardia: /81, HR 160s on tele. Denies chest pain or palpitations.

## 2023-09-21 NOTE — PROGRESS NOTE ADULT - SUBJECTIVE AND OBJECTIVE BOX
Margarito Mason MD PGY-5 Hematology Oncology  Reachable on TEAMS    INTERVAL HPI/OVERNIGHT EVENTS:  Patient S&E at bedside. No acute events, no active complaints    VITAL SIGNS:  T(F): 98.4 (09-21-23 @ 11:08)  HR: 113 (09-21-23 @ 11:33)  BP: 125/82 (09-21-23 @ 11:33)  RR: 18 (09-21-23 @ 11:08)  SpO2: 98% (09-21-23 @ 11:33)  Wt(kg): --    PHYSICAL EXAM:    Constitutional: NAD  Eyes: EOMI, sclera non-icteric  Neck: supple, no masses, no JVD  Respiratory: CTA b/l  Cardiovascular: RRR, no M/R/G  Gastrointestinal: soft, NTND, no masses palpable, + BS  Extremities: no c/c/e  Neurological: no focal deficits      MEDICATIONS  (STANDING):  chlorhexidine 2% Cloths 1 Application(s) Topical daily  dexAMETHasone     Tablet 4 milliGRAM(s) Oral every 6 hours  dextrose 5%. 1000 milliLiter(s) (100 mL/Hr) IV Continuous <Continuous>  dextrose 5%. 1000 milliLiter(s) (50 mL/Hr) IV Continuous <Continuous>  dextrose 50% Injectable 25 Gram(s) IV Push once  dextrose 50% Injectable 12.5 Gram(s) IV Push once  dextrose 50% Injectable 25 Gram(s) IV Push once  divalproex  milliGRAM(s) Oral two times a day  glucagon  Injectable 1 milliGRAM(s) IntraMuscular once  insulin lispro (ADMELOG) corrective regimen sliding scale   SubCutaneous at bedtime  insulin lispro (ADMELOG) corrective regimen sliding scale   SubCutaneous three times a day before meals  pantoprazole  Injectable 40 milliGRAM(s) IV Push two times a day  polyethylene glycol 3350 17 Gram(s) Oral daily  senna 2 Tablet(s) Oral at bedtime    MEDICATIONS  (PRN):  acetaminophen     Tablet .. 650 milliGRAM(s) Oral every 6 hours PRN Temp greater or equal to 38C (100.4F), Mild Pain (1 - 3)  aluminum hydroxide/magnesium hydroxide/simethicone Suspension 30 milliLiter(s) Oral every 4 hours PRN Dyspepsia  dextrose Oral Gel 15 Gram(s) Oral once PRN Blood Glucose LESS THAN 70 milliGRAM(s)/deciliter  HYDROmorphone  Injectable 0.5 milliGRAM(s) IV Push every 4 hours PRN Severe Pain (7 - 10)  melatonin 3 milliGRAM(s) Oral at bedtime PRN Insomnia  ondansetron Injectable 4 milliGRAM(s) IV Push every 8 hours PRN Nausea and/or Vomiting  oxyCODONE    IR 10 milliGRAM(s) Oral every 4 hours PRN Moderate Pain (4 - 6)      Allergies    No Known Allergies    Intolerances        LABS:                        10.6   5.67  )-----------( 210      ( 21 Sep 2023 06:36 )             32.2     09-21    134<L>  |  100  |  10  ----------------------------<  161<H>  4.1   |  21<L>  |  0.51    Ca    9.8      21 Sep 2023 06:36  Phos  3.5     09-20  Mg     1.9     09-20    TPro  8.1  /  Alb  3.6  /  TBili  0.4  /  DBili  x   /  AST  23  /  ALT  23  /  AlkPhos  66  09-20      Urinalysis Basic - ( 21 Sep 2023 06:36 )    Color: x / Appearance: x / SG: x / pH: x  Gluc: 161 mg/dL / Ketone: x  / Bili: x / Urobili: x   Blood: x / Protein: x / Nitrite: x   Leuk Esterase: x / RBC: x / WBC x   Sq Epi: x / Non Sq Epi: x / Bacteria: x        RADIOLOGY & ADDITIONAL TESTS:  Studies reviewed.

## 2023-09-22 NOTE — CONSULT NOTE ADULT - REASON FOR ADMISSION
leg weakness/ dizziness blurry vision

## 2023-09-22 NOTE — PROGRESS NOTE ADULT - PROBLEM SELECTOR PLAN 1
case discussed with Dr. Metcalf([facility]) and Dr. Bernard ([facility]) 9/21. Plan is for tentative resection of right brain lesion on 9/26 if pt in agreement. No additional plan for RT thereafter at this time unless changes/findings dictates as per Dr. Bernard. Onc (Dr. Lynch) note reviewed. plan for further systemic tx thereafter once recovered from surg.   - previously treated with 4 rounds of chemo, developed brain mets 5/23 s/p RT.   - now returning with dizziness, weakness, pain   - CT/MRI with enhancing lesions, quite a few of which demonstrate peripheral enhancement involving the posterior fossa and supratentorial region/edema. please see full report on Harold  - repeat CT re: punctate hemorrhage slight inc size. subsequent MRI without mention of hemorrhage.   -per nsgy: dex4 q6; Keppra 500 BID initially  recommended by NSGY however pt on Depakote at home- can continue with Depakote 500 BID as per NSGY   - palliative consult to help assist with GOC/ pain control

## 2023-09-22 NOTE — PROGRESS NOTE ADULT - SUBJECTIVE AND OBJECTIVE BOX
[facility] Division of Hospital Medicine  Ave Vazquez MD  Pager (M-F, 8A-5P): [phone]  Other Times:  [phone]    Patient is a 52y old  Female who presents with a chief complaint of leg weakness/ dizziness blurry vision (21 Sep 2023 17:54)      SUBJECTIVE / OVERNIGHT EVENTS:  c/o headache this am . hasnt had further abd pain issues since yest morning breakfast -contributes to drinking orange juice.     ADDITIONAL REVIEW OF SYSTEMS: otherwise neg    MEDICATIONS  (STANDING):  chlorhexidine 2% Cloths 1 Application(s) Topical daily  dexAMETHasone     Tablet 4 milliGRAM(s) Oral every 6 hours  dextrose 5%. 1000 milliLiter(s) (100 mL/Hr) IV Continuous <Continuous>  dextrose 5%. 1000 milliLiter(s) (50 mL/Hr) IV Continuous <Continuous>  dextrose 50% Injectable 25 Gram(s) IV Push once  dextrose 50% Injectable 12.5 Gram(s) IV Push once  dextrose 50% Injectable 25 Gram(s) IV Push once  divalproex  milliGRAM(s) Oral two times a day  glucagon  Injectable 1 milliGRAM(s) IntraMuscular once  insulin lispro (ADMELOG) corrective regimen sliding scale   SubCutaneous at bedtime  insulin lispro (ADMELOG) corrective regimen sliding scale   SubCutaneous three times a day before meals  pantoprazole  Injectable 40 milliGRAM(s) IV Push two times a day  polyethylene glycol 3350 17 Gram(s) Oral daily  senna 2 Tablet(s) Oral at bedtime    MEDICATIONS  (PRN):  acetaminophen     Tablet .. 650 milliGRAM(s) Oral every 6 hours PRN Temp greater or equal to 38C (100.4F), Mild Pain (1 - 3)  aluminum hydroxide/magnesium hydroxide/simethicone Suspension 30 milliLiter(s) Oral every 4 hours PRN Dyspepsia  dextrose Oral Gel 15 Gram(s) Oral once PRN Blood Glucose LESS THAN 70 milliGRAM(s)/deciliter  HYDROmorphone  Injectable 0.5 milliGRAM(s) IV Push every 4 hours PRN Severe Pain (7 - 10)  melatonin 3 milliGRAM(s) Oral at bedtime PRN Insomnia  ondansetron Injectable 4 milliGRAM(s) IV Push every 8 hours PRN Nausea and/or Vomiting  oxyCODONE    IR 10 milliGRAM(s) Oral every 4 hours PRN Moderate Pain (4 - 6)      CAPILLARY BLOOD GLUCOSE      POCT Blood Glucose.: 163 mg/dL (22 Sep 2023 08:08)  POCT Blood Glucose.: 167 mg/dL (21 Sep 2023 21:55)  POCT Blood Glucose.: 225 mg/dL (21 Sep 2023 16:45)  POCT Blood Glucose.: 236 mg/dL (21 Sep 2023 11:47)    I&O's Summary    21 Sep 2023 07:01  -  22 Sep 2023 07:00  --------------------------------------------------------  IN: 720 mL / OUT: 0 mL / NET: 720 mL        PHYSICAL EXAM:  Vital Signs Last 24 Hrs  T(C): 36.8 (22 Sep 2023 04:29), Max: 36.8 (22 Sep 2023 04:29)  T(F): 98.3 (22 Sep 2023 04:29), Max: 98.3 (22 Sep 2023 04:29)  HR: 94 (22 Sep 2023 04:29) (88 - 113)  BP: 131/80 (22 Sep 2023 04:29) (125/82 - 135/84)  BP(mean): --  RR: 18 (22 Sep 2023 04:29) (18 - 18)  SpO2: 96% (22 Sep 2023 04:29) (96% - 98%)    Parameters below as of 22 Sep 2023 04:29  Patient On (Oxygen Delivery Method): room air      CONSTITUTIONAL: NAD, well-groomed  EYES:  conjunctiva and sclera clear  ENMT: Moist oral mucosa  NECK: Supple, no palpable masses; no JVD  RESPIRATORY: Normal respiratory effort; lungs are clear to auscultation bilaterally  CARDIOVASCULAR: Regular rate and rhythm, normal S1 and S2, no murmur/rub/gallop; No lower extremity edema  ABDOMEN: Nontender to palpation, normoactive bowel sounds, no rebound/guarding  MUSCULOSKELETAL:  no clubbing or cyanosis of digits; no joint swelling or tenderness to palpation  PSYCH: A+O to person, place, and time; affect appropriate  SKIN: No rashes; no palpable lesions  Neuro: left side dysmetria, strength grossly intact    LABS:                        11.1   7.68  )-----------( 233      ( 22 Sep 2023 06:08 )             34.4     09-22    136  |  101  |  16  ----------------------------<  173<H>  4.8   |  22  |  0.62    Ca    10.2      22 Sep 2023 06:09            Urinalysis Basic - ( 22 Sep 2023 06:09 )    Color: x / Appearance: x / SG: x / pH: x  Gluc: 173 mg/dL / Ketone: x  / Bili: x / Urobili: x   Blood: x / Protein: x / Nitrite: x   Leuk Esterase: x / RBC: x / WBC x   Sq Epi: x / Non Sq Epi: x / Bacteria: x          RADIOLOGY & ADDITIONAL TESTS:  Results Reviewed:   Imaging Personally Reviewed:  Electrocardiogram Personally Reviewed:    COORDINATION OF CARE:  Care Discussed with Consultants/Other Providers [Y/N]:  Prior or Outpatient Records Reviewed [Y/N]:

## 2023-09-22 NOTE — CONSULT NOTE ADULT - SUBJECTIVE AND OBJECTIVE BOX
HPI:  51 yo female PMHx stage IV lung cancer with mets to brain s/p 4 cycles chemo and thoracic RT completed on oct 2022, gamma knife surgery to 7 brain mets , recent admission to [facility] from sept 4-13th for epigastric pain and hyperglycemia thought to be due to steroids presents to ED complaining of 1 weeks of lower extremity weakness and intermittent blurry vision.  Pt was initially seen at the end of August and admitted for hx of migraines and dizziness, was started on  decadron 6mg q6hr and Depakote at that time. Returned to [facility] again early september for worsening epigastric pain  in the setting of taking steroids without GI ppx. CTA at that time showing no PE, stable right apical lung mass and rapid interval growth of a left lower lobe mass and new hepatic mets. Pt finished dexamethasone taper on 9/7 and was discharged with protonix, depakote and follow up with outpatient rad onc Dr. Bernard, outpatient NSGY, and oncologist Dr. Beckman at [facility] with plans to start immunotherapy nivolumab as outpatient.  Since discharge on the 13th, pt noting weakness in right lower extremity 'muscle' pain which improves with massages, used walker at home however feels weak and [facility]/ attend to daily needs. Notes also intermittent epigastric pain with radiation to the back. Denies numbness or tingling, saddle anesthesia, bowel incontinence, falls or trauma. Notes occasional chest pain and SOB which resolves. Additionally endorses some slight blurry vision over the last 2 weeks which she describes as "vision feels off" though is able to see.  Denies fevers, chills, N/V/D, recent sick contacts. Social hx: no smoker, previous marijuana use, no alcohol,  lives at home with phil.    ED vitals:   Pt tachy to 120's sinus rhythm otherwise VSS    Labs notable for ph 7.44, pco2 33 (20 Sep 2023 00:28)    PERTINENT PM/SXH:   GERD (Gastroesophageal Reflux Disease)    Hydrosalpinx    GERD (Gastroesophageal Reflux Disease)    Ulcer    Lung mass    Non-small cell lung cancer with metastasis      No significant past surgical history    S/P endoscopy      FAMILY HISTORY:  No pertinent family history in first degree relatives      Family Hx substance abuse [ ]yes [x ]no  ITEMS NOT CHECKED ARE NOT PRESENT    SOCIAL HISTORY:   Significant other/partner[x ]  Children[ ]  Yarsani/Spirituality:  Substance hx:  [ ]   Tobacco hx:  [ ]   Alcohol hx: [ ]   Home Opioid hx:  [ ] I-Stop Reference No:  Living Situation: [ ]Home  [ ]Long term care  [ ]Rehab [ ]Other    ADVANCE DIRECTIVES:    DNR/MOLST  [ ]  Living Will  [ ]   DECISION MAKER(s):  [ ] Health Care Proxy(s)  [ x] Surrogate(s)  [ ] Guardian           Name(s): Phone Number(s): Mariposa Fuller [phone]    BASELINE (I)ADL(s) (prior to admission):  [place]: [x ]Total  [ ] Moderate [ ]Dependent    Allergies    No Known Allergies    Intolerances    MEDICATIONS  (STANDING):  chlorhexidine 2% Cloths 1 Application(s) Topical daily  dexAMETHasone     Tablet 4 milliGRAM(s) Oral every 6 hours  dextrose 5%. 1000 milliLiter(s) (100 mL/Hr) IV Continuous <Continuous>  dextrose 5%. 1000 milliLiter(s) (50 mL/Hr) IV Continuous <Continuous>  dextrose 50% Injectable 25 Gram(s) IV Push once  dextrose 50% Injectable 25 Gram(s) IV Push once  dextrose 50% Injectable 12.5 Gram(s) IV Push once  divalproex  milliGRAM(s) Oral two times a day  glucagon  Injectable 1 milliGRAM(s) IntraMuscular once  insulin lispro (ADMELOG) corrective regimen sliding scale   SubCutaneous at bedtime  insulin lispro (ADMELOG) corrective regimen sliding scale   SubCutaneous three times a day before meals  pantoprazole  Injectable 40 milliGRAM(s) IV Push two times a day  polyethylene glycol 3350 17 Gram(s) Oral daily  senna 2 Tablet(s) Oral at bedtime    MEDICATIONS  (PRN):  acetaminophen     Tablet .. 650 milliGRAM(s) Oral every 6 hours PRN Temp greater or equal to 38C (100.4F), Mild Pain (1 - 3)  aluminum hydroxide/magnesium hydroxide/simethicone Suspension 30 milliLiter(s) Oral every 4 hours PRN Dyspepsia  dextrose Oral Gel 15 Gram(s) Oral once PRN Blood Glucose LESS THAN 70 milliGRAM(s)/deciliter  HYDROmorphone  Injectable 0.5 milliGRAM(s) IV Push every 4 hours PRN Severe Pain (7 - 10)  melatonin 3 milliGRAM(s) Oral at bedtime PRN Insomnia  ondansetron Injectable 4 milliGRAM(s) IV Push every 8 hours PRN Nausea and/or Vomiting  oxyCODONE    IR 10 milliGRAM(s) Oral every 4 hours PRN Moderate Pain (4 - 6)    PRESENT SYMPTOMS: [ ]Unable to self-report  [ ] CPOT [ ] PAINADs [ ] RDOS  Source if other than patient:  [ ]Family   [ ]Team     Pain: [ ]yes [ ]no none at present  QOL impact -   Location -          head and R to mid chest          Aggravating factors - " Drama "  Quality -  can be sharp or pounding  Radiation - none  Timing- intermittent  Severity (0-10 scale): can be 10/10  Minimal acceptable level (0-10 scale):     CPOT:    https://www.[facility].org/getattachment/[email or web address]-[email or web address]/Critical-Care-Pain-Observation-Tool-(CPOT)    PAIN AD Score:   http://geriatrictoolkit.[facility]/cog/painad.pdf (press ctrl +  left click to view)    Dyspnea:                           [ ]Mild [ ]Moderate [ ]Severe      RDOS:  0 to 2  minimal or no respiratory distress   3  mild distress  4 to 6 moderate distress  >7 severe distress  https://homecareinformation.net/handouts/hen/Respiratory_Distress_Observation_Scale.pdf (Ctrl +  left click to view)     Anxiety:                             [ ]Mild [ ]Moderate [ ]Severe  Fatigue:                             [ ]Mild [ ]Moderate [ ]Severe  Nausea:                            [ ]Mild [ ]Moderate [ ]Severe  Loss of appetite:               [ ]Mild [ ]Moderate [ ]Severe  Constipation:                    [ ]Mild [ ]Moderate [ ]Severe    PCSSQ[Palliative Care Spiritual Screening Question]   Severity (0-10):  Score of 4 or > indicate consideration of Chaplaincy referral.  Chaplaincy Referral: [ ] yes [ ] refused [ ] following [ x] Deferred     Caregiver [place]? : [ ] yes [ ] no [x ] Deferred [ ] Declined             Social work referral [ ] Patient & Family Centered Care Referral [ ]     Anticipatory Grief present?:  [ ] yes [ ] no  [x] Deferred                  Social work referral [ ] Chaplaincy Referral[ ]      Other Symptoms:  [x ]All other review of systems negative     Palliative Performance Status Version 2:    70     %    http://npcrc.org/files/news/palliative_performance_scale_ppsv2.pdf  PHYSICAL EXAM:  Vital Signs Last 24 Hrs  T(C): 36.8 (22 Sep 2023 04:29), Max: 36.8 (22 Sep 2023 04:29)  T(F): 98.3 (22 Sep 2023 04:29), Max: 98.3 (22 Sep 2023 04:29)  HR: 94 (22 Sep 2023 04:29) (88 - 113)  BP: 131/80 (22 Sep 2023 04:29) (125/82 - 135/84)  BP(mean): --  RR: 18 (22 Sep 2023 04:29) (18 - 18)  SpO2: 96% (22 Sep 2023 04:29) (96% - 98%)    Parameters below as of 22 Sep 2023 04:29  Patient On (Oxygen Delivery Method): room air     I&O's Summary    21 Sep 2023 07:01  -  22 Sep 2023 07:00  --------------------------------------------------------  IN: 720 mL / OUT: 0 mL / NET: 720 mL      GENERAL: [ ]Cachexia    [x]Alert  [x ]Oriented x   [ ]Lethargic  [ ]Unarousable  [ ]Verbal  [ ]Non-Verbal  Behavioral:   [ ] Anxiety  [ ] Delirium [ ] Agitation [ ] Other  HEENT:  [x ]Normal   [ ]Dry mouth   [ ]ET Tube/Trach  [ ]Oral lesions  PULMONARY:   [x ]Clear [ ]Tachypnea  [ ]Audible excessive secretions   [ ]Rhonchi        [ ]Right [ ]Left [ ]Bilateral  [ ]Crackles        [ ]Right [ ]Left [ ]Bilateral  [ ]Wheezing     [ ]Right [ ]Left [ ]Bilateral  [x ]Diminished breath sounds [ ]right [ ]left [ ]bilateral  CARDIOVASCULAR:    [x ]Regular [ ]Irregular [ ]Tachy  [ ][place] [ ]Murmur [ ]Other  GASTROINTESTINAL:  [x ]Soft  [ ]Distended   [x ]+BS  [ x]Non tender [ ]Tender  [ ]Other [ ]PEG [ ]OGT/ NGT  Last BM:  GENITOURINARY:  [ xNormal [ ] Incontinent   [ ]Oliguria/Anuria   [ ]Mas  MUSCULOSKELETAL:   [x ]Normal   [ ]Weakness  [ ]Bed/Wheelchair bound [ ]Edema  NEUROLOGIC:   x[ ]No focal deficits  [ ]Cognitive impairment  [ ]Dysphagia [ ]Dysarthria [ ]Paresis [ ]Other   SKIN:   [ x]Normal  [ ]Rash  [ ]Other  [ ]Pressure ulcer(s)       Present on admission [ ]y [ ]n    CRITICAL CARE:  [ ]Shock Present  [ ]Septic [ ]Cardiogenic [ ]Neurologic [ ]Hypovolemic  [ ]  Vasopressors [ ]  Inotropes   [ ]Respiratory failure present [ ]Mechanical ventilation [ ]Non-invasive ventilatory support [ ]High flow    [ ]Acute  [ ]Chronic [ ]Hypoxic  [ ]Hypercarbic [ ]Other  [ ]Other organ failure     LABS:                        11.1   7.68  )-----------( 233      ( 22 Sep 2023 06:08 )             34.4   09-22    136  |  101  |  16  ----------------------------<  173<H>  4.8   |  22  |  0.62    Ca    10.2      22 Sep 2023 06:09        Urinalysis Basic - ( 22 Sep 2023 06:09 )    Color: x / Appearance: x / SG: x / pH: x  Gluc: 173 mg/dL / Ketone: x  / Bili: x / Urobili: x   Blood: x / Protein: x / Nitrite: x   Leuk Esterase: x / RBC: x / WBC x   Sq Epi: x / Non Sq Epi: x / Bacteria: x      RADIOLOGY & ADDITIONAL STUDIES:    < from: MR Head w/ IV Cont (09.20.23 @ 14:58) >  ACC: [id number] EXAM:  MR BRAIN IC   ORDERED BY:  JASSI KERR     PROCEDURE DATE:  09/20/2023          INTERPRETATION:  Clinical indication: Brain metastasis.    MRI of the brain was performed sagittal T1 axial T1 and T2 T2 FLAIR   diffusion and susceptibility weighted sequence. Coronal T2-weighted   sequence was performed while. The patient was injected with approximately   7.5 cc gadavist IV with no IV contrast discarded. Sagittal coronal and   axial T1-weighted sequences performed.    Thisexam is compared prior head CT performed on September 20, 2023.    Enhancing lesions, quite a few of which demonstrate peripheral   enhancement involving the posterior fossa and supratentorial region.   Areas of involvement include the right cerebellum, right   temporal/posterior frontal, left occipital parietal, right frontal   subcortical, right parietal subcortical, left parietal cortex, left   posterior centrum semiovale and high right parietal cortex. The lesion   involving the lateral aspect the right cerebellum does appear to be dural   based and measures approximately 1.5 x 0.9 cm. The largest supratentorial   lesion is seen involving the right temporal/posterior frontal region and   measures approximately 4.7 x 3.8 x 3.1 cm. A few ofthe lesions do   demonstrate abnormal susceptibility. These lesions do demonstrate   associated edema. There is localized mass effect seen consistent sulcal   effacement. Mild uncal herniation is seen on the right side.    Parenchymal volume loss is seen    Abnormal T2 prolongation in the pelvis white matter and brainstem is   seen. This could be related to treatment-related changes    Small air-fluid level is seen in the right sphenoid sinus.    Both mastoid and middle regions appear clear.    IMPRESSION: Abnormal lesions involving the posterior fossa and   supratentorial region are identified which are compatible with metastasis   given patient's history.    --- End of Report ---            LANI FLYNN MD; Attending Radiologist  This document has been electronically signed. Sep 20 2023  5:56PM    < end of copied text >      PROTEIN CALORIE MALNUTRITION PRESENT: [ ]mild [ ]moderate [ ]severe [ ]underweight [ ]morbid obesity  https://www.andeal.org/vault/2440/web/files/ONC/Table_Clinical%20Characteristics%20to%20Document%20Malnutrition-White%20JV%20et%20al%202012.pdf    Height (cm): 167.6 (09-19-23 @ 13:01), 167.6 (09-04-23 @ 02:06), 167.6 (08-26-23 @ 22:33)  Weight (kg): 63.5 (09-19-23 @ 13:01), 63.503 (09-04-23 @ 09:25), 68.855 (08-26-23 @ 22:33)  BMI (kg/m2): 22.6 (09-19-23 @ 13:01), 22.6 (09-04-23 @ 09:25), 24.5 (09-04-23 @ 02:06)    [ ]PPSV2 < or = to 30% [ ]significant weight loss  [ ]poor nutritional intake  [ ]anasarca[ ]Artificial Nutrition      Other REFERRALS:  [ ]Hospice  [ ]Child Life  [ ]Social Work  [ ]Case management [ ]Holistic Therapy

## 2023-09-22 NOTE — PROGRESS NOTE ADULT - PROBLEM SELECTOR PLAN 5
resolved  - pt c/o intermittent luq pain and back pain localized to right upper scapula  - non specific differentials include gastritis given recent steroid use vs GERD   - lipase WNL.   will start on IV protonix 40mg BID and titrate pain meds

## 2023-09-22 NOTE — CONSULT NOTE ADULT - PROBLEM SELECTOR RECOMMENDATION 9
pt using alternative methods including using a fan    pain regime as follows :  HYDROmorphone  Injectable 0.5 milliGRAM(s) IV Push every 4 hours PRN Severe Pain (7 - 10)  oxyCODONE  IR 10 milliGRAM(s) Oral every 4 hours PRN Moderate Pain (4 - 6)  Pt reports that she has used both meds and they work for the pain she dosn't like the way they make her feels so she only sill take it if pain is severe.

## 2023-09-22 NOTE — CONSULT NOTE ADULT - ASSESSMENT
53 yo female PMHx stage IV lung cancer with mets to brain s/p 4 cycles chemo and thoracic RT completed on oct 2022, Gamma knife surgery to 7 brain mets, 2 recent admissions for dizziness, headaches and epigastric pain presents with right lower extremity weakness and blurry vision, likely in setting of worsening metastatic cancer and physical deconditioning. Palliative care called for GOC.

## 2023-09-22 NOTE — CHART NOTE - NSCHARTNOTEFT_GEN_A_CORE
MRI orbits and MRV results noted.     < from: MR Venogram Head w/wo IV Cont (09.22.23 @ 11:39) >    IMPRESSION:    MR orbits/brain: Unremarkable MR of the orbits.  multiple enhancing   metastases in the posterior LEFT frontal, RIGHT parietal, LEFT occipital   and RIGHT temporal lobe, measuring 4.7 cm and less in size. Moderate   surrounding edema is noted with no significant mass effect. 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    MRV brain:  Unremarkable MR venogram.  No evidence of dural sinus   thrombosis.    < end of copied text >    Assessment and Plan:    51 yo female PMHx stage IV lung cancer with mets to brain s/p 4 cycles chemo and thoracic RT completed on oct 2022, gamma knife surgery to 7 brain mets , recent admission to [facility] from sept 4-13th for epigastric pain and hyperglycemia thought to be due to steroids presents to ED complaining of 1 weeks of lower extremity weakness and intermittent blurry vision, reporting vision "feels off," found to have color plate defect OU, 2-3+ optic disc edema OU with hemorrhages arising from optic nerves OU.     # Blurry vision  # Optic disc edema OU  - Pt with hx lung cancer with mets to the brain seen on CT as well as MRI in 8/20/23 including occipital lobe with vasogenic edema, reports with 1-2 weeks episodic blurry vision, found to have bilateral optic disc edema   - VA grossly intact, 20/40 OD 20/30 OS, IOP wnl OU, no rAPD OU  - CVF full OU  - Color plates 8/12 OD, 7/12 OD  - Anterior exam unremarkable OU   - DFE with 3+ disc edema with superior hemorrhage arising from optic nerve OU  - Differential includes elevated ICP leading to papilledema in the setting of brain mets with vasogenic edema, metastatic infiltration of CSF, retinal vein occlusion (unlikely to be bilateral)  - MRV negative for thrombosis.   - MRI orbits within normal limits.   - Recommend LP with opening pressure if concerned for ICP  - Recommend to c/w management per neuro/NSGY

## 2023-09-22 NOTE — CONSULT NOTE ADULT - CONVERSATION DETAILS
Met with pt at bedside for greater then 16 min s at bedside discussing ACP.  Pt has a good understanding or medical conditions and hospitalization.  She is very spiritual and feels prayer will help and guide her and her journey.  Plan to go for MRI and potential surgery pending results.  HCP discussed.  Filled out the Dr. TARAS Greenfield located in pt window dated 9/6/23.  Pt identified Jonny Monge as her primary proxy and Aba Meza as secondary.  Transport came to pick pt up for MRI.  Palliative care will follow up.

## 2023-09-22 NOTE — CONSULT NOTE ADULT - PROBLEM SELECTOR RECOMMENDATION 2
pt currently having MRI   as per IM note - Plan is for tentative resection of right brain lesion on 9/26 if pt in agreement. No additional plan for RT thereafter at this time unless changes/findings dictates as per Dr. Bernard. Onc (Dr. Lynch) note reviewed. plan for further systemic tx thereafter once recovered from surg.     outpatient follow up once stable for discharge, please include hematology oncology f/u at [facility] in discharge note

## 2023-09-22 NOTE — CONSULT NOTE ADULT - PROBLEM SELECTOR RECOMMENDATION 3
kps 70%    I personally spent 30 minutes on advance care planning services with the patient. This time is separate and distinct from any other care management services provided on this date.    Raiza Han, ANP-BC  Please contact me via Teams  between 6am-2pm. If not answering, please call the palliative care pager [phone]    After 2pm and on weekends, please see the contact information below:    In the event of newly developing, evolving, or worsening symptoms between 2pm and 5pm please contact the Palliative Medicine via extension [phone] for assistance.  After 5pm please contact team via pager (if the patient is at [facility] #[phone] or if the patient is at [facility] #[phone]) The Geriatric and Palliative Medicine service has coverage 24 hours a day/ 7 days a week to provide medical recommendations regarding symptom management needs via telephone

## 2023-09-22 NOTE — PROGRESS NOTE ADULT - PROBLEM SELECTOR PLAN 3
- likely related to malignancy/mets. Seen by ophtho   - check MR orbits and venogram pending.   - d/w Dr. Metcalf re: possible need for LP. doesn't feel this is indicated based on imaging we currently have.  will defer for now.   - cont dex 4q6 and continue depakote 500 BID as per NSGY

## 2023-09-23 NOTE — PROGRESS NOTE ADULT - PROBLEM SELECTOR PLAN 5
resolved  - pt c/o intermittent luq pain and back pain localized to right upper scapula  - non specific differentials include gastritis given recent steroid use vs GERD   - lipase WNL.   will c/w IV protonix 40mg BID and titrate pain meds

## 2023-09-23 NOTE — PROGRESS NOTE ADULT - SUBJECTIVE AND OBJECTIVE BOX
Patient is a 52y old  Female who presents with a chief complaint of leg weakness/ dizziness blurry vision (23 Sep 2023 13:28)        SUBJECTIVE / OVERNIGHT EVENTS: Patient reports right eye blurry vision persists but stable compared to prior. Sometimes intermittent HA. Otherwise ok.       MEDICATIONS  (STANDING):  chlorhexidine 2% Cloths 1 Application(s) Topical daily  dexAMETHasone     Tablet 4 milliGRAM(s) Oral every 6 hours  dextrose 5%. 1000 milliLiter(s) (100 mL/Hr) IV Continuous <Continuous>  dextrose 5%. 1000 milliLiter(s) (50 mL/Hr) IV Continuous <Continuous>  dextrose 50% Injectable 25 Gram(s) IV Push once  dextrose 50% Injectable 25 Gram(s) IV Push once  dextrose 50% Injectable 12.5 Gram(s) IV Push once  divalproex  milliGRAM(s) Oral two times a day  glucagon  Injectable 1 milliGRAM(s) IntraMuscular once  insulin lispro (ADMELOG) corrective regimen sliding scale   SubCutaneous three times a day before meals  insulin lispro (ADMELOG) corrective regimen sliding scale   SubCutaneous at bedtime  pantoprazole  Injectable 40 milliGRAM(s) IV Push two times a day  polyethylene glycol 3350 17 Gram(s) Oral daily  senna 2 Tablet(s) Oral at bedtime    MEDICATIONS  (PRN):  acetaminophen     Tablet .. 650 milliGRAM(s) Oral every 6 hours PRN Temp greater or equal to 38C (100.4F), Mild Pain (1 - 3)  aluminum hydroxide/magnesium hydroxide/simethicone Suspension 30 milliLiter(s) Oral every 4 hours PRN Dyspepsia  dextrose Oral Gel 15 Gram(s) Oral once PRN Blood Glucose LESS THAN 70 milliGRAM(s)/deciliter  HYDROmorphone  Injectable 0.5 milliGRAM(s) IV Push every 4 hours PRN Severe Pain (7 - 10)  melatonin 3 milliGRAM(s) Oral at bedtime PRN Insomnia  ondansetron Injectable 4 milliGRAM(s) IV Push every 8 hours PRN Nausea and/or Vomiting  oxyCODONE    IR 10 milliGRAM(s) Oral every 4 hours PRN Moderate Pain (4 - 6)      Vital Signs Last 24 Hrs  T(C): 36.4 (23 Sep 2023 11:23), Max: 36.8 (23 Sep 2023 04:34)  T(F): 97.5 (23 Sep 2023 11:23), Max: 98.2 (23 Sep 2023 04:34)  HR: 100 (23 Sep 2023 11:23) (93 - 100)  BP: 147/77 (23 Sep 2023 11:23) (128/74 - 147/77)  BP(mean): --  RR: 18 (23 Sep 2023 11:23) (18 - 18)  SpO2: 98% (23 Sep 2023 11:23) (98% - 99%)    Parameters below as of 23 Sep 2023 11:23  Patient On (Oxygen Delivery Method): room air      CAPILLARY BLOOD GLUCOSE      POCT Blood Glucose.: 197 mg/dL (23 Sep 2023 11:42)  POCT Blood Glucose.: 150 mg/dL (23 Sep 2023 08:29)  POCT Blood Glucose.: 206 mg/dL (22 Sep 2023 21:54)  POCT Blood Glucose.: 179 mg/dL (22 Sep 2023 17:22)    I&O's Summary    22 Sep 2023 07:01  -  23 Sep 2023 07:00  --------------------------------------------------------  IN: 720 mL / OUT: 0 mL / NET: 720 mL    23 Sep 2023 07:01  -  23 Sep 2023 16:28  --------------------------------------------------------  IN: 240 mL / OUT: 0 mL / NET: 240 mL          PHYSICAL EXAM:   GENERAL: NAD, well-developed  HEAD:  Atraumatic, Normocephalic  EYES:   conjunctiva and sclera clear  NECK: Supple   CHEST/LUNG: Clear to auscultation bilaterally; No wheeze  HEART: S1S2 normal. Regular rate and rhythm; No murmurs, rubs, or gallops  ABDOMEN: Soft, Nontender, Nondistended; Bowel sounds present  EXTREMITIES:   No clubbing, cyanosis, or edema  PSYCH/Neuro: AAOx3. Non-focal.   SKIN: No rashes or lesions      LABS:                        11.1   7.68  )-----------( 233      ( 22 Sep 2023 06:08 )             34.4     09-22    136  |  101  |  16  ----------------------------<  173<H>  4.8   |  22  |  0.62    Ca    10.2      22 Sep 2023 06:09            Urinalysis Basic - ( 22 Sep 2023 06:09 )    Color: x / Appearance: x / SG: x / pH: x  Gluc: 173 mg/dL / Ketone: x  / Bili: x / Urobili: x   Blood: x / Protein: x / Nitrite: x   Leuk Esterase: x / RBC: x / WBC x   Sq Epi: x / Non Sq Epi: x / Bacteria: x      < from: MR Venogram Head w/wo IV Cont (09.22.23 @ 11:39) >  IMPRESSION:    MR orbits/brain: Unremarkable MR of the orbits.  multiple enhancing   metastases in the posterior LEFT frontal, RIGHT parietal, LEFT occipital   and RIGHT temporal lobe, measuring 4.7 cm and less in size. Moderate   surrounding edema is noted with no significant mass effect. 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    MRV brain:  Unremarkable MR venogram.  No evidence of dural sinus   thrombosis.    --- End of Report ---    < end of copied text >      RADIOLOGY & ADDITIONAL TESTS:    Imaging Personally Reviewed: mri brain  Consultant(s) Notes Reviewed:  ophtho, heme/onco, nsgy  Care Discussed with Consultants/Other Providers:

## 2023-09-23 NOTE — PROGRESS NOTE ADULT - PROBLEM SELECTOR PLAN 3
- likely related to malignancy/mets. Seen by ophtho   -MR orbits and venogram complete. F/u ophtho/nsgy recs. consider LP.  - d/w Dr. Metcalf re: possible need for LP. doesn't feel this is indicated based on imaging we currently have.  will defer for now.   - cont dex 4q6 and continue depakote 500 BID as per NSGY  -artificial tears prn.   -Consider eye patch for comfort.

## 2023-09-23 NOTE — PROGRESS NOTE ADULT - ASSESSMENT
51 yo female PMHx stage IV lung cancer with mets to brain s/p 4 cycles chemo and thoracic RT completed on oct 2022, Gamma knife surgery to 7 brain mets, 2 recent admissions for dizziness, headaches and epigastric pain presents with right lower extremity weakness and blurry vision, likely in setting of worsening metastatic cancer and physical deconditioning.

## 2023-09-23 NOTE — PROGRESS NOTE ADULT - PROBLEM SELECTOR PLAN 1
case was discussed with Dr. Metcalf([facility]) and Dr. Bernard ([facility]) 9/21. Plan is for tentative resection of right brain lesion on 9/26 if pt in agreement. No additional plan for RT thereafter at this time unless changes/findings dictates as per Dr. Bernard. Onc (Dr. Lynch) note reviewed. plan for further systemic tx thereafter once recovered from surg.   - previously treated with 4 rounds of chemo, developed brain mets 5/23 s/p RT.   - now returning with dizziness, weakness, pain, HA, blurry vision   - CT/MRI with enhancing lesions, quite a few of which demonstrate peripheral enhancement involving the posterior fossa and supratentorial region/edema. please see full report on [place]  - repeat CT re: punctate hemorrhage slight inc size. subsequent MRI without mention of hemorrhage.   -per nsgy: dexa 4mg q6h; Keppra 500mg BID initially  recommended by NSGY however pt on Depakote at home- can continue with Depakote 500 BID as per NSGY   - palliative consult to help assist with GOC/ pain control. -iv dilaudid and oxycodone prn.

## 2023-09-24 NOTE — PROGRESS NOTE ADULT - SUBJECTIVE AND OBJECTIVE BOX
Patient is a 52y old  Female who presents with a chief complaint of leg weakness/ dizziness blurry vision (24 Sep 2023 10:38)        SUBJECTIVE / OVERNIGHT EVENTS: patient reports blurry vision/double vision. worse with right eye, but improves when covering one eye. intermittent HA's. otherwise feels ok.      MEDICATIONS  (STANDING):  chlorhexidine 2% Cloths 1 Application(s) Topical daily  dexAMETHasone     Tablet 4 milliGRAM(s) Oral every 6 hours  dextrose 5%. 1000 milliLiter(s) (50 mL/Hr) IV Continuous <Continuous>  dextrose 5%. 1000 milliLiter(s) (100 mL/Hr) IV Continuous <Continuous>  dextrose 50% Injectable 12.5 Gram(s) IV Push once  dextrose 50% Injectable 25 Gram(s) IV Push once  dextrose 50% Injectable 25 Gram(s) IV Push once  divalproex  milliGRAM(s) Oral two times a day  glucagon  Injectable 1 milliGRAM(s) IntraMuscular once  insulin lispro (ADMELOG) corrective regimen sliding scale   SubCutaneous three times a day before meals  insulin lispro (ADMELOG) corrective regimen sliding scale   SubCutaneous at bedtime  pantoprazole  Injectable 40 milliGRAM(s) IV Push two times a day  polyethylene glycol 3350 17 Gram(s) Oral daily  senna 2 Tablet(s) Oral at bedtime    MEDICATIONS  (PRN):  acetaminophen     Tablet .. 650 milliGRAM(s) Oral every 6 hours PRN Temp greater or equal to 38C (100.4F), Mild Pain (1 - 3)  aluminum hydroxide/magnesium hydroxide/simethicone Suspension 30 milliLiter(s) Oral every 4 hours PRN Dyspepsia  artificial tears (preservative free) Ophthalmic Solution 1 Drop(s) Both EYES two times a day PRN Dry Eyes  dextrose Oral Gel 15 Gram(s) Oral once PRN Blood Glucose LESS THAN 70 milliGRAM(s)/deciliter  HYDROmorphone  Injectable 0.5 milliGRAM(s) IV Push every 4 hours PRN Severe Pain (7 - 10)  melatonin 3 milliGRAM(s) Oral at bedtime PRN Insomnia  ondansetron Injectable 4 milliGRAM(s) IV Push every 8 hours PRN Nausea and/or Vomiting  oxyCODONE    IR 10 milliGRAM(s) Oral every 4 hours PRN Moderate Pain (4 - 6)      Vital Signs Last 24 Hrs  T(C): 36.5 (24 Sep 2023 10:44), Max: 37.1 (23 Sep 2023 20:08)  T(F): 97.7 (24 Sep 2023 10:44), Max: 98.8 (23 Sep 2023 20:08)  HR: 124 (24 Sep 2023 10:44) (84 - 124)  BP: 130/89 (24 Sep 2023 10:44) (116/74 - 144/82)  BP(mean): --  RR: 18 (24 Sep 2023 10:44) (18 - 18)  SpO2: 95% (24 Sep 2023 10:44) (95% - 96%)    Parameters below as of 24 Sep 2023 10:44  Patient On (Oxygen Delivery Method): room air      CAPILLARY BLOOD GLUCOSE      POCT Blood Glucose.: 267 mg/dL (24 Sep 2023 11:40)  POCT Blood Glucose.: 194 mg/dL (24 Sep 2023 07:54)  POCT Blood Glucose.: 375 mg/dL (23 Sep 2023 22:16)  POCT Blood Glucose.: 311 mg/dL (23 Sep 2023 21:12)  POCT Blood Glucose.: 158 mg/dL (23 Sep 2023 16:33)    I&O's Summary    23 Sep 2023 07:01  -  24 Sep 2023 07:00  --------------------------------------------------------  IN: 340 mL / OUT: 0 mL / NET: 340 mL    24 Sep 2023 07:01  -  24 Sep 2023 14:12  --------------------------------------------------------  IN: 360 mL / OUT: 0 mL / NET: 360 mL          PHYSICAL EXAM:   GENERAL: NAD, well-developed  HEAD:  s/p shavings.  EYES:   conjunctiva and sclera clear  NECK: Supple   CHEST/LUNG: Clear to auscultation bilaterally; No wheeze  HEART: S1S2 normal. Regular rate and rhythm; No murmurs, rubs, or gallops  ABDOMEN: Soft, Nontender, Nondistended; Bowel sounds present  EXTREMITIES:   No clubbing, cyanosis, or edema  PSYCH/Neuro: AAOx3. Non-focal. reported diplopia.   SKIN: No rashes or lesions      LABS:                        10.7   8.06  )-----------( 217      ( 24 Sep 2023 06:15 )             32.4     09-24    135  |  98  |  15  ----------------------------<  187<H>  4.1   |  24  |  0.51    Ca    9.6      24 Sep 2023 06:13  Phos  2.6     09-24  Mg     2.0     09-24    TPro  6.7  /  Alb  3.2<L>  /  TBili  0.1<L>  /  DBili  x   /  AST  16  /  ALT  15  /  AlkPhos  52  09-24          Urinalysis Basic - ( 24 Sep 2023 06:13 )    Color: x / Appearance: x / SG: x / pH: x  Gluc: 187 mg/dL / Ketone: x  / Bili: x / Urobili: x   Blood: x / Protein: x / Nitrite: x   Leuk Esterase: x / RBC: x / WBC x   Sq Epi: x / Non Sq Epi: x / Bacteria: x      Telemetry: Sinus tachy 's    RADIOLOGY & ADDITIONAL TESTS:    Imaging Personally Reviewed:  Consultant(s) Notes Reviewed:    Care Discussed with Consultants/Other Providers:

## 2023-09-24 NOTE — PROGRESS NOTE ADULT - PROBLEM SELECTOR PLAN 3
- likely related to malignancy/mets. Seen by ophtho   -MR orbits and venogram complete. F/u ophtho/nsgy recs. consider LP.  - d/w Dr. Metcalf re: possible need for LP. doesn't feel this is indicated based on imaging we currently have.  will defer for now.   - cont dex 4q6 and continue depakote 500 BID as per NSGY  -artificial tears prn.   -Eye patch for comfort if patient amenable. Says when she covers one eye the diplopia resolves.

## 2023-09-24 NOTE — PROGRESS NOTE ADULT - PROBLEM SELECTOR PLAN 1
case was discussed with Dr. Metcalf([facility]) and Dr. Bernard ([facility]) 9/21. Plan is for tentative resection of right brain lesion on 9/26 if pt in agreement. No additional plan for RT thereafter at this time unless changes/findings dictates as per Dr. Bernard. Onc (Dr. Lynch) note reviewed. plan for further systemic tx thereafter once recovered from surg.   - previously treated with 4 rounds of chemo, developed brain mets 5/23 s/p RT.   - now returning with dizziness, weakness, pain, HA, blurry vision   - CT/MRI with enhancing lesions, quite a few of which demonstrate peripheral enhancement involving the posterior fossa and supratentorial region/edema. please see full report on [place]  - repeat CT re: punctate hemorrhage slight inc size. subsequent MRI without mention of hemorrhage.   -per nsgy: dexa 4mg q6h; Keppra 500mg BID initially  recommended by NSGY however pt on Depakote at home- can continue with Depakote 500 BID as per NSGY   - palliative consult to help assist with GOC/ pain control. -iv dilaudid and oxycodone prn.

## 2023-09-25 NOTE — PROGRESS NOTE ADULT - PROBLEM SELECTOR PLAN 1
Ongoing discussions with Dr. Metcalf (NSx), Dr. Beckman (onc) and Dr. Bernard ([facility])   Patient remains undecided about planned intervention -needing to speak with rest of her family/finance and hear from oncology and [facility]. Initially scheduled surg on 9/26 now postponed to likely next week to give pt more time.   All services in agreement re: best option is for resection and follow up when recovered for further systemic therapy.     - previously treated with 4 rounds of chemo, developed brain mets 5/23 s/p RT.   - now returning with dizziness, weakness, pain, HA, blurry vision   - CT/MRI with enhancing lesions, quite a few of which demonstrate peripheral enhancement involving the posterior fossa and supratentorial region/edema. please see full report on [place]  - repeat CT re: punctate hemorrhage slight inc size. subsequent MRI without mention of hemorrhage.   -per nsgy: dexa 4mg q6h; Keppra 500mg BID initially  recommended by NSGY however pt on Depakote at home- can continue with Depakote 500 BID as per NSGY   - palliative consult to help assist with GOC/ pain control. -iv dilaudid and oxycodone prn. Ongoing discussions with Dr. Metcalf (NSx), Dr. Beckman (onc) and Dr. Bernard ([facility])   Patient remains undecided about planned intervention -needing to speak with rest of her family/finance and hear from oncology and [facility]. Initially scheduled surg on 9/26 now postponed to likely next week to give pt more time.   All services in agreement re: best option is for resection and follow up when recovered for further systemic therapy.    Preop risk stratification : Patient is clinically without any significant signs and symptoms of heart disease. prior functional status limited due to recent hosp stays and underlying malignancy though without overt symptoms when able. prior echo on 8/2023 reviewed with preserved LV function with mod AR/MR . No further med/card w/u needed at this time. can proceed with proposed neurosurg intervention.      - previously treated with 4 rounds of chemo, developed brain mets 5/23 s/p RT.   - now returning with dizziness, weakness, pain, HA, blurry vision   - CT/MRI with enhancing lesions, quite a few of which demonstrate peripheral enhancement involving the posterior fossa and supratentorial region/edema. please see full report on [facility]  - repeat CT re: punctate hemorrhage slight inc size. subsequent MRI without mention of hemorrhage.   -per nsgy: dexa 4mg q6h; Keppra 500mg BID initially  recommended by NSGY however pt on Depakote at home- can continue with Depakote 500 BID as per NSGY   - palliative consult to help assist with GOC/ pain control. -iv dilaudid and oxycodone prn.

## 2023-09-25 NOTE — PROGRESS NOTE ADULT - PROBLEM SELECTOR PLAN 3
- likely related to malignancy/mets. Seen by ophtho   - MR orbits and venogram no acute findings. F/u ophtho/nsgy    - cont dex 4q6 and continue depakote 500 BID as per NSGY  - artificial tears prn.   - Eye patch for comfort if patient amenable. Says when she covers one eye the diplopia resolves.    plan for surg resection as above if pt agrees

## 2023-09-25 NOTE — CHART NOTE - NSCHARTNOTEFT_GEN_A_CORE
Attempted to see pt today x 2. MD in room speaking with pt and pt off floor.  Will reattempt to see pt at later date.

## 2023-09-25 NOTE — PROGRESS NOTE ADULT - SUBJECTIVE AND OBJECTIVE BOX
INTERVAL HPI/OVERNIGHT EVENTS:  Patient S&E at bedside. No complaints at this time. No F/C, CP, SOB, abdominal pain, N/V, rash, or edema      VITAL SIGNS:  T(F): 98.4 (09-25-23 @ 13:19)  HR: 89 (09-25-23 @ 13:19)  BP: 145/88 (09-25-23 @ 13:19)  RR: 18 (09-25-23 @ 13:19)  SpO2: 97% (09-25-23 @ 13:19)  Wt(kg): --    PHYSICAL EXAM:    Constitutional: NAD  Eyes: EOMI, sclera non-icteric  Neck: supple  Respiratory: no increased WOB, CTAB/L  Cardiovascular: RRR, S1, S2, no m/g/r  Gastrointestinal: soft, NTND, no masses palpable, no HSM  Extremities: no c/c/e  Neurological: AAOx3    MEDICATIONS  (STANDING):  chlorhexidine 2% Cloths 1 Application(s) Topical daily  dexAMETHasone     Tablet 4 milliGRAM(s) Oral every 6 hours  dextrose 5%. 1000 milliLiter(s) (50 mL/Hr) IV Continuous <Continuous>  dextrose 5%. 1000 milliLiter(s) (100 mL/Hr) IV Continuous <Continuous>  dextrose 50% Injectable 25 Gram(s) IV Push once  dextrose 50% Injectable 12.5 Gram(s) IV Push once  dextrose 50% Injectable 25 Gram(s) IV Push once  divalproex  milliGRAM(s) Oral two times a day  glucagon  Injectable 1 milliGRAM(s) IntraMuscular once  insulin glargine Injectable (LANTUS) 3 Unit(s) SubCutaneous at bedtime  insulin lispro (ADMELOG) corrective regimen sliding scale   SubCutaneous at bedtime  insulin lispro (ADMELOG) corrective regimen sliding scale   SubCutaneous three times a day before meals  insulin lispro Injectable (ADMELOG) 2 Unit(s) SubCutaneous three times a day before meals  pantoprazole  Injectable 40 milliGRAM(s) IV Push two times a day  polyethylene glycol 3350 17 Gram(s) Oral daily  senna 2 Tablet(s) Oral at bedtime    MEDICATIONS  (PRN):  acetaminophen     Tablet .. 650 milliGRAM(s) Oral every 6 hours PRN Temp greater or equal to 38C (100.4F), Mild Pain (1 - 3)  aluminum hydroxide/magnesium hydroxide/simethicone Suspension 30 milliLiter(s) Oral every 4 hours PRN Dyspepsia  artificial tears (preservative free) Ophthalmic Solution 1 Drop(s) Both EYES two times a day PRN Dry Eyes  dextrose Oral Gel 15 Gram(s) Oral once PRN Blood Glucose LESS THAN 70 milliGRAM(s)/deciliter  HYDROmorphone  Injectable 0.5 milliGRAM(s) IV Push every 4 hours PRN Severe Pain (7 - 10)  melatonin 3 milliGRAM(s) Oral at bedtime PRN Insomnia  ondansetron Injectable 4 milliGRAM(s) IV Push every 8 hours PRN Nausea and/or Vomiting  oxyCODONE    IR 10 milliGRAM(s) Oral every 4 hours PRN Moderate Pain (4 - 6)      LABS:                        10.8   9.69  )-----------( 207      ( 25 Sep 2023 06:13 )             33.4     09-25    136  |  98  |  15  ----------------------------<  217<H>  4.3   |  24  |  0.56    Ca    9.9      25 Sep 2023 06:13  Phos  2.8     09-25  Mg     2.1     09-25    TPro  6.7  /  Alb  3.3  /  TBili  0.2  /  DBili  x   /  AST  19  /  ALT  16  /  AlkPhos  53  09-25    PT/INR - ( 25 Sep 2023 14:31 )   PT: 12.8 sec;   INR: 1.17 ratio         PTT - ( 25 Sep 2023 14:31 )  PTT:26.8 sec  Urinalysis Basic - ( 25 Sep 2023 06:13 )    Color: x / Appearance: x / SG: x / pH: x  Gluc: 217 mg/dL / Ketone: x  / Bili: x / Urobili: x   Blood: x / Protein: x / Nitrite: x   Leuk Esterase: x / RBC: x / WBC x   Sq Epi: x / Non Sq Epi: x / Bacteria: x        RADIOLOGY & ADDITIONAL TESTS:

## 2023-09-25 NOTE — CHART NOTE - NSCHARTNOTEFT_GEN_A_CORE
Pt seen on Friday, 9/22.  She was alert, non focal.  Spoke with her and with her fiance, Aba Berumencott (on phone).  They want to get input from Dr. Beckman regarding treatment options and Mr. Meza would like to speak with Dr. Bernard.  They will make a decision regarding surgery or no surgery once there is more information.  I left a message with Dr. Bernard in this regard.  OR still tentative for tomorrow, pending consent. Pt seen on Friday, 9/22.  She was alert, non focal.  Spoke with her and with her fiance, Aba Berumencott (on phone).  They want to get input from Dr. Beckman regarding treatment options and Mr. Meza would like to speak with Dr. Bernard.  They will make a decision regarding surgery or no surgery once there is more information.  I left a message with Dr. Bernard in this regard. Ophthalmology now reports 3+ disc edema.  OR still tentative for tomorrow, pending consent.

## 2023-09-25 NOTE — PROGRESS NOTE ADULT - PROBLEM SELECTOR PLAN 4
- DM a1c 7.2 end of August 2023. now 8.0   - last seen by breana mid september, finished steroid taper on 7th, was on lantus 3 units and lispro 2 units at that time  - BG wnl this admission   - SSI for now   - will add basal and premeal

## 2023-09-25 NOTE — PROGRESS NOTE ADULT - SUBJECTIVE AND OBJECTIVE BOX
[facility] Division of Hospital Medicine  Ave Vazquez MD  Pager (M-F, 6M-7T): [phone]  Other Times:  [phone]    Patient is a 52y old  Female who presents with a chief complaint of leg weakness/ dizziness blurry vision (24 Sep 2023 10:38)      SUBJECTIVE / OVERNIGHT EVENTS:  feeling ok. still with some blurry vision unchanged. undecided about surg. afebrile.   no acute overnight issues.     ADDITIONAL REVIEW OF SYSTEMS: otherwise neg    MEDICATIONS  (STANDING):  chlorhexidine 2% Cloths 1 Application(s) Topical daily  dexAMETHasone     Tablet 4 milliGRAM(s) Oral every 6 hours  dextrose 5%. 1000 milliLiter(s) (100 mL/Hr) IV Continuous <Continuous>  dextrose 5%. 1000 milliLiter(s) (50 mL/Hr) IV Continuous <Continuous>  dextrose 50% Injectable 25 Gram(s) IV Push once  dextrose 50% Injectable 12.5 Gram(s) IV Push once  dextrose 50% Injectable 25 Gram(s) IV Push once  divalproex  milliGRAM(s) Oral two times a day  glucagon  Injectable 1 milliGRAM(s) IntraMuscular once  insulin lispro (ADMELOG) corrective regimen sliding scale   SubCutaneous at bedtime  insulin lispro (ADMELOG) corrective regimen sliding scale   SubCutaneous three times a day before meals  pantoprazole  Injectable 40 milliGRAM(s) IV Push two times a day  polyethylene glycol 3350 17 Gram(s) Oral daily  senna 2 Tablet(s) Oral at bedtime    MEDICATIONS  (PRN):  acetaminophen     Tablet .. 650 milliGRAM(s) Oral every 6 hours PRN Temp greater or equal to 38C (100.4F), Mild Pain (1 - 3)  aluminum hydroxide/magnesium hydroxide/simethicone Suspension 30 milliLiter(s) Oral every 4 hours PRN Dyspepsia  artificial tears (preservative free) Ophthalmic Solution 1 Drop(s) Both EYES two times a day PRN Dry Eyes  dextrose Oral Gel 15 Gram(s) Oral once PRN Blood Glucose LESS THAN 70 milliGRAM(s)/deciliter  HYDROmorphone  Injectable 0.5 milliGRAM(s) IV Push every 4 hours PRN Severe Pain (7 - 10)  melatonin 3 milliGRAM(s) Oral at bedtime PRN Insomnia  ondansetron Injectable 4 milliGRAM(s) IV Push every 8 hours PRN Nausea and/or Vomiting  oxyCODONE    IR 10 milliGRAM(s) Oral every 4 hours PRN Moderate Pain (4 - 6)      CAPILLARY BLOOD GLUCOSE      POCT Blood Glucose.: 177 mg/dL (25 Sep 2023 13:11)  POCT Blood Glucose.: 164 mg/dL (25 Sep 2023 08:35)  POCT Blood Glucose.: 286 mg/dL (24 Sep 2023 20:54)  POCT Blood Glucose.: 224 mg/dL (24 Sep 2023 16:37)    I&O's Summary    24 Sep 2023 07:01  -  25 Sep 2023 07:00  --------------------------------------------------------  IN: 460 mL / OUT: 0 mL / NET: 460 mL    25 Sep 2023 07:01  -  25 Sep 2023 15:19  --------------------------------------------------------  IN: 720 mL / OUT: 0 mL / NET: 720 mL        PHYSICAL EXAM:  Vital Signs Last 24 Hrs  T(C): 36.9 (25 Sep 2023 13:19), Max: 36.9 (25 Sep 2023 13:19)  T(F): 98.4 (25 Sep 2023 13:19), Max: 98.4 (25 Sep 2023 13:19)  HR: 89 (25 Sep 2023 13:19) (86 - 93)  BP: 145/88 (25 Sep 2023 13:19) (124/75 - 145/88)  BP(mean): --  RR: 18 (25 Sep 2023 13:19) (18 - 18)  SpO2: 97% (25 Sep 2023 13:19) (97% - 98%)    Parameters below as of 25 Sep 2023 13:19  Patient On (Oxygen Delivery Method): room air        CONSTITUTIONAL: NAD, well-groomed  EYES:  conjunctiva and sclera clear  ENMT: Moist oral mucosa  NECK: Supple, no palpable masses; no JVD  RESPIRATORY: Normal respiratory effort; lungs are clear to auscultation bilaterally  CARDIOVASCULAR: Regular rate and rhythm, normal S1 and S2, no murmur/rub/gallop; No lower extremity edema  ABDOMEN: Nontender to palpation, normoactive bowel sounds, no rebound/guarding  MUSCULOSKELETAL:  no clubbing or cyanosis of digits; no joint swelling or tenderness to palpation  PSYCH: A+O to person, place, and time; affect appropriate  SKIN: No rashes; no palpable lesions    LABS:                        10.8   9.69  )-----------( 207      ( 25 Sep 2023 06:13 )             33.4     09-25    136  |  98  |  15  ----------------------------<  217<H>  4.3   |  24  |  0.56    Ca    9.9      25 Sep 2023 06:13  Phos  2.8     09-25  Mg     2.1     09-25    TPro  6.7  /  Alb  3.3  /  TBili  0.2  /  DBili  x   /  AST  19  /  ALT  16  /  AlkPhos  53  09-25    PT/INR - ( 25 Sep 2023 14:31 )   PT: 12.8 sec;   INR: 1.17 ratio         PTT - ( 25 Sep 2023 14:31 )  PTT:26.8 sec      Urinalysis Basic - ( 25 Sep 2023 06:13 )    Color: x / Appearance: x / SG: x / pH: x  Gluc: 217 mg/dL / Ketone: x  / Bili: x / Urobili: x   Blood: x / Protein: x / Nitrite: x   Leuk Esterase: x / RBC: x / WBC x   Sq Epi: x / Non Sq Epi: x / Bacteria: x          RADIOLOGY & ADDITIONAL TESTS:  Results Reviewed:   Imaging Personally Reviewed:  Electrocardiogram Personally Reviewed:    COORDINATION OF CARE:  Care Discussed with Consultants/Other Providers [Y/N]:  Prior or Outpatient Records Reviewed [Y/N]:

## 2023-09-25 NOTE — PROGRESS NOTE ADULT - ASSESSMENT
52F stage IV lung cancer with mets to brain s/p 4 cycles chemo and thoracic RT completed on oct 2022, Gamma knife surgery to 7 brain mets (5 fractions of GK-SRS to 7 brain metastases from 6/21 - 6/29/2023), 2 recent admissions for dizziness, headaches and epigastric pain presents with right lower extremity weakness and blurry vision, CTH w/ L frontoparietal/L occipital (w/ calcifications)/R parietooccipital masses w/ surrounding edema. Oncology consulted for lung cancer    #lung cancer  - f/w Dr. Beckman, initially stage IIIB unresectable s/p Carboplatin/Pemetrexed in July 2022 x 4 cycles completed Sept 2022, concurrent Thoracic RT started Sept through October 2022. Achieved ME. Restaging PET/CT March 2023 confirming response to interval therapy. She declined maintenance Durvalumab recommended on numerous occasions through March 2023. Hospitalized at [facility] on 5/31-6/7/23 with symptomatic numerous brain metastases. Treated with GK-SRS to 7 brain metastases in 5 fractions from 6/21 - 6/29/2023, planned for durvalumab 8/4/2023 however appt cancelled  - recent CT C/A/P showing enlarging lung lesion and new subcentimeter liver lesion concerning for POD  - no inpatient systemic treatment planned  - NSG tentative plan for resection 9/26, patient is agreeable to surgery.  - would recommend radiation oncology assessment for post-op radiation. Plan for further immunotherapy treatment outpatient  - MRV/CTV to rule out venous sinus thrombosis, MRI orbits w/wo contrast +/- LP as per ophtho  - outpatient follow up once stable for discharge, please include hematology oncology f/u at [facility], [facility] in discharge note    Discussed w/ Dr. Jose F Acevedo MD, PhD  Heme/Onc Fellow  [id number]

## 2023-09-26 NOTE — PROGRESS NOTE ADULT - PROBLEM SELECTOR PLAN 4
- DM a1c 7.2 end of August 2023. now 8.0   - last seen by breana mid september, finished steroid taper on 7th, was on lantus 3 units and lispro 2 units at that time  - BG wnl this admission   - SSI for now   - will add basal and premeal- titrate

## 2023-09-26 NOTE — PROGRESS NOTE ADULT - SUBJECTIVE AND OBJECTIVE BOX
[facility] Division of Hospital Medicine  Ave Vazquez MD  Pager (M-F, 0C-0Y): [phone]  Other Times:  [phone]    Patient is a 52y old  Female who presents with a chief complaint of leg weakness/ dizziness blurry vision (25 Sep 2023 18:18)      SUBJECTIVE / OVERNIGHT EVENTS:  alert and awake. upset about being NPO and discussions about surgery today - now not agreeable.   afebrile.     ADDITIONAL REVIEW OF SYSTEMS: otherwise neg    MEDICATIONS  (STANDING):  chlorhexidine 2% Cloths 1 Application(s) Topical daily  dexAMETHasone     Tablet 4 milliGRAM(s) Oral every 6 hours  dextrose 5%. 1000 milliLiter(s) (50 mL/Hr) IV Continuous <Continuous>  dextrose 5%. 1000 milliLiter(s) (100 mL/Hr) IV Continuous <Continuous>  dextrose 50% Injectable 25 Gram(s) IV Push once  dextrose 50% Injectable 25 Gram(s) IV Push once  dextrose 50% Injectable 12.5 Gram(s) IV Push once  divalproex  milliGRAM(s) Oral two times a day  glucagon  Injectable 1 milliGRAM(s) IntraMuscular once  insulin glargine Injectable (LANTUS) 3 Unit(s) SubCutaneous at bedtime  insulin lispro (ADMELOG) corrective regimen sliding scale   SubCutaneous at bedtime  insulin lispro (ADMELOG) corrective regimen sliding scale   SubCutaneous three times a day before meals  insulin lispro Injectable (ADMELOG) 2 Unit(s) SubCutaneous three times a day before meals  lactated ringers. 1000 milliLiter(s) (60 mL/Hr) IV Continuous <Continuous>  mupirocin 2% Nasal 1 Application(s) Both Nostrils two times a day  pantoprazole  Injectable 40 milliGRAM(s) IV Push two times a day  polyethylene glycol 3350 17 Gram(s) Oral daily  senna 2 Tablet(s) Oral at bedtime    MEDICATIONS  (PRN):  acetaminophen     Tablet .. 650 milliGRAM(s) Oral every 6 hours PRN Temp greater or equal to 38C (100.4F), Mild Pain (1 - 3)  aluminum hydroxide/magnesium hydroxide/simethicone Suspension 30 milliLiter(s) Oral every 4 hours PRN Dyspepsia  artificial tears (preservative free) Ophthalmic Solution 1 Drop(s) Both EYES two times a day PRN Dry Eyes  dextrose Oral Gel 15 Gram(s) Oral once PRN Blood Glucose LESS THAN 70 milliGRAM(s)/deciliter  HYDROmorphone  Injectable 0.5 milliGRAM(s) IV Push every 4 hours PRN Severe Pain (7 - 10)  melatonin 3 milliGRAM(s) Oral at bedtime PRN Insomnia  ondansetron Injectable 4 milliGRAM(s) IV Push every 8 hours PRN Nausea and/or Vomiting  oxyCODONE    IR 10 milliGRAM(s) Oral every 4 hours PRN Moderate Pain (4 - 6)      CAPILLARY BLOOD GLUCOSE      POCT Blood Glucose.: 170 mg/dL (26 Sep 2023 12:10)  POCT Blood Glucose.: 233 mg/dL (26 Sep 2023 08:06)  POCT Blood Glucose.: 228 mg/dL (25 Sep 2023 21:02)  POCT Blood Glucose.: 264 mg/dL (25 Sep 2023 16:31)    I&O's Summary    25 Sep 2023 07:01  -  26 Sep 2023 07:00  --------------------------------------------------------  IN: 820 mL / OUT: 0 mL / NET: 820 mL    26 Sep 2023 07:01  -  26 Sep 2023 13:38  --------------------------------------------------------  IN: 240 mL / OUT: 0 mL / NET: 240 mL        PHYSICAL EXAM:  Vital Signs Last 24 Hrs  T(C): 36.4 (26 Sep 2023 12:00), Max: 37.1 (26 Sep 2023 07:48)  T(F): 97.5 (26 Sep 2023 12:00), Max: 98.7 (26 Sep 2023 07:48)  HR: 88 (26 Sep 2023 12:00) (85 - 96)  BP: 133/86 (26 Sep 2023 12:00) (114/66 - 133/86)  BP(mean): --  RR: 18 (26 Sep 2023 12:00) (18 - 18)  SpO2: 100% (26 Sep 2023 12:00) (97% - 100%)    Parameters below as of 26 Sep 2023 12:00  Patient On (Oxygen Delivery Method): room air      CONSTITUTIONAL: NAD,   EYES:  conjunctiva and sclera clear  ENMT: Moist oral mucosa  NECK: Supple, no palpable masses; no JVD  RESPIRATORY: Normal respiratory effort; lungs are clear to auscultation bilaterally  CARDIOVASCULAR: Regular rate and rhythm, normal S1 and S2, no murmur/rub/gallop; No lower extremity edema  ABDOMEN: Nontender to palpation, normoactive bowel sounds, no rebound/guarding  MUSCULOSKELETAL:  no clubbing or cyanosis of digits; no joint swelling or tenderness to palpation  PSYCH: A+O to person, place, and time; affect appropriate  SKIN: No rashes; no palpable lesions    LABS:                        10.8   9.69  )-----------( 207      ( 25 Sep 2023 06:13 )             33.4     09-25    136  |  98  |  15  ----------------------------<  217<H>  4.3   |  24  |  0.56    Ca    9.9      25 Sep 2023 06:13  Phos  2.8     09-25  Mg     2.1     09-25    TPro  6.7  /  Alb  3.3  /  TBili  0.2  /  DBili  x   /  AST  19  /  ALT  16  /  AlkPhos  53  09-25    PT/INR - ( 25 Sep 2023 14:31 )   PT: 12.8 sec;   INR: 1.17 ratio         PTT - ( 25 Sep 2023 14:31 )  PTT:26.8 sec      Urinalysis Basic - ( 25 Sep 2023 06:13 )    Color: x / Appearance: x / SG: x / pH: x  Gluc: 217 mg/dL / Ketone: x  / Bili: x / Urobili: x   Blood: x / Protein: x / Nitrite: x   Leuk Esterase: x / RBC: x / WBC x   Sq Epi: x / Non Sq Epi: x / Bacteria: x          RADIOLOGY & ADDITIONAL TESTS:  Results Reviewed:   Imaging Personally Reviewed:  Electrocardiogram Personally Reviewed:    COORDINATION OF CARE:  Care Discussed with Consultants/Other Providers [Y/N]:  Prior or Outpatient Records Reviewed [Y/N]:

## 2023-09-26 NOTE — PROGRESS NOTE ADULT - PROBLEM SELECTOR PLAN 5
resolved  - pt c/o intermittent luq pain and back pain localized to right upper scapula  - non specific differentials include gastritis given recent steroid use vs GERD   - lipase WNL.   switch to po protonix 40mg daily and titrate pain meds

## 2023-09-26 NOTE — PROGRESS NOTE ADULT - PROBLEM SELECTOR PLAN 1
Ongoing discussions with Dr. Metcalf (NSx), Dr. Beckman (onc) and Dr. Bernard ([facility]) Dr. Maritza Kohler (Onc)   Patient remains undecided about planned intervention -needing to speak with rest of her family. Initially scheduled surg on 9/26 now postponed to likely next week to give pt more time. All services in agreement re: best option is for resection and follow up when recovered for further systemic therapy.    Preop risk stratification : Patient is clinically without any significant signs and symptoms of cardiac disease. prior functional status limited due to recent hosp stays and underlying malignancy though without overt symptoms when able. prior echo on 8/2023 reviewed with preserved LV function with mod AR/MR . No further med/card w/u needed at this time. can proceed with proposed neurosurg intervention.      - previously treated with 4 rounds of chemo, developed brain mets 5/23 s/p RT.   - now returning with dizziness, weakness, pain, HA, blurry vision   - CT/MRI with enhancing lesions, quite a few of which demonstrate peripheral enhancement involving the posterior fossa and supratentorial region/edema. please see full report on [place]  - repeat CT re: punctate hemorrhage slight inc size. subsequent MRI without mention of hemorrhage.   -per nsgy: dexa 4mg q6h; Keppra 500mg BID initially  recommended by NSGY however pt on Depakote at home- can continue with Depakote 500 BID as per NSGY   - palliative consult to help assist with GOC/ pain control. -iv dilaudid and oxycodone prn.

## 2023-09-27 PROBLEM — C34.90 NON-SMALL CELL LUNG CANCER (NSCLC): Status: ACTIVE | Noted: 2022-07-11

## 2023-09-27 NOTE — PROGRESS NOTE ADULT - SUBJECTIVE AND OBJECTIVE BOX
[facility] Division of Hospital Medicine  Ave Vazquez MD  Pager (M-F, 0J-9K): [phone]  Other Times:  [phone]    Patient is a 52y old  Female who presents with a chief complaint of leg weakness/ dizziness blurry vision (26 Sep 2023 13:38)      SUBJECTIVE / OVERNIGHT EVENTS:  feeling well. denies any specific complaints. wants to have surg. no changes in vision or other symptoms. .     ADDITIONAL REVIEW OF SYSTEMS: otherwise neg    MEDICATIONS  (STANDING):  chlorhexidine 2% Cloths 1 Application(s) Topical daily  dexAMETHasone     Tablet 4 milliGRAM(s) Oral every 6 hours  dextrose 5%. 1000 milliLiter(s) (50 mL/Hr) IV Continuous <Continuous>  dextrose 5%. 1000 milliLiter(s) (100 mL/Hr) IV Continuous <Continuous>  dextrose 50% Injectable 25 Gram(s) IV Push once  dextrose 50% Injectable 25 Gram(s) IV Push once  dextrose 50% Injectable 12.5 Gram(s) IV Push once  divalproex  milliGRAM(s) Oral two times a day  glucagon  Injectable 1 milliGRAM(s) IntraMuscular once  insulin glargine Injectable (LANTUS) 8 Unit(s) SubCutaneous at bedtime  insulin lispro (ADMELOG) corrective regimen sliding scale   SubCutaneous three times a day before meals  insulin lispro (ADMELOG) corrective regimen sliding scale   SubCutaneous at bedtime  insulin lispro Injectable (ADMELOG) 5 Unit(s) SubCutaneous three times a day before meals  lactated ringers. 1000 milliLiter(s) (60 mL/Hr) IV Continuous <Continuous>  mupirocin 2% Nasal 1 Application(s) Both Nostrils two times a day  pantoprazole    Tablet 40 milliGRAM(s) Oral before breakfast  polyethylene glycol 3350 17 Gram(s) Oral daily  senna 2 Tablet(s) Oral at bedtime    MEDICATIONS  (PRN):  acetaminophen     Tablet .. 650 milliGRAM(s) Oral every 6 hours PRN Temp greater or equal to 38C (100.4F), Mild Pain (1 - 3)  aluminum hydroxide/magnesium hydroxide/simethicone Suspension 30 milliLiter(s) Oral every 4 hours PRN Dyspepsia  artificial tears (preservative free) Ophthalmic Solution 1 Drop(s) Both EYES two times a day PRN Dry Eyes  dextrose Oral Gel 15 Gram(s) Oral once PRN Blood Glucose LESS THAN 70 milliGRAM(s)/deciliter  HYDROmorphone  Injectable 0.5 milliGRAM(s) IV Push every 4 hours PRN Severe Pain (7 - 10)  melatonin 3 milliGRAM(s) Oral at bedtime PRN Insomnia  ondansetron Injectable 4 milliGRAM(s) IV Push every 8 hours PRN Nausea and/or Vomiting  oxyCODONE    IR 10 milliGRAM(s) Oral every 4 hours PRN Moderate Pain (4 - 6)      CAPILLARY BLOOD GLUCOSE      POCT Blood Glucose.: 323 mg/dL (27 Sep 2023 11:41)  POCT Blood Glucose.: 257 mg/dL (27 Sep 2023 07:45)  POCT Blood Glucose.: 337 mg/dL (26 Sep 2023 21:05)  POCT Blood Glucose.: 326 mg/dL (26 Sep 2023 16:28)  POCT Blood Glucose.: 170 mg/dL (26 Sep 2023 12:10)    I&O's Summary    26 Sep 2023 07:01  -  27 Sep 2023 07:00  --------------------------------------------------------  IN: 240 mL / OUT: 0 mL / NET: 240 mL        PHYSICAL EXAM:  Vital Signs Last 24 Hrs  T(C): 36.8 (27 Sep 2023 11:26), Max: 36.8 (27 Sep 2023 11:26)  T(F): 98.3 (27 Sep 2023 11:26), Max: 98.3 (27 Sep 2023 11:26)  HR: 93 (27 Sep 2023 11:26) (88 - 103)  BP: 123/72 (27 Sep 2023 11:26) (110/73 - 149/98)  BP(mean): --  RR: 18 (27 Sep 2023 11:26) (18 - 18)  SpO2: 99% (27 Sep 2023 11:26) (98% - 100%)    Parameters below as of 27 Sep 2023 11:26  Patient On (Oxygen Delivery Method): room air      CONSTITUTIONAL: NAD,   EYES:  conjunctiva and sclera clear  ENMT: Moist oral mucosa  NECK: Supple, no palpable masses; no JVD  RESPIRATORY: Normal respiratory effort; lungs are clear to auscultation bilaterally  CARDIOVASCULAR: Regular rate and rhythm, normal S1 and S2, no murmur/rub/gallop; No lower extremity edema  ABDOMEN: Nontender to palpation, normoactive bowel sounds, no rebound/guarding  MUSCULOSKELETAL:  no clubbing or cyanosis of digits; no joint swelling or tenderness to palpation  PSYCH: A+O to person, place, and time; affect appropriate  SKIN: No rashes; no palpable lesions    LABS:                        11.8   11.38 )-----------( 203      ( 27 Sep 2023 06:51 )             36.8     09-27    134<L>  |  98  |  16  ----------------------------<  223<H>  4.4   |  21<L>  |  0.59    Ca    10.0      27 Sep 2023 06:51    TPro  7.1  /  Alb  3.4  /  TBili  0.2  /  DBili  x   /  AST  18  /  ALT  22  /  AlkPhos  59  09-27    PT/INR - ( 25 Sep 2023 14:31 )   PT: 12.8 sec;   INR: 1.17 ratio         PTT - ( 25 Sep 2023 14:31 )  PTT:26.8 sec      Urinalysis Basic - ( 27 Sep 2023 06:51 )    Color: x / Appearance: x / SG: x / pH: x  Gluc: 223 mg/dL / Ketone: x  / Bili: x / Urobili: x   Blood: x / Protein: x / Nitrite: x   Leuk Esterase: x / RBC: x / WBC x   Sq Epi: x / Non Sq Epi: x / Bacteria: x          RADIOLOGY & ADDITIONAL TESTS:  Results Reviewed:   Imaging Personally Reviewed:  Electrocardiogram Personally Reviewed:    COORDINATION OF CARE:  Care Discussed with Consultants/Other Providers [Y/N]:  Prior or Outpatient Records Reviewed [Y/N]:

## 2023-09-27 NOTE — PROGRESS NOTE ADULT - PROBLEM SELECTOR PLAN 1
Ongoing discussions with Dr. Metcalf (NSx), Dr. Beckman (onc) and Dr. Bernard ([facility]) Dr. Maritza Kohler (Onc)   Per discussion with pt - now in agreement to pursue surg. NSx Dr. Metcalf made aware. will plan for earliest this weekend if not next week.   All services in agreement re: best option is for resection and follow up when recovered for further systemic therapy.    Preop risk stratification : Patient is clinically without any significant signs and symptoms of cardiac disease. prior functional status limited due to recent hosp stays and underlying malignancy though without overt symptoms when able. prior echo on 8/2023 reviewed with preserved LV function with mod AR/MR . No further med/card w/u needed at this time. can proceed with proposed neurosurg intervention.      - previously treated with 4 rounds of chemo, developed brain mets 5/23 s/p RT.   - now returning with dizziness, weakness, pain, HA, blurry vision   - CT/MRI with enhancing lesions, quite a few of which demonstrate peripheral enhancement involving the posterior fossa and supratentorial region/edema. please see full report on [place]  - repeat CT re: punctate hemorrhage slight inc size. subsequent MRI without mention of hemorrhage.   -per nsgy: dexa 4mg q6h; Keppra 500mg BID initially  recommended by NSGY however pt on Depakote at home- can continue with Depakote 500 BID as per NSGY   - palliative consult to help assist with GOC/ pain control. -iv dilaudid and oxycodone prn.

## 2023-09-27 NOTE — PROGRESS NOTE ADULT - SUBJECTIVE AND OBJECTIVE BOX
PT seen earlier today with resident.  We were notified by team that pt has had a chance to confer with family and contemplate treatment and is not amenable to surgery.  Discussed the rationale for surgery to assist with relieving some of the edema burden.  Surgery as possible add-on for Saturday.  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

## 2023-09-27 NOTE — PROGRESS NOTE ADULT - PROBLEM SELECTOR PLAN 3
- likely related to malignancy/mets. Seen by ophtho   - MR orbits and venogram no acute findings. F/u ophtho/nsgy    - cont dex 4q6 and continue depakote 500 BID as per NSGY  - artificial tears prn.   - Eye patch for comfort if patient amenable. Says when she covers one eye the diplopia resolves.    plan for surg resection as above

## 2023-09-28 NOTE — PROGRESS NOTE ADULT - PROBLEM SELECTOR PLAN 3
- likely related to malignancy/mets/edema. Seen by ophtho   - MR orbits and venogram no acute findings. F/u ophtho/nsgy    - cont dex 4q6 and continue depakote 500 BID as per NSGY  - artificial tears prn.   - Eye patch for comfort if patient amenable. Says when she covers one eye the diplopia resolves.    plan for surg resection as above

## 2023-09-28 NOTE — PROGRESS NOTE ADULT - PROBLEM SELECTOR PLAN 1
c/w current regimen -     HYDROmorphone  Injectable 0.5 milliGRAM(s) IV Push every 4 hours PRN Severe Pain (7 - 10) -  0 used in 24 hrs  oxyCODONE    IR 10 milliGRAM(s) Oral every 4 hours PRN Moderate Pain (4 - 6)  - 0 used in 24 hrs

## 2023-09-28 NOTE — PROGRESS NOTE ADULT - PROBLEM SELECTOR PLAN 3
kps 60%    I personally spent 30 minutes on advance care planning services with the patient. This time is separate and distinct from any other care management services provided on this date.    Raiza Han, ANP-BC  Please contact me via Teams  between 6am-2pm. If not answering, please call the palliative care pager [phone]    After 2pm and on weekends, please see the contact information below:    In the event of newly developing, evolving, or worsening symptoms between 2pm and 5pm please contact the Palliative Medicine via extension [phone] for assistance.  After 5pm please contact team via pager (if the patient is at [facility] #[phone] or if the patient is at [facility] #[phone]) The Geriatric and Palliative Medicine service has coverage 24 hours a day/ 7 days a week to provide medical recommendations regarding symptom management needs via telephone

## 2023-09-28 NOTE — PROGRESS NOTE ADULT - CONVERSATION DETAILS
met with pt at bedside for greater then 16 mins.  Discussed plan for intervention/surgery on Saturday.  Pt is hopeful that her vision and leg weakness will improve with procedure.  Pt reports that pain has been better and using pain meds prn. Goals are established.  Palliative care team will continue to follow for pain.

## 2023-09-28 NOTE — PROGRESS NOTE ADULT - SUBJECTIVE AND OBJECTIVE BOX
[facility] Division of Hospital Medicine  Ave aVzquez MD  Pager (M-F, 9A-5L): [phone]  Other Times:  [phone]    Patient is a 52y old  Female who presents with a chief complaint of leg weakness/ dizziness blurry vision (28 Sep 2023 10:21)      SUBJECTIVE / OVERNIGHT EVENTS:  feeling well. denies any new complaints. pain controlled. anxious about going forward with surg.     ADDITIONAL REVIEW OF SYSTEMS: otherwise neg    MEDICATIONS  (STANDING):  chlorhexidine 2% Cloths 1 Application(s) Topical daily  dexAMETHasone     Tablet 4 milliGRAM(s) Oral every 6 hours  dextrose 5%. 1000 milliLiter(s) (100 mL/Hr) IV Continuous <Continuous>  dextrose 5%. 1000 milliLiter(s) (50 mL/Hr) IV Continuous <Continuous>  dextrose 50% Injectable 12.5 Gram(s) IV Push once  dextrose 50% Injectable 25 Gram(s) IV Push once  dextrose 50% Injectable 25 Gram(s) IV Push once  divalproex  milliGRAM(s) Oral two times a day  glucagon  Injectable 1 milliGRAM(s) IntraMuscular once  insulin glargine Injectable (LANTUS) 8 Unit(s) SubCutaneous at bedtime  insulin lispro (ADMELOG) corrective regimen sliding scale   SubCutaneous at bedtime  insulin lispro (ADMELOG) corrective regimen sliding scale   SubCutaneous three times a day before meals  insulin lispro Injectable (ADMELOG) 5 Unit(s) SubCutaneous three times a day before meals  lactated ringers. 1000 milliLiter(s) (60 mL/Hr) IV Continuous <Continuous>  mupirocin 2% Nasal 1 Application(s) Both Nostrils two times a day  pantoprazole    Tablet 40 milliGRAM(s) Oral before breakfast  polyethylene glycol 3350 17 Gram(s) Oral daily  senna 2 Tablet(s) Oral at bedtime    MEDICATIONS  (PRN):  acetaminophen     Tablet .. 650 milliGRAM(s) Oral every 6 hours PRN Temp greater or equal to 38C (100.4F), Mild Pain (1 - 3)  aluminum hydroxide/magnesium hydroxide/simethicone Suspension 30 milliLiter(s) Oral every 4 hours PRN Dyspepsia  artificial tears (preservative free) Ophthalmic Solution 1 Drop(s) Both EYES two times a day PRN Dry Eyes  dextrose Oral Gel 15 Gram(s) Oral once PRN Blood Glucose LESS THAN 70 milliGRAM(s)/deciliter  HYDROmorphone  Injectable 0.5 milliGRAM(s) IV Push every 4 hours PRN Severe Pain (7 - 10)  melatonin 3 milliGRAM(s) Oral at bedtime PRN Insomnia  ondansetron Injectable 4 milliGRAM(s) IV Push every 8 hours PRN Nausea and/or Vomiting  oxyCODONE    IR 10 milliGRAM(s) Oral every 4 hours PRN Moderate Pain (4 - 6)      CAPILLARY BLOOD GLUCOSE      POCT Blood Glucose.: 179 mg/dL (28 Sep 2023 07:58)  POCT Blood Glucose.: 202 mg/dL (27 Sep 2023 21:26)  POCT Blood Glucose.: 274 mg/dL (27 Sep 2023 16:28)    I&O's Summary      PHYSICAL EXAM:  Vital Signs Last 24 Hrs  T(C): 36.8 (28 Sep 2023 04:12), Max: 36.8 (28 Sep 2023 04:12)  T(F): 98.3 (28 Sep 2023 04:12), Max: 98.3 (28 Sep 2023 04:12)  HR: 77 (28 Sep 2023 04:12) (77 - 91)  BP: 106/67 (28 Sep 2023 04:12) (106/67 - 111/73)  BP(mean): --  RR: 18 (28 Sep 2023 04:12) (18 - 18)  SpO2: 96% (28 Sep 2023 04:12) (96% - 98%)    Parameters below as of 28 Sep 2023 04:12  Patient On (Oxygen Delivery Method): room air        CONSTITUTIONAL: NAD, well-groomed  EYES:  conjunctiva and sclera clear  ENMT: Moist oral mucosa  NECK: Supple, no palpable masses; no JVD  RESPIRATORY: Normal respiratory effort; lungs are clear to auscultation bilaterally  CARDIOVASCULAR: Regular rate and rhythm, normal S1 and S2, no murmur/rub/gallop; No lower extremity edema  ABDOMEN: Nontender to palpation, normoactive bowel sounds, no rebound/guarding  MUSCULOSKELETAL:  no clubbing or cyanosis of digits; no joint swelling or tenderness to palpation  PSYCH: A+O to person, place, and time; affect appropriate  SKIN: No rashes; no palpable lesions    LABS:                        11.2   9.91  )-----------( 167      ( 28 Sep 2023 07:30 )             34.1     09-28    135  |  99  |  15  ----------------------------<  186<H>  4.1   |  23  |  0.51    Ca    9.4      28 Sep 2023 07:30    TPro  6.4  /  Alb  3.3  /  TBili  0.1<L>  /  DBili  x   /  AST  16  /  ALT  18  /  AlkPhos  54  09-28          Urinalysis Basic - ( 28 Sep 2023 07:30 )    Color: x / Appearance: x / SG: x / pH: x  Gluc: 186 mg/dL / Ketone: x  / Bili: x / Urobili: x   Blood: x / Protein: x / Nitrite: x   Leuk Esterase: x / RBC: x / WBC x   Sq Epi: x / Non Sq Epi: x / Bacteria: x          RADIOLOGY & ADDITIONAL TESTS:  Results Reviewed:   Imaging Personally Reviewed:  Electrocardiogram Personally Reviewed:    COORDINATION OF CARE:  Care Discussed with Consultants/Other Providers [Y/N]:  Prior or Outpatient Records Reviewed [Y/N]:

## 2023-09-28 NOTE — PROGRESS NOTE ADULT - PROBLEM SELECTOR PLAN 1
Extensive discussions with Dr. Metcalf (NSx), Dr. Beckman (onc) and Dr. Bernard ([facility]) Dr. Maritza Kohler (Onc)   Per discussion with pt - now in agreement to pursue surg. NSx Dr. Metcalf made aware. will plan for earliest this weekend if not next week.   All services in agreement re: best option is for resection and follow up when recovered for further systemic therapy.    Preop risk stratification : Patient is clinically without any significant signs and symptoms of cardiac disease. prior functional status limited due to recent hosp stays and underlying malignancy though without overt symptoms when able. prior echo on 8/2023 reviewed with preserved LV function with mod AR/MR . No further med/card w/u needed at this time. can proceed with proposed neurosurg intervention.      - previously treated with 4 rounds of chemo, developed brain mets 5/23 s/p RT.   - now returning with dizziness, weakness, pain, HA, blurry vision   - CT/MRI with enhancing lesions, quite a few of which demonstrate peripheral enhancement involving the posterior fossa and supratentorial region/edema. please see full report on [place]  - repeat CT re: punctate hemorrhage slight inc size. subsequent MRI without mention of hemorrhage.   -per nsgy: dexa 4mg q6h; Keppra 500mg BID initially  recommended by NSGY however pt on Depakote at home- can continue with Depakote 500 BID as per NSGY   - palliative consult to help assist with GOC/ pain control. -iv dilaudid and oxycodone prn.

## 2023-09-28 NOTE — PROGRESS NOTE ADULT - PROBLEM SELECTOR PLAN 4
- DM a1c 7.2 end of August 2023. now 8.0   - BS continues to be elevated despite change to DM diet and increased insulin regiment. ?dietary indiscretion   - Will cont to titrate insulin to goal.

## 2023-09-28 NOTE — PROGRESS NOTE ADULT - SUBJECTIVE AND OBJECTIVE BOX
SUBJECTIVE AND OBJECTIVE:  Pt resting comfortably in bed in NAD  Indication for Geriatrics and Palliative Care Services/INTERVAL HPI:  Called for GOC    OVERNIGHT EVENTS: no acute events overnight    DNR on chart: no  Allergies    No Known Allergies    Intolerances    MEDICATIONS  (STANDING):  chlorhexidine 2% Cloths 1 Application(s) Topical daily  dexAMETHasone     Tablet 4 milliGRAM(s) Oral every 6 hours  dextrose 5%. 1000 milliLiter(s) (100 mL/Hr) IV Continuous <Continuous>  dextrose 5%. 1000 milliLiter(s) (50 mL/Hr) IV Continuous <Continuous>  dextrose 50% Injectable 12.5 Gram(s) IV Push once  dextrose 50% Injectable 25 Gram(s) IV Push once  dextrose 50% Injectable 25 Gram(s) IV Push once  divalproex  milliGRAM(s) Oral two times a day  glucagon  Injectable 1 milliGRAM(s) IntraMuscular once  insulin glargine Injectable (LANTUS) 8 Unit(s) SubCutaneous at bedtime  insulin lispro (ADMELOG) corrective regimen sliding scale   SubCutaneous at bedtime  insulin lispro (ADMELOG) corrective regimen sliding scale   SubCutaneous three times a day before meals  insulin lispro Injectable (ADMELOG) 5 Unit(s) SubCutaneous three times a day before meals  lactated ringers. 1000 milliLiter(s) (60 mL/Hr) IV Continuous <Continuous>  mupirocin 2% Nasal 1 Application(s) Both Nostrils two times a day  pantoprazole    Tablet 40 milliGRAM(s) Oral before breakfast  polyethylene glycol 3350 17 Gram(s) Oral daily  senna 2 Tablet(s) Oral at bedtime    MEDICATIONS  (PRN):  acetaminophen     Tablet .. 650 milliGRAM(s) Oral every 6 hours PRN Temp greater or equal to 38C (100.4F), Mild Pain (1 - 3)  aluminum hydroxide/magnesium hydroxide/simethicone Suspension 30 milliLiter(s) Oral every 4 hours PRN Dyspepsia  artificial tears (preservative free) Ophthalmic Solution 1 Drop(s) Both EYES two times a day PRN Dry Eyes  dextrose Oral Gel 15 Gram(s) Oral once PRN Blood Glucose LESS THAN 70 milliGRAM(s)/deciliter  HYDROmorphone  Injectable 0.5 milliGRAM(s) IV Push every 4 hours PRN Severe Pain (7 - 10)  melatonin 3 milliGRAM(s) Oral at bedtime PRN Insomnia  ondansetron Injectable 4 milliGRAM(s) IV Push every 8 hours PRN Nausea and/or Vomiting  oxyCODONE    IR 10 milliGRAM(s) Oral every 4 hours PRN Moderate Pain (4 - 6)      ITEMS UNCHECKED ARE NOT PRESENT    PRESENT SYMPTOMS: [ ]Unable to self-report - see [ ] CPOT [ ] PAINADS [ ] RDOS  Source if other than patient:  [ ]Family   [ ]Team     Pain:  [ ]yes [x ]no  QOL impact -   Location -                    Aggravating factors -  Quality -  Radiation -  Timing-  Severity (0-10 scale):  Minimal acceptable level (0-10 scale):     CPOT:    https://www.[email or web address]/getattachment/[email or web address]/Critical-Care-Pain-Observation-Tool-(CPOT)    PAIN AD Score:	  http://geriatrictoolkit.[email or web address]/painad.pdf (Ctrl + left click to view)    Dyspnea:                           [ ]Mild [ ]Moderate [ ]Severe    RDOS:  0 to 2  minimal or no respiratory distress   3  mild distress  4 to 6 moderate distress  >7 severe distress  https://homecareinformation.net/handouts/hen/Respiratory_Distress_Observation_Scale.pdf (Ctrl +  left click to view)     Anxiety:                             [ ]Mild [ ]Moderate [ ]Severe  Fatigue:                             [ ]Mild [ ]Moderate [ ]Severe  Nausea:                            [ ]Mild [ ]Moderate [ ]Severe  Loss of appetite:               [ ]Mild [ ]Moderate [ ]Severe  Constipation:                    [ ]Mild [ ]Moderate [ ]Severe    PCSSQ[Palliative Care Spiritual Screening Question]   Severity (0-10):  Score of 4 or > indicate consideration of Chaplaincy referral.  Chaplaincy Referral: [ ] yes [ ] refused [ ] following [ ] Deferred     Caregiver [place]? : [ ] yes [ ] no [ ] Deferred [ ] Declined             Social work referral [ ] Patient & Family Centered Care Referral [ ]     Anticipatory Grief present?:  [ ] yes [ ] no  [ ] Deferred                  Social work referral [ ] Chaplaincy Referral[ ]      Other Symptoms: blurry R eye  x[ ]All other review of systems negative     Palliative Performance Status Version 2:     60    %      http://npcrc.org/files/news/palliative_performance_scale_ppsv2.pdf  PHYSICAL EXAM:  Vital Signs Last 24 Hrs  T(C): 36.8 (28 Sep 2023 04:12), Max: 36.8 (27 Sep 2023 11:26)  T(F): 98.3 (28 Sep 2023 04:12), Max: 98.3 (27 Sep 2023 11:26)  HR: 77 (28 Sep 2023 04:12) (77 - 93)  BP: 106/67 (28 Sep 2023 04:12) (106/67 - 123/72)  BP(mean): --  RR: 18 (28 Sep 2023 04:12) (18 - 18)  SpO2: 96% (28 Sep 2023 04:12) (96% - 99%)    Parameters below as of 28 Sep 2023 04:12  Patient On (Oxygen Delivery Method): room air     I&O's Summary     GENERAL: [ ]Cachexia    [x ]Alert  [x ]Oriented x   [ ]Lethargic  [ ]Unarousable  [x ]Verbal  [ ]Non-Verbal  Behavioral:   [ ]Anxiety  [ ]Delirium [ ]Agitation [ ]Other  HEENT:  [x ]Normal   [ ]Dry mouth   [ ]ET Tube/Trach  [ ]Oral lesions  PULMONARY:   [x ]Clear [ ]Tachypnea  [ ]Audible excessive secretions   [ ]Rhonchi        [ ]Right [ ]Left [ ]Bilateral  [ ]Crackles        [ ]Right [ ]Left [ ]Bilateral  [ ]Wheezing     [ ]Right [ ]Left [ ]Bilateral  [ ]Diminished BS [ ] Right [ ]Left [ ]Bilateral  CARDIOVASCULAR:    [x ]Regular [ ]Irregular [ ]Tachy  [ ][place] [ ]Murmur [ ]Other  GASTROINTESTINAL:  [ x]Soft  [ ]Distended   [x ]+BS  [x ]Non tender [ ]Tender  [ ]Other [ ]PEG [ ]OGT/ NGT   Last BM:   GENITOURINARY:  [ x]Normal [ ]Incontinent   [ ]Oliguria/Anuria   [ ]Mas  MUSCULOSKELETAL:   x ]Normal   [ ]Weakness  [ ]Bed/Wheelchair bound [ ]Edema  NEUROLOGIC: weakness unilateral  [x ]No focal deficits  [ ] Cognitive impairment  [ ] Dysphagia [ ]Dysarthria [ ] Paresis [ ]Other   SKIN:   [x ]Normal  [ ]Rash  [ ]Other  [ ]Pressure ulcer(s) [ ]y [ ]n present on admission    CRITICAL CARE:  [ ]Shock Present  [ ]Septic [ ]Cardiogenic [ ]Neurologic [ ]Hypovolemic  [ ]Vasopressors [ ]Inotropes  [ ]Respiratory failure present [ ]Mechanical Ventilation [ ]Non-invasive ventilatory support [ ]High-Flow   [ ]Acute  [ ]Chronic [ ]Hypoxic  [ ]Hypercarbic [ ]Other  [ ]Other organ failure     LABS:                        11.2   9.91  )-----------( 167      ( 28 Sep 2023 07:30 )             34.1   09-28    135  |  99  |  15  ----------------------------<  186<H>  4.1   |  23  |  0.51    Ca    9.4      28 Sep 2023 07:30    TPro  6.4  /  Alb  3.3  /  TBili  0.1<L>  /  DBili  x   /  AST  16  /  ALT  18  /  AlkPhos  54  09-28      Urinalysis Basic - ( 28 Sep 2023 07:30 )    Color: x / Appearance: x / SG: x / pH: x  Gluc: 186 mg/dL / Ketone: x  / Bili: x / Urobili: x   Blood: x / Protein: x / Nitrite: x   Leuk Esterase: x / RBC: x / WBC x   Sq Epi: x / Non Sq Epi: x / Bacteria: x      RADIOLOGY & ADDITIONAL STUDIES:  < from: MR Venogram Head w/wo IV Cont (09.22.23 @ 11:39) >  ACC: [id number] EXAM:  MR VENOGRAM BRAIN WAW    ORDERED BY: TONIA MCINTOSH     ACC: [id number] EXAM:  MR ORBITS ONLY WAWI   ORDERED BY: TONIA MCINTOSH     PROCEDURE DATE:  09/22/2023          INTERPRETATION:  MR orbits with and without IV gadolinium    CLINICAL INFORMATION:   Metastatic lung carcinoma, Visual changes.    TECHNIQUE:   Coronal and axialT1-weighted, coronal and axial   fat-saturated T2-weightedwere obtained.  Following the intravenous   administration of 6.5 cc of Gadavist/1.0 cc discarded, axial and coronal   fat-saturated T1-weighted images of the orbits were obtained.  T1   weighted sagittal and axial diffusion, FLAIR, T1 and T2-weighted images   of the brain were obtained. Gadolinium administration, axial and coronal   T1-weighted images of the brain were obtained.    FINDINGS:   MRI dated 09/20/2022 available for review.    The optic nerves are intact without abnormal enhancement, abnormal signal   intensity or mass lesion.  The orbital apices are intact.  The cisternal   segments of the optic nerves, the optic chiasm and the optic tracks   appear intact.    The orbits appear intact.  The globes are intact.  No preseptal   abnormalities found.  Intraconal and extraconal fat is preserved.  The   extraocular muscles demonstrate symmetric physiologic enhancement.  The   superior ophthalmic veins are patent and symmetric.    The sella, cavernous sinuses and para cavernous regions appear intact.      The cavernous segments of the internal carotid arteries demonstrate   expected flow-void indicating patency.   The central skull base is   intact.  The temporal bones appear intact.    The paranasal sinuses are significant for a small retention cysts in the   RIGHT sphenoid sinus.  No significant mucosal disease or abnormal   paranasal sinus fluid collection is found.  The nasal cavity appears   intact.  The deep facial spaces are intact.    The brain again demonstrates multiple enhancing metastases in the   posterior LEFT frontal,, anterior on all RIGHT parietal, LEFT occipital   and RIGHT temporal lobe, measuring 4.7 cm and less in size. Moderate   surrounding edema is noted with no significant mass effect. 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      MR venogram  (without and with gadolinium technique)    CLINICAL INFORMATION: Dural venous sinus thrombosis    TECHNIQUE:   Three-dimensional time of flight MR venogram was performed.    Post processing angiographic reconstruction of images was performed. This   data set was reconstructed as maximum intensity pixel images and   displayed in multiple rotations.    FINDINGS:   No prior examinations are available for review.    The superior sagittal sinus is patent in both its anterior and posterior   limbs.  The transverse sigmoid sinuses are also patent.  There is   asymmetry of the transverse sinuses that is within physiologic limits.    Jugular bulbs and internal jugular veins remain patent.    Anterior dural sinuses including the petrosal sinuses also appear   unremarkable.          IMPRESSION:    MR orbits/brain: Unremarkable MR of the orbits.  multiple enhancing   metastases in the posterior LEFT frontal, RIGHT parietal, LEFT occipital   and RIGHT temporal lobe, measuring 4.7 cm and less in size. Moderate   surrounding edema is noted with no significant mass effect. 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    MRV brain:  Unremarkable MR venogram.  No evidence of dural sinus   thrombosis.    --- End of Report ---            PRIYANK BEAN MD; Attending Radiologist  This document has been electronically signed. Sep 22 2023 12:41PM    < end of copied text >      Protein Calorie Malnutrition Present: [ ]mild [ ]moderate [ ]severe [ ]underweight [ ]morbid obesity  https://www.andeal.org/vault/2440/web/files/ONC/Table_Clinical%20Characteristics%20to%20Document%20Malnutrition-White%20JV%20et%20al%202012.pdf    Height (cm): 167.6 (09-19-23 @ 13:01), 167.6 (09-04-23 @ 02:06), 167.6 (08-26-23 @ 22:33)  Weight (kg): 63.5 (09-19-23 @ 13:01), 63.503 (09-04-23 @ 09:25), 68.855 (08-26-23 @ 22:33)  BMI (kg/m2): 22.6 (09-19-23 @ 13:01), 22.6 (09-04-23 @ 09:25), 24.5 (09-04-23 @ 02:06)    [ ]PPSV2 < or = 30%  [ ]significant weight loss [ ]poor nutritional intake [ ]anasarca[ ]Artificial Nutrition    Other REFERRALS:  [ ]Hospice  [ ]Child Life  [ ]Social Work  [ ]Case management [ ]Holistic Therapy     Goals of Care Document:CHRIS Hernandes (06-06-23 @ 15:27)  Goals of Care Conversation:   Participants:  · Participants  Patient    Advance Directives:  · Does patient have Advance Directive  No  · Do you want to complete the HCP and name a [place] Care Agent  Yes  Wants James Meza tel # [phone] to be HCP  · Name of person who assisted  RN Iris  · Does the Patient have a Court Appointed Guardian ([id number])  No  · Caregiver:  declines    Conversation Discussion:  · Conversation Details  53 yo F with Pmhx of NSCLC with adenocarcinoma with neuroendocrine feature s/p chemo and radiation presents to the ED with RLE weakness and dizziness, found to have brain mass.     Pending Finance / SW to resolve insurance issue as patient has a lot of follow-up appointments upon discharge.     r 3rd finger pain--> f/u xray     Problem/Plan - 1:  ·  Problem: Brain mass.   ·  Plan: CT head showed multiple brain masses c/w brain mets in the setting of hx of lung cancer   -Neurology and neurosurgery following   -MR brain with similar findings of CT   -F/u with neurosurgery recs - started on decadron 2mg q6H and keppra 500mg BID for seizure ppx   -Neuro checks q4hrs   -Seizure precautions   -Fall precautions  6/6/23 Did GOC with patient  Patient Wants Aba Berumencott Gisella james at [phone] to be HCP--> FORM completed  Patient Wants FULL CODE  Told by OTF her ins of medicaid will be effective in 1 week--> SW/ Np to help get Keppra and Steroids and PPI   Will work on appointment for [facility] with Dr Gonzalez and [facility] [phone]      Patient wants James Troncoso Susana [phone] to be HCP---> Form completed.  Patient wants to be FULL CODE    What Matters Most To Patient and Family:  · What matters most to patient and family  getting treatment with Dr Beckman at [facility]    Personal Advance Directives Treatment Guidelines:   Treatment Guidelines:  · Decision Maker  Patient  · Treatment Guideline Comments  Wants treatment with Dr Beckman at [facility]    Location of Discussion:   Time Spent on Advance Care Planning:  Attending or DB Only.     I personally spent 30 minutes on advance care planning services with the patient. This time is separate and distinct from any other care management services provided on this date.    Location of Discussion:  · Location of discussion  Face to face      Electronic Signatures:  Heaven Hernandes)  (Signed 06-Jun-2023 15:41)  	Authored: Goals of Care Conversation, Personal Advance Directives Treatment Guidelines, Location of Discussion      Last Updated: 06-Jun-2023 15:41 by Heaven Hernandes)

## 2023-09-29 NOTE — PROGRESS NOTE ADULT - PROBLEM SELECTOR PLAN 1
Extensive discussions with Dr. Metcalf (NSx), Dr. Beckman (onc) and Dr. Bernard ([facility]) Dr. Maritza Kohler (Onc)   Per discussion with pt - now in agreement to pursue surg. NSx Dr. Metcalf made aware.  Plan for resection likely 9/30 am. NPO p MN.  All services in agreement re: best option is for resection and follow up when recovered for further systemic therapy.    Preop risk stratification : Patient is clinically without any significant signs and symptoms of cardiac disease. prior functional status limited due to recent hosp stays and underlying malignancy though without overt symptoms when able. prior echo on 8/2023 reviewed with preserved LV function with mod AR/MR . No further med/card w/u needed at this time. can proceed with proposed neurosurg intervention.      - previously treated with 4 rounds of chemo, developed brain mets 5/23 s/p RT.   - now returning with dizziness, weakness, pain, HA, blurry vision   - CT/MRI with enhancing lesions, quite a few of which demonstrate peripheral enhancement involving the posterior fossa and supratentorial region/edema. please see full report on [place]  - repeat CT re: punctate hemorrhage slight inc size. subsequent MRI without mention of hemorrhage.   -per nsgy: dexa 4mg q6h; Keppra 500mg BID initially  recommended by NSGY however pt on Depakote at home- can continue with Depakote 500 BID as per NSGY   - palliative consult to help assist with GOC/ pain control. -iv dilaudid and oxycodone prn.

## 2023-09-29 NOTE — PROGRESS NOTE ADULT - SUBJECTIVE AND OBJECTIVE BOX
Patient seen and examined at bedside.     --Anticoagulation--  none    T(C): 36.7 (09-26-23 @ 05:16), Max: 36.9 (09-25-23 @ 13:19)  HR: 85 (09-26-23 @ 05:16) (85 - 96)  BP: 114/66 (09-26-23 @ 05:16) (114/66 - 145/88)  RR: 18 (09-26-23 @ 05:16) (18 - 18)  SpO2: 99% (09-26-23 @ 05:16) (97% - 99%)  Wt(kg): --    Exam: Optho noted 3+ disc edema. neurointact except for dysmetria on FTN L>R, mild RUE drift, and a very fine tremor in both hands.

## 2023-09-29 NOTE — PROGRESS NOTE ADULT - ASSESSMENT
Exam: Optho noted 3+ disc edema. neurointact except for dysmetria on FTN L>R, mild RUE drift, and a very fine tremor in both hands.     -Preop'd for tmrw AM for R crani for Tumor resection. Pt has decided to move forward with surgery.   -dex4 q6  -medical clearance appreciated.  - Pt on bactroban for MRSA+ from preop nasal swab.

## 2023-09-29 NOTE — PROGRESS NOTE ADULT - PROBLEM SELECTOR PLAN 4
- DM a1c 7.2 end of August 2023. now 8.0   - BS continues to be elevated despite change to DM diet and increased insulin regiment. ?dietary indiscretion   - Will cont to titrate insulin to goal. - DM a1c 7.2 end of August 2023. now 8.0   - BS continues to be elevated despite change to DM diet and increased insulin regiment. ?dietary indiscretion   - Will decrease basal insulin from 10 to 7 units tonight given planned surg tomorrow.

## 2023-09-29 NOTE — PROGRESS NOTE ADULT - SUBJECTIVE AND OBJECTIVE BOX
[facility] Division of Hospital Medicine  Ave Vazquez MD  Pager (M-F, 0X-3Y): [phone]  Other Times:  [phone]    Patient is a 52y old  Female who presents with a chief complaint of leg weakness/ dizziness blurry vision (29 Sep 2023 05:32)      SUBJECTIVE / OVERNIGHT EVENTS:  feeling well. denies any specific complaints. wants to get surg as soon as possible     ADDITIONAL REVIEW OF SYSTEMS: otherwise neg    MEDICATIONS  (STANDING):  chlorhexidine 2% Cloths 1 Application(s) Topical daily  dexAMETHasone     Tablet 4 milliGRAM(s) Oral every 6 hours  dextrose 5%. 1000 milliLiter(s) (50 mL/Hr) IV Continuous <Continuous>  dextrose 5%. 1000 milliLiter(s) (100 mL/Hr) IV Continuous <Continuous>  dextrose 50% Injectable 25 Gram(s) IV Push once  dextrose 50% Injectable 25 Gram(s) IV Push once  dextrose 50% Injectable 12.5 Gram(s) IV Push once  divalproex  milliGRAM(s) Oral two times a day  glucagon  Injectable 1 milliGRAM(s) IntraMuscular once  insulin glargine Injectable (LANTUS) 10 Unit(s) SubCutaneous at bedtime  insulin lispro (ADMELOG) corrective regimen sliding scale   SubCutaneous three times a day before meals  insulin lispro (ADMELOG) corrective regimen sliding scale   SubCutaneous at bedtime  insulin lispro Injectable (ADMELOG) 8 Unit(s) SubCutaneous three times a day before meals  lactated ringers. 1000 milliLiter(s) (60 mL/Hr) IV Continuous <Continuous>  mupirocin 2% Nasal 1 Application(s) Both Nostrils two times a day  pantoprazole    Tablet 40 milliGRAM(s) Oral before breakfast  polyethylene glycol 3350 17 Gram(s) Oral daily  senna 2 Tablet(s) Oral at bedtime    MEDICATIONS  (PRN):  acetaminophen     Tablet .. 650 milliGRAM(s) Oral every 6 hours PRN Temp greater or equal to 38C (100.4F), Mild Pain (1 - 3)  aluminum hydroxide/magnesium hydroxide/simethicone Suspension 30 milliLiter(s) Oral every 4 hours PRN Dyspepsia  artificial tears (preservative free) Ophthalmic Solution 1 Drop(s) Both EYES two times a day PRN Dry Eyes  dextrose Oral Gel 15 Gram(s) Oral once PRN Blood Glucose LESS THAN 70 milliGRAM(s)/deciliter  melatonin 3 milliGRAM(s) Oral at bedtime PRN Insomnia  ondansetron Injectable 4 milliGRAM(s) IV Push every 8 hours PRN Nausea and/or Vomiting      CAPILLARY BLOOD GLUCOSE      POCT Blood Glucose.: 233 mg/dL (29 Sep 2023 07:54)  POCT Blood Glucose.: 299 mg/dL (28 Sep 2023 21:45)  POCT Blood Glucose.: 279 mg/dL (28 Sep 2023 16:37)  POCT Blood Glucose.: 274 mg/dL (28 Sep 2023 12:27)    I&O's Summary    28 Sep 2023 07:01  -  29 Sep 2023 07:00  --------------------------------------------------------  IN: 490 mL / OUT: 1 mL / NET: 489 mL        PHYSICAL EXAM:  Vital Signs Last 24 Hrs  T(C): 36.7 (29 Sep 2023 04:14), Max: 37 (28 Sep 2023 20:38)  T(F): 98.1 (29 Sep 2023 04:14), Max: 98.6 (28 Sep 2023 20:38)  HR: 76 (29 Sep 2023 04:14) (76 - 98)  BP: 111/67 (29 Sep 2023 04:14) (111/67 - 120/77)  BP(mean): --  RR: 18 (29 Sep 2023 04:14) (18 - 18)  SpO2: 98% (29 Sep 2023 04:14) (98% - 98%)    Parameters below as of 28 Sep 2023 20:38  Patient On (Oxygen Delivery Method): room air        CONSTITUTIONAL: NAD, well-groomed  EYES:  conjunctiva and sclera clear  ENMT: Moist oral mucosa  NECK: Supple, no palpable masses; no JVD  RESPIRATORY: Normal respiratory effort; lungs are clear to auscultation bilaterally  CARDIOVASCULAR: Regular rate and rhythm, normal S1 and S2, no murmur/rub/gallop; No lower extremity edema  ABDOMEN: Nontender to palpation, normoactive bowel sounds, no rebound/guarding  MUSCULOSKELETAL:  no clubbing or cyanosis of digits; no joint swelling or tenderness to palpation  PSYCH: A+O to person, place, and time; affect appropriate  SKIN: No rashes; no palpable lesions    LABS:                        10.9   10.30 )-----------( 157      ( 29 Sep 2023 07:00 )             33.6     09-29    133<L>  |  98  |  16  ----------------------------<  250<H>  4.3   |  22  |  0.46<L>    Ca    9.2      29 Sep 2023 06:56    TPro  6.2  /  Alb  3.1<L>  /  TBili  0.2  /  DBili  x   /  AST  17  /  ALT  25  /  AlkPhos  60  09-29          Urinalysis Basic - ( 29 Sep 2023 06:56 )    Color: x / Appearance: x / SG: x / pH: x  Gluc: 250 mg/dL / Ketone: x  / Bili: x / Urobili: x   Blood: x / Protein: x / Nitrite: x   Leuk Esterase: x / RBC: x / WBC x   Sq Epi: x / Non Sq Epi: x / Bacteria: x          RADIOLOGY & ADDITIONAL TESTS:  Results Reviewed:   Imaging Personally Reviewed:  Electrocardiogram Personally Reviewed:    COORDINATION OF CARE:  Care Discussed with Consultants/Other Providers [Y/N]:  Prior or Outpatient Records Reviewed [Y/N]:

## 2023-09-30 NOTE — PRE PROCEDURE NOTE - PRE PROCEDURE EVALUATION
Patient seen and examined at bedside. Pt is optimized for surgical intervention.    --Anticoagulation--    T(C): 36.7 (09-26-23 @ 05:16), Max: 36.9 (09-25-23 @ 13:19)  HR: 85 (09-26-23 @ 05:16) (85 - 96)  BP: 114/66 (09-26-23 @ 05:16) (114/66 - 145/88)  RR: 18 (09-26-23 @ 05:16) (18 - 18)  SpO2: 99% (09-26-23 @ 05:16) (97% - 99%)  Wt(kg): --    Exam: Optho noted 3+ disc edema. neurointact except for dysmetria on FTN L>R, mild RUE drift, and a very fine tremor in both hands.     -Preop'd and added on for R crani for Tumor resection. Pt has consented to procedure.   -dex4 q6  -medical clearance appreciated.
Patient seen and examined at bedside. Pt is optimized for surgical intervention.    --Anticoagulation--    T(C): 36.7 (09-26-23 @ 05:16), Max: 36.9 (09-25-23 @ 13:19)  HR: 85 (09-26-23 @ 05:16) (85 - 96)  BP: 114/66 (09-26-23 @ 05:16) (114/66 - 145/88)  RR: 18 (09-26-23 @ 05:16) (18 - 18)  SpO2: 99% (09-26-23 @ 05:16) (97% - 99%)  Wt(kg): --    Exam: Optho noted 3+ disc edema. neurointact except for dysmetria on FTN L>R, mild RUE drift, and a very fine tremor in both hands.     -Preop'd and booked for today AM for R crani for Tumor resection. Pt is still undecided as to whether she will move forward with surgery. We will engage with her again this AM to determine whether she is consenting or not, and will move forward (or cancel) accordingly.  -dex4 q6  -medical clearance appreciated.

## 2023-10-01 NOTE — PROGRESS NOTE ADULT - CRITICAL CARE ATTENDING COMMENT
re examined at 8am 10/1, neck pain improved with oxy  tolerating diet  vitals/labs/meds reviewed  post op CTH pending  VPA for seizure ppx  decadron for cerebral edema   , did not get lantus last night, will dose 15U this am, qam moving forward, 5U tid premeals, mod sliding scale before meals and at bedtime  -160  high risk DVT, 9/25 BLE dopplers negative, repeat 10/2, holding chemoppx fresh post op, SCDs

## 2023-10-01 NOTE — PROGRESS NOTE ADULT - SUBJECTIVE AND OBJECTIVE BOX
Patient seen and examined at bedside.    --Anticoagulation--    T(C): 36.1 (10-01-23 @ 00:00), Max: 36.8 (09-30-23 @ 11:24)  HR: 71 (10-01-23 @ 03:00) (65 - 106)  BP: 121/75 (10-01-23 @ 03:00) (110/68 - 132/62)  RR: 16 (10-01-23 @ 03:00) (14 - 18)  SpO2: 100% (10-01-23 @ 03:00) (95% - 100%)  Wt(kg): --    Exam:  AOx3, FC, PERRL, EOMI, no facial, 5/5 throughout, no drift

## 2023-10-01 NOTE — PHYSICAL THERAPY INITIAL EVALUATION ADULT - GENERAL OBSERVATIONS, REHAB EVAL
Pt received semi-supine in bed in NAD, +IV Lock, + O2. +Mas. KJ=[id number]. Pt AOx3-4, pleasant and cooperative. Pt received seated in chair, in NAD, +IV Lock, + O2. +Mas. PY=216. Pt AOx3-4, pleasant and cooperative.

## 2023-10-01 NOTE — PHYSICAL THERAPY INITIAL EVALUATION ADULT - DIAGNOSIS, PT EVAL
decr fxl and endurance
pt has decreased activity tolerance, decreased functional mobility capacity, decreased strength, impaired balance

## 2023-10-01 NOTE — PHYSICAL THERAPY INITIAL EVALUATION ADULT - BED MOBILITY TRAINING, PT EVAL
GOAL:ptwill complete all bed mob ind in 4wks.
GOAL: Pt will perform bed mobility independently within 2 weeks.

## 2023-10-01 NOTE — PHYSICAL THERAPY INITIAL EVALUATION ADULT - ACTIVE RANGE OF MOTION EXAMINATION, REHAB EVAL
BUE and LLE WFL, RLE AAROM WFL/Left UE Active ROM was WFL (within functional limits)/Right UE Active ROM was WFL (within functional limits)/Left LE Active ROM was WFL (within functional limits)/Right LE Active ROM was WFL (within functional limits)
no Active ROM deficits were identified

## 2023-10-01 NOTE — PROGRESS NOTE ADULT - SUBJECTIVE AND OBJECTIVE BOX
Patient is a 52y old  Female who presents with a chief complaint of leg weakness/ dizziness blurry vision    SUBJECTIVE / OVERNIGHT EVENTS:  patient seen on 4C post-op. Has pain at surgical site. Otherwise denies any other acute complaints. Denies fevers, chills, CP, SOB, abd pain, diarrhea, dysuria,     MEDICATIONS  (STANDING):  chlorhexidine 2% Cloths 1 Application(s) Topical daily  dexAMETHasone     Tablet 4 milliGRAM(s) Oral every 6 hours  dextrose 5%. 1000 milliLiter(s) (50 mL/Hr) IV Continuous <Continuous>  dextrose 5%. 1000 milliLiter(s) (100 mL/Hr) IV Continuous <Continuous>  dextrose 50% Injectable 25 Gram(s) IV Push once  dextrose 50% Injectable 25 Gram(s) IV Push once  dextrose 50% Injectable 12.5 Gram(s) IV Push once  divalproex  milliGRAM(s) Oral two times a day  glucagon  Injectable 1 milliGRAM(s) IntraMuscular once  insulin glargine Injectable (LANTUS) 10 Unit(s) SubCutaneous at bedtime  insulin lispro (ADMELOG) corrective regimen sliding scale   SubCutaneous three times a day before meals  insulin lispro (ADMELOG) corrective regimen sliding scale   SubCutaneous at bedtime  insulin lispro Injectable (ADMELOG) 8 Unit(s) SubCutaneous three times a day before meals  lactated ringers. 1000 milliLiter(s) (60 mL/Hr) IV Continuous <Continuous>  mupirocin 2% Nasal 1 Application(s) Both Nostrils two times a day  pantoprazole    Tablet 40 milliGRAM(s) Oral before breakfast  polyethylene glycol 3350 17 Gram(s) Oral daily  senna 2 Tablet(s) Oral at bedtime    MEDICATIONS  (PRN):  acetaminophen     Tablet .. 650 milliGRAM(s) Oral every 6 hours PRN Temp greater or equal to 38C (100.4F), Mild Pain (1 - 3)  aluminum hydroxide/magnesium hydroxide/simethicone Suspension 30 milliLiter(s) Oral every 4 hours PRN Dyspepsia  artificial tears (preservative free) Ophthalmic Solution 1 Drop(s) Both EYES two times a day PRN Dry Eyes  dextrose Oral Gel 15 Gram(s) Oral once PRN Blood Glucose LESS THAN 70 milliGRAM(s)/deciliter  melatonin 3 milliGRAM(s) Oral at bedtime PRN Insomnia  ondansetron Injectable 4 milliGRAM(s) IV Push every 8 hours PRN Nausea and/or Vomiting    Vital Signs Last 24 Hrs  T(C): 36.7 (01 Oct 2023 13:38), Max: 36.9 (01 Oct 2023 08:00)  T(F): 98.1 (01 Oct 2023 13:38), Max: 98.4 (01 Oct 2023 08:00)  HR: 67 (01 Oct 2023 13:38) (65 - 106)  BP: 118/74 (01 Oct 2023 13:38) (118/74 - 138/82)  BP(mean): 96 (01 Oct 2023 12:00) (89 - 105)  RR: 18 (01 Oct 2023 13:38) (14 - 18)  SpO2: 96% (01 Oct 2023 13:38) (95% - 100%)    Parameters below as of 01 Oct 2023 13:38  Patient On (Oxygen Delivery Method): room air      CONSTITUTIONAL: NAD  HEENT: R sided crani site covered with bandage, conjunctiva and sclera clear  ENMT: Moist oral mucosa  NECK: Supple, trachea midline   RESPIRATORY: Normal respiratory effort; lungs are clear to auscultation bilaterally  CARDIOVASCULAR: Regular rate and rhythm, normal S1 and S2; No lower extremity edema  ABDOMEN: Nontender to palpation, normoactive bowel sounds, no rebound/guarding  MUSCULOSKELETAL:  no clubbing or cyanosis of digits; no joint swelling or tenderness to palpation  PSYCH: A+O to person, place, and time; affect appropriate      LABS:                         10.7   12.44 )-----------( 147      ( 30 Sep 2023 20:55 )             33.4     09-30    135  |  102  |  18  ----------------------------<  236<H>  4.4   |  22  |  0.44<L>    Ca    8.9      30 Sep 2023 20:54  Phos  3.8     09-30  Mg     2.2     09-30    TPro  6.1  /  Alb  3.1<L>  /  TBili  <0.1<L>  /  DBili  x   /  AST  16  /  ALT  24  /  AlkPhos  68  09-30    PT/INR - ( 30 Sep 2023 20:54 )   PT: 12.3 sec;   INR: 1.18 ratio         PTT - ( 30 Sep 2023 20:54 )  PTT:23.6 sec  Urinalysis Basic - ( 30 Sep 2023 20:54 )    Color: x / Appearance: x / SG: x / pH: x  Gluc: 236 mg/dL / Ketone: x  / Bili: x / Urobili: x   Blood: x / Protein: x / Nitrite: x   Leuk Esterase: x / RBC: x / WBC x   Sq Epi: x / Non Sq Epi: x / Bacteria: x

## 2023-10-01 NOTE — PHYSICAL THERAPY INITIAL EVALUATION ADULT - BALANCE DISTURBANCE, IDENTIFIED IMPAIRMENT CONTRIBUTE, REHAB EVAL
impaired coordination/impaired motor control/impaired postural control/decreased strength
decreased strength

## 2023-10-01 NOTE — PHYSICAL THERAPY INITIAL EVALUATION ADULT - PERTINENT HX OF CURRENT PROBLEM, REHAB EVAL
Pt is a 53 yo female admitted to [facility] on 9/19/23 PMHx stage IV lung cancer with mets to brain s/p 4 cycles chemo and thoracic RT completed on oct 2022, gamma knife surgery to 7 brain mets, recent admission to [facility] from sept 4-13th for epigastric pain and hyperglycemia thought to be due to steroids presents to ED complaining of 1 weeks of lower extremity weakness and intermittent blurry vision.  Pt was initially seen at the end of August and admitted for hx of migraines and dizziness, was started on  decadron 6mg q6hr and Depakote at that time. Returned to [facility] again early september for worsening epigastric pain  in the setting of taking steroids without GI ppx. CTA at that time showing no PE, stable right apical lung mass and rapid interval growth of a left lower lobe mass and new hepatic mets. Pt finished dexamethasone taper on 9/7 and was discharged with protonix, depakote and follow up with outpatient rad onc Dr. Bernard, outpatient NSGY, and oncologist Dr. Beckman at [facility] with plans to start immunotherapy nivolumab as outpatient.  Since discharge on the 13th, pt noting weakness in right lower extremity 'muscle' pain which improves with massages, used walker at home however feels weak and [facility] to [facility]/ attend to daily needs. Notes also intermittent epigastric pain with radiation to the back. Denies numbness or tingling, saddle anesthesia, bowel incontinence, falls or trauma. Notes occasional chest pain and SOB which resolves. Additionally endorses some slight blurry vision over the last 2 weeks which she describes as "vision feels off" though is able to see.  Denies fevers, chills, N/V/D, recent sick contacts. Social hx: no smoker, previous marijuana use, no alcohol,  lives at home with phil. Hospital course: ED vitals:  Pt tachy to 120's sinus rhythm otherwise VSS , Labs notable for ph 7.44, pco2 33; + 2 recent admissions for dizziness, headaches and epigastric pain presents with right lower extremity weakness and blurry vision, likely in setting of worsening metastatic cancer and physical deconditioning. Denies visual changes CT w/ L frontoparietal/L occipital (w/ calcifications)/R parietooccipital masses w/ surrounding edema; no MLS. MR 8/20/23 w/  Exam: neurointact except for dysmetria on FTN L>R, and a very fine tremor in both hands.  RLE weakness, no alarm signs of cord compression, likely MSK vs Diabetic neuropathy, dvt: ppx:  scds (as per  NSGY - obtain repeat non con head CT in the AM to evaluate for possible AC, however no AC as of now) CTA chest: No pulmonary arterial emboli. Left lower lobe previously described peripheral opacity abutting the pleural surface with interval decrease in size since September 4, 2023. Right upper lobe apical lobulated mass is without significant interval change since September 4, 2023. Hepatic hypodense lesions are not well evaluated on this chest CT due to the timing of contrast opacification although may have increased in size since September 4, 2023 given differences in technique. Dedicated contrast enhanced liver MRI can be performed for complete evaluation as clinically warranted. CTH: Left high frontoparietal mass has mildly increased in size with new high attenuating material within it concerning for new punctate hemorrhage and increased surrounding vasogenic edema. These findings were discussed with MICHEAL Dangelo at 9/19/2023 5:27 PM by Dr. Chong with read back confirmation. Mildly increased vasogenic edema surrounding the left occipital lesion. Grossly stable right parietotemporal lesion. Recommend MRI brain with IV contrast for further evaluation.
Pt s/p Right craniotomy for tumor 30-Sep-2023. See previous PT evaluation for Past Medical History.

## 2023-10-01 NOTE — PHYSICAL THERAPY INITIAL EVALUATION ADULT - ORIENTATION, REHAB EVAL
pt did not know which hospital she was in/person/time/situation
oriented to person, place, time and situation

## 2023-10-01 NOTE — PHYSICAL THERAPY INITIAL EVALUATION ADULT - NSPTDISCHREC_GEN_A_CORE
DC acute rehab; if pt to go home, home PT services, assist for ALL mobility/ADLs, recommend use of rolling walker (owns), and shower chair (owns), ANDRÉS Bryant aware. ACP Emily aware- OT consult and PMR consult requested./Acute Inpatient Rehab
Pt will benefit from acute rehab prior to D/C home to maximize functional independence. If pt d/c home pt requires assist for all functional mobility & home PT for progressive ambulation training, transfers, bed mobs. DME: transport \poly fly chair for long distances outside, shower chair/Acute Inpatient Rehab

## 2023-10-01 NOTE — PROGRESS NOTE ADULT - PROBLEM SELECTOR PLAN 3
- DM a1c 7.2 end of August 2023. Now 8.0   - FS poorly controlled likely 2/2 steroids. Also missed lantus dose. Lantus 15u daily + Lispro 5u TID +MDSS   - Uptitrate insulin regimen pending FS trend

## 2023-10-01 NOTE — PROGRESS NOTE ADULT - NSPROGADDITIONALINFOA_GEN_ALL_CORE
d/w ACp  d/w NSx, Onc    Pt now agrreable for surg. d/w Dr. Metcalf. will schedule
d/w ACp  d/w NSx, Onc    Pt now agrreable for surg. d/w Dr. Metcalf. will schedule possible addon this sat
d/w ACP  d/w Dr. Metcalf (NSx) and Dr. Bernard (Rad Onc)on 9/21
d/w ACp  d/w NSx, Onc
d/w ACp       Pt now agrreable for surg. d/w Dr. Metcalf.  scheduled for 9/30 am
d/w neurosurg team
d/w ACP
d/w ACp  d/w NSx, Onc    Pt remains very non commital and changing her mind re: surg intervention. Will wait on pt decision on whether pt pursue surg . IF pt cont to refuse or delay making decision may need to consider d/c and follow up outpt -though not ideal given pt history of lost to follow up with onc svc for treatment .

## 2023-10-01 NOTE — PHYSICAL THERAPY INITIAL EVALUATION ADULT - PHYSICAL ASSIST/NONPHYSICAL ASSIST: STAND/SIT, REHAB EVAL
for handplacement/verbal cues/1 person assist
verbal cues/nonverbal cues (demo/gestures)/1 person assist

## 2023-10-01 NOTE — PHYSICAL THERAPY INITIAL EVALUATION ADULT - GAIT TRAINING, PT EVAL
GOAL:pt will amb 300'+ ind with appropriate device in 4wks.
GOAL: Pt will ambulate 150ft independently w/RW within 2 weeks.

## 2023-10-01 NOTE — PHYSICAL THERAPY INITIAL EVALUATION ADULT - IMPAIRMENTS CONTRIBUTING TO GAIT DEVIATIONS, PT EVAL
decr endurance/impaired balance/impaired postural control/decreased strength
impaired balance/decreased strength

## 2023-10-01 NOTE — PROGRESS NOTE ADULT - ASSESSMENT
53 yo female PMHx stage IV lung cancer with mets to brain s/p 4 cycles chemo and thoracic RT completed on oct 2022, Gamma knife surgery to 7 brain mets, 2 recent admissions for dizziness, headaches and epigastric pain presents with right lower extremity weakness and blurry vision, likely in setting of worsening metastatic cancer and physical deconditioning. S/p R. temporal crani for resection of tumor 10/1.   Medicine following for comanagement.

## 2023-10-01 NOTE — PHYSICAL THERAPY INITIAL EVALUATION ADULT - ADDITIONAL COMMENTS
Pt states she lives in a private home with 4 steps to enter with no rails but {something to hold on to". 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 Pt has had two slips in past few month and was issued a RW a few weeks ago upon d/c from [place]

## 2023-10-01 NOTE — PROGRESS NOTE ADULT - SUBJECTIVE AND OBJECTIVE BOX
HOSPITAL COURSE:  Admitted on 9/19 with dizziness, syncope and HAs for 2 weeks. On 9/20 got an MRI of the brain which showed multiple mets including a large cystic R. temporal 4.5 X 3.6 cm with significant vasogenic edema. The patient was discussed in the tumor board and decided to go on with surgery. On 9/30 she underwent a R. temporal crani for resection of tumor. Patient is neurologically intact post op with the exception of the right lower extremity which is slightly weaker than the left.     Admission Scores  GCS:   HH:   MF:   NIHSS:   RASS:    CAM-ICU:   ICH:    24 hour Events:   R crani for tumor resection. Patient now in the PACU.     Allergies    No Known Allergies    Intolerances      REVIEW OF SYSTEMS: [ ] Unable to Assess due to neurologic exam   [ ] All ROS addressed below are non-contributory, except:  Neuro: [ ] Headache [ ] Back pain [ ] Numbness [ ] Weakness [ ] Ataxia [ ] Dizziness [ ] Aphasia [ ] Dysarthria [ ] Visual disturbance  Resp: [ ] Shortness of breath/dyspnea, [ ] Orthopnea [ ] Cough  CV: [ ] Chest pain [ ] Palpitation [ ] Lightheadedness [ ] Syncope  Renal: [ ] Thirst [ ] Edema  GI: [ ] Nausea [ ] Emesis [ ] Abdominal pain [ ] Constipation [ ] Diarrhea  Hem: [ ] Hematemesis [ ] bright red blood per rectum  ID: [ ] Fever [ ] Chills [ ] Dysuria  ENT: [ ] Rhinorrhea      DEVICES:   [ ] Restraints [ ] ET tube [ ] central line [ ] arterial line [ ] ott [ ] NGT/OGT [ ] EVD [ ] LD [ ] ANILA/HMV [ ] Trach [ ] PEG [ ] Chest Tube     VITALS:   Vital Signs Last 24 Hrs  T(C): 36.1 (01 Oct 2023 00:00), Max: 36.8 (30 Sep 2023 04:43)  T(F): 97 (01 Oct 2023 00:00), Max: 98.3 (30 Sep 2023 04:43)  HR: 70 (01 Oct 2023 00:00) (70 - 106)  BP: 130/80 (30 Sep 2023 23:00) (110/68 - 132/62)  BP(mean): 98 (30 Sep 2023 23:00) (89 - 101)  RR: 16 (01 Oct 2023 00:00) (14 - 18)  SpO2: 100% (01 Oct 2023 00:00) (95% - 100%)    Parameters below as of 01 Oct 2023 00:00  Patient On (Oxygen Delivery Method): room air      CAPILLARY BLOOD GLUCOSE      POCT Blood Glucose.: 198 mg/dL (30 Sep 2023 18:14)  POCT Blood Glucose.: 304 mg/dL (30 Sep 2023 05:55)    I&O's Summary    29 Sep 2023 07:01  -  30 Sep 2023 07:00  --------------------------------------------------------  IN: 0 mL / OUT: 400 mL / NET: -400 mL    30 Sep 2023 07:01  -  01 Oct 2023 00:47  --------------------------------------------------------  IN: 980 mL / OUT: 305 mL / NET: 675 mL        Respiratory: RA sating >94      LABS:                        10.7   12.44 )-----------( 147      ( 30 Sep 2023 20:55 )             33.4     09-30    135  |  102  |  18  ----------------------------<  236<H>  4.4   |  22  |  0.44<L>           MEDICATION LEVELS:     IVF FLUIDS/MEDICATIONS:   MEDICATIONS  (STANDING):  ceFAZolin   IVPB 2000 milliGRAM(s) IV Intermittent every 8 hours  dexAMETHasone  Injectable 4 milliGRAM(s) IV Push every 6 hours  dextrose 5%. 1000 milliLiter(s) (100 mL/Hr) IV Continuous <Continuous>  dextrose 5%. 1000 milliLiter(s) (100 mL/Hr) IV Continuous <Continuous>  dextrose 5%. 1000 milliLiter(s) (50 mL/Hr) IV Continuous <Continuous>  dextrose 50% Injectable 25 Gram(s) IV Push once  dextrose 50% Injectable 25 Gram(s) IV Push once  dextrose 50% Injectable 12.5 Gram(s) IV Push once  glucagon  Injectable 1 milliGRAM(s) IntraMuscular once  insulin lispro (ADMELOG) corrective regimen sliding scale   SubCutaneous at bedtime  insulin lispro (ADMELOG) corrective regimen sliding scale   SubCutaneous three times a day before meals  levETIRAcetam  IVPB 500 milliGRAM(s) IV Intermittent every 12 hours  mupirocin 2% Nasal 1 Application(s) Both Nostrils every 12 hours  polyethylene glycol 3350 17 Gram(s) Oral every 24 hours  senna 2 Tablet(s) Oral at bedtime  sodium chloride 0.9%. 1000 milliLiter(s) (75 mL/Hr) IV Continuous <Continuous>    MEDICATIONS  (PRN):  acetaminophen     Tablet .. 650 milliGRAM(s) Oral every 6 hours PRN Mild Pain (1 - 3)  dextrose Oral Gel 15 Gram(s) Oral once PRN Blood Glucose LESS THAN 70 milliGRAM(s)/deciliter  ondansetron Injectable 4 milliGRAM(s) IV Push once PRN Nausea and/or Vomiting  oxyCODONE    IR 5 milliGRAM(s) Oral every 4 hours PRN Moderate Pain (4 - 6)        IMAGING:  < from: MR Head w/ IV Cont (09.20.23 @ 14:58) >  IMPRESSION: Abnormal lesions involving the posterior fossa and   supratentorial region are identified which are compatible with metastasis   given patient's history.      < end of copied text >        EXAMINATION:  PHYSICAL EXAM:    Constitutional: No Acute Distress     Neurological: Awake, alert oriented to person, place and time, Following Commands, PERRL, EOMI, No Gaze Preference, Face Symmetrical, Speech Fluent, No dysmetria, No ataxia, No nystagmus     Motor exam:          Upper extremity                         Delt     Bicep     Tricep    HG                                                 R         5/5        5/5        5/5       5/5                                               L          5/5        5/5        5/5       5/5          Lower extremity                        HF         KF        KE       DF         PF                                                  R        4/5        4/5        4/5       4/5         4/5                                               L         5/5        5/5       5/5       5/5          5/5                                                 Sensation: [ ] intact to light touch  [ ] decreased:     Reflexes: Deep Tendon Reflexes Intact     Pulmonary: Clear to Auscultation, No rales, No rhonchi, No wheezes     Cardiovascular: S1, S2, Regular rate and rhythm     Gastrointestinal: Soft, Non-tender, Non-distended     Extremities: No calf tenderness     Incision: Covered. Clean and intact.    53 yo female PMHx stage IV lung cancer with mets to brain s/p 4 cycles chemo and thoracic RT completed on oct 2022, gamma knife surgery to 7 brain mets , recent admission to [facility] from sept 4-13th for epigastric pain and hyperglycemia thought to be due to steroids presents to ED complaining of 1 weeks of lower extremity weakness and intermittent blurry vision.  Pt was initially seen at the end of August and admitted for hx of migraines and dizziness, was started on  decadron 6mg q6hr and Depakote at that time. Returned to [facility] again early september for worsening epigastric pain  in the setting of taking steroids without GI ppx. CTA at that time showing no PE, stable right apical lung mass and rapid interval growth of a left lower lobe mass and new hepatic mets. Pt finished dexamethasone taper on 9/7 and was discharged with protonix, depakote and follow up with outpatient rad onc Dr. Bernard, outpatient NSGY, and oncologist Dr. Beckman at [facility] with plans to start immunotherapy nivolumab as outpatient.  Since discharge on the 13th, pt noting weakness in right lower extremity 'muscle' pain which improves with massages, used walker at home however feels weak and requiring increasing support from ancee to ambulate/ attend to daily needs. Notes also intermittent epigastric pain with radiation to the back. Denies numbness or tingling, saddle anesthesia, bowel incontinence, falls or trauma. Notes occasional chest pain and SOB which resolves. Additionally endorses some slight blurry vision over the last 2 weeks which she describes as "vision feels off" though is able to see.  Denies fevers, chills, N/V/D, recent sick contacts. Social hx: no smoker, previous marijuana use, no alcohol,  lives at home with phil.      Admission Scores  GCS: 15    24 hour Events: POD 0 R crani for tumor resection. Patient now in the PACU.     Allergies    No Known Allergies    Intolerances      REVIEW OF SYSTEMS: [ ] Unable to Assess due to neurologic exam   [x ] All ROS addressed below are non-contributory, except:  Neuro: [ ] Headache [ ] Back pain [ ] Numbness [ ] Weakness [ ] Ataxia [ ] Dizziness [ ] Aphasia [ ] Dysarthria [ ] Visual disturbance  Resp: [ ] Shortness of breath/dyspnea, [ ] Orthopnea [ ] Cough  CV: [ ] Chest pain [ ] Palpitation [ ] Lightheadedness [ ] Syncope  Renal: [ ] Thirst [ ] Edema  GI: [ ] Nausea [ ] Emesis [ ] Abdominal pain [ ] Constipation [ ] Diarrhea  Hem: [ ] Hematemesis [ ] bright red blood per rectum  ID: [ ] Fever [ ] Chills [ ] Dysuria  ENT: [ ] Rhinorrhea      DEVICES:   [ ] Restraints [ ] ET tube [ ] central line [ ] arterial line [ ] ott [ ] NGT/OGT [ ] EVD [ ] LD [ ] ANILA/HMV [ ] Trach [ ] PEG [ ] Chest Tube     VITALS:   Vital Signs Last 24 Hrs  T(C): 36.1 (01 Oct 2023 00:00), Max: 36.8 (30 Sep 2023 04:43)  T(F): 97 (01 Oct 2023 00:00), Max: 98.3 (30 Sep 2023 04:43)  HR: 70 (01 Oct 2023 00:00) (70 - 106)  BP: 130/80 (30 Sep 2023 23:00) (110/68 - 132/62)  BP(mean): 98 (30 Sep 2023 23:00) (89 - 101)  RR: 16 (01 Oct 2023 00:00) (14 - 18)  SpO2: 100% (01 Oct 2023 00:00) (95% - 100%)    Parameters below as of 01 Oct 2023 00:00  Patient On (Oxygen Delivery Method): room air      CAPILLARY BLOOD GLUCOSE      POCT Blood Glucose.: 198 mg/dL (30 Sep 2023 18:14)  POCT Blood Glucose.: 304 mg/dL (30 Sep 2023 05:55)    I&O's Summary    29 Sep 2023 07:01  -  30 Sep 2023 07:00  --------------------------------------------------------  IN: 0 mL / OUT: 400 mL / NET: -400 mL    30 Sep 2023 07:01  -  01 Oct 2023 00:47  --------------------------------------------------------  IN: 980 mL / OUT: 305 mL / NET: 675 mL        Respiratory: RA sating >94      LABS:                        10.7   12.44 )-----------( 147      ( 30 Sep 2023 20:55 )             33.4     09-30    135  |  102  |  18  ----------------------------<  236<H>  4.4   |  22  |  0.44<L>           MEDICATION LEVELS:     IVF FLUIDS/MEDICATIONS:   MEDICATIONS  (STANDING):  ceFAZolin   IVPB 2000 milliGRAM(s) IV Intermittent every 8 hours  dexAMETHasone  Injectable 4 milliGRAM(s) IV Push every 6 hours  dextrose 5%. 1000 milliLiter(s) (100 mL/Hr) IV Continuous <Continuous>  dextrose 5%. 1000 milliLiter(s) (100 mL/Hr) IV Continuous <Continuous>  dextrose 5%. 1000 milliLiter(s) (50 mL/Hr) IV Continuous <Continuous>  dextrose 50% Injectable 25 Gram(s) IV Push once  dextrose 50% Injectable 25 Gram(s) IV Push once  dextrose 50% Injectable 12.5 Gram(s) IV Push once  glucagon  Injectable 1 milliGRAM(s) IntraMuscular once  insulin lispro (ADMELOG) corrective regimen sliding scale   SubCutaneous at bedtime  insulin lispro (ADMELOG) corrective regimen sliding scale   SubCutaneous three times a day before meals  levETIRAcetam  IVPB 500 milliGRAM(s) IV Intermittent every 12 hours  mupirocin 2% Nasal 1 Application(s) Both Nostrils every 12 hours  polyethylene glycol 3350 17 Gram(s) Oral every 24 hours  senna 2 Tablet(s) Oral at bedtime  sodium chloride 0.9%. 1000 milliLiter(s) (75 mL/Hr) IV Continuous <Continuous>    MEDICATIONS  (PRN):  acetaminophen     Tablet .. 650 milliGRAM(s) Oral every 6 hours PRN Mild Pain (1 - 3)  dextrose Oral Gel 15 Gram(s) Oral once PRN Blood Glucose LESS THAN 70 milliGRAM(s)/deciliter  ondansetron Injectable 4 milliGRAM(s) IV Push once PRN Nausea and/or Vomiting  oxyCODONE    IR 5 milliGRAM(s) Oral every 4 hours PRN Moderate Pain (4 - 6)        IMAGING:  < from: MR Head w/ IV Cont (09.20.23 @ 14:58) >  IMPRESSION: Abnormal lesions involving the posterior fossa and   supratentorial region are identified which are compatible with metastasis   given patient's history.      < end of copied text >        EXAMINATION:  PHYSICAL EXAM:    Constitutional: No Acute Distress     Neurological: Awake, alert oriented to person, place and time, Following Commands, PERRL, EOMI, No Gaze Preference, Face Symmetrical, Speech Fluent. Visual fields intact. RUE drift with strength 5/5. No sensory deficits.     Pulmonary: Clear to Auscultation, No rales, No rhonchi, No wheezes     Cardiovascular: S1, S2, Regular rate and rhythm     Gastrointestinal: Soft, Non-tender, Non-distended     Extremities: No calf tenderness     Incision: Covered. Clean and intact.    51 yo woman PMHx stage IV lung cancer with mets to brain s/p 4 cycles chemo and thoracic RT completed on oct 2022, gamma knife surgery to 7 brain mets , recent admission to [facility] from sept 4-13th for epigastric pain and hyperglycemia thought to be due to steroids presents to ED complaining of 1 weeks of lower extremity weakness and intermittent blurry vision.  Pt was initially seen at the end of August and admitted for hx of migraines and dizziness, was started on  decadron 6mg q6hr and Depakote at that time. Returned to [facility] again early september for worsening epigastric pain  in the setting of taking steroids without GI ppx. CTA at that time showing no PE, stable right apical lung mass and rapid interval growth of a left lower lobe mass and new hepatic mets. Pt finished dexamethasone taper on 9/7 and was discharged with protonix, depakote and follow up with outpatient rad onc Dr. Bernard, outpatient NSGY, and oncologist Dr. Beckman at [facility] with plans to start immunotherapy nivolumab as outpatient.  Since discharge on the 13th, pt noting weakness in right lower extremity 'muscle' pain which improves with massages, used walker at home however feels weak and requiring increasing support from ancee to ambulate/ attend to daily needs. Notes also intermittent epigastric pain with radiation to the back. Denies numbness or tingling, saddle anesthesia, bowel incontinence, falls or trauma. Notes occasional chest pain and SOB which resolves. Additionally endorses some slight blurry vision over the last 2 weeks which she describes as "vision feels off" though is able to see.  Denies fevers, chills, N/V/D, recent sick contacts. Social hx: no smoker, previous marijuana use, no alcohol,  lives at home with phil.      Admission Scores  GCS: 15    24 hour Events: POD 0 R crani for tumor resection. Patient now in the PACU.     Allergies    No Known Allergies    Intolerances      REVIEW OF SYSTEMS: [ ] Unable to Assess due to neurologic exam   [x ] All ROS addressed below are non-contributory, except: neck pain better after oxy  Neuro: [x ] Headache [ ] Back pain [ ] Numbness [ ] Weakness [ ] Ataxia [ ] Dizziness [ ] Aphasia [ ] Dysarthria [ ] Visual disturbance  Resp: [ ] Shortness of breath/dyspnea, [ ] Orthopnea [ ] Cough  CV: [ ] Chest pain [ ] Palpitation [ ] Lightheadedness [ ] Syncope  Renal: [ ] Thirst [ ] Edema  GI: [ ] Nausea [ ] Emesis [ ] Abdominal pain [ ] Constipation [ ] Diarrhea  Hem: [ ] Hematemesis [ ] bright red blood per rectum  ID: [ ] Fever [ ] Chills [ ] Dysuria  ENT: [ ] Rhinorrhea      DEVICES:   [ ] Restraints [ ] ET tube [ ] central line [x ] arterial line [ ] ott [ ] NGT/OGT [ ] EVD [ ] LD [ ] ANILA/HMV [ ] Trach [ ] PEG [ ] Chest Tube     VITALS:   Vital Signs Last 24 Hrs  T(C): 36.1 (01 Oct 2023 00:00), Max: 36.8 (30 Sep 2023 04:43)  T(F): 97 (01 Oct 2023 00:00), Max: 98.3 (30 Sep 2023 04:43)  HR: 70 (01 Oct 2023 00:00) (70 - 106)  BP: 130/80 (30 Sep 2023 23:00) (110/68 - 132/62)  BP(mean): 98 (30 Sep 2023 23:00) (89 - 101)  RR: 16 (01 Oct 2023 00:00) (14 - 18)  SpO2: 100% (01 Oct 2023 00:00) (95% - 100%)    Parameters below as of 01 Oct 2023 00:00  Patient On (Oxygen Delivery Method): room air      CAPILLARY BLOOD GLUCOSE      POCT Blood Glucose.: 198 mg/dL (30 Sep 2023 18:14)  POCT Blood Glucose.: 304 mg/dL (30 Sep 2023 05:55)    I&O's Summary    29 Sep 2023 07:01  -  30 Sep 2023 07:00  --------------------------------------------------------  IN: 0 mL / OUT: 400 mL / NET: -400 mL    30 Sep 2023 07:01  -  01 Oct 2023 00:47  --------------------------------------------------------  IN: 980 mL / OUT: 305 mL / NET: 675 mL        Respiratory: RA sating >94      LABS:                        10.7   12.44 )-----------( 147      ( 30 Sep 2023 20:55 )             33.4     09-30    135  |  102  |  18  ----------------------------<  236<H>  4.4   |  22  |  0.44<L>      IVF FLUIDS/MEDICATIONS:   MEDICATIONS  (STANDING):  ceFAZolin   IVPB 2000 milliGRAM(s) IV Intermittent every 8 hours  dexAMETHasone  Injectable 4 milliGRAM(s) IV Push every 6 hours  dextrose 5%. 1000 milliLiter(s) (100 mL/Hr) IV Continuous <Continuous>  dextrose 5%. 1000 milliLiter(s) (100 mL/Hr) IV Continuous <Continuous>  dextrose 5%. 1000 milliLiter(s) (50 mL/Hr) IV Continuous <Continuous>  dextrose 50% Injectable 25 Gram(s) IV Push once  dextrose 50% Injectable 25 Gram(s) IV Push once  dextrose 50% Injectable 12.5 Gram(s) IV Push once  glucagon  Injectable 1 milliGRAM(s) IntraMuscular once  insulin lispro (ADMELOG) corrective regimen sliding scale   SubCutaneous at bedtime  insulin lispro (ADMELOG) corrective regimen sliding scale   SubCutaneous three times a day before meals  levETIRAcetam  IVPB 500 milliGRAM(s) IV Intermittent every 12 hours  mupirocin 2% Nasal 1 Application(s) Both Nostrils every 12 hours  polyethylene glycol 3350 17 Gram(s) Oral every 24 hours  senna 2 Tablet(s) Oral at bedtime  sodium chloride 0.9%. 1000 milliLiter(s) (75 mL/Hr) IV Continuous <Continuous>    MEDICATIONS  (PRN):  acetaminophen     Tablet .. 650 milliGRAM(s) Oral every 6 hours PRN Mild Pain (1 - 3)  dextrose Oral Gel 15 Gram(s) Oral once PRN Blood Glucose LESS THAN 70 milliGRAM(s)/deciliter  ondansetron Injectable 4 milliGRAM(s) IV Push once PRN Nausea and/or Vomiting  oxyCODONE    IR 5 milliGRAM(s) Oral every 4 hours PRN Moderate Pain (4 - 6)        IMAGING:  < from: MR Head w/ IV Cont (09.20.23 @ 14:58) >  IMPRESSION: Abnormal lesions involving the posterior fossa and   supratentorial region are identified which are compatible with metastasis   given patient's history.      < end of copied text >        EXAMINATION:  PHYSICAL EXAM:    Constitutional: No Acute Distress     Neurological: Awake, alert oriented to person, place and time, Following Commands, PERRL, EOMI, No Gaze Preference, Face Symmetrical, Speech Fluent. Visual fields intact. RUE drift with strength 5/5. No sensory deficits.     Pulmonary: Clear to Auscultation, No rales, No rhonchi, No wheezes     Cardiovascular: S1, S2, Regular rate and rhythm     Gastrointestinal: Soft, Non-tender, Non-distended     Extremities: No calf tenderness     Incision: Covered. Clean and intact.

## 2023-10-01 NOTE — PROGRESS NOTE ADULT - PROBLEM SELECTOR PLAN 4
Mild leukocytosis likely 2/2 steroids. Afebrile. No s/s of acute infectious etiology at this time.   -Monitor WBC trend, fever curve. Monitor off abx  -Send infectious w/u if febrile

## 2023-10-01 NOTE — PROGRESS NOTE ADULT - ASSESSMENT
ASSESSMENT: HPI:  53 yo female PMHx stage IV lung cancer with mets to brain s/p 4 cycles chemo and thoracic RT completed on oct 2022, gamma knife surgery to 7 brain mets , recent admission to [facility] from sept 4-13th for epigastric pain and hyperglycemia thought to be due to steroids presents to ED complaining of 1 weeks of lower extremity weakness and intermittent blurry vision.  Pt was initially seen at the end of August and admitted for hx of migraines and dizziness, was started on  decadron 6mg q6hr and Depakote at that time. Returned to [facility] again early september for worsening epigastric pain  in the setting of taking steroids without GI ppx. CTA at that time showing no PE, stable right apical lung mass and rapid interval growth of a left lower lobe mass and new hepatic mets. Pt finished dexamethasone taper on 9/7 and was discharged with protonix, depakote and follow up with outpatient rad onc Dr. Bernard, outpatient NSGY, and oncologist Dr. Beckman at [facility] with plans to start immunotherapy nivolumab as outpatient.  Since discharge on the 13th, pt noting weakness in right lower extremity 'muscle' pain which improves with massages, used walker at home however feels weak and [facility]/ attend to daily needs. Notes also intermittent epigastric pain with radiation to the back. Denies numbness or tingling, saddle anesthesia, bowel incontinence, falls or trauma. Notes occasional chest pain and SOB which resolves. Additionally endorses some slight blurry vision over the last 2 weeks which she describes as "vision feels off" though is able to see.  Denies fevers, chills, N/V/D, recent sick contacts. Social hx: no smoker, previous marijuana use, no alcohol,  lives at home with phil.    ED vitals:   Pt tachy to 120's sinus rhythm otherwise VSS    Labs notable for ph 7.44, pco2 33 (20 Sep 2023 00:28)      NEURO:  CT Head in AM, Dex Q6, Pain control with oxy and tylenol   Continue neurochecks and vitals Q1  Activity: [x] OOB as tolerated [] Bedrest [] PT [] OT [] PMNR    PULM:  Incentive spirometry, mobilize as tolerated    CV:  Keep SBP <140    RENAL:  NS @75, d/c ott in AM    GI:  Continue CCD  GI prophylaxis [] not indicated [x] PPI [] other: Continue while on steroids   Bowel regimen [] colace [x] senna [xother: Miralax     ENDO:   Goal euglycemia (-180)  Continue Lantus   Continue Premeals and Sliding scale.     HEME/ONC:  VTE prophylaxis: [x] SCDs [] chemoprophylaxis [x] hold chemoprophylaxis due to: until cleared by neurosurgery.  [] high risk of DVT/PE on admission due to:     ID:  Nanci-op antibiotics    MISC:    SOCIAL/FAMILY:  [] awaiting [x] updated at bedside [] family meeting    CODE STATUS:  [x] Full Code [] DNR [] DNI [] Palliative/Comfort Care    DISPOSITION:  [x] ICU [] Stroke Unit [] Floor [] EMU [] RCU [] PCU    [] Patient is at high risk of neurologic deterioration/death due to: Post neurosurgical intervention.     Time seen:  Time spent: 45 critical care minutes    Contact: [phone] ASSESSMENT: 53 y/o female initially admitted on 9/19 with dizziness, syncope and HAs for 2 weeks. On 9/20 got an MRI of the brain which showed multiple mets including a large cystic R. temporal 4.5 X 3.6 cm with significant vasogenic edema. Patient now s/p R. temporal crani for resection of tumor. POD 0.    NEURO:  Neurochecks q1H  CT Head in AM, Dex Q6,   Pain control with oxy and tylenol   Depakote 500 mg BID- home med  Activity: [x] OOB as tolerated [] Bedrest [x] PT [x] OT [] PMNR    PULM:  RA  Incentive spirometry, mobilize as tolerated    CV:  Keep SBP <140    RENAL:  NS @75, d/c ott in AM    GI:  Continue CCD  GI prophylaxis [] not indicated [x] PPI [] other: Continue while on steroids   Bowel regimen [] colace [x] senna [x] other: Miralax     ENDO:   Goal euglycemia (-180)  Continue Lantus 15 units, Lispro 8 units pre meals  Continue Premeals and Sliding scale.   HbA1c 8.0    HEME/ONC:  H/H stable   VTE prophylaxis: [x] SCDs [] chemoprophylaxis [x] hold chemoprophylaxis due to: until cleared by neurosurgery.  [] high risk of DVT/PE on admission due to:     ID:  Afebrile, mild leukocytosis likely reactive 2/2 steroids   Nanci-op antibiotics    MISC:    SOCIAL/FAMILY:  [] awaiting [x] updated at bedside [] family meeting    CODE STATUS:  [x] Full Code [] DNR [] DNI [] Palliative/Comfort Care    DISPOSITION:  [x] ICU [] Stroke Unit [] Floor [] EMU [] RCU [] PCU    [x] Patient is at high risk of neurologic deterioration/death due to: Post neurosurgical intervention.     Time seen:  Time spent: 45 critical care minutes    Contact: [phone] ASSESSMENT: 53 y/o female initially admitted on 9/19 with dizziness, syncope and HAs for 2 weeks. On 9/20 got an MRI of the brain which showed multiple mets including a large cystic R. temporal 4.5 X 3.6 cm with significant vasogenic edema. Patient now s/p R. temporal crani for resection of tumor. POD 0.    NEURO:  Neurochecks q1H  CT Head in AM, Dex Q6,   Pain control with oxy and tylenol   Depakote 500 mg BID- home med  Activity: [x] OOB as tolerated [] Bedrest [x] PT [x] OT [] PMNR    PULM:  RA  Incentive spirometry, mobilize as tolerated    CV:  Keep SBP <140    RENAL:  NS @75, d/c ott in AM    GI:  Continue CCD  GI prophylaxis [] not indicated [x] PPI [] other: Continue while on steroids   Bowel regimen [] colace [x] senna [x] other: Miralax     ENDO:   Goal euglycemia (-180)  Continue Lantus 15 units, Lispro 8 units pre meals  Continue Premeals and Sliding scale.   HbA1c 8.0    HEME/ONC:  H/H stable   VTE prophylaxis: [x] SCDs [] chemoprophylaxis [x] hold chemoprophylaxis due to: until cleared by neurosurgery.  [] high risk of DVT/PE on admission due to:     ID:  Afebrile, mild leukocytosis likely reactive 2/2 steroids   Nanci-op antibiotics    MISC:    SOCIAL/FAMILY:  [] awaiting [x] updated at bedside [] family meeting    CODE STATUS:  [x] Full Code [] DNR [] DNI [] Palliative/Comfort Care    DISPOSITION:  [x] ICU [] Stroke Unit [] Floor [] EMU [] RCU [] PCU    [x] Patient is at high risk of neurologic deterioration/death due to: Post neurosurgical intervention, cerebral edema, post op hemorrhage     Time spent: 45 critical care minutes    Contact: [phone]

## 2023-10-01 NOTE — PROGRESS NOTE ADULT - PROBLEM SELECTOR PLAN 1
Dr. Metcalf (NSx), Dr. Beckman (onc) and Dr. Bernard ([facility]) Dr. Maritza Kohler (Onc) following   All services in agreement re: best option is for resection and follow up when recovered for further systemic therapy.    - previously treated with 4 rounds of chemo, developed brain mets 5/23 s/p RT.   - now returning with dizziness, weakness, pain, HA, blurry vision   - CT/MRI with enhancing lesions, quite a few of which demonstrate peripheral enhancement involving the posterior fossa and supratentorial region/edema. please see full report on [place]  - repeat CT re: punctate hemorrhage slight inc size. subsequent MRI without mention of hemorrhage.   - palliative consult to help assist with GOC/ pain control  - S/p R. temporal crani for resection of tumor 10/1  - Management per neurosurg - on neurocks, dexa 4mg q6h, Depakote 500 BID. F/u post op CTH   - PPI while on steroids  - Bowel regimen while on opiates

## 2023-10-01 NOTE — PHYSICAL THERAPY INITIAL EVALUATION ADULT - GAIT DEVIATIONS NOTED, PT EVAL
decreased gabe/decreased step length/decreased weight-shifting ability
decreased gabe/increased time in double stance/decreased step length

## 2023-10-01 NOTE — PHYSICAL THERAPY INITIAL EVALUATION ADULT - IMPAIRED TRANSFERS: SIT/STAND, REHAB EVAL
decr endurance/impaired balance/cognition/impaired postural control/decreased strength
impaired balance/decreased strength

## 2023-10-02 NOTE — OCCUPATIONAL THERAPY INITIAL EVALUATION ADULT - BALANCE TRAINING, PT EVAL
GOAL: pt will stand for ADLs with good balance in 4 weeks.
GOAL: pt will stand at the sink to brush her teeth with good balance in 4 weeks.

## 2023-10-02 NOTE — OCCUPATIONAL THERAPY INITIAL EVALUATION ADULT - SHORT TERM MEMORY, REHAB EVAL
impaired Pt scored 14 error points out of 28 on the short blessed test (cognitive screen) indicating a severe cognitive impairment./impaired

## 2023-10-02 NOTE — PROGRESS NOTE ADULT - ASSESSMENT
53 yo female PMHx stage IV lung cancer with mets to brain s/p 4 cycles chemo and thoracic RT completed on oct 2022, Gamma knife surgery to 7 brain mets, 2 recent admissions for dizziness, headaches and epigastric pain presents with right lower extremity weakness and blurry vision.     On 9/20 got an MRI of the brain which showed multiple mets including a large cystic R. temporal 4.5 X 3.6 cm with significant vasogenic edema. Patient s/p R. temporal crani for resection of tumor on 9/30.     Now transferred out of the NSCU. Walked w PT today, no dizziness or palpitations, c/o "heaviness". Reported to be tachycardic to 160s while working w PT.              51 yo female PMHx stage IV lung cancer with mets to brain s/p 4 cycles chemo and thoracic RT completed on oct 2022, Gamma knife surgery to 7 brain mets, 2 recent admissions for dizziness, headaches and epigastric pain presents with right lower extremity weakness and blurry vision.     On 9/20 got an MRI of the brain which showed multiple mets including a large cystic R. temporal 4.5 X 3.6 cm with significant vasogenic edema. Patient s/p R. temporal crani for resection of tumor on 9/30.     Now transferred out of the NSCU. Walked w PT today, no dizziness or palpitations, c/o "heaviness". Reported to be tachycardic to 160s while working w PT.

## 2023-10-02 NOTE — OCCUPATIONAL THERAPY INITIAL EVALUATION ADULT - DIAGNOSIS, OT EVAL
Pt presents with decreased motor control, cognition, balance, strength, coordination and ROM impacting her ability to complete ADL/mobility. Pt presents with impaired cognition, decreased motor control, balance, strength, coordination and ROM impacting her ability to complete ADL/mobility.

## 2023-10-02 NOTE — PROGRESS NOTE ADULT - ATTENDING COMMENTS
#lung cancer  - f/w Dr. Beckman, initially stage IIIB unresectable s/p Carboplatin/Pemetrexed in July 2022 x 4 cycles completed Sept 2022, concurrent Thoracic RT started Sept through October 2022. Achieved IA. Restaging PET/CT March 2023 confirming response to interval therapy. She declined maintenance Durvalumab recommended on numerous occasions through March 2023. Hospitalized at [facility] on 5/31-6/7/23 with symptomatic numerous brain metastases. Treated with GK-SRS to 7 brain metastases in 5 fractions from 6/21 - 6/29/2023, planned for durvalumab 8/4/2023 however appt cancelled  - recent CT C/A/P showing enlarging lung lesion and new subcentimeter liver lesion concerning for POD  - no inpatient systemic treatment planned  - NSG tentative plan for resection 9/26, patient is agreeable to surgery.  - would recommend radiation oncology assessment for post-op radiation. Plan for further immunotherapy treatment outpatient  - MRV/CTV to rule out venous sinus thrombosis, MRI orbits w/wo contrast +/- LP as per ophtho  - outpatient follow up once stable for discharge, please include hematology oncology f/u at [facility] in discharge note
Pt sitting up in bed, NAD, lungs clear, abdomen NT, extremities no edema. She has metastatic NSCLC to the brain s/p SRS and currently with symptomatic increased brain metastases. She is amenable with neurosurgery plan and understands the palliative immune therapy would not be able to work quickly enough on the brain metastases to improve her current neurologic symptoms.
Pt alert this evening, AMAN.  Discussed surgery with pt and close friend at bedside.  Pt is amenable and has signed consent.  Plan for tomorrow.
Pt seen and examined on 10/1.  Pt alert and awake, feels no headache, perhaps the blurriness is a little better.  NEWTON with no drift.  Dressing is dry.  CT No pronator drift.  Mild right LE weakness.  CT with right frontal pneumocephalus and air, mild heme in operative bed.  Pt is doing well post right termporal tumor resection.  PT, OOB to chair, mobilize.
#lung cancer  - f/w Dr. Beckman, initially stage IIIB unresectable s/p Carboplatin/Pemetrexed in July 2022 x 4 cycles completed Sept 2022, concurrent Thoracic RT started Sept through October 2022. Achieved WI. Restaging PET/CT March 2023 confirming response to interval therapy. She declined maintenance Durvalumab recommended on numerous occasions through March 2023. Hospitalized at [facility] on 5/31-6/7/23 with symptomatic numerous brain metastases. Treated with GK-SRS to 7 brain metastases in 5 fractions from 6/21 - 6/29/2023, planned for durvalumab 8/4/2023 however appt cancelled. Representing to [facility] now w/  with dizziness, weakness, pain, HA, blurry vision   - recent CT C/A/P showing enlarging lung lesion and new subcentimeter liver lesion concerning for POD  -s/p R. temporal craniotomy for resection of tumor 9/30, now on decadron, keppra, N[facility]G following  - pending CTH post-op   - please consult radiation oncology to assess for post-op radiation. Plan for further immunotherapy treatment outpatient  - palliative consult to help assist with GOC/ pain control  - no inpatient systemic treatment planned  - outpatient follow up once stable for discharge, please include hematology oncology f/u at [facility] in discharge note

## 2023-10-02 NOTE — PROGRESS NOTE ADULT - SUBJECTIVE AND OBJECTIVE BOX
Patient is a 52y old  Female who presents with a chief complaint of leg weakness/ dizziness blurry vision (02 Oct 2023 08:48)      SUBJECTIVE / OVERNIGHT EVENTS: Pt walked w PT today. Tachycardic.     MEDICATIONS  (STANDING):  dexAMETHasone     Tablet 2 milliGRAM(s) Oral every 6 hours  dextrose 5%. 1000 milliLiter(s) (50 mL/Hr) IV Continuous <Continuous>  dextrose 5%. 1000 milliLiter(s) (100 mL/Hr) IV Continuous <Continuous>  dextrose 5%. 1000 milliLiter(s) (100 mL/Hr) IV Continuous <Continuous>  dextrose 50% Injectable 25 Gram(s) IV Push once  dextrose 50% Injectable 12.5 Gram(s) IV Push once  dextrose 50% Injectable 25 Gram(s) IV Push once  divalproex  milliGRAM(s) Oral every 12 hours  enoxaparin Injectable 40 milliGRAM(s) SubCutaneous <User Schedule>  glucagon  Injectable 1 milliGRAM(s) IntraMuscular once  insulin glargine Injectable (LANTUS) 15 Unit(s) SubCutaneous every morning  insulin lispro (ADMELOG) corrective regimen sliding scale   SubCutaneous at bedtime  insulin lispro (ADMELOG) corrective regimen sliding scale   SubCutaneous three times a day before meals  insulin lispro Injectable (ADMELOG) 5 Unit(s) SubCutaneous three times a day before meals  mupirocin 2% Nasal 1 Application(s) Both Nostrils every 12 hours  pantoprazole    Tablet 40 milliGRAM(s) Oral before breakfast  polyethylene glycol 3350 17 Gram(s) Oral every 24 hours  senna 2 Tablet(s) Oral at bedtime    MEDICATIONS  (PRN):  acetaminophen     Tablet .. 650 milliGRAM(s) Oral every 6 hours PRN Mild Pain (1 - 3)  dextrose Oral Gel 15 Gram(s) Oral once PRN Blood Glucose LESS THAN 70 milliGRAM(s)/deciliter  oxyCODONE    IR 5 milliGRAM(s) Oral every 4 hours PRN Moderate Pain (4 - 6)      CAPILLARY BLOOD GLUCOSE      POCT Blood Glucose.: 274 mg/dL (02 Oct 2023 12:19)  POCT Blood Glucose.: 195 mg/dL (02 Oct 2023 08:10)  POCT Blood Glucose.: 226 mg/dL (01 Oct 2023 21:08)  POCT Blood Glucose.: 231 mg/dL (01 Oct 2023 19:17)  POCT Blood Glucose.: 242 mg/dL (01 Oct 2023 16:21)    I&O's Summary    01 Oct 2023 07:01  -  02 Oct 2023 07:00  --------------------------------------------------------  IN: 2275 mL / OUT: 1875 mL / NET: 400 mL        PHYSICAL EXAM:  T(C): 36.6 (10-02-23 @ 09:29), Max: 37 (10-02-23 @ 04:40)  HR: 121 (10-02-23 @ 10:45) (67 - 121)  BP: 126/75 (10-02-23 @ 10:45) (115/71 - 133/74)  RR: 18 (10-02-23 @ 09:29) (18 - 18)  SpO2: 99% (10-02-23 @ 10:45) (94% - 99%)    Up in bed, awake, alert. R periorbital edema.   RLE weakness- 4/5  Lung clear, tachy      LABS:                        10.7   16.75 )-----------( 130      ( 02 Oct 2023 06:57 )             32.8     10-02    132<L>  |  99  |  15  ----------------------------<  179<H>  4.5   |  23  |  0.41<L>    Ca    9.1      02 Oct 2023 06:49  Phos  3.8     09-30  Mg     2.2     09-30      PT/INR - ( 30 Sep 2023 20:54 )   PT: 12.3 sec;   INR: 1.18 ratio         PTT - ( 30 Sep 2023 20:54 )  PTT:23.6 sec      Urinalysis Basic - ( 02 Oct 2023 06:49 )    Color: x / Appearance: x / SG: x / pH: x  Gluc: 179 mg/dL / Ketone: x  / Bili: x / Urobili: x   Blood: x / Protein: x / Nitrite: x   Leuk Esterase: x / RBC: x / WBC x   Sq Epi: x / Non Sq Epi: x / Bacteria: x        RADIOLOGY & ADDITIONAL TESTS:    Imaging Personally Reviewed:    Consultant(s) Notes Reviewed:      Care Discussed with Consultants/Other Providers: Nsx

## 2023-10-02 NOTE — OCCUPATIONAL THERAPY INITIAL EVALUATION ADULT - NS ASR FOLLOW COMMAND OT EVAL
100% of the time/able to follow single-step instructions
pt demonstrated decreased attention, increased distraction/75% of the time/able to follow single-step instructions

## 2023-10-02 NOTE — OCCUPATIONAL THERAPY INITIAL EVALUATION ADULT - BALANCE DISTURBANCE, IDENTIFIED IMPAIRMENT CONTRIBUTE, REHAB EVAL
impaired coordination/impaired motor control/decreased ROM/decreased strength
impaired coordination/impaired motor control/impaired postural control/decreased strength

## 2023-10-02 NOTE — PROGRESS NOTE ADULT - SUBJECTIVE AND OBJECTIVE BOX
patient feeling better  right leg feeling "heavy"  participating with therapy     REVIEW OF SYSTEMS  Constitutional - No fever,  No fatigue  HEENT - No vertigo, No neck pain  Neurological - No headaches, No memory loss, No loss of strength, No numbness, No tremors  Skin - No rashes, No lesions   Musculoskeletal - No joint pain, No joint swelling, No muscle pain  Psychiatric - No depression, No anxiety    FUNCTIONAL PROGRESS  10/2 PT  transfers stand by assist with rW  gait CG with rW x 75 feet  limited by fatigue     10/2 OT  transfers min assist  non powered lift assist  dressing min assist   severe cognitive impairment 14/28 SBE    VITALS  T(C): 36.6 (10-02-23 @ 12:20), Max: 37 (10-02-23 @ 04:40)  HR: 105 (10-02-23 @ 12:20) (67 - 121)  BP: 129/72 (10-02-23 @ 12:20) (115/71 - 133/74)  RR: 18 (10-02-23 @ 12:20) (18 - 18)  SpO2: 99% (10-02-23 @ 12:20) (94% - 99%)  Wt(kg): --    MEDICATIONS   acetaminophen     Tablet .. 650 milliGRAM(s) every 6 hours PRN  dexAMETHasone     Tablet 2 milliGRAM(s) every 6 hours  dextrose 5%. 1000 milliLiter(s) <Continuous>  dextrose 5%. 1000 milliLiter(s) <Continuous>  dextrose 5%. 1000 milliLiter(s) <Continuous>  dextrose 50% Injectable 25 Gram(s) once  dextrose 50% Injectable 25 Gram(s) once  dextrose 50% Injectable 12.5 Gram(s) once  dextrose Oral Gel 15 Gram(s) once PRN  divalproex  milliGRAM(s) every 12 hours  enoxaparin Injectable 40 milliGRAM(s) <User Schedule>  glucagon  Injectable 1 milliGRAM(s) once  insulin glargine Injectable (LANTUS) 15 Unit(s) every morning  insulin lispro (ADMELOG) corrective regimen sliding scale   at bedtime  insulin lispro (ADMELOG) corrective regimen sliding scale   three times a day before meals  insulin lispro Injectable (ADMELOG) 7 Unit(s) three times a day before meals  mupirocin 2% Nasal 1 Application(s) every 12 hours  oxyCODONE    IR 5 milliGRAM(s) every 4 hours PRN  pantoprazole    Tablet 40 milliGRAM(s) before breakfast  polyethylene glycol 3350 17 Gram(s) every 24 hours  senna 2 Tablet(s) at bedtime      RECENT LABS - Reviewed                        10.7   16.75 )-----------( 130      ( 02 Oct 2023 06:57 )             32.8     10-02    132<L>  |  99  |  15  ----------------------------<  179<H>  4.5   |  23  |  0.41<L>    Ca    9.1      02 Oct 2023 06:49  Phos  3.8     09-30  Mg     2.2     09-30      PT/INR - ( 30 Sep 2023 20:54 )   PT: 12.3 sec;   INR: 1.18 ratio         PTT - ( 30 Sep 2023 20:54 )  PTT:23.6 sec  Urinalysis Basic - ( 02 Oct 2023 06:49 )    Color: x / Appearance: x / SG: x / pH: x  Gluc: 179 mg/dL / Ketone: x  / Bili: x / Urobili: x   Blood: x / Protein: x / Nitrite: x   Leuk Esterase: x / RBC: x / WBC x   Sq Epi: x / Non Sq Epi: x / Bacteria: x    < from: CT Head No Cont (10.01.23 @ 09:18) >    IMPRESSION:    1.  Postoperative changes related to a recent right temporal craniotomy   for resection of a metastatic lesion.  2.  Small midline shift to the left of approximately 2 mm.  3.  Stable appearing metastatic lesions in the left parietal and   occipital lobes with surrounding vasogenic edema.    < end of copied text >      ----------------------------------------------------------------------------------------  PHYSICAL EXAM  Constitutional - NAD, Comfortable, sitting at side of bed   right periorbital swelling/dressing in place   Chest - Breathing comfortably, room air   Cardiovascular - S1S2   Abdomen - Soft   Extremities - No C/C/E, No calf tenderness   Neurologic Exam -                    Cognitive - Awake, Alert, AAO to self, place, date, year, situation     Communication - Fluent, No dysarthria       Motor -  moves all ext 5/5     Sensory - Intact to LT    Psychiatric - Mood stable, Affect WNL  ----------------------------------------------------------------------------------------  ASSESSMENT/PLAN  52yFemale h/o metastatic lung cancer, s/p chemo, RT with functional deficits due to brain mets  s/p right temporal craniotomy for resection of mass  on dexamethasone  awaiting radiation consult for post op radiation   Pain - Tylenol, oxycodone  DVT PPX - SCDs Lovenox   Rehab -    patient requires min assist with mobility/ADLs, cognitive deficits on short blessed test    Recommend ACUTE inpatient rehabilitation for the functional deficits consisting of 3 hours of therapy/day & 24 hour RN/daily PMR physician for comorbid medical management. Will continue to follow for ongoing rehab needs and recommendations. Patient will be able to tolerate 3 hours a day.

## 2023-10-02 NOTE — PROGRESS NOTE ADULT - PROBLEM SELECTOR PLAN 3
- likely related to malignancy/mets/edema. Seen by ophtho   - MR orbits and venogram no acute findings. F/u ophtho/nsgy    - artificial tears prn.   - Eye patch for comfort if patient amenable. Says when she covers one eye the diplopia resolves. - likely related to malignancy/mets/edema. Seen by ophtho   - MR orbits and venogram no acute findings.   - artificial tears prn.   - Eye patch for comfort if patient amenable. Says when she covers one eye the diplopia resolves.

## 2023-10-02 NOTE — OCCUPATIONAL THERAPY INITIAL EVALUATION ADULT - PERSONAL SAFETY AND JUDGMENT, REHAB EVAL
Pt presents with decreased awareness of her deficits/impaired
pt did not safely use walker, pt has decreased cognition which causes her to become easily distracted/impaired

## 2023-10-02 NOTE — OCCUPATIONAL THERAPY INITIAL EVALUATION ADULT - IMPAIRED TRANSFERS: SIT/STAND, REHAB EVAL
impaired balance/cognition/decreased flexibility/decreased ROM/decreased strength
impaired balance/cognition/impaired coordination/decreased flexibility/impaired motor control/decreased strength

## 2023-10-02 NOTE — PROGRESS NOTE ADULT - SUBJECTIVE AND OBJECTIVE BOX
51 yo woman PMHx stage IV lung cancer with mets to brain s/p 4 cycles chemo and thoracic RT completed on oct 2022, gamma knife surgery to 7 brain mets , recent admission to [facility] from sept 4-13th for epigastric pain and hyperglycemia thought to be due to steroids presents to ED complaining of 1 weeks of lower extremity weakness and intermittent blurry vision.  Pt was initially seen at the end of August and admitted for hx of migraines and dizziness, was started on  decadron 6mg q6hr and Depakote at that time. Returned to [facility] again early september for worsening epigastric pain  in the setting of taking steroids without GI ppx. CTA at that time showing no PE, stable right apical lung mass and rapid interval growth of a left lower lobe mass and new hepatic mets. Pt finished dexamethasone taper on 9/7 and was discharged with protonix, depakote and follow up with outpatient rad onc Dr. Bernard, outpatient NSGY, and oncologist Dr. Beckman at [facility] with plans to start immunotherapy nivolumab as outpatient.  Since discharge on the 13th, pt noting weakness in right lower extremity 'muscle' pain which improves with massages, used walker at home however feels weak and requiring increasing support from ancee to ambulate/ attend to daily needs. Notes also intermittent epigastric pain with radiation to the back. Denies numbness or tingling, saddle anesthesia, bowel incontinence, falls or trauma. Notes occasional chest pain and SOB which resolves. Additionally endorses some slight blurry vision over the last 2 weeks which she describes as "vision feels off" though is able to see.  Denies fevers, chills, N/V/D, recent sick contacts. Social hx: no smoker, previous marijuana use, no alcohol,  lives at home with phil.      Admission Scores  GCS: 15    24 hour Events: POD 0 R crani for tumor resection. Patient now in the PACU.     Allergies    No Known Allergies    Intolerances      REVIEW OF SYSTEMS: [ ] Unable to Assess due to neurologic exam   [x ] All ROS addressed below are non-contributory, except: neck pain better after oxy  Neuro: [x ] Headache [ ] Back pain [ ] Numbness [ ] Weakness [ ] Ataxia [ ] Dizziness [ ] Aphasia [ ] Dysarthria [ ] Visual disturbance  Resp: [ ] Shortness of breath/dyspnea, [ ] Orthopnea [ ] Cough  CV: [ ] Chest pain [ ] Palpitation [ ] Lightheadedness [ ] Syncope  Renal: [ ] Thirst [ ] Edema  GI: [ ] Nausea [ ] Emesis [ ] Abdominal pain [ ] Constipation [ ] Diarrhea  Hem: [ ] Hematemesis [ ] bright red blood per rectum  ID: [ ] Fever [ ] Chills [ ] Dysuria  ENT: [ ] Rhinorrhea      DEVICES:   [ ] Restraints [ ] ET tube [ ] central line [x ] arterial line [ ] ott [ ] NGT/OGT [ ] EVD [ ] LD [ ] ANILA/HMV [ ] Trach [ ] PEG [ ] Chest Tube     Vital Signs Last 24 Hrs  T(C): 36.6 (02 Oct 2023 09:29), Max: 37 (02 Oct 2023 04:40)  T(F): 97.9 (02 Oct 2023 09:29), Max: 98.6 (02 Oct 2023 04:40)  HR: 121 (02 Oct 2023 10:45) (67 - 121)  BP: 126/75 (02 Oct 2023 10:45) (115/71 - 133/74)  BP(mean): --  RR: 18 (02 Oct 2023 09:29) (18 - 18)  SpO2: 99% (02 Oct 2023 10:45) (94% - 99%)    Parameters below as of 02 Oct 2023 10:45  Patient On (Oxygen Delivery Method): room air              Respiratory: RA sating >94                          10.7   16.75 )-----------( 130      ( 02 Oct 2023 06:57 )             32.8   10-02    132<L>  |  99  |  15  ----------------------------<  179<H>  4.5   |  23  |  0.41<L>    Ca    9.1      02 Oct 2023 06:49  Phos  3.8     09-30  Mg     2.2     09-30        IVF FLUIDS/MEDICATIONS:   MEDICATIONS  (STANDING):  ceFAZolin   IVPB 2000 milliGRAM(s) IV Intermittent every 8 hours  dexAMETHasone  Injectable 4 milliGRAM(s) IV Push every 6 hours  dextrose 5%. 1000 milliLiter(s) (100 mL/Hr) IV Continuous <Continuous>  dextrose 5%. 1000 milliLiter(s) (100 mL/Hr) IV Continuous <Continuous>  dextrose 5%. 1000 milliLiter(s) (50 mL/Hr) IV Continuous <Continuous>  dextrose 50% Injectable 25 Gram(s) IV Push once  dextrose 50% Injectable 25 Gram(s) IV Push once  dextrose 50% Injectable 12.5 Gram(s) IV Push once  glucagon  Injectable 1 milliGRAM(s) IntraMuscular once  insulin lispro (ADMELOG) corrective regimen sliding scale   SubCutaneous at bedtime  insulin lispro (ADMELOG) corrective regimen sliding scale   SubCutaneous three times a day before meals  levETIRAcetam  IVPB 500 milliGRAM(s) IV Intermittent every 12 hours  mupirocin 2% Nasal 1 Application(s) Both Nostrils every 12 hours  polyethylene glycol 3350 17 Gram(s) Oral every 24 hours  senna 2 Tablet(s) Oral at bedtime  sodium chloride 0.9%. 1000 milliLiter(s) (75 mL/Hr) IV Continuous <Continuous>    MEDICATIONS  (PRN):  acetaminophen     Tablet .. 650 milliGRAM(s) Oral every 6 hours PRN Mild Pain (1 - 3)  dextrose Oral Gel 15 Gram(s) Oral once PRN Blood Glucose LESS THAN 70 milliGRAM(s)/deciliter  ondansetron Injectable 4 milliGRAM(s) IV Push once PRN Nausea and/or Vomiting  oxyCODONE    IR 5 milliGRAM(s) Oral every 4 hours PRN Moderate Pain (4 - 6)        IMAGING:  < from: MR Head w/ IV Cont (09.20.23 @ 14:58) >  IMPRESSION: Abnormal lesions involving the posterior fossa and   supratentorial region are identified which are compatible with metastasis   given patient's history.      < end of copied text >        EXAMINATION:  PHYSICAL EXAM:    Constitutional: No Acute Distress     Neurological: Awake, alert oriented to person, place and time, Following Commands, PERRL, EOMI, No Gaze Preference, Face Symmetrical, Speech Fluent. Visual fields intact. RUE drift with strength 5/5. No sensory deficits.     Pulmonary: Clear to Auscultation, No rales, No rhonchi, No wheezes     Cardiovascular: S1, S2, Regular rate and rhythm     Gastrointestinal: Soft, Non-tender, Non-distended     Extremities: No calf tenderness     Incision: Covered. Clean and intact.

## 2023-10-02 NOTE — OCCUPATIONAL THERAPY INITIAL EVALUATION ADULT - ADDITIONAL COMMENTS
Pt states up until 2 weeks ago pt was working/driving. Since being hospitalized, she has needed more assistance with mobility and ADLs. Pt states up until 4 weeks ago pt was working/driving. Since being hospitalized, she has needed more assistance with mobility and ADLs.

## 2023-10-02 NOTE — PROGRESS NOTE ADULT - SUBJECTIVE AND OBJECTIVE BOX
INTERVAL HPI/OVERNIGHT EVENTS:  Patient S&E at bedside. No complaints at this time. No F/C, CP, SOB, abdominal pain, N/V, rash, or edema      VITAL SIGNS:  T(F): 98.6 (10-02-23 @ 04:40)  HR: 81 (10-02-23 @ 04:40)  BP: 119/68 (10-02-23 @ 04:40)  RR: 18 (10-02-23 @ 04:40)  SpO2: 94% (10-02-23 @ 04:40)  Wt(kg): --    PHYSICAL EXAM:    Constitutional: NAD  Eyes: EOMI, sclera non-icteric  Neck: supple  Respiratory: no increased WOB, CTAB/L  Cardiovascular: RRR, S1, S2, no m/g/r  Gastrointestinal: soft, NTND, no masses palpable, no HSM  Extremities: no c/c/e  Neurological: AAOx3    MEDICATIONS  (STANDING):  dexAMETHasone  Injectable 4 milliGRAM(s) IV Push every 6 hours  dextrose 5%. 1000 milliLiter(s) (50 mL/Hr) IV Continuous <Continuous>  dextrose 5%. 1000 milliLiter(s) (100 mL/Hr) IV Continuous <Continuous>  dextrose 5%. 1000 milliLiter(s) (100 mL/Hr) IV Continuous <Continuous>  dextrose 50% Injectable 25 Gram(s) IV Push once  dextrose 50% Injectable 12.5 Gram(s) IV Push once  dextrose 50% Injectable 25 Gram(s) IV Push once  glucagon  Injectable 1 milliGRAM(s) IntraMuscular once  insulin glargine Injectable (LANTUS) 15 Unit(s) SubCutaneous every morning  insulin lispro (ADMELOG) corrective regimen sliding scale   SubCutaneous at bedtime  insulin lispro (ADMELOG) corrective regimen sliding scale   SubCutaneous three times a day before meals  insulin lispro Injectable (ADMELOG) 5 Unit(s) SubCutaneous three times a day before meals  mupirocin 2% Nasal 1 Application(s) Both Nostrils every 12 hours  pantoprazole    Tablet 40 milliGRAM(s) Oral before breakfast  polyethylene glycol 3350 17 Gram(s) Oral every 24 hours  senna 2 Tablet(s) Oral at bedtime  valproate sodium  IVPB 500 milliGRAM(s) IV Intermittent every 12 hours    MEDICATIONS  (PRN):  acetaminophen     Tablet .. 650 milliGRAM(s) Oral every 6 hours PRN Mild Pain (1 - 3)  dextrose Oral Gel 15 Gram(s) Oral once PRN Blood Glucose LESS THAN 70 milliGRAM(s)/deciliter  oxyCODONE    IR 5 milliGRAM(s) Oral every 4 hours PRN Moderate Pain (4 - 6)      LABS:                        10.7   16.75 )-----------( 130      ( 02 Oct 2023 06:57 )             32.8     10-02    132<L>  |  99  |  15  ----------------------------<  179<H>  4.5   |  23  |  0.41<L>    Ca    9.1      02 Oct 2023 06:49  Phos  3.8     09-30  Mg     2.2     09-30      PT/INR - ( 30 Sep 2023 20:54 )   PT: 12.3 sec;   INR: 1.18 ratio         PTT - ( 30 Sep 2023 20:54 )  PTT:23.6 sec  Urinalysis Basic - ( 02 Oct 2023 06:49 )    Color: x / Appearance: x / SG: x / pH: x  Gluc: 179 mg/dL / Ketone: x  / Bili: x / Urobili: x   Blood: x / Protein: x / Nitrite: x   Leuk Esterase: x / RBC: x / WBC x   Sq Epi: x / Non Sq Epi: x / Bacteria: x        RADIOLOGY & ADDITIONAL TESTS:     INTERVAL HPI/OVERNIGHT EVENTS:  Patient S&E at bedside. Continues to have soreness at the surgical site, no pain      VITAL SIGNS:  T(F): 98.6 (10-02-23 @ 04:40)  HR: 81 (10-02-23 @ 04:40)  BP: 119/68 (10-02-23 @ 04:40)  RR: 18 (10-02-23 @ 04:40)  SpO2: 94% (10-02-23 @ 04:40)  Wt(kg): --    PHYSICAL EXAM:    Constitutional: NAD  Eyes: sclera non-icteric  Neck: supple  Respiratory: no increased WOB, CTAB/L  Cardiovascular: RRR, S1, S2, no m/g/r  Gastrointestinal: soft, NTND, no masses palpable, no HSM  Extremities: no c/c/e  Neurological: AAOx3    MEDICATIONS  (STANDING):  dexAMETHasone  Injectable 4 milliGRAM(s) IV Push every 6 hours  dextrose 5%. 1000 milliLiter(s) (50 mL/Hr) IV Continuous <Continuous>  dextrose 5%. 1000 milliLiter(s) (100 mL/Hr) IV Continuous <Continuous>  dextrose 5%. 1000 milliLiter(s) (100 mL/Hr) IV Continuous <Continuous>  dextrose 50% Injectable 25 Gram(s) IV Push once  dextrose 50% Injectable 12.5 Gram(s) IV Push once  dextrose 50% Injectable 25 Gram(s) IV Push once  glucagon  Injectable 1 milliGRAM(s) IntraMuscular once  insulin glargine Injectable (LANTUS) 15 Unit(s) SubCutaneous every morning  insulin lispro (ADMELOG) corrective regimen sliding scale   SubCutaneous at bedtime  insulin lispro (ADMELOG) corrective regimen sliding scale   SubCutaneous three times a day before meals  insulin lispro Injectable (ADMELOG) 5 Unit(s) SubCutaneous three times a day before meals  mupirocin 2% Nasal 1 Application(s) Both Nostrils every 12 hours  pantoprazole    Tablet 40 milliGRAM(s) Oral before breakfast  polyethylene glycol 3350 17 Gram(s) Oral every 24 hours  senna 2 Tablet(s) Oral at bedtime  valproate sodium  IVPB 500 milliGRAM(s) IV Intermittent every 12 hours    MEDICATIONS  (PRN):  acetaminophen     Tablet .. 650 milliGRAM(s) Oral every 6 hours PRN Mild Pain (1 - 3)  dextrose Oral Gel 15 Gram(s) Oral once PRN Blood Glucose LESS THAN 70 milliGRAM(s)/deciliter  oxyCODONE    IR 5 milliGRAM(s) Oral every 4 hours PRN Moderate Pain (4 - 6)      LABS:                        10.7   16.75 )-----------( 130      ( 02 Oct 2023 06:57 )             32.8     10-02    132<L>  |  99  |  15  ----------------------------<  179<H>  4.5   |  23  |  0.41<L>    Ca    9.1      02 Oct 2023 06:49  Phos  3.8     09-30  Mg     2.2     09-30      PT/INR - ( 30 Sep 2023 20:54 )   PT: 12.3 sec;   INR: 1.18 ratio         PTT - ( 30 Sep 2023 20:54 )  PTT:23.6 sec  Urinalysis Basic - ( 02 Oct 2023 06:49 )    Color: x / Appearance: x / SG: x / pH: x  Gluc: 179 mg/dL / Ketone: x  / Bili: x / Urobili: x   Blood: x / Protein: x / Nitrite: x   Leuk Esterase: x / RBC: x / WBC x   Sq Epi: x / Non Sq Epi: x / Bacteria: x        RADIOLOGY & ADDITIONAL TESTS:

## 2023-10-02 NOTE — OCCUPATIONAL THERAPY INITIAL EVALUATION ADULT - LIVES WITH, PROFILE
Pt lives in a 2 story home with 2 steps to enter with her fiance. Pt has a walker, cane, and shower chair./significant other
Pt lives in a 2 story home with 2 steps to enter with her fiance. Pt has a walker, cane, and shower chair./significant other

## 2023-10-02 NOTE — OCCUPATIONAL THERAPY INITIAL EVALUATION ADULT - VISUAL ACUITY
pt experiences diplopia when looking far distances
Pt able to read sign across room, pt states diplopia decreased

## 2023-10-02 NOTE — OCCUPATIONAL THERAPY INITIAL EVALUATION ADULT - ADL RETRAINING, OT EVAL
GOAL: pt will independently perform UB dressing with in 4 weeks. GOAL: pt will perform LB dressing with supervision in 4 weeks.
GOAL: pt will perform UB/LB dressing independently in 4 weeks.

## 2023-10-02 NOTE — OCCUPATIONAL THERAPY INITIAL EVALUATION ADULT - TRANSFER TRAINING, PT EVAL
GOAL: pt will complete a sit to stand transfer with close supervision in 4 weeks.
GOAL: pt will independently perform a sit to stand transfer in 4 weeks.

## 2023-10-02 NOTE — PROGRESS NOTE ADULT - ASSESSMENT
ASSESSMENT: 51 y/o female initially admitted on 9/19 with dizziness, syncope and HAs for 2 weeks. On 9/20 got an MRI of the brain which showed multiple mets including a large cystic R. temporal 4.5 X 3.6 cm with significant vasogenic edema. Patient now s/p R. temporal crani for resection of tumor. POD #2:    NEURO:  Neurochecks q4  decadron 2q6  Pain control with oxy and tylenol   Depakote 500 mg BID- home med  Activity: [x] OOB as tolerated [] Bedrest [x] PT [x] OT [] PMNR    PULM:  RA  Incentive spirometry, mobilize as tolerated    CV:  Keep SBP <140    RENAL:  2% @ 75 for 12 hours, repeat bmp in am , sodium 132 from this morning, am labs ordered for tomorrow    GI:  Continue CCD  GI prophylaxis [] not indicated [x] PPI [] other: Continue while on steroids   Bowel regimen [] colace [x] senna [x] other: Miralax     ENDO:   Goal euglycemia (-180)  Continue Lantus 15 units, Lispro 7 units pre meals and medium sliding scale coverage  HbA1c 8.0    HEME/ONC:  H/H stable   VTE prophylaxis: [x] SCDs [x] chemoprophylaxis [x] hold chemoprophylaxis due to: until cleared by neurosurgery.  [] high risk of DVT/PE on admission due to:     ID:  Afebrile, mild leukocytosis likely reactive 2/2 steroids   Nanci-op antibiotics    MISC:    SOCIAL/FAMILY:  [] awaiting [x] updated at bedside [] family meeting    CODE STATUS:  [x] Full Code [] DNR [] DNI [] Palliative/Comfort Care    DISPOSITION:  [] ICU [] Stroke Unit [x] Floor [] EMU [] RCU [] PCU  acute rehab

## 2023-10-02 NOTE — OCCUPATIONAL THERAPY INITIAL EVALUATION ADULT - PHYSICAL ASSIST/NONPHYSICAL ASSIST: STAND/SIT, REHAB EVAL
non powered lift assist/verbal cues/nonverbal cues (demo/gestures)/1 person assist
verbal cues/nonverbal cues (demo/gestures)/1 person assist

## 2023-10-02 NOTE — PROGRESS NOTE ADULT - ASSESSMENT
52F stage IV lung cancer with mets to brain s/p 4 cycles chemo and thoracic RT completed on oct 2022, Gamma knife surgery to 7 brain mets (5 fractions of GK-SRS to 7 brain metastases from 6/21 - 6/29/2023), 2 recent admissions for dizziness, headaches and epigastric pain presents with right lower extremity weakness and blurry vision, CTH w/ L frontoparietal/L occipital (w/ calcifications)/R parietooccipital masses w/ surrounding edema. Oncology consulted for lung cancer    #lung cancer  - f/w Dr. Beckman, initially stage IIIB unresectable s/p Carboplatin/Pemetrexed in July 2022 x 4 cycles completed Sept 2022, concurrent Thoracic RT started Sept through October 2022. Achieved [place]. Restaging PET/CT March 2023 confirming response to interval therapy. She declined maintenance Durvalumab recommended on numerous occasions through March 2023. Hospitalized at [facility] on 5/31-6/7/23 with symptomatic numerous brain metastases. Treated with GK-SRS to 7 brain metastases in 5 fractions from 6/21 - 6/29/2023, planned for durvalumab 8/4/2023 however appt cancelled  - recent CT C/A/P showing enlarging lung lesion and new subcentimeter liver lesion concerning for POD  - no inpatient systemic treatment planned  - NSG tentative plan for resection 9/26, patient is agreeable to surgery.  - would recommend radiation oncology assessment for post-op radiation. Plan for further immunotherapy treatment outpatient  - MRV/CTV to rule out venous sinus thrombosis, MRI orbits w/wo contrast +/- LP as per ophtho  - outpatient follow up once stable for discharge, please include hematology oncology f/u at [facility] in discharge note    Discussed w/ Dr. Jose F Acevedo MD, PhD  Heme/Onc Fellow  PGY4     52F stage IV lung cancer with mets to brain s/p 4 cycles chemo and thoracic RT completed on oct 2022, Gamma knife surgery to 7 brain mets (5 fractions of GK-SRS to 7 brain metastases from 6/21 - 6/29/2023), 2 recent admissions for dizziness, headaches and epigastric pain presents with right lower extremity weakness and blurry vision, CTH w/ L frontoparietal/L occipital (w/ calcifications)/R parietooccipital masses w/ surrounding edema. Oncology consulted for lung cancer    #lung cancer  - f/w Dr. Beckman, initially stage IIIB unresectable s/p Carboplatin/Pemetrexed in July 2022 x 4 cycles completed Sept 2022, concurrent Thoracic RT started Sept through October 2022. Achieved [place]. Restaging PET/CT March 2023 confirming response to interval therapy. She declined maintenance Durvalumab recommended on numerous occasions through March 2023. Hospitalized at [facility] on 5/31-6/7/23 with symptomatic numerous brain metastases. Treated with GK-SRS to 7 brain metastases in 5 fractions from 6/21 - 6/29/2023, planned for durvalumab 8/4/2023 however appt cancelled. Representing to [facility] now w/  with dizziness, weakness, pain, HA, blurry vision   - recent CT C/A/P showing enlarging lung lesion and new subcentimeter liver lesion concerning for POD  -s/p R. temporal craniotomy for resection of tumor 9/30, now on decadron, keppra, NYSG following  - pending CTH post-op   - would recommend radiation oncology assessment for post-op radiation. Plan for further immunotherapy treatment outpatient  - palliative consult to help assist with GOC/ pain control  - no inpatient systemic treatment planned  - outpatient follow up once stable for discharge, please include hematology oncology f/u at [facility] [place] in discharge note    Discussed w/ Dr. Jose F Acevedo MD, PhD  Heme/Onc Fellow  PGY4     52F stage IV lung cancer with mets to brain s/p 4 cycles chemo and thoracic RT completed on oct 2022, Gamma knife surgery to 7 brain mets (5 fractions of GK-SRS to 7 brain metastases from 6/21 - 6/29/2023), 2 recent admissions for dizziness, headaches and epigastric pain presents with right lower extremity weakness and blurry vision, CTH w/ L frontoparietal/L occipital (w/ calcifications)/R parietooccipital masses w/ surrounding edema. Oncology consulted for lung cancer    #lung cancer  - f/w Dr. Beckman, initially stage IIIB unresectable s/p Carboplatin/Pemetrexed in July 2022 x 4 cycles completed Sept 2022, concurrent Thoracic RT started Sept through October 2022. Achieved [place]. Restaging PET/CT March 2023 confirming response to interval therapy. She declined maintenance Durvalumab recommended on numerous occasions through March 2023. Hospitalized at [facility] on 5/31-6/7/23 with symptomatic numerous brain metastases. Treated with GK-SRS to 7 brain metastases in 5 fractions from 6/21 - 6/29/2023, planned for durvalumab 8/4/2023 however appt cancelled. Representing to [facility] now w/  with dizziness, weakness, pain, HA, blurry vision   - recent CT C/A/P showing enlarging lung lesion and new subcentimeter liver lesion concerning for POD  -s/p R. temporal craniotomy for resection of tumor 9/30, now on decadron, keppra, NYSG following  - pending CTH post-op   - please consult radiation oncology to assess for post-op radiation. Plan for further immunotherapy treatment outpatient  - palliative consult to help assist with GOC/ pain control  - no inpatient systemic treatment planned  - outpatient follow up once stable for discharge, please include hematology oncology f/u at [facility] cancer [place] in discharge note    Discussed w/ Dr. Jose F Acevedo MD, PhD  Heme/Onc Fellow  PGY4

## 2023-10-02 NOTE — PROGRESS NOTE ADULT - NS ATTEND AMEND GEN_ALL_CORE FT
Pt seen this evening, alert, awake in NAD.  C/o right leg feeling heavy.  Pt with right periorbital swelling, but able to open eye.  Dressing dry.  NEWTON with no drift, mild weakness right LE.  Pt has been recommended for acute rehab.  Blood glucose elevated today and dex reduced to 2 mg qid.  Pending MRI brain.  Continue monitoring.

## 2023-10-02 NOTE — OCCUPATIONAL THERAPY INITIAL EVALUATION ADULT - PLANNED THERAPY INTERVENTIONS, OT EVAL
ADL retraining/balance training/transfer training
ADL retraining/balance training/cognitive, visual perceptual/transfer training

## 2023-10-02 NOTE — PROGRESS NOTE ADULT - PROBLEM SELECTOR PLAN 4
- DM a1c 7.2 end of August 2023. Now 8.0     - Steroid induced hyperglycemia. On Lantus 15u daily + Lispro 5u TID +MDSS. FS elevated today- Admelog increased, monitor.

## 2023-10-02 NOTE — PROGRESS NOTE ADULT - PROBLEM SELECTOR PLAN 1
S/p resection as above. On Depakote for seizure prophylaxis, Decadron taper for vasogenic edema.     CT 10/1- Postoperative changes related to a recent right temporal craniotomy for resection of a metastatic lesion. Small midline shift to the left of approximately 2 mm. Stable appearing metastatic lesions in the left parietal and occipital lobes with surrounding vasogenic edema.    Tachycardia today- likely multifactorial- due to deconditioning, steroids. Not hypoxic, LE doppler 10/1 negative. Clinical suspicion for PE low but is high risk given metastatic ca. If tachy persists- would get CTA to r/o PE.     Thrombocytopenia- possibly consumptive postop- monitor. S/p resection as above. On Depakote for seizure prophylaxis, Decadron taper for vasogenic edema.     CT 10/1- Postoperative changes related to a recent right temporal craniotomy for resection of a metastatic lesion. Small midline shift to the left of approximately 2 mm. Stable appearing metastatic lesions in the left parietaland occipital lobes with surrounding vasogenic edema.    Tachycardia today- likely multifactorial- due to deconditioning, steroids. Not hypoxic, LE doppler 10/1 negative. Clinical suspicion for PE low but is high risk given metastatic ca. If tachy persists- would get CTA to r/o PE.     Thrombocytopenia- possibly consumptive postop- monitor.

## 2023-10-02 NOTE — OCCUPATIONAL THERAPY INITIAL EVALUATION ADULT - PERTINENT HX OF CURRENT PROBLEM, REHAB EVAL
Pt is a 51 yo female admitted to [facility] on 9/19/23 PMHx stage IV lung cancer with mets to brain s/p 4 cycles chemo and thoracic RT completed on oct 2022, gamma knife surgery to 7 brain mets, recent admission to [facility] from sept 4-13th for epigastric pain and hyperglycemia thought to be due to steroids presents to ED complaining of 1 weeks of lower extremity weakness and intermittent blurry vision.  Pt was initially seen at the end of August and admitted for hx of migraines and dizziness, was started on  decadron 6mg q6hr and Depakote at that time. Returned to [facility] again early september for worsening epigastric pain  in the setting of taking steroids without GI ppx. CTA at that time showing no PE, stable right apical lung mass and rapid interval growth of a left lower lobe mass and new hepatic mets. Pt finished dexamethasone taper on 9/7 and was discharged with protonix, depakote and follow up with outpatient rad onc Dr. Bernard, outpatient NSGY, and oncologist Dr. Beckman at [facility] with plans to start immunotherapy nivolumab as outpatient.  Since discharge on the 13th, pt noting weakness in right lower extremity 'muscle' pain which improves with massages, used walker at home however feels weak and requiring increasing support from fiancee to ambulate/ attend to daily needs. Notes also intermittent epigastric pain with radiation to the back. Additionally endorses some slight blurry vision over the last 2 weeks which she describes as "vision feels off" though is able to see. RLE weakness, no alarm signs of cord compression, likely MSK vs Diabetic neuropathy, dvt: ppx:  scds (as per  NSGY - obtain repeat non con head CT in the AM to evaluate for possible AC, however no AC as of now) CTA chest: No pulmonary arterial emboli. Left lower lobe previously described peripheral opacity abutting the pleural surface with interval decrease in size since September 4, 2023. Right upper lobe apical lobulated mass is without significant interval change since September 4, 2023. Hepatic hypodense lesions are not well evaluated on this chest CT due to the timing of contrast opacification although may have increased in size since September 4, 2023 given differences in technique. Dedicated contrast enhanced liver MRI can be performed for complete evaluation as clinically warranted. CTH: Left high frontoparietal mass has mildly increased in size with new high attenuating material within it concerning for new punctate hemorrhage and increased surrounding vasogenic edema. Mildly increased vasogenic edema surrounding the left occipital lesion. Grossly stable right parietotemporal lesion.  Now s/p craniotomy. Pt is a 53 yo female admitted to [facility] on 9/19/23 PMHx stage IV lung cancer with mets to brain s/p 4 cycles chemo and thoracic RT completed on oct 2022, gamma knife surgery to 7 brain mets, recent admission to [facility] from sept 4-13th for epigastric pain and hyperglycemia thought to be due to steroids presents to ED complaining of 1 weeks of lower extremity weakness and intermittent blurry vision.  Pt was initially seen at the end of August and admitted for hx of migraines and dizziness, was started on  decadron 6mg q6hr and Depakote at that time. Returned to [facility] again early september for worsening epigastric pain  in the setting of taking steroids without GI ppx. CTA at that time showing no PE, stable right apical lung mass and rapid interval growth of a left lower lobe mass and new hepatic mets. Pt finished dexamethasone taper on 9/7 and was discharged with protonix, depakote and follow up with outpatient rad onc Dr. Bernard, outpatient NSGY, and oncologist Dr. Beckman at [facility] with plans to start immunotherapy nivolumab as outpatient.  Since discharge on the 13th, pt noting weakness in right lower extremity 'muscle' pain which improves with massages, used walker at home however feels weak and requiring increasing support from fiancee to ambulate/ attend to daily needs. Notes also intermittent epigastric pain with radiation to the back. Additionally endorses some slight blurry vision over the last 2 weeks which she describes as "vision feels off" though is able to see. RLE weakness, no alarm signs of cord compression, likely MSK vs Diabetic neuropathy, dvt: ppx:  scds (as per  NSGY - obtain repeat non con head CT in the AM to evaluate for possible AC, however no AC as of now) CTA chest: No pulmonary arterial emboli. Left lower lobe previously described peripheral opacity abutting the pleural surface with interval decrease in size since September 4, 2023. Right upper lobe apical lobulated mass is without significant interval change since September 4, 2023. Hepatic hypodense lesions are not well evaluated on this chest CT due to the timing of contrast opacification although may have increased in size since September 4, 2023 given differences in technique. Dedicated contrast enhanced liver MRI can be performed for complete evaluation as clinically warranted. CTH: Left high frontoparietal mass has mildly increased in size with new high attenuating material within it concerning for new punctate hemorrhage and increased surrounding vasogenic edema. Mildly increased vasogenic edema surrounding the left occipital lesion. Grossly stable right parietotemporal lesion.  S/p R. temporal crani for resection of tumor 10/1. Pt is a 53 yo female admitted to [facility] on 9/19/23 PMHx stage IV lung cancer with mets to brain s/p 4 cycles chemo and thoracic RT completed on oct 2022, gamma knife surgery to 7 brain mets, recent admission to [facility] from sept 4-13th for epigastric pain and hyperglycemia thought to be due to steroids presents to ED complaining of 1 weeks of lower extremity weakness and intermittent blurry vision.  Pt was initially seen at the end of August and admitted for hx of migraines and dizziness, was started on  decadron 6mg q6hr and Depakote at that time. Returned to [facility] again early september for worsening epigastric pain  in the setting of taking steroids without GI ppx. CTA at that time showing no PE, stable right apical lung mass and rapid interval growth of a left lower lobe mass and new hepatic mets. Pt finished dexamethasone taper on 9/7 and was discharged with protonix, depakote and follow up with outpatient rad onc Dr. Bernard, outpatient NSGY, and oncologist Dr. Beckman at [facility] with plans to start immunotherapy nivolumab as outpatient.  Since discharge on the 13th, pt noting weakness in right lower extremity 'muscle' pain which improves with massages, used walker at home however feels weak and requiring increasing support from fiancee to ambulate/ attend to daily needs. Notes also intermittent epigastric pain with radiation to the back. Additionally endorses some slight blurry vision over the last 2 weeks which she describes as "vision feels off" though is able to see. RLE weakness, no alarm signs of cord compression, likely MSK vs Diabetic neuropathy, dvt: ppx:  scds (as per  NSGY - obtain repeat non con head CT in the AM to evaluate for possible AC, however no AC as of now) CTA chest: No pulmonary arterial emboli. Left lower lobe previously described peripheral opacity abutting the pleural surface with interval decrease in size since September 4, 2023. Right upper lobe apical lobulated mass is without significant interval change since September 4, 2023. Hepatic hypodense lesions are not well evaluated on this chest CT due to the timing of contrast opacification although may have increased in size since September 4, 2023 given differences in technique. Dedicated contrast enhanced liver MRI can be performed for complete evaluation as clinically warranted. CTH: Left high frontoparietal mass has mildly increased in size with new high attenuating material within it concerning for new punctate hemorrhage and increased surrounding vasogenic edema. Mildly increased vasogenic edema surrounding the left occipital lesion. Grossly stable right parietotemporal lesion.  S/p R. temporal crani for resection of tumor 10/1.  CT Head: 1.  Postoperative changes related to a recent right temporal craniotomy for resection of a metastatic lesion.2.  Small midline shift to the left of approximately 2 mm.3.  Stable appearing metastatic lesions in the left parietal and occipital lobes with surrounding vasogenic edema.

## 2023-10-02 NOTE — PROGRESS NOTE ADULT - PROBLEM SELECTOR PLAN 5
Likely 2/2 steroids, postop. Afebrile. No s/s of acute infectious etiology at this time.       Hyponatremia- corrected 133- Likely 2/2 steroids, postop. Afebrile. No s/s of acute infectious etiology at this time.       Hyponatremia- corrected 133- consider fluid restriction, salt tabs.

## 2023-10-03 NOTE — PROGRESS NOTE ADULT - ASSESSMENT
ASSESSMENT: 53 y/o female initially admitted on 9/19 with dizziness, syncope and HAs for 2 weeks. On 9/20 got an MRI of the brain which showed multiple mets including a large cystic R. temporal 4.5 X 3.6 cm with significant vasogenic edema. Patient now s/p R. temporal crani for resection of tumor. POD #3:    NEURO:  Neurochecks q4  decadron 2q6  Pain control with oxy and tylenol   Depakote 500 mg BID- home med  Activity: [x] OOB as tolerated [] Bedrest [x] PT [x] OT [] PMNR  10/2 mri brain new left cerebellar infarct noted, cta head and neck ordered and pending.    PULM:  RA  Incentive spirometry, mobilize as tolerated    CV:  Keep SBP <140    RENAL:  sodium 136 this am.  recheck tomorrow am    GI:  Continue CCD  GI prophylaxis [] not indicated [x] PPI [] other: Continue while on steroids   Bowel regimen [] colace [x] senna [x] other: Miralax     ENDO:   Goal euglycemia (-180)  Continue Lantus 15 units, Lispro 8 units pre meals and medium sliding scale coverage  HbA1c 8.0    HEME/ONC:  H/H stable   VTE prophylaxis: [x] SCDs [x] chemoprophylaxis [x] hold chemoprophylaxis due to: until cleared by neurosurgery.  [] high risk of DVT/PE on admission due to:     ID:  Afebrile, mild leukocytosis likely reactive 2/2 steroids   Nanci-op antibiotics    MISC:    SOCIAL/FAMILY:  [] awaiting [x] updated at bedside [] family meeting    CODE STATUS:  [x] Full Code [] DNR [] DNI [] Palliative/Comfort Care    DISPOSITION:  [] ICU [] Stroke Unit [x] Floor [] EMU [] RCU [] PCU  acute rehab pending                ASSESSMENT: 53 y/o female initially admitted on 9/19 with dizziness, syncope and HAs for 2 weeks. On 9/20 got an MRI of the brain which showed multiple mets including a large cystic R. temporal 4.5 X 3.6 cm with significant vasogenic edema. Patient now s/p R. temporal crani for resection of tumor. POD #3:    NEURO:  Neurochecks q4  decadron 2q6  Pain control with oxy and tylenol   Depakote 500 mg BID- home med  Activity: [x] OOB as tolerated [] Bedrest [x] PT [x] OT [] PMNR  10/2 mri brain new left cerebellar infarct noted, cta head and neck ordered and pending.    PULM:  RA  Incentive spirometry, mobilize as tolerated    CV:  Keep SBP <140    RENAL:  sodium 136 this am.  recheck tomorrow am    GI:  Continue CCD  GI prophylaxis [] not indicated [x] PPI [] other: Continue while on steroids   Bowel regimen [] colace [x] senna [x] other: Miralax     ENDO:   Goal euglycemia (-180)  Continue Lantus 15 units, Lispro 8 units pre meals and medium sliding scale coverage  HbA1c 8.0    HEME/ONC:  H/H stable   VTE prophylaxis: [x] SCDs [x] chemoprophylaxis [x] hold chemoprophylaxis due to: until cleared by neurosurgery.  [] high risk of DVT/PE on admission due to:     ID:  Afebrile, mild leukocytosis likely reactive 2/2 steroids   Nanci-op antibiotics    MISC:    SOCIAL/FAMILY:  [] awaiting [x] updated at bedside [] family meeting    CODE STATUS:  [x] Full Code [] DNR [] DNI [] Palliative/Comfort Care    DISPOSITION:  [] ICU [] Stroke Unit [x] Floor [] EMU [] RCU [] PCU    acute rehab pending - will not need radiation or chemo while in acute rehab.

## 2023-10-03 NOTE — PROVIDER CONTACT NOTE (OTHER) - BACKGROUND
Pt admitted for RLE weakness and blurry vision. PMH stage IV lung cancer w/mets to brain, GERD.
Pt admitted for RLE weakness and blurry vision. PMH stage IV lung cancer w/mets, GERD.
S/p Right temporal crani for resection

## 2023-10-03 NOTE — PROVIDER CONTACT NOTE (OTHER) - ACTION/TREATMENT ORDERED:
No interventions at this time.
PA notified and aware. As per PA, obtain EKG. EKG obtained and reviewed by PA, HR now 107. Will continue tele monitoring.
PA notified and aware. PA ordered one time dose of IV push morphine 2mg, given as ordered w/relief.

## 2023-10-03 NOTE — PROGRESS NOTE ADULT - SUBJECTIVE AND OBJECTIVE BOX
51 yo woman PMHx stage IV lung cancer with mets to brain s/p 4 cycles chemo and thoracic RT completed on oct 2022, gamma knife surgery to 7 brain mets , recent admission to [facility] from sept 4-13th for epigastric pain and hyperglycemia thought to be due to steroids presents to ED complaining of 1 weeks of lower extremity weakness and intermittent blurry vision.  Pt was initially seen at the end of August and admitted for hx of migraines and dizziness, was started on  decadron 6mg q6hr and Depakote at that time. Returned to [facility] again early september for worsening epigastric pain  in the setting of taking steroids without GI ppx. CTA at that time showing no PE, stable right apical lung mass and rapid interval growth of a left lower lobe mass and new hepatic mets. Pt finished dexamethasone taper on 9/7 and was discharged with protonix, depakote and follow up with outpatient rad onc Dr. Bernard, outpatient NSGY, and oncologist Dr. Beckman at [facility] with plans to start immunotherapy nivolumab as outpatient.  Since discharge on the 13th, pt noting weakness in right lower extremity 'muscle' pain which improves with massages, used walker at home however feels weak and requiring increasing support from ancee to ambulate/ attend to daily needs. Notes also intermittent epigastric pain with radiation to the back. Denies numbness or tingling, saddle anesthesia, bowel incontinence, falls or trauma. Notes occasional chest pain and SOB which resolves. Additionally endorses some slight blurry vision over the last 2 weeks which she describes as "vision feels off" though is able to see.  Denies fevers, chills, N/V/D, recent sick contacts. Social hx: no smoker, previous marijuana use, no alcohol,  lives at home with phil.      Admission Scores  GCS: 15    24 hour Events: POD 0 R crani for tumor resection. Patient now in the PACU.     Allergies    No Known Allergies    Intolerances      REVIEW OF SYSTEMS: [ ] Unable to Assess due to neurologic exam   [x ] All ROS addressed below are non-contributory, except: neck pain better after oxy  Neuro: [x ] Headache [ ] Back pain [ ] Numbness [ ] Weakness [ ] Ataxia [ ] Dizziness [ ] Aphasia [ ] Dysarthria [ ] Visual disturbance  Resp: [ ] Shortness of breath/dyspnea, [ ] Orthopnea [ ] Cough  CV: [ ] Chest pain [ ] Palpitation [ ] Lightheadedness [ ] Syncope  Renal: [ ] Thirst [ ] Edema  GI: [ ] Nausea [ ] Emesis [ ] Abdominal pain [ ] Constipation [ ] Diarrhea  Hem: [ ] Hematemesis [ ] bright red blood per rectum  ID: [ ] Fever [ ] Chills [ ] Dysuria  ENT: [ ] Rhinorrhea      DEVICES:   [ ] Restraints [ ] ET tube [ ] central line [x ] arterial line [ ] ott [ ] NGT/OGT [ ] EVD [ ] LD [ ] ANILA/HMV [ ] Trach [ ] PEG [ ] Chest Tube   Vital Signs Last 24 Hrs  T(C): 36.9 (03 Oct 2023 16:20), Max: 37.1 (03 Oct 2023 11:32)  T(F): 98.4 (03 Oct 2023 16:20), Max: 98.7 (03 Oct 2023 11:32)  HR: 97 (03 Oct 2023 16:20) (86 - 101)  BP: 119/72 (03 Oct 2023 16:20) (115/72 - 153/71)  BP(mean): --  RR: 18 (03 Oct 2023 16:20) (18 - 18)  SpO2: 99% (03 Oct 2023 16:20) (95% - 100%)    Parameters below as of 03 Oct 2023 16:20  Patient On (Oxygen Delivery Method): room air                          9.8    12.18 )-----------( 122      ( 03 Oct 2023 06:15 )             30.1   10-03    136  |  103  |  15  ----------------------------<  261<H>  3.9   |  22  |  0.35<L>    Ca    8.4      03 Oct 2023 06:15  Phos  2.3     10-03  Mg     1.9     10-03          IVF FLUIDS/MEDICATIONS:   MEDICATIONS  (STANDING):  ceFAZolin   IVPB 2000 milliGRAM(s) IV Intermittent every 8 hours  dexAMETHasone  Injectable 4 milliGRAM(s) IV Push every 6 hours  dextrose 5%. 1000 milliLiter(s) (100 mL/Hr) IV Continuous <Continuous>  dextrose 5%. 1000 milliLiter(s) (100 mL/Hr) IV Continuous <Continuous>  dextrose 5%. 1000 milliLiter(s) (50 mL/Hr) IV Continuous <Continuous>  dextrose 50% Injectable 25 Gram(s) IV Push once  dextrose 50% Injectable 25 Gram(s) IV Push once  dextrose 50% Injectable 12.5 Gram(s) IV Push once  glucagon  Injectable 1 milliGRAM(s) IntraMuscular once  insulin lispro (ADMELOG) corrective regimen sliding scale   SubCutaneous at bedtime  insulin lispro (ADMELOG) corrective regimen sliding scale   SubCutaneous three times a day before meals  levETIRAcetam  IVPB 500 milliGRAM(s) IV Intermittent every 12 hours  mupirocin 2% Nasal 1 Application(s) Both Nostrils every 12 hours  polyethylene glycol 3350 17 Gram(s) Oral every 24 hours  senna 2 Tablet(s) Oral at bedtime  sodium chloride 0.9%. 1000 milliLiter(s) (75 mL/Hr) IV Continuous <Continuous>    MEDICATIONS  (PRN):  acetaminophen     Tablet .. 650 milliGRAM(s) Oral every 6 hours PRN Mild Pain (1 - 3)  dextrose Oral Gel 15 Gram(s) Oral once PRN Blood Glucose LESS THAN 70 milliGRAM(s)/deciliter  ondansetron Injectable 4 milliGRAM(s) IV Push once PRN Nausea and/or Vomiting  oxyCODONE    IR 5 milliGRAM(s) Oral every 4 hours PRN Moderate Pain (4 - 6)        IMAGING:  < from: MR Head w/ IV Cont (09.20.23 @ 14:58) >  IMPRESSION: Abnormal lesions involving the posterior fossa and   supratentorial region are identified which are compatible with metastasis   given patient's history.      < end of copied text >        EXAMINATION:  PHYSICAL EXAM:    Constitutional: No Acute Distress     Neurological: Awake, alert oriented to person, place and time, Following Commands, PERRL, EOMI, No Gaze Preference, Face Symmetrical, Speech Fluent. Visual fields intact. RUE drift with strength 5/5. No sensory deficits.     Pulmonary: Clear to Auscultation, No rales, No rhonchi, No wheezes     Cardiovascular: S1, S2, Regular rate and rhythm     Gastrointestinal: Soft, Non-tender, Non-distended     Extremities: No calf tenderness     Incision: Covered. Clean and intact.

## 2023-10-03 NOTE — CHART NOTE - NSCHARTNOTESELECT_GEN_ALL_CORE
Brief Progress Note
Event Note
ISTOP/Event Note
Palliative  care/Event Note
ophthalmology
palliative care/Event Note

## 2023-10-03 NOTE — CHART NOTE - NSCHARTNOTEFT_GEN_A_CORE
pt seen this am s/p neurosurgical intervention.  In bed resting comfortably.  Palliative care will sign off as goals are to pursue intervention and treatement as tolerated. Please reconsult if necessary.

## 2023-10-03 NOTE — PROGRESS NOTE ADULT - NS ATTEND AMEND GEN_ALL_CORE FT
PT seen earlier today.  Pt in chair, alert, with mildly improved right periorbital edema.  NEWTON with no drift.  No dysmetria.  Wound with staples, c/d/i.  MRI done and will review more closely.  New small left cerebellar infarction noted, subclinical.  Plan CTA head and neck.  Awaiting acute rehab.

## 2023-10-04 NOTE — PROGRESS NOTE ADULT - ASSESSMENT
51 yo female PMHx stage IV lung cancer with mets to brain s/p 4 cycles chemo and thoracic RT completed on oct 2022, Gamma knife surgery to 7 brain mets, 2 recent admissions for dizziness, headaches and epigastric pain presents with right lower extremity weakness and blurry vision.     On 9/20 got an MRI of the brain which showed multiple mets including a large cystic R. temporal 4.5 X 3.6 cm with significant vasogenic edema. Patient s/p R. temporal crani for resection of tumor on 9/30.     Now transferred out of the NSCU. Walked w PT today, no dizziness or palpitations, c/o "heaviness". Reported to be tachycardic to 160s while working w PT.     MRI 10/2- Redemonstration of right temporal craniotomy. Residual 3.7 x 1.4 cm centrally necrotic/cystic peripherally enhancing lesion along the mesial aspect of the surgical cavity (20-14). Findings could represent residual enhancing neoplasm and/or postsurgical changes.New small focus of restricted diffusion in the left mid cerebellar hemisphere suggesting the presence of acute infarction. 51 yo female PMHx stage IV lung cancer with mets to brain s/p 4 cycles chemo and thoracic RT completed on oct 2022, Gamma knife surgery to 7 brain mets, 2 recent admissions for dizziness, headaches and epigastric pain presents with right lower extremity weakness and blurry vision.     On 9/20 got an MRI of the brain which showed multiple mets including a large cystic R. temporal 4.5 X 3.6 cm with significant vasogenic edema. Patient s/p R. temporal crani for resection of tumor on 9/30.     Now transferred out of the NSCU.     MRI 10/2- Redemonstration of right temporal craniotomy. Residual 3.7 x 1.4 cm centrally necrotic/cystic peripherally enhancing lesion along the mesial aspect of the surgical cavity (20-14). Findings could represent residual enhancing neoplasm and/or postsurgical changes.New small focus of restricted diffusion in the left mid cerebellar hemisphere suggesting the presence of acute infarction.

## 2023-10-04 NOTE — PROGRESS NOTE ADULT - SUBJECTIVE AND OBJECTIVE BOX
no new complaints  participating with therapy     REVIEW OF SYSTEMS  Constitutional - No fever,  No fatigue  HEENT - No vertigo, No neck pain  Neurological - No headaches, No memory loss, No loss of strength, No numbness, No tremors  Skin - No rashes, No lesions   Musculoskeletal - No joint pain, No joint swelling, No muscle pain  Psychiatric - No depression, No anxiety    FUNCTIONAL PROGRESS  10/4 PT  bed mobility min assist  transfers stand by assist, RW  gait CG with rW x 75 feet     10/3 OT  toilet transfers min assist    VITALS  T(C): 37 (10-04-23 @ 12:28), Max: 37.1 (10-04-23 @ 09:08)  HR: 76 (10-04-23 @ 12:28) (76 - 97)  BP: 114/74 (10-04-23 @ 12:28) (110/70 - 123/80)  RR: 18 (10-04-23 @ 12:28) (18 - 18)  SpO2: 99% (10-04-23 @ 12:28) (97% - 99%)  Wt(kg): --    MEDICATIONS   acetaminophen     Tablet .. 650 milliGRAM(s) every 6 hours PRN  dexAMETHasone     Tablet 3 milliGRAM(s) two times a day  dextrose 5%. 1000 milliLiter(s) <Continuous>  dextrose 5%. 1000 milliLiter(s) <Continuous>  dextrose 5%. 1000 milliLiter(s) <Continuous>  dextrose 50% Injectable 12.5 Gram(s) once  dextrose 50% Injectable 25 Gram(s) once  dextrose 50% Injectable 25 Gram(s) once  dextrose Oral Gel 15 Gram(s) once PRN  divalproex  milliGRAM(s) every 12 hours  enoxaparin Injectable 40 milliGRAM(s) <User Schedule>  glucagon  Injectable 1 milliGRAM(s) once  insulin glargine Injectable (LANTUS) 15 Unit(s) every morning  insulin lispro (ADMELOG) corrective regimen sliding scale   at bedtime  insulin lispro (ADMELOG) corrective regimen sliding scale   three times a day before meals  insulin lispro Injectable (ADMELOG) 8 Unit(s) three times a day before meals  mupirocin 2% Nasal 1 Application(s) every 12 hours  oxyCODONE    IR 5 milliGRAM(s) every 4 hours PRN  pantoprazole    Tablet 40 milliGRAM(s) before breakfast  polyethylene glycol 3350 17 Gram(s) every 24 hours  senna 2 Tablet(s) at bedtime      RECENT LABS - Reviewed                        9.8    12.18 )-----------( 122      ( 03 Oct 2023 06:15 )             30.1     10-04    134<L>  |  103  |  12  ----------------------------<  190<H>  4.4   |  23  |  0.34<L>    Ca    8.9      04 Oct 2023 06:19  Phos  3.2     10-04  Mg     2.1     10-04        Urinalysis Basic - ( 04 Oct 2023 06:19 )    Color: x / Appearance: x / SG: x / pH: x  Gluc: 190 mg/dL / Ketone: x  / Bili: x / Urobili: x   Blood: x / Protein: x / Nitrite: x   Leuk Esterase: x / RBC: x / WBC x   Sq Epi: x / Non Sq Epi: x / Bacteria: x      ----------------------------------------------------------------------------------  PHYSICAL EXAM  Constitutional - NAD, Comfortable, in chair    right periorbital swelling improved/head staples in place   Chest - Breathing comfortably, room air   Cardiovascular - S1S2   Abdomen - Soft   Extremities - No C/C/E, No calf tenderness   Neurologic Exam -                    Cognitive - Awake, Alert, AAO to self, place, date, year, situation     Communication - Fluent, No dysarthria       Motor -  moves all ext 5/5     Sensory - Intact to LT    Psychiatric - Mood stable, Affect WNL  ----------------------------------------------------------------------------------------  ASSESSMENT/PLAN  52yFemale h/o metastatic lung cancer, s/p chemo, RT with functional deficits due to brain mets  s/p right temporal craniotomy for resection of mass  on dexamethasone  awaiting radiation consult for post op radiation   Pain - Tylenol, oxycodone  DVT PPX - SCDs Lovenox   Rehab -    patient requires min assist with mobility/ADLs, cognitive deficits on short blessed test    Recommend ACUTE inpatient rehabilitation for the functional deficits consisting of 3 hours of therapy/day & 24 hour RN/daily PMR physician for comorbid medical management. Will continue to follow for ongoing rehab needs and recommendations. Patient will be able to tolerate 3 hours a day.

## 2023-10-04 NOTE — PROGRESS NOTE ADULT - SUBJECTIVE AND OBJECTIVE BOX
Patient is a 52y old  Female who presents with a chief complaint of leg weakness/ dizziness blurry vision (03 Oct 2023 16:40)      SUBJECTIVE / OVERNIGHT EVENTS:    MEDICATIONS  (STANDING):  dexAMETHasone     Tablet 3 milliGRAM(s) Oral two times a day  dextrose 5%. 1000 milliLiter(s) (50 mL/Hr) IV Continuous <Continuous>  dextrose 5%. 1000 milliLiter(s) (100 mL/Hr) IV Continuous <Continuous>  dextrose 5%. 1000 milliLiter(s) (100 mL/Hr) IV Continuous <Continuous>  dextrose 50% Injectable 25 Gram(s) IV Push once  dextrose 50% Injectable 12.5 Gram(s) IV Push once  dextrose 50% Injectable 25 Gram(s) IV Push once  divalproex  milliGRAM(s) Oral every 12 hours  enoxaparin Injectable 40 milliGRAM(s) SubCutaneous <User Schedule>  glucagon  Injectable 1 milliGRAM(s) IntraMuscular once  insulin glargine Injectable (LANTUS) 15 Unit(s) SubCutaneous every morning  insulin lispro (ADMELOG) corrective regimen sliding scale   SubCutaneous at bedtime  insulin lispro (ADMELOG) corrective regimen sliding scale   SubCutaneous three times a day before meals  insulin lispro Injectable (ADMELOG) 8 Unit(s) SubCutaneous three times a day before meals  mupirocin 2% Nasal 1 Application(s) Both Nostrils every 12 hours  pantoprazole    Tablet 40 milliGRAM(s) Oral before breakfast  polyethylene glycol 3350 17 Gram(s) Oral every 24 hours  senna 2 Tablet(s) Oral at bedtime    MEDICATIONS  (PRN):  acetaminophen     Tablet .. 650 milliGRAM(s) Oral every 6 hours PRN Mild Pain (1 - 3)  dextrose Oral Gel 15 Gram(s) Oral once PRN Blood Glucose LESS THAN 70 milliGRAM(s)/deciliter  oxyCODONE    IR 5 milliGRAM(s) Oral every 4 hours PRN Moderate Pain (4 - 6)      CAPILLARY BLOOD GLUCOSE      POCT Blood Glucose.: 130 mg/dL (04 Oct 2023 11:57)  POCT Blood Glucose.: 256 mg/dL (04 Oct 2023 07:53)  POCT Blood Glucose.: 149 mg/dL (03 Oct 2023 20:40)  POCT Blood Glucose.: 251 mg/dL (03 Oct 2023 16:30)    I&O's Summary    03 Oct 2023 07:01  -  04 Oct 2023 07:00  --------------------------------------------------------  IN: 840 mL / OUT: 0 mL / NET: 840 mL        PHYSICAL EXAM:  T(C): 37.1 (10-04-23 @ 09:08), Max: 37.1 (10-04-23 @ 09:08)  HR: 93 (10-04-23 @ 11:12) (82 - 97)  BP: 110/70 (10-04-23 @ 11:12) (110/70 - 123/80)  RR: 18 (10-04-23 @ 09:08) (18 - 18)  SpO2: 99% (10-04-23 @ 11:12) (97% - 99%)  CONSTITUTIONAL: NAD, well-developed, well-groomed  EYES: PERRLA; conjunctiva and sclera clear  ENMT: Moist oral mucosa, no pharyngeal injection or exudates; normal dentition  NECK: Supple, no palpable masses; no thyromegaly  RESPIRATORY: Normal respiratory effort; lungs are clear to auscultation bilaterally  CARDIOVASCULAR: Regular rate and rhythm, normal S1 and S2, no murmur/rub/gallop; No lower extremity edema; Peripheral pulses are 2+ bilaterally  ABDOMEN: Nontender to palpation, normoactive bowel sounds, no rebound/guarding; No hepatosplenomegaly  MUSCULOSKELETAL:  Normal gait; no clubbing or cyanosis of digits; no joint swelling or tenderness to palpation  PSYCH: A+O to person, place, and time; affect appropriate  NEUROLOGY: CN 2-12 are intact and symmetric; no gross sensory deficits   SKIN: No rashes; no palpable lesions    LABS:                        9.8    12.18 )-----------( 122      ( 03 Oct 2023 06:15 )             30.1     10-04    134<L>  |  103  |  12  ----------------------------<  190<H>  4.4   |  23  |  0.34<L>    Ca    8.9      04 Oct 2023 06:19  Phos  3.2     10-04  Mg     2.1     10-04            Urinalysis Basic - ( 04 Oct 2023 06:19 )    Color: x / Appearance: x / SG: x / pH: x  Gluc: 190 mg/dL / Ketone: x  / Bili: x / Urobili: x   Blood: x / Protein: x / Nitrite: x   Leuk Esterase: x / RBC: x / WBC x   Sq Epi: x / Non Sq Epi: x / Bacteria: x        RADIOLOGY & ADDITIONAL TESTS:    Imaging Personally Reviewed:    Consultant(s) Notes Reviewed:      Care Discussed with Consultants/Other Providers: Nsx   Patient is a 52y old  Female who presents with a chief complaint of leg weakness/ dizziness blurry vision (03 Oct 2023 16:40)      SUBJECTIVE / OVERNIGHT EVENTS: Pt in bed, feels well.     MEDICATIONS  (STANDING):  dexAMETHasone     Tablet 3 milliGRAM(s) Oral two times a day  dextrose 5%. 1000 milliLiter(s) (50 mL/Hr) IV Continuous <Continuous>  dextrose 5%. 1000 milliLiter(s) (100 mL/Hr) IV Continuous <Continuous>  dextrose 5%. 1000 milliLiter(s) (100 mL/Hr) IV Continuous <Continuous>  dextrose 50% Injectable 25 Gram(s) IV Push once  dextrose 50% Injectable 12.5 Gram(s) IV Push once  dextrose 50% Injectable 25 Gram(s) IV Push once  divalproex  milliGRAM(s) Oral every 12 hours  enoxaparin Injectable 40 milliGRAM(s) SubCutaneous <User Schedule>  glucagon  Injectable 1 milliGRAM(s) IntraMuscular once  insulin glargine Injectable (LANTUS) 15 Unit(s) SubCutaneous every morning  insulin lispro (ADMELOG) corrective regimen sliding scale   SubCutaneous at bedtime  insulin lispro (ADMELOG) corrective regimen sliding scale   SubCutaneous three times a day before meals  insulin lispro Injectable (ADMELOG) 8 Unit(s) SubCutaneous three times a day before meals  mupirocin 2% Nasal 1 Application(s) Both Nostrils every 12 hours  pantoprazole    Tablet 40 milliGRAM(s) Oral before breakfast  polyethylene glycol 3350 17 Gram(s) Oral every 24 hours  senna 2 Tablet(s) Oral at bedtime    MEDICATIONS  (PRN):  acetaminophen     Tablet .. 650 milliGRAM(s) Oral every 6 hours PRN Mild Pain (1 - 3)  dextrose Oral Gel 15 Gram(s) Oral once PRN Blood Glucose LESS THAN 70 milliGRAM(s)/deciliter  oxyCODONE    IR 5 milliGRAM(s) Oral every 4 hours PRN Moderate Pain (4 - 6)      CAPILLARY BLOOD GLUCOSE      POCT Blood Glucose.: 130 mg/dL (04 Oct 2023 11:57)  POCT Blood Glucose.: 256 mg/dL (04 Oct 2023 07:53)  POCT Blood Glucose.: 149 mg/dL (03 Oct 2023 20:40)  POCT Blood Glucose.: 251 mg/dL (03 Oct 2023 16:30)    I&O's Summary    03 Oct 2023 07:01  -  04 Oct 2023 07:00  --------------------------------------------------------  IN: 840 mL / OUT: 0 mL / NET: 840 mL        PHYSICAL EXAM:  T(C): 37.1 (10-04-23 @ 09:08), Max: 37.1 (10-04-23 @ 09:08)  HR: 93 (10-04-23 @ 11:12) (82 - 97)  BP: 110/70 (10-04-23 @ 11:12) (110/70 - 123/80)  RR: 18 (10-04-23 @ 09:08) (18 - 18)  SpO2: 99% (10-04-23 @ 11:12) (97% - 99%)  CONSTITUTIONAL: NAD, well-developed, well-groomed  EYES: PERRLA; conjunctiva and sclera clear  ENMT: Moist oral mucosa, no pharyngeal injection or exudates; normal dentition  NECK: Supple, no palpable masses; no thyromegaly  RESPIRATORY: Normal respiratory effort; lungs are clear to auscultation bilaterally  CARDIOVASCULAR: Regular rate and rhythm, normal S1 and S2, no murmur/rub/gallop; No lower extremity edema; Peripheral pulses are 2+ bilaterally  ABDOMEN: Nontender to palpation, normoactive bowel sounds, no rebound/guarding; No hepatosplenomegaly  MUSCULOSKELETAL:  Normal gait; no clubbing or cyanosis of digits; no joint swelling or tenderness to palpation  PSYCH: A+O to person, place, and time; affect appropriate  NEUROLOGY: CN 2-12 are intact and symmetric; no gross sensory deficits   SKIN: No rashes; no palpable lesions    LABS:                        9.8    12.18 )-----------( 122      ( 03 Oct 2023 06:15 )             30.1     10-04    134<L>  |  103  |  12  ----------------------------<  190<H>  4.4   |  23  |  0.34<L>    Ca    8.9      04 Oct 2023 06:19  Phos  3.2     10-04  Mg     2.1     10-04            Urinalysis Basic - ( 04 Oct 2023 06:19 )    Color: x / Appearance: x / SG: x / pH: x  Gluc: 190 mg/dL / Ketone: x  / Bili: x / Urobili: x   Blood: x / Protein: x / Nitrite: x   Leuk Esterase: x / RBC: x / WBC x   Sq Epi: x / Non Sq Epi: x / Bacteria: x        RADIOLOGY & ADDITIONAL TESTS:    Imaging Personally Reviewed:    Consultant(s) Notes Reviewed:      Care Discussed with Consultants/Other Providers: Nsx

## 2023-10-04 NOTE — PROGRESS NOTE ADULT - PROBLEM SELECTOR PLAN 3
- likely related to malignancy/mets/edema. Seen by ophtho   - MR orbits and venogram no acute findings.   - artificial tears prn.   - Eye patch for comfort if patient amenable. Says when she covers one eye the diplopia resolves.

## 2023-10-04 NOTE — PROGRESS NOTE ADULT - PROBLEM SELECTOR PLAN 4
- DM a1c 7.2 end of August 2023. Now 8.0     - Steroid induced hyperglycemia. On Lantus 15u daily + Lispro 8u TID +MDSS. FS elevated today- given additional 5U lantus. Decadron being tapered- expect FS to improve, monitor.

## 2023-10-04 NOTE — PROGRESS NOTE ADULT - PROBLEM SELECTOR PLAN 1
S/p resection as above. On Depakote for seizure prophylaxis, Decadron taper for vasogenic edema.     Postop MRI w acute infarct as above. CTA head and neck pending.     Tachycardia postop to 150s- now improved.     Thrombocytopenia- possibly consumptive postop- monitor. S/p resection as above. On Depakote for seizure prophylaxis, Decadron taper for vasogenic edema.     Postop MRI w acute infarct as above. CTA head and neck pending.     Tachycardia postop to 160s- now improved.     Thrombocytopenia- possibly consumptive postop- monitor.

## 2023-10-04 NOTE — PROGRESS NOTE ADULT - PROBLEM SELECTOR PLAN 5
Likely 2/2 steroids, postop. Afebrile. No s/s of acute infectious etiology at this time- improving.       Hyponatremia- corrected 134- consider fluid restriction, salt tabs. Likely 2/2 steroids, postop. Afebrile. No s/s of acute infectious etiology at this time- improving.

## 2023-10-05 NOTE — DISCHARGE NOTE PROVIDER - HOSPITAL COURSE
51 yo female PMHx stage IV lung cancer with mets to brain s/p 4 cycles chemo and thoracic RT completed on oct 2022, gamma knife surgery to 7 brain mets , recent admission to [facility] from sept 4-13th for epigastric pain and hyperglycemia thought to be due to steroids presents to ED complaining of 1 weeks of lower extremity weakness and intermittent blurry vision.  Pt was initially seen at the end of August and admitted for hx of migraines and dizziness, was started on  decadron 6mg q6hr and Depakote at that time. Returned to [facility] again early september for worsening epigastric pain  in the setting of taking steroids without GI ppx. CTA at that time showing no PE, stable right apical lung mass and rapid interval growth of a left lower lobe mass and new hepatic mets. Pt finished dexamethasone taper on 9/7 and was discharged with protonix, depakote and follow up with outpatient rad onc Dr. Bernard, outpatient NSGY, and oncologist Dr. Beckman at [facility] with plans to start immunotherapy nivolumab as outpatient.  Since discharge on the 13th, pt noting weakness in right lower extremity 'muscle' pain which improves with massages, used walker at home however feels weak and requiring increasing support from nicolase to ambulate/ attend to daily needs. Notes also intermittent epigastric pain with radiation to the back. Denies numbness or tingling, saddle anesthesia, bowel incontinence, falls or trauma. Notes occasional chest pain and SOB which resolves. Additionally endorses some slight blurry vision over the last 2 weeks which she describes as "vision feels off" though is able to see.  Denies fevers, chills, N/V/D, recent sick contacts. Social hx: no smoker, previous marijuana use, no alcohol,  lives at home with phil.    admitted 9/30 and underwent R temporal crani for rsxn of metastatic lesion.  seen by pt/ot/pmr and deemed appropriate for inpatient acute rehab.    f/u w/ oncology for lung, radiation oncology for brain, dr. michelle for surgery.  pt. to follow up w/ dr. jalyn willams in 6 months for findings on 10/4 cta head.    no asa for secondary prevention until ok'd by dr. michelle.    wound closure due to be removed postop day 10-14 in rehab. 51 yo female PMHx stage IV lung cancer with mets to brain s/p 4 cycles chemo and thoracic RT completed on oct 2022, gamma knife surgery to 7 brain mets , recent admission to [facility] from sept 4-13th for epigastric pain and hyperglycemia thought to be due to steroids presents to ED complaining of 1 weeks of lower extremity weakness and intermittent blurry vision.  Pt was initially seen at the end of August and admitted for hx of migraines and dizziness, was started on  decadron 6mg q6hr and Depakote at that time. Returned to [facility] again early september for worsening epigastric pain  in the setting of taking steroids without GI ppx. CTA at that time showing no PE, stable right apical lung mass and rapid interval growth of a left lower lobe mass and new hepatic mets. Pt finished dexamethasone taper on 9/7 and was discharged with protonix, depakote and follow up with outpatient rad onc Dr. Bernard, outpatient NSGY, and oncologist Dr. Beckman at [facility] with plans to start immunotherapy nivolumab as outpatient.  Since discharge on the 13th, pt noting weakness in right lower extremity 'muscle' pain which improves with massages, used walker at home however feels weak and requiring increasing support from nicolase to ambulate/ attend to daily needs. Notes also intermittent epigastric pain with radiation to the back. Denies numbness or tingling, saddle anesthesia, bowel incontinence, falls or trauma. Notes occasional chest pain and SOB which resolves. Additionally endorses some slight blurry vision over the last 2 weeks which she describes as "vision feels off" though is able to see.  Denies fevers, chills, N/V/D, recent sick contacts. Social hx: no smoker, previous marijuana use, no alcohol,  lives at home with phil.    admitted 9/30 and underwent R temporal crani for rsxn of metastatic lesion.  seen by pt/ot/pmr and deemed appropriate for inpatient acute rehab.    f/u w/ oncology for lung, radiation oncology for brain, dr. michelle for surgery.  pt. to follow up w/ dr. jalyn willams in 6 months for findings on 10/4 cta head.    no asa for secondary prevention until ok'd by dr. michelle.    wound closure due to be removed postop day 10-14 in rehab.  please follow up with repeat cbc for platelets and monitor/adjust glycemic control regimen.

## 2023-10-05 NOTE — H&P ADULT - NSHPREVIEWOFSYSTEMS_GEN_ALL_CORE
REVIEW OF SYSTEMS  Constitutional: No fever, No Chills, No fatigue  HEENT: No eye pain, +blurry vision, No difficulty hearing  Pulm: No cough,  No shortness of breath  Cardio: No chest pain, No palpitations  GI:  No abdominal pain, No nausea, No vomiting, No diarrhea, No constipation  : No dysuria, No frequency, No hematuria  Neuro: +headaches, No memory loss, No loss of strength, No numbness  Skin: No itching, No rashes, No lesions   MSK: No joint pain, No joint swelling, No muscle pain, No Neck or back pain  Psych:  No depression, No anxiety

## 2023-10-05 NOTE — PROGRESS NOTE ADULT - ASSESSMENT
ASSESSMENT: 53 y/o female initially admitted on 9/19 with dizziness, syncope and HAs for 2 weeks. On 9/20 got an MRI of the brain which showed multiple mets including a large cystic R. temporal 4.5 X 3.6 cm with significant vasogenic edema. Patient now s/p R. temporal crani for resection of tumor. POD #5:    NEURO:  Neurochecks q4  decadron taper ordered  Pain control with oxy and tylenol   Depakote 500 mg BID- home med  Activity: [x] OOB as tolerated [] Bedrest [x] PT [x] OT [] PMNR  10/2 mri brain new left cerebellar infarct noted, cta head and neck ordered and pending.  10/4 cta head/neck R ica terminus infundibulum, left cerebellar infarct, mets, R apical lung lesion    PULM:  RA  Incentive spirometry, mobilize as tolerated    CV:  Keep SBP <140    RENAL:  sodium 134    GI:  Continue CCD  GI prophylaxis [] not indicated [x] PPI [] other: Continue while on steroids   Bowel regimen [] colace [x] senna [x] other: Miralax     ENDO:   Goal euglycemia (-180)  Continue glycemic control w/ insulin which is a bit better, lantus 15, meal 8, medium scale  HbA1c 8.0    HEME/ONC:  H/H stable   VTE prophylaxis: [x] SCDs [x] chemoprophylaxis [x] hold chemoprophylaxis due to: until cleared by neurosurgery.  [] high risk of DVT/PE on admission due to:     ID:  Afebrile, mild leukocytosis likely reactive 2/2 steroids   Nanci-op antibiotics    MISC:    SOCIAL/FAMILY:  [] awaiting [x] updated at bedside [] family meeting    CODE STATUS:  [x] Full Code [] DNR [] DNI [] Palliative/Comfort Care    DISPOSITION:  [] ICU [] Stroke Unit [x] Floor [] EMU [] RCU [] PCU    acute rehab pending - will not need radiation or chemo while in acute rehab.

## 2023-10-05 NOTE — H&P ADULT - HISTORY OF PRESENT ILLNESS
Patient is a 51 yo F with history of stage IV lung cancer with mets to brain (s/p 4 cycles chemo and thoracic RT completed on oct 2022), gamma knife surgery to 7 brain mets , recent admission to [facility] from sept 4-13th for epigastric pain and hyperglycemia thought to be due to steroids, who presented to [facility] ED 10/5 complaining of 1 week of lower extremity weakness and intermittent blurry vision.  Patient was initially seen at the end of August and admitted for hx of migraines and dizziness, was started on  decadron 6mg q6hr and Depakote at that time. She returned to [facility] again in early september for worsening epigastric pain  in the setting of taking steroids without GI ppx. CTA at that time showing no PE, stable right apical lung mass and rapid interval growth of a left lower lobe mass and new hepatic mets. Pt finished dexamethasone taper on 9/7 and was discharged with protonix, depakote and follow up with outpatient rad onc Dr. Bernard, outpatient NSGY, and oncologist Dr. Beckman at [facility] with plans to start immunotherapy nivolumab as outpatient.  Since discharge on the 13th, patient noted weakness in her right lower extremity and 'muscle pain' which improves with massages. She used her walker at home however she feels weak and was requiring increasing support from her fiancee to ambulate/attend to daily needs. She noted also intermittent epigastric pain with radiation to the back. and endorsef some slight blurry vision over the last 2 weeks which she describes as "vision feels off" though is able to see.    Patient was admitted 9/30 and underwent R temporal crani for resection of metastatic lesion. Post-operative course was complicated by acute cerebellar infarct found on post-op MRI, tachycardia, and thrombocytopenia. She was recommended for follow up with oncology for lung lesions, radiation oncology for brain, and dr. michelle for post-operative follow up. She will also follow up with Dr. Pam Randle in 6 months for findings on 10/4 CTA head. Plan for no ASA for secondary prevention until cleared by Dr. Michelle. Wound closure due to be removed postop day 10-14 in rehab. Patient was evaluated by PM&R and therapy for functional deficits and gait/ ADL impairments and recommended acute rehabilitation. Patient was medically optimized for discharge to [facility] on 10/5/23. Patient is a 51 yo F with history of stage IV lung cancer with mets to brain (s/p 4 cycles chemo and thoracic RT completed on oct 2022), gamma knife surgery to 7 brain mets , recent admission to [facility] from sept 4-13th for epigastric pain and hyperglycemia thought to be due to steroids, who presented to [facility] ED 10/5 complaining of 1 week of lower extremity weakness and intermittent blurry vision.  Patient was initially seen at the end of August and admitted for hx of migraines and dizziness, was started on  decadron 6mg q6hr and Depakote at that time. She returned to [facility] again in early september for worsening epigastric pain  in the setting of taking steroids without GI ppx. CTA at that time showing no PE, stable right apical lung mass and rapid interval growth of a left lower lobe mass and new hepatic mets. Pt finished dexamethasone taper on 9/7 and was discharged with protonix, depakote and follow up with outpatient rad onc Dr. Bernard, outpatient NSGY, and oncologist Dr. Beckman at [facility] with plans to start immunotherapy nivolumab as outpatient.  Since discharge on the 13th, patient noted weakness in her right lower extremity and 'muscle pain' which improves with massages. She used her walker at home however she feels weak and was requiring increasing support from her fiancee to ambulate/attend to daily needs. She noted also intermittent epigastric pain with radiation to the back. and endorsef some slight blurry vision over the last 2 weeks which she describes as "vision feels off" though is able to see.    Patient was admitted 9/30 and underwent R temporal crani for resection of metastatic lesion. Post-operative course was complicated by acute left cerebellar infarct found on post-op MRI, tachycardia, and thrombocytopenia. She was recommended for follow up with oncology for lung lesions, radiation oncology for brain, and dr. michelle for post-operative follow up. She will also follow up with Dr. Pam Randle in 6 months for findings on 10/4 CTA head. Plan for no ASA for secondary prevention until cleared by Dr. Michelle. Wound closure due to be removed postop day 10-14 in rehab. Patient was evaluated by PM&R and therapy for functional deficits and gait/ ADL impairments and recommended acute rehabilitation. Patient was medically optimized for discharge to [facility] on 10/5/23.

## 2023-10-05 NOTE — H&P ADULT - NSHPLABSRESULTS_GEN_ALL_CORE
< from: CT Head No Cont (10.04.23 @ 15:16) >    edemonstrated postoperative changes related to a prior right   temporal craniotomy for resection of a metastatic lesion. Postoperative   findings are relatively stable.  2.  Other metastatic lesions seen throughout the brain parenchyma with   surrounding vasogenic edema, again relatively stable.  3.  Redemonstrated acute infarct in the left cerebellar hemisphere,   stable from recent MR imaging.    CTA BRAIN:  1.  No evidence of abrupt cut off of intraluminal contrast.  2.  1 to 2 mm medially oriented vascular outpouching from the right ICA   terminus. This could represent a small saccular aneurysm versus a   vascular infundibulum.    CTA NECK:  1.  No evidence of critical stenosis by NASCET criteria.  2.  Multiple nodules seen in the bilateral lobes of the thyroid.   Recommend nonemergent thyroid ultrasound for further characterization if  clinically indicated.  3.  Redemonstrated partially visualized right apex lung mass. Correlate   with recent CT imaging of the chest for further evaluation.        < from: MR Head w/wo IV Cont (10.02.23 @ 23:54) >    IMPRESSION:    Redemonstration of right temporal craniotomy.    Residual 3.7 x 1.4 cm centrally necrotic/cystic peripherally enhancing   lesion along the mesial aspect of the surgical cavity (20-14). Findings   could represent residual enhancing neoplasm and/or postsurgical changes.    Additional curvilinear, thickened region of enhancement capping the   mesial aspect of the surgical cavity measures 7 mm in greatest thickness   (20-13). Findings could represent residual enhancing neoplasm and/or   postsurgical changes.    Previously seen an described multifocal enhancing parenchymal lesions   with internal susceptibility artifact in the bilateral frontal and right   cerebellar hemispheres are not significantly changed.    New small focus of restricted diffusion in the left mid cerebellar   hemisphere suggesting the presence of acute infarction.    Findings were discussed with Dr. Banuelos by Dr. Hooks on 10/3/2023 9:11 AM   with readback confirmation. Below that          < from: [facility] Duplex Lower Ext Vein Scan, Bilat (10.01.23 @ 15:45) >      IMPRESSION:  No evidence of deep venous thrombosis in either lower extremity.

## 2023-10-05 NOTE — PROGRESS NOTE ADULT - ASSESSMENT
51 yo female PMHx stage IV lung cancer with mets to brain s/p 4 cycles chemo and thoracic RT completed on oct 2022, Gamma knife surgery to 7 brain mets, 2 recent admissions for dizziness, headaches and epigastric pain presents with right lower extremity weakness and blurry vision.     On 9/20 got an MRI of the brain which showed multiple mets including a large cystic R. temporal 4.5 X 3.6 cm with significant vasogenic edema. Patient s/p R. temporal crani for resection of tumor on 9/30.     Now transferred out of the NSCU.     MRI 10/2- Redemonstration of right temporal craniotomy. Residual 3.7 x 1.4 cm centrally necrotic/cystic peripherally enhancing lesion along the mesial aspect of the surgical cavity (20-14). Findings could represent residual enhancing neoplasm and/or postsurgical changes.New small focus of restricted diffusion in the left mid cerebellar hemisphere suggesting the presence of acute infarction.    10/4 cta head/neck R ica terminus infundibulum, left cerebellar infarct, mets, R apical lung lesion    Pt currently up in bed, feels well, able to ambulate w walker.

## 2023-10-05 NOTE — H&P ADULT - NSHPPHYSICALEXAM_GEN_ALL_CORE
Gen - NAD, Comfortable  HEENT - NCAT, EOMI, MMM, staples to right craniotomy incision  Pulm - CTAB, No wheeze, No rhonchi, No crackles  Cardiovascular - RRR, S1S2, No murmurs  Abdomen - Soft, NT/ND, +BS  Extremities - No C/C/E, No calf tenderness  Neuro-     Cognitive - AAOx3     Communication - Fluent, No dysarthria     Attention: able to state days of the week backwards     Memory: Recall 3 objects immediate, delayed recall 1/3 without categorical cues and 2/3 with categorical cues         Cranial Nerves - no facial asymmetry, tongue midline, EOMI     Motor -                     LEFT    UE - ShAB 5/5, EF 5/5, EE 5/5, WE 5/5,  5/5                    RIGHT UE - ShAB 5/5, EF 5/5, EE 5/5, WE 5/5,  5/5, +slight pronator drift                    LEFT    LE - HF 4/5, KE 5/5, DF 5/5, PF 5/5                    RIGHT LE - HF 4/5, KE 5/5, DF 5/5, PF 5/5        Sensory - Intact to LT     Coordination - slowing with right FTN, left intact     Tone - normal  Psychiatric - Mood stable, Affect WNL  Skin:  all skin intact    Wounds: right craniotomy incision with staples in place and dried blood Gen - NAD, Comfortable  HEENT - NCAT, EOMI, MMM, staples to right craniotomy incision  Pulm - CTAB, No wheeze, No rhonchi, No crackles  Cardiovascular - RRR, S1S2, No murmurs  Abdomen - Soft, NT/ND, +BS  Extremities - No C/C/E, No calf tenderness  Neuro-     Cognitive - AAOx3     Communication - Fluent, No dysarthria     Attention: able to state days of the week backwards     Memory: Recall 3 objects immediate, delayed recall 1/3 without categorical cues and 2/3 with categorical cues         Cranial Nerves - no facial asymmetry, tongue midline, EOMI     Motor -                     LEFT    UE - ShAB 5/5, EF 5/5, EE 5/5, WE 5/5,  5/5                    RIGHT UE - ShAB 3/5, EF 3/5, EE 3/5, WE 3/5,  3+/5, +slight pronator drift                    LEFT    LE - HF 4/5, KE 5/5, DF 5/5, PF 5/5                    RIGHT LE - HF 3/5, KE 3/5, DF 3/5, PF 5/5        Sensory - Intact to LT     Coordination - slowing with right FTN, left intact     Tone - normal  Psychiatric - Mood stable, Affect WNL  Skin:  all skin intact    Wounds: right craniotomy incision with staples in place and dried blood

## 2023-10-05 NOTE — PROGRESS NOTE ADULT - SUBJECTIVE AND OBJECTIVE BOX
53 yo woman PMHx stage IV lung cancer with mets to brain s/p 4 cycles chemo and thoracic RT completed on oct 2022, gamma knife surgery to 7 brain mets , recent admission to [facility] from sept 4-13th for epigastric pain and hyperglycemia thought to be due to steroids presents to ED complaining of 1 weeks of lower extremity weakness and intermittent blurry vision.  Pt was initially seen at the end of August and admitted for hx of migraines and dizziness, was started on  decadron 6mg q6hr and Depakote at that time. Returned to [facility] again early september for worsening epigastric pain  in the setting of taking steroids without GI ppx. CTA at that time showing no PE, stable right apical lung mass and rapid interval growth of a left lower lobe mass and new hepatic mets. Pt finished dexamethasone taper on 9/7 and was discharged with protonix, depakote and follow up with outpatient rad onc Dr. Bernard, outpatient NSGY, and oncologist Dr. Beckman at [facility] with plans to start immunotherapy nivolumab as outpatient.  Since discharge on the 13th, pt noting weakness in right lower extremity 'muscle' pain which improves with massages, used walker at home however feels weak and requiring increasing support from ancee to ambulate/ attend to daily needs. Notes also intermittent epigastric pain with radiation to the back. Denies numbness or tingling, saddle anesthesia, bowel incontinence, falls or trauma. Notes occasional chest pain and SOB which resolves. Additionally endorses some slight blurry vision over the last 2 weeks which she describes as "vision feels off" though is able to see.  Denies fevers, chills, N/V/D, recent sick contacts. Social hx: no smoker, previous marijuana use, no alcohol,  lives at home with phil.      Admission Scores  GCS: 15    24 hour Events: POD 0 R crani for tumor resection. Patient now in the PACU.     Allergies    No Known Allergies    Intolerances      REVIEW OF SYSTEMS: [ ] Unable to Assess due to neurologic exam   [x ] All ROS addressed below are non-contributory, except: neck pain better after oxy  Neuro: [x ] Headache [ ] Back pain [ ] Numbness [ ] Weakness [ ] Ataxia [ ] Dizziness [ ] Aphasia [ ] Dysarthria [ ] Visual disturbance  Resp: [ ] Shortness of breath/dyspnea, [ ] Orthopnea [ ] Cough  CV: [ ] Chest pain [ ] Palpitation [ ] Lightheadedness [ ] Syncope  Renal: [ ] Thirst [ ] Edema  GI: [ ] Nausea [ ] Emesis [ ] Abdominal pain [ ] Constipation [ ] Diarrhea  Hem: [ ] Hematemesis [ ] bright red blood per rectum  ID: [ ] Fever [ ] Chills [ ] Dysuria  ENT: [ ] Rhinorrhea      DEVICES:   [ ] Restraints [ ] ET tube [ ] central line [x ] arterial line [ ] ott [ ] NGT/OGT [ ] EVD [ ] LD [ ] ANILA/HMV [ ] Trach [ ] PEG [ ] Chest Tube       Vital Signs Last 24 Hrs  T(C): 36.7 (05 Oct 2023 09:57), Max: 37 (04 Oct 2023 12:28)  T(F): 98.1 (05 Oct 2023 09:57), Max: 98.6 (04 Oct 2023 12:28)  HR: 93 (05 Oct 2023 09:57) (76 - 93)  BP: 125/79 (05 Oct 2023 09:57) (103/70 - 127/79)  BP(mean): --  RR: 18 (05 Oct 2023 09:57) (18 - 18)  SpO2: 98% (05 Oct 2023 09:57) (97% - 99%)    Parameters below as of 05 Oct 2023 09:57  Patient On (Oxygen Delivery Method): room air    10-04    134<L>  |  103  |  12  ----------------------------<  190<H>  4.4   |  23  |  0.34<L>    Ca    8.9      04 Oct 2023 06:19  Phos  3.2     10-04  Mg     2.1     10-04            IVF FLUIDS/MEDICATIONS:   MEDICATIONS  (STANDING):  ceFAZolin   IVPB 2000 milliGRAM(s) IV Intermittent every 8 hours  dexAMETHasone  Injectable 4 milliGRAM(s) IV Push every 6 hours  dextrose 5%. 1000 milliLiter(s) (100 mL/Hr) IV Continuous <Continuous>  dextrose 5%. 1000 milliLiter(s) (100 mL/Hr) IV Continuous <Continuous>  dextrose 5%. 1000 milliLiter(s) (50 mL/Hr) IV Continuous <Continuous>  dextrose 50% Injectable 25 Gram(s) IV Push once  dextrose 50% Injectable 25 Gram(s) IV Push once  dextrose 50% Injectable 12.5 Gram(s) IV Push once  glucagon  Injectable 1 milliGRAM(s) IntraMuscular once  insulin lispro (ADMELOG) corrective regimen sliding scale   SubCutaneous at bedtime  insulin lispro (ADMELOG) corrective regimen sliding scale   SubCutaneous three times a day before meals  levETIRAcetam  IVPB 500 milliGRAM(s) IV Intermittent every 12 hours  mupirocin 2% Nasal 1 Application(s) Both Nostrils every 12 hours  polyethylene glycol 3350 17 Gram(s) Oral every 24 hours  senna 2 Tablet(s) Oral at bedtime  sodium chloride 0.9%. 1000 milliLiter(s) (75 mL/Hr) IV Continuous <Continuous>    MEDICATIONS  (PRN):  acetaminophen     Tablet .. 650 milliGRAM(s) Oral every 6 hours PRN Mild Pain (1 - 3)  dextrose Oral Gel 15 Gram(s) Oral once PRN Blood Glucose LESS THAN 70 milliGRAM(s)/deciliter  ondansetron Injectable 4 milliGRAM(s) IV Push once PRN Nausea and/or Vomiting  oxyCODONE    IR 5 milliGRAM(s) Oral every 4 hours PRN Moderate Pain (4 - 6)        IMAGING:  < from: MR Head w/ IV Cont (09.20.23 @ 14:58) >  IMPRESSION: Abnormal lesions involving the posterior fossa and   supratentorial region are identified which are compatible with metastasis   given patient's history.      < end of copied text >        EXAMINATION:  PHYSICAL EXAM:    Constitutional: No Acute Distress     Neurological: Awake, alert oriented to person, place and time, Following Commands, PERRL, EOMI, No Gaze Preference, Face Symmetrical, Speech Fluent. Visual fields intact. RUE drift with strength 5/5. No sensory deficits.     Pulmonary: Clear to Auscultation, No rales, No rhonchi, No wheezes     Cardiovascular: S1, S2, Regular rate and rhythm     Gastrointestinal: Soft, Non-tender, Non-distended     Extremities: No calf tenderness     Incision: Covered. Clean and intact.

## 2023-10-05 NOTE — H&P ADULT - ASSESSMENT
Patient is a 51 yo F with history of stage IV lung cancer with mets to brain (s/p 4 cycles chemo and thoracic RT completed on oct 2022), gamma knife surgery to 7 brain mets, who presented to [facility] ED 10/5 complaining of 1 week of lower extremity weakness and intermittent blurry vision. now s/p R temporal crani for resection of metastatic lesion. Post-operative course was complicated by acute cerebellar infarct found on post-op MRI, tachycardia, and thrombocytopenia. Admitted to [place] acute inpatient rehab on 10/5 for ADL, gait, and functional impairments.     # Metastatic brain lesion s/p Crani  - Comprehensive Multidisciplinary Rehab Program: 3 hours a day, 5 days a week with PT/OT/SLP  - Decadron taper - 3mg BID x2 days then 2mg BID through follow up  - Depakote 500mg BID seizure prophylaxis  - Pain control with oxy and tylenol  - Wound closure removal in 10-14 days (10/10-10/14)  - Outpatient follow up with Dr. Metcalf  - Fall, aspiration precautions    # Acute Cerebellar infarct  - No ASA until cleared by neurosurgery  - PT/OT evaluation for balance and coordination  - Consider need for ?statin    #Lung Cancer with metastasis  - Depakote, decadron as above  - Outpatient oncology follow up with Dr. Beckman  - Recent CT c/a/p showed enlarging lung lesion and new liver metastases  - Per onc - patient will require rad/on assessment for post-op radiation  - S/p CRT last June 2023  - Hospitalist consult appreciated    # Diplopia  - Likely in setting of malignancy/mets/edema  - Seen by ophtho  - Artificial tears PRN. Eye patch PRN for comfort    # Type 2 Diabetes Mellitus with steroid induced hyperglycemia  - Recent A1c 8.0%  - Monitor FSBG TIDAC/QHS  - Consistent Carbohydrate diet  - Lantus 15 units QHS and admelog 8 units TIDAC  - Mod SSI AC, low SSI HS  - Hospitalist consult appreciated    # Mood/Cognition:  - Consider neuropsych consult    # Pain Management:  - Tylenol PRN  - Oxycodone 5mg q4 hours PRN severe    # GI/Bowel:  - Senna QHS, Miralax daily   - GI ppx:Protonix    # /Bladder:   - Bladder scan q8 hours on admission, SC PRN  - Encourage timed voids every 4 hours while awake     # Skin/Pressure Injury:  - Skin assessment on admission: ***  - Monitor Incisions: ***crani****  - Turn every 2 hours while in bed    # Diet:   - Diet Consistency/Modifications: Reg/thin/CC  - Aspiration Precautions  - SLP consult for swallow function evaluation and treatment    # DVT ppx:  - Lovenox    # Restrictions/Precautions:  - Precautions: Fall, aspiration    ---------------  Outpatient Follow-up:    Marcus Metcalf  Neurosurgery  [address] [facility], [address]  [place] [id number]  Phone: [phone]  Fax: [phone]  Follow Up Time:     Maritza Kohler  Hematology  [address] [place] [address]  Phone: [phone]  Fax: [phone]  Follow Up Time:     Kieran Figueroa  Neurology  [address] [place], [place] [id number]  Phone: [phone]  Fax: [phone]  Follow Up Time:  --------------    Goals: Safe discharge to home  Estimated Length of Stay: 10-14 days  Rehab Potential: Good  Medical Prognosis: Good  Estimated Disposition: Home with Home Care  ---------------    PRESCREEN COMPARISON:  I have reviewed the prescreen information and I have found no relevant changes between the preadmission screening and my post admission evaluation.    RATIONALE FOR INPATIENT ADMISSION: Patient demonstrates the following:  [X] Medically appropriate for rehabilitation admission  [X] Has attainable rehab goals with an appropriate initial discharge plan  [X]Has rehabilitation potential (expected to make a significant improvement within a reasonable period of time)  [X] Requires close medical management by a rehab physician, rehab nursing care, Hospitalist and comprehensive interdisciplinary team (including PT, OT and/or SLP, Prosthetics and Orthotics)  Patient is a 51 yo F with history of stage IV lung cancer with mets to brain (s/p 4 cycles chemo and thoracic RT completed on oct 2022), gamma knife surgery to 7 brain mets, who presented to [facility] ED 10/5 complaining of 1 week of lower extremity weakness and intermittent blurry vision. now s/p R temporal crani for resection of metastatic lesion. Post-operative course was complicated by acute cerebellar infarct found on post-op MRI, tachycardia, and thrombocytopenia. Admitted to [place] acute inpatient rehab on 10/5 for ADL, gait, and functional impairments.     # Metastatic brain lesion s/p Crani  - Comprehensive Multidisciplinary Rehab Program: 3 hours a day, 5 days a week with PT/OT/SLP  - Decadron taper - 3mg BID x2 days then 2mg BID through follow up  - Depakote 500mg BID seizure prophylaxis  - Pain control with oxy and tylenol  - Wound closure removal in 10-14 days (10/10-10/14)  - Outpatient follow up with Dr. Metcalf  - Fall, aspiration precautions    # Acute Cerebellar infarct  - No ASA until cleared by neurosurgery  - PT/OT evaluation for balance and coordination  - Consider need for ?statin    #Lung Cancer with metastasis  - Depakote, decadron as above  - Outpatient oncology follow up with Dr. Beckman  - Recent CT c/a/p showed enlarging lung lesion and new liver metastases  - Per onc - patient will require rad/on assessment for post-op radiation  - S/p CRT last June 2023  - Hospitalist consult appreciated    # Diplopia  - Likely in setting of malignancy/mets/edema  - Seen by ophtho  - Artificial tears PRN. Eye patch PRN for comfort    # Type 2 Diabetes Mellitus with steroid induced hyperglycemia  - Recent A1c 8.0%  - Monitor FSBG TIDAC/QHS  - Consistent Carbohydrate diet  - Lantus 15 units in morning and admelog 8 units TIDAC  - Mod SSI AC, low SSI HS  - Hospitalist consult appreciated    # Mood/Cognition:  - Consider neuropsych consult    # Pain Management:  - Tylenol PRN  - Oxycodone 5mg q4 hours PRN severe    # GI/Bowel:  - Senna QHS, Miralax daily   - GI ppx:Protonix    # /Bladder:   - Bladder scan q8 hours on admission, SC PRN  - Encourage timed voids every 4 hours while awake     # Skin/Pressure Injury:  - Skin assessment on admission: intact  - Monitor Incisions: right craniotomy incision with staples in place and dried blood  - Turn every 2 hours while in bed    # Diet:   - Diet Consistency/Modifications: Reg/thin/CC  - Aspiration Precautions  - SLP consult for swallow function evaluation and treatment    # DVT ppx:  - Lovenox    # Restrictions/Precautions:  - Precautions: Fall, aspiration    ---------------  Outpatient Follow-up:    Marcus Metcalf  Neurosurgery  [address] [facility], [address] 1  [place] [id number]  Phone: [phone]  Fax: [phone]  Follow Up Time:     Maritza Kohler  Hematology  [address] [place] [address]  Phone: [phone]  Fax: [phone]  Follow Up Time:     Kieran Figueroa  Neurology  [address] [place], [place] [address]  Phone: [phone]  Fax: [phone]  Follow Up Time:  --------------    Goals: Safe discharge to home  Estimated Length of Stay: 10-14 days  Rehab Potential: Good  Medical Prognosis: Good  Estimated Disposition: Home with Home Care  ---------------    PRESCREEN COMPARISON:  I have reviewed the prescreen information and I have found no relevant changes between the preadmission screening and my post admission evaluation.    RATIONALE FOR INPATIENT ADMISSION: Patient demonstrates the following:  [X] Medically appropriate for rehabilitation admission  [X] Has attainable rehab goals with an appropriate initial discharge plan  [X]Has rehabilitation potential (expected to make a significant improvement within a reasonable period of time)  [X] Requires close medical management by a rehab physician, rehab nursing care, Hospitalist and comprehensive interdisciplinary team (including PT, OT and/or SLP, Prosthetics and Orthotics)  Patient is a 53 yo F with history of stage IV lung cancer with mets to brain (s/p 4 cycles chemo and thoracic RT completed on oct 2022), gamma knife surgery to 7 brain mets, who presented to [facility] ED 10/5 complaining of 1 week of lower extremity weakness and intermittent blurry vision. now s/p R temporal crani for resection of metastatic lesion. Post-operative course was complicated by acute cerebellar infarct found on post-op MRI, tachycardia, and thrombocytopenia. Admitted to [place] acute inpatient rehab on 10/5 for ADL, gait, and functional impairments.     # Metastatic brain lesion s/p Crani  - Comprehensive Multidisciplinary Rehab Program: 3 hours a day, 5 days a week with PT/OT/SLP  - Decadron taper - 3mg BID x2 days then 2mg BID through follow up  - Depakote 500mg BID seizure prophylaxis  - Pain control with oxy and tylenol  - Wound closure removal in 10-14 days (10/10-10/14)  - Outpatient follow up with Dr. Metcalf  - Fall, aspiration precautions    # Acute Cerebellar infarct  - No ASA until cleared by neurosurgery  - PT/OT evaluation for balance and coordination, ambulation, transfer   - Consider need for ?statin    #Lung Cancer with metastasis  - Depakote, decadron as above  - Outpatient oncology follow up with Dr. Beckman  - Recent CT c/a/p showed enlarging lung lesion and new liver metastases  - Per onc - patient will require rad/on assessment for post-op radiation  - S/p CRT last June 2023  - Hospitalist consult appreciated    # Diplopia, Blurry vision   - Likely in setting of malignancy/mets/edema  - Seen by ophtho  - Artificial tears PRN. Eye patch PRN for comfort    # Type 2 Diabetes Mellitus with steroid induced hyperglycemia  - Recent A1c 8.0%  - Monitor FSBG TIDAC/QHS  - Consistent Carbohydrate diet  - Lantus 15 units in morning and admelog 8 units TIDAC  - Mod SSI AC, low SSI HS  - Hospitalist consult appreciated    # Mood/Cognition:  - Consider neuropsych consult if needed    # Pain Management:  - Tylenol PRN  - Oxycodone 5mg q4 hours PRN severe    # GI/Bowel:  - Senna QHS, Miralax daily   - GI ppx:Protonix    # /Bladder:   - Bladder scan q8 hours on admission, SC PRN  - Encourage timed voids every 4 hours while awake     # Skin/Pressure Injury:  - Skin assessment on admission: intact  - Monitor Incisions: right craniotomy incision with staples in place and dried blood  - Turn every 2 hours while in bed    # Diet:   - Diet Consistency/Modifications: Reg/thin/CC  - Aspiration Precautions  - SLP consult for swallow function evaluation and treatment    # DVT ppx:  - Lovenox    # Restrictions/Precautions:  - Precautions: Fall, aspiration    ---------------  Outpatient Follow-up:    Marcus Metcalf  Neurosurgery  [address] [facility], [address] 1  [place] [id number]  Phone: [phone]  Fax: [phone]  Follow Up Time:     Maritza Kohler  Hematology  [address] [place] [address]  Phone: [phone]  Fax: [phone]  Follow Up Time:     Kieran Figueroa  Neurology  [address] [place], [place] [address]  Phone: [phone]  Fax: [phone]  Follow Up Time:  --------------    Goals: Safe discharge to home  Estimated Length of Stay: 10-14 days  Rehab Potential: Good  Medical Prognosis: Good  Estimated Disposition: Home with Home Care  ---------------    PRESCREEN COMPARISON:  I have reviewed the prescreen information and I have found no relevant changes between the preadmission screening and my post admission evaluation.    RATIONALE FOR INPATIENT ADMISSION: Patient demonstrates the following:  [X] Medically appropriate for rehabilitation admission  [X] Has attainable rehab goals with an appropriate initial discharge plan  [X]Has rehabilitation potential (expected to make a significant improvement within a reasonable period of time)  [X] Requires close medical management by a rehab physician, rehab nursing care, Hospitalist and comprehensive interdisciplinary team (including PT, OT and/or SLP, Prosthetics and Orthotics)  Patient is a 51 yo F with history of stage IV lung cancer with mets to brain (s/p 4 cycles chemo and thoracic RT completed on oct 2022), gamma knife surgery to 7 brain mets, who presented to [facility] ED 10/5 complaining of 1 week of lower extremity weakness and intermittent blurry vision. now s/p R temporal crani for resection of metastatic lesion. Post-operative course was complicated by acute cerebellar infarct found on post-op MRI, tachycardia, and thrombocytopenia. Admitted to [place] acute inpatient rehab on 10/5 for ADL, gait, and functional impairments.     # Multiple metastatic brain lesions s/p Crani and temporal lobe lesion resection/ Right temporal lesion resection/ Right dominant hemiparesis   - Comprehensive Multidisciplinary Rehab Program: 3 hours a day, 5 days a week with PT/OT/SLP  - Decadron taper - 3mg BID x2 days then 2mg BID through follow up  - Depakote 500mg BID seizure prophylaxis  - Pain control with oxy and tylenol  - Wound closure removal in 10-14 days (10/10-10/14)  - Outpatient follow up with Dr. Metcalf  - Fall, aspiration precautions    # Acute Cerebellar infarct  - No ASA until cleared by neurosurgery  - PT/OT evaluation for balance and coordination, ambulation, transfer   - Consider need for ?statin    #Lung Cancer with metastasis  - Depakote, decadron as above  - Outpatient oncology follow up with Dr. Beckman  - Recent CT c/a/p showed enlarging lung lesion and new liver metastases  - Per onc - patient will require rad/on assessment for post-op radiation  - S/p CRT last June 2023  - Hospitalist consult appreciated    # Diplopia, Blurry vision   - Likely in setting of malignancy/mets/edema  - Seen by ophtho  - Artificial tears PRN. Eye patch PRN for comfort    # Type 2 Diabetes Mellitus with steroid induced hyperglycemia  - Recent A1c 8.0%  - Monitor FSBG TIDAC/QHS  - Consistent Carbohydrate diet  - Lantus 15 units in morning and admelog 8 units TIDAC  - Mod SSI AC, low SSI HS  - Hospitalist consult appreciated    # Mood/Cognition:  - Consider neuropsych consult if needed    # Pain Management:  - Tylenol PRN  - Oxycodone 5mg q4 hours PRN severe    # GI/Bowel:  - Senna QHS, Miralax daily   - GI ppx:Protonix    # /Bladder:   - Bladder scan q8 hours on admission, SC PRN  - Encourage timed voids every 4 hours while awake     # Skin/Pressure Injury:  - Skin assessment on admission: intact  - Monitor Incisions: right craniotomy incision with staples in place and dried blood  - Turn every 2 hours while in bed    # Diet:   - Diet Consistency/Modifications: Reg/thin/CC  - Aspiration Precautions  - SLP consult for swallow function evaluation and treatment    # DVT ppx:  - Lovenox    # Restrictions/Precautions:  - Precautions: Fall, aspiration    ---------------  Outpatient Follow-up:    Marcus Metcalf  Neurosurgery  [address] [facility], [address] 1  [place] [address]  Phone: [phone]  Fax: [phone]  Follow Up Time:     Maritza Kohlre  Hematology  [address] [place] [address]  Phone: [phone]  Fax: [phone]  Follow Up Time:     Kieran Figueroa  Neurology  [address] [place], [place] [address]  Phone: [phone]  Fax: [phone]  Follow Up Time:  --------------    Goals: Safe discharge to home  Estimated Length of Stay: 10-14 days  Rehab Potential: Good  Medical Prognosis: Good  Estimated Disposition: Home with Home Care  ---------------    PRESCREEN COMPARISON:  I have reviewed the prescreen information and I have found no relevant changes between the preadmission screening and my post admission evaluation.    RATIONALE FOR INPATIENT ADMISSION: Patient demonstrates the following:  [X] Medically appropriate for rehabilitation admission  [X] Has attainable rehab goals with an appropriate initial discharge plan  [X]Has rehabilitation potential (expected to make a significant improvement within a reasonable period of time)  [X] Requires close medical management by a rehab physician, rehab nursing care, Hospitalist and comprehensive interdisciplinary team (including PT, OT and/or SLP, Prosthetics and Orthotics)

## 2023-10-05 NOTE — PATIENT PROFILE ADULT - VISION (WITH CORRECTIVE LENSES IF THE PATIENT USUALLY WEARS THEM):
R sided blurry vision/Partially impaired: cannot see medication labels or newsprint, but can see obstacles in path, and the surrounding layout; can count fingers at arm's length

## 2023-10-05 NOTE — PROGRESS NOTE ADULT - PROBLEM SELECTOR PROBLEM 3
Blurry vision, bilateral
Diabetes mellitus
Blurry vision, bilateral
Encounter for palliative care
Blurry vision, bilateral
Blurry vision, bilateral

## 2023-10-05 NOTE — H&P ADULT - NSHPSOCIALHISTORY_GEN_ALL_CORE
SOCIAL HISTORY  Smoking - Denied. Former marijuana smoker  EtOH - Denied   Drugs - Denied    FUNCTIONAL HISTORY  Lives in private house, 3 steps to enter, with fiance, staying on main floor   Independent ADLs, has walker     CURRENT FUNCTIONAL STATUS  9/20 PT  bed mobility CG/min assist  transfers CG/min assist  gait min assist with RW x 15 feet SOCIAL HISTORY  Smoking - Denied. Former marijuana smoker  EtOH - Denied   Drugs - Denied    FUNCTIONAL HISTORY  Lives in private house, 3 steps to enter, with fiance, staying on main floor. Sister coming to stay with her for two weeks to help out.  Independent ADLs, has walker     CURRENT FUNCTIONAL STATUS  9/20 PT  bed mobility CG/min assist  transfers CG/min assist  gait min assist with RW x 15 feet

## 2023-10-05 NOTE — PROGRESS NOTE ADULT - PROBLEM SELECTOR PLAN 4
- DM a1c 7.2 end of August 2023. Now 8.0     - Steroid induced hyperglycemia. On Lantus 15u daily + Lispro 8u TID +MDSS. FS elevated today- increase Lantus to 20U. Titrate and monitor in rehab.

## 2023-10-05 NOTE — PROGRESS NOTE ADULT - REASON FOR ADMISSION
leg weakness/ dizziness blurry vision

## 2023-10-05 NOTE — DISCHARGE NOTE PROVIDER - PROVIDER TOKENS
PROVIDER:[TOKEN:[[id number]]],PROVIDER:[TOKEN:[[id number]]],PROVIDER:[TOKEN:[[id number]]]

## 2023-10-05 NOTE — PATIENT PROFILE ADULT - FALL HARM RISK - HARM RISK INTERVENTIONS
Assistance with ambulation/Assistance OOB with selected safe patient handling equipment/Communicate Risk of Fall with Harm to all staff/Discuss with provider need for PT consult/Monitor gait and stability/Provide patient with walking aids - walker, cane, crutches/Reinforce activity limits and safety measures with patient and family/Sit up slowly, dangle for a short time, stand at bedside before walking/Tailored Fall Risk Interventions/Use of alarms - bed, chair and/or voice tab/Visual Cue: Yellow wristband and red socks/Bed in lowest position, wheels locked, appropriate side rails in place/Call bell, personal items and telephone in reach/Instruct patient to call for assistance before getting out of bed or chair/Non-slip footwear when patient is out of bed/[place] to call system/Physically safe environment - no spills, clutter or unnecessary equipment/Purposeful Proactive Rounding/Room/bathroom lighting operational, light cord in reach

## 2023-10-05 NOTE — PROGRESS NOTE ADULT - PROBLEM SELECTOR PROBLEM 2
Right leg weakness
Stage 4 lung cancer
Right leg weakness
Stage 4 lung cancer
Blurry vision, bilateral
Right leg weakness
Stage 4 lung cancer
Stage 4 lung cancer
Right leg weakness

## 2023-10-05 NOTE — DISCHARGE NOTE PROVIDER - NSDCCPTREATMENT_GEN_ALL_CORE_FT
PRINCIPAL PROCEDURE  Procedure: Craniotomy, temporal approach, with mass excision  Findings and Treatment:

## 2023-10-05 NOTE — PROGRESS NOTE ADULT - SUBJECTIVE AND OBJECTIVE BOX
Patient is a 52y old  Female who presents with a chief complaint of leg weakness/ dizziness blurry vision (05 Oct 2023 10:58)      SUBJECTIVE / OVERNIGHT EVENTS: Pt up in bed, feels well.     MEDICATIONS  (STANDING):  dexAMETHasone     Tablet 3 milliGRAM(s) Oral two times a day  dextrose 5%. 1000 milliLiter(s) (50 mL/Hr) IV Continuous <Continuous>  dextrose 5%. 1000 milliLiter(s) (100 mL/Hr) IV Continuous <Continuous>  dextrose 5%. 1000 milliLiter(s) (100 mL/Hr) IV Continuous <Continuous>  dextrose 50% Injectable 25 Gram(s) IV Push once  dextrose 50% Injectable 12.5 Gram(s) IV Push once  dextrose 50% Injectable 25 Gram(s) IV Push once  divalproex  milliGRAM(s) Oral every 12 hours  enoxaparin Injectable 40 milliGRAM(s) SubCutaneous <User Schedule>  glucagon  Injectable 1 milliGRAM(s) IntraMuscular once  insulin glargine Injectable (LANTUS) 15 Unit(s) SubCutaneous every morning  insulin lispro (ADMELOG) corrective regimen sliding scale   SubCutaneous at bedtime  insulin lispro (ADMELOG) corrective regimen sliding scale   SubCutaneous three times a day before meals  insulin lispro Injectable (ADMELOG) 8 Unit(s) SubCutaneous three times a day before meals  mupirocin 2% Nasal 1 Application(s) Both Nostrils every 12 hours  pantoprazole    Tablet 40 milliGRAM(s) Oral before breakfast  polyethylene glycol 3350 17 Gram(s) Oral every 24 hours  senna 2 Tablet(s) Oral at bedtime    MEDICATIONS  (PRN):  acetaminophen     Tablet .. 650 milliGRAM(s) Oral every 6 hours PRN Mild Pain (1 - 3)  dextrose Oral Gel 15 Gram(s) Oral once PRN Blood Glucose LESS THAN 70 milliGRAM(s)/deciliter  oxyCODONE    IR 5 milliGRAM(s) Oral every 4 hours PRN Moderate Pain (4 - 6)      CAPILLARY BLOOD GLUCOSE      POCT Blood Glucose.: 356 mg/dL (05 Oct 2023 11:49)  POCT Blood Glucose.: 148 mg/dL (05 Oct 2023 07:46)  POCT Blood Glucose.: 207 mg/dL (04 Oct 2023 20:58)  POCT Blood Glucose.: 189 mg/dL (04 Oct 2023 16:16)    I&O's Summary      PHYSICAL EXAM:  T(C): 37.1 (10-05-23 @ 13:20), Max: 37.1 (10-05-23 @ 13:20)  HR: 97 (10-05-23 @ 13:20) (80 - 97)  BP: 112/74 (10-05-23 @ 13:20) (103/70 - 127/79)  RR: 18 (10-05-23 @ 13:20) (18 - 18)  SpO2: 98% (10-05-23 @ 13:20) (97% - 99%)  CONSTITUTIONAL: NAD, well-developed, well-groomed  EYES: PERRLA; conjunctiva and sclera clear  ENMT: Moist oral mucosa, no pharyngeal injection or exudates; normal dentition  NECK: Supple, no palpable masses; no thyromegaly  RESPIRATORY: Normal respiratory effort; lungs are clear to auscultation bilaterally  CARDIOVASCULAR: Regular rate and rhythm, normal S1 and S2, no murmur/rub/gallop; No lower extremity edema; Peripheral pulses are 2+ bilaterally  ABDOMEN: Nontender to palpation, normoactive bowel sounds, no rebound/guarding; No hepatosplenomegaly  MUSCULOSKELETAL:  Normal gait; no clubbing or cyanosis of digits; no joint swelling or tenderness to palpation  PSYCH: A+O to person, place, and time; affect appropriate  NEUROLOGY: CN 2-12 are intact and symmetric; no gross sensory deficits   SKIN: No rashes; no palpable lesions    LABS:    10-04    134<L>  |  103  |  12  ----------------------------<  190<H>  4.4   |  23  |  0.34<L>    Ca    8.9      04 Oct 2023 06:19  Phos  3.2     10-04  Mg     2.1     10-04            Urinalysis Basic - ( 04 Oct 2023 06:19 )    Color: x / Appearance: x / SG: x / pH: x  Gluc: 190 mg/dL / Ketone: x  / Bili: x / Urobili: x   Blood: x / Protein: x / Nitrite: x   Leuk Esterase: x / RBC: x / WBC x   Sq Epi: x / Non Sq Epi: x / Bacteria: x        RADIOLOGY & ADDITIONAL TESTS:    Imaging Personally Reviewed:    Consultant(s) Notes Reviewed:      Care Discussed with Consultants/Other Providers: Nsx

## 2023-10-05 NOTE — PROGRESS NOTE ADULT - THIS PATIENT HAS THE FOLLOWING CONDITION(S)/DIAGNOSES ON THIS ADMISSION:
Brain Compression / Herniation
None
Brain Compression / Herniation
None

## 2023-10-05 NOTE — PROGRESS NOTE ADULT - TIME BILLING
symptom assessment and management, supportive counseling, coordination of care, discussion and coordination with team.
Chart review, exam, documentation, d/w pt and team.
reviewing documentation/labs/imaging, interviewing and examining patient, documentation, coordinating care with ACP/CM/Specialists

## 2023-10-05 NOTE — DISCHARGE NOTE NURSING/CASE MANAGEMENT/SOCIAL WORK - PATIENT PORTAL LINK FT
You can access the FollowMyHealth Patient Portal offered by [facility] by registering at the following website: [email or web address]. By joining [employer]’s FollowMyHealth portal, you will also be able to view your health information using other applications (apps) compatible with our system.

## 2023-10-05 NOTE — PROGRESS NOTE ADULT - PROBLEM SELECTOR PLAN 1
S/p resection as above. On Depakote for seizure prophylaxis, Decadron taper for vasogenic edema.     Postop MRI w acute infarct as above. CTA head and neck as above.     Tachycardia postop to 160s- now improved.     Thrombocytopenia- possibly consumptive postop- monitor in rehab.

## 2023-10-05 NOTE — DISCHARGE NOTE PROVIDER - NSDCMRMEDTOKEN_GEN_ALL_CORE_FT
acetaminophen 325 mg oral tablet: 2 tab(s) orally every 6 hours As needed Mild Pain (1 - 3)  dexAMETHasone 1.5 mg oral tablet: 3 milligram(s) orally 2 times a day stop after 2 days and then continue 2mg po bid thereafter  dexAMETHasone 2 mg oral tablet: 1 tab(s) orally 2 times a day final taper step to continue  divalproex sodium 500 mg oral delayed release tablet: 1 tab(s) orally every 12 hours  enoxaparin: 40 milligram(s) subcutaneously once a day (at bedtime)  insulin glargine 100 units/mL subcutaneous solution: 15 unit(s) subcutaneous once a day (at bedtime)  insulin lispro 100 units/mL injectable solution: injectable 3 times a day (before meals) follow current dosing  insulin lispro 100 units/mL injectable solution: injectable once a day (at bedtime) follow current dosing  insulin lispro 100 units/mL injectable solution: injectable 3 times a day (with meals) follow current dosing  oxyCODONE 5 mg oral tablet: 1 tab(s) orally every 4 hours as needed for Moderate Pain (4 - 6)  pantoprazole 40 mg oral delayed release tablet: 1 tab(s) orally once a day (before a meal)  polyethylene glycol 3350 oral powder for reconstitution: 17 gram(s) orally every 24 hours  senna leaf extract oral tablet: 2 tab(s) orally once a day (at bedtime)

## 2023-10-05 NOTE — PROGRESS NOTE ADULT - PROBLEM SELECTOR PROBLEM 1
Stage 4 lung cancer
Metastasis to brain
Stage 4 lung cancer
Metastasis to brain
Pain, cancer
Metastasis to brain
Stage 4 lung cancer
Stage 4 lung cancer

## 2023-10-05 NOTE — PROGRESS NOTE ADULT - PROBLEM SELECTOR PROBLEM 5
Leukocytosis
Leukocytosis
Abdominal pain
Prophylactic measure
Abdominal pain
Abdominal pain
Leukocytosis
Abdominal pain

## 2023-10-05 NOTE — PATIENT PROFILE ADULT - HOME ACCESSIBILITY CONCERNS
Where does she feel she has an infection? Respiratory? Has she had any fevers? She really needs to be seen, I think she will likely need to increase her insulin but I would like her to contact endo on that, she has an appointment with them later this month but they likely will increase her now and see how her sugars do.    stairs within home

## 2023-10-05 NOTE — H&P ADULT - REASON FOR ADMISSION
Metastatic brain lesion s/p R temporal craniotomy, resection Multiple metastatic brain lesions(left frontal, occipital and temporal) s/p R temporal craniotomy and resection  Right dominant hemiparesis

## 2023-10-05 NOTE — DISCHARGE NOTE PROVIDER - CARE PROVIDERS DIRECT ADDRESSES
[email or web address],DirectAddress_Unknown

## 2023-10-05 NOTE — DISCHARGE NOTE PROVIDER - CARE PROVIDER_API CALL
Marcus Metcalf  Neurosurgery  [address], [address]  [place] [address]  Phone: [phone]  Fax: [phone]  Follow Up Time:     Maritza Kohler  [place] [id number]  Phone: [phone]  Fax: [phone]  Follow Up Time:     Kieran Figueroa  Neurology  [address] [place], [place] [id number]  Phone: [phone]  Fax: [phone]  Follow Up Time:

## 2023-10-05 NOTE — PROGRESS NOTE ADULT - PROBLEM SELECTOR PLAN 2
no alarm signs of cord compression. unclear this is due to her met disease to brain.   - PT/OT consult notes.  - A1c 8.0  - still with pain will increase pain meds as tolerated.
- likely related to malignancy/mets/edema. Seen by ophtho   - MR orbits and venogram no acute findings. F/u ophtho/nsgy    - cont dex 4q6 and continue depakote 500 BID as per NSGY  - artificial tears prn.   - Eye patch for comfort if patient amenable. Says when she covers one eye the diplopia resolves.    plan for surg resection as above
no alarm signs of cord compression. unclear this is due to her met disease to brain.   - PT/OT consult notes.  - A1c 8.0  - neuro checks per routine.
no alarm signs of cord compression. unclear this is due to her met disease to brain.   - PT/OT consult notes.  - A1c 8.0  - neuro checks per routine.
no alarm signs of cord compression. unclear this is due to her met disease to brain.   - PT/OT consult notes.  - A1c 8.0  -neuro checks per routine.
no alarm signs of cord compression. unclear this is due to her met disease to brain.   - PT/OT consult notes.  - A1c 8.0  - neuro checks per routine.
Sees Dr. Beckman, initially stage IIIB unresectable s/p Carboplatin/Pemetrexed in July 2022 x 4 cycles completed Sept 2022, concurrent Thoracic RT started Sept through October 2022. Achieved HI.     Restaging PET/CT March 2023 confirming response to interval therapy. She declined maintenance Durvalumab recommended on numerous occasions through March 2023. Hospitalized at [facility] on 5/31-6/7/23 with symptomatic numerous brain metastases. Treated with GK-SRS to 7 brain metastases in 5 fractions from 6/21 - 6/29/2023, planned for durvalumab 8/4/2023 however appt cancelled.    Recent CT C/A/P showing enlarging lung lesion and new subcentimeter liver lesion concerning for POD    Per onc- radiation oncology assessment for post-op radiation. Plan for further immunotherapy treatment outpatient
no alarm signs of cord compression. unclear this is due to her met disease to brain.   - PT/OT consult  - A1c 8.0  - still with pain will increase pain meds as tolerated.
no alarm signs of cord compression. unclear this is due to her met disease to brain.   - PT/OT consult notes.  - A1c 8.0  - neuro checks per routine.
Sees Dr. Beckman, initially stage IIIB unresectable s/p Carboplatin/Pemetrexed in July 2022 x 4 cycles completed Sept 2022, concurrent Thoracic RT started Sept through October 2022. Achieved TN.     Restaging PET/CT March 2023 confirming response to interval therapy. She declined maintenance Durvalumab recommended on numerous occasions through March 2023. Hospitalized at [facility] on 5/31-6/7/23 with symptomatic numerous brain metastases. Treated with GK-SRS to 7 brain metastases in 5 fractions from 6/21 - 6/29/2023, planned for durvalumab 8/4/2023 however appt cancelled.    Recent CT C/A/P showing enlarging lung lesion and new subcentimeter liver lesion concerning for POD    Per onc- radiation oncology assessment for post-op radiation. Plan for further immunotherapy treatment outpatient
As per neurosurgery plan for surgical intervention this saturday.
no alarm signs of cord compression. unclear this is due to her met disease to brain.   - PT/OT consult notes.  - A1c 8.0  - neuro checks per routine.
no alarm signs of cord compression. unclear this is due to her met disease to brain.   - PT/OT consult notes.  - A1c 8.0  - still with pain will increase pain meds as tolerated.  -neuro checks per routine.
Sees Dr. Beckman, initially stage IIIB unresectable s/p Carboplatin/Pemetrexed in July 2022 x 4 cycles completed Sept 2022, concurrent Thoracic RT started Sept through October 2022. Achieved DC.     Restaging PET/CT March 2023 confirming response to interval therapy. She declined maintenance Durvalumab recommended on numerous occasions through March 2023. Hospitalized at [facility] on 5/31-6/7/23 with symptomatic numerous brain metastases. Treated with GK-SRS to 7 brain metastases in 5 fractions from 6/21 - 6/29/2023, planned for durvalumab 8/4/2023 however appt cancelled.    Recent CT C/A/P showing enlarging lung lesion and new subcentimeter liver lesion concerning for POD    Per onc- radiation oncology assessment for post-op radiation. Plan for further immunotherapy treatment outpatient

## 2023-10-05 NOTE — PROGRESS NOTE ADULT - PROVIDER SPECIALTY LIST ADULT
Heme/Onc
Hospitalist
Hospitalist
Internal Medicine
Neurosurgery
Rehab Medicine
Rehab Medicine
NSICU
Neurosurgery
Ophthalmology
Neurosurgery
Heme/Onc
Heme/Onc
Neurosurgery
Hospitalist
Hospitalist
Palliative Care
Internal Medicine
Hospitalist
Internal Medicine

## 2023-10-05 NOTE — PROGRESS NOTE ADULT - PROBLEM SELECTOR PLAN 6
diet: carb consistent   dvt: ppx:  Lovenox started 10/2
- diet: regular  dvt: ppx:  scds (as per NSGY - can obtain repeat non con head CT in the AM to evaluate for possible AC, however no AC as of now)   -F/u PT/OT/PMNR.    7. Discussed plan with Dr. Grigsby.
diet: carb consistent   dvt: ppx:  Lovenox started today
- diet: regular  dvt: ppx:  scds (as per  NSGY - obtain repeat non con head CT in the AM to evaluate for possible AC, however no AC as of now)   PT to see
- diet: regular  dvt: ppx:  scds (as per NSGY - can obtain repeat non con head CT to evaluate for possible AC, however no AC as of now).   -F/u PT/OT/PMNR.
- diet: regular  dvt: ppx:  scds (as per  NSGY - obtain repeat non con head CT in the AM to evaluate for possible AC, however no AC as of now)   PT to see
- diet: regular  dvt: ppx:  scds (as per NSGY - can obtain repeat non con head CT to evaluate for possible AC, however no AC as of now).   -F/u PT/OT/PMNR -TBD
diet: carb consistent   dvt: ppx:  Lovenox started 10/2      For rehab.
- diet: regular  dvt: ppx:  scds (as per NSGY - can obtain repeat non con head CT to evaluate for possible AC, however no AC as of now).   -F/u PT/OT/PMNR.    7. Discussed plan with patient and ACP Ronaldo.
- diet: regular  dvt: ppx:  scds (as per NSGY - can obtain repeat non con head CT to evaluate for possible AC, however no AC as of now).   -F/u PT/OT/PMNR -TBD
- diet: regular  dvt: ppx:  scds (as per NSGY - can obtain repeat non con head CT to evaluate for possible AC, however no AC as of now).   -F/u PT/OT/PMNR.
- diet: regular  dvt: ppx:  scds (as per NSGY - can obtain repeat non con head CT to evaluate for possible AC, however no AC as of now).   -F/u PT/OT/PMNR.

## 2023-10-05 NOTE — DISCHARGE NOTE PROVIDER - NSDCCPCAREPLAN_GEN_ALL_CORE_FT
PRINCIPAL DISCHARGE DIAGNOSIS  Diagnosis: Stage 4 lung cancer  Assessment and Plan of Treatment:       SECONDARY DISCHARGE DIAGNOSES  Diagnosis: Tachycardia  Assessment and Plan of Treatment:

## 2023-10-06 NOTE — DIETITIAN INITIAL EVALUATION ADULT - PERTINENT MEDS FT
MEDICATIONS  (STANDING):  dexAMETHasone     Tablet 3 milliGRAM(s) Oral two times a day  dexAMETHasone     Tablet   Oral   dextrose 5%. 1000 milliLiter(s) (50 mL/Hr) IV Continuous <Continuous>  dextrose 5%. 1000 milliLiter(s) (100 mL/Hr) IV Continuous <Continuous>  dextrose 50% Injectable 25 Gram(s) IV Push once  dextrose 50% Injectable 12.5 Gram(s) IV Push once  dextrose 50% Injectable 25 Gram(s) IV Push once  divalproex  milliGRAM(s) Oral every 12 hours  enoxaparin Injectable 40 milliGRAM(s) SubCutaneous every 24 hours  glucagon  Injectable 1 milliGRAM(s) IntraMuscular once  insulin glargine Injectable (LANTUS) 15 Unit(s) SubCutaneous at bedtime  insulin lispro (ADMELOG) corrective regimen sliding scale   SubCutaneous at bedtime  insulin lispro (ADMELOG) corrective regimen sliding scale   SubCutaneous three times a day before meals  insulin lispro Injectable (ADMELOG) 5 Unit(s) SubCutaneous three times a day before meals  pantoprazole    Tablet 40 milliGRAM(s) Oral before breakfast  polyethylene glycol 3350 17 Gram(s) Oral every 24 hours  senna 2 Tablet(s) Oral at bedtime    MEDICATIONS  (PRN):  acetaminophen     Tablet .. 650 milliGRAM(s) Oral every 6 hours PRN Mild Pain (1 - 3), Moderate Pain (4 - 6)  dextrose Oral Gel 15 Gram(s) Oral once PRN Blood Glucose LESS THAN 70 milliGRAM(s)/deciliter  oxyCODONE    IR 5 milliGRAM(s) Oral every 4 hours PRN Severe Pain (7 - 10)

## 2023-10-06 NOTE — DIETITIAN INITIAL EVALUATION ADULT - NSPROEDALEARNPREF_GEN_A_NUR
Nutrition Education Provided on Consistent Carbohydrate Diet & Need for Supplementation/verbal instruction

## 2023-10-06 NOTE — DIETITIAN INITIAL EVALUATION ADULT - ORAL INTAKE PTA/DIET HISTORY
Patient Does Not Follow Diet @Home  Consumes 2 Meals a Day (Breakfast & Dinner)   Family Usually Cooks For Patient   Doesn't Take Vitamin/Supplements @Home

## 2023-10-06 NOTE — PROGRESS NOTE ADULT - REASON FOR ADMISSION
Metastatic brain lesion s/p R temporal craniotomy, resection, left cerebellar acute infarct Multiple metastatic brain lesions s/p R temporal craniotomy, resection /Right dominant hemiparesis

## 2023-10-06 NOTE — CHART NOTE - NSCHARTNOTEFT_GEN_A_CORE
[facility] Interdisciplinary Plan of Care    REHABILITATION DIAGNOSIS:  Right temporal lobe metastasis from lung cancer S/P resection   Left cerebellar acute infarct      COMORBIDITIES/COMPLICATING CONDITIONS IMPACTING REHABILITATION:  HEALTH ISSUES - PROBLEM Dx:    GERD (Gastroesophageal Reflux Disease)  Lung mass right  Non-small cell lung cancer with metastasis  S/P endoscopy 2022  Decreased ADLs  Function decline  Gait and mobility impairment  walking difficulties      Based upon consideration of the patient's impairments, functional status, complicating conditions and any other contributing factors and after information garnered from the assessments of all therapy disciplines involved in treating the patient and other pertinent clinicians:    INTERDISCIPLINARY REHABILITATION INTERVENTIONS:    [ X  ] Transfer Training  [ X  ] Bed Mobility  [ X  ] Therapeutic Exercise  [ X ] Balance/Coordination Exercises  [ X ] Locomotion retraining  [ X  ] Stairs  [  X ] Functional Transfer Training  [ X  ] Bowel/Bladder program  [ X  ] Pain Management  [ X  ] Skin/Wound Care  [ X  ] Visual/Perceptual Training  [ X  ] Therapeutic Recreation Activities  [  X ] Neuromuscular Re-education  [ X  ] Activities of Daily Living  [ X  ] Speech Exercise  [X   ] Swallowing Exercises  [   ] Vital Stim  [   ] Dietary Supplements  [   ] Calorie Count  [ X  ] Cognitive Exercises  [  X ] Congnitive/Linguistic Treatment  [   ] Behavior Program  [  x ] Neuropsych Therapy  [ X  ] Patient/Family Counseling  [ X ] Family Training  [ X  ] Community Re-entry  [   ] Orthotic Evaluation  [   ] Prosthetic Eval/Training    MEDICAL PROGNOSIS: good    REHAB POTENTIAL:  Good    EXPECTED DAILY THERAPY:  3 hours/day           ESTIMATED LOS:  [  ] 5-7 Days  [  ] 7-10 Days  [  ] 10- 14 Days  [ x ] 14- 18 Days  [  ] 18- 21 Days    ESTIMATED DISPOSITION:  [  ] Home   [  ] Home with Outpatient Therapies  [ x ] Home with Home Therapies  [  ] Assisted Living  [  ] Nursing Home  [  ] Long Term Acute Care    INTERDISCIPLINARY FUNCTIONAL OUTCOMES/GOALS:         Gait/Mobility: 6       Transfers: 6       ADLs: 6       Functional Transfers: 6       Medication Management: 6       Communication: 7       Cognitive: 6       Dysphagia: 7       Bladder: 7       Bowel: 7     Functional Independent Measures:   7 = Independent  6 = Modified Independent  5 = Supervision  4 = Minimal Assist/ Contact Guard  3 = Moderate Assistance  2 = Maximum Assistance  1 = Total Assistance  0 = Unable to assess [facility] Interdisciplinary Plan of Care    REHABILITATION DIAGNOSIS:  Multiple metastatic brain lesions from lung cancer    Right temporal lobe metastasis from lung cancer S/P resection   Left cerebellar acute infarct      COMORBIDITIES/COMPLICATING CONDITIONS IMPACTING REHABILITATION:  HEALTH ISSUES - PROBLEM Dx:    GERD (Gastroesophageal Reflux Disease)  Lung mass right  Non-small cell lung cancer with metastasis  S/P endoscopy 2022  Decreased ADLs  Function decline  Gait and mobility impairment  walking difficulties      Based upon consideration of the patient's impairments, functional status, complicating conditions and any other contributing factors and after information garnered from the assessments of all therapy disciplines involved in treating the patient and other pertinent clinicians:    INTERDISCIPLINARY REHABILITATION INTERVENTIONS:    [ X  ] Transfer Training  [ X  ] Bed Mobility  [ X  ] Therapeutic Exercise  [ X ] Balance/Coordination Exercises  [ X ] Locomotion retraining  [ X  ] Stairs  [  X ] Functional Transfer Training  [ X  ] Bowel/Bladder program  [ X  ] Pain Management  [ X  ] Skin/Wound Care  [ X  ] Visual/Perceptual Training  [ X  ] Therapeutic Recreation Activities  [  X ] Neuromuscular Re-education  [ X  ] Activities of Daily Living  [ X  ] Speech Exercise  [X   ] Swallowing Exercises  [   ] Vital Stim  [   ] Dietary Supplements  [   ] Calorie Count  [ X  ] Cognitive Exercises  [  X ] Congnitive/Linguistic Treatment  [   ] Behavior Program  [  x ] Neuropsych Therapy  [ X  ] Patient/Family Counseling  [ X ] Family Training  [ X  ] Community Re-entry  [   ] Orthotic Evaluation  [   ] Prosthetic Eval/Training    MEDICAL PROGNOSIS: good    REHAB POTENTIAL:  Good    EXPECTED DAILY THERAPY:  3 hours/day           ESTIMATED LOS:  [  ] 5-7 Days  [  ] 7-10 Days  [  ] 10- 14 Days  [ x ] 14- 18 Days  [  ] 18- 21 Days    ESTIMATED DISPOSITION:  [  ] Home   [  ] Home with Outpatient Therapies  [ x ] Home with Home Therapies  [  ] Assisted Living  [  ] Nursing Home  [  ] Long Term Acute Care    INTERDISCIPLINARY FUNCTIONAL OUTCOMES/GOALS:         Gait/Mobility: 6       Transfers: 6       ADLs: 6       Functional Transfers: 6       Medication Management: 6       Communication: 7       Cognitive: 6       Dysphagia: 7       Bladder: 7       Bowel: 7     Functional Independent Measures:   7 = Independent  6 = Modified Independent  5 = Supervision  4 = Minimal Assist/ Contact Guard  3 = Moderate Assistance  2 = Maximum Assistance  1 = Total Assistance  0 = Unable to assess

## 2023-10-06 NOTE — DIETITIAN INITIAL EVALUATION ADULT - FACTORS AFF FOOD INTAKE
States Good PO Intake/Appetite over Last Week  Denies Change in Meal Consumption Prior to Admission to Hospital (Per Patient)/none

## 2023-10-06 NOTE — CONSULT NOTE ADULT - SUBJECTIVE AND OBJECTIVE BOX
Patient is a 52y old  Female who presents with a chief complaint of Metastatic brain lesion s/p R temporal craniotomy, resection (05 Oct 2023 13:24)    HPI:  Patient is a 53 yo F with history of stage IV lung cancer with mets to brain (s/p 4 cycles chemo and thoracic RT completed on Oct 2022), gamma knife surgery to 7 brain mets, recent admission to [facility] from sept 4-13th for epigastric pain and hyperglycemia thought to be due to steroids, who presented to [facility] ED 10/5 complaining of 1 week of lower extremity weakness and intermittent blurry vision. Patient was initially seen at the end of August and admitted for hx of migraines and dizziness, was started on  decadron 6mg q6hr and Depakote at that time. She returned to [facility] again in early September for worsening epigastric pain in the setting of taking steroids without GI ppx. CTA at that time showing no PE, stable right apical lung mass and rapid interval growth of a left lower lobe mass and new hepatic mets. Pt finished dexamethasone taper on 9/7 and was discharged with protonix, depakote and follow up with outpatient rad onc Dr. Bernard, outpatient NSGY, and oncologist Dr. Beckman at [facility] with plans to start immunotherapy nivolumab as outpatient.  Since discharge on the 13th, patient noted weakness in her right lower extremity and 'muscle pain' which improves with massages. She used her walker at home however she feels weak and was requiring increasing support from her fiancee to ambulate/attend to daily needs. She noted also intermittent epigastric pain with radiation to the back. and endorsef some slight blurry vision over the last 2 weeks which she describes as "vision feels off" though is able to see.    Patient was admitted 9/30 and underwent R temporal crani for resection of metastatic lesion. Post-operative course was complicated by acute cerebellar infarct found on post-op MRI, tachycardia, and thrombocytopenia. She was recommended for follow up with oncology for lung lesions, radiation oncology for brain, and Dr. Metcalf for post-operative follow up. She will also follow up with Dr. Pam Randle in 6 months for findings on 10/4 CTA head. Plan for no ASA for secondary prevention until cleared by Dr. Metcalf. Wound closure due to be removed postop day 10-14 in rehab. Patient was evaluated by PM&R and therapy for functional deficits and gait/ ADL impairments and recommended acute rehabilitation. Patient was medically optimized for discharge to [facility] on 10/5/23. (05 Oct 2023 13:24)    Patient seen and examined at bedside, stable, NAD. Feeling well this AM. denies headache, fever, chills, cp, sob, n/v, abd pain.     PAST MEDICAL & SURGICAL HISTORY:  GERD (Gastroesophageal Reflux Disease)  Lung mass right  Non-small cell lung cancer with metastasis  S/P endoscopy 2022    SOCIAL HISTORY: Denies tobacco, EtOH, or illicit drug use. PTA independent in ADLs, IADLs, uses walker prn.    FAMILY HISTORY: Mother, alive age 78 PMH anemia. Father, alive age 80 - denies PMH    ALLERGIES:  No Known Allergies    MEDICATIONS  (STANDING):  dexAMETHasone     Tablet   Oral   dexAMETHasone     Tablet 3 milliGRAM(s) Oral two times a day  dextrose 5%. 1000 milliLiter(s) (50 mL/Hr) IV Continuous <Continuous>  dextrose 5%. 1000 milliLiter(s) (100 mL/Hr) IV Continuous <Continuous>  dextrose 50% Injectable 25 Gram(s) IV Push once  dextrose 50% Injectable 12.5 Gram(s) IV Push once  dextrose 50% Injectable 25 Gram(s) IV Push once  divalproex  milliGRAM(s) Oral every 12 hours  enoxaparin Injectable 40 milliGRAM(s) SubCutaneous every 24 hours  glucagon  Injectable 1 milliGRAM(s) IntraMuscular once  insulin glargine Injectable (LANTUS) 15 Unit(s) SubCutaneous every morning  insulin lispro (ADMELOG) corrective regimen sliding scale   SubCutaneous at bedtime  insulin lispro (ADMELOG) corrective regimen sliding scale   SubCutaneous three times a day before meals  insulin lispro Injectable (ADMELOG) 8 Unit(s) SubCutaneous three times a day before meals  pantoprazole    Tablet 40 milliGRAM(s) Oral before breakfast  polyethylene glycol 3350 17 Gram(s) Oral every 24 hours  senna 2 Tablet(s) Oral at bedtime    MEDICATIONS  (PRN):  acetaminophen     Tablet .. 650 milliGRAM(s) Oral every 6 hours PRN Mild Pain (1 - 3), Moderate Pain (4 - 6)  dextrose Oral Gel 15 Gram(s) Oral once PRN Blood Glucose LESS THAN 70 milliGRAM(s)/deciliter  oxyCODONE    IR 5 milliGRAM(s) Oral every 4 hours PRN Severe Pain (7 - 10)    Review of Systems: Refer to HPI for pertinent positives and negatives. All other ROS reviewed and negative except as otherwise stated above.    Vital Signs Last 24 Hrs  T(F): 97.6 (06 Oct 2023 08:37), Max: 98.7 (05 Oct 2023 13:20)  HR: 109 (06 Oct 2023 08:37) (88 - 109)  BP: 114/78 (06 Oct 2023 08:37) (110/66 - 136/83)  RR: 16 (06 Oct 2023 08:37) (16 - 18)  SpO2: 97% (06 Oct 2023 08:37) (97% - 100%)  I&O's Summary    PHYSICAL EXAM:  GENERAL: NAD, well-groomed, well-developed, plesant  HEAD: +right cranial incision well healed with staples  EYES: EOMI, PERRL, conjunctiva and sclera clear  ENMT: Moist mucous membranes, Good dentition  NECK: Supple, No JVD  CHEST/LUNG: Clear to auscultation bilaterally, non-labored breathing, good air entry  HEART: RRR; S1/S2  ABDOMEN: Soft, Nontender, Nondistended; Bowel sounds present  VASCULAR: Normal pulses, Normal capillary refill  EXTREMITIES: No cyanosis, No edema. RUE, LUE strength 5/5. RLE, LLE strength 4/5  LYMPH: No lymphadenopathy noted  SKIN: Warm, Intact  PSYCH: Normal mood and affect  NERVOUS SYSTEM:  A/O x3, Good concentration; CN 2-12 intact, No focal deficits, moves all extremities    LABS:                        10.3   10.29 )-----------( 133      ( 06 Oct 2023 05:45 )             31.4     10-06    136  |  103  |  16  ----------------------------<  163  4.0   |  26  |  0.60    Ca    8.7      06 Oct 2023 05:45  Phos  3.2     10-04  Mg     2.1     10-04    TPro  6.0  /  Alb  1.9  /  TBili  0.2  /  DBili  x   /  AST  24  /  ALT  28  /  AlkPhos  60  10-06                          POCT Blood Glucose.: 115 mg/dL (06 Oct 2023 07:59)  POCT Blood Glucose.: 205 mg/dL (05 Oct 2023 22:14)  POCT Blood Glucose.: 71 mg/dL (05 Oct 2023 17:42)  POCT Blood Glucose.: 68 mg/dL (05 Oct 2023 17:41)  POCT Blood Glucose.: 356 mg/dL (05 Oct 2023 11:49)      Urinalysis Basic - ( 06 Oct 2023 05:45 )    Color: x / Appearance: x / SG: x / pH: x  Gluc: 163 mg/dL / Ketone: x  / Bili: x / Urobili: x   Blood: x / Protein: x / Nitrite: x   Leuk Esterase: x / RBC: x / WBC x   Sq Epi: x / Non Sq Epi: x / Bacteria: x          RADIOLOGY & ADDITIONAL TESTS:  Labs, Radiology, Cardiology, and Other Results: < from: CT Head No Cont (10.04.23 @ 15:16) >    edemonstrated postoperative changes related to a prior right   temporal craniotomy for resection of a metastatic lesion. Postoperative   findings are relatively stable.  2.  Other metastatic lesions seen throughout the brain parenchyma with   surrounding vasogenic edema, again relatively stable.  3.  Redemonstrated acute infarct in the left cerebellar hemisphere,   stable from recent MR imaging.    CTA BRAIN:  1.  No evidence of abrupt cut off of intraluminal contrast.  2.  1 to 2 mm medially oriented vascular outpouching from the right ICA   terminus. This could represent a small saccular aneurysm versus a   vascular infundibulum.    CTA NECK:  1.  No evidence of critical stenosis by NASCET criteria.  2.  Multiple nodules seen in the bilateral lobes of the thyroid.   Recommend nonemergent thyroid ultrasound for further characterization if  clinically indicated.  3.  Redemonstrated partially visualized right apex lung mass. Correlate   with recent CT imaging of the chest for further evaluation.        < from: MR Head w/wo IV Cont (10.02.23 @ 23:54) >    IMPRESSION:    Redemonstration of right temporal craniotomy.    Residual 3.7 x 1.4 cm centrally necrotic/cystic peripherally enhancing   lesion along the mesial aspect of the surgical cavity (20-14). Findings   could represent residual enhancing neoplasm and/or postsurgical changes.    Additional curvilinear, thickened region of enhancement capping the   mesial aspect of the surgical cavity measures 7 mm in greatest thickness   (20-13). Findings could represent residual enhancing neoplasm and/or   postsurgical changes.    Previously seen an described multifocal enhancing parenchymal lesions   with internal susceptibility artifact in the bilateral frontal and right   cerebellar hemispheres are not significantly changed.    New small focus of restricted diffusion in the left mid cerebellar   hemisphere suggesting the presence of acute infarction.    Findings were discussed with Dr. Banuelos by Dr. Hooks on 10/3/2023 9:11 AM   with readback confirmation. Below that          < from: [facility] Duplex Lower Ext Vein Scan, Bilat (10.01.23 @ 15:45) >      IMPRESSION:  No evidence of deep venous thrombosis in either lower extremity.        Care Discussed with Consultants/Other Providers: yes, rehab

## 2023-10-06 NOTE — DIETITIAN INITIAL EVALUATION ADULT - NS FNS DIET ORDER
Consistent Carbohydrate Diet w/ Thin Liquids (IDDSI Level 0)  Recommend Initiate Glucerna 8oz PO Daily (Provides 220kcal-10grams of Protein)   Nutrition Education Provided on Consistent Carbohydrate Diet & Need for Supplementation

## 2023-10-06 NOTE — PROVIDER CONTACT NOTE (MEDICATION) - ACTION/TREATMENT ORDERED:
Spoke with hospitalist Dr. Camara, 8 units of premeal held. Dr. Mcfarland also made aware. 15 units of lantus administered to pt at this time.

## 2023-10-06 NOTE — DIETITIAN INITIAL EVALUATION ADULT - EDUCATION DIETARY MODIFICATIONS
Nutrition Education Provided on Consistent Carbohydrate Diet & Need for Supplementation/(2) meets goals/outcomes/verbalization

## 2023-10-06 NOTE — CONSULT NOTE ADULT - ASSESSMENT
53 y/o F with hx of stage IV lung cancer with mets to brain (s/p 4 cycles chemo and thoracic RT completed on Oct 2022), gamma knife surgery to 7 brain mets, who presented to [facility] ED 10/5 complaining of 1 week of lower extremity weakness and intermittent blurry vision. now s/p R temporal crani for resection of metastatic lesion. Post-operative course was complicated by acute cerebellar infarct found on post-op MRI, tachycardia, and thrombocytopenia. Admitted to [facility] acute inpatient rehab on 10/5 for ADL, gait, and functional impairments.     #Lung Cancer with metastasis  #Metastatic brain lesion s/p Crani  -Wound closure removal in 10-14 days (10/10-10/14)  -Recent CT c/a/p showed enlarging lung lesion and new liver metastases  -S/p CRT last June 2023  -Continue Decadron taper - 3mg BID x2 days then 2mg BID through follow up. Maintain GI ppx with protonix while on steroids  -Depakote 500mg BID seizure prophylaxis  -Start comprehensive rehab program - PT/OT/SLP per rehab team  -Pain management, bowel regimen per rehab. Continue fall, aspiration precautions  -H/H stable. Monitor CBC, likely post op anemia  -Outpatient follow up with Dr. Metcalf, outpatient oncology follow up with Dr. Beckman  -Per onc - patient will require rad/on assessment for post-op radiation  -Pall consult 10/3 noted - goals are to pursue intervention and treatment as tolerated  -Consider neuropsych consult for mood    #Acute Cerebellar infarct  -No ASA until cleared by neurosurgery  -Consider need for ?statin - check lipid panel    #Diplopia - Likely in setting of malignancy/mets/edema  -Seen by ophtho  -Artificial tears PRN. Eye patch PRN for comfort    #Type 2 Diabetes Mellitus with steroid induced hyperglycemia, HbA1c 8.0% (9/20/23)  -Monitor FSBG TIDAC/QHS - Noted  pre breakfast, admelog 8un held 10/6  -Consistent Carbohydrate diet  -Lantus 15 units in morning and admelog 8 units TIDAC - anticipate change to lantus qhs, admelog 5un TID AC, readjust as indicated ****   -Mod SSI AC, low SSI HS  -Will consider initiating metformin ***     DVT ppx - Lovenox  GI ppx - Protonix   53 y/o F with hx of stage IV lung cancer with mets to brain (s/p 4 cycles chemo and thoracic RT completed on Oct 2022), gamma knife surgery to 7 brain mets, who presented to [facility] ED 10/5 complaining of 1 week of lower extremity weakness and intermittent blurry vision. now s/p R temporal crani for resection of metastatic lesion. Post-operative course was complicated by acute cerebellar infarct found on post-op MRI, tachycardia, and thrombocytopenia. Admitted to [facility] acute inpatient rehab on 10/5 for ADL, gait, and functional impairments.     #Lung Cancer with metastasis  #Metastatic brain lesion s/p Crani  -Wound closure removal in 10-14 days (10/10-10/14)  -Recent CT c/a/p showed enlarging lung lesion and new liver metastases  -S/p CRT last June 2023  -Continue Decadron taper - 3mg BID x2 days then 2mg BID through follow up. Maintain GI ppx with protonix while on steroids  -Depakote 500mg BID seizure prophylaxis  -Start comprehensive rehab program - PT/OT/SLP per rehab team  -Pain management, bowel regimen per rehab. Continue fall, aspiration precautions  -H/H stable. Monitor CBC, likely post op anemia  -Outpatient follow up with Dr. Metcalf, outpatient oncology follow up with Dr. Beckman  -Per onc - patient will require rad/on assessment for post-op radiation  -Pall consult 10/3 noted - goals are to pursue intervention and treatment as tolerated  -Consider neuropsych consult for mood    #Acute Cerebellar infarct  -No ASA until cleared by neurosurgery  -Consider need for ?statin - check lipid panel    #Diplopia - Likely in setting of malignancy/mets/edema  -Seen by ophtho  -Artificial tears PRN. Eye patch PRN for comfort    #Type 2 Diabetes Mellitus with steroid induced hyperglycemia, HbA1c 8.0% (9/20/23)  -Monitor FSBG TIDAC/QHS - Noted  pre breakfast, admelog 8un held 10/6  -Consistent Carbohydrate diet  -Lantus 15un qam, admelog 8 units TIDAC - change to lantus 15un qhs, admelog 5un TID AC 10/6 - increase pre meal admelog over weekend if indicated  -Continue ISS    DVT ppx - Lovenox  GI ppx - Protonix

## 2023-10-06 NOTE — DIETITIAN NUTRITION RISK NOTIFICATION - TREATMENT: THE FOLLOWING DIET HAS BEEN RECOMMENDED
Recommend Change Glucerna 8oz PO Daily (Provides 220kcal-10grams of Protein)  Nutrition Education Provided

## 2023-10-06 NOTE — DIETITIAN INITIAL EVALUATION ADULT - ADD RECOMMEND
1) Monitor Weights, Intake, Tolerance, Skin, POCT & Labwork  2) Recommend Change Glucerna 8oz PO Daily  3) Nutrition Education Provided on Consistent Carbohydrate Diet & Need for Supplementation   4) Continue Nutrition Plan of Care

## 2023-10-06 NOTE — DIETITIAN INITIAL EVALUATION ADULT - OTHER INFO
Initial Nutrition Assessment   52yr Old Female   Denies Food Allergy/Intolerance  Tolerates Diet Well - No Chewing/Swallowing Complications (Per Patient)  Surgical Incision on Head & No Pressure Ulcers (as Per Nursing Flow Sheets)  No Edema Noted (as Per Nursing Flow Sheets)  No Recent Nausea/Vomiting/Diarrhea/Constipation (as Per Patient)

## 2023-10-06 NOTE — PROGRESS NOTE ADULT - SUBJECTIVE AND OBJECTIVE BOX
CC:  Weakness, blurry vision    HPI:  Patient is a 51 yo F with history of stage IV lung cancer with mets to brain (s/p 4 cycles chemo and thoracic RT completed on oct 2022), gamma knife surgery to 7 brain mets , recent admission to [facility] from sept 4-13th for epigastric pain and hyperglycemia thought to be due to steroids, who presented to [facility] ED 10/5 complaining of 1 week of lower extremity weakness and intermittent blurry vision.  Patient was initially seen at the end of August and admitted for hx of migraines and dizziness, was started on  decadron 6mg q6hr and Depakote at that time. She returned to [facility] again in early september for worsening epigastric pain  in the setting of taking steroids without GI ppx. CTA at that time showing no PE, stable right apical lung mass and rapid interval growth of a left lower lobe mass and new hepatic mets. Pt finished dexamethasone taper on 9/7 and was discharged with protonix, depakote and follow up with outpatient rad onc Dr. Bernard, outpatient NSGY, and oncologist Dr. Beckman at [facility] with plans to start immunotherapy nivolumab as outpatient.  Since discharge on the 13th, patient noted weakness in her right lower extremity and 'muscle pain' which improves with massages. She used her walker at home however she feels weak and was requiring increasing support from her fiancee to ambulate/attend to daily needs. She noted also intermittent epigastric pain with radiation to the back. and endorsef some slight blurry vision over the last 2 weeks which she describes as "vision feels off" though is able to see.    Patient was admitted 9/30 and underwent R temporal crani for resection of metastatic lesion. Post-operative course was complicated by acute left cerebellar infarct found on post-op MRI, tachycardia, and thrombocytopenia. She was recommended for follow up with oncology for lung lesions, radiation oncology for brain, and dr. michelle for post-operative follow up. She will also follow up with Dr. Pam Randle in 6 months for findings on 10/4 CTA head. Plan for no ASA for secondary prevention until cleared by Dr. Michelle. Wound closure due to be removed postop day 10-14 in rehab. Patient was evaluated by PM&R and therapy for functional deficits and gait/ ADL impairments and recommended acute rehabilitation. Patient was medically optimized for discharge to [facility] on 10/5/23.     Allergies  No Known Allergies    Subjective/ROS:   - Patient was seen and examined at bed side, in her WC comfortable, no over night issues  - Slept well last night, no new complaints this am  - Denies pain at this time  - GI/, moving her bowels regulary, LBM (10/05), voiding without issues  - Tolerating 3 hours of daily therapy, motivated  - Denies CP, palpitation, SOB, cough, fever, chills, abdominal pain, N/V/D/C, joint pain or swelling, headaches or dizziness, dysuria, hematuria or frequency     MEDICATIONS  (STANDING):  dexAMETHasone     Tablet 3 milliGRAM(s) Oral two times a day  dexAMETHasone     Tablet   Oral   dextrose 5%. 1000 milliLiter(s) (50 mL/Hr) IV Continuous <Continuous>  dextrose 5%. 1000 milliLiter(s) (100 mL/Hr) IV Continuous <Continuous>  dextrose 50% Injectable 25 Gram(s) IV Push once  dextrose 50% Injectable 12.5 Gram(s) IV Push once  dextrose 50% Injectable 25 Gram(s) IV Push once  divalproex  milliGRAM(s) Oral every 12 hours  enoxaparin Injectable 40 milliGRAM(s) SubCutaneous every 24 hours  glucagon  Injectable 1 milliGRAM(s) IntraMuscular once  insulin glargine Injectable (LANTUS) 15 Unit(s) SubCutaneous at bedtime  insulin lispro (ADMELOG) corrective regimen sliding scale   SubCutaneous at bedtime  insulin lispro (ADMELOG) corrective regimen sliding scale   SubCutaneous three times a day before meals  insulin lispro Injectable (ADMELOG) 5 Unit(s) SubCutaneous three times a day before meals  pantoprazole    Tablet 40 milliGRAM(s) Oral before breakfast  polyethylene glycol 3350 17 Gram(s) Oral every 24 hours  senna 2 Tablet(s) Oral at bedtime    MEDICATIONS  (PRN):  acetaminophen     Tablet .. 650 milliGRAM(s) Oral every 6 hours PRN Mild Pain (1 - 3), Moderate Pain (4 - 6)  dextrose Oral Gel 15 Gram(s) Oral once PRN Blood Glucose LESS THAN 70 milliGRAM(s)/deciliter  oxyCODONE    IR 5 milliGRAM(s) Oral every 4 hours PRN Severe Pain (7 - 10)    LAB                        10.3   10.29 )-----------( 133      ( 06 Oct 2023 05:45 )             31.4     10-06    136  |  103  |  16  ----------------------------<  163<H>  4.0   |  26  |  0.60    Ca    8.7      06 Oct 2023 05:45    TPro  6.0  /  Alb  1.9<L>  /  TBili  0.2  /  DBili  x   /  AST  24  /  ALT  28  /  AlkPhos  60  10-06    LIVER FUNCTIONS - ( 06 Oct 2023 05:45 )  Alb: 1.9 g/dL / Pro: 6.0 g/dL / ALK PHOS: 60 U/L / ALT: 28 U/L / AST: 24 U/L / GGT: x           CAPILLARY BLOOD GLUCOSE  POCT Blood Glucose.: 115 mg/dL (06 Oct 2023 07:59)  POCT Blood Glucose.: 205 mg/dL (05 Oct 2023 22:14)  POCT Blood Glucose.: 71 mg/dL (05 Oct 2023 17:42)  POCT Blood Glucose.: 68 mg/dL (05 Oct 2023 17:41)  POCT Blood Glucose.: 356 mg/dL (05 Oct 2023 11:49)    Radiology, Cardiology, and Other Results:     CT Head No Cont (10.04.23 @ 15:16)   edemonstrated postoperative changes related to a prior right   temporal craniotomy for resection of a metastatic lesion. Postoperative   findings are relatively stable.  2.  Other metastatic lesions seen throughout the brain parenchyma with   surrounding vasogenic edema, again relatively stable.  3.  Redemonstrated acute infarct in the left cerebellar hemisphere,   stable from recent MR imaging.    CTA BRAIN:  1.  No evidence of abrupt cut off of intraluminal contrast.  2.  1 to 2 mm medially oriented vascular outpouching from the right ICA   terminus. This could represent a small saccular aneurysm versus a   vascular infundibulum.    CTA NECK:  1.  No evidence of critical stenosis by NASCET criteria.  2.  Multiple nodules seen in the bilateral lobes of the thyroid.   Recommend nonemergent thyroid ultrasound for further characterization if  clinically indicated.  3.  Redemonstrated partially visualized right apex lung mass. Correlate   with recent CT imaging of the chest for further evaluation.        MR Head w/wo IV Cont (10.02.23 @ 23:54)   IMPRESSION:  Redemonstration of right temporal craniotomy.  Residual 3.7 x 1.4 cm centrally necrotic/cystic peripherally enhancing   lesion along the mesial aspect of the surgical cavity (20-14). Findings   could represent residual enhancing neoplasm and/or postsurgical changes.  Additional curvilinear, thickened region of enhancement capping the   mesial aspect of the surgical cavity measures 7 mm in greatest thickness   (20-13). Findings could represent residual enhancing neoplasm and/or   postsurgical changes.    Previously seen an described multifocal enhancing parenchymal lesions   with internal susceptibility artifact in the bilateral frontal and right   cerebellar hemispheres are not significantly changed.  New small focus of restricted diffusion in the left mid cerebellar   hemisphere suggesting the presence of acute infarction.    VA Duplex Lower Ext Vein Scan, Bilat (10.01.23 @ 15:45)   IMPRESSION:  No evidence of deep venous thrombosis in either lower extremity.    Physical Exam:   Vital Signs Last 24 Hrs  T(C): 36.4 (06 Oct 2023 08:37), Max: 37.1 (05 Oct 2023 13:20)  T(F): 97.6 (06 Oct 2023 08:37), Max: 98.7 (05 Oct 2023 13:20)  HR: 109 (06 Oct 2023 08:37) (88 - 109)  BP: 114/78 (06 Oct 2023 08:37) (110/66 - 136/83)  RR: 16 (06 Oct 2023 08:37) (16 - 18)  SpO2: 97% (06 Oct 2023 08:37) (97% - 100%)    Gen - NAD, Comfortable  HEENT - NCAT, EOMI, PRERRLA, staples to right craniotomy incision  Pulm - CTAB,  No crackles  Cardiovascular - RRR, S1S2  Abdomen - Soft, NT, ND, +BS  Extremities - No C/C/E, No calf tenderness  Neuro-     Cognitive - AAOx4, follows command     Communication - Fluent, No dysarthria     Attention: able to state days of the week backwards     Memory: Recall 3 objects immediate, delayed recall 1/3 without categorical cues and 2/3 with categorical cues         Cranial Nerves - no facial asymmetry, tongue midline, EOMI     Motor -                     LEFT    UE - ShAB 4/5, EF 5/5, EE 5/5, WE 5/5,  5/5                    RIGHT UE - ShAB 4/5, EF 5/5, EE 5/5, WE 5/5,  5/5, +slight pronator drift                    LEFT    LE - HF 4/5, KE 5/5, DF 5/5, PF 5/5                    RIGHT LE - HF 4/5, KE 5/5, DF 5/5, PF 5/5        Sensory - Intact to LT     Coordination - slowing with right FTN and (+) dysmateria, left intact     Tone - normal  Psychiatric - Mood stable, Affect WNL  Skin:  all skin intact    Wounds: right craniotomy incision with staples in place and dried blood                CC:  Weakness, blurry vision    HPI:  Patient is a 53 yo F with history of stage IV lung cancer with mets to brain (s/p 4 cycles chemo and thoracic RT completed on oct 2022), gamma knife surgery to 7 brain mets , recent admission to [facility] from sept 4-13th for epigastric pain and hyperglycemia thought to be due to steroids, who presented to [facility] ED 10/5 complaining of 1 week of lower extremity weakness and intermittent blurry vision.  Patient was initially seen at the end of August and admitted for hx of migraines and dizziness, was started on  decadron 6mg q6hr and Depakote at that time. She returned to [facility] again in early september for worsening epigastric pain  in the setting of taking steroids without GI ppx. CTA at that time showing no PE, stable right apical lung mass and rapid interval growth of a left lower lobe mass and new hepatic mets. Pt finished dexamethasone taper on 9/7 and was discharged with protonix, depakote and follow up with outpatient rad onc Dr. Bernard, outpatient NSGY, and oncologist Dr. Beckman at [facility] with plans to start immunotherapy nivolumab as outpatient.  Since discharge on the 13th, patient noted weakness in her right lower extremity and 'muscle pain' which improves with massages. She used her walker at home however she feels weak and was requiring increasing support from her fiancee to ambulate/attend to daily needs. She noted also intermittent epigastric pain with radiation to the back. and endorsef some slight blurry vision over the last 2 weeks which she describes as "vision feels off" though is able to see.    Patient was admitted 9/30 and underwent R temporal crani for resection of metastatic lesion. Post-operative course was complicated by acute left cerebellar infarct found on post-op MRI, tachycardia, and thrombocytopenia. She was recommended for follow up with oncology for lung lesions, radiation oncology for brain, and dr. michelle for post-operative follow up. She will also follow up with Dr. Pam Randle in 6 months for findings on 10/4 CTA head. Plan for no ASA for secondary prevention until cleared by Dr. Michelle. Wound closure due to be removed postop day 10-14 in rehab. Patient was evaluated by PM&R and therapy for functional deficits and gait/ ADL impairments and recommended acute rehabilitation. Patient was medically optimized for discharge to [facility] on 10/5/23.     Allergies  No Known Allergies    Subjective/ROS:   - Patient was seen and examined at bed side, in her WC comfortable, no over night issues  - Slept well last night, no new complaints this am  - Denies pain at this time  - GI/, moving her bowels regulary, LBM (10/05), voiding without issues  - Tolerating 3 hours of daily therapy, motivated  - Denies CP, palpitation, SOB, cough, fever, chills, abdominal pain, N/V/D/C, joint pain or swelling, headaches or dizziness, dysuria, hematuria or frequency     MEDICATIONS  (STANDING):  dexAMETHasone     Tablet 3 milliGRAM(s) Oral two times a day  dexAMETHasone     Tablet   Oral   dextrose 5%. 1000 milliLiter(s) (50 mL/Hr) IV Continuous <Continuous>  dextrose 5%. 1000 milliLiter(s) (100 mL/Hr) IV Continuous <Continuous>  dextrose 50% Injectable 25 Gram(s) IV Push once  dextrose 50% Injectable 12.5 Gram(s) IV Push once  dextrose 50% Injectable 25 Gram(s) IV Push once  divalproex  milliGRAM(s) Oral every 12 hours  enoxaparin Injectable 40 milliGRAM(s) SubCutaneous every 24 hours  glucagon  Injectable 1 milliGRAM(s) IntraMuscular once  insulin glargine Injectable (LANTUS) 15 Unit(s) SubCutaneous at bedtime  insulin lispro (ADMELOG) corrective regimen sliding scale   SubCutaneous at bedtime  insulin lispro (ADMELOG) corrective regimen sliding scale   SubCutaneous three times a day before meals  insulin lispro Injectable (ADMELOG) 5 Unit(s) SubCutaneous three times a day before meals  pantoprazole    Tablet 40 milliGRAM(s) Oral before breakfast  polyethylene glycol 3350 17 Gram(s) Oral every 24 hours  senna 2 Tablet(s) Oral at bedtime    MEDICATIONS  (PRN):  acetaminophen     Tablet .. 650 milliGRAM(s) Oral every 6 hours PRN Mild Pain (1 - 3), Moderate Pain (4 - 6)  dextrose Oral Gel 15 Gram(s) Oral once PRN Blood Glucose LESS THAN 70 milliGRAM(s)/deciliter  oxyCODONE    IR 5 milliGRAM(s) Oral every 4 hours PRN Severe Pain (7 - 10)    LAB                        10.3   10.29 )-----------( 133      ( 06 Oct 2023 05:45 )             31.4     10-06    136  |  103  |  16  ----------------------------<  163<H>  4.0   |  26  |  0.60    Ca    8.7      06 Oct 2023 05:45    TPro  6.0  /  Alb  1.9<L>  /  TBili  0.2  /  DBili  x   /  AST  24  /  ALT  28  /  AlkPhos  60  10-06    LIVER FUNCTIONS - ( 06 Oct 2023 05:45 )  Alb: 1.9 g/dL / Pro: 6.0 g/dL / ALK PHOS: 60 U/L / ALT: 28 U/L / AST: 24 U/L / GGT: x           CAPILLARY BLOOD GLUCOSE  POCT Blood Glucose.: 115 mg/dL (06 Oct 2023 07:59)  POCT Blood Glucose.: 205 mg/dL (05 Oct 2023 22:14)  POCT Blood Glucose.: 71 mg/dL (05 Oct 2023 17:42)  POCT Blood Glucose.: 68 mg/dL (05 Oct 2023 17:41)  POCT Blood Glucose.: 356 mg/dL (05 Oct 2023 11:49)    Radiology, Cardiology, and Other Results:     CT Head No Cont (10.04.23 @ 15:16)   edemonstrated postoperative changes related to a prior right   temporal craniotomy for resection of a metastatic lesion. Postoperative   findings are relatively stable.  2.  Other metastatic lesions seen throughout the brain parenchyma with   surrounding vasogenic edema, again relatively stable.  3.  Redemonstrated acute infarct in the left cerebellar hemisphere,   stable from recent MR imaging.    CTA BRAIN:  1.  No evidence of abrupt cut off of intraluminal contrast.  2.  1 to 2 mm medially oriented vascular outpouching from the right ICA   terminus. This could represent a small saccular aneurysm versus a   vascular infundibulum.    CTA NECK:  1.  No evidence of critical stenosis by NASCET criteria.  2.  Multiple nodules seen in the bilateral lobes of the thyroid.   Recommend nonemergent thyroid ultrasound for further characterization if  clinically indicated.  3.  Redemonstrated partially visualized right apex lung mass. Correlate   with recent CT imaging of the chest for further evaluation.        MR Head w/wo IV Cont (10.02.23 @ 23:54)   IMPRESSION:  Redemonstration of right temporal craniotomy.  Residual 3.7 x 1.4 cm centrally necrotic/cystic peripherally enhancing   lesion along the mesial aspect of the surgical cavity (20-14). Findings   could represent residual enhancing neoplasm and/or postsurgical changes.  Additional curvilinear, thickened region of enhancement capping the   mesial aspect of the surgical cavity measures 7 mm in greatest thickness   (20-13). Findings could represent residual enhancing neoplasm and/or   postsurgical changes.    Previously seen an described multifocal enhancing parenchymal lesions   with internal susceptibility artifact in the bilateral frontal and right   cerebellar hemispheres are not significantly changed.  New small focus of restricted diffusion in the left mid cerebellar   hemisphere suggesting the presence of acute infarction.    VA Duplex Lower Ext Vein Scan, Bilat (10.01.23 @ 15:45)   IMPRESSION:  No evidence of deep venous thrombosis in either lower extremity.    Physical Exam:   Vital Signs Last 24 Hrs  T(C): 36.4 (06 Oct 2023 08:37), Max: 37.1 (05 Oct 2023 13:20)  T(F): 97.6 (06 Oct 2023 08:37), Max: 98.7 (05 Oct 2023 13:20)  HR: 109 (06 Oct 2023 08:37) (88 - 109)  BP: 114/78 (06 Oct 2023 08:37) (110/66 - 136/83)  RR: 16 (06 Oct 2023 08:37) (16 - 18)  SpO2: 97% (06 Oct 2023 08:37) (97% - 100%)    Gen - NAD, Comfortable  HEENT - NCAT, EOMI, PRERRLA, staples to right craniotomy incision  Pulm - CTAB,  No crackles  Cardiovascular - RRR, S1S2  Abdomen - Soft, NT, ND, +BS  Extremities - No C/C/E, No calf tenderness  Neuro-     Cognitive - AAOx4, follows command     Communication - Fluent, No dysarthria     Attention: able to state days of the week backwards     Memory: Recall 3 objects immediate, delayed recall 1/3 without categorical cues and 2/3 with categorical cues         Cranial Nerves - no facial asymmetry, tongue midline, EOMI     Motor -                     LEFT    UE - ShAB 4/5, EF 5/5, EE 5/5, WE 5/5,  5/5                    RIGHT UE - ShAB 3/5, EF 3/5, EE 3/5, WE 3/5,  3+/5, +slight pronator drift                    LEFT    LE - HF 4/5, KE 5/5, DF 5/5, PF 5/5                    RIGHT LE - HF 3/5, KE 3/5, DF 3/5, PF 5/5        Sensory - Intact to LT     Coordination - slowing with right FTN and (+) dysmateria, left intact     Tone - normal  Psychiatric - Mood stable, Affect WNL  Skin:  all skin intact    Wounds: right craniotomy incision with staples in place and dried blood

## 2023-10-06 NOTE — PROGRESS NOTE ADULT - ASSESSMENT
Assessment	  Patient is a 51 yo F with history of stage IV lung cancer with mets to brain (s/p 4 cycles chemo and thoracic RT completed on oct 2022), gamma knife surgery to 7 brain mets, who presented to [facility] ED 10/5 complaining of 1 week of lower extremity weakness and intermittent blurry vision. now s/p R temporal crani for resection of metastatic lesion. Post-operative course was complicated by acute cerebellar infarct found on post-op MRI, tachycardia, and thrombocytopenia. Admitted to [place] acute inpatient rehab on 10/5 for ADL, gait, and functional impairments.     # Metastatic brain lesion s/p Crani  - Comprehensive Multidisciplinary Rehab Program: 3 hours a day, 5 days a week with PT/OT/SLP  - Decadron taper - 3mg BID x2 days then 2mg BID through follow up  - Depakote 500mg BID seizure prophylaxis  - Pain control with oxy and tylenol  - Wound closure removal in 10-14 days (10/10-10/14)  - Outpatient follow up with Dr. Metcalf  - Fall, aspiration precautions    # Acute Cerebellar infarct  - No ASA until cleared by neurosurgery  - PT/OT evaluation for balance and coordination, ambulation, transfer   - Consider need for ?statin    #Lung Cancer with metastasis  - Depakote, decadron as above  - Outpatient oncology follow up with Dr. Beckman  - Recent CT c/a/p showed enlarging lung lesion and new liver metastases  - Per onc - patient will require rad/on assessment for post-op radiation  - S/p CRT last June 2023  - Hospitalist consult appreciated    # Diplopia/ Blurry vision   - Likely in setting of malignancy/mets/edema  - Seen by ophtho  - Artificial tears PRN. Eye patch PRN for comfort    # Type 2 Diabetes Mellitus with steroid induced hyperglycemia  - Recent A1c 8.0%  - Monitor FSBG TIDAC/QHS  - Consistent Carbohydrate diet  - Lantus 15 units in morning and admelog 8 units TIDAC --> reduced to 5 unites x 3 (10/06)   - Mod SSI AC, low SSI HS  - Hospitalist consult appreciated    # Mood/Cognition:  - Consider neuropsych consult if needed    # Pain Management:  - Tylenol PRN  - Oxycodone 5mg q4 hours PRN severe    # GI/Bowel:  - Senna QHS, Miralax daily   - GI ppx:Protonix    # /Bladder:   - Bladder scan q8 hours on admission, SC PRN  - Encourage timed voids every 4 hours while awake     # Skin/Pressure Injury:  - Skin assessment on admission: intact  - Monitor Incisions: right craniotomy incision with staples in place and dried blood  - Turn every 2 hours while in bed    # Diet:   - Diet Consistency/Modifications: Reg/thin/CC  - Aspiration Precautions  - SLP consult for swallow function evaluation and treatment    # DVT ppx:  - Lovenox    # Restrictions/Precautions:  - Precautions: Fall, aspiration    ---------------  Outpatient Follow-up:    Marcus Metcalf  Neurosurgery  [address] [facility]  [place] [address]  Phone: [phone]  Fax: [phone]  Follow Up Time:     Maritza Kohler  Hematology  [address] [place] [address]  Phone: [phone]  Fax: [phone]  Follow Up Time:     Kieran Figueroa  Neurology  [address] [place], [place] [id number]  Phone: [phone]  Fax: [phone]  Follow Up Time:  --------------    Goals: Safe discharge to home  Estimated Length of Stay: 10-14 days  Rehab Potential: Good  Medical Prognosis: Good  Estimated Disposition: Home with Home Care  ---------------     Assessment	  Patient is a 51 yo F with history of stage IV lung cancer with mets to brain (s/p 4 cycles chemo and thoracic RT completed on oct 2022), gamma knife surgery to 7 brain mets, who presented to [facility] ED 10/5 complaining of 1 week of lower extremity weakness and intermittent blurry vision. now s/p R temporal crani for resection of metastatic lesion. Post-operative course was complicated by acute cerebellar infarct found on post-op MRI, tachycardia, and thrombocytopenia. Admitted to [facility] acute inpatient rehab on 10/5 for ADL, gait, and functional impairments.     # Multiple metastatic brain lesions s/p Crani for R temporal lesion resection/ R dominant hemiparesis   - Comprehensive Multidisciplinary Rehab Program: 3 hours a day, 5 days a week with PT/OT/SLP  - Decadron taper - 3mg BID x2 days then 2mg BID through follow up  - Depakote 500mg BID seizure prophylaxis  - Pain control with oxy and tylenol  - Wound closure removal in 10-14 days (10/10-10/14)  - Outpatient follow up with Dr. Metcalf  - Fall, aspiration precautions    # Acute Cerebellar infarct  - No ASA until cleared by neurosurgery  - PT/OT evaluation for balance and coordination, ambulation, transfer   - Consider need for ?statin    #Lung Cancer with metastasis  - Depakote, decadron as above  - Outpatient oncology follow up with Dr. Beckman  - Recent CT c/a/p showed enlarging lung lesion and new liver metastases  - Per onc - patient will require rad/on assessment for post-op radiation  - S/p CRT last June 2023  - Hospitalist consult appreciated    # Diplopia/ Blurry vision   - Likely in setting of malignancy/mets/edema  - Seen by ophtho  - Artificial tears PRN. Eye patch PRN for comfort    # Type 2 Diabetes Mellitus with steroid induced hyperglycemia  - Recent A1c 8.0%  - Monitor FSBG TIDAC/QHS  - Consistent Carbohydrate diet  - Lantus 15 units in morning and admelog 8 units TIDAC --> reduced to 5 unites x 3 (10/06)   - Mod SSI AC, low SSI HS  - Hospitalist consult appreciated    # Mood/Cognition:  - Consider neuropsych consult if needed    # Pain Management:  - Tylenol PRN  - Oxycodone 5mg q4 hours PRN severe    # GI/Bowel:  - Senna QHS, Miralax daily   - GI ppx:Protonix    # /Bladder:   - Bladder scan q8 hours on admission, SC PRN  - Encourage timed voids every 4 hours while awake     # Skin/Pressure Injury:  - Skin assessment on admission: intact  - Monitor Incisions: right craniotomy incision with staples in place and dried blood  - Turn every 2 hours while in bed    # Diet:   - Diet Consistency/Modifications: Reg/thin/CC  - Aspiration Precautions  - SLP consult for swallow function evaluation and treatment    # DVT ppx:  - Lovenox    # Restrictions/Precautions:  - Precautions: Fall, aspiration    ---------------  Outpatient Follow-up:    Marcus Metcalf  Neurosurgery  [address] [facility], [address]  [place] [address]  Phone: [phone]  Fax: [phone]  Follow Up Time:     Maritza Kohler  Hematology  [address] [place] [address]  Phone: [phone]  Fax: [phone]  Follow Up Time:     Kieran Figueroa  Neurology  [address] [place] [address]  Phone: [phone]  Fax: [phone]  Follow Up Time:  --------------    Goals: Safe discharge to home  Estimated Length of Stay: 10-14 days  Rehab Potential: Good  Medical Prognosis: Good  Estimated Disposition: Home with Home Care  ---------------

## 2023-10-07 NOTE — PROGRESS NOTE ADULT - REASON FOR ADMISSION
Metastatic brain lesion s/p R temporal craniotomy, resection Multiple metastatic brain lesions s/p R temporal craniotomy, resection/ Right dominant hemiparesis

## 2023-10-07 NOTE — PROGRESS NOTE ADULT - SUBJECTIVE AND OBJECTIVE BOX
CC:  Weakness, blurry vision    HPI:  Patient is a 51 yo F with history of stage IV lung cancer with mets to brain (s/p 4 cycles chemo and thoracic RT completed on oct 2022), gamma knife surgery to 7 brain mets , recent admission to [facility] from sept 4-13th for epigastric pain and hyperglycemia thought to be due to steroids, who presented to [facility] ED 10/5 complaining of 1 week of lower extremity weakness and intermittent blurry vision.  Patient was initially seen at the end of August and admitted for hx of migraines and dizziness, was started on  decadron 6mg q6hr and Depakote at that time. She returned to [facility] again in early september for worsening epigastric pain  in the setting of taking steroids without GI ppx. CTA at that time showing no PE, stable right apical lung mass and rapid interval growth of a left lower lobe mass and new hepatic mets. Pt finished dexamethasone taper on 9/7 and was discharged with protonix, depakote and follow up with outpatient rad onc Dr. Bernard, outpatient NSGY, and oncologist Dr. Beckman at [facility] with plans to start immunotherapy nivolumab as outpatient.  Since discharge on the 13th, patient noted weakness in her right lower extremity and 'muscle pain' which improves with massages. She used her walker at home however she feels weak and was requiring increasing support from her fiancee to ambulate/attend to daily needs. She noted also intermittent epigastric pain with radiation to the back. and endorsef some slight blurry vision over the last 2 weeks which she describes as "vision feels off" though is able to see.    Patient was admitted 9/30 and underwent R temporal crani for resection of metastatic lesion. Post-operative course was complicated by acute left cerebellar infarct found on post-op MRI, tachycardia, and thrombocytopenia. She was recommended for follow up with oncology for lung lesions, radiation oncology for brain, and dr. michelle for post-operative follow up. She will also follow up with Dr. Pam Randle in 6 months for findings on 10/4 CTA head. Plan for no ASA for secondary prevention until cleared by Dr. Michelle. Wound closure due to be removed postop day 10-14 in rehab. Patient was evaluated by PM&R and therapy for functional deficits and gait/ ADL impairments and recommended acute rehabilitation. Patient was medically optimized for discharge to [facility] on 10/5/23.     Allergies  No Known Allergies    Subjective/ROS:   - Patient was seen and examined at bed side, in her bed comfortable, no over night issues  - Slept well last night, no new complaints this am  - Denies pain at this time  - GI/, moving her bowels regulary, LBM (10/06), voiding without issues  - Tolerating 3 hours of daily therapy, motivated  - Denies CP, palpitation, SOB, cough, fever, chills, abdominal pain, N/V/D/C, joint pain or swelling, headaches or dizziness, dysuria, hematuria or frequency     MEDICATIONS  (STANDING):  dexAMETHasone     Tablet 3 milliGRAM(s) Oral two times a day  dexAMETHasone     Tablet   Oral   dextrose 5%. 1000 milliLiter(s) (50 mL/Hr) IV Continuous <Continuous>  dextrose 5%. 1000 milliLiter(s) (100 mL/Hr) IV Continuous <Continuous>  dextrose 50% Injectable 25 Gram(s) IV Push once  dextrose 50% Injectable 12.5 Gram(s) IV Push once  dextrose 50% Injectable 25 Gram(s) IV Push once  divalproex  milliGRAM(s) Oral every 12 hours  enoxaparin Injectable 40 milliGRAM(s) SubCutaneous every 24 hours  glucagon  Injectable 1 milliGRAM(s) IntraMuscular once  insulin glargine Injectable (LANTUS) 15 Unit(s) SubCutaneous at bedtime  insulin lispro (ADMELOG) corrective regimen sliding scale   SubCutaneous at bedtime  insulin lispro (ADMELOG) corrective regimen sliding scale   SubCutaneous three times a day before meals  insulin lispro Injectable (ADMELOG) 5 Unit(s) SubCutaneous three times a day before meals  pantoprazole    Tablet 40 milliGRAM(s) Oral before breakfast  polyethylene glycol 3350 17 Gram(s) Oral every 24 hours  senna 2 Tablet(s) Oral at bedtime    MEDICATIONS  (PRN):  acetaminophen     Tablet .. 650 milliGRAM(s) Oral every 6 hours PRN Mild Pain (1 - 3), Moderate Pain (4 - 6)  dextrose Oral Gel 15 Gram(s) Oral once PRN Blood Glucose LESS THAN 70 milliGRAM(s)/deciliter  oxyCODONE    IR 5 milliGRAM(s) Oral every 4 hours PRN Severe Pain (7 - 10)    LAB                        10.3   10.29 )-----------( 133      ( 06 Oct 2023 05:45 )             31.4     10-06    136  |  103  |  16  ----------------------------<  163<H>  4.0   |  26  |  0.60    Ca    8.7      06 Oct 2023 05:45    TPro  6.0  /  Alb  1.9<L>  /  TBili  0.2  /  DBili  x   /  AST  24  /  ALT  28  /  AlkPhos  60  10-06    LIVER FUNCTIONS - ( 06 Oct 2023 05:45 )  Alb: 1.9 g/dL / Pro: 6.0 g/dL / ALK PHOS: 60 U/L / ALT: 28 U/L / AST: 24 U/L / GGT: x           CAPILLARY BLOOD GLUCOSE  POCT Blood Glucose.: 211 mg/dL (07 Oct 2023 12:07)  POCT Blood Glucose.: 253 mg/dL (07 Oct 2023 08:23)  POCT Blood Glucose.: 311 mg/dL (06 Oct 2023 22:07)  POCT Blood Glucose.: 149 mg/dL (06 Oct 2023 17:28)      Radiology, Cardiology, and Other Results:     CT Head No Cont (10.04.23 @ 15:16)   edemonstrated postoperative changes related to a prior right   temporal craniotomy for resection of a metastatic lesion. Postoperative   findings are relatively stable.  2.  Other metastatic lesions seen throughout the brain parenchyma with   surrounding vasogenic edema, again relatively stable.  3.  Redemonstrated acute infarct in the left cerebellar hemisphere,   stable from recent MR imaging.    CTA BRAIN:  1.  No evidence of abrupt cut off of intraluminal contrast.  2.  1 to 2 mm medially oriented vascular outpouching from the right ICA   terminus. This could represent a small saccular aneurysm versus a   vascular infundibulum.    CTA NECK:  1.  No evidence of critical stenosis by NASCET criteria.  2.  Multiple nodules seen in the bilateral lobes of the thyroid.   Recommend nonemergent thyroid ultrasound for further characterization if  clinically indicated.  3.  Redemonstrated partially visualized right apex lung mass. Correlate   with recent CT imaging of the chest for further evaluation.        MR Head w/wo IV Cont (10.02.23 @ 23:54)   IMPRESSION:  Redemonstration of right temporal craniotomy.  Residual 3.7 x 1.4 cm centrally necrotic/cystic peripherally enhancing   lesion along the mesial aspect of the surgical cavity (20-14). Findings   could represent residual enhancing neoplasm and/or postsurgical changes.  Additional curvilinear, thickened region of enhancement capping the   mesial aspect of the surgical cavity measures 7 mm in greatest thickness   (20-13). Findings could represent residual enhancing neoplasm and/or   postsurgical changes.    Previously seen an described multifocal enhancing parenchymal lesions   with internal susceptibility artifact in the bilateral frontal and right   cerebellar hemispheres are not significantly changed.  New small focus of restricted diffusion in the left mid cerebellar   hemisphere suggesting the presence of acute infarction.    VA Duplex Lower Ext Vein Scan, Bilat (10.01.23 @ 15:45)   IMPRESSION:  No evidence of deep venous thrombosis in either lower extremity.    Physical Exam:   Vital Signs Last 24 Hrs  T(C): 36.9 (07 Oct 2023 08:30), Max: 36.9 (07 Oct 2023 08:30)  T(F): 98.4 (07 Oct 2023 08:30), Max: 98.4 (07 Oct 2023 08:30)  HR: 84 (07 Oct 2023 08:30) (84 - 94)  BP: 110/72 (07 Oct 2023 08:30) (110/72 - 117/76)  RR: 16 (07 Oct 2023 08:30) (16 - 16)  SpO2: 98% (07 Oct 2023 08:30) (97% - 98%)    Gen - NAD, Comfortable  HEENT - NCAT, EOMI, PRERRLA, staples to right craniotomy incision  Pulm - CTAB,  No crackles  Cardiovascular - RRR, S1S2  Abdomen - Soft, NT, ND, +BS  Extremities - No C/C/E, No calf tenderness  Neuro-     Cognitive - AAOx4, follows command     Communication - Fluent, No dysarthria     Attention: able to state days of the week backwards     Memory: Recall 3 objects immediate, delayed recall 1/3 without categorical cues and 2/3 with categorical cues         Cranial Nerves - no facial asymmetry, tongue midline, EOMI     Motor -                     LEFT    UE - ShAB 4/5, EF 5/5, EE 5/5, WE 5/5,  5/5                    RIGHT UE - ShAB 4/5, EF 5/5, EE 5/5, WE 5/5,  5/5, +slight pronator drift                    LEFT    LE - HF 4/5, KE 5/5, DF 5/5, PF 5/5                    RIGHT LE - HF 4/5, KE 5/5, DF 5/5, PF 5/5        Sensory - Intact to LT     Coordination - slowing with right FTN and (+) dysmateria, left intact     Tone - normal  Psychiatric - Mood stable, Affect WNL  Skin:  all skin intact    Wounds: right craniotomy incision with staples in place and dried blood                CC:  Weakness, blurry vision    HPI:  Patient is a 51 yo F with history of stage IV lung cancer with mets to brain (s/p 4 cycles chemo and thoracic RT completed on oct 2022), gamma knife surgery to 7 brain mets , recent admission to [facility] from sept 4-13th for epigastric pain and hyperglycemia thought to be due to steroids, who presented to [facility] ED 10/5 complaining of 1 week of lower extremity weakness and intermittent blurry vision.  Patient was initially seen at the end of August and admitted for hx of migraines and dizziness, was started on  decadron 6mg q6hr and Depakote at that time. She returned to [facility] again in early september for worsening epigastric pain  in the setting of taking steroids without GI ppx. CTA at that time showing no PE, stable right apical lung mass and rapid interval growth of a left lower lobe mass and new hepatic mets. Pt finished dexamethasone taper on 9/7 and was discharged with protonix, depakote and follow up with outpatient rad onc Dr. Bernard, outpatient NSGY, and oncologist Dr. Beckman at [facility] with plans to start immunotherapy nivolumab as outpatient.  Since discharge on the 13th, patient noted weakness in her right lower extremity and 'muscle pain' which improves with massages. She used her walker at home however she feels weak and was requiring increasing support from her fiancee to ambulate/attend to daily needs. She noted also intermittent epigastric pain with radiation to the back. and endorsef some slight blurry vision over the last 2 weeks which she describes as "vision feels off" though is able to see.    Patient was admitted 9/30 and underwent R temporal crani for resection of metastatic lesion. Post-operative course was complicated by acute left cerebellar infarct found on post-op MRI, tachycardia, and thrombocytopenia. She was recommended for follow up with oncology for lung lesions, radiation oncology for brain, and dr. michelle for post-operative follow up. She will also follow up with Dr. Pam Randle in 6 months for findings on 10/4 CTA head. Plan for no ASA for secondary prevention until cleared by Dr. Michelle. Wound closure due to be removed postop day 10-14 in rehab. Patient was evaluated by PM&R and therapy for functional deficits and gait/ ADL impairments and recommended acute rehabilitation. Patient was medically optimized for discharge to [facility] on 10/5/23.     Allergies  No Known Allergies    Subjective/ROS:   - Patient was seen and examined at bed side, in her bed comfortable, no over night issues  - Slept well last night, no new complaints this am  - Denies pain at this time  - GI/, moving her bowels regulary, LBM (10/06), voiding without issues  - Tolerating 3 hours of daily therapy, motivated  - Denies CP, palpitation, SOB, cough, fever, chills, abdominal pain, N/V/D/C, joint pain or swelling, headaches or dizziness, dysuria, hematuria or frequency     MEDICATIONS  (STANDING):  dexAMETHasone     Tablet 3 milliGRAM(s) Oral two times a day  dexAMETHasone     Tablet   Oral   dextrose 5%. 1000 milliLiter(s) (50 mL/Hr) IV Continuous <Continuous>  dextrose 5%. 1000 milliLiter(s) (100 mL/Hr) IV Continuous <Continuous>  dextrose 50% Injectable 25 Gram(s) IV Push once  dextrose 50% Injectable 12.5 Gram(s) IV Push once  dextrose 50% Injectable 25 Gram(s) IV Push once  divalproex  milliGRAM(s) Oral every 12 hours  enoxaparin Injectable 40 milliGRAM(s) SubCutaneous every 24 hours  glucagon  Injectable 1 milliGRAM(s) IntraMuscular once  insulin glargine Injectable (LANTUS) 15 Unit(s) SubCutaneous at bedtime  insulin lispro (ADMELOG) corrective regimen sliding scale   SubCutaneous at bedtime  insulin lispro (ADMELOG) corrective regimen sliding scale   SubCutaneous three times a day before meals  insulin lispro Injectable (ADMELOG) 5 Unit(s) SubCutaneous three times a day before meals  pantoprazole    Tablet 40 milliGRAM(s) Oral before breakfast  polyethylene glycol 3350 17 Gram(s) Oral every 24 hours  senna 2 Tablet(s) Oral at bedtime    MEDICATIONS  (PRN):  acetaminophen     Tablet .. 650 milliGRAM(s) Oral every 6 hours PRN Mild Pain (1 - 3), Moderate Pain (4 - 6)  dextrose Oral Gel 15 Gram(s) Oral once PRN Blood Glucose LESS THAN 70 milliGRAM(s)/deciliter  oxyCODONE    IR 5 milliGRAM(s) Oral every 4 hours PRN Severe Pain (7 - 10)    LAB                        10.3   10.29 )-----------( 133      ( 06 Oct 2023 05:45 )             31.4     10-06    136  |  103  |  16  ----------------------------<  163<H>  4.0   |  26  |  0.60    Ca    8.7      06 Oct 2023 05:45    TPro  6.0  /  Alb  1.9<L>  /  TBili  0.2  /  DBili  x   /  AST  24  /  ALT  28  /  AlkPhos  60  10-06    LIVER FUNCTIONS - ( 06 Oct 2023 05:45 )  Alb: 1.9 g/dL / Pro: 6.0 g/dL / ALK PHOS: 60 U/L / ALT: 28 U/L / AST: 24 U/L / GGT: x           CAPILLARY BLOOD GLUCOSE  POCT Blood Glucose.: 211 mg/dL (07 Oct 2023 12:07)  POCT Blood Glucose.: 253 mg/dL (07 Oct 2023 08:23)  POCT Blood Glucose.: 311 mg/dL (06 Oct 2023 22:07)  POCT Blood Glucose.: 149 mg/dL (06 Oct 2023 17:28)      Radiology, Cardiology, and Other Results:     CT Head No Cont (10.04.23 @ 15:16)   edemonstrated postoperative changes related to a prior right   temporal craniotomy for resection of a metastatic lesion. Postoperative   findings are relatively stable.  2.  Other metastatic lesions seen throughout the brain parenchyma with   surrounding vasogenic edema, again relatively stable.  3.  Redemonstrated acute infarct in the left cerebellar hemisphere,   stable from recent MR imaging.    CTA BRAIN:  1.  No evidence of abrupt cut off of intraluminal contrast.  2.  1 to 2 mm medially oriented vascular outpouching from the right ICA   terminus. This could represent a small saccular aneurysm versus a   vascular infundibulum.    CTA NECK:  1.  No evidence of critical stenosis by NASCET criteria.  2.  Multiple nodules seen in the bilateral lobes of the thyroid.   Recommend nonemergent thyroid ultrasound for further characterization if  clinically indicated.  3.  Redemonstrated partially visualized right apex lung mass. Correlate   with recent CT imaging of the chest for further evaluation.    MR Head w/wo IV Cont (10.02.23 @ 23:54)   IMPRESSION:  Redemonstration of right temporal craniotomy.  Residual 3.7 x 1.4 cm centrally necrotic/cystic peripherally enhancing   lesion along the mesial aspect of the surgical cavity (20-14). Findings   could represent residual enhancing neoplasm and/or postsurgical changes.  Additional curvilinear, thickened region of enhancement capping the   mesial aspect of the surgical cavity measures 7 mm in greatest thickness   (20-13). Findings could represent residual enhancing neoplasm and/or   postsurgical changes.    Previously seen an described multifocal enhancing parenchymal lesions   with internal susceptibility artifact in the bilateral frontal and right   cerebellar hemispheres are not significantly changed.  New small focus of restricted diffusion in the left mid cerebellar   hemisphere suggesting the presence of acute infarction.    VA Duplex Lower Ext Vein Scan, Bilat (10.01.23 @ 15:45)   IMPRESSION:  No evidence of deep venous thrombosis in either lower extremity.    Physical Exam:   Vital Signs Last 24 Hrs  T(C): 36.9 (07 Oct 2023 08:30), Max: 36.9 (07 Oct 2023 08:30)  T(F): 98.4 (07 Oct 2023 08:30), Max: 98.4 (07 Oct 2023 08:30)  HR: 84 (07 Oct 2023 08:30) (84 - 94)  BP: 110/72 (07 Oct 2023 08:30) (110/72 - 117/76)  RR: 16 (07 Oct 2023 08:30) (16 - 16)  SpO2: 98% (07 Oct 2023 08:30) (97% - 98%)    Gen - NAD, Comfortable  HEENT - NCAT, EOMI, PRERRLA, staples to right craniotomy incision  Pulm - CTAB,  No crackles  Cardiovascular - RRR, S1S2  Abdomen - Soft, NT, ND, +BS  Extremities - No C/C/E, No calf tenderness  Neuro-     Cognitive - AAOx4, follows command     Communication - Fluent, No dysarthria     Attention: able to state days of the week backwards     Memory: Recall 3 objects immediate, delayed recall 1/3 without categorical cues and 2/3 with categorical cues         Cranial Nerves - no facial asymmetry, tongue midline, EOMI     Motor -                     LEFT    UE - ShAB 4/5, EF 5/5, EE 5/5, WE 5/5,  5/5                    RIGHT UE - ShAB 3/5, EF3/5, EE 3/5, WE 3/5, Grip3+/5, +slight pronator drift                    LEFT    LE - HF 4/5, KE 5/5, DF 5/5, PF 5/5                    RIGHT LE - HF 3/5, KE 3/5, DF 3/5, PF 5/5        Sensory - Intact to LT     Coordination - slowing with right FTN and (+) dysmateria, left intact     Tone - normal  Psychiatric - Mood stable, Affect WNL  Skin:  all skin intact    Wounds: right craniotomy incision with staples in place and dried blood

## 2023-10-07 NOTE — PROGRESS NOTE ADULT - SUBJECTIVE AND OBJECTIVE BOX
PROGRESS NOTE:     Patient is a 52y old  Female who presents with a chief complaint of Neoplasm of uncertain behavior of brain     (06 Oct 2023 13:14)          SUBJECTIVE & OBJECTIVE:   Pt seen and examined at bedside in AM    no overnight events.   no new complaints    REVIEW OF SYSTEMS: remaining ROS negative     PHYSICAL EXAM:  VITALS:  Vital Signs Last 24 Hrs  T(C): 36.9 (07 Oct 2023 08:30), Max: 36.9 (07 Oct 2023 08:30)  T(F): 98.4 (07 Oct 2023 08:30), Max: 98.4 (07 Oct 2023 08:30)  HR: 84 (07 Oct 2023 08:30) (84 - 94)  BP: 110/72 (07 Oct 2023 08:30) (110/72 - 117/76)  BP(mean): --  RR: 16 (07 Oct 2023 08:30) (16 - 16)  SpO2: 98% (07 Oct 2023 08:30) (97% - 98%)    Parameters below as of 07 Oct 2023 08:30  Patient On (Oxygen Delivery Method): room air          GENERAL: NAD,  no increased WOB  HEAD:  +right cranial incision well healed with staples  EYES: EOMI, conjunctiva and sclera clear  ENMT: Moist mucous membranes  NECK: Supple, No JVD  NERVOUS SYSTEM:  Alert & Oriented X3, no focal neuro deficits   CHEST/LUNG: Clear to auscultation bilaterally; No rales, rhonchi, wheezing, or rubs  HEART: Regular rate and rhythm; No murmurs  ABDOMEN: Soft, Nontender, Nondistended; Bowel sounds present  EXTREMITIES: No clubbing, cyanosis, calf tenderness or edema b/l      MEDICATIONS  (STANDING):  dexAMETHasone     Tablet   Oral   dexAMETHasone     Tablet 2 milliGRAM(s) Oral two times a day  dextrose 5%. 1000 milliLiter(s) (50 mL/Hr) IV Continuous <Continuous>  dextrose 5%. 1000 milliLiter(s) (100 mL/Hr) IV Continuous <Continuous>  dextrose 50% Injectable 25 Gram(s) IV Push once  dextrose 50% Injectable 12.5 Gram(s) IV Push once  dextrose 50% Injectable 25 Gram(s) IV Push once  divalproex  milliGRAM(s) Oral every 12 hours  enoxaparin Injectable 40 milliGRAM(s) SubCutaneous every 24 hours  glucagon  Injectable 1 milliGRAM(s) IntraMuscular once  insulin glargine Injectable (LANTUS) 15 Unit(s) SubCutaneous at bedtime  insulin lispro (ADMELOG) corrective regimen sliding scale   SubCutaneous at bedtime  insulin lispro (ADMELOG) corrective regimen sliding scale   SubCutaneous three times a day before meals  insulin lispro Injectable (ADMELOG) 5 Unit(s) SubCutaneous three times a day before meals  pantoprazole    Tablet 40 milliGRAM(s) Oral before breakfast  polyethylene glycol 3350 17 Gram(s) Oral every 24 hours  senna 2 Tablet(s) Oral at bedtime    MEDICATIONS  (PRN):  acetaminophen     Tablet .. 650 milliGRAM(s) Oral every 6 hours PRN Mild Pain (1 - 3), Moderate Pain (4 - 6)  dextrose Oral Gel 15 Gram(s) Oral once PRN Blood Glucose LESS THAN 70 milliGRAM(s)/deciliter  oxyCODONE    IR 5 milliGRAM(s) Oral every 4 hours PRN Severe Pain (7 - 10)      Allergies    No Known Allergies    Intolerances              LABS:                           10.3   10.29 )-----------( 133      ( 06 Oct 2023 05:45 )             31.4     10-06    136  |  103  |  16  ----------------------------<  163<H>  4.0   |  26  |  0.60    Ca    8.7      06 Oct 2023 05:45    TPro  6.0  /  Alb  1.9<L>  /  TBili  0.2  /  DBili  x   /  AST  24  /  ALT  28  /  AlkPhos  60  10-06      Urinalysis Basic - ( 06 Oct 2023 05:45 )    Color: x / Appearance: x / SG: x / pH: x  Gluc: 163 mg/dL / Ketone: x  / Bili: x / Urobili: x   Blood: x / Protein: x / Nitrite: x   Leuk Esterase: x / RBC: x / WBC x   Sq Epi: x / Non Sq Epi: x / Bacteria: x      CAPILLARY BLOOD GLUCOSE      POCT Blood Glucose.: 211 mg/dL (07 Oct 2023 12:07)  POCT Blood Glucose.: 253 mg/dL (07 Oct 2023 08:23)  POCT Blood Glucose.: 311 mg/dL (06 Oct 2023 22:07)  POCT Blood Glucose.: 149 mg/dL (06 Oct 2023 17:28)                RECENT CULTURES:          RADIOLOGY & ADDITIONAL TESTS:

## 2023-10-07 NOTE — PROGRESS NOTE ADULT - ASSESSMENT
Assessment	  Patient is a 51 yo F with history of stage IV lung cancer with mets to brain (s/p 4 cycles chemo and thoracic RT completed on oct 2022), gamma knife surgery to 7 brain mets, who presented to [facility] ED 10/5 complaining of 1 week of lower extremity weakness and intermittent blurry vision. now s/p R temporal crani for resection of metastatic lesion. Post-operative course was complicated by acute cerebellar infarct found on post-op MRI, tachycardia, and thrombocytopenia. Admitted to [place] acute inpatient rehab on 10/5 for ADL, gait, and functional impairments.     # Metastatic brain lesion s/p Crani  - Comprehensive Multidisciplinary Rehab Program: 3 hours a day, 5 days a week with PT/OT/SLP  - Decadron taper - 3mg BID x2 days then 2mg BID through follow up  - Depakote 500mg BID seizure prophylaxis  - Pain control with oxy and tylenol  - Wound closure removal in 14 days (10/14)  - Outpatient follow up with Dr. Metcalf  - Fall, aspiration precautions    # Acute Cerebellar infarct  - No ASA until cleared by neurosurgery  - PT/OT evaluation for balance and coordination, ambulation, transfer   - Consider need for ?statin    #Lung Cancer with metastasis  - Depakote, decadron as above  - Outpatient oncology follow up with Dr. Beckman  - Recent CT c/a/p showed enlarging lung lesion and new liver metastases  - Per onc - patient will require rad/on assessment for post-op radiation  - S/p CRT last June 2023  - Hospitalist consult appreciated    # Diplopia/ Blurry vision   - Likely in setting of malignancy/mets/edema  - Seen by ophtho  - Artificial tears PRN. Eye patch PRN for comfort    # Type 2 Diabetes Mellitus with steroid induced hyperglycemia  - Recent A1c 8.0%  - Monitor FSBG TIDAC/QHS  - Consistent Carbohydrate diet  - Lantus 15 units in morning and admelog 8 units TIDAC --> reduced to 5 unites x 3 (10/06)   - Mod SSI AC, low SSI HS  - Hospitalist consult appreciated    # Mood/Cognition:  - Consider neuropsych consult if needed    # Pain Management:  - Tylenol PRN  - Oxycodone 5mg q4 hours PRN severe    # GI/Bowel:  - Senna QHS, Miralax daily PRN  - GI ppx:Protonix    # /Bladder:   - Bladder scan q8 hours on admission, SC PRN  - Encourage timed voids every 4 hours while awake     # Skin/Pressure Injury:  - Skin assessment on admission: intact  - Monitor Incisions: right craniotomy incision with staples in place and dried blood  - Turn every 2 hours while in bed    # Diet:   - Diet Consistency/Modifications: Reg/thin/CC  - Aspiration Precautions  - SLP consult for swallow function evaluation and treatment, recs appreciated     # DVT ppx:  - Lovenox    # Restrictions/Precautions:  - Precautions: Fall, aspiration    ---------------  Outpatient Follow-up:    Marcus Metcalf  Neurosurgery  [address] [facility], [address]  [place] [address]  Phone: [phone]  Fax: [phone]  Follow Up Time:     Maritza Kohler  Hematology  [address] [place] [address]  Phone: [phone]  Fax: [phone]  Follow Up Time:     Kieran Figueroa  Neurology  [address] [place], [place] [id number]  Phone: [phone]  Fax: [phone]  Follow Up Time:  --------------    Goals: Safe discharge to home  Estimated Length of Stay: 10-14 days  Rehab Potential: Good  Medical Prognosis: Good  Estimated Disposition: Home with Home Care  ---------------     Assessment	  Patient is a 53 yo F with history of stage IV lung cancer with mets to brain (s/p 4 cycles chemo and thoracic RT completed on oct 2022), gamma knife surgery to 7 brain mets, who presented to [facility] ED 10/5 complaining of 1 week of lower extremity weakness and intermittent blurry vision. now s/p R temporal crani for resection of metastatic lesion. Post-operative course was complicated by acute cerebellar infarct found on post-op MRI, tachycardia, and thrombocytopenia. Admitted to [place] acute inpatient rehab on 10/5 for ADL, gait, and functional impairments.     # Multiple metastatic brain lesions s/p Crani/ right dominant hemiparesis   - Comprehensive Multidisciplinary Rehab Program: 3 hours a day, 5 days a week with PT/OT/SLP  - Decadron taper - 3mg BID x2 days then 2mg BID through follow up  - Depakote 500mg BID seizure prophylaxis  - Pain control with oxy and tylenol  - Wound closure removal in 14 days (10/14)  - Outpatient follow up with Dr. Metcalf  - Fall, aspiration precautions    # Acute Cerebellar infarct  - No ASA until cleared by neurosurgery  - PT/OT evaluation for balance and coordination, ambulation, transfer   - Consider need for ?statin    #Lung Cancer with metastasis  - Depakote, decadron as above  - Outpatient oncology follow up with Dr. Beckman  - Recent CT c/a/p showed enlarging lung lesion and new liver metastases  - Per onc - patient will require rad/on assessment for post-op radiation  - S/p CRT last June 2023  - Hospitalist consult appreciated    # Diplopia/ Blurry vision   - Likely in setting of malignancy/mets/edema  - Seen by ophtho  - Artificial tears PRN. Eye patch PRN for comfort    # Type 2 Diabetes Mellitus with steroid induced hyperglycemia  - Recent A1c 8.0%  - Monitor FSBG TIDAC/QHS  - Consistent Carbohydrate diet  - Lantus 15 units in morning and admelog 8 units TIDAC --> reduced to 5 unites x 3 (10/06)   - Mod SSI AC, low SSI HS  - Hospitalist consult appreciated    # Mood/Cognition:  - Consider neuropsych consult if needed    # Pain Management:  - Tylenol PRN  - Oxycodone 5mg q4 hours PRN severe    # GI/Bowel:  - Senna QHS, Miralax daily PRN  - GI ppx:Protonix    # /Bladder:   - Bladder scan q8 hours on admission, SC PRN  - Encourage timed voids every 4 hours while awake     # Skin/Pressure Injury:  - Skin assessment on admission: intact  - Monitor Incisions: right craniotomy incision with staples in place and dried blood  - Turn every 2 hours while in bed    # Diet:   - Diet Consistency/Modifications: Reg/thin/CC  - Aspiration Precautions  - SLP consult for swallow function evaluation and treatment, recs appreciated     # DVT ppx:  - Lovenox    # Restrictions/Precautions:  - Precautions: Fall, aspiration    ---------------  Outpatient Follow-up:    Marcus Metcalf  Neurosurgery  [address] [facility], [address]  [place] [address]  Phone: [phone]  Fax: [phone]  Follow Up Time:     Maritza Kohler  Hematology  [address] [place] [address]  Phone: [phone]  Fax: [phone]  Follow Up Time:     Kieran Figueroa  Neurology  [address] [place] [address]  Phone: [phone]  Fax: [phone]  Follow Up Time:  --------------    Goals: Safe discharge to home  Estimated Length of Stay: 10-14 days  Rehab Potential: Good  Medical Prognosis: Good  Estimated Disposition: Home with Home Care  ---------------

## 2023-10-07 NOTE — PROGRESS NOTE ADULT - ASSESSMENT
53 y/o F with hx of stage IV lung cancer with mets to brain (s/p 4 cycles chemo and thoracic RT completed on Oct 2022), gamma knife surgery to 7 brain mets, who presented to [facility] ED 10/5 complaining of 1 week of lower extremity weakness and intermittent blurry vision. now s/p R temporal crani for resection of metastatic lesion. Post-operative course was complicated by acute cerebellar infarct found on post-op MRI, tachycardia, and thrombocytopenia. Admitted to [place] acute inpatient rehab on 10/5 for ADL, gait, and functional impairments.         #Lung Cancer with metastasis  #Metastatic brain lesion s/p Crani  -Wound closure removal in 10-14 days (10/10-10/14)  -Recent CT c/a/p showed enlarging lung lesion and new liver metastases  -S/p CRT last June 2023  -Continue Decadron taper - 3mg BID x2 days then 2mg BID through follow up. Maintain GI ppx with protonix while on steroids  -Depakote 500mg BID seizure prophylaxis  -Start comprehensive rehab program - PT/OT/SLP per rehab team  -Pain management, bowel regimen per rehab. Continue fall, aspiration precautions  -H/H stable. Monitor CBC, likely post op anemia  -Outpatient follow up with Dr. Metcalf, outpatient oncology follow up with Dr. Beckman  -Per onc - patient will require rad/on assessment for post-op radiation  -Pall consult 10/3 noted - goals are to pursue intervention and treatment as tolerated  -Consider neuropsych consult for mood    #Acute Cerebellar infarct  -No ASA until cleared by neurosurgery  -Consider need for ?statin - check lipid panel    #Diplopia - Likely in setting of malignancy/mets/edema  -Seen by ophtho  -Artificial tears PRN. Eye patch PRN for comfort    #Type 2 Diabetes Mellitus with steroid induced hyperglycemia, HbA1c 8.0% (9/20/23)  -Monitor FSBG TIDAC/QHS - Noted  pre breakfast, admelog 8un held 10/6  -Consistent Carbohydrate diet  -Lantus 15un qam, admelog 8 units TIDAC - change to lantus 15un qhs, admelog 5un TID AC 10/6 - increase pre meal admelog over weekend if indicated  -Continue ISS    DVT ppx - Lovenox  GI ppx - Protonix

## 2023-10-08 NOTE — CHART NOTE - NSCHARTNOTEFT_GEN_A_CORE
Rapid response was called this afternoon 2/2 probable seizure.  ~6pm RN noted pt had seizure-like episode. Pt felt dizzy and all of a sudden had jaw clenching for a few seconds.  Pt was seen at bedside, awake oriented to place and time, answering questions well and denied any complaints at this time.  Pt said she doesn't remember having seizures or events leading up to rapid response being called.  Denied chest pain, shortness of breath, numbness, tingling sensations, motor deficits in extremities.    T(C): 36.7 (10-08-23 @ 07:50), Max: 37.5 (10-07-23 @ 21:15)  HR: 89 (10-08-23 @ 07:50) (89 - 109)  BP: 104/69 (10-08-23 @ 07:50) (104/69 - 116/77)  RR: 16 (10-08-23 @ 07:50) (16 - 16)  SpO2: 99% (10-08-23 @ 07:50) (97% - 99%)    PHYSICAL EXAM:  Gen: NAD  Resp: b/l air entry  CV: s1 s2 heard, regular  CNS: no focal deficits    ASSESSMENT:  ?seizure  - seizure precautions  - CT head  - labs, ekg  - IV fluids  - lorazepam 2mg IV PRN seizures  - close observation    Patient was seen and assessed by Dr. Dickinson. Rapid response was called this afternoon 2/2 probable seizure.  ~6pm RN noted pt had seizure-like episode. Pt felt dizzy and all of a sudden had jaw clenching for a few seconds.  Pt was seen at bedside, awake oriented to place and time, answering questions well and denied any complaints at this time.  Pt said she doesn't remember having seizures or events leading up to rapid response being called.  Denied chest pain, shortness of breath, numbness, tingling sensations, motor deficits in extremities.    T(C): 36.7 (10-08-23 @ 07:50), Max: 37.5 (10-07-23 @ 21:15)  HR: 89 (10-08-23 @ 07:50) (89 - 109)  BP: 104/69 (10-08-23 @ 07:50) (104/69 - 116/77)  RR: 16 (10-08-23 @ 07:50) (16 - 16)  SpO2: 99% (10-08-23 @ 07:50) (97% - 99%)    PHYSICAL EXAM:  Gen: NAD  Resp: b/l air entry  CV: s1 s2 heard, regular  CNS: no focal deficits    ASSESSMENT:  ?seizure  - seizure precautions  - CT head  - labs, ekg  - IV fluids  - lorazepam 1mg IV PRN seizures  - close observation    Patient was seen and assessed by Dr. Dickinson. Rapid response was called this afternoon 2/2 probable seizure.  ~6pm RN noted pt had seizure-like episode. Pt felt dizzy and all of a sudden had jaw clenching for a few seconds.  Pt was seen at bedside, awake oriented to place and time, answering questions well and denied any complaints at this time.  Pt said she doesn't remember having seizures or events leading up to rapid response being called.  Denied chest pain, shortness of breath, numbness, tingling sensations, motor deficits in extremities.    T(C): 36.7 (10-08-23 @ 07:50), Max: 37.5 (10-07-23 @ 21:15)  HR: 89 (10-08-23 @ 07:50) (89 - 109)  BP: 104/69 (10-08-23 @ 07:50) (104/69 - 116/77)  RR: 16 (10-08-23 @ 07:50) (16 - 16)  SpO2: 99% (10-08-23 @ 07:50) (97% - 99%)    PHYSICAL EXAM:  Gen: NAD  Resp: b/l air entry  CV: s1 s2 heard, regular  CNS: no focal deficits    ASSESSMENT:  ?seizure  - seizure precautions  - CT head  - labs, ekg  - IV fluids  - lorazepam 1mg IV PRN seizures  - close observation    Patient was seen and assessed by Dr. Dickinson.      Attending agrees with above in additiion:   Plan: noted elevated wbc,? asp pna,cxr ordered,  sepsis order set ordered, give zosyn IV, called PMR resident Dr Madera to update plan with [facility] for call back, repeat lactate after 2L bolus, f/u cultures, consider ID and s/s eval, f/u CT head signed out to nocturnist Dr Esteves, consider neuro consult if neurosx team agrees, pt encouraged po hydration, may need some maintenance, monitor clinical course, stable to remain on 1S rehab for now as long as remains hemodynamically stable, will discuss with patient and attending raim via ms teams

## 2023-10-08 NOTE — PROGRESS NOTE ADULT - ASSESSMENT
Assessment	  Patient is a 53 yo F with history of stage IV lung cancer with mets to brain (s/p 4 cycles chemo and thoracic RT completed on oct 2022), gamma knife surgery to 7 brain mets, who presented to [facility] ED 10/5 complaining of 1 week of lower extremity weakness and intermittent blurry vision. now s/p R temporal crani for resection of metastatic lesion. Post-operative course was complicated by acute cerebellar infarct found on post-op MRI, tachycardia, and thrombocytopenia. Admitted to [facility] acute inpatient rehab on 10/5 for ADL, gait, and functional impairments.     # Metastatic brain lesion s/p Crani  - Comprehensive Multidisciplinary Rehab Program: 3 hours a day, 5 days a week with PT/OT/SLP  - Decadron taper - 3mg BID x2 days then 2mg BID through follow up  - Depakote 500mg BID seizure prophylaxis  - Pain control with oxy and tylenol  - Wound closure removal in 14 days (10/14)  - Outpatient follow up with Dr. Metcalf  - Fall, aspiration precautions    # Acute Cerebellar infarct  - No ASA until cleared by neurosurgery  - PT/OT evaluation for balance and coordination, ambulation, transfer, ADLs  - Consider need for ?statin    #Lung Cancer with metastasis  - Depakote, decadron as above  - Outpatient oncology follow up with Dr. Beckman  - Recent CT c/a/p showed enlarging lung lesion and new liver metastases  - Per onc - patient will require rad/on assessment for post-op radiation  - S/p CRT last June 2023  - Hospitalist consult appreciated    # Diplopia/ Blurry vision   - Likely in setting of malignancy/mets/edema  - Seen by ophtho  - Artificial tears PRN. Eye patch PRN for comfort    # Type 2 Diabetes Mellitus with steroid induced hyperglycemia  - Recent A1c 8.0%  - Monitor FSBG TIDAC/QHS  - Consistent Carbohydrate diet  - Lantus 15 units in morning and admelog 8 units TIDAC --> reduced to 5 unites x 3 (10/06)   - Mod SSI AC, low SSI HS  - Hospitalist consult appreciated    # Mood/Cognition:  - Consider neuropsych consult if needed    # Pain Management:  - Tylenol PRN  - Oxycodone 5mg q4 hours PRN severe    # GI/Bowel:  - Senna QHS, Miralax daily PRN  - GI ppx:Protonix    # /Bladder:   - Bladder scan q8 hours on admission, SC PRN  - Encourage timed voids every 4 hours while awake     # Skin/Pressure Injury:  - Skin assessment on admission: intact  - Monitor Incisions: right craniotomy incision with staples in place and dried blood  - Turn every 2 hours while in bed    # Diet:   - Diet Consistency/Modifications: Reg/thin/CC  - Aspiration Precautions  - SLP consult for swallow function evaluation and treatment, recs appreciated     # DVT ppx:  - Lovenox    # Restrictions/Precautions:  - Precautions: Fall, aspiration    ---------------  Outpatient Follow-up:    Marcus Metcalf  Neurosurgery  [address] [facility], [address]  [place] [id number]  Phone: [phone]  Fax: [phone]  Follow Up Time:     Maritza Kohler  Hematology  [address] [place] [address]  Phone: [phone]  Fax: [phone]  Follow Up Time:     Kieran Figueroa  Neurology  [address] [place] [address]  Phone: [phone]  Fax: [phone]  Follow Up Time:  --------------    Goals: Safe discharge to home  Estimated Length of Stay: 10-14 days  Rehab Potential: Good  Medical Prognosis: Good  Estimated Disposition: Home with Home Care  ---------------     Assessment	  Patient is a 51 yo F with history of stage IV lung cancer with mets to brain (s/p 4 cycles chemo and thoracic RT completed on oct 2022), gamma knife surgery to 7 brain mets, who presented to [facility] ED 10/5 complaining of 1 week of lower extremity weakness and intermittent blurry vision. now s/p R temporal crani for resection of metastatic lesion. Post-operative course was complicated by acute cerebellar infarct found on post-op MRI, tachycardia, and thrombocytopenia. Admitted to [place] acute inpatient rehab on 10/5 for ADL, gait, and functional impairments.     # Multiple metastatic brain lesions s/p Crani/ right dominant hemiparesis   - Comprehensive Multidisciplinary Rehab Program: 3 hours a day, 5 days a week with PT/OT/SLP  - Decadron taper - 3mg BID x2 days then 2mg BID through follow up  - Depakote 500mg BID seizure prophylaxis  - Pain control with oxy and tylenol  - Wound closure removal in 14 days (10/14)  - Outpatient follow up with Dr. Metcalf  - Fall, aspiration precautions    # Acute Cerebellar infarct  - No ASA until cleared by neurosurgery  - PT/OT evaluation for balance and coordination, ambulation, transfer, ADLs  - Consider need for ?statin    #Lung Cancer with metastasis  - Depakote, decadron as above  - Outpatient oncology follow up with Dr. Beckman  - Recent CT c/a/p showed enlarging lung lesion and new liver metastases  - Per onc - patient will require rad/on assessment for post-op radiation  - S/p CRT last June 2023  - Hospitalist consult appreciated    # Diplopia/ Blurry vision   - Likely in setting of malignancy/mets/edema  - Seen by ophtho  - Artificial tears PRN. Eye patch PRN for comfort    # Type 2 Diabetes Mellitus with steroid induced hyperglycemia  - Recent A1c 8.0%  - Monitor FSBG TIDAC/QHS  - Consistent Carbohydrate diet  - Lantus 15 units in morning and admelog 8 units TIDAC --> reduced to 5 unites x 3 (10/06)   - Mod SSI AC, low SSI HS  - Hospitalist consult appreciated    # Mood/Cognition:  - Consider neuropsych consult if needed    # Pain Management:  - Tylenol PRN  - Oxycodone 5mg q4 hours PRN severe    # GI/Bowel:  - Senna QHS, Miralax daily PRN  - GI ppx:Protonix    # /Bladder:   - Bladder scan q8 hours on admission, SC PRN  - Encourage timed voids every 4 hours while awake     # Skin/Pressure Injury:  - Skin assessment on admission: intact  - Monitor Incisions: right craniotomy incision with staples in place and dried blood  - Turn every 2 hours while in bed    # Diet:   - Diet Consistency/Modifications: Reg/thin/CC  - Aspiration Precautions  - SLP consult for swallow function evaluation and treatment, recs appreciated     # DVT ppx:  - Lovenox    # Restrictions/Precautions:  - Precautions: Fall, aspiration    ---------------  Outpatient Follow-up:    Marcus Metcalf  Neurosurgery  [address] [facility], [address]  [place] [address]  Phone: [phone]  Fax: [phone]  Follow Up Time:     Maritza Kohler  Hematology  [address] [place] [address]  Phone: [phone]  Fax: [phone]  Follow Up Time:     Kieran Figueroa  Neurology  [address] [place] [address]  Phone: [phone]  Fax: [phone]  Follow Up Time:  --------------    Goals: Safe discharge to home  Estimated Length of Stay: 10-14 days  Rehab Potential: Good  Medical Prognosis: Good  Estimated Disposition: Home with Home Care  ---------------

## 2023-10-08 NOTE — PROGRESS NOTE ADULT - REASON FOR ADMISSION
Metastatic brain lesion s/p R temporal craniotomy, resection Multiple metastatic brain lesions s/p R temporal craniotomy, resection/Right dominant hemiparesis

## 2023-10-08 NOTE — PROGRESS NOTE ADULT - SUBJECTIVE AND OBJECTIVE BOX
CC:  Weakness, blurry vision    HPI:  Patient is a 51 yo F with history of stage IV lung cancer with mets to brain (s/p 4 cycles chemo and thoracic RT completed on oct 2022), gamma knife surgery to 7 brain mets , recent admission to [facility] from sept 4-13th for epigastric pain and hyperglycemia thought to be due to steroids, who presented to [facility] ED 10/5 complaining of 1 week of lower extremity weakness and intermittent blurry vision.  Patient was initially seen at the end of August and admitted for hx of migraines and dizziness, was started on  decadron 6mg q6hr and Depakote at that time. She returned to [facility] again in early september for worsening epigastric pain  in the setting of taking steroids without GI ppx. CTA at that time showing no PE, stable right apical lung mass and rapid interval growth of a left lower lobe mass and new hepatic mets. Pt finished dexamethasone taper on 9/7 and was discharged with protonix, depakote and follow up with outpatient rad onc Dr. Bernard, outpatient NSGY, and oncologist Dr. Beckman [facility] with plans to start immunotherapy nivolumab as outpatient.  Since discharge on the 13th, patient noted weakness in her right lower extremity and 'muscle pain' which improves with massages. She used her walker at home however she feels weak and was requiring increasing support from her fiancee to ambulate/attend to daily needs. She noted also intermittent epigastric pain with radiation to the back. and endorsef some slight blurry vision over the last 2 weeks which she describes as "vision feels off" though is able to see.    Patient was admitted 9/30 and underwent R temporal crani for resection of metastatic lesion. Post-operative course was complicated by acute left cerebellar infarct found on post-op MRI, tachycardia, and thrombocytopenia. She was recommended for follow up with oncology for lung lesions, radiation oncology for brain, and dr. michelle for post-operative follow up. She will also follow up with Dr. Pam Randle in 6 months for findings on 10/4 CTA head. Plan for no ASA for secondary prevention until cleared by Dr. Michelle. Wound closure due to be removed postop day 10-14 in rehab. Patient was evaluated by PM&R and therapy for functional deficits and gait/ ADL impairments and recommended acute rehabilitation. Patient was medically optimized for discharge to [facility] on 10/5/23.     Allergies  No Known Allergies    Subjective/ROS:   - Patient was seen and examined at bed side, in her bed comfortable, no over night issues  - Slept well last night, no new complaints this am  - Denies pain at this time  - GI/, moving her bowels regulary, LBM (10/07), voiding without issues  - Tolerating 3 hours of daily therapy, motivated  - Denies CP, palpitation, SOB, cough, fever, chills, abdominal pain, N/V/D/C, joint pain or swelling, headaches or dizziness, dysuria, hematuria or frequency     MEDICATIONS  (STANDING):  dexAMETHasone     Tablet 2 milliGRAM(s) Oral two times a day  dexAMETHasone     Tablet   Oral   dextrose 5%. 1000 milliLiter(s) (50 mL/Hr) IV Continuous <Continuous>  dextrose 5%. 1000 milliLiter(s) (100 mL/Hr) IV Continuous <Continuous>  dextrose 50% Injectable 25 Gram(s) IV Push once  dextrose 50% Injectable 12.5 Gram(s) IV Push once  dextrose 50% Injectable 25 Gram(s) IV Push once  divalproex  milliGRAM(s) Oral every 12 hours  enoxaparin Injectable 40 milliGRAM(s) SubCutaneous every 24 hours  glucagon  Injectable 1 milliGRAM(s) IntraMuscular once  insulin glargine Injectable (LANTUS) 15 Unit(s) SubCutaneous at bedtime  insulin lispro (ADMELOG) corrective regimen sliding scale   SubCutaneous at bedtime  insulin lispro (ADMELOG) corrective regimen sliding scale   SubCutaneous three times a day before meals  insulin lispro Injectable (ADMELOG) 5 Unit(s) SubCutaneous three times a day before meals  pantoprazole    Tablet 40 milliGRAM(s) Oral before breakfast  senna 2 Tablet(s) Oral at bedtime    MEDICATIONS  (PRN):  acetaminophen     Tablet .. 650 milliGRAM(s) Oral every 6 hours PRN Mild Pain (1 - 3), Moderate Pain (4 - 6)  dextrose Oral Gel 15 Gram(s) Oral once PRN Blood Glucose LESS THAN 70 milliGRAM(s)/deciliter  oxyCODONE    IR 5 milliGRAM(s) Oral every 4 hours PRN Severe Pain (7 - 10)  polyethylene glycol 3350 17 Gram(s) Oral every 24 hours PRN Constipation      LAB                        10.3   10.29 )-----------( 133      ( 06 Oct 2023 05:45 )             31.4     10-06    136  |  103  |  16  ----------------------------<  163<H>  4.0   |  26  |  0.60    Ca    8.7      06 Oct 2023 05:45    TPro  6.0  /  Alb  1.9<L>  /  TBili  0.2  /  DBili  x   /  AST  24  /  ALT  28  /  AlkPhos  60  10-06    LIVER FUNCTIONS - ( 06 Oct 2023 05:45 )  Alb: 1.9 g/dL / Pro: 6.0 g/dL / ALK PHOS: 60 U/L / ALT: 28 U/L / AST: 24 U/L / GGT: x           CAPILLARY BLOOD GLUCOSE  POCT Blood Glucose.: 142 mg/dL (08 Oct 2023 08:13)  POCT Blood Glucose.: 200 mg/dL (07 Oct 2023 21:16)  POCT Blood Glucose.: 159 mg/dL (07 Oct 2023 17:32)      Radiology, Cardiology, and Other Results:     CT Head No Cont (10.04.23 @ 15:16)   edemonstrated postoperative changes related to a prior right   temporal craniotomy for resection of a metastatic lesion. Postoperative   findings are relatively stable.  2.  Other metastatic lesions seen throughout the brain parenchyma with   surrounding vasogenic edema, again relatively stable.  3.  Redemonstrated acute infarct in the left cerebellar hemisphere,   stable from recent MR imaging.    CTA BRAIN:  1.  No evidence of abrupt cut off of intraluminal contrast.  2.  1 to 2 mm medially oriented vascular outpouching from the right ICA   terminus. This could represent a small saccular aneurysm versus a   vascular infundibulum.    CTA NECK:  1.  No evidence of critical stenosis by NASCET criteria.  2.  Multiple nodules seen in the bilateral lobes of the thyroid.   Recommend nonemergent thyroid ultrasound for further characterization if  clinically indicated.  3.  Redemonstrated partially visualized right apex lung mass. Correlate   with recent CT imaging of the chest for further evaluation.    MR Head w/wo IV Cont (10.02.23 @ 23:54)   IMPRESSION:  Redemonstration of right temporal craniotomy.  Residual 3.7 x 1.4 cm centrally necrotic/cystic peripherally enhancing   lesion along the mesial aspect of the surgical cavity (20-14). Findings   could represent residual enhancing neoplasm and/or postsurgical changes.  Additional curvilinear, thickened region of enhancement capping the   mesial aspect of the surgical cavity measures 7 mm in greatest thickness   (20-13). Findings could represent residual enhancing neoplasm and/or   postsurgical changes.    Previously seen an described multifocal enhancing parenchymal lesions   with internal susceptibility artifact in the bilateral frontal and right   cerebellar hemispheres are not significantly changed.  New small focus of restricted diffusion in the left mid cerebellar   hemisphere suggesting the presence of acute infarction.    VA Duplex Lower Ext Vein Scan, Bilat (10.01.23 @ 15:45)   IMPRESSION:  No evidence of deep venous thrombosis in either lower extremity.    Physical Exam:   Vital Signs Last 24 Hrs  T(C): 36.7 (08 Oct 2023 07:50), Max: 37.5 (07 Oct 2023 21:15)  T(F): 98.1 (08 Oct 2023 07:50), Max: 99.5 (07 Oct 2023 21:15)  HR: 89 (08 Oct 2023 07:50) (89 - 109)  BP: 104/69 (08 Oct 2023 07:50) (104/69 - 116/77)  RR: 16 (08 Oct 2023 07:50) (16 - 16)  SpO2: 99% (08 Oct 2023 07:50) (97% - 99%)    Gen - NAD, Comfortable  HEENT - NCAT, EOMI, PRERRLA, staples to right craniotomy incision  Pulm - CTAB,  No crackles  Cardiovascular - RRR, S1S2  Abdomen - Soft, NT, ND, +BS  Extremities - No C/C/E, No calf tenderness  Neuro-     Cognitive - AAOx4, follows command     Communication - Fluent, No dysarthria     Attention: able to state days of the week backwards     Memory: Recall 3 objects immediate, delayed recall 1/3 without categorical cues and 2/3 with categorical cues         Cranial Nerves - no facial asymmetry, tongue midline, EOMI     Motor -                     LEFT    UE - ShAB 4/5, EF 5/5, EE 5/5, WE 5/5,  5/5                    RIGHT UE - ShAB 4/5, EF 5/5, EE 5/5, WE 5/5,  5/5, +slight pronator drift                    LEFT    LE - HF 4/5, KE 5/5, DF 5/5, PF 5/5                    RIGHT LE - HF 4/5, KE 5/5, DF 5/5, PF 5/5             Coordination - slowing with right FTN and (+) dysmateria, left intact  Psychiatric - Mood stable, Affect WNL  Skin:  all skin intact    Wounds: right craniotomy incision with staples in place and dried blood                CC:  Weakness, blurry vision    HPI:  Patient is a 53 yo F with history of stage IV lung cancer with mets to brain (s/p 4 cycles chemo and thoracic RT completed on oct 2022), gamma knife surgery to 7 brain mets , recent admission to [facility] from sept 4-13th for epigastric pain and hyperglycemia thought to be due to steroids, who presented to [facility] ED 10/5 complaining of 1 week of lower extremity weakness and intermittent blurry vision.  Patient was initially seen at the end of August and admitted for hx of migraines and dizziness, was started on  decadron 6mg q6hr and Depakote at that time. She returned to [facility] again in early september for worsening epigastric pain  in the setting of taking steroids without GI ppx. CTA at that time showing no PE, stable right apical lung mass and rapid interval growth of a left lower lobe mass and new hepatic mets. Pt finished dexamethasone taper on 9/7 and was discharged with protonix, depakote and follow up with outpatient rad onc Dr. Bernard, outpatient NSGY, [facility] with [facility] to start immunotherapy nivolumab as outpatient.  Since discharge on the 13th, patient noted weakness in her right lower extremity and 'muscle pain' which improves with massages. She used her walker at home however she feels weak and was requiring increasing support from her fiancee to ambulate/attend to daily needs. She noted also intermittent epigastric pain with radiation to the back. and endorsef some slight blurry vision over the last 2 weeks which she describes as "vision feels off" though is able to see.    Patient was admitted 9/30 and underwent R temporal crani for resection of metastatic lesion. Post-operative course was complicated by acute left cerebellar infarct found on post-op MRI, tachycardia, and thrombocytopenia. She was recommended for follow up with oncology for lung lesions, radiation oncology for brain, and dr. michelle for post-operative follow up. She will also follow up with Dr. Pam Randle in 6 months for findings on 10/4 CTA head. Plan for no ASA for secondary prevention until cleared by Dr. Michelle. Wound closure due to be removed postop day 10-14 in rehab. Patient was evaluated by PM&R and therapy for functional deficits and gait/ ADL impairments and recommended acute rehabilitation. Patient was medically optimized for discharge to [facility] on 10/5/23.     Allergies  No Known Allergies    Subjective/ROS:   - Patient was seen and examined at bed side, in her bed comfortable, no over night issues  - Slept well last night, no new complaints this am  - Denies pain at this time  - GI/, moving her bowels regulary, LBM (10/07), voiding without issues  - Tolerating 3 hours of daily therapy, motivated  - Denies CP, palpitation, SOB, cough, fever, chills, abdominal pain, N/V/D/C, joint pain or swelling, headaches or dizziness, dysuria, hematuria or frequency     MEDICATIONS  (STANDING):  dexAMETHasone     Tablet 2 milliGRAM(s) Oral two times a day  dexAMETHasone     Tablet   Oral   dextrose 5%. 1000 milliLiter(s) (50 mL/Hr) IV Continuous <Continuous>  dextrose 5%. 1000 milliLiter(s) (100 mL/Hr) IV Continuous <Continuous>  dextrose 50% Injectable 25 Gram(s) IV Push once  dextrose 50% Injectable 12.5 Gram(s) IV Push once  dextrose 50% Injectable 25 Gram(s) IV Push once  divalproex  milliGRAM(s) Oral every 12 hours  enoxaparin Injectable 40 milliGRAM(s) SubCutaneous every 24 hours  glucagon  Injectable 1 milliGRAM(s) IntraMuscular once  insulin glargine Injectable (LANTUS) 15 Unit(s) SubCutaneous at bedtime  insulin lispro (ADMELOG) corrective regimen sliding scale   SubCutaneous at bedtime  insulin lispro (ADMELOG) corrective regimen sliding scale   SubCutaneous three times a day before meals  insulin lispro Injectable (ADMELOG) 5 Unit(s) SubCutaneous three times a day before meals  pantoprazole    Tablet 40 milliGRAM(s) Oral before breakfast  senna 2 Tablet(s) Oral at bedtime    MEDICATIONS  (PRN):  acetaminophen     Tablet .. 650 milliGRAM(s) Oral every 6 hours PRN Mild Pain (1 - 3), Moderate Pain (4 - 6)  dextrose Oral Gel 15 Gram(s) Oral once PRN Blood Glucose LESS THAN 70 milliGRAM(s)/deciliter  oxyCODONE    IR 5 milliGRAM(s) Oral every 4 hours PRN Severe Pain (7 - 10)  polyethylene glycol 3350 17 Gram(s) Oral every 24 hours PRN Constipation      LAB                        10.3   10.29 )-----------( 133      ( 06 Oct 2023 05:45 )             31.4     10-06    136  |  103  |  16  ----------------------------<  163<H>  4.0   |  26  |  0.60    Ca    8.7      06 Oct 2023 05:45    TPro  6.0  /  Alb  1.9<L>  /  TBili  0.2  /  DBili  x   /  AST  24  /  ALT  28  /  AlkPhos  60  10-06    LIVER FUNCTIONS - ( 06 Oct 2023 05:45 )  Alb: 1.9 g/dL / Pro: 6.0 g/dL / ALK PHOS: 60 U/L / ALT: 28 U/L / AST: 24 U/L / GGT: x           CAPILLARY BLOOD GLUCOSE  POCT Blood Glucose.: 142 mg/dL (08 Oct 2023 08:13)  POCT Blood Glucose.: 200 mg/dL (07 Oct 2023 21:16)  POCT Blood Glucose.: 159 mg/dL (07 Oct 2023 17:32)      Radiology, Cardiology, and Other Results:     CT Head No Cont (10.04.23 @ 15:16)   edemonstrated postoperative changes related to a prior right   temporal craniotomy for resection of a metastatic lesion. Postoperative   findings are relatively stable.  2.  Other metastatic lesions seen throughout the brain parenchyma with   surrounding vasogenic edema, again relatively stable.  3.  Redemonstrated acute infarct in the left cerebellar hemisphere,   stable from recent MR imaging.    CTA BRAIN:  1.  No evidence of abrupt cut off of intraluminal contrast.  2.  1 to 2 mm medially oriented vascular outpouching from the right ICA   terminus. This could represent a small saccular aneurysm versus a   vascular infundibulum.    CTA NECK:  1.  No evidence of critical stenosis by NASCET criteria.  2.  Multiple nodules seen in the bilateral lobes of the thyroid.   Recommend nonemergent thyroid ultrasound for further characterization if  clinically indicated.  3.  Redemonstrated partially visualized right apex lung mass. Correlate   with recent CT imaging of the chest for further evaluation.    MR Head w/wo IV Cont (10.02.23 @ 23:54)   IMPRESSION:  Redemonstration of right temporal craniotomy.  Residual 3.7 x 1.4 cm centrally necrotic/cystic peripherally enhancing   lesion along the mesial aspect of the surgical cavity (20-14). Findings   could represent residual enhancing neoplasm and/or postsurgical changes.  Additional curvilinear, thickened region of enhancement capping the   mesial aspect of the surgical cavity measures 7 mm in greatest thickness   (20-13). Findings could represent residual enhancing neoplasm and/or   postsurgical changes.    Previously seen an described multifocal enhancing parenchymal lesions   with internal susceptibility artifact in the bilateral frontal and right   cerebellar hemispheres are not significantly changed.  New small focus of restricted diffusion in the left mid cerebellar   hemisphere suggesting the presence of acute infarction.    VA Duplex Lower Ext Vein Scan, Bilat (10.01.23 @ 15:45)   IMPRESSION:  No evidence of deep venous thrombosis in either lower extremity.    Physical Exam:   Vital Signs Last 24 Hrs  T(C): 36.7 (08 Oct 2023 07:50), Max: 37.5 (07 Oct 2023 21:15)  T(F): 98.1 (08 Oct 2023 07:50), Max: 99.5 (07 Oct 2023 21:15)  HR: 89 (08 Oct 2023 07:50) (89 - 109)  BP: 104/69 (08 Oct 2023 07:50) (104/69 - 116/77)  RR: 16 (08 Oct 2023 07:50) (16 - 16)  SpO2: 99% (08 Oct 2023 07:50) (97% - 99%)    Gen - NAD, Comfortable  HEENT - NCAT, EOMI, PRERRLA, staples to right craniotomy incision  Pulm - CTAB,  No crackles  Cardiovascular - RRR, S1S2  Abdomen - Soft, NT, ND, +BS  Extremities - No C/C/E, No calf tenderness  Neuro-     Cognitive - AAOx4, follows command     Communication - Fluent, No dysarthria     Attention: able to state days of the week backwards     Memory: Recall 3 objects immediate, delayed recall 1/3 without categorical cues and 2/3 with categorical cues         Cranial Nerves - no facial asymmetry, tongue midline, EOMI     Motor -                     LEFT    UE - ShAB 4/5, EF 5/5, EE 5/5, WE 5/5,  5/5                    RIGHT UE - ShAB 3/5, EF 3/5, EE 3/5, WE 3/5, Grip3+/5, +slight pronator drift                    LEFT    LE - HF 4/5, KE 5/5, DF 5/5, PF 5/5                    RIGHT LE - HF 3/5, KE 3/5, DF 3/5, PF 5/5             Coordination - slowing with right FTN and (+) dysmateria, left intact  Psychiatric - Mood stable, Affect WNL  Skin:  all skin intact    Wounds: right craniotomy incision with staples in place and dried blood

## 2023-10-08 NOTE — PROGRESS NOTE ADULT - SUBJECTIVE AND OBJECTIVE BOX
PROGRESS NOTE:     Patient is a 52y old  Female who presents with a chief complaint of Neoplasm of uncertain behavior of brain     (06 Oct 2023 13:14)          SUBJECTIVE & OBJECTIVE:   Pt seen and examined at bedside    no overnight events.   no new complaints    REVIEW OF SYSTEMS: remaining ROS negative     PHYSICAL EXAM:  VITALS:  Vital Signs Last 24 Hrs  T(C): 36.7 (08 Oct 2023 07:50), Max: 37.5 (07 Oct 2023 21:15)  T(F): 98.1 (08 Oct 2023 07:50), Max: 99.5 (07 Oct 2023 21:15)  HR: 89 (08 Oct 2023 07:50) (89 - 109)  BP: 104/69 (08 Oct 2023 07:50) (104/69 - 116/77)  BP(mean): --  RR: 16 (08 Oct 2023 07:50) (16 - 16)  SpO2: 99% (08 Oct 2023 07:50) (97% - 99%)    Parameters below as of 08 Oct 2023 07:50  Patient On (Oxygen Delivery Method): room air          GENERAL: NAD,  no increased WOB  HEAD:  +right cranial incision well healed with staples  EYES: EOMI, conjunctiva and sclera clear  ENMT: Moist mucous membranes  NECK: Supple, No JVD  NERVOUS SYSTEM:  Alert & Oriented X3, no focal neuro deficits   CHEST/LUNG: Clear to auscultation bilaterally; No rales, rhonchi, wheezing, or rubs  HEART: Regular rate and rhythm; No murmurs  ABDOMEN: Soft, Nontender, Nondistended; Bowel sounds present  EXTREMITIES: No clubbing, cyanosis, calf tenderness or edema b/l      MEDICATIONS  (STANDING):  dexAMETHasone     Tablet 2 milliGRAM(s) Oral two times a day  dexAMETHasone     Tablet   Oral   dextrose 5%. 1000 milliLiter(s) (50 mL/Hr) IV Continuous <Continuous>  dextrose 5%. 1000 milliLiter(s) (100 mL/Hr) IV Continuous <Continuous>  dextrose 50% Injectable 25 Gram(s) IV Push once  dextrose 50% Injectable 12.5 Gram(s) IV Push once  dextrose 50% Injectable 25 Gram(s) IV Push once  divalproex  milliGRAM(s) Oral every 12 hours  enoxaparin Injectable 40 milliGRAM(s) SubCutaneous every 24 hours  glucagon  Injectable 1 milliGRAM(s) IntraMuscular once  insulin glargine Injectable (LANTUS) 17 Unit(s) SubCutaneous at bedtime  insulin lispro (ADMELOG) corrective regimen sliding scale   SubCutaneous three times a day before meals  insulin lispro (ADMELOG) corrective regimen sliding scale   SubCutaneous at bedtime  insulin lispro Injectable (ADMELOG) 5 Unit(s) SubCutaneous three times a day before meals  pantoprazole    Tablet 40 milliGRAM(s) Oral before breakfast  senna 2 Tablet(s) Oral at bedtime    MEDICATIONS  (PRN):  acetaminophen     Tablet .. 650 milliGRAM(s) Oral every 6 hours PRN Mild Pain (1 - 3), Moderate Pain (4 - 6)  dextrose Oral Gel 15 Gram(s) Oral once PRN Blood Glucose LESS THAN 70 milliGRAM(s)/deciliter  oxyCODONE    IR 5 milliGRAM(s) Oral every 4 hours PRN Severe Pain (7 - 10)      Allergies    No Known Allergies    Intolerances              LABS:                           10.3   10.29 )-----------( 133      ( 06 Oct 2023 05:45 )             31.4     10-06    136  |  103  |  16  ----------------------------<  163<H>  4.0   |  26  |  0.60    Ca    8.7      06 Oct 2023 05:45    TPro  6.0  /  Alb  1.9<L>  /  TBili  0.2  /  DBili  x   /  AST  24  /  ALT  28  /  AlkPhos  60  10-06      Urinalysis Basic - ( 06 Oct 2023 05:45 )    Color: x / Appearance: x / SG: x / pH: x  Gluc: 163 mg/dL / Ketone: x  / Bili: x / Urobili: x   Blood: x / Protein: x / Nitrite: x   Leuk Esterase: x / RBC: x / WBC x   Sq Epi: x / Non Sq Epi: x / Bacteria: x      CAPILLARY BLOOD GLUCOSE      POCT Blood Glucose.: 219 mg/dL (08 Oct 2023 12:28)  POCT Blood Glucose.: 142 mg/dL (08 Oct 2023 08:13)  POCT Blood Glucose.: 200 mg/dL (07 Oct 2023 21:16)  POCT Blood Glucose.: 159 mg/dL (07 Oct 2023 17:32)              RECENT CULTURES:          RADIOLOGY & ADDITIONAL TESTS:

## 2023-10-08 NOTE — PROGRESS NOTE ADULT - ASSESSMENT
53 y/o F with hx of stage IV lung cancer with mets to brain (s/p 4 cycles chemo and thoracic RT completed on Oct 2022), gamma knife surgery to 7 brain mets, who presented to [facility] ED 10/5 complaining of 1 week of lower extremity weakness and intermittent blurry vision. now s/p R temporal crani for resection of metastatic lesion. Post-operative course was complicated by acute cerebellar infarct found on post-op MRI, tachycardia, and thrombocytopenia. Admitted to [facility] on 10/5 for ADL, gait, and functional impairments.         #Lung Cancer with metastasis  #Metastatic brain lesion s/p Crani  -Wound closure removal in 10-14 days (10/10-10/14)  -Recent CT c/a/p showed enlarging lung lesion and new liver metastases  -S/p CRT last June 2023  -Continue Decadron taper - 3mg BID x2 days then 2mg BID through follow up. Maintain GI ppx with protonix while on steroids  -Depakote 500mg BID seizure prophylaxis  -Start comprehensive rehab program - PT/OT/SLP per rehab team  -Pain management, bowel regimen per rehab. Continue fall, aspiration precautions  -H/H stable. Monitor CBC, likely post op anemia  -Outpatient follow up with Dr. Metcalf, outpatient oncology follow up with Dr. Beckman  -Per onc - patient will require rad/on assessment for post-op radiation  -Pall consult 10/3 noted - goals are to pursue intervention and treatment as tolerated  -Consider neuropsych consult for mood    #Acute Cerebellar infarct  -No ASA until cleared by neurosurgery  -Start on atorvastatin 40mg    #Diplopia - Likely in setting of malignancy/mets/edema  -Seen by ophtho  -Artificial tears PRN. Eye patch PRN for comfort    #Type 2 Diabetes Mellitus with steroid induced hyperglycemia, HbA1c 8.0% (9/20/23)  -Monitor FSBG TIDAC/QHS - Noted  pre breakfast, admelog 8un held 10/6  -Consistent Carbohydrate diet  -Lantus 15un qam, admelog 8 units TIDAC - change to lantus 15un qhs, admelog 5un TID AC 10/6 - increase lants to 17U on 10/8  -Continue ISS    DVT ppx - Lovenox  GI ppx - Protonix

## 2023-10-08 NOTE — PROVIDER CONTACT NOTE (CRITICAL VALUE NOTIFICATION) - ACTION/TREATMENT ORDERED:
Dr Madera acknowledged the lab value, will repeat the lactate levels after bolus will be done, continue to monitor pt

## 2023-10-09 NOTE — CONSULT NOTE ADULT - ASSESSMENT
Assessment:  Renée Rojas is a 52 year old woman with past medical history of Stage IV Lung cancer with metastasis to brain (s/p chemotherapy, radiation and gamma knife surgery with recent hospitalization at [facility] for right lower extremity weakness and blurry vision found to have metastases to brain s/p right temporal craniotomy for resection of mass, currently admitted to [facility].     Cardiology consulted due to rapid response from 10/8 in which patient was dizzy and had sudden jaw clenching.  Assessment:  Renée Rojas is a 52 year old woman with past medical history of Stage IV Lung cancer with metastasis to brain (s/p chemotherapy, radiation and gamma knife surgery) with recent hospitalization at [facility] for right lower extremity weakness and blurry vision found to have metastases to brain s/p right temporal craniotomy for resection of mass, currently admitted to [facility].     Cardiology consulted due to rapid response from 10/8 in which patient was dizzy and had sudden jaw clenching per notes. The patient recalls going to the bathroom and then getting her vitals checked and then cannot remember further, possible vasovagal event. ECG at that time consistent with normal sinus rhythm, no acute ischemic ST abnormalities. Troponin negative, does not appear to be an acute coronary syndrome. Recent echocardiogram consistent with normal LVEF 55-60% with moderate valvular disease. The patient denies angina, dyspnea or palpitations.     Recommendations:  [] Possible brief syncope: Possible vasovagal event. Follow up Neurology, patient with metastatic lung cancer to brain with vasogenic edema. If recurrent syncope then can consider inpatient Holter monitor while in rehab. The patient appears to be at low CV risk at this time.    Mani Pinto MD  Cardiology

## 2023-10-09 NOTE — PROGRESS NOTE ADULT - REASON FOR ADMISSION
Multiple metastatic brain lesions(left frontal, occipital and temporal) s/p R temporal craniotomy and resection  Right dominant hemiparesis

## 2023-10-09 NOTE — PROGRESS NOTE ADULT - ASSESSMENT
Assessment	  Patient is a 51 yo F with history of stage IV lung cancer with mets to brain (s/p 4 cycles chemo and thoracic RT completed on oct 2022), gamma knife surgery to 7 brain mets, who presented to [facility] ED 10/5 complaining of 1 week of lower extremity weakness and intermittent blurry vision. now s/p R temporal crani for resection of metastatic lesion. Post-operative course was complicated by acute cerebellar infarct found on post-op MRI, tachycardia, and thrombocytopenia. Admitted to [place] [facility] inpatient [facility]hab on 10/5 for ADL, gait, and functional impairments.     #Seizure   - Depakote level is 55 (01/09)  - Procalcitionin level (10/08) 27, (10/09) 25.8  - Head CT (10/09) IMPRESSION: No new acute abnormalities are identified.  Reidentified is a right temporal craniotomy for prior resection of a metastasis . Small amount of air and hemorrhage in the surgical cavity is present. Underlying lesion is not well evaluated on this noncontrast CT exam. Surrounding vasogenic edema is unchanged. Previously visualized minimal right to left midline shift has improved.  Previously visualized partially calcified lesion in the left occipital lobe, with surrounding vasogenic edema is unchanged. Lesion in the left high frontal lobe with surrounding vasogenic edema is unchanged.  Previously visualized acute infarct in the left cerebellum is not seen to good advantage on this study.   - Neurology Consult pending  - Will Contact her NS for update and recommendation     # Metastatic brain lesion s/p Crani  - Comprehensive Multidisciplinary Rehab Program: 3 hours a day, 5 days a week with PT/OT/SLP  - Decadron taper - 3mg BID x2 days then 2mg BID through follow up  - Depakote 500mg BID seizure prophylaxis  - Pain control with oxy and tylenol  - Wound closure removal in 14 days (10/14)  - Outpatient follow up with Dr. Metcalf  - Fall, aspiration precautions    # Acute Cerebellar infarct  - No ASA until cleared by neurosurgery, Atorvastatin 40 mg po daily   - PT/OT evaluation for balance and coordination, ambulation, transfer, ADLs  - Hospitalist F/U     #Lung Cancer with metastasis  - Depakote, decadron as above  - Outpatient oncology follow up with Dr. Beckman  - Recent CT c/a/p showed enlarging lung lesion and new liver metastases  - Per onc - patient will require rad/on assessment for post-op radiation  - S/p CRT last June 2023  - Hospitalist consult appreciated    # Diplopia/ Blurry vision   - Likely in setting of malignancy/mets/edema  - Seen by ophtho  - Artificial tears PRN. Eye patch PRN for comfort    # Type 2 Diabetes Mellitus with steroid induced hyperglycemia  - Recent A1c 8.0%  - Monitor FSBG TIDAC/QHS  - Consistent Carbohydrate diet  - Lantus 15 units in morning --> increased to 17 unites (10/08) and admelog 8 units TIDAC --> reduced to 5 unites x 3 (10/06)   - Mod SSI AC, low SSI HS  - Hospitalist consult appreciated    # Mood/Cognition:  - Consider neuropsych consult if needed    # Pain Management:  - Tylenol PRN  - Oxycodone 5mg q4 hours PRN severe    # GI/Bowel:  - Senna QHS, Miralax daily PRN  - GI ppx:Protonix    # /Bladder:   - Bladder scan q8 hours on admission, SC PRN  - Voiding without issues   - Encourage timed voids every 4 hours while awake     # Skin/Pressure Injury:  - Skin assessment on admission: intact  - Monitor Incisions: right craniotomy incision with staples in place and dried blood  - Turn every 2 hours while in bed    # Diet:   - Diet Consistency/Modifications: Reg/thin/CC  - Aspiration Precautions  - SLP consult for swallow function evaluation and treatment, recs appreciated     # DVT ppx:  - Lovenox    # Restrictions/Precautions:  - Precautions: Fall, aspiration    ---------------  Outpatient Follow-up:    Marcus Metcalf  Neurosurgery  [address] [place] [address], [address]  [place] [address]  Phone: [phone]  Fax: [phone]  Follow Up Time:     Maritza Kohler  Hematology  [address] [place] [address]  Phone: [phone]  Fax: [phone]  Follow Up Time:     Kieran Figueroa  Neurology  [address] [place] [address]  Phone: [phone]  Fax: [phone]  Follow Up Time:  --------------    Goals: Safe discharge to home  Estimated Length of Stay: 10-14 days  Rehab Potential: Good  Medical Prognosis: Good  Estimated Disposition: Home with Home Care  ---------------     Assessment	  Patient is a 51 yo F with history of stage IV lung cancer with mets to brain (s/p 4 cycles chemo and thoracic RT completed on oct 2022), gamma knife surgery to 7 brain mets, who presented to [facility] ED 10/5 complaining of 1 week of lower extremity weakness and intermittent blurry vision. now s/p R temporal crani for resection of metastatic lesion. Post-operative course was complicated by acute cerebellar infarct found on post-op MRI, tachycardia, and thrombocytopenia. Admitted to [place] [facility] inpatient [facility] on 10/5 for ADL, gait, and functional impairments.     #Seizure   - Depakote level is 55 (01/09)  - Procalcitionin level (10/08) 27, (10/09) 25.8  - Lactate level 2.2 (01/08), 2.5 (10/09)   - Started on Zosyn 3.375 gm IVPB q 8 hours x 7 days.   - Head CT (10/09) IMPRESSION: No new acute abnormalities are identified.  Reidentified is a right temporal craniotomy for prior resection of a metastasis . Small amount of air and hemorrhage in the surgical cavity is present. Underlying lesion is not well evaluated on this noncontrast CT exam. Surrounding vasogenic edema is unchanged. Previously visualized minimal right to left midline shift has improved.  Previously visualized partially calcified lesion in the left occipital lobe, with surrounding vasogenic edema is unchanged. Lesion in the left high frontal lobe with surrounding vasogenic edema is unchanged.  Previously visualized acute infarct in the left cerebellum is not seen to good advantage on this study.   - Neurology Consult pending  - Will Contact her NS for update and recommendation     # Metastatic brain lesion s/p Crani  - Comprehensive Multidisciplinary Rehab Program: 3 hours a day, 5 days a week with PT/OT/SLP  - Decadron taper - 3mg BID x2 days then 2mg BID through follow up  - Depakote 500mg BID seizure prophylaxis  - Pain control with oxy and tylenol  - Wound closure removal in 14 days (10/14)  - Outpatient follow up with Dr. Metcalf  - Fall, aspiration precautions    # Acute Cerebellar infarct  - No ASA until cleared by neurosurgery, Atorvastatin 40 mg po daily   - PT/OT evaluation for balance and coordination, ambulation, transfer, ADLs  - Hospitalist F/U     #Lung Cancer with metastasis  - Depakote, decadron as above  - Outpatient oncology follow up with Dr. Beckman  - Recent CT c/a/p showed enlarging lung lesion and new liver metastases  - Per onc - patient will require rad/on assessment for post-op radiation  - S/p CRT last June 2023  - Hospitalist consult appreciated    # Diplopia/ Blurry vision   - Likely in setting of malignancy/mets/edema  - Seen by ophtho  - Artificial tears PRN. Eye patch PRN for comfort    # Type 2 Diabetes Mellitus with steroid induced hyperglycemia  - Recent A1c 8.0%  - Monitor FSBG TIDAC/QHS  - Consistent Carbohydrate diet  - Lantus 15 units in morning --> increased to 17 unites (10/08) and admelog 8 units TIDAC --> reduced to 5 unites x 3 (10/06)   - Mod SSI AC, low SSI HS  - Hospitalist consult appreciated    # Mood/Cognition:  - Consider neuropsych consult if needed    # Pain Management:  - Tylenol PRN  - Oxycodone 5mg q4 hours PRN severe    # GI/Bowel:  - Senna QHS, Miralax daily PRN  - GI ppx:Protonix    # /Bladder:   - Bladder scan q8 hours on admission, SC PRN  - Voiding without issues   - Encourage timed voids every 4 hours while awake     # Skin/Pressure Injury:  - Skin assessment on admission: intact  - Monitor Incisions: right craniotomy incision with staples in place and dried blood  - Turn every 2 hours while in bed    # Diet:   - Diet Consistency/Modifications: Reg/thin/CC  - Aspiration Precautions  - SLP consult for swallow function evaluation and treatment, recs appreciated     # DVT ppx:  - Lovenox    # Restrictions/Precautions:  - Precautions: Fall, aspiration    ---------------  Outpatient Follow-up:    Marcus Metcalf  Neurosurgery  [address] [facility], [address]  [place] [address]  Phone: [phone]  Fax: [phone]  Follow Up Time:     Maritza Kohler  Hematology  [address] [place] [address]  Phone: [phone]  Fax: [phone]  Follow Up Time:     Kieran Figueroa  Neurology  [address] [place] [address]  Phone: [phone]  Fax: [phone]  Follow Up Time:  --------------    Goals: Safe discharge to home  Estimated Length of Stay: 10-14 days  Rehab Potential: Good  Medical Prognosis: Good  Estimated Disposition: Home with Home Care  ---------------     Assessment	  Patient is a 51 yo F with history of stage IV lung cancer with mets to brain (s/p 4 cycles chemo and thoracic RT completed on oct 2022), gamma knife surgery to 7 brain mets, who presented to [facility] ED 10/5 complaining of 1 week of lower extremity weakness and intermittent blurry vision. now s/p R temporal crani for resection of metastatic lesion. Post-operative course was complicated by acute cerebellar infarct found on post-op MRI, tachycardia, and thrombocytopenia. Admitted to [place] [facility] on 10/5 for ADL, gait, and functional impairments.     #Seizure   - Depakote level is 55 (01/09)  - Procalcitionin level (10/08) 27, (10/09) 25.8  - Lactate level 2.2 (01/08), 2.5 (10/09)   - Started on Zosyn 3.375 gm IVPB q 8 hours x 7 days.   - Head CT (10/09) IMPRESSION: No new acute abnormalities are identified.  Reidentified is a right temporal craniotomy for prior resection of a metastasis . Small amount of air and hemorrhage in the surgical cavity is present. Underlying lesion is not well evaluated on this noncontrast CT exam. Surrounding vasogenic edema is unchanged. Previously visualized minimal right to left midline shift has improved.  Previously visualized partially calcified lesion in the left occipital lobe, with surrounding vasogenic edema is unchanged. Lesion in the left high frontal lobe with surrounding vasogenic edema is unchanged.  Previously visualized acute infarct in the left cerebellum is not seen to good advantage on this study.   - Neurology Consult pending  - Will Contact her NS for update and recommendation     # Multiple metastatic brain lesions s/p Crani and temporal lesion resection/ Right dominant hemiparesis   - Comprehensive Multidisciplinary Rehab Program: 3 hours a day, 5 days a week with PT/OT/SLP  - Decadron taper - 3mg BID x2 days then 2mg BID through follow up  - Depakote 500mg BID seizure prophylaxis  - Pain control with oxy and tylenol  - Wound closure removal in 14 days (10/14)  - Outpatient follow up with Dr. Metcalf  - Fall, aspiration precautions    # Acute Cerebellar infarct  - No ASA until cleared by neurosurgery, Atorvastatin 40 mg po daily   - PT/OT evaluation for balance and coordination, ambulation, transfer, ADLs  - Hospitalist F/U     #Lung Cancer with metastasis  - Depakote, decadron as above  - Outpatient oncology follow up with Dr. Beckman  - Recent CT c/a/p showed enlarging lung lesion and new liver metastases  - Per onc - patient will require rad/on assessment for post-op radiation  - S/p CRT last June 2023  - Hospitalist consult appreciated    # Diplopia/ Blurry vision   - Likely in setting of malignancy/mets/edema  - Seen by ophtho  - Artificial tears PRN. Eye patch PRN for comfort    # Type 2 Diabetes Mellitus with steroid induced hyperglycemia  - Recent A1c 8.0%  - Monitor FSBG TIDAC/QHS  - Consistent Carbohydrate diet  - Lantus 15 units in morning --> increased to 17 unites (10/08) and admelog 8 units TIDAC --> reduced to 5 unites x 3 (10/06)   - Mod SSI AC, low SSI HS  - Hospitalist consult appreciated    # Mood/Cognition:  - Consider neuropsych consult if needed    # Pain Management:  - Tylenol PRN  - Oxycodone 5mg q4 hours PRN severe    # GI/Bowel:  - Senna QHS, Miralax daily PRN  - GI ppx:Protonix    # /Bladder:   - Bladder scan q8 hours on admission, SC PRN  - Voiding without issues   - Encourage timed voids every 4 hours while awake     # Skin/Pressure Injury:  - Skin assessment on admission: intact  - Monitor Incisions: right craniotomy incision with staples in place and dried blood  - Turn every 2 hours while in bed    # Diet:   - Diet Consistency/Modifications: Reg/thin/CC  - Aspiration Precautions  - SLP consult for swallow function evaluation and treatment, recs appreciated     # DVT ppx:  - Lovenox    # Restrictions/Precautions:  - Precautions: Fall, aspiration    ---------------  Outpatient Follow-up:    Marcus Metcalf  Neurosurgery  [address] [facility], Floor 1  [place] [address]  Phone: [phone]  Fax: [phone]  Follow Up Time:     Maritza Kohler  Hematology  [address] [place] [address]  Phone: [phone]  Fax: [phone]  Follow Up Time:     Kieran Figueroa  Neurology  [address] [place] [address]  Phone: [phone]  Fax: [phone]  Follow Up Time:  --------------    Goals: Safe discharge to home  Estimated Length of Stay: 10-14 days  Rehab Potential: Good  Medical Prognosis: Good  Estimated Disposition: Home with Home Care  ---------------

## 2023-10-09 NOTE — PROGRESS NOTE ADULT - SUBJECTIVE AND OBJECTIVE BOX
CC:  Weakness, blurry vision    HPI:  Patient is a 51 yo F with history of stage IV lung cancer with mets to brain (s/p 4 cycles chemo and thoracic RT completed on oct 2022), gamma knife surgery to 7 brain mets , recent admission to [facility] from sept 4-13th for epigastric pain and hyperglycemia thought to be due to steroids, who presented to [facility] ED 10/5 complaining of 1 week of lower extremity weakness and intermittent blurry vision.  Patient was initially seen at the end of August and admitted for hx of migraines and dizziness, was started on  decadron 6mg q6hr and Depakote at that time. She returned to [facility] again in early september for worsening epigastric pain  in the setting of taking steroids without GI ppx. CTA at that time showing no PE, stable right apical lung mass and rapid interval growth of a left lower lobe mass and new hepatic mets. Pt finished dexamethasone taper on 9/7 and was discharged with protonix, depakote and follow up with outpatient rad onc Dr. Bernard, outpatient NSGY, and oncologist Dr. Beckman at [facility] with plans to start immunotherapy nivolumab as outpatient.  Since discharge on the 13th, patient noted weakness in her right lower extremity and 'muscle pain' which improves with massages. She used her walker at home however she feels weak and was requiring increasing support from her fiancee to ambulate/attend to daily needs. She noted also intermittent epigastric pain with radiation to the back. and endorsef some slight blurry vision over the last 2 weeks which she describes as "vision feels off" though is able to see.    Patient was admitted 9/30 and underwent R temporal crani for resection of metastatic lesion. Post-operative course was complicated by acute left cerebellar infarct found on post-op MRI, tachycardia, and thrombocytopenia. She was recommended for follow up with oncology for lung lesions, radiation oncology for brain, and dr. michelle for post-operative follow up. She will also follow up with Dr. Pam Randle in 6 months for findings on 10/4 CTA head. Plan for no ASA for secondary prevention until cleared by Dr. Michelle. Wound closure due to be removed postop day 10-14 in rehab. Patient was evaluated by PM&R and therapy for functional deficits and gait/ ADL impairments and recommended acute rehabilitation. Patient was medically optimized for discharge to [facility] on 10/5/23.     Allergies  No Known Allergies    Subjective/ROS:   - Patient was seen and examined at bed side, in her bed comfortable,  RRT was called yesterday as patient was not responsive with clenching her jaws, eyes were closed shortly after she opened her eyes and was responsive.   - Did not sleep well last night 2/2 seizure?? and the RRT call, no new complaints this am  - Denies pain at this time  - GI/, LBM (10/07), voiding without issues  - Tolerating 3 hours of daily therapy, motivated  - Denies CP, palpitation, SOB, cough, fever, chills, abdominal pain, N/V/D/C, joint pain or swelling, headaches or dizziness, dysuria, hematuria or frequency     MEDICATIONS  (STANDING):  atorvastatin 40 milliGRAM(s) Oral at bedtime  dexAMETHasone     Tablet 2 milliGRAM(s) Oral two times a day  dexAMETHasone     Tablet   Oral   dextrose 5%. 1000 milliLiter(s) (50 mL/Hr) IV Continuous <Continuous>  dextrose 5%. 1000 milliLiter(s) (100 mL/Hr) IV Continuous <Continuous>  dextrose 50% Injectable 25 Gram(s) IV Push once  dextrose 50% Injectable 12.5 Gram(s) IV Push once  dextrose 50% Injectable 25 Gram(s) IV Push once  divalproex  milliGRAM(s) Oral every 12 hours  enoxaparin Injectable 40 milliGRAM(s) SubCutaneous every 24 hours  glucagon  Injectable 1 milliGRAM(s) IntraMuscular once  insulin glargine Injectable (LANTUS) 17 Unit(s) SubCutaneous at bedtime  insulin lispro (ADMELOG) corrective regimen sliding scale   SubCutaneous at bedtime  insulin lispro (ADMELOG) corrective regimen sliding scale   SubCutaneous three times a day before meals  insulin lispro Injectable (ADMELOG) 5 Unit(s) SubCutaneous three times a day before meals  pantoprazole    Tablet 40 milliGRAM(s) Oral before breakfast  piperacillin/tazobactam IVPB.. 3.375 Gram(s) IV Intermittent every 8 hours  senna 2 Tablet(s) Oral at bedtime  sodium chloride 0.9%. 1000 milliLiter(s) (1000 mL/Hr) IV Continuous <Continuous>    MEDICATIONS  (PRN):  acetaminophen     Tablet .. 650 milliGRAM(s) Oral every 6 hours PRN Mild Pain (1 - 3), Moderate Pain (4 - 6)  dextrose Oral Gel 15 Gram(s) Oral once PRN Blood Glucose LESS THAN 70 milliGRAM(s)/deciliter  LORazepam   Injectable 1 milliGRAM(s) IV Push every 6 hours PRN seizures  oxyCODONE    IR 5 milliGRAM(s) Oral every 4 hours PRN Severe Pain (7 - 10)  polyethylene glycol 3350 17 Gram(s) Oral every 24 hours PRN Constipation      LAB                        10.3   12.55 )-----------( 143      ( 09 Oct 2023 06:23 )             31.5     10-09    135  |  103  |  12  ----------------------------<  162<H>  4.1   |  24  |  0.55    Ca    8.5      09 Oct 2023 06:23    TPro  6.2  /  Alb  1.9<L>  /  TBili  0.2  /  DBili  x   /  AST  33  /  ALT  27  /  AlkPhos  58  10-09    LIVER FUNCTIONS - ( 09 Oct 2023 06:23 )  Alb: 1.9 g/dL / Pro: 6.2 g/dL / ALK PHOS: 58 U/L / ALT: 27 U/L / AST: 33 U/L / GGT: x          Procalcitionin 27 (10/08), 25.8 (10/09)   Lactate 2.2 (10/08), 2.5 (10/09)    Depakote level 55          CAPILLARY BLOOD GLUCOSE  POCT Blood Glucose.: 127 mg/dL (09 Oct 2023 08:25)  POCT Blood Glucose.: 255 mg/dL (08 Oct 2023 22:15)  POCT Blood Glucose.: 87 mg/dL (08 Oct 2023 17:53)  POCT Blood Glucose.: 219 mg/dL (08 Oct 2023 12:28)      Radiology, Cardiology, and Other Results:     CT Head No Cont (10.04.23 @ 15:16)   edemonstrated postoperative changes related to a prior right   temporal craniotomy for resection of a metastatic lesion. Postoperative   findings are relatively stable.  2.  Other metastatic lesions seen throughout the brain parenchyma with   surrounding vasogenic edema, again relatively stable.  3.  Redemonstrated acute infarct in the left cerebellar hemisphere,   stable from recent MR imaging.    CTA BRAIN:  1.  No evidence of abrupt cut off of intraluminal contrast.  2.  1 to 2 mm medially oriented vascular outpouching from the right ICA   terminus. This could represent a small saccular aneurysm versus a   vascular infundibulum.    CTA NECK:  1.  No evidence of critical stenosis by NASCET criteria.  2.  Multiple nodules seen in the bilateral lobes of the thyroid.   Recommend nonemergent thyroid ultrasound for further characterization if  clinically indicated.  3.  Redemonstrated partially visualized right apex lung mass. Correlate   with recent CT imaging of the chest for further evaluation.    MR Head w/wo IV Cont (10.02.23 @ 23:54)   IMPRESSION:  Redemonstration of right temporal craniotomy.  Residual 3.7 x 1.4 cm centrally necrotic/cystic peripherally enhancing   lesion along the mesial aspect of the surgical cavity (20-14). Findings   could represent residual enhancing neoplasm and/or postsurgical changes.  Additional curvilinear, thickened region of enhancement capping the   mesial aspect of the surgical cavity measures 7 mm in greatest thickness   (20-13). Findings could represent residual enhancing neoplasm and/or   postsurgical changes.    Previously seen an described multifocal enhancing parenchymal lesions   with internal susceptibility artifact in the bilateral frontal and right   cerebellar hemispheres are not significantly changed.  New small focus of restricted diffusion in the left mid cerebellar   hemisphere suggesting the presence of acute infarction.    VA Duplex Lower Ext Vein Scan, Bilat (10.01.23 @ 15:45)   IMPRESSION:  No evidence of deep venous thrombosis in either lower extremity.    Physical Exam:   Vital Signs Last 24 Hrs  T(C): 36.9 (09 Oct 2023 08:37), Max: 37.1 (08 Oct 2023 19:30)  T(F): 98.5 (09 Oct 2023 08:37), Max: 98.8 (08 Oct 2023 19:30)  HR: 84 (09 Oct 2023 08:37) (84 - 88)  BP: 99/66 (09 Oct 2023 08:37) (99/66 - 118/74)  RR: 16 (09 Oct 2023 08:37) (16 - 20)  SpO2: 96% (09 Oct 2023 08:37) (96% - 99%)    Gen - NAD, Comfortable  HEENT - NCAT, EOMI, PRERRLA, staples to right craniotomy incision  Pulm - CTAB,  No crackles  Cardiovascular - RRR, S1S2  Abdomen - Soft, NT, ND, +BS  Extremities - No C/C/E, No calf tenderness  Neuro-     Cognitive - AAOx4, follows command     Communication - Fluent, No dysarthria     Attention: able to state days of the week backwards     Memory: Recall 3 objects immediate, delayed recall 1/3 without categorical cues and 2/3 with categorical cues         Cranial Nerves - no facial asymmetry, tongue midline, EOMI     Motor -                     LEFT    UE - ShAB 4/5, EF 5/5, EE 5/5, WE 4/5,  4/5                    RIGHT UE - ShAB 4/5, EF 5/5, EE 5/5, WE 5/5,  5/5, +slight pronator drift                    LEFT    LE - HF 4/5, KE 5/5, DF 5/5, PF 5/5                    RIGHT LE - HF 4-/5, KE 4-/5, DF 5/5, PF 5/5             Coordination - slowing with right FTN and (+) dysmateria, left intact  Psychiatric - Mood stable, Affect WNL  Skin:  all skin intact    Wounds: right craniotomy incision with staples in place and dried blood                CC:  Weakness, blurry vision    HPI:  Patient is a 53 yo F with history of stage IV lung cancer with mets to brain (s/p 4 cycles chemo and thoracic RT completed on oct 2022), gamma knife surgery to 7 brain mets , recent admission to [facility] from sept 4-13th for epigastric pain and hyperglycemia thought to be due to steroids, who presented to [facility] ED 10/5 complaining of 1 week of lower extremity weakness and intermittent blurry vision.  Patient was initially seen at the end of August and admitted for hx of migraines and dizziness, was started on  decadron 6mg q6hr and Depakote at that time. She returned to [facility] again in early september for worsening epigastric pain  in the setting of taking steroids without GI ppx. CTA at that time showing no PE, stable right apical lung mass and rapid interval growth of a left lower lobe mass and new hepatic mets. Pt finished dexamethasone taper on 9/7 and was discharged with protonix, depakote and follow up with outpatient rad onc Dr. Bernard, outpatient NSGY, and oncologist Dr. Beckman at [facility] with plans to start immunotherapy nivolumab as outpatient.  Since discharge on the 13th, patient noted weakness in her right lower extremity and 'muscle pain' which improves with massages. She used her walker at home however she feels weak and was requiring increasing support from her fiancee to ambulate/attend to daily needs. She noted also intermittent epigastric pain with radiation to the back. and endorsef some slight blurry vision over the last 2 weeks which she describes as "vision feels off" though is able to see.    Patient was admitted 9/30 and underwent R temporal crani for resection of metastatic lesion. Post-operative course was complicated by acute left cerebellar infarct found on post-op MRI, tachycardia, and thrombocytopenia. She was recommended for follow up with oncology for lung lesions, radiation oncology for brain, and dr. michelle for post-operative follow up. She will also follow up with Dr. Pam Randle in 6 months for findings on 10/4 CTA head. Plan for no ASA for secondary prevention until cleared by Dr. Michelle. Wound closure due to be removed postop day 10-14 in rehab. Patient was evaluated by PM&R and therapy for functional deficits and gait/ ADL impairments and recommended acute rehabilitation. Patient was medically optimized for discharge to [facility] on 10/5/23.     Allergies  No Known Allergies    Subjective/ROS:   - Patient was seen and examined at bed side, in her bed comfortable,  RRT was called yesterday as patient was not responsive with clenching her jaws, eyes were closed shortly after she opened her eyes and was responsive.   - Did not sleep well last night 2/2 seizure?? and the RRT call, no new complaints this am  - Denies pain at this time  - GI/, LBM (10/07), voiding without issues  - Tolerating 3 hours of daily therapy, motivated  - Denies CP, palpitation, SOB, cough, fever, chills, abdominal pain, N/V/D/C, joint pain or swelling, headaches or dizziness, dysuria, hematuria or frequency     MEDICATIONS  (STANDING):  atorvastatin 40 milliGRAM(s) Oral at bedtime  dexAMETHasone     Tablet 2 milliGRAM(s) Oral two times a day  dexAMETHasone     Tablet   Oral   dextrose 5%. 1000 milliLiter(s) (50 mL/Hr) IV Continuous <Continuous>  dextrose 5%. 1000 milliLiter(s) (100 mL/Hr) IV Continuous <Continuous>  dextrose 50% Injectable 25 Gram(s) IV Push once  dextrose 50% Injectable 12.5 Gram(s) IV Push once  dextrose 50% Injectable 25 Gram(s) IV Push once  divalproex  milliGRAM(s) Oral every 12 hours  enoxaparin Injectable 40 milliGRAM(s) SubCutaneous every 24 hours  glucagon  Injectable 1 milliGRAM(s) IntraMuscular once  insulin glargine Injectable (LANTUS) 17 Unit(s) SubCutaneous at bedtime  insulin lispro (ADMELOG) corrective regimen sliding scale   SubCutaneous at bedtime  insulin lispro (ADMELOG) corrective regimen sliding scale   SubCutaneous three times a day before meals  insulin lispro Injectable (ADMELOG) 5 Unit(s) SubCutaneous three times a day before meals  pantoprazole    Tablet 40 milliGRAM(s) Oral before breakfast  piperacillin/tazobactam IVPB.. 3.375 Gram(s) IV Intermittent every 8 hours  senna 2 Tablet(s) Oral at bedtime  sodium chloride 0.9%. 1000 milliLiter(s) (1000 mL/Hr) IV Continuous <Continuous>    MEDICATIONS  (PRN):  acetaminophen     Tablet .. 650 milliGRAM(s) Oral every 6 hours PRN Mild Pain (1 - 3), Moderate Pain (4 - 6)  dextrose Oral Gel 15 Gram(s) Oral once PRN Blood Glucose LESS THAN 70 milliGRAM(s)/deciliter  LORazepam   Injectable 1 milliGRAM(s) IV Push every 6 hours PRN seizures  oxyCODONE    IR 5 milliGRAM(s) Oral every 4 hours PRN Severe Pain (7 - 10)  polyethylene glycol 3350 17 Gram(s) Oral every 24 hours PRN Constipation      LAB                        10.3   12.55 )-----------( 143      ( 09 Oct 2023 06:23 )             31.5     10-09    135  |  103  |  12  ----------------------------<  162<H>  4.1   |  24  |  0.55    Ca    8.5      09 Oct 2023 06:23    TPro  6.2  /  Alb  1.9<L>  /  TBili  0.2  /  DBili  x   /  AST  33  /  ALT  27  /  AlkPhos  58  10-09    LIVER FUNCTIONS - ( 09 Oct 2023 06:23 )  Alb: 1.9 g/dL / Pro: 6.2 g/dL / ALK PHOS: 58 U/L / ALT: 27 U/L / AST: 33 U/L / GGT: x          Procalcitionin 27 (10/08), 25.8 (10/09)   Lactate 2.2 (10/08), 2.5 (10/09)    Depakote level 55          CAPILLARY BLOOD GLUCOSE  POCT Blood Glucose.: 127 mg/dL (09 Oct 2023 08:25)  POCT Blood Glucose.: 255 mg/dL (08 Oct 2023 22:15)  POCT Blood Glucose.: 87 mg/dL (08 Oct 2023 17:53)  POCT Blood Glucose.: 219 mg/dL (08 Oct 2023 12:28)      Radiology, Cardiology, and Other Results:     CT Head No Cont (10.04.23 @ 15:16)   edemonstrated postoperative changes related to a prior right   temporal craniotomy for resection of a metastatic lesion. Postoperative   findings are relatively stable.  2.  Other metastatic lesions seen throughout the brain parenchyma with   surrounding vasogenic edema, again relatively stable.  3.  Redemonstrated acute infarct in the left cerebellar hemisphere,   stable from recent MR imaging.    CTA BRAIN:  1.  No evidence of abrupt cut off of intraluminal contrast.  2.  1 to 2 mm medially oriented vascular outpouching from the right ICA   terminus. This could represent a small saccular aneurysm versus a   vascular infundibulum.    CTA NECK:  1.  No evidence of critical stenosis by NASCET criteria.  2.  Multiple nodules seen in the bilateral lobes of the thyroid.   Recommend nonemergent thyroid ultrasound for further characterization if  clinically indicated.  3.  Redemonstrated partially visualized right apex lung mass. Correlate   with recent CT imaging of the chest for further evaluation.    MR Head w/wo IV Cont (10.02.23 @ 23:54)   IMPRESSION:  Redemonstration of right temporal craniotomy.  Residual 3.7 x 1.4 cm centrally necrotic/cystic peripherally enhancing   lesion along the mesial aspect of the surgical cavity (20-14). Findings   could represent residual enhancing neoplasm and/or postsurgical changes.  Additional curvilinear, thickened region of enhancement capping the   mesial aspect of the surgical cavity measures 7 mm in greatest thickness   (20-13). Findings could represent residual enhancing neoplasm and/or   postsurgical changes.    Previously seen an described multifocal enhancing parenchymal lesions   with internal susceptibility artifact in the bilateral frontal and right   cerebellar hemispheres are not significantly changed.  New small focus of restricted diffusion in the left mid cerebellar   hemisphere suggesting the presence of acute infarction.    VA Duplex Lower Ext Vein Scan, Bilat (10.01.23 @ 15:45)   IMPRESSION:  No evidence of deep venous thrombosis in either lower extremity.    Physical Exam:   Vital Signs Last 24 Hrs  T(C): 36.9 (09 Oct 2023 08:37), Max: 37.1 (08 Oct 2023 19:30)  T(F): 98.5 (09 Oct 2023 08:37), Max: 98.8 (08 Oct 2023 19:30)  HR: 84 (09 Oct 2023 08:37) (84 - 88)  BP: 99/66 (09 Oct 2023 08:37) (99/66 - 118/74)  RR: 16 (09 Oct 2023 08:37) (16 - 20)  SpO2: 96% (09 Oct 2023 08:37) (96% - 99%)    Gen - NAD, Comfortable  HEENT - NCAT, EOMI, PRERRLA, staples to right craniotomy incision  Pulm - CTAB,  No crackles  Cardiovascular - RRR, S1S2  Abdomen - Soft, NT, ND, +BS  Extremities - No C/C/E, No calf tenderness  Neuro-     Cognitive - AAOx4, follows command     Communication - Fluent, No dysarthria     Attention: able to state days of the week backwards     Memory: Recall 3 objects immediate, delayed recall 1/3 without categorical cues and 2/3 with categorical cues         Cranial Nerves - no facial asymmetry, tongue midline, EOMI     Motor -                     LEFT    UE - ShAB 4/5, EF 5/5, EE 5/5, WE 4/5,  4/5                    RIGHT UE - ShAB 3/5, EF 3/5, EE 3/5, WE 3/5,  3+/5, +slight pronator drift                    LEFT    LE - HF 4/5, KE 5/5, DF 5/5, PF 5/5                    RIGHT LE - HF 3/5, KE 3/5, DF 3/5, PF 5/5             Coordination - slowing with right FTN and (+) dysmateria, left intact  Psychiatric - Mood stable, Affect WNL  Skin:  all skin intact    Wounds: right craniotomy incision with staples in place and dried blood

## 2023-10-09 NOTE — CONSULT NOTE ADULT - SUBJECTIVE AND OBJECTIVE BOX
Patient is a 52 year old woman with Stage IV metastatic nonsmall cell Lung CA to brain, S/P gamma knife to 7 brain lesions,  admitted to [facility] on 10/5/23. She was initially admitted to [facility] where underwent  right temporal craniotomy and resection  of metastatic lesion. During Hospitalization at [facility] acute left cerebellar infarct noted on MRI Head. She was seen by ophthalmology for blurred vision and found to have bilateral disc edema which could suggest increased intracranial pressure. She was continued on depakote 500mg bid for seizure prophylaxis. She has been on tapering doses of prednisone and discharged on 2 mg bid. She was observed yesterday for a few seconds to have jaw clenching and not be responsive.  She states she has no recollection of jaw being clenched. She denies HA or other neurological complaints.     PMH: As above Nonsmall cell Lung CA- S/P chemo, S/P Thoracic RT, mets to brain, S/P gamma knife 7 lesions, and right temporal craniectomy and resection metastatic lesion. right liver metastasis. To begin immunotherapy with nivolumab             SH: No allergy    Exam: Awake, alert, approrpiate           Pupils 2.5mm, EOM intact , no nystagmus, VFF          CN II-XII intact          Motor tone and strength  RUE  5/5         LUE   5/5                                                RLE   5/5          LLE  5/5                                10.3   12.55 )-----------( 143      ( 09 Oct 2023 06:23 )             31.5       10-09    135  |  103  |  12  ----------------------------<  162<H>  4.1   |  24  |  0.55    Ca    8.5      09 Oct 2023 06:23    TPro  6.2  /  Alb  1.9<L>  /  TBili  0.2  /  DBili  x   /  AST  33  /  ALT  27  /  AlkPhos  58  10-09    Valproic Acid Level, Serum (10.08.23 @ 18:10)    Valproic Acid Level, Serum: 55 ug/mL    < from: CT Brain Stroke Protocol (10.08.23 @ 18:34) >    COMPARISON: CT head 10/4/2023    FINDINGS:    Reidentified is a right temporal craniotomy for prior resection of a   metastasis . Small amount of air and hemorrhage in the surgical cavity is   present. Underlying lesion is not well evaluated on this noncontrast CT   exam. Surrounding vasogenic edema is unchanged. Previously visualized   minimal right to left midline shift has improved.    Previously visualized partially calcified lesion in the left occipital   lobe, with surrounding vasogenic edema is unchanged. Lesion in the left   high frontal lobe with surrounding vasogenic edema is unchanged.    Previouslyvisualized acute infarct in the left cerebellum is not seen to   good advantage on this study. No new acute infarct or hemorrhage is   identified.      IMPRESSION:    No new acute abnormalities are identified.    Reidentified is a right temporal craniotomy for prior resection of a   metastasis . Small amount of air and hemorrhage in the surgical cavity is   present. Underlying lesion is not well evaluated on this noncontrast CT   exam. Surrounding vasogenic edema is unchanged. Previously visualized   minimal right to left midline shift has improved.    Previously visualized partially calcified lesion in the left occipital   lobe, with surrounding vasogenic edema is unchanged. Lesion in the left   high frontal lobe with surrounding vasogenic edema is unchanged.    Previously visualized acute infarct in the left cerebellum is not seen to   good advantage on this study.    --- End of Report ---            KUNAL GALEAS MD; Attending Radiologist  This document has been electronically signed. Oct  8 2023  9:12PM    < end of copied text >

## 2023-10-09 NOTE — CONSULT NOTE ADULT - SUBJECTIVE AND OBJECTIVE BOX
RENÉE WHITMORE  [id number]      HPI:    Renée Whitmore is a 52 year old woman with past medical history of Stage IV Lung cancer with metastasis to brain (s/p chemotherapy, radiation and gamma knife surgery with recent hospitalization at [facility] for right lower extremity weakness and blurry vision found to have metastases to brain s/p right temporal craniotomy for resection of mass, currently admitted to [facility].     ALLERGIES:  No Known Allergies      PAST MEDICAL & SURGICAL HISTORY:  GERD (Gastroesophageal Reflux Disease)  Non-small cell lung cancer with metastasis      CURRENT MEDICATIONS:  acetaminophen     Tablet .. 650 milliGRAM(s) Oral every 6 hours PRN  atorvastatin 40 milliGRAM(s) Oral at bedtime  dexAMETHasone     Tablet 2 milliGRAM(s) Oral two times a day  dexAMETHasone     Tablet   Oral   dextrose 5%. 1000 milliLiter(s) IV Continuous <Continuous>  dextrose 5%. 1000 milliLiter(s) IV Continuous <Continuous>  dextrose 50% Injectable 25 Gram(s) IV Push once  dextrose 50% Injectable 12.5 Gram(s) IV Push once  dextrose 50% Injectable 25 Gram(s) IV Push once  dextrose Oral Gel 15 Gram(s) Oral once PRN  divalproex  milliGRAM(s) Oral every 12 hours  enoxaparin Injectable 40 milliGRAM(s) SubCutaneous every 24 hours  glucagon  Injectable 1 milliGRAM(s) IntraMuscular once  insulin glargine Injectable (LANTUS) 17 Unit(s) SubCutaneous at bedtime  insulin lispro (ADMELOG) corrective regimen sliding scale   SubCutaneous at bedtime  insulin lispro (ADMELOG) corrective regimen sliding scale   SubCutaneous three times a day before meals  insulin lispro Injectable (ADMELOG) 5 Unit(s) SubCutaneous three times a day before meals  LORazepam   Injectable 1 milliGRAM(s) IV Push every 6 hours PRN  oxyCODONE    IR 5 milliGRAM(s) Oral every 4 hours PRN  pantoprazole    Tablet 40 milliGRAM(s) Oral before breakfast  polyethylene glycol 3350 17 Gram(s) Oral every 24 hours PRN  senna 2 Tablet(s) Oral at bedtime  sodium chloride 0.9%. 1000 milliLiter(s) IV Continuous <Continuous>    ROS:  All 10 systems reviewed and positives noted in HPI    OBJECTIVE:    VITAL SIGNS:  Vital Signs Last 24 Hrs  T(C): 36.9 (09 Oct 2023 08:37), Max: 37.1 (08 Oct 2023 19:30)  T(F): 98.5 (09 Oct 2023 08:37), Max: 98.8 (08 Oct 2023 19:30)  HR: 84 (09 Oct 2023 08:37) (84 - 88)  BP: 99/66 (09 Oct 2023 08:37) (99/66 - 118/74)  BP(mean): --  RR: 16 (09 Oct 2023 08:37) (16 - 20)  SpO2: 96% (09 Oct 2023 08:37) (96% - 99%)    Parameters below as of 09 Oct 2023 08:37  Patient On (Oxygen Delivery Method): room air        PHYSICAL EXAM:  General: well appearing, no distress  HEENT: sclera anicteric  Neck: supple, no carotid bruits b/l  CVS: JVP ~ 7 cm H20, RRR, s1, s2, no murmurs/rubs/gallops  Chest: unlabored respirations, clear to auscultation b/l  Abdomen: non-distended  Extremities: no lower extremity edema b/l  Neuro: awake, alert & oriented x 3  Psych: normal affect      LABS:                        10.3   12.55 )-----------( 143      ( 09 Oct 2023 06:23 )             31.5     10-09    135  |  103  |  12  ----------------------------<  162<H>  4.1   |  24  |  0.55    Ca    8.5      09 Oct 2023 06:23    TPro  6.2  /  Alb  1.9<L>  /  TBili  0.2  /  DBili  x   /  AST  33  /  ALT  27  /  AlkPhos  58  10-09            TTE (8/2023):  LVEF 55-60%  Moderate MR, Moderate AR    ECG (10/8/23): normal sinus rhythm   ECG (10/9/23): normal sinus rhythm, right atrial enlargement   RENÉE WHITMORE  [id number]      HPI:    Renée Whitmore is a 52 year old woman with past medical history of Stage IV Lung cancer with metastasis to brain (s/p chemotherapy, radiation and gamma knife surgery) with recent hospitalization at [facility] for right lower extremity weakness and blurry vision found to have metastases to brain s/p right temporal craniotomy for resection of mass, currently admitted to [facility].     ALLERGIES:  No Known Allergies      PAST MEDICAL & SURGICAL HISTORY:  GERD (Gastroesophageal Reflux Disease)  Non-small cell lung cancer with metastasis      CURRENT MEDICATIONS:  acetaminophen     Tablet .. 650 milliGRAM(s) Oral every 6 hours PRN  atorvastatin 40 milliGRAM(s) Oral at bedtime  dexAMETHasone     Tablet 2 milliGRAM(s) Oral two times a day  dexAMETHasone     Tablet   Oral   dextrose 5%. 1000 milliLiter(s) IV Continuous <Continuous>  dextrose 5%. 1000 milliLiter(s) IV Continuous <Continuous>  dextrose 50% Injectable 25 Gram(s) IV Push once  dextrose 50% Injectable 12.5 Gram(s) IV Push once  dextrose 50% Injectable 25 Gram(s) IV Push once  dextrose Oral Gel 15 Gram(s) Oral once PRN  divalproex  milliGRAM(s) Oral every 12 hours  enoxaparin Injectable 40 milliGRAM(s) SubCutaneous every 24 hours  glucagon  Injectable 1 milliGRAM(s) IntraMuscular once  insulin glargine Injectable (LANTUS) 17 Unit(s) SubCutaneous at bedtime  insulin lispro (ADMELOG) corrective regimen sliding scale   SubCutaneous at bedtime  insulin lispro (ADMELOG) corrective regimen sliding scale   SubCutaneous three times a day before meals  insulin lispro Injectable (ADMELOG) 5 Unit(s) SubCutaneous three times a day before meals  LORazepam   Injectable 1 milliGRAM(s) IV Push every 6 hours PRN  oxyCODONE    IR 5 milliGRAM(s) Oral every 4 hours PRN  pantoprazole    Tablet 40 milliGRAM(s) Oral before breakfast  polyethylene glycol 3350 17 Gram(s) Oral every 24 hours PRN  senna 2 Tablet(s) Oral at bedtime  sodium chloride 0.9%. 1000 milliLiter(s) IV Continuous <Continuous>    ROS:  All 10 systems reviewed and positives noted in HPI    OBJECTIVE:    VITAL SIGNS:  Vital Signs Last 24 Hrs  T(C): 36.9 (09 Oct 2023 08:37), Max: 37.1 (08 Oct 2023 19:30)  T(F): 98.5 (09 Oct 2023 08:37), Max: 98.8 (08 Oct 2023 19:30)  HR: 84 (09 Oct 2023 08:37) (84 - 88)  BP: 99/66 (09 Oct 2023 08:37) (99/66 - 118/74)  BP(mean): --  RR: 16 (09 Oct 2023 08:37) (16 - 20)  SpO2: 96% (09 Oct 2023 08:37) (96% - 99%)    Parameters below as of 09 Oct 2023 08:37  Patient On (Oxygen Delivery Method): room air        PHYSICAL EXAM:  General: no acute distress, craniotomy scar   HEENT: sclera anicteric  Neck: supple  CVS: JVP ~ 7 cm H20, RRR, s1, s2, no murmurs/rubs/gallops  Chest: unlabored respirations, clear to auscultation b/l  Abdomen: non-distended  Extremities: no lower extremity edema b/l  Neuro: awake, alert & oriented  Psych: normal affect      LABS:                        10.3   12.55 )-----------( 143      ( 09 Oct 2023 06:23 )             31.5     10-09    135  |  103  |  12  ----------------------------<  162<H>  4.1   |  24  |  0.55    Ca    8.5      09 Oct 2023 06:23    TPro  6.2  /  Alb  1.9<L>  /  TBili  0.2  /  DBili  x   /  AST  33  /  ALT  27  /  AlkPhos  58  10-09        TTE (8/2023):  LVEF 55-60%  Moderate MR, Moderate AR    ECG (10/8/23): normal sinus rhythm   ECG (10/9/23): normal sinus rhythm, right atrial enlargement

## 2023-10-09 NOTE — PROGRESS NOTE ADULT - ASSESSMENT
53 y/o F with hx of stage IV lung cancer with mets to brain (s/p 4 cycles chemo and thoracic RT completed on Oct 2022), gamma knife surgery to 7 brain mets, who presented to [facility] ED 10/5 complaining of 1 week of lower extremity weakness and intermittent blurry vision. now s/p R temporal crani for resection of metastatic lesion. Post-operative course was complicated by acute cerebellar infarct found on post-op MRI, tachycardia, and thrombocytopenia. Admitted to [place] acute inpatient rehab on 10/5 for ADL, gait, and functional impairments.     #RRT 10/8  #Seizure-like activity   -Rec reaching out to NS  -Depakote level 55  -Neurology to eval  -Do not feel strongly     #Lung Cancer with metastasis  #Metastatic brain lesion s/p Crani  -Wound closure removal in 10-14 days (10/10-10/14)  -Recent CT c/a/p showed enlarging lung lesion and new liver metastases  -S/p CRT last June 2023  -Continue Decadron taper - 3mg BID x2 days then 2mg BID through follow up. Maintain GI ppx with protonix while on steroids  -Depakote 500mg BID seizure prophylaxis  -Start comprehensive rehab program - PT/OT/SLP per rehab team  -Pain management, bowel regimen per rehab. Continue fall, aspiration precautions  -H/H stable. Monitor CBC, likely post op anemia  -Outpatient follow up with Dr. Metcalf, outpatient oncology follow up with Dr. Beckman  -Per onc - patient will require rad/on assessment for post-op radiation  -Pall consult 10/3 noted - goals are to pursue intervention and treatment as tolerated  -Consider neuropsych consult for mood    #Acute Cerebellar infarct  -No ASA until cleared by neurosurgery  -Start on atorvastatin 40mg    #Diplopia - Likely in setting of malignancy/mets/edema  -Seen by ophtho  -Artificial tears PRN. Eye patch PRN for comfort    #Type 2 Diabetes Mellitus with steroid induced hyperglycemia, HbA1c 8.0% (9/20/23)  -Monitor FSBG TIDAC/QHS - Noted  pre breakfast, admelog 8un held 10/6  -Consistent Carbohydrate diet  -Lantus 17un qam, admelog 5 units TIDAC   -Continue ISS    DVT ppx - Lovenox  GI ppx - Protonix 51 y/o F with hx of stage IV lung cancer with mets to brain (s/p 4 cycles chemo and thoracic RT completed on Oct 2022), gamma knife surgery to 7 brain mets, who presented to [facility] ED 10/5 complaining of 1 week of lower extremity weakness and intermittent blurry vision. now s/p R temporal crani for resection of metastatic lesion. Post-operative course was complicated by acute cerebellar infarct found on post-op MRI, tachycardia, and thrombocytopenia. Admitted to [place] acute inpatient rehab on 10/5 for ADL, gait, and functional impairments.     #RRT 10/8  #Seizure-like activity   -Rec reaching out to NS  -Depakote level 55  -Neurology to eval  -Do not feel strongly to continue Zosyn, CXR unremarkable     #Lung Cancer with metastasis  #Metastatic brain lesion s/p Crani  -Wound closure removal in 10-14 days (10/10-10/14)  -Recent CT c/a/p showed enlarging lung lesion and new liver metastases  -S/p CRT last June 2023  -Continue Decadron taper - 3mg BID x2 days then 2mg BID through follow up. Maintain GI ppx with protonix while on steroids  -Depakote 500mg BID seizure prophylaxis  -Start comprehensive rehab program - PT/OT/SLP per rehab team  -Pain management, bowel regimen per rehab. Continue fall, aspiration precautions  -H/H stable. Monitor CBC, likely post op anemia  -Outpatient follow up with Dr. Metcalf, outpatient oncology follow up with Dr. Beckman  -Per onc - patient will require rad/on assessment for post-op radiation  -Pall consult 10/3 noted - goals are to pursue intervention and treatment as tolerated  -Consider neuropsych consult for mood    #Acute Cerebellar infarct  -No ASA until cleared by neurosurgery  -Start on atorvastatin 40mg    #Diplopia - Likely in setting of malignancy/mets/edema  -Seen by ophtho  -Artificial tears PRN. Eye patch PRN for comfort    #Type 2 Diabetes Mellitus with steroid induced hyperglycemia, HbA1c 8.0% (9/20/23)  -Monitor FSBG TIDAC/QHS - Noted  pre breakfast, admelog 8un held 10/6  -Consistent Carbohydrate diet  -Lantus 17un qam, admelog 5 units TIDAC   -Continue ISS    DVT ppx - Lovenox  GI ppx - Protonix

## 2023-10-09 NOTE — CONSULT NOTE ADULT - ASSESSMENT
Patient is a 52 year old woman with Stage IV metastatic nonsmall cell Lung CA to brain, S/P gamma knife to 7 brain lesions,  [facility]. She was initially admitted to [facility] where underwent  right temporal craniotomy and resection  of metastatic lesion. During Hospitalization at [facility] acute left cerebellar infarct noted on MRI Head. She was seen by ophthalmology for blurred vision and found to have bilateral disc edema which could suggest increased intracranial pressure. She was continued on depakote 500mg bid for seizure prophylaxis. She has been on tapering doses of prednisone and discharged on 2 mg bid. She was observed yesterday for a few seconds to have jaw clenching and not be responsive.  She states she has no recollection of jaw being clenched. She denies HA or other neurological complaints. Neurological exam as above. Would be concerned with syncope with episode of unresponsiveness.  Jaw clenched for a few seconds not suggestive of seizure.    1) CT head as above no new abnormalities. Reidentified right temporal craniotomy . Small amount air and hemorrhage in surgical cavity. Underlying lesion not well evaluated. Surrounding vasogenic edema unchanged. Minimal right to left midline shift improved. Calcific lesion left occipital with surrounding edema unchanged. left high frontal lobe unchanged. Acute infarct left cerebellum not seen to good advantage this study.  2) Cardiology consult with regard to syncope.

## 2023-10-09 NOTE — PROGRESS NOTE ADULT - SUBJECTIVE AND OBJECTIVE BOX
Patient is a 52y old  Female who presents with a chief complaint of Metastatic brain lesion s/p R temporal craniotomy, resection (09 Oct 2023 10:07)    Patient seen and examined at bedside. RRT last night for seizure like activity. Neurology consulted. Patient started on empiric Zosyn for ?aspiration pneumonia    ALLERGIES:  No Known Allergies    MEDICATIONS  (STANDING):  atorvastatin 40 milliGRAM(s) Oral at bedtime  dexAMETHasone     Tablet 2 milliGRAM(s) Oral two times a day  dexAMETHasone     Tablet   Oral   dextrose 5%. 1000 milliLiter(s) (50 mL/Hr) IV Continuous <Continuous>  dextrose 5%. 1000 milliLiter(s) (100 mL/Hr) IV Continuous <Continuous>  dextrose 50% Injectable 25 Gram(s) IV Push once  dextrose 50% Injectable 12.5 Gram(s) IV Push once  dextrose 50% Injectable 25 Gram(s) IV Push once  divalproex  milliGRAM(s) Oral every 12 hours  enoxaparin Injectable 40 milliGRAM(s) SubCutaneous every 24 hours  glucagon  Injectable 1 milliGRAM(s) IntraMuscular once  insulin glargine Injectable (LANTUS) 17 Unit(s) SubCutaneous at bedtime  insulin lispro (ADMELOG) corrective regimen sliding scale   SubCutaneous three times a day before meals  insulin lispro (ADMELOG) corrective regimen sliding scale   SubCutaneous at bedtime  insulin lispro Injectable (ADMELOG) 5 Unit(s) SubCutaneous three times a day before meals  pantoprazole    Tablet 40 milliGRAM(s) Oral before breakfast  piperacillin/tazobactam IVPB.. 3.375 Gram(s) IV Intermittent every 8 hours  senna 2 Tablet(s) Oral at bedtime  sodium chloride 0.9%. 1000 milliLiter(s) (1000 mL/Hr) IV Continuous <Continuous>    MEDICATIONS  (PRN):  acetaminophen     Tablet .. 650 milliGRAM(s) Oral every 6 hours PRN Mild Pain (1 - 3), Moderate Pain (4 - 6)  dextrose Oral Gel 15 Gram(s) Oral once PRN Blood Glucose LESS THAN 70 milliGRAM(s)/deciliter  LORazepam   Injectable 1 milliGRAM(s) IV Push every 6 hours PRN seizures  oxyCODONE    IR 5 milliGRAM(s) Oral every 4 hours PRN Severe Pain (7 - 10)  polyethylene glycol 3350 17 Gram(s) Oral every 24 hours PRN Constipation    Vital Signs Last 24 Hrs  T(F): 98.5 (09 Oct 2023 08:37), Max: 98.8 (08 Oct 2023 19:30)  HR: 84 (09 Oct 2023 08:37) (84 - 88)  BP: 99/66 (09 Oct 2023 08:37) (99/66 - 118/74)  RR: 16 (09 Oct 2023 08:37) (16 - 20)  SpO2: 96% (09 Oct 2023 08:37) (96% - 99%)  I&O's Summary    08 Oct 2023 07:01  -  09 Oct 2023 07:00  --------------------------------------------------------  IN: 2200 mL / OUT: 800 mL / NET: 1400 mL    BMI (kg/m2): 23.1 (10-05-23 @ 17:28)    PHYSICAL EXAM:  General: NAD, awake, alert   Head: R craniotomy incision with overlying staples   ENT: MMM, no tonsilar exudate  Neck: Supple, No JVD  Lungs: Clear to auscultation bilaterally, no wheezes. Good air entry bilaterally   Cardio: RRR, S1/S2, No murmurs  Abdomen: Soft, Nontender, Nondistended; Bowel sounds present  Extremities: No calf tenderness, No pitting edema    LABS:                        10.3   12.55 )-----------( 143      ( 09 Oct 2023 06:23 )             31.5       10-09    135  |  103  |  12  ----------------------------<  162  4.1   |  24  |  0.55    Ca    8.5      09 Oct 2023 06:23    TPro  6.2  /  Alb  1.9  /  TBili  0.2  /  DBili  x   /  AST  33  /  ALT  27  /  AlkPhos  58  10-09      Lactate, Blood: 2.5 mmol/L (10-08 @ 23:30)  Lactate, Blood: 2.2 mmol/L (10-08 @ 20:30)  Lactate, Blood: 3.5 mmol/L (10-08 @ 18:04)    CARDIAC MARKERS ( 08 Oct 2023 18:04 )  x     / 32.3 ng/L / x     / x     / x        POCT Blood Glucose.: 127 mg/dL (09 Oct 2023 08:25)  POCT Blood Glucose.: 255 mg/dL (08 Oct 2023 22:15)  POCT Blood Glucose.: 87 mg/dL (08 Oct 2023 17:53)  POCT Blood Glucose.: 219 mg/dL (08 Oct 2023 12:28)    Urinalysis Basic - ( 09 Oct 2023 06:23 )    Color: x / Appearance: x / SG: x / pH: x  Gluc: 162 mg/dL / Ketone: x  / Bili: x / Urobili: x   Blood: x / Protein: x / Nitrite: x   Leuk Esterase: x / RBC: x / WBC x   Sq Epi: x / Non Sq Epi: x / Bacteria: x    RADIOLOGY & ADDITIONAL TESTS:     Care Discussed with Consultants/Other Providers:

## 2023-10-10 NOTE — PROGRESS NOTE ADULT - SUBJECTIVE AND OBJECTIVE BOX
Patient is a 52y old  Female who presents with a chief complaint of Multiple metastatic brain lesions(left frontal, occipital and temporal) s/p R temporal craniotomy and resection  Right dominant hemiparesis (10 Oct 2023 10:09)    Patient seen and examined at bedside. No acute overnight events. In good spirits. Denies pain.     ALLERGIES:  No Known Allergies    MEDICATIONS  (STANDING):  atorvastatin 40 milliGRAM(s) Oral at bedtime  dexAMETHasone     Tablet 2 milliGRAM(s) Oral two times a day  dexAMETHasone     Tablet   Oral   dextrose 5%. 1000 milliLiter(s) (100 mL/Hr) IV Continuous <Continuous>  dextrose 5%. 1000 milliLiter(s) (50 mL/Hr) IV Continuous <Continuous>  dextrose 50% Injectable 25 Gram(s) IV Push once  dextrose 50% Injectable 25 Gram(s) IV Push once  dextrose 50% Injectable 12.5 Gram(s) IV Push once  divalproex  milliGRAM(s) Oral every 12 hours  enoxaparin Injectable 40 milliGRAM(s) SubCutaneous every 24 hours  glucagon  Injectable 1 milliGRAM(s) IntraMuscular once  insulin glargine Injectable (LANTUS) 10 Unit(s) SubCutaneous at bedtime  insulin lispro (ADMELOG) corrective regimen sliding scale   SubCutaneous three times a day before meals  insulin lispro (ADMELOG) corrective regimen sliding scale   SubCutaneous at bedtime  insulin lispro Injectable (ADMELOG) 6 Unit(s) SubCutaneous before dinner  insulin lispro Injectable (ADMELOG) 6 Unit(s) SubCutaneous before breakfast  insulin lispro Injectable (ADMELOG) 2 Unit(s) SubCutaneous before lunch  metFORMIN 500 milliGRAM(s) Oral two times a day  pantoprazole    Tablet 40 milliGRAM(s) Oral before breakfast  senna 2 Tablet(s) Oral at bedtime  sodium chloride 0.9%. 1000 milliLiter(s) (1000 mL/Hr) IV Continuous <Continuous>    MEDICATIONS  (PRN):  acetaminophen     Tablet .. 650 milliGRAM(s) Oral every 6 hours PRN Mild Pain (1 - 3), Moderate Pain (4 - 6)  dextrose Oral Gel 15 Gram(s) Oral once PRN Blood Glucose LESS THAN 70 milliGRAM(s)/deciliter  oxyCODONE    IR 5 milliGRAM(s) Oral every 4 hours PRN Severe Pain (7 - 10)  polyethylene glycol 3350 17 Gram(s) Oral every 24 hours PRN Constipation    Vital Signs Last 24 Hrs  T(F): 98.6 (10 Oct 2023 07:35), Max: 98.6 (10 Oct 2023 07:35)  HR: 112 (10 Oct 2023 13:15) (80 - 112)  BP: 108/70 (10 Oct 2023 13:15) (100/68 - 114/70)  RR: 16 (10 Oct 2023 07:35) (16 - 18)  SpO2: 99% (10 Oct 2023 07:35) (98% - 99%)  I&O's Summary    09 Oct 2023 07:01  -  10 Oct 2023 07:00  --------------------------------------------------------  IN: 0 mL / OUT: 3 mL / NET: -3 mL    BMI (kg/m2): 23.1 (10-05-23 @ 17:28)    PHYSICAL EXAM:  General: NAD, awake, alert   Head: R craniotomy incision with overlying staples   ENT: MMM, no tonsilar exudate  Neck: Supple, No JVD  Lungs: Clear to auscultation bilaterally, no wheezes. Good air entry bilaterally   Cardio: RRR, S1/S2, No murmurs  Abdomen: Soft, Nontender, Nondistended; Bowel sounds present  Extremities: No calf tenderness, No pitting edema    LABS:                        10.3   12.55 )-----------( 143      ( 09 Oct 2023 06:23 )             31.5       10-09    135  |  103  |  12  ----------------------------<  162  4.1   |  24  |  0.55    Ca    8.5      09 Oct 2023 06:23    TPro  6.2  /  Alb  1.9  /  TBili  0.2  /  DBili  x   /  AST  33  /  ALT  27  /  AlkPhos  58  10-09          Lactate, Blood: 2.5 mmol/L (10-08 @ 23:30)  Lactate, Blood: 2.2 mmol/L (10-08 @ 20:30)  Lactate, Blood: 3.5 mmol/L (10-08 @ 18:04)    CARDIAC MARKERS ( 08 Oct 2023 18:04 )  x     / 32.3 ng/L / x     / x     / x        POCT Blood Glucose.: 232 mg/dL (10 Oct 2023 12:05)  POCT Blood Glucose.: 100 mg/dL (10 Oct 2023 07:34)  POCT Blood Glucose.: 312 mg/dL (09 Oct 2023 22:17)  POCT Blood Glucose.: 93 mg/dL (09 Oct 2023 17:24)    Urinalysis Basic - ( 09 Oct 2023 06:23 )    Color: x / Appearance: x / SG: x / pH: x  Gluc: 162 mg/dL / Ketone: x  / Bili: x / Urobili: x   Blood: x / Protein: x / Nitrite: x   Leuk Esterase: x / RBC: x / WBC x   Sq Epi: x / Non Sq Epi: x / Bacteria: x    Culture - Blood (collected 08 Oct 2023 20:30)  Source: .Blood Blood-Peripheral  Preliminary Report (10 Oct 2023 02:01):    No growth at 24 hours    Culture - Blood (collected 08 Oct 2023 20:25)  Source: .Blood Blood-Peripheral  Preliminary Report (10 Oct 2023 02:01):    No growth at 24 hours    RADIOLOGY & ADDITIONAL TESTS:     Care Discussed with Consultants/Other Providers:

## 2023-10-10 NOTE — PROGRESS NOTE ADULT - ASSESSMENT
Assessment	  Patient is a 51 yo F with history of stage IV lung cancer with mets to brain (s/p 4 cycles chemo and thoracic RT completed on oct 2022), gamma knife surgery to 7 brain mets, who presented to [facility] ED 10/5 complaining of 1 week of lower extremity weakness and intermittent blurry vision. now s/p R temporal crani for resection of metastatic lesion. Post-operative course was complicated by acute cerebellar infarct found on post-op MRI, tachycardia, and thrombocytopenia. Admitted to [facility] inpatient rehab on 10/5 for ADL, gait, and functional impairments.     #Seizure   - Depakote level is 55 (01/09)  - Procalcitionin level (10/08) 27, (10/09) 25.8  - Lactate level 2.2 (01/08), 2.5 (10/09)   - Started on Zosyn 3.375 gm IVPB q 8 hours x 7 days.   - Head CT (10/09) IMPRESSION: No new acute abnormalities are identified.  Reidentified is a right temporal craniotomy for prior resection of a metastasis . Small amount of air and hemorrhage in the surgical cavity is present. Underlying lesion is not well evaluated on this noncontrast CT exam. Surrounding vasogenic edema is unchanged. Previously visualized minimal right to left midline shift has improved.  Previously visualized partially calcified lesion in the left occipital lobe, with surrounding vasogenic edema is unchanged. Lesion in the left high frontal lobe with surrounding vasogenic edema is unchanged.  Previously visualized acute infarct in the left cerebellum is not seen to good advantage on this study.   - Neurology Consult completed   - Will Contact her NS for update and recommendation     # Multiple metastatic brain lesions s/p Crani and temporal lesion resection/ Right dominant hemiparesis   - Comprehensive Multidisciplinary Rehab Program: 3 hours a day, 5 days a week with PT/OT/SLP  - Decadron taper - 3mg BID x2 days then 2mg BID through follow up  - Depakote 500mg BID seizure prophylaxis  - Pain control with oxy and tylenol  - Wound closure removal in 14 days (10/14)  - Outpatient follow up with Dr. Metcalf  - Fall, aspiration precautions    # Acute Cerebellar infarct  - No ASA until cleared by neurosurgery, Atorvastatin 40 mg po daily   - PT/OT evaluation for balance and coordination, ambulation, transfer, ADLs  - Hospitalist F/U     #Lung Cancer with metastasis  - Depakote, decadron as above  - Outpatient oncology follow up with Dr. Beckman  - Recent CT c/a/p showed enlarging lung lesion and new liver metastases  - Per onc - patient will require rad/on assessment for post-op radiation  - S/p CRT last June 2023  - Hospitalist consult appreciated    # Diplopia/ Blurry vision   - Likely in setting of malignancy/mets/edema  - Seen by ophtho  - Artificial tears PRN. Eye patch PRN for comfort    # Type 2 Diabetes Mellitus with steroid induced hyperglycemia/Hypoglycemia   - Recent A1c 8.0%  - Monitor FSBG TIDAC/QHS  - Consistent Carbohydrate diet  - Lantus 15 units in morning --> increased to 17 unites (10/08) --> decreased to 10 units at am (10/10) and admelog 8 units TIDAC --> reduced to 5 unites x 3 (10/06) --> changed to 6/2/6 premeals (10/10), started Metformin 500 mg po x 2 daily   - Mod SSI AC, low SSI HS  - Hospitalist consult appreciated    # Mood/Cognition:  - Neuropsychology consult PRN    # Pain Management:  - Tylenol PRN  - Oxycodone 5mg q4 hours PRN severe Pain    # GI/Bowel:  - Senna QHS, Miralax daily PRN  - GI ppx:Protonix    # /Bladder:   - Bladder scan q8 hours on admission, SC PRN  - Voiding without issues   - Encourage timed voids every 4 hours while awake     # Skin/Pressure Injury:  - Skin assessment on admission: intact  - Monitor Incisions: right craniotomy incision with staples in place and dried blood  - Turn every 2 hours while in bed    # Diet:   - Diet Consistency/Modifications: Reg/thin/CC  - Aspiration Precautions  - SLP consult for swallow function evaluation and treatment, recs appreciated     # DVT ppx:  - Lovenox    # Restrictions/Precautions:  - Precautions: Fall, aspiration    ---------------  Outpatient Follow-up:    Marcus Metcalf  Neurosurgery  [address] [facility]  [place] [address]  Phone: [phone]  Fax: [phone]  Follow Up Time:     Maritza Kohler  Hematology  [address] [place] [id number]  Phone: [phone]  Fax: [phone]  Follow Up Time:     Kieran Figueroa  Neurology  [address] [place] [id number]  Phone: [phone]  Fax: [phone]  Follow Up Time:  --------------    Goals: Safe discharge to home  Estimated Length of Stay: 10-14 days  Rehab Potential: Good  Medical Prognosis: Good  Estimated Disposition: Home with Home Care  ---------------

## 2023-10-10 NOTE — PROGRESS NOTE ADULT - ASSESSMENT
51 y/o F with hx of stage IV lung cancer with mets to brain (s/p 4 cycles chemo and thoracic RT completed on Oct 2022), gamma knife surgery to 7 brain mets, who presented to [facility] ED 10/5 complaining of 1 week of lower extremity weakness and intermittent blurry vision. now s/p R temporal crani for resection of metastatic lesion. Post-operative course was complicated by acute cerebellar infarct found on post-op MRI, tachycardia, and thrombocytopenia. Admitted to [facility] acute inpatient rehab on 10/5 for ADL, gait, and functional impairments.     #RRT 10/8  #Seizure-like activity vs syncope  -Rec reaching out to [facility]  -Depakote level 55  -Neurology, Cardiology evals noted     #Lung Cancer with metastasis  #Metastatic brain lesion s/p Crani  -Wound closure removal in 10-14 days (10/10-10/14)  -Recent CT c/a/p showed enlarging lung lesion and new liver metastases  -S/p CRT last June 2023  -Continue Decadron taper - 3mg BID x2 days then 2mg BID through follow up. Maintain GI ppx with protonix while on steroids  -Depakote 500mg BID seizure prophylaxis  -Start comprehensive rehab program - PT/OT/SLP per rehab team  -Pain management, bowel regimen per rehab. Continue fall, aspiration precautions  -H/H stable. Monitor CBC, likely post op anemia  -Outpatient follow up with Dr. Metcalf, outpatient oncology follow up with Dr. Beckman  -Per onc - patient will require rad/on assessment for post-op radiation  -Pall consult 10/3 noted - goals are to pursue intervention and treatment as tolerated  -Consider neuropsych consult for mood    #Acute Cerebellar infarct  -No ASA until cleared by neurosurgery  -Start on atorvastatin 40mg    #Diplopia - Likely in setting of malignancy/mets/edema  -Seen by ophtho  -Artificial tears PRN. Eye patch PRN for comfort    #Type 2 Diabetes Mellitus with steroid induced hyperglycemia, HbA1c 8.0% (9/20/23)  -Monitor [facility]SBG TIDAC/QHS - Noted  pre breakfast, admelog 8un held 10/6  -Consistent Carbohydrate diet  -Lantus 10u qhs, lispro 6u with breakfast, 2u with lunch, 6u with dinner. metformin 500mg bid  -Continue ISS    DVT ppx - Lovenox  GI ppx - Protonix

## 2023-10-10 NOTE — PROGRESS NOTE ADULT - SUBJECTIVE AND OBJECTIVE BOX
CC:  Weakness, blurry vision    HPI:  Patient is a 51 yo F with history of stage IV lung cancer with mets to brain (s/p 4 cycles chemo and thoracic RT completed on oct 2022), gamma knife surgery to 7 brain mets , recent admission to [facility] from sept 4-13th for epigastric pain and hyperglycemia thought to be due to steroids, who presented to [facility] ED 10/5 complaining of 1 week of lower extremity weakness and intermittent blurry vision.  Patient was initially seen at the end of August and admitted for hx of migraines and dizziness, was started on  decadron 6mg q6hr and Depakote at that time. She returned to [facility] again in early september for worsening epigastric pain  in the setting of taking steroids without GI ppx. CTA at that time showing no PE, stable right apical lung mass and rapid interval growth of a left lower lobe mass and new hepatic mets. Pt finished dexamethasone taper on 9/7 and was discharged with protonix, depakote and follow up with outpatient rad onc Dr. Bernard, outpatient NSGY, and oncologist Dr. Beckman at [facility] with plans to start immunotherapy nivolumab as outpatient.  Since discharge on the 13th, patient noted weakness in her right lower extremity and 'muscle pain' which improves with massages. She used her walker at home however she feels weak and was requiring increasing support from her fiancee to ambulate/attend to daily needs. She noted also intermittent epigastric pain with radiation to the back. and endorsef some slight blurry vision over the last 2 weeks which she describes as "vision feels off" though is able to see.    Patient was admitted 9/30 and underwent R temporal crani for resection of metastatic lesion. Post-operative course was complicated by acute left cerebellar infarct found on post-op MRI, tachycardia, and thrombocytopenia. She was recommended for follow up with oncology for lung lesions, radiation oncology for brain, and dr. michelle for post-operative follow up. She will also follow up with Dr. Pam Randle in 6 months for findings on 10/4 CTA head. Plan for no ASA for secondary prevention until cleared by Dr. Michelle. Wound closure due to be removed postop day 10-14 in rehab. Patient was evaluated by PM&R and therapy for functional deficits and gait/ ADL impairments and recommended acute rehabilitation. Patient was medically optimized for discharge to [facility] on 10/5/23.     Allergies  No Known Allergies    Subjective/ROS:   - Patient was seen and examined at bed side, in her bed comfortable, no events over night  - Slep well last night, no new complaints this am  - Denies pain at this time  - GI/, LBM (10/09), voiding without issues  - Case discussed in IDT today for progress and discharge plan   - Tolerating 3 hours of daily therapy, motivated and tolerating well   - Denies CP, palpitation, SOB, cough, fever, chills, abdominal pain, N/V/D/C, joint pain or swelling, headaches or dizziness, dysuria, hematuria or frequency     MEDICATIONS  (STANDING):  atorvastatin 40 milliGRAM(s) Oral at bedtime  dexAMETHasone     Tablet 2 milliGRAM(s) Oral two times a day  dexAMETHasone     Tablet   Oral   dextrose 5%. 1000 milliLiter(s) (100 mL/Hr) IV Continuous <Continuous>  dextrose 5%. 1000 milliLiter(s) (50 mL/Hr) IV Continuous <Continuous>  dextrose 50% Injectable 25 Gram(s) IV Push once  dextrose 50% Injectable 25 Gram(s) IV Push once  dextrose 50% Injectable 12.5 Gram(s) IV Push once  divalproex  milliGRAM(s) Oral every 12 hours  enoxaparin Injectable 40 milliGRAM(s) SubCutaneous every 24 hours  glucagon  Injectable 1 milliGRAM(s) IntraMuscular once  insulin glargine Injectable (LANTUS) 10 Unit(s) SubCutaneous at bedtime  insulin lispro (ADMELOG) corrective regimen sliding scale   SubCutaneous three times a day before meals  insulin lispro (ADMELOG) corrective regimen sliding scale   SubCutaneous at bedtime  insulin lispro Injectable (ADMELOG) 6 Unit(s) SubCutaneous before dinner  insulin lispro Injectable (ADMELOG) 6 Unit(s) SubCutaneous before breakfast  insulin lispro Injectable (ADMELOG) 2 Unit(s) SubCutaneous before lunch  metFORMIN 500 milliGRAM(s) Oral two times a day  pantoprazole    Tablet 40 milliGRAM(s) Oral before breakfast  senna 2 Tablet(s) Oral at bedtime  sodium chloride 0.9%. 1000 milliLiter(s) (1000 mL/Hr) IV Continuous <Continuous>    MEDICATIONS  (PRN):  acetaminophen     Tablet .. 650 milliGRAM(s) Oral every 6 hours PRN Mild Pain (1 - 3), Moderate Pain (4 - 6)  dextrose Oral Gel 15 Gram(s) Oral once PRN Blood Glucose LESS THAN 70 milliGRAM(s)/deciliter  oxyCODONE    IR 5 milliGRAM(s) Oral every 4 hours PRN Severe Pain (7 - 10)  polyethylene glycol 3350 17 Gram(s) Oral every 24 hours PRN Constipation      LAB                        10.3   12.55 )-----------( 143      ( 09 Oct 2023 06:23 )             31.5     10-09    135  |  103  |  12  ----------------------------<  162<H>  4.1   |  24  |  0.55    Ca    8.5      09 Oct 2023 06:23    TPro  6.2  /  Alb  1.9<L>  /  TBili  0.2  /  DBili  x   /  AST  33  /  ALT  27  /  AlkPhos  58  10-09    LIVER FUNCTIONS - ( 09 Oct 2023 06:23 )  Alb: 1.9 g/dL / Pro: 6.2 g/dL / ALK PHOS: 58 U/L / ALT: 27 U/L / AST: 33 U/L / GGT: x          Procalcitionin 27 (10/08), 25.8 (10/09)   Lactate 2.2 (10/08), 2.5 (10/09)    Depakote level 55          CAPILLARY BLOOD GLUCOSE  POCT Blood Glucose.: 100 mg/dL (10 Oct 2023 07:34)  POCT Blood Glucose.: 312 mg/dL (09 Oct 2023 22:17)  POCT Blood Glucose.: 93 mg/dL (09 Oct 2023 17:24)  POCT Blood Glucose.: 251 mg/dL (09 Oct 2023 12:16)      Radiology, Cardiology, and Other Results:     CT Head No Cont (10.04.23 @ 15:16)   edemonstrated postoperative changes related to a prior right   temporal craniotomy for resection of a metastatic lesion. Postoperative   findings are relatively stable.  2.  Other metastatic lesions seen throughout the brain parenchyma with   surrounding vasogenic edema, again relatively stable.  3.  Redemonstrated acute infarct in the left cerebellar hemisphere,   stable from recent MR imaging.    CTA BRAIN:  1.  No evidence of abrupt cut off of intraluminal contrast.  2.  1 to 2 mm medially oriented vascular outpouching from the right ICA   terminus. This could represent a small saccular aneurysm versus a   vascular infundibulum.    CTA NECK:  1.  No evidence of critical stenosis by NASCET criteria.  2.  Multiple nodules seen in the bilateral lobes of the thyroid.   Recommend nonemergent thyroid ultrasound for further characterization if  clinically indicated.  3.  Redemonstrated partially visualized right apex lung mass. Correlate   with recent CT imaging of the chest for further evaluation.    MR Head w/wo IV Cont (10.02.23 @ 23:54)   IMPRESSION:  Redemonstration of right temporal craniotomy.  Residual 3.7 x 1.4 cm centrally necrotic/cystic peripherally enhancing   lesion along the mesial aspect of the surgical cavity (20-14). Findings   could represent residual enhancing neoplasm and/or postsurgical changes.  Additional curvilinear, thickened region of enhancement capping the   mesial aspect of the surgical cavity measures 7 mm in greatest thickness   (20-13). Findings could represent residual enhancing neoplasm and/or   postsurgical changes.    Previously seen an described multifocal enhancing parenchymal lesions   with internal susceptibility artifact in the bilateral frontal and right   cerebellar hemispheres are not significantly changed.  New small focus of restricted diffusion in the left mid cerebellar   hemisphere suggesting the presence of acute infarction.    VA Duplex Lower Ext Vein Scan, Bilat (10.01.23 @ 15:45)   IMPRESSION:  No evidence of deep venous thrombosis in either lower extremity.    Physical Exam:   Vital Signs Last 24 Hrs  T(C): 37 (10 Oct 2023 07:35), Max: 37 (10 Oct 2023 07:35)  T(F): 98.6 (10 Oct 2023 07:35), Max: 98.6 (10 Oct 2023 07:35)  HR: 91 (10 Oct 2023 07:35) (80 - 91)  BP: 100/68 (10 Oct 2023 07:35) (100/68 - 114/70)  RR: 16 (10 Oct 2023 07:35) (16 - 18)  SpO2: 99% (10 Oct 2023 07:35) (98% - 99%)    Gen - NAD, Comfortable  HEENT - NCAT, EOMI, PRERRLA, staples to right craniotomy incision  Pulm - CTAB,  No crackles  Cardiovascular - RRR, S1S2  Abdomen - Soft, NT, ND, +BS  Extremities - No C/C/E, No calf tenderness  Neuro-     Cognitive - AAOx4, follows command     Communication - Fluent, No dysarthria     Attention: able to state days of the week backwards     Memory: Recall 3 objects immediate, delayed recall 1/3 without categorical cues and 2/3 with categorical cues         Cranial Nerves - no facial asymmetry, tongue midline, EOMI     Motor -                     LEFT    UE - ShAB 4/5, EF 5/5, EE 5/5, WE 4/5,  4/5                    RIGHT UE - ShAB 3/5, EF 3/5, EE 3/5, WE 3/5,  3+/5, +slight pronator drift                    LEFT    LE - HF 4/5, KE 5/5, DF 5/5, PF 5/5                    RIGHT LE - HF 3/5, KE 3/5, DF 3/5, PF 5/5             Coordination - slowing with right FTN and (+) dysmateria, left intact  Psychiatric - Mood stable, Affect WNL  Skin:  all skin intact    Wounds: right craniotomy incision with staples in place and dried blood

## 2023-10-11 NOTE — CHART NOTE - NSCHARTNOTEFT_GEN_A_CORE
Nutrition Follow Up Note  Hospital Course (Per Electronic Medical Record):   Source: Medical Record [X] Patient [X]  Nursing Staff [X]     Diet: Consistent Carbohydrate, with evening snack, Glucerna shake QD     Patient tolerating diet , consuming % of meals , POCT reviewed , insulin regimen noted, (+) steroids noted , DX of moderate malnutrition noted receiving po supplement due to same.     Current Weight: (10/8) 147.2/66.8kg     Pertinent Medications: MEDICATIONS  (STANDING):  atorvastatin 40 milliGRAM(s) Oral at bedtime  dexAMETHasone     Tablet 2 milliGRAM(s) Oral two times a day  dexAMETHasone     Tablet   Oral   dextrose 5%. 1000 milliLiter(s) (50 mL/Hr) IV Continuous <Continuous>  dextrose 5%. 1000 milliLiter(s) (100 mL/Hr) IV Continuous <Continuous>  dextrose 50% Injectable 25 Gram(s) IV Push once  dextrose 50% Injectable 25 Gram(s) IV Push once  dextrose 50% Injectable 12.5 Gram(s) IV Push once  divalproex  milliGRAM(s) Oral every 12 hours  enoxaparin Injectable 40 milliGRAM(s) SubCutaneous every 24 hours  glucagon  Injectable 1 milliGRAM(s) IntraMuscular once  insulin glargine Injectable (LANTUS) 6 Unit(s) SubCutaneous at bedtime  insulin lispro (ADMELOG) corrective regimen sliding scale   SubCutaneous three times a day before meals  insulin lispro (ADMELOG) corrective regimen sliding scale   SubCutaneous at bedtime  insulin lispro Injectable (ADMELOG) 4 Unit(s) SubCutaneous before breakfast  insulin lispro Injectable (ADMELOG) 4 Unit(s) SubCutaneous before dinner  metFORMIN 500 milliGRAM(s) Oral two times a day  pantoprazole    Tablet 40 milliGRAM(s) Oral before breakfast  senna 2 Tablet(s) Oral at bedtime  sodium chloride 0.9%. 1000 milliLiter(s) (1000 mL/Hr) IV Continuous <Continuous>    MEDICATIONS  (PRN):  acetaminophen     Tablet .. 650 milliGRAM(s) Oral every 6 hours PRN Mild Pain (1 - 3), Moderate Pain (4 - 6)  dextrose Oral Gel 15 Gram(s) Oral once PRN Blood Glucose LESS THAN 70 milliGRAM(s)/deciliter  oxyCODONE    IR 5 milliGRAM(s) Oral every 4 hours PRN Severe Pain (7 - 10)  polyethylene glycol 3350 17 Gram(s) Oral every 24 hours PRN Constipation      Pertinent Labs:  10-09 Na135 mmol/L Glu 162 mg/dL<H> K+ 4.1 mmol/L Cr  0.55 mg/dL BUN 12 mg/dL 10-09 Alb 1.9 g/dL<L>Hgb 10.3g/dl<L> , Hct31.5% <L>   POCT 181,78,170,150      Skin: surgical incision     Edema: none    Last BM: (10/8)     Estimated Needs:   [X] No Change since Previous Assessment      Previous Nutrition Diagnosis: Moderate Protein Calorie Malnutrition     Nutrition Diagnosis is [X] Ongoing & addressed         New Nutrition Diagnosis: [X] Not Applicable    Interventions:   1. continue current  diet       Monitoring & Evaluation: will monitor:  [X] Weights   [X] PO Intake   [X] Follow Up (Per Protocol)  [X] Tolerance to Diet Prescription       RD to follow as per Nutrition protocol  Ying Tyson RDN

## 2023-10-11 NOTE — PROGRESS NOTE ADULT - ASSESSMENT
51 y/o F with hx of stage IV lung cancer with mets to brain (s/p 4 cycles chemo and thoracic RT completed on Oct 2022), gamma knife surgery to 7 brain mets, who presented to [facility] ED 10/5 complaining of 1 week of lower extremity weakness and intermittent blurry vision. now s/p R temporal crani for resection of metastatic lesion. Post-operative course was complicated by acute cerebellar infarct found on post-op MRI, tachycardia, and thrombocytopenia. Admitted to [place] [facility] acute inpatient rehab on 10/5 for ADL, gait, and functional impairments.     #RRT 10/8  #Seizure-like activity vs syncope  -Rec reaching out to [facility]  -Depakote level 55  -Neurology, Cardiology evals noted     #Sinus tachycardia   -HR in 115-120s in PT, no chest pain or SOB  -EKG reviewed. normal sinus rhythm  -check D-dimer if tachycardia persists   -monitor    #Lung Cancer with metastasis  #Metastatic brain lesion s/p Crani  -Wound closure removal in 10-14 days (10/10-10/14)  -Recent CT c/a/p showed enlarging lung lesion and new liver metastases  -S/p CRT last June 2023  -Continue Decadron 2mg BID through follow up. Maintain GI ppx with protonix while on steroids  -Depakote 500mg BID seizure prophylaxis  -Pain management, bowel regimen per rehab. Continue fall, aspiration precautions  -H/H stable. Monitor CBC, likely post op anemia  -Outpatient follow up with Dr. Metcalf, outpatient oncology follow up with Dr. Beckman  -Per onc - patient will require rad/on assessment for post-op radiation  -Pall consult 10/3 noted - goals are to pursue intervention and treatment as tolerated    #Acute Cerebellar infarct  -No ASA until cleared by neurosurgery  -Start on atorvastatin 40mg    #Diplopia - Likely in setting of malignancy/mets/edema  -Seen by ophtho  -Artificial tears PRN. Eye patch PRN for comfort    #Type 2 Diabetes Mellitus with steroid induced hyperglycemia, HbA1c 8.0% (9/20/23)  -Monitor FSBG TIDAC/QHS - Noted  pre breakfast, admelog 8un held 10/6  -Consistent Carbohydrate diet  -Lantus 6u qhs, lispro 4u with breakfast, 4u with dinner.   -Metformin 500mg bid  -Continue ISS    DVT ppx - Lovenox  GI ppx - Protonix

## 2023-10-11 NOTE — PROGRESS NOTE ADULT - ASSESSMENT
Assessment	  Patient is a 53 yo F with history of stage IV lung cancer with mets to brain (s/p 4 cycles chemo and thoracic RT completed on oct 2022), gamma knife surgery to 7 brain mets, who presented to [facility] ED 10/5 complaining of 1 week of lower extremity weakness and intermittent blurry vision. now s/p R temporal crani for resection of metastatic lesion. Post-operative course was complicated by acute cerebellar infarct found on post-op MRI, tachycardia, and thrombocytopenia. Admitted to [facility] inpatient rehab on 10/5 for ADL, gait, and functional impairments.     #Seizure   - Depakote level is 55 (01/09)  - Procalcitionin level (10/08) 27, (10/09) 25.8  - Lactate level 2.2 (01/08), 2.5 (10/09)   - Started on Zosyn 3.375 gm IVPB q 8 hours x 7 days.   - Head CT (10/09) IMPRESSION: No new acute abnormalities are identified.  Reidentified is a right temporal craniotomy for prior resection of a metastasis . Small amount of air and hemorrhage in the surgical cavity is present. Underlying lesion is not well evaluated on this noncontrast CT exam. Surrounding vasogenic edema is unchanged. Previously visualized minimal right to left midline shift has improved.  Previously visualized partially calcified lesion in the left occipital lobe, with surrounding vasogenic edema is unchanged. Lesion in the left high frontal lobe with surrounding vasogenic edema is unchanged.  Previously visualized acute infarct in the left cerebellum is not seen to good advantage on this study.   - Neurology Consult completed   -Contact her NS for update and recommendation     # Multiple metastatic brain lesions s/p Crani and temporal lesion resection/ Right dominant hemiparesis   - Comprehensive Multidisciplinary Rehab Program: 3 hours a day, 5 days a week with PT/OT/SLP  - Decadron taper - 3mg BID x2 days then 2mg BID through follow up  - Depakote 500mg BID seizure prophylaxis  - Pain control with oxy and tylenol  - Wound closure removal in 14 days (10/14)  - Outpatient follow up with Dr. Metcalf  - Fall, aspiration precautions    # Acute Cerebellar infarct  - No ASA until cleared by neurosurgery, Atorvastatin 40 mg po daily   - PT/OT evaluation for balance and coordination, ambulation, transfer, ADLs  - Hospitalist F/U     #Lung Cancer with metastasis  - Depakote, decadron as above  - Outpatient oncology follow up with Dr. Beckman  - Recent CT c/a/p showed enlarging lung lesion and new liver metastases  - Per onc - patient will require rad/on assessment for post-op radiation  - S/p CRT last June 2023  - Hospitalist consult appreciated    # Diplopia/ Blurry vision   - Likely in setting of malignancy/mets/edema  - Seen by ophtho  - Artificial tears PRN. Eye patch PRN for comfort    # Type 2 Diabetes Mellitus with steroid induced hyperglycemia/Hypoglycemia   - Recent A1c 8.0%  - Monitor FSBG TIDAC/QHS  - Consistent Carbohydrate diet  - Lantus 15 units in morning --> increased to 17 unites (10/08) --> decreased to 6 units at am (10/10) and admelog 8 units TIDAC --> reduced to 5 unites x 3 (10/06) --> changed to 6/4/4 premeals (10/10), started Metformin 500 mg po x 2 daily   - Mod SSI AC, low SSI HS  - Hospitalist consult appreciated    # Mood/Cognition:  - Neuropsychology consult PRN    # Pain Management:  - Tylenol PRN  - Oxycodone 5mg q4 hours PRN severe Pain    # GI/Bowel:  - Senna QHS, Miralax daily PRN  - GI ppx: Protonix    # /Bladder:   - Bladder scan q8 hours on admission, SC PRN  - Voiding without issues   - Encourage timed voids every 4 hours while awake     # Skin/Pressure Injury:  - Skin assessment on admission: intact  - Monitor Incisions: right craniotomy incision with staples in place and dried blood  - Turn every 2 hours while in bed    # Diet:   - Diet Consistency/Modifications: Reg/thin/CC  - Aspiration Precautions  - SLP consult for swallow function evaluation and treatment, recs appreciated     # DVT ppx:  - Lovenox    # Restrictions/Precautions:  - Precautions: Fall, aspiration    ---------------  Outpatient Follow-up:    Marcus Metcalf  Neurosurgery  [address] [facility]  [place] [address]  Phone: [phone]  Fax: [phone]  Follow Up Time:     Maritza Kohler  Hematology  [address] [place] [id number]  Phone: [phone]  Fax: [phone]  Follow Up Time:     Kieran Figueroa  Neurology  [address] [place] [id number]  Phone: [phone]  Fax: [phone]  Follow Up Time:  --------------    Goals: Safe discharge to home  Estimated Length of Stay: 10-14 days  Rehab Potential: Good  Medical Prognosis: Good  Estimated Disposition: Home with Home Care  ---------------

## 2023-10-11 NOTE — PROGRESS NOTE ADULT - SUBJECTIVE AND OBJECTIVE BOX
Patient is a 52y old  Female who presents with a chief complaint of Multiple metastatic brain lesions(left frontal, occipital and temporal) s/p R temporal craniotomy and resection  Right dominant hemiparesis (11 Oct 2023 11:19)      Subjective and overnight events:  Patient seen and examined at bedside. +tachycardia HR 120s in PT, pt admits to mild palpitations during PT. denies any SOB or chest pain. no fever, chills, sob, cp, abd pain.     ALLERGIES:  No Known Allergies    MEDICATIONS  (STANDING):  atorvastatin 40 milliGRAM(s) Oral at bedtime  dexAMETHasone     Tablet 2 milliGRAM(s) Oral two times a day  dexAMETHasone     Tablet   Oral   dextrose 5%. 1000 milliLiter(s) (100 mL/Hr) IV Continuous <Continuous>  dextrose 5%. 1000 milliLiter(s) (50 mL/Hr) IV Continuous <Continuous>  dextrose 50% Injectable 12.5 Gram(s) IV Push once  dextrose 50% Injectable 25 Gram(s) IV Push once  dextrose 50% Injectable 25 Gram(s) IV Push once  divalproex  milliGRAM(s) Oral every 12 hours  enoxaparin Injectable 40 milliGRAM(s) SubCutaneous every 24 hours  glucagon  Injectable 1 milliGRAM(s) IntraMuscular once  insulin glargine Injectable (LANTUS) 6 Unit(s) SubCutaneous at bedtime  insulin lispro (ADMELOG) corrective regimen sliding scale   SubCutaneous three times a day before meals  insulin lispro (ADMELOG) corrective regimen sliding scale   SubCutaneous at bedtime  insulin lispro Injectable (ADMELOG) 4 Unit(s) SubCutaneous before breakfast  insulin lispro Injectable (ADMELOG) 4 Unit(s) SubCutaneous before dinner  metFORMIN 500 milliGRAM(s) Oral two times a day  pantoprazole    Tablet 40 milliGRAM(s) Oral before breakfast  senna 2 Tablet(s) Oral at bedtime  sodium chloride 0.9%. 1000 milliLiter(s) (1000 mL/Hr) IV Continuous <Continuous>    MEDICATIONS  (PRN):  acetaminophen     Tablet .. 650 milliGRAM(s) Oral every 6 hours PRN Mild Pain (1 - 3), Moderate Pain (4 - 6)  dextrose Oral Gel 15 Gram(s) Oral once PRN Blood Glucose LESS THAN 70 milliGRAM(s)/deciliter  oxyCODONE    IR 5 milliGRAM(s) Oral every 4 hours PRN Severe Pain (7 - 10)  polyethylene glycol 3350 17 Gram(s) Oral every 24 hours PRN Constipation    Vital Signs Last 24 Hrs  T(F): 97.6 (10 Oct 2023 20:00), Max: 97.6 (10 Oct 2023 20:00)  HR: 115 (11 Oct 2023 07:30) (96 - 115)  BP: 126/87 (11 Oct 2023 07:30) (108/70 - 126/87)  RR: 16 (11 Oct 2023 07:30) (16 - 18)  SpO2: 100% (11 Oct 2023 07:30) (98% - 100%)  I&O's Summary    10 Oct 2023 07:01  -  11 Oct 2023 07:00  --------------------------------------------------------  IN: 0 mL / OUT: 3 mL / NET: -3 mL      PHYSICAL EXAM:  General: NAD, A/O x 3  ENT: MMM  Neck: Supple, No JVD  Lungs: Clear to auscultation bilaterally  Cardio: RRR, S1/S2, No murmurs  Abdomen: Soft, Nontender, Nondistended; Bowel sounds present  Extremities: No calf tenderness, No pitting edema    LABS:                        10.3   12.55 )-----------( 143      ( 09 Oct 2023 06:23 )             31.5     10-09    135  |  103  |  12  ----------------------------<  162  4.1   |  24  |  0.55    Ca    8.5      09 Oct 2023 06:23    TPro  6.2  /  Alb  1.9  /  TBili  0.2  /  DBili  x   /  AST  33  /  ALT  27  /  AlkPhos  58  10-09        Lactate, Blood: 2.5 mmol/L (10-08 @ 23:30)  Lactate, Blood: 2.2 mmol/L (10-08 @ 20:30)  Lactate, Blood: 3.5 mmol/L (10-08 @ 18:04)    CARDIAC MARKERS ( 08 Oct 2023 18:04 )  x     / 32.3 ng/L / x     / x     / x                POCT Blood Glucose.: 78 mg/dL (11 Oct 2023 07:27)  POCT Blood Glucose.: 170 mg/dL (10 Oct 2023 21:36)  POCT Blood Glucose.: 150 mg/dL (10 Oct 2023 17:25)  POCT Blood Glucose.: 232 mg/dL (10 Oct 2023 12:05)      Urinalysis Basic - ( 09 Oct 2023 06:23 )    Color: x / Appearance: x / SG: x / pH: x  Gluc: 162 mg/dL / Ketone: x  / Bili: x / Urobili: x   Blood: x / Protein: x / Nitrite: x   Leuk Esterase: x / RBC: x / WBC x   Sq Epi: x / Non Sq Epi: x / Bacteria: x        Culture - Blood (collected 08 Oct 2023 20:30)  Source: .Blood Blood-Peripheral  Preliminary Report (11 Oct 2023 02:01):    No growth at 48 Hours    Culture - Blood (collected 08 Oct 2023 20:25)  Source: .Blood Blood-Peripheral  Preliminary Report (11 Oct 2023 02:01):    No growth at 48 Hours          RADIOLOGY & ADDITIONAL TESTS:    Care Discussed with Consultants/Other Providers:

## 2023-10-11 NOTE — PROGRESS NOTE ADULT - SUBJECTIVE AND OBJECTIVE BOX
CC:  Weakness, blurry vision    HPI:  Patient is a 51 yo F with history of stage IV lung cancer with mets to brain (s/p 4 cycles chemo and thoracic RT completed on oct 2022), gamma knife surgery to 7 brain mets , recent admission to [facility] from sept 4-13th for epigastric pain and hyperglycemia thought to be due to steroids, who presented to [facility] ED 10/5 complaining of 1 week of lower extremity weakness and intermittent blurry vision.  Patient was initially seen at the end of August and admitted for hx of migraines and dizziness, was started on  decadron 6mg q6hr and Depakote at that time. She returned to [facility] again in early september for worsening epigastric pain  in the setting of taking steroids without GI ppx. CTA at that time showing no PE, stable right apical lung mass and rapid interval growth of a left lower lobe mass and new hepatic mets. Pt finished dexamethasone taper on 9/7 and was discharged with protonix, depakote and follow up with outpatient rad onc Dr. Bernard, outpatient NSGY, and oncologist Dr. Beckman at [facility] with plans to start immunotherapy nivolumab as outpatient.  Since discharge on the 13th, patient noted weakness in her right lower extremity and 'muscle pain' which improves with massages. She used her walker at home however she feels weak and was requiring increasing support from her fiancee to ambulate/attend to daily needs. She noted also intermittent epigastric pain with radiation to the back. and endorsef some slight blurry vision over the last 2 weeks which she describes as "vision feels off" though is able to see.    Patient was admitted 9/30 and underwent R temporal crani for resection of metastatic lesion. Post-operative course was complicated by acute left cerebellar infarct found on post-op MRI, tachycardia, and thrombocytopenia. She was recommended for follow up with oncology for lung lesions, radiation oncology for brain, and dr. michelle for post-operative follow up. She will also follow up with Dr. Pam Randle in 6 months for findings on 10/4 CTA head. Plan for no ASA for secondary prevention until cleared by Dr. Michelle. Wound closure due to be removed postop day 10-14 in rehab. Patient was evaluated by PM&R and therapy for functional deficits and gait/ ADL impairments and recommended acute rehabilitation. Patient was medically optimized for discharge to [facility] on 10/5/23.     Allergies  No Known Allergies    Subjective/ROS:   - Patient was seen and examined at bed side, in her bed comfortable, no events over night  - Slep well last night, no new complaints   - Denies pain at this time  - GI/, LBM (10/10), voiding without issues  - Case discussed in IDT today for progress and discharge plan yesterday  - Tolerating 3 hours of daily therapy, motivated and tolerating well   - Denies CP, palpitation, SOB, cough, fever, chills, abdominal pain, N/V/D/C, joint pain or swelling, headaches or dizziness, dysuria, hematuria or frequency     MEDICATIONS  (STANDING):  atorvastatin 40 milliGRAM(s) Oral at bedtime  dexAMETHasone     Tablet 2 milliGRAM(s) Oral two times a day  dexAMETHasone     Tablet   Oral   dextrose 5%. 1000 milliLiter(s) (50 mL/Hr) IV Continuous <Continuous>  dextrose 5%. 1000 milliLiter(s) (100 mL/Hr) IV Continuous <Continuous>  dextrose 50% Injectable 25 Gram(s) IV Push once  dextrose 50% Injectable 25 Gram(s) IV Push once  dextrose 50% Injectable 12.5 Gram(s) IV Push once  divalproex  milliGRAM(s) Oral every 12 hours  enoxaparin Injectable 40 milliGRAM(s) SubCutaneous every 24 hours  glucagon  Injectable 1 milliGRAM(s) IntraMuscular once  insulin glargine Injectable (LANTUS) 6 Unit(s) SubCutaneous at bedtime  insulin lispro (ADMELOG) corrective regimen sliding scale   SubCutaneous three times a day before meals  insulin lispro (ADMELOG) corrective regimen sliding scale   SubCutaneous at bedtime  insulin lispro Injectable (ADMELOG) 4 Unit(s) SubCutaneous before dinner  insulin lispro Injectable (ADMELOG) 4 Unit(s) SubCutaneous before breakfast  metFORMIN 500 milliGRAM(s) Oral two times a day  pantoprazole    Tablet 40 milliGRAM(s) Oral before breakfast  senna 2 Tablet(s) Oral at bedtime  sodium chloride 0.9%. 1000 milliLiter(s) (1000 mL/Hr) IV Continuous <Continuous>    MEDICATIONS  (PRN):  acetaminophen     Tablet .. 650 milliGRAM(s) Oral every 6 hours PRN Mild Pain (1 - 3), Moderate Pain (4 - 6)  dextrose Oral Gel 15 Gram(s) Oral once PRN Blood Glucose LESS THAN 70 milliGRAM(s)/deciliter  oxyCODONE    IR 5 milliGRAM(s) Oral every 4 hours PRN Severe Pain (7 - 10)  polyethylene glycol 3350 17 Gram(s) Oral every 24 hours PRN Constipation        LAB                        10.3   12.55 )-----------( 143      ( 09 Oct 2023 06:23 )             31.5     10-09    135  |  103  |  12  ----------------------------<  162<H>  4.1   |  24  |  0.55    Ca    8.5      09 Oct 2023 06:23    TPro  6.2  /  Alb  1.9<L>  /  TBili  0.2  /  DBili  x   /  AST  33  /  ALT  27  /  AlkPhos  58  10-09    LIVER FUNCTIONS - ( 09 Oct 2023 06:23 )  Alb: 1.9 g/dL / Pro: 6.2 g/dL / ALK PHOS: 58 U/L / ALT: 27 U/L / AST: 33 U/L / GGT: x          Procalcitionin 27 (10/08), 25.8 (10/09)   Lactate 2.2 (10/08), 2.5 (10/09)    Depakote level 55          CAPILLARY BLOOD GLUCOSE  POCT Blood Glucose.: 78 mg/dL (11 Oct 2023 07:27)  POCT Blood Glucose.: 170 mg/dL (10 Oct 2023 21:36)  POCT Blood Glucose.: 150 mg/dL (10 Oct 2023 17:25)  POCT Blood Glucose.: 232 mg/dL (10 Oct 2023 12:05)        Radiology, Cardiology, and Other Results:     CT Head No Cont (10.04.23 @ 15:16)   edemonstrated postoperative changes related to a prior right   temporal craniotomy for resection of a metastatic lesion. Postoperative   findings are relatively stable.  2.  Other metastatic lesions seen throughout the brain parenchyma with   surrounding vasogenic edema, again relatively stable.  3.  Redemonstrated acute infarct in the left cerebellar hemisphere,   stable from recent MR imaging.    CTA BRAIN:  1.  No evidence of abrupt cut off of intraluminal contrast.  2.  1 to 2 mm medially oriented vascular outpouching from the right ICA   terminus. This could represent a small saccular aneurysm versus a   vascular infundibulum.    CTA NECK:  1.  No evidence of critical stenosis by NASCET criteria.  2.  Multiple nodules seen in the bilateral lobes of the thyroid.   Recommend nonemergent thyroid ultrasound for further characterization if  clinically indicated.  3.  Redemonstrated partially visualized right apex lung mass. Correlate   with recent CT imaging of the chest for further evaluation.    MR Head w/wo IV Cont (10.02.23 @ 23:54)   IMPRESSION:  Redemonstration of right temporal craniotomy.  Residual 3.7 x 1.4 cm centrally necrotic/cystic peripherally enhancing   lesion along the mesial aspect of the surgical cavity (20-14). Findings   could represent residual enhancing neoplasm and/or postsurgical changes.  Additional curvilinear, thickened region of enhancement capping the   mesial aspect of the surgical cavity measures 7 mm in greatest thickness   (20-13). Findings could represent residual enhancing neoplasm and/or   postsurgical changes.    Previously seen an described multifocal enhancing parenchymal lesions   with internal susceptibility artifact in the bilateral frontal and right   cerebellar hemispheres are not significantly changed.  New small focus of restricted diffusion in the left mid cerebellar   hemisphere suggesting the presence of acute infarction.    VA Duplex Lower Ext Vein Scan, Bilat (10.01.23 @ 15:45)   IMPRESSION:  No evidence of deep venous thrombosis in either lower extremity.    Physical Exam:   Vital Signs Last 24 Hrs  T(C): 36.4 (10 Oct 2023 20:00), Max: 36.4 (10 Oct 2023 20:00)  T(F): 97.6 (10 Oct 2023 20:00), Max: 97.6 (10 Oct 2023 20:00)  HR: 115 (11 Oct 2023 07:30) (96 - 115)  BP: 126/87 (11 Oct 2023 07:30) (108/70 - 126/87)  RR: 16 (11 Oct 2023 07:30) (16 - 18)  SpO2: 100% (11 Oct 2023 07:30) (98% - 100%)    Gen - NAD, Comfortable  HEENT - NCAT, EOMI, PRERRLA, staples to right craniotomy incision  Pulm - CTAB,  No crackles  Cardiovascular - RRR, S1S2  Abdomen - Soft, NT, ND, +BS  Extremities - No C/C/E, No calf tenderness  Neuro-     Cognitive - AAOx4, follows command     Communication - Fluent, No dysarthria     Attention: able to state days of the week backwards     Memory: Recall 3 objects immediate, delayed recall 1/3 without categorical cues and 2/3 with categorical cues         Cranial Nerves - no facial asymmetry, tongue midline, EOMI     Motor -                     LEFT    UE - ShAB 4/5, EF 5/5, EE 5/5, WE 4/5,  4/5                    RIGHT UE - ShAB 3/5, EF 3/5, EE 3/5, WE 3/5,  3+/5, +slight pronator drift                    LEFT    LE - HF 4/5, KE 5/5, DF 5/5, PF 5/5                    RIGHT LE - HF 3/5, KE 3/5, DF 3/5, PF 5/5             Coordination - slowing with right FTN and (+) dysmateria, left intact  Psychiatric - Mood stable, Affect WNL  Skin:  all skin intact    Wounds: right craniotomy incision with staples in place and dried blood      IDT rounds 10/10  SW - lives in [address] [place] none inside, lives with fiance, independent prior, fiance and landlord can provide intermittenet support  SLP - reg/thin, mod reduced language impacted by mod cog, reasoning, prob solving, mild dysarthria. Will need assistance with higher level cog  OT - Setup ADLs, Matthew LBD, CG transfers; Goals supervision/setup  PT - CG txf, amb 100'RW Matthew, stairs N/A; Goals independent for BM/txf, supverision amb  TDD: 10/18 Home

## 2023-10-12 NOTE — PROGRESS NOTE ADULT - ASSESSMENT
53 y/o F with hx of stage IV lung cancer with mets to brain (s/p 4 cycles chemo and thoracic RT completed on Oct 2022), gamma knife surgery to 7 brain mets, who presented to [facility] ED 10/5 complaining of 1 week of lower extremity weakness and intermittent blurry vision. now s/p R temporal crani for resection of metastatic lesion. Post-operative course was complicated by acute cerebellar infarct found on post-op MRI, tachycardia, and thrombocytopenia. Admitted to [place] [facility] inpatient rehab on 10/5 for ADL, gait, and functional impairments.     #RRT 10/8  #Seizure-like activity vs syncope  -Rec reaching out to [facility]  -Depakote level 55  -Neurology, Cardiology evals noted     #Sinus tachycardia - improved today  -HR in 115-120s in PT, no chest pain or SOB  -EKG reviewed. normal sinus rhythm  -monitor    #Lung Cancer with metastasis  #Metastatic brain lesion s/p Crani  -Wound closure removal in 10-14 days (10/10-10/14)  -Recent CT c/a/p showed enlarging lung lesion and new liver metastases  -S/p CRT last June 2023  -Continue Decadron 2mg BID through follow up. Maintain GI ppx with protonix while on steroids  -Depakote 500mg BID seizure prophylaxis  -Pain management, bowel regimen per rehab. Continue fall, aspiration precautions  -H/H stable. Monitor CBC, likely post op anemia  -Outpatient follow up with Dr. Metcalf, outpatient oncology follow up with Dr. Beckman  -Per onc - patient will require rad/on assessment for post-op radiation  -Pall consult 10/3 noted - goals are to pursue intervention and treatment as tolerated    #Acute Cerebellar infarct  -No ASA until cleared by neurosurgery  -Start on atorvastatin 40mg    #Diplopia - Likely in setting of malignancy/mets/edema  -Seen by ophtho  -Artificial tears PRN. Eye patch PRN for comfort    #Type 2 Diabetes Mellitus with steroid induced hyperglycemia, HbA1c 8.0% (9/20/23)  -Consistent Carbohydrate diet  -hypoglycemia in AM noted. DC lantus  -cont with admelog qbreakfast and dinner, ISS  -Metformin 500mg bid  -Continue ISS    DVT ppx - Lovenox  GI ppx - Protonix

## 2023-10-12 NOTE — PROGRESS NOTE ADULT - SUBJECTIVE AND OBJECTIVE BOX
Patient is a 52y old  Female who presents with a chief complaint of Multiple metastatic brain lesions(left frontal, occipital and temporal) s/p R temporal craniotomy and resection  Right dominant hemiparesis (12 Oct 2023 12:42)    HPI:  Patient is a 53 yo F with history of stage IV lung cancer with mets to brain (s/p 4 cycles chemo and thoracic RT completed on oct 2022), gamma knife surgery to 7 brain mets , recent admission to [facility] from sept 4-13th for epigastric pain and hyperglycemia thought to be due to steroids, who presented to [facility] ED 10/5 complaining of 1 week of lower extremity weakness and intermittent blurry vision.  Patient was initially seen at the end of August and admitted for hx of migraines and dizziness, was started on  decadron 6mg q6hr and Depakote at that time. She returned to [facility] again in early september for worsening epigastric pain  in the setting of taking steroids without GI ppx. CTA at that time showing no PE, stable right apical lung mass and rapid interval growth of a left lower lobe mass and new hepatic mets. Pt finished dexamethasone taper on 9/7 and was discharged with protonix, depakote and follow up with outpatient rad onc Dr. Bernard, outpatient NSGY, and oncologist Dr. Beckman at [facility] with plans to start immunotherapy nivolumab as outpatient.  Since discharge on the 13th, patient noted weakness in her right lower extremity and 'muscle pain' which improves with massages. She used her walker at home however she feels weak and was requiring increasing support from her fiancee to ambulate/attend to daily needs. She noted also intermittent epigastric pain with radiation to the back. and endorsef some slight blurry vision over the last 2 weeks which she describes as "vision feels off" though is able to see.    Patient was admitted 9/30 and underwent R temporal crani for resection of metastatic lesion. Post-operative course was complicated by acute left cerebellar infarct found on post-op MRI, tachycardia, and thrombocytopenia. She was recommended for follow up with oncology for lung lesions, radiation oncology for brain, and dr. michelle for post-operative follow up. She will also follow up with Dr. Pam Randle in 6 months for findings on 10/4 CTA head. Plan for no ASA for secondary prevention until cleared by Dr. Michelle. Wound closure due to be removed postop day 10-14 in rehab. Patient was evaluated by PM&R and therapy for functional deficits and gait/ ADL impairments and recommended acute rehabilitation. Patient was medically optimized for discharge to [facility] on 10/5/23.        SUBJECTIVE/ROS:  - Patient was seen and examined at bed side this morning, no new events over night.    - Patient states that they slept well last night. No new complaints this morning.  - Overall, patient reports doing better compared to yesterday and day before. She notes that prior chest tightness has resolved. Reviewed EKG with patient which was NSR.  - Pain is well-controlled with current meds.  - GI/: patient is moving bowels, last BM was yesterday. Voiding without issues  - Tolerating 3 hours of therapy without issues - she is eager to participate  - FSBG have been low, d/w hospitalist  - Denies fever, chills, CP, palpitation, cough, SOB, abdominal pain, N/V/D/C     MEDICATIONS  (STANDING):  atorvastatin 40 milliGRAM(s) Oral at bedtime  dexAMETHasone     Tablet 2 milliGRAM(s) Oral two times a day  dexAMETHasone     Tablet   Oral   dextrose 5%. 1000 milliLiter(s) (100 mL/Hr) IV Continuous <Continuous>  dextrose 5%. 1000 milliLiter(s) (50 mL/Hr) IV Continuous <Continuous>  dextrose 50% Injectable 25 Gram(s) IV Push once  dextrose 50% Injectable 25 Gram(s) IV Push once  dextrose 50% Injectable 12.5 Gram(s) IV Push once  divalproex  milliGRAM(s) Oral every 12 hours  enoxaparin Injectable 40 milliGRAM(s) SubCutaneous every 24 hours  glucagon  Injectable 1 milliGRAM(s) IntraMuscular once  insulin lispro (ADMELOG) corrective regimen sliding scale   SubCutaneous three times a day before meals  insulin lispro (ADMELOG) corrective regimen sliding scale   SubCutaneous at bedtime  insulin lispro Injectable (ADMELOG) 4 Unit(s) SubCutaneous before breakfast  insulin lispro Injectable (ADMELOG) 4 Unit(s) SubCutaneous before dinner  metFORMIN 500 milliGRAM(s) Oral two times a day  pantoprazole    Tablet 40 milliGRAM(s) Oral before breakfast  sodium chloride 0.9%. 1000 milliLiter(s) (1000 mL/Hr) IV Continuous <Continuous>    MEDICATIONS  (PRN):  acetaminophen     Tablet .. 650 milliGRAM(s) Oral every 6 hours PRN Mild Pain (1 - 3), Moderate Pain (4 - 6)  dextrose Oral Gel 15 Gram(s) Oral once PRN Blood Glucose LESS THAN 70 milliGRAM(s)/deciliter  oxyCODONE    IR 5 milliGRAM(s) Oral every 4 hours PRN Severe Pain (7 - 10)  senna 2 Tablet(s) Oral at bedtime PRN Constipation      RECENT LABS:                          10.2   8.64  )-----------( 128      ( 12 Oct 2023 05:53 )             31.4     10-12    139  |  103  |  15  ----------------------------<  132<H>  4.1   |  26  |  0.51    Ca    8.9      12 Oct 2023 05:53    TPro  6.6  /  Alb  2.0<L>  /  TBili  0.3  /  DBili  x   /  AST  33  /  ALT  24  /  AlkPhos  63  10-12    LIVER FUNCTIONS - ( 12 Oct 2023 05:53 )  Alb: 2.0 g/dL / Pro: 6.6 g/dL / ALK PHOS: 63 U/L / ALT: 24 U/L / AST: 33 U/L / GGT: x             Urinalysis Basic - ( 12 Oct 2023 05:53 )    Color: x / Appearance: x / SG: x / pH: x  Gluc: 132 mg/dL / Ketone: x  / Bili: x / Urobili: x   Blood: x / Protein: x / Nitrite: x   Leuk Esterase: x / RBC: x / WBC x   Sq Epi: x / Non Sq Epi: x / Bacteria: x        PHYSICAL EXAM:  Vital Signs Last 24 Hrs  T(C): 36.6 (12 Oct 2023 07:59), Max: 37.3 (11 Oct 2023 21:11)  T(F): 97.9 (12 Oct 2023 07:59), Max: 99.2 (11 Oct 2023 21:11)  HR: 86 (12 Oct 2023 07:59) (80 - 99)  BP: 123/82 (12 Oct 2023 07:59) (104/69 - 123/82)  BP(mean): --  RR: 16 (12 Oct 2023 07:59) (16 - 17)  SpO2: 98% (12 Oct 2023 07:59) (97% - 98%)    Parameters below as of 12 Oct 2023 07:59  Patient On (Oxygen Delivery Method): room air    Gen - NAD, Comfortable  HEENT - NCAT, EOMI, PRERRLA, staples to right craniotomy incision  Pulm - CTAB,  No crackles  Cardiovascular - RRR, S1S2  Abdomen - Soft, NT, ND, +BS  Extremities - No C/C/E, No calf tenderness  Neuro-     Cognitive - AAOx4, follows command     Communication - Fluent, No dysarthria     Attention: able to state days of the week backwards     Memory: Recall 3 objects immediate, delayed recall 1/3 without categorical cues and 2/3 with categorical cues         Cranial Nerves - no facial asymmetry, tongue midline, EOMI     Motor -                     LEFT    UE - ShAB 4/5, EF 5/5, EE 5/5, WE 4/5,  4/5                    RIGHT UE - ShAB 3/5, EF 3/5, EE 3/5, WE 3/5,  3+/5, +slight pronator drift                    LEFT    LE - HF 4/5, KE 5/5, DF 5/5, PF 5/5                    RIGHT LE - HF 3/5, KE 3/5, DF 3/5, PF 5/5             Coordination - slowing with right FTN and (+) dysmateria, left intact  Psychiatric - Mood stable, Affect WNL  Skin:  all skin intact    Wounds: right craniotomy incision with staples in place and dried blood      IDT rounds 10/10  SW - lives in [address] NO[place] none inside, lives with fiance, independent prior, fiance and landlord can provide intermittenet support  SLP - reg/thin, mod reduced language impacted by mod cog, reasoning, prob solving, mild dysarthria. Will need assistance with higher level cog  OT - Setup ADLs, Matthew LBD, CG transfers; Goals supervision/setup  PT - CG txf, amb 100'RW Matthew, stairs N/A; Goals independent for BM/txf, supverision amb  TDD: 10/18 Home

## 2023-10-12 NOTE — PROGRESS NOTE ADULT - SUBJECTIVE AND OBJECTIVE BOX
Patient is a 52y old  Female who presents with a chief complaint of Multiple metastatic brain lesions(left frontal, occipital and temporal) s/p R temporal craniotomy and resection  Right dominant hemiparesis (11 Oct 2023 11:36)      Subjective and overnight events:  Patient seen and examined at bedside.    ALLERGIES:  No Known Allergies    MEDICATIONS  (STANDING):  atorvastatin 40 milliGRAM(s) Oral at bedtime  dexAMETHasone     Tablet 2 milliGRAM(s) Oral two times a day  dexAMETHasone     Tablet   Oral   dextrose 5%. 1000 milliLiter(s) (100 mL/Hr) IV Continuous <Continuous>  dextrose 5%. 1000 milliLiter(s) (50 mL/Hr) IV Continuous <Continuous>  dextrose 50% Injectable 25 Gram(s) IV Push once  dextrose 50% Injectable 25 Gram(s) IV Push once  dextrose 50% Injectable 12.5 Gram(s) IV Push once  divalproex  milliGRAM(s) Oral every 12 hours  enoxaparin Injectable 40 milliGRAM(s) SubCutaneous every 24 hours  glucagon  Injectable 1 milliGRAM(s) IntraMuscular once  insulin lispro (ADMELOG) corrective regimen sliding scale   SubCutaneous three times a day before meals  insulin lispro (ADMELOG) corrective regimen sliding scale   SubCutaneous at bedtime  insulin lispro Injectable (ADMELOG) 4 Unit(s) SubCutaneous before dinner  insulin lispro Injectable (ADMELOG) 4 Unit(s) SubCutaneous before breakfast  metFORMIN 500 milliGRAM(s) Oral two times a day  pantoprazole    Tablet 40 milliGRAM(s) Oral before breakfast  sodium chloride 0.9%. 1000 milliLiter(s) (1000 mL/Hr) IV Continuous <Continuous>    MEDICATIONS  (PRN):  acetaminophen     Tablet .. 650 milliGRAM(s) Oral every 6 hours PRN Mild Pain (1 - 3), Moderate Pain (4 - 6)  dextrose Oral Gel 15 Gram(s) Oral once PRN Blood Glucose LESS THAN 70 milliGRAM(s)/deciliter  oxyCODONE    IR 5 milliGRAM(s) Oral every 4 hours PRN Severe Pain (7 - 10)  senna 2 Tablet(s) Oral at bedtime PRN Constipation    Vital Signs Last 24 Hrs  T(F): 97.9 (12 Oct 2023 07:59), Max: 99.2 (11 Oct 2023 21:11)  HR: 86 (12 Oct 2023 07:59) (80 - 99)  BP: 123/82 (12 Oct 2023 07:59) (104/69 - 123/82)  RR: 16 (12 Oct 2023 07:59) (16 - 17)  SpO2: 98% (12 Oct 2023 07:59) (97% - 98%)  I&O's Summary    PHYSICAL EXAM:  General: NAD, A/O x 3  ENT: MMM  Neck: Supple, No JVD  Lungs: Clear to auscultation bilaterally  Cardio: RRR, S1/S2, No murmurs  Abdomen: Soft, Nontender, Nondistended; Bowel sounds present  Extremities: No calf tenderness, No pitting edema    LABS:                        10.2   8.64  )-----------( 128      ( 12 Oct 2023 05:53 )             31.4     10-12    139  |  103  |  15  ----------------------------<  132  4.1   |  26  |  0.51    Ca    8.9      12 Oct 2023 05:53    TPro  6.6  /  Alb  2.0  /  TBili  0.3  /  DBili  x   /  AST  33  /  ALT  24  /  AlkPhos  63  10-12                TSH 0.216   TSH with FT4 reflex --  Total T3 --              POCT Blood Glucose.: 168 mg/dL (12 Oct 2023 12:08)  POCT Blood Glucose.: 87 mg/dL (12 Oct 2023 08:07)  POCT Blood Glucose.: 128 mg/dL (11 Oct 2023 21:14)  POCT Blood Glucose.: 167 mg/dL (11 Oct 2023 16:59)      Urinalysis Basic - ( 12 Oct 2023 05:53 )    Color: x / Appearance: x / SG: x / pH: x  Gluc: 132 mg/dL / Ketone: x  / Bili: x / Urobili: x   Blood: x / Protein: x / Nitrite: x   Leuk Esterase: x / RBC: x / WBC x   Sq Epi: x / Non Sq Epi: x / Bacteria: x        Culture - Blood (collected 08 Oct 2023 20:30)  Source: .Blood Blood-Peripheral  Preliminary Report (12 Oct 2023 02:01):    No growth at 72 Hours    Culture - Blood (collected 08 Oct 2023 20:25)  Source: .Blood Blood-Peripheral  Preliminary Report (12 Oct 2023 02:01):    No growth at 72 Hours          RADIOLOGY & ADDITIONAL TESTS:    Care Discussed with Consultants/Other Providers:

## 2023-10-12 NOTE — PROGRESS NOTE ADULT - ASSESSMENT
Assessment	  Patient is a 51 yo F with history of stage IV lung cancer with mets to brain (s/p 4 cycles chemo and thoracic RT completed on oct 2022), gamma knife surgery to 7 brain mets, who presented to [facility] ED 10/5 complaining of 1 week of lower extremity weakness and intermittent blurry vision. now s/p R temporal crani for resection of metastatic lesion. Post-operative course was complicated by acute cerebellar infarct found on post-op MRI, tachycardia, and thrombocytopenia. Admitted to [place] acute inpatient rehab on 10/5 for ADL, gait, and functional impairments.     #Unresponsive episode c/f Seizure vs. Syncope  - Depakote level is 55 (01/09)  - Procalcitionin level (10/08) 27, (10/09) 25.8  - Lactate level 2.2 (01/08), 2.5 (10/09)   - Started on Zosyn 3.375 gm IVPB q 8 hours x 7 days.   - Head CT (10/09) IMPRESSION: No new acute abnormalities are identified.  Reidentified is a right temporal craniotomy for prior resection of a metastasis . Small amount of air and hemorrhage in the surgical cavity is present. Underlying lesion is not well evaluated on this noncontrast CT exam. Surrounding vasogenic edema is unchanged. Previously visualized minimal right to left midline shift has improved.  Previously visualized partially calcified lesion in the left occipital lobe, with surrounding vasogenic edema is unchanged. Lesion in the left high frontal lobe with surrounding vasogenic edema is unchanged.  Previously visualized acute infarct in the left cerebellum is not seen to good advantage on this study.   - Neurology Consult appreciated  - Neurosurgery Dr. Metcalf updated    # Multiple metastatic brain lesions s/p Crani and temporal lesion resection/ Right dominant hemiparesis   - Comprehensive Multidisciplinary Rehab Program: 3 hours a day, 5 days a week with PT/OT/SLP  - Decadron taper - 3mg BID x2 days then 2mg BID through follow up  - Depakote 500mg BID seizure prophylaxis  - Pain control with oxy and tylenol  - Wound closure removal in 14 days (10/14)  - Outpatient follow up with Dr. Metcalf  - Fall, aspiration precautions    # Acute Cerebellar infarct  - No ASA until cleared by neurosurgery, Atorvastatin 40 mg po daily   - PT/OT evaluation for balance and coordination, ambulation, transfer, ADLs  - Hospitalist F/U     #Lung Cancer with metastasis  - Depakote, decadron as above  - Outpatient oncology follow up with Dr. Beckman  - Recent CT c/a/p showed enlarging lung lesion and new liver metastases  - Per onc - patient will require rad/on assessment for post-op radiation  - S/p CRT last June 2023  - Hospitalist consult appreciated    # Diplopia/ Blurry vision   - Likely in setting of malignancy/mets/edema  - Seen by ophtho  - Artificial tears PRN. Eye patch PRN for comfort    # Type 2 Diabetes Mellitus with steroid induced hyperglycemia/Hypoglycemia   - Recent A1c 8.0%  - Monitor FSBG TIDAC/QHS  - Consistent Carbohydrate diet  - Lantus now discontinued; and admelog reduced to 4/0/4, started Metformin 500 mg po x 2 daily   - Mod SSI AC, low SSI HS  - Hospitalist consult appreciated    # Mood/Cognition:  - Neuropsychology consult PRN    # Pain Management:  - Tylenol PRN  - Oxycodone 5mg q4 hours PRN severe Pain    # GI/Bowel:  - Senna QHS, Miralax daily PRN  - GI ppx: Protonix    # /Bladder:   - Bladder scan q8 hours on admission, SC PRN  - Voiding without issues   - Encourage timed voids every 4 hours while awake     # Skin/Pressure Injury:  - Skin assessment on admission: intact  - Monitor Incisions: right craniotomy incision with staples in place and dried blood  - Turn every 2 hours while in bed    # Diet:   - Diet Consistency/Modifications: Reg/thin/CC  - Aspiration Precautions  - SLP consult for swallow function evaluation and treatment, recs appreciated     # DVT ppx:  - Lovenox    # Restrictions/Precautions:  - Precautions: Fall, aspiration    ---------------  Outpatient Follow-up:    Marcus Metcalf  Neurosurgery  [address] [facility], [address]  [place] [address]  Phone: [phone]  Fax: [phone]  Follow Up Time:     Maritza Kohler  Hematology  [address] [place] [address]  Phone: [phone]  Fax: [phone]  Follow Up Time:     Kieran Figueroa  Neurology  [address] [place] [address]  Phone: [phone]  Fax: [phone]  Follow Up Time:  --------------    Goals: Safe discharge to home  Estimated Length of Stay: 10-14 days  Rehab Potential: Good  Medical Prognosis: Good  Estimated Disposition: Home with Home Care  ---------------

## 2023-10-13 NOTE — DISCHARGE NOTE NURSING/CASE MANAGEMENT/SOCIAL WORK - NSPROMEDSRETURNEDYESNO_GEN_A_NUR
as per patient and pharmacy, medication was returned to patient last week (pt does no remember the exact day).

## 2023-10-13 NOTE — DISCHARGE NOTE NURSING/CASE MANAGEMENT/SOCIAL WORK - PATIENT PORTAL LINK FT
You can access the FollowMyHealth Patient Portal offered by [facility] by registering at the following website: [email or web address]. By joining [facility]’s FollowMyHealth portal, you will also be able to view your health information using other applications (apps) compatible with our system.

## 2023-10-13 NOTE — PROGRESS NOTE ADULT - ASSESSMENT
Assessment	  Patient is a 51 yo F with history of stage IV lung cancer with mets to brain (s/p 4 cycles chemo and thoracic RT completed on oct 2022), gamma knife surgery to 7 brain mets, who presented to [facility] ED 10/5 complaining of 1 week of lower extremity weakness and intermittent blurry vision. now s/p R temporal crani for resection of metastatic lesion. Post-operative course was complicated by acute cerebellar infarct found on post-op MRI, tachycardia, and thrombocytopenia. Admitted to [place] acute inpatient rehab on 10/5 for ADL, gait, and functional impairments.     #Unresponsive episode c/f Seizure vs. Syncope  - Depakote level is 55 (01/09)  - Procalcitionin level (10/08) 27, (10/09) 25.8  - Lactate level 2.2 (01/08), 2.5 (10/09)   - Started on Zosyn 3.375 gm IVPB q 8 hours x 7 days.   - Head CT (10/09) IMPRESSION: No new acute abnormalities are identified.  Reidentified is a right temporal craniotomy for prior resection of a metastasis . Small amount of air and hemorrhage in the surgical cavity is present. Underlying lesion is not well evaluated on this noncontrast CT exam. Surrounding vasogenic edema is unchanged. Previously visualized minimal right to left midline shift has improved.  Previously visualized partially calcified lesion in the left occipital lobe, with surrounding vasogenic edema is unchanged. Lesion in the left high frontal lobe with surrounding vasogenic edema is unchanged.  Previously visualized acute infarct in the left cerebellum is not seen to good advantage on this study.   - Neurology Consult appreciated  - Neurosurgery Dr. Metcalf updated    # Multiple metastatic brain lesions s/p Crani and temporal lesion resection/ Right dominant hemiparesis   - Comprehensive Multidisciplinary Rehab Program: 3 hours a day, 5 days a week with PT/OT/SLP  - Decadron taper - 3mg BID x2 days then 2mg BID through follow up  - Depakote 500mg BID seizure prophylaxis  - Pain control with oxy and tylenol  - Wound closure removal in 14 days - plan for staple removal this afternoon (10/13)  - Outpatient follow up with Dr. Metcalf  - Fall, aspiration precautions    # Acute Cerebellar infarct  - No ASA until cleared by neurosurgery, Atorvastatin 40 mg po daily   - PT/OT evaluation for balance and coordination, ambulation, transfer, ADLs  - Hospitalist F/U     #Lung Cancer with metastasis  - Depakote, decadron as above  - Outpatient oncology follow up with Dr. Beckman  - Recent CT c/a/p showed enlarging lung lesion and new liver metastases  - Per onc - patient will require rad/on assessment for post-op radiation  - Oncology consult; pending recommendations  - S/p CRT last June 2023  - Hospitalist consult appreciated    # Diplopia/ Blurry vision   - Likely in setting of malignancy/mets/edema  - Seen by ophtho  - Artificial tears PRN. Eye patch PRN for comfort    # Type 2 Diabetes Mellitus with steroid induced hyperglycemia/Hypoglycemia   - Recent A1c 8.0%  - Monitor FSBG TIDAC/QHS  - Consistent Carbohydrate diet  - Lantus and premeal admelog now discontinued; started Metformin 500 mg po x 2 daily   - Mod SSI AC, low SSI HS  - Hospitalist consult appreciated    # Mood/Cognition:  - Neuropsychology consult PRN    # Pain Management:  - Tylenol PRN  - Oxycodone 5mg q4 hours PRN severe Pain    # GI/Bowel:  - Senna QHS, Miralax daily PRN  - GI ppx: Protonix    # /Bladder:   - Bladder scan q8 hours on admission, SC PRN  - Voiding without issues   - Encourage timed voids every 4 hours while awake     # Skin/Pressure Injury:  - Skin assessment on admission: intact  - Monitor Incisions: right craniotomy incision with staples in place and dried blood  - Turn every 2 hours while in bed    # Diet:   - Diet Consistency/Modifications: Reg/thin/CC  - Aspiration Precautions  - SLP consult for swallow function evaluation and treatment, recs appreciated     # DVT ppx:  - Lovenox    # Restrictions/Precautions:  - Precautions: Fall, aspiration    ---------------  Outpatient Follow-up:    Marcus Metcalf  Neurosurgery  [address] [facility], [address]  [place] [address]  Phone: [phone]  Fax: [phone]  Follow Up Time:     Maritza Kohler  Hematology  [address] [place] [address]  Phone: [phone]  Fax: [phone]  Follow Up Time:     Kieran Figueroa  Neurology  [address] [place] [address]  Phone: [phone]  Fax: [phone]  Follow Up Time:  --------------    Goals: Safe discharge to home  Estimated Length of Stay: 10-14 days  Rehab Potential: Good  Medical Prognosis: Good  Estimated Disposition: Home with Home Care  ---------------

## 2023-10-13 NOTE — PROGRESS NOTE ADULT - SUBJECTIVE AND OBJECTIVE BOX
Patient is a 52y old  Female who presents with a chief complaint of Multiple metastatic brain lesions(left frontal, occipital and temporal) s/p R temporal craniotomy and resection  Right dominant hemiparesis (12 Oct 2023 12:52)    HPI:  Patient is a 51 yo F with history of stage IV lung cancer with mets to brain (s/p 4 cycles chemo and thoracic RT completed on oct 2022), gamma knife surgery to 7 brain mets , recent admission to [facility] from sept 4-13th for epigastric pain and hyperglycemia thought to be due to steroids, who presented to [facility] ED 10/5 complaining of 1 week of lower extremity weakness and intermittent blurry vision.  Patient was initially seen at the end of August and admitted for hx of migraines and dizziness, was started on  decadron 6mg q6hr and Depakote at that time. She returned to [facility] again in early september for worsening epigastric pain  in the setting of taking steroids without GI ppx. CTA at that time showing no PE, stable right apical lung mass and rapid interval growth of a left lower lobe mass and new hepatic mets. Pt finished dexamethasone taper on 9/7 and was discharged with protonix, depakote and follow up with outpatient rad onc Dr. Bernard, outpatient NSGY, and oncologist Dr. Beckman at [facility] with plans to start immunotherapy nivolumab as outpatient.  Since discharge on the 13th, patient noted weakness in her right lower extremity and 'muscle pain' which improves with massages. She used her walker at home however she feels weak and was requiring increasing support from her fiancee to ambulate/attend to daily needs. She noted also intermittent epigastric pain with radiation to the back. and endorsef some slight blurry vision over the last 2 weeks which she describes as "vision feels off" though is able to see.    Patient was admitted 9/30 and underwent R temporal crani for resection of metastatic lesion. Post-operative course was complicated by acute left cerebellar infarct found on post-op MRI, tachycardia, and thrombocytopenia. She was recommended for follow up with oncology for lung lesions, radiation oncology for brain, and dr. michelle for post-operative follow up. She will also follow up with Dr. Pam Randle in 6 months for findings on 10/4 CTA head. Plan for no ASA for secondary prevention until cleared by Dr. Michelle. Wound closure due to be removed postop day 10-14 in rehab. Patient was evaluated by PM&R and therapy for functional deficits and gait/ ADL impairments and recommended acute rehabilitation. Patient was medically optimized for discharge to [facility] on 10/5/23.      SUBJECTIVE/ROS:  - Patient was seen and examined at bed side this morning, no new events over night.    - Patient states that they slept well last night. Patient reports feeling well this morning; no new complaints. Denies chest tightness, headache.  - Pain is well-controlled with current meds.  - Discussed plan for staple removal, patient understands and is amenable  - Neuropsychology was consulted for evaluation and counselling. Mood is improved somewhat after trip to [facility] yesterday  - GI/: patient is moving bowels. Voiding without issues  - Tolerating 3 hours of therapy without issues - patient is making good progress.  - Denies fever, chills, CP, palpitation, cough, SOB, abdominal pain, N/V/D/C     MEDICATIONS  (STANDING):  atorvastatin 40 milliGRAM(s) Oral at bedtime  dexAMETHasone     Tablet 2 milliGRAM(s) Oral two times a day  dexAMETHasone     Tablet   Oral   dextrose 5%. 1000 milliLiter(s) (50 mL/Hr) IV Continuous <Continuous>  dextrose 5%. 1000 milliLiter(s) (100 mL/Hr) IV Continuous <Continuous>  dextrose 50% Injectable 25 Gram(s) IV Push once  dextrose 50% Injectable 25 Gram(s) IV Push once  dextrose 50% Injectable 12.5 Gram(s) IV Push once  divalproex  milliGRAM(s) Oral every 12 hours  enoxaparin Injectable 40 milliGRAM(s) SubCutaneous every 24 hours  glucagon  Injectable 1 milliGRAM(s) IntraMuscular once  insulin lispro (ADMELOG) corrective regimen sliding scale   SubCutaneous three times a day before meals  insulin lispro (ADMELOG) corrective regimen sliding scale   SubCutaneous at bedtime  metFORMIN 500 milliGRAM(s) Oral two times a day  pantoprazole    Tablet 40 milliGRAM(s) Oral before breakfast  sodium chloride 0.9%. 1000 milliLiter(s) (1000 mL/Hr) IV Continuous <Continuous>    MEDICATIONS  (PRN):  acetaminophen     Tablet .. 650 milliGRAM(s) Oral every 6 hours PRN Mild Pain (1 - 3), Moderate Pain (4 - 6)  dextrose Oral Gel 15 Gram(s) Oral once PRN Blood Glucose LESS THAN 70 milliGRAM(s)/deciliter  senna 2 Tablet(s) Oral at bedtime PRN Constipation      RECENT LABS:                          10.2   8.64  )-----------( 128      ( 12 Oct 2023 05:53 )             31.4     10-12    139  |  103  |  15  ----------------------------<  132<H>  4.1   |  26  |  0.51    Ca    8.9      12 Oct 2023 05:53    TPro  6.6  /  Alb  2.0<L>  /  TBili  0.3  /  DBili  x   /  AST  33  /  ALT  24  /  AlkPhos  63  10-12    LIVER FUNCTIONS - ( 12 Oct 2023 05:53 )  Alb: 2.0 g/dL / Pro: 6.6 g/dL / ALK PHOS: 63 U/L / ALT: 24 U/L / AST: 33 U/L / GGT: x             Urinalysis Basic - ( 12 Oct 2023 05:53 )    Color: x / Appearance: x / SG: x / pH: x  Gluc: 132 mg/dL / Ketone: x  / Bili: x / Urobili: x   Blood: x / Protein: x / Nitrite: x   Leuk Esterase: x / RBC: x / WBC x   Sq Epi: x / Non Sq Epi: x / Bacteria: x        PHYSICAL EXAM:  Vital Signs Last 24 Hrs  T(C): 36.9 (13 Oct 2023 07:38), Max: 36.9 (12 Oct 2023 20:32)  T(F): 98.5 (13 Oct 2023 07:38), Max: 98.5 (12 Oct 2023 20:32)  HR: 94 (13 Oct 2023 07:38) (94 - 105)  BP: 109/71 (13 Oct 2023 07:38) (103/71 - 109/71)  BP(mean): --  RR: 16 (13 Oct 2023 07:38) (16 - 17)  SpO2: 98% (13 Oct 2023 07:38) (97% - 98%)    Parameters below as of 13 Oct 2023 07:38  Patient On (Oxygen Delivery Method): room air    Gen - NAD, Comfortable  HEENT - NCAT, EOMI, PRERRLA, staples to right craniotomy incision  Pulm - CTAB,  No crackles  Cardiovascular - RRR, S1S2  Abdomen - Soft, NT, ND, +BS  Extremities - No C/C/E, No calf tenderness  Neuro-     Cognitive - AAOx4, follows command     Communication - Fluent, No dysarthria     Attention: able to state days of the week backwards     Memory: Recall 3 objects immediate, delayed recall 1/3 without categorical cues and 2/3 with categorical cues         Cranial Nerves - no facial asymmetry, tongue midline, EOMI     Motor -                     LEFT    UE - ShAB 4/5, EF 5/5, EE 5/5, WE 4/5,  4/5                    RIGHT UE - ShAB 3/5, EF 3/5, EE 3/5, WE 3/5,  3+/5, +slight pronator drift                    LEFT    LE - HF 4/5, KE 5/5, DF 5/5, PF 5/5                    RIGHT LE - HF 3/5, KE 3/5, DF 3/5, PF 5/5             Coordination - slowing with right FTN and (+) dysmateria, left intact  Psychiatric - Mood stable, Affect WNL  Skin:  all skin intact    Wounds: right craniotomy incision with staples in place and dried blood      IDT rounds 10/10  SW - lives in [address] none inside, lives with fiance, independent prior, fiance and landlord can provide intermittenet support  SLP - reg/thin, mod reduced language impacted by mod cog, reasoning, prob solving, mild dysarthria. Will need assistance with higher level cog  OT - Setup ADLs, Matthew LBD, CG transfers; Goals supervision/setup  PT - CG txf, amb 100'RW Matthew, stairs N/A; Goals independent for BM/txf, supverision amb  TDD: 10/18 Home

## 2023-10-13 NOTE — DISCHARGE NOTE NURSING/CASE MANAGEMENT/SOCIAL WORK - NSDCPEFALRISK_GEN_ALL_CORE
For information on Fall & Injury Prevention, visit: [email or web address] OR  [email or web address] OR  https://www.cdc.gov/steadi/patient.html

## 2023-10-13 NOTE — DISCHARGE NOTE NURSING/CASE MANAGEMENT/SOCIAL WORK - NSSCTYPOFSERV_GEN_ALL_CORE
You will have an evaluation for [facility] services. Your home visiting nurse will call you after your hospital discharge to schedule your first appointment.

## 2023-10-13 NOTE — PROGRESS NOTE ADULT - ASSESSMENT
53 y/o F with hx of stage IV lung cancer with mets to brain (s/p 4 cycles chemo and thoracic RT completed on Oct 2022), gamma knife surgery to 7 brain mets, who presented to [facility] ED 10/5 complaining of 1 week of lower extremity weakness and intermittent blurry vision. now s/p R temporal crani for resection of metastatic lesion. Post-operative course was complicated by acute cerebellar infarct found on post-op MRI, tachycardia, and thrombocytopenia. Admitted to [place] acute inpatient rehab on 10/5 for ADL, gait, and functional impairments.     #RRT 10/8  #Seizure-like activity vs syncope  -Rec reaching out to [facility]  -Depakote level 55  -Neurology, Cardiology evals noted     #Sinus tachycardia - improved today  -HR in 115-120s in PT, no chest pain or SOB  -EKG reviewed. normal sinus rhythm  -monitor    #Lung Cancer with metastasis  #Metastatic brain lesion s/p Crani  -Wound closure removal in 10-14 days (10/10-10/14)  -Recent CT c/a/p showed enlarging lung lesion and new liver metastases  -S/p CRT last June 2023  -Continue Decadron 2mg BID through follow up. Maintain GI ppx with protonix while on steroids  -Depakote 500mg BID seizure prophylaxis  -Pain management, bowel regimen per rehab. Continue fall, aspiration precautions  -H/H stable. Monitor CBC, likely post op anemia  -Outpatient follow up with Dr. Metcalf, outpatient oncology follow up with Dr. Beckman  -Per onc - patient will require rad/on assessment for post-op radiation  -Pall consult 10/3 noted - goals are to pursue intervention and treatment as tolerated    #Acute Cerebellar infarct  -ASA when clears by neurosurgery  -cont with atorvastatin 40mg    #Diplopia - Likely in setting of malignancy/mets/edema  -Seen by ophtho  -Artificial tears PRN. Eye patch PRN for comfort    #Type 2 Diabetes Mellitus with steroid induced hyperglycemia, HbA1c 8.0% (9/20/23)  -Consistent Carbohydrate diet  -Metformin 500mg bid  -Lantus and admelog discontinued  -Continue ISS    DVT ppx - Lovenox  GI ppx - Protonix

## 2023-10-13 NOTE — PROGRESS NOTE ADULT - SUBJECTIVE AND OBJECTIVE BOX
Patient is a 52y old  Female who presents with a chief complaint of Multiple metastatic brain lesions(left frontal, occipital and temporal) s/p R temporal craniotomy and resection  Right dominant hemiparesis (13 Oct 2023 11:26)      Subjective and overnight events:  Patient seen and examined at bedside. no complaints. no fever, chills, sob, cp, abd pain.     ALLERGIES:  No Known Allergies    MEDICATIONS  (STANDING):  artificial tears (preservative free) Ophthalmic Solution 1 Drop(s) Both EYES three times a day  atorvastatin 40 milliGRAM(s) Oral at bedtime  dexAMETHasone     Tablet 2 milliGRAM(s) Oral two times a day  dexAMETHasone     Tablet   Oral   dextrose 5%. 1000 milliLiter(s) (100 mL/Hr) IV Continuous <Continuous>  dextrose 5%. 1000 milliLiter(s) (50 mL/Hr) IV Continuous <Continuous>  dextrose 50% Injectable 25 Gram(s) IV Push once  dextrose 50% Injectable 25 Gram(s) IV Push once  dextrose 50% Injectable 12.5 Gram(s) IV Push once  divalproex  milliGRAM(s) Oral every 12 hours  enoxaparin Injectable 40 milliGRAM(s) SubCutaneous every 24 hours  glucagon  Injectable 1 milliGRAM(s) IntraMuscular once  insulin lispro (ADMELOG) corrective regimen sliding scale   SubCutaneous three times a day before meals  insulin lispro (ADMELOG) corrective regimen sliding scale   SubCutaneous at bedtime  metFORMIN 500 milliGRAM(s) Oral two times a day  pantoprazole    Tablet 40 milliGRAM(s) Oral before breakfast  sodium chloride 0.9%. 1000 milliLiter(s) (1000 mL/Hr) IV Continuous <Continuous>    MEDICATIONS  (PRN):  acetaminophen     Tablet .. 650 milliGRAM(s) Oral every 6 hours PRN Mild Pain (1 - 3), Moderate Pain (4 - 6)  dextrose Oral Gel 15 Gram(s) Oral once PRN Blood Glucose LESS THAN 70 milliGRAM(s)/deciliter  senna 2 Tablet(s) Oral at bedtime PRN Constipation    Vital Signs Last 24 Hrs  T(F): 98.5 (13 Oct 2023 07:38), Max: 98.5 (12 Oct 2023 20:32)  HR: 94 (13 Oct 2023 07:38) (94 - 105)  BP: 109/71 (13 Oct 2023 07:38) (103/71 - 109/71)  RR: 16 (13 Oct 2023 07:38) (16 - 17)  SpO2: 98% (13 Oct 2023 07:38) (97% - 98%)  I&O's Summary    PHYSICAL EXAM:  General: NAD, A/O x 3  ENT: MMM  Neck: Supple, No JVD  Lungs: Clear to auscultation bilaterally  Cardio: RRR, S1/S2, No murmurs  Abdomen: Soft, Nontender, Nondistended; Bowel sounds present  Extremities: No calf tenderness, No pitting edema    LABS:                        10.2   8.64  )-----------( 128      ( 12 Oct 2023 05:53 )             31.4     10-12    139  |  103  |  15  ----------------------------<  132  4.1   |  26  |  0.51    Ca    8.9      12 Oct 2023 05:53    TPro  6.6  /  Alb  2.0  /  TBili  0.3  /  DBili  x   /  AST  33  /  ALT  24  /  AlkPhos  63  10-12                TSH 0.216   TSH with FT4 reflex --  Total T3 --              POCT Blood Glucose.: 154 mg/dL (13 Oct 2023 12:07)  POCT Blood Glucose.: 128 mg/dL (13 Oct 2023 08:11)  POCT Blood Glucose.: 163 mg/dL (12 Oct 2023 20:35)  POCT Blood Glucose.: 133 mg/dL (12 Oct 2023 17:10)      Urinalysis Basic - ( 12 Oct 2023 05:53 )    Color: x / Appearance: x / SG: x / pH: x  Gluc: 132 mg/dL / Ketone: x  / Bili: x / Urobili: x   Blood: x / Protein: x / Nitrite: x   Leuk Esterase: x / RBC: x / WBC x   Sq Epi: x / Non Sq Epi: x / Bacteria: x        Culture - Blood (collected 08 Oct 2023 20:30)  Source: .Blood Blood-Peripheral  Preliminary Report (13 Oct 2023 02:00):    No growth at 4 days    Culture - Blood (collected 08 Oct 2023 20:25)  Source: .Blood Blood-Peripheral  Preliminary Report (13 Oct 2023 02:00):    No growth at 4 days          RADIOLOGY & ADDITIONAL TESTS:    Care Discussed with Consultants/Other Providers:

## 2023-10-14 NOTE — PROGRESS NOTE ADULT - ASSESSMENT
Assessment	  Patient is a 51 yo F with history of stage IV lung cancer with mets to brain (s/p 4 cycles chemo and thoracic RT completed on oct 2022), gamma knife surgery to 7 brain mets, who presented to [facility] ED 10/5 complaining of 1 week of lower extremity weakness and intermittent blurry vision. now s/p R temporal crani for resection of metastatic lesion. Post-operative course was complicated by acute cerebellar infarct found on post-op MRI, tachycardia, and thrombocytopenia. Admitted to [place] [facility] acute inpatient rehab on 10/5 for ADL, gait, and functional impairments.     #Unresponsive episode c/f Seizure vs. Syncope  - Depakote level is 55 (01/09)  - Procalcitionin level (10/08) 27, (10/09) 25.8  - Lactate level 2.2 (01/08), 2.5 (10/09)   - Started on Zosyn 3.375 gm IVPB q 8 hours x 7 days.   - Head CT (10/09) IMPRESSION: No new acute abnormalities are identified.  Reidentified is a right temporal craniotomy for prior resection of a metastasis . Small amount of air and hemorrhage in the surgical cavity is present. Underlying lesion is not well evaluated on this noncontrast CT exam. Surrounding vasogenic edema is unchanged. Previously visualized minimal right to left midline shift has improved.  Previously visualized partially calcified lesion in the left occipital lobe, with surrounding vasogenic edema is unchanged. Lesion in the left high frontal lobe with surrounding vasogenic edema is unchanged.  Previously visualized acute infarct in the left cerebellum is not seen to good advantage on this study.   - Neurology Consult appreciated  - Neurosurgery Dr. Metcalf updated    # Multiple metastatic brain lesions s/p Crani and temporal lesion resection/ Right dominant hemiparesis   - Comprehensive Multidisciplinary Rehab Program: 3 hours a day, 5 days a week with PT/OT/SLP  - Decadron taper - 3mg BID x2 days then 2mg BID through follow up  - Depakote 500mg BID seizure prophylaxis  - Pain control with oxy and tylenol  - Wound closure removal in 14 days - plan for staple removal this afternoon (10/13)  - Outpatient follow up with Dr. Metcalf  - Fall, aspiration precautions    # Acute Cerebellar infarct  - No ASA until cleared by neurosurgery, Atorvastatin 40 mg po daily   - PT/OT evaluation for balance and coordination, ambulation, transfer, ADLs  - Hospitalist F/U     #Lung Cancer with metastasis  - Depakote, decadron as above  - Outpatient oncology follow up with Dr. Beckman  - Recent CT c/a/p showed enlarging lung lesion and new liver metastases  - Per onc - patient will require rad/on assessment for post-op radiation  - Oncology consult; pending recommendations  - S/p CRT last June 2023  - Hospitalist consult appreciated    # Diplopia/ Blurry vision   - Likely in setting of malignancy/mets/edema  - Seen by ophtho  - Artificial tears PRN. Eye patch PRN for comfort    # Type 2 Diabetes Mellitus with steroid induced hyperglycemia/Hypoglycemia   - Recent A1c 8.0%  - Monitor FSBG TIDAC/QHS  - Consistent Carbohydrate diet  - Lantus and premeal admelog now discontinued; started Metformin 500 mg po x 2 daily   - Mod SSI AC, low SSI HS  -POCT glucose 108-199    # Mood/Cognition:  - Neuropsychology consult PRN    # Pain Management:  - Tylenol PRN  - Oxycodone 5mg q4 hours PRN severe Pain    # GI/Bowel:  - Senna QHS, Miralax daily PRN  - GI ppx: Protonix    # /Bladder:   - Bladder scan q8 hours on admission, SC PRN  - Voiding without issues   - Encourage timed voids every 4 hours while awake     # Skin/Pressure Injury:  - Skin assessment on admission: intact  - Monitor Incisions: right craniotomy incision with staples in place and dried blood  - Turn every 2 hours while in bed    # Diet:   - Diet Consistency/Modifications: Reg/thin/CC  - Aspiration Precautions  - SLP consult for swallow function evaluation and treatment, recs appreciated     # DVT ppx:  - Lovenox    # Restrictions/Precautions:  - Precautions: Fall, aspiration

## 2023-10-14 NOTE — PROGRESS NOTE ADULT - ASSESSMENT
51 y/o F with hx of stage IV lung cancer with mets to brain (s/p 4 cycles chemo and thoracic RT completed on Oct 2022), gamma knife surgery to 7 brain mets, who presented to [facility] ED 10/5 complaining of 1 week of lower extremity weakness and intermittent blurry vision. now s/p R temporal crani for resection of metastatic lesion. Post-operative course was complicated by acute cerebellar infarct found on post-op MRI, tachycardia, and thrombocytopenia. Admitted to [facility] acute inpatient rehab on 10/5 for ADL, gait, and functional impairments.     #RRT 10/8  #Seizure-like activity vs syncope  -[place] [facility]  -Depakote level 55  -Neurology, Cardiology evals noted     #Sinus tachycardia - improved   -EKG reviewed. normal sinus rhythm  -monitor    #Lung Cancer with metastasis  #Metastatic brain lesion s/p Crani  -Wound closure removal in 10-14 days (10/10-10/14)  -Recent CT c/a/p showed enlarging lung lesion and new liver metastases  -S/p CRT last June 2023  -Continue Decadron 2mg BID through follow up. Maintain GI ppx with protonix while on steroids  -Depakote 500mg BID seizure prophylaxis  -Pain management, bowel regimen per rehab. Continue fall, aspiration precautions  -H/H stable. Monitor CBC, likely post op anemia  -Outpatient follow up with Dr. Metcalf, outpatient oncology follow up with Dr. Beckman  -Per onc - patient will require rad/on assessment for post-op radiation  -Pall consult 10/3 noted - goals are to pursue intervention and treatment as tolerated    #Acute Cerebellar infarct  -ASA when clears by neurosurgery  -cont with atorvastatin 40mg    #Diplopia - Likely in setting of malignancy/mets/edema  -Seen by ophtho  -Artificial tears PRN. Eye patch PRN for comfort    #Type 2 Diabetes Mellitus with steroid induced hyperglycemia, HbA1c 8.0% (9/20/23)  -Consistent Carbohydrate diet  -Metformin 500mg bid  -Continue ISS    DVT ppx - Lovenox  GI ppx - Protonix

## 2023-10-14 NOTE — PROGRESS NOTE ADULT - SUBJECTIVE AND OBJECTIVE BOX
Patient is a 52y old  Female who presents with a chief complaint of Multiple metastatic brain lesions(left frontal, occipital and temporal) s/p R temporal craniotomy and resection  Right dominant hemiparesis (14 Oct 2023 12:49)      Subjective and overnight events:  Patient seen and examined at bedside. no complaints. no fever, chills, sob, cp, abd pain.    ALLERGIES:  No Known Allergies    MEDICATIONS  (STANDING):  artificial tears (preservative free) Ophthalmic Solution 1 Drop(s) Both EYES three times a day  atorvastatin 40 milliGRAM(s) Oral at bedtime  dexAMETHasone     Tablet 2 milliGRAM(s) Oral two times a day  dexAMETHasone     Tablet   Oral   dextrose 5%. 1000 milliLiter(s) (100 mL/Hr) IV Continuous <Continuous>  dextrose 5%. 1000 milliLiter(s) (50 mL/Hr) IV Continuous <Continuous>  dextrose 50% Injectable 25 Gram(s) IV Push once  dextrose 50% Injectable 25 Gram(s) IV Push once  dextrose 50% Injectable 12.5 Gram(s) IV Push once  divalproex  milliGRAM(s) Oral every 12 hours  enoxaparin Injectable 40 milliGRAM(s) SubCutaneous every 24 hours  glucagon  Injectable 1 milliGRAM(s) IntraMuscular once  insulin lispro (ADMELOG) corrective regimen sliding scale   SubCutaneous three times a day before meals  insulin lispro (ADMELOG) corrective regimen sliding scale   SubCutaneous at bedtime  metFORMIN 500 milliGRAM(s) Oral two times a day  pantoprazole    Tablet 40 milliGRAM(s) Oral before breakfast  sodium chloride 0.9%. 1000 milliLiter(s) (1000 mL/Hr) IV Continuous <Continuous>    MEDICATIONS  (PRN):  acetaminophen     Tablet .. 650 milliGRAM(s) Oral every 6 hours PRN Mild Pain (1 - 3), Moderate Pain (4 - 6)  dextrose Oral Gel 15 Gram(s) Oral once PRN Blood Glucose LESS THAN 70 milliGRAM(s)/deciliter  senna 2 Tablet(s) Oral at bedtime PRN Constipation    Vital Signs Last 24 Hrs  T(F): 98.1 (14 Oct 2023 07:40), Max: 98.5 (13 Oct 2023 20:42)  HR: 89 (14 Oct 2023 07:40) (89 - 98)  BP: 106/73 (14 Oct 2023 07:40) (100/65 - 110/74)  RR: 17 (14 Oct 2023 07:40) (17 - 18)  SpO2: 96% (14 Oct 2023 07:40) (96% - 97%)  I&O's Summary    13 Oct 2023 07:01  -  14 Oct 2023 07:00  --------------------------------------------------------  IN: 0 mL / OUT: 1 mL / NET: -1 mL      PHYSICAL EXAM:  General: NAD, Awake alert. stables on scalp removed  ENT: MMM  Neck: Supple, No JVD  Lungs: Clear to auscultation bilaterally  Cardio: RRR, S1/S2, No murmurs  Abdomen: Soft, Nontender, Nondistended; Bowel sounds present  Extremities: No calf tenderness, No pitting edema    LABS:                        10.2   8.64  )-----------( 128      ( 12 Oct 2023 05:53 )             31.4     10-12    139  |  103  |  15  ----------------------------<  132  4.1   |  26  |  0.51    Ca    8.9      12 Oct 2023 05:53    TPro  6.6  /  Alb  2.0  /  TBili  0.3  /  DBili  x   /  AST  33  /  ALT  24  /  AlkPhos  63  10-12                TSH 0.216   TSH with FT4 reflex --  Total T3 --        POCT Blood Glucose.: 108 mg/dL (14 Oct 2023 12:26)  POCT Blood Glucose.: 111 mg/dL (14 Oct 2023 08:11)  POCT Blood Glucose.: 199 mg/dL (13 Oct 2023 20:41)      Urinalysis Basic - ( 12 Oct 2023 05:53 )    Color: x / Appearance: x / SG: x / pH: x  Gluc: 132 mg/dL / Ketone: x  / Bili: x / Urobili: x   Blood: x / Protein: x / Nitrite: x   Leuk Esterase: x / RBC: x / WBC x   Sq Epi: x / Non Sq Epi: x / Bacteria: x        Culture - Blood (collected 08 Oct 2023 20:30)  Source: .Blood Blood-Peripheral  Final Report (14 Oct 2023 02:00):    No growth at 5 days    Culture - Blood (collected 08 Oct 2023 20:25)  Source: .Blood Blood-Peripheral  Final Report (14 Oct 2023 02:00):    No growth at 5 days          RADIOLOGY & ADDITIONAL TESTS:    Care Discussed with Consultants/Other Providers:

## 2023-10-14 NOTE — PROGRESS NOTE ADULT - SUBJECTIVE AND OBJECTIVE BOX
Patient is a 52y old  Female who presents with a chief complaint of Multiple metastatic brain lesions(left frontal, occipital and temporal) s/p R temporal craniotomy and resection  Right dominant hemiparesis (12 Oct 2023 12:52)    HPI:  Patient is a 53 yo F with history of stage IV lung cancer with mets to brain (s/p 4 cycles chemo and thoracic RT completed on oct 2022), gamma knife surgery to 7 brain mets , recent admission to [facility] from sept 4-13th for epigastric pain and hyperglycemia thought to be due to steroids, who presented to [facility] ED 10/5 complaining of 1 week of lower extremity weakness and intermittent blurry vision.  Patient was initially seen at the end of August and admitted for hx of migraines and dizziness, was started on  decadron 6mg q6hr and Depakote at that time. She returned to [facility] again in early september for worsening epigastric pain  in the setting of taking steroids without GI ppx. CTA at that time showing no PE, stable right apical lung mass and rapid interval growth of a left lower lobe mass and new hepatic mets. Pt finished dexamethasone taper on 9/7 and was discharged with protonix, depakote and follow up with outpatient rad onc Dr. Bernard, outpatient NSGY, and oncologist Dr. Beckman at [facility] with plans to start immunotherapy nivolumab as outpatient.  Since discharge on the 13th, patient noted weakness in her right lower extremity and 'muscle pain' which improves with massages. She used her walker at home however she feels weak and was requiring increasing support from her fiancee to ambulate/attend to daily needs. She noted also intermittent epigastric pain with radiation to the back. and endorsef some slight blurry vision over the last 2 weeks which she describes as "vision feels off" though is able to see.    Patient was admitted 9/30 and underwent R temporal crani for resection of metastatic lesion. Post-operative course was complicated by acute left cerebellar infarct found on post-op MRI, tachycardia, and thrombocytopenia. She was recommended for follow up with oncology for lung lesions, radiation oncology for brain, and dr. michelle for post-operative follow up. She will also follow up with Dr. Pam Randle in 6 months for findings on 10/4 CTA head. Plan for no ASA for secondary prevention until cleared by Dr. Michelle. Wound closure due to be removed postop day 10-14 in rehab. Patient was evaluated by PM&R and therapy for functional deficits and gait/ ADL impairments and recommended acute rehabilitation. Patient was medically optimized for discharge to [facility] on 10/5/23.      SUBJECTIVE/ROS:  - Patient was seen and examined sitting at bed side this morning, no new events over night.  Slept well. No Headaches No blurred vision   - Pain is well-controlled with current meds.  - Discussed plan for staple removal, patient understands and is amenable    MEDICATIONS  (STANDING):  atorvastatin 40 milliGRAM(s) Oral at bedtime  dexAMETHasone     Tablet 2 milliGRAM(s) Oral two times a day  dexAMETHasone     Tablet   Oral   dextrose 5%. 1000 milliLiter(s) (50 mL/Hr) IV Continuous <Continuous>  dextrose 5%. 1000 milliLiter(s) (100 mL/Hr) IV Continuous <Continuous>  dextrose 50% Injectable 25 Gram(s) IV Push once  dextrose 50% Injectable 25 Gram(s) IV Push once  dextrose 50% Injectable 12.5 Gram(s) IV Push once  divalproex  milliGRAM(s) Oral every 12 hours  enoxaparin Injectable 40 milliGRAM(s) SubCutaneous every 24 hours  glucagon  Injectable 1 milliGRAM(s) IntraMuscular once  insulin lispro (ADMELOG) corrective regimen sliding scale   SubCutaneous three times a day before meals  insulin lispro (ADMELOG) corrective regimen sliding scale   SubCutaneous at bedtime  metFORMIN 500 milliGRAM(s) Oral two times a day  pantoprazole    Tablet 40 milliGRAM(s) Oral before breakfast  sodium chloride 0.9%. 1000 milliLiter(s) (1000 mL/Hr) IV Continuous <Continuous>    MEDICATIONS  (PRN):  acetaminophen     Tablet .. 650 milliGRAM(s) Oral every 6 hours PRN Mild Pain (1 - 3), Moderate Pain (4 - 6)  dextrose Oral Gel 15 Gram(s) Oral once PRN Blood Glucose LESS THAN 70 milliGRAM(s)/deciliter  senna 2 Tablet(s) Oral at bedtime PRN Constipation      RECENT LABS:                          10.2   8.64  )-----------( 128      ( 12 Oct 2023 05:53 )             31.4     10-12    139  |  103  |  15  ----------------------------<  132<H>  4.1   |  26  |  0.51    Ca    8.9      12 Oct 2023 05:53    TPro  6.6  /  Alb  2.0<L>  /  TBili  0.3  /  DBili  x   /  AST  33  /  ALT  24  /  AlkPhos  63  10-12    LIVER FUNCTIONS - ( 12 Oct 2023 05:53 )  Alb: 2.0 g/dL / Pro: 6.6 g/dL / ALK PHOS: 63 U/L / ALT: 24 U/L / AST: 33 U/L / GGT: x             Urinalysis Basic - ( 12 Oct 2023 05:53 )    Color: x / Appearance: x / SG: x / pH: x  Gluc: 132 mg/dL / Ketone: x  / Bili: x / Urobili: x   Blood: x / Protein: x / Nitrite: x   Leuk Esterase: x / RBC: x / WBC x   Sq Epi: x / Non Sq Epi: x / Bacteria: x        PHYSICAL EXAM:  Vital Signs Last 24 Hrs  T(C): 36.7 (14 Oct 2023 07:40), Max: 36.9 (13 Oct 2023 20:42)  T(F): 98.1 (14 Oct 2023 07:40), Max: 98.5 (13 Oct 2023 20:42)  HR: 89 (14 Oct 2023 07:40) (89 - 98)  BP: 106/73 (14 Oct 2023 07:40) (100/65 - 110/74)  RR: 17 (14 Oct 2023 07:40) (17 - 18)  SpO2: 96% (14 Oct 2023 07:40) (96% - 97%)    Gen - NAD, Comfortable  HEENT - NCAT, EOMI, PRERRLA, staples to right craniotomy incision  Pulm - CTAB,  No crackles  Cardiovascular - RRR, S1S2  Abdomen - Soft, NT, ND, +BS  Extremities - No C/C/E, No calf tenderness  Neuro-     Cognitive - AAOx4, follows command     Communication - Fluent, No dysarthria     Attention: able to state days of the week backwards     Memory: Recall 3 objects immediate, delayed recall 1/3 without categorical cues and 2/3 with categorical cues         Cranial Nerves - no facial asymmetry, tongue midline, EOMI     Motor -                     LEFT    UE - ShAB 4/5, EF 5/5, EE 5/5, WE 4/5,  4/5                    RIGHT UE - ShAB 3/5, EF 3/5, EE 3/5, WE 3/5,  3+/5, +slight pronator drift                    LEFT    LE - HF 4/5, KE 5/5, DF 5/5, PF 5/5                    RIGHT LE - HF 3/5, KE 3/5, DF 3/5, PF 5/5             Coordination - slowing with right FTN and (+) dysmateria, left intact  Psychiatric - Mood stable, Affect WNL  Skin:  all skin intact    Wounds: right craniotomy incision C/D/I closed

## 2023-10-15 NOTE — CONSULT NOTE ADULT - PROBLEM SELECTOR RECOMMENDATION 9
Recovering post right temporal craniotomy for resection of brain metastasis.  Reviewed recent CT head, which showed unchanged vasogenic edema and improved right to left midline shift.  Patient is treated supportively with dexamethasone and Depakote for seizure prophylaxis.  No plan for active systemic chemotherapy during inpatient rehab.  We will continue monitoring hematologic profile.  Consider resuming aspirin once neurosurgery OK with decision.  Reviewed recent CT C/A/P which showed possible progression of disease with enlarging lung lesion and new liver metastasis.  Will follow-up with Dr. Beckman in ambulatory.

## 2023-10-15 NOTE — CONSULT NOTE ADULT - SUBJECTIVE AND OBJECTIVE BOX
HARJEET WHITMORE,  52y Female  MRN: [id number]  ATTENDING: Dr. Octavio Lange      HPI:  Patient is a 51 yo F with history of stage IV lung cancer with mets to brain (s/p 4 cycles chemo and thoracic RT completed on oct 2022), gamma knife surgery to 7 brain mets , recent admission to [facility] from sept 4-13th for epigastric pain and hyperglycemia thought to be due to steroids, who presented to [facility] ED 10/5 complaining of 1 week of lower extremity weakness and intermittent blurry vision.  Patient was initially seen at the end of August and admitted for hx of migraines and dizziness, was started on  decadron 6mg q6hr and Depakote at that time. She returned to [facility] again in early september for worsening epigastric pain  in the setting of taking steroids without GI ppx. CTA at that time showing no PE, stable right apical lung mass and rapid interval growth of a left lower lobe mass and new hepatic mets. Pt finished dexamethasone taper on 9/7 and was discharged with protonix, depakote and follow up with outpatient rad onc Dr. Bernard, outpatient NSGY, and oncologist Dr. Beckman at [facility] with plans to start immunotherapy nivolumab as outpatient.  Since discharge on the 13th, patient noted weakness in her right lower extremity and 'muscle pain' which improves with massages. She used her walker at home however she feels weak and was requiring increasing support from her fiancee to ambulate/attend to daily needs. She noted also intermittent epigastric pain with radiation to the back. and endorsef some slight blurry vision over the last 2 weeks which she describes as "vision feels off" though is able to see.    Patient was admitted 9/30 and underwent R temporal crani for resection of metastatic lesion. Post-operative course was complicated by acute left cerebellar infarct found on post-op MRI, tachycardia, and thrombocytopenia. She was recommended for follow up with oncology for lung lesions, radiation oncology for brain, and dr. michelle for post-operative follow up. She will also follow up with Dr. Pam Randle in 6 months for findings on 10/4 CTA head. Plan for no ASA for secondary prevention until cleared by Dr. Michelle. Wound closure due to be removed postop day 10-14 in rehab. Patient was evaluated by PM&R and therapy for functional deficits and gait/ ADL impairments and recommended acute rehabilitation. Patient was medically optimized for discharge to [facility] on 10/5/23. (05 Oct 2023 13:24)      PAST MEDICAL & SURGICAL HISTORY:  GERD (Gastroesophageal Reflux Disease)      Lung mass  right      Non-small cell lung cancer with metastasis      S/P endoscopy  2022          MEDICATION:  artificial tears (preservative free) Ophthalmic Solution 1 Drop(s) Both EYES three times a day  atorvastatin 40 milliGRAM(s) Oral at bedtime  dexAMETHasone     Tablet 2 milliGRAM(s) Oral two times a day  dexAMETHasone     Tablet   Oral   dextrose 5%. 1000 milliLiter(s) IV Continuous <Continuous>  dextrose 5%. 1000 milliLiter(s) IV Continuous <Continuous>  dextrose 50% Injectable 25 Gram(s) IV Push once  dextrose 50% Injectable 25 Gram(s) IV Push once  dextrose 50% Injectable 12.5 Gram(s) IV Push once  divalproex  milliGRAM(s) Oral every 12 hours  enoxaparin Injectable 40 milliGRAM(s) SubCutaneous every 24 hours  glucagon  Injectable 1 milliGRAM(s) IntraMuscular once  insulin lispro (ADMELOG) corrective regimen sliding scale   SubCutaneous three times a day before meals  insulin lispro (ADMELOG) corrective regimen sliding scale   SubCutaneous at bedtime  metFORMIN 500 milliGRAM(s) Oral two times a day  pantoprazole    Tablet 40 milliGRAM(s) Oral before breakfast  sodium chloride 0.9%. 1000 milliLiter(s) IV Continuous <Continuous>      ALLERGIES:  No Known Allergies      FAMILY HISTORY:  Reviewed, non-contributory: [ ]   Maternal-  Paternal-    SOCIAL HISTORY:  Tobacco: YES [ ]  ; NO [ ]; Former smoker [ ]  Alcohol:   YES [ ]  ; NO [ ]; Social alcohol user [ ]  Occupation/ marital status/ children:    REVIEW SYSTEMS:  Constitutional: no fever, chills, night sweats, no weight loss  HEENT: denies visual changes; no oral ulcers, dysphagia, no epistaxis;   Respiratory: no dyspnea , wheezing, cough, hemoptysis  Cardiovascular: denies acute chest pain, palpitations  GI: no loss of appetite, dark stools, or abdominal tenderness / pain; no change in bowel habits.  Musculoskeletal: no new back pain, bone/ joint pain ,no extremity swelling  Integumentary: denies pruritus; no skin rash, bruises, no  suspicious skin lesions  Neurologic: denies peripheral numbness, no dizziness, no gait problems.  Heme: no reported easy bruisability; no lymph node enlargement    VITALS:  T(C): 37.1, Max: 37.1 (10-15-23 @ 08:09)  T(F): 98.7, Max: 98.7 (10-15-23 @ 08:09)  HR: 95 (87 - 95)  BP: 98/66 (98/66 - 108/74)  SpO2: 96% (96% - 98%)    PHYSICAL EXAM:  Constitutional: alert, well developed  HEENT: normocephalic, anicteric sclerae, no mucositis or thrush  Respiratory: bilateral clear to auscultation anteriorly  Cardiovascular : S1, S2 regular, rhythmic, no murmurs, gallops or rubs  Abdomen: soft, distended, + normoactive BS, no palpable HS- megaly  Extremities: no tenderness;  -c/c/e, pulses equal bilaterally  Integumentary: no rashes, scars, or lesions suggestive of malignancy  Neurologic: no gross focal deficits    LABS:  (10-12) WBC: 8.64 K/uL,Hemoglobin: 10.2 g/dL, Hematocrit: 31.4 %,  Platelet: 128 K/uL    RADIOLOGY:  ACC: [id number] EXAM:  CT ANGIO NECK (W)AW IC   ORDERED BY: YEE COPELAND     ACC: [id number] EXAM:  CT ANGIO BRAIN (W)AW IC   ORDERED BY: YEE COPELAND     ACC: [id number] EXAM:  CT BRAIN   ORDERED BY: YEE COPELAND     PROCEDURE DATE:  10/04/2023          INTERPRETATION:  EXAM: CTA HEAD AND NECK    HISTORY:stroke    TECHNIQUE: CTA of the head and neck was acquired from the lung apices to   the skull vertex. Sagittal and coronal reconstructions were subsequently   conducted. Omnipaque 350 Intravenous contrast was administered. 2-D MIP   images were provided.    COMPARISON: CT of the head October 1, 2023, MRI of the brain October 2, 2023    FINDINGS:    CT HEAD:  There are postoperative changes related to a recent right temporal   craniotomyfor resection of a metastatic lesion. Again, the operative   cavity is in part fluid-filled with postoperative blood. There is   persistent surrounding vasogenic edema. The previously seen   pneumocephalus along the frontal convexities has greatly improved. There   is a thin postoperative extra-axial collection overlying the operative   bed, relatively stable. There is a small midline shift to the left of   approximately 0.3 cm.    The previously seen partially calcified lesion in the left   parietal/occipital lobes is again redemonstrated, relatively stable. The   lesion in the high left frontal lobe is again demonstrated, stable. There   is surrounding vasogenic edema.    The previously seen acute infarct in the left cerebellar hemisphere is   again redemonstrated.    The cranial cervical junction is within normal limits. The sella is not   expanded. There is no depressed calvarial fracture. The visualized   paranasal sinuses are well aerated. The mastoid air cells are well   aerated. The visualized orbits are within normal limits.    CTA BRAIN:  The intracranial segments of the bilateral ICAs has high with   intraluminal contrast. There is a 1 to 2 mm medially oriented vascular   outpouching from the right ICA terminus, image 3 1 of series 3.    The bilateral MCAs and ACAs are within normal limits. The anterior   commuting artery is unremarkable.    The bilateral vertebral arteries opacify with intraluminal contrast. The   left vertebral artery is dominant. The basilar artery is unremarkable.   The proximal SCAs and PCAs are within normal limits.      CTA NECK:  There is a three-vessel arch. The common carotid arteries opacify   normally width intraluminal contrast. The carotid bulbs are unremarkable.   The bilateral ICAs opacify normally with intraluminal contrast to the   skull base.    The vertebral arteries have normal origins. The left vertebral artery is   dominant. The vertebral arteries opacify normally with intraluminal   contrast to the skull base.    There are multiple hypo and hyperattenuating nodules in the bilateral   lobes of the thyroid, right greater than left.    There are mild degenerative changes in the cervical spine. There are mild   degenerative changes in the bilateral TMJs    The partially visualized lung mass is seen in the right lung apex. Mild   emphysematous changes are seen in the bilateral lung apices.    IMPRESSION:    CT HEAD:  1.  Redemonstrated postoperative changes related to a prior right   temporal craniotomy for resection of a metastatic lesion. Postoperative   findings are relatively stable.  2.  Other metastatic lesions seen throughout the brain parenchyma with   surrounding vasogenic edema, again relatively stable.  3.  Redemonstrated acute infarct in the left cerebellar hemisphere,   stable from recent MR imaging.    CTA BRAIN:  1.  No evidence of abrupt cut off of intraluminal contrast.  2.  1 to 2 mm medially oriented vascular outpouching from the right ICA   terminus. This could represent a small saccular aneurysm versus a   vascular infundibulum.    CTA NECK:  1.  No evidence of critical stenosis by NASCET criteria.  2.  Multiple nodules seen in the bilateral lobes of the thyroid.   Recommend nonemergent thyroid ultrasound for further characterization if  clinically indicated.  3.  Redemonstrated partially visualized right apex lung mass. Correlate   with recent CT imaging of the chest for further evaluation.               HARJEET WHITMORE,  52y Female  MRN: [id number]  ATTENDING: Dr. Octavio Lange      HPI:  52F, PMHx NSCLC with multiple brain metastases, s/p palliative systemic chemotherapy x4 cycles and thoracic RT completed October 2022, s/p SRS 7 brain metastases, who presented 10/5/2023 with lower extremity weakness and intermittent blurry vision.  Patient admitted at [facility] ED, started on dexamethasone 6 mg every 6 hours, and Depakote.  Patient was subsequently discharged, but still experiencing pain in the right lower extremity.  Ambulates with a walker.  On 9/30/2023 patient was admitted for right temporal craniotomy with resection of metastatic lesion.  Postoperative course was complicated by acute left cerebellar infarct, tachycardia and mild thrombocytopenia.  Once stabilized, patient was evaluated for PM&R and was discharged at [facility] on 10/5/2023.  Patient is under the care at [facility] and [facility]; she was slated to start nivolumab monotherapy in ambulatory.    PAST MEDICAL & SURGICAL HISTORY:  GERD (Gastroesophageal Reflux Disease)  Lung mass right  Non-small cell lung cancer with metastasis  S/P endoscopy-2022    MEDICATION:  artificial tears (preservative free) Ophthalmic Solution 1 Drop(s) Both EYES three times a day  atorvastatin 40 milliGRAM(s) Oral at bedtime  dexAMETHasone     Tablet 2 milliGRAM(s) Oral two times a day  dexAMETHasone     Tablet   Oral   dextrose 5%. 1000 milliLiter(s) IV Continuous <Continuous>  dextrose 5%. 1000 milliLiter(s) IV Continuous <Continuous>  dextrose 50% Injectable 25 Gram(s) IV Push once  dextrose 50% Injectable 25 Gram(s) IV Push once  dextrose 50% Injectable 12.5 Gram(s) IV Push once  divalproex  milliGRAM(s) Oral every 12 hours  enoxaparin Injectable 40 milliGRAM(s) SubCutaneous every 24 hours  glucagon  Injectable 1 milliGRAM(s) IntraMuscular once  insulin lispro (ADMELOG) corrective regimen sliding scale   SubCutaneous three times a day before meals  insulin lispro (ADMELOG) corrective regimen sliding scale   SubCutaneous at bedtime  metFORMIN 500 milliGRAM(s) Oral two times a day  pantoprazole    Tablet 40 milliGRAM(s) Oral before breakfast  sodium chloride 0.9%. 1000 milliLiter(s) IV Continuous <Continuous>    ALLERGIES:  No Known Allergies    FAMILY HISTORY:  Reviewed, non-contributory: [x ]     SOCIAL HISTORY:  Tobacco: YES [ ]  ; NO [x ]; Former smoker [ ]  Alcohol:   YES [ ]  ; NO [x ]; Social alcohol user [ ]    REVIEW SYSTEMS:  Constitutional: no fever, chills, night sweats, no weight loss  HEENT: no visual changes; no oral ulcers, dysphagia, no epistaxis;   Respiratory: no dyspnea , wheezing, cough, hemoptysis  Cardiovascular: denies acute chest pain, palpitations  GI: no loss of appetite, dark stools, or abdominal tenderness / pain; no change in bowel habits.  Musculoskeletal: no new back pain, bone/ joint pain ,no extremity swelling  Integumentary: denies pruritus; no skin rash, bruises, no  suspicious skin lesions  Neurologic: denies peripheral numbness, no dizziness, no gait problems.  Heme: no reported easy bruisability; no lymph node enlargement    VITALS:  T(C): 37.1, Max: 37.1 (10-15-23 @ 08:09)  T(F): 98.7, Max: 98.7 (10-15-23 @ 08:09)  HR: 95 (87 - 95)  BP: 98/66 (98/66 - 108/74)  SpO2: 96% (96% - 98%)    PHYSICAL EXAM:  Constitutional: alert, well developed  HEENT: s/p craniotomy, anicteric sclerae, no mucositis or thrush  Respiratory: bilateral clear to auscultation anteriorly  Cardiovascular : S1, S2 regular, rhythmic, no murmurs, gallops or rubs  Abdomen: soft, distended, + normoactive BS, no palpable HS- megaly  Extremities: no tenderness;  -c/c/e, pulses equal bilaterally  Integumentary: no rashes, scars, or lesions suggestive of malignancy  Neurologic: no gross focal deficits    LABS:  (10-12) WBC: 8.64 K/uL,Hemoglobin: 10.2 g/dL, Hematocrit: 31.4 %,  Platelet: 128 K/uL    RADIOLOGY:  ACC: [id number] EXAM:  CT ANGIO NECK (W)AW IC   ORDERED BY: YEE COPELAND     ACC: [id number] EXAM:  CT ANGIO BRAIN (W)AW IC   ORDERED BY: YEE COPELAND     ACC: [id number] EXAM:  CT BRAIN   ORDERED BY: YEE COPELAND     PROCEDURE DATE:  10/04/2023          INTERPRETATION:  EXAM: CTA HEAD AND NECK    HISTORY:stroke    TECHNIQUE: CTA of the head and neck was acquired from the lung apices to   the skull vertex. Sagittal and coronal reconstructions were subsequently   conducted. Omnipaque 350 Intravenous contrast was administered. 2-D MIP   images were provided.    COMPARISON: CT of the head October 1, 2023, MRI of the brain October 2, 2023    FINDINGS:    CT HEAD:  There are postoperative changes related to a recent right temporal   craniotomyfor resection of a metastatic lesion. Again, the operative   cavity is in part fluid-filled with postoperative blood. There is   persistent surrounding vasogenic edema. The previously seen   pneumocephalus along the frontal convexities has greatly improved. There   is a thin postoperative extra-axial collection overlying the operative   bed, relatively stable. There is a small midline shift to the left of   approximately 0.3 cm.    The previously seen partially calcified lesion in the left   parietal/occipital lobes is again redemonstrated, relatively stable. The   lesion in the high left frontal lobe is again demonstrated, stable. There   is surrounding vasogenic edema.    The previously seen acute infarct in the left cerebellar hemisphere is   again redemonstrated.    The cranial cervical junction is within normal limits. The sella is not   expanded. There is no depressed calvarial fracture. The visualized   paranasal sinuses are well aerated. The mastoid air cells are well   aerated. The visualized orbits are within normal limits.    CTA BRAIN:  The intracranial segments of the bilateral ICAs has high with   intraluminal contrast. There is a 1 to 2 mm medially oriented vascular   outpouching from the right ICA terminus, image 3 1 of series 3.    The bilateral MCAs and ACAs are within normal limits. The anterior   commuting artery is unremarkable.    The bilateral vertebral arteries opacify with intraluminal contrast. The   left vertebral artery is dominant. The basilar artery is unremarkable.   The proximal SCAs and PCAs are within normal limits.      CTA NECK:  There is a three-vessel arch. The common carotid arteries opacify   normally width intraluminal contrast. The carotid bulbs are unremarkable.   The bilateral ICAs opacify normally with intraluminal contrast to the   skull base.    The vertebral arteries have normal origins. The left vertebral artery is   dominant. The vertebral arteries opacify normally with intraluminal   contrast to the skull base.    There are multiple hypo and hyperattenuating nodules in the bilateral   lobes of the thyroid, right greater than left.    There are mild degenerative changes in the cervical spine. There are mild   degenerative changes in the bilateral TMJs    The partially visualized lung mass is seen in the right lung apex. Mild   emphysematous changes are seen in the bilateral lung apices.    IMPRESSION:    CT HEAD:  1.  Redemonstrated postoperative changes related to a prior right   temporal craniotomy for resection of a metastatic lesion. Postoperative   findings are relatively stable.  2.  Other metastatic lesions seen throughout the brain parenchyma with   surrounding vasogenic edema, again relatively stable.  3.  Redemonstrated acute infarct in the left cerebellar hemisphere,   stable from recent MR imaging.    CTA BRAIN:  1.  No evidence of abrupt cut off of intraluminal contrast.  2.  1 to 2 mm medially oriented vascular outpouching from the right ICA   terminus. This could represent a small saccular aneurysm versus a   vascular infundibulum.    CTA NECK:  1.  No evidence of critical stenosis by NASCET criteria.  2.  Multiple nodules seen in the bilateral lobes of the thyroid.   Recommend nonemergent thyroid ultrasound for further characterization if  clinically indicated.  3.  Redemonstrated partially visualized right apex lung mass. Correlate   with recent CT imaging of the chest for further evaluation.

## 2023-10-15 NOTE — PROGRESS NOTE ADULT - SUBJECTIVE AND OBJECTIVE BOX
Patient is a 52y old  Female who presents with a chief complaint of Multiple metastatic brain lesions(left frontal, occipital and temporal) s/p R temporal craniotomy and resection  Right dominant hemiparesis (12 Oct 2023 12:52)    HPI:  Patient is a 53 yo F with history of stage IV lung cancer with mets to brain (s/p 4 cycles chemo and thoracic RT completed on oct 2022), gamma knife surgery to 7 brain mets , recent admission to [facility] from sept 4-13th for epigastric pain and hyperglycemia thought to be due to steroids, who presented to [facility] ED 10/5 complaining of 1 week of lower extremity weakness and intermittent blurry vision.  Patient was initially seen at the end of August and admitted for hx of migraines and dizziness, was started on  decadron 6mg q6hr and Depakote at that time. She returned to [facility] again in early september for worsening epigastric pain  in the setting of taking steroids without GI ppx. CTA at that time showing no PE, stable right apical lung mass and rapid interval growth of a left lower lobe mass and new hepatic mets. Pt finished dexamethasone taper on 9/7 and was discharged with protonix, depakote and follow up with outpatient rad onc Dr. Bernard, outpatient NSGY, and oncologist Dr. Beckman at [facility] with plans to start immunotherapy nivolumab as outpatient.  Since discharge on the 13th, patient noted weakness in her right lower extremity and 'muscle pain' which improves with massages. She used her walker at home however she feels weak and was requiring increasing support from her fiancee to ambulate/attend to daily needs. She noted also intermittent epigastric pain with radiation to the back. and endorsef some slight blurry vision over the last 2 weeks which she describes as "vision feels off" though is able to see.    Patient was admitted 9/30 and underwent R temporal crani for resection of metastatic lesion. Post-operative course was complicated by acute left cerebellar infarct found on post-op MRI, tachycardia, and thrombocytopenia. She was recommended for follow up with oncology for lung lesions, radiation oncology for brain, and dr. michelle for post-operative follow up. She will also follow up with Dr. Pam Randle in 6 months for findings on 10/4 CTA head. Plan for no ASA for secondary prevention until cleared by Dr. Michelle. Wound closure due to be removed postop day 10-14 in rehab. Patient was evaluated by PM&R and therapy for functional deficits and gait/ ADL impairments and recommended acute rehabilitation. Patient was medically optimized for discharge to [facility] on 10/5/23.      SUBJECTIVE/ROS:  - Patient was seen and examined sitting at bed side this morning, no new events over night.    - Patient had 1 episode of headache No blurred vision. Resolved with Tylenol. No HA this AM  - Pain is well-controlled with current meds.  - Discussed plan for staple removal, patient understands and is amenable    MEDICATIONS  (STANDING):  atorvastatin 40 milliGRAM(s) Oral at bedtime  dexAMETHasone     Tablet 2 milliGRAM(s) Oral two times a day  dexAMETHasone     Tablet   Oral   dextrose 5%. 1000 milliLiter(s) (50 mL/Hr) IV Continuous <Continuous>  dextrose 5%. 1000 milliLiter(s) (100 mL/Hr) IV Continuous <Continuous>  dextrose 50% Injectable 25 Gram(s) IV Push once  dextrose 50% Injectable 25 Gram(s) IV Push once  dextrose 50% Injectable 12.5 Gram(s) IV Push once  divalproex  milliGRAM(s) Oral every 12 hours  enoxaparin Injectable 40 milliGRAM(s) SubCutaneous every 24 hours  glucagon  Injectable 1 milliGRAM(s) IntraMuscular once  insulin lispro (ADMELOG) corrective regimen sliding scale   SubCutaneous three times a day before meals  insulin lispro (ADMELOG) corrective regimen sliding scale   SubCutaneous at bedtime  metFORMIN 500 milliGRAM(s) Oral two times a day  pantoprazole    Tablet 40 milliGRAM(s) Oral before breakfast  sodium chloride 0.9%. 1000 milliLiter(s) (1000 mL/Hr) IV Continuous <Continuous>    MEDICATIONS  (PRN):  acetaminophen     Tablet .. 650 milliGRAM(s) Oral every 6 hours PRN Mild Pain (1 - 3), Moderate Pain (4 - 6)  dextrose Oral Gel 15 Gram(s) Oral once PRN Blood Glucose LESS THAN 70 milliGRAM(s)/deciliter  senna 2 Tablet(s) Oral at bedtime PRN Constipation      RECENT LABS:                          10.2   8.64  )-----------( 128      ( 12 Oct 2023 05:53 )             31.4     10-12    139  |  103  |  15  ----------------------------<  132<H>  4.1   |  26  |  0.51    Ca    8.9      12 Oct 2023 05:53    TPro  6.6  /  Alb  2.0<L>  /  TBili  0.3  /  DBili  x   /  AST  33  /  ALT  24  /  AlkPhos  63  10-12    LIVER FUNCTIONS - ( 12 Oct 2023 05:53 )  Alb: 2.0 g/dL / Pro: 6.6 g/dL / ALK PHOS: 63 U/L / ALT: 24 U/L / AST: 33 U/L / GGT: x             Urinalysis Basic - ( 12 Oct 2023 05:53 )    Color: x / Appearance: x / SG: x / pH: x  Gluc: 132 mg/dL / Ketone: x  / Bili: x / Urobili: x   Blood: x / Protein: x / Nitrite: x   Leuk Esterase: x / RBC: x / WBC x   Sq Epi: x / Non Sq Epi: x / Bacteria: x        PHYSICAL EXAM:  Vital Signs Last 24 Hrs  ICU Vital Signs Last 24 Hrs  T(C): 37.1 (15 Oct 2023 08:09), Max: 37.1 (15 Oct 2023 08:09)  T(F): 98.7 (15 Oct 2023 08:09), Max: 98.7 (15 Oct 2023 08:09)  HR: 95 (15 Oct 2023 08:09) (87 - 95)  BP: 98/66 (15 Oct 2023 08:09) (98/66 - 108/74)  RR: 18 (15 Oct 2023 08:09) (18 - 18)  SpO2: 96% (15 Oct 2023 08:09) (96% - 98%)    Gen - NAD, Comfortable  HEENT - NCAT, EOMI, PRERRLA, staples to right craniotomy incision  Pulm - CTAB,  No crackles  Cardiovascular - RRR, S1S2  Abdomen - Soft, NT, ND, +BS  Extremities - No C/C/E, No calf tenderness  Neuro-     Cognitive - AAOx4, follows command     Communication - Fluent, No dysarthria     Attention: able to state days of the week backwards     Memory: Recall 3 objects immediate, delayed recall 1/3 without categorical cues and 2/3 with categorical cues         Cranial Nerves - no facial asymmetry, tongue midline, EOMI     Motor -                     LEFT    UE - ShAB 4/5, EF 5/5, EE 5/5, WE 4/5,  4/5                    RIGHT UE - ShAB 3/5, EF 3/5, EE 3/5, WE 3/5,  3+/5, +slight pronator drift                    LEFT    LE - HF 4/5, KE 5/5, DF 5/5, PF 5/5                    RIGHT LE - HF 3/5, KE 3/5, DF 3/5, PF 5/5             Coordination - slowing with right FTN and (+) dysmateria, left intact  Psychiatric - Mood stable, Affect WNL  Skin:  all skin intact    Wounds: right craniotomy incision C/D/I closed

## 2023-10-15 NOTE — PROGRESS NOTE ADULT - ASSESSMENT
Assessment	  Patient is a 51 yo F with history of stage IV lung cancer with mets to brain (s/p 4 cycles chemo and thoracic RT completed on oct 2022), gamma knife surgery to 7 brain mets, who presented to [facility] ED 10/5 complaining of 1 week of lower extremity weakness and intermittent blurry vision. now s/p R temporal crani for resection of metastatic lesion. Post-operative course was complicated by acute cerebellar infarct found on post-op MRI, tachycardia, and thrombocytopenia. Admitted to [facility] acute inpatient rehab on 10/5 for ADL, gait, and functional impairments.     #Unresponsive episode c/f Seizure vs. Syncope  - Depakote level is 55 (01/09)  - Procalcitionin level (10/08) 27, (10/09) 25.8  - Lactate level 2.2 (01/08), 2.5 (10/09)   - Started on Zosyn 3.375 gm IVPB q 8 hours x 7 days.   - Head CT (10/09) IMPRESSION: No new acute abnormalities are identified.  Reidentified is a right temporal craniotomy for prior resection of a metastasis . Small amount of air and hemorrhage in the surgical cavity is present. Underlying lesion is not well evaluated on this noncontrast CT exam. Surrounding vasogenic edema is unchanged. Previously visualized minimal right to left midline shift has improved.  Previously visualized partially calcified lesion in the left occipital lobe, with surrounding vasogenic edema is unchanged. Lesion in the left high frontal lobe with surrounding vasogenic edema is unchanged.  Previously visualized acute infarct in the left cerebellum is not seen to good advantage on this study.     # Multiple metastatic brain lesions s/p Crani and temporal lesion resection/ Right dominant hemiparesis   - Comprehensive Multidisciplinary Rehab Program: 3 hours a day, 5 days a week with PT/OT/SLP  - Decadron taper - 3mg BID x2 days then 2mg BID through follow up  - Depakote 500mg BID seizure prophylaxis  - Pain control with oxy and tylenol  - Outpatient follow up with Dr. Metcalf  - Fall, aspiration precautions    # Acute Cerebellar infarct  - No ASA until cleared by neurosurgery, Atorvastatin 40 mg po daily   - PT/OT evaluation for balance and coordination, ambulation, transfer, ADLs  - Hospitalist F/U     #Lung Cancer with metastasis  - Depakote, decadron as above  - Outpatient oncology follow up with Dr. Beckman  - Recent CT c/a/p showed enlarging lung lesion and new liver metastases  - Per onc - patient will require rad/on assessment for post-op radiation  - Oncology consult; pending recommendations  - S/p CRT last June 2023  - Hospitalist consult appreciated    # Diplopia/ Blurry vision   - Likely in setting of malignancy/mets/edema  - Seen by ophtho  - Artificial tears PRN. Eye patch PRN for comfort    # Type 2 Diabetes Mellitus with steroid induced hyperglycemia/Hypoglycemia   - Recent A1c 8.0%  - Monitor FSBG TIDAC/QHS  - Consistent Carbohydrate diet  - Lantus and premeal admelog now discontinued; started Metformin 500 mg po x 2 daily   - Mod SSI AC, low SSI HS  -POCT glucose     # Mood/Cognition:  - Neuropsychology consult PRN  - Pleasant.     # Pain Management:  - Tylenol PRN  - Oxycodone 5mg q4 hours PRN severe Pain    # GI/Bowel:  - Senna QHS, Miralax daily PRN  - GI ppx: Protonix    # /Bladder:   - Bladder scan q8 hours on admission, SC PRN  - Voiding without issues   - Encourage timed voids every 4 hours while awake     # Skin/Pressure Injury:  - Skin assessment on admission: intact  - Monitor Incisions: right craniotomy incision with staples in place and dried blood  - Turn every 2 hours while in bed    # Diet:   - Diet Consistency/Modifications: Reg/thin/CC  - Aspiration Precautions  - SLP consult for swallow function evaluation and treatment, recs appreciated     # DVT ppx:  - Lovenox    # Restrictions/Precautions:  - Precautions: Fall, aspiration

## 2023-10-16 NOTE — DISCHARGE NOTE PROVIDER - NSDCCPCAREPLAN_GEN_ALL_CORE_FT
PRINCIPAL DISCHARGE DIAGNOSIS  Diagnosis: S/P craniotomy  Assessment and Plan of Treatment: You were admitted to [place] for rehabilitation following your hospitalization, and you received physical, occupational and speech therapy during your admission. Please continue taking your medications as prescribed including your seizure prophylaxis medications and steroid, and follow up with your Neurosurgeon, oncologist, physiatrist, and PCP upon discharge from rehab.      SECONDARY DISCHARGE DIAGNOSES  Diagnosis: CVA (cerebrovascular accident)  Assessment and Plan of Treatment: You came to the hospital for acute rehab following a stroke, and received PT, OT and speech therapy. Please continue taking your medications as prescribed and monitor your blood pressure. Avoid Aspirin until cleared by your neurosurgeon. Reduce fat, cholesterol and salt in your diet. Increase intake of fruits and vegetables. If you experience any symptoms of facial drooping, slurred speech, arm or leg weakness, severe headache, vision changes or any worsening symptoms, notify provider immediately and return to ER. Please follow up with your PCP, neurologist and PM&R regarding your recent rehab admission.    Diagnosis: Diabetes mellitus  Assessment and Plan of Treatment: You have a history of diabetes and your blood sugars were managed with insulin temporarily while you were in the hospital. Please continue taking your diabetes medications as prescribed and follow up with your PCP or endocrinologist following discharge from rehab.     PRINCIPAL DISCHARGE DIAGNOSIS  Diagnosis: S/P craniotomy  Assessment and Plan of Treatment: You were admitted to [place] for rehabilitation following your hospitalization, and you received physical, occupational and speech therapy during your admission. Please continue taking your medications as prescribed including your seizure prophylaxis medications and steroid, and follow up with your Neurosurgeon, oncologist, physiatrist, and PCP upon discharge from rehab. Your case has been reviewed by an in-house oncologist and discussed with your primary oncology team. Please follow up with your primary oncology team closely to discuss radiation or further treatment as needed.      SECONDARY DISCHARGE DIAGNOSES  Diagnosis: CVA (cerebrovascular accident)  Assessment and Plan of Treatment: You came to the hospital for acute rehab following a stroke, and received PT, OT and speech therapy. Please continue taking your medications as prescribed and monitor your blood pressure. Avoid Aspirin until cleared by your neurosurgeon. Reduce fat, cholesterol and salt in your diet. Increase intake of fruits and vegetables. If you experience any symptoms of facial drooping, slurred speech, arm or leg weakness, severe headache, vision changes or any worsening symptoms, notify provider immediately and return to ER. Please follow up with your PCP, neurologist and PM&R regarding your recent rehab admission.    Diagnosis: Diabetes mellitus  Assessment and Plan of Treatment: You have a history of diabetes and your blood sugars were managed with insulin temporarily while you were in the hospital. Please continue taking your diabetes medications as prescribed and follow up with your PCP or endocrinologist following discharge from rehab.

## 2023-10-16 NOTE — DISCHARGE NOTE PROVIDER - NSDCMRMEDTOKEN_GEN_ALL_CORE_FT
acetaminophen 325 mg oral tablet: 2 tab(s) orally every 6 hours As needed Mild Pain (1 - 3)  dexAMETHasone 1.5 mg oral tablet: 3 milligram(s) orally 2 times a day stop after 2 days and then continue 2mg po bid thereafter  dexAMETHasone 2 mg oral tablet: 1 tab(s) orally 2 times a day final taper step to continue  divalproex sodium 500 mg oral delayed release tablet: 1 tab(s) orally every 12 hours  enoxaparin: 40 milligram(s) subcutaneously once a day (at bedtime)  insulin glargine 100 units/mL subcutaneous solution: 15 unit(s) subcutaneous once a day (at bedtime)  insulin lispro 100 units/mL injectable solution: injectable 3 times a day (before meals) follow current dosing  insulin lispro 100 units/mL injectable solution: injectable once a day (at bedtime) follow current dosing  insulin lispro 100 units/mL injectable solution: injectable 3 times a day (with meals) follow current dosing  oxyCODONE 5 mg oral tablet: 1 tab(s) orally every 4 hours as needed for Moderate Pain (4 - 6)  pantoprazole 40 mg oral delayed release tablet: 1 tab(s) orally once a day (before a meal)  polyethylene glycol 3350 oral powder for reconstitution: 17 gram(s) orally every 24 hours  senna leaf extract oral tablet: 2 tab(s) orally once a day (at bedtime)   acetaminophen 325 mg oral tablet: 2 tab(s) orally every 6 hours As needed Mild Pain (1 - 3), Moderate Pain (4 - 6)  atorvastatin 40 mg oral tablet: 1 tab(s) orally once a day (at bedtime)  Depakote 500 mg oral delayed release tablet: 1 tab(s) orally every 12 hours  dexAMETHasone 2 mg oral tablet: 1 tab(s) orally 2 times a day  metFORMIN 500 mg oral tablet: 1 tab(s) orally 2 times a day  ocular lubricant ophthalmic solution: 1 drop(s) to each affected eye 3 times a day  Protonix 40 mg oral delayed release tablet: 1 tab(s) orally once a day (before a meal)  senna leaf extract oral tablet: 2 tab(s) orally once a day (at bedtime) As needed Constipation

## 2023-10-16 NOTE — DISCHARGE NOTE PROVIDER - DETAILS OF MALNUTRITION DIAGNOSIS/DIAGNOSES
This patient has been assessed with a concern for Malnutrition and was treated during this hospitalization for the following Nutrition diagnosis/diagnoses:     -  10/06/2023: Moderate protein-calorie malnutrition

## 2023-10-16 NOTE — DISCHARGE NOTE PROVIDER - HOSPITAL COURSE
Patient is a 53 yo F with history of stage IV lung cancer with mets to brain (s/p 4 cycles chemo and thoracic RT completed on oct 2022), gamma knife surgery to 7 brain mets , recent admission to [facility] from sept 4-13th for epigastric pain and hyperglycemia thought to be due to steroids, who presented to [facility] ED 10/5 complaining of 1 week of lower extremity weakness and intermittent blurry vision.  Patient was initially seen at the end of August and admitted for hx of migraines and dizziness, was started on  decadron 6mg q6hr and Depakote at that time. She returned to [facility] again in early september for worsening epigastric pain  in the setting of taking steroids without GI ppx. CTA at that time showing no PE, stable right apical lung mass and rapid interval growth of a left lower lobe mass and new hepatic mets. Pt finished dexamethasone taper on 9/7 and was discharged with protonix, depakote and follow up with outpatient rad onc Dr. Bernard, outpatient NSGY, and oncologist Dr. Beckman at [facility] with plans to start immunotherapy nivolumab as outpatient.  Since discharge on the 13th, patient noted weakness in her right lower extremity and 'muscle pain' which improves with massages. She used her walker at home however she feels weak and was requiring increasing support from her fiancee to ambulate/attend to daily needs. She noted also intermittent epigastric pain with radiation to the back. and endorsef some slight blurry vision over the last 2 weeks which she describes as "vision feels off" though is able to see.    Patient was admitted 9/30 and underwent R temporal crani for resection of metastatic lesion. Post-operative course was complicated by acute left cerebellar infarct found on post-op MRI, tachycardia, and thrombocytopenia. She was recommended for follow up with oncology for lung lesions, radiation oncology for brain, and dr. michelle for post-operative follow up. She will also follow up with Dr. Pam Randle in 6 months for findings on 10/4 CTA head. Plan for no ASA for secondary prevention until cleared by Dr. Michelle. Wound closure due to be removed postop day 10-14 in rehab. Patient was evaluated by PM&R and therapy for functional deficits and gait/ ADL impairments and recommended acute rehabilitation. Patient was medically optimized for discharge to [facility] on 10/5/23.      REHAB COURSE:  Patient was stable upon rehab admission to  [facility]. Admitted with gait instability, ADL, and functional impairments.     Rehab Course was significant for brief unresponsive episode 10/8 during which staff noted clenching of the jaw.     Patient tolerated course of inpatient PT/OT/SLP rehab with significant functional improvements and met rehab goals prior to discharge. Patient was medically stable and optimized for discharge ____ home with follow-up with ______. Patient is a 51 yo F with history of stage IV lung cancer with mets to brain (s/p 4 cycles chemo and thoracic RT completed on oct 2022), gamma knife surgery to 7 brain mets , recent admission to [facility] from sept 4-13th for epigastric pain and hyperglycemia thought to be due to steroids, who presented to [facility] ED 10/5 complaining of 1 week of lower extremity weakness and intermittent blurry vision.  Patient was initially seen at the end of August and admitted for hx of migraines and dizziness, was started on  decadron 6mg q6hr and Depakote at that time. She returned to [facility] again in early september for worsening epigastric pain  in the setting of taking steroids without GI ppx. CTA at that time showing no PE, stable right apical lung mass and rapid interval growth of a left lower lobe mass and new hepatic mets. Pt finished dexamethasone taper on 9/7 and was discharged with protonix, depakote and follow up with outpatient rad onc Dr. Bernard, outpatient NSGY, and oncologist Dr. Beckman at [facility] with plans to start immunotherapy nivolumab as outpatient.  Since discharge on the 13th, patient noted weakness in her right lower extremity and 'muscle pain' which improves with massages. She used her walker at home however she feels weak and was requiring increasing support from her fiancee to ambulate/attend to daily needs. She noted also intermittent epigastric pain with radiation to the back. and endorsef some slight blurry vision over the last 2 weeks which she describes as "vision feels off" though is able to see.    Patient was admitted 9/30 and underwent R temporal crani for resection of metastatic lesion. Post-operative course was complicated by acute left cerebellar infarct found on post-op MRI, tachycardia, and thrombocytopenia. She was recommended for follow up with oncology for lung lesions, radiation oncology for brain, and dr. michelle for post-operative follow up. She will also follow up with Dr. Pam Randle in 6 months for findings on 10/4 CTA head. Plan for no ASA for secondary prevention until cleared by Dr. Michelle. Wound closure due to be removed postop day 10-14 in rehab. Patient was evaluated by PM&R and therapy for functional deficits and gait/ ADL impairments and recommended acute rehabilitation. Patient was medically optimized for discharge to [facility] on 10/5/23.      REHAB COURSE:  Patient was stable upon rehab admission to  Inpatient Rehabilitation Facility. Admitted with gait instability, ADL, and functional impairments.     Rehab Course was significant for brief unresponsive episode 10/8 during which staff noted clenching of the jaw. Patient was started on empiric antibiotics and given IVF. Workup at this time was negative. Patient was seen by Neurology who had low suspicion for seizure and recommended continuation of current dose of AEDs. Cardiology noted low suspicion for cardiogenic syncope/arrhythmia; patient may follow for outpatient Holter if experiencing repeat episodes. Mechanism presumed possible vasovagal syncope. Patient has no similar issues for the remainder of her rehab course.   Patient was weaned off of insulin and started on MTF for blood glucose control.  Craniotomy staples were removed on 10/13.    Patient tolerated course of inpatient PT/OT/SLP rehab with significant functional improvements and met rehab goals prior to discharge. Patient was medically stable and optimized for discharge ____ home with follow-up with ______. Patient is a 51 yo F with history of stage IV lung cancer with mets to brain (s/p 4 cycles chemo and thoracic RT completed on oct 2022), gamma knife surgery to 7 brain mets , recent admission to [facility] from sept 4-13th for epigastric pain and hyperglycemia thought to be due to steroids, who presented to [facility] ED 10/5 complaining of 1 week of lower extremity weakness and intermittent blurry vision.  Patient was initially seen at the end of August and admitted for hx of migraines and dizziness, was started on  decadron 6mg q6hr and Depakote at that time. She returned to [facility] again in early september for worsening epigastric pain  in the setting of taking steroids without GI ppx. CTA at that time showing no PE, stable right apical lung mass and rapid interval growth of a left lower lobe mass and new hepatic mets. Pt finished dexamethasone taper on 9/7 and was discharged with protonix, depakote and follow up with outpatient rad onc Dr. Bernard, outpatient NSGY, and oncologist Dr. Beckman at [facility] with plans to start immunotherapy nivolumab as outpatient.  Since discharge on the 13th, patient noted weakness in her right lower extremity and 'muscle pain' which improves with massages. She used her walker at home however she feels weak and was requiring increasing support from her fiancee to ambulate/attend to daily needs. She noted also intermittent epigastric pain with radiation to the back. and endorsef some slight blurry vision over the last 2 weeks which she describes as "vision feels off" though is able to see.    Patient was admitted 9/30 and underwent R temporal crani for resection of metastatic lesion. Post-operative course was complicated by acute left cerebellar infarct found on post-op MRI, tachycardia, and thrombocytopenia. She was recommended for follow up with oncology for lung lesions, radiation oncology for brain, and dr. michelle for post-operative follow up. She will also follow up with Dr. Pam Randle in 6 months for findings on 10/4 CTA head. Plan for no ASA for secondary prevention until cleared by Dr. Michelle. Wound closure due to be removed postop day 10-14 in rehab. Patient was evaluated by PM&R and therapy for functional deficits and gait/ ADL impairments and recommended acute rehabilitation. Patient was medically optimized for discharge to [facility] on 10/5/23.      REHAB COURSE:  Patient was stable upon rehab admission to  Inpatient Rehabilitation Facility. Admitted with gait instability, ADL, and functional impairments.     Rehab Course was significant for brief unresponsive episode 10/8 during which staff noted clenching of the jaw. Patient was started on empiric antibiotics and given IVF. Workup at this time was negative. Patient was seen by Neurology who had low suspicion for seizure and recommended continuation of current dose of AEDs. Cardiology noted low suspicion for cardiogenic syncope/arrhythmia; patient may follow for outpatient Holter if experiencing repeat episodes. Mechanism presumed possible vasovagal syncope. Patient has no similar issues for the remainder of her rehab course.   Patient was weaned off of insulin and started on MTF for blood glucose control.  Craniotomy staples were removed on 10/13.    Patient tolerated course of inpatient PT/OT/SLP rehab with significant functional improvements and met rehab goals prior to discharge. Patient was medically stable and optimized for discharge home with follow-up with Neurosurgery, Oncology, Radiation Oncology, Neurology, PM&R, and PCP.

## 2023-10-16 NOTE — DISCHARGE NOTE PROVIDER - PROVIDER TOKENS
PROVIDER:[TOKEN:[[id number]]],PROVIDER:[TOKEN:[[id number]]],PROVIDER:[TOKEN:[[id number]]],PROVIDER:[TOKEN:[[id number]]] PROVIDER:[TOKEN:[[id number]]],PROVIDER:[TOKEN:[[id number]]],PROVIDER:[TOKEN:[[id number]]],PROVIDER:[TOKEN:[[id number]]],PROVIDER:[TOKEN:[[id number]]]

## 2023-10-16 NOTE — DISCHARGE NOTE PROVIDER - NPI NUMBER (FOR SYSADMIN USE ONLY) :
[[id number]],[[id number]] [[id number]],[[id number]],[[id number]],[[id number]],[[id number]]

## 2023-10-16 NOTE — CONSULT NOTE ADULT - SUBJECTIVE AND OBJECTIVE BOX
Patient seen alone at bedside for 50-minute psychology consultation. Neuropsychologist is introduced as a member of the rehabilitation team and the purpose of the session is explained. Patient agrees to participate.     Per patient, she underwent brain surgery on 9/30/23 to remove cancer that was "growing into my brain." She indicates moving along in her recovery, as her staples were removed and she is able to shower. She indicates a tentative plan for discharge to home later this week.     Medical records are reviewed. Per records: Patient is a 52-year-old, female, with history of stage IV lung cancer with mets to brain (s/p 4 cycles chemo and thoracic RT completed on oct 2022), gamma knife surgery to 7 brain mets , recent admission to [facility] from sept 4-13th for epigastric pain and hyperglycemia thought to be due to steroids, who presented to [facility] ED 10/5 complaining of 1 week of lower extremity weakness and intermittent blurry vision. Patient was initially seen at the end of August and admitted for hx of migraines and dizziness, was started on  decadron 6mg q6hr and Depakote at that time. She returned to [facility] again in early september for worsening epigastric pain  in the setting of taking steroids without GI ppx. CTA at that time showing no PE, stable right apical lung mass and rapid interval growth of a left lower lobe mass and new hepatic mets. Pt finished dexamethasone taper on 9/7 and was discharged with protonix, depakote and follow up with outpatient rad onc Dr. Bernard, outpatient NSGY, and oncologist Dr. Beckman at [facility] with plans to start immunotherapy nivolumab as outpatient. Since discharge on the 13th, patient noted weakness in her right lower extremity and 'muscle pain' which improves with massages. She used her walker at home however she feels weak and was requiring increasing support from her fiancee to ambulate/attend to daily needs. She noted also intermittent epigastric pain with radiation to the back. and endorsef some slight blurry vision over the last 2 weeks which she describes as "vision feels off" though is able to see.    Patient was admitted 9/30 and underwent R temporal crani for resection of metastatic lesion. Post-operative course was complicated by acute left cerebellar infarct found on post-op MRI, tachycardia, and thrombocytopenia. She was recommended for follow up with oncology for lung lesions, radiation oncology for brain, and dr. michelle for post-operative follow up. She will also follow up with Dr. Pam Randle in 6 months for findings on 10/4 CTA head. Plan for no ASA for secondary prevention until cleared by Dr. Michelle. Wound closure due to be removed postop day 10-14 in rehab. Patient was evaluated by PM&R and therapy for functional deficits and gait/ ADL impairments and recommended acute rehabilitation. Patient was medically optimized for discharge to [facility] on 10/5/23.

## 2023-10-16 NOTE — PROGRESS NOTE ADULT - SUBJECTIVE AND OBJECTIVE BOX
Patient is a 52y old  Female who presents with a chief complaint of Multiple metastatic brain lesions(left frontal, occipital and temporal) s/p R temporal craniotomy and resection  Right dominant hemiparesis (12 Oct 2023 12:52)    HPI:  Patient is a 51 yo F with history of stage IV lung cancer with mets to brain (s/p 4 cycles chemo and thoracic RT completed on oct 2022), gamma knife surgery to 7 brain mets , recent admission to [facility] from sept 4-13th for epigastric pain and hyperglycemia thought to be due to steroids, who presented to [facility] ED 10/5 complaining of 1 week of lower extremity weakness and intermittent blurry vision.  Patient was initially seen at the end of August and admitted for hx of migraines and dizziness, was started on  decadron 6mg q6hr and Depakote at that time. She returned to [facility] again in early september for worsening epigastric pain  in the setting of taking steroids without GI ppx. CTA at that time showing no PE, stable right apical lung mass and rapid interval growth of a left lower lobe mass and new hepatic mets. Pt finished dexamethasone taper on 9/7 and was discharged with protonix, depakote and follow up with outpatient rad onc Dr. Bernard, outpatient NSGY, and oncologist Dr. Beckman at [facility] with plans to start immunotherapy nivolumab as outpatient.  Since discharge on the 13th, patient noted weakness in her right lower extremity and 'muscle pain' which improves with massages. She used her walker at home however she feels weak and was requiring increasing support from her fiancee to ambulate/attend to daily needs. She noted also intermittent epigastric pain with radiation to the back. and endorsef some slight blurry vision over the last 2 weeks which she describes as "vision feels off" though is able to see.    Patient was admitted 9/30 and underwent R temporal crani for resection of metastatic lesion. Post-operative course was complicated by acute left cerebellar infarct found on post-op MRI, tachycardia, and thrombocytopenia. She was recommended for follow up with oncology for lung lesions, radiation oncology for brain, and dr. michelle for post-operative follow up. She will also follow up with Dr. Pam Randle in 6 months for findings on 10/4 CTA head. Plan for no ASA for secondary prevention until cleared by Dr. Michelle. Wound closure due to be removed postop day 10-14 in rehab. Patient was evaluated by PM&R and therapy for functional deficits and gait/ ADL impairments and recommended acute rehabilitation. Patient was medically optimized for discharge to [facility] on 10/5/23.      SUBJECTIVE/ROS:  - Patient was seen and examined at bed side, no new events over night.    - Slept well last night. Patient reports feeling well this morning; no new complaints.    - Pain is well-controlled with current meds.  - GI/: patient is moving bowels, LBM (10/14). Voiding without issues  - Tolerating 3 hours of therapy without issues - patient is making good progress.  - Denies fever, chills, CP, palpitation, cough, SOB, abdominal pain, N/V/D/C, headaches, dizziness, joint pain or swelling, dysuria, retention    MEDICATIONS  (STANDING):  artificial tears (preservative free) Ophthalmic Solution 1 Drop(s) Both EYES three times a day  atorvastatin 40 milliGRAM(s) Oral at bedtime  dexAMETHasone     Tablet 2 milliGRAM(s) Oral two times a day  dexAMETHasone     Tablet   Oral   dextrose 5%. 1000 milliLiter(s) (100 mL/Hr) IV Continuous <Continuous>  dextrose 5%. 1000 milliLiter(s) (50 mL/Hr) IV Continuous <Continuous>  dextrose 50% Injectable 25 Gram(s) IV Push once  dextrose 50% Injectable 25 Gram(s) IV Push once  dextrose 50% Injectable 12.5 Gram(s) IV Push once  divalproex  milliGRAM(s) Oral every 12 hours  enoxaparin Injectable 40 milliGRAM(s) SubCutaneous every 24 hours  glucagon  Injectable 1 milliGRAM(s) IntraMuscular once  insulin lispro (ADMELOG) corrective regimen sliding scale   SubCutaneous three times a day before meals  insulin lispro (ADMELOG) corrective regimen sliding scale   SubCutaneous at bedtime  metFORMIN 500 milliGRAM(s) Oral two times a day  pantoprazole    Tablet 40 milliGRAM(s) Oral before breakfast  sodium chloride 0.9%. 1000 milliLiter(s) (1000 mL/Hr) IV Continuous <Continuous>    MEDICATIONS  (PRN):  acetaminophen     Tablet .. 650 milliGRAM(s) Oral every 6 hours PRN Mild Pain (1 - 3), Moderate Pain (4 - 6)  dextrose Oral Gel 15 Gram(s) Oral once PRN Blood Glucose LESS THAN 70 milliGRAM(s)/deciliter  senna 2 Tablet(s) Oral at bedtime PRN Constipation      RECENT LABS:                        11.0   7.75  )-----------( 155      ( 16 Oct 2023 06:30 )             34.3     10-16    135  |  101  |  12  ----------------------------<  121<H>  3.7   |  24  |  0.50    Ca    9.0      16 Oct 2023 06:30    TPro  6.6  /  Alb  2.1<L>  /  TBili  0.2  /  DBili  x   /  AST  46<H>  /  ALT  26  /  AlkPhos  78  10-16    LIVER FUNCTIONS - ( 16 Oct 2023 06:30 )  Alb: 2.1 g/dL / Pro: 6.6 g/dL / ALK PHOS: 78 U/L / ALT: 26 U/L / AST: 46 U/L / GGT: x             PHYSICAL EXAM:  Vital Signs Last 24 Hrs  T(C): 36.7 (16 Oct 2023 09:04), Max: 37.2 (15 Oct 2023 20:38)  T(F): 98.1 (16 Oct 2023 09:04), Max: 98.9 (15 Oct 2023 20:38)  HR: 107 (16 Oct 2023 09:04) (96 - 107)  BP: 108/74 (16 Oct 2023 09:04) (98/63 - 108/74)  RR: 16 (16 Oct 2023 09:04) (16 - 16)  SpO2: 97% (16 Oct 2023 09:04) (96% - 97%)    Gen - NAD, Comfortable  HEENT - NCAT, EOMI, PERRLA, incision is C/D/I.  Pulm - CTAB,  No crackles  Cardiovascular - RRR, S1S2  Abdomen - Soft, NT, ND, +BS  Extremities - No C/C/E, No calf tenderness  Neuro-     Cognitive - AAOx4, follows command     Communication - Fluent, No dysarthria     Attention: able to state days of the week backwards     Memory: Recall 3 objects immediate, delayed recall 1/3 without categorical cues and 2/3 with categorical cues         Cranial Nerves - no facial asymmetry, tongue midline, EOMI     Motor -                     LEFT    UE - ShAB 4/5, EF 5/5, EE 5/5, WE 4/5,  4/5                    RIGHT UE - ShAB 3/5, EF 3/5, EE 3/5, WE 3/5,  3+/5, +slight pronator drift                    LEFT    LE - HF 4/5, KE 5/5, DF 5/5, PF 5/5                    RIGHT LE - HF 3/5, KE 3/5, DF 3/5, PF 5/5             Coordination - slowing with right FTN and (+) dysmateria, left intact  Psychiatric - Mood stable, Affect WNL  Skin:  all skin intact    Wounds: right craniotomy incision with staples in place and dried blood      IDT rounds 10/10  SW - lives in [address] none inside, lives with fiance, independent prior, fiance and landlord can provide intermittenet support  SLP - reg/thin, mod reduced language impacted by mod cog, reasoning, prob solving, mild dysarthria. Will need assistance with higher level cog  OT - Setup ADLs, Matthew LBD, CG transfers; Goals supervision/setup  PT - CG txf, amb 100'RW Matthew, stairs N/A; Goals independent for BM/txf, supverision amb  TDD: 10/18 Home

## 2023-10-16 NOTE — CONSULT NOTE ADULT - ASSESSMENT
Patient is in bed. Family/visitors are not present. Patient is alert and oriented to person, place, and situation. She is initially confused regarding the date "Yesterday was Halloween." With prompts, she self-corrects date. Speech is fluent, clear and comprehensible. She denies pain presently. Appetite is reported as good. Sleep is reported as adequate.     Emotional functioning: Mood appears sad at times, affect is intermittently tearful. 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 She is having some difficulty depending on assistance from others. She wants to get back to work and to her normal routine,  though recognizes she needs to prioritize her health at this time. On self-report screening of recent mood, her responses suggest minimal anxiety (ZEEFRINO-7 = 1/21) and minimal depression (PHQ-9 = 0/27). She denies ideation, plan, or intent to harm self or other. She denies hallucinations. She denies use of ETOH and does not smoke. Per records, she is a former marijuana user. She denies prior psychiatric history.     Psychosocial history: She was born and raised in [place]. She completed an 8th grade education and left school to begin working. She has worked in various clerical positions. 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 She resides with her fiance. She has 3 sisters who reside out of state. She notes one of her sisters will be coming to stay with her post-discharge. She reports a support system of family. She indicates being a person of kwesi and that prayer is a comfort.     Impression: Patient with adjustment difficulty, with mild depression features, associated with current health concerns. Patient reports change in physical functioning and independence. She perceives rehabilitation gains thus far, and is hopeful for continued gains.      Psychoeducation is provided regarding the rehabilitation process and coping with changes in functioning. Cognitive-behavioral approach is used to address the interaction between thoughts and feelings. Support and encouragement are provided. She expresses appreciation for session.     Plan: Individual psychotherapy to address adjustment. Patient in agreement with plan. Neuropsychology remains available.

## 2023-10-16 NOTE — PROGRESS NOTE ADULT - PROBLEM SELECTOR PLAN 1
NSCLC metastatic to the brain, s/p seizure versus syncopal episode, on dexamethasone and Depakote.  Reviewed CT head from 10/9/2023 consistent with unchanged vasogenic edema.  Patient is recovering s/p right temporal craniotomy after SRS.  Continues close neurologic follow-up.  Needs steroid taper.  No plans for systemic chemotherapy during this hospitalization.  Case discussed with patient's oncologist at [facility].

## 2023-10-16 NOTE — PROGRESS NOTE ADULT - SUBJECTIVE AND OBJECTIVE BOX
HARJEET WHITMORE, 52y Female  MRN: [id number]  ATTENDING: Octavio Lange    HPI:  52F, PMHx NSCLC with multiple brain metastases, s/p palliative systemic chemotherapy x4 cycles and thoracic RT completed October 2022, s/p SRS 7 brain metastases, who presented 10/5/2023 with lower extremity weakness and intermittent blurry vision.  Patient admitted at [facility] ED, started on dexamethasone 6 mg every 6 hours, and Depakote.  Patient was subsequently discharged, but still experiencing pain in the right lower extremity.  Ambulates with a walker.  On 9/30/2023 patient was admitted for right temporal craniotomy with resection of metastatic lesion.  Postoperative course was complicated by acute left cerebellar infarct, tachycardia and mild thrombocytopenia.  Once stabilized, patient was evaluated for PM&R and was discharged at [place] re[facility] on 10/5/2023.  Patient is under the care at [facility] and [facility] at [facility]; she was slated to start nivolumab monotherapy in ambulatory.    MEDICATIONS:  acetaminophen     Tablet .. 650 milliGRAM(s) Oral every 6 hours PRN  artificial tears (preservative free) Ophthalmic Solution 1 Drop(s) Both EYES three times a day  atorvastatin 40 milliGRAM(s) Oral at bedtime  dexAMETHasone     Tablet 2 milliGRAM(s) Oral two times a day  dexAMETHasone     Tablet   Oral   dextrose 5%. 1000 milliLiter(s) IV Continuous <Continuous>  dextrose 5%. 1000 milliLiter(s) IV Continuous <Continuous>  dextrose 50% Injectable 25 Gram(s) IV Push once  dextrose 50% Injectable 25 Gram(s) IV Push once  dextrose 50% Injectable 12.5 Gram(s) IV Push once  dextrose Oral Gel 15 Gram(s) Oral once PRN  divalproex  milliGRAM(s) Oral every 12 hours  enoxaparin Injectable 40 milliGRAM(s) SubCutaneous every 24 hours  glucagon  Injectable 1 milliGRAM(s) IntraMuscular once  insulin lispro (ADMELOG) corrective regimen sliding scale   SubCutaneous three times a day before meals  insulin lispro (ADMELOG) corrective regimen sliding scale   SubCutaneous at bedtime  metFORMIN 500 milliGRAM(s) Oral two times a day  pantoprazole    Tablet 40 milliGRAM(s) Oral before breakfast  senna 2 Tablet(s) Oral at bedtime PRN  sodium chloride 0.9%. 1000 milliLiter(s) IV Continuous <Continuous>    All other medications reviewed.    SUBJECTIVE:  slept well; no events over night.    VITALS:  T(C): 36.7 (10-16-23 @ 09:04), Max: 37.2 (10-15-23 @ 20:38)  T(F): 98.1 (10-16-23 @ 09:04), Max: 98.9 (10-15-23 @ 20:38)  HR: 107 (10-16-23 @ 09:04) (96 - 107)  BP: 108/74 (10-16-23 @ 09:04) (98/63 - 108/74)      PHYSICAL EXAM:  Constitutional: alert, in NAD  HEENT: normocephalic, anicteric sclerae, no mucositis or thrush  Respiratory: bilateral clear to auscultation anteriorly  Cardiovascular : S1, S2 regular, rhythmic, no murmurs, gallops or rubs  Abdomen: soft, distended, + normoactive BS, no palpable HS- megaly  Extremities: no tenderness;  -c/c/e, pulses equal bilaterally    LABS:  (10-16) WBC: 7.75 K/uL,Hemoglobin: 11.0 g/dL, Hematocrit: 34.3 %,  Platelet: 155 K/uL  (10-16) Na: 135 mmol/L ; K: 3.7 mmol/L ; BUN: 12 mg/dL ; Cr: 0.50 mg/dL.    RADIOLOGY:  (10-12) WBC: 8.64 K/uL,Hemoglobin: 10.2 g/dL, Hematocrit: 31.4 %,  Platelet: 128 K/uL    RADIOLOGY:  ACC: [id number] EXAM:  CT ANGIO NECK (W)AW IC   ORDERED BY: YEE COPELAND     ACC: [id number] EXAM:  CT ANGIO BRAIN (W)AW IC   ORDERED BY: YEE COPELAND     ACC: [id number] EXAM:  CT BRAIN   ORDERED BY: YEE COPELAND     PROCEDURE DATE:  10/04/2023          INTERPRETATION:  EXAM: CTA HEAD AND NECK    HISTORY:stroke    TECHNIQUE: CTA of the head and neck was acquired from the lung apices to   the skull vertex. Sagittal and coronal reconstructions were subsequently   conducted. Omnipaque 350 Intravenous contrast was administered. 2-D MIP   images were provided.    COMPARISON: CT of the head October 1, 2023, MRI of the brain October 2, 2023    FINDINGS:    CT HEAD:  There are postoperative changes related to a recent right temporal   craniotomyfor resection of a metastatic lesion. Again, the operative   cavity is in part fluid-filled with postoperative blood. There is   persistent surrounding vasogenic edema. The previously seen   pneumocephalus along the frontal convexities has greatly improved. There   is a thin postoperative extra-axial collection overlying the operative   bed, relatively stable. There is a small midline shift to the left of   approximately 0.3 cm.    The previously seen partially calcified lesion in the left   parietal/occipital lobes is again redemonstrated, relatively stable. The   lesion in the high left frontal lobe is again demonstrated, stable. There   is surrounding vasogenic edema.    The previously seen acute infarct in the left cerebellar hemisphere is   again redemonstrated.    The cranial cervical junction is within normal limits. The sella is not   expanded. There is no depressed calvarial fracture. The visualized   paranasal sinuses are well aerated. The mastoid air cells are well   aerated. The visualized orbits are within normal limits.    CTA BRAIN:  The intracranial segments of the bilateral ICAs has high with   intraluminal contrast. There is a 1 to 2 mm medially oriented vascular   outpouching from the right ICA terminus, image 3 1 of series 3.    The bilateral MCAs and ACAs are within normal limits. The anterior   commuting artery is unremarkable.    The bilateral vertebral arteries opacify with intraluminal contrast. The   left vertebral artery is dominant. The basilar artery is unremarkable.   The proximal SCAs and PCAs are within normal limits.      CTA NECK:  There is a three-vessel arch. The common carotid arteries opacify   normally width intraluminal contrast. The carotid bulbs are unremarkable.   The bilateral ICAs opacify normally with intraluminal contrast to the   skull base.    The vertebral arteries have normal origins. The left vertebral artery is   dominant. The vertebral arteries opacify normally with intraluminal   contrast to the skull base.    There are multiple hypo and hyperattenuating nodules in the bilateral   lobes of the thyroid, right greater than left.    There are mild degenerative changes in the cervical spine. There are mild   degenerative changes in the bilateral TMJs    The partially visualized lung mass is seen in the right lung apex. Mild   emphysematous changes are seen in the bilateral lung apices.    IMPRESSION:    CT HEAD:  1.  Redemonstrated postoperative changes related to a prior right   temporal craniotomy for resection of a metastatic lesion. Postoperative   findings are relatively stable.  2.  Other metastatic lesions seen throughout the brain parenchyma with   surrounding vasogenic edema, again relatively stable.  3.  Redemonstrated acute infarct in the left cerebellar hemisphere,   stable from recent MR imaging.    CTA BRAIN:  1.  No evidence of abrupt cut off of intraluminal contrast.  2.  1 to 2 mm medially oriented vascular outpouching from the right ICA   terminus. This could represent a small saccular aneurysm versus a   vascular infundibulum.    CTA NECK:  1.  No evidence of critical stenosis by NASCET criteria.  2.  Multiple nodules seen in the bilateral lobes of the thyroid.   Recommend nonemergent thyroid ultrasound for further characterization if  clinically indicated.  3.  Redemonstrated partially visualized right apex lung mass. Correlate   with recent CT imaging of the chest for further evaluation.

## 2023-10-16 NOTE — DISCHARGE NOTE PROVIDER - CARE PROVIDER_API CALL
Marcus Metcalf  Neurosurgery  [address], [address] 1  [place] [address]  Phone: [phone]  Fax: [phone]  Follow Up Time:     Maritza Kohler  Hematology  [address] [place], [place] [address]  Phone: [phone]  Fax: [phone]  Follow Up Time:     Kiearn Figueroa  Neurology  [address] [place], [place] [address]  Phone: [phone]  Fax: [phone]  Follow Up Time:     Octavio Lange  Physical/Rehab Medicine  Follow Up Time:    Mracus Metcalf  Neurosurgery  [address] [facility] [facility], [address]  [place] [address]  Phone: [phone]  Fax: [phone]  Follow Up Time:     Maritza Kohler  Hematology  [address] [place] [address]  Phone: [phone]  Fax: [phone]  Follow Up Time:     Kieran Figueroa  Neurology  [address] [place] [address]  Phone: [phone]  Fax: [phone]  Follow Up Time:     Octavio Lange  Physical/Rehab Medicine  Follow Up Time:     Margarito Beckman  Medical Oncology  [address] [place] [address]  Phone: [phone]  Fax: [phone]  Follow Up Time:

## 2023-10-16 NOTE — CONSULT NOTE ADULT - CONSULT REASON
Syncope
Adjustment to diagnosis and deficits.
lung cancer
unresponsive with jaw clenching for a few seconds.
medical co management

## 2023-10-16 NOTE — PROGRESS NOTE ADULT - ASSESSMENT
Assessment	  Patient is a 53 yo F with history of stage IV lung cancer with mets to brain (s/p 4 cycles chemo and thoracic RT completed on oct 2022), gamma knife surgery to 7 brain mets, who presented to [facility] ED 10/5 complaining of 1 week of lower extremity weakness and intermittent blurry vision. now s/p R temporal crani for resection of metastatic lesion. Post-operative course was complicated by acute cerebellar infarct found on post-op MRI, tachycardia, and thrombocytopenia. Admitted to [facility] acute inpatient rehab on 10/5 for ADL, gait, and functional impairments.     #Unresponsive episode c/f Seizure vs. Syncope  - Depakote level is 55 (01/09)  - Procalcitionin level (10/08) 27, (10/09) 25.8  - Lactate level 2.2 (01/08), 2.5 (10/09)   - Started on Zosyn 3.375 gm IVPB q 8 hours x 7 days.   - Head CT (10/09) IMPRESSION: No new acute abnormalities are identified.  Reidentified is a right temporal craniotomy for prior resection of a metastasis . Small amount of air and hemorrhage in the surgical cavity is present. Underlying lesion is not well evaluated on this noncontrast CT exam. Surrounding vasogenic edema is unchanged. Previously visualized minimal right to left midline shift has improved.  Previously visualized partially calcified lesion in the left occipital lobe, with surrounding vasogenic edema is unchanged. Lesion in the left high frontal lobe with surrounding vasogenic edema is unchanged.  Previously visualized acute infarct in the left cerebellum is not seen to good advantage on this study.   - Neurology Consult appreciated  - Neurosurgery Dr. Metcalf updated    # Multiple metastatic brain lesions s/p Crani and temporal lesion resection/ Right dominant hemiparesis   - Comprehensive Multidisciplinary Rehab Program: 3 hours a day, 5 days a week with PT/OT/SLP  - Decadron taper - 3mg BID x2 days then 2mg BID through follow up  - Depakote 500mg BID seizure prophylaxis  - Pain control with oxy and tylenol  - Staples were removed on (10/13)  - Outpatient follow up with Dr. Metcalf  - Fall, aspiration precautions    # Acute Cerebellar infarct  - No ASA until cleared by neurosurgery, Atorvastatin 40 mg po daily   - PT/OT for balance and coordination, ambulation, transfer, ADLs.  3 hours daily   - Hospitalist F/U     #Lung Cancer with metastasis  - Depakote, decadron as above  - Outpatient oncology follow up with Dr. Beckman  - Recent CT c/a/p showed enlarging lung lesion and new liver metastases  - Per onc - patient will require rad/on assessment for post-op radiation  - Oncology consult was completed   - S/p CRT last June 2023  - Hospitalist consult appreciated    # Diplopia/ Blurry vision   - Likely in setting of malignancy/mets/edema  - Seen by ophtho  - Artificial tears PRN. Eye patch PRN for comfort    # Type 2 Diabetes Mellitus with steroid induced hyperglycemia/Hypoglycemia   - Recent A1c 8.0%  - Monitor FSBG TIDAC/QHS  - Consistent Carbohydrate diet  - Lantus and premeal admelog now discontinued; started Metformin 500 mg po x 2 daily   - Mod SSI AC, low SSI HS  - Hospitalist consult appreciated    # Mood/Cognition:  - Neuropsychology consult PRN    # Pain Management:  - Tylenol PRN  - Oxycodone 5mg q4 hours PRN severe Pain    # GI/Bowel:  - Senna QHS, Miralax daily PRN  - GI ppx: Protonix    # /Bladder:   - Bladder scan q8 hours on admission, SC PRN  - Voiding without issues   - Encourage timed voids every 4 hours while awake     # Skin/Pressure Injury:  - Skin assessment on admission: intact  - Monitor Incisions: right craniotomy incision with staples in place and dried blood  - Turn every 2 hours while in bed    # Diet:   - Diet Consistency/Modifications: Reg/thin/CC  - Aspiration Precautions  - SLP consult for swallow function evaluation and treatment, recs appreciated     # DVT ppx:  - Lovenox    # Restrictions/Precautions:  - Precautions: Fall, aspiration    ---------------  Outpatient Follow-up:    Marcus Metcalf  Neurosurgery  [address] [facility], [address] 1  [place] [address]  Phone: [phone]  Fax: [phone]  Follow Up Time:     Maritza Kohler  Hematology  [address] [place] [address]  Phone: [phone]  Fax: [phone]  Follow Up Time:     Kieran Figueroa  Neurology  [address] [place] [address]  Phone: [phone]  Fax: [phone]  Follow Up Time:  --------------    Goals: Safe discharge to home  Estimated Length of Stay: 10-14 days  Rehab Potential: Good  Medical Prognosis: Good  Estimated Disposition: Home with Home Care  ---------------

## 2023-10-16 NOTE — DISCHARGE NOTE PROVIDER - CARE PROVIDERS DIRECT ADDRESSES
,[email or web address],DirectAddress_Unknown,DirectAddress_Unknown ,[email or web address],DirectAddress_Unknown,DirectAddress_Unknown,[email or web address]

## 2023-10-17 NOTE — PROGRESS NOTE ADULT - SUBJECTIVE AND OBJECTIVE BOX
HARJEET WHITMORE, 52y Female  MRN: [id number]  ATTENDING: cOtavio Lange    HPI:  52F, PMHx NSCLC with multiple brain metastases, s/p palliative systemic chemotherapy x4 cycles and thoracic RT completed October 2022, s/p SRS 7 brain metastases, who presented 10/5/2023 with lower extremity weakness and intermittent blurry vision.  Patient admitted at [facility] ED, started on dexamethasone 6 mg every 6 hours, and Depakote.  Patient was subsequently discharged, but still experiencing pain in the right lower extremity.  Ambulates with a walker.  On 9/30/2023 patient was admitted for right temporal craniotomy with resection of metastatic lesion.  Postoperative course was complicated by acute left cerebellar infarct, tachycardia and mild thrombocytopenia.  Once stabilized, patient was evaluated for PM&R and was discharged at [facility] on 10/5/2023.  Patient is under the care at [facility]; she was slated to start nivolumab monotherapy in ambulatory.    MEDICATIONS:  acetaminophen     Tablet .. 650 milliGRAM(s) Oral every 6 hours PRN  artificial tears (preservative free) Ophthalmic Solution 1 Drop(s) Both EYES three times a day  atorvastatin 40 milliGRAM(s) Oral at bedtime  dexAMETHasone     Tablet 2 milliGRAM(s) Oral two times a day  dexAMETHasone     Tablet   Oral   dextrose 5%. 1000 milliLiter(s) IV Continuous <Continuous>  dextrose 5%. 1000 milliLiter(s) IV Continuous <Continuous>  dextrose 50% Injectable 25 Gram(s) IV Push once  dextrose 50% Injectable 25 Gram(s) IV Push once  dextrose 50% Injectable 12.5 Gram(s) IV Push once  dextrose Oral Gel 15 Gram(s) Oral once PRN  divalproex  milliGRAM(s) Oral every 12 hours  enoxaparin Injectable 40 milliGRAM(s) SubCutaneous every 24 hours  glucagon  Injectable 1 milliGRAM(s) IntraMuscular once  insulin lispro (ADMELOG) corrective regimen sliding scale   SubCutaneous three times a day before meals  insulin lispro (ADMELOG) corrective regimen sliding scale   SubCutaneous at bedtime  metFORMIN 500 milliGRAM(s) Oral two times a day  pantoprazole    Tablet 40 milliGRAM(s) Oral before breakfast  senna 2 Tablet(s) Oral at bedtime PRN  sodium chloride 0.9%. 1000 milliLiter(s) IV Continuous <Continuous>    All other medications reviewed.    SUBJECTIVE:  slept well; no events over night.    VITALS:  T(C): 36.7 (10-16-23 @ 09:04), Max: 37.2 (10-15-23 @ 20:38)  T(F): 98.1 (10-16-23 @ 09:04), Max: 98.9 (10-15-23 @ 20:38)  HR: 107 (10-16-23 @ 09:04) (96 - 107)  BP: 108/74 (10-16-23 @ 09:04) (98/63 - 108/74)      PHYSICAL EXAM:  Constitutional: alert, in NAD  HEENT: normocephalic, anicteric sclerae, no mucositis or thrush  Respiratory: bilateral clear to auscultation anteriorly  Cardiovascular : S1, S2 regular, rhythmic, no murmurs, gallops or rubs  Abdomen: soft, distended, + normoactive BS, no palpable HS- megaly  Extremities: no tenderness;  -c/c/e, pulses equal bilaterally    LABS:  (10-16) WBC: 7.75 K/uL,Hemoglobin: 11.0 g/dL, Hematocrit: 34.3 %,  Platelet: 155 K/uL  (10-16) Na: 135 mmol/L ; K: 3.7 mmol/L ; BUN: 12 mg/dL ; Cr: 0.50 mg/dL.    RADIOLOGY:  (10-12) WBC: 8.64 K/uL,Hemoglobin: 10.2 g/dL, Hematocrit: 31.4 %,  Platelet: 128 K/uL    RADIOLOGY:  ACC: [id number] EXAM:  CT ANGIO NECK (W)AW IC   ORDERED BY: YEE COPELAND     ACC: [id number] EXAM:  CT ANGIO BRAIN (W)AW IC   ORDERED BY: YEE COPELAND     ACC: [id number] EXAM:  CT BRAIN   ORDERED BY: YEE COPELAND     PROCEDURE DATE:  10/04/2023          INTERPRETATION:  EXAM: CTA HEAD AND NECK    HISTORY:stroke    TECHNIQUE: CTA of the head and neck was acquired from the lung apices to   the skull vertex. Sagittal and coronal reconstructions were subsequently   conducted. Omnipaque 350 Intravenous contrast was administered. 2-D MIP   images were provided.    COMPARISON: CT of the head October 1, 2023, MRI of the brain October 2, 2023    FINDINGS:    CT HEAD:  There are postoperative changes related to a recent right temporal   craniotomyfor resection of a metastatic lesion. Again, the operative   cavity is in part fluid-filled with postoperative blood. There is   persistent surrounding vasogenic edema. The previously seen   pneumocephalus along the frontal convexities has greatly improved. There   is a thin postoperative extra-axial collection overlying the operative   bed, relatively stable. There is a small midline shift to the left of   approximately 0.3 cm.    The previously seen partially calcified lesion in the left   parietal/occipital lobes is again redemonstrated, relatively stable. The   lesion in the high left frontal lobe is again demonstrated, stable. There   is surrounding vasogenic edema.    The previously seen acute infarct in the left cerebellar hemisphere is   again redemonstrated.    The cranial cervical junction is within normal limits. The sella is not   expanded. There is no depressed calvarial fracture. The visualized   paranasal sinuses are well aerated. The mastoid air cells are well   aerated. The visualized orbits are within normal limits.    CTA BRAIN:  The intracranial segments of the bilateral ICAs has high with   intraluminal contrast. There is a 1 to 2 mm medially oriented vascular   outpouching from the right ICA terminus, image 3 1 of series 3.    The bilateral MCAs and ACAs are within normal limits. The anterior   commuting artery is unremarkable.    The bilateral vertebral arteries opacify with intraluminal contrast. The   left vertebral artery is dominant. The basilar artery is unremarkable.   The proximal SCAs and PCAs are within normal limits.      CTA NECK:  There is a three-vessel arch. The common carotid arteries opacify   normally width intraluminal contrast. The carotid bulbs are unremarkable.   The bilateral ICAs opacify normally with intraluminal contrast to the   skull base.    The vertebral arteries have normal origins. The left vertebral artery is   dominant. The vertebral arteries opacify normally with intraluminal   contrast to the skull base.    There are multiple hypo and hyperattenuating nodules in the bilateral   lobes of the thyroid, right greater than left.    There are mild degenerative changes in the cervical spine. There are mild   degenerative changes in the bilateral TMJs    The partially visualized lung mass is seen in the right lung apex. Mild   emphysematous changes are seen in the bilateral lung apices.    IMPRESSION:    CT HEAD:  1.  Redemonstrated postoperative changes related to a prior right   temporal craniotomy for resection of a metastatic lesion. Postoperative   findings are relatively stable.  2.  Other metastatic lesions seen throughout the brain parenchyma with   surrounding vasogenic edema, again relatively stable.  3.  Redemonstrated acute infarct in the left cerebellar hemisphere,   stable from recent MR imaging.    CTA BRAIN:  1.  No evidence of abrupt cut off of intraluminal contrast.  2.  1 to 2 mm medially oriented vascular outpouching from the right ICA   terminus. This could represent a small saccular aneurysm versus a   vascular infundibulum.    CTA NECK:  1.  No evidence of critical stenosis by NASCET criteria.  2.  Multiple nodules seen in the bilateral lobes of the thyroid.   Recommend nonemergent thyroid ultrasound for further characterization if  clinically indicated.  3.  Redemonstrated partially visualized right apex lung mass. Correlate   with recent CT imaging of the chest for further evaluation.         HARJEET WHITMORE, 52y Female  MRN: [id number]  ATTENDING: Octavio Lange    HPI:  52F, PMHx NSCLC with multiple brain metastases, s/p palliative systemic chemotherapy x4 cycles and thoracic RT completed October 2022, s/p SRS 7 brain metastases, who presented 10/5/2023 with lower extremity weakness and intermittent blurry vision.  Patient admitted at [facility] ED, started on dexamethasone 6 mg every 6 hours, and Depakote.  Patient was subsequently discharged, but still experiencing pain in the right lower extremity.  Ambulates with a walker.  On 9/30/2023 patient was admitted for right temporal craniotomy with resection of metastatic lesion.  Postoperative course was complicated by acute left cerebellar infarct, tachycardia and mild thrombocytopenia.  Once stabilized, patient was evaluated for PM&R and was discharged at [place] [facility] on 10/5/2023.  Patient is under the care at [facility]; she was slated to start nivolumab monotherapy in ambulatory.    MEDICATIONS:  acetaminophen     Tablet .. 650 milliGRAM(s) Oral every 6 hours PRN  artificial tears (preservative free) Ophthalmic Solution 1 Drop(s) Both EYES three times a day  atorvastatin 40 milliGRAM(s) Oral at bedtime  dexAMETHasone     Tablet 2 milliGRAM(s) Oral two times a day  dexAMETHasone     Tablet   Oral   dextrose 5%. 1000 milliLiter(s) IV Continuous <Continuous>  dextrose 5%. 1000 milliLiter(s) IV Continuous <Continuous>  dextrose 50% Injectable 25 Gram(s) IV Push once  dextrose 50% Injectable 25 Gram(s) IV Push once  dextrose 50% Injectable 12.5 Gram(s) IV Push once  dextrose Oral Gel 15 Gram(s) Oral once PRN  divalproex  milliGRAM(s) Oral every 12 hours  enoxaparin Injectable 40 milliGRAM(s) SubCutaneous every 24 hours  glucagon  Injectable 1 milliGRAM(s) IntraMuscular once  insulin lispro (ADMELOG) corrective regimen sliding scale   SubCutaneous three times a day before meals  insulin lispro (ADMELOG) corrective regimen sliding scale   SubCutaneous at bedtime  metFORMIN 500 milliGRAM(s) Oral two times a day  pantoprazole    Tablet 40 milliGRAM(s) Oral before breakfast  senna 2 Tablet(s) Oral at bedtime PRN  sodium chloride 0.9%. 1000 milliLiter(s) IV Continuous <Continuous>    All other medications reviewed.    SUBJECTIVE:  Appears stable clinically; denies chest pain or dyspnea.  No events overnight.    VITALS:  T(C): 36.7 (10-16-23 @ 09:04), Max: 37.2 (10-15-23 @ 20:38)  T(F): 98.1 (10-16-23 @ 09:04), Max: 98.9 (10-15-23 @ 20:38)  HR: 107 (10-16-23 @ 09:04) (96 - 107)  BP: 108/74 (10-16-23 @ 09:04) (98/63 - 108/74)      PHYSICAL EXAM:  Constitutional: alert, in NAD  HEENT: normocephalic, anicteric sclerae, no mucositis or thrush  Respiratory: bilateral clear to auscultation anteriorly  Cardiovascular : S1, S2 regular, rhythmic, no murmurs, gallops or rubs  Abdomen: soft, distended, + normoactive BS, no palpable HS- megaly  Extremities: no tenderness;  -c/c/e, pulses equal bilaterally    LABS:  (10-16) WBC: 7.75 K/uL,Hemoglobin: 11.0 g/dL, Hematocrit: 34.3 %,  Platelet: 155 K/uL  (10-16) Na: 135 mmol/L ; K: 3.7 mmol/L ; BUN: 12 mg/dL ; Cr: 0.50 mg/dL.    RADIOLOGY:  (10-12) WBC: 8.64 K/uL,Hemoglobin: 10.2 g/dL, Hematocrit: 31.4 %,  Platelet: 128 K/uL    RADIOLOGY:  ACC: [id number] EXAM:  CT ANGIO NECK (W)AW IC   ORDERED BY: YEE COPELAND     ACC: [id number] EXAM:  CT ANGIO BRAIN (W)AW IC   ORDERED BY: YEE COPELAND     ACC: [id number] EXAM:  CT BRAIN   ORDERED BY: YEE COPELAND     PROCEDURE DATE:  10/04/2023          INTERPRETATION:  EXAM: CTA HEAD AND NECK    HISTORY:stroke    TECHNIQUE: CTA of the head and neck was acquired from the lung apices to   the skull vertex. Sagittal and coronal reconstructions were subsequently   conducted. Omnipaque 350 Intravenous contrast was administered. 2-D MIP   images were provided.    COMPARISON: CT of the head October 1, 2023, MRI of the brain October 2, 2023    FINDINGS:    CT HEAD:  There are postoperative changes related to a recent right temporal   craniotomyfor resection of a metastatic lesion. Again, the operative   cavity is in part fluid-filled with postoperative blood. There is   persistent surrounding vasogenic edema. The previously seen   pneumocephalus along the frontal convexities has greatly improved. There   is a thin postoperative extra-axial collection overlying the operative   bed, relatively stable. There is a small midline shift to the left of   approximately 0.3 cm.    The previously seen partially calcified lesion in the left   parietal/occipital lobes is again redemonstrated, relatively stable. The   lesion in the high left frontal lobe is again demonstrated, stable. There   is surrounding vasogenic edema.    The previously seen acute infarct in the left cerebellar hemisphere is   again redemonstrated.    The cranial cervical junction is within normal limits. The sella is not   expanded. There is no depressed calvarial fracture. The visualized   paranasal sinuses are well aerated. The mastoid air cells are well   aerated. The visualized orbits are within normal limits.    CTA BRAIN:  The intracranial segments of the bilateral ICAs has high with   intraluminal contrast. There is a 1 to 2 mm medially oriented vascular   outpouching from the right ICA terminus, image 3 1 of series 3.    The bilateral MCAs and ACAs are within normal limits. The anterior   commuting artery is unremarkable.    The bilateral vertebral arteries opacify with intraluminal contrast. The   left vertebral artery is dominant. The basilar artery is unremarkable.   The proximal SCAs and PCAs are within normal limits.      CTA NECK:  There is a three-vessel arch. The common carotid arteries opacify   normally width intraluminal contrast. The carotid bulbs are unremarkable.   The bilateral ICAs opacify normally with intraluminal contrast to the   skull base.    The vertebral arteries have normal origins. The left vertebral artery is   dominant. The vertebral arteries opacify normally with intraluminal   contrast to the skull base.    There are multiple hypo and hyperattenuating nodules in the bilateral   lobes of the thyroid, right greater than left.    There are mild degenerative changes in the cervical spine. There are mild   degenerative changes in the bilateral TMJs    The partially visualized lung mass is seen in the right lung apex. Mild   emphysematous changes are seen in the bilateral lung apices.    IMPRESSION:    CT HEAD:  1.  Redemonstrated postoperative changes related to a prior right   temporal craniotomy for resection of a metastatic lesion. Postoperative   findings are relatively stable.  2.  Other metastatic lesions seen throughout the brain parenchyma with   surrounding vasogenic edema, again relatively stable.  3.  Redemonstrated acute infarct in the left cerebellar hemisphere,   stable from recent MR imaging.    CTA BRAIN:  1.  No evidence of abrupt cut off of intraluminal contrast.  2.  1 to 2 mm medially oriented vascular outpouching from the right ICA   terminus. This could represent a small saccular aneurysm versus a   vascular infundibulum.    CTA NECK:  1.  No evidence of critical stenosis by NASCET criteria.  2.  Multiple nodules seen in the bilateral lobes of the thyroid.   Recommend nonemergent thyroid ultrasound for further characterization if  clinically indicated.  3.  Redemonstrated partially visualized right apex lung mass. Correlate   with recent CT imaging of the chest for further evaluation.

## 2023-10-17 NOTE — PROGRESS NOTE ADULT - SUBJECTIVE AND OBJECTIVE BOX
Patient is a 52y old  Female who presents with a chief complaint of Multiple metastatic brain lesions(left frontal, occipital and temporal) s/p R temporal craniotomy and resection  Right dominant hemiparesis (14 Oct 2023 12:49)      Subjective and overnight events:  Patient seen and examined at bedside. no complaints. no fever, chills, sob, cp, abd pain.    ALLERGIES:  No Known Allergies    MEDICATIONS  (STANDING):  artificial tears (preservative free) Ophthalmic Solution 1 Drop(s) Both EYES three times a day  atorvastatin 40 milliGRAM(s) Oral at bedtime  dexAMETHasone     Tablet 2 milliGRAM(s) Oral two times a day  dexAMETHasone     Tablet   Oral   dextrose 5%. 1000 milliLiter(s) (100 mL/Hr) IV Continuous <Continuous>  dextrose 5%. 1000 milliLiter(s) (50 mL/Hr) IV Continuous <Continuous>  dextrose 50% Injectable 25 Gram(s) IV Push once  dextrose 50% Injectable 25 Gram(s) IV Push once  dextrose 50% Injectable 12.5 Gram(s) IV Push once  divalproex  milliGRAM(s) Oral every 12 hours  enoxaparin Injectable 40 milliGRAM(s) SubCutaneous every 24 hours  glucagon  Injectable 1 milliGRAM(s) IntraMuscular once  insulin lispro (ADMELOG) corrective regimen sliding scale   SubCutaneous three times a day before meals  insulin lispro (ADMELOG) corrective regimen sliding scale   SubCutaneous at bedtime  metFORMIN 500 milliGRAM(s) Oral two times a day  pantoprazole    Tablet 40 milliGRAM(s) Oral before breakfast  sodium chloride 0.9%. 1000 milliLiter(s) (1000 mL/Hr) IV Continuous <Continuous>    MEDICATIONS  (PRN):  acetaminophen     Tablet .. 650 milliGRAM(s) Oral every 6 hours PRN Mild Pain (1 - 3), Moderate Pain (4 - 6)  dextrose Oral Gel 15 Gram(s) Oral once PRN Blood Glucose LESS THAN 70 milliGRAM(s)/deciliter  senna 2 Tablet(s) Oral at bedtime PRN Constipation      Vital Signs Last 24 Hrs  T(C): 36.4 (17 Oct 2023 07:20), Max: 36.8 (16 Oct 2023 21:00)  T(F): 97.6 (17 Oct 2023 07:20), Max: 98.3 (16 Oct 2023 21:00)  HR: 106 (17 Oct 2023 07:20) (89 - 106)  BP: 94/64 (17 Oct 2023 07:20) (94/64 - 110/68)  BP(mean): --  RR: 14 (17 Oct 2023 07:20) (14 - 15)  SpO2: 98% (17 Oct 2023 07:20) (96% - 98%)    Parameters below as of 17 Oct 2023 07:20  Patient On (Oxygen Delivery Method): room air        PHYSICAL EXAM:  General: NAD, Awake alert. stables on scalp removed  ENT: MMM  Neck: Supple, No JVD  Lungs: Clear to auscultation bilaterally  Cardio: RRR, S1/S2, No murmurs  Abdomen: Soft, Nontender, Nondistended; Bowel sounds present  Extremities: No calf tenderness, No pitting edema    LABS:                          11.0   7.75  )-----------( 155      ( 16 Oct 2023 06:30 )             34.3     10-16    135  |  101  |  12  ----------------------------<  121<H>  3.7   |  24  |  0.50    Ca    9.0      16 Oct 2023 06:30    TPro  6.6  /  Alb  2.1<L>  /  TBili  0.2  /  DBili  x   /  AST  46<H>  /  ALT  26  /  AlkPhos  78  10-16    LIVER FUNCTIONS - ( 16 Oct 2023 06:30 )  Alb: 2.1 g/dL / Pro: 6.6 g/dL / ALK PHOS: 78 U/L / ALT: 26 U/L / AST: 46 U/L / GGT: x             Urinalysis Basic - ( 16 Oct 2023 06:30 )    Color: x / Appearance: x / SG: x / pH: x  Gluc: 121 mg/dL / Ketone: x  / Bili: x / Urobili: x   Blood: x / Protein: x / Nitrite: x   Leuk Esterase: x / RBC: x / WBC x   Sq Epi: x / Non Sq Epi: x / Bacteria: x            CAPILLARY BLOOD GLUCOSE      POCT Blood Glucose.: 163 mg/dL (17 Oct 2023 11:34)  POCT Blood Glucose.: 90 mg/dL (17 Oct 2023 07:30)  POCT Blood Glucose.: 194 mg/dL (16 Oct 2023 22:31)  POCT Blood Glucose.: 120 mg/dL (16 Oct 2023 17:07)          RADIOLOGY & ADDITIONAL TESTS:    Care Discussed with Consultants/Other Providers:

## 2023-10-17 NOTE — PROGRESS NOTE ADULT - SUBJECTIVE AND OBJECTIVE BOX
Patient is a 52y old  Female who presents with a chief complaint of Multiple metastatic brain lesions(left frontal, occipital and temporal) s/p R temporal craniotomy and resection  Right dominant hemiparesis (16 Oct 2023 16:37)    HPI:  Patient is a 51 yo F with history of stage IV lung cancer with mets to brain (s/p 4 cycles chemo and thoracic RT completed on oct 2022), gamma knife surgery to 7 brain mets , recent admission to [facility] from sept 4-13th for epigastric pain and hyperglycemia thought to be due to steroids, who presented to [facility] ED 10/5 complaining of 1 week of lower extremity weakness and intermittent blurry vision.  Patient was initially seen at the end of August and admitted for hx of migraines and dizziness, was started on  decadron 6mg q6hr and Depakote at that time. She returned to [facility] again in early september for worsening epigastric pain  in the setting of taking steroids without GI ppx. CTA at that time showing no PE, stable right apical lung mass and rapid interval growth of a left lower lobe mass and new hepatic mets. Pt finished dexamethasone taper on 9/7 and was discharged with protonix, depakote and follow up with outpatient rad onc Dr. Bernard, outpatient NSGY, and oncologist Dr. Beckman at [facility] with plans to start immunotherapy nivolumab as outpatient.  Since discharge on the 13th, patient noted weakness in her right lower extremity and 'muscle pain' which improves with massages. She used her walker at home however she feels weak and was requiring increasing support from her fiancee to ambulate/attend to daily needs. She noted also intermittent epigastric pain with radiation to the back. and endorsef some slight blurry vision over the last 2 weeks which she describes as "vision feels off" though is able to see.    Patient was admitted 9/30 and underwent R temporal crani for resection of metastatic lesion. Post-operative course was complicated by acute left cerebellar infarct found on post-op MRI, tachycardia, and thrombocytopenia. She was recommended for follow up with oncology for lung lesions, radiation oncology for brain, and dr. michelle for post-operative follow up. She will also follow up with Dr. Pam Randle in 6 months for findings on 10/4 CTA head. Plan for no ASA for secondary prevention until cleared by Dr. Michelle. Wound closure due to be removed postop day 10-14 in rehab. Patient was evaluated by PM&R and therapy for functional deficits and gait/ ADL impairments and recommended acute rehabilitation. Patient was medically optimized for discharge to [facility] on 10/5/23.      SUBJECTIVE/ROS:  - Patient was seen and examined at bed side this morning, no new events over night.  - Patient states that they slept well last night. She reports feeling well this morning, no new complaints.  - Patient understands and is in agreement with plan for discharge tomorrow; she is [place] forward to [place] home. Family training planned for tomorrow.  - Pain is well-controlled with current meds  - GI/: patient is moving bowels. Voiding without issues  - Tolerating 3 hours of therapy without issues  - Denies fever, chills, CP, palpitation, cough, SOB, abdominal pain, N/V/D/C     MEDICATIONS  (STANDING):  artificial tears (preservative free) Ophthalmic Solution 1 Drop(s) Both EYES three times a day  atorvastatin 40 milliGRAM(s) Oral at bedtime  dexAMETHasone     Tablet 2 milliGRAM(s) Oral two times a day  dexAMETHasone     Tablet   Oral   dextrose 5%. 1000 milliLiter(s) (100 mL/Hr) IV Continuous <Continuous>  dextrose 5%. 1000 milliLiter(s) (50 mL/Hr) IV Continuous <Continuous>  dextrose 50% Injectable 25 Gram(s) IV Push once  dextrose 50% Injectable 12.5 Gram(s) IV Push once  dextrose 50% Injectable 25 Gram(s) IV Push once  divalproex  milliGRAM(s) Oral every 12 hours  enoxaparin Injectable 40 milliGRAM(s) SubCutaneous every 24 hours  glucagon  Injectable 1 milliGRAM(s) IntraMuscular once  insulin lispro (ADMELOG) corrective regimen sliding scale   SubCutaneous three times a day before meals  insulin lispro (ADMELOG) corrective regimen sliding scale   SubCutaneous at bedtime  metFORMIN 500 milliGRAM(s) Oral two times a day  pantoprazole    Tablet 40 milliGRAM(s) Oral before breakfast  sodium chloride 0.9%. 1000 milliLiter(s) (1000 mL/Hr) IV Continuous <Continuous>    MEDICATIONS  (PRN):  acetaminophen     Tablet .. 650 milliGRAM(s) Oral every 6 hours PRN Mild Pain (1 - 3), Moderate Pain (4 - 6)  dextrose Oral Gel 15 Gram(s) Oral once PRN Blood Glucose LESS THAN 70 milliGRAM(s)/deciliter  senna 2 Tablet(s) Oral at bedtime PRN Constipation      RECENT LABS:                11.0   7.75  )-----------( 155      ( 16 Oct 2023 06:30 )             34.3     10-16    135  |  101  |  12  ----------------------------<  121<H>  3.7   |  24  |  0.50    Ca    9.0      16 Oct 2023 06:30    TPro  6.6  /  Alb  2.1<L>  /  TBili  0.2  /  DBili  x   /  AST  46<H>  /  ALT  26  /  AlkPhos  78  10-16    LIVER FUNCTIONS - ( 16 Oct 2023 06:30 )  Alb: 2.1 g/dL / Pro: 6.6 g/dL / ALK PHOS: 78 U/L / ALT: 26 U/L / AST: 46 U/L / GGT: x             Urinalysis Basic - ( 16 Oct 2023 06:30 )    Color: x / Appearance: x / SG: x / pH: x  Gluc: 121 mg/dL / Ketone: x  / Bili: x / Urobili: x   Blood: x / Protein: x / Nitrite: x   Leuk Esterase: x / RBC: x / WBC x   Sq Epi: x / Non Sq Epi: x / Bacteria: x       PHYSICAL EXAM:  Vital Signs Last 24 Hrs  T(C): 36.4 (17 Oct 2023 07:20), Max: 36.8 (16 Oct 2023 21:00)  T(F): 97.6 (17 Oct 2023 07:20), Max: 98.3 (16 Oct 2023 21:00)  HR: 106 (17 Oct 2023 07:20) (89 - 106)  BP: 94/64 (17 Oct 2023 07:20) (94/64 - 110/68)  BP(mean): --  RR: 14 (17 Oct 2023 07:20) (14 - 15)  SpO2: 98% (17 Oct 2023 07:20) (96% - 98%)    Parameters below as of 17 Oct 2023 07:20  Patient On (Oxygen Delivery Method): room air    Gen - NAD, Comfortable  HEENT - NCAT, EOMI, PERRLA, incision is C/D/I.  Pulm - CTAB,  No crackles  Cardiovascular - RRR, S1S2  Abdomen - Soft, NT, ND, +BS  Extremities - No C/C/E, No calf tenderness  Neuro-     Cognitive - AAOx4, follows command     Communication - Fluent, No dysarthria     Attention: able to state days of the week backwards     Memory: Recall 3 objects immediate, delayed recall 1/3 without categorical cues and 2/3 with categorical cues         Cranial Nerves - no facial asymmetry, tongue midline, EOMI     Motor -                     LEFT    UE - ShAB 4/5, EF 5/5, EE 5/5, WE 4/5,  4/5                    RIGHT UE - ShAB 3/5, EF 3/5, EE 3/5, WE 3/5,  3+/5, +slight pronator drift                    LEFT    LE - HF 4/5, KE 5/5, DF 5/5, PF 5/5                    RIGHT LE - HF 3/5, KE 3/5, DF 3/5, PF 5/5             Coordination - slowing with right FTN and (+) dysmateria, left intact  Psychiatric - Mood stable, Affect WNL  Skin:  all skin intact    Wounds: right craniotomy incision with staples in place and dried blood      IDT rounds 10/10  SW - lives in [address] none inside, lives with fiance, independent prior, fiance and landlord can provide intermittenet support  SLP - reg/thin, mod reduced language impacted by mod cog, reasoning, prob solving, mild dysarthria. Will need assistance with higher level cog  OT - Setup ADLs, Matthew LBD, CG transfers; Goals supervision/setup  PT - CG txf, amb 100'RW Matthew, stairs N/A; Goals independent for BM/txf, supverision amb  TDD: 10/18 Home Patient is a 52y old  Female who presents with a chief complaint of Multiple metastatic brain lesions(left frontal, occipital and temporal) s/p R temporal craniotomy and resection  Right dominant hemiparesis (16 Oct 2023 16:37)    HPI:  Patient is a 53 yo F with history of stage IV lung cancer with mets to brain (s/p 4 cycles chemo and thoracic RT completed on oct 2022), gamma knife surgery to 7 brain mets , recent admission to [facility] from sept 4-13th for epigastric pain and hyperglycemia thought to be due to steroids, who presented to [facility] ED 10/5 complaining of 1 week of lower extremity weakness and intermittent blurry vision.  Patient was initially seen at the end of August and admitted for hx of migraines and dizziness, was started on  decadron 6mg q6hr and Depakote at that time. She returned to [facility] again in early september for worsening epigastric pain  in the setting of taking steroids without GI ppx. CTA at that time showing no PE, stable right apical lung mass and rapid interval growth of a left lower lobe mass and new hepatic mets. Pt finished dexamethasone taper on 9/7 and was discharged with protonix, depakote and follow up with outpatient rad onc Dr. Bernard, outpatient NSGY, and oncologist Dr. Beckman at [facility] with plans to start immunotherapy nivolumab as outpatient.  Since discharge on the 13th, patient noted weakness in her right lower extremity and 'muscle pain' which improves with massages. She used her walker at home however she feels weak and was requiring increasing support from her fiancee to ambulate/attend to daily needs. She noted also intermittent epigastric pain with radiation to the back. and endorsef some slight blurry vision over the last 2 weeks which she describes as "vision feels off" though is able to see.    Patient was admitted 9/30 and underwent R temporal crani for resection of metastatic lesion. Post-operative course was complicated by acute left cerebellar infarct found on post-op MRI, tachycardia, and thrombocytopenia. She was recommended for follow up with oncology for lung lesions, radiation oncology for brain, and dr. michelle for post-operative follow up. She will also follow up with Dr. Pam Randle in 6 months for findings on 10/4 CTA head. Plan for no ASA for secondary prevention until cleared by Dr. Michelle. Wound closure due to be removed postop day 10-14 in rehab. Patient was evaluated by PM&R and therapy for functional deficits and gait/ ADL impairments and recommended acute rehabilitation. Patient was medically [facility].      SUBJECTIVE/ROS:  - Patient was seen and examined at bed side this morning, no new events over night.  - Patient states that they slept well last night. She reports feeling well this morning, no new complaints.  - Patient understands and is in agreement with plan for discharge tomorrow; she is looking forward to [place] home. Family training planned for tomorrow.  - Pain is well-controlled with current meds  - GI/: patient is moving bowels. Voiding without issues  - Case discussed at IDT meeting for progress and discharge plan  - Tolerating 3 hours of therapy without issues  - Denies fever, chills, CP, palpitation, cough, SOB, abdominal pain, N/V/D/C     MEDICATIONS  (STANDING):  artificial tears (preservative free) Ophthalmic Solution 1 Drop(s) Both EYES three times a day  atorvastatin 40 milliGRAM(s) Oral at bedtime  dexAMETHasone     Tablet 2 milliGRAM(s) Oral two times a day  dexAMETHasone     Tablet   Oral   dextrose 5%. 1000 milliLiter(s) (100 mL/Hr) IV Continuous <Continuous>  dextrose 5%. 1000 milliLiter(s) (50 mL/Hr) IV Continuous <Continuous>  dextrose 50% Injectable 25 Gram(s) IV Push once  dextrose 50% Injectable 12.5 Gram(s) IV Push once  dextrose 50% Injectable 25 Gram(s) IV Push once  divalproex  milliGRAM(s) Oral every 12 hours  enoxaparin Injectable 40 milliGRAM(s) SubCutaneous every 24 hours  glucagon  Injectable 1 milliGRAM(s) IntraMuscular once  insulin lispro (ADMELOG) corrective regimen sliding scale   SubCutaneous three times a day before meals  insulin lispro (ADMELOG) corrective regimen sliding scale   SubCutaneous at bedtime  metFORMIN 500 milliGRAM(s) Oral two times a day  pantoprazole    Tablet 40 milliGRAM(s) Oral before breakfast  sodium chloride 0.9%. 1000 milliLiter(s) (1000 mL/Hr) IV Continuous <Continuous>    MEDICATIONS  (PRN):  acetaminophen     Tablet .. 650 milliGRAM(s) Oral every 6 hours PRN Mild Pain (1 - 3), Moderate Pain (4 - 6)  dextrose Oral Gel 15 Gram(s) Oral once PRN Blood Glucose LESS THAN 70 milliGRAM(s)/deciliter  senna 2 Tablet(s) Oral at bedtime PRN Constipation      RECENT LABS:                11.0   7.75  )-----------( 155      ( 16 Oct 2023 06:30 )             34.3     10-16    135  |  101  |  12  ----------------------------<  121<H>  3.7   |  24  |  0.50    Ca    9.0      16 Oct 2023 06:30    TPro  6.6  /  Alb  2.1<L>  /  TBili  0.2  /  DBili  x   /  AST  46<H>  /  ALT  26  /  AlkPhos  78  10-16    LIVER FUNCTIONS - ( 16 Oct 2023 06:30 )  Alb: 2.1 g/dL / Pro: 6.6 g/dL / ALK PHOS: 78 U/L / ALT: 26 U/L / AST: 46 U/L / GGT: x              PHYSICAL EXAM:  Vital Signs Last 24 Hrs  T(C): 36.4 (17 Oct 2023 07:20), Max: 36.8 (16 Oct 2023 21:00)  T(F): 97.6 (17 Oct 2023 07:20), Max: 98.3 (16 Oct 2023 21:00)  HR: 106 (17 Oct 2023 07:20) (89 - 106)  BP: 94/64 (17 Oct 2023 07:20) (94/64 - 110/68)  RR: 14 (17 Oct 2023 07:20) (14 - 15)  SpO2: 98% (17 Oct 2023 07:20) (96% - 98%)    Gen - NAD, Comfortable  HEENT - NCAT, EOMI, PERRLA, incision is C/D/I.  Pulm - CTAB,  No crackles  Cardiovascular - RRR, S1S2  Abdomen - Soft, NT, ND, +BS  Extremities - No C/C/E, No calf tenderness  Neuro-     Cognitive - AAOx4, follows command     Communication - Fluent, No dysarthria     Attention: able to state days of the week backwards     Memory: Recall 3 objects immediate, delayed recall 1/3 without categorical cues and 2/3 with categorical cues         Cranial Nerves - no facial asymmetry, tongue midline, EOMI     Motor -                     LEFT    UE - ShAB 4/5, EF 5/5, EE 5/5, WE 4/5,  4/5                    RIGHT UE - ShAB 3/5, EF 3/5, EE 3/5, WE 3/5,  3+/5, +slight pronator drift                    LEFT    LE - HF 4/5, KE 5/5, DF 5/5, PF 5/5                    RIGHT LE - HF 3/5, KE 3/5, DF 3/5, PF 5/5             Coordination - slowing with right FTN and (+) dysmateria, left intact  Psychiatric - Mood stable, Affect WNL  Skin:  all skin intact    Wounds: right craniotomy incision with staples in place and dried blood      IDT rounds 10/10  SW - lives in [address] [place] none inside, lives with fiance, independent prior, fiance and landlord can provide intermittenet support  SLP - reg/thin, mod reduced language impacted by mod cog, reasoning, prob solving, mild dysarthria. Will need assistance with higher level cog  OT - Setup ADLs, Matthew LBD, CG transfers; Goals supervision/setup  PT - CG txf, amb 100'RW Matthew, stairs N/A; Goals independent for BM/txf, supverision amb  TDD: 10/18 Home

## 2023-10-17 NOTE — PROGRESS NOTE ADULT - ATTENDING COMMENTS
I independently performed the documented the history, exam, and medical decision making. I have made amendments to the documentation where necessary. I have personally seen and examined the patient. Medical records were reviewed and I have made amendments to the documentation where necessary and adjusted the history, physical examination, and plan as documented by the Resident Physician.
I independently performed the documented the history, exam, and medical decision making. I have made amendments to the documentation where necessary. I have personally seen and examined the patient. Medical records were reviewed and I have made amendments to the documentation where necessary and adjusted the history, physical examination, and plan as documented by the Resident Physician.

## 2023-10-17 NOTE — PROGRESS NOTE ADULT - ASSESSMENT
Assessment	  Patient is a 53 yo F with history of stage IV lung cancer with mets to brain (s/p 4 cycles chemo and thoracic RT completed on oct 2022), gamma knife surgery to 7 brain mets, who presented to [facility] ED 10/5 complaining of 1 week of lower extremity weakness and intermittent blurry vision. now s/p R temporal crani for resection of metastatic lesion. Post-operative course was complicated by acute cerebellar infarct found on post-op MRI, tachycardia, and thrombocytopenia. Admitted to [place] acute inpatient rehab on 10/5 for ADL, gait, and functional impairments.     #Unresponsive episode c/f Seizure vs. Syncope  - Depakote level is 55 (01/09)  - Procalcitionin level (10/08) 27, (10/09) 25.8  - Lactate level 2.2 (01/08), 2.5 (10/09)   - Started on Zosyn 3.375 gm IVPB q 8 hours x 7 days.   - Head CT (10/09) IMPRESSION: No new acute abnormalities are identified.  Reidentified is a right temporal craniotomy for prior resection of a metastasis . Small amount of air and hemorrhage in the surgical cavity is present. Underlying lesion is not well evaluated on this noncontrast CT exam. Surrounding vasogenic edema is unchanged. Previously visualized minimal right to left midline shift has improved.  Previously visualized partially calcified lesion in the left occipital lobe, with surrounding vasogenic edema is unchanged. Lesion in the left high frontal lobe with surrounding vasogenic edema is unchanged.  Previously visualized acute infarct in the left cerebellum is not seen to good advantage on this study.   - Neurology Consult appreciated  - Neurosurgery Dr. Metcalf updated    # Multiple metastatic brain lesions s/p Crani and temporal lesion resection/ Right dominant hemiparesis   - Comprehensive Multidisciplinary Rehab Program: 3 hours a day, 5 days a week with PT/OT/SLP  - Decadron taper - 3mg BID x2 days then 2mg BID through follow up  - Depakote 500mg BID seizure prophylaxis  - Pain control with oxy and tylenol  - Staples were removed on (10/13)  - Outpatient follow up with Dr. Metcalf  - Fall, aspiration precautions    # Acute Cerebellar infarct  - No ASA until cleared by neurosurgery, Atorvastatin 40 mg po daily   - PT/OT for balance and coordination, ambulation, transfer, ADLs.  3 hours daily   - Hospitalist F/U     #Lung Cancer with metastasis  - Depakote, decadron as above  - Outpatient oncology follow up with Dr. Beckman  - Recent CT c/a/p showed enlarging lung lesion and new liver metastases  - Per onc - patient will require rad/on assessment for post-op radiation  - Oncology consult was completed   - S/p CRT last June 2023  - Hospitalist consult appreciated    # Diplopia/ Blurry vision   - Likely in setting of malignancy/mets/edema  - Seen by ophtho  - Artificial tears PRN. Eye patch PRN for comfort    # Type 2 Diabetes Mellitus with steroid induced hyperglycemia/Hypoglycemia   - Recent A1c 8.0%  - Monitor FSBG TIDAC/QHS  - Consistent Carbohydrate diet  - Lantus and premeal admelog now discontinued; started Metformin 500 mg po x 2 daily   - Mod SSI AC, low SSI HS  - Hospitalist consult appreciated    # Mood/Cognition:  - Neuropsychology consult PRN    # Pain Management:  - Tylenol PRN  - Oxycodone 5mg q4 hours PRN severe Pain    # GI/Bowel:  - Senna QHS, Miralax daily PRN  - GI ppx: Protonix    # /Bladder:   - Bladder scan q8 hours on admission, SC PRN  - Voiding without issues   - Encourage timed voids every 4 hours while awake     # Skin/Pressure Injury:  - Skin assessment on admission: intact  - Monitor Incisions: right craniotomy -staples removed healing well  - Turn every 2 hours while in bed    # Diet:   - Diet Consistency/Modifications: Reg/thin/CC  - Aspiration Precautions  - SLP consult for swallow function evaluation and treatment, recs appreciated     # DVT ppx:  - Lovenox    # Restrictions/Precautions:  - Precautions: Fall, aspiration    ---------------  Outpatient Follow-up:    Marcus Metcalf  Neurosurgery  [address]  [place] [address]  Phone: [phone]  Fax: [phone]  Follow Up Time:     Maritza Kohler  Hematology  [address] [place] [address]  Phone: [phone]  Fax: [phone]  Follow Up Time:     Kieran Figueroa  Neurology  [address] [place] [address]  Phone: [phone]  Fax: [phone]  Follow Up Time:  --------------    Goals: Safe discharge to home  Estimated Length of Stay: 10-14 days  Rehab Potential: Good  Medical Prognosis: Good  Estimated Disposition: Home with Home Care  ---------------     Assessment	  Patient is a 51 yo F with history of stage IV lung cancer with mets to brain (s/p 4 cycles chemo and thoracic RT completed on oct 2022), gamma knife surgery to 7 brain mets, who presented to [facility] ED 10/5 complaining of 1 week of lower extremity weakness and intermittent blurry vision. now s/p R temporal crani for resection of metastatic lesion. Post-operative course was complicated by acute cerebellar infarct found on post-op MRI, tachycardia, and thrombocytopenia. Admitted to [place] acute inpatient rehab on 10/5 for ADL, gait, and functional impairments.     #Unresponsive episode c/f Seizure vs. Syncope/ Resolved  - Depakote level is 55 (01/09)  - Procalcitionin level (10/08) 27, (10/09) 25.8  - Lactate level 2.2 (01/08), 2.5 (10/09)   - Started on Zosyn 3.375 gm IVPB q 8 hours x 7 days.   - Head CT (10/09) IMPRESSION: No new acute abnormalities are identified.  Reidentified is a right temporal craniotomy for prior resection of a metastasis . Small amount of air and hemorrhage in the surgical cavity is present. Underlying lesion is not well evaluated on this noncontrast CT exam. Surrounding vasogenic edema is unchanged. Previously visualized minimal right to left midline shift has improved.  Previously visualized partially calcified lesion in the left occipital lobe, with surrounding vasogenic edema is unchanged. Lesion in the left high frontal lobe with surrounding vasogenic edema is unchanged.  Previously visualized acute infarct in the left cerebellum is not seen to good advantage on this study.   - Neurology Consult appreciated  - Neurosurgery Dr. Metcalf updated    # Multiple metastatic brain lesions s/p Crani and temporal lesion resection/ Right dominant hemiparesis   - Comprehensive Multidisciplinary Rehab Program: 3 hours a day, 5 days a week with PT/OT/SLP  - Decadron taper - 3mg BID x2 days then 2mg BID through follow up  - Depakote 500mg BID seizure prophylaxis  - Pain control with oxy and tylenol  - Staples were removed on (10/13)  - Outpatient follow up with Dr. Metcalf  - Fall, aspiration precautions    # Acute Cerebellar infarct  - No ASA until cleared by neurosurgery, Atorvastatin 40 mg po daily   - PT/OT for balance and coordination, ambulation, transfer, ADLs.  3 hours daily   - Hospitalist F/U     #Lung Cancer with metastasis  - Depakote, decadron as above  - Outpatient oncology follow up with Dr. Beckman  - Recent CT c/a/p showed enlarging lung lesion and new liver metastases  - Per onc - patient will require rad/on assessment for post-op radiation  - Oncology consult was completed   - S/p CRT last June 2023  - Hospitalist consult appreciated    # Diplopia/ Blurry vision   - Likely in setting of malignancy/mets/edema  - Seen by ophtho  - Artificial tears PRN. Eye patch PRN for comfort    # Type 2 Diabetes Mellitus with steroid induced hyperglycemia/Hypoglycemia   - Recent A1c 8.0%  - Monitor FSBG TIDAC/QHS  - Consistent Carbohydrate diet  - Lantus and premeal admelog now discontinued; started Metformin 500 mg po x 2 daily   - Mod SSI AC, low SSI HS  - Hospitalist consult appreciated    # Mood/Cognition:  - Neuropsychology consult PRN    # Pain Management:  - Tylenol PRN  - Oxycodone 5mg q4 hours PRN severe Pain    # GI/Bowel:  - Senna QHS, Miralax daily PRN  - GI ppx: Protonix    # /Bladder:   - Bladder scan q8 hours on admission, SC PRN  - Voiding without issues   - Encourage timed voids every 4 hours while awake     # Skin/Pressure Injury:  - Skin assessment on admission: intact  - Monitor Incisions: right craniotomy - staples removed healing well  - Turn every 2 hours while in bed    # Diet:   - Diet Consistency/Modifications: Reg/thin/CC  - Aspiration Precautions  - SLP consult for swallow function evaluation and treatment, recs appreciated     # DVT ppx:  - Lovenox    # Restrictions/Precautions:  - Precautions: Fall, aspiration    ---------------  Outpatient Follow-up:    Marcus Metcalf  Neurosurgery  [address] [facility] [facility], [address]  [place] [address]  Phone: [phone]  Fax: [phone]  Follow Up Time:     Maritza Kohler  Hematology  [address] [place] [address]  Phone: [phone]  Fax: [phone]  Follow Up Time:     Kieran Figueroa  Neurology  [address] [place] [address]  Phone: [phone]  Fax: [phone]  Follow Up Time:  --------------    Goals: Safe discharge to home  Estimated Length of Stay: 10-14 days  Rehab Potential: Good  Medical Prognosis: Good  Estimated Disposition: Home with Home Care  ---------------

## 2023-10-17 NOTE — CHART NOTE - NSCHARTNOTEFT_GEN_A_CORE
Nutrition Follow Up Note  Hospital Course   (Per Electronic Medical Record)    Source:  Patient [X]  Nursing Staff [X]   Medical Record [X]      Diet: Diet, Consistent Carbohydrate w/Evening Snack:   Supplement Feeding Modality:  Oral  Glucerna Shake Cans or Servings Per Day:  1       Frequency:  Daily (10-06-23 @ 13:13) [Active]    At this time patient tolerating diet w/ varied appetite/intake consuming % of meals. Patient reports enjoying meals but reports we offer too much food on trays. Reviewed preferences and menu alternatives. No issues w/ dentition or chewing and swallowing current diet texture. Denies N/V/C/D, last Bm 10/16 Per nursing flowsheets. T2DM noted. A1C: 8.0% (9/20/23). POCT & insulin regimen reviewed. Addressed with [facility] diet w/ evening snack. Diet education provided on [facility] diet + written material.       Enteral/Parenteral Nutrition: Not Applicable    Current Weight: 138lb on 10/8  Recommend obtaining new weight    Pertinent Medications: MEDICATIONS  (STANDING):  artificial tears (preservative free) Ophthalmic Solution 1 Drop(s) Both EYES three times a day  atorvastatin 40 milliGRAM(s) Oral at bedtime  dexAMETHasone     Tablet 2 milliGRAM(s) Oral two times a day  dexAMETHasone     Tablet   Oral   dextrose 5%. 1000 milliLiter(s) (100 mL/Hr) IV Continuous <Continuous>  dextrose 5%. 1000 milliLiter(s) (50 mL/Hr) IV Continuous <Continuous>  dextrose 50% Injectable 12.5 Gram(s) IV Push once  dextrose 50% Injectable 25 Gram(s) IV Push once  dextrose 50% Injectable 25 Gram(s) IV Push once  divalproex  milliGRAM(s) Oral every 12 hours  enoxaparin Injectable 40 milliGRAM(s) SubCutaneous every 24 hours  glucagon  Injectable 1 milliGRAM(s) IntraMuscular once  insulin lispro (ADMELOG) corrective regimen sliding scale   SubCutaneous three times a day before meals  insulin lispro (ADMELOG) corrective regimen sliding scale   SubCutaneous at bedtime  metFORMIN 500 milliGRAM(s) Oral two times a day  pantoprazole    Tablet 40 milliGRAM(s) Oral before breakfast  sodium chloride 0.9%. 1000 milliLiter(s) (1000 mL/Hr) IV Continuous <Continuous>    MEDICATIONS  (PRN):  acetaminophen     Tablet .. 650 milliGRAM(s) Oral every 6 hours PRN Mild Pain (1 - 3), Moderate Pain (4 - 6)  dextrose Oral Gel 15 Gram(s) Oral once PRN Blood Glucose LESS THAN 70 milliGRAM(s)/deciliter  senna 2 Tablet(s) Oral at bedtime PRN Constipation      Pertinent Labs:  10-16 Na135 mmol/L Glu 121 mg/dL<H> K+ 3.7 mmol/L Cr  0.50 mg/dL BUN 12 mg/dL 10-16 Alb 2.1 g/dL<L>      Skin: No pressure injury per nursing flowsheets, Surgical Incision, Site Temporal Cranial     Edema: No edema noted per nursing flowsheet    Last Bowel Movement: on 10/16 Per nursing flowsheets     Estimated Needs:   [X] No Change Since Previous Assessment    Previous Nutrition Diagnosis:   Previous Nutrition Diagnosis: Moderate Protein Calorie Malnutrition     Nutrition Diagnosis is [X] Ongoing & addressed w// Consistent Carbohydrate meal pattern + Glucerna 8oz PO Daily (Provides 220kcal-10grams of Protein)        New Nutrition Diagnosis: [X] Not Applicable    Interventions:   1. Recommend continuing with current plan of care  2. Encourage PO intake  3. Obtain and honor food preferences as able  4. Ongoing diet education     Monitoring & Evaluation:   [X] Weights   [X] PO Intake   [X] Skin Integrity   [X] Follow Up (Per Protocol)  [X] Tolerance to Diet Prescription   [X] Other: Labs & POCT    Registered Dietitian/Nutritionist Remains Available.  Saima Garcia RD, MS, CDN    Phone# [phone]

## 2023-10-17 NOTE — PROGRESS NOTE ADULT - ASSESSMENT
53 y/o F with hx of stage IV lung cancer with mets to brain (s/p 4 cycles chemo and thoracic RT completed on Oct 2022), gamma knife surgery to 7 brain mets, who presented to [facility] ED 10/5 complaining of 1 week of lower extremity weakness and intermittent blurry vision. now s/p R temporal crani for resection of metastatic lesion. Post-operative course was complicated by acute cerebellar infarct found on post-op MRI, tachycardia, and thrombocytopenia. Admitted to [place] acute inpatient rehab on 10/5 for ADL, gait, and functional impairments.     #RRT 10/8  #Seizure-like activity vs syncope  -Depakote level 55  -Neurology, Cardiology evals noted     #Sinus tachycardia - improved   -EKG reviewed. normal sinus rhythm  -monitor    #Lung Cancer with metastasis  #Metastatic brain lesion s/p Crani  -Wound closure removal in 10-14 days (10/10-10/14)  -Recent CT c/a/p showed enlarging lung lesion and new liver metastases  -S/p CRT last June 2023  -Continue Decadron 2mg BID through follow up. Maintain GI ppx with protonix while on steroids  -Depakote 500mg BID seizure prophylaxis  -Pain management, bowel regimen per rehab. Continue fall, aspiration precautions  -H/H stable. Monitor CBC, likely post op anemia  -Outpatient follow up with Dr. Metcalf, outpatient oncology follow up with Dr. Beckman  -Per onc - patient will require rad/on assessment for post-op radiation  -Pall consult 10/3 noted - goals are to pursue intervention and treatment as tolerated    #Acute Cerebellar infarct  -ASA when clears by neurosurgery  -cont with atorvastatin 40mg    #Diplopia - Likely in setting of malignancy/mets/edema  -Seen by ophtho  -Artificial tears PRN. Eye patch PRN for comfort    #Type 2 Diabetes Mellitus with steroid induced hyperglycemia, HbA1c 8.0% (9/20/23)  -Consistent Carbohydrate diet  -Metformin 500mg bid  -Continue ISS    DVT ppx - Lovenox  GI ppx - Protonix

## 2023-10-17 NOTE — PROGRESS NOTE ADULT - PROBLEM SELECTOR PLAN 1
NSCLC metastatic to the brain, s/p seizure versus syncopal episode, on dexamethasone and Depakote.  Reviewed CT head from 10/9/2023 consistent with unchanged vasogenic edema.  Patient is recovering s/p right temporal craniotomy after SRS.  Continues close neurologic follow-up.  Needs steroid taper.  No plans for systemic chemotherapy during this hospitalization.  Case discussed with patient's oncologist at [facility]. Advanced pulmonary neoplasm with metastatic brain lesions, s/p craniotomy.  Evidence of POD on CT C/A consistent with enlarging lung lesions and new liver metastases; will need follow-up with oncology (Dr. Beckman -[facility]) for further management.  In interim remains on Decadron and Depakote per neurology.  Will also require WBRT in ambulatory.

## 2023-10-18 NOTE — PROGRESS NOTE ADULT - SUBJECTIVE AND OBJECTIVE BOX
HARJEET WHITMORE, 52y Female  MRN: [id number]  ATTENDING: Octavio Lange    HPI:  52F, PMHx NSCLC with multiple brain metastases, s/p palliative systemic chemotherapy x4 cycles and thoracic RT completed October 2022, s/p SRS 7 brain metastases, who presented 10/5/2023 with lower extremity weakness and intermittent blurry vision.  Patient admitted at [facility] ED, started on dexamethasone 6 mg every 6 hours, and Depakote.  Patient was subsequently discharged, but still experiencing pain in the right lower extremity.  Ambulates with a walker.  On 9/30/2023 patient was admitted for right temporal craniotomy with resection of metastatic lesion.  Postoperative course was complicated by acute left cerebellar infarct, tachycardia and mild thrombocytopenia.  Once stabilized, patient was evaluated for PM&R and was discharged at [place] rehab on 10/5/2023.  Patient is under the care at [facility] Beckman and Joana at [facility]; she was slated to start nivolumab monotherapy in ambulatory.    MEDICATIONS:  acetaminophen     Tablet .. 650 milliGRAM(s) Oral every 6 hours PRN  artificial tears (preservative free) Ophthalmic Solution 1 Drop(s) Both EYES three times a day  atorvastatin 40 milliGRAM(s) Oral at bedtime  dexAMETHasone     Tablet 2 milliGRAM(s) Oral two times a day  dexAMETHasone     Tablet   Oral   dextrose 5%. 1000 milliLiter(s) IV Continuous <Continuous>  dextrose 5%. 1000 milliLiter(s) IV Continuous <Continuous>  dextrose 50% Injectable 25 Gram(s) IV Push once  dextrose 50% Injectable 25 Gram(s) IV Push once  dextrose 50% Injectable 12.5 Gram(s) IV Push once  dextrose Oral Gel 15 Gram(s) Oral once PRN  divalproex  milliGRAM(s) Oral every 12 hours  enoxaparin Injectable 40 milliGRAM(s) SubCutaneous every 24 hours  glucagon  Injectable 1 milliGRAM(s) IntraMuscular once  insulin lispro (ADMELOG) corrective regimen sliding scale   SubCutaneous three times a day before meals  insulin lispro (ADMELOG) corrective regimen sliding scale   SubCutaneous at bedtime  metFORMIN 500 milliGRAM(s) Oral two times a day  pantoprazole    Tablet 40 milliGRAM(s) Oral before breakfast  senna 2 Tablet(s) Oral at bedtime PRN  sodium chloride 0.9%. 1000 milliLiter(s) IV Continuous <Continuous>    All other medications reviewed.    SUBJECTIVE:  Feels great; able to move LE with more strength.  Awaiting d/c today.    VITALS:  T(C): 36.7 (10-16-23 @ 09:04), Max: 37.2 (10-15-23 @ 20:38)  T(F): 98.1 (10-16-23 @ 09:04), Max: 98.9 (10-15-23 @ 20:38)  HR: 107 (10-16-23 @ 09:04) (96 - 107)  BP: 108/74 (10-16-23 @ 09:04) (98/63 - 108/74)      PHYSICAL EXAM:  Constitutional: alert, in NAD  HEENT: normocephalic, anicteric sclerae, no mucositis or thrush  Respiratory: bilateral clear to auscultation anteriorly  Cardiovascular : S1, S2 regular, rhythmic, no murmurs, gallops or rubs  Abdomen: soft, distended, + normoactive BS, no palpable HS- megaly  Extremities: no tenderness;  -c/c/e, pulses equal bilaterally    LABS:  (10-16) WBC: 7.75 K/uL,Hemoglobin: 11.0 g/dL, Hematocrit: 34.3 %,  Platelet: 155 K/uL  (10-16) Na: 135 mmol/L ; K: 3.7 mmol/L ; BUN: 12 mg/dL ; Cr: 0.50 mg/dL.    RADIOLOGY:  (10-12) WBC: 8.64 K/uL,Hemoglobin: 10.2 g/dL, Hematocrit: 31.4 %,  Platelet: 128 K/uL    RADIOLOGY:  ACC: [id number] EXAM:  CT ANGIO NECK (W)AW IC   ORDERED BY: YEE COPELAND     ACC: [id number] EXAM:  CT ANGIO BRAIN (W)AW IC   ORDERED BY: YEE GOREER     ACC: [id number] EXAM:  CT BRAIN   ORDERED BY: YEE COPELAND     PROCEDURE DATE:  10/04/2023          INTERPRETATION:  EXAM: CTA HEAD AND NECK    HISTORY:stroke    TECHNIQUE: CTA of the head and neck was acquired from the lung apices to   the skull vertex. Sagittal and coronal reconstructions were subsequently   conducted. Omnipaque 350 Intravenous contrast was administered. 2-D MIP   images were provided.    COMPARISON: CT of the head October 1, 2023, MRI of the brain October 2, 2023    FINDINGS:    CT HEAD:  There are postoperative changes related to a recent right temporal   craniotomyfor resection of a metastatic lesion. Again, the operative   cavity is in part fluid-filled with postoperative blood. There is   persistent surrounding vasogenic edema. The previously seen   pneumocephalus along the frontal convexities has greatly improved. There   is a thin postoperative extra-axial collection overlying the operative   bed, relatively stable. There is a small midline shift to the left of   approximately 0.3 cm.    The previously seen partially calcified lesion in the left   parietal/occipital lobes is again redemonstrated, relatively stable. The   lesion in the high left frontal lobe is again demonstrated, stable. There   is surrounding vasogenic edema.    The previously seen acute infarct in the left cerebellar hemisphere is   again redemonstrated.    The cranial cervical junction is within normal limits. The sella is not   expanded. There is no depressed calvarial fracture. The visualized   paranasal sinuses are well aerated. The mastoid air cells are well   aerated. The visualized orbits are within normal limits.    CTA BRAIN:  The intracranial segments of the bilateral ICAs has high with   intraluminal contrast. There is a 1 to 2 mm medially oriented vascular   outpouching from the right ICA terminus, image 3 1 of series 3.    The bilateral MCAs and ACAs are within normal limits. The anterior   commuting artery is unremarkable.    The bilateral vertebral arteries opacify with intraluminal contrast. The   left vertebral artery is dominant. The basilar artery is unremarkable.   The proximal SCAs and PCAs are within normal limits.      CTA NECK:  There is a three-vessel arch. The common carotid arteries opacify   normally width intraluminal contrast. The carotid bulbs are unremarkable.   The bilateral ICAs opacify normally with intraluminal contrast to the   skull base.    The vertebral arteries have normal origins. The left vertebral artery is   dominant. The vertebral arteries opacify normally with intraluminal   contrast to the skull base.    There are multiple hypo and hyperattenuating nodules in the bilateral   lobes of the thyroid, right greater than left.    There are mild degenerative changes in the cervical spine. There are mild   degenerative changes in the bilateral TMJs    The partially visualized lung mass is seen in the right lung apex. Mild   emphysematous changes are seen in the bilateral lung apices.    IMPRESSION:    CT HEAD:  1.  Redemonstrated postoperative changes related to a prior right   temporal craniotomy for resection of a metastatic lesion. Postoperative   findings are relatively stable.  2.  Other metastatic lesions seen throughout the brain parenchyma with   surrounding vasogenic edema, again relatively stable.  3.  Redemonstrated acute infarct in the left cerebellar hemisphere,   stable from recent MR imaging.    CTA BRAIN:  1.  No evidence of abrupt cut off of intraluminal contrast.  2.  1 to 2 mm medially oriented vascular outpouching from the right ICA   terminus. This could represent a small saccular aneurysm versus a   vascular infundibulum.    CTA NECK:  1.  No evidence of critical stenosis by NASCET criteria.  2.  Multiple nodules seen in the bilateral lobes of the thyroid.   Recommend nonemergent thyroid ultrasound for further characterization if  clinically indicated.  3.  Redemonstrated partially visualized right apex lung mass. Correlate   with recent CT imaging of the chest for further evaluation.

## 2023-10-18 NOTE — PROGRESS NOTE ADULT - ASSESSMENT
Assessment	  Patient is a 53 yo F with history of stage IV lung cancer with mets to brain (s/p 4 cycles chemo and thoracic RT completed on oct 2022), gamma knife surgery to 7 brain mets, who presented to [facility] ED 10/5 complaining of 1 week of lower extremity weakness and intermittent blurry vision. now s/p R temporal crani for resection of metastatic lesion. Post-operative course was complicated by acute cerebellar infarct found on post-op MRI, tachycardia, and thrombocytopenia. Admitted to [facility] inpatient rehab on 10/5 for ADL, gait, and functional impairments.     #Unresponsive episode c/f Seizure vs. Syncope/ Resolved  - Depakote level is 55 (01/09)  - Procalcitionin level (10/08) 27, (10/09) 25.8  - Lactate level 2.2 (01/08), 2.5 (10/09)   - Started on Zosyn 3.375 gm IVPB q 8 hours x 7 days.   - Head CT (10/09) IMPRESSION: No new acute abnormalities are identified.  Reidentified is a right temporal craniotomy for prior resection of a metastasis . Small amount of air and hemorrhage in the surgical cavity is present. Underlying lesion is not well evaluated on this noncontrast CT exam. Surrounding vasogenic edema is unchanged. Previously visualized minimal right to left midline shift has improved.  Previously visualized partially calcified lesion in the left occipital lobe, with surrounding vasogenic edema is unchanged. Lesion in the left high frontal lobe with surrounding vasogenic edema is unchanged.  Previously visualized acute infarct in the left cerebellum is not seen to good advantage on this study.   - Neurology Consult appreciated  - Neurosurgery Dr. Metcalf updated    # Multiple metastatic brain lesions s/p Crani and temporal lesion resection/ Right dominant hemiparesis   - Comprehensive Multidisciplinary Rehab Program: 3 hours a day, 5 days a week with PT/OT/SLP  - Decadron taper - 3mg BID x2 days then 2mg BID through follow up  - Depakote 500mg BID seizure prophylaxis  - Pain control with oxy and tylenol  - Staples were removed on (10/13)  - Outpatient follow up with Dr. Metcalf  - Fall, aspiration precautions    # Acute Cerebellar infarct  - No ASA until cleared by neurosurgery, Atorvastatin 40 mg po daily   - PT/OT for balance and coordination, ambulation, transfer, ADLs.  3 hours daily   - Hospitalist F/U     #Lung Cancer with metastasis  - Depakote, decadron as above  - Outpatient oncology follow up with Dr. Beckman  - Recent CT c/a/p showed enlarging lung lesion and new liver metastases  - Per onc - patient will require rad/on assessment for post-op radiation  - Oncology consult was completed   - S/p CRT last June 2023  - Hospitalist consult appreciated    # Diplopia/ Blurry vision   - Likely in setting of malignancy/mets/edema  - Seen by ophtho  - Artificial tears PRN. Eye patch PRN for comfort    # Type 2 Diabetes Mellitus with steroid induced hyperglycemia/Hypoglycemia   - Recent A1c 8.0%  - Monitor FSBG TIDAC/QHS  - Consistent Carbohydrate diet  - Lantus and premeal admelog now discontinued; started Metformin 500 mg po x 2 daily   - Mod SSI AC, low SSI HS  - Hospitalist consult appreciated    # Mood/Cognition:  - Neuropsychology consult PRN    # Pain Management:  - Tylenol PRN  - Oxycodone 5mg q4 hours PRN severe Pain    # GI/Bowel:  - Senna QHS, Miralax daily PRN  - GI ppx: Protonix    # /Bladder:   - Bladder scan q8 hours on admission, SC PRN  - Voiding without issues   - Encourage timed voids every 4 hours while awake     # Skin/Pressure Injury:  - Skin assessment on admission: intact  - Monitor Incisions: right craniotomy - staples removed healing well  - Turn every 2 hours while in bed    # Diet:   - Diet Consistency/Modifications: Reg/thin/CC  - Aspiration Precautions  - SLP consult for swallow function evaluation and treatment, recs appreciated     # DVT ppx:  - Lovenox    # Restrictions/Precautions:  - Precautions: Fall, aspiration    ---------------  Outpatient Follow-up:    Marcus Metcalf  Neurosurgery  [address]  [place] [address]  Phone: [phone]  Fax: [phone]  Follow Up Time:     Maritza Kohler  Hematology  [address] [place] [address]  Phone: [phone]  Fax: [phone]  Follow Up Time:     Kieran Figueroa  Neurology  [address] [place] [address]  Phone: [phone]  Fax: [phone]  Follow Up Time:  --------------    Goals: Safe discharge to home  Estimated Length of Stay: 10-14 days  Rehab Potential: Good  Medical Prognosis: Good  Estimated Disposition: Home with Home Care  ---------------

## 2023-10-18 NOTE — PROGRESS NOTE ADULT - PROBLEM SELECTOR PLAN 1
NSCLC metastatic to the brain, s/p seizure versus syncopal episode, on dexamethasone and Depakote.  Evidence of POD on CT C/A consistent with enlarging lung lesions and new liver metastases. Patient is still recovering s/p right temporal craniotomy after SRS, but ready for discharge today. Ffup with medical and RT oncology at [facility]. Will continue PT in ambulatory.

## 2023-10-18 NOTE — PROGRESS NOTE ADULT - NSPROGADDITIONALINFOA_GEN_ALL_CORE
Spent 26 minutes on face-face visit, evaluate, examine and treat
Spent I hour on evaluating, examining and with team meeting where we discussed patient's progress and discharge plan.
Spent 26 minutes on face-face visit, evaluate, examine and treat, excluding teaching time
Spent 30 minutes on face-face visit, evaluate, examine and treat
Spent 26 minutes on face-face visit, evaluate, examine and treat
Spent 26 minutes on face-face visit, evaluate, examine and treat
Spent 28 minutes on face-face visit, evaluate, examine and treat
Spent 50 minutes on face-face visit, evaluating, examining, prepare discharge papers, discuss discharge meds and out patient F/U visits
Spent 26 minutes on face-face visit, evaluate, examine and treat, excluding teaching time
I spent 1 hour on evaluating, examining and with team meeting where we discussed patient's progress and discharge plan.  Excluding teaching time

## 2023-10-18 NOTE — PROGRESS NOTE ADULT - SUBJECTIVE AND OBJECTIVE BOX
Patient is a 52y old  Female who presents with a chief complaint of Multiple metastatic brain lesions(left frontal, occipital and temporal) s/p R temporal craniotomy and resection  Right dominant hemiparesis (16 Oct 2023 16:37)    HPI:  Patient is a 53 yo F with history of stage IV lung cancer with mets to brain (s/p 4 cycles chemo and thoracic RT completed on oct 2022), gamma knife surgery to 7 brain mets , recent admission to [facility] from sept 4-13th for epigastric pain and hyperglycemia thought to be due to steroids, who presented to [facility] ED 10/5 complaining of 1 week of lower extremity weakness and intermittent blurry vision.  Patient was initially seen at the end of August and admitted for hx of migraines and dizziness, was started on  decadron 6mg q6hr and Depakote at that time. She returned to [facility] again in early september for worsening epigastric pain  in the setting of taking steroids without GI ppx. CTA at that time showing no PE, stable right apical lung mass and rapid interval growth of a left lower lobe mass and new hepatic mets. Pt finished dexamethasone taper on 9/7 and was discharged with protonix, depakote and follow up with outpatient rad onc Dr. Bernard, outpatient NSGY, and oncologist Dr. Beckman at [facility] with plans to start immunotherapy nivolumab as outpatient.  Since discharge on the 13th, patient noted weakness in her right lower extremity and 'muscle pain' which improves with massages. She used her walker at home however she feels weak and was requiring increasing support from her fiancee to ambulate/attend to daily needs. She noted also intermittent epigastric pain with radiation to the back. and endorsef some slight blurry vision over the last 2 weeks which she describes as "vision feels off" though is able to see.    Patient was admitted 9/30 and underwent R temporal crani for resection of metastatic lesion. Post-operative course was complicated by acute left cerebellar infarct found on post-op MRI, tachycardia, and thrombocytopenia. She was recommended for follow up with oncology for lung lesions, radiation oncology for brain, and dr. michelle for post-operative follow up. She will also follow up with Dr. Pam Randle in 6 months for findings on 10/4 CTA head. Plan for no ASA for secondary prevention until cleared by Dr. Michelle. Wound closure due to be removed postop day 10-14 in rehab. Patient was evaluated by PM&R and therapy for functional deficits and gait/ ADL impairments and recommended acute rehabilitation. Patient was medically [facility].      SUBJECTIVE/ROS:  - Patient was seen and examined at bed side this morning, comfortable in bed, excited to be discharge home today, no new events over night.  - Slept well last night. no new complaints.  - Patient understands and is in agreement with plan for discharge tomorrow; she is looking forward to ID home.    - Pain is well-controlled with current meds  - GI/: patient is moving bowels. Voiding without issues  - Tolerating 3 hours of therapy without issues, progressing  - Denies fever, chills, CP, palpitation, cough, SOB, abdominal pain, N/V/D/C     MEDICATIONS  (STANDING):  artificial tears (preservative free) Ophthalmic Solution 1 Drop(s) Both EYES three times a day  atorvastatin 40 milliGRAM(s) Oral at bedtime  dexAMETHasone     Tablet 2 milliGRAM(s) Oral two times a day  dexAMETHasone     Tablet   Oral   dextrose 5%. 1000 milliLiter(s) (100 mL/Hr) IV Continuous <Continuous>  dextrose 5%. 1000 milliLiter(s) (50 mL/Hr) IV Continuous <Continuous>  dextrose 50% Injectable 25 Gram(s) IV Push once  dextrose 50% Injectable 12.5 Gram(s) IV Push once  dextrose 50% Injectable 25 Gram(s) IV Push once  divalproex  milliGRAM(s) Oral every 12 hours  enoxaparin Injectable 40 milliGRAM(s) SubCutaneous every 24 hours  glucagon  Injectable 1 milliGRAM(s) IntraMuscular once  insulin lispro (ADMELOG) corrective regimen sliding scale   SubCutaneous three times a day before meals  insulin lispro (ADMELOG) corrective regimen sliding scale   SubCutaneous at bedtime  metFORMIN 500 milliGRAM(s) Oral two times a day  pantoprazole    Tablet 40 milliGRAM(s) Oral before breakfast  sodium chloride 0.9%. 1000 milliLiter(s) (1000 mL/Hr) IV Continuous <Continuous>    MEDICATIONS  (PRN):  acetaminophen     Tablet .. 650 milliGRAM(s) Oral every 6 hours PRN Mild Pain (1 - 3), Moderate Pain (4 - 6)  dextrose Oral Gel 15 Gram(s) Oral once PRN Blood Glucose LESS THAN 70 milliGRAM(s)/deciliter  senna 2 Tablet(s) Oral at bedtime PRN Constipation      RECENT LABS:                11.0   7.75  )-----------( 155      ( 16 Oct 2023 06:30 )             34.3     10-16    135  |  101  |  12  ----------------------------<  121<H>  3.7   |  24  |  0.50    Ca    9.0      16 Oct 2023 06:30    TPro  6.6  /  Alb  2.1<L>  /  TBili  0.2  /  DBili  x   /  AST  46<H>  /  ALT  26  /  AlkPhos  78  10-16    LIVER FUNCTIONS - ( 16 Oct 2023 06:30 )  Alb: 2.1 g/dL / Pro: 6.6 g/dL / ALK PHOS: 78 U/L / ALT: 26 U/L / AST: 46 U/L / GGT: x              PHYSICAL EXAM:  Vital Signs Last 24 Hrs  T(C): 36.8 (18 Oct 2023 09:10), Max: 37 (17 Oct 2023 20:37)  T(F): 98.3 (18 Oct 2023 09:10), Max: 98.6 (17 Oct 2023 20:37)  HR: 101 (18 Oct 2023 09:10) (98 - 101)  BP: 102/61 (18 Oct 2023 09:10) (100/66 - 108/68)  RR: 17 (18 Oct 2023 09:10) (14 - 17)  SpO2: 97% (18 Oct 2023 09:10) (97% - 98%)    Gen - NAD, Comfortable  HEENT - NCAT, EOMI, PERRLA, incision is C/D/I.  Pulm - CTAB,  No crackles  Cardiovascular - RRR, S1S2  Abdomen - Soft, NT, ND, +BS  Extremities - No C/C/E, No calf tenderness  Neuro-     Cognitive - AAOx4, follows command     Communication - Fluent, No dysarthria     Attention: able to state days of the week backwards     Memory: Recall 3 objects immediate, delayed recall 1/3 without categorical cues and 2/3 with categorical cues         Cranial Nerves - no facial asymmetry, tongue midline, EOMI     Motor -                     LEFT    UE - ShAB 4/5, EF 5/5, EE 5/5, WE 4/5,  4/5                    RIGHT UE - ShAB 3/5, EF 3/5, EE 3/5, WE 3/5,  3+/5, +slight pronator drift                    LEFT    LE - HF 4/5, KE 5/5, DF 5/5, PF 5/5                    RIGHT LE - HF 3/5, KE 3/5, DF 3/5, PF 5/5             Coordination - slowing with right FTN and (+) dysmateria, left intact  Psychiatric - Mood stable, Affect WNL  Skin:  all skin intact    Wounds: right craniotomy incision with staples in place and dried blood      IDT rounds 10/10  SW - lives in [address] [place] none inside, lives with fiance, independent prior, fiance and landlord can provide intermittenet support  SLP - reg/thin, mod reduced language impacted by mod cog, reasoning, prob solving, mild dysarthria. Will need assistance with higher level cog  OT - Setup ADLs, Matthew LBD, CG transfers; Goals supervision/setup  PT - CG txf, amb 100'RW Matthew, stairs N/A; Goals independent for BM/txf, supverision amb  TDD: 10/18 Home      IDT rounds 10/17  SW - lives in [address] [place] none inside, lives with fiance, independent prior, fiance and landlord can provide intermittenet support  SLP - reg/thin, mild cog, mild language, Will need supervision with higher level cog  OT - supervision all transfers and ADLs; Goals supervision/setup  PT - Independent BM, CS-CG transfers, amb 100-125' with CG/CS, stairs Matthew x2 due to dizziness; Goals supervision for mobility  TDD: 10/18 Home

## 2023-10-18 NOTE — PROGRESS NOTE ADULT - PROVIDER SPECIALTY LIST ADULT
Hospitalist
Physiatry
Physiatry
Hospitalist
Physiatry
Hospitalist
Hospitalist
Physiatry
Hospitalist
Physiatry
Heme/Onc
Physiatry
Physiatry

## 2023-10-18 NOTE — PROGRESS NOTE ADULT - NUTRITIONAL ASSESSMENT
This patient has been assessed with a concern for Malnutrition and has been determined to have a diagnosis/diagnoses of Moderate protein-calorie malnutrition.    This patient is being managed with:   Diet Consistent Carbohydrate w/Evening Snack-  Supplement Feeding Modality:  Oral  Glucerna Shake Cans or Servings Per Day:  1       Frequency:  Daily  Entered: Oct  6 2023  1:13PM  

## 2023-10-20 NOTE — CHART NOTE - NSCHARTNOTEFT_GEN_A_CORE
Returned call to patient via phone # [phone] and was unable to get through after several attempts.   Spoke with [facility] Randhawa who confirmed that patient has active insurance, it appears that pharmacy may have had old insurance on file.  Called patient's [facility] pharmacy in [place], who confirmed that patient was able to  all prescriptions.    Angelo Mcfarland MD

## 2023-10-23 NOTE — CONSULT NOTE ADULT - CONVERSATION/DISCUSSION
Chief complaint:   Chief Complaint   Patient presents with   • Follow-up     6 Month Visit       Vitals:  Visit Vitals  /80 (BP Location: LUE - Left upper extremity, Patient Position: Sitting, Cuff Size: Regular)   Pulse 90   Resp 15   Ht 5' 9\" (1.753 m)   Wt 89.4 kg (197 lb)   LMP  (LMP Unknown)   SpO2 98%   BMI 29.09 kg/m²       HISTORY OF PRESENT ILLNESS         Referring Provider: Gale King MD  Primary Provider:   Luzmaria San MD    Chief Complaint: vulvar cancer    Subjective:  Wilda Disla is a 64 year old female whom presents today for follow up examination.  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  Per the patient have been present for quite a while.  She finally did bring it up to her primary physician upon patient was concerned regarding this lesion.  She was therefore sent to Gynecology, Dr. King.  A biopsy was offered in the office over the chose to use of see gyn Oncology.  She was therefore referred to our office and evaluated on 11/2/2020.    Therefore on 11/10/2020 she proceeded with a Radical right modified hemivulvectomy not requiring flap, Right sentinel node dissection followed by completion inguinal femoral lymphadenectomy.    Final Pathology revealed:  Pathologic Diagnosis  A.   Vulva, right, modified hemivulvectomy:  -Invasive moderately differentiated squamous cell carcinoma (5.5 cm), margins negative.  -High-grade squamous intraepithelial lesion (ERIKA III), margins negative.  -Please see synoptic template for additional details.     B.   Lymph nodes, superficial right inguinal, biopsy:  -Two benign lymph nodes, negative for metastatic carcinoma (0/2).     C.   Lymph nodes, deep right inguinal, excision:  -Five benign lymph nodes, negative for metastatic carcinoma  (0/5).    She was then presented at gyn case, 01/18/2020 with the following recommendations:  Proceed with surveillance versus vulvar radiation.    She did meet with Radiation Oncology, Dr. Joseph on 12/11/2020 and ultimately decided not to proceed with vulva radiation.    Of note: She was initiated on Aldara therapy but only completed 1 1/2 weeks due to intense itching.      She presents today in compliance with her surveillance visit. At today's visit she states she is okay.  She is concerned there may be a new lump near the rectal area. Also some questionable swelling in her groin.     She denies vaginal bleeding or discharge. She denies any leg swelling or pain, vulvar pain or itching. She denies any issues with bowel or bladder function. She denies other subjective complaints.     History of present illness as well as subsequent gynecologic care identified below in problem list.  Additional subjective affirmative responses noted in ROS.        Other significant problems:  Patient Active Problem List    Diagnosis Date Noted   • Peripheral vascular disease (CMD) 07/24/2023     Priority: Low     1/2023 imaging, 2/2023 Left lower extremity PTA with stent.      • Encounter for therapeutic drug monitoring 09/26/2022     Priority: Low   • Long term (current) use of anticoagulants 09/26/2022     Priority: Low   • Osteopenia of multiple sites 09/26/2022     Priority: Low     9/2022     • Chronic anticoagulation 07/21/2022     Priority: Low   • Paroxysmal atrial fibrillation (CMD) 10/13/2021     Priority: Low   • History of TIA (transient ischemic attack) and stroke 08/18/2021     Priority: Low   • Acute ischemic stroke (CMD) 04/30/2021     Priority: Low     4/30/2021. LUE symptoms.  Acute/early subacute infarct in the right parietal lobe, predominantly along the postcentral sulcus, with small infarcts in the posterior right frontal lobe on MRI brain 04/30/2021     • ICAO (internal carotid artery occlusion), right  04/30/2021     Priority: Low     4/2021.   On follow up 10/2021 no longer totally occluded.      • Status post total right knee replacement 04/30/2021     Priority: Low     4/2021     • Vulvar cancer (CMD) 11/02/2020     Priority: Low     11/2020. Dr Garcia, s/p resection 11/2020, on surviellence     • Amaurosis fugax of right eye 09/16/2020     Priority: Low     9/2020/ Possible TIA.  Work up reassuring. ASA started and statin increased.       • Hx of adenomatous colonic polyps 01/06/2020     Priority: Low     11/2019 Dr Olmos     • Kidney stone on right side 07/17/2018     Priority: Low   • CRD (chronic renal disease), stage III (CMS/HCC) 06/12/2017     Priority: Low     [facility]      • Microhematuria 06/12/2017     Priority: Low     [facility]     • Diastolic dysfunction 06/12/2017     Priority: Low     [facility]     • Glucose intolerance (impaired glucose tolerance) 04/09/2017     Priority: Low   • Irritable bowel syndrome with both constipation and diarrhea 08/04/2016     Priority: Low   • Diverticulosis 12/16/2014     Priority: Low   • Internal hemorrhoids 12/16/2014     Priority: Low   • Cervical spondylosis without myelopathy 10/16/2014     Priority: Low     Cervical spondylosis  Cervical spine stenosis at C5-6 and C6-7 with radiculopathy  Thoracic  spondylosis   Dr Vega     • Lumbosacral spondylosis without myelopathy 10/16/2014     Priority: Low     Lumbar spondylosis  Lumbar stenosis at L4-5 with radiculopathy  Lumbar scoliosis  Dr Vega     • Constipation 03/20/2014     Priority: Low   • Severe Osteoarthritis of both knees 01/30/2014     Priority: Low     Dr Saucedo / [facility]. S/P right replacement 4/2021.       • Osteoarthritis of back 01/30/2014     Priority: Low   • Hypercholesterolemia 05/22/2013     Priority: Low   • Depression with anxiety 05/17/2013     Priority: Low   • Essential hypertension 05/17/2013     Priority: Low   • Smoker 05/17/2013     Priority: Low       PAST  Diagnosis/Treatment Options MEDICAL, FAMILY AND SOCIAL HISTORY     Medications:  Current Outpatient Medications   Medication Sig Dispense Refill   • warfarin (COUMADIN) 3 MG tablet Take 1-2 tablets by mouth daily. 180 tablet 0   • clopidogrel (Plavix) 75 MG tablet Take 1 tablet by mouth daily. 30 tablet 5   • escitalopram (LEXAPRO) 20 MG tablet Take 1 tablet by mouth once daily 90 tablet 3   • lisinopril (ZESTRIL) 20 MG tablet Take 1 tablet by mouth nightly 90 tablet 3   • Cartia  MG 24 hr capsule Take 1 capsule by mouth daily. 90 capsule 1   • MAGNESIUM OXIDE PO      • atorvastatin (LIPITOR) 80 MG tablet Take 1 tablet by mouth nightly. 90 tablet 3   • VITAMIN D, CHOLECALCIFEROL, PO Take 5,000 Units by mouth daily.     • Multiple Vitamin (MULTIVITAMIN ADULT PO) Take 1 tablet by mouth daily.     • polyethylene glycol (MIRALAX) powder Take 17 g by mouth daily. Titrate as needed (Patient taking differently: Take 17 g by mouth daily. Taking as needed.) 526 g 11     No current facility-administered medications for this visit.       Allergies:  ALLERGIES:  No Known Allergies    Past Medical  History/Surgeries:  Past Medical History:   Diagnosis Date   • Anxiety and depression    • Arthritis    • Atrial fibrillation (CMD) 10/09/2021   • Bronchitis    • Cancer (CMD)     Vulva   • Carotid stenosis 11/2021    s/p Rt CEA   • Cerebral infarction (CMD)    • Chronic kidney disease     Stage 3/4 (patient unsure)   • Chronic pain     knees   • Constipation    • Depression    • Dyslipidemia    • High cholesterol    • HTN (hypertension)    • Inflammatory bowel disease    • Irritable bowel syndrome with diarrhea 08/04/2016   • Kidney stones    • Left sided numbness 04/30/2021   • Left-sided weakness 04/30/2021   • Malignant neoplasm (CMD)     right vulvar ca   • PONV (postoperative nausea and vomiting)    • Sinusitis, chronic    • TIA (transient ischemic attack) 10/09/2021   • Ureteral stone 2000    left distal ureteral stone    • Urinary tract infection         Past Surgical History:   Procedure Laterality Date   • Abdomen surgery      removed some kind of growth, initially though was ectopic pregancy   • Colonoscopy w/ polypectomy  2014    probably hyperplastic, rech 5 yrs, Fayyad   • Colonoscopy w/ polypectomy  2019    serrated adenoma, Fayyad. Due    • Extracorporeal shock wave lithotripsy Right 06/2000    X2   • Eye surgery Right     laser surg x 1.  may need another.   • Lower extremity angiograms/possible pta/possible stent Left 02/15/2023   • Oophorectomy Bilateral    • Thromboendart w/w0 graft carotid  neck incs Right 2021    [place]   • Thromboendart w/w0 graft carotid  neck incs Right 2021   • Total abdominal hysterectomy w/ bilateral salpingoophorectomy      pain, endometriosis   • Total knee arthroplasty Right 2021    [place]   • Vulva resect,rad,part,uni nodes Right 11/10/2020    Right hemivulvectomy right groin node dissection        Family History:  Family History   Problem Relation Age of Onset   • Stroke Mother    • Diabetes Father         amputation leg,  kidney dialysis, sight loss     • Stroke Sister    • Cancer, Thyroid Sister         thyroid cancer   • Cancer, Ovarian Neg Hx    • Cancer, Breast Neg Hx    • Cancer, Endometrial Neg Hx        Social History:  Social History     Tobacco Use   • Smoking status: Every Day     Current packs/day: 0.00     Average packs/day: 0.3 packs/day for 40.0 years (10.0 ttl pk-yrs)     Types: Cigarettes     Start date: 10/9/1981     Last attempt to quit: 10/9/2021     Years since quittin.0   • Smokeless tobacco: Never   Substance Use Topics   • Alcohol use: Not Currently     Alcohol/week: 0.0 standard drinks of alcohol       REVIEW OF SYSTEMS     Review of Systems   Constitutional: Negative for chills, fatigue, fever and unexpected weight change.   HENT: Negative for congestion, mouth sores, postnasal drip, rhinorrhea and sneezing.    Eyes: Negative for visual  disturbance.   Respiratory: Negative for cough, shortness of breath and wheezing.    Cardiovascular: Negative for chest pain and leg swelling.   Gastrointestinal: Negative for abdominal distention, abdominal pain, blood in stool, constipation, diarrhea, nausea and vomiting.   Genitourinary: Negative for decreased urine volume, difficulty urinating, dyspareunia, dysuria, frequency, hematuria, pelvic pain, urgency, vaginal bleeding, vaginal discharge and vaginal pain.   Musculoskeletal: Negative for arthralgias and myalgias.   Skin: Negative for color change and rash.   Neurological: Negative for weakness, light-headedness, numbness and headaches.   Hematological: Negative for adenopathy. Does not bruise/bleed easily.   Psychiatric/Behavioral: Negative for dysphoric mood and sleep disturbance. The patient is not nervous/anxious.        PHYSICAL EXAM   Patient's ECOG Perfomance Status is 0.    Physical Exam  Vitals and nursing note reviewed. Exam conducted with a chaperone present.   Constitutional:       General: She is not in acute distress.     Appearance: Normal appearance. She is well-developed.   HENT:      Head: Normocephalic and atraumatic.      Neck: Normal range of motion and neck supple.   Eyes:      Pupils: Pupils are equal, round, and reactive to light.   Neck:      Thyroid: No thyromegaly.   Pulmonary:      Effort: Pulmonary effort is normal. No tachypnea, accessory muscle usage or respiratory distress.   Abdominal:       Genitourinary:     Exam position: Supine.      Labia:         Right: No rash or lesion.         Left: No rash or lesion.       Vagina: Normal. No vaginal discharge or bleeding.      Adnexa:         Right: No mass, tenderness or fullness.          Left: No mass, tenderness or fullness.        Rectum: Normal.       Musculoskeletal:         General: Normal range of motion.   Skin:     General: Skin is warm and dry.      Findings: No erythema or rash.   Neurological:      Mental Status: She  is alert and oriented to person, place, and time.   Psychiatric:         Speech: Speech normal.         Behavior: Behavior normal.         Thought Content: Thought content normal.         Judgment: Judgment normal.       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 Joseph and ultimately decided not to proceed with vulvar radiation.  -Due to positive margins she was then started on Aldara she only used x 1 1/2 weeks due to irritation   -Her surveillance will include : First 2 years following treatment.  Surveillance will include Q3-4 Month GYN Exams. Years 3-5 following treatment:  Surveillance will include Q6 Month GYN Exams.   -Vaginal pap smear collected 4/9/2021 and was negative. Repeat in 4/2024.   -Smoking cessation encouraged      All of the patients questions have been answered. She verbalizes an understanding and agreement of the above mentioned plan of care. She states she has no further questions at this time. She is certainly encouraged to contact our clinic should there be any additional questions, concerns or issues, prior to the next follow up visit.     She will therefore return to the clinic in 6 months, sooner as needed    MORIAH Esqueda    Patient seen and evaluated along with the mid-level provider. Participated in the care of the patient. Agree with the above assessment and plan.    Vulva  cancer  Close/pos margins  aldara used  Still smokes  Vulva no mass  Groins neg  surveilance    Eliezer Garcia MD

## 2023-10-31 NOTE — ED PROVIDER NOTE - PHYSICAL EXAMINATION
Gen: NAD, non-toxic appearing  Head: normal appearing  HEENT: normal conjunctiva  Lung: no respiratory distress, speaking in full sentences, clear to auscultation bilaterally   CV: regular rate and rhythm, no murmurs  Abd: soft, non distended, diffuse tenderness which the patient says of her baseline.  MSK: no visible deformities  Neuro:  Alert and grossly oriented, following commands  CN II-XII intact   patient has significant drift on the right side both right upper and right lower extremity.  Intact sensation to   Light touch in x4 extremities  Skin: No fox rashes Gen: NAD, non-toxic appearing  Head: normal appearing  HEENT: normal conjunctiva  Lung: no respiratory distress, speaking in full sentences, clear to auscultation bilaterally   CV: regular rate and rhythm, no murmurs  Abd: soft, non distended, diffuse tenderness which the patient says of her baseline.  MSK: no visible deformities  Neuro:  Alert and grossly oriented, following commands  CN II-XII intact   patient has significant drift on the right side both right upper and right lower extremity.  Intact sensation to   Light touch in x4 extremities  Skin: No fox rashes  Attending Hannah Barkley: Gen: NAD, heent: atrauamtic, eomi, dry mucous membranes, op pink, uvula midline, neck; nttp, no nuchal rigidity, chest: nttp, no crepitus, cv: tachcyardic, lungs: ctab, abd: soft,mild epigastric ttp, nondistended, no peritoneal signs,  no guarding, ext: wwp, neg homans, skin: no rash, neuro: awake and alert, following commands, speech clear, 4/5 right upper and lower extremity, 5/5 LLL and ANDRE. s

## 2023-10-31 NOTE — H&P ADULT - NSHPLABSRESULTS_GEN_ALL_CORE
10.9  10.81 )-----------( 176    ( 10-31-23 @ 12:19 )             34.2      133  |  98  |  7  -------------------------<  108    ( 10-31-23 @ 12:19 )  4.5   |  19  |  0.31      Calcium: 10.0 mg/dL (10-31-23 @ 12:19)  Magnesium: 1.6 mg/dL (10-31-23 @ 12:19)    TPro  7.5  /  Alb  2.9  /  TBili  0.3  /  DBili  --  /  AST  52  /  ALT  32  /  AlkPhos  171    ( 10-31-23 @ 12:19 )    < from: US Abdomen Upper Quadrant Right (10.31.23 @ 18:39) >    Bilobar hepatic metastases as on same-day CT.    No sonographic evidence of acute cholecystitis.    < end of copied text >    < from: CT Abdomen and Pelvis w/ IV Cont (10.31.23 @ 18:00) >    No pulmonary embolus.    Overall progression of neoplastic disease as evidenced by enlarging   dominant right upper lobe pulmonary mass with interval increase in size   and number of bilateral pulmonary metastases and bilobar hepatic   metastases when compared to prior imaging September 2023.    Additionally, new scattered metastatic nodules also noted in the   subcutaneous tissues of the abdominal wall.    < end of copied text >    < from: CT Head No Cont (10.31.23 @ 14:54) >    Post right temporal resection with multiple additional brain   metastases again noted. These do not appear to besignificantly changed   in appearance.    Tiny amount of extra-axial hemorrhage remaining versus postsurgical   material along the inside of the craniotomy flap. Interval resolution of   pneumocephalus post resection.    Partial opacification of right mastoid air cells, new compared to   previous exam 10/8/2023.    MRI with gadolinium may provide helpful additional evaluation, if   clinically indicated.    < end of copied text >    < from: Xray Chest 1 View- PORTABLE-Urgent (Xray Chest 1 View- PORTABLE-Urgent .) (10.31.23 @ 12:23) >    Redemonstrated right apical lung mass.  Left basilar linear atelectasis.    < end of copied text >

## 2023-10-31 NOTE — H&P ADULT - NSHPREVIEWOFSYSTEMS_GEN_ALL_CORE
Constitutional:  (-) fever, (-) chills, (-) lethargy  Eyes:  (-) eye pain (-) visual changes  ENMT: (-) nasal discharge, (-) sore throat. (-) neck pain or stiffness  Cardiac: (-) chest pain (-) palpitations  Respiratory:  (-) cough (-) respiratory distress.   GI:  (-) nausea (-) vomiting (-) diarrhea (-) constipation (+) abdominal pain.  :  (-) dysuria (-) frequency (-) burning.  MS:  (-) joint pain.  Neuro:  (+) headache (-) numbness (-) tingling   Skin:  (-) rash  Except as documented in the HPI,  all other systems are negative

## 2023-10-31 NOTE — ED PROVIDER NOTE - NS ED ROS FT
GENERAL: no acute fever  EYES: no acute eye pain  HEENT: no acute neck pain  CARDIAC: no acute chest pain  PULMONARY: no acute SOB  GI: no acute abdominal pain  : no acute dysuria  SKIN: no acute rashes  NEURO: + weakness  MSK: no new joint pain

## 2023-10-31 NOTE — H&P ADULT - PROBLEM SELECTOR PLAN 2
no alarm signs of cord compression  - likely 2/2 brain metastases  - PT consult  - oxy 5mg home med for severe pain, Tylenol for moderate no alarm signs of cord compression  - likely 2/2 brain metastases  - PT consult  - Tylenol 650mg for mild pain no alarm signs of cord compression  - likely 2/2 brain metastases; will obtain brain MRI to further evaluate  - PT/OT consult  - Tylenol 650mg for mild pain no alarm signs of cord compression  - pt given 4mg dexamethasone by oncologist prior to presentation  - likely 2/2 brain metastases; will obtain brain MRI to further evaluate  - PT/OT consult  - continue dexamethasone 2mg BID for now; can consider increasing if needed  - Tylenol 650mg for mild pain

## 2023-10-31 NOTE — H&P ADULT - PROBLEM SELECTOR PLAN 5
- per pt, no longer taking metformin for diabetes  - BG wnl this admission at 108  - check a1c in AM  - consider addition of SSI if persistently hyperglycemic - per pt, still takes metformin for diabetes  - BG wnl this admission at 108  - check a1c in AM  - ordered SSI - unclear if pt has been dx w/ T2DM or has hyperglycemia due to steroids  - per pt, still takes metformin BID  - BG wnl this admission at 108  - check a1c in AM  - if blood glucose elevated will consider starting SSI

## 2023-10-31 NOTE — H&P ADULT - ASSESSMENT
Patient is a 53 yo F with history of stage IV lung cancer with mets to brain (s/p 4 cycles chemo and thoracic RT completed on oct 2022), gamma knife surgery to 7 brain mets, who presented to [facility] with two weeks right lower extremity and upper weakness. now s/p R temporal crani for resection of metastatic lesion. Post-operative course was complicated by acute cerebellar infarct found on post-op MRI, tachycardia, and thrombocytopenia. Admitted to [place] acute inpatient rehab on 10/5 for ADL, gait, and functional impairments Patient is a 51 yo F with history of stage IV lung cancer with mets to brain (s/p 4 cycles chemo and thoracic RT completed on oct 2022), gamma knife surgery to 7 brain mets, s/p R temporal crani for resection of metastatic lesion. Post-operative course was complicated by acute cerebellar infarct found on post-op MRI, tachycardia, and thrombocytopenia. Pt now presenting with two weeks right upper and lower extremity weakness and abdominal pain concerning for metastatic disease. Patient is a 53 yo F with history of DM, GERD, stage IV lung cancer with mets to brain (s/p 4 cycles chemo and thoracic RT completed on oct 2022), gamma knife surgery to 7 brain mets, s/p R temporal crani for resection of metastatic lesion. Pt now presenting with two weeks right upper and lower extremity weakness and abdominal pain concerning for metastatic disease.

## 2023-10-31 NOTE — ED PROVIDER NOTE - CLINICAL SUMMARY MEDICAL DECISION MAKING FREE TEXT BOX
Adult patient with stage IV lung cancer with brain metastases presenting with acute weakness.  She has objective pronator drift on the right upper and right lower extremity.  With episode patient is an absolute contraindication to tPA given known metastatic disease of the brain.  Will assess for interval changes of same.Will assess for metabolic origins of weakness, hypoglycemia, electrolyte derangements and acid-base disturbances. assess for occult infection.  Patient reporting right-sided shoulder pain, will assess for bony injury, x-ray.  Patient also tachycardic, rectal temperature was unremarkable, not hypoxic and without overt symptoms to suggest PE, will rule out critical anemia.  Empirically treat for dehydration with physical exam findings consistent with same.  If tachycardia does not improve with fluid therapy, will advance to PE to rule out this as a cause of her tachycardia.    Of note, patient has abdominal tenderness.  Does not report any acute GI symptomology.  Has tenderness, appears to be chronic.  Patient is able to tolerate p.o. and is having active bowel movements.  Given stable clinical status and ongoing GI function, no concern for emergent surgical process at this time.  Will clinically trend. Adult patient with stage IV lung cancer with brain metastases presenting with acute weakness.  She has objective pronator drift on the right upper and right lower extremity.  With episode patient is an absolute contraindication to tPA given known metastatic disease of the brain.  Will assess for interval changes of same.Will assess for metabolic origins of weakness, hypoglycemia, electrolyte derangements and acid-base disturbances. assess for occult infection.  Patient reporting right-sided shoulder pain, will assess for bony injury, x-ray.  Patient also tachycardic, rectal temperature was unremarkable, not hypoxic and without overt symptoms to suggest PE, will rule out critical anemia.  Empirically treat for dehydration with physical exam findings consistent with same.  If tachycardia does not improve with fluid therapy, will advance to PE to rule out this as a cause of her tachycardia.    Of note, patient has abdominal tenderness.  Does not report any acute GI symptomology.  Has tenderness, appears to be chronic.  Patient is able to tolerate p.o. and is having active bowel movements.  Given stable clinical status and ongoing GI function, no concern for emergent surgical process at this time.  Will clinically trend.  Attending Hannah Barkley: 53 yo female with multiple medical episodes including h/o metastatic lung ca with intracranial involvement on decadron presenting with worsening weakness to right side and abdominal pain. pt out of window for code stroke and with h/o mets to the brain would be contraindication to tenectaplase. on exam pt with Right sided weakness. and abd pain. concern for posble worsenig spread of metastatic disease versus hemorrhagic conversion. ct scan of the head ordered to further evaluate. no fevers or chills. pt is immunosuppressed without clear foci of infection. pt with abdominal pain. pt tachcyardic upon arrival without evdience of respiratory distress. pt given IVF as appears dehydrated. with tachycardia ct scan of the chest abd/pel to evaluate for possibel PE vs intra abdominal process. less likely SBP. consider possible adrenal insufificency as pt also reports increased fatigue. will obtain labs, imaging. pt will needa dmission.

## 2023-10-31 NOTE — H&P ADULT - PROBLEM SELECTOR PLAN 3
- pt with worsening abdominal pain radiating from RUQ to left side of abdomen with palpable mass in abdomen; most likely to be 2/2 metastatic disease considering radiologic disease, less likely to be due to pancreatitis or infectious etiology considering no leukocytosis or fever  - RUQ demonstrating bilobar hepatic metastases, no sonographic evidence of acute cholecystitis.  - CTAP demonstrating new scattered metastatic nodules also noted in the subcutaneous tissues of the abdominal wall.  - however given usage of Depakote at home and side effect of pancreatitis, will obtain lipase  - if lipase is elevated, hold depakote and switch to keppra 500 BID - pt with worsening abdominal pain radiating from RUQ to left side of abdomen with palpable mass in abdomen; most likely to be 2/2 metastatic disease considering radiologic disease, less likely to be due to pancreatitis or infectious etiology considering no leukocytosis or fever  - RUQ demonstrating bilobar hepatic metastases, no sonographic evidence of acute cholecystitis.  - CTAP demonstrating new scattered metastatic nodules also noted in the subcutaneous tissues of the abdominal wall.  - however given usage of Depakote at home and side effect of pancreatitis, will obtain lipase  - if lipase is elevated, may consider holding depakote and switch to keppra 500 BID

## 2023-10-31 NOTE — H&P ADULT - HISTORY OF PRESENT ILLNESS
52-year-old female, history of stage IV lung cancer with brain metastasis, undergoing radiation therapy, presenting with a chief complaint of right upper extremity and right lower extremity weakness. Pt was recently admitted for RLE weakness in 10/23 and was discharged to rehab where pt states she continued doing exercises and felt she was improving. She states she started experiencing the weakness again about a week after she was discharged and she feels unstable when walking or attempting to hold anything with her R hand. Pt denies any pain in her right side and states she just feels "wobbly." She states she is now needing her fiance to help with her ADLs including getting out of bed  Pt states this started about a weekDoes not have any other acute complaints, she does feel generalized fatigue at baseline as well as intermittent abdominal pains, no metastatic disease to the abdomen.  Currently not on chemotherapy. no recent medication changes. 52-year-old female, history of stage IV lung cancer with brain metastasis, undergoing radiation therapy, presenting with a chief complaint of right upper extremity and right lower extremity weakness. Pt was recently admitted for RLE weakness in 10/23 and was discharged to rehab where pt states she continued doing exercises and felt she was improving. She states she started experiencing the weakness again about a week after she was discharged and she feels unstable when walking or attempting to hold anything with her R hand. Pt denies any pain in her right side and states she just feels "wobbly." She states she is now needing her fiance to help with her ADLs including getting out of bed  Pt states she has not fallen due to her weakness but feels that she would if she did not have several people helping her. Pt also stated that she has been having abdominal pain and noticed a palpable knot in her abdomen which started when she was d/c-ed from her previous admission. She states that initially the pain was in her RUQ, but now it radiates to the left side of her abdomen as well. Pt describes the pain as a lightening sensation. Pt also states she has right shoulder pain which her oncologist stated was most likely due to the weakness she was experiencing on her right side. She states she takes about 6 tylenol (150mg each) per day for pain. Pt states she went to her oncologist this morning with concern of her R sided weakness and was encouraged to come to the ED for further imaging. Currently not on chemotherapy. no recent medication changes. Pt states she is having increasing shortness of breath with exertion, but denies current SOB, chest pain, fever/chills, N/V. Pt endorses headache and states it is at the same degree of her typical headaches, but more frequent.

## 2023-10-31 NOTE — ED PROVIDER NOTE - ATTENDING CONTRIBUTION TO CARE
Attending MD Hannah Barkley:  I personally have seen and examined this patient.  Resident note reviewed and agree on plan of care and except where noted.  See HPI, PE, and MDM for details.

## 2023-10-31 NOTE — ED PROVIDER NOTE - OBJECTIVE STATEMENT
52-year-old female, history of stage IV lung cancer with brain metastasis, undergoing radiation therapy, presenting with a chief complaint of right upper extremity and right lower extremity weakness.  Acute onset today.  Does not have any other acute complaints, she does feel generalized fatigue at baseline as well as intermittent abdominal pains, no metastatic disease to the abdomen.  Currently not on chemotherapy. no recent medication changes.

## 2023-10-31 NOTE — H&P ADULT - PROBLEM SELECTOR PLAN 7
- diet: regular  dvt: ppx:  scds (as per  NSGY - obtain repeat non con head CT in the AM to evaluate for possible AC, however no AC as of now)   PT to see - diet: regular  dvt: ppx:  scds  PT to see - diet: regular  dvt: ppx:  scds  PT to see  - need to follow-up on meds since pt is unsure if she takes atorvastatin 40mg - diet: NPO for dysphagia eval  dvt: ppx:  scds  PT to see  - need to follow-up on meds since pt is unsure if she takes atorvastatin 40mg

## 2023-10-31 NOTE — H&P ADULT - NSHPPHYSICALEXAM_GEN_ALL_CORE
PHYSICAL EXAM:  GENERAL: NAD, well-groomed, well-developed  HEAD:  Atraumatic, Normocephalic  EYES: conjunctiva and sclera clear  HEART: Regular rate and rhythm; No murmurs, rubs, or gallops  RESPIRATORY: CTA B/L, No wheezing, rhonchi, rales  ABDOMEN: Soft, tender to palpation of RUQ, palpable mass in RUQ, Nondistended; Bowel sounds present  NEUROLOGY: A&Ox3, moving all extremities, 4/5 strength in RLE, 5/5 strength in LLE, 4/5 strength in RUE, 5/5 strength in LUE  EXTREMITIES:  No clubbing, cyanosis, or edema  SKIN: warm, dry, normal color, no rash or abnormal lesions

## 2023-10-31 NOTE — H&P ADULT - ATTENDING COMMENTS
53yo 58kg f w pmh hld, dm, gerd, stage 4 metastatic (bilateral pulmonary, l pleura; hepatic, subcutaneous abdominal wall; brain s/p palliative systemic chemotherapy w Carbo/pem x4 cycles 9/2022 and r thoracic rt 9/1/2022-10/21/2022, s/p SRS 7 brain metastases 6/21-6/29/2023, s/p right temporal craniotomy 9/30/2023 w resection of metastatic lesion c/b acute left cerebellar infarct), p/w worsening rle weakness; in er, imaging showed overall progression of disease w either worsening preexisting mets or new mets; admit to medicine for further mgmt.    neuroimaging w ct reviewed  follow up mri brain w contrast  maintain frequent neurochecks  NPO for now pending dysphagia screen/SLP eval; aspiration precautions with head end of bed elevated and IVF + lytes in the mean time  PT/OT eval with fall precautions in the mean time + SW/CM for disposition  neuro/nsgy consult in am  onc consult in am  palliative care consult in am    otherwise, concur w a+p as described by resident above

## 2023-10-31 NOTE — ED ADULT NURSE NOTE - OBJECTIVE STATEMENT
Pt is 52y F with PMH brain metastasis s/p RT and right temporal craniotomy complaining of R arm weakness. Pt reports onset of R arm weakness x few days. Upon assessment, A&Ox4, upper quadrant tenderness upon palpation, R arm weakness, full ROM of upper and lower extremities, denies numbness or tingling n all extremities. Tachy  NSR, all other vitals WNL. Belongings at bedside. Pt is 52y F with PMH stage IV lung CA with brain metastasis s/p RT and right temporal craniotomy complaining of R arm weakness. Pt reports onset of R arm weakness x few days. Upon assessment, A&Ox4, upper quadrant tenderness upon palpation, R arm weakness, full ROM of upper and lower extremities, denies numbness or tingling all extremities. Tachy  NSR, all other vitals WNL. Belongings at bedside.

## 2023-10-31 NOTE — H&P ADULT - PROBLEM SELECTOR PLAN 1
previously treated with 4 rounds of chemo, developed brain mets 5/23, supposed to have followed up for immunotherapy with outpatient Dr. Beckman; now presenting w/ R sided weakness and abdominal pain  - CTAP demonstrating progression of neoplastic disease  with enlarging dominant right upper lobe pulmonary mass with interval increase in size and number of bilateral pulmonary metastases and bilobar hepatic metastases when compared to prior imaging September 2023 along with new scattered metastatic nodules in the subcutaneous tissues of the abdominal wall.  - CT Head demonstrated multiple brain metastases not significantly changed from prior imaging  - consult NSGY in the AM  - oncology consult in the AM  - palliative consult in the AM to help assist with GOC/ pain control if needed previously treated with 4 rounds of chemo, developed brain mets 5/23, supposed to have followed up for immunotherapy with outpatient Dr. Beckman; now presenting w/ R sided weakness and abdominal pain  - CTAP demonstrating progression of neoplastic disease  with enlarging dominant right upper lobe pulmonary mass with interval increase in size and number of bilateral pulmonary metastases and bilobar hepatic metastases when compared to prior imaging September 2023 along with new scattered metastatic nodules in the subcutaneous tissues of the abdominal wall.  - CT Head demonstrated multiple brain metastases not significantly changed from prior imaging  - ordered MRI; may consider consult NSGY in the AM  - oncology consult in the AM  - palliative consult in the AM to help assist with GOC/ pain control if needed

## 2023-10-31 NOTE — ED ADULT NURSE NOTE - NSFALLUNIVINTERV_ED_ALL_ED
Bed/Stretcher in lowest position, wheels locked, appropriate side rails in place/Call bell, personal items and telephone in reach/Instruct patient to call for assistance before getting out of bed/chair/stretcher/Non-slip footwear applied when patient is off stretcher/[place] to call system/Physically safe environment - no spills, clutter or unnecessary equipment/Purposeful proactive rounding/Room/bathroom lighting operational, light cord in reach

## 2023-10-31 NOTE — H&P ADULT - PROBLEM SELECTOR PLAN 4
- pt w/ Na of 133 on admission; pt noted to have hyponatremia during prior admissions as well  - will order serum osmolality and urine lytes - pt w/ Na of 133 on admission; pt noted to have hyponatremia during prior admissions as well  - most likely 2/2 SIADH given hx of lung cancer  - will order serum osmolality and urine lytes

## 2023-11-01 NOTE — PROGRESS NOTE ADULT - PROBLEM SELECTOR PLAN 5
- unclear if pt has been dx w/ T2DM or has hyperglycemia due to steroids  - per pt, still takes metformin BID  - BG wnl this admission at 108  - check a1c in AM  - if blood glucose elevated will consider starting SSI - unclear if pt has been dx w/ T2DM or has hyperglycemia due to steroids, A1c 7.7  - per pt, still takes metformin BID  - BG wnl this admission at 108  - fsg qhs + tid  - low SSI

## 2023-11-01 NOTE — PHYSICAL THERAPY INITIAL EVALUATION ADULT - ADDITIONAL COMMENTS
Pt lives at home alone, +fiance, recent hospitalization and rehab stay; DCd home with rolling walker; Per last chart;  private home with 4 steps to enter with no rails but {something to hold on to". 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 Pt has had two slips in past few month and was issued a RW a few weeks ago upon d/c from [facility]

## 2023-11-01 NOTE — CONSULT NOTE ADULT - SUBJECTIVE AND OBJECTIVE BOX
p ([id number])     HPI: 52F no AC/AP hx stage IV NSCLC w/ mets to brain s/p 4c chemo/ thoracic RT completed 10/2022, GKRS to 7 brain mets, now s/p R crani for tumor rsxn 9/30/23. Pt now admitted med for c/o 1wk R leg heaviness and RUE clumsiness + abd pain. CT CAP w/ new scattered mets. CTH w/ no sig change c/t 10/8/23     Exam: AOx3, mild RUE drift, VFF, PERRL, EOMI, no facial, Rt side 4+/5, L side 5/5, SILT      --Anticoagulation:    =====================  PAST MEDICAL HISTORY   GERD (Gastroesophageal Reflux Disease)    Lung mass    Non-small cell lung cancer with metastasis      PAST SURGICAL HISTORY   No significant past surgical history    S/P endoscopy      No Known Allergies      MEDICATIONS:  Antibiotics:    Neuro:  acetaminophen     Tablet .. 650 milliGRAM(s) Oral every 6 hours PRN  divalproex  milliGRAM(s) Oral every 12 hours  melatonin 3 milliGRAM(s) Oral at bedtime PRN    Other:  dexAMETHasone     Tablet 2 milliGRAM(s) Oral two times a day  dextrose 5%. 1000 milliLiter(s) IV Continuous <Continuous>  dextrose 5%. 1000 milliLiter(s) IV Continuous <Continuous>  dextrose 50% Injectable 25 Gram(s) IV Push once  dextrose 50% Injectable 25 Gram(s) IV Push once  dextrose 50% Injectable 12.5 Gram(s) IV Push once  dextrose Oral Gel 15 Gram(s) Oral once PRN  glucagon  Injectable 1 milliGRAM(s) IntraMuscular once  insulin lispro (ADMELOG) corrective regimen sliding scale   SubCutaneous three times a day before meals  lactated ringers. 1000 milliLiter(s) IV Continuous <Continuous>  pantoprazole    Tablet 40 milliGRAM(s) Oral before breakfast  polyethylene glycol 3350 17 Gram(s) Oral daily  senna 2 Tablet(s) Oral at bedtime PRN      SOCIAL HISTORY:   Occupation:   Marital Status:     FAMILY HISTORY:  No pertinent family history in first degree relatives        ROS: Negative except per HPI    LABS:  PT/INR - ( 31 Oct 2023 12:19 )   PT: 15.1 sec;   INR: 1.39 ratio         PTT - ( 31 Oct 2023 12:19 )  PTT:28.0 sec                        9.8    8.50  )-----------( 188      ( 01 Nov 2023 18:38 )             30.3     11-01    137  |  103  |  6<L>  ----------------------------<  80  3.5   |  22  |  0.33<L>    Ca    10.1      01 Nov 2023 07:37  Phos  2.9     11-01  Mg     1.7     11-01    TPro  6.2  /  Alb  2.6<L>  /  TBili  0.2  /  DBili  x   /  AST  35  /  ALT  26  /  AlkPhos  146<H>  11-01

## 2023-11-01 NOTE — OCCUPATIONAL THERAPY INITIAL EVALUATION ADULT - PERTINENT HX OF CURRENT PROBLEM, REHAB EVAL
52-year-old female, history of stage IV lung cancer with brain metastasis, undergoing radiation therapy, presenting with a chief complaint of right upper extremity and right lower extremity weakness. Pt was recently admitted for RLE weakness in 10/23 and was discharged to rehab where pt states she continued doing exercises and felt she was improving. She states she started experiencing the weakness again about a week after she was discharged and she feels unstable when walking or attempting to hold anything with her R hand. Pt denies any pain in her right side and states she just feels "wobbly." She states she is now needing her fiance to help with her ADLs including getting out of bed  Pt states she has not fallen due to her weakness but feels that she would if she did not have several people helping her. Pt also stated that she has been having abdominal pain and noticed a palpable knot in her abdomen which started when she was d/c-ed from her previous admission. She states that initially the pain was in her RUQ, but now it radiates to the left side of her abdomen as well. Pt describes the pain as a lightening sensation. Pt also states she has right shoulder pain which her oncologist stated was most likely due to the weakness she was experiencing on her right side. She states she takes about 6 tylenol (150mg each) per day for pain. Pt states she went to her oncologist this morning with concern of her R sided weakness and was encouraged to come to the ED for further imaging. Currently not on chemotherapy. no recent medication changes. Pt states she is having increasing shortness of breath with exertion, but denies current SOB, chest pain, fever/chills, N/V. Pt endorses headache and states it is at the same degree of her typical headaches, but more frequent.CT Angio Chest/Pelvis 10/31-No pulmonary embolus. Overall progression of neoplastic disease as evidenced by enlarging dominant right upper lobe pulmonary mass with interval increase in size and number of bilateral pulmonary metastases and bilobar hepatic metastases when compared to prior imaging September 2023. Additionally, new scattered metastatic nodules also noted in the subcutaneous tissues of the abdominal wall.

## 2023-11-01 NOTE — PROGRESS NOTE ADULT - PROBLEM SELECTOR PLAN 1
previously treated with 4 rounds of chemo, developed brain mets 5/23, supposed to have followed up for immunotherapy with outpatient Dr. Beckman; now presenting w/ R sided weakness and abdominal pain  - CTAP demonstrating progression of neoplastic disease  with enlarging dominant right upper lobe pulmonary mass with interval increase in size and number of bilateral pulmonary metastases and bilobar hepatic metastases when compared to prior imaging September 2023 along with new scattered metastatic nodules in the subcutaneous tissues of the abdominal wall.  - CT Head demonstrated multiple brain metastases not significantly changed from prior imaging  - ordered MRI; may consider consult NSGY in the AM  - oncology consult in the AM  - palliative consult in the AM to help assist with GOC/ pain control if needed previously treated with 4 rounds of chemo, developed brain mets 5/23, supposed to have followed up for immunotherapy with outpatient Dr. Beckman; now presenting w/ R sided weakness and abdominal pain  - CTAP demonstrating progression of neoplastic disease  with enlarging dominant right upper lobe pulmonary mass with interval increase in size and number of bilateral pulmonary metastases and bilobar hepatic metastases when compared to prior imaging September 2023 along with new scattered metastatic nodules in the subcutaneous tissues of the abdominal wall.  - CT Head demonstrated multiple brain metastases not significantly changed from prior imaging  - ordered MRI  - oncology consult in the AM previously treated with 4 rounds of chemo, developed brain mets 5/23, supposed to have followed up for immunotherapy with outpatient Dr. Beckman; now presenting w/ R sided weakness and abdominal pain  - CTAP demonstrating progression of neoplastic disease  with enlarging dominant right upper lobe pulmonary mass with interval increase in size and number of bilateral pulmonary metastases and bilobar hepatic metastases when compared to prior imaging September 2023 along with new scattered metastatic nodules in the subcutaneous tissues of the abdominal wall.  - CT Head demonstrated multiple brain metastases not significantly changed from prior imaging  - MRI head pending  - oncology consulted 11/1, f/u recs

## 2023-11-01 NOTE — OCCUPATIONAL THERAPY INITIAL EVALUATION ADULT - LIVES WITH, PROFILE
Pt lives at home alone, +fiance, recent hospitalization and rehab stay; DCd home with rolling walker; Per last chart;  private home with 4 steps to enter with no rails but {something to hold on to". 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 Pt has had two slips in past few month and was issued a RW a few weeks ago upon d/c from [facility]/significant other

## 2023-11-01 NOTE — PROGRESS NOTE ADULT - ASSESSMENT
Patient is a 53 yo F with history of DM, GERD, stage IV lung cancer with mets to brain (s/p 4 cycles chemo and thoracic RT completed on oct 2022), gamma knife surgery to 7 brain mets, s/p R temporal crani for resection of metastatic lesion. Pt now presenting with two weeks right upper and lower extremity weakness and abdominal pain concerning for metastatic disease.

## 2023-11-01 NOTE — PHYSICAL THERAPY INITIAL EVALUATION ADULT - GENERAL OBSERVATIONS, REHAB EVAL
Pt a/w from home with incr RUE/RLE weakness and abd pain, concern for metastatic disease, hx lung ca with mets to brain; recent rehab stay and DCd home. Pt received supine on stretcher in [facility] (orange [id number]), +IVL, A&OX4, mild forgetfulness, +purewick.

## 2023-11-01 NOTE — PHYSICAL THERAPY INITIAL EVALUATION ADULT - TRANSFER TRAINING, PT EVAL
bilateral upper extremities/normal/bilateral lower extremities GOAL: pt will complete all transfers ind in 4wks.

## 2023-11-01 NOTE — PROGRESS NOTE ADULT - PROBLEM SELECTOR PLAN 4
- pt w/ Na of 133 on admission; pt noted to have hyponatremia during prior admissions as well  - most likely 2/2 SIADH given hx of lung cancer  - will order serum osmolality and urine lytes

## 2023-11-01 NOTE — PHYSICAL THERAPY INITIAL EVALUATION ADULT - NSPTDISCHREC_GEN_A_CORE
DC acute rehab; if pt to go home home PT services assist for ALL mobility/ADLs, recommend use of rolling walker, recommend shower chair, transport/polyfly WC for long distances and decr endurance/strength to R side, ANDRÉS matos aware./Acute Inpatient Rehab

## 2023-11-01 NOTE — PROGRESS NOTE ADULT - PROBLEM SELECTOR PLAN 2
no alarm signs of cord compression  - pt given 4mg dexamethasone by oncologist prior to presentation  - likely 2/2 brain metastases; will obtain brain MRI to further evaluate  - PT/OT consult  - continue dexamethasone 2mg BID for now; can consider increasing if needed  - Tylenol 650mg for mild pain no alarm signs of cord compression  - pt given 4mg dexamethasone by oncologist prior to presentation  - likely 2/2 brain metastases; will obtain brain MRI to further evaluate  - PT/OT consult  - continue dexamethasone 2mg BID for now  - Tylenol 650mg for mild pain no alarm signs of cord compression  - pt given 4mg dexamethasone by oncologist prior to presentation  - likely 2/2 brain metastases; will obtain brain MRI to further evaluate  - continue dexamethasone 2mg BID for now; can consider increasing if needed  - Tylenol 650mg for mild pain  - PT/OT consulted 11/1, f/u recs

## 2023-11-01 NOTE — PHYSICAL THERAPY INITIAL EVALUATION ADULT - ACTIVE RANGE OF MOTION EXAMINATION, REHAB EVAL
incr time for R hand , decr coordination/Left UE Active ROM was WFL (within functional limits)/Right UE Active ROM was WFL (within functional limits)/Left LE Active ROM was WFL (within functional limits)/Right LE Active ROM was WFL (within functional limits)

## 2023-11-01 NOTE — PHYSICAL THERAPY INITIAL EVALUATION ADULT - GAIT DEVIATIONS NOTED, PT EVAL
heaviness to RLE, buckling with fatigue, drags R foot/decreased gabe/decreased stride length/decreased weight-shifting ability

## 2023-11-01 NOTE — OCCUPATIONAL THERAPY INITIAL EVALUATION ADULT - IMPAIRED TRANSFERS: SIT/STAND, REHAB EVAL
impaired balance/impaired postural control/decreased strength impaired balance/cognition/impaired postural control/decreased strength

## 2023-11-01 NOTE — PROGRESS NOTE ADULT - PROBLEM SELECTOR PLAN 3
- pt with worsening abdominal pain radiating from RUQ to left side of abdomen with palpable mass in abdomen; most likely to be 2/2 metastatic disease considering radiologic disease, less likely to be due to pancreatitis or infectious etiology considering no leukocytosis or fever  - RUQ demonstrating bilobar hepatic metastases, no sonographic evidence of acute cholecystitis.  - CTAP demonstrating new scattered metastatic nodules also noted in the subcutaneous tissues of the abdominal wall.  - however given usage of Depakote at home and side effect of pancreatitis, will obtain lipase  - if lipase is elevated, may consider holding depakote and switch to keppra 500 BID - pt with sharp 10 sec abdominal pain radiating from RUQ to left side of abdomen with palpable mass in abdomen; most likely to be 2/2 metastatic disease considering radiologic disease, less likely to be due to pancreatitis or infectious etiology considering no leukocytosis or fever  - RUQ demonstrating bilobar hepatic metastases, no sonographic evidence of acute cholecystitis.  - CTAP demonstrating new scattered metastatic nodules also noted in the subcutaneous tissues of the abdominal wall.  - lipase neg  - Tylenol 650mg for mild pain

## 2023-11-01 NOTE — PROGRESS NOTE ADULT - PROBLEM SELECTOR PLAN 7
- diet: NPO for dysphagia eval  dvt: ppx:  scds  PT to see  - need to follow-up on meds since pt is unsure if she takes atorvastatin 40mg - diet: regular diet  dvt: ppx:  scds  PT to see  - need to follow-up on meds since pt is unsure if she takes atorvastatin 40mg - diet: carb consistent  - dvt ppx:  cds  - PT to see  - need to follow-up on meds since pt is unsure if she takes atorvastatin 40mg

## 2023-11-01 NOTE — PHYSICAL THERAPY INITIAL EVALUATION ADULT - PERTINENT HX OF CURRENT PROBLEM, REHAB EVAL
Pt is a 52-year-old female admitted to [facility] on 10/31/23 history of stage IV lung cancer with brain metastasis, undergoing radiation therapy, presenting with a chief complaint of right upper extremity and right lower extremity weakness. Pt was recently admitted for RLE weakness in 10/23 and was discharged to rehab where pt states she continued doing exercises and felt she was improving. She states she started experiencing the weakness again about a week after she was discharged and she feels unstable when walking or attempting to hold anything with her R hand. Pt denies any pain in her right side and states she just feels "wobbly." She states she is now needing her fiance to help with her ADLs including getting out of bed  Pt states she has not fallen due to her weakness but feels that she would if she did not have several people helping her. Pt also stated that she has been having abdominal pain and noticed a palpable knot in her abdomen which started when she was d/c-ed from her previous admission. She states that initially the pain was in her RUQ, but now it radiates to the left side of her abdomen as well. Pt describes the pain as a lightening sensation. Pt also states she has right shoulder pain which her oncologist stated was most likely due to the weakness she was experiencing on her right side. She states she takes about 6 tylenol (150mg each) per day for pain. Pt states she went to her oncologist this morning with concern of her R sided weakness and was encouraged to come to the ED for further imaging. Currently not on chemotherapy. no recent medication changes. Pt states she is having increasing shortness of breath with exertion, but denies current SOB, chest pain, fever/chills, N/V. Pt endorses headache and states it is at the same degree of her typical headaches, but more frequent. Hospital course: +Stage IV lung cancer with mets to brain (s/p 4 cycles chemo and thoracic RT completed on oct 2022), gamma knife surgery to 7 brain mets, s/p R temporal crani for resection of metastatic lesion. Pt now presenting with two weeks right upper and lower extremity weakness and abdominal pain concerning for metastatic disease. CTAP demonstrating progression of neoplastic disease  with enlarging dominant right upper lobe pulmonary mass with interval increase in size and number of bilateral pulmonary metastases and bilobar hepatic metastases when compared to prior imaging September 2023 along with new scattered metastatic nodules in the subcutaneous tissues of the abdominal wall. CT Head demonstrated multiple brain metastases not significantly changed from prior imaging, ordered MRI; may consider consult NSGY in the AM, +RUE weakness- no alarm signs of cord compression; +dvt: ppx: scds. US abd: Bilobar hepatic metastases as on same-day CT. No sonographic evidence of acute cholecystitis. CXR: Redemonstrated right apical lung mass.Left basilar linear atelectasis.

## 2023-11-01 NOTE — PROGRESS NOTE ADULT - SUBJECTIVE AND OBJECTIVE BOX
Patient is a 52y old  Female who presents with a chief complaint of R sided weakness (31 Oct 2023 22:45)    INTERVAL HPI/OVERNIGHT EVENTS:    REVIEW OF SYSTEMS:  CONSTITUTIONAL: No fever, weight loss, or fatigue  EYES: No eye pain, visual disturbances, or discharge  ENMT:  No difficulty hearing, tinnitus, vertigo; No sinus or throat pain  NECK: No pain or stiffness  BREASTS: No pain, masses, or nipple discharge  RESPIRATORY: No cough, wheezing, chills or hemoptysis; No shortness of breath  CARDIOVASCULAR: No chest pain, palpitations, dizziness, or leg swelling  GASTROINTESTINAL: No abdominal or epigastric pain. No nausea, vomiting, or hematemesis; No diarrhea or constipation. No melena or hematochezia.  GENITOURINARY: No dysuria, frequency, hematuria, or incontinence  NEUROLOGICAL: No headaches, memory loss, loss of strength, numbness, or tremors  SKIN: No itching, burning, rashes, or lesions   LYMPH NODES: No enlarged glands  ENDOCRINE: No heat or cold intolerance; No hair loss  MUSCULOSKELETAL: No joint pain or swelling; No muscle, back, or extremity pain  PSYCHIATRIC: No depression, anxiety, mood swings, or difficulty sleeping  HEME/LYMPH: No easy bruising, or bleeding gums  ALLERY AND IMMUNOLOGIC: No hives or eczema    No Known Allergies    MEDS:  acetaminophen     Tablet .. 650 milliGRAM(s) Oral every 6 hours PRN  artificial tears (preservative free) Ophthalmic Solution 1 Drop(s) Both EYES three times a day  dexAMETHasone     Tablet 2 milliGRAM(s) Oral two times a day  divalproex  milliGRAM(s) Oral every 12 hours  melatonin 3 milliGRAM(s) Oral at bedtime PRN  pantoprazole    Tablet 40 milliGRAM(s) Oral before breakfast  polyethylene glycol 3350 17 Gram(s) Oral daily  senna 2 Tablet(s) Oral at bedtime PRN    T(C): 36.8 (11-01-23 @ 04:31), Max: 36.9 (10-31-23 @ 13:00)  HR: 109 (11-01-23 @ 04:31) (98 - 120)  BP: 103/67 (11-01-23 @ 04:31) (94/67 - 126/82)  RR: 20 (11-01-23 @ 04:31) (18 - 20)  SpO2: 92% (11-01-23 @ 04:31) (92% - 100%)    PHYSICAL EXAM:  GENERAL: NAD, well-groomed, well-developed  HEAD:  Atraumatic, Normocephalic  EYES: conjunctiva and sclera clear  HEART: Regular rate and rhythm; No murmurs, rubs, or gallops  RESPIRATORY: CTA B/L, No wheezing, rhonchi, rales  ABDOMEN: Soft, tender to palpation of RUQ, palpable mass in RUQ, Nondistended; Bowel sounds present  NEUROLOGY: A&Ox3, moving all extremities, 4/5 strength in RLE, 5/5 strength in LLE, 4/5 strength in RUE, 5/5 strength in LUE  EXTREMITIES:  No clubbing, cyanosis, or edema  SKIN: warm, dry, normal color, no rash or abnormal lesions    Consultant(s) Notes Reviewed:  [x ] YES  [ ] NO  Care Discussed with Consultants/Other Providers [ x] YES  [ ] NO    LABS:                        9.7    8.61  )-----------( 173      ( 01 Nov 2023 07:08 )             29.7     133<L>  |  98  |  7   ----------------------------<  108<H>  4.5   |  19<L>  |  0.31<L>    Ca    10.0      31 Oct 2023 12:19  Mg     1.6     10-31    TPro  7.5  /  Alb  2.9<L>  /  TBili  0.3  /  DBili  x   /  AST  52<H>  /  ALT  32  /  AlkPhos  171<H>  10-31      RADIOLOGY & ADDITIONAL TESTS:    US Abdomen Upper Quadrant Right (10.31.23 @ 18:39)  Bilobar hepatic metastases as on same-day CT.  No sonographic evidence of acute cholecystitis.    CT Abdomen and Pelvis w/ IV Cont (10.31.23 @ 18:00)  No pulmonary embolus.  Overall progression of neoplastic disease as evidenced by enlarging   dominant right upper lobe pulmonary mass with interval increase in size   and number of bilateral pulmonary metastases and bilobar hepatic   metastases when compared to prior imaging September 2023.  Additionally, new scattered metastatic nodules also noted in the   subcutaneous tissues of the abdominal wall.    CT Head No Cont (10.31.23 @ 14:54)  Post right temporal resection with multiple additional brain   metastases again noted. These do not appear to besignificantly changed   in appearance.  Tiny amount of extra-axial hemorrhage remaining versus postsurgical   material along the inside of the craniotomy flap. Interval resolution of   pneumocephalus post resection.  Partial opacification of right mastoid air cells, new compared to   previous exam 10/8/2023.  MRI with gadolinium may provide helpful additional evaluation, if   clinically indicated.    Xray Chest 1 View- PORTABLE-Urgent (10.31.23 @ 12:23)  Redemonstrated right apical lung mass.  Left basilar linear atelectasis. Patient is a 52y old  Female who presents with a chief complaint of R sided weakness (31 Oct 2023 22:45)    INTERVAL HPI/OVERNIGHT EVENTS: Patient reports R sided upper extremity and lower extremity weakness. She was discharged from rehab with good pain and extremity control, and reports strength was back to baseline. However at home, she could not continue physical exercises as she does not have adequate home PT set up and time constrainsts with family. She reports weakenss has been worsening since discharge to rehab, but no red flag symptoms such as urinary or bowel incontinence, point tenderness, spinal pain or rigidity.     No Known Allergies    MEDS:  acetaminophen     Tablet .. 650 milliGRAM(s) Oral every 6 hours PRN  artificial tears (preservative free) Ophthalmic Solution 1 Drop(s) Both EYES three times a day  dexAMETHasone     Tablet 2 milliGRAM(s) Oral two times a day  divalproex  milliGRAM(s) Oral every 12 hours  melatonin 3 milliGRAM(s) Oral at bedtime PRN  pantoprazole    Tablet 40 milliGRAM(s) Oral before breakfast  polyethylene glycol 3350 17 Gram(s) Oral daily  senna 2 Tablet(s) Oral at bedtime PRN    T(C): 36.8 (11-01-23 @ 04:31), Max: 36.9 (10-31-23 @ 13:00)  HR: 109 (11-01-23 @ 04:31) (98 - 120)  BP: 103/67 (11-01-23 @ 04:31) (94/67 - 126/82)  RR: 20 (11-01-23 @ 04:31) (18 - 20)  SpO2: 92% (11-01-23 @ 04:31) (92% - 100%)    PHYSICAL EXAM:  GENERAL: NAD  HEAD:  Atraumatic, Normocephalic  EYES: conjunctiva and sclera clear  HEART: Regular rate and rhythm; No murmurs, rubs, or gallops  RESPIRATORY: CTA B/L, No wheezing, rhonchi, rales  ABDOMEN: Soft, tender to palpation of RUQ, palpable mass in RUQ, Nondistended; Bowel sounds present  NEUROLOGY: A&Ox3, moving all extremities, 4+/5 strength in RLE, 5/5 strength in LLE, 4+/5 strength in RUE, 5/5 strength in LUE  EXTREMITIES:  No clubbing, cyanosis, or edema  SKIN: warm, dry, normal color, no rash or abnormal lesions    Consultant(s) Notes Reviewed:  [x ] YES  [ ] NO  Care Discussed with Consultants/Other Providers [ x] YES  [ ] NO    LABS:                        9.7    8.61  )-----------( 173      ( 01 Nov 2023 07:08 )             29.7     133<L>  |  98  |  7   ----------------------------<  108<H>  4.5   |  19<L>  |  0.31<L>    Ca    10.0      31 Oct 2023 12:19  Mg     1.6     10-31    TPro  7.5  /  Alb  2.9<L>  /  TBili  0.3  /  DBili  x   /  AST  52<H>  /  ALT  32  /  AlkPhos  171<H>  10-31      RADIOLOGY & ADDITIONAL TESTS:    US Abdomen Upper Quadrant Right (10.31.23 @ 18:39)  Bilobar hepatic metastases as on same-day CT.  No sonographic evidence of acute cholecystitis.    CT Abdomen and Pelvis w/ IV Cont (10.31.23 @ 18:00)  No pulmonary embolus.  Overall progression of neoplastic disease as evidenced by enlarging   dominant right upper lobe pulmonary mass with interval increase in size   and number of bilateral pulmonary metastases and bilobar hepatic   metastases when compared to prior imaging September 2023.  Additionally, new scattered metastatic nodules also noted in the   subcutaneous tissues of the abdominal wall.    CT Head No Cont (10.31.23 @ 14:54)  Post right temporal resection with multiple additional brain   metastases again noted. These do not appear to besignificantly changed   in appearance.  Tiny amount of extra-axial hemorrhage remaining versus postsurgical   material along the inside of the craniotomy flap. Interval resolution of   pneumocephalus post resection.  Partial opacification of right mastoid air cells, new compared to   previous exam 10/8/2023.  MRI with gadolinium may provide helpful additional evaluation, if   clinically indicated.    Xray Chest 1 View- PORTABLE-Urgent (10.31.23 @ 12:23)  Redemonstrated right apical lung mass.  Left basilar linear atelectasis.

## 2023-11-01 NOTE — CONSULT NOTE ADULT - ASSESSMENT
52F no AC/AP hx stage IV NSCLC w/ mets to brain s/p 4c chemo/ thoracic RT completed 10/2022, GKRS to 7 brain mets, now s/p R crani for tumor rsxn 9/30/23. Pt now admitted med for c/o 1wk R leg heaviness and RUE clumsiness + abd pain. CT CAP w/ new scattered mets. CTH w/ no sig change c/t 10/8/23 Exam: AOx3, mild RUE drift, VFF, PERRL, EOMI, no facial, Rt side 4+/5, L side 5/5, SILT  -no acute nsgy intervention  -on [id number] per primary  -onc/palliative following  -MR brain w/wo pending - please page neurosurgery [phone] once completed

## 2023-11-01 NOTE — PATIENT PROFILE ADULT - FALL HARM RISK - RISK INTERVENTIONS

## 2023-11-02 NOTE — CONSULT NOTE ADULT - ASSESSMENT
51yo lady with PMH of smoking, stage 4 lung adenocarcinoma with brain metastases, steroid induced DM, GERD who presents with 2 weeks of right upper and lower extremity weakness and abdominal pain    #Stage 4 lung adenocarcinoma  When patient was initially diagnosed with stage IIIB T4N2M0 lung adenocarcinoma with neuroendocrine featuers in 5/2022, patient received 4C carboplatin + pemetrexed from 7-9/2022, with thoracic RT, and achieved WA, but declined to receive immunotherapy. In 5/2023, she was found to have brain mets, and she received GK-SRS to 7 brain mets in 5 fractions in 6/2023. She again was advised immunotherapy, but delayed treatment. From 8/2023 to 9/2023, she had multiple ED visits for headache, dizziness, migraines related to brain mets, which were managed with anti-epileptics and steroids, c/b epigastric pain. She was found to have new liver mets. From 9/19-10/5/23, she was admitted for LE weakness and blurred vision, s/p R temporal craniotomy for resection of metastatic lesion. Post-op course c/b acute left cerebellar infarction post-op MRI, tachycardia and thrombocytopenia. Patient was thereafter sent to acute rehab at [facility].   She was most recently planned for combination chemotherapy and immunotherapy with carboplatin+ pemetrexed+ pembrolizumab to improve management of CNS disease, but has since presented with worsening RUE and RLE weakness and abdominal pain.     - We will review the results of MR brain w/wo contrast with Dr. Bernard and Dr. Beckman to determine plan for further systemic therapy.   - We agree with continuing dexamethasone 2mg BID for now.     Note not finalized until signed by attending.   Please do not hesitate to page with questions.     Alva Childers MD PGY5   [phone]  Hematology-Oncology Fellow  WEEKENDS- Please call  to page on-call fellow 53 yo F with PMH of smoking, stage 4 lung adenocarcinoma with brain metastases, steroid induced DM, GERD who presents with 2 weeks of right upper and lower extremity weakness and abdominal pain    #Stage 4 lung adenocarcinoma  When patient was initially diagnosed with stage IIIB T4N2M0 lung adenocarcinoma with neuroendocrine featuers in 5/2022, patient received 4C carboplatin + pemetrexed from 7-9/2022, with thoracic RT, and achieved MS, but declined to receive immunotherapy. In 5/2023, she was found to have brain mets, and she received GK-SRS to 7 brain mets in 5 fractions in 6/2023. She again was advised immunotherapy, but delayed treatment. From 8/2023 to 9/2023, she had multiple ED visits for headache, dizziness, migraines related to brain mets, which were managed with anti-epileptics and steroids, c/b epigastric pain. She was found to have new liver mets. From 9/19-10/5/23, she was admitted for LE weakness and blurred vision, s/p R temporal craniotomy for resection of metastatic lesion. Post-op course c/b acute left cerebellar infarction post-op MRI, tachycardia and thrombocytopenia. Patient was thereafter sent to acute rehab at [facility].   She was most recently planned for combination chemotherapy and immunotherapy with carboplatin+ pemetrexed+ pembrolizumab to improve management of CNS disease, but has since presented with worsening RUE and RLE weakness and abdominal pain.     - We will review the results of MR brain w/wo contrast with Dr. Bernard and Dr. Beckman to determine plan for further systemic therapy.   - We agree with continuing dexamethasone 2mg BID for now.     Please do not hesitate to page with questions.     Alva Childers MD PGY5   [phone]  Hematology-Oncology Fellow  WEEKENDS- Please call  to page on-call fellow

## 2023-11-02 NOTE — PROGRESS NOTE ADULT - PROBLEM SELECTOR PLAN 4
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

## 2023-11-02 NOTE — PROGRESS NOTE ADULT - PROBLEM SELECTOR PLAN 2
no alarm signs of cord compression  - pt given 4mg dexamethasone by oncologist prior to presentation  - likely 2/2 brain metastases; will obtain brain MRI to further evaluate  - continue dexamethasone 2mg BID for now; can consider increasing if needed  - Tylenol 650mg for mild pain  - PT/OT consulted 11/1 - rec acute inpatient rehab; DC acute rehab; if pt to go home, home OT and PT services assist for ALL mobility/ADLs with use of rolling walker, shower chair, transport/polyfly WC. no alarm signs of cord compression  - pt given 4mg dexamethasone by oncologist prior to presentation  - likely 2/2 brain metastases; will obtain brain MRI to further evaluate  - continue dexamethasone 2mg BID for now; can consider increasing if needed  - Tylenol 650mg for mild pain  - PT/OT consulted 11/1 - rec acute inpatient rehab; DC acute rehab; if pt to go home, home OT and PT services assist for ALL mobility/ADLs with use of rolling walker, shower chair, transport/polyfly WC.  - PMR consulted 11/2

## 2023-11-02 NOTE — CONSULT NOTE ADULT - SUBJECTIVE AND OBJECTIVE BOX
HPI:  PAST MEDICAL & SURGICAL HISTORY:  GERD (Gastroesophageal Reflux Disease)      Lung mass  right      Non-small cell lung cancer with metastasis      S/P endoscopy  2022        FAMILY HISTORY:    Social History:        REVIEW OF SYSTEMS  CONSTITUTIONAL: No fever, no chills, no fatigue  EYES: No eye pain, no vision changes  ENMT:  No difficulty hearing, no throat pain  RESPIRATORY: No cough,  No shortness of breath  CARDIOVASCULAR: No chest pain, no palpitations  GASTROINTESTINAL: No abdominal pain, no nausea, no vomiting, no diarrhea, no constipation,  GENITOURINARY: No dysuria, no hematuria  NEUROLOGICAL: No numbness , no loss of strength  SKIN: No itching, no rashes,  HEME/LYMPH: No easy bruising, bleeding      >>> <<<>>> <<<  >>> <<<  Allergies  Allergies    No Known Allergies    Intolerances        Medications  MEDICATIONS  (STANDING):  artificial tears (preservative free) Ophthalmic Solution 1 Drop(s) Both EYES three times a day  dexAMETHasone     Tablet 2 milliGRAM(s) Oral two times a day  dextrose 5%. 1000 milliLiter(s) (100 mL/Hr) IV Continuous <Continuous>  dextrose 5%. 1000 milliLiter(s) (50 mL/Hr) IV Continuous <Continuous>  dextrose 50% Injectable 25 Gram(s) IV Push once  dextrose 50% Injectable 25 Gram(s) IV Push once  dextrose 50% Injectable 12.5 Gram(s) IV Push once  divalproex  milliGRAM(s) Oral every 12 hours  glucagon  Injectable 1 milliGRAM(s) IntraMuscular once  insulin lispro (ADMELOG) corrective regimen sliding scale   SubCutaneous three times a day before meals  pantoprazole    Tablet 40 milliGRAM(s) Oral before breakfast  polyethylene glycol 3350 17 Gram(s) Oral daily    MEDICATIONS  (PRN):  acetaminophen     Tablet .. 650 milliGRAM(s) Oral every 6 hours PRN Temp greater or equal to 38C (100.4F), Mild Pain (1 - 3)  dextrose Oral Gel 15 Gram(s) Oral once PRN Blood Glucose LESS THAN 70 milliGRAM(s)/deciliter  melatonin 3 milliGRAM(s) Oral at bedtime PRN Insomnia  senna 2 Tablet(s) Oral at bedtime PRN Constipation      PHYSICAL EXAM:  GENERAL: NAD, well-groomed  HEAD:  Atraumatic, Normocephalic  EYES: EOMI, PERRLA, conjunctiva and sclera clear  ENMT: No oropharyngeal exudates, Moist mucous membranes  NECK: Supple, no cervical lymphadenopathy  NERVOUS SYSTEM:  alert and conversant, moving all extremities spontaneously   CHEST/LUNG: Clear to auscultation bilaterally; no rhonchi  HEART: Regular rate and rhythm; No murmurs  ABDOMEN: Soft, Nontender, Nondistended  EXTREMITIES:  2+ radial Pulses, No cyanosis or edema  SKIN: warm, dry    LABS:                        9.0    6.95  )-----------( 191      ( 02 Nov 2023 06:32 )             27.8     11-02    138  |  104  |  7   ----------------------------<  126<H>  3.8   |  22  |  0.37<L>    Ca    9.1      02 Nov 2023 06:34  Phos  2.8     11-02  Mg     1.6     11-02    TPro  5.5<L>  /  Alb  2.4<L>  /  TBili  0.2  /  DBili  x   /  AST  38  /  ALT  29  /  AlkPhos  154<H>  11-02    PT/INR - ( 31 Oct 2023 12:19 )   PT: 15.1 sec;   INR: 1.39 ratio         PTT - ( 31 Oct 2023 12:19 )  PTT:28.0 sec  Urinalysis Basic - ( 02 Nov 2023 06:34 )    Color: x / Appearance: x / SG: x / pH: x  Gluc: 126 mg/dL / Ketone: x  / Bili: x / Urobili: x   Blood: x / Protein: x / Nitrite: x   Leuk Esterase: x / RBC: x / WBC x   Sq Epi: x / Non Sq Epi: x / Bacteria: x        RADIOLOGY & ADDITIONAL STUDIES:  PATHOLOGY:     HPI:  51yo lady with PMH of smoking, stage 4 lung adenocarcinoma with brain metastases, steroid induced DM, GERD who presents with 2 weeks of RU and lower extremity weakness and abdominal pain.   10/31/23:  CT head : Post right temporal resection with multiple additional brain   metastases again noted. These do not appear to besignificantly changed in appearance. Tiny amount of extra-axial hemorrhage remaining versus postsurgical material along the inside of the craniotomy flap. Interval resolution of pneumocephalus post resection. Partial opacification of right mastoid air cells, new compared to previous exam 10/8/2023. MRI with gadolinium may provide helpful additional evaluation, if clinically indicated.  10/31/23: CTPA AP: No pulmonary embolus.   Overall progression of neoplastic disease as evidenced by enlarging dominant right upper lobe pulmonary mass with interval increase in size and number of bilateral pulmonary metastases and bilobar hepatic metastases when compared to prior imaging September 2023.  Additionally, new scattered metastatic nodules also noted in the subcutaneous tissues of the abdominal wall.    Patient is continued on dexamethasone 2mg BID. She is on tylenol 650mg for pain. She ws noted to be hyponatremic on admission,     Oncology Hx:   5/2022- Patient had GERD and cough. CXR revealed large RUL mass, increased in size from previous study. PET/CT found RUL mass was FDG-avid.  Patient had bronchoscopy with pathology identifying adenocarcinoma with neuroendocrine features consistent with lung primary.  She was clinically stage IIIB, T4N2M0 disease.  Pathology clarified that tumor represents NSCLC, either adenocarcinoma with neuroendocrine features vs large cell neuroendocrine cancer.  Brain MRI negative.   Tumor PDL1 negative. Molecular testing negative for actionable mutation  7/2022- 9/2022 Patient received 4 C carboplatin + pemetrexed  9/2022- 10/2022 Patient received thoracic RT  Patient achieved [place].  Upon completing definitive therapy, she decined recommended treatment with durvalumab despite discussions.  5/2023-6/2023  She was hospitalized and found to have brain mets. She was started on steroids and keppra for seizure ppx.   6/21-6/29/23- Patient received GK-SRS to 7 brain mets in 5 fractions.   She was advised to have 2nd line nivolumab, but patient delayed start.     8/19-8/26/23- Patient seen in ED for syncope and headaches, found to have brain mets, received decadron and keppra and advised to follow up with rad onc.  8/26-8/31/23- Patient seen in ED again for dizziness and migraines 2/2 brain mets. She was was started on steroid taper, depakote with plan for outpatient rad onc follow up.  9/4-9/13/23- Patient admitted for epigastric pain 2/2 steroids while not on GI ppx. CTA found no PE, stable R apical lung mass, but rapid interval growth of LLL mass and new  hepatic mets. Patinet discharged on protonix and depakote, advised to follow up with med onc and surgical oncology  9/19-10/5/23- Patient admitted for LE weakness and blurred vision, s/p R temporal craniotomy for resection of metastatic lesion. Post-op course c/b acute left cerebellar infarcton post-op MRI, tachycardia and thrombocytopenia. Patient advised f/u with onc, rad onc and NSX. Patient was evaluated by PM&R and discharged to acute rehab at [place].  10/8/23- While at rehab, patient had brief episode of unresponsiveness, with jaw clenching. She was started on empiric antibiotics and IVF. Workup negative. Neuro saw patient, with low suspicion for seizure, and recommended continuation of AEDs. Cardiology noted low suspicion for cardiogenic event. Event was presumed vasovagal. Craniotomy staples removed.   Patient had been recently consented to combination chemotherapy and immunotherapy with carboplatin+ pemetrexed+ pembrolizumab to improve management of CNS disease.     Given her new weakness and CNS symptoms, use of bevacizumab is under consideration.       PAST MEDICAL & SURGICAL HISTORY:  GERD (Gastroesophageal Reflux Disease)  Lung mass  right   Non-small cell lung cancer with metastasis  S/P endoscopy 2022    FAMILY HISTORY:    Social History:        REVIEW OF SYSTEMS  CONSTITUTIONAL: No fever, no chills, no fatigue  EYES: No eye pain, no vision changes  ENMT:  No difficulty hearing, no throat pain  RESPIRATORY: No cough,  No shortness of breath  CARDIOVASCULAR: No chest pain, no palpitations  GASTROINTESTINAL: No abdominal pain, no nausea, no vomiting, no diarrhea, no constipation,  GENITOURINARY: No dysuria, no hematuria  NEUROLOGICAL: No numbness , no loss of strength  SKIN: No itching, no rashes,  HEME/LYMPH: No easy bruising, bleeding      >>> <<<>>> <<<  >>> <<<  Allergies  Allergies    No Known Allergies    Intolerances        Medications  MEDICATIONS  (STANDING):  artificial tears (preservative free) Ophthalmic Solution 1 Drop(s) Both EYES three times a day  dexAMETHasone     Tablet 2 milliGRAM(s) Oral two times a day  dextrose 5%. 1000 milliLiter(s) (100 mL/Hr) IV Continuous <Continuous>  dextrose 5%. 1000 milliLiter(s) (50 mL/Hr) IV Continuous <Continuous>  dextrose 50% Injectable 25 Gram(s) IV Push once  dextrose 50% Injectable 25 Gram(s) IV Push once  dextrose 50% Injectable 12.5 Gram(s) IV Push once  divalproex  milliGRAM(s) Oral every 12 hours  glucagon  Injectable 1 milliGRAM(s) IntraMuscular once  insulin lispro (ADMELOG) corrective regimen sliding scale   SubCutaneous three times a day before meals  pantoprazole    Tablet 40 milliGRAM(s) Oral before breakfast  polyethylene glycol 3350 17 Gram(s) Oral daily    MEDICATIONS  (PRN):  acetaminophen     Tablet .. 650 milliGRAM(s) Oral every 6 hours PRN Temp greater or equal to 38C (100.4F), Mild Pain (1 - 3)  dextrose Oral Gel 15 Gram(s) Oral once PRN Blood Glucose LESS THAN 70 milliGRAM(s)/deciliter  melatonin 3 milliGRAM(s) Oral at bedtime PRN Insomnia  senna 2 Tablet(s) Oral at bedtime PRN Constipation      PHYSICAL EXAM:  GENERAL: NAD, well-groomed  HEAD:  Atraumatic, Normocephalic  EYES: EOMI, PERRLA, conjunctiva and sclera clear  ENMT: No oropharyngeal exudates, Moist mucous membranes  NECK: Supple, no cervical lymphadenopathy  NERVOUS SYSTEM:  alert and conversant, moving all extremities spontaneously   CHEST/LUNG: Clear to auscultation bilaterally; no rhonchi  HEART: Regular rate and rhythm; No murmurs  ABDOMEN: Soft, Nontender, Nondistended  EXTREMITIES:  2+ radial Pulses, No cyanosis or edema  SKIN: warm, dry    LABS:                        9.0    6.95  )-----------( 191      ( 02 Nov 2023 06:32 )             27.8     11-02    138  |  104  |  7   ----------------------------<  126<H>  3.8   |  22  |  0.37<L>    Ca    9.1      02 Nov 2023 06:34  Phos  2.8     11-02  Mg     1.6     11-02    TPro  5.5<L>  /  Alb  2.4<L>  /  TBili  0.2  /  DBili  x   /  AST  38  /  ALT  29  /  AlkPhos  154<H>  11-02    PT/INR - ( 31 Oct 2023 12:19 )   PT: 15.1 sec;   INR: 1.39 ratio         PTT - ( 31 Oct 2023 12:19 )  PTT:28.0 sec  Urinalysis Basic - ( 02 Nov 2023 06:34 )    Color: x / Appearance: x / SG: x / pH: x  Gluc: 126 mg/dL / Ketone: x  / Bili: x / Urobili: x   Blood: x / Protein: x / Nitrite: x   Leuk Esterase: x / RBC: x / WBC x   Sq Epi: x / Non Sq Epi: x / Bacteria: x        RADIOLOGY & ADDITIONAL STUDIES:  PATHOLOGY:     HPI:  53yo lady with PMH of smoking, stage 4 lung adenocarcinoma with brain metastases, steroid induced DM, GERD who presents with 2 weeks of right upper and lower extremity weakness and abdominal pain. Patient reported her weakness to her radiation oncologist who advised that she come to the hospital for evaluation. Patient notes  in right hand in particular has been poor. She denies any focal loss of sensation or incontinence to stool or urine. She also notes RUQ discomfort.     10/31/23:  CT head : Post right temporal resection with multiple additional brain   metastases again noted. These do not appear to be significantly changed in appearance. Tiny amount of extra-axial hemorrhage remaining versus postsurgical material along the inside of the craniotomy flap. Interval resolution of pneumocephalus post resection. Partial opacification of right mastoid air cells, new compared to previous exam 10/8/2023. MRI with gadolinium may provide helpful additional evaluation, if clinically indicated.  10/31/23: CTPA AP: No pulmonary embolus.   Overall progression of neoplastic disease as evidenced by enlarging dominant right upper lobe pulmonary mass with interval increase in size and number of bilateral pulmonary metastases and bilobar hepatic metastases when compared to prior imaging September 2023.  Additionally, new scattered metastatic nodules also noted in the subcutaneous tissues of the abdominal wall.    Patient was continued on dexamethasone 2mg BID. She is on tylenol 650mg for pain. She ws noted to be hyponatremic on admission.       Oncology Hx:   5/2022- Patient had GERD and cough. CXR revealed large RUL mass, increased in size from previous study. PET/CT found RUL mass was FDG-avid.  Patient had bronchoscopy with pathology identifying adenocarcinoma with neuroendocrine features consistent with lung primary.  She was clinically stage IIIB, T4N2M0 disease.  Pathology clarified that tumor represents NSCLC, either adenocarcinoma with neuroendocrine features vs large cell neuroendocrine cancer.  Brain MRI negative.   Tumor PDL1 negative. Molecular testing negative for actionable mutation  7/2022- 9/2022 Patient received 4 C carboplatin + pemetrexed  9/2022- 10/2022 Patient received thoracic RT  Patient achieved [place].  Upon completing definitive therapy, she decined recommended treatment with durvalumab despite discussions.  5/2023-6/2023  She was hospitalized and found to have brain mets. She was started on steroids and keppra for seizure ppx.   6/21-6/29/23- Patient received GK-SRS to 7 brain mets in 5 fractions.   She was advised to have 2nd line nivolumab, but patient delayed start.     8/19-8/26/23- Patient seen in ED for syncope and headaches, found to have brain mets, received decadron and keppra and advised to follow up with rad onc.  8/26-8/31/23- Patient seen in ED again for dizziness and migraines 2/2 brain mets. She was was started on steroid taper, depakote with plan for outpatient rad onc follow up.  9/4-9/13/23- Patient admitted for epigastric pain 2/2 steroids while not on GI ppx. CTA found no PE, stable R apical lung mass, but rapid interval growth of LLL mass and new  hepatic mets. Patinet discharged on protonix and depakote, advised to follow up with med onc and surgical oncology  9/19-10/5/23- Patient admitted for LE weakness and blurred vision, s/p R temporal craniotomy for resection of metastatic lesion. Post-op course c/b acute left cerebellar infarction post-op MRI, tachycardia and thrombocytopenia. Patient advised f/u with onc, rad onc and NSX. Patient was evaluated by PM&R and discharged to acute rehab at [place].  10/8/23- While at rehab, patient had brief episode of unresponsiveness, with jaw clenching. She was started on empiric antibiotics and IVF. Workup negative. Neuro saw patient, with low suspicion for seizure, and recommended continuation of AEDs. Cardiology noted low suspicion for cardiogenic event. Event was presumed vasovagal. Craniotomy staples removed.   Patient had been recently consented to combination chemotherapy and immunotherapy with carboplatin+ pemetrexed+ pembrolizumab to improve management of CNS disease.     Given her new weakness and CNS symptoms, treatment plan with carboplatin + pemetrexed+ bevacizumab is under consideration.   MRIhead  is pending.    PAST MEDICAL & SURGICAL HISTORY:  GERD (Gastroesophageal Reflux Disease)  Lung mass  right   Non-small cell lung cancer with metastasis  S/P endoscopy 2022    FAMILY HISTORY:    Social History:  lives with fiance, smoker     REVIEW OF SYSTEMS  CONSTITUTIONAL: No fever, no chills, + fatigue  EYES: No eye pain, no vision changes  ENMT:  No difficulty hearing, no throat pain  RESPIRATORY: No cough,  No shortness of breath  CARDIOVASCULAR: No chest pain, no palpitations  GASTROINTESTINAL: + abdominal pain, no nausea, no vomiting  GENITOURINARY: No dysuria, no hematuria  NEUROLOGICAL: No numbness , +RUE and RLE weakness, + gait instability   SKIN: No itching, no rashes,  HEME/LYMPH: No easy bruising, bleeding      >>> <<<>>> <<<  >>> <<<  Allergies  Allergies    No Known Allergies    Intolerances        Medications  MEDICATIONS  (STANDING):  artificial tears (preservative free) Ophthalmic Solution 1 Drop(s) Both EYES three times a day  dexAMETHasone     Tablet 2 milliGRAM(s) Oral two times a day  dextrose 5%. 1000 milliLiter(s) (100 mL/Hr) IV Continuous <Continuous>  dextrose 5%. 1000 milliLiter(s) (50 mL/Hr) IV Continuous <Continuous>  dextrose 50% Injectable 25 Gram(s) IV Push once  dextrose 50% Injectable 25 Gram(s) IV Push once  dextrose 50% Injectable 12.5 Gram(s) IV Push once  divalproex  milliGRAM(s) Oral every 12 hours  glucagon  Injectable 1 milliGRAM(s) IntraMuscular once  insulin lispro (ADMELOG) corrective regimen sliding scale   SubCutaneous three times a day before meals  pantoprazole    Tablet 40 milliGRAM(s) Oral before breakfast  polyethylene glycol 3350 17 Gram(s) Oral daily    MEDICATIONS  (PRN):  acetaminophen     Tablet .. 650 milliGRAM(s) Oral every 6 hours PRN Temp greater or equal to 38C (100.4F), Mild Pain (1 - 3)  dextrose Oral Gel 15 Gram(s) Oral once PRN Blood Glucose LESS THAN 70 milliGRAM(s)/deciliter  melatonin 3 milliGRAM(s) Oral at bedtime PRN Insomnia  senna 2 Tablet(s) Oral at bedtime PRN Constipation      PHYSICAL EXAM:  GENERAL: NAD, well-groomed  HEAD:  Atraumatic, Normocephalic  EYES: EOMI, PERRLA, conjunctiva and sclera clear  + unable to lift left eyebrow  ENMT: No oropharyngeal exudates, Moist mucous membranes  NECK: Supple, no cervical lymphadenopathy  NERVOUS SYSTEM:  alert and conversant, + R hand decreased , +RLE decreased hip flexion and plantar flexion/extension  CHEST/LUNG: Clear to auscultation bilaterally; no rhonchi  HEART: Regular rate and rhythm; No murmurs  ABDOMEN: Soft, Nontender, Nondistended  EXTREMITIES:  2+ radial Pulses, No cyanosis or edema  SKIN: warm, dry    LABS:                        9.0    6.95  )-----------( 191      ( 02 Nov 2023 06:32 )             27.8     11-02    138  |  104  |  7   ----------------------------<  126<H>  3.8   |  22  |  0.37<L>    Ca    9.1      02 Nov 2023 06:34  Phos  2.8     11-02  Mg     1.6     11-02    TPro  5.5<L>  /  Alb  2.4<L>  /  TBili  0.2  /  DBili  x   /  AST  38  /  ALT  29  /  AlkPhos  154<H>  11-02    PT/INR - ( 31 Oct 2023 12:19 )   PT: 15.1 sec;   INR: 1.39 ratio         PTT - ( 31 Oct 2023 12:19 )  PTT:28.0 sec  Urinalysis Basic - ( 02 Nov 2023 06:34 )    Color: x / Appearance: x / SG: x / pH: x  Gluc: 126 mg/dL / Ketone: x  / Bili: x / Urobili: x   Blood: x / Protein: x / Nitrite: x   Leuk Esterase: x / RBC: x / WBC x   Sq Epi: x / Non Sq Epi: x / Bacteria: x        RADIOLOGY & ADDITIONAL STUDIES:  PATHOLOGY:     HPI:  53 yo F with PMH of smoking, stage 4 lung adenocarcinoma with brain metastases, steroid induced DM, GERD who presents with 2 weeks of right upper and lower extremity weakness and abdominal pain. Patient reported her weakness to her radiation oncologist who advised that she come to the hospital for evaluation. Patient notes  in right hand in particular has been poor. She denies any focal loss of sensation or incontinence to stool or urine. She also notes RUQ discomfort.     10/31/23:  CT head : Post right temporal resection with multiple additional brain   metastases again noted. These do not appear to be significantly changed in appearance. Tiny amount of extra-axial hemorrhage remaining versus postsurgical material along the inside of the craniotomy flap. Interval resolution of pneumocephalus post resection. Partial opacification of right mastoid air cells, new compared to previous exam 10/8/2023. MRI with gadolinium may provide helpful additional evaluation, if clinically indicated.  10/31/23: CTPA AP: No pulmonary embolus.   Overall progression of neoplastic disease as evidenced by enlarging dominant right upper lobe pulmonary mass with interval increase in size and number of bilateral pulmonary metastases and bilobar hepatic metastases when compared to prior imaging September 2023.  Additionally, new scattered metastatic nodules also noted in the subcutaneous tissues of the abdominal wall.    Patient was continued on dexamethasone 2mg BID. She is on tylenol 650mg for pain. She ws noted to be hyponatremic on admission.       Oncology Hx:   5/2022- Patient had GERD and cough. CXR revealed large RUL mass, increased in size from previous study. PET/CT found RUL mass was FDG-avid.  Patient had bronchoscopy with pathology identifying adenocarcinoma with neuroendocrine features consistent with lung primary.  She was clinically stage IIIB, T4N2M0 disease.  Pathology clarified that tumor represents NSCLC, either adenocarcinoma with neuroendocrine features vs large cell neuroendocrine cancer.  Brain MRI negative.   Tumor PDL1 negative. Molecular testing negative for actionable mutation  7/2022- 9/2022 Patient received 4 C carboplatin + pemetrexed  9/2022- 10/2022 Patient received thoracic RT  Patient achieved [place].  Upon completing definitive therapy, she decined recommended treatment with durvalumab despite discussions.  5/2023-6/2023  She was hospitalized and found to have brain mets. She was started on steroids and keppra for seizure ppx.   6/21-6/29/23- Patient received GK-SRS to 7 brain mets in 5 fractions.   She was advised to have 2nd line nivolumab, but patient delayed start.     8/19-8/26/23- Patient seen in ED for syncope and headaches, found to have brain mets, received decadron and keppra and advised to follow up with rad onc.  8/26-8/31/23- Patient seen in ED again for dizziness and migraines 2/2 brain mets. She was was started on steroid taper, depakote with plan for outpatient rad onc follow up.  9/4-9/13/23- Patient admitted for epigastric pain 2/2 steroids while not on GI ppx. CTA found no PE, stable R apical lung mass, but rapid interval growth of LLL mass and new  hepatic mets. Patinet discharged on protonix and depakote, advised to follow up with med onc and surgical oncology  9/19-10/5/23- Patient admitted for LE weakness and blurred vision, s/p R temporal craniotomy for resection of metastatic lesion. Post-op course c/b acute left cerebellar infarction post-op MRI, tachycardia and thrombocytopenia. Patient advised f/u with onc, rad onc and NSX. Patient was evaluated by PM&R and discharged to acute rehab at [place].  10/8/23- While at rehab, patient had brief episode of unresponsiveness, with jaw clenching. She was started on empiric antibiotics and IVF. Workup negative. Neuro saw patient, with low suspicion for seizure, and recommended continuation of AEDs. Cardiology noted low suspicion for cardiogenic event. Event was presumed vasovagal. Craniotomy staples removed.   Patient had been recently consented to combination chemotherapy and immunotherapy with carboplatin+ pemetrexed+ pembrolizumab to improve management of CNS disease.     Given her new weakness and CNS symptoms, treatment plan with carboplatin + pemetrexed+ bevacizumab is under consideration.   MRIhead  is pending.    PAST MEDICAL & SURGICAL HISTORY:  GERD (Gastroesophageal Reflux Disease)  Lung mass  right   Non-small cell lung cancer with metastasis  S/P endoscopy 2022    FAMILY HISTORY:    Social History:  lives with fiance, smoker     REVIEW OF SYSTEMS  CONSTITUTIONAL: No fever, no chills, + fatigue  EYES: No eye pain, no vision changes  ENMT:  No difficulty hearing, no throat pain  RESPIRATORY: No cough,  No shortness of breath  CARDIOVASCULAR: No chest pain, no palpitations  GASTROINTESTINAL: + abdominal pain, no nausea, no vomiting  GENITOURINARY: No dysuria, no hematuria  NEUROLOGICAL: No numbness , +RUE and RLE weakness, + gait instability   SKIN: No itching, no rashes,  HEME/LYMPH: No easy bruising, bleeding      >>> <<<>>> <<<  >>> <<<  Allergies  Allergies    No Known Allergies    Intolerances        Medications  MEDICATIONS  (STANDING):  artificial tears (preservative free) Ophthalmic Solution 1 Drop(s) Both EYES three times a day  dexAMETHasone     Tablet 2 milliGRAM(s) Oral two times a day  dextrose 5%. 1000 milliLiter(s) (100 mL/Hr) IV Continuous <Continuous>  dextrose 5%. 1000 milliLiter(s) (50 mL/Hr) IV Continuous <Continuous>  dextrose 50% Injectable 25 Gram(s) IV Push once  dextrose 50% Injectable 25 Gram(s) IV Push once  dextrose 50% Injectable 12.5 Gram(s) IV Push once  divalproex  milliGRAM(s) Oral every 12 hours  glucagon  Injectable 1 milliGRAM(s) IntraMuscular once  insulin lispro (ADMELOG) corrective regimen sliding scale   SubCutaneous three times a day before meals  pantoprazole    Tablet 40 milliGRAM(s) Oral before breakfast  polyethylene glycol 3350 17 Gram(s) Oral daily    MEDICATIONS  (PRN):  acetaminophen     Tablet .. 650 milliGRAM(s) Oral every 6 hours PRN Temp greater or equal to 38C (100.4F), Mild Pain (1 - 3)  dextrose Oral Gel 15 Gram(s) Oral once PRN Blood Glucose LESS THAN 70 milliGRAM(s)/deciliter  melatonin 3 milliGRAM(s) Oral at bedtime PRN Insomnia  senna 2 Tablet(s) Oral at bedtime PRN Constipation      PHYSICAL EXAM:  GENERAL: NAD, well-groomed  HEAD:  Atraumatic, Normocephalic  EYES: EOMI, PERRLA, conjunctiva and sclera clear  + unable to lift left eyebrow  ENMT: No oropharyngeal exudates, Moist mucous membranes  NECK: Supple, no cervical lymphadenopathy  NERVOUS SYSTEM:  alert and conversant, + R hand decreased , +RLE decreased hip flexion and plantar flexion/extension  CHEST/LUNG: Clear to auscultation bilaterally; no rhonchi  HEART: Regular rate and rhythm; No murmurs  ABDOMEN: Soft, Nontender, Nondistended  EXTREMITIES:  2+ radial Pulses, No cyanosis or edema  SKIN: warm, dry    LABS:                        9.0    6.95  )-----------( 191      ( 02 Nov 2023 06:32 )             27.8     11-02    138  |  104  |  7   ----------------------------<  126<H>  3.8   |  22  |  0.37<L>    Ca    9.1      02 Nov 2023 06:34  Phos  2.8     11-02  Mg     1.6     11-02    TPro  5.5<L>  /  Alb  2.4<L>  /  TBili  0.2  /  DBili  x   /  AST  38  /  ALT  29  /  AlkPhos  154<H>  11-02    PT/INR - ( 31 Oct 2023 12:19 )   PT: 15.1 sec;   INR: 1.39 ratio         PTT - ( 31 Oct 2023 12:19 )  PTT:28.0 sec  Urinalysis Basic - ( 02 Nov 2023 06:34 )    Color: x / Appearance: x / SG: x / pH: x  Gluc: 126 mg/dL / Ketone: x  / Bili: x / Urobili: x   Blood: x / Protein: x / Nitrite: x   Leuk Esterase: x / RBC: x / WBC x   Sq Epi: x / Non Sq Epi: x / Bacteria: x        RADIOLOGY & ADDITIONAL STUDIES:  PATHOLOGY:

## 2023-11-02 NOTE — PROGRESS NOTE ADULT - PROBLEM SELECTOR PLAN 7
- diet: carb consistent  - dvt ppx:  scds  - PT/OT: Acute Inpt Rehab/ home PT/OT  - need to follow-up on meds since pt is unsure if she takes atorvastatin 40mg

## 2023-11-02 NOTE — PROGRESS NOTE ADULT - SUBJECTIVE AND OBJECTIVE BOX
Patient is a 52y old  Female who presents with a chief complaint of R sided weakness (01 Nov 2023 18:53)    INTERVAL HPI/OVERNIGHT EVENTS:    No Known Allergies    MEDS:  acetaminophen     Tablet .. 650 milliGRAM(s) Oral every 6 hours PRN  artificial tears (preservative free) Ophthalmic Solution 1 Drop(s) Both EYES three times a day  dexAMETHasone     Tablet 2 milliGRAM(s) Oral two times a day  dextrose 5%. 1000 milliLiter(s) IV Continuous <Continuous>  dextrose 5%. 1000 milliLiter(s) IV Continuous <Continuous>  dextrose 50% Injectable 25 Gram(s) IV Push once  dextrose 50% Injectable 25 Gram(s) IV Push once  dextrose 50% Injectable 12.5 Gram(s) IV Push once  dextrose Oral Gel 15 Gram(s) Oral once PRN  divalproex  milliGRAM(s) Oral every 12 hours  glucagon  Injectable 1 milliGRAM(s) IntraMuscular once  insulin lispro (ADMELOG) corrective regimen sliding scale   SubCutaneous three times a day before meals  lactated ringers. 1000 milliLiter(s) IV Continuous <Continuous>  melatonin 3 milliGRAM(s) Oral at bedtime PRN  pantoprazole    Tablet 40 milliGRAM(s) Oral before breakfast  polyethylene glycol 3350 17 Gram(s) Oral daily  senna 2 Tablet(s) Oral at bedtime PRN    T(C): 36.6 (11-02-23 @ 05:15), Max: 36.9 (11-01-23 @ 12:42)  HR: 93 (11-02-23 @ 05:15) (93 - 110)  BP: 96/64 (11-02-23 @ 05:15) (92/63 - 112/74)  RR: 18 (11-02-23 @ 05:15) (18 - 19)  SpO2: 94% (11-02-23 @ 05:15) (94% - 98%)    PHYSICAL EXAM:  GENERAL: NAD  HEAD:  Atraumatic, Normocephalic  EYES: conjunctiva and sclera clear  HEART: Regular rate and rhythm; No murmurs, rubs, or gallops  RESPIRATORY: CTA B/L, No wheezing, rhonchi, rales  ABDOMEN: Soft, tender to palpation of RUQ, palpable mass in RUQ, Nondistended; Bowel sounds present  NEUROLOGY: A&Ox3, moving all extremities, 4+/5 strength in RUE & RLE, 5/5 strength in LUE & LLE  EXTREMITIES:  No clubbing, cyanosis, or edema  SKIN: warm, dry, normal color, no rash or abnormal lesions    Consultant(s) Notes Reviewed:  [x ] YES  [ ] NO  Care Discussed with Consultants/Other Providers [ x] YES  [ ] NO    LABS:             9.0    6.95  )-----------( 191      ( 02 Nov 2023 06:32 )             27.8     137  |  103  |  6<L>  ----------------------------<  80  3.5   |  22  |  0.33<L>    Ca    10.1      01 Nov 2023 07:37  Phos  2.9     11-01  Mg     1.7     11-01    TPro  6.2  /  Alb  2.6<L>  /  TBili  0.2  /  DBili  x   /  AST  35  /  ALT  26  /  AlkPhos  146<H>  11-01    RADIOLOGY & ADDITIONAL TESTS:    US Abdomen Upper Quadrant Right (10.31.23 @ 18:39)  Bilobar hepatic metastases as on same-day CT.  No sonographic evidence of acute cholecystitis.    CT Abdomen and Pelvis w/ IV Cont (10.31.23 @ 18:00)  No pulmonary embolus.  Overall progression of neoplastic disease as evidenced by enlarging   dominant right upper lobe pulmonary mass with interval increase in size   and number of bilateral pulmonary metastases and bilobar hepatic   metastases when compared to prior imaging September 2023.  Additionally, new scattered metastatic nodules also noted in the   subcutaneous tissues of the abdominal wall.    CT Head No Cont (10.31.23 @ 14:54)  Post right temporal resection with multiple additional brain   metastases again noted. These do not appear to besignificantly changed   in appearance.  Tiny amount of extra-axial hemorrhage remaining versus postsurgical   material along the inside of the craniotomy flap. Interval resolution of   pneumocephalus post resection.  Partial opacification of right mastoid air cells, new compared to   previous exam 10/8/2023.  MRI with gadolinium may provide helpful additional evaluation, if   clinically indicated.    Xray Chest 1 View- PORTABLE-Urgent (10.31.23 @ 12:23)  Redemonstrated right apical lung mass.  Left basilar linear atelectasis. Patient is a 52y old  Female who presents with a chief complaint of R sided weakness (01 Nov 2023 18:53)    INTERVAL HPI/OVERNIGHT EVENTS: Pt had no acute overnight events. S&E at bedside. no acute complaints. still with RUE and RLE weakness. No cp, sob, abd pain, urinary or bowel sx. Will continue to have PT/OT in hospital, while awaiting MRI.     No Known Allergies    MEDS:  acetaminophen     Tablet .. 650 milliGRAM(s) Oral every 6 hours PRN  artificial tears (preservative free) Ophthalmic Solution 1 Drop(s) Both EYES three times a day  dexAMETHasone     Tablet 2 milliGRAM(s) Oral two times a day  dextrose 5%. 1000 milliLiter(s) IV Continuous <Continuous>  dextrose 5%. 1000 milliLiter(s) IV Continuous <Continuous>  dextrose 50% Injectable 25 Gram(s) IV Push once  dextrose 50% Injectable 25 Gram(s) IV Push once  dextrose 50% Injectable 12.5 Gram(s) IV Push once  dextrose Oral Gel 15 Gram(s) Oral once PRN  divalproex  milliGRAM(s) Oral every 12 hours  glucagon  Injectable 1 milliGRAM(s) IntraMuscular once  insulin lispro (ADMELOG) corrective regimen sliding scale   SubCutaneous three times a day before meals  lactated ringers. 1000 milliLiter(s) IV Continuous <Continuous>  melatonin 3 milliGRAM(s) Oral at bedtime PRN  pantoprazole    Tablet 40 milliGRAM(s) Oral before breakfast  polyethylene glycol 3350 17 Gram(s) Oral daily  senna 2 Tablet(s) Oral at bedtime PRN    T(C): 36.6 (11-02-23 @ 05:15), Max: 36.9 (11-01-23 @ 12:42)  HR: 93 (11-02-23 @ 05:15) (93 - 110)  BP: 96/64 (11-02-23 @ 05:15) (92/63 - 112/74)  RR: 18 (11-02-23 @ 05:15) (18 - 19)  SpO2: 94% (11-02-23 @ 05:15) (94% - 98%)    PHYSICAL EXAM:  GENERAL: NAD  HEAD:  Atraumatic, Normocephalic  EYES: conjunctiva and sclera clear  HEART: Regular rate and rhythm; No murmurs, rubs, or gallops  RESPIRATORY: CTA B/L, No wheezing, rhonchi, rales  ABDOMEN: Soft, tender to palpation of RUQ, palpable mass in RUQ, Nondistended; Bowel sounds present  NEUROLOGY: A&Ox3, moving all extremities, 4+/5 strength in RUE & RLE, 5/5 strength in LUE & LLE  EXTREMITIES:  No clubbing, cyanosis, or edema  SKIN: warm, dry, normal color, no rash or abnormal lesions    Consultant(s) Notes Reviewed:  [x ] YES  [ ] NO  Care Discussed with Consultants/Other Providers [ x] YES  [ ] NO    LABS:             9.0    6.95  )-----------( 191      ( 02 Nov 2023 06:32 )             27.8     137  |  103  |  6<L>  ----------------------------<  80  3.5   |  22  |  0.33<L>    Ca    10.1      01 Nov 2023 07:37  Phos  2.9     11-01  Mg     1.7     11-01    TPro  6.2  /  Alb  2.6<L>  /  TBili  0.2  /  DBili  x   /  AST  35  /  ALT  26  /  AlkPhos  146<H>  11-01    RADIOLOGY & ADDITIONAL TESTS:    US Abdomen Upper Quadrant Right (10.31.23 @ 18:39)  Bilobar hepatic metastases as on same-day CT.  No sonographic evidence of acute cholecystitis.    CT Abdomen and Pelvis w/ IV Cont (10.31.23 @ 18:00)  No pulmonary embolus.  Overall progression of neoplastic disease as evidenced by enlarging   dominant right upper lobe pulmonary mass with interval increase in size   and number of bilateral pulmonary metastases and bilobar hepatic   metastases when compared to prior imaging September 2023.  Additionally, new scattered metastatic nodules also noted in the   subcutaneous tissues of the abdominal wall.    CT Head No Cont (10.31.23 @ 14:54)  Post right temporal resection with multiple additional brain   metastases again noted. These do not appear to besignificantly changed   in appearance.  Tiny amount of extra-axial hemorrhage remaining versus postsurgical   material along the inside of the craniotomy flap. Interval resolution of   pneumocephalus post resection.  Partial opacification of right mastoid air cells, new compared to   previous exam 10/8/2023.  MRI with gadolinium may provide helpful additional evaluation, if   clinically indicated.    Xray Chest 1 View- PORTABLE-Urgent (10.31.23 @ 12:23)  Redemonstrated right apical lung mass.  Left basilar linear atelectasis.

## 2023-11-02 NOTE — CONSULT NOTE ADULT - ATTENDING COMMENTS
Pt seen on 11/1/23.  Agree with above resident evaluation and assessment.  Pt known to us s/p resection of right temporal brain metastasis. Pt has not yet gone for systemic therapy and sent to ER from Rad onc for work up of right leg heaviness.  Pt is alert, awake, in NAD.  NEWTON with no drift and good , although pt says she tends to drop things from her right hand.  The right leg is approx 4/5 proximally, 5/5 distally.  Last CT showed no sign change.  Plan for MRI brain to assess motor strip lesion.  Needs oncology evaluation for chemo. Dr. Beckman is her outpt oncologist.
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 She was initially diagnosed with Stage IIIB NSCLC in 5/22 and treated with chemo/RT but declined immunotherapy. In 5/2023, she was found to have brain mets, and she received GK-SRS to 7 brain mets in 5 fractions in 6/2023. She again was advised immunotherapy, but delayed treatment. She is now presenting with worsening RUE and RLE weakness and abdominal pain. MRI brain done today and we will review with her doctors Dr. Bernard ([facility]) and Dr. Beckman ([facility]) to determine plan for further systemic therapy. Agree with continuing dexamethasone 2mg BID for now. Oncology will continue to follow with you.

## 2023-11-02 NOTE — PROGRESS NOTE ADULT - PROBLEM SELECTOR PLAN 1
previously treated with 4 rounds of chemo, developed brain mets 5/23, supposed to have followed up for immunotherapy with outpatient Dr. Beckman; now presenting w/ R sided weakness and abdominal pain  - CTAP demonstrating progression of neoplastic disease  with enlarging dominant right upper lobe pulmonary mass with interval increase in size and number of bilateral pulmonary metastases and bilobar hepatic metastases when compared to prior imaging September 2023 along with new scattered metastatic nodules in the subcutaneous tissues of the abdominal wall.  - CT Head demonstrated multiple brain metastases not significantly changed from prior imaging  - MRI head pending - neurosurgery following (pg [id number] when complete)  - oncology consulted 11/1, f/u recs

## 2023-11-02 NOTE — PROGRESS NOTE ADULT - PROBLEM SELECTOR PLAN 3
- pt with sharp 10 sec abdominal pain radiating from RUQ to left side of abdomen with palpable mass in abdomen; most likely to be 2/2 metastatic disease considering radiologic disease, less likely to be due to pancreatitis or infectious etiology considering no leukocytosis or fever  - RUQ demonstrating bilobar hepatic metastases, no sonographic evidence of acute cholecystitis.  - CTAP demonstrating new scattered metastatic nodules also noted in the subcutaneous tissues of the abdominal wall.  - lipase neg  - Tylenol 650mg for mild pain

## 2023-11-02 NOTE — PROGRESS NOTE ADULT - PROBLEM SELECTOR PLAN 5
- unclear if pt has been dx w/ T2DM or has hyperglycemia due to steroids, A1c 7.7  - per pt, still takes metformin BID  - BG wnl this admission at 108  - fsg qhs + tid  - low SSI

## 2023-11-03 NOTE — PROGRESS NOTE ADULT - PROBLEM SELECTOR PLAN 4
- pt w/ Na of 133 on admission; pt noted to have hyponatremia during prior admissions as well  - most likely 2/2 SIADH given hx of lung cancer  - FeNa 0.1, likely pre-renal  - s/p 1LNS bolus + 1L LR fluids   - Na 137 -> 138 - pt w/ Na of 133 on admission; pt noted to have hyponatremia during prior admissions as well  - most likely 2/2 SIADH given hx of lung cancer  - FeNa 0.1, likely pre-renal  - s/p 1LNS bolus + 1L LR fluids   - Na stable

## 2023-11-03 NOTE — PROGRESS NOTE ADULT - ASSESSMENT
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     #Stage 4 lung adenocarcinoma  When patient was initially diagnosed with stage IIIB T4N2M0 lung adenocarcinoma with neuroendocrine featuers in 5/2022, patient received 4C carboplatin + pemetrexed from 7-9/2022, with thoracic RT, and achieved [place], but declined to receive immunotherapy. In 5/2023, she was found to have brain mets, and she received GK-SRS to 7 brain mets in 5 fractions in 6/2023. She again was advised immunotherapy, but delayed treatment. From 8/2023 to 9/2023, she had multiple ED visits for headache, dizziness, migraines related to brain mets, which were managed with anti-epileptics and steroids, c/b epigastric pain. She was found to have new liver mets. From 9/19-10/5/23, she was admitted for LE weakness and blurred vision, s/p R temporal craniotomy for resection of metastatic lesion. Post-op course c/b acute left cerebellar infarction post-op MRI, tachycardia and thrombocytopenia. Patient was thereafter sent to acute rehab at [facility].   She was most recently planned for combination chemotherapy and immunotherapy with carboplatin+ pemetrexed+ pembrolizumab to improve management of CNS disease, but has since presented with worsening RUE and RLE weakness and abdominal pain.     - 11/2/23: MRI head w/o IVC: Right temporal postoperative changes with slight increase in the size of the enhancement within the postoperative bed since 10/2/2023 with slightly less vasogenic edema. Multiple additional brain metastases have increased in size since the prior exam as described above.    - We are discussing results Dr. Bernard and Dr. Beckman to determine plan for further systemic therapy.   - Patient currently shows improvement in RUE and RLE strength. We agree with continuing dexamethasone 2mg BID for now.     Please do not hesitate to page with questions.     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     #Stage 4 lung adenocarcinoma  When patient was initially diagnosed with stage IIIB T4N2M0 lung adenocarcinoma with neuroendocrine featuers in 5/2022, patient received 4C carboplatin + pemetrexed from 7-9/2022, with thoracic RT, and achieved [place], but declined to receive immunotherapy. In 5/2023, she was found to have brain mets, and she received GK-SRS to 7 brain mets in 5 fractions in 6/2023. She again was advised immunotherapy, but delayed treatment. From 8/2023 to 9/2023, she had multiple ED visits for headache, dizziness, migraines related to brain mets, which were managed with anti-epileptics and steroids, c/b epigastric pain. She was found to have new liver mets. From 9/19-10/5/23, she was admitted for LE weakness and blurred vision, s/p R temporal craniotomy for resection of metastatic lesion. Post-op course c/b acute left cerebellar infarction post-op MRI, tachycardia and thrombocytopenia. Patient was thereafter sent to acute rehab at [facility].   She was most recently planned for combination chemotherapy and immunotherapy with carboplatin+ pemetrexed+ pembrolizumab to improve management of CNS disease, but has since presented with worsening RUE and RLE weakness and abdominal pain.     - 11/2/23: MRI head w/o IVC: Right temporal postoperative changes with slight increase in the size of the enhancement within the postoperative bed since 10/2/2023 with slightly less vasogenic edema. Multiple additional brain metastases have increased in size since the prior exam as described above.    - Results of MRI reviewed with Dr. Bernard and Dr. Beckman to determine plan for further systemic therapy.   - Plan is to give one cycle of Carbo/Taxol/Avastin as an inpatient and then discharge patient to home or rehab. If she responds she may receive additional treatment as an outpatient  - She will follow up with Dr. Bernard who has indicated that she will need additional radiation to the brain as an outpatient.  - Patient currently shows improvement in RUE and RLE strength. We agree with continuing dexamethasone 2mg BID for now.     Please do not hesitate to page with questions.     Alva Childers MD PGY5   [phone]  Hematology-Oncology Fellow  WEEKENDS- Please call  to page on-call fellow

## 2023-11-03 NOTE — PROGRESS NOTE ADULT - PROBLEM SELECTOR PLAN 2
no alarm signs of cord compression  - pt given 4mg dexamethasone by oncologist prior to presentation  - likely 2/2 brain metastases; will obtain brain MRI to further evaluate  - continue dexamethasone 2mg BID for now; can consider increasing if needed  - Tylenol 650mg for mild pain  - PT/OT consulted 11/1 - rec acute inpatient rehab; DC acute rehab; if pt to go home, home OT and PT services assist for ALL mobility/ADLs with use of rolling walker, shower chair, transport/polyfly WC.  - PMR consulted 11/2

## 2023-11-03 NOTE — PROGRESS NOTE ADULT - SUBJECTIVE AND OBJECTIVE BOX
***************************************************************  Hector Ang) [facility] PGY2  Internal Medicine   ***************************************************************    HARJEET WHITMORE  52y  MRN: [id number]    Patient is a 52y old  Female who presents with a chief complaint of R sided weakness (02 Nov 2023 10:08)      Subjective: no events ON. Denies fever, CP, SOB, abn pain, N/V, dysuria. Tolerating diet.      MEDICATIONS  (STANDING):  acetaminophen   IVPB .. 1000 milliGRAM(s) IV Intermittent once  artificial tears (preservative free) Ophthalmic Solution 1 Drop(s) Both EYES three times a day  chlorhexidine 4% Liquid 1 Application(s) Topical daily  dexAMETHasone     Tablet 2 milliGRAM(s) Oral two times a day  dextrose 5%. 1000 milliLiter(s) (100 mL/Hr) IV Continuous <Continuous>  dextrose 5%. 1000 milliLiter(s) (50 mL/Hr) IV Continuous <Continuous>  dextrose 50% Injectable 25 Gram(s) IV Push once  dextrose 50% Injectable 25 Gram(s) IV Push once  dextrose 50% Injectable 12.5 Gram(s) IV Push once  divalproex  milliGRAM(s) Oral every 12 hours  glucagon  Injectable 1 milliGRAM(s) IntraMuscular once  insulin lispro (ADMELOG) corrective regimen sliding scale   SubCutaneous three times a day before meals  pantoprazole    Tablet 40 milliGRAM(s) Oral before breakfast  polyethylene glycol 3350 17 Gram(s) Oral daily    MEDICATIONS  (PRN):  acetaminophen     Tablet .. 650 milliGRAM(s) Oral every 6 hours PRN Temp greater or equal to 38C (100.4F), Mild Pain (1 - 3)  dextrose Oral Gel 15 Gram(s) Oral once PRN Blood Glucose LESS THAN 70 milliGRAM(s)/deciliter  melatonin 3 milliGRAM(s) Oral at bedtime PRN Insomnia  senna 2 Tablet(s) Oral at bedtime PRN Constipation      Objective:    Vitals: Vital Signs Last 24 Hrs  T(C): 36.6 (11-03-23 @ 04:41), Max: 38.1 (11-02-23 @ 20:06)  T(F): 97.9 (11-03-23 @ 04:41), Max: 100.5 (11-02-23 @ 20:06)  HR: 94 (11-03-23 @ 04:41) (94 - 121)  BP: 116/83 (11-03-23 @ 04:41) (105/67 - 116/83)  BP(mean): --  RR: 20 (11-03-23 @ 04:41) (18 - 20)  SpO2: 90% (11-03-23 @ 04:41) (90% - 98%)            I&O's Summary    01 Nov 2023 07:01  -  02 Nov 2023 07:00  --------------------------------------------------------  IN: 1350 mL / OUT: 1650 mL / NET: -300 mL    02 Nov 2023 07:01  -  03 Nov 2023 06:49  --------------------------------------------------------  IN: 0 mL / OUT: 3000 mL / NET: -3000 mL        PHYSICAL EXAM:  GENERAL: NAD  HEAD:  Atraumatic, Normocephalic  EYES: EOMI, conjunctiva and sclera clear  CHEST/LUNG: Clear to auscultation bilaterally; No rales, rhonchi, wheezing, or rubs  HEART: Regular rate and rhythm; No murmurs, rubs, or gallops  ABDOMEN: Soft, Nontender, Nondistended;   SKIN: No rashes or lesions  NERVOUS SYSTEM:  Alert & Oriented X3, no focal deficits    LABS:  11-02    138  |  104  |  7   ----------------------------<  126<H>  3.8   |  22  |  0.37<L>  11-01    137  |  103  |  6<L>  ----------------------------<  80  3.5   |  22  |  0.33<L>  10-31    133<L>  |  98  |  7   ----------------------------<  108<H>  4.5   |  19<L>  |  0.31<L>    Ca    9.1      02 Nov 2023 06:34  Ca    10.1      01 Nov 2023 07:37  Ca    10.0      31 Oct 2023 12:19  Phos  2.8     11-02  Mg     1.6     11-02    TPro  5.5<L>  /  Alb  2.4<L>  /  TBili  0.2  /  DBili  x   /  AST  38  /  ALT  29  /  AlkPhos  154<H>  11-02  TPro  6.2  /  Alb  2.6<L>  /  TBili  0.2  /  DBili  x   /  AST  35  /  ALT  26  /  AlkPhos  146<H>  11-01  TPro  7.5  /  Alb  2.9<L>  /  TBili  0.3  /  DBili  x   /  AST  52<H>  /  ALT  32  /  AlkPhos  171<H>  10-31                    Urinalysis Basic - ( 02 Nov 2023 06:34 )    Color: x / Appearance: x / SG: x / pH: x  Gluc: 126 mg/dL / Ketone: x  / Bili: x / Urobili: x   Blood: x / Protein: x / Nitrite: x   Leuk Esterase: x / RBC: x / WBC x   Sq Epi: x / Non Sq Epi: x / Bacteria: x                              10.3   11.73 )-----------( 224      ( 03 Nov 2023 06:28 )             32.1                         9.0    6.95  )-----------( 191      ( 02 Nov 2023 06:32 )             27.8                         9.8    8.50  )-----------( 188      ( 01 Nov 2023 18:38 )             30.3     CAPILLARY BLOOD GLUCOSE      POCT Blood Glucose.: 194 mg/dL (02 Nov 2023 21:27)  POCT Blood Glucose.: 105 mg/dL (02 Nov 2023 17:52)  POCT Blood Glucose.: 156 mg/dL (02 Nov 2023 12:52)  POCT Blood Glucose.: 121 mg/dL (02 Nov 2023 09:02)      RADIOLOGY & ADDITIONAL TESTS:    Imaging Personally Reviewed:  [x ] YES  [ ] NO    Consultants involved in case:   Consultant(s) Notes Reviewed:  [ x] YES  [ ] NO:   Care Discussed with Consultants/Other Providers [x ] YES  [ ] NO         ***************************************************************  Hector Ang) [facility] PGY2  Internal Medicine   ***************************************************************    HARJEET WHITMORE  52y  MRN: [id number]    Patient is a 52y old  Female who presents with a chief complaint of R sided weakness (02 Nov 2023 10:08)      Subjective: NAEO. Fever to 100.5, tachy to 118. This morning reporting abdominal pain and requesting tylenol.     MEDICATIONS  (STANDING):  acetaminophen   IVPB .. 1000 milliGRAM(s) IV Intermittent once  artificial tears (preservative free) Ophthalmic Solution 1 Drop(s) Both EYES three times a day  chlorhexidine 4% Liquid 1 Application(s) Topical daily  dexAMETHasone     Tablet 2 milliGRAM(s) Oral two times a day  dextrose 5%. 1000 milliLiter(s) (100 mL/Hr) IV Continuous <Continuous>  dextrose 5%. 1000 milliLiter(s) (50 mL/Hr) IV Continuous <Continuous>  dextrose 50% Injectable 25 Gram(s) IV Push once  dextrose 50% Injectable 25 Gram(s) IV Push once  dextrose 50% Injectable 12.5 Gram(s) IV Push once  divalproex  milliGRAM(s) Oral every 12 hours  glucagon  Injectable 1 milliGRAM(s) IntraMuscular once  insulin lispro (ADMELOG) corrective regimen sliding scale   SubCutaneous three times a day before meals  pantoprazole    Tablet 40 milliGRAM(s) Oral before breakfast  polyethylene glycol 3350 17 Gram(s) Oral daily    MEDICATIONS  (PRN):  acetaminophen     Tablet .. 650 milliGRAM(s) Oral every 6 hours PRN Temp greater or equal to 38C (100.4F), Mild Pain (1 - 3)  dextrose Oral Gel 15 Gram(s) Oral once PRN Blood Glucose LESS THAN 70 milliGRAM(s)/deciliter  melatonin 3 milliGRAM(s) Oral at bedtime PRN Insomnia  senna 2 Tablet(s) Oral at bedtime PRN Constipation      Objective:    Vitals: Vital Signs Last 24 Hrs  T(C): 36.6 (11-03-23 @ 04:41), Max: 38.1 (11-02-23 @ 20:06)  T(F): 97.9 (11-03-23 @ 04:41), Max: 100.5 (11-02-23 @ 20:06)  HR: 94 (11-03-23 @ 04:41) (94 - 121)  BP: 116/83 (11-03-23 @ 04:41) (105/67 - 116/83)  BP(mean): --  RR: 20 (11-03-23 @ 04:41) (18 - 20)  SpO2: 90% (11-03-23 @ 04:41) (90% - 98%)            I&O's Summary    01 Nov 2023 07:01  -  02 Nov 2023 07:00  --------------------------------------------------------  IN: 1350 mL / OUT: 1650 mL / NET: -300 mL    02 Nov 2023 07:01  -  03 Nov 2023 06:49  --------------------------------------------------------  IN: 0 mL / OUT: 3000 mL / NET: -3000 mL        PHYSICAL EXAM:  GENERAL: NAD  HEAD:  Atraumatic, Normocephalic  EYES: EOMI, conjunctiva and sclera clear  CHEST/LUNG: Clear to auscultation bilaterally; No rales, rhonchi, wheezing, or rubs  HEART: Regular rate and rhythm; No murmurs, rubs, or gallops  ABDOMEN: Soft, Nontender, Nondistended;   SKIN: No rashes or lesions  NERVOUS SYSTEM:  Alert & Oriented X3, RLQ 4/5 strength    LABS:  11-02    138  |  104  |  7   ----------------------------<  126<H>  3.8   |  22  |  0.37<L>  11-01    137  |  103  |  6<L>  ----------------------------<  80  3.5   |  22  |  0.33<L>  10-31    133<L>  |  98  |  7   ----------------------------<  108<H>  4.5   |  19<L>  |  0.31<L>    Ca    9.1      02 Nov 2023 06:34  Ca    10.1      01 Nov 2023 07:37  Ca    10.0      31 Oct 2023 12:19  Phos  2.8     11-02  Mg     1.6     11-02    TPro  5.5<L>  /  Alb  2.4<L>  /  TBili  0.2  /  DBili  x   /  AST  38  /  ALT  29  /  AlkPhos  154<H>  11-02  TPro  6.2  /  Alb  2.6<L>  /  TBili  0.2  /  DBili  x   /  AST  35  /  ALT  26  /  AlkPhos  146<H>  11-01  TPro  7.5  /  Alb  2.9<L>  /  TBili  0.3  /  DBili  x   /  AST  52<H>  /  ALT  32  /  AlkPhos  171<H>  10-31                    Urinalysis Basic - ( 02 Nov 2023 06:34 )    Color: x / Appearance: x / SG: x / pH: x  Gluc: 126 mg/dL / Ketone: x  / Bili: x / Urobili: x   Blood: x / Protein: x / Nitrite: x   Leuk Esterase: x / RBC: x / WBC x   Sq Epi: x / Non Sq Epi: x / Bacteria: x                              10.3   11.73 )-----------( 224      ( 03 Nov 2023 06:28 )             32.1                         9.0    6.95  )-----------( 191      ( 02 Nov 2023 06:32 )             27.8                         9.8    8.50  )-----------( 188      ( 01 Nov 2023 18:38 )             30.3     CAPILLARY BLOOD GLUCOSE      POCT Blood Glucose.: 194 mg/dL (02 Nov 2023 21:27)  POCT Blood Glucose.: 105 mg/dL (02 Nov 2023 17:52)  POCT Blood Glucose.: 156 mg/dL (02 Nov 2023 12:52)  POCT Blood Glucose.: 121 mg/dL (02 Nov 2023 09:02)      RADIOLOGY & ADDITIONAL TESTS:    Imaging Personally Reviewed:  [x ] YES  [ ] NO    Consultants involved in case:   Consultant(s) Notes Reviewed:  [ x] YES  [ ] NO:   Care Discussed with Consultants/Other Providers [x ] YES  [ ] NO

## 2023-11-03 NOTE — PROGRESS NOTE ADULT - ASSESSMENT
52F no AC/AP hx stage IV NSCLC w/ mets to brain s/p 4c chemo/ thoracic RT completed 10/2022, GKRS to 7 brain mets, now s/p R crani for tumor rsxn 9/30/23. Pt now admitted med for c/o 1wk R leg heaviness and RUE clumsiness + abd pain. CT CAP w/ new scattered mets. CTH w/ no sig change c/t 10/8/23, MRI w/ R temporal postop changes with slight increase in the size of the enhancement within the postoperative bed since 10/2/2023 with slightly less vasogenic edema. Multiple additional brain metastases have increased in size since the prior exam as described above  -no acute nsgy intervention  -continue [id number] per primary  -per heme/onc -MR brain to be d/w with Dr. Bernard and Dr. Beckman to determine plan for further systemic therapy   97

## 2023-11-03 NOTE — PROGRESS NOTE ADULT - ASSESSMENT
Patient is a 51 yo F with history of DM, GERD, stage IV lung cancer with mets to brain (s/p 4 cycles chemo and thoracic RT completed on oct 2022), gamma knife surgery to 7 brain mets, s/p R temporal crani for resection of metastatic lesion. Pt now presenting with two weeks right upper and lower extremity weakness and abdominal pain concerning for metastatic disease.

## 2023-11-03 NOTE — PROGRESS NOTE ADULT - PROBLEM SELECTOR PLAN 1
previously treated with 4 rounds of chemo, developed brain mets 5/23, supposed to have followed up for immunotherapy with outpatient Dr. Beckman; now presenting w/ R sided weakness and abdominal pain  - CTAP demonstrating progression of neoplastic disease  with enlarging dominant right upper lobe pulmonary mass with interval increase in size and number of bilateral pulmonary metastases and bilobar hepatic metastases when compared to prior imaging September 2023 along with new scattered metastatic nodules in the subcutaneous tissues of the abdominal wall.  - CT Head demonstrated multiple brain metastases not significantly changed from prior imaging  - MRI head pending - neurosurgery following (pg [id number] when complete)  - oncology consulted 11/1, f/u recs previously treated with 4 rounds of chemo, developed brain mets 5/23, supposed to have followed up for immunotherapy with outpatient Dr. Beckman; now presenting w/ R sided weakness and abdominal pain  - CTAP demonstrating progression of neoplastic disease  with enlarging dominant right upper lobe pulmonary mass with interval increase in size and number of bilateral pulmonary metastases and bilobar hepatic metastases when compared to prior imaging September 2023 along with new scattered metastatic nodules in the subcutaneous tissues of the abdominal wall.  - CT Head demonstrated multiple brain metastases not significantly changed from prior imaging  - MRI head w/ increase in lesions - per NSGY no intervention, however Heme/Onc will review images and determine next steps.

## 2023-11-03 NOTE — CONSULT NOTE ADULT - SUBJECTIVE AND OBJECTIVE BOX
Patient is a 52y old  Female who presents with a chief complaint of R sided weakness (03 Nov 2023 10:00)      HPI:  52-year-old female, history of stage IV lung cancer with brain metastasis, undergoing radiation therapy, presenting with a chief complaint of right upper extremity and right lower extremity weakness. Pt was recently admitted for RLE weakness in 10/23 and was discharged to rehab where pt states she continued doing exercises and felt she was improving. She states she started experiencing the weakness again about a week after she was discharged and she feels unstable when walking or attempting to hold anything with her R hand. Pt denies any pain in her right side and states she just feels "wobbly." She states she is now needing her fiance to help with her ADLs including getting out of bed  Pt states she has not fallen due to her weakness but feels that she would if she did not have several people helping her. Pt also stated that she has been having abdominal pain and noticed a palpable knot in her abdomen which started when she was d/c-ed from her previous admission. She states that initially the pain was in her RUQ, but now it radiates to the left side of her abdomen as well. Pt describes the pain as a lightening sensation. Pt also states she has right shoulder pain which her oncologist stated was most likely due to the weakness she was experiencing on her right side. She states she takes about 6 tylenol (150mg each) per day for pain. Pt states she went to her oncologist this morning with concern of her R sided weakness and was encouraged to come to the ED for further imaging. Currently not on chemotherapy. no recent medication changes. Pt states she is having increasing shortness of breath with exertion, but denies current SOB, chest pain, fever/chills, N/V. Pt endorses headache and states it is at the same degree of her typical headaches, but more frequent. (31 Oct 2023 22:45)    Interval History:  Patient had imaging:  CT Head No Cont (10.31.23 @ 14:54) >  HEAD CT: Post right temporal resection with multiple additional brain   metastases again noted. These do not appear to besignificantly changed   in appearance.    Tiny amount of extra-axial hemorrhage remaining versus postsurgical   material along the inside of the craniotomy flap. Interval resolution of   pneumocephalus post resection.    Partial opacification of right mastoid air cells, new compared to   previous exam 10/8/2023.    MR Head w/wo IV Cont (11.02.23 @ 17:51) >  Right temporal postoperative changes with slight increase in   the size of the enhancement within the postoperative bed since 10/2/2023   with slightly less vasogenic edema. Multiple additional brain metastases   have increased in size since the prior exam as described above.    REVIEW OF SYSTEMS: No chest pain, shortness of breath, nausea, vomiting or diarhea; other ROS neg     PAST MEDICAL & SURGICAL HISTORY  GERD (Gastroesophageal Reflux Disease)    Hydrosalpinx    GERD (Gastroesophageal Reflux Disease)    Ulcer    Lung mass    Non-small cell lung cancer with metastasis    S/P endoscopy    FUNCTIONAL HISTORY:   Lives alone in home w 4 NOAH  PTA Independent w RW    CURRENT FUNCTIONAL STATUS:  Min A transfers, gait    FAMILY HISTORY   No pertinent family history in first degree relatives    MEDICATIONS   acetaminophen     Tablet .. 650 milliGRAM(s) Oral every 6 hours PRN  acetaminophen   IVPB .. 1000 milliGRAM(s) IV Intermittent once  artificial tears (preservative free) Ophthalmic Solution 1 Drop(s) Both EYES three times a day  chlorhexidine 4% Liquid 1 Application(s) Topical daily  dexAMETHasone     Tablet 2 milliGRAM(s) Oral two times a day  dextrose 5%. 1000 milliLiter(s) IV Continuous <Continuous>  dextrose 5%. 1000 milliLiter(s) IV Continuous <Continuous>  dextrose 50% Injectable 25 Gram(s) IV Push once  dextrose 50% Injectable 25 Gram(s) IV Push once  dextrose 50% Injectable 12.5 Gram(s) IV Push once  dextrose Oral Gel 15 Gram(s) Oral once PRN  divalproex  milliGRAM(s) Oral every 12 hours  glucagon  Injectable 1 milliGRAM(s) IntraMuscular once  insulin lispro (ADMELOG) corrective regimen sliding scale   SubCutaneous three times a day before meals  melatonin 3 milliGRAM(s) Oral at bedtime PRN  pantoprazole    Tablet 40 milliGRAM(s) Oral before breakfast  polyethylene glycol 3350 17 Gram(s) Oral daily  senna 2 Tablet(s) Oral at bedtime PRN    ALLERGIES  No Known Allergies    VITALS  T(C): 36.6 (11-03-23 @ 04:41), Max: 38.1 (11-02-23 @ 20:06)  HR: 94 (11-03-23 @ 04:41) (94 - 121)  BP: 116/83 (11-03-23 @ 04:41) (105/67 - 116/83)  RR: 20 (11-03-23 @ 04:41) (18 - 20)  SpO2: 90% (11-03-23 @ 04:41) (90% - 98%)  Wt(kg): --    PHYSICAL EXAM  Constitutional - NAD, Comfortable  HEENT - NCAT, EOMI  Neck - Supple  Chest - No distress, no use of accessory muscles for respiration  Cardiovascular -Well perfused  Abdomen - BS+, Soft, NTND  Extremities - No C/C/E, No calf tenderness   Neurologic Exam -                    Cognitive - Awake, Alert, AAO to self, place, date, year, situation     Communication - Fluent, No dysarthria, no aphasia     Cranial Nerves - CN 2-12 intact     Motor - No focal deficits      Sensory - Intact to LT     Reflexes - DTR Intact, No primitive reflexive  Psychiatric - Mood stable, Affect WNL    RECENT LABS/IMAGING  CBC Full  -  ( 03 Nov 2023 06:28 )  WBC Count : 11.73 K/uL  RBC Count : 3.63 M/uL  Hemoglobin : 10.3 g/dL  Hematocrit : 32.1 %  Platelet Count - Automated : 224 K/uL  Mean Cell Volume : 88.4 fl  Mean Cell Hemoglobin : 28.4 pg  Mean Cell Hemoglobin Concentration : 32.1 gm/dL  Auto Neutrophil # : 9.38 K/uL  Auto Lymphocyte # : 0.72 K/uL  Auto Monocyte # : 1.52 K/uL  Auto Eosinophil # : 0.00 K/uL  Auto Basophil # : 0.00 K/uL  Auto Neutrophil % : 76.5 %  Auto Lymphocyte % : 6.1 %  Auto Monocyte % : 13.0 %  Auto Eosinophil % : 0.0 %  Auto Basophil % : 0.0 %    11-03    140  |  104  |  4<L>  ----------------------------<  123<H>  3.5   |  23  |  <0.30<L>    Ca    10.0      03 Nov 2023 06:28  Phos  3.0     11-03  Mg     1.6     11-03    TPro  6.4  /  Alb  2.7<L>  /  TBili  0.2  /  DBili  x   /  AST  44<H>  /  ALT  33  /  AlkPhos  178<H>  11-03    Urinalysis Basic - ( 03 Nov 2023 06:28 )    Color: x / Appearance: x / SG: x / pH: x  Gluc: 123 mg/dL / Ketone: x  / Bili: x / Urobili: x   Blood: x / Protein: x / Nitrite: x   Leuk Esterase: x / RBC: x / WBC x   Sq Epi: x / Non Sq Epi: x / Bacteria: x    Impression:  51 yo with functional deficits secondary to diagnosis of brain mass    Plan:  PT- ROM, Bed Mob, Transfers, Amb w AD and bracing as needed  OT- ADLs, bracing  SLP- Dysphagia eval and treat  Prec- Falls, Cardiac  DVT Prophylaxis- SCDs  Skin- Turn q2 h  Dispo-  Patient is a 52y old  Female who presents with a chief complaint of R sided weakness (03 Nov 2023 10:00)      HPI:  52-year-old female, history of stage IV lung cancer with brain metastasis, undergoing radiation therapy, presenting with a chief complaint of right upper extremity and right lower extremity weakness. Pt was recently admitted for RLE weakness in 10/23 and was discharged to rehab where pt states she continued doing exercises and felt she was improving. She states she started experiencing the weakness again about a week after she was discharged and she feels unstable when walking or attempting to hold anything with her R hand. Pt denies any pain in her right side and states she just feels "wobbly." She states she is now needing her fiance to help with her ADLs including getting out of bed  Pt states she has not fallen due to her weakness but feels that she would if she did not have several people helping her. Pt also stated that she has been having abdominal pain and noticed a palpable knot in her abdomen which started when she was d/c-ed from her previous admission. She states that initially the pain was in her RUQ, but now it radiates to the left side of her abdomen as well. Pt describes the pain as a lightening sensation. Pt also states she has right shoulder pain which her oncologist stated was most likely due to the weakness she was experiencing on her right side. She states she takes about 6 tylenol (150mg each) per day for pain. Pt states she went to her oncologist this morning with concern of her R sided weakness and was encouraged to come to the ED for further imaging. Currently not on chemotherapy. no recent medication changes. Pt states she is having increasing shortness of breath with exertion, but denies current SOB, chest pain, fever/chills, N/V. Pt endorses headache and states it is at the same degree of her typical headaches, but more frequent. (31 Oct 2023 22:45)    Interval History:  Patient had imaging:  CT Head No Cont (10.31.23 @ 14:54) >  HEAD CT: Post right temporal resection with multiple additional brain   metastases again noted. These do not appear to besignificantly changed   in appearance.    Tiny amount of extra-axial hemorrhage remaining versus postsurgical   material along the inside of the craniotomy flap. Interval resolution of   pneumocephalus post resection.    Partial opacification of right mastoid air cells, new compared to   previous exam 10/8/2023.    MR Head w/wo IV Cont (11.02.23 @ 17:51) >  Right temporal postoperative changes with slight increase in   the size of the enhancement within the postoperative bed since 10/2/2023   with slightly less vasogenic edema. Multiple additional brain metastases   have increased in size since the prior exam as described above.    REVIEW OF SYSTEMS: R sided weakness, + difficulty walking, No chest pain, shortness of breath, nausea, vomiting or diarhea; other ROS neg     PAST MEDICAL & SURGICAL HISTORY  GERD (Gastroesophageal Reflux Disease)    Hydrosalpinx    GERD (Gastroesophageal Reflux Disease)    Ulcer    Lung mass    Non-small cell lung cancer with metastasis    S/P endoscopy    FUNCTIONAL HISTORY:   Lives alone in home w 4 NOAH  PTA Independent w RW    CURRENT FUNCTIONAL STATUS:  Min A transfers, gait    FAMILY HISTORY   No pertinent family history in first degree relatives    MEDICATIONS   acetaminophen     Tablet .. 650 milliGRAM(s) Oral every 6 hours PRN  acetaminophen   IVPB .. 1000 milliGRAM(s) IV Intermittent once  artificial tears (preservative free) Ophthalmic Solution 1 Drop(s) Both EYES three times a day  chlorhexidine 4% Liquid 1 Application(s) Topical daily  dexAMETHasone     Tablet 2 milliGRAM(s) Oral two times a day  dextrose 5%. 1000 milliLiter(s) IV Continuous <Continuous>  dextrose 5%. 1000 milliLiter(s) IV Continuous <Continuous>  dextrose 50% Injectable 25 Gram(s) IV Push once  dextrose 50% Injectable 25 Gram(s) IV Push once  dextrose 50% Injectable 12.5 Gram(s) IV Push once  dextrose Oral Gel 15 Gram(s) Oral once PRN  divalproex  milliGRAM(s) Oral every 12 hours  glucagon  Injectable 1 milliGRAM(s) IntraMuscular once  insulin lispro (ADMELOG) corrective regimen sliding scale   SubCutaneous three times a day before meals  melatonin 3 milliGRAM(s) Oral at bedtime PRN  pantoprazole    Tablet 40 milliGRAM(s) Oral before breakfast  polyethylene glycol 3350 17 Gram(s) Oral daily  senna 2 Tablet(s) Oral at bedtime PRN    ALLERGIES  No Known Allergies    VITALS  T(C): 36.6 (11-03-23 @ 04:41), Max: 38.1 (11-02-23 @ 20:06)  HR: 94 (11-03-23 @ 04:41) (94 - 121)  BP: 116/83 (11-03-23 @ 04:41) (105/67 - 116/83)  RR: 20 (11-03-23 @ 04:41) (18 - 20)  SpO2: 90% (11-03-23 @ 04:41) (90% - 98%)  Wt(kg): --    PHYSICAL EXAM  Constitutional - NAD, Comfortable  HEENT - NCAT, EOMI  Neck - Supple  Chest - No distress, no use of accessory muscles for respiration  Cardiovascular -Well perfused  Abdomen - BS+, Soft, NTND  Extremities - No C/C/E, No calf tenderness   Neurologic Exam -                 AAO x 3  Motor RLE/RUE 3/5, LUE/LLE 4+/5  No clonus  Sensation intact     Psychiatric - Mood stable, Affect WNL    RECENT LABS/IMAGING  CBC Full  -  ( 03 Nov 2023 06:28 )  WBC Count : 11.73 K/uL  RBC Count : 3.63 M/uL  Hemoglobin : 10.3 g/dL  Hematocrit : 32.1 %  Platelet Count - Automated : 224 K/uL  Mean Cell Volume : 88.4 fl  Mean Cell Hemoglobin : 28.4 pg  Mean Cell Hemoglobin Concentration : 32.1 gm/dL  Auto Neutrophil # : 9.38 K/uL  Auto Lymphocyte # : 0.72 K/uL  Auto Monocyte # : 1.52 K/uL  Auto Eosinophil # : 0.00 K/uL  Auto Basophil # : 0.00 K/uL  Auto Neutrophil % : 76.5 %  Auto Lymphocyte % : 6.1 %  Auto Monocyte % : 13.0 %  Auto Eosinophil % : 0.0 %  Auto Basophil % : 0.0 %    11-03    140  |  104  |  4<L>  ----------------------------<  123<H>  3.5   |  23  |  <0.30<L>    Ca    10.0      03 Nov 2023 06:28  Phos  3.0     11-03  Mg     1.6     11-03    TPro  6.4  /  Alb  2.7<L>  /  TBili  0.2  /  DBili  x   /  AST  44<H>  /  ALT  33  /  AlkPhos  178<H>  11-03    Urinalysis Basic - ( 03 Nov 2023 06:28 )    Color: x / Appearance: x / SG: x / pH: x  Gluc: 123 mg/dL / Ketone: x  / Bili: x / Urobili: x   Blood: x / Protein: x / Nitrite: x   Leuk Esterase: x / RBC: x / WBC x   Sq Epi: x / Non Sq Epi: x / Bacteria: x    Impression:  53 yo with functional deficits secondary to diagnosis of brain mass    Plan:  PT- ROM, Bed Mob, Transfers, Amb w AD and bracing as needed  OT- ADLs, bracing  SLP- Dysphagia eval and treat  Prec- Falls, Cardiac  DVT Prophylaxis- SCDs  Skin- Turn q2 h  Dispo- Acute Rehab- patient requires active and ongoing therapeutic interventions of multiple disciplines and can tolerate 3 hours of therapies x 4wks. Can actively participate and benefit from  an intensive rehabilitation program. Requires supervision by a rehabilitation physician and a coordinated interdisciplinary approach to providing rehabilitation.

## 2023-11-03 NOTE — PROGRESS NOTE ADULT - NS ATTEND AMEND GEN_ALL_CORE FT
Pt seen and examined on 11/4/23.  Pt in good spirits.  Right leg improved on dex.  MRI with enlargement of left motor strip and occipital lesions.  Right temporal resection bed with decreased edema and mass effect.  Pt states she is going to start receiving inpatient systemic chemotherapy.  Continue management per oncology and primary team.

## 2023-11-03 NOTE — PROGRESS NOTE ADULT - SUBJECTIVE AND OBJECTIVE BOX
Hematology Oncology Follow-up    INTERVAL HPI/OVERNIGHT EVENTS:  Patient is very pleased today that she can lift up her right lower extremity and her right upper extremity. She feels her strength has improved and she demonstrates.     VITAL SIGNS:  T(F): 98.2 (23 @ 13:15)  HR: 108 (23 @ 13:15)  BP: 100/74 (23 @ 13:15)  RR: 20 (23 @ 13:15)  SpO2: 98% (23 @ 13:15)  Wt(kg): --    23 @ 07:01  -  23 @ 07:00  --------------------------------------------------------  IN: 0 mL / OUT: 3000 mL / NET: -3000 mL    23 @ 07:01  -  23 @ 17:07  --------------------------------------------------------  IN: 840 mL / OUT: 400 mL / NET: 440 mL      PHYSICAL EXAM:  GENERAL: NAD, well-groomed  HEAD:  Atraumatic, Normocephalic  EYES: EOMI, PERRLA, conjunctiva and sclera clear  ENMT: No oropharyngeal exudates, Moist mucous membranes  NECK: Supple, no cervical lymphadenopathy  NERVOUS SYSTEM:  alert and conversant, sensation intact, RUE  strength and flexion stronger than yesterday, RLE hip flexion also stronger than yesterday, RUE and RLE still slightly weaker than LUE and LLE respectively   CHEST/LUNG: Clear to auscultation bilaterally; no rhonchi  HEART: Regular rate and rhythm; No murmurs  ABDOMEN: Soft, Nontender, Nondistended  EXTREMITIES:  2+ radial Pulses, No cyanosis or edema  SKIN: warm, dry    Medications  MEDICATIONS  (STANDING):  acetaminophen   IVPB .. 1000 milliGRAM(s) IV Intermittent once  artificial tears (preservative free) Ophthalmic Solution 1 Drop(s) Both EYES three times a day  chlorhexidine 4% Liquid 1 Application(s) Topical daily  dexAMETHasone     Tablet 2 milliGRAM(s) Oral two times a day  dextrose 5%. 1000 milliLiter(s) (100 mL/Hr) IV Continuous <Continuous>  dextrose 5%. 1000 milliLiter(s) (50 mL/Hr) IV Continuous <Continuous>  dextrose 50% Injectable 25 Gram(s) IV Push once  dextrose 50% Injectable 25 Gram(s) IV Push once  dextrose 50% Injectable 12.5 Gram(s) IV Push once  divalproex  milliGRAM(s) Oral every 12 hours  glucagon  Injectable 1 milliGRAM(s) IntraMuscular once  insulin lispro (ADMELOG) corrective regimen sliding scale   SubCutaneous three times a day before meals  pantoprazole    Tablet 40 milliGRAM(s) Oral before breakfast  polyethylene glycol 3350 17 Gram(s) Oral daily    MEDICATIONS  (PRN):  acetaminophen     Tablet .. 650 milliGRAM(s) Oral every 6 hours PRN Temp greater or equal to 38C (100.4F), Mild Pain (1 - 3)  dextrose Oral Gel 15 Gram(s) Oral once PRN Blood Glucose LESS THAN 70 milliGRAM(s)/deciliter  melatonin 3 milliGRAM(s) Oral at bedtime PRN Insomnia  senna 2 Tablet(s) Oral at bedtime PRN Constipation      Allergies: No Known Allergies      LABS:                        10.3   11.73 )-----------( 224      ( 2023 06:28 )             32.1     11-03    140  |  104  |  4<L>  ----------------------------<  123<H>  3.5   |  23  |  <0.30<L>    Ca    10.0      2023 06:28  Phos  3.0     11-  Mg     1.6     11-    TPro  6.4  /  Alb  2.7<L>  /  TBili  0.2  /  DBili  x   /  AST  44<H>  /  ALT  33  /  AlkPhos  178<H>  11-03       Urinalysis Basic - ( 2023 15:09 )    Color: Yellow / Appearance: Clear / S.011 / pH: x  Gluc: x / Ketone: Negative mg/dL  / Bili: Negative / Urobili: 1.0 mg/dL   Blood: x / Protein: Negative mg/dL / Nitrite: Negative   Leuk Esterase: Moderate / RBC: 1 /HPF / WBC 56 /HPF   Sq Epi: x / Non Sq Epi: 4 /HPF / Bacteria: Negative /HPF              Bilirubin: Negative (23 @ 15:09)    RADIOLOGY & ADDITIONAL TESTS:  Studies reviewed.

## 2023-11-03 NOTE — CHART NOTE - NSCHARTNOTEFT_GEN_A_CORE
Patient consented to receive palliative chemotherapy with carboplatin, paclitaxel and bevacizumab. Plan to start C1 on 11/3/2023.    Note not finalized until signed by attending.   Please do not hesitate to page with questions.     Alva Childers MD PGY5   [phone]  Hematology-Oncology Fellow  WEEKENDS- Please call  to page on-call fellow Patient consented to receive palliative chemotherapy with carboplatin, paclitaxel and bevacizumab. Plan to start C1 on 11/4/2023.    Note not finalized until signed by attending.   Please do not hesitate to page with questions.     Alva Childers MD PGY5   [phone]  Hematology-Oncology Fellow  WEEKENDS- Please call  to page on-call fellow Patient consented to receive palliative chemotherapy with carboplatin, paclitaxel and bevacizumab. Risks and benefits of treatment were reviewed with the patient at length. Plan to start C1 on 11/4/2023.    Note not finalized until signed by attending.   Please do not hesitate to page with questions.     Alva Childers MD PGY5   [phone]  Hematology-Oncology Fellow  WEEKENDS- Please call  to page on-call fellow Patient consented to receive palliative chemotherapy with carboplatin, paclitaxel and bevacizumab. Risks and benefits of treatment were reviewed with the patient at length. Plan to start C1 on 11/4/2023.      Please do not hesitate to page with questions.     Alva Childers MD PGY5   [phone]  Hematology-Oncology Fellow  WEEKENDS- Please call  to page on-call fellow

## 2023-11-03 NOTE — PROGRESS NOTE ADULT - SUBJECTIVE AND OBJECTIVE BOX
SUBJECTIVE:   pt s/p MRI for metastatic workup     Vital Signs Last 24 Hrs  T(C): 36.8 (11-03-23 @ 13:15), Max: 38.1 (11-02-23 @ 20:06)  T(F): 98.2 (11-03-23 @ 13:15), Max: 100.5 (11-02-23 @ 20:06)  HR: 108 (11-03-23 @ 13:15) (94 - 121)  BP: 100/74 (11-03-23 @ 13:15) (100/74 - 116/83)  BP(mean): --  RR: 20 (11-03-23 @ 13:15) (18 - 20)  SpO2: 98% (11-03-23 @ 13:15) (90% - 98%)    PHYSICAL EXAM:    Constitutional: No Acute Distress     Neurological: Awake, Ox3, moving all extremities 4+/5 (effort dependent)       LABS:                          10.3   11.73 )-----------( 224      ( 03 Nov 2023 06:28 )             32.1    11-03    140  |  104  |  4<L>  ----------------------------<  123<H>  3.5   |  23  |  <0.30<L>    Ca    10.0      03 Nov 2023 06:28  Phos  3.0     11-03  Mg     1.6     11-03    TPro  6.4  /  Alb  2.7<L>  /  TBili  0.2  /  DBili  x   /  AST  44<H>  /  ALT  33  /  AlkPhos  178<H>  11-03 11-02 @ 07:01  -  11-03 @ 07:00  --------------------------------------------------------  IN: 0 mL / OUT: 3000 mL / NET: -3000 mL    11-03 @ 07:01  -  11-03 @ 13:39  --------------------------------------------------------  IN: 840 mL / OUT: 400 mL / NET: 440 mL        IMAGING:     ACC: [id number] EXAM: MR BRAIN WAW IC ORDERED BY: MICHELLE MORA    PROCEDURE DATE: 11/02/2023        INTERPRETATION: CLINICAL INDICATION: Brain metastases, worsening exam, lung cancer      Magnetic resonance imaging of the brain was carried out with transaxial SPGR, FLAIR, fast spin echo T2 weighted images, axial susceptibility weighted series, diffusion weighted series and sagittal T1 weighted series on a 1.5 Mylene magnet. Post contrast axial, coronal and sagittal T1 weighted images were obtained. 6 cc of Gadavist were intravenously injected, 1.5 cc were discarded.    Comparison is made with the prior MRI of 10/2/2023.        There has been a right temporal craniotomy. The enhancement in the right temporal postoperative bed is increased in size compared to the prior exam, measuring 4.7 cm in AP diameter by 3.2 cm transversely by 2.6 cm in craniocaudal diameter compared with the prior of 4.0 cm in AP diameter by 3.4 cm transversely by 2.7 cm in craniocaudal diameter. The vasogenic edema is slightly improved.    A small nodular enhancing lesion in the right inferior cerebellum is slightly larger measuring 1.7 cm in AP diameter by 1.4 cm transversely by 1.2 cm in craniocaudal diameter compared with the prior of 1.2 cm in AP diameter by 0.9 cm transversely by 1 cm in craniocaudal diameter.    The left occipital ring-enhancing mass is also larger measuring 3.8 cm in AP diameter and 3.0 cm transversely by 2.9 cm in craniocaudal diameter compared with the prior of 3.2 cm in AP diameter by 2.5 cm transversely by 2.7 cm in craniocaudal diameter. Vasogenic edema is worse.    A solid right posterior temporal enhancing lesion is larger measuring 1.8 cm in AP diameter by 1.5 cm transversely by 1.0 cm in craniocaudal diameter compared with the prior of 1.6 cm in AP diameter by 1.3 cm transversely by 0.7 cm in craniocaudal diameter.    A large left posterior frontal parietal lesion is larger measuring 2.9 cm in AP diameter by 2.2 cm transversely by 2.9 cm in craniocaudal diameter compared with the prior of 2.4 cm in AP diameter by 1.7 cm transversely by 2.1 cm in craniocaudal diameter.    Additional small left frontal cortical and right medial frontal enhancing lesions are identified which are slightly larger    Ventricles are normal in size and position. No acute infarcts are seen.    IMPRESSION: Right temporal postoperative changes with slight increase in the size of the enhancement within the postoperative bed since 10/2/2023 with slightly less vasogenic edema. Multiple additional brain metastases have increased in size since the prior exam as described above.    --- End of Report ---            DESIRE FRANCIS MD; Attending Radiologist  This document has been electronically signed. Nov 2 2023 6:27PM    MEDICATIONS:  Antibiotics:    Neuro:  acetaminophen     Tablet .. 650 milliGRAM(s) Oral every 6 hours PRN Temp greater or equal to 38C (100.4F), Mild Pain (1 - 3)  acetaminophen   IVPB .. 1000 milliGRAM(s) IV Intermittent once  divalproex  milliGRAM(s) Oral every 12 hours  melatonin 3 milliGRAM(s) Oral at bedtime PRN Insomnia    GI/:  pantoprazole    Tablet 40 milliGRAM(s) Oral before breakfast  polyethylene glycol 3350 17 Gram(s) Oral daily  senna 2 Tablet(s) Oral at bedtime PRN Constipation    Other:   artificial tears (preservative free) Ophthalmic Solution 1 Drop(s) Both EYES three times a day  chlorhexidine 4% Liquid 1 Application(s) Topical daily  dexAMETHasone     Tablet 2 milliGRAM(s) Oral two times a day  dextrose 5%. 1000 milliLiter(s) IV Continuous <Continuous>  dextrose 5%. 1000 milliLiter(s) IV Continuous <Continuous>  dextrose 50% Injectable 25 Gram(s) IV Push once  dextrose 50% Injectable 25 Gram(s) IV Push once  dextrose 50% Injectable 12.5 Gram(s) IV Push once  dextrose Oral Gel 15 Gram(s) Oral once PRN Blood Glucose LESS THAN 70 milliGRAM(s)/deciliter  glucagon  Injectable 1 milliGRAM(s) IntraMuscular once  insulin lispro (ADMELOG) corrective regimen sliding scale   SubCutaneous three times a day before meals       SUBJECTIVE:   pt s/p MRI for metastatic workup     Vital Signs Last 24 Hrs  T(C): 36.8 (11-03-23 @ 13:15), Max: 38.1 (11-02-23 @ 20:06)  T(F): 98.2 (11-03-23 @ 13:15), Max: 100.5 (11-02-23 @ 20:06)  HR: 108 (11-03-23 @ 13:15) (94 - 121)  BP: 100/74 (11-03-23 @ 13:15) (100/74 - 116/83)  BP(mean): --  RR: 20 (11-03-23 @ 13:15) (18 - 20)  SpO2: 98% (11-03-23 @ 13:15) (90% - 98%)    PHYSICAL EXAM:    Constitutional: No Acute Distress     Neurological: Awake, Ox3, moving all extremities 4+/5 (effort dependent)       LABS:                          10.3   11.73 )-----------( 224      ( 03 Nov 2023 06:28 )             32.1    11-03    140  |  104  |  4<L>  ----------------------------<  123<H>  3.5   |  23  |  <0.30<L>    Ca    10.0      03 Nov 2023 06:28  Phos  3.0     11-03  Mg     1.6     11-03    TPro  6.4  /  Alb  2.7<L>  /  TBili  0.2  /  DBili  x   /  AST  44<H>  /  ALT  33  /  AlkPhos  178<H>  11-03 11-02 @ 07:01  -  11-03 @ 07:00  --------------------------------------------------------  IN: 0 mL / OUT: 3000 mL / NET: -3000 mL    11-03 @ 07:01  -  11-03 @ 13:39  --------------------------------------------------------  IN: 840 mL / OUT: 400 mL / NET: 440 mL        IMAGING:     ACC: [id number] EXAM: MR BRAIN WAW IC ORDERED BY: MICHELLE MORA    PROCEDURE DATE: 11/02/2023        INTERPRETATION: CLINICAL INDICATION: Brain metastases, worsening exam, lung cancer      Magnetic resonance imaging of the brain was carried out with transaxial SPGR, FLAIR, fast spin echo T2 weighted images, axial susceptibility weighted series, diffusion weighted series and sagittal T1 weighted series on a 1.5 Mylene magnet. Post contrast axial, coronal and sagittal T1 weighted images were obtained. 6 cc of Gadavist were intravenously injected, 1.5 cc were discarded.    Comparison is made with the prior MRI of 10/2/2023.        There has been a right temporal craniotomy. The enhancement in the right temporal postoperative bed is increased in size compared to the prior exam, measuring 4.7 cm in AP diameter by 3.2 cm transversely by 2.6 cm in craniocaudal diameter compared with the prior of 4.0 cm in AP diameter by 3.4 cm transversely by 2.7 cm in craniocaudal diameter. The vasogenic edema is slightly improved.    A small nodular enhancing lesion in the right inferior cerebellum is slightly larger measuring 1.7 cm in AP diameter by 1.4 cm transversely by 1.2 cm in craniocaudal diameter compared with the prior of 1.2 cm in AP diameter by 0.9 cm transversely by 1 cm in craniocaudal diameter.    The left occipital ring-enhancing mass is also larger measuring 3.8 cm in AP diameter and 3.0 cm transversely by 2.9 cm in craniocaudal diameter compared with the prior of 3.2 cm in AP diameter by 2.5 cm transversely by 2.7 cm in craniocaudal diameter. Vasogenic edema is worse.    A solid right posterior temporal enhancing lesion is larger measuring 1.8 cm in AP diameter by 1.5 cm transversely by 1.0 cm in craniocaudal diameter compared with the prior of 1.6 cm in AP diameter by 1.3 cm transversely by 0.7 cm in craniocaudal diameter.    A large left posterior frontal parietal lesion is larger measuring 2.9 cm in AP diameter by 2.2 cm transversely by 2.9 cm in craniocaudal diameter compared with the prior of 2.4 cm in AP diameter by 1.7 cm transversely by 2.1 cm in craniocaudal diameter.    Additional small left frontal cortical and right medial frontal enhancing lesions are identified which are slightly larger    Ventricles are normal in size and position. No acute infarcts are seen.    IMPRESSION: Right temporal postoperative changes with slight increase in the size of the enhancement within the postoperative bed since 10/2/2023 with slightly less vasogenic edema. Multiple additional brain metastases have increased in size since the prior exam as described above.    --- End of Report ---            DESIRE FRANCIS MD; Attending Radiologist  This document has been electronically signed. Nov 2 2023 6:27PM    MEDICATIONS:  Antibiotics:    Neuro:  acetaminophen     Tablet .. 650 milliGRAM(s) Oral every 6 hours PRN Temp greater or equal to 38C (100.4F), Mild Pain (1 - 3)  acetaminophen   IVPB .. 1000 milliGRAM(s) IV Intermittent once  divalproex  milliGRAM(s) Oral every 12 hours  melatonin 3 milliGRAM(s) Oral at bedtime PRN Insomnia    GI/:  pantoprazole    Tablet 40 milliGRAM(s) Oral before breakfast  polyethylene glycol 3350 17 Gram(s) Oral daily  senna 2 Tablet(s) Oral at bedtime PRN Constipation    Other:   artificial tears (preservative free) Ophthalmic Solution 1 Drop(s) Both EYES three times a day  chlorhexidine 4% Liquid 1 Application(s) Topical daily  dexAMETHasone     Tablet 2 milliGRAM(s) Oral two times a day  dextrose 5%. 1000 milliLiter(s) IV Continuous <Continuous>  dextrose 5%. 1000 milliLiter(s) IV Continuous <Continuous>  dextrose 50% Injectable 25 Gram(s) IV Push once  dextrose 50% Injectable 25 Gram(s) IV Push once  dextrose 50% Injectable 12.5 Gram(s) IV Push once  dextrose Oral Gel 15 Gram(s) Oral once PRN Blood Glucose LESS THAN 70 milliGRAM(s)/deciliter  glucagon  Injectable 1 milliGRAM(s) IntraMuscular once  insulin lispro (ADMELOG) corrective regimen sliding scale   SubCutaneous three times a day before meals

## 2023-11-04 NOTE — PROGRESS NOTE ADULT - SUBJECTIVE AND OBJECTIVE BOX
Patient is a 52y old  Female who presents with a chief complaint of R sided weakness (03 Nov 2023 13:38)    INTERVAL HPI/OVERNIGHT EVENTS:    No Known Allergies    MEDS:  acetaminophen     Tablet .. 650 milliGRAM(s) Oral every 6 hours PRN  acetaminophen   IVPB .. 1000 milliGRAM(s) IV Intermittent once  aluminum hydroxide/magnesium hydroxide/simethicone Suspension 30 milliLiter(s) Oral every 6 hours PRN  artificial tears (preservative free) Ophthalmic Solution 1 Drop(s) Both EYES three times a day  chlorhexidine 4% Liquid 1 Application(s) Topical daily  dexAMETHasone     Tablet 2 milliGRAM(s) Oral two times a day  dextrose 5%. 1000 milliLiter(s) IV Continuous <Continuous>  dextrose 5%. 1000 milliLiter(s) IV Continuous <Continuous>  dextrose 50% Injectable 25 Gram(s) IV Push once  dextrose 50% Injectable 12.5 Gram(s) IV Push once  dextrose 50% Injectable 25 Gram(s) IV Push once  dextrose Oral Gel 15 Gram(s) Oral once PRN  divalproex  milliGRAM(s) Oral every 12 hours  glucagon  Injectable 1 milliGRAM(s) IntraMuscular once  insulin lispro (ADMELOG) corrective regimen sliding scale   SubCutaneous three times a day before meals  melatonin 3 milliGRAM(s) Oral at bedtime PRN  pantoprazole    Tablet 40 milliGRAM(s) Oral before breakfast  polyethylene glycol 3350 17 Gram(s) Oral daily  senna 2 Tablet(s) Oral at bedtime PRN    T(C): 36.4 (11-04-23 @ 04:50), Max: 37.1 (11-03-23 @ 20:21)  HR: 110 (11-04-23 @ 04:50) (108 - 114)  BP: 101/68 (11-04-23 @ 04:50) (100/74 - 106/71)  RR: 18 (11-04-23 @ 04:50) (18 - 20)  SpO2: 90% (11-04-23 @ 04:50) (90% - 100%)    PHYSICAL EXAM:  GENERAL: NAD  HEAD:  Atraumatic, Normocephalic  EYES: EOMI, conjunctiva and sclera clear  CHEST/LUNG: Clear to auscultation bilaterally; No rales, rhonchi, wheezing, or rubs  HEART: Regular rate and rhythm; No murmurs, rubs, or gallops  ABDOMEN: Soft, Nontender, Nondistended;   SKIN: No rashes or lesions  NERVOUS SYSTEM:  Alert & Oriented X3, RLQ 4/5 strength    Consultant(s) Notes Reviewed:  [x ] YES  [ ] NO  Care Discussed with Consultants/Other Providers [ x] YES  [ ] NO    LABS:             10.3   11.73 )-----------( 224      ( 03 Nov 2023 06:28 )             32.1     140  |  104  |  4<L>  ----------------------------<  123<H>  3.5   |  23  |  <0.30<L>    Ca    10.0      03 Nov 2023 06:28  Phos  3.0     11-03  Mg     1.6     11-03    TPro  6.4  /  Alb  2.7<L>  /  TBili  0.2  /  DBili  x   /  AST  44<H>  /  ALT  33  /  AlkPhos  178<H>  11-03      RADIOLOGY & ADDITIONAL TESTS:  MR Head w/wo IV Cont (11.02.23 @ 17:51)  IMPRESSION: Right temporal postoperative changes with slight increase in   the size of the enhancement within the postoperative bed since 10/2/2023   with slightly less vasogenic edema. Multiple additional brain metastases   have increased in size since the prior exam as described above. Patient is a 52y old  Female who presents with a chief complaint of R sided weakness (03 Nov 2023 13:38)    INTERVAL HPI/OVERNIGHT EVENTS: No acute overnight events. Pt denies any new complaints this AM. Will have 1st round of inpt palliative chemo today. Will c/w inpatient rehab services    No Known Allergies    MEDS:  acetaminophen     Tablet .. 650 milliGRAM(s) Oral every 6 hours PRN  acetaminophen   IVPB .. 1000 milliGRAM(s) IV Intermittent once  aluminum hydroxide/magnesium hydroxide/simethicone Suspension 30 milliLiter(s) Oral every 6 hours PRN  artificial tears (preservative free) Ophthalmic Solution 1 Drop(s) Both EYES three times a day  chlorhexidine 4% Liquid 1 Application(s) Topical daily  dexAMETHasone     Tablet 2 milliGRAM(s) Oral two times a day  dextrose 5%. 1000 milliLiter(s) IV Continuous <Continuous>  dextrose 5%. 1000 milliLiter(s) IV Continuous <Continuous>  dextrose 50% Injectable 25 Gram(s) IV Push once  dextrose 50% Injectable 12.5 Gram(s) IV Push once  dextrose 50% Injectable 25 Gram(s) IV Push once  dextrose Oral Gel 15 Gram(s) Oral once PRN  divalproex  milliGRAM(s) Oral every 12 hours  glucagon  Injectable 1 milliGRAM(s) IntraMuscular once  insulin lispro (ADMELOG) corrective regimen sliding scale   SubCutaneous three times a day before meals  melatonin 3 milliGRAM(s) Oral at bedtime PRN  pantoprazole    Tablet 40 milliGRAM(s) Oral before breakfast  polyethylene glycol 3350 17 Gram(s) Oral daily  senna 2 Tablet(s) Oral at bedtime PRN    T(C): 36.4 (11-04-23 @ 04:50), Max: 37.1 (11-03-23 @ 20:21)  HR: 110 (11-04-23 @ 04:50) (108 - 114)  BP: 101/68 (11-04-23 @ 04:50) (100/74 - 106/71)  RR: 18 (11-04-23 @ 04:50) (18 - 20)  SpO2: 90% (11-04-23 @ 04:50) (90% - 100%)    PHYSICAL EXAM:  GENERAL: NAD  HEAD:  Atraumatic, Normocephalic  EYES: EOMI, conjunctiva and sclera clear, mild proptosis  CHEST/LUNG: Clear to auscultation bilaterally; No rales, rhonchi, wheezing, or rubs  HEART: Regular rate and rhythm; No murmurs, rubs, or gallops  ABDOMEN: Soft, Nontender, Nondistended;   SKIN: No rashes or lesions  NERVOUS SYSTEM:  Alert & Oriented X3, RLQ 4/5 strength    Consultant(s) Notes Reviewed:  [x ] YES  [ ] NO  Care Discussed with Consultants/Other Providers [ x] YES  [ ] NO    LABS:             10.3   11.73 )-----------( 224      ( 03 Nov 2023 06:28 )             32.1     140  |  104  |  4<L>  ----------------------------<  123<H>  3.5   |  23  |  <0.30<L>    Ca    10.0      03 Nov 2023 06:28  Phos  3.0     11-03  Mg     1.6     11-03    TPro  6.4  /  Alb  2.7<L>  /  TBili  0.2  /  DBili  x   /  AST  44<H>  /  ALT  33  /  AlkPhos  178<H>  11-03    RADIOLOGY & ADDITIONAL TESTS:  MR Head w/wo IV Cont (11.02.23 @ 17:51)  IMPRESSION: Right temporal postoperative changes with slight increase in   the size of the enhancement within the postoperative bed since 10/2/2023   with slightly less vasogenic edema. Multiple additional brain metastases   have increased in size since the prior exam as described above.

## 2023-11-04 NOTE — PROGRESS NOTE ADULT - PROBLEM SELECTOR PLAN 2
no alarm signs of cord compression  - pt given 4mg dexamethasone by oncologist prior to presentation  - likely 2/2 brain metastases; will obtain brain MRI to further evaluate  - continue dexamethasone 2mg BID for now; can consider increasing if needed  - Tylenol 650mg for mild pain  - PT/OT consulted 11/1 - rec acute inpatient rehab; DC acute rehab; if pt to go home, home OT and PT services assist for ALL mobility/ADLs with use of rolling walker, shower chair, transport/polyfly WC.  - PMR consulted 11/2 - will begin inpt rehab no alarm signs of cord compression --> improving  - likely 2/2 brain metastases   - pt given 4mg dexamethasone by oncologist prior to presentation  - continue dexamethasone 2mg BID for now; can consider increasing if needed   - Tylenol 650mg for mild pain  - PT/OT consulted 11/1 - rec acute inpatient rehab; DC acute rehab; if pt to go home, home OT and PT services assist for ALL mobility/ADLs with use of rolling walker, shower chair, transport/polyfly WC.  - PMR consulted 11/2 - will begin inpt rehab

## 2023-11-04 NOTE — PROVIDER CONTACT NOTE (OTHER) - SITUATION
20 min into infusion of Paclitaxel pt c/o abdominal pain and headache 10/10, pt stated pain is not new has had the same pain previously with this admission

## 2023-11-04 NOTE — PROGRESS NOTE ADULT - PROBLEM SELECTOR PLAN 1
previously treated with 4 rounds of chemo, developed brain mets 5/23, supposed to have followed up for immunotherapy with outpatient Dr. Beckman; now presenting w/ R sided weakness and abdominal pain  - CTAP demonstrating progression of neoplastic disease  with enlarging dominant right upper lobe pulmonary mass with interval increase in size and number of bilateral pulmonary metastases and bilobar hepatic metastases when compared to prior imaging September 2023 along with new scattered metastatic nodules in the subcutaneous tissues of the abdominal wall.  - CT Head demonstrated multiple brain metastases not significantly changed from prior imaging  - MRI head w/ increase in lesions - per NSGY no intervention  - Onc consulted: plan to give 1 cycle of Carbo/Taxol/Avastin as inpatient and then discharge patient to home or rehab. If she responds she may receive additional treatment as an outpatient previously treated with 4 rounds of chemo, developed brain mets 5/23, supposed to have followed up for immunotherapy with outpatient Dr. Beckman; now presenting w/ R sided weakness and abdominal pain  - CTAP: progression of neoplastic disease with enlarging dominant right upper lobe pulmonary mass with interval increase in size and number of bilateral pulmonary metastases and bilobar hepatic metastases when compared to prior imaging September 2023 along with new scattered metastatic nodules in the subcutaneous tissues of the abdominal wall.  - CT Head demonstrated multiple brain metastases not significantly changed from prior imaging  - MRI head w/ increase in lesions - per NSGY no intervention  - Onc consulted: plan to give 1 cycle of Carbo/Taxol/Avastin as inpatient and then discharge patient to home or rehab. If she responds she may receive additional treatment as an outpatient

## 2023-11-04 NOTE — PROGRESS NOTE ADULT - PROBLEM SELECTOR PLAN 7
- diet: carb consistent  - dvt ppx:  scds  - PT/OT: Acute Inpt Rehab/ home PT/OT  - need to follow-up on meds since pt is unsure if she takes atorvastatin 40mg T 100.5 with tachy 121 11/2 PM. No cough, abd pain, changes in urinary or BMs  - UCx and BCx 10/31 neg  - repeat BCx x2 & UCx - pending

## 2023-11-04 NOTE — PROGRESS NOTE ADULT - PROBLEM SELECTOR PLAN 4
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

## 2023-11-04 NOTE — PROGRESS NOTE ADULT - ASSESSMENT
51 yo F with PMH of smoking, stage 4 lung adenocarcinoma with brain metastases, steroid induced DM, GERD who presents with 2 weeks of right upper and lower extremity weakness and abdominal pain    #Stage 4 lung adenocarcinoma  When patient was initially diagnosed with stage IIIB T4N2M0 lung adenocarcinoma with neuroendocrine featuers in 5/2022, patient received 4C carboplatin + pemetrexed from 7-9/2022, with thoracic RT, and achieved DE, but declined to receive immunotherapy. In 5/2023, she was found to have brain mets, and she received GK-SRS to 7 brain mets in 5 fractions in 6/2023. She again was advised immunotherapy, but delayed treatment. From 8/2023 to 9/2023, she had multiple ED visits for headache, dizziness, migraines related to brain mets, which were managed with anti-epileptics and steroids, c/b epigastric pain. She was found to have new liver mets. From 9/19-10/5/23, she was admitted for LE weakness and blurred vision, s/p R temporal craniotomy for resection of metastatic lesion. Post-op course c/b acute left cerebellar infarction post-op MRI, tachycardia and thrombocytopenia. Patient was thereafter sent to acute rehab at [facility].   She was most recently planned for combination chemotherapy and immunotherapy with carboplatin+ pemetrexed+ pembrolizumab to improve management of CNS disease, but has since presented with worsening RUE and RLE weakness and abdominal pain.     - Results of MR brain w/wo contrast have been discussed with Dr. Bernard and Dr. Beckman per oncology attending attestation from 11/3.    - We agree with continuing dexamethasone 2mg BID for now.   - Patient is for C1D1 of Carbo + Paclitaxel + Bevacizumab on 11/4, chemo consent signed and orders signed and delivered to the chemo nurse. Please order a stat Urinalysis to eval for proteinuria as a baseline as patient being treated with Bevacizumab and will need serial urine protein evaluation with subsequent doses.   -Please check daily CBC w/ differential.     Rest of care per primary team.     Plan d/w Dr. Sorensen and primary team.

## 2023-11-04 NOTE — PROGRESS NOTE ADULT - SUBJECTIVE AND OBJECTIVE BOX
Patient is a 52y old  Female who presents with a chief complaint of R sided weakness (04 Nov 2023 07:03)       Pt is seen and examined  pt is awake and lying in bed  Feels well, states that her lower extremity strength is much improved.   Still endorsing right sided abdominal pain to deep palpation however able to eat and drink, denies any nausea or vomiting.       MEDICATIONS  (STANDING):  acetaminophen   IVPB .. 1000 milliGRAM(s) IV Intermittent once  artificial tears (preservative free) Ophthalmic Solution 1 Drop(s) Both EYES three times a day  chlorhexidine 4% Liquid 1 Application(s) Topical daily  dexAMETHasone     Tablet 2 milliGRAM(s) Oral two times a day  dextrose 5%. 1000 milliLiter(s) (100 mL/Hr) IV Continuous <Continuous>  dextrose 5%. 1000 milliLiter(s) (50 mL/Hr) IV Continuous <Continuous>  dextrose 50% Injectable 25 Gram(s) IV Push once  dextrose 50% Injectable 12.5 Gram(s) IV Push once  dextrose 50% Injectable 25 Gram(s) IV Push once  divalproex  milliGRAM(s) Oral every 12 hours  glucagon  Injectable 1 milliGRAM(s) IntraMuscular once  insulin lispro (ADMELOG) corrective regimen sliding scale   SubCutaneous three times a day before meals  pantoprazole    Tablet 40 milliGRAM(s) Oral before breakfast  polyethylene glycol 3350 17 Gram(s) Oral daily    MEDICATIONS  (PRN):  acetaminophen     Tablet .. 650 milliGRAM(s) Oral every 6 hours PRN Temp greater or equal to 38C (100.4F), Mild Pain (1 - 3)  aluminum hydroxide/magnesium hydroxide/simethicone Suspension 30 milliLiter(s) Oral every 6 hours PRN Dyspepsia  dextrose Oral Gel 15 Gram(s) Oral once PRN Blood Glucose LESS THAN 70 milliGRAM(s)/deciliter  melatonin 3 milliGRAM(s) Oral at bedtime PRN Insomnia  senna 2 Tablet(s) Oral at bedtime PRN Constipation      Allergies    No Known Allergies    Intolerances        Vital Signs Last 24 Hrs  T(C): 36.4 (04 Nov 2023 04:50), Max: 37.1 (03 Nov 2023 20:21)  T(F): 97.6 (04 Nov 2023 04:50), Max: 98.8 (03 Nov 2023 20:21)  HR: 110 (04 Nov 2023 04:50) (108 - 114)  BP: 101/68 (04 Nov 2023 04:50) (100/74 - 106/71)  BP(mean): --  RR: 18 (04 Nov 2023 04:50) (18 - 20)  SpO2: 90% (04 Nov 2023 04:50) (90% - 100%)    Parameters below as of 04 Nov 2023 04:50  Patient On (Oxygen Delivery Method): room air        PHYSICAL EXAM  GENERAL: NAD  HEAD:  Atraumatic, Normocephalic  EYES: EOMI, conjunctiva and sclera clear, mild proptosis  CHEST/LUNG: Clear to auscultation bilaterally; No rales, rhonchi, wheezing, or rubs  HEART: Regular rate and rhythm; No murmurs, rubs, or gallops  ABDOMEN: Soft, Nontender, Nondistended;   SKIN: No rashes or lesions  NERVOUS SYSTEM:  Alert & Oriented X3, RLQ 4/5 strength  LABS:                          9.5    9.86  )-----------( 218      ( 04 Nov 2023 06:46 )             30.8         Mean Cell Volume : 90.6 fl  Mean Cell Hemoglobin : 27.9 pg  Mean Cell Hemoglobin Concentration : 30.8 gm/dL  Auto Neutrophil # : 7.46 K/uL  Auto Lymphocyte # : 1.03 K/uL  Auto Monocyte # : 1.20 K/uL  Auto Eosinophil # : 0.00 K/uL  Auto Basophil # : 0.00 K/uL  Auto Neutrophil % : 74.8 %  Auto Lymphocyte % : 10.4 %  Auto Monocyte % : 12.2 %  Auto Eosinophil % : 0.0 %  Auto Basophil % : 0.0 %    Serial CBC's  11-04 @ 06:46  Hct-30.8 / Hgb-9.5 / Plat-218 / RBC-3.40 / WBC-9.86          Serial CBC's  11-03 @ 06:28  Hct-32.1 / Hgb-10.3 / Plat-224 / RBC-3.63 / WBC-11.73          Serial CBC's  11-02 @ 06:32  Hct-27.8 / Hgb-9.0 / Plat-191 / RBC-3.12 / WBC-6.95          Serial CBC's  11-01 @ 18:38  Hct-30.3 / Hgb-9.8 / Plat-188 / RBC-3.38 / WBC-8.50          Serial CBC's  11-01 @ 07:08  Hct-29.7 / Hgb-9.7 / Plat-173 / RBC-3.30 / WBC-8.61            11-04    139  |  103  |  4<L>  ----------------------------<  113<H>  3.6   |  23  |  <0.30<L>    Ca    9.9      04 Nov 2023 06:45  Phos  3.2     11-04  Mg     1.7     11-04    TPro  6.3  /  Alb  2.7<L>  /  TBili  0.2  /  DBili  x   /  AST  41<H>  /  ALT  35  /  AlkPhos  179<H>  11-04          WBC Count: 9.86 K/uL (11-04-23 @ 06:46)  Hemoglobin: 9.5 g/dL (11-04-23 @ 06:46)  Hematocrit: 30.8 % (11-04-23 @ 06:46)  Platelet Count - Automated: 218 K/uL (11-04-23 @ 06:46)  WBC Count: 11.73 K/uL (11-03-23 @ 06:28)  Hemoglobin: 10.3 g/dL (11-03-23 @ 06:28)  Hematocrit: 32.1 % (11-03-23 @ 06:28)  Platelet Count - Automated: 224 K/uL (11-03-23 @ 06:28)  WBC Count: 6.95 K/uL (11-02-23 @ 06:32)  Hemoglobin: 9.0 g/dL (11-02-23 @ 06:32)  Hematocrit: 27.8 % (11-02-23 @ 06:32)  Platelet Count - Automated: 191 K/uL (11-02-23 @ 06:32)  WBC Count: 8.50 K/uL (11-01-23 @ 18:38)  Hemoglobin: 9.8 g/dL (11-01-23 @ 18:38)  Hematocrit: 30.3 % (11-01-23 @ 18:38)  Platelet Count - Automated: 188 K/uL (11-01-23 @ 18:38)  WBC Count: 8.61 K/uL (11-01-23 @ 07:08)  Hemoglobin: 9.7 g/dL (11-01-23 @ 07:08)  Hematocrit: 29.7 % (11-01-23 @ 07:08)  Platelet Count - Automated: 173 K/uL (11-01-23 @ 07:08)  WBC Count: 10.81 K/uL (10-31-23 @ 12:19)  Hemoglobin: 10.9 g/dL (10-31-23 @ 12:19)  Hematocrit: 34.2 % (10-31-23 @ 12:19)  Platelet Count - Automated: 176 K/uL (10-31-23 @ 12:19)  WBC Count: 8.22 K/uL (10-27-23 @ 15:49)  Hemoglobin: 11.3 g/dL (10-27-23 @ 15:49)  Hematocrit: 35.2 % (10-27-23 @ 15:49)  Platelet Count - Automated: 182 K/uL (10-27-23 @ 1

## 2023-11-05 NOTE — PROGRESS NOTE ADULT - SUBJECTIVE AND OBJECTIVE BOX
Patient is a 52y old  Female who presents with a chief complaint of R sided weakness (03 Nov 2023 13:38)    INTERVAL HPI/OVERNIGHT EVENTS: No acute overnight events. Pt denies any new complaints this AM. s/p 1 round of palliative chemo yesterday. will c/w inpatient rehab services    No Known Allergies    MEDICATIONS  (STANDING):  acetaminophen   IVPB .. 1000 milliGRAM(s) IV Intermittent once  artificial tears (preservative free) Ophthalmic Solution 1 Drop(s) Both EYES three times a day  bevacizumab-bvzr (ZIRABEV) IVPB (eMAR) 800 milliGRAM(s) IV Intermittent once  chlorhexidine 4% Liquid 1 Application(s) Topical daily  dexAMETHasone     Tablet 2 milliGRAM(s) Oral two times a day  dextrose 5%. 1000 milliLiter(s) (100 mL/Hr) IV Continuous <Continuous>  dextrose 5%. 1000 milliLiter(s) (50 mL/Hr) IV Continuous <Continuous>  dextrose 50% Injectable 25 Gram(s) IV Push once  dextrose 50% Injectable 12.5 Gram(s) IV Push once  dextrose 50% Injectable 25 Gram(s) IV Push once  divalproex  milliGRAM(s) Oral every 12 hours  glucagon  Injectable 1 milliGRAM(s) IntraMuscular once  insulin lispro (ADMELOG) corrective regimen sliding scale   SubCutaneous three times a day before meals  pantoprazole    Tablet 40 milliGRAM(s) Oral before breakfast  polyethylene glycol 3350 17 Gram(s) Oral daily  sodium chloride 0.9%. 1000 milliLiter(s) (50 mL/Hr) IV Continuous <Continuous>    MEDICATIONS  (PRN):  acetaminophen     Tablet .. 650 milliGRAM(s) Oral every 6 hours PRN Temp greater or equal to 38C (100.4F), Mild Pain (1 - 3)  aluminum hydroxide/magnesium hydroxide/simethicone Suspension 30 milliLiter(s) Oral every 6 hours PRN Dyspepsia  dextrose Oral Gel 15 Gram(s) Oral once PRN Blood Glucose LESS THAN 70 milliGRAM(s)/deciliter  melatonin 3 milliGRAM(s) Oral at bedtime PRN Insomnia  senna 2 Tablet(s) Oral at bedtime PRN Constipation    Vital Signs Last 24 Hrs  T(C): 36.3 (05 Nov 2023 06:04), Max: 36.6 (04 Nov 2023 14:20)  T(F): 97.4 (05 Nov 2023 06:04), Max: 97.9 (04 Nov 2023 14:20)  HR: 101 (05 Nov 2023 06:04) (96 - 122)  BP: 103/74 (05 Nov 2023 06:04) (103/74 - 107/73)  RR: 18 (05 Nov 2023 06:04) (16 - 18)  SpO2: 98% (05 Nov 2023 06:04) (94% - 98%)    Parameters below as of 05 Nov 2023 06:04  Patient On (Oxygen Delivery Method): room air    I&O's Summary  04 Nov 2023 08:01  -  05 Nov 2023 07:00  --------------------------------------------------------  IN: 1042 mL / OUT: 700 mL / NET: 342 mL    PHYSICAL EXAM:  GENERAL: NAD  HEAD:  Atraumatic, Normocephalic  EYES: EOMI, conjunctiva and sclera clear, mild proptosis  CHEST/LUNG: Clear to auscultation bilaterally; No rales, rhonchi, wheezing, or rubs  HEART: Regular rate and rhythm; No murmurs, rubs, or gallops  ABDOMEN: Soft, Nontender, Nondistended;   SKIN: No rashes or lesions  NERVOUS SYSTEM:  Alert & Oriented X3, RLQ 4/5 strength    Consultant(s) Notes Reviewed:  [x ] YES  [ ] NO  Care Discussed with Consultants/Other Providers [ x] YES  [ ] NO    LABS:       RADIOLOGY & ADDITIONAL TESTS:  MR Head w/wo IV Cont (11.02.23 @ 17:51)  IMPRESSION: Right temporal postoperative changes with slight increase in   the size of the enhancement within the postoperative bed since 10/2/2023   with slightly less vasogenic edema. Multiple additional brain metastases   have increased in size since the prior exam as described above. Patient is a 52y old  Female who presents with a chief complaint of R sided weakness (03 Nov 2023 13:38)    INTERVAL HPI/OVERNIGHT EVENTS: No acute overnight events. Pt denies any new complaints this AM. s/p 1 round of palliative chemo yesterday. will c/w inpatient rehab services    No Known Allergies    MEDICATIONS  (STANDING):  acetaminophen   IVPB .. 1000 milliGRAM(s) IV Intermittent once  artificial tears (preservative free) Ophthalmic Solution 1 Drop(s) Both EYES three times a day  bevacizumab-bvzr (ZIRABEV) IVPB (eMAR) 800 milliGRAM(s) IV Intermittent once  chlorhexidine 4% Liquid 1 Application(s) Topical daily  dexAMETHasone     Tablet 2 milliGRAM(s) Oral two times a day  dextrose 5%. 1000 milliLiter(s) (100 mL/Hr) IV Continuous <Continuous>  dextrose 5%. 1000 milliLiter(s) (50 mL/Hr) IV Continuous <Continuous>  dextrose 50% Injectable 25 Gram(s) IV Push once  dextrose 50% Injectable 12.5 Gram(s) IV Push once  dextrose 50% Injectable 25 Gram(s) IV Push once  divalproex  milliGRAM(s) Oral every 12 hours  glucagon  Injectable 1 milliGRAM(s) IntraMuscular once  insulin lispro (ADMELOG) corrective regimen sliding scale   SubCutaneous three times a day before meals  pantoprazole    Tablet 40 milliGRAM(s) Oral before breakfast  polyethylene glycol 3350 17 Gram(s) Oral daily  sodium chloride 0.9%. 1000 milliLiter(s) (50 mL/Hr) IV Continuous <Continuous>    MEDICATIONS  (PRN):  acetaminophen     Tablet .. 650 milliGRAM(s) Oral every 6 hours PRN Temp greater or equal to 38C (100.4F), Mild Pain (1 - 3)  aluminum hydroxide/magnesium hydroxide/simethicone Suspension 30 milliLiter(s) Oral every 6 hours PRN Dyspepsia  dextrose Oral Gel 15 Gram(s) Oral once PRN Blood Glucose LESS THAN 70 milliGRAM(s)/deciliter  melatonin 3 milliGRAM(s) Oral at bedtime PRN Insomnia  senna 2 Tablet(s) Oral at bedtime PRN Constipation    Vital Signs Last 24 Hrs  T(C): 36.3 (05 Nov 2023 06:04), Max: 36.6 (04 Nov 2023 14:20)  T(F): 97.4 (05 Nov 2023 06:04), Max: 97.9 (04 Nov 2023 14:20)  HR: 101 (05 Nov 2023 06:04) (96 - 122)  BP: 103/74 (05 Nov 2023 06:04) (103/74 - 107/73)  RR: 18 (05 Nov 2023 06:04) (16 - 18)  SpO2: 98% (05 Nov 2023 06:04) (94% - 98%)    Parameters below as of 05 Nov 2023 06:04  Patient On (Oxygen Delivery Method): room air    I&O's Summary  04 Nov 2023 08:01  -  05 Nov 2023 07:00  --------------------------------------------------------  IN: 1042 mL / OUT: 700 mL / NET: 342 mL    PHYSICAL EXAM:  GENERAL: NAD  HEAD:  Atraumatic, Normocephalic  EYES: EOMI, conjunctiva and sclera clear, mild proptosis  CHEST/LUNG: Clear to auscultation bilaterally; No rales, rhonchi, wheezing, or rubs  HEART: Regular rate and rhythm; No murmurs, rubs, or gallops  ABDOMEN: Soft, Nontender, Nondistended;   SKIN: No rashes or lesions  NERVOUS SYSTEM:  Alert & Oriented X3, RLQ 4/5 strength    Consultant(s) Notes Reviewed:  [x ] YES  [ ] NO  Care Discussed with Consultants/Other Providers [ x] YES  [ ] NO    LABS:               9.4    6.75  )-----------( 210      ( 05 Nov 2023 06:58 )             29.8     139  |  101  |  8   ----------------------------<  146<H>  3.8   |  21<L>  |  <0.30<L>    Ca    9.2      05 Nov 2023 06:55  Phos  3.3     11-05  Mg     1.8     11-05    TPro  6.2  /  Alb  2.7<L>  /  TBili  0.3  /  DBili  x   /  AST  51<H>  /  ALT  33  /  AlkPhos  167<H>  11-05    RADIOLOGY & ADDITIONAL TESTS:  MR Head w/wo IV Cont (11.02.23 @ 17:51)  IMPRESSION: Right temporal postoperative changes with slight increase in   the size of the enhancement within the postoperative bed since 10/2/2023   with slightly less vasogenic edema. Multiple additional brain metastases   have increased in size since the prior exam as described above.

## 2023-11-05 NOTE — DISCHARGE NOTE PROVIDER - NSDCCPTREATMENT_GEN_ALL_CORE_FT
PRINCIPAL PROCEDURE  Procedure: MRI head  Findings and Treatment: There has been a right temporal craniotomy. The enhancement in the right temporal postoperative bed is increased in size compared to the prior exam, measuring 4.7 cm in AP diameter by 3.2 cm transversely by 2.6 cm in craniocaudal diameter compared with the prior of 4.0 cm in AP diameter by 3.4 cm transversely by 2.7 cm in craniocaudal diameter. The vasogenic edema is slightly improved.  A small nodular enhancing lesion in the right inferior cerebellum is slightly larger measuring 1.7 cm in AP diameter by 1.4 cm transversely by 1.2 cm in craniocaudal diameter compared with the prior of 1.2 cm in AP diameter by 0.9 cm transversely by 1 cm in craniocaudal diameter.  The left occipital ring-enhancing mass is also larger measuring 3.8 cm in AP diameter and 3.0 cm transversely by 2.9 cm in craniocaudal diameter compared with the prior of 3.2 cm in AP diameter by 2.5 cm transversely by 2.7 cm in craniocaudal diameter. Vasogenic edema is worse.  A solid right posterior temporal enhancing lesion is larger measuring 1.8 cm in AP diameter by 1.5 cm transversely by 1.0 cm in craniocaudal diameter compared with the prior of 1.6 cm in AP diameter by 1.3 cm transversely by 0.7 cm in craniocaudal diameter.  A large left posterior frontal parietal lesion is larger measuring 2.9 cm in AP diameter by 2.2 cm transversely by 2.9 cm in craniocaudal diameter compared with the prior of 2.4 cm in AP diameter by 1.7 cm transversely by 2.1 cm in craniocaudal diameter.  Additional small left frontal cortical and right medial frontal enhancing lesions are identified which are slightly larger  Ventricles are normal in size and position. No acute infarcts are seen.  IMPRESSION: Right temporal postoperative changes with slight increase in the size of the enhancement within the postoperative bed since 10/2/2023 with slightly less vasogenic edema. Multiple additional brain metastases have increased in size since the prior exam as described above.      SECONDARY PROCEDURE  Procedure: CT chest, abdomen and pelvis  Findings and Treatment: CHEST:  LUNGS AND LARGE AIRWAYS: Patent central airways. Small mucous secretions in the left main bronchus. Dominant 6.5 x 4.9 cm right upper lobe pulmonary mass, increased in size from September 2023 when it measured 5.0 x 4.4 cm. Scattered bilateral pulmonary metastases, also increased from prior. For instance, a 1.3 cm nodule at the left lung base is new from prior (3:108) and a 0.8 cm right upper lobe nodule was previously 2 mm (3:47). In addition, there are newnodules, for example left upper lobe, 3:25. Linear atelectasis of the left lower lobe and lingula.  PLEURA: Pleural nodularity in the left lower hemithorax, mildly increased from prior. No pleural effusion or pneumothorax.  VESSELS: No pulmonary embolus. The thoracic aorta and main pulmonary artery are normal in caliber.  ABDOMEN AND PELVIS:  LIVER: Innumerable bilobar hepatic metastases, the majority of which are new from 9/4/2023, the largest in the left lobe measures 6.4 cm, the largest in the right lobe 5.0 cm.  BOWEL: No bowel obstruction. Appendix is normal.  PERITONEUM: No ascites.  VESSELS: Atherosclerotic changes. Compression of the left hepatic vein from metastasis. Portal vein is patent.  RETROPERITONEUM/LYMPH NODES: No lymphadenopathy.  ABDOMINAL WALL:New scattered enhancing subcutaneous nodules. Largest of these is a 1.2 x 1.1 cm nodule near the right iliac wing (6:106).  BONES: Within normal limits. No suspicious osseous lesions.  IMPRESSION:  Overall progression of neoplastic disease as evidenced by enlarging dominant right upper lobe pulmonary mass with interval increase in size and number of bilateral pulmonary metastases and bilobar hepatic metastases when compared to prior imaging September 2023.  Additionally, new scattered metastatic nodules also noted in the subcutaneous tissues of the abdominal wall.    Procedure: CT head  Findings and Treatment: VENTRICLES AND SULCI: Ventricles and sulci are unremarkable for patient age.  INTRA-AXIAL: Calcified lesion in the left occipital lobe with surrounding edema unchanged. Focal vasogenic edema underneath the right temporal craniotomy, again noted. Interval resolution of pneumocephalus post resection. Bilateral posterior frontal lobe and parietal lobe vasogenic edema is again noted. There is non-specific decreased attenuation in the white matter likely related to sequelae of microvascular disease.  EXTRA-AXIAL: Tiny amount of extra-axial hemorrhage remaining versus postsurgical material along the inside of the craniotomy flap.  INTRACRANIAL HEMORRHAGE: Indeterminate.  VISUALIZED SINUSES: No air-fluid levels are identified. Focal mucosal thickening right sphenoid sinus.  VISUALIZED MASTOIDS: Partial opacification of right mastoid air cells, new compared to 10/8/2023. The left side is clear.  CALVARIUM: Right temporal craniotomy.  MISCELLANEOUS:  None.  IMPRESSION:  HEAD CT: Post right temporal resection with multiple additional brain metastases again noted. These do not appear to besignificantly changed in appearance.  Tiny amount of extra-axial hemorrhage remaining versus postsurgical material along the inside of the craniotomy flap. Interval resolution of pneumocephalus post resection.  Partial opacification of right mastoid air cells, new compared to previous exam 10/8/2023.  MRI with gadolinium may provide helpful additional evaluation, if clinically indicated.

## 2023-11-05 NOTE — SWALLOW BEDSIDE ASSESSMENT ADULT - SLP PERTINENT HISTORY OF CURRENT PROBLEM
51yo 58kg f w pmh hld, dm, gerd, stage 4 metastatic (bilateral pulmonary, l pleura; hepatic, subcutaneous abdominal wall; brain s/p palliative systemic chemotherapy w Carbo/pem x4 cycles 9/2022 and r thoracic rt 9/1/2022-10/21/2022, s/p SRS 7 brain metastases 6/21-6/29/2023, s/p right temporal craniotomy 9/30/2023 w resection of metastatic lesion c/b acute left cerebellar infarct), p/w worsening rle weakness; in er, imaging showed overall progression of disease w either worsening preexisting mets or new mets; admit to medicine for further mgmt.

## 2023-11-05 NOTE — PROGRESS NOTE ADULT - PROBLEM SELECTOR PLAN 2
no alarm signs of cord compression --> improving  - likely 2/2 brain metastases   - pt given 4mg dexamethasone by oncologist prior to presentation  - continue dexamethasone 2mg BID for now; can consider increasing if needed   - Tylenol 650mg for mild pain  - PT/OT consulted 11/1 - rec acute inpatient rehab; DC acute rehab; if pt to go home, home OT and PT services assist for ALL mobility/ADLs with use of rolling walker, shower chair, transport/polyfly WC.  - PMR consulted 11/2 - will begin inpt rehab

## 2023-11-05 NOTE — PROGRESS NOTE ADULT - PROBLEM SELECTOR PLAN 8
- diet: carb consistent  - dvt ppx:  scds  - PT/OT: Acute Inpt Rehab/ home PT/OT  - need to follow-up on meds since pt is unsure if she takes atorvastatin 40mg - diet: carb consistent  - dvt ppx: scds  - PT/OT/PMR: Acute Inpt Rehab

## 2023-11-05 NOTE — SWALLOW BEDSIDE ASSESSMENT ADULT - COMMENTS
MRI Brain: Right temporal postoperative changes with slight increase in the size of the enhancement within the postoperative bed since 10/2/2023   with slightly less vasogenic edema. Multiple additional brain metastases have increased in size since the prior exam...  CT A/P: No pulmonary embolus. Overall progression of neoplastic disease as evidenced by enlarging dominant right upper lobe pulmonary mass with interval increase in size and number of bilateral pulmonary metastases and bilobar hepatic metastases when compared to prior imaging September 2023. Additionally, new scattered metastatic nodules also noted in the subcutaneous tissues of the abdominal wall.  No neurosurgical intervention. Pt planning on inpatient chemo. Management per Oncology; to receive palliative chemotherapy with carboplatin, paclitaxel and bevacizumab; 1st round started 11/4 MRI Brain: Right temporal postoperative changes with slight increase in the size of the enhancement within the postoperative bed since 10/2/2023   with slightly less vasogenic edema. Multiple additional brain metastases have increased in size since the prior exam...  CT A/P: No pulmonary embolus. Overall progression of neoplastic disease as evidenced by enlarging dominant right upper lobe pulmonary mass with interval increase in size and number of bilateral pulmonary metastases and bilobar hepatic metastases when compared to prior imaging September 2023. Additionally, new scattered metastatic nodules also noted in the subcutaneous tissues of the abdominal wall.  No neurosurgical intervention. Pt planning on inpatient chemo. Management per Oncology; to receive palliative chemotherapy with carboplatin, paclitaxel and bevacizumab; 1st round started 11/4  OT eval appreciated 11/2: [place] Cognitive Assessment (MOCA), scoring a 196/30, demonstrating a severe cognitive impairment; deficits in the following areas: visuospatial/executive, attention, abstraction, language, delayed recall, orientation.

## 2023-11-05 NOTE — DISCHARGE NOTE PROVIDER - HOSPITAL COURSE
53 yo F with history of DM, GERD, stage IV lung cancer with mets to brain (s/p 4 cycles chemo and thoracic RT completed on oct 2022), gamma knife surgery to 7 brain mets, s/p R temporal crani for resection of metastatic lesion. Pt now presenting with two weeks right upper and lower extremity weakness and abdominal pain concerning for metastatic disease.    Hospital course:  Physical and occupational therapy evaluations completed with recommendation for acute inpatient rehab. PMR was consulted and recommended acute rehab with 3 hours of therapy for total of 4 weeks. Neurosurgery was consulted after MRI head was complete, and showed right temporal postop changes with slight increase in the size of the enhancement within the postoperative bed since 10/2/2023, and multiple additional brain metastases that have increased in size. These findings were reviewed by neurosurgery, who recommended no acute interventions. Hematology/oncology were consulted and recommended starting inpatient chemotherapy x1 dose to monitor response. She is s/p C1D1 carboplatin and paclitaxel. Patient is also s/p bevacizumab, and passed speech and swallow exam.    The patient is afebrile, hemodynamically stable and medically optimized for discharge to rehab with follow up with _________________. On day of discharge, patient is clinically stable with no new exam findings or acute symptoms compared to prior. The patient was seen by the attending physician on the date of discharge and deemed stable and acceptable for discharge. The patient's chronic medical conditions were treated accordingly per the patient's home medication regimen. The patient's medication reconciliation (with changes made to chronic medications), follow up appointments, discharge orders, instructions, and significant lab and diagnostic studies are as noted.    Important Medication Changes and Reason:  Continue with dexamethasone 2mg BID given anti-inflammatory processes, until followup with outpatient oncology.    Active or Pending Issues Requiring Follow-up:  - Please follow up with oncology outpatient within 2 weeks of discharge.    Advanced Directives:   [X] Full code  [ ] DNR  [ ] Hospice       53 yo F with history of DM, GERD, stage IV lung cancer with mets to brain (s/p 4 cycles chemo and thoracic RT completed on oct 2022), gamma knife surgery to 7 brain mets, s/p R temporal crani for resection of metastatic lesion. Pt now presenting with two weeks right upper and lower extremity weakness and abdominal pain concerning for metastatic disease.    Hospital course:  Physical and occupational therapy evaluations completed with recommendation for acute inpatient rehab. PMR was consulted and recommended acute rehab with 3 hours of therapy for total of 4 weeks. Neurosurgery was consulted after MRI head was complete, and showed right temporal postop changes with slight increase in the size of the enhancement within the postoperative bed since 10/2/2023, and multiple additional brain metastases that have increased in size. These findings were reviewed by neurosurgery, who recommended no acute interventions. Hematology/oncology were consulted and recommended starting inpatient chemotherapy x1 dose to monitor response. She is s/p C1D1 carboplatin and paclitaxel. Patient is also s/p bevacizumab, and passed speech and swallow exam.    On day of discharge, patient is clinically stable with no new exam findings or acute symptoms compared to prior. The patient was seen by the attending physician on the date of discharge and deemed stable and acceptable for discharge. The patient's chronic medical conditions were treated accordingly per the patient's home medication regimen. The patient's medication reconciliation (with changes made to chronic medications), follow up appointments, discharge orders, instructions, and significant lab and diagnostic studies are as noted.    Important Medication Changes and Reason:  Continue with dexamethasone 2mg BID given anti-inflammatory processes, until followup with outpatient oncology.    Active or Pending Issues Requiring Follow-up:  - Please follow up with oncology outpatient within 2 weeks of discharge.    Advanced Directives:   [X] Full code  [ ] DNR  [ ] Hospice

## 2023-11-05 NOTE — DISCHARGE NOTE PROVIDER - PROVIDER TOKENS
PROVIDER:[TOKEN:[[id number]],FOLLOWUP:[2 weeks],ESTABLISHEDPATIENT:[T]],PROVIDER:[TOKEN:[[id number]],FOLLOWUP:[2 weeks],ESTABLISHEDPATIENT:[T]]

## 2023-11-05 NOTE — ADVANCED PRACTICE NURSE CONSULT - RECOMMEDATIONS
Strict I &O's  Monitor labs  Encourage Fluids 
monitor strict i and o, n/v, am labs, hazardous drug precautions

## 2023-11-05 NOTE — PROGRESS NOTE ADULT - PROBLEM SELECTOR PLAN 1
previously treated with 4 rounds of chemo, developed brain mets 5/23, supposed to have followed up for immunotherapy with outpatient Dr. Beckman; now presenting w/ R sided weakness and abdominal pain  - CTAP: progression of neoplastic disease with enlarging dominant RUL pulmonary mass with interval increase in size and number of bilateral pulmonary metastases and bilobar hepatic metastases. new scattered metastatic nodules in the subcutaneous tissues of abdominal wall.  - CTH: multiple brain metastases not significantly changed from prior imaging  - MRI head w/ increase in lesions - per NSGY no intervention  - Onc consulted: s/p Carbo/Taxol/Avastin 11/4. If response, may receive additional tx as outpatient

## 2023-11-05 NOTE — DISCHARGE NOTE PROVIDER - CARE PROVIDER_API CALL
Margarito Beckman  Medical Oncology  [address] [place] [id number]  Phone: [phone]  Fax: [phone]  Established Patient  Follow Up Time: 2 weeks    uZrdo Bernard  Radiation Oncology  [address] [place] [id number]  Phone: [phone]  Fax: [phone]  Established Patient  Follow Up Time: 2 weeks

## 2023-11-05 NOTE — ADVANCED PRACTICE NURSE CONSULT - ASSESSMENT
Pt with day 1 cycle 1 tx, labs noted in sunrise, height and bsa verified, okay to proceed with tx as per MD Ramos/MD Sorensen.  Pt with left piv placed by chemo rn clean stick, + blood return noted, no pain, redness or swelling noted at site.  Pt premedicated as noted in sunrise.  Pt administered Paclitaxel 150 mg/m2 = 240 mg iv over 3 hours via locked pump.  20 min into infusion pt c/o pain (see provider note). Pt administered Carboplatin AUC 5 = 520 mg iv over 1 hour via locked pump. Pt educated on chemo regimen and signs and symptoms to report to area rn and staff, pt verbalized understanding.  Emotional support provided.  Report given to area rn.  Bevacizumab to be administered on 11/5/23 as per telephone order.  
Patient received in bed, alert and oriented x 4, capable of expressing all needs to staff. Cycle 1, day 2/2,Height and weight verified. Consent in chart. Lab results as per Md danial aware of same. Vital signs stable prior to chemotherapy and within acceptable parameters, see sunrise. Pt educated on the importance of saving urine, verbalizes good understanding. Pt education done re chemo regimen, drug effects and potential side effects, written materials provided, pt states understanding. Reports that she has been tolerating chemotherapy well without side effects. Pt with left 20g PIV line, site free from signs and symptoms of infection.  Bevacizumab 15mg/kg = 800mg IV infused over 90 mins via locked pump. Pt tolerated well, Completed at 1430. Report given to area nurse.

## 2023-11-05 NOTE — PROGRESS NOTE ADULT - PROBLEM SELECTOR PLAN 7
T 100.5 with tachy 121 11/2 PM. No cough, abd pain, changes in urinary or BMs  - UCx and BCx 10/31 neg  - repeat BCx x2 & UCx - pending T 100.5 with tachy 121 11/2 PM. No cough, abd pain, changes in urinary or BMs  - UCx and BCx 10/31 neg  - repeat BCx x2 & UCx 11/3 - NGTD

## 2023-11-05 NOTE — DISCHARGE NOTE PROVIDER - NSDCMRMEDTOKEN_GEN_ALL_CORE_FT
atorvastatin 40 mg oral tablet: 1 tab(s) orally once a day (at bedtime)  Depakote 500 mg oral delayed release tablet: 1 tab(s) orally every 12 hours  dexAMETHasone 2 mg oral tablet: 1 tab(s) orally 2 times a day  metFORMIN 500 mg oral tablet: 1 tab(s) orally 2 times a day  ocular lubricant ophthalmic solution: 1 drop(s) to each affected eye 3 times a day  polyethylene glycol 3350 oral powder for reconstitution: 17 each orally once a day  Protonix 40 mg oral delayed release tablet: 1 tab(s) orally once a day (before a meal)  senna leaf extract oral tablet: 2 tab(s) orally once a day (at bedtime) As needed Constipation

## 2023-11-05 NOTE — PROGRESS NOTE ADULT - ASSESSMENT
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 In 5/2023, she was found to have brain mets, and she received GK-SRS to 7 brain mets in 5 fractions in 6/2023. She again was advised immunotherapy, but delayed treatment. From 8/2023 to 9/2023, she had multiple ED visits for headache, dizziness, migraines related to brain mets, which were managed with anti-epileptics and steroids, c/b epigastric pain. She was found to have new liver mets. From 9/19-10/5/23, she was admitted for LE weakness and blurred vision, s/p R temporal craniotomy for resection of metastatic lesion. Post-op course c/b acute left cerebellar infarction post-op MRI, tachycardia and thrombocytopenia. Patient was thereafter sent to acute rehab at [facility].   She was most recently planned for combination chemotherapy and immunotherapy with carboplatin+ pemetrexed+ pembrolizumab to improve management of CNS disease, but has since presented with worsening RUE and RLE weakness and abdominal pain.     - Results of MR brain w/wo contrast have been discussed with Dr. Bernard and Dr. Beckman  - Patient is s/p C1D1 of Carbo + Paclitaxel on 11/4. Chemo tolerated well. No taxol reaction.   - Plan for Bevacizumab today. Baseline UA reviewed. BP good   -Please check daily CBC w/ differential.

## 2023-11-05 NOTE — SWALLOW BEDSIDE ASSESSMENT ADULT - SWALLOW EVAL: DIAGNOSIS
51 yo F with lung ca; brain mets admitted with progression of disease. Patient presents with a grossly functional oropharyngeal swallow. Adequate oral management and no overt signs of laryngeal penetration/aspiration across all consistencies trialed. Of note, patient is edentulous with U/L dentures; c/o pain on posterior lower gum. Small sore noted just beyond denture, suspect from ill-fitting lower denture. Patient will ask fiance to bring in denture adhesive. If persistent complaint, Dental consult may be considered.

## 2023-11-05 NOTE — DISCHARGE NOTE PROVIDER - NSDCCPCAREPLAN_GEN_ALL_CORE_FT
PRINCIPAL DISCHARGE DIAGNOSIS  Diagnosis: Acute weakness  Assessment and Plan of Treatment: You were seen in the hospital for right sided upper and lower weakness since discharge from rehab. You had imaging done, which showed no concern findings. You were evaluated by physical therapy and occupational therapy. You were seen by the Pain Management and Rehabilitation (PMR) who recommended inoatient rehab. On discharge, you will go home with home PT and OT services. Please return to the hospital if there is any slurred sleech, dissymmetry of the face, wealmess pf the extremities.

## 2023-11-05 NOTE — DISCHARGE NOTE PROVIDER - CARE PROVIDERS DIRECT ADDRESSES
[email or web address]

## 2023-11-05 NOTE — PROGRESS NOTE ADULT - SUBJECTIVE AND OBJECTIVE BOX
INTERVAL HPI/OVERNIGHT EVENTS:    Patient S&E at bedside. Chemo tolerated well. No reaction  Getting avastin today.    VITAL SIGNS:  T(F): 97.4 (23 @ 06:04)  HR: 101 (23 @ 06:04)  BP: 103/74 (23 @ 06:04)  RR: 18 (23 @ 06:04)  SpO2: 98% (23 @ 06:04)  Wt(kg): --    PHYSICAL EXAM:    Constitutional: NAD  Eyes: EOMI, sclera non-icteric  Neck: supple, no masses, no JVD  Respiratory: CTA b/l, good air entry b/l  Cardiovascular: RRR, no M/R/G  Gastrointestinal: soft, NTND, no masses palpable, + BS, no hepatosplenomegaly  Extremities: no c/c/e  Neurological: AAOx3      MEDICATIONS  (STANDING):  acetaminophen   IVPB .. 1000 milliGRAM(s) IV Intermittent once  artificial tears (preservative free) Ophthalmic Solution 1 Drop(s) Both EYES three times a day  bevacizumab-bvzr (ZIRABEV) IVPB (eMAR) 800 milliGRAM(s) IV Intermittent once  chlorhexidine 4% Liquid 1 Application(s) Topical daily  dexAMETHasone     Tablet 2 milliGRAM(s) Oral two times a day  dextrose 5%. 1000 milliLiter(s) (100 mL/Hr) IV Continuous <Continuous>  dextrose 5%. 1000 milliLiter(s) (50 mL/Hr) IV Continuous <Continuous>  dextrose 50% Injectable 25 Gram(s) IV Push once  dextrose 50% Injectable 12.5 Gram(s) IV Push once  dextrose 50% Injectable 25 Gram(s) IV Push once  divalproex  milliGRAM(s) Oral every 12 hours  glucagon  Injectable 1 milliGRAM(s) IntraMuscular once  insulin lispro (ADMELOG) corrective regimen sliding scale   SubCutaneous three times a day before meals  pantoprazole    Tablet 40 milliGRAM(s) Oral before breakfast  polyethylene glycol 3350 17 Gram(s) Oral daily  sodium chloride 0.9%. 1000 milliLiter(s) (50 mL/Hr) IV Continuous <Continuous>    MEDICATIONS  (PRN):  acetaminophen     Tablet .. 650 milliGRAM(s) Oral every 6 hours PRN Temp greater or equal to 38C (100.4F), Mild Pain (1 - 3)  aluminum hydroxide/magnesium hydroxide/simethicone Suspension 30 milliLiter(s) Oral every 6 hours PRN Dyspepsia  dextrose Oral Gel 15 Gram(s) Oral once PRN Blood Glucose LESS THAN 70 milliGRAM(s)/deciliter  melatonin 3 milliGRAM(s) Oral at bedtime PRN Insomnia  senna 2 Tablet(s) Oral at bedtime PRN Constipation      Allergies    No Known Allergies    Intolerances        LABS:                        9.4    6.75  )-----------( 210      ( 2023 06:58 )             29.8         139  |  101  |  8   ----------------------------<  146<H>  3.8   |  21<L>  |  <0.30<L>    Ca    9.2      2023 06:55  Phos  3.3       Mg     1.8         TPro  6.2  /  Alb  2.7<L>  /  TBili  0.3  /  DBili  x   /  AST  51<H>  /  ALT  33  /  AlkPhos  167<H>        Urinalysis Basic - ( 2023 07:32 )    Color: Yellow / Appearance: Clear / S.013 / pH: x  Gluc: x / Ketone: 15 mg/dL  / Bili: Negative / Urobili: 1.0 mg/dL   Blood: x / Protein: Trace mg/dL / Nitrite: Negative   Leuk Esterase: Trace / RBC: 0 /HPF / WBC 3 /HPF   Sq Epi: x / Non Sq Epi: 3 /HPF / Bacteria: Moderate /HPF        RADIOLOGY & ADDITIONAL TESTS:  Studies reviewed.    ASSESSMENT & PLAN:

## 2023-11-06 NOTE — PROGRESS NOTE ADULT - SUBJECTIVE AND OBJECTIVE BOX
Hematology Oncology Follow-up    INTERVAL HPI/OVERNIGHT EVENTS:  Patient received chemotherapy over the weekend. She denies any N/V. She has been taking PO. She complains of RUQ pain. She has been walking with a walker with physical therapy. She complains that her RLQ still is weak compared to her strength on 11/3/23.     VITAL SIGNS:  T(F): 97.9 (11-06-23 @ 12:22)  HR: 94 (11-06-23 @ 12:22)  BP: 100/65 (11-06-23 @ 12:22)  RR: 19 (11-06-23 @ 12:22)  SpO2: 92% (11-06-23 @ 12:22)  Wt(kg): --    11-05-23 @ 07:01  -  11-06-23 @ 07:00  --------------------------------------------------------  IN: 400 mL / OUT: 600 mL / NET: -200 mL    11-06-23 @ 07:01  -  11-06-23 @ 17:50  --------------------------------------------------------  IN: 550 mL / OUT: 0 mL / NET: 550 mL    PHYSICAL EXAM:  GENERAL: NAD, well-groomed  HEAD:  Atraumatic, Normocephalic  EYES: EOMI, PERRLA, conjunctiva and sclera clear  ENMT: No oropharyngeal exudates, Moist mucous membranes  NECK: Supple, no cervical lymphadenopathy  NERVOUS SYSTEM:  alert and conversant, sensation intact to light touch, RUE 4/5 strength, RLE 3/5 strength,  LUE and LLE 5/5 strength,   CHEST/LUNG: Clear to auscultation bilaterally; no rhonchi  HEART: Regular rate and rhythm; No murmurs  ABDOMEN: Soft, Nondistended. + RUQ tender to palpation   EXTREMITIES:  2+ radial Pulses, No cyanosis or edema  SKIN: warm, dry    Medications  MEDICATIONS  (STANDING):  acetaminophen   IVPB .. 1000 milliGRAM(s) IV Intermittent once  artificial tears (preservative free) Ophthalmic Solution 1 Drop(s) Both EYES three times a day  chlorhexidine 4% Liquid 1 Application(s) Topical daily  dexAMETHasone     Tablet 2 milliGRAM(s) Oral two times a day  dextrose 5%. 1000 milliLiter(s) (100 mL/Hr) IV Continuous <Continuous>  dextrose 5%. 1000 milliLiter(s) (50 mL/Hr) IV Continuous <Continuous>  dextrose 50% Injectable 25 Gram(s) IV Push once  dextrose 50% Injectable 12.5 Gram(s) IV Push once  dextrose 50% Injectable 25 Gram(s) IV Push once  divalproex  milliGRAM(s) Oral every 12 hours  glucagon  Injectable 1 milliGRAM(s) IntraMuscular once  insulin lispro (ADMELOG) corrective regimen sliding scale   SubCutaneous three times a day before meals  pantoprazole    Tablet 40 milliGRAM(s) Oral before breakfast  polyethylene glycol 3350 17 Gram(s) Oral daily  sodium chloride 0.9%. 1000 milliLiter(s) (50 mL/Hr) IV Continuous <Continuous>    MEDICATIONS  (PRN):  acetaminophen     Tablet .. 650 milliGRAM(s) Oral every 6 hours PRN Temp greater or equal to 38C (100.4F), Mild Pain (1 - 3)  aluminum hydroxide/magnesium hydroxide/simethicone Suspension 30 milliLiter(s) Oral every 6 hours PRN Dyspepsia  dextrose Oral Gel 15 Gram(s) Oral once PRN Blood Glucose LESS THAN 70 milliGRAM(s)/deciliter  melatonin 3 milliGRAM(s) Oral at bedtime PRN Insomnia  senna 2 Tablet(s) Oral at bedtime PRN Constipation      Allergies: No Known Allergies      LABS:                        9.0    5.33  )-----------( 165      ( 06 Nov 2023 06:35 )             27.6     11-06    137  |  100  |  9   ----------------------------<  139<H>  4.0   |  24  |  <0.30<L>    Ca    9.6      06 Nov 2023 06:35  Phos  2.1     11-06  Mg     1.9     11-06    TPro  6.1  /  Alb  2.5<L>  /  TBili  0.2  /  DBili  x   /  AST  66<H>  /  ALT  34  /  AlkPhos  172<H>  11-06       Urinalysis Basic - ( 06 Nov 2023 06:35 )    Color: x / Appearance: x / SG: x / pH: x  Gluc: 139 mg/dL / Ketone: x  / Bili: x / Urobili: x   Blood: x / Protein: x / Nitrite: x   Leuk Esterase: x / RBC: x / WBC x   Sq Epi: x / Non Sq Epi: x / Bacteria: x                RADIOLOGY & ADDITIONAL TESTS:  Studies reviewed.

## 2023-11-06 NOTE — PROGRESS NOTE ADULT - SUBJECTIVE AND OBJECTIVE BOX
************************  Nel Baer MD  Internal Medicine PGY-1  ************************    Patient is a 52y old  Female who presents with a chief complaint of R sided weakness (2023 23:45)    Overnight no events, this morning patient with no acute complaints.Denies CP, SOB, fevers/chills, N/V, or abdominal pain.  Patient urinating well with BM(s).      Patient reminded of ongoing careplan.    MEDICATIONS  (STANDING):  acetaminophen   IVPB .. 1000 milliGRAM(s) IV Intermittent once  artificial tears (preservative free) Ophthalmic Solution 1 Drop(s) Both EYES three times a day  chlorhexidine 4% Liquid 1 Application(s) Topical daily  dexAMETHasone     Tablet 2 milliGRAM(s) Oral two times a day  dextrose 5%. 1000 milliLiter(s) (50 mL/Hr) IV Continuous <Continuous>  dextrose 5%. 1000 milliLiter(s) (100 mL/Hr) IV Continuous <Continuous>  dextrose 50% Injectable 25 Gram(s) IV Push once  dextrose 50% Injectable 12.5 Gram(s) IV Push once  dextrose 50% Injectable 25 Gram(s) IV Push once  divalproex  milliGRAM(s) Oral every 12 hours  glucagon  Injectable 1 milliGRAM(s) IntraMuscular once  insulin lispro (ADMELOG) corrective regimen sliding scale   SubCutaneous three times a day before meals  pantoprazole    Tablet 40 milliGRAM(s) Oral before breakfast  polyethylene glycol 3350 17 Gram(s) Oral daily  sodium chloride 0.9%. 1000 milliLiter(s) (50 mL/Hr) IV Continuous <Continuous>    MEDICATIONS  (PRN):  acetaminophen     Tablet .. 650 milliGRAM(s) Oral every 6 hours PRN Temp greater or equal to 38C (100.4F), Mild Pain (1 - 3)  aluminum hydroxide/magnesium hydroxide/simethicone Suspension 30 milliLiter(s) Oral every 6 hours PRN Dyspepsia  dextrose Oral Gel 15 Gram(s) Oral once PRN Blood Glucose LESS THAN 70 milliGRAM(s)/deciliter  melatonin 3 milliGRAM(s) Oral at bedtime PRN Insomnia  senna 2 Tablet(s) Oral at bedtime PRN Constipation      CAPILLARY BLOOD GLUCOSE      POCT Blood Glucose.: 228 mg/dL (2023 22:10)  POCT Blood Glucose.: 210 mg/dL (2023 13:04)  POCT Blood Glucose.: 145 mg/dL (2023 08:19)    I&O's Summary    2023 07:01  -  2023 07:00  --------------------------------------------------------  IN: 400 mL / OUT: 600 mL / NET: -200 mL        PHYSICAL EXAM:  Vital Signs Last 24 Hrs  T(C): 36.5 (2023 05:27), Max: 36.7 (2023 14:18)  T(F): 97.7 (2023 05:27), Max: 98 (2023 14:18)  HR: 103 (2023 05:27) (93 - 103)  BP: 93/61 (2023 05:27) (93/61 - 123/79)  BP(mean): --  RR: 20 (2023 05:27) (18 - 20)  SpO2: 98% (2023 05:27) (96% - 98%)    Parameters below as of 2023 05:27  Patient On (Oxygen Delivery Method): room air        PHYSICAL EXAM:  GENERAL: NAD, lying in bed comfortably  HEENT: NC/AT  NECK: Supple, No JVD  CHEST/LUNG: CTAB, no increased WOB  HEART: RRR, no m/r/g  ABDOMEN: soft, NT, ND, BS+  EXTREMITIES:  2+ peripheral pulses, no edema  NERVOUS SYSTEM:  A&Ox3  MSK: FROM all 4 extremities, full and equal strength  SKIN: No rashes or lesions    LABS:                        9.0    5.33  )-----------( 165      ( 2023 06:35 )             27.6     11-05    139  |  101  |  8   ----------------------------<  146<H>  3.8   |  21<L>  |  <0.30<L>    Ca    9.2      2023 06:55  Phos  3.3     11-  Mg     1.8         TPro  6.2  /  Alb  2.7<L>  /  TBili  0.3  /  DBili  x   /  AST  51<H>  /  ALT  33  /  AlkPhos  167<H>  11-          Urinalysis Basic - ( 2023 07:32 )    Color: Yellow / Appearance: Clear / S.013 / pH: x  Gluc: x / Ketone: 15 mg/dL  / Bili: Negative / Urobili: 1.0 mg/dL   Blood: x / Protein: Trace mg/dL / Nitrite: Negative   Leuk Esterase: Trace / RBC: 0 /HPF / WBC 3 /HPF   Sq Epi: x / Non Sq Epi: 3 /HPF / Bacteria: Moderate /HPF        Culture - Urine (collected 2023 15:09)  Source: Clean Catch Clean Catch (Midstream)  Final Report (2023 15:00):    <10,000 CFU/mL Normal Urogenital Elizabeth    Culture - Blood (collected 2023 14:30)  Source: .Blood Blood-Peripheral  Preliminary Report (2023 18:02):    No growth at 48 Hours    Culture - Blood (collected 2023 14:30)  Source: .Blood Blood-Peripheral  Preliminary Report (2023 18:02):    No growth at 48 Hours         ************************  Nel Baer MD  Internal Medicine PGY-1  ************************    Patient is a 52y old  Female who presents with a chief complaint of R sided weakness (2023 23:45)    Overnight no events, this morning patient with no acute complaints.Denies CP, SOB, fevers/chills, N/V, or abdominal pain.  Patient urinating well with BM(s).      Patient reminded of ongoing careplan.    MEDICATIONS  (STANDING):  acetaminophen   IVPB .. 1000 milliGRAM(s) IV Intermittent once  artificial tears (preservative free) Ophthalmic Solution 1 Drop(s) Both EYES three times a day  chlorhexidine 4% Liquid 1 Application(s) Topical daily  dexAMETHasone     Tablet 2 milliGRAM(s) Oral two times a day  dextrose 5%. 1000 milliLiter(s) (50 mL/Hr) IV Continuous <Continuous>  dextrose 5%. 1000 milliLiter(s) (100 mL/Hr) IV Continuous <Continuous>  dextrose 50% Injectable 25 Gram(s) IV Push once  dextrose 50% Injectable 12.5 Gram(s) IV Push once  dextrose 50% Injectable 25 Gram(s) IV Push once  divalproex  milliGRAM(s) Oral every 12 hours  glucagon  Injectable 1 milliGRAM(s) IntraMuscular once  insulin lispro (ADMELOG) corrective regimen sliding scale   SubCutaneous three times a day before meals  pantoprazole    Tablet 40 milliGRAM(s) Oral before breakfast  polyethylene glycol 3350 17 Gram(s) Oral daily  sodium chloride 0.9%. 1000 milliLiter(s) (50 mL/Hr) IV Continuous <Continuous>    MEDICATIONS  (PRN):  acetaminophen     Tablet .. 650 milliGRAM(s) Oral every 6 hours PRN Temp greater or equal to 38C (100.4F), Mild Pain (1 - 3)  aluminum hydroxide/magnesium hydroxide/simethicone Suspension 30 milliLiter(s) Oral every 6 hours PRN Dyspepsia  dextrose Oral Gel 15 Gram(s) Oral once PRN Blood Glucose LESS THAN 70 milliGRAM(s)/deciliter  melatonin 3 milliGRAM(s) Oral at bedtime PRN Insomnia  senna 2 Tablet(s) Oral at bedtime PRN Constipation      CAPILLARY BLOOD GLUCOSE      POCT Blood Glucose.: 228 mg/dL (2023 22:10)  POCT Blood Glucose.: 210 mg/dL (2023 13:04)  POCT Blood Glucose.: 145 mg/dL (2023 08:19)    I&O's Summary    2023 07:01  -  2023 07:00  --------------------------------------------------------  IN: 400 mL / OUT: 600 mL / NET: -200 mL        PHYSICAL EXAM:  Vital Signs Last 24 Hrs  T(C): 36.5 (2023 05:27), Max: 36.7 (2023 14:18)  T(F): 97.7 (2023 05:27), Max: 98 (2023 14:18)  HR: 103 (2023 05:27) (93 - 103)  BP: 93/61 (2023 05:27) (93/61 - 123/79)  BP(mean): --  RR: 20 (2023 05:27) (18 - 20)  SpO2: 98% (2023 05:27) (96% - 98%)    Parameters below as of 2023 05:27  Patient On (Oxygen Delivery Method): room air        PHYSICAL EXAM:  GENERAL: NAD, lying in bed comfortably  HEENT: NC/AT  NECK: Supple, No JVD  CHEST/LUNG: CTAB, no increased WOB  HEART: RRR, no m/r/g  ABDOMEN: soft,tenderness upon palpation on L side abdomen , ND, BS+  EXTREMITIES:  2+ peripheral pulses, no edema  NERVOUS SYSTEM:  A&Ox3  MSK: FROM all 4 extremities, full and equal strength  SKIN: No rashes or lesions    LABS:                        9.0    5.33  )-----------( 165      ( 2023 06:35 )             27.6     11-05    139  |  101  |  8   ----------------------------<  146<H>  3.8   |  21<L>  |  <0.30<L>    Ca    9.2      2023 06:55  Phos  3.3     11-  Mg     1.8     11-    TPro  6.2  /  Alb  2.7<L>  /  TBili  0.3  /  DBili  x   /  AST  51<H>  /  ALT  33  /  AlkPhos  167<H>  11-          Urinalysis Basic - ( 2023 07:32 )    Color: Yellow / Appearance: Clear / S.013 / pH: x  Gluc: x / Ketone: 15 mg/dL  / Bili: Negative / Urobili: 1.0 mg/dL   Blood: x / Protein: Trace mg/dL / Nitrite: Negative   Leuk Esterase: Trace / RBC: 0 /HPF / WBC 3 /HPF   Sq Epi: x / Non Sq Epi: 3 /HPF / Bacteria: Moderate /HPF        Culture - Urine (collected 2023 15:09)  Source: Clean Catch Clean Catch (Midstream)  Final Report (2023 15:00):    <10,000 CFU/mL Normal Urogenital Elizabeth    Culture - Blood (collected 2023 14:30)  Source: .Blood Blood-Peripheral  Preliminary Report (2023 18:02):    No growth at 48 Hours    Culture - Blood (collected 2023 14:30)  Source: .Blood Blood-Peripheral  Preliminary Report (2023 18:02):    No growth at 48 Hours

## 2023-11-06 NOTE — PROGRESS NOTE ADULT - ASSESSMENT
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 In 5/2023, she was found to have brain mets, and she received GK-SRS to 7 brain mets in 5 fractions in 6/2023. She again was advised immunotherapy, but delayed treatment. From 8/2023 to 9/2023, she had multiple ED visits for headache, dizziness, migraines related to brain mets, which were managed with anti-epileptics and steroids, c/b epigastric pain. She was found to have new liver mets. From 9/19-10/5/23, she was admitted for LE weakness and blurred vision, s/p R temporal craniotomy for resection of metastatic lesion. Post-op course c/b acute left cerebellar infarction post-op MRI, tachycardia and thrombocytopenia. Patient was thereafter sent to acute rehab at [facility].   She was most recently planned for combination chemotherapy and immunotherapy with carboplatin+ pemetrexed+ pembrolizumab to improve management of CNS disease, but has since presented with worsening RUE and RLE weakness and abdominal pain.     - Results of MR brain w/wo contrast have been discussed with Dr. Bernard and Dr. Beckman  - Patient is s/p C1D1 of Carbo + Paclitaxel on 11/4. Chemo tolerated well. No taxol reaction. C2 would be due 11/25/23.   - Patient is s/p C1 bevacizumab on 11/5/23. UA found trace protein. BP has been WNL.   There is no plan for further systemic inpatient chemotherapy during this admission.  - Trend CBC with differential to monitor for neutropenia.   - Patient may experience myalgias on day 4-6 2/2 paclitaxel use. If she experiences myalgias, she can have tylenol 650mg q6h PRN (not exceeding 2g qdaily).   - Please have patient assessed for acute PT needs.     Note not finalized until signed by attending.   Please do not hesitate to page with questions.     Alva Childers MD PGY5   [phone]  Hematology-Oncology Fellow  WEEKENDS- Please call  to page on-call fellow 51 yo F with PMH of smoking, stage 4 lung adenocarcinoma with brain metastases, steroid induced DM, GERD who presents with 2 weeks of right upper and lower extremity weakness and abdominal pain    #Stage 4 lung adenocarcinoma  When patient was initially diagnosed with stage IIIB T4N2M0 lung adenocarcinoma with neuroendocrine featuers in 5/2022, patient received 4C carboplatin + pemetrexed from 7-9/2022, with thoracic RT, and achieved [place], but declined to receive immunotherapy. In 5/2023, she was found to have brain mets, and she received GK-SRS to 7 brain mets in 5 fractions in 6/2023. She again was advised immunotherapy, but delayed treatment. From 8/2023 to 9/2023, she had multiple ED visits for headache, dizziness, migraines related to brain mets, which were managed with anti-epileptics and steroids, c/b epigastric pain. She was found to have new liver mets. From 9/19-10/5/23, she was admitted for LE weakness and blurred vision, s/p R temporal craniotomy for resection of metastatic lesion. Post-op course c/b acute left cerebellar infarction post-op MRI, tachycardia and thrombocytopenia. Patient was thereafter sent to acute rehab at [facility].   She was most recently planned for combination chemotherapy and immunotherapy with carboplatin+ pemetrexed+ pembrolizumab to improve management of CNS disease, but has since presented with worsening RUE and RLE weakness and abdominal pain.     - Results of MR brain w/wo contrast have been discussed with Dr. Bernard and Dr. Beckman  - Patient is s/p C1D1 of Carbo + Paclitaxel on 11/4. Chemo tolerated well. No taxol reaction. C2 would be due 11/25/23.   - Patient is s/p C1 bevacizumab on 11/5/23. UA found trace protein. BP has been WNL.   There is no plan for further systemic inpatient chemotherapy during this admission.  - Trend CBC with differential to monitor for neutropenia.   - Patient may experience myalgias on day 4-6 2/2 paclitaxel use. If she experiences myalgias, she can have tylenol 650mg q6h PRN (not exceeding 2g qdaily).   - Please have patient assessed for acute PT needs.       Alva Childers MD PGY5   [phone]  Hematology-Oncology Fellow  WEEKENDS- Please call  to page on-call fellow

## 2023-11-06 NOTE — PROGRESS NOTE ADULT - PROBLEM SELECTOR PLAN 1
previously treated with 4 rounds of chemo, developed brain mets 5/23, supposed to have followed up for immunotherapy with outpatient Dr. Beckman; now presenting w/ R sided weakness and abdominal pain  - CTAP: progression of neoplastic disease with enlarging dominant RUL pulmonary mass with interval increase in size and number of bilateral pulmonary metastases and bilobar hepatic metastases. new scattered metastatic nodules in the subcutaneous tissues of abdominal wall.  - CTH: multiple brain metastases not significantly changed from prior imaging  - MRI head w/ increase in lesions - per NSGY no intervention  - Onc consulted: s/p Carbo/Taxol/Avastin 11/4. If response, may receive additional tx as outpatient previously treated with 4 rounds of chemo, developed brain mets 5/23, supposed to have followed up for immunotherapy with outpatient Dr. Beckman; now presenting w/ R sided weakness and abdominal pain  - CTAP: progression of neoplastic disease with enlarging dominant RUL pulmonary mass with interval increase in size and number of bilateral pulmonary metastases and bilobar hepatic metastases. new scattered metastatic nodules in the subcutaneous tissues of abdominal wall.  - CTH: multiple brain metastases not significantly changed from prior imaging  - MRI head w/ increase in lesions - per NSGY no intervention  - Onc consulted: s/p Carbo/Taxol/Avastin 11/4. If response,   - per onc- will most likely cw tx in outpt setting

## 2023-11-06 NOTE — PROGRESS NOTE ADULT - PROBLEM SELECTOR PLAN 7
T 100.5 with tachy 121 11/2 PM. No cough, abd pain, changes in urinary or BMs  - UCx and BCx 10/31 neg  - repeat BCx x2 & UCx 11/3 - NGTD

## 2023-11-07 NOTE — CHART NOTE - NSCHARTNOTEFT_GEN_A_CORE
Per heme/onc, pt received one dose of chemotherapy inpt and will not need another dose until after being seen outpatient.    Dick Hoang  PGY-1 Per heme/onc, pt received one dose of chemotherapy inpt and will not need another dose until after being seen outpatient. Pt will not need chemotherapy while in rehab.     Dick Hoang  PGY-1

## 2023-11-07 NOTE — DISCHARGE NOTE NURSING/CASE MANAGEMENT/SOCIAL WORK - NSDCPEPTCAREGIVEDUMATLIST _GEN_ALL_CORE
Diabetes Solaraze Pregnancy And Lactation Text: This medication is Pregnancy Category B and is considered safe. There is some data to suggest avoiding during the third trimester. It is unknown if this medication is excreted in breast milk.

## 2023-11-07 NOTE — PROGRESS NOTE ADULT - PROBLEM SELECTOR PLAN 1
previously treated with 4 rounds of chemo, developed brain mets 5/23, supposed to have followed up for immunotherapy with outpatient Dr. Beckman; now presenting w/ R sided weakness and abdominal pain  - CTAP: progression of neoplastic disease with enlarging dominant RUL pulmonary mass with interval increase in size and number of bilateral pulmonary metastases and bilobar hepatic metastases. new scattered metastatic nodules in the subcutaneous tissues of abdominal wall.  - CTH: multiple brain metastases not significantly changed from prior imaging  - MRI head w/ increase in lesions - per NSGY no intervention  - Onc consulted: s/p Carbo/Taxol/Avastin 11/4. If response,   - per onc- will most likely cw tx in outpt setting

## 2023-11-07 NOTE — PROGRESS NOTE ADULT - SUBJECTIVE AND OBJECTIVE BOX
Dick Hoang| PGY-1  Internal Medicine    OVERNIGHT EVENTS: no overnight events      SUBJECTIVE: Patient was examined at bedside this morning. Appeared comfortable. Overnight vitals and monitoring results were reviewed. Reporting no complaints. Denied chest pain, SOB, abdominal pain, vomiting, diarrhea, constipation.       MEDICATIONS  (STANDING):  acetaminophen   IVPB .. 1000 milliGRAM(s) IV Intermittent once  artificial tears (preservative free) Ophthalmic Solution 1 Drop(s) Both EYES three times a day  chlorhexidine 4% Liquid 1 Application(s) Topical daily  dexAMETHasone     Tablet 2 milliGRAM(s) Oral two times a day  dextrose 5%. 1000 milliLiter(s) (100 mL/Hr) IV Continuous <Continuous>  dextrose 5%. 1000 milliLiter(s) (50 mL/Hr) IV Continuous <Continuous>  dextrose 50% Injectable 25 Gram(s) IV Push once  dextrose 50% Injectable 12.5 Gram(s) IV Push once  dextrose 50% Injectable 25 Gram(s) IV Push once  divalproex  milliGRAM(s) Oral every 12 hours  glucagon  Injectable 1 milliGRAM(s) IntraMuscular once  insulin lispro (ADMELOG) corrective regimen sliding scale   SubCutaneous three times a day before meals  pantoprazole    Tablet 40 milliGRAM(s) Oral before breakfast  polyethylene glycol 3350 17 Gram(s) Oral daily  sodium chloride 0.9%. 1000 milliLiter(s) (50 mL/Hr) IV Continuous <Continuous>    MEDICATIONS  (PRN):  acetaminophen     Tablet .. 650 milliGRAM(s) Oral every 6 hours PRN Temp greater or equal to 38C (100.4F), Mild Pain (1 - 3)  aluminum hydroxide/magnesium hydroxide/simethicone Suspension 30 milliLiter(s) Oral every 6 hours PRN Dyspepsia  dextrose Oral Gel 15 Gram(s) Oral once PRN Blood Glucose LESS THAN 70 milliGRAM(s)/deciliter  melatonin 3 milliGRAM(s) Oral at bedtime PRN Insomnia  senna 2 Tablet(s) Oral at bedtime PRN Constipation        T(F): 99.4 (11-07-23 @ 04:10), Max: 99.4 (11-07-23 @ 04:10)  HR: 108 (11-07-23 @ 04:10) (94 - 108)  BP: 100/68 (11-07-23 @ 04:10) (100/65 - 102/78)  BP(mean): --  RR: 20 (11-07-23 @ 04:10) (19 - 20)  SpO2: 91% (11-07-23 @ 04:10) (91% - 98%)    PHYSICAL EXAM:     GENERAL: NAD, lying in bed comfortably  HEAD:  Atraumatic, Normocephalic  EYES: EOMI, PERRLA, conjunctiva and sclera clear, no nystagmus noted  ENT: Moist mucous membranes,   NECK: Supple, No JVD, trachea midline  CHEST/LUNG: Clear to auscultation bilaterally; No rales, rhonchi, wheezing, or rubs. Unlabored respirations  HEART: Regular rate and rhythm; No murmurs, rubs, or gallops, normal S1/S2  ABDOMEN: normal bowel sounds; Soft, nontender, nondistended, no organomegaly   EXTREMITIES:  2+ Peripheral Pulses, brisk capillary refill. No clubbing, cyanosis, or edema  MSK: No gross deformities noted   Neurological:  A&Ox3, no focal deficits   SKIN: No rashes or lesions  PSYCH: Normal mood, affect     TELEMETRY:    LABS:                        9.7    4.37  )-----------( 144      ( 07 Nov 2023 07:11 )             30.2     11-07    131<L>  |  105  |  6<L>  ----------------------------<  142<H>  3.8   |  24  |  <0.30<L>    Ca    9.8      07 Nov 2023 07:11  Phos  3.0     11-07  Mg     1.9     11-07    TPro  6.4  /  Alb  2.7<L>  /  TBili  0.3  /  DBili  x   /  AST  62<H>  /  ALT  33  /  AlkPhos  201<H>  11-07            Creatinine Trend: <0.30<--, <0.30<--, <0.30<--, <0.30<--, <0.30<--, 0.37<--  I&O's Summary    06 Nov 2023 07:01  -  07 Nov 2023 07:00  --------------------------------------------------------  IN: 1100 mL / OUT: 600 mL / NET: 500 mL      BNP    RADIOLOGY & ADDITIONAL STUDIES:

## 2023-11-08 NOTE — PATIENT PROFILE ADULT - NSPRESCRALCFREQ_GEN_A_NUR
Ear Star Wedge Flap Text: The defect edges were debeveled with a #15 blade scalpel.  Given the location of the defect and the proximity to free margins (helical rim) an ear star wedge flap was deemed most appropriate.  Using a sterile surgical marker, the appropriate flap was drawn incorporating the defect and placing the expected incisions between the helical rim and antihelix where possible.  The area thus outlined was incised through and through with a #15 scalpel blade. Never

## 2023-11-08 NOTE — PROGRESS NOTE ADULT - PROBLEM SELECTOR PROBLEM 1
Non-small cell lung cancer with metastasis

## 2023-11-08 NOTE — H&P ADULT - ATTENDING COMMENTS
1. Caller Name: [facility]                                         Call Back Number: [phone]      Patient approves a detailed voicemail message: N\A    Received a call from [facility] to see if patient is aware that Dr. Peña-Neurology, is out of network. She may accrue more out of pocket costs due to this. They wanted to see if patient would like the referral changed to someone in network. If not, they will work on an authorization to see if it can be approved but at a higher cost to the patient.    I LVM for patient to call back to discuss.  
Seen and examined H and P revised, findings noted    53 y/o F, [place]  Background hx as stated. Metastatic Non Small Cell Lung Cancer dx 5/2022, s/p previous chemo/RT  Readmitted with disease recurrence--Generalized weakness to [facility] 10/31,  Multiple metastasis noted nd received C1DI Cisplatin/Paclitaxel 11/4  and Bevacizumad 11/5. Complications noted including hyponatremia. Plan is for nextr cycle in 3 wks , Acute rehab admission 11/8    Living with family, in private house, 3 NOAH, ambulating  with walker    Alert and fully oriented  Rt scalp surgical area, noted  Chest --non laboured breathing  Abd--tenderness epigastrum and upper abd region  Ext--no edema or calf tenderness    MMT --ue 4+/5, LLE 4/5, RLE 2+/5, but ankle 4/5    RECENT LABS/IMAGING                        10.4   3.98  )-----------( 131      ( 09 Nov 2023 06:38 )             32.3     11-09    137  |  102  |  7   ----------------------------<  125<H>  4.2   |  25  |  0.46<L>    Ca    10.1      09 Nov 2023 06:38    TPro  6.7  /  Alb  1.7<L>  /  TBili  0.3  /  DBili  x   /  AST  125<H>  /  ALT  38  /  AlkPhos  222<H>  11-09      Urinalysis Basic - ( 09 Nov 2023 06:38 )    Color: x / Appearance: x / SG: x / pH: x  Gluc: 125 mg/dL / Ketone: x  / Bili: x / Urobili: x   Blood: x / Protein: x / Nitrite: x   Leuk Esterase: x / RBC: x / WBC x   Sq Epi: x / Non Sq Epi: x / Bacteria: x      Dx--Metastatic lung cancer--brain, liver, abdomen  Generalized weakness, worse on RLE  Commence therapy   DVT ppx--lovenox  Pain mgt--continue analgesic regimen  Abd pain probably due to gastritis and liver/abd mets--will get GI consult if persisting   Oncology consult recs noted  Collateral hx to be obtained from family  Recreational therapy referral  Will consider neuropsych consult   Est dc to be decided at next IDT

## 2023-11-08 NOTE — H&P ADULT - NSHPPHYSICALEXAM_GEN_ALL_CORE
PHYSICAL EXAM  VITALS  T(C): 36.4 (11-08-23 @ 18:29), Max: 37.2 (11-07-23 @ 20:32)  HR: 104 (11-08-23 @ 18:29) (104 - 118)  BP: 125/81 (11-08-23 @ 18:29) (103/75 - 125/81)  RR: 16 (11-08-23 @ 18:29) (16 - 20)  SpO2: 97% (11-08-23 @ 18:29) (95% - 97%)    Gen - NAD, Comfortable  HEENT - NCAT, EOMI, MMM  Neck - Supple, No limited ROM  Pulm - CTAB, No wheeze, No rhonchi, No crackles  Cardiovascular - RRR, S1S2  Chest - good chest expansion, good respiratory effort  Abdomen - Soft, NT/ND, +BS  Extremities - No Cyanosis, no clubbing, no edema, no calf tenderness  Neuro-     Cognitive - awake, alert, oriented to person, place, date, year, and situation. Able to follow command     Communication - Fluent, Comprehensible     Attention: Intact     Judgement: Good evidence of judgement     Memory: Recall 3 objects immediate and 3 min later     Cranial Nerves - CN 2-12 intact     Motor -                     LEFT    UE -  5/5                    RIGHT UE - 4/5                    LEFT    LE -  5/5                    RIGHT LE - 4/5        Sensory - Intact  to LT bilaterally     Reflexes - DTR Intact, No primitive reflexive     Tone - normal  MSK: Right sided weakness  Psychiatric - Mood stable, Affect WNL  Skin:  Right crani incision DELISA

## 2023-11-08 NOTE — PATIENT PROFILE ADULT - FALL HARM RISK - HARM RISK INTERVENTIONS

## 2023-11-08 NOTE — CHART NOTE - NSCHARTNOTEFT_GEN_A_CORE
Pt's HR has been persistently tachycardic. Current tachycardia does not represent infection as pt has been afebrile and cultures are NGTD. Tachycardia is most likely due to increased metabolic rate from cancer/ metastatic burden.    Dick Hoang MD  PGY-1

## 2023-11-08 NOTE — PROGRESS NOTE ADULT - PROVIDER SPECIALTY LIST ADULT
Heme/Onc
Neurosurgery
Heme/Onc
Internal Medicine

## 2023-11-08 NOTE — DISCHARGE NOTE NURSING/CASE MANAGEMENT/SOCIAL WORK - PATIENT PORTAL LINK FT
You can access the FollowMyHealth Patient Portal offered by [facility] by registering at the following website: [email or web address]. By joining [employer]’s FollowMyHealth portal, you will also be able to view your health information using other applications (apps) compatible with our system.

## 2023-11-08 NOTE — PATIENT PROFILE ADULT - FALL HARM RISK - RISK INTERVENTIONS

## 2023-11-08 NOTE — PROGRESS NOTE ADULT - REASON FOR ADMISSION
R sided weakness

## 2023-11-08 NOTE — PROGRESS NOTE ADULT - PROBLEM SELECTOR PLAN 6
- pt currently on protonix 40mg daily for GERD  - will continue home medication

## 2023-11-08 NOTE — H&P ADULT - HISTORY OF PRESENT ILLNESS
Renée Arechiga is a 52 year old female with PMH of GERD, S4 lung cancer with mets to brain (s/p 4 cycles of chemo and thoracic RT- completed 10/2022), steroid induced Diabetes Mellitus, gamma knife surgery to 7 brain mets, and s/p temporal crani/resection of metastatic lesion; who presented to [facility] on 10/31 with BL UE and BL LE weakness and abdominal pain for 2 weeks concerning for progressive metastatic disease.  Overall imaging was significant for metastatic disease in the brain, lung, liver and abdomen.  NSGY deemed no surgical intervention for worsening brain mets.  Heme/Onc suggested 1 dose of C1D1 Carboplatin and Paclitaxel. She also received Bevacizumab during her admission. Her hospital course was complicated by hyponatremia (likely 2/2 SIADH given lung CA), and fever (negative workup).  She will continue chemotherapy/radiation treatment in the outpatient setting with Dr. Beckman.  PM&R was consulted and deemed her an appropriate candidate for IRF. She was medically stabilized and cleared for discharge to [facility].

## 2023-11-08 NOTE — H&P ADULT - NSHPSOCIALHISTORY_GEN_ALL_CORE
SOCIAL HISTORY  Smoking - Denied  EtOH - Denied   Drugs - Denied    FUNCTIONAL HISTORY  Lives in private house, 3 steps to enter, with fiance, staying on main floor   Independent ADLs, has walker     CURRENT FUNCTIONAL STATUS  - Bed Mobility: Min  - Transfers: Min  - Gait: Min   - ADLs: LBD- Mod

## 2023-11-08 NOTE — H&P ADULT - NSHPREVIEWOFSYSTEMS_GEN_ALL_CORE
DVT: Heparin due to CKD  GI: PPI REVIEW OF SYSTEMS  Constitutional: No fever, No Chills, + fatigue  HEENT: No eye pain, No visual disturbances, No difficulty hearing  Pulm: No cough,  No shortness of breath  Cardio: No chest pain, No palpitations  GI:  + abdominal tenderness, No nausea, No vomiting, No diarrhea, No constipation  : No dysuria, No frequency, No hematuria  Neuro: No headaches, No memory loss, + loss of strength, + numbness, No tremors  Skin: No itching, No rashes, No lesions   Endo: No temperature intolerance  MSK: No joint pain, No joint swelling, No muscle pain, No Neck pain,  No back pain  Psych:  No depression, No anxiety

## 2023-11-08 NOTE — H&P ADULT - NSHPLABSRESULTS_GEN_ALL_CORE
LABS:                        9.7    4.37  )-----------( 144      ( 07 Nov 2023 07:11 )             30.2     11-07    131<L>  |  105  |  6<L>  ----------------------------<  142<H>  3.8   |  24  |  <0.30<L>    Ca    9.8      07 Nov 2023 07:11  Phos  3.0     11-07  Mg     1.9     11-07    TPro  6.4  /  Alb  2.7<L>  /  TBili  0.3  /  DBili  x   /  AST  62<H>  /  ALT  33  /  AlkPhos  201<H>  11-07      Urinalysis Basic - ( 07 Nov 2023 07:11 )    Color: x / Appearance: x / SG: x / pH: x  Gluc: 142 mg/dL / Ketone: x  / Bili: x / Urobili: x   Blood: x / Protein: x / Nitrite: x   Leuk Esterase: x / RBC: x / WBC x   Sq Epi: x / Non Sq Epi: x / Bacteria: x      IMAGING/RADIOLOGY:  MR HEAD (11/2)  IMPRESSION: Right temporal postoperative changes with slight increase in   the size of the enhancement within the postoperative bed since 10/2/2023   with slightly less vasogenic edema. Multiple additional brain metastases   have increased in size since the prior exam as described above.    RUQ US (10/31)  IMPRESSION:  Bilobar hepatic metastases as on same-day CT.  No sonographic evidence of acute cholecystitis.    CT ANGIO CHEST/ CT ABDOMEN & PELVIS (10/31)  IMPRESSION:  No pulmonary embolus.  Overall progression of neoplastic disease as evidenced by enlarging   dominant right upper lobe pulmonary mass with interval increase in size   and number of bilateral pulmonary metastases and bilobar hepatic   metastases when compared to prior imaging September 2023.  Additionally, new scattered metastatic nodules also noted in the   subcutaneous tissues of the abdominal wall.    HEAD CT (10/31)  IMPRESSION: Post right temporal resection with multiple additional brain   metastases again noted. These do not appear to besignificantly changed   in appearance.  Tiny amount of extra-axial hemorrhage remaining versus postsurgical   material along the inside of the craniotomy flap. Interval resolution of   pneumocephalus post resection.  Partial opacification of right mastoid air cells, new compared to   previous exam 10/8/2023.  MRI with gadolinium may provide helpful additional evaluation, if   clinically indicated.

## 2023-11-08 NOTE — PROGRESS NOTE ADULT - ATTENDING COMMENTS
Pt with metastatic NSCLC to the brain s/p SRS and admitted with weakness. Palliative chemotherapy initiated 11/4 and 11/5 to see if can help with tumor burden while awaiting improvement in performance status. On exam, lungs clear anteriorly, heart RRR, abdomen NT, no pitting edema. She currently denies any myalgias or fatigue that can occur 3 to 4 days after chemotherapy. Continue with supportive measures. Next cycle of treatment would not be until 3 weeks later and would see if pt can return to rehab for further PT.
pt with stage IV lung ca. Plan for carbo taxol raimundo today. Premeds and chemo drugs reviewed. S/e reviewed with the pt. Monitor VS, labs and U/A.
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 She was initially diagnosed with Stage IIIB NSCLC in 5/22 and treated with chemo/RT but declined immunotherapy. In 5/2023, she was found to have brain mets, and she received GK-SRS to 7 brain mets in 5 fractions in 6/2023. She again was advised immunotherapy, but delayed treatment. She is now presenting with worsening RUE and RLE weakness and abdominal pain. MRI brain on 11/2 shows areas of progression of disease. The findings were reviewed with her doctors Dr. Bernard ([facility]) and Dr. Beckman ([facility]) to determine plan for further systemic therapy. At their request she will be given one cycle of Carbo/Taxol/Avastin as an inpatient and follow up later with Dr. Bernard to discuss further radiation to the brain. Agree with continuing dexamethasone 2mg BID for now. If she responds to the chemotherapy she may receive additional cycles of chemotherapy as an outpatient. Oncology will continue to follow with you.
52 year old female with a PMHx as above, significant for stage IV lung cancer with mets to brain (s/p 4 cycles chemo and thoracic RT completed on oct 2022), gamma knife surgery to 7 brain mets, and recent admission in [facility] 9/19 - 10/5 for weakness and blurred vision secondary to large cystic right temporal mass (s/p right temporal craniotomy) who presents with weakness. Pt reports that after her recent hospitalization, she was discharged to acute rehab. By the time of discharge, she did not have any appreciable weakness in the right upper extremity but was still requiring walker to ambulate and still feeling some unsteadiness in her legs. While at home, she reports being unable to move as much as she did during acute rehab; her  is not always available to help her at home. She then developed weakness in the right upper extremity, unable to grab things with her hand. Otherwise, she denies any subjective fevers/chills, cough, or dysuria. CT head stable from prior; shows “right temporal resection with multiple additional brain metastases again noted. These do not appear to be significantly changed in appearance.” MRI brain however shows multiple brain meds, increased in size compared to prior study. Neurosurgery has been notified.    Overnight, the patient was febrile to 100.5F, , WBC trending up to 11.7.  This morning, patient feels improved compared to prior days. Says the right sided weakness is improving and her legs do not feel heavy anymore. She has no subjective fevers/chills, no SOB, no cough, no diarrhea. 5/5 strength of the lower extremities bilaterally; 5-/5 strength of the right upper extremity, but improved compared to prior. No lower extremity edema. There is no calf tenderness.    #stage IV lung cancer   #subacute weakness with known metastatic disease to the brain   #SIRS-- unclear source of infection.    -weakness likely secondary to known metastatic disease to the brain, stable from prior on CT   -MR brain showing increased size of known metastatic disease-- neurosurgery to be contacted  -continue dexamethasone 2mg IV BID for now   -oncology consulted and neurosurgery are consulted, appreciate recs  -pt was in acute rehab, discharged home prior to hospital presentation   -PM&R consult for acute rehab needs  -Pt meets SIRS criteria but is without clear infectious source on evaluation. Will continue to monitor vital signs. hold off on antibiotics for now.    Rest of plan as per resident note above.
52 year old female with a PMHx as above, significant for stage IV lung cancer with mets to brain (s/p 4 cycles chemo and thoracic RT completed on oct 2022), gamma knife surgery to 7 brain mets, and recent admission in [facility] 9/19 - 10/5 for weakness and blurred vision secondary to large cystic right temporal mass (s/p right temporal craniotomy) who presents with weakness. Pt reports that after her recent hospitalization, she was discharged to acute rehab. By the time of discharge, she did not have any appreciable weakness in the right upper extremity but was still requiring walker to ambulate and still feeling some unsteadiness in her legs. While at home, she reports being unable to move as much as she did during acute rehab; her  is not always available to help her at home. She then developed weakness in the right upper extremity, unable to grab things with her hand. Otherwise, she denies any subjective fevers/chills, cough, or dysuria.     On exam, the patient is alert and oriented, intermittently tearful when speaking of her symptoms and of her history of cancer treatments. She has 5-/5 strength in the right upper and lower extremities bilaterally. RRR, clear lungs. Abdomen is soft but a small palpable and mobile nodule in the mid abdomen. Site of prior right parietal craniotomy is well healing.     CT head stable from prior; shows “right temporal resection with multiple additional brain metastases again noted. These do not appear to be significantly changed in appearance.”     #stage IV lung cancer   #subacute weakness with known metastatic disease to the brain   -weakness likely secondary to known metastatic disease to the brain, stable from prior on CT   -will obtain MR brain   -pt was in acute rehab, discharged home prior to hospital presentation   -PT/OT evaluation for rehab needs   -oncology consult   -continue dexamethasone 2mg IV BID for now     Rest of plan as per resident note above.
52 year old female with a PMHx as above, significant for stage IV lung cancer with mets to brain (s/p 4 cycles chemo and thoracic RT completed on oct 2022), gamma knife surgery to 7 brain mets, and recent admission in [facility] 9/19 - 10/5 for weakness and blurred vision secondary to large cystic right temporal mass (s/p right temporal craniotomy) who presents with weakness. Pt reports that after her recent hospitalization, she was discharged to acute rehab. By the time of discharge, she did not have any appreciable weakness in the right upper extremity but was still requiring walker to ambulate and still feeling some unsteadiness in her legs. While at home, she reports being unable to move as much as she did during acute rehab; her  is not always available to help her at home. She then developed weakness in the right upper extremity, unable to grab things with her hand. Otherwise, she denies any subjective fevers/chills, cough, or dysuria. CT head stable from prior; shows “right temporal resection with multiple additional brain metastases again noted. These do not appear to be significantly changed in appearance.” MRI brain is ordered and pending.    This afternoon, patient feels stable from yesterday. Exam is stable as well, with 5-/5 strength in the upper and lower extremities on the RIGHT; 5/5 strength on the upper and lower extremities of the LEFT.     #stage IV lung cancer   #subacute weakness with known metastatic disease to the brain   -weakness likely secondary to known metastatic disease to the brain, stable from prior on CT   -will obtain MR brain   -continue dexamethasone 2mg IV BID for now   -oncology consulted, will follow up recommendations  -pt was in acute rehab, discharged home prior to hospital presentation   -PT/OT recommends acute inpatient rehab    Rest of plan as per resident note above.
52 year old female with a PMHx as above, significant for stage IV lung cancer with mets to brain (s/p 4 cycles chemo and thoracic RT completed on oct 2022), gamma knife surgery to 7 brain mets, and recent admission in [facility] 9/19 - 10/5 for weakness and blurred vision secondary to large cystic right temporal mass (s/p right temporal craniotomy) who presents with acute RUE weakness. CT head stable from prior; shows “right temporal resection with multiple additional brain metastases again noted. These do not appear to be significantly changed in appearance.” MRI brain however shows multiple brain mets, increased in size compared to prior study.     Heme/onc is onboard and has started the patient on inpatient chemotherapy-- C1D1 of carbo/peclitaxel on 11/4; bevacizumab on 11/5.    #stage IV lung cancer   #subacute weakness with known metastatic disease to the brain     - weakness secondary to metastatic disease to the brain, increased in size on MRI  - s/p carbo/paclitaxel on 11/4 and bevacizumab on 11/5  - per heme/onc, no further plans for inpatient chemotherapy. Pt to follow up outpatient oncologist for further care  - continue dexamethasone 2mg PO BID  - PM&R consult for acute rehab evaluation    The patient is medically stable for discharge.   Dispo: pending placement to acute rehab.
52 year old female with a PMHx as above, significant for stage IV lung cancer with mets to brain (s/p 4 cycles chemo and thoracic RT completed on oct 2022), gamma knife surgery to 7 brain mets, and recent admission in [facility] 9/19 - 10/5 for weakness and blurred vision secondary to large cystic right temporal mass (s/p right temporal craniotomy) who presents with acute RUE weakness. CT head stable from prior; shows “right temporal resection with multiple additional brain metastases again noted. These do not appear to be significantly changed in appearance.” MRI brain however shows multiple brain mets, increased in size compared to prior study.     Heme/onc is onboard and has started the patient on inpatient chemotherapy-- C1D1 of carbo/peclitaxel on 11/4; bevacizumab on 11/5.    The patient was seen this morning. States that her right-sided weakness fluctuates in severity but is overall improved compared to hospital presentation. On exam, 5/5 strength of the right upper extremity, 5-/5 strength of the right lower extremity; 5/5 strength of the left upper and lower extremities.    #stage IV lung cancer   #subacute weakness with known metastatic disease to the brain     - weakness secondary to metastatic disease to the brain, increased in size on MRI  - s/p carbo/paclitaxel on 11/4 and bevacizumab on 11/5  - per heme/onc, no further plans for inpatient chemotherapy. Pt to follow up outpatient oncologist for further care  - continue dexamethasone 2mg PO BID  - PM&R consult for acute rehab evaluation    The patient is medically stable for discharge.   Dispo: pending placement to acute rehab.    Rest of plan as per resident note above.
52 year old female with a PMHx as above, significant for stage IV lung cancer with mets to brain (s/p 4 cycles chemo and thoracic RT completed on oct 2022), gamma knife surgery to 7 brain mets, and recent admission in [facility] 9/19 - 10/5 for weakness and blurred vision secondary to large cystic right temporal mass (s/p right temporal craniotomy) who presents with acute RUE weakness. CT head stable from prior; shows “right temporal resection with multiple additional brain metastases again noted. These do not appear to be significantly changed in appearance.” MRI brain however shows multiple brain meds, increased in size compared to prior study.     #stage IV lung cancer   #subacute weakness with known metastatic disease to the brain   #SIRS-- unclear source of infection.    - weakness likely secondary to known metastatic disease to the brain, stable from prior on CT   - MR brain showing increased size of known metastatic disease-- neurosurgery to be contacted  - continue dexamethasone 2mg IV BID for now   - oncology consulted and neurosurgery are consulted, appreciate recs  - planned for inpt chemo 11/4 and 11/5 pending repeat UA for proteinuria  - Gentle hydration  - monitor on tele    Rest of plan as per resident note above.    40 minutes spent on total encounter. The necessity of the time spent during the encounter on this date of service was due to:     - preparing to see the patient (eg, review of tests, documents)  - performing a medically appropriate examination and/or evaluation  - referring and communicating with other health care professionals  - documenting clinical information in the electronic or other health record  - care coordination
52 year old female with a PMHx as above, significant for stage IV lung cancer with mets to brain (s/p 4 cycles chemo and thoracic RT completed on oct 2022), gamma knife surgery to 7 brain mets, and recent admission in [facility] 9/19 - 10/5 for weakness and blurred vision secondary to large cystic right temporal mass (s/p right temporal craniotomy) who presents with acute RUE weakness. CT head stable from prior; shows “right temporal resection with multiple additional brain metastases again noted. These do not appear to be significantly changed in appearance.” MRI brain however shows multiple brain mets, increased in size compared to prior study.     Heme/onc is onboard and has started the patient on inpatient chemotherapy-- C1D1 of carbo/peclitaxel on 11/4; bevacizumab on 11/5.    The patient was seen this morning. States that her right-sided weakness fluctuates in severity but is overall improved compared to hospital presentation. On exam, 5-/5 strength of the right upper and lower extremity; 5/5 strength of the left upper and lower extremities.    #stage IV lung cancer   #subacute weakness with known metastatic disease to the brain   #SIRS-- unclear source of infection -> no infection identified and pt clinically improved    - weakness likely secondary to known metastatic disease to the brain, stable from prior on CT   - MR brain showing increased size of known metastatic disease  - s/p carbo/paclitaxel on 11/4 and bevacizumab on 11/5  - per heme/onc, no further plans for inpatient chemotherapy. Pt may follow up outpatient oncology  - continue dexamethasone 2mg IV BID for now   - PM&R consult for acute rehab evaluation    Rest of plan as per resident note above.
52 year old female with a PMHx as above, significant for stage IV lung cancer with mets to brain (s/p 4 cycles chemo and thoracic RT completed on oct 2022), gamma knife surgery to 7 brain mets, and recent admission in [facility] 9/19 - 10/5 for weakness and blurred vision secondary to large cystic right temporal mass (s/p right temporal craniotomy) who presents with acute RUE weakness. CT head stable from prior; shows “right temporal resection with multiple additional brain metastases again noted. These do not appear to be significantly changed in appearance.” MRI brain however shows multiple brain meds, increased in size compared to prior study.     #stage IV lung cancer   #subacute weakness with known metastatic disease to the brain   #SIRS-- unclear source of infection.    -weakness likely secondary to known metastatic disease to the brain, stable from prior on CT   -MR brain showing increased size of known metastatic disease-- neurosurgery to be contacted  -continue dexamethasone 2mg IV BID for now   -oncology consulted and neurosurgery are consulted, appreciate recs  - planned for inpt chemo today pending repeat UA  - Gentle hydration  - monitor on tele    Rest of plan as per resident note above.    40 minutes spent on total encounter. The necessity of the time spent during the encounter on this date of service was due to:     - preparing to see the patient (eg, review of tests, documents)  - performing a medically appropriate examination and/or evaluation  - referring and communicating with other health care professionals  - documenting clinical information in the electronic or other health record  - care coordination.

## 2023-11-08 NOTE — PATIENT PROFILE ADULT - COMPLETE THE FOLLOWING IF THE PATIENT REFUSES THE INFLUENZA VACCINE:
Risks/benefits discussed with patient/surrogate/Vaccine Information Sheet (VIS) provided-VIS date: 8/6/21 Risks/benefits discussed with patient/surrogate

## 2023-11-08 NOTE — PROGRESS NOTE ADULT - PROBLEM SELECTOR PROBLEM 2
Right sided weakness

## 2023-11-08 NOTE — DISCHARGE NOTE NURSING/CASE MANAGEMENT/SOCIAL WORK - NSDCPEFALRISK_GEN_ALL_CORE
For information on Fall & Injury Prevention, visit: [email or web address] OR  [email or web address] OR  https://www.cdc.gov/steadi/patient.html

## 2023-11-08 NOTE — PROGRESS NOTE ADULT - PROBLEM SELECTOR PROBLEM 5
History of steroid-induced diabetes mellitus

## 2023-11-08 NOTE — PROGRESS NOTE ADULT - SUBJECTIVE AND OBJECTIVE BOX
Dick Hoang| PGY-1  Internal Medicine    OVERNIGHT EVENTS: no overnight events      SUBJECTIVE: Patient was examined at bedside this morning. Appeared comfortable. Overnight vitals and monitoring results were reviewed. Reporting no complaints. Denied chest pain, SOB, abdominal pain, vomiting, diarrhea, constipation.       MEDICATIONS  (STANDING):  acetaminophen   IVPB .. 1000 milliGRAM(s) IV Intermittent once  artificial tears (preservative free) Ophthalmic Solution 1 Drop(s) Both EYES three times a day  chlorhexidine 4% Liquid 1 Application(s) Topical daily  dexAMETHasone     Tablet 2 milliGRAM(s) Oral two times a day  dextrose 5%. 1000 milliLiter(s) (50 mL/Hr) IV Continuous <Continuous>  dextrose 5%. 1000 milliLiter(s) (100 mL/Hr) IV Continuous <Continuous>  dextrose 50% Injectable 25 Gram(s) IV Push once  dextrose 50% Injectable 25 Gram(s) IV Push once  dextrose 50% Injectable 12.5 Gram(s) IV Push once  divalproex  milliGRAM(s) Oral every 12 hours  glucagon  Injectable 1 milliGRAM(s) IntraMuscular once  insulin lispro (ADMELOG) corrective regimen sliding scale   SubCutaneous three times a day before meals  pantoprazole    Tablet 40 milliGRAM(s) Oral before breakfast  polyethylene glycol 3350 17 Gram(s) Oral daily  sodium chloride 0.9%. 1000 milliLiter(s) (50 mL/Hr) IV Continuous <Continuous>    MEDICATIONS  (PRN):  acetaminophen     Tablet .. 650 milliGRAM(s) Oral every 6 hours PRN Temp greater or equal to 38C (100.4F), Mild Pain (1 - 3)  aluminum hydroxide/magnesium hydroxide/simethicone Suspension 30 milliLiter(s) Oral every 6 hours PRN Dyspepsia  dextrose Oral Gel 15 Gram(s) Oral once PRN Blood Glucose LESS THAN 70 milliGRAM(s)/deciliter  melatonin 3 milliGRAM(s) Oral at bedtime PRN Insomnia  senna 2 Tablet(s) Oral at bedtime PRN Constipation        T(F): 97.8 (11-08-23 @ 04:45), Max: 98.9 (11-07-23 @ 20:32)  HR: 118 (11-08-23 @ 04:45) (106 - 118)  BP: 103/75 (11-08-23 @ 04:45) (103/75 - 113/82)  BP(mean): --  RR: 20 (11-08-23 @ 04:45) (18 - 20)  SpO2: 95% (11-08-23 @ 04:45) (95% - 99%)    PHYSICAL EXAM:     GENERAL: NAD, lying in bed comfortably  HEAD:  Atraumatic, Normocephalic  EYES: EOMI, PERRLA, conjunctiva and sclera clear, no nystagmus noted  ENT: Moist mucous membranes,   NECK: Supple, No JVD, trachea midline  CHEST/LUNG: Clear to auscultation bilaterally; No rales, rhonchi, wheezing, or rubs. Unlabored respirations  HEART: Regular rate and rhythm; No murmurs, rubs, or gallops, normal S1/S2  ABDOMEN: normal bowel sounds; Soft, nontender, nondistended, no organomegaly   EXTREMITIES:  2+ Peripheral Pulses, brisk capillary refill. No clubbing, cyanosis, or edema  MSK: No gross deformities noted   Neurological:  A&Ox3, no focal deficits   SKIN: No rashes or lesions  PSYCH: Normal mood, affect     TELEMETRY:    LABS:                        9.7    4.37  )-----------( 144      ( 07 Nov 2023 07:11 )             30.2     11-07    131<L>  |  105  |  6<L>  ----------------------------<  142<H>  3.8   |  24  |  <0.30<L>    Ca    9.8      07 Nov 2023 07:11  Phos  3.0     11-07  Mg     1.9     11-07    TPro  6.4  /  Alb  2.7<L>  /  TBili  0.3  /  DBili  x   /  AST  62<H>  /  ALT  33  /  AlkPhos  201<H>  11-07            Creatinine Trend: <0.30<--, <0.30<--, <0.30<--, <0.30<--, <0.30<--, 0.37<--  I&O's Summary    07 Nov 2023 07:01  -  08 Nov 2023 07:00  --------------------------------------------------------  IN: 530 mL / OUT: 900 mL / NET: -370 mL      BNP    RADIOLOGY & ADDITIONAL STUDIES:

## 2023-11-08 NOTE — H&P ADULT - ASSESSMENT
Assessment/Plan:  HARJEET WHITMORE is a 52 year old female with PMH of GERD, S4 lung cancer with mets to brain (s/p 4 cycles of chemo and thoracic RT- completed 10/2022), steroid induced Diabetes Mellitus, gamma knife surgery to 7 brain mets, and s/p temporal crani/resection of metastatic lesion; who presented to [facility] on 10/31 with BL UE and BL LE weakness and abdominal pain for 2 weeks concerning for progressive metastatic disease.  Overall imaging was significant for metastatic disease in the brain, lung, liver and abdomen.  NSGY deemed no surgical intervention for worsening brain mets.  Heme/Onc suggested 1 dose of C1D1 Carboplatin and Paclitaxel. She also received Bevacizumab during her admission. Her hospital course was complicated by hyponatremia (likely 2/2 SIADH given lung CA), and fever (negative workup). She will continue chemotherapy/radiation treatment in the outpatient setting with Dr. Beckman. Patient now admitted for a multidisciplinary rehab program. 11-08-23 @ 15:16  -------------  Rehab Management/MEDICAL MANAGEMENT     #Malignant neoplasm with metastasis to brain  - Gait Instability, ADL impairments and Functional impairments: start Comprehensive Rehab Program of PT/OT/SLP  - 3 hours a day, 5 days a week  - P&O as needed   - Dexamethasone 2mg BID  - Resume chemo/radiation therapy in outpatient setting  - Follow up with Dr. Quintanilla     #Seizure Prophylaxis  - Depakote 500mg BID    #Steroid Induced Diabetes Mellitus  - A1c: 7.7 (11/2023)  - ISS  - FS AC&HS  - Metformin 500mg BID     #HLD  - Atorvastatin 40mg at HS    #Hyponatremia  - Trend CMP  - Likely secondary to lung CA    #Sleep  - Melatonin 3mg PRN     #Skin  - Skin on admission  - Pressure injury/Skin: OOB to Chair, PT/OT     #Pain Mgmt   - Tylenol PRN    #GI/Bowel Mgmt   - Pantoprazole  - Miralax  - Senna  - Maalox PRN     #/Bladder Mgmt   -  PVR q8h, CIC>400cc    #FEN   - Diet - Regular + Thins  [CCHO]    - Dysphagia  SLP - evaluation and treatment    #Health Maintenance  - Ocular lubricant gtts TID     #Precautions / PROPHYLAXIS:   - Falls, Seizure   - ortho: Weight bearing status: WBAT   - Lungs: Aspiration, Incentive Spirometer   - DVT: Lovenox & SCDs   --------------------------------------------  OUTPATIENT/FOLLOW UP:    Margarito Beckman  Medical Oncology  [address] [place], [place] [id number]  Phone: [phone]  Fax: [phone]  Established Patient  Follow Up Time: 2 weeks    Zurdo Bernard  Radiation Oncology  [address] [place] [id number]  Phone: [phone]  Fax: [phone]  Established Patient  Follow Up Time: 2 weeks  --------------------------------------------  MEDICAL PROGNOSIS: GOOD                                   REHAB POTENTIAL: GOOD  ESTIMATED DISPOSITION: HOME                             ELOS: 10-14 Days   EXPECTED THERAPY:     P.T. 1hr/day       O.T. 1hr/day      S.L.P. 1hr/day      EXP FREQUENCY: 5 days per 7 day period     PRESCREEN COMPARISON: I have reviewed the prescreen information and I have found no relevant changes between the preadmission screening and my post admission evaluation     RATIONALE FOR INPATIENT ADMISSION - Patient demonstrates the following: (check all that apply)  [X] Medically appropriate for rehabilitation admission  [X] Has attainable rehab goals with an appropriate initial discharge plan  [X] Has rehabilitation potential (expected to make a significant improvement within a reasonable period of time)   [X] Requires close medical managment by a rehab physician, rehab nursing care, Hospitalist and comprehensive interdisciplinary team (including PT, OT, & or SLP, Prosthetics and Orthotics)     Assessment/Plan:  HARJEET WHITMORE is a 52 year old female with PMH of GERD, S4 lung cancer with mets to brain (s/p 4 cycles of chemo and thoracic RT- completed 10/2022), steroid induced Diabetes Mellitus, gamma knife surgery to 7 brain mets, and s/p temporal crani/resection of metastatic lesion; who presented to [facility] on 10/31 with BL UE and BL LE weakness and abdominal pain for 2 weeks concerning for progressive metastatic disease.  Overall imaging was significant for metastatic disease in the brain, lung, liver and abdomen.  NSGY deemed no surgical intervention for worsening brain mets.  Heme/Onc suggested 1 dose of C1D1 Carboplatin and Paclitaxel. She also received Bevacizumab during her admission. Her hospital course was complicated by hyponatremia (likely 2/2 SIADH given lung CA), and fever (negative workup). She will continue chemotherapy/radiation treatment in the outpatient setting with Dr. Beckman. Patient now admitted for a multidisciplinary rehab program.   -------------  Rehab Management/MEDICAL MANAGEMENT     #Malignant neoplasm with metastasis to brain  - Gait Instability, ADL impairments and Functional impairments: start Comprehensive Rehab Program of PT/OT/SLP  - 3 hours a day, 5 days a week  - P&O as needed   - Dexamethasone 2mg BID  - Resume chemo/radiation therapy in outpatient setting  - Follow up with Dr. Beckman     #Seizure Prophylaxis  - Depakote 500mg BID    #Steroid Induced Diabetes Mellitus  - A1c: 7.7 (11/2023)  - ISS  - FS AC&HS  - Metformin 500mg BID     #HLD  - Atorvastatin 40mg at HS    #Hyponatremia  - Trend CMP  - Likely secondary to lung CA    #Sleep  - Melatonin 3mg PRN     #Skin  - Skin on admission: Right crani incision DELISA  - Pressure injury/Skin: OOB to Chair, PT/OT     #Pain Mgmt   - Tylenol PRN    #GI/Bowel Mgmt   - Pantoprazole  - Miralax  - Senna  - Maalox PRN     #/Bladder Mgmt   -  PVR q8h, CIC>400cc    #FEN   - Diet - Regular + Thins  [CCHO]    - Dysphagia  SLP - evaluation and treatment    #Health Maintenance  - Ocular lubricant gtts TID     #Precautions / PROPHYLAXIS:   - Falls, Seizure   - ortho: Weight bearing status: WBAT   - Lungs: Aspiration, Incentive Spirometer   - DVT: Lovenox & SCDs   --------------------------------------------  OUTPATIENT/FOLLOW UP:    Margarito Beckman  Medical Oncology  [address] [place] [address]  Phone: [phone]  Fax: [phone]  Established Patient  Follow Up Time: 2 weeks    Zurdo Bernard  Radiation Oncology  [address] [place] [address]  Phone: [phone]  Fax: [phone]  Established Patient  Follow Up Time: 2 weeks  --------------------------------------------  MEDICAL PROGNOSIS: GOOD                                   REHAB POTENTIAL: GOOD  ESTIMATED DISPOSITION: HOME                             ELOS: 10-14 Days   EXPECTED THERAPY:     P.T. 1hr/day       O.T. 1hr/day      S.L.P. 1hr/day      EXP FREQUENCY: 5 days per 7 day period     PRESCREEN COMPARISON: I have reviewed the prescreen information and I have found no relevant changes between the preadmission screening and my post admission evaluation     RATIONALE FOR INPATIENT ADMISSION - Patient demonstrates the following: (check all that apply)  [X] Medically appropriate for rehabilitation admission  [X] Has attainable rehab goals with an appropriate initial discharge plan  [X] Has rehabilitation potential (expected to make a significant improvement within a reasonable period of time)   [X] Requires close medical managment by a rehab physician, rehab nursing care, Hospitalist and comprehensive interdisciplinary team (including PT, OT, & or SLP, Prosthetics and Orthotics)     Assessment/Plan:  HARJEET WHITMORE is a 52 year old female with PMH of GERD, S4 lung cancer with mets to brain (s/p 4 cycles of chemo and thoracic RT- completed 10/2022), steroid induced Diabetes Mellitus, gamma knife surgery to 7 brain mets, and s/p temporal crani/resection of metastatic lesion; who presented to [facility] on 10/31 with BL UE and BL LE weakness and abdominal pain for 2 weeks concerning for progressive metastatic disease.  Overall imaging was significant for metastatic disease in the brain, lung, liver and abdomen.  NSGY deemed no surgical intervention for worsening brain mets.  Heme/Onc suggested 1 dose of C1D1 Carboplatin and Paclitaxel. She also received Bevacizumab during her admission. Her hospital course was complicated by hyponatremia (likely 2/2 SIADH given lung CA), and fever (negative workup). She will continue chemotherapy/radiation treatment in the outpatient setting with Dr. Beckman. Patient now admitted for a multidisciplinary rehab program.   -------------  * Heneon review and recs appreciated  * Abd pain--gastritis/liver mets-- continue PPI and antacid, will get GI consult if persisting  * l;abs d/w paitent,  hyponatremia, monitor     #Malignant neoplasm with metastasis to brain//liver/abdomen  - Gait Instability, ADL impairments and Functional impairments: start Comprehensive Rehab Program of PT/OT/SLP  - 3 hours a day, 5 days a week  - P&O as needed   - continue Dexamethasone 2mg BID  - Resume chemo/radiation therapy in outpatient setting  - Follow up with Dr. Beckman     #Seizure Prophylaxis  - contnue Depakote 500mg BID    #Steroid Induced Diabetes Mellitus  - A1c: 7.7 (11/2023)  - ISS  - FS AC&HS  - Metformin 500mg BID     #HLD  - Atorvastatin 40mg at HS    #Hyponatremia--monitor   - Likely secondary to lung CA    #Sleep  - Melatonin 3mg PRN     #Skin  - Skin on admission: Right crani incision DELISA  - Pressure injury/Skin: OOB to Chair, PT/OT     #Pain Mgmt   - Tylenol PRN    #GI/Bowel Mgmt   - Continue Pantoprazole - Miralax - Senna - Maalox PRN     #/Bladder Mgmt --voiding     #FEN   - Diet - Regular + Thins  [CCHO]    - Dysphagia  SLP - evaluation and treatment    #Health Maintenance  - Ocular lubricant gtts TID     #Precautions / PROPHYLAXIS:   - Falls, Seizure   - ortho: Weight bearing status: WBAT   - Lungs: Aspiration, Incentive Spirometer   - DVT: Lovenox   --------------------------------------------  OUTPATIENT/FOLLOW UP:    Margarito Beckman  Medical Oncology  [address] [place] [address]  Phone: [phone]  Fax: [phone]  Established Patient  Follow Up Time: 2 weeks    Zurdo Bernard  Radiation Oncology  [address] [place] [address]  Phone: [phone]  Fax: [phone]  Established Patient  Follow Up Time: 2 weeks  --------------------------------------------  MEDICAL PROGNOSIS: GOOD                                   REHAB POTENTIAL: GOOD  ESTIMATED DISPOSITION: HOME                             ELOS: 10-14 Days   EXPECTED THERAPY:     P.T. 1hr/day       O.T. 1hr/day      S.L.P. 1hr/day      EXP FREQUENCY: 5 days per 7 day period     PRESCREEN COMPARISON: I have reviewed the prescreen information and I have found no relevant changes between the preadmission screening and my post admission evaluation     RATIONALE FOR INPATIENT ADMISSION - Patient demonstrates the following: (check all that apply)  [X] Medically appropriate for rehabilitation admission  [X] Has attainable rehab goals with an appropriate initial discharge plan  [X] Has rehabilitation potential (expected to make a significant improvement within a reasonable period of time)   [X] Requires close medical managment by a rehab physician, rehab nursing care, Hospitalist and comprehensive interdisciplinary team (including PT, OT, & or SLP, Prosthetics and Orthotics)

## 2023-11-09 NOTE — DIETITIAN NUTRITION RISK NOTIFICATION - MUSCLE MASS (LOSS OF MUSCLE)
Temples.../Clavicles.../Dorsal hand... The patient is a 33y Male complaining of suture/staple removal.

## 2023-11-09 NOTE — DIETITIAN INITIAL EVALUATION ADULT - OTHER INFO
Reason for Admission: Malignant neoplasm with metastasis to brain  History of Present Illness:   Renée Arechiga is a 52 year old female with PMH of GERD, S4 lung cancer with mets to brain (s/p 4 cycles of chemo and thoracic RT- completed 10/2022), steroid induced Diabetes Mellitus, gamma knife surgery to 7 brain mets, and s/p temporal crani/resection of metastatic lesion; who presented to [facility] on 10/31 with BL UE and BL LE weakness and abdominal pain for 2 weeks concerning for progressive metastatic disease.  Overall imaging was significant for metastatic disease in the brain, lung, liver and abdomen.  NSGY deemed no surgical intervention for worsening brain mets.  Heme/Onc suggested 1 dose of C1D1 Carboplatin and Paclitaxel. She also received Bevacizumab during her admission. Her hospital course was complicated by hyponatremia (likely 2/2 SIADH given lung CA), and fever (negative workup).  She will continue chemotherapy/radiation treatment in the outpatient setting with Dr. Beckman.  PM&R was consulted and deemed her an appropriate candidate for IRF. She was medically stabilized and cleared for discharge to [facility].     At this time patient tolerating current diet w/ poor appetite/intake. Reviewed preferences and menu alternatives noted in [place]. Recommend Glucerna 8oz PO TID (Provides 660kcal-30grams of Protein) to assist patient in meeting estimated protein-energy requirements. No issues w/ dentition or chewing and swallowing current diet texture. Denies N/V/C/D, last BM 11/7 per patient report. With 23lb weight loss, per RD note UBW 143lb (10/6), CBW 120lb (11/18). Physical appearance consistent with malnutrition (muscle & subcutaneous fat loss observed). Encouraged patient to focus on high-protein, high-calorie foods during meals to maintain weight and lean body mass. Noted patient w/ steroid induced DM, A1c: 7.7% (11/1) POCT: 108-174mg/dL in last 24 hours, addressed w/ Consistent Carbohydrate w/ evening snack.

## 2023-11-09 NOTE — DIETITIAN INITIAL EVALUATION ADULT - PERTINENT MEDS FT
MEDICATIONS  (STANDING):  artificial tears (preservative free) Ophthalmic Solution 1 Drop(s) Both EYES three times a day  atorvastatin 40 milliGRAM(s) Oral at bedtime  dexAMETHasone     Tablet 2 milliGRAM(s) Oral two times a day  dextrose 5%. 1000 milliLiter(s) (50 mL/Hr) IV Continuous <Continuous>  dextrose 5%. 1000 milliLiter(s) (100 mL/Hr) IV Continuous <Continuous>  dextrose 50% Injectable 12.5 Gram(s) IV Push once  dextrose 50% Injectable 25 Gram(s) IV Push once  dextrose 50% Injectable 25 Gram(s) IV Push once  divalproex  milliGRAM(s) Oral every 12 hours  enoxaparin Injectable 40 milliGRAM(s) SubCutaneous every 24 hours  glucagon  Injectable 1 milliGRAM(s) IntraMuscular once  insulin lispro (ADMELOG) corrective regimen sliding scale   SubCutaneous three times a day before meals  metFORMIN 500 milliGRAM(s) Oral two times a day  pantoprazole    Tablet 40 milliGRAM(s) Oral before breakfast  polyethylene glycol 3350 17 Gram(s) Oral daily    MEDICATIONS  (PRN):  acetaminophen     Tablet .. 650 milliGRAM(s) Oral every 6 hours PRN Temp greater or equal to 38C (100.4F), Mild Pain (1 - 3)  aluminum hydroxide/magnesium hydroxide/simethicone Suspension 30 milliLiter(s) Oral every 4 hours PRN Dyspepsia  dextrose Oral Gel 15 Gram(s) Oral once PRN Blood Glucose LESS THAN 70 milliGRAM(s)/deciliter  melatonin 3 milliGRAM(s) Oral at bedtime PRN Insomnia  senna 2 Tablet(s) Oral at bedtime PRN Constipation

## 2023-11-09 NOTE — DIETITIAN NUTRITION RISK NOTIFICATION - TREATMENT: THE FOLLOWING DIET HAS BEEN RECOMMENDED
Diet, Consistent Carbohydrate w/Evening Snack:   Supplement Feeding Modality:  Oral  Glucerna Shake Cans or Servings Per Day:  1       Frequency:  Three Times a day (11-09-23 @ 14:15) [Pending Verification By Attending]  Diet, Consistent Carbohydrate w/Evening Snack (11-08-23 @ 15:22) [Active]

## 2023-11-09 NOTE — CONSULT NOTE ADULT - ASSESSMENT
Renée Rojas is a 52 year old female with PMH of GERD, S4 lung cancer with mets to brain (s/p 4 cycles of chemo and thoracic RT- completed 10/2022), steroid induced Diabetes Mellitus, gamma knife surgery to 7 brain mets, and s/p temporal crani/resection of metastatic lesion; who presented to [facility] on 10/31 with BL UE and BL LE weakness and abdominal pain for 2 weeks concerning for progressive metastatic disease.  Overall imaging was significant for metastatic disease in the brain, lung, liver and abdomen.  NSGY deemed no surgical intervention for worsening brain mets.  Heme/Onc suggested 1 dose of C1D1 Carboplatin and Paclitaxel. She also received Bevacizumab during her admission. Her hospital course was complicated by hyponatremia (likely 2/2 SIADH given lung CA), and fever.  She will continue chemotherapy/radiation treatment in the outpatient setting with Dr. Beckman.  PM&R was consulted and deemed her an appropriate candidate for IRF. She was medically stabilized and cleared for discharge to [facility].    Oncology consulted for h/o lung cancer.

## 2023-11-09 NOTE — DIETITIAN INITIAL EVALUATION ADULT - PERTINENT LABORATORY DATA
11-09    137  |  102  |  7   ----------------------------<  125<H>  4.2   |  25  |  0.46<L>    Ca    10.1      09 Nov 2023 06:38    TPro  6.7  /  Alb  1.7<L>  /  TBili  0.3  /  DBili  x   /  AST  125<H>  /  ALT  38  /  AlkPhos  222<H>  11-09  POCT Blood Glucose.: 117 mg/dL (11-09-23 @ 11:22)  A1C with Estimated Average Glucose Result: 7.7 % (11-01-23 @ 07:08)  A1C with Estimated Average Glucose Result: 8.0 % (09-20-23 @ 06:52)  A1C with Estimated Average Glucose Result: 7.3 % (08-28-23 @ 07:00)

## 2023-11-09 NOTE — CONSULT NOTE ADULT - PROBLEM SELECTOR RECOMMENDATION 9
Patient with advanced metastatic NSCLC, with POD-lung, brain, liver, abdominal metastases.  Her primary oncologist is Dr. Beckman-at this time, patient plans f/u with him for continued palliative cancer treatment.  Supportive H/O care while on rehab. DVT prophylaxis. Dr. Beckman notified of patient's admission.

## 2023-11-09 NOTE — DIETITIAN INITIAL EVALUATION ADULT - ADD RECOMMEND
1. Continue Consistent Carbohydrate w/ evening snack diet.   2. Recommend Glucerna 8oz PO TID (Provides 660kcal-30grams of Protein) to assist pt in meeting estimated protein energy needs.  3. Monitor PO intake, GI tolerance, skin integrity, labs, weight, and bowel movement regularity.   4. Honor food preferences as feasible. Assist with meals PRN and encourage PO intake.  5. Provide ongoing diet education as needed  6. RD remains available upon request and will follow-up per protocol

## 2023-11-09 NOTE — DIETITIAN NUTRITION RISK NOTIFICATION - BUCCAL DEPLETION IS
Future Appointments:  Future Appointments   Date Time Provider Ravindra Manuel   5/23/2022  2:40 PM MD ELBA Abreu BS AMB   10/3/2022 12:15 PM Tyler Merchant MD [facility] BS AMB        Last Appointment With Me:  3/29/2022     Requested Prescriptions     Pending Prescriptions Disp Refills    gabapentin (NEURONTIN) 300 mg capsule 270 Capsule 1     Sig: Take 1 Capsule by mouth three (3) times daily as needed for Pain. Max Daily Amount: 900 mg.
severe

## 2023-11-09 NOTE — CONSULT NOTE ADULT - SUBJECTIVE AND OBJECTIVE BOX
RENÉE WHITMORE  52y  Female  Admitting: IRENE Lucia    HPI:  Renée Whitmore is a 52 year old female with PMH of GERD, S4 lung cancer with mets to brain (s/p 4 cycles of chemo and thoracic RT- completed 10/2022), steroid induced Diabetes Mellitus, gamma knife surgery to 7 brain mets, and s/p temporal crani/resection of metastatic lesion; who presented to [facility] on 10/31 with BL UE and BL LE weakness and abdominal pain for 2 weeks concerning for progressive metastatic disease.  Overall imaging was significant for metastatic disease in the brain, lung, liver and abdomen.  NSGY deemed no surgical intervention for worsening brain mets.  Heme/Onc suggested 1 dose of C1D1 Carboplatin and Paclitaxel. She also received Bevacizumab during her admission. Her hospital course was complicated by hyponatremia (likely 2/2 SIADH given lung CA), and fever.  She will continue chemotherapy/radiation treatment in the outpatient setting with Dr. Beckman.  PM&R was consulted and deemed her an appropriate candidate for IRF. She was medically stabilized and cleared for discharge to [facility].    Oncology asked to see patient by rehab team, for h/o lung cancer.    PAST MEDICAL & SURGICAL HISTORY:  GERD (Gastroesophageal Reflux Disease)      Lung mass  right      Non-small cell lung cancer with metastasis      S/P endoscopy  2022      MEDICATIONS  (STANDING):  artificial tears (preservative free) Ophthalmic Solution 1 Drop(s) Both EYES three times a day  atorvastatin 40 milliGRAM(s) Oral at bedtime  dexAMETHasone     Tablet 2 milliGRAM(s) Oral two times a day  dextrose 5%. 1000 milliLiter(s) (50 mL/Hr) IV Continuous <Continuous>  dextrose 5%. 1000 milliLiter(s) (100 mL/Hr) IV Continuous <Continuous>  dextrose 50% Injectable 12.5 Gram(s) IV Push once  dextrose 50% Injectable 25 Gram(s) IV Push once  dextrose 50% Injectable 25 Gram(s) IV Push once  divalproex  milliGRAM(s) Oral every 12 hours  enoxaparin Injectable 40 milliGRAM(s) SubCutaneous every 24 hours  glucagon  Injectable 1 milliGRAM(s) IntraMuscular once  insulin lispro (ADMELOG) corrective regimen sliding scale   SubCutaneous three times a day before meals  metFORMIN 500 milliGRAM(s) Oral two times a day  pantoprazole    Tablet 40 milliGRAM(s) Oral before breakfast  polyethylene glycol 3350 17 Gram(s) Oral daily    MEDICATIONS  (PRN):  acetaminophen     Tablet .. 650 milliGRAM(s) Oral every 6 hours PRN Temp greater or equal to 38C (100.4F), Mild Pain (1 - 3)  aluminum hydroxide/magnesium hydroxide/simethicone Suspension 30 milliLiter(s) Oral every 6 hours PRN Dyspepsia  dextrose Oral Gel 15 Gram(s) Oral once PRN Blood Glucose LESS THAN 70 milliGRAM(s)/deciliter  melatonin 3 milliGRAM(s) Oral at bedtime PRN Insomnia  senna 2 Tablet(s) Oral at bedtime PRN Constipation    Allergies    No Known Allergies    FAMILY HISTORY: NC in first degree relatives      SOCIAL HISTORY: No EtOH, + tobacco    REVIEW OF SYSTEMS:    CONSTITUTIONAL: + weakness  EYES/ENT: No throat pain   NECK: No pain or stiffness  RESPIRATORY: No hemoptysis; No shortness of breath  CARDIOVASCULAR: No chest pain or palpitations  GASTROINTESTINAL: No hematemesis; No melena or hematochezia.  GENITOURINARY: No hematuria  NEUROLOGICAL: +weakness  SKIN: No itching  All other review of systems is negative unless indicated above.    Height (cm): 167.6 (11-08 @ 18:29)  Weight (kg): 54.8 (11-08 @ 18:29)  BMI (kg/m2): 19.5 (11-08 @ 18:29)  BSA (m2): 1.61 (11-08 @ 18:29)    T(F): 98.1 (11-09-23 @ 07:44), Max: 98.7 (11-08-23 @ 22:13)  HR: 105 (11-09-23 @ 07:44)  BP: 113/84 (11-09-23 @ 07:44)  RR: 20 (11-09-23 @ 07:44)  SpO2: 99% (11-09-23 @ 07:44)    GENERAL: NAD, well-developed  EYES: EOMI, PERRLA, conjunctiva and sclera clear  NECK: Supple, No JVD  CHEST/LUNG: decreased BS ant.  HEART: Regular rate and rhythm; No murmurs, rubs, or gallops  ABDOMEN: softly distended, tender to palpation  EXTREMITIES:  no calf tenderness  NEUROLOGY: awake, alert  SKIN: No rashes     Labs:             10.4   3.98  )-----------( 131      ( 11-09 @ 06:38 )             32.3                9.7    4.37  )-----------( 144      ( 11-07 @ 07:11 )             30.2     Consultant notes reviewed : YES [ x] ; NO [ ]      Radiology and additional tests:  < from: MR Head w/wo IV Cont (11.02.23 @ 17:51) >    IMPRESSION: Right temporal postoperative changes with slight increase in   the size of the enhancement within the postoperative bed since 10/2/2023   with slightly less vasogenic edema. Multiple additional brain metastases   have increased in size since the prior exam as described above.    < end of copied text >  < from: CT Abdomen and Pelvis w/ IV Cont (10.31.23 @ 18:00) >  No pulmonary embolus.    Overall progression of neoplastic disease as evidenced by enlarging   dominant right upper lobe pulmonary mass with interval increase in size   and number of bilateral pulmonary metastases and bilobar hepatic   metastases when compared to prior imaging September 2023.    Additionally, new scattered metastatic nodules also noted in the   subcutaneous tissues of the abdominal wall.      < end of copied text >

## 2023-11-09 NOTE — DIETITIAN INITIAL EVALUATION ADULT - PERSON TAUGHT/METHOD
Optimal protein-energy intake, Consistent Carbohydrate meal pattern/verbal instruction/teach back - (Patient repeats in own words)/patient instructed

## 2023-11-09 NOTE — CHART NOTE - NSCHARTNOTEFT_GEN_A_CORE
[facility] Interdisciplinary Plan of Care    REHABILITATION DIAGNOSIS:  Metastatic cancer of lung, metastatic to brain      COMORBIDITIES/COMPLICATING CONDITIONS IMPACTING REHABILITATION:  HEALTH ISSUES - PROBLEM Dx:  Non-small cell carcinoma of lung    HLD   Type 2 DM  Hyponatremia  Chronic pain   Impaired mobility       PAST MEDICAL & SURGICAL HISTORY:  GERD (Gastroesophageal Reflux Disease)      Lung mass  right      Non-small cell lung cancer with metastasis      S/P endoscopy  2022        Based upon consideration of the patient's impairments, functional status, complicating conditions and any other contributing factors and after information garnered from the assessments of all therapy disciplines involved in treating the patient and other pertinent clinicians:    INTERDISCIPLINARY REHABILITATION INTERVENTIONS:    [ X  ] Transfer Training  [ X  ] Bed Mobility  [ X  ] Therapeutic Exercise  [ X ] Balance/Coordination Exercises  [ X ] Locomotion retraining  [ X  ] Stairs  [  X ] Functional Transfer Training  [   ] Bowel/Bladder program  [ x  ] Pain Management  [   ] Skin/Wound Care  [   ] Visual/Perceptual Training  [ x  ] Therapeutic Recreation Activities  [   ] Neuromuscular Re-education  [ X  ] Activities of Daily Living  [   ] Speech Exercise  [   ] Swallowing Exercises  [   ] Vital Stim  [   ] Dietary Supplements  [   ] Calorie Count  [   ] Cognitive Exercises  [   ] Congnitive/Linguistic Treatment  [   ] Behavior Program  [   ] Neuropsych Therapy  [ X  ] Patient/Family Counseling  [ X ] Family Training  [ X  ] Community Re-entry  [   ] Orthotic Evaluation  [   ] Prosthetic Eval/Training    MEDICAL PROGNOSIS:  Fair     REHAB POTENTIAL:  Fair   EXPECTED DAILY THERAPY:         PT: 1. 5 hr        OT: 1.5 hr        ST: n/a        P&O: n/a    EXPECTED INTENSITY OF PROGRAM:  3 hrs / Day    EXPECTED FREQUENCY OF PROGRAM: 5 Days/ Week    ESTIMATED LOS:  [  ] 5-7 Days  [  ] 7-10 Days  [ X ] 10- 14 Days  [  ] 14- 18 Days  [  ] 18- 21 Days    ESTIMATED DISPOSITION:  [  ] Home   [  x] Home with Outpatient Therapies  [  ] Home with Home Therapies  [  ] Assisted Living  [  ] Nursing Home  [  ] Long Term Acute Care    INTERDISCIPLINARY FUNCTIONAL OUTCOMES/GOALS:         Gait/Mobility: 4 with gait device        Transfers: 4       ADLs: 4       Functional Transfers: 5       Medication Management: 5       Communication: 5       Cognitive: 5       Dysphagia: 6       Bladder 6       Bowel: 6     Functional Independent Measures:   7 = Independent  6 = Modified Independent  5 = Supervision  4 = Minimal Assist/ Contact Guard  3 = Moderate Assistance  2 = Maximum Assistance  1 = Total Assistance  0 = Unable to assess

## 2023-11-09 NOTE — CONSULT NOTE ADULT - SUBJECTIVE AND OBJECTIVE BOX
Patient is a 52y old  Female who presents with a chief complaint of Malignant neoplasm with metastasis to brain (08 Nov 2023 15:08)    HPI:  Renée Rojas is a 52 year old female with PMH of GERD, S4 lung cancer with mets to brain (s/p 4 cycles of chemo and thoracic RT- completed 10/2022), steroid induced Diabetes Mellitus, gamma knife surgery to 7 brain mets, and s/p temporal crani/resection of metastatic lesion; who presented to [facility] on 10/31 with BL UE and BL LE weakness and abdominal pain for 2 weeks concerning for progressive metastatic disease.  Overall imaging was significant for metastatic disease in the brain, lung, liver and abdomen.  NSGY deemed no surgical intervention for worsening brain mets.  Heme/Onc suggested 1 dose of C1D1 Carboplatin and Paclitaxel. She also received Bevacizumab during her admission. Her hospital course was complicated by hyponatremia (likely 2/2 SIADH given lung CA), and fever (negative workup).  She will continue chemotherapy/radiation treatment in the outpatient setting with Dr. Beckman.  PM&R was consulted and deemed her an appropriate candidate for IRF. She was medically stabilized and cleared for discharge to [facility].  (08 Nov 2023 15:08)    PAST MEDICAL & SURGICAL HISTORY:  GERD (Gastroesophageal Reflux Disease)      Lung mass  right      Non-small cell lung cancer with metastasis      S/P endoscopy  2022        SOCIAL HISTORY:  FAMILY HISTORY:    ALLERGIES:  No Known Allergies    MEDICATIONS  (STANDING):  artificial tears (preservative free) Ophthalmic Solution 1 Drop(s) Both EYES three times a day  atorvastatin 40 milliGRAM(s) Oral at bedtime  dexAMETHasone     Tablet 2 milliGRAM(s) Oral two times a day  dextrose 5%. 1000 milliLiter(s) (50 mL/Hr) IV Continuous <Continuous>  dextrose 5%. 1000 milliLiter(s) (100 mL/Hr) IV Continuous <Continuous>  dextrose 50% Injectable 12.5 Gram(s) IV Push once  dextrose 50% Injectable 25 Gram(s) IV Push once  dextrose 50% Injectable 25 Gram(s) IV Push once  divalproex  milliGRAM(s) Oral every 12 hours  enoxaparin Injectable 40 milliGRAM(s) SubCutaneous every 24 hours  glucagon  Injectable 1 milliGRAM(s) IntraMuscular once  insulin lispro (ADMELOG) corrective regimen sliding scale   SubCutaneous three times a day before meals  metFORMIN 500 milliGRAM(s) Oral two times a day  pantoprazole    Tablet 40 milliGRAM(s) Oral before breakfast  polyethylene glycol 3350 17 Gram(s) Oral daily    MEDICATIONS  (PRN):  acetaminophen     Tablet .. 650 milliGRAM(s) Oral every 6 hours PRN Temp greater or equal to 38C (100.4F), Mild Pain (1 - 3)  aluminum hydroxide/magnesium hydroxide/simethicone Suspension 30 milliLiter(s) Oral every 6 hours PRN Dyspepsia  dextrose Oral Gel 15 Gram(s) Oral once PRN Blood Glucose LESS THAN 70 milliGRAM(s)/deciliter  melatonin 3 milliGRAM(s) Oral at bedtime PRN Insomnia  senna 2 Tablet(s) Oral at bedtime PRN Constipation    Review of Systems: Refer to HPI for pertinent positives and negatives. All other ROS reviewed and negative except as otherwise stated above.    Vital Signs Last 24 Hrs  T(F): 98.7 (08 Nov 2023 22:13), Max: 98.7 (08 Nov 2023 22:13)  HR: 104 (08 Nov 2023 22:13) (104 - 116)  BP: 106/65 (08 Nov 2023 22:13) (106/65 - 125/81)  RR: 16 (08 Nov 2023 22:13) (16 - 20)  SpO2: 96% (08 Nov 2023 22:13) (96% - 97%)  I&O's Summary    PHYSICAL EXAM:  GENERAL: NAD, well-groomed  HEAD:  Atraumatic, Normocephalic  EYES: EOMI, PERRL, conjunctiva and sclera clear  ENMT: Moist mucous membranes, Good dentition  NECK: Supple, No JVD  CHEST/LUNG: Clear to auscultation bilaterally, non-labored breathing, good air entry  HEART: RRR; S1/S2, No murmur  ABDOMEN: Soft, Nontender, Nondistended; Bowel sounds present  VASCULAR: Normal pulses, Normal capillary refill  EXTREMITIES: No cyanosis, No edema  LYMPH: No lymphadenopathy noted  SKIN: Warm, Intact  PSYCH: Normal mood and affect  NERVOUS SYSTEM:  A/O x3, Good concentration; CN 2-12 intact, No focal deficits, moves all extremities    LABS:                        9.7    4.37  )-----------( 144      ( 07 Nov 2023 07:11 )             30.2     11-07    131  |  105  |  6   ----------------------------<  142  3.8   |  24  |  <0.30    Ca    9.8      07 Nov 2023 07:11  Phos  3.0     11-07  Mg     1.9     11-07    TPro  6.4  /  Alb  2.7  /  TBili  0.3  /  DBili  x   /  AST  62  /  ALT  33  /  AlkPhos  201  11-07    POCT Blood Glucose.: 174 mg/dL (08 Nov 2023 12:38)  POCT Blood Glucose.: 125 mg/dL (08 Nov 2023 08:00)    Urinalysis Basic - ( 07 Nov 2023 07:11 )    Color: x / Appearance: x / SG: x / pH: x  Gluc: 142 mg/dL / Ketone: x  / Bili: x / Urobili: x   Blood: x / Protein: x / Nitrite: x   Leuk Esterase: x / RBC: x / WBC x   Sq Epi: x / Non Sq Epi: x / Bacteria: x        Culture - Urine (collected 03 Nov 2023 15:09)  Source: Clean Catch Clean Catch (Midstream)  Final Report (04 Nov 2023 15:00):    <10,000 CFU/mL Normal Urogenital Elizabeth    Culture - Blood (collected 03 Nov 2023 14:30)  Source: .Blood Blood-Peripheral  Final Report (08 Nov 2023 18:01):    No growth at 5 days    Culture - Blood (collected 03 Nov 2023 14:30)  Source: .Blood Blood-Peripheral  Final Report (08 Nov 2023 18:01):    No growth at 5 days     Patient is a 52y old  Female who presents with a chief complaint of Malignant neoplasm with metastasis to brain (08 Nov 2023 15:08)    HPI:  Renée Rojas is a 52 year old female with PMH of GERD, S4 lung cancer with mets to brain (s/p 4 cycles of chemo and thoracic RT- completed 10/2022), steroid induced Diabetes Mellitus, gamma knife surgery to 7 brain mets, and s/p temporal crani/resection of metastatic lesion; who presented to [facility] on 10/31 with BL UE and BL LE weakness and abdominal pain for 2 weeks concerning for progressive metastatic disease.  Overall imaging was significant for metastatic disease in the brain, lung, liver and abdomen.  NSGY deemed no surgical intervention for worsening brain mets.  Heme/Onc suggested 1 dose of C1D1 Carboplatin and Paclitaxel. She also received Bevacizumab during her admission. Her hospital course was complicated by hyponatremia (likely 2/2 SIADH given lung CA), and fever (negative workup).  She will continue chemotherapy/radiation treatment in the outpatient setting with Dr. Beckman.  PM&R was consulted and deemed her an appropriate candidate for IRF. She was medically stabilized and cleared for discharge to [facility].  (08 Nov 2023 15:08)    Currently patient complains of generalized weakness but more particularly on the right side, persistent, in context of known metastatic brain lesion, associated with difficulty walking / balancing. Does also have some abdominal pain. Her doctor told her "the cancer may be acting on in my stomach." Generalized decreased food/drink intake. + Voiding freely. + bowel movements. No chest pain or SOB. No headache.     PAST MEDICAL & SURGICAL HISTORY:  GERD (Gastroesophageal Reflux Disease)      Lung mass  right      Non-small cell lung cancer with metastasis      S/P endoscopy  2022        SOCIAL HISTORY: Former marijuana use, quit about 12 years ago. no cigarette use  FAMILY HISTORY: No family hx of canacer    ALLERGIES:  No Known Allergies    MEDICATIONS  (STANDING):  artificial tears (preservative free) Ophthalmic Solution 1 Drop(s) Both EYES three times a day  atorvastatin 40 milliGRAM(s) Oral at bedtime  dexAMETHasone     Tablet 2 milliGRAM(s) Oral two times a day  dextrose 5%. 1000 milliLiter(s) (50 mL/Hr) IV Continuous <Continuous>  dextrose 5%. 1000 milliLiter(s) (100 mL/Hr) IV Continuous <Continuous>  dextrose 50% Injectable 12.5 Gram(s) IV Push once  dextrose 50% Injectable 25 Gram(s) IV Push once  dextrose 50% Injectable 25 Gram(s) IV Push once  divalproex  milliGRAM(s) Oral every 12 hours  enoxaparin Injectable 40 milliGRAM(s) SubCutaneous every 24 hours  glucagon  Injectable 1 milliGRAM(s) IntraMuscular once  insulin lispro (ADMELOG) corrective regimen sliding scale   SubCutaneous three times a day before meals  metFORMIN 500 milliGRAM(s) Oral two times a day  pantoprazole    Tablet 40 milliGRAM(s) Oral before breakfast  polyethylene glycol 3350 17 Gram(s) Oral daily    MEDICATIONS  (PRN):  acetaminophen     Tablet .. 650 milliGRAM(s) Oral every 6 hours PRN Temp greater or equal to 38C (100.4F), Mild Pain (1 - 3)  aluminum hydroxide/magnesium hydroxide/simethicone Suspension 30 milliLiter(s) Oral every 6 hours PRN Dyspepsia  dextrose Oral Gel 15 Gram(s) Oral once PRN Blood Glucose LESS THAN 70 milliGRAM(s)/deciliter  melatonin 3 milliGRAM(s) Oral at bedtime PRN Insomnia  senna 2 Tablet(s) Oral at bedtime PRN Constipation    Review of Systems: Refer to HPI for pertinent positives and negatives. All other ROS reviewed and negative except as otherwise stated above.    Vital Signs Last 24 Hrs  T(F): 98.7 (08 Nov 2023 22:13), Max: 98.7 (08 Nov 2023 22:13)  HR: 104 (08 Nov 2023 22:13) (104 - 116)  BP: 106/65 (08 Nov 2023 22:13) (106/65 - 125/81)  RR: 16 (08 Nov 2023 22:13) (16 - 20)  SpO2: 96% (08 Nov 2023 22:13) (96% - 97%)  I&O's Summary    PHYSICAL EXAM:  GENERAL: NAD, well-groomed  HEAD:  Atraumatic, Normocephalic  EYES: EOMI, PERRL, conjunctiva and sclera clear  ENMT: dry mucous membranes, Good dentition  NECK: Supple, No JVD  CHEST/LUNG: generalized diminished breath sounds although no crackles or wheezes, non-labored breathing  HEART: RRR; S1/S2, No murmur  ABDOMEN: Soft, mildly tender to palpation in all quadrants, Nondistended; Bowel sounds present  VASCULAR: Normal pulses, Normal capillary refill  EXTREMITIES: No cyanosis, No edema  LYMPH: No lymphadenopathy noted  SKIN: Warm, Intact  PSYCH: flat affect  NERVOUS SYSTEM:  A/O x3, Good concentration; CN 2-12 intact, mild right sided weakness but moves all extremities    LABS:                        9.7    4.37  )-----------( 144      ( 07 Nov 2023 07:11 )             30.2     11-07    131  |  105  |  6   ----------------------------<  142  3.8   |  24  |  <0.30    Ca    9.8      07 Nov 2023 07:11  Phos  3.0     11-07  Mg     1.9     11-07    TPro  6.4  /  Alb  2.7  /  TBili  0.3  /  DBili  x   /  AST  62  /  ALT  33  /  AlkPhos  201  11-07    POCT Blood Glucose.: 174 mg/dL (08 Nov 2023 12:38)  POCT Blood Glucose.: 125 mg/dL (08 Nov 2023 08:00)    Urinalysis Basic - ( 07 Nov 2023 07:11 )    Color: x / Appearance: x / SG: x / pH: x  Gluc: 142 mg/dL / Ketone: x  / Bili: x / Urobili: x   Blood: x / Protein: x / Nitrite: x   Leuk Esterase: x / RBC: x / WBC x   Sq Epi: x / Non Sq Epi: x / Bacteria: x        Culture - Urine (collected 03 Nov 2023 15:09)  Source: Clean Catch Clean Catch (Midstream)  Final Report (04 Nov 2023 15:00):    <10,000 CFU/mL Normal Urogenital Elizabeth    Culture - Blood (collected 03 Nov 2023 14:30)  Source: .Blood Blood-Peripheral  Final Report (08 Nov 2023 18:01):    No growth at 5 days    Culture - Blood (collected 03 Nov 2023 14:30)  Source: .Blood Blood-Peripheral  Final Report (08 Nov 2023 18:01):    No growth at 5 days

## 2023-11-09 NOTE — CONSULT NOTE ADULT - ASSESSMENT
52F with metastatic lung cancer to brain on ongoing chemo/RT, steroid-induced DM, now in acute rehab after being hospitalized for progressive weakness    #Metastatic lung cancer to brain  #Progressive weakness due to above  -PT/OT  -outpatient chemo/RT   -c/w Decadron   -seizure prophylaxis: Depakote    #Steroid-induced DM  A1C with Estimated Average Glucose Result: 7.7 % (11-01-23 @ 07:08)  POCT Blood Glucose.: 174 mg/dL (08 Nov 2023 12:38)  POCT Blood Glucose.: 125 mg/dL (08 Nov 2023 08:00)  c/w Metformin, ISS    #SIADH  -monitor BMP per routine    #DVT ppx: Lovenox       52F with metastatic lung cancer to brain/liver on ongoing chemo/RT, steroid-induced DM, now in acute rehab after being hospitalized for progressive weakness    #Metastatic lung cancer to brain/liver  #Progressive weakness due to above  #Abdominal pain likely due to above mets (liver mets, abdominal wall subcutaneous nodules, all in recent imaging)  -PT/OT  -outpatient chemo/RT   -c/w Decadron   -seizure prophylaxis: Depakote  -Pain control    #Steroid-induced DM  A1C with Estimated Average Glucose Result: 7.7 % (11-01-23 @ 07:08)  POCT Blood Glucose.: 174 mg/dL (08 Nov 2023 12:38)  POCT Blood Glucose.: 125 mg/dL (08 Nov 2023 08:00)  c/w Metformin, ISS    #SIADH  -monitor BMP per routine    #DVT ppx: Lovenox

## 2023-11-10 NOTE — PROGRESS NOTE ADULT - ASSESSMENT
Assessment/Plan:  HARJEET WHITMORE is a 52 year old female with PMH of GERD, S4 lung cancer with mets to brain (s/p 4 cycles of chemo and thoracic RT- completed 10/2022), steroid induced Diabetes Mellitus, gamma knife surgery to 7 brain mets, and s/p temporal crani/resection of metastatic lesion; who presented to [facility] on 10/31 with BL UE and BL LE weakness and abdominal pain for 2 weeks concerning for progressive metastatic disease.  Overall imaging was significant for metastatic disease in the brain, lung, liver and abdomen.  NSGY deemed no surgical intervention for worsening brain mets.  Heme/Onc suggested 1 dose of C1D1 Carboplatin and Paclitaxel. She also received Bevacizumab during her admission. Her hospital course was complicated by hyponatremia (likely 2/2 SIADH given lung CA), and fever (negative workup). She will continue chemotherapy/radiation treatment in the outpatient setting with Dr. Beckman. Patient now admitted for a multidisciplinary rehab program.   -------------  * Heneon review and recs appreciated  * Abd pain--gastritis/liver mets-- continue PPI and antacid, will get GI consult if persisting  * l;abs d/w paitent,  hyponatremia, monitor     #Malignant neoplasm with metastasis to brain//liver/abdomen  - Gait Instability, ADL impairments and Functional impairments: start Comprehensive Rehab Program of PT/OT/SLP  - 3 hours a day, 5 days a week  - P&O as needed   - continue Dexamethasone 2mg BID  - Resume chemo/radiation therapy in outpatient setting  - Follow up with Dr. Bekcman     #Seizure Prophylaxis  - contnue Depakote 500mg BID    #Steroid Induced Diabetes Mellitus  - A1c: 7.7 (11/2023)  - ISS  - FS AC&HS  - Metformin 500mg BID     #HLD  - Atorvastatin 40mg at HS    #Hyponatremia--monitor   - Likely secondary to lung CA    #Sleep  - Melatonin 3mg PRN     #Skin  - Skin on admission: Right crani incision DELISA  - Pressure injury/Skin: OOB to Chair, PT/OT     #Pain Mgmt   - Tylenol PRN    #GI/Bowel Mgmt   - Continue Pantoprazole - Miralax - Senna - Maalox PRN     #/Bladder Mgmt --voiding     #FEN   - Diet - Regular + Thins  [CCHO]    - Dysphagia  SLP - evaluation and treatment    #Health Maintenance  - Ocular lubricant gtts TID     #Precautions / PROPHYLAXIS:   - Falls, Seizure   - ortho: Weight bearing status: WBAT   - Lungs: Aspiration, Incentive Spirometer   - DVT: Lovenox   --------------------------------------------  OUTPATIENT/FOLLOW UP:    Margarito Beckman  Medical Oncology  [address] [place] [id number]  Phone: [phone]  Fax: [phone]  Established Patient  Follow Up Time: 2 weeks    Zurdo Bernard  Radiation Oncology  [address] [place] [id number]  Phone: [phone]  Fax: [phone]  Established Patient  Follow Up Time: 2 weeks  --------------------------------------------  MEDICAL PROGNOSIS: GOOD                                   REHAB POTENTIAL: GOOD  ESTIMATED DISPOSITION: HOME                             ELOS: 10-14 Days   EXPECTED THERAPY:     P.T. 1hr/day       O.T. 1hr/day      S.L.P. 1hr/day      EXP FREQUENCY: 5 days per 7 day period     PRESCREEN COMPARISON: I have reviewed the prescreen information and I have found no relevant changes between the preadmission screening and my post admission evaluation     RATIONALE FOR INPATIENT ADMISSION - Patient demonstrates the following: (check all that apply)  [X] Medically appropriate for rehabilitation admission  [X] Has attainable rehab goals with an appropriate initial discharge plan  [X] Has rehabilitation potential (expected to make a significant improvement within a reasonable period of time)   [X] Requires close medical managment by a rehab physician, rehab nursing care, Hospitalist and comprehensive interdisciplinary team (including PT, OT, & or SLP, Prosthetics and Orthotics)     Assessment/Plan:  HARJEET WHITMORE is a 52 year old female with PMH of GERD, S4 lung cancer with mets to brain (s/p 4 cycles of chemo and thoracic RT- completed 10/2022), steroid induced Diabetes Mellitus, gamma knife surgery to 7 brain mets, and s/p temporal crani/resection of metastatic lesion; who presented to [facility] on 10/31 with BL UE and BL LE weakness and abdominal pain for 2 weeks concerning for progressive metastatic disease.  Overall imaging was significant for metastatic disease in the brain, lung, liver and abdomen.  NSGY deemed no surgical intervention for worsening brain mets.  Heme/Onc suggested 1 dose of C1D1 Carboplatin and Paclitaxel. She also received Bevacizumab during her admission. Her hospital course was complicated by hyponatremia (likely 2/2 SIADH given lung CA), and fever (negative workup). She will continue chemotherapy/radiation treatment in the outpatient setting with Dr. Beckman. Patient now admitted for a multidisciplinary rehab program.   -------------    * Abd pain--resolving, continue PPI and antacid    #Malignant neoplasm with metastasis to brain//liver/abdomen  - Gait Instability, ADL impairments and Functional impairments: start Comprehensive Rehab Program of PT/OT/SLP  - 3 hours a day, 5 days a week  - continue Dexamethasone 2mg BID  - Resume chemo/radiation therapy in outpatient setting  - Follow up with Dr. Beckman     #Seizure Prophylaxis  - contnue Depakote 500mg BID    #Steroid Induced Diabetes Mellitus  - A1c: 7.7 (11/2023)  - ISS  - FS AC&HS  - Metformin 500mg BID     #HLD  - Atorvastatin 40mg at HS    #Hyponatremia--monitor   - Likely secondary to lung CA    #Sleep  - Melatonin 3mg PRN     #Skin  - Skin -- Right crani incision ,healed       #Pain Mgmt   - Tylenol PRN    #GI/Bowel Mgmt   - Continue Pantoprazole - Miralax - Senna - Maalox PRN     #/Bladder Mgmt --voiding     #FEN   - Diet - Regular + Thins  [CCHO]    - Dysphagia  SLP - evaluation and treatment    #Health Maintenance  - Ocular lubricant gtts TID     #Precautions / PROPHYLAXIS:   - Falls, Seizure   - ortho: Weight bearing status: WBAT   - Lungs: Aspiration, Incentive Spirometer   - DVT: Lovenox   --------------------------------------------  OUTPATIENT/FOLLOW UP:    Margarito Beckman  Medical Oncology  [address] [place] [id number]  Phone: [phone]  Fax: [phone]  Established Patient  Follow Up Time: 2 weeks    Zurdo Bernard  Radiation Oncology  [address] [place] [id number]  Phone: [phone]  Fax: [phone]  Established Patient  Follow Up Time: 2 weeks  --------------------------------------------

## 2023-11-10 NOTE — PROGRESS NOTE ADULT - ASSESSMENT
Renée Rojas is a 52 year old female with PMH of GERD, S4 lung cancer with mets to brain (s/p 4 cycles of chemo and thoracic RT- completed 10/2022), steroid induced Diabetes Mellitus, gamma knife surgery to 7 brain mets, and s/p temporal crani/resection of metastatic lesion; who presented to [facility] on 10/31 with BL UE and BL LE weakness and abdominal pain for 2 weeks concerning for progressive metastatic disease.  Overall imaging was significant for metastatic disease in the brain, lung, liver and abdomen.  NSGY deemed no surgical intervention for worsening brain mets.  Heme/Onc suggested 1 dose of C1D1 Carboplatin and Paclitaxel. She also received Bevacizumab during her admission. Her hospital course was complicated by hyponatremia (likely 2/2 SIADH given lung CA), and fever.  She will continue chemotherapy/radiation treatment in the outpatient setting with Dr. Beckman.  PM&R was consulted and deemed her an appropriate candidate for IRF. She was medically stabilized and cleared for discharge to [facility].    Oncology consulted for h/o lung cancer.

## 2023-11-10 NOTE — PROGRESS NOTE ADULT - SUBJECTIVE AND OBJECTIVE BOX
Subjective  Reports no acute distress  Abd pain intermittent and responsive to analgesic regimen  Previously declined admission scrrening COVID screen, but agreed to this after discussion  Tolerating oral diet     ROS--no chest or abd pain, no nausea or vomiting   LBM 10/11    Vital Signs Last 24 Hrs  T(C): 36.7 (10 Nov 2023 08:00), Max: 36.7 (10 Nov 2023 08:00)  T(F): 98 (10 Nov 2023 08:00), Max: 98 (10 Nov 2023 08:00)  HR: 109 (10 Nov 2023 08:00) (109 - 120)  BP: 109/76 (10 Nov 2023 08:00) (102/72 - 109/76)  RR: 16 (10 Nov 2023 08:00) (16 - 16)  SpO2: 95% (10 Nov 2023 08:00) (95% - 100%)  O2 Parameters below as of 10 Nov 2023 08:00  Patient On (Oxygen Delivery Method): room air      EXAM  Gen - Comfortable  HEENT - NAD  Neck - Supple, No limited ROM  Pulm - CTAB, No wheeze, No rhonchi, No crackles  Cardiovascular - RRR, S1S2  Chest - good chest expansion, good respiratory effort  Abdomen - Soft,  +BS, mild tenderness   Extremities - No Cyanosis, no clubbing, no edema, no calf tenderness    Neuro-   Alert and fully oriented  Normal speech      Cranial Nerves - CN 2-12 intact     Motor -                     LEFT    UE -  5/5                    RIGHT UE - 4/5                    LEFT    LE -  5/5                    RIGHT LE - 4/5        Sensory - Intact  to LT bilaterally     Reflexes - DTR Intact, No primitive reflexive     Tone - normal  MSK: Right sided weakness, mainly right leg, chronic  Psychiatric - Mood stable, Affect WNL  Skin:  Right crani incision DELISA    RECENT LABS/IMAGING                        10.4   3.98  )-----------( 131      ( 09 Nov 2023 06:38 )             32.3     11-09    137  |  102  |  7   ----------------------------<  125<H>  4.2   |  25  |  0.46<L>    Ca    10.1      09 Nov 2023 06:38    TPro  6.7  /  Alb  1.7<L>  /  TBili  0.3  /  DBili  x   /  AST  125<H>  /  ALT  38  /  AlkPhos  222<H>  11-09      Urinalysis Basic - ( 09 Nov 2023 06:38 )    Color: x / Appearance: x / SG: x / pH: x  Gluc: 125 mg/dL / Ketone: x  / Bili: x / Urobili: x   Blood: x / Protein: x / Nitrite: x   Leuk Esterase: x / RBC: x / WBC x   Sq Epi: x / Non Sq Epi: x / Bacteria: x    MEDICATIONS  (STANDING):  artificial tears (preservative free) Ophthalmic Solution 1 Drop(s) Both EYES three times a day  atorvastatin 40 milliGRAM(s) Oral at bedtime  dexAMETHasone     Tablet 2 milliGRAM(s) Oral two times a day  dextrose 5%. 1000 milliLiter(s) (50 mL/Hr) IV Continuous <Continuous>  dextrose 5%. 1000 milliLiter(s) (100 mL/Hr) IV Continuous <Continuous>  dextrose 50% Injectable 12.5 Gram(s) IV Push once  dextrose 50% Injectable 25 Gram(s) IV Push once  dextrose 50% Injectable 25 Gram(s) IV Push once  divalproex  milliGRAM(s) Oral every 12 hours  enoxaparin Injectable 40 milliGRAM(s) SubCutaneous every 24 hours  glucagon  Injectable 1 milliGRAM(s) IntraMuscular once  insulin lispro (ADMELOG) corrective regimen sliding scale   SubCutaneous three times a day before meals  metFORMIN 500 milliGRAM(s) Oral two times a day  pantoprazole    Tablet 40 milliGRAM(s) Oral before breakfast  polyethylene glycol 3350 17 Gram(s) Oral daily    MEDICATIONS  (PRN):  acetaminophen     Tablet .. 650 milliGRAM(s) Oral every 6 hours PRN Temp greater or equal to 38C (100.4F), Mild Pain (1 - 3)  aluminum hydroxide/magnesium hydroxide/simethicone Suspension 30 milliLiter(s) Oral every 4 hours PRN Dyspepsia  dextrose Oral Gel 15 Gram(s) Oral once PRN Blood Glucose LESS THAN 70 milliGRAM(s)/deciliter  melatonin 3 milliGRAM(s) Oral at bedtime PRN Insomnia  senna 2 Tablet(s) Oral at bedtime PRN Constipation

## 2023-11-10 NOTE — PROGRESS NOTE ADULT - SUBJECTIVE AND OBJECTIVE BOX
RENÉE WHITMORE   52y   Female    Admitting: IRENE Lucia  HPI:    Renée Whitmore is a 52 year old female with PMH of GERD, S4 lung cancer with mets to brain (s/p 4 cycles of chemo and thoracic RT- completed 10/2022), steroid induced Diabetes Mellitus, gamma knife surgery to 7 brain mets, and s/p temporal crani/resection of metastatic lesion; who presented to [facility] on 10/31 with BL UE and BL LE weakness and abdominal pain for 2 weeks concerning for progressive metastatic disease.  Overall imaging was significant for metastatic disease in the brain, lung, liver and abdomen.  NSGY deemed no surgical intervention for worsening brain mets.  Heme/Onc suggested 1 dose of C1D1 Carboplatin and Paclitaxel. She also received Bevacizumab during her admission. Her hospital course was complicated by hyponatremia (likely 2/2 SIADH given lung CA), and fever.  She will continue chemotherapy/radiation treatment in the outpatient setting with Dr. Beckman.  PM&R was consulted and deemed her an appropriate candidate for IRF. She was medically stabilized and cleared for discharge to [facility].    Oncology asked to see patient by rehab team, for h/o lung cancer.    PAST MEDICAL & SURGICAL HISTORY:  GERD (Gastroesophageal Reflux Disease)      Lung mass  right      Non-small cell lung cancer with metastasis      S/P endoscopy  2022      HEALTH ISSUES - PROBLEM Dx:  Non-small cell carcinoma of lung      MEDICATIONS  (STANDING):  artificial tears (preservative free) Ophthalmic Solution 1 Drop(s) Both EYES three times a day  atorvastatin 40 milliGRAM(s) Oral at bedtime  dexAMETHasone     Tablet 2 milliGRAM(s) Oral two times a day  dextrose 5%. 1000 milliLiter(s) (50 mL/Hr) IV Continuous <Continuous>  dextrose 5%. 1000 milliLiter(s) (100 mL/Hr) IV Continuous <Continuous>  dextrose 50% Injectable 12.5 Gram(s) IV Push once  dextrose 50% Injectable 25 Gram(s) IV Push once  dextrose 50% Injectable 25 Gram(s) IV Push once  divalproex  milliGRAM(s) Oral every 12 hours  enoxaparin Injectable 40 milliGRAM(s) SubCutaneous every 24 hours  glucagon  Injectable 1 milliGRAM(s) IntraMuscular once  insulin lispro (ADMELOG) corrective regimen sliding scale   SubCutaneous three times a day before meals  metFORMIN 500 milliGRAM(s) Oral two times a day  pantoprazole    Tablet 40 milliGRAM(s) Oral before breakfast  polyethylene glycol 3350 17 Gram(s) Oral daily    MEDICATIONS  (PRN):  acetaminophen     Tablet .. 650 milliGRAM(s) Oral every 6 hours PRN Temp greater or equal to 38C (100.4F), Mild Pain (1 - 3)  aluminum hydroxide/magnesium hydroxide/simethicone Suspension 30 milliLiter(s) Oral every 4 hours PRN Dyspepsia  dextrose Oral Gel 15 Gram(s) Oral once PRN Blood Glucose LESS THAN 70 milliGRAM(s)/deciliter  melatonin 3 milliGRAM(s) Oral at bedtime PRN Insomnia  senna 2 Tablet(s) Oral at bedtime PRN Constipation    Allergies    No Known Allergies    INTERVAL HPI/OVERNIGHT EVENTS:  Patient S&E at bedside. Feeling a little better today with decreased abdominal pain.     VITAL SIGNS:  T(F): 97.3 (11-09-23 @ 20:00)  HR: 109 (11-10-23 @ 08:00)  BP: 109/76 (11-10-23 @ 08:00)  RR: 16 (11-10-23 @ 08:00)  SpO2: 95% (11-10-23 @ 08:00)      PHYSICAL EXAM:  Constitutional: NAD  Eyes: sclera non-icteric  Neck: no JVD  Respiratory: decreased BS ant.  Cardiovascular: RRR, no M/R/G  Gastrointestinal: softly distended, NT  Extremities: no calf tenderness elicited  Neurological: Awake, alert.    Labs:             10.4   3.98  )-----------( 131      ( 11-09 @ 06:38 )             32.3       11-09    137  |  102  |  7   ----------------------------<  125<H>  4.2   |  25  |  0.46<L>    Ca    10.1      09 Nov 2023 06:38    TPro  6.7  /  Alb  1.7<L>  /  TBili  0.3  /  DBili  x   /  AST  125<H>  /  ALT  38  /  AlkPhos  222<H>  11-09      Consultant notes reviewed : YES [ x] ; NO [ ]

## 2023-11-11 NOTE — PROGRESS NOTE ADULT - SUBJECTIVE AND OBJECTIVE BOX
Cc: Gait dysfunction    HPI: Patient with no new medical issues today. Slept well overnight. Continues to have abdominal discomfort, mildly improved with maalox.   Pain controlled, no chest pain, no N/V, no Fevers/Chills. No other new ROS  Has been tolerating rehabilitation program.    acetaminophen     Tablet .. 650 milliGRAM(s) Oral every 6 hours PRN  aluminum hydroxide/magnesium hydroxide/simethicone Suspension 30 milliLiter(s) Oral every 4 hours PRN  artificial tears (preservative free) Ophthalmic Solution 1 Drop(s) Both EYES three times a day  atorvastatin 40 milliGRAM(s) Oral at bedtime  dexAMETHasone     Tablet 2 milliGRAM(s) Oral two times a day  dextrose 5%. 1000 milliLiter(s) IV Continuous <Continuous>  dextrose 5%. 1000 milliLiter(s) IV Continuous <Continuous>  dextrose 50% Injectable 12.5 Gram(s) IV Push once  dextrose 50% Injectable 25 Gram(s) IV Push once  dextrose 50% Injectable 25 Gram(s) IV Push once  dextrose Oral Gel 15 Gram(s) Oral once PRN  divalproex  milliGRAM(s) Oral every 12 hours  enoxaparin Injectable 40 milliGRAM(s) SubCutaneous every 24 hours  glucagon  Injectable 1 milliGRAM(s) IntraMuscular once  insulin lispro (ADMELOG) corrective regimen sliding scale   SubCutaneous three times a day before meals  melatonin 3 milliGRAM(s) Oral at bedtime PRN  metFORMIN 500 milliGRAM(s) Oral two times a day  pantoprazole    Tablet 40 milliGRAM(s) Oral before breakfast  polyethylene glycol 3350 17 Gram(s) Oral daily  senna 2 Tablet(s) Oral at bedtime PRN      T(C): 36.3 (11-11-23 @ 08:47), Max: 36.4 (11-10-23 @ 21:31)  HR: 125 (11-11-23 @ 10:32) (108 - 125)  BP: 106/82 (11-11-23 @ 10:32) (106/82 - 135/75)  RR: 16 (11-11-23 @ 10:32) (16 - 16)  SpO2: 100% (11-11-23 @ 10:32) (95% - 100%)    In NAD  HEENT- +R crani site C/D/I  Heart- RRR, S1S2  Lungs- CTA bl.  Abd- +mild TTP   Ext- No calf pain  Neuro- Exam unchanged          Imp: Patient with diagnosis of    Malignant neoplasm with metastasis to brain      admitted for comprehensive acute rehabilitation.    Plan:  - Continue PT/OT/SLP  - DVT prophylaxis - lovenox   - Skin- Turn q2h, check skin daily  - Continue current medications; patient medically stable.   - Abd pain - continue maalox and PPI  - Patient is stable to continue current rehabilitation program.

## 2023-11-11 NOTE — PROGRESS NOTE ADULT - SUBJECTIVE AND OBJECTIVE BOX
overall no complaints      PHYSICAL EXAM:    Vital Signs Last 24 Hrs  T(F): 97.4 (11 Nov 2023 08:47), Max: 97.6 (10 Nov 2023 21:31)  HR: 112 (11 Nov 2023 11:33) (108 - 125)  BP: 106/82 (11 Nov 2023 10:32) (106/82 - 135/75)  RR: 16 (11 Nov 2023 11:33) (16 - 16)  SpO2: 97% (11 Nov 2023 11:33) (95% - 100%)  I&O's Summary      GENERAL: NAD  HEENT: NCAT  CHEST/LUNG: No increased WOB, Clear to percussion bilaterally; No rales, rhonchi, wheezing  HEART: Regular rate and rhythm; No murmurs  ABDOMEN: Soft, Nontender, Nondistended; Bowel sounds present  MUSCULOSKELETAL/EXTREMITIES:  2+ Peripheral Pulses, No LE edema  PSYCH: Appropriate affect, Alert & Oriented    LABS:                        10.4   3.98  )-----------( 131      ( 09 Nov 2023 06:38 )             32.3       11-09    137  |  102  |  7   ----------------------------<  125  4.2   |  25  |  0.46    Ca    10.1      09 Nov 2023 06:38    TPro  6.7  /  Alb  1.7  /  TBili  0.3  /  DBili  x   /  AST  125  /  ALT  38  /  AlkPhos  222  11-09                              POCT Blood Glucose.: 126 mg/dL (11 Nov 2023 11:54)  POCT Blood Glucose.: 101 mg/dL (11 Nov 2023 07:31)  POCT Blood Glucose.: 143 mg/dL (10 Nov 2023 21:34)  POCT Blood Glucose.: 157 mg/dL (10 Nov 2023 16:53)      Urinalysis Basic - ( 09 Nov 2023 06:38 )    Color: x / Appearance: x / SG: x / pH: x  Gluc: 125 mg/dL / Ketone: x  / Bili: x / Urobili: x   Blood: x / Protein: x / Nitrite: x   Leuk Esterase: x / RBC: x / WBC x   Sq Epi: x / Non Sq Epi: x / Bacteria: x        COVID-19 PCR: NotDetec (11-10-23 @ 11:00)      MEDICATIONS  (STANDING):  artificial tears (preservative free) Ophthalmic Solution 1 Drop(s) Both EYES three times a day  atorvastatin 40 milliGRAM(s) Oral at bedtime  dexAMETHasone     Tablet 2 milliGRAM(s) Oral two times a day  dextrose 5%. 1000 milliLiter(s) (50 mL/Hr) IV Continuous <Continuous>  dextrose 5%. 1000 milliLiter(s) (100 mL/Hr) IV Continuous <Continuous>  dextrose 50% Injectable 25 Gram(s) IV Push once  dextrose 50% Injectable 12.5 Gram(s) IV Push once  dextrose 50% Injectable 25 Gram(s) IV Push once  divalproex  milliGRAM(s) Oral every 12 hours  enoxaparin Injectable 40 milliGRAM(s) SubCutaneous every 24 hours  glucagon  Injectable 1 milliGRAM(s) IntraMuscular once  insulin lispro (ADMELOG) corrective regimen sliding scale   SubCutaneous three times a day before meals  metFORMIN 500 milliGRAM(s) Oral two times a day  pantoprazole    Tablet 40 milliGRAM(s) Oral before breakfast  polyethylene glycol 3350 17 Gram(s) Oral daily    MEDICATIONS  (PRN):  acetaminophen     Tablet .. 650 milliGRAM(s) Oral every 6 hours PRN Temp greater or equal to 38C (100.4F), Mild Pain (1 - 3)  aluminum hydroxide/magnesium hydroxide/simethicone Suspension 30 milliLiter(s) Oral every 4 hours PRN Dyspepsia  dextrose Oral Gel 15 Gram(s) Oral once PRN Blood Glucose LESS THAN 70 milliGRAM(s)/deciliter  melatonin 3 milliGRAM(s) Oral at bedtime PRN Insomnia  senna 2 Tablet(s) Oral at bedtime PRN Constipation      Care Discussed with Consultants/Other Providers: Yes

## 2023-11-11 NOTE — PROGRESS NOTE ADULT - ASSESSMENT
52F with metastatic lung cancer to brain/liver on ongoing chemo/RT, steroid-induced DM, now in acute rehab after being hospitalized for progressive weakness    #Metastatic lung cancer to brain/liver  #Progressive weakness due to above  #Abdominal pain likely due to above mets (liver mets, abdominal wall subcutaneous nodules, all in recent imaging)  - continue comprehensive rehab program  -c/w Decadron   -seizure prophylaxis: Depakote  -Pain control    #Steroid-induced DM  - POCT at goal  c/w Metformin, ISS    #SIADH  -sodium improved    #DVT ppx: Lovenox

## 2023-11-12 NOTE — PROGRESS NOTE ADULT - SUBJECTIVE AND OBJECTIVE BOX
no acute events overnight.      PHYSICAL EXAM:    Vital Signs Last 24 Hrs  T(F): 97.7 (12 Nov 2023 08:10), Max: 98.7 (11 Nov 2023 21:53)  HR: 102 (12 Nov 2023 08:10) (102 - 111)  BP: 106/74 (12 Nov 2023 08:10) (106/74 - 110/72)  RR: 16 (12 Nov 2023 08:10) (16 - 16)  SpO2: 97% (12 Nov 2023 08:10) (95% - 97%)  I&O's Summary      GENERAL: NAD  HEENT: NCAT  CHEST/LUNG: No increased WOB, Clear to percussion bilaterally; No rales, rhonchi, wheezing  HEART: Regular rate and rhythm; No murmurs  ABDOMEN: Soft, Nontender, Nondistended; Bowel sounds present  MUSCULOSKELETAL/EXTREMITIES:  2+ Peripheral Pulses, No LE edema  PSYCH: Appropriate affect, Alert & Oriented    LABS:                                      POCT Blood Glucose.: 120 mg/dL (12 Nov 2023 11:40)  POCT Blood Glucose.: 93 mg/dL (12 Nov 2023 08:28)  POCT Blood Glucose.: 149 mg/dL (11 Nov 2023 22:00)  POCT Blood Glucose.: 138 mg/dL (11 Nov 2023 16:25)          COVID-19 PCR: NotDetec (11-10-23 @ 11:00)      MEDICATIONS  (STANDING):  AQUAPHOR (petrolatum Ointment) 1 Application(s) Topical daily  artificial tears (preservative free) Ophthalmic Solution 1 Drop(s) Both EYES three times a day  atorvastatin 40 milliGRAM(s) Oral at bedtime  dexAMETHasone     Tablet 2 milliGRAM(s) Oral two times a day  dextrose 5%. 1000 milliLiter(s) (50 mL/Hr) IV Continuous <Continuous>  dextrose 5%. 1000 milliLiter(s) (100 mL/Hr) IV Continuous <Continuous>  dextrose 50% Injectable 12.5 Gram(s) IV Push once  dextrose 50% Injectable 25 Gram(s) IV Push once  dextrose 50% Injectable 25 Gram(s) IV Push once  divalproex  milliGRAM(s) Oral every 12 hours  enoxaparin Injectable 40 milliGRAM(s) SubCutaneous every 24 hours  glucagon  Injectable 1 milliGRAM(s) IntraMuscular once  insulin lispro (ADMELOG) corrective regimen sliding scale   SubCutaneous three times a day before meals  metFORMIN 500 milliGRAM(s) Oral two times a day  pantoprazole    Tablet 40 milliGRAM(s) Oral before breakfast  polyethylene glycol 3350 17 Gram(s) Oral daily    MEDICATIONS  (PRN):  acetaminophen     Tablet .. 650 milliGRAM(s) Oral every 6 hours PRN Temp greater or equal to 38C (100.4F), Mild Pain (1 - 3)  aluminum hydroxide/magnesium hydroxide/simethicone Suspension 30 milliLiter(s) Oral every 4 hours PRN Dyspepsia  dextrose Oral Gel 15 Gram(s) Oral once PRN Blood Glucose LESS THAN 70 milliGRAM(s)/deciliter  melatonin 3 milliGRAM(s) Oral at bedtime PRN Insomnia  senna 2 Tablet(s) Oral at bedtime PRN Constipation      Care Discussed with Consultants/Other Providers: Yes

## 2023-11-13 NOTE — PROVIDER CONTACT NOTE (OTHER) - SITUATION
Ct scan notified RN reporting that IV 20g  unable to perform Ct scan at this time due to IV access. pt refused to have another IV at this time.

## 2023-11-13 NOTE — PROVIDER CONTACT NOTE (OTHER) - BACKGROUND
Diagnosis: malignant neoplasm metastatic brain
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

## 2023-11-13 NOTE — PROGRESS NOTE ADULT - SUBJECTIVE AND OBJECTIVE BOX
no acute events overnight  working with OT this morning, having tachycardia 's   reports "i dont know" when asked if she is anxious  per  nursing staff, decreased po intake  denies chest pain and SOB.      PHYSICAL EXAM:    Vital Signs Last 24 Hrs  T(F): 97.8 (13 Nov 2023 08:30), Max: 98.9 (13 Nov 2023 07:34)  HR: 138 (13 Nov 2023 08:30) (115 - 138)  BP: 112/75 (13 Nov 2023 07:34) (106/75 - 112/75)  RR: 16 (13 Nov 2023 07:34) (16 - 16)  SpO2: 96% (13 Nov 2023 07:34) (96% - 98%)  I&O's Summary    12 Nov 2023 07:01  -  13 Nov 2023 07:00  --------------------------------------------------------  IN: 0 mL / OUT: 702 mL / NET: -702 mL        GENERAL: NAD  HEENT: NCAT  CHEST/LUNG: No increased WOB, Clear to percussion bilaterally; No rales, rhonchi, wheezing  HEART: +tachycardia andregular rhythm; No murmurs  ABDOMEN: Soft, Nontender, Nondistended; Bowel sounds present  MUSCULOSKELETAL/EXTREMITIES:  2+ Peripheral Pulses, No LE edema  PSYCH: Appropriate affect, Alert & Oriented    LABS:                        11.1   6.34  )-----------( 131      ( 13 Nov 2023 06:00 )             35.1       11-13    136  |  102  |  6   ----------------------------<  116  3.8   |  27  |  0.51    Ca    10.8      13 Nov 2023 06:00    TPro  7.0  /  Alb  2.0  /  TBili  0.2  /  DBili  x   /  AST  95  /  ALT  34  /  AlkPhos  245  11-13                              POCT Blood Glucose.: 107 mg/dL (13 Nov 2023 12:35)  POCT Blood Glucose.: 115 mg/dL (13 Nov 2023 07:53)  POCT Blood Glucose.: 100 mg/dL (12 Nov 2023 20:46)  POCT Blood Glucose.: 105 mg/dL (12 Nov 2023 17:37)      Urinalysis Basic - ( 13 Nov 2023 06:00 )    Color: x / Appearance: x / SG: x / pH: x  Gluc: 116 mg/dL / Ketone: x  / Bili: x / Urobili: x   Blood: x / Protein: x / Nitrite: x   Leuk Esterase: x / RBC: x / WBC x   Sq Epi: x / Non Sq Epi: x / Bacteria: x        COVID-19 PCR: NotDetec (11-10-23 @ 11:00)      MEDICATIONS  (STANDING):  AQUAPHOR (petrolatum Ointment) 1 Application(s) Topical daily  artificial tears (preservative free) Ophthalmic Solution 1 Drop(s) Both EYES three times a day  atorvastatin 40 milliGRAM(s) Oral at bedtime  dexAMETHasone     Tablet 2 milliGRAM(s) Oral two times a day  dextrose 5%. 1000 milliLiter(s) (50 mL/Hr) IV Continuous <Continuous>  dextrose 5%. 1000 milliLiter(s) (100 mL/Hr) IV Continuous <Continuous>  dextrose 50% Injectable 12.5 Gram(s) IV Push once  dextrose 50% Injectable 25 Gram(s) IV Push once  dextrose 50% Injectable 25 Gram(s) IV Push once  divalproex  milliGRAM(s) Oral every 12 hours  enoxaparin Injectable 40 milliGRAM(s) SubCutaneous every 24 hours  glucagon  Injectable 1 milliGRAM(s) IntraMuscular once  insulin lispro (ADMELOG) corrective regimen sliding scale   SubCutaneous three times a day before meals  metFORMIN 500 milliGRAM(s) Oral two times a day  pantoprazole    Tablet 40 milliGRAM(s) Oral before breakfast  polyethylene glycol 3350 17 Gram(s) Oral daily    MEDICATIONS  (PRN):  acetaminophen     Tablet .. 650 milliGRAM(s) Oral every 6 hours PRN Temp greater or equal to 38C (100.4F), Mild Pain (1 - 3)  aluminum hydroxide/magnesium hydroxide/simethicone Suspension 30 milliLiter(s) Oral every 4 hours PRN Dyspepsia  dextrose Oral Gel 15 Gram(s) Oral once PRN Blood Glucose LESS THAN 70 milliGRAM(s)/deciliter  melatonin 3 milliGRAM(s) Oral at bedtime PRN Insomnia  senna 2 Tablet(s) Oral at bedtime PRN Constipation      Care Discussed with Consultants/Other Providers: Yes

## 2023-11-13 NOTE — PROGRESS NOTE ADULT - ASSESSMENT
Assessment/Plan:  HARJEET WHITMORE is a 52 year old female with PMH of GERD, S4 lung cancer with mets to brain (s/p 4 cycles of chemo and thoracic RT- completed 10/2022), steroid induced Diabetes Mellitus, gamma knife surgery to 7 brain mets, and s/p temporal crani/resection of metastatic lesion; who presented to [facility] on 10/31 with BL UE and BL LE weakness and abdominal pain for 2 weeks concerning for progressive metastatic disease.  Overall imaging was significant for metastatic disease in the brain, lung, liver and abdomen.  NSGY deemed no surgical intervention for worsening brain mets.  Heme/Onc suggested 1 dose of C1D1 Carboplatin and Paclitaxel. She also received Bevacizumab during her admission. Her hospital course was complicated by hyponatremia (likely 2/2 SIADH given lung CA), and fever (negative workup). She will continue chemotherapy/radiation treatment in the outpatient setting with Dr. Beckman. Patient now admitted for a multidisciplinary rehab program.   -------------    * Persistent tachycardia---LE venous duplex ultrasound  * Clinically dehydrated---f/u hospitalist recs,  * Poor oral intake--F/u Nutritionist   * Labs unremarkable  * Therapy at bedside, no resistance exercises today, await cardiology review and hospitalist f/u recs     #Malignant neoplasm with metastasis to brain//liver/abdomen  - Gait Instability, ADL impairments and Functional impairments: start Comprehensive Rehab Program of PT/OT/SLP  - 3 hours a day, 5 days a week  - continue Dexamethasone 2mg BID  - Resume chemo/radiation therapy in outpatient setting  - Follow up with Dr. Beckman     #Seizure Prophylaxis  - contnue Depakote 500mg BID      * Persistent tachycardia---LE venous duplex ultrasound  D/w hospitalist and cardiologist  Cardiology consulted  Hospitalist will review fluid balance and make recs    * Clinically dehydrated---liberal oral fluids  F/u Hospitalist recs      #Steroid Induced Diabetes Mellitus  - A1c: 7.7 (11/2023)  - ISS  - FS AC&HS  - Metformin 500mg BID     #HLD  - Atorvastatin 40mg at HS    #Hyponatremia--monitor   - Likely secondary to lung CA    #Sleep  - Melatonin 3mg PRN     #Skin  - Skin -- Right crani incision ,healed       #Pain Mgmt   - Tylenol PRN    #GI/Bowel Mgmt   ? Gastritis   - Continue Pantoprazole - Miralax - Senna - Maalox  PRN (will switch to standing if symptoms persist)    #/Bladder Mgmt --voiding     #FEN   - Diet - Regular + Thins  [CCHO]    - Dysphagia  SLP - evaluation and treatment  * Poor oral intake--F/u Nutritionist     #Health Maintenance  - Ocular lubricant gtts TID     #Precautions / PROPHYLAXIS:   - Falls, Seizure   - ortho: Weight bearing status: WBAT   - Lungs: Aspiration, Incentive Spirometer   - DVT: Lovenox     * L;abs 11/13---Stable anemia, normal renal function and elevated Alk phos,   --------------------------------------------  OUTPATIENT/FOLLOW UP:    Margarito Beckman  Medical Oncology  [address] [place] [id number]  Phone: [phone]  Fax: [phone]  Established Patient  Follow Up Time: 2 weeks    Zurdo Bernard  Radiation Oncology  [address] [place] [id number]  Phone: [phone]  Fax: [phone]  Established Patient  Follow Up Time: 2 weeks  --------------------------------------------

## 2023-11-13 NOTE — CONSULT NOTE ADULT - ASSESSMENT
Assessment:  52 year old female with PMH of GERD, S4 lung cancer with mets to brain (s/p 4 cycles of chemo and thoracic RT- completed 10/2022), steroid induced Diabetes Mellitus, gamma knife surgery to 7 brain mets, and s/p temporal crani/resection of metastatic lesion; presented to [facility] on 10/31 with  progressive metastatic disease in the brain, lung, liver and abdomen.  Was not a candidate for neurosurgical intervention but did receive a dose of chemo. Cardiology consulted currently for sinus tachycardia.     Recommendations:  Sinus tachycardia: pt is asymptomatic. Appears nontoxic, is afebrile and has no leukocytosis. Pt denies any pain but reports occasional back pain. Reports MCNEIL. EKG is non ischemic. TTE from 8/2023 normal. Can trend trops. Agree with l/e doppler to r/o emboli iso progressive metastic disease. Can also consider obtaining a dimer and CTA to r/o PE.    [facility]   Assessment:  52 year old female with PMH of GERD, S4 lung cancer with mets to brain (s/p 4 cycles of chemo and thoracic RT- completed 10/2022), steroid induced Diabetes Mellitus, gamma knife surgery to 7 brain mets, and s/p temporal crani/resection of metastatic lesion; presented to [facility] on 10/31 with  progressive metastatic disease in the brain, lung, liver and abdomen.  Was not a candidate for neurosurgical intervention but did receive a dose of chemo. Cardiology consulted currently for sinus tachycardia.     Recommendations:  Sinus tachycardia: pt is asymptomatic. Appears nontoxic, is afebrile and has no leukocytosis. Pt denies any pain but reports occasional back pain. Reports MCNEIL. EKG is non ischemic. TTE from 8/2023 normal. Can trend trops. Agree with l/e doppler to r/o emboli iso progressive metastic disease. Can also consider obtaining a dimer and CTA to r/o PE.  Can monitor pt with a holter to assess for arrythmias.    Xin TABOR-BC

## 2023-11-13 NOTE — CONSULT NOTE ADULT - REASON FOR ADMISSION
Malignant neoplasm with metastasis to brain

## 2023-11-13 NOTE — CONSULT NOTE ADULT - NS ATTEND AMEND GEN_ALL_CORE FT
I have seen and examined the patient. I have discussed case with JUDE Santana.    Renée Rojas is a 52 year old woman with past medical history of Stage IV Lung cancer with metastasis to brain (s/p chemotherapy, radiation and gamma knife surgery) with recent right temporal craniotomy for resection of mass, then recent hospitalization at [facility] earlier this month with right sided weakness found to have worsening metastases to brain, now admitted to [facility].    Cardiology consulted for sinus tachycardia. ECG consistent with sinus tachycardia at 131 BPM. The patient reports decreased appetite today, denies palpitations, chest pain or shortness of breath. Prior echocardiogram (8/2023) reported as normal LVEF 55-60%. Leg US indicates left leg DVT. Overall, sinus tachycardia is secondary to inciting event, agree with evaluation for pulmonary embolism given hypercoagulable state and left leg DVT, if PE found then will need Hematology guidance on anticoagulation given brain metastases and risk of bleeding with anticoagulation. Will also need repeat echo if pulmonary embolism present.     Discussed with Dr. Lucia. We will continue to follow along.    Mani Pinto MD  Cardiology

## 2023-11-13 NOTE — PROGRESS NOTE ADULT - ASSESSMENT
52F with metastatic lung cancer to brain/liver on ongoing chemo/RT, steroid-induced DM, now in acute rehab after being hospitalized for progressive weakness    #sinus tachycardia  - likely in setting of decreased po intake; WBC WNL  - will give 1L NS bolus and monitor HR and BP  - agree with bilateral venous duplex u/s in setting of ruling out VTE  - if remains with tachycardia -- agree with cardiology reccs re: CTA chest to rule out PE.    #Metastatic lung cancer to brain/liver  #Progressive weakness due to above  #Abdominal pain likely due to above mets (liver mets, abdominal wall subcutaneous nodules, all in recent imaging)  - continue comprehensive rehab program  -c/w Decadron   -seizure prophylaxis: Depakote  -Pain control    #Steroid-induced DM  - POCT at goal  c/w Metformin, ISS    #SIADH  -sodium improved    #DVT ppx: Lovenox    Discussed with primary team

## 2023-11-13 NOTE — CHART NOTE - NSCHARTNOTEFT_GEN_A_CORE
Seen at bed side    I gave patient update on her LE venous duplex scan showing Left Soleal DVT   I informed her that considering her increased risk of other thrombotic events, we would be getting a Chest CT pulmonary protocol to r/o pulmonary embolism, in addition to other blood tests    She had a cardiology review today and these investigations were recommended   I also discussed with the hematologist managing her care, who agreed to these tests      She agreed to go ahead wit the tests Seen at bed side    I gave patient update on her LE venous duplex scan showing Left Soleal DVT   I informed her that considering her increased risk of other thrombotic events, we would be getting a Chest CT pulmonary protocol to r/o pulmonary embolism, in addition to other blood tests    She had a cardiology review today and these investigations were recommended   I also discussed with the hematologist managing her care, who agreed to these tests    She agreed to go ahead wit the tests    I called her NOK as in EMR --Devin Cueva, in an attempt to give update on patient's treatment   I left a voice msg with call back details, as I could not reach him

## 2023-11-13 NOTE — PROGRESS NOTE ADULT - SUBJECTIVE AND OBJECTIVE BOX
Subjective  Seen and examined  Reports an uneventful night,  Reports epigastric discomfort with liquid substances  Persistent tachycardia noted by staff, with EKG HR 130s  She also has sinus tachy 120s in previous EKG prior to acute rehab admission  Reports no cough or chest pain   Nursing staff observe that patient's oral intake is poor   Has chronic upper abd discomfort and intermittent pain   Tolerating oral diet     ROS--no chest, no nausea or vomiting, no fever or cough  Epigastric discomfort  LBM 10/12    Vital Signs Last 24 Hrs  T(C): 36.9 (12 Nov 2023 20:10), Max: 36.9 (12 Nov 2023 20:10)  T(F): 98.4 (12 Nov 2023 20:10), Max: 98.4 (12 Nov 2023 20:10)  HR: 115 (12 Nov 2023 20:10) (115 - 115)  BP: 106/75 (12 Nov 2023 20:10) (106/75 - 106/75)  RR: 16 (12 Nov 2023 20:10) (16 - 16)  SpO2: 98% (12 Nov 2023 20:10) (98% - 98%)  O2 Parameters below as of 12 Nov 2023 20:10  Patient On (Oxygen Delivery Method): room air    EXAM  Gen - No acute distress, sitting on a WC at bedside   Dry oral mucosa  HEENT - NAD  Neck - Supple, No limited ROM  Pulm -No creps  Cardiovascular - HS I and I, increased rate   Abdomen - Soft,  +BS, mild tenderness epigastric and RUQ  Extremities - No Cyanosis, no clubbing, no edema, no calf tenderness    Neuro-  Alert and fully oriented, no distress  Normal speech      Cranial Nerves - CN 2-12 intact     Motor -                     LEFT    UE -  5/5                    RIGHT UE - 4/5                    LEFT    LE -  5/5                    RIGHT LE - 4/5        Sensory - Intact  to LT bilaterally     Reflexes - DTR Intact, No primitive reflexive     Tone - normal  MSK: Right sided weakness, mainly right leg, chronic  Psychiatric - Mood stable, Affect WNL  Skin:  Right crani incision DELISA, edges together  Decreased skin tugor     RECENT LABS/IMAGING                        11.1   6.34  )-----------( 131      ( 13 Nov 2023 06:00 )             35.1     11-13    136  |  102  |  6<L>  ----------------------------<  116<H>  3.8   |  27  |  0.51    Ca    10.8<H>      13 Nov 2023 06:00    TPro  7.0  /  Alb  2.0<L>  /  TBili  0.2  /  DBili  x   /  AST  95<H>  /  ALT  34  /  AlkPhos  245<H>  11-13      Urinalysis Basic - ( 13 Nov 2023 06:00 )    Color: x / Appearance: x / SG: x / pH: x  Gluc: 116 mg/dL / Ketone: x  / Bili: x / Urobili: x   Blood: x / Protein: x / Nitrite: x   Leuk Esterase: x / RBC: x / WBC x   Sq Epi: x / Non Sq Epi: x / Bacteria: x      MEDICATIONS  (STANDING):  AQUAPHOR (petrolatum Ointment) 1 Application(s) Topical daily  artificial tears (preservative free) Ophthalmic Solution 1 Drop(s) Both EYES three times a day  atorvastatin 40 milliGRAM(s) Oral at bedtime  dexAMETHasone     Tablet 2 milliGRAM(s) Oral two times a day  dextrose 5%. 1000 milliLiter(s) (50 mL/Hr) IV Continuous <Continuous>  dextrose 5%. 1000 milliLiter(s) (100 mL/Hr) IV Continuous <Continuous>  dextrose 50% Injectable 25 Gram(s) IV Push once  dextrose 50% Injectable 12.5 Gram(s) IV Push once  dextrose 50% Injectable 25 Gram(s) IV Push once  divalproex  milliGRAM(s) Oral every 12 hours  enoxaparin Injectable 40 milliGRAM(s) SubCutaneous every 24 hours  glucagon  Injectable 1 milliGRAM(s) IntraMuscular once  insulin lispro (ADMELOG) corrective regimen sliding scale   SubCutaneous three times a day before meals  metFORMIN 500 milliGRAM(s) Oral two times a day  pantoprazole    Tablet 40 milliGRAM(s) Oral before breakfast  polyethylene glycol 3350 17 Gram(s) Oral daily    MEDICATIONS  (PRN):  acetaminophen     Tablet .. 650 milliGRAM(s) Oral every 6 hours PRN Temp greater or equal to 38C (100.4F), Mild Pain (1 - 3)  aluminum hydroxide/magnesium hydroxide/simethicone Suspension 30 milliLiter(s) Oral every 4 hours PRN Dyspepsia  dextrose Oral Gel 15 Gram(s) Oral once PRN Blood Glucose LESS THAN 70 milliGRAM(s)/deciliter  melatonin 3 milliGRAM(s) Oral at bedtime PRN Insomnia  senna 2 Tablet(s) Oral at bedtime PRN Constipation

## 2023-11-13 NOTE — PROGRESS NOTE ADULT - SUBJECTIVE AND OBJECTIVE BOX
RENÉE WHITMORE   52y   Female    Admitting: IRENE Lucia  HPI:    Renée Whitmore is a 52 year old female with PMH of GERD, S4 lung cancer with mets to brain (s/p 4 cycles of chemo and thoracic RT- completed 10/2022), steroid induced Diabetes Mellitus, gamma knife surgery to 7 brain mets, and s/p temporal crani/resection of metastatic lesion; who presented to [facility] on 10/31 with BL UE and BL LE weakness and abdominal pain for 2 weeks concerning for progressive metastatic disease.  Overall imaging was significant for metastatic disease in the brain, lung, liver and abdomen.  NSGY deemed no surgical intervention for worsening brain mets.  Heme/Onc suggested 1 dose of C1D1 Carboplatin and Paclitaxel. She also received Bevacizumab during her admission. Her hospital course was complicated by hyponatremia (likely 2/2 SIADH given lung CA), and fever.  She will continue chemotherapy/radiation treatment in the outpatient setting with Dr. Beckman.  PM&R was consulted and deemed her an appropriate candidate for IRF. She was medically stabilized and cleared for discharge to [facility].    Oncology asked to see patient by rehab team, for h/o lung cancer.    PAST MEDICAL & SURGICAL HISTORY:  GERD (Gastroesophageal Reflux Disease)      Lung mass  right      Non-small cell lung cancer with metastasis      S/P endoscopy  2022      HEALTH ISSUES - PROBLEM Dx:  Non-small cell carcinoma of lung      MEDICATIONS  (STANDING):  AQUAPHOR (petrolatum Ointment) 1 Application(s) Topical daily  artificial tears (preservative free) Ophthalmic Solution 1 Drop(s) Both EYES three times a day  atorvastatin 40 milliGRAM(s) Oral at bedtime  dexAMETHasone     Tablet 2 milliGRAM(s) Oral two times a day  dextrose 5%. 1000 milliLiter(s) (50 mL/Hr) IV Continuous <Continuous>  dextrose 5%. 1000 milliLiter(s) (100 mL/Hr) IV Continuous <Continuous>  dextrose 50% Injectable 25 Gram(s) IV Push once  dextrose 50% Injectable 12.5 Gram(s) IV Push once  dextrose 50% Injectable 25 Gram(s) IV Push once  divalproex  milliGRAM(s) Oral every 12 hours  enoxaparin Injectable 40 milliGRAM(s) SubCutaneous every 24 hours  glucagon  Injectable 1 milliGRAM(s) IntraMuscular once  insulin lispro (ADMELOG) corrective regimen sliding scale   SubCutaneous three times a day before meals  metFORMIN 500 milliGRAM(s) Oral two times a day  pantoprazole    Tablet 40 milliGRAM(s) Oral before breakfast  polyethylene glycol 3350 17 Gram(s) Oral daily    MEDICATIONS  (PRN):  acetaminophen     Tablet .. 650 milliGRAM(s) Oral every 6 hours PRN Temp greater or equal to 38C (100.4F), Mild Pain (1 - 3)  aluminum hydroxide/magnesium hydroxide/simethicone Suspension 30 milliLiter(s) Oral every 4 hours PRN Dyspepsia  dextrose Oral Gel 15 Gram(s) Oral once PRN Blood Glucose LESS THAN 70 milliGRAM(s)/deciliter  melatonin 3 milliGRAM(s) Oral at bedtime PRN Insomnia  senna 2 Tablet(s) Oral at bedtime PRN Constipation    Allergies    No Known Allergies    INTERVAL HPI/OVERNIGHT EVENTS:  Patient S&E at bedside. CP when coughs.    VITAL SIGNS:  T(F): 98.4 (11-12-23 @ 20:10)  HR: 115 (11-12-23 @ 20:10)  BP: 106/75 (11-12-23 @ 20:10)  RR: 16 (11-12-23 @ 20:10)  SpO2: 98% (11-12-23 @ 20:10)      PHYSICAL EXAM:  Constitutional: NAD  Eyes: sclera non-icteric  Neck: no JVD  Respiratory: decreased BS ant.  Cardiovascular: RRR, no M/R/G  Gastrointestinal: softly distended, tender to palpation without rebound or guarding  Extremities: no calf tenderness  Neurological: responsive to questions    Labs:             11.1   6.34  )-----------( 131      ( 11-13 @ 06:00 )             35.1       11-13    136  |  102  |  6<L>  ----------------------------<  116<H>  3.8   |  27  |  0.51    Ca    10.8<H>      13 Nov 2023 06:00    TPro  7.0  /  Alb  2.0<L>  /  TBili  0.2  /  DBili  x   /  AST  95<H>  /  ALT  34  /  AlkPhos  245<H>  11-13    Consultant notes reviewed : YES [x ] ; NO [ ]

## 2023-11-13 NOTE — CONSULT NOTE ADULT - SUBJECTIVE AND OBJECTIVE BOX
RENÉE WHITMORE  [id number]      HPI:  Renée Arechiga is a 52 year old female with PMH of GERD, S4 lung cancer with mets to brain (s/p 4 cycles of chemo and thoracic RT- completed 10/2022), steroid induced Diabetes Mellitus, gamma knife surgery to 7 brain mets, and s/p temporal crani/resection of metastatic lesion; who presented to [facility] on 10/31 with BL UE and BL LE weakness and abdominal pain for 2 weeks concerning for progressive metastatic disease.  Overall imaging was significant for metastatic disease in the brain, lung, liver and abdomen.  NSGY deemed no surgical intervention for worsening brain mets.  Heme/Onc suggested 1 dose of C1D1 Carboplatin and Paclitaxel. She also received Bevacizumab during her admission. Her hospital course was complicated by hyponatremia (likely 2/2 SIADH given lung CA), and fever (negative workup).  She will continue chemotherapy/radiation treatment in the outpatient setting with Dr. Beckman.  PM&R was consulted and deemed her an appropriate candidate for IRF. She was medically stabilized and cleared for discharge to [facility].  (08 Nov 2023 15:08)        ALLERGIES:  No Known Allergies      PAST MEDICAL & SURGICAL HISTORY:  GERD (Gastroesophageal Reflux Disease)      Lung mass  right      Non-small cell lung cancer with metastasis      S/P endoscopy  2022      MEDICATIONS  (STANDING):  AQUAPHOR (petrolatum Ointment) 1 Application(s) Topical daily  artificial tears (preservative free) Ophthalmic Solution 1 Drop(s) Both EYES three times a day  atorvastatin 40 milliGRAM(s) Oral at bedtime  dexAMETHasone     Tablet 2 milliGRAM(s) Oral two times a day  dextrose 5%. 1000 milliLiter(s) (50 mL/Hr) IV Continuous <Continuous>  dextrose 5%. 1000 milliLiter(s) (100 mL/Hr) IV Continuous <Continuous>  dextrose 50% Injectable 12.5 Gram(s) IV Push once  dextrose 50% Injectable 25 Gram(s) IV Push once  dextrose 50% Injectable 25 Gram(s) IV Push once  divalproex  milliGRAM(s) Oral every 12 hours  enoxaparin Injectable 40 milliGRAM(s) SubCutaneous every 24 hours  glucagon  Injectable 1 milliGRAM(s) IntraMuscular once  insulin lispro (ADMELOG) corrective regimen sliding scale   SubCutaneous three times a day before meals  metFORMIN 500 milliGRAM(s) Oral two times a day  pantoprazole    Tablet 40 milliGRAM(s) Oral before breakfast  polyethylene glycol 3350 17 Gram(s) Oral daily  sodium chloride 0.9% Bolus 1000 milliLiter(s) IV Bolus once    MEDICATIONS  (PRN):  acetaminophen     Tablet .. 650 milliGRAM(s) Oral every 6 hours PRN Temp greater or equal to 38C (100.4F), Mild Pain (1 - 3)  aluminum hydroxide/magnesium hydroxide/simethicone Suspension 30 milliLiter(s) Oral every 4 hours PRN Dyspepsia  dextrose Oral Gel 15 Gram(s) Oral once PRN Blood Glucose LESS THAN 70 milliGRAM(s)/deciliter  melatonin 3 milliGRAM(s) Oral at bedtime PRN Insomnia  senna 2 Tablet(s) Oral at bedtime PRN Constipation        SOCIAL HISTORY:      FAMILY HISTORY:      ROS:  All 10 systems reviewed and positives noted in HPI    OBJECTIVE:    VITAL SIGNS:  Vital Signs Last 24 Hrs  T(C): 36.6 (13 Nov 2023 08:30), Max: 37.2 (13 Nov 2023 07:34)  T(F): 97.8 (13 Nov 2023 08:30), Max: 98.9 (13 Nov 2023 07:34)  HR: 138 (13 Nov 2023 08:30) (115 - 138)  BP: 112/75 (13 Nov 2023 07:34) (106/75 - 112/75)  BP(mean): --  RR: 16 (13 Nov 2023 07:34) (16 - 16)  SpO2: 96% (13 Nov 2023 07:34) (96% - 98%)    Parameters below as of 13 Nov 2023 07:34  Patient On (Oxygen Delivery Method): room air        PHYSICAL EXAM:  General: well appearing, no distress  HEENT: sclera anicteric  Neck: supple, no carotid bruits b/l  CVS: JVP ~ 7 cm H20, RRR, s1, s2, no murmurs/rubs/gallops  Chest: unlabored respirations, clear to auscultation b/l  Abdomen: non-distended  Extremities: no lower extremity edema b/l  Neuro: awake, alert & oriented x 3  Psych: normal affect      LABS:                        11.1   6.34  )-----------( 131      ( 13 Nov 2023 06:00 )             35.1     11-13    136  |  102  |  6<L>  ----------------------------<  116<H>  3.8   |  27  |  0.51    Ca    10.8<H>      13 Nov 2023 06:00    TPro  7.0  /  Alb  2.0<L>  /  TBili  0.2  /  DBili  x   /  AST  95<H>  /  ALT  34  /  AlkPhos  245<H>  11-13      ECG: Sinus tachycardia      TTE: < from: TTE Echo Complete w/o Contrast w/ Doppler (08.20.23 @ 13:58) >   1. Left ventricular ejection fraction, by visual estimation, is 55 to   60%.   2. Normal global left ventricular systolic function.   3. Mild thickening of the anterior and posterior mitral valve leaflets.   4. Moderate mitral valve regurgitation.   5. Moderate aortic regurgitation.   6. Sclerotic aortic valve with normal opening.    < end of copied text >       RENÉE WHITMORE  [id number]      HPI:  Renée Arechiga is a 52 year old female with PMH of GERD, S4 lung cancer with mets to brain (s/p 4 cycles of chemo and thoracic RT- completed 10/2022), steroid induced Diabetes Mellitus, gamma knife surgery to 7 brain mets, and s/p temporal crani/resection of metastatic lesion; who presented to [facility] on 10/31 with BL UE and BL LE weakness and abdominal pain for 2 weeks concerning for progressive metastatic disease.  Overall imaging was significant for metastatic disease in the brain, lung, liver and abdomen.  NSGY deemed no surgical intervention for worsening brain mets.  Heme/Onc suggested 1 dose of C1D1 Carboplatin and Paclitaxel. She also received Bevacizumab during her admission. Her hospital course was complicated by hyponatremia (likely 2/2 SIADH given lung CA), and fever (negative workup).  She will continue chemotherapy/radiation treatment in the outpatient setting with Dr. Beckman.  PM&R was consulted and deemed her an appropriate candidate for IRF. She was medically stabilized and cleared for discharge to [facility].  (08 Nov 2023 15:08)        ALLERGIES:  No Known Allergies      PAST MEDICAL HISTORY:  Non-small cell lung cancer with metastasis      MEDICATIONS  (STANDING):  AQUAPHOR (petrolatum Ointment) 1 Application(s) Topical daily  artificial tears (preservative free) Ophthalmic Solution 1 Drop(s) Both EYES three times a day  atorvastatin 40 milliGRAM(s) Oral at bedtime  dexAMETHasone     Tablet 2 milliGRAM(s) Oral two times a day  dextrose 5%. 1000 milliLiter(s) (50 mL/Hr) IV Continuous <Continuous>  dextrose 5%. 1000 milliLiter(s) (100 mL/Hr) IV Continuous <Continuous>  dextrose 50% Injectable 12.5 Gram(s) IV Push once  dextrose 50% Injectable 25 Gram(s) IV Push once  dextrose 50% Injectable 25 Gram(s) IV Push once  divalproex  milliGRAM(s) Oral every 12 hours  enoxaparin Injectable 40 milliGRAM(s) SubCutaneous every 24 hours  glucagon  Injectable 1 milliGRAM(s) IntraMuscular once  insulin lispro (ADMELOG) corrective regimen sliding scale   SubCutaneous three times a day before meals  metFORMIN 500 milliGRAM(s) Oral two times a day  pantoprazole    Tablet 40 milliGRAM(s) Oral before breakfast  polyethylene glycol 3350 17 Gram(s) Oral daily  sodium chloride 0.9% Bolus 1000 milliLiter(s) IV Bolus once    MEDICATIONS  (PRN):  acetaminophen     Tablet .. 650 milliGRAM(s) Oral every 6 hours PRN Temp greater or equal to 38C (100.4F), Mild Pain (1 - 3)  aluminum hydroxide/magnesium hydroxide/simethicone Suspension 30 milliLiter(s) Oral every 4 hours PRN Dyspepsia  dextrose Oral Gel 15 Gram(s) Oral once PRN Blood Glucose LESS THAN 70 milliGRAM(s)/deciliter  melatonin 3 milliGRAM(s) Oral at bedtime PRN Insomnia  senna 2 Tablet(s) Oral at bedtime PRN Constipation      ROS:  All 10 systems reviewed and positives noted in HPI    OBJECTIVE:    VITAL SIGNS:  Vital Signs Last 24 Hrs  T(C): 36.6 (13 Nov 2023 08:30), Max: 37.2 (13 Nov 2023 07:34)  T(F): 97.8 (13 Nov 2023 08:30), Max: 98.9 (13 Nov 2023 07:34)  HR: 138 (13 Nov 2023 08:30) (115 - 138)  BP: 112/75 (13 Nov 2023 07:34) (106/75 - 112/75)  BP(mean): --  RR: 16 (13 Nov 2023 07:34) (16 - 16)  SpO2: 96% (13 Nov 2023 07:34) (96% - 98%)    Parameters below as of 13 Nov 2023 07:34  Patient On (Oxygen Delivery Method): room air        PHYSICAL EXAM:  General: no acute distress  HEENT: sclera anicteric  Neck: supple  CVS: JVP ~ 7 cm H20, RRR, s1, s2, no murmurs  Chest: unlabored respirations, clear to auscultation b/l  Abdomen: non-distended  Extremities: no lower extremity edema b/l  Neuro: awake, alert & oriented  Psych: normal affect      LABS:                        11.1   6.34  )-----------( 131      ( 13 Nov 2023 06:00 )             35.1     11-13    136  |  102  |  6<L>  ----------------------------<  116<H>  3.8   |  27  |  0.51    Ca    10.8<H>      13 Nov 2023 06:00    TPro  7.0  /  Alb  2.0<L>  /  TBili  0.2  /  DBili  x   /  AST  95<H>  /  ALT  34  /  AlkPhos  245<H>  11-13      ECG: Sinus tachycardia      TTE: < from: TTE Echo Complete w/o Contrast w/ Doppler (08.20.23 @ 13:58) >   1. Left ventricular ejection fraction, by visual estimation, is 55 to   60%.   2. Normal global left ventricular systolic function.   3. Mild thickening of the anterior and posterior mitral valve leaflets.   4. Moderate mitral valve regurgitation.   5. Moderate aortic regurgitation.   6. Sclerotic aortic valve with normal opening.    < end of copied text >

## 2023-11-13 NOTE — PROGRESS NOTE ADULT - ASSESSMENT
Renée Rojas is a 52 year old female with PMH of GERD, S4 lung cancer with mets to brain (s/p 4 cycles of chemo and thoracic RT- completed 10/2022), steroid induced Diabetes Mellitus, gamma knife surgery to 7 brain mets, and s/p temporal crani/resection of metastatic lesion; who presented to [facility] on 10/31 with BL UE and BL LE weakness and abdominal pain for 2 weeks concerning for progressive metastatic disease.  Overall imaging was significant for metastatic disease in the brain, lung, liver and abdomen.  NSGY deemed no surgical intervention for worsening brain mets.  Heme/Onc suggested 1 dose of C1D1 Carboplatin and Paclitaxel. She also received Bevacizumab during her admission. Her hospital course was complicated by hyponatremia (likely 2/2 SIADH given lung CA), and fever.  She will continue chemotherapy/radiation treatment in the outpatient setting with Dr. Beckman.  PM&R was consulted and deemed her an appropriate candidate for IRF. She was medically stabilized and cleared for discharge to [facility].    Oncology consulted for h/o lung cancer.

## 2023-11-13 NOTE — PROVIDER CONTACT NOTE (OTHER) - SITUATION
pt MD johnson made aware of pt symptoms. pt alert and oriented, c/o of chest pain. Pt denies SOB, dizziness, lightheadedness, blurry vision, H/A. pt MD johnson made aware of pt symptoms. pt alert and oriented, c/o of chest pain; worsens went coughing. Pt denies SOB, dizziness, lightheadedness, blurry vision, H/A.

## 2023-11-14 NOTE — PROGRESS NOTE ADULT - ASSESSMENT
Renée Rojas is a 52 year old female with PMH of GERD, S4 lung cancer with mets to brain (s/p 4 cycles of chemo and thoracic RT- completed 10/2022), steroid induced Diabetes Mellitus, gamma knife surgery to 7 brain mets, and s/p temporal crani/resection of metastatic lesion; who presented to [facility] on 10/31 with BL UE and BL LE weakness and abdominal pain for 2 weeks concerning for progressive metastatic disease.  Overall imaging was significant for metastatic disease in the brain, lung, liver and abdomen.  NSGY deemed no surgical intervention for worsening brain mets.  Heme/Onc suggested 1 dose of C1D1 Carboplatin and Paclitaxel. She also received Bevacizumab during her admission. Her hospital course was complicated by hyponatremia (likely 2/2 SIADH given lung CA), and fever.  She will continue chemotherapy/radiation treatment in the outpatient setting with Dr. Beckman.  PM&R was consulted and deemed her an appropriate candidate for IRF. She was medically stabilized and cleared for discharge to [facility].    Oncology consulted for h/o lung cancer.

## 2023-11-14 NOTE — PROGRESS NOTE ADULT - SUBJECTIVE AND OBJECTIVE BOX
Subjective  Seen and examined  Reports disturbed sleep due to IV insertion and CT chest overnight to r/o PE  She reports no chest or abd pain,   Tolerating oral diet    Requests her therapy session be moved to PM as she wants to get tos rest this AM    ROS--no chest, no nausea or vomiting, no fever or cough  Epigastric discomfort  LBM 10/13    Vital Signs Last 24 Hrs  T(C): 36.5 (13 Nov 2023 20:10), Max: 36.5 (13 Nov 2023 20:10)  T(F): 97.7 (13 Nov 2023 20:10), Max: 97.7 (13 Nov 2023 20:10)  HR: 125 (14 Nov 2023 13:06) (116 - 132)  BP: 112/76 (14 Nov 2023 05:40) (93/65 - 112/76)  RR: 16 (14 Nov 2023 13:06) (16 - 16)  SpO2: 98% (14 Nov 2023 13:06) (95% - 98%)  O2 Parameters below as of 14 Nov 2023 13:06  Patient On (Oxygen Delivery Method): room air      EXAM  Gen - No acute distress, sitting on a WC at bedside   Dry oral mucosa  HEENT - NAD  Neck - Supple, No limited ROM  Pulm -No creps  Cardiovascular - HS I and I, increased rate   Abdomen - Soft, non tender  Extremities - No Cyanosis, no clubbing, no edema, no calf tenderness    Neuro  Alert and fully oriented,   Normal speech      Cranial Nerves - CN 2-12 intact     Motor -                     LEFT    UE -  5/5                    RIGHT UE - 4/5                    LEFT    LE -  5/5                    RIGHT LE - 4/5        Sensory - Intact  to LT bilaterally     Tone - normal  MSK: Right sided weakness, mainly right leg, chronic  Psychiatric - Mood stable, Affect WNL  Skin:  Right craniotomy scar, unremarkable  Decreased skin tugor     RECENT LABS/IMAGING                        11.1   6.34  )-----------( 131      ( 13 Nov 2023 06:00 )             35.1     11-13    136  |  102  |  6<L>  ----------------------------<  116<H>  3.8   |  27  |  0.51    Ca    10.8<H>      13 Nov 2023 06:00    TPro  7.0  /  Alb  2.0<L>  /  TBili  0.2  /  DBili  x   /  AST  95<H>  /  ALT  34  /  AlkPhos  245<H>  11-13      Urinalysis Basic - ( 13 Nov 2023 06:00 )    Color: x / Appearance: x / SG: x / pH: x  Gluc: 116 mg/dL / Ketone: x  / Bili: x / Urobili: x   Blood: x / Protein: x / Nitrite: x   Leuk Esterase: x / RBC: x / WBC x   Sq Epi: x / Non Sq Epi: x / Bacteria: x      Troponin downtrending     MEDICATIONS  (STANDING):  AQUAPHOR (petrolatum Ointment) 1 Application(s) Topical daily  artificial tears (preservative free) Ophthalmic Solution 1 Drop(s) Both EYES three times a day  atorvastatin 40 milliGRAM(s) Oral at bedtime  dexAMETHasone     Tablet 2 milliGRAM(s) Oral two times a day  dextrose 5%. 1000 milliLiter(s) (50 mL/Hr) IV Continuous <Continuous>  dextrose 5%. 1000 milliLiter(s) (100 mL/Hr) IV Continuous <Continuous>  dextrose 50% Injectable 12.5 Gram(s) IV Push once  dextrose 50% Injectable 25 Gram(s) IV Push once  dextrose 50% Injectable 25 Gram(s) IV Push once  divalproex  milliGRAM(s) Oral every 12 hours  enoxaparin Injectable 40 milliGRAM(s) SubCutaneous every 24 hours  glucagon  Injectable 1 milliGRAM(s) IntraMuscular once  insulin lispro (ADMELOG) corrective regimen sliding scale   SubCutaneous three times a day before meals  lactated ringers Bolus 1000 milliLiter(s) IV Bolus once  metFORMIN 500 milliGRAM(s) Oral two times a day  pantoprazole    Tablet 40 milliGRAM(s) Oral before breakfast  polyethylene glycol 3350 17 Gram(s) Oral daily    MEDICATIONS  (PRN):  acetaminophen     Tablet .. 650 milliGRAM(s) Oral every 6 hours PRN Temp greater or equal to 38C (100.4F), Mild Pain (1 - 3)  aluminum hydroxide/magnesium hydroxide/simethicone Suspension 30 milliLiter(s) Oral every 4 hours PRN Dyspepsia  dextrose Oral Gel 15 Gram(s) Oral once PRN Blood Glucose LESS THAN 70 milliGRAM(s)/deciliter  melatonin 3 milliGRAM(s) Oral at bedtime PRN Insomnia  senna 2 Tablet(s) Oral at bedtime PRN Constipation

## 2023-11-14 NOTE — PROGRESS NOTE ADULT - ASSESSMENT
Assessment/Plan:  HARJEET WHITMORE is a 52 year old female with PMH of GERD, S4 lung cancer with mets to brain (s/p 4 cycles of chemo and thoracic RT- completed 10/2022), steroid induced Diabetes Mellitus, gamma knife surgery to 7 brain mets, and s/p temporal crani/resection of metastatic lesion; who presented to [facility] on 10/31 with BL UE and BL LE weakness and abdominal pain for 2 weeks concerning for progressive metastatic disease.  Overall imaging was significant for metastatic disease in the brain, lung, liver and abdomen.  NSGY deemed no surgical intervention for worsening brain mets.  Heme/Onc suggested 1 dose of C1D1 Carboplatin and Paclitaxel. She also received Bevacizumab during her admission. Her hospital course was complicated by hyponatremia (likely 2/2 SIADH given lung CA), and fever (negative workup). She will continue chemotherapy/radiation treatment in the outpatient setting with Dr. Beckman. Patient now admitted for a multidisciplinary rehab program.   -------------    * CT chest no PE  * Troponin downtrending, no chest pain  * Cardiology and Hematology f/u appreciated  * Left soleal DVT---Serial weekly venous duplex as recs by hematology due to increased risk of bleeding   * Echo to evaluate LVEF    #Malignant neoplasm with metastasis to brain//liver/abdomen  - Gait Instability, ADL impairments and Functional impairments: start Comprehensive Rehab Program of PT/OT/SLP  - 3 hours a day, 5 days a week  - continue Dexamethasone 2mg BID  - Resume chemo/radiation therapy in outpatient setting  - Follow up with Dr. Beckman     #Seizure Prophylaxis  - contnue Depakote 500mg BID     Left soleal DVT---11/13   CT chest no PE  --Elevated, downtrending trop, attributed to demand ischemia  Serial weekly venous duplex as recs by hematology due to increased risk of bleeding   Cardiology recs Echo to evaluate LVEF  Hematology has notified patient private heme Dr Beckman    Clinically dehydrated--responded to bolus IVF 11/14--C/W --liberal oral fluids  F/u Hospitalist recs    #Steroid Induced Diabetes Mellitus  - A1c: 7.7 (11/2023)  - ISS  - FS AC&HS  - Metformin 500mg BID     #HLD  - Atorvastatin 40mg at HS    #Hyponatremia--monitor   - Likely secondary to lung CA    #Sleep  - Melatonin 3mg PRN     #Skin  - Skin -- Right crani incision ,healed     #Pain Mgmt   - Tylenol PRN    #GI/Bowel Mgmt   - Continue Pantoprazole - Miralax - Senna - Maalox  PRN (will switch to standing if symptoms persist)    #/Bladder Mgmt --voiding     #FEN   - Diet - Regular + Thins  [CCHO]    - Dysphagia  SLP - evaluation and treatment  --Poor oral intake--F/u Nutritionist     #Health Maintenance  - Ocular lubricant gtts TID     #Precautions / PROPHYLAXIS:   - Falls, Seizure   - ortho: Weight bearing status: WBAT   - Lungs: Aspiration, Incentive Spirometer   - DVT: Lovenox     * L;abs 11/13---Stable anemia, normal renal function and elevated Alk phos,   --Down trending troponin    11/14--Liaison with Cardiology and Hemetology for management of LLE DVT     IDT 11/14  Lives with Friend, plans to live in a friend's house post distance  Could also move in to sister's house on DC  SLP--Reg diet with thin liquids, mod cog impairment, deficits with recalL  Function  Min assist with upper body dressing and mod assist with Lower body dressing, max assist with toileting  Ambulating 10ft with WC Follow through max assistance   Goals--Ambulating functional distance with device, min assist with ADLs  Barriers--poor endurance, fatigue, low motivation   Ext dc 11/22 to home    --------------------------------------------  OUTPATIENT/FOLLOW UP:    Margarito Beckman  Medical Oncology  [address] [place] [id number]  Phone: [phone]  Fax: [phone]  Established Patient  Follow Up Time: 2 weeks    Zurdo Bernard  Radiation Oncology  [address] [place] [id number]  Phone: [phone]  Fax: [phone]  Established Patient  Follow Up Time: 2 weeks  --------------------------------------------

## 2023-11-14 NOTE — PROGRESS NOTE ADULT - ASSESSMENT
52F with metastatic lung cancer to brain/liver on ongoing chemo/RT, steroid-induced DM, now in acute rehab after being hospitalized for progressive weakness    #sinus tachycardia  - discussed with cardiology, follow up on TTE  - CTA negative PE  - trops elevated, now downtrending  - ordered LR bolus for today, continue to encourage po intake.    #LLE DVT   - distal DVT +soleal vein of left leg  - discussed with PM&R, given risk of hemorrhagic conversion of brain mets, will repeat venous duplex u/s next week to evaluate propagation of DVT. holding off on full AC.    #Metastatic lung cancer to brain/liver  #Progressive weakness due to above  #Abdominal pain likely due to above mets (liver mets, abdominal wall subcutaneous nodules, all in recent imaging)  - continue comprehensive rehab program  -c/w Decadron   -seizure prophylaxis: Depakote  -Pain control    #Steroid-induced DM  - POCT at goal  c/w Metformin, ISS    #SIADH  -sodium improved    #DVT ppx: Lovenox    Discussed with primary team

## 2023-11-14 NOTE — PROGRESS NOTE ADULT - ASSESSMENT
Assessment:  Renée Rojas is a 52 year old woman with past medical history of Stage IV Lung cancer with metastasis to brain (s/p chemotherapy, radiation and gamma knife surgery) with recent right temporal craniotomy for resection of mass, then recent hospitalization at [facility] earlier this month with right sided weakness found to have worsening metastases to brain, now admitted to [facility].    Cardiology consulted for sinus tachycardia. ECG consistent with sinus tachycardia at 131 BPM. The patient reports decreased appetite, denies palpitations, chest pain or shortness of breath. Prior echocardiogram (8/2023) reported as normal LVEF 55-60%. Leg US indicates left leg DVT. CT chest with lung mass and metastatic nodules.    Recommendations:  [] Sinus tachycardia: Likely secondary to inciting event, patient with left leg DVT, however no pulmonary embolism on CTPA.  Follow up Hematology regarding anticoagulation recommendations given brain metastases and risk of bleeding with anticoagulation.  [] Elevated troponins: Patient is asymptomatic, likely demand ischemia from chronic sinus tachycardia and medical illnesses Recommend echo to evaluate LVEF and wall motion.     We will continue to follow along.    Mani Pinto MD  Cardiology

## 2023-11-14 NOTE — PROGRESS NOTE ADULT - SUBJECTIVE AND OBJECTIVE BOX
no acute events overnight  +DVT of L soleal vein      PHYSICAL EXAM:    Vital Signs Last 24 Hrs  T(F): 97.7 (13 Nov 2023 20:10), Max: 98.2 (13 Nov 2023 14:48)  HR: 125 (14 Nov 2023 13:06) (116 - 132)  BP: 112/76 (14 Nov 2023 05:40) (93/65 - 112/76)  RR: 16 (14 Nov 2023 13:06) (16 - 16)  SpO2: 98% (14 Nov 2023 13:06) (95% - 98%)  I&O's Summary    13 Nov 2023 07:01  -  14 Nov 2023 07:00  --------------------------------------------------------  IN: 0 mL / OUT: 800 mL / NET: -800 mL        GENERAL: NAD  HEENT: NCAT  CHEST/LUNG: No increased WOB, Clear to percussion bilaterally; No rales, rhonchi, wheezing  HEART: Regular rate and rhythm; No murmurs  ABDOMEN: Soft, Nontender, Nondistended; Bowel sounds present  MUSCULOSKELETAL/EXTREMITIES:  2+ Peripheral Pulses, No LE edema  PSYCH: Appropriate affect, Alert & Oriented    LABS:                        11.1   6.34  )-----------( 131      ( 13 Nov 2023 06:00 )             35.1       11-13    136  |  102  |  6   ----------------------------<  116  3.8   |  27  |  0.51    Ca    10.8      13 Nov 2023 06:00    TPro  7.0  /  Alb  2.0  /  TBili  0.2  /  DBili  x   /  AST  95  /  ALT  34  /  AlkPhos  245  11-13            CARDIAC MARKERS ( 13 Nov 2023 18:05 )  x     / 221.0 ng/L / x     / x     / x      CARDIAC MARKERS ( 13 Nov 2023 14:10 )  x     / 257.0 ng/L / x     / x     / x                        POCT Blood Glucose.: 160 mg/dL (14 Nov 2023 12:01)  POCT Blood Glucose.: 114 mg/dL (14 Nov 2023 07:59)  POCT Blood Glucose.: 98 mg/dL (13 Nov 2023 20:49)  POCT Blood Glucose.: 83 mg/dL (13 Nov 2023 17:58)      Urinalysis Basic - ( 13 Nov 2023 06:00 )    Color: x / Appearance: x / SG: x / pH: x  Gluc: 116 mg/dL / Ketone: x  / Bili: x / Urobili: x   Blood: x / Protein: x / Nitrite: x   Leuk Esterase: x / RBC: x / WBC x   Sq Epi: x / Non Sq Epi: x / Bacteria: x        COVID-19 PCR: NotDetec (11-10-23 @ 11:00)      MEDICATIONS  (STANDING):  AQUAPHOR (petrolatum Ointment) 1 Application(s) Topical daily  artificial tears (preservative free) Ophthalmic Solution 1 Drop(s) Both EYES three times a day  atorvastatin 40 milliGRAM(s) Oral at bedtime  dexAMETHasone     Tablet 2 milliGRAM(s) Oral two times a day  dextrose 5%. 1000 milliLiter(s) (50 mL/Hr) IV Continuous <Continuous>  dextrose 5%. 1000 milliLiter(s) (100 mL/Hr) IV Continuous <Continuous>  dextrose 50% Injectable 12.5 Gram(s) IV Push once  dextrose 50% Injectable 25 Gram(s) IV Push once  dextrose 50% Injectable 25 Gram(s) IV Push once  divalproex  milliGRAM(s) Oral every 12 hours  enoxaparin Injectable 40 milliGRAM(s) SubCutaneous every 24 hours  glucagon  Injectable 1 milliGRAM(s) IntraMuscular once  insulin lispro (ADMELOG) corrective regimen sliding scale   SubCutaneous three times a day before meals  lactated ringers Bolus 1000 milliLiter(s) IV Bolus once  metFORMIN 500 milliGRAM(s) Oral two times a day  pantoprazole    Tablet 40 milliGRAM(s) Oral before breakfast  polyethylene glycol 3350 17 Gram(s) Oral daily    MEDICATIONS  (PRN):  acetaminophen     Tablet .. 650 milliGRAM(s) Oral every 6 hours PRN Temp greater or equal to 38C (100.4F), Mild Pain (1 - 3)  aluminum hydroxide/magnesium hydroxide/simethicone Suspension 30 milliLiter(s) Oral every 4 hours PRN Dyspepsia  dextrose Oral Gel 15 Gram(s) Oral once PRN Blood Glucose LESS THAN 70 milliGRAM(s)/deciliter  melatonin 3 milliGRAM(s) Oral at bedtime PRN Insomnia  senna 2 Tablet(s) Oral at bedtime PRN Constipation      Care Discussed with Consultants/Other Providers: Yes

## 2023-11-14 NOTE — CHART NOTE - NSCHARTNOTEFT_GEN_A_CORE
Nutrition Follow Up Note  Source: Medical Record [X] Patient [X] Family [ ]         Diet: consistent carbohydrate with evening snack, Glucerna TID  Pt tolerating diet but with suboptimal PO intake, eating 0-50% of meals per nursing flow sheets. Pt has good acceptance of Glucerna supplement, reports drinking 100% of supplement (provides 220 kcal, 10 g protein/serving). Pt also stated she ate 100% of chicken parmesan dinner on Sunday. Obtained food preferences to optimize PO intake. Pt reports loose stools but attributes this to not eating much solid food and drinking mostly Glucerna. Encouraged pt to eat solid food + binding foods.     Enteral/Parenteral Nutrition: N/A    Current Weight: 120.8 lbs (11-8)    Pertinent Medications: MEDICATIONS  (STANDING):  AQUAPHOR (petrolatum Ointment) 1 Application(s) Topical daily  artificial tears (preservative free) Ophthalmic Solution 1 Drop(s) Both EYES three times a day  atorvastatin 40 milliGRAM(s) Oral at bedtime  dexAMETHasone     Tablet 2 milliGRAM(s) Oral two times a day  dextrose 5%. 1000 milliLiter(s) (50 mL/Hr) IV Continuous <Continuous>  dextrose 5%. 1000 milliLiter(s) (100 mL/Hr) IV Continuous <Continuous>  dextrose 50% Injectable 25 Gram(s) IV Push once  dextrose 50% Injectable 12.5 Gram(s) IV Push once  dextrose 50% Injectable 25 Gram(s) IV Push once  divalproex  milliGRAM(s) Oral every 12 hours  enoxaparin Injectable 40 milliGRAM(s) SubCutaneous every 24 hours  glucagon  Injectable 1 milliGRAM(s) IntraMuscular once  insulin lispro (ADMELOG) corrective regimen sliding scale   SubCutaneous three times a day before meals  metFORMIN 500 milliGRAM(s) Oral two times a day  pantoprazole    Tablet 40 milliGRAM(s) Oral before breakfast  polyethylene glycol 3350 17 Gram(s) Oral daily    MEDICATIONS  (PRN):  acetaminophen     Tablet .. 650 milliGRAM(s) Oral every 6 hours PRN Temp greater or equal to 38C (100.4F), Mild Pain (1 - 3)  aluminum hydroxide/magnesium hydroxide/simethicone Suspension 30 milliLiter(s) Oral every 4 hours PRN Dyspepsia  dextrose Oral Gel 15 Gram(s) Oral once PRN Blood Glucose LESS THAN 70 milliGRAM(s)/deciliter  melatonin 3 milliGRAM(s) Oral at bedtime PRN Insomnia  senna 2 Tablet(s) Oral at bedtime PRN Constipation      Pertinent Labs:  11-13 Na136 mmol/L Glu 116 mg/dL<H> K+ 3.8 mmol/L Cr  0.51 mg/dL BUN 6 mg/dL<L> 11-13 Alb 2.0 g/dL<L>        Skin: Skin intact per nursing flow sheets     Edema: No edema per nursing flow sheets     Last BM: on 11-13 Per nursing flowsheets     Estimated Needs:   [X] No Change since Previous Assessment  [ ] Recalculated:     Previous Nutrition Diagnosis:   1. severe chronic malnutrition  2. altered nutrition related lab values    Nutrition Diagnosis is [X] Ongoing  - addressed with Glucerna TID, therapeutic diet    New Nutrition Diagnosis: [X] Not Applicable  [ ] Inadequate Protein Energy Intake   [ ] Inadequate Oral Intake   [ ] Excessive Energy Intake   [ ] Increased Nutrient Needs   [ ] Obesity   [ ] Altered GI Function   [ ] Unintended Weight Loss   [ ] Food & Nutrition Related Knowledge Deficit  [ ] Limited Adherence to nutrition related recommendations   [ ] Malnutrition      Interventions:   1. Recommend continuing with current plan of care  2. Encourage PO intake  3. Obtain and honor food preferences as able    Monitoring & Evaluation:   [X] Weights   [X] PO Intake   [X] Follow Up (Per Protocol)  [X] Tolerance to Diet Prescription   [X] Other: Labs    RD Remains Available.  Casie Flores RD

## 2023-11-14 NOTE — PROGRESS NOTE ADULT - SUBJECTIVE AND OBJECTIVE BOX
RENÉE WHITMORE   52y   Female    Admitting: IRENE Lucia  HPI:    Renée Whitmore is a 52 year old female with PMH of GERD, S4 lung cancer with mets to brain (s/p 4 cycles of chemo and thoracic RT- completed 10/2022), steroid induced Diabetes Mellitus, gamma knife surgery to 7 brain mets, and s/p temporal crani/resection of metastatic lesion; who presented to [facility] on 10/31 with BL UE and BL LE weakness and abdominal pain for 2 weeks concerning for progressive metastatic disease.  Overall imaging was significant for metastatic disease in the brain, lung, liver and abdomen.  NSGY deemed no surgical intervention for worsening brain mets.  Heme/Onc suggested 1 dose of C1D1 Carboplatin and Paclitaxel. She also received Bevacizumab during her admission. Her hospital course was complicated by hyponatremia (likely 2/2 SIADH given lung CA), and fever.  She will continue chemotherapy/radiation treatment in the outpatient setting with Dr. Beckman.  PM&R was consulted and deemed her an appropriate candidate for IRF. She was medically stabilized and cleared for discharge to [facility].    Oncology asked to see patient by rehab team, for h/o lung cancer.    PAST MEDICAL & SURGICAL HISTORY:  GERD (Gastroesophageal Reflux Disease)      Lung mass  right      Non-small cell lung cancer with metastasis      S/P endoscopy  2022        HEALTH ISSUES - PROBLEM Dx:  Non-small cell carcinoma of lung      MEDICATIONS  (STANDING):  AQUAPHOR (petrolatum Ointment) 1 Application(s) Topical daily  artificial tears (preservative free) Ophthalmic Solution 1 Drop(s) Both EYES three times a day  atorvastatin 40 milliGRAM(s) Oral at bedtime  dexAMETHasone     Tablet 2 milliGRAM(s) Oral two times a day  dextrose 5%. 1000 milliLiter(s) (50 mL/Hr) IV Continuous <Continuous>  dextrose 5%. 1000 milliLiter(s) (100 mL/Hr) IV Continuous <Continuous>  dextrose 50% Injectable 12.5 Gram(s) IV Push once  dextrose 50% Injectable 25 Gram(s) IV Push once  dextrose 50% Injectable 25 Gram(s) IV Push once  divalproex  milliGRAM(s) Oral every 12 hours  enoxaparin Injectable 40 milliGRAM(s) SubCutaneous every 24 hours  glucagon  Injectable 1 milliGRAM(s) IntraMuscular once  insulin lispro (ADMELOG) corrective regimen sliding scale   SubCutaneous three times a day before meals  metFORMIN 500 milliGRAM(s) Oral two times a day  pantoprazole    Tablet 40 milliGRAM(s) Oral before breakfast  polyethylene glycol 3350 17 Gram(s) Oral daily    MEDICATIONS  (PRN):  acetaminophen     Tablet .. 650 milliGRAM(s) Oral every 6 hours PRN Temp greater or equal to 38C (100.4F), Mild Pain (1 - 3)  aluminum hydroxide/magnesium hydroxide/simethicone Suspension 30 milliLiter(s) Oral every 4 hours PRN Dyspepsia  dextrose Oral Gel 15 Gram(s) Oral once PRN Blood Glucose LESS THAN 70 milliGRAM(s)/deciliter  melatonin 3 milliGRAM(s) Oral at bedtime PRN Insomnia  senna 2 Tablet(s) Oral at bedtime PRN Constipation    Allergies    No Known Allergies    INTERVAL HPI/OVERNIGHT EVENTS:  Patient S&E at bedside. No c/o CP or SOB currently.    VITAL SIGNS:  T(F): 97.7 (11-13-23 @ 20:10)  HR: 116 (11-14-23 @ 05:40)  BP: 112/76 (11-14-23 @ 05:40)  RR: 16 (11-13-23 @ 20:10)  SpO2: 95% (11-13-23 @ 20:10)      PHYSICAL EXAM:  Constitutional: NAD  Eyes: sclera non-icteric  Neck: no JVD  Respiratory: decreased BS ant.  Cardiovascular: RRR, no M/R/G  Gastrointestinal: softly distended, tender to palpation without rebound  Extremities: no calf tenderness elicited  Neurological: Awake, alert.    Labs:             11.1   6.34  )-----------( 131      ( 11-13 @ 06:00 )             35.1       11-13    136  |  102  |  6<L>  ----------------------------<  116<H>  3.8   |  27  |  0.51    Ca    10.8<H>      13 Nov 2023 06:00    TPro  7.0  /  Alb  2.0<L>  /  TBili  0.2  /  DBili  x   /  AST  95<H>  /  ALT  34  /  AlkPhos  245<H>  11-13      RADIOLOGY & ADDITIONAL TESTS:  < from: CT Angio Chest PE Protocol w/ IV Cont (11.14.23 @ 06:02) >  IMPRESSION:  No pulmonary thromboembolism.    No change of right upper lobe lobulated mass with numerous metastatic   nodules in both lungs.  Numerous metastatic hepatic nodules with interval increase of caudate   lobe lesions.  Nodular thickening of left adrenal gland-adenoma versus metastasis.  Osseous metastasis of T1.    < end of copied text >    Consultant notes reviewed : YES [ x] ; NO [ ]

## 2023-11-15 NOTE — PROGRESS NOTE ADULT - ASSESSMENT
52F with metastatic lung cancer to brain/liver on ongoing chemo/RT, steroid-induced DM, now in acute rehab after being hospitalized for progressive weakness    #sinus tachycardia  #hypomagnesemia  #hypophosphatemia  - discussed with cardiology,  - TTE reviewed, limited study  - EKG w sinus tachy 's  - started lopressor 12.5mg po BID  - WBC 3.59 today, Hb decreased to 9.6. ordered CBC diff as add on, ordered hepatic panel as add on, LDH, haptoglobin.  - repleted mag and phos today.  - LR @75cc/h for 500cc    #bicytopenia  - given mild leukopenia, will order blood cultures x 2 sets  - UA with reflex  - Hb dropped to 9.6, in setting of DVT and tachycardia with decreased in H/H, ordered hepatic panel LDH and haptoglobin.    #LLE DVT   - distal DVT +soleal vein of left leg  - discussed with PM&R, given risk of hemorrhagic conversion of brain mets, will repeat venous duplex u/s next week to evaluate propagation of DVT. holding off on full AC.    #Metastatic lung cancer to brain/liver  #Progressive weakness due to above  #Abdominal pain likely due to above mets (liver mets, abdominal wall subcutaneous nodules, all in recent imaging)  - continue comprehensive rehab program  -c/w Decadron   -seizure prophylaxis: Depakote  -Pain control    #Steroid-induced DM  - POCT at goal  c/w Metformin, ISS    #SIADH  -sodium improved    #DVT ppx: Lovenox    Discussed with primary team

## 2023-11-15 NOTE — PROGRESS NOTE ADULT - SUBJECTIVE AND OBJECTIVE BOX
heart palpitations, no CP or SOB      PHYSICAL EXAM:    Vital Signs Last 24 Hrs  T(F): 98.2 (14 Nov 2023 20:09), Max: 98.2 (14 Nov 2023 20:09)  HR: 128 (15 Nov 2023 09:55) (111 - 128)  BP: 113/82 (15 Nov 2023 09:55) (100/65 - 113/82)  RR: 16 (14 Nov 2023 20:09) (16 - 16)  SpO2: 97% (14 Nov 2023 20:09) (97% - 97%)  I&O's Summary    14 Nov 2023 07:01  -  15 Nov 2023 07:00  --------------------------------------------------------  IN: 0 mL / OUT: 400 mL / NET: -400 mL        GENERAL: NAD  HEENT: NCAT  CHEST/LUNG: No increased WOB, Clear to percussion bilaterally; No rales, rhonchi, wheezing  HEART: tachycardic and rhythm; No murmurs  ABDOMEN: Soft, Nontender, Nondistended; Bowel sounds present  MUSCULOSKELETAL/EXTREMITIES:  2+ Peripheral Pulses, No LE edema  PSYCH: Appropriate affect, Alert & Oriented    LABS:                        9.6    3.59  )-----------( 129      ( 15 Nov 2023 12:40 )             30.8       11-15    136  |  102  |  5   ----------------------------<  155  3.6   |  25  |  0.41    Ca    9.8      15 Nov 2023 12:40  Phos  2.2     11-15  Mg     1.4     11-15    TPro  7.0  /  Alb  2.0  /  TBili  0.2  /  DBili  x   /  AST  95  /  ALT  34  /  AlkPhos  245  11-13            CARDIAC MARKERS ( 13 Nov 2023 18:05 )  x     / 221.0 ng/L / x     / x     / x      CARDIAC MARKERS ( 13 Nov 2023 14:10 )  x     / 257.0 ng/L / x     / x     / x                        POCT Blood Glucose.: 177 mg/dL (15 Nov 2023 11:17)  POCT Blood Glucose.: 104 mg/dL (15 Nov 2023 07:54)  POCT Blood Glucose.: 110 mg/dL (14 Nov 2023 17:00)      Urinalysis Basic - ( 15 Nov 2023 12:40 )    Color: x / Appearance: x / SG: x / pH: x  Gluc: 155 mg/dL / Ketone: x  / Bili: x / Urobili: x   Blood: x / Protein: x / Nitrite: x   Leuk Esterase: x / RBC: x / WBC x   Sq Epi: x / Non Sq Epi: x / Bacteria: x        COVID-19 PCR: NotDetec (11-10-23 @ 11:00)      MEDICATIONS  (STANDING):  AQUAPHOR (petrolatum Ointment) 1 Application(s) Topical daily  artificial tears (preservative free) Ophthalmic Solution 1 Drop(s) Both EYES three times a day  atorvastatin 40 milliGRAM(s) Oral at bedtime  dexAMETHasone     Tablet 2 milliGRAM(s) Oral two times a day  dextrose 5%. 1000 milliLiter(s) (50 mL/Hr) IV Continuous <Continuous>  dextrose 5%. 1000 milliLiter(s) (100 mL/Hr) IV Continuous <Continuous>  dextrose 50% Injectable 12.5 Gram(s) IV Push once  dextrose 50% Injectable 25 Gram(s) IV Push once  dextrose 50% Injectable 25 Gram(s) IV Push once  divalproex  milliGRAM(s) Oral every 12 hours  enoxaparin Injectable 40 milliGRAM(s) SubCutaneous every 24 hours  glucagon  Injectable 1 milliGRAM(s) IntraMuscular once  insulin lispro (ADMELOG) corrective regimen sliding scale   SubCutaneous three times a day before meals  lactated ringers. 500 milliLiter(s) (75 mL/Hr) IV Continuous <Continuous>  magnesium sulfate  IVPB 2 Gram(s) IV Intermittent once  metFORMIN 500 milliGRAM(s) Oral two times a day  pantoprazole    Tablet 40 milliGRAM(s) Oral before breakfast  polyethylene glycol 3350 17 Gram(s) Oral daily  potassium phosphate / sodium phosphate Powder (PHOS-NaK) 1 Packet(s) Oral once    MEDICATIONS  (PRN):  acetaminophen     Tablet .. 650 milliGRAM(s) Oral every 6 hours PRN Temp greater or equal to 38C (100.4F), Mild Pain (1 - 3)  aluminum hydroxide/magnesium hydroxide/simethicone Suspension 30 milliLiter(s) Oral every 4 hours PRN Dyspepsia  dextrose Oral Gel 15 Gram(s) Oral once PRN Blood Glucose LESS THAN 70 milliGRAM(s)/deciliter  melatonin 3 milliGRAM(s) Oral at bedtime PRN Insomnia  senna 2 Tablet(s) Oral at bedtime PRN Constipation      Care Discussed with Consultants/Other Providers: Yes

## 2023-11-15 NOTE — PROGRESS NOTE ADULT - ASSESSMENT
Assessment:  Renée Rojas is a 52 year old woman with past medical history of Stage IV Lung cancer with metastasis to brain (s/p chemotherapy, radiation and gamma knife surgery) with recent right temporal craniotomy for resection of mass, then recent hospitalization at [facility] earlier this month with right sided weakness found to have worsening metastases to brain, now admitted to [facility].    Cardiology consulted for sinus tachycardia. ECG consistent with sinus tachycardia at 131 BPM. The patient reports decreased appetite, denies palpitations, chest pain or shortness of breath. Prior echocardiogram (8/2023) reported as normal LVEF 55-60%. Leg US indicates left leg DVT. CT chest with lung mass and metastatic nodules.    Recommendations:  [] Sinus tachycardia: Likely secondary to inciting event, tachycardia may also be secondary to left leg DVT, however no pulmonary embolism on CTPA. Follow up Hematology regarding anticoagulation recommendations given brain metastases and risk of bleeding with anticoagulation. Would check labs today and trial NS IV fluids 500 cc, patient is currently hemodynamically stable. Plan for echo as below. May trial low dose beta blocker if no other etiology of sinus tachycardia discovered.  [] Elevated troponins: Patient is asymptomatic, likely demand ischemia from chronic sinus tachycardia and medical illnesses Recommend echo to evaluate LVEF and wall motion.     Updated Dr. Alva. Will sign out this case to cardiologist to follow along tomorrow.    Mani Pinto MD  Cardiology

## 2023-11-15 NOTE — PROGRESS NOTE ADULT - ASSESSMENT
Assessment/Plan:  HARJEET WHITMORE is a 52 year old female with PMH of GERD, S4 lung cancer with mets to brain (s/p 4 cycles of chemo and thoracic RT- completed 10/2022), steroid induced Diabetes Mellitus, gamma knife surgery to 7 brain mets, and s/p temporal crani/resection of metastatic lesion; who presented to [facility] on 10/31 with BL UE and BL LE weakness and abdominal pain for 2 weeks concerning for progressive metastatic disease.  Overall imaging was significant for metastatic disease in the brain, lung, liver and abdomen.  NSGY deemed no surgical intervention for worsening brain mets.  Heme/Onc suggested 1 dose of C1D1 Carboplatin and Paclitaxel. She also received Bevacizumab during her admission. Her hospital course was complicated by hyponatremia (likely 2/2 SIADH given lung CA), and fever (negative workup). She will continue chemotherapy/radiation treatment in the outpatient setting with Dr. Beckman. Patient now admitted for a multidisciplinary rehab program.   -------------    * Cardiology f/u review appreciated, ECHO report is pending   * Cough--monitor  * Oral intake gradually improving-----F/u Nutritionist     #Malignant neoplasm with metastasis to brain//liver/abdomen  - Gait Instability, ADL impairments and Functional impairments: start Comprehensive Rehab Program of PT/OT/SLP  - 3 hours a day, 5 days a week  - continue Dexamethasone 2mg BID  - Resume chemo/radiation therapy in outpatient setting  - Follow up with Dr. Beckman     #Seizure Prophylaxis  - contnue Depakote 500mg BID    * Cough--monitor, declined antitussive meds     Left soleal DVT---11/13   CT chest no PE  --Elevated, downtrending trop, attributed to demand ischemia  Serial weekly venous duplex as recs by hematology due to increased risk of bleeding    Echo to evaluate LVEF, 11/14--report pending   Hematology has notified patient private heme Dr Beckman    Clinically dehydrated--responded to bolus IVF 11/14--C/W --liberal oral fluids  F/u Hospitalist recs    #Steroid Induced Diabetes Mellitus  - A1c: 7.7 (11/2023)  - ISS  - FS AC&HS  - Metformin 500mg BID     #HLD  - continue Atorvastatin 40mg at HS    #Hyponatremia--monitor   - Likely secondary to lung CA    #Sleep  - Melatonin 3mg PRN     #Skin  - Skin -- Right crani incision ,healed     #Pain Mgmt   - Tylenol PRN    #GI/Bowel Mgmt   - Continue Pantoprazole - Miralax - Senna - Maalox  PRN (will switch to standing if symptoms persist)    #/Bladder Mgmt --voiding     #FEN   - Diet - Regular + Thins  [CCHO]    - Dysphagia  SLP - evaluation and treatment  --Poor oral intake--F/u Nutritionist     #Health Maintenance  - Ocular lubricant gtts TID     #Precautions / PROPHYLAXIS:   - Falls, Seizure   - ortho: Weight bearing status: WBAT   - Lungs: Aspiration, Incentive Spirometer   - DVT: Lovenox     * L;abs 11/13---Stable anemia, normal renal function and elevated Alk phos,   --Down trending troponin    11/14--Liaison with Cardiology and Hemetology for management of LLE DVT     IDT 11/14  Lives with Friend, plans to live in a friend's house post distance  Could also move in to sister's house on DC  SLP--Reg diet with thin liquids, mod cog impairment, deficits with recalL  Function  Min assist with upper body dressing and mod assist with Lower body dressing, max assist with toileting  Ambulating 10ft with WC Follow through max assistance   Goals--Ambulating functional distance with device, min assist with ADLs  Barriers--poor endurance, fatigue, low motivation   Ext dc 11/22 to home    --------------------------------------------  OUTPATIENT/FOLLOW UP:    Margarito Beckman  Medical Oncology  [address] [place] [id number]  Phone: [phone]  Fax: [phone]  Established Patient  Follow Up Time: 2 weeks    Zurdo Beranrd  Radiation Oncology  [address] [place] [id number]  Phone: [phone]  Fax: [phone]  Established Patient  Follow Up Time: 2 weeks  --------------------------------------------   Assessment/Plan:  HARJEET WHITMORE is a 52 year old female with PMH of GERD, S4 lung cancer with mets to brain (s/p 4 cycles of chemo and thoracic RT- completed 10/2022), steroid induced Diabetes Mellitus, gamma knife surgery to 7 brain mets, and s/p temporal crani/resection of metastatic lesion; who presented to [facility] on 10/31 with BL UE and BL LE weakness and abdominal pain for 2 weeks concerning for progressive metastatic disease.  Overall imaging was significant for metastatic disease in the brain, lung, liver and abdomen.  NSGY deemed no surgical intervention for worsening brain mets.  Heme/Onc suggested 1 dose of C1D1 Carboplatin and Paclitaxel. She also received Bevacizumab during her admission. Her hospital course was complicated by hyponatremia (likely 2/2 SIADH given lung CA), and fever (negative workup). She will continue chemotherapy/radiation treatment in the outpatient setting with Dr. Beckman. Patient now admitted for a multidisciplinary rehab program.   -------------    * Cardiology f/u review appreciated, ECHO report is pending   * Cough--monitor  * Oral intake gradually improving-----F/u Nutritionist  * Tachycardia--Await further recs from Hospitalist and Cardiology     #Malignant neoplasm with metastasis to brain//liver/abdomen  - Gait Instability, ADL impairments and Functional impairments: start Comprehensive Rehab Program of PT/OT/SLP  - 3 hours a day, 5 days a week  - continue Dexamethasone 2mg BID  - Resume chemo/radiation therapy in outpatient setting  - Follow up with Dr. Beckman     #Seizure Prophylaxis  - contnue Depakote 500mg BID    * Cough--monitor, declined antitussive meds    # Tachycardia    Left soleal DVT---11/13   CT chest no PE  --Elevated, downtrending trop, attributed to demand ischemia  Serial weekly venous duplex as recs by hematology due to increased risk of bleeding    Echo to evaluate LVEF, 11/14--report pending   Hematology has notified patient private heme Dr Beckman  Hospitalist following and will liaise with cardiology to make recs for Tachycardia    Clinically dehydrated--responded to bolus IVF 11/14--C/W --liberal oral fluids  F/u Hospitalist recs    #Steroid Induced Diabetes Mellitus  - A1c: 7.7 (11/2023)  - ISS  - FS AC&HS  - Metformin 500mg BID     #HLD  - continue Atorvastatin 40mg at HS    #Hyponatremia--monitor   - Likely secondary to lung CA    #Sleep  - Melatonin 3mg PRN     #Skin  - Skin -- Right crani incision ,healed     #Pain Mgmt   - Tylenol PRN    #GI/Bowel Mgmt   - Continue Pantoprazole - Miralax - Senna - Maalox  PRN (will switch to standing if symptoms persist)    #/Bladder Mgmt --voiding     #FEN   - Diet - Regular + Thins  [CCHO]    - Dysphagia  SLP - evaluation and treatment  --Poor oral intake--F/u Nutritionist     #Health Maintenance  - Ocular lubricant gtts TID     #Precautions / PROPHYLAXIS:   - Falls, Seizure   - ortho: Weight bearing status: WBAT   - Lungs: Aspiration, Incentive Spirometer   - DVT: Lovenox     * L;abs 11/13---Stable anemia, normal renal function and elevated Alk phos,   --Down trending troponin    11/14--Liaison with Cardiology and Hemetology for management of LLE DVT     IDT 11/14  Lives with Friend, plans to live in a friend's house post distance  Could also move in to sister's house on DC  SLP--Reg diet with thin liquids, mod cog impairment, deficits with recalL  Function  Min assist with upper body dressing and mod assist with Lower body dressing, max assist with toileting  Ambulating 10ft with WC Follow through max assistance   Goals--Ambulating functional distance with device, min assist with ADLs  Barriers--poor endurance, fatigue, low motivation   Ext dc 11/22 to home    --------------------------------------------  OUTPATIENT/FOLLOW UP:    Margarito Beckman  Medical Oncology  [address] [place] [id number]  Phone: [phone]  Fax: [phone]  Established Patient  Follow Up Time: 2 weeks    Zurdo Bernard  Radiation Oncology  [address] [place] [id number]  Phone: [phone]  Fax: [phone]  Established Patient  Follow Up Time: 2 weeks  --------------------------------------------

## 2023-11-15 NOTE — PROGRESS NOTE ADULT - SUBJECTIVE AND OBJECTIVE BOX
HARJEET WHITMORE  [id number]      Chief Complaint: Stage IV Lung Cancer with metastases/Sinus tachycardia/DVT/Elevated troponin    Interval History: The patient reports feeling ok this morning, denies palpitations or chest pain.     Tele: not on telemetry (in rehab)      Current meds:   acetaminophen     Tablet .. 650 milliGRAM(s) Oral every 6 hours PRN  aluminum hydroxide/magnesium hydroxide/simethicone Suspension 30 milliLiter(s) Oral every 4 hours PRN  AQUAPHOR (petrolatum Ointment) 1 Application(s) Topical daily  artificial tears (preservative free) Ophthalmic Solution 1 Drop(s) Both EYES three times a day  atorvastatin 40 milliGRAM(s) Oral at bedtime  dexAMETHasone     Tablet 2 milliGRAM(s) Oral two times a day  dextrose 5%. 1000 milliLiter(s) IV Continuous <Continuous>  dextrose 5%. 1000 milliLiter(s) IV Continuous <Continuous>  dextrose 50% Injectable 12.5 Gram(s) IV Push once  dextrose 50% Injectable 25 Gram(s) IV Push once  dextrose 50% Injectable 25 Gram(s) IV Push once  dextrose Oral Gel 15 Gram(s) Oral once PRN  divalproex  milliGRAM(s) Oral every 12 hours  enoxaparin Injectable 40 milliGRAM(s) SubCutaneous every 24 hours  glucagon  Injectable 1 milliGRAM(s) IntraMuscular once  insulin lispro (ADMELOG) corrective regimen sliding scale   SubCutaneous three times a day before meals  melatonin 3 milliGRAM(s) Oral at bedtime PRN  metFORMIN 500 milliGRAM(s) Oral two times a day  metoprolol tartrate 12.5 milliGRAM(s) Oral once  pantoprazole    Tablet 40 milliGRAM(s) Oral before breakfast  polyethylene glycol 3350 17 Gram(s) Oral daily  senna 2 Tablet(s) Oral at bedtime PRN      Objective:     Vital Signs:   T(C): 36.8 (11-14-23 @ 20:09), Max: 36.8 (11-14-23 @ 20:09)  HR: 128 (11-15-23 @ 09:55) (109 - 128)  BP: 113/82 (11-15-23 @ 09:55) (99/68 - 113/82)  RR: 16 (11-14-23 @ 20:09) (16 - 16)  SpO2: 97% (11-14-23 @ 20:09) (97% - 98%)  Wt(kg): --    Physical Exam:   General: thin, chronically ill appearing  Neck: supple   CVS: JVP ~ 7 cm H20, tachycardic, s1, s2  Pulm: unlabored respirations, clear to ausculation   Ext: no lower extremity edema b/l  Neuro: awake, alert and oriented  Psych: Normal affect    Labs:         TTE (8/2023):   1. Left ventricular ejection fraction, by visual estimation, is 55 to 60%.   2. Normal global left ventricular systolic function.   3. Mild thickening of the anterior and posterior mitral valve leaflets.   4. Moderate mitral valve regurgitation.   5. Moderate aortic regurgitation.   6. Sclerotic aortic valve with normal opening.      ECG (11/13/23): sinus tachycardia, right atrial enlargement  ECG (11/15/23): sinus tachycardia, right atrial enlargement

## 2023-11-15 NOTE — PROGRESS NOTE ADULT - ASSESSMENT
Renée Rojas is a 52 year old female with PMH of GERD, S4 lung cancer with mets to brain (s/p 4 cycles of chemo and thoracic RT- completed 10/2022), steroid induced Diabetes Mellitus, gamma knife surgery to 7 brain mets, and s/p temporal crani/resection of metastatic lesion; who presented to [facility] on 10/31 with BL UE and BL LE weakness and abdominal pain for 2 weeks concerning for progressive metastatic disease.  Overall imaging was significant for metastatic disease in the brain, lung, liver and abdomen.  NSGY deemed no surgical intervention for worsening brain mets.  Heme/Onc suggested 1 dose of C1D1 Carboplatin and Paclitaxel. She also received Bevacizumab during her admission. Her hospital course was complicated by hyponatremia (likely 2/2 SIADH given lung CA), and fever.  She will continue chemotherapy/radiation treatment in the outpatient setting with Dr. Beckman.  PM&R was consulted and deemed her an appropriate candidate for IRF. She was medically stabilized and cleared for discharge to [facility].    Oncology consulted for h/o lung cancer.

## 2023-11-15 NOTE — PROGRESS NOTE ADULT - SUBJECTIVE AND OBJECTIVE BOX
Subjective  Seen and examined  Reports uneventful night rest  Reports cough, non productive, no wheezing or chest pain ,   Tolerating oral diet  Reports improved energy levels    ROS--no chest, no nausea or vomiting, no fever or cough  Epigastric discomfort  LBM 10/14    Therapy  --Agrees to engage appropriately today  Discussed need for improved participation   Discussed details from IDT yesterday with patient  Including need to improve on engagement and optimize function prior to next chemo cycle     ECHO reports is pending from the test yesterday    ICU Vital Signs Last 24 Hrs  T(C): 36.8 (14 Nov 2023 20:09), Max: 36.8 (14 Nov 2023 20:09)  T(F): 98.2 (14 Nov 2023 20:09), Max: 98.2 (14 Nov 2023 20:09)  HR: 111 (14 Nov 2023 20:09) (109 - 125)  BP: 100/65 (14 Nov 2023 20:09) (99/68 - 100/65)  RR: 16 (14 Nov 2023 20:09) (16 - 16)  SpO2: 97% (14 Nov 2023 20:09) (97% - 98%)  O2 Parameters below as of 14 Nov 2023 20:09  Patient On (Oxygen Delivery Method): room air        EXAM  Gen - No acute distress, lying in bed   HEENT - NAD  Neck - Supple, No limited ROM  Pulm -No creps, decreased air entry   Cardiovascular - HS I and I,    Abdomen - Soft, non tender  Extremities - No Cyanosis, no clubbing, no edema, no calf tenderness    Neuro  Alert and fully oriented, appropriately responsive  Normal speech      Cranial Nerves - CN 2-12 intact     Motor -                     LEFT    UE -  5/5                    RIGHT UE - 4/5                    LEFT    LE -  5/5                    RIGHT LE - 4/5        Sensory - Intact  to LT bilaterally     Tone - normal  MSK: Right sided weakness, mainly right leg, chronic  Psychiatric - Mood stable, Affect WNL  Skin:  Right craniotomy scar, unremarkable  Decreased skin tugor     RECENT LABS/IMAGING                        11.1   6.34  )-----------( 131      ( 13 Nov 2023 06:00 )             35.1     11-13    136  |  102  |  6<L>  ----------------------------<  116<H>  3.8   |  27  |  0.51    Ca    10.8<H>      13 Nov 2023 06:00    TPro  7.0  /  Alb  2.0<L>  /  TBili  0.2  /  DBili  x   /  AST  95<H>  /  ALT  34  /  AlkPhos  245<H>  11-13      Urinalysis Basic - ( 13 Nov 2023 06:00 )    Color: x / Appearance: x / SG: x / pH: x  Gluc: 116 mg/dL / Ketone: x  / Bili: x / Urobili: x   Blood: x / Protein: x / Nitrite: x   Leuk Esterase: x / RBC: x / WBC x   Sq Epi: x / Non Sq Epi: x / Bacteria: x      MEDICATIONS  (STANDING):  AQUAPHOR (petrolatum Ointment) 1 Application(s) Topical daily  artificial tears (preservative free) Ophthalmic Solution 1 Drop(s) Both EYES three times a day  atorvastatin 40 milliGRAM(s) Oral at bedtime  dexAMETHasone     Tablet 2 milliGRAM(s) Oral two times a day  dextrose 5%. 1000 milliLiter(s) (50 mL/Hr) IV Continuous <Continuous>  dextrose 5%. 1000 milliLiter(s) (100 mL/Hr) IV Continuous <Continuous>  dextrose 50% Injectable 12.5 Gram(s) IV Push once  dextrose 50% Injectable 25 Gram(s) IV Push once  dextrose 50% Injectable 25 Gram(s) IV Push once  divalproex  milliGRAM(s) Oral every 12 hours  enoxaparin Injectable 40 milliGRAM(s) SubCutaneous every 24 hours  glucagon  Injectable 1 milliGRAM(s) IntraMuscular once  insulin lispro (ADMELOG) corrective regimen sliding scale   SubCutaneous three times a day before meals  metFORMIN 500 milliGRAM(s) Oral two times a day  pantoprazole    Tablet 40 milliGRAM(s) Oral before breakfast  polyethylene glycol 3350 17 Gram(s) Oral daily    MEDICATIONS  (PRN):  acetaminophen     Tablet .. 650 milliGRAM(s) Oral every 6 hours PRN Temp greater or equal to 38C (100.4F), Mild Pain (1 - 3)  aluminum hydroxide/magnesium hydroxide/simethicone Suspension 30 milliLiter(s) Oral every 4 hours PRN Dyspepsia  dextrose Oral Gel 15 Gram(s) Oral once PRN Blood Glucose LESS THAN 70 milliGRAM(s)/deciliter  melatonin 3 milliGRAM(s) Oral at bedtime PRN Insomnia  senna 2 Tablet(s) Oral at bedtime PRN Constipation     Subjective  Seen and examined  Reports uneventful night rest  Reports cough, non productive, no wheezing or chest pain ,   Tolerating oral diet  Reports improved energy levels    ROS--no chest, no nausea or vomiting, no fever  Cough + intermittent palpitations   LBM 10/14    Therapy  --Agrees to engage appropriately today  Discussed need for improved participation   Discussed details from IDT yesterday with patient  Including need to improve on engagement and optimize function prior to next chemo cycle     ECHO reports is pending from the test yesterday    ICU Vital Signs Last 24 Hrs  T(C): 36.8 (14 Nov 2023 20:09), Max: 36.8 (14 Nov 2023 20:09)  T(F): 98.2 (14 Nov 2023 20:09), Max: 98.2 (14 Nov 2023 20:09)  HR: 111 (14 Nov 2023 20:09) (109 - 125)  BP: 100/65 (14 Nov 2023 20:09) (99/68 - 100/65)  RR: 16 (14 Nov 2023 20:09) (16 - 16)  SpO2: 97% (14 Nov 2023 20:09) (97% - 98%)  O2 Parameters below as of 14 Nov 2023 20:09  Patient On (Oxygen Delivery Method): room air      EXAM  Gen - No acute distress, lying in bed   HEENT - NAD  Neck - Supple, No limited ROM  Pulm -No creps, decreased air entry   Cardiovascular - HS I and I,    Abdomen - Soft, non tender  Extremities - No Cyanosis, no clubbing, no edema, no calf tenderness    Neuro  Alert and fully oriented, appropriately responsive  Normal speech      Cranial Nerves - CN 2-12 intact     Motor -                     LEFT    UE -  5/5                    RIGHT UE - 4/5                    LEFT    LE -  5/5                    RIGHT LE - 4/5        Sensory - Intact  to LT bilaterally     Tone - normal  MSK: Right sided weakness, mainly right leg, chronic  Psychiatric - Mood stable, Affect WNL  Skin:  Right craniotomy scar, unremarkable  Decreased skin tugor     RECENT LABS/IMAGING                        11.1   6.34  )-----------( 131      ( 13 Nov 2023 06:00 )             35.1     11-13    136  |  102  |  6<L>  ----------------------------<  116<H>  3.8   |  27  |  0.51    Ca    10.8<H>      13 Nov 2023 06:00    TPro  7.0  /  Alb  2.0<L>  /  TBili  0.2  /  DBili  x   /  AST  95<H>  /  ALT  34  /  AlkPhos  245<H>  11-13      Urinalysis Basic - ( 13 Nov 2023 06:00 )    Color: x / Appearance: x / SG: x / pH: x  Gluc: 116 mg/dL / Ketone: x  / Bili: x / Urobili: x   Blood: x / Protein: x / Nitrite: x   Leuk Esterase: x / RBC: x / WBC x   Sq Epi: x / Non Sq Epi: x / Bacteria: x      MEDICATIONS  (STANDING):  AQUAPHOR (petrolatum Ointment) 1 Application(s) Topical daily  artificial tears (preservative free) Ophthalmic Solution 1 Drop(s) Both EYES three times a day  atorvastatin 40 milliGRAM(s) Oral at bedtime  dexAMETHasone     Tablet 2 milliGRAM(s) Oral two times a day  dextrose 5%. 1000 milliLiter(s) (50 mL/Hr) IV Continuous <Continuous>  dextrose 5%. 1000 milliLiter(s) (100 mL/Hr) IV Continuous <Continuous>  dextrose 50% Injectable 12.5 Gram(s) IV Push once  dextrose 50% Injectable 25 Gram(s) IV Push once  dextrose 50% Injectable 25 Gram(s) IV Push once  divalproex  milliGRAM(s) Oral every 12 hours  enoxaparin Injectable 40 milliGRAM(s) SubCutaneous every 24 hours  glucagon  Injectable 1 milliGRAM(s) IntraMuscular once  insulin lispro (ADMELOG) corrective regimen sliding scale   SubCutaneous three times a day before meals  metFORMIN 500 milliGRAM(s) Oral two times a day  pantoprazole    Tablet 40 milliGRAM(s) Oral before breakfast  polyethylene glycol 3350 17 Gram(s) Oral daily    MEDICATIONS  (PRN):  acetaminophen     Tablet .. 650 milliGRAM(s) Oral every 6 hours PRN Temp greater or equal to 38C (100.4F), Mild Pain (1 - 3)  aluminum hydroxide/magnesium hydroxide/simethicone Suspension 30 milliLiter(s) Oral every 4 hours PRN Dyspepsia  dextrose Oral Gel 15 Gram(s) Oral once PRN Blood Glucose LESS THAN 70 milliGRAM(s)/deciliter  melatonin 3 milliGRAM(s) Oral at bedtime PRN Insomnia  senna 2 Tablet(s) Oral at bedtime PRN Constipation

## 2023-11-15 NOTE — PROGRESS NOTE ADULT - SUBJECTIVE AND OBJECTIVE BOX
RENÉE WHITMORE   52y   Female    Admitting: IRENE Lucia  HPI:  Renée Whitmore is a 52 year old female with PMH of GERD, S4 lung cancer with mets to brain (s/p 4 cycles of chemo and thoracic RT- completed 10/2022), steroid induced Diabetes Mellitus, gamma knife surgery to 7 brain mets, and s/p temporal crani/resection of metastatic lesion; who presented to [facility] on 10/31 with BL UE and BL LE weakness and abdominal pain for 2 weeks concerning for progressive metastatic disease.  Overall imaging was significant for metastatic disease in the brain, lung, liver and abdomen.  NSGY deemed no surgical intervention for worsening brain mets.  Heme/Onc suggested 1 dose of C1D1 Carboplatin and Paclitaxel. She also received Bevacizumab during her admission. Her hospital course was complicated by hyponatremia (likely 2/2 SIADH given lung CA), and fever.  She will continue chemotherapy/radiation treatment in the outpatient setting with Dr. Beckman.  PM&R was consulted and deemed her an appropriate candidate for IRF. She was medically stabilized and cleared for discharge to [facility].    Oncology asked to see patient by rehab team, for h/o lung cancer.      PAST MEDICAL & SURGICAL HISTORY:  GERD (Gastroesophageal Reflux Disease)      Lung mass  right      Non-small cell lung cancer with metastasis      S/P endoscopy  2022      HEALTH ISSUES - PROBLEM Dx:  Non-small cell carcinoma of lung      MEDICATIONS  (STANDING):  AQUAPHOR (petrolatum Ointment) 1 Application(s) Topical daily  artificial tears (preservative free) Ophthalmic Solution 1 Drop(s) Both EYES three times a day  atorvastatin 40 milliGRAM(s) Oral at bedtime  dexAMETHasone     Tablet 2 milliGRAM(s) Oral two times a day  dextrose 5%. 1000 milliLiter(s) (50 mL/Hr) IV Continuous <Continuous>  dextrose 5%. 1000 milliLiter(s) (100 mL/Hr) IV Continuous <Continuous>  dextrose 50% Injectable 12.5 Gram(s) IV Push once  dextrose 50% Injectable 25 Gram(s) IV Push once  dextrose 50% Injectable 25 Gram(s) IV Push once  divalproex  milliGRAM(s) Oral every 12 hours  enoxaparin Injectable 40 milliGRAM(s) SubCutaneous every 24 hours  glucagon  Injectable 1 milliGRAM(s) IntraMuscular once  insulin lispro (ADMELOG) corrective regimen sliding scale   SubCutaneous three times a day before meals  metFORMIN 500 milliGRAM(s) Oral two times a day  pantoprazole    Tablet 40 milliGRAM(s) Oral before breakfast  polyethylene glycol 3350 17 Gram(s) Oral daily    MEDICATIONS  (PRN):  acetaminophen     Tablet .. 650 milliGRAM(s) Oral every 6 hours PRN Temp greater or equal to 38C (100.4F), Mild Pain (1 - 3)  aluminum hydroxide/magnesium hydroxide/simethicone Suspension 30 milliLiter(s) Oral every 4 hours PRN Dyspepsia  dextrose Oral Gel 15 Gram(s) Oral once PRN Blood Glucose LESS THAN 70 milliGRAM(s)/deciliter  melatonin 3 milliGRAM(s) Oral at bedtime PRN Insomnia  senna 2 Tablet(s) Oral at bedtime PRN Constipation    Allergies    No Known Allergies    INTERVAL HPI/OVERNIGHT EVENTS:  Patient S&E at bedside. No c/o CP or SOB. +Intermittent cough.    VITAL SIGNS:  T(F): 98.2 (11-14-23 @ 20:09)  HR: 111 (11-14-23 @ 20:09)  BP: 100/65 (11-14-23 @ 20:09)  RR: 16 (11-14-23 @ 20:09)  SpO2: 97% (11-14-23 @ 20:09)      PHYSICAL EXAM:  Constitutional: NAD  Eyes: sclera non-icteric  Neck: no JVD  Respiratory: decreased BS ant.  Cardiovascular: RRR, no M/R/G  Gastrointestinal: softly distended, tender to palpation  Extremities: no calf tenderness  Neurological: Awake, alert.    Labs:             11.1   6.34  )-----------( 131      ( 11-13 @ 06:00 )             35.1     RADIOLOGY & ADDITIONAL TESTS:  < from: CT Angio Chest PE Protocol w/ IV Cont (11.14.23 @ 06:02) >  IMPRESSION:  No pulmonary thromboembolism.    No change of right upper lobe lobulated mass with numerous metastatic   nodules in both lungs.  Numerous metastatic hepatic nodules with interval increase of caudate   lobe lesions.  Nodular thickening of left adrenal gland-adenoma versus metastasis.  Osseous metastasis of T1.    < end of copied text >      < from: US Duplex Venous Lower Ext Complete, Bilateral (11.13.23 @ 10:57) >  IMPRESSION:  No evidence of deep venous thrombosis in the right lower extremity.    Below-knee deep vein thrombosis in the left soleal vein.      < end of copied text >  Consultant notes reviewed : YES [x ] ; NO [ ]

## 2023-11-16 NOTE — PROGRESS NOTE ADULT - ASSESSMENT
Assessment:  Renée Rojas is a 52 year old woman with past medical history of Stage IV Lung cancer with metastasis to brain (s/p chemotherapy, radiation and gamma knife surgery) with recent right temporal craniotomy for resection of mass, then recent hospitalization at [facility] earlier this month with right sided weakness found to have worsening metastases to brain, now admitted to [facility].    Cardiology consulted for sinus tachycardia. ECG consistent with sinus tachycardia at 131 BPM. The patient reports decreased appetite, denies palpitations, chest pain or shortness of breath. Prior echocardiogram (8/2023) reported as normal LVEF 55-60%. Leg US indicates left leg DVT. CT chest with lung mass and metastatic nodules.    Recommendations:  [] Sinus tachycardia: Likely secondary to inciting event, tachycardia may also be secondary to left leg DVT, however no pulmonary embolism on CTPA. Follow up Hematology regarding anticoagulation recommendations given brain metastases and risk of bleeding with anticoagulation. TTE revealing tachycardia and visual EF >75%. May trial low dose beta blocker metoprolol 12.5 BID with hold parameters sbp <90 hr, <55  [] Elevated troponins: Patient is asymptomatic, likely demand ischemia from chronic sinus tachycardia and medical illnesses. EKG nonischemic    Xin Santana [facility]      Assessment:  Renée Rojas is a 52 year old woman with past medical history of Stage IV Lung cancer with metastasis to brain (s/p chemotherapy, radiation and gamma knife surgery) with recent right temporal craniotomy for resection of mass, then recent hospitalization at [facility] earlier this month with right sided weakness found to have worsening metastases to brain, now admitted to [facility].    Cardiology consulted for sinus tachycardia. ECG consistent with sinus tachycardia at 131 BPM. The patient reports decreased appetite, denies palpitations, chest pain or shortness of breath. Prior echocardiogram (8/2023) reported as normal LVEF 55-60%. Leg US indicates left leg DVT. CT chest with lung mass and metastatic nodules.    Recommendations:  [] Sinus tachycardia: Likely secondary to inciting event, tachycardia may also be secondary to left leg DVT, however no pulmonary embolism on CTPA. Follow up Hematology regarding anticoagulation recommendations given brain metastases and risk of bleeding with anticoagulation. TTE revealing tachycardia and visual EF >75%. May trial low dose beta blocker metoprolol 12.5 BID with hold parameters sbp <90 hr, <55  [] Elevated troponins: Patient is asymptomatic, likely demand ischemia from chronic sinus tachycardia and medical illnesses. EKG nonischemic    Xin Santana [facility]     cardio  Patient with a tachycardia and metastatic carcinoma to brain consider beta-blocker history of DVTs and pulmonary emboli on anticoagulation. Patient examined and interviewed with JUDE rodriguez md [facility]     Assessment:  Renée Rojas is a 52 year old woman with past medical history of Stage IV Lung cancer with metastasis to brain (s/p chemotherapy, radiation and gamma knife surgery) with recent right temporal craniotomy for resection of mass, then recent hospitalization at [facility] earlier this month with right sided weakness found to have worsening metastases to brain, now admitted to [facility].    Cardiology consulted for sinus tachycardia. ECG consistent with sinus tachycardia at 131 BPM. The patient reports decreased appetite, denies palpitations, chest pain or shortness of breath. Prior echocardiogram (8/2023) reported as normal LVEF 55-60%. Leg US indicates left leg DVT. CT chest with lung mass and metastatic nodules.    Recommendations:  [] Sinus tachycardia: Likely secondary to inciting event, tachycardia may also be secondary to left leg DVT, however no pulmonary embolism on CTPA. Follow up Hematology regarding anticoagulation recommendations given brain metastases and risk of bleeding with anticoagulation. TTE revealing tachycardia and visual EF >75%. May trial low dose beta blocker metoprolol 12.5 BID with hold parameters sbp <90 hr, <55  [] Elevated troponins: Patient is asymptomatic, likely demand ischemia from chronic sinus tachycardia and medical illnesses. EKG nonischemic    Xin Santana [facility]     cardio  Patient with a tachycardia and metastatic carcinoma to brain consider beta-blocker history of DVTs and pulmonary emboli on anticoagulation. Patient examined and interviewed with NP   anticoagulation regimen as per dr Seven rodriguez md [facility]

## 2023-11-16 NOTE — PROGRESS NOTE ADULT - SUBJECTIVE AND OBJECTIVE BOX
no acute events overnight  palpitations improved  ate and drank a bit more yesterday with her fiance      PHYSICAL EXAM:    Vital Signs Last 24 Hrs  T(F): 97.3 (16 Nov 2023 09:56), Max: 97.6 (15 Nov 2023 19:55)  HR: 104 (16 Nov 2023 09:56) (104 - 115)  BP: 96/70 (16 Nov 2023 09:56) (96/70 - 105/71)  RR: 16 (16 Nov 2023 09:56) (16 - 17)  SpO2: 99% (16 Nov 2023 09:56) (96% - 99%)  I&O's Summary    15 Nov 2023 07:01  -  16 Nov 2023 07:00  --------------------------------------------------------  IN: 0 mL / OUT: 1600 mL / NET: -1600 mL        GENERAL: NAD  HEENT: NCAT  CHEST/LUNG: No increased WOB, Clear to percussion bilaterally; No rales, rhonchi, wheezing  HEART: tachycardic and rhythm; No murmurs  ABDOMEN: Soft, Nontender, Nondistended; Bowel sounds present  MUSCULOSKELETAL/EXTREMITIES:  2+ Peripheral Pulses, No LE edema  PSYCH: Appropriate affect, Alert & Oriented    LABS:                        9.3    2.63  )-----------( 154      ( 16 Nov 2023 06:36 )             30.0       11-16    139  |  104  |  3   ----------------------------<  92  3.5   |  25  |  0.40    Ca    9.7      16 Nov 2023 06:36  Phos  2.2     11-15  Mg     1.4     11-15    TPro  6.5  /  Alb  1.9  /  TBili  0.2  /  DBili  x   /  AST  104  /  ALT  33  /  AlkPhos  166  11-16            CARDIAC MARKERS ( 13 Nov 2023 18:05 )  x     / 221.0 ng/L / x     / x     / x      CARDIAC MARKERS ( 13 Nov 2023 14:10 )  x     / 257.0 ng/L / x     / x     / x                        POCT Blood Glucose.: 141 mg/dL (16 Nov 2023 08:05)  POCT Blood Glucose.: 109 mg/dL (15 Nov 2023 21:04)  POCT Blood Glucose.: 92 mg/dL (15 Nov 2023 16:54)      Urinalysis Basic - ( 16 Nov 2023 06:36 )    Color: x / Appearance: x / SG: x / pH: x  Gluc: 92 mg/dL / Ketone: x  / Bili: x / Urobili: x   Blood: x / Protein: x / Nitrite: x   Leuk Esterase: x / RBC: x / WBC x   Sq Epi: x / Non Sq Epi: x / Bacteria: x        COVID-19 PCR: NotDetec (11-10-23 @ 11:00)      MEDICATIONS  (STANDING):  AQUAPHOR (petrolatum Ointment) 1 Application(s) Topical daily  artificial tears (preservative free) Ophthalmic Solution 1 Drop(s) Both EYES three times a day  atorvastatin 40 milliGRAM(s) Oral at bedtime  dexAMETHasone     Tablet 2 milliGRAM(s) Oral two times a day  dextrose 5%. 1000 milliLiter(s) (50 mL/Hr) IV Continuous <Continuous>  dextrose 5%. 1000 milliLiter(s) (100 mL/Hr) IV Continuous <Continuous>  dextrose 50% Injectable 25 Gram(s) IV Push once  dextrose 50% Injectable 12.5 Gram(s) IV Push once  dextrose 50% Injectable 25 Gram(s) IV Push once  divalproex  milliGRAM(s) Oral every 12 hours  enoxaparin Injectable 40 milliGRAM(s) SubCutaneous every 24 hours  glucagon  Injectable 1 milliGRAM(s) IntraMuscular once  insulin lispro (ADMELOG) corrective regimen sliding scale   SubCutaneous three times a day before meals  lactated ringers. 500 milliLiter(s) (75 mL/Hr) IV Continuous <Continuous>  metFORMIN 500 milliGRAM(s) Oral two times a day  pantoprazole    Tablet 40 milliGRAM(s) Oral before breakfast  polyethylene glycol 3350 17 Gram(s) Oral daily    MEDICATIONS  (PRN):  acetaminophen     Tablet .. 650 milliGRAM(s) Oral every 6 hours PRN Temp greater or equal to 38C (100.4F), Mild Pain (1 - 3)  aluminum hydroxide/magnesium hydroxide/simethicone Suspension 30 milliLiter(s) Oral every 4 hours PRN Dyspepsia  dextrose Oral Gel 15 Gram(s) Oral once PRN Blood Glucose LESS THAN 70 milliGRAM(s)/deciliter  melatonin 3 milliGRAM(s) Oral at bedtime PRN Insomnia  senna 2 Tablet(s) Oral at bedtime PRN Constipation      Care Discussed with Consultants/Other Providers: Yes

## 2023-11-16 NOTE — PROGRESS NOTE ADULT - SUBJECTIVE AND OBJECTIVE BOX
Subjective  Seen and examined  Reports uneventful night rest  Reports cough, non productive, no wheezing or chest pain ,   Tolerating oral diet  Reports improved energy levels    ROS--no chest, no nausea or vomiting, no fever  Cough + intermittent palpitations   LBM 10/14    Therapy  --Agrees to engage appropriately today  Discussed need for improved participation   Discussed details from IDT yesterday with patient  Including need to improve on engagement and optimize function prior to next chemo cycle     ECHO reports is pending from the test yesterday    ICU Vital Signs Last 24 Hrs  T(C): 36.8 (14 Nov 2023 20:09), Max: 36.8 (14 Nov 2023 20:09)  T(F): 98.2 (14 Nov 2023 20:09), Max: 98.2 (14 Nov 2023 20:09)  HR: 111 (14 Nov 2023 20:09) (109 - 125)  BP: 100/65 (14 Nov 2023 20:09) (99/68 - 100/65)  RR: 16 (14 Nov 2023 20:09) (16 - 16)  SpO2: 97% (14 Nov 2023 20:09) (97% - 98%)  O2 Parameters below as of 14 Nov 2023 20:09  Patient On (Oxygen Delivery Method): room air      EXAM  Gen - No acute distress, lying in bed   HEENT - NAD  Neck - Supple, No limited ROM  Pulm -No creps, decreased air entry   Cardiovascular - HS I and I,    Abdomen - Soft, non tender  Extremities - No Cyanosis, no clubbing, no edema, no calf tenderness    Neuro  Alert and fully oriented, appropriately responsive  Normal speech      Cranial Nerves - CN 2-12 intact     Motor -                     LEFT    UE -  5/5                    RIGHT UE - 4/5                    LEFT    LE -  5/5                    RIGHT LE - 4/5        Sensory - Intact  to LT bilaterally     Tone - normal  MSK: Right sided weakness, mainly right leg, chronic  Psychiatric - Mood stable, Affect WNL  Skin:  Right craniotomy scar, unremarkable  Decreased skin tugor     RECENT LABS/IMAGING                        11.1   6.34  )-----------( 131      ( 13 Nov 2023 06:00 )             35.1     11-13    136  |  102  |  6<L>  ----------------------------<  116<H>  3.8   |  27  |  0.51    Ca    10.8<H>      13 Nov 2023 06:00    TPro  7.0  /  Alb  2.0<L>  /  TBili  0.2  /  DBili  x   /  AST  95<H>  /  ALT  34  /  AlkPhos  245<H>  11-13      Urinalysis Basic - ( 13 Nov 2023 06:00 )    Color: x / Appearance: x / SG: x / pH: x  Gluc: 116 mg/dL / Ketone: x  / Bili: x / Urobili: x   Blood: x / Protein: x / Nitrite: x   Leuk Esterase: x / RBC: x / WBC x   Sq Epi: x / Non Sq Epi: x / Bacteria: x      MEDICATIONS  (STANDING):  AQUAPHOR (petrolatum Ointment) 1 Application(s) Topical daily  artificial tears (preservative free) Ophthalmic Solution 1 Drop(s) Both EYES three times a day  atorvastatin 40 milliGRAM(s) Oral at bedtime  dexAMETHasone     Tablet 2 milliGRAM(s) Oral two times a day  dextrose 5%. 1000 milliLiter(s) (50 mL/Hr) IV Continuous <Continuous>  dextrose 5%. 1000 milliLiter(s) (100 mL/Hr) IV Continuous <Continuous>  dextrose 50% Injectable 12.5 Gram(s) IV Push once  dextrose 50% Injectable 25 Gram(s) IV Push once  dextrose 50% Injectable 25 Gram(s) IV Push once  divalproex  milliGRAM(s) Oral every 12 hours  enoxaparin Injectable 40 milliGRAM(s) SubCutaneous every 24 hours  glucagon  Injectable 1 milliGRAM(s) IntraMuscular once  insulin lispro (ADMELOG) corrective regimen sliding scale   SubCutaneous three times a day before meals  metFORMIN 500 milliGRAM(s) Oral two times a day  pantoprazole    Tablet 40 milliGRAM(s) Oral before breakfast  polyethylene glycol 3350 17 Gram(s) Oral daily    MEDICATIONS  (PRN):  acetaminophen     Tablet .. 650 milliGRAM(s) Oral every 6 hours PRN Temp greater or equal to 38C (100.4F), Mild Pain (1 - 3)  aluminum hydroxide/magnesium hydroxide/simethicone Suspension 30 milliLiter(s) Oral every 4 hours PRN Dyspepsia  dextrose Oral Gel 15 Gram(s) Oral once PRN Blood Glucose LESS THAN 70 milliGRAM(s)/deciliter  melatonin 3 milliGRAM(s) Oral at bedtime PRN Insomnia  senna 2 Tablet(s) Oral at bedtime PRN Constipation     Subjective  Seen and examined  Reports good night rest, until she was woken up by phlebotomy this AM  Reports improved oral intake and energy levels  No chest pain on palpitations  Cough resolving    ROS--no chest, no nausea or vomiting, no fever  Cough, resolving, no further palpitations   LBM 10/15    Therapy  observed yesterday, ambulating with walker with min assistance  Will continue on ambulation and endurance exercises  LE still weak     Vital Signs Last 24 Hrs  T(C): 36.4 (15 Nov 2023 19:55), Max: 36.4 (15 Nov 2023 19:55)  T(F): 97.6 (15 Nov 2023 19:55), Max: 97.6 (15 Nov 2023 19:55)  HR: 113 (16 Nov 2023 06:26) (109 - 128)  BP: 103/72 (16 Nov 2023 06:26) (102/69 - 113/82)  RR: 17 (16 Nov 2023 06:26) (16 - 17)  SpO2: 96% (16 Nov 2023 06:26) (96% - 97%)  O2 Parameters below as of 16 Nov 2023 06:26  Patient On (Oxygen Delivery Method): room air    Discussed low wbc with patient and informed that this will be d/w with her hematologist  Being at [facility] post chemo, could explain this in some part  ANC 1.25 yesterday    EXAM  Gen - No acute distress, lying in bed   HEENT - NAD  Neck - Supple, No limited ROM  Pulm -No creps, decreased air entry , improving   Cardiovascular - HS I and I,    Abdomen - Soft, non tender  Extremities - No Cyanosis, no clubbing, no edema, no calf tenderness    Neuro  Alert and fully oriented, appropriately responsive  Normal speech      Cranial Nerves - CN 2-12 intact     Motor -                     LEFT    UE -  5/5                    RIGHT UE - 4/5                    LEFT    LE -  5/5                    RIGHT LE - 4/5        Sensory - Intact  to LT bilaterally     Tone - normal  MSK: Right sided weakness, mainly right leg, chronic  Psychiatric - Mood stable, Affect WNL  Skin:  Right craniotomy scar, unremarkable  Decreased skin tugor     RECENT LABS/IMAGING                        9.3    2.63  )-----------( 154      ( 16 Nov 2023 06:36 )             30.0     11-16    139  |  104  |  3<L>  ----------------------------<  92  3.5   |  25  |  0.40<L>    Ca    9.7      16 Nov 2023 06:36  Phos  2.2     11-15  Mg     1.4     11-15    TPro  6.5  /  Alb  1.9<L>  /  TBili  0.2  /  DBili  x   /  AST  104<H>  /  ALT  33  /  AlkPhos  166<H>  11-16      Urinalysis Basic - ( 16 Nov 2023 06:36 )    Color: x / Appearance: x / SG: x / pH: x  Gluc: 92 mg/dL / Ketone: x  / Bili: x / Urobili: x   Blood: x / Protein: x / Nitrite: x   Leuk Esterase: x / RBC: x / WBC x   Sq Epi: x / Non Sq Epi: x / Bacteria: x

## 2023-11-16 NOTE — PROGRESS NOTE ADULT - ASSESSMENT
Renée Rojas is a 52 year old female with PMH of GERD, S4 lung cancer with mets to brain (s/p 4 cycles of chemo and thoracic RT- completed 10/2022), steroid induced Diabetes Mellitus, gamma knife surgery to 7 brain mets, and s/p temporal crani/resection of metastatic lesion; who presented to [facility] on 10/31 with BL UE and BL LE weakness and abdominal pain for 2 weeks concerning for progressive metastatic disease.  Overall imaging was significant for metastatic disease in the brain, lung, liver and abdomen.  NSGY deemed no surgical intervention for worsening brain mets.  Heme/Onc suggested 1 dose of C1D1 Carboplatin and Paclitaxel. She also received Bevacizumab during her admission. Her hospital course was complicated by hyponatremia (likely 2/2 SIADH given lung CA), and fever.  She will continue chemotherapy/radiation treatment in the outpatient setting with Dr. Beckman.  PM&R was consulted and deemed her an appropriate candidate for IRF. She was medically stabilized and cleared for discharge to [facility].    Oncology consulted for h/o lung cancer.

## 2023-11-16 NOTE — PROGRESS NOTE ADULT - SUBJECTIVE AND OBJECTIVE BOX
RENÉE WHITMORE   52y   Female    Admitting: IRENE Lucia  HPI:  Renée Whitmore is a 52 year old female with PMH of GERD, S4 lung cancer with mets to brain (s/p 4 cycles of chemo and thoracic RT- completed 10/2022), steroid induced Diabetes Mellitus, gamma knife surgery to 7 brain mets, and s/p temporal crani/resection of metastatic lesion; who presented to [facility] on 10/31 with BL UE and BL LE weakness and abdominal pain for 2 weeks concerning for progressive metastatic disease.  Overall imaging was significant for metastatic disease in the brain, lung, liver and abdomen.  NSGY deemed no surgical intervention for worsening brain mets.  Heme/Onc suggested 1 dose of C1D1 Carboplatin and Paclitaxel. She also received Bevacizumab during her admission. Her hospital course was complicated by hyponatremia (likely 2/2 SIADH given lung CA), and fever.  She will continue chemotherapy/radiation treatment in the outpatient setting with Dr. Beckman.  PM&R was consulted and deemed her an appropriate candidate for IRF. She was medically stabilized and cleared for discharge to [facility].    Oncology asked to see patient by rehab team, for h/o lung cancer.    PAST MEDICAL & SURGICAL HISTORY:  GERD (Gastroesophageal Reflux Disease)      Lung mass  right      Non-small cell lung cancer with metastasis      S/P endoscopy  2022      HEALTH ISSUES - PROBLEM Dx:  Non-small cell carcinoma of lung      MEDICATIONS  (STANDING):  AQUAPHOR (petrolatum Ointment) 1 Application(s) Topical daily  artificial tears (preservative free) Ophthalmic Solution 1 Drop(s) Both EYES three times a day  atorvastatin 40 milliGRAM(s) Oral at bedtime  dexAMETHasone     Tablet 2 milliGRAM(s) Oral two times a day  dextrose 5%. 1000 milliLiter(s) (50 mL/Hr) IV Continuous <Continuous>  dextrose 5%. 1000 milliLiter(s) (100 mL/Hr) IV Continuous <Continuous>  dextrose 50% Injectable 25 Gram(s) IV Push once  dextrose 50% Injectable 12.5 Gram(s) IV Push once  dextrose 50% Injectable 25 Gram(s) IV Push once  divalproex  milliGRAM(s) Oral every 12 hours  enoxaparin Injectable 40 milliGRAM(s) SubCutaneous every 24 hours  glucagon  Injectable 1 milliGRAM(s) IntraMuscular once  insulin lispro (ADMELOG) corrective regimen sliding scale   SubCutaneous three times a day before meals  lactated ringers. 500 milliLiter(s) (75 mL/Hr) IV Continuous <Continuous>  metFORMIN 500 milliGRAM(s) Oral two times a day  pantoprazole    Tablet 40 milliGRAM(s) Oral before breakfast  polyethylene glycol 3350 17 Gram(s) Oral daily    MEDICATIONS  (PRN):  acetaminophen     Tablet .. 650 milliGRAM(s) Oral every 6 hours PRN Temp greater or equal to 38C (100.4F), Mild Pain (1 - 3)  aluminum hydroxide/magnesium hydroxide/simethicone Suspension 30 milliLiter(s) Oral every 4 hours PRN Dyspepsia  dextrose Oral Gel 15 Gram(s) Oral once PRN Blood Glucose LESS THAN 70 milliGRAM(s)/deciliter  melatonin 3 milliGRAM(s) Oral at bedtime PRN Insomnia  senna 2 Tablet(s) Oral at bedtime PRN Constipation    Allergies    No Known Allergies    INTERVAL HPI/OVERNIGHT EVENTS:  Patient S&E at bedside. No c/o CP or SOB    VITAL SIGNS:  T(F): 97.6 (11-15-23 @ 19:55)  HR: 113 (11-16-23 @ 06:26)  BP: 103/72 (11-16-23 @ 06:26)  RR: 17 (11-16-23 @ 06:26)  SpO2: 96% (11-16-23 @ 06:26)      PHYSICAL EXAM:  Constitutional: NAD  Eyes: sclera non-icteric  Neck: no JVD  Respiratory: decreased BS bases ant.  Cardiovascular: RRR, no M/R/G  Gastrointestinal: soft, NTND, no masses palpable  Extremities: no calf tenderness  Neurological: Awake, alert.    Labs:             9.3    2.63  )-----------( 154      ( 11-16 @ 06:36 )             30.0                9.6    3.59  )-----------( 129      ( 11-15 @ 12:40 )             30.8       11-15    136  |  102  |  5<L>  ----------------------------<  155<H>  3.6   |  25  |  0.41<L>    Ca    9.8      15 Nov 2023 12:40  Phos  2.2     11-15  Mg     1.4     11-15    TPro  6.3  /  Alb  1.9<L>  /  TBili  0.1<L>  /  DBili  0.0  /  AST  99<H>  /  ALT  34  /  AlkPhos  175<H>  11-15      Absolute Reticulocytes: 123.4 K/uL (11-16-23 @ 06:36)      Consultant notes reviewed : YES [x ] ; NO [ ]

## 2023-11-16 NOTE — PROGRESS NOTE ADULT - SUBJECTIVE AND OBJECTIVE BOX
HARJEET WHITMORE  [id number]      Chief Complaint: Stage IV Lung Cancer with metastases/Sinus tachycardia/DVT/Elevated troponin    Interval History: The patient reports feeling ok this morning, denies palpitations or chest pain.     Tele: not on telemetry (in rehab)      Current meds:   acetaminophen     Tablet .. 650 milliGRAM(s) Oral every 6 hours PRN  aluminum hydroxide/magnesium hydroxide/simethicone Suspension 30 milliLiter(s) Oral every 4 hours PRN  AQUAPHOR (petrolatum Ointment) 1 Application(s) Topical daily  artificial tears (preservative free) Ophthalmic Solution 1 Drop(s) Both EYES three times a day  atorvastatin 40 milliGRAM(s) Oral at bedtime  dexAMETHasone     Tablet 2 milliGRAM(s) Oral two times a day  dextrose 5%. 1000 milliLiter(s) IV Continuous <Continuous>  dextrose 5%. 1000 milliLiter(s) IV Continuous <Continuous>  dextrose 50% Injectable 12.5 Gram(s) IV Push once  dextrose 50% Injectable 25 Gram(s) IV Push once  dextrose 50% Injectable 25 Gram(s) IV Push once  dextrose Oral Gel 15 Gram(s) Oral once PRN  divalproex  milliGRAM(s) Oral every 12 hours  enoxaparin Injectable 40 milliGRAM(s) SubCutaneous every 24 hours  glucagon  Injectable 1 milliGRAM(s) IntraMuscular once  insulin lispro (ADMELOG) corrective regimen sliding scale   SubCutaneous three times a day before meals  melatonin 3 milliGRAM(s) Oral at bedtime PRN  metFORMIN 500 milliGRAM(s) Oral two times a day  metoprolol tartrate 12.5 milliGRAM(s) Oral once  pantoprazole    Tablet 40 milliGRAM(s) Oral before breakfast  polyethylene glycol 3350 17 Gram(s) Oral daily  senna 2 Tablet(s) Oral at bedtime PRN      Objective:     Vital Signs:   T(C): 36.8 (11-14-23 @ 20:09), Max: 36.8 (11-14-23 @ 20:09)  HR: 128 (11-15-23 @ 09:55) (109 - 128)  BP: 113/82 (11-15-23 @ 09:55) (99/68 - 113/82)  RR: 16 (11-14-23 @ 20:09) (16 - 16)  SpO2: 97% (11-14-23 @ 20:09) (97% - 98%)  Wt(kg): --    Physical Exam:   General: thin, chronically ill appearing  Neck: supple   CVS: JVP ~ 7 cm H20, tachycardic, s1, s2  Pulm: unlabored respirations, clear to ausculation   Ext: no lower extremity edema b/l  Neuro: awake, alert and oriented  Psych: Normal affect    Labs:         TTE (8/2023):   1. Left ventricular ejection fraction, by visual estimation, is 55 to 60%.   2. Normal global left ventricular systolic function.   3. Mild thickening of the anterior and posterior mitral valve leaflets.   4. Moderate mitral valve regurgitation.   5. Moderate aortic regurgitation.   6. Sclerotic aortic valve with normal opening.    < from: TTE Echo Complete w/o Contrast w/ Doppler (11.15.23 @ 07:58) >   1. Technically difficult study with poor endocardial visualization.   2. The heart rate is tachycardic    120s BPM throughout the study.   3. Hyperdynamic global left ventricular systolic function.   4. Left ventricular ejection fraction, by visual estimation, is >75%.   5. Decreased left ventricular internal cavity size.   6. Mildly increased LV wall thickness.   7. The right ventricle is not well visualized, appears generally normal   in size with normal systolic function in limited views.   8. Mild mitral annular calcification.   9. Mild mitral valve leaflet thickening.  10. Mild mitral valve regurgitation.  11. No aortic valve stenosis.  12. There is no evidence of pericardial effusion.      < end of copied text >        ECG (11/13/23): sinus tachycardia, right atrial enlargement  ECG (11/15/23): sinus tachycardia, right atrial enlargement

## 2023-11-16 NOTE — PROGRESS NOTE ADULT - ASSESSMENT
52F with metastatic lung cancer to brain/liver on ongoing chemo/RT, steroid-induced DM, now in acute rehab after being hospitalized for progressive weakness    #sinus tachycardia  #hypomagnesemia  #hypophosphatemia  - slightly improved tachycardia today, continue lopressor 12.5mg po BID  - TTE on 11/15 limited study.   - encourage po intake, consider LR @75cc/h again x 5 hours for 500cc if with decreased po intake    #bicytopenia  - likely due due to chemo -- last dose 11/4 (on Carbo,Pemetrexed,Pembrolizumab w5dymxs per Dr. Beckman's visit note dated 10/27)  - given mild leukopenia, follow blood cultures  - CXR unremarkable  - pending UA  - Hb remains stable in 9's, slight drop from 11. LDH is elevated, haptoglobin pending.    #LLE DVT   - distal DVT +soleal vein of left leg  - discussed with PM&R, and heme/onc reccs appreciated, will repeat venous duplex u/s next week to evaluate propagation of DVT. holding off on full AC.    #Metastatic lung cancer to brain/liver  #Progressive weakness due to above  #Abdominal pain likely due to above mets (liver mets, abdominal wall subcutaneous nodules, all in recent imaging)  - continue comprehensive rehab program  -c/w Decadron   -seizure prophylaxis: Depakote  -Pain control    #Steroid-induced DM  - POCT at goal  c/w Metformin, ISS    #SIADH due to metastatic lung cancer  -sodium improved    #DVT ppx: Lovenox    Discussed with primary team   52F with metastatic lung cancer to brain/liver on ongoing chemo/RT, steroid-induced DM, now in acute rehab after being hospitalized for progressive weakness    #sinus tachycardia  #hypomagnesemia  #hypophosphatemia  - slightly improved tachycardia today, continue lopressor 12.5mg po BID  - TTE on 11/15 limited study.   - encourage po intake, consider LR @75cc/h again x 5 hours for 500cc if with decreased po intake    #bicytopenia  - labs reviewed, leukopenia slighly worsening today, likely due due to chemo -- last dose 11/4 (on Carbo,Pemetrexed,Pembrolizumab p2ihmxd per Dr. Beckman's visit note dated 10/27). continue following CBC with Diff.  - given mild leukopenia, follow blood cultures  - CXR unremarkable  - pending UA  - Hb remains stable in 9's, slight drop from 11. LDH is elevated, haptoglobin pending.    #LLE DVT   - distal DVT +soleal vein of left leg  - discussed with PM&R, and heme/onc reccs appreciated, will repeat venous duplex u/s next week to evaluate propagation of DVT. holding off on full AC.    #Metastatic lung cancer to brain/liver  #Progressive weakness due to above  #Abdominal pain likely due to above mets (liver mets, abdominal wall subcutaneous nodules, all in recent imaging)  - continue comprehensive rehab program  -c/w Decadron   -seizure prophylaxis: Depakote  -Pain control    #Steroid-induced DM  - POCT at goal  c/w Metformin, ISS    #SIADH due to metastatic lung cancer  -sodium improved    #DVT ppx: Lovenox    Discussed with primary team

## 2023-11-16 NOTE — PROGRESS NOTE ADULT - ASSESSMENT
Assessment/Plan:  HARJEET WHITMORE is a 52 year old female with PMH of GERD, S4 lung cancer with mets to brain (s/p 4 cycles of chemo and thoracic RT- completed 10/2022), steroid induced Diabetes Mellitus, gamma knife surgery to 7 brain mets, and s/p temporal crani/resection of metastatic lesion; who presented to [facility] on 10/31 with BL UE and BL LE weakness and abdominal pain for 2 weeks concerning for progressive metastatic disease.  Overall imaging was significant for metastatic disease in the brain, lung, liver and abdomen.  NSGY deemed no surgical intervention for worsening brain mets.  Heme/Onc suggested 1 dose of C1D1 Carboplatin and Paclitaxel. She also received Bevacizumab during her admission. Her hospital course was complicated by hyponatremia (likely 2/2 SIADH given lung CA), and fever (negative workup). She will continue chemotherapy/radiation treatment in the outpatient setting with Dr. Beckman. Patient now admitted for a multidisciplinary rehab program.   -------------    * Cardiology f/u review appreciated, ECHO report is pending   * Cough--monitor  * Oral intake gradually improving-----F/u Nutritionist  * Tachycardia--Await further recs from Hospitalist and Cardiology     #Malignant neoplasm with metastasis to brain//liver/abdomen  - Gait Instability, ADL impairments and Functional impairments: start Comprehensive Rehab Program of PT/OT/SLP  - 3 hours a day, 5 days a week  - continue Dexamethasone 2mg BID  - Resume chemo/radiation therapy in outpatient setting  - Follow up with Dr. Beckman     #Seizure Prophylaxis  - contnue Depakote 500mg BID    * Cough--monitor, declined antitussive meds    # Tachycardia    Left soleal DVT---11/13   CT chest no PE  --Elevated, downtrending trop, attributed to demand ischemia  Serial weekly venous duplex as recs by hematology due to increased risk of bleeding    Echo to evaluate LVEF, 11/14--report pending   Hematology has notified patient private heme Dr Beckman  Hospitalist following and will liaise with cardiology to make recs for Tachycardia    Clinically dehydrated--responded to bolus IVF 11/14--C/W --liberal oral fluids  F/u Hospitalist recs    #Steroid Induced Diabetes Mellitus  - A1c: 7.7 (11/2023)  - ISS  - FS AC&HS  - Metformin 500mg BID     #HLD  - continue Atorvastatin 40mg at HS    #Hyponatremia--monitor   - Likely secondary to lung CA    #Sleep  - Melatonin 3mg PRN     #Skin  - Skin -- Right crani incision ,healed     #Pain Mgmt   - Tylenol PRN    #GI/Bowel Mgmt   - Continue Pantoprazole - Miralax - Senna - Maalox  PRN (will switch to standing if symptoms persist)    #/Bladder Mgmt --voiding     #FEN   - Diet - Regular + Thins  [CCHO]    - Dysphagia  SLP - evaluation and treatment  --Poor oral intake--F/u Nutritionist     #Health Maintenance  - Ocular lubricant gtts TID     #Precautions / PROPHYLAXIS:   - Falls, Seizure   - ortho: Weight bearing status: WBAT   - Lungs: Aspiration, Incentive Spirometer   - DVT: Lovenox     * L;abs 11/13---Stable anemia, normal renal function and elevated Alk phos,   --Down trending troponin    11/14--Liaison with Cardiology and Hemetology for management of LLE DVT     IDT 11/14  Lives with Friend, plans to live in a friend's house post distance  Could also move in to sister's house on DC  SLP--Reg diet with thin liquids, mod cog impairment, deficits with recalL  Function  Min assist with upper body dressing and mod assist with Lower body dressing, max assist with toileting  Ambulating 10ft with WC Follow through max assistance   Goals--Ambulating functional distance with device, min assist with ADLs  Barriers--poor endurance, fatigue, low motivation   Ext dc 11/22 to home    --------------------------------------------  OUTPATIENT/FOLLOW UP:    Margarito Beckman  Medical Oncology  [address] [place] [id number]  Phone: [phone]  Fax: [phone]  Established Patient  Follow Up Time: 2 weeks    Zurdo Bernard  Radiation Oncology  [address] [place] [id number]  Phone: [phone]  Fax: [phone]  Established Patient  Follow Up Time: 2 weeks  --------------------------------------------   Assessment/Plan:  HARJEET WHITMORE is a 52 year old female with PMH of GERD, S4 lung cancer with mets to brain (s/p 4 cycles of chemo and thoracic RT- completed 10/2022), steroid induced Diabetes Mellitus, gamma knife surgery to 7 brain mets, and s/p temporal crani/resection of metastatic lesion; who presented to [facility] on 10/31 with BL UE and BL LE weakness and abdominal pain for 2 weeks concerning for progressive metastatic disease.  Overall imaging was significant for metastatic disease in the brain, lung, liver and abdomen.  NSGY deemed no surgical intervention for worsening brain mets.  Heme/Onc suggested 1 dose of C1D1 Carboplatin and Paclitaxel. She also received Bevacizumab during her admission. Her hospital course was complicated by hyponatremia (likely 2/2 SIADH given lung CA), and fever (negative workup). She will continue chemotherapy/radiation treatment in the outpatient setting with Dr. Beckman. Patient now admitted for a multidisciplinary rehab program.   -------------    * F/U with Hematology and Hospitalist for low wbc, and pending lag results   * Oral intake gradually improving-----F/u Nutritionist  * Lab results discussed  * Asymptomatic tachycardia  * Continue therapy with vital signs parameters    #Malignant neoplasm with metastasis to brain//liver/abdomen  - Gait Instability, ADL impairments and Functional impairments: start Comprehensive Rehab Program of PT/OT/SLP  - 3 hours a day, 5 days a week  - continue Dexamethasone 2mg BID  - Resume chemo/radiation therapy in outpatient setting  - Follow up with Dr. Beckman     #Seizure Prophylaxis  - contnue Depakote 500mg BID    * Cough--monitor, declined antitussive meds    # Tachycardia    Left soleal DVT---11/13   CT chest no PE  --Elevated, downtrending trop, attributed to demand ischemia  Serial weekly venous duplex as recs by hematology due to increased risk of bleeding    Echo to evaluate LVEF, 11/14--report pending   Hematology has notified patient private heme Dr Beckman  Hospitalist following and will liaise with cardiology to make recs for Tachycardia    Low wbc and anemia--afebrile  F/u Hospitalist recs and heme recs    #Steroid Induced Diabetes Mellitus  - A1c: 7.7 (11/2023)  - ISS  - FS AC&HS  - Metformin 500mg BID     #HLD  - continue Atorvastatin 40mg at HS    #Hyponatremia--monitor   - Likely secondary to lung CA    #Sleep  - Melatonin 3mg PRN     #Skin  - Skin -- Right crani incision ,healed     #Pain Mgmt   - Tylenol PRN    #GI/Bowel Mgmt   - Continue Pantoprazole - Miralax - Senna - Maalox  PRN (will switch to standing if symptoms persist)    #/Bladder Mgmt --voiding     #FEN   - Diet - Regular + Thins  [CCHO]    - Dysphagia  SLP - evaluation and treatment  --Poor oral intake--F/u Nutritionist     #Health Maintenance  - Ocular lubricant gtts TID     #Precautions / PROPHYLAXIS:   - Falls, Seizure   - ortho: Weight bearing status: WBAT   - Lungs: Aspiration, Incentive Spirometer   - DVT: Lovenox     * L;abs 11/16---Stable anemia, normal renal function and elevated Alk phos,   --Down trending wbc monitor f/u pending labs ANC 1.25 on 11/15    11/16--Liaison with Hospitalist and Hemetology for management of LLE DVT     IDT 11/14  Lives with Friend, plans to live in a friend's house post distance  Could also move in to sister's house on DC  SLP--Reg diet with thin liquids, mod cog impairment, deficits with recalL  Function  Min assist with upper body dressing and mod assist with Lower body dressing, max assist with toileting  Ambulating 10ft with WC Follow through max assistance   Goals--Ambulating functional distance with device, min assist with ADLs  Barriers--poor endurance, fatigue, low motivation   Ext dc 11/22 to home    --------------------------------------------  OUTPATIENT/FOLLOW UP:    Margarito Beckman  Medical Oncology  [address] [place] [id number]  Phone: [phone]  Fax: [phone]  Established Patient  Follow Up Time: 2 weeks    Zurdo Bernard  Radiation Oncology  [address] [place] [id number]  Phone: [phone]  Fax: [phone]  Established Patient  Follow Up Time: 2 weeks  --------------------------------------------

## 2023-11-17 NOTE — PROGRESS NOTE ADULT - SUBJECTIVE AND OBJECTIVE BOX
Subjective  Seen and examined,   Reports uneventful night  Some urethral discomfort during clean up post void, but otherwise no dysuria    ROS--no chest, no nausea or vomiting, no fever  Cough, resolving, no further palpitations   LBM 10/16    Therapy--Sustained improvement with ambulation and muscle strengthening  however knee still buckling during therapy  Due family training today     Update from Patient's oncologist noted, has f/u appointment on  or , to be confirmed  DC will be brought forward from  to enable her attend this in person appointment    Vital Signs Last 24 Hrs  T(C): 37.2 (2023 20:13), Max: 37.2 (2023 20:13)  T(F): 98.9 (2023 20:13), Max: 98.9 (2023 20:13)  HR: 110 (2023 20:13) (110 - 110)  BP: 99/66 (2023 20:13) (99/66 - 99/66)  RR: 17 (2023 20:13) (17 - 17)  SpO2: 99% (2023 20:13) (99% - 99%)  O2 Parameters below as of 2023 20:13  Patient On (Oxygen Delivery Method): room air      EXAM  Gen - No acute distress, lying in bed   HEENT - NAD  Neck - Supple,  Pulm -No creps, decreased air entry , improving   Cardiovascular - HS I and I,    Abdomen - Soft, non tender  Extremities - No Cyanosis, no clubbing, no edema, no calf tenderness    Neuro  Alert and fully oriented, appropriately responsive  Normal speech      Cranial Nerves - CN 2-12 intact     Motor -                     LEFT    UE -  5/5                    RIGHT UE - 4/5                    LEFT    LE -  5/5                    RIGHT LE - 4/5        Sensory - Intact  to LT bilaterally     Tone - normal  MSK: Right sided weakness, mainly right leg, chronic  Psychiatric - Mood stable, Affect WNL  Skin:  Right craniotomy scar, unremarkable      RECENT LABS/IMAGING                        9.3    2.63  )-----------( 154      ( 2023 06:36 )             30.0     -    139  |  104  |  3<L>  ----------------------------<  92  3.5   |  25  |  0.40<L>    Ca    9.7      2023 06:36  Phos  2.2     11-15  Mg     1.4     11-15    TPro  6.5  /  Alb  1.9<L>  /  TBili  0.2  /  DBili  x   /  AST  104<H>  /  ALT  33  /  AlkPhos  166<H>  11-16      Urinalysis Basic - ( 2023 09:12 )    Color: Yellow / Appearance: Cloudy / S.007 / pH: x  Gluc: x / Ketone: Negative mg/dL  / Bili: Negative / Urobili: 1.0 E.U./dL   Blood: x / Protein: Negative mg/dL / Nitrite: Negative   Leuk Esterase: Large / RBC: 3 /HPF / WBC 8 /HPF   Sq Epi: x / Non Sq Epi: x / Bacteria: Few /HPF      MEDICATIONS  (STANDING):  AQUAPHOR (petrolatum Ointment) 1 Application(s) Topical daily  artificial tears (preservative free) Ophthalmic Solution 1 Drop(s) Both EYES three times a day  atorvastatin 40 milliGRAM(s) Oral at bedtime  dexAMETHasone     Tablet 2 milliGRAM(s) Oral two times a day  dextrose 5%. 1000 milliLiter(s) (100 mL/Hr) IV Continuous <Continuous>  dextrose 5%. 1000 milliLiter(s) (50 mL/Hr) IV Continuous <Continuous>  dextrose 50% Injectable 25 Gram(s) IV Push once  dextrose 50% Injectable 12.5 Gram(s) IV Push once  dextrose 50% Injectable 25 Gram(s) IV Push once  divalproex  milliGRAM(s) Oral every 12 hours  enoxaparin Injectable 40 milliGRAM(s) SubCutaneous every 24 hours  glucagon  Injectable 1 milliGRAM(s) IntraMuscular once  insulin lispro (ADMELOG) corrective regimen sliding scale   SubCutaneous three times a day before meals  lactated ringers. 500 milliLiter(s) (75 mL/Hr) IV Continuous <Continuous>  metFORMIN 500 milliGRAM(s) Oral two times a day  pantoprazole    Tablet 40 milliGRAM(s) Oral before breakfast  polyethylene glycol 3350 17 Gram(s) Oral daily    MEDICATIONS  (PRN):  acetaminophen     Tablet .. 650 milliGRAM(s) Oral every 6 hours PRN Temp greater or equal to 38C (100.4F), Mild Pain (1 - 3)  aluminum hydroxide/magnesium hydroxide/simethicone Suspension 30 milliLiter(s) Oral every 4 hours PRN Dyspepsia  dextrose Oral Gel 15 Gram(s) Oral once PRN Blood Glucose LESS THAN 70 milliGRAM(s)/deciliter  melatonin 3 milliGRAM(s) Oral at bedtime PRN Insomnia  senna 2 Tablet(s) Oral at bedtime PRN Constipation                            Subjective  Seen and examined,   Reports uneventful night  Some urethral discomfort during clean up post void, but otherwise no dysuria    ROS--no chest, no nausea or vomiting, no fever  Cough, resolving, no further palpitations   LBM 10/16    Therapy--Sustained improvement with ambulation and muscle strengthening  however knee still buckling during therapy  Due family training today     Update from Patient's oncologist noted, has f/u appointment on  at 1pm  to be confirmed  D/w cousin Mr Alfaro, happy with plan and she will meet patient at Oncologist's office on     ICU Vital Signs Last 24 Hrs  T(C): 37.2 (2023 20:13), Max: 37.2 (2023 20:13)  T(F): 98.9 (2023 20:13), Max: 98.9 (2023 20:13)  HR: 110 (2023 20:13) (110 - 110)  BP: 99/66 (2023 20:13) (99/66 - 99/66)  RR: 17 (2023 20:13) (17 - 17)  SpO2: 99% (2023 20:13) (99% - 99%)  O2 Parameters below as of 2023 20:13  Patient On (Oxygen Delivery Method): room air      EXAM  Gen - No acute distress, sitting at her bed side    HEENT - NAD  Neck - Supple,  Pulm -No creps, decreased air entry , improving   Cardiovascular - HS I and I,    Abdomen - Soft, non tender  Extremities - No Cyanosis, no clubbing, no edema, no calf tenderness    Neuro  Alert and fully oriented, appropriately responsive  Normal speech      Cranial Nerves - CN 2-12 intact     Motor -                     LEFT    UE -  5/5                    RIGHT UE - 4/5                    LEFT    LE -  5/5                    RIGHT LE - 4/5        Sensory - Intact  to LT bilaterally     Tone - normal  MSK: Right sided weakness, mainly right leg, chronic  Psychiatric - Mood stable, Affect WNL  Skin:  Right craniotomy scar, unremarkable      RECENT LABS/IMAGING                        9.3    2.63  )-----------( 154      ( 2023 06:36 )             30.0     11-16    139  |  104  |  3<L>  ----------------------------<  92  3.5   |  25  |  0.40<L>    Ca    9.7      2023 06:36  Phos  2.2     11-15  Mg     1.4     11-15    TPro  6.5  /  Alb  1.9<L>  /  TBili  0.2  /  DBili  x   /  AST  104<H>  /  ALT  33  /  AlkPhos  166<H>  11-16      Urinalysis Basic - ( 2023 09:12 )    Color: Yellow / Appearance: Cloudy / S.007 / pH: x  Gluc: x / Ketone: Negative mg/dL  / Bili: Negative / Urobili: 1.0 E.U./dL   Blood: x / Protein: Negative mg/dL / Nitrite: Negative   Leuk Esterase: Large / RBC: 3 /HPF / WBC 8 /HPF   Sq Epi: x / Non Sq Epi: x / Bacteria: Few /HPF    MEDICATIONS  (STANDING):  AQUAPHOR (petrolatum Ointment) 1 Application(s) Topical daily  artificial tears (preservative free) Ophthalmic Solution 1 Drop(s) Both EYES three times a day  atorvastatin 40 milliGRAM(s) Oral at bedtime  dexAMETHasone     Tablet 2 milliGRAM(s) Oral two times a day  dextrose 5%. 1000 milliLiter(s) (100 mL/Hr) IV Continuous <Continuous>  dextrose 5%. 1000 milliLiter(s) (50 mL/Hr) IV Continuous <Continuous>  dextrose 50% Injectable 25 Gram(s) IV Push once  dextrose 50% Injectable 12.5 Gram(s) IV Push once  dextrose 50% Injectable 25 Gram(s) IV Push once  divalproex  milliGRAM(s) Oral every 12 hours  enoxaparin Injectable 40 milliGRAM(s) SubCutaneous every 24 hours  glucagon  Injectable 1 milliGRAM(s) IntraMuscular once  insulin lispro (ADMELOG) corrective regimen sliding scale   SubCutaneous three times a day before meals  lactated ringers. 500 milliLiter(s) (75 mL/Hr) IV Continuous <Continuous>  metFORMIN 500 milliGRAM(s) Oral two times a day  pantoprazole    Tablet 40 milliGRAM(s) Oral before breakfast  polyethylene glycol 3350 17 Gram(s) Oral daily    MEDICATIONS  (PRN):  acetaminophen     Tablet .. 650 milliGRAM(s) Oral every 6 hours PRN Temp greater or equal to 38C (100.4F), Mild Pain (1 - 3)  aluminum hydroxide/magnesium hydroxide/simethicone Suspension 30 milliLiter(s) Oral every 4 hours PRN Dyspepsia  dextrose Oral Gel 15 Gram(s) Oral once PRN Blood Glucose LESS THAN 70 milliGRAM(s)/deciliter  melatonin 3 milliGRAM(s) Oral at bedtime PRN Insomnia  senna 2 Tablet(s) Oral at bedtime PRN Constipation

## 2023-11-17 NOTE — PROGRESS NOTE ADULT - SUBJECTIVE AND OBJECTIVE BOX
eating breakfast, overall doing well, denies palpiations, SOB, chest pain.      PHYSICAL EXAM:    Vital Signs Last 24 Hrs  T(F): 98.9 (2023 20:13), Max: 98.9 (2023 20:13)  HR: 110 (2023 20:13) (110 - 110)  BP: 99/66 (2023 20:13) (99/66 - 99/66)  RR: 17 (2023 20:13) (17 - 17)  SpO2: 99% (2023 20:13) (99% - 99%)  I&O's Summary      GENERAL: NAD  HEENT: NCAT  CHEST/LUNG: No increased WOB, Clear to percussion bilaterally; No rales, rhonchi, wheezing  HEART: +tachycardia and rhythm; No murmurs  ABDOMEN: Soft, Nontender, Nondistended; Bowel sounds present  MUSCULOSKELETAL/EXTREMITIES:  2+ Peripheral Pulses, No LE edema  PSYCH: Appropriate affect, Alert & Oriented    LABS:                        9.3    2.63  )-----------( 154      ( 2023 06:36 )             30.0       11-16    139  |  104  |  3   ----------------------------<  92  3.5   |  25  |  0.40    Ca    9.7      2023 06:36  Phos  2.2     11-15  Mg     1.4     11-15    TPro  6.5  /  Alb  1.9  /  TBili  0.2  /  DBili  x   /  AST  104  /  ALT  33  /  AlkPhos  166  11-16                              POCT Blood Glucose.: 102 mg/dL (2023 11:27)  POCT Blood Glucose.: 83 mg/dL (2023 08:21)  POCT Blood Glucose.: 113 mg/dL (2023 16:46)  POCT Blood Glucose.: 143 mg/dL (2023 12:05)      Urinalysis Basic - ( 2023 09:12 )    Color: Yellow / Appearance: Cloudy / S.007 / pH: x  Gluc: x / Ketone: Negative mg/dL  / Bili: Negative / Urobili: 1.0 E.U./dL   Blood: x / Protein: Negative mg/dL / Nitrite: Negative   Leuk Esterase: Large / RBC: 3 /HPF / WBC 8 /HPF   Sq Epi: x / Non Sq Epi: x / Bacteria: Few /HPF        Culture - Blood (collected 2023 06:36)  Source: .Blood Blood-Peripheral  Preliminary Report (2023 12:02):    No growth at 24 hours    Culture - Blood (collected 2023 06:32)  Source: .Blood Blood-Peripheral  Preliminary Report (2023 12:02):    No growth at 24 hours      COVID-19 PCR: NotDetec (11-10-23 @ 11:00)      MEDICATIONS  (STANDING):  AQUAPHOR (petrolatum Ointment) 1 Application(s) Topical daily  artificial tears (preservative free) Ophthalmic Solution 1 Drop(s) Both EYES three times a day  atorvastatin 40 milliGRAM(s) Oral at bedtime  dexAMETHasone     Tablet 2 milliGRAM(s) Oral two times a day  dextrose 5%. 1000 milliLiter(s) (100 mL/Hr) IV Continuous <Continuous>  dextrose 5%. 1000 milliLiter(s) (50 mL/Hr) IV Continuous <Continuous>  dextrose 50% Injectable 25 Gram(s) IV Push once  dextrose 50% Injectable 12.5 Gram(s) IV Push once  dextrose 50% Injectable 25 Gram(s) IV Push once  divalproex  milliGRAM(s) Oral every 12 hours  enoxaparin Injectable 40 milliGRAM(s) SubCutaneous every 24 hours  glucagon  Injectable 1 milliGRAM(s) IntraMuscular once  insulin lispro (ADMELOG) corrective regimen sliding scale   SubCutaneous three times a day before meals  lactated ringers. 500 milliLiter(s) (75 mL/Hr) IV Continuous <Continuous>  metFORMIN 500 milliGRAM(s) Oral two times a day  pantoprazole    Tablet 40 milliGRAM(s) Oral before breakfast  polyethylene glycol 3350 17 Gram(s) Oral daily    MEDICATIONS  (PRN):  acetaminophen     Tablet .. 650 milliGRAM(s) Oral every 6 hours PRN Temp greater or equal to 38C (100.4F), Mild Pain (1 - 3)  aluminum hydroxide/magnesium hydroxide/simethicone Suspension 30 milliLiter(s) Oral every 4 hours PRN Dyspepsia  dextrose Oral Gel 15 Gram(s) Oral once PRN Blood Glucose LESS THAN 70 milliGRAM(s)/deciliter  melatonin 3 milliGRAM(s) Oral at bedtime PRN Insomnia  senna 2 Tablet(s) Oral at bedtime PRN Constipation      Care Discussed with Consultants/Other Providers: Yes

## 2023-11-17 NOTE — DISCHARGE NOTE PROVIDER - HOSPITAL COURSE
HPI:  Renée Arechiga is a 52 year old female with PMH of GERD, S4 lung cancer with mets to brain (s/p 4 cycles of chemo and thoracic RT- completed 10/2022), steroid induced Diabetes Mellitus, gamma knife surgery to 7 brain mets, and s/p temporal crani/resection of metastatic lesion; who presented to [facility] on 10/31 with BL UE and BL LE weakness and abdominal pain for 2 weeks concerning for progressive metastatic disease.  Overall imaging was significant for metastatic disease in the brain, lung, liver and abdomen.  NSGY deemed no surgical intervention for worsening brain mets.  Heme/Onc suggested 1 dose of C1D1 Carboplatin and Paclitaxel. She also received Bevacizumab during her admission. Her hospital course was complicated by hyponatremia (likely 2/2 SIADH given lung CA), and fever (negative workup).  She will continue chemotherapy/radiation treatment in the outpatient setting with Dr. Beckman.  PM&R was consulted and deemed her an appropriate candidate for IRF. She was medically stabilized and cleared for discharge to [facility].  (08 Nov 2023 15:08)    Patient was evaluated by PM&R and therapy for gait/ADL impairments and recommended acute rehabilitation. Patient was medically optimized for discharge to [facility] on 11/8/23. Admitted with gait instability, ADL, and functional impairments.     Rehab course significant for:      All other medical co-morbidities were stable. Patient tolerated course of inpatient PT/OT/SLP rehab with significant improvements and met rehab goals prior to discharge. Patient was medically cleared on 11/21/23 for discharge to home with home care. HPI:  Renée Arechiga is a 52 year old female with PMH of GERD, S4 lung cancer with mets to brain (s/p 4 cycles of chemo and thoracic RT- completed 10/2022), steroid induced Diabetes Mellitus, gamma knife surgery to 7 brain mets, and s/p temporal crani/resection of metastatic lesion; who presented to [facility] on 10/31 with BL UE and BL LE weakness and abdominal pain for 2 weeks concerning for progressive metastatic disease.  Overall imaging was significant for metastatic disease in the brain, lung, liver and abdomen.  NSGY deemed no surgical intervention for worsening brain mets.  Heme/Onc suggested 1 dose of C1D1 Carboplatin and Paclitaxel. She also received Bevacizumab during her admission. Her hospital course was complicated by hyponatremia (likely 2/2 SIADH given lung CA), and fever (negative workup).  She will continue chemotherapy/radiation treatment in the outpatient setting with Dr. Beckman.  PM&R was consulted and deemed her an appropriate candidate for IRF. She was medically stabilized and cleared for discharge to [facility].  (08 Nov 2023 15:08)    Patient was evaluated by PM&R and therapy for gait/ADL impairments and recommended acute rehabilitation. Patient was medically optimized for discharge to [facility] on 11/8/23. Admitted with gait instability, ADL, and functional impairments.     Rehab course significant for:  11/18: BS lower side. changed diet to regular diet instead of consistent carb. started on metoprolol 12.5 mg BID for persistent tachycardia. UA: mild UTI. CXR: ?left atelectasis vs infiltrate vs fluid in fissure. will see final result, patient was parking food, and complaints cough, started on Ceftin 500 mg daily for 3-5 days. will cover pneumonia and UTI. if pneumonia omn cxr, needs total 5 days  11/21: DC today.     All other medical co-morbidities were stable. Patient tolerated course of inpatient PT/OT/SLP rehab with significant improvements and met rehab goals prior to discharge. Patient was medically cleared on 11/21/23 for discharge to home with home care. HPI:  Renée Arechiga is a 52 year old female with PMH of GERD, S4 lung cancer with mets to brain (s/p 4 cycles of chemo and thoracic RT- completed 10/2022), steroid induced Diabetes Mellitus, gamma knife surgery to 7 brain mets, and s/p temporal crani/resection of metastatic lesion; who presented to [facility] on 10/31 with BL UE and BL LE weakness and abdominal pain for 2 weeks concerning for progressive metastatic disease.  Overall imaging was significant for metastatic disease in the brain, lung, liver and abdomen.  NSGY deemed no surgical intervention for worsening brain mets.  Heme/Onc suggested 1 dose of C1D1 Carboplatin and Paclitaxel. She also received Bevacizumab during her admission. Her hospital course was complicated by hyponatremia (likely 2/2 SIADH given lung CA), and fever (negative workup).  She will continue chemotherapy/radiation treatment in the outpatient setting with Dr. Beckman.  PM&R was consulted and deemed her an appropriate candidate for IRF. She was medically stabilized and cleared for discharge to [facility].  (08 Nov 2023 15:08)    Patient was evaluated by PM&R and therapy for gait/ADL impairments and recommended acute rehabilitation. Patient was medically optimized for discharge to [facility] on 11/8/23. Admitted with gait instability, ADL, and functional impairments.     Rehab course significant for:  11/18: BS lower side. changed diet to regular diet instead of consistent carb. started on metoprolol 12.5 mg BID for persistent tachycardia. UA: mild UTI. CXR: ?left atelectasis vs infiltrate vs fluid in fissure. will see final result, patient was parking food, and complaints cough, started on Ceftin 500 mg daily for 3-5 days. will cover pneumonia and UTI. if pneumonia omn cxr, needs total 5 days  11/21: DC today.     IDT 11/14  Lives with Friend, plans to live in a friend's house post distance  Could also move in to sister's house on DC  SLP--Reg diet with thin liquids, mod cog impairment, deficits with recalL  Function  Min assist with upper body dressing and mod assist with Lower body dressing, max assist with toileting  Ambulating 10ft with WC Follow through max assistance   Goals--Ambulating functional distance with device, min assist with ADLs  Barriers--poor endurance, fatigue, low motivation   Ext dc 11/21 to oncologist office [address] [facility] and cousin Ms Angelina Hall meeting patient there for  to home     You made significant progress in therapy, with functional improvements as noted  However, you still has deficits with LE motor strength, and intermittent knee buckling in therapy, which was discussed/demonstrated during your family training with your family  Your Tachycardia was managed with cardiology team, ECHO showed normal EF, you responded to low dose metoprolol and you are due f/u with your PCP in 1 wk and Cardiology--Dr Kearney in 2 wks time  A left below knee DVT was Dxed, this was conservatively managed, without need for full anticoagulation. Repeat LE venous scan on 11/20, showed resolution of this blood clot  Hematology team was involved in your care during this acute rehab admission  You are due an appointment with your oncologist Dr Beckman today and your friend Mr Fuller will be taking you to this appointment. Afterwards, you sister Ms Angelina Hall will be taking you home as agreed with her    All other medical co-morbidities were stable. Patient tolerated course of inpatient PT/OT/SLP rehab with significant improvements and met rehab goals prior to discharge. Patient was medically cleared on 11/21/23 for discharge to home with home care.

## 2023-11-17 NOTE — DISCHARGE NOTE PROVIDER - DETAILS OF MALNUTRITION DIAGNOSIS/DIAGNOSES
This patient has been assessed with a concern for Malnutrition and was treated during this hospitalization for the following Nutrition diagnosis/diagnoses:     -  11/09/2023: Severe protein-calorie malnutrition

## 2023-11-17 NOTE — PROGRESS NOTE ADULT - ASSESSMENT
Renée Rojas is a 52 year old female with PMH of GERD, S4 lung cancer with mets to brain (s/p 4 cycles of chemo and thoracic RT- completed 10/2022), steroid induced Diabetes Mellitus, gamma knife surgery to 7 brain mets, and s/p temporal crani/resection of metastatic lesion; who presented to [facility] on 10/31 with BL UE and BL LE weakness and abdominal pain for 2 weeks concerning for progressive metastatic disease.  Overall imaging was significant for metastatic disease in the brain, lung, liver and abdomen.  NSGY deemed no surgical intervention for worsening brain mets.  Heme/Onc suggested 1 dose of C1D1 Carboplatin and Paclitaxel. She also received Bevacizumab during her admission. Her hospital course was complicated by hyponatremia (likely 2/2 SIADH given lung CA), and fever.  She will continue chemotherapy/radiation treatment in the outpatient setting with Dr. Beckman.  PM&R was consulted and deemed her an appropriate candidate for IRF. She was medically stabilized and cleared for discharge to [facility].    Oncology consulted for h/o lung cancer.

## 2023-11-17 NOTE — PROGRESS NOTE ADULT - SUBJECTIVE AND OBJECTIVE BOX
RENÉE WHITMORE   52y   Female    Admitting: IRENE Lucia  HPI:  Renée Whitmore is a 52 year old female with PMH of GERD, S4 lung cancer with mets to brain (s/p 4 cycles of chemo and thoracic RT- completed 10/2022), steroid induced Diabetes Mellitus, gamma knife surgery to 7 brain mets, and s/p temporal crani/resection of metastatic lesion; who presented to [facility] on 10/31 with BL UE and BL LE weakness and abdominal pain for 2 weeks concerning for progressive metastatic disease.  Overall imaging was significant for metastatic disease in the brain, lung, liver and abdomen.  NSGY deemed no surgical intervention for worsening brain mets.  Heme/Onc suggested 1 dose of C1D1 Carboplatin and Paclitaxel. She also received Bevacizumab during her admission. Her hospital course was complicated by hyponatremia (likely 2/2 SIADH given lung CA), and fever.  She will continue chemotherapy/radiation treatment in the outpatient setting with Dr. Beckman.  PM&R was consulted and deemed her an appropriate candidate for IRF. She was medically stabilized and cleared for discharge to [facility].    Oncology asked to see patient by rehab team, for h/o lung cancer.      PAST MEDICAL & SURGICAL HISTORY:  GERD (Gastroesophageal Reflux Disease)      Lung mass  right      Non-small cell lung cancer with metastasis      S/P endoscopy  2022        HEALTH ISSUES - PROBLEM Dx:  Non-small cell carcinoma of lung      MEDICATIONS  (STANDING):  AQUAPHOR (petrolatum Ointment) 1 Application(s) Topical daily  artificial tears (preservative free) Ophthalmic Solution 1 Drop(s) Both EYES three times a day  atorvastatin 40 milliGRAM(s) Oral at bedtime  dexAMETHasone     Tablet 2 milliGRAM(s) Oral two times a day  dextrose 5%. 1000 milliLiter(s) (100 mL/Hr) IV Continuous <Continuous>  dextrose 5%. 1000 milliLiter(s) (50 mL/Hr) IV Continuous <Continuous>  dextrose 50% Injectable 25 Gram(s) IV Push once  dextrose 50% Injectable 12.5 Gram(s) IV Push once  dextrose 50% Injectable 25 Gram(s) IV Push once  divalproex  milliGRAM(s) Oral every 12 hours  enoxaparin Injectable 40 milliGRAM(s) SubCutaneous every 24 hours  glucagon  Injectable 1 milliGRAM(s) IntraMuscular once  insulin lispro (ADMELOG) corrective regimen sliding scale   SubCutaneous three times a day before meals  lactated ringers. 500 milliLiter(s) (75 mL/Hr) IV Continuous <Continuous>  metFORMIN 500 milliGRAM(s) Oral two times a day  pantoprazole    Tablet 40 milliGRAM(s) Oral before breakfast  polyethylene glycol 3350 17 Gram(s) Oral daily    MEDICATIONS  (PRN):  acetaminophen     Tablet .. 650 milliGRAM(s) Oral every 6 hours PRN Temp greater or equal to 38C (100.4F), Mild Pain (1 - 3)  aluminum hydroxide/magnesium hydroxide/simethicone Suspension 30 milliLiter(s) Oral every 4 hours PRN Dyspepsia  dextrose Oral Gel 15 Gram(s) Oral once PRN Blood Glucose LESS THAN 70 milliGRAM(s)/deciliter  melatonin 3 milliGRAM(s) Oral at bedtime PRN Insomnia  senna 2 Tablet(s) Oral at bedtime PRN Constipation    Allergies    No Known Allergies    INTERVAL HPI/OVERNIGHT EVENTS:  Patient S&E at bedside. No c/o H/A, CP or SOB.     VITAL SIGNS:  T(F): 98.9 (11-16-23 @ 20:13)  HR: 110 (11-16-23 @ 20:13)  BP: 99/66 (11-16-23 @ 20:13)  RR: 17 (11-16-23 @ 20:13)  SpO2: 99% (11-16-23 @ 20:13)      PHYSICAL EXAM:  Constitutional: NAD  Eyes: sclera non-icteric  Neck: no JVD  Respiratory: decrease BS bases ant.  Cardiovascular: RRR, no M/R/G  Gastrointestinal: soft, NTND, no masses palpable  Extremities: no calf tenderness  Neurological: Awake, alert.    Labs:             9.3    2.63  )-----------( 154      ( 11-16 @ 06:36 )             30.0                9.6    3.59  )-----------( 129      ( 11-15 @ 12:40 )             30.8       11-16    139  |  104  |  3<L>  ----------------------------<  92  3.5   |  25  |  0.40<L>    Ca    9.7      16 Nov 2023 06:36  Phos  2.2     11-15  Mg     1.4     11-15    TPro  6.5  /  Alb  1.9<L>  /  TBili  0.2  /  DBili  x   /  AST  104<H>  /  ALT  33  /  AlkPhos  166<H>  11-16      Haptoglobin, Serum: 441 mg/dL (11-16-23 @ 06:36)  Absolute Reticulocytes: 123.4 K/uL (11-16-23 @ 06:36)    Consultant notes reviewed : YES [x ] ; NO [ ]  Case discussed with attending physician / consultant: YES [x ] ; NO [ ]

## 2023-11-17 NOTE — DISCHARGE NOTE PROVIDER - NSDCFUADDAPPT_GEN_ALL_CORE_FT
Please follow up with your PCP as soon as possible.    If you are in need of a PCP or a specialist in your area: contact the [facility] Physician Referral Service at [phone].

## 2023-11-17 NOTE — PROGRESS NOTE ADULT - ASSESSMENT
52F with metastatic lung cancer to brain/liver on ongoing chemo/RT, steroid-induced DM, now in acute rehab after being hospitalized for progressive weakness    #sinus tachycardia  - HR in the 130's early this week, improved.  - continue lopressor 12.5mg po BID  - TTE on 11/15 limited study.   - encourage po intake, consider LR @75cc/h again x 5 hours for 500cc if with decreased po intake or if HR >120, sustained.    #bicytopenia likely due to chemotherapy, immunotherapy.  Last dose: Taxol/Carboplatin 11/4/23 and bevacizumab 11/5/23  - heme/onc reccs appreciated -- plan for repeat CBC w diff 11/20 and outpatient follow up on Tuesday 11/21 Dr. Beckman  - blood cultures negative to date since 11/16  - CXR unremarkable  - pending UA  - Hb remains stable in 9's, repeat 11/21    #LLE DVT   - distal DVT +soleal vein of left leg  - discussed with PM&R, and heme/onc reccs appreciated, will repeat venous duplex u/s next week to evaluate propagation of DVT. holding off on full AC.    #Metastatic lung cancer to brain/liver  #Progressive weakness due to above  #Abdominal pain likely due to above mets (liver mets, abdominal wall subcutaneous nodules, all in recent imaging)  - continue comprehensive rehab program  -c/w Decadron   -seizure prophylaxis: Depakote  -Pain control    #Steroid-induced DM  - POCT at goal  c/w Metformin, ISS    #SIADH due to metastatic lung cancer  -sodium improved    #DVT ppx: Lovenox    Discussed with primary team  Dispo: plan for dc 11/20 to home

## 2023-11-17 NOTE — DISCHARGE NOTE PROVIDER - NSDCFUSCHEDAPPT_GEN_ALL_CORE_FT
Margarito Beckman  [facility]  [facility]  Scheduled Appointment: 11/21/2023    [facility]  [facility]  Scheduled Appointment: 12/01/2023

## 2023-11-17 NOTE — DISCHARGE NOTE PROVIDER - CARE PROVIDERS DIRECT ADDRESSES
,DirectAddress_Unknown,[email or web address]

## 2023-11-17 NOTE — PROGRESS NOTE ADULT - SUBJECTIVE AND OBJECTIVE BOX
HARJEET WHITMORE  [id number]      Chief Complaint: Stage IV Lung Cancer with metastases/Sinus tachycardia/DVT/Elevated troponin    Interval History: The patient reports feeling better, denies palpitations or chest pain.     Tele: not on telemetry (in rehab)      Current meds:   acetaminophen     Tablet .. 650 milliGRAM(s) Oral every 6 hours PRN  aluminum hydroxide/magnesium hydroxide/simethicone Suspension 30 milliLiter(s) Oral every 4 hours PRN  AQUAPHOR (petrolatum Ointment) 1 Application(s) Topical daily  artificial tears (preservative free) Ophthalmic Solution 1 Drop(s) Both EYES three times a day  atorvastatin 40 milliGRAM(s) Oral at bedtime  dexAMETHasone     Tablet 2 milliGRAM(s) Oral two times a day  dextrose 5%. 1000 milliLiter(s) IV Continuous <Continuous>  dextrose 5%. 1000 milliLiter(s) IV Continuous <Continuous>  dextrose 50% Injectable 12.5 Gram(s) IV Push once  dextrose 50% Injectable 25 Gram(s) IV Push once  dextrose 50% Injectable 25 Gram(s) IV Push once  dextrose Oral Gel 15 Gram(s) Oral once PRN  divalproex  milliGRAM(s) Oral every 12 hours  enoxaparin Injectable 40 milliGRAM(s) SubCutaneous every 24 hours  glucagon  Injectable 1 milliGRAM(s) IntraMuscular once  insulin lispro (ADMELOG) corrective regimen sliding scale   SubCutaneous three times a day before meals  melatonin 3 milliGRAM(s) Oral at bedtime PRN  metFORMIN 500 milliGRAM(s) Oral two times a day  metoprolol tartrate 12.5 milliGRAM(s) Oral once  pantoprazole    Tablet 40 milliGRAM(s) Oral before breakfast  polyethylene glycol 3350 17 Gram(s) Oral daily  senna 2 Tablet(s) Oral at bedtime PRN      Objective:     Vital Signs:   T(C): 36.8 (11-14-23 @ 20:09), Max: 36.8 (11-14-23 @ 20:09)  HR: 128 (11-15-23 @ 09:55) (109 - 128)  BP: 113/82 (11-15-23 @ 09:55) (99/68 - 113/82)  RR: 16 (11-14-23 @ 20:09) (16 - 16)  SpO2: 97% (11-14-23 @ 20:09) (97% - 98%)  Wt(kg): --    Physical Exam:   General: thin, chronically ill appearing  Neck: supple   CVS: JVP ~ 7 cm H20, tachycardic, s1, s2  Pulm: unlabored respirations, clear to ausculation   Ext: no lower extremity edema b/l  Neuro: awake, alert and oriented  Psych: Normal affect    Labs:         TTE (8/2023):   1. Left ventricular ejection fraction, by visual estimation, is 55 to 60%.   2. Normal global left ventricular systolic function.   3. Mild thickening of the anterior and posterior mitral valve leaflets.   4. Moderate mitral valve regurgitation.   5. Moderate aortic regurgitation.   6. Sclerotic aortic valve with normal opening.    < from: TTE Echo Complete w/o Contrast w/ Doppler (11.15.23 @ 07:58) >   1. Technically difficult study with poor endocardial visualization.   2. The heart rate is tachycardic    120s BPM throughout the study.   3. Hyperdynamic global left ventricular systolic function.   4. Left ventricular ejection fraction, by visual estimation, is >75%.   5. Decreased left ventricular internal cavity size.   6. Mildly increased LV wall thickness.   7. The right ventricle is not well visualized, appears generally normal   in size with normal systolic function in limited views.   8. Mild mitral annular calcification.   9. Mild mitral valve leaflet thickening.  10. Mild mitral valve regurgitation.  11. No aortic valve stenosis.  12. There is no evidence of pericardial effusion.      < end of copied text >        ECG (11/13/23): sinus tachycardia, right atrial enlargement  ECG (11/15/23): sinus tachycardia, right atrial enlargement

## 2023-11-17 NOTE — DISCHARGE NOTE PROVIDER - CARE PROVIDER_API CALL
Christopher Lucia  Physical/Rehab Medicine  [address] [place] [address]  Phone: [phone]  Fax: [phone]  Follow Up Time: 1 month    Margarito Beckman  Medical Oncology  [address] [place] [id number]  Phone: [phone]  Fax: [phone]  Follow Up Time: 1 week    Zurdo Bernard  Radiation Oncology  [address] [place] [address], [facility] [place] [address]  Phone: [phone]  Fax: [phone]  Follow Up Time: 1 week    Иван Molina  Cardiology  [address] [facility], [address]  [place] [address]  Phone: [phone]  Fax: [phone]  Follow Up Time: 1 week

## 2023-11-17 NOTE — DISCHARGE NOTE PROVIDER - NSDCCPCAREPLAN_GEN_ALL_CORE_FT
PRINCIPAL DISCHARGE DIAGNOSIS  Diagnosis: Malignant neoplasm of breast metastatic to brain  Assessment and Plan of Treatment: You presented to the hospital and were found to have a new brain mass requiring surgery. You were sent to  acute rehab to help improve your strength and overall function. Please follow up with the following providers listed in this packet for further management.      SECONDARY DISCHARGE DIAGNOSES  Diagnosis: Tachycardia  Assessment and Plan of Treatment: You were noted to have an elevated heart rate during your rehab stay. Please continue your medications as prescribed and follow up with Cardiology upon discharge from rehab.    Diagnosis: Steroid-induced hyperglycemia  Assessment and Plan of Treatment: Your blood sugars were noted to be elevated during your rehab stay. This is expected, as you are on a steroid medication. Please continue to take your Metformin as prescribed.

## 2023-11-17 NOTE — DISCHARGE NOTE PROVIDER - NSDCMRMEDTOKEN_GEN_ALL_CORE_FT
atorvastatin 40 mg oral tablet: 1 tab(s) orally once a day (at bedtime)  Depakote 500 mg oral delayed release tablet: 1 tab(s) orally every 12 hours  dexAMETHasone 2 mg oral tablet: 1 tab(s) orally 2 times a day  metFORMIN 500 mg oral tablet: 1 tab(s) orally 2 times a day  ocular lubricant ophthalmic solution: 1 drop(s) to each affected eye 3 times a day  polyethylene glycol 3350 oral powder for reconstitution: 17 each orally once a day  Protonix 40 mg oral delayed release tablet: 1 tab(s) orally once a day (before a meal)  senna leaf extract oral tablet: 2 tab(s) orally once a day (at bedtime) As needed Constipation   acetaminophen 325 mg oral tablet: 2 tab(s) orally every 6 hours As needed Temp greater or equal to 38C (100.4F), Mild Pain (1 - 3)  aluminum hydroxide-magnesium hydroxide 200 mg-200 mg/5 mL oral suspension: 30 milliliter(s) orally every 4 hours As needed Dyspepsia  atorvastatin 40 mg oral tablet: 1 tab(s) orally once a day (at bedtime)  dexAMETHasone 2 mg oral tablet: 1 tab(s) orally 2 times a day  divalproex sodium 500 mg oral delayed release tablet: 1 tab(s) orally every 12 hours  melatonin 3 mg oral tablet: 1 tab(s) orally once a day (at bedtime) As needed Insomnia  metFORMIN 500 mg oral tablet: 1 tab(s) orally 2 times a day  ocular lubricant ophthalmic solution: 1 drop(s) to each affected eye 3 times a day  pantoprazole 40 mg oral delayed release tablet: 1 tab(s) orally once a day (before a meal)  petrolatum topical ointment: 1 Apply topically to affected area once a day  polyethylene glycol 3350 oral powder for reconstitution: 17 gram(s) orally once a day  senna leaf extract oral tablet: 2 tab(s) orally once a day (at bedtime) As needed Constipation   acetaminophen 325 mg oral tablet: 2 tab(s) orally every 6 hours As needed Temp greater or equal to 38C (100.4F), Mild Pain (1 - 3)  aluminum hydroxide-magnesium hydroxide 200 mg-200 mg/5 mL oral suspension: 30 milliliter(s) orally every 4 hours As needed Dyspepsia  atorvastatin 40 mg oral tablet: 1 tab(s) orally once a day (at bedtime)  benzonatate 100 mg oral capsule: 1 cap(s) orally every 8 hours as needed for Cough  dexAMETHasone 2 mg oral tablet: 1 tab(s) orally 2 times a day  divalproex sodium 500 mg oral delayed release tablet: 1 tab(s) orally every 12 hours  melatonin 3 mg oral tablet: 1 tab(s) orally once a day (at bedtime) As needed Insomnia  metFORMIN 500 mg oral tablet: 1 tab(s) orally 2 times a day  Metoprolol Tartrate 25 mg oral tablet: 0.5 tab(s) orally 2 times a day  ocular lubricant ophthalmic solution: 1 drop(s) to each affected eye 3 times a day  pantoprazole 40 mg oral delayed release tablet: 1 tab(s) orally once a day (before a meal)  petrolatum topical ointment: 1 Apply topically to affected area once a day  polyethylene glycol 3350 oral powder for reconstitution: 17 gram(s) orally once a day  senna leaf extract oral tablet: 2 tab(s) orally once a day (at bedtime) As needed Constipation

## 2023-11-17 NOTE — PROGRESS NOTE ADULT - ASSESSMENT
Assessment/Plan:  HARJEET WHITMORE is a 52 year old female with PMH of GERD, S4 lung cancer with mets to brain (s/p 4 cycles of chemo and thoracic RT- completed 10/2022), steroid induced Diabetes Mellitus, gamma knife surgery to 7 brain mets, and s/p temporal crani/resection of metastatic lesion; who presented to [facility] on 10/31 with BL UE and BL LE weakness and abdominal pain for 2 weeks concerning for progressive metastatic disease.  Overall imaging was significant for metastatic disease in the brain, lung, liver and abdomen.  NSGY deemed no surgical intervention for worsening brain mets.  Heme/Onc suggested 1 dose of C1D1 Carboplatin and Paclitaxel. She also received Bevacizumab during her admission. Her hospital course was complicated by hyponatremia (likely 2/2 SIADH given lung CA), and fever (negative workup). She will continue chemotherapy/radiation treatment in the outpatient setting with Dr. Beckman. Patient now admitted for a multidisciplinary rehab program.   -------------    * Mon 1/20 due  Repeat LE duplex monitor LLE DVT  and labs to monitor low wbc   * Asymptomatic tachycardia continue low dose b blocker  * Family training today, need teaching on how to help patient considering LE weakness  * D/C will be brought forward to enable her attend in person oncology appointment     #Malignant neoplasm with metastasis to brain//liver/abdomen  - Gait Instability, ADL impairments and Functional impairments: start Comprehensive Rehab Program of PT/OT/SLP  - 3 hours a day, 5 days a week  - continue Dexamethasone 2mg BID  - Resume chemo/radiation therapy in outpatient setting  - Follow up with Dr. Beckman     #Seizure Prophylaxis  - contnue Depakote 500mg BID    * Cough--monitor, declined antitussive meds    # Tachycardia    Left soleal DVT---11/13   CT chest no PE  --Elevated, downtrending trop, attributed to demand ischemia  Serial weekly venous duplex as recs by hematology due to increased risk of bleeding    Echo to evaluate LVEF, 11/14--report pending   Hematology has notified patient private heme Dr Beckman  Hospitalist following and will liaise with cardiology to make recs for Tachycardia    Low wbc and anemia--afebrile  F/u Hospitalist recs and heme recs    #Steroid Induced Diabetes Mellitus  - A1c: 7.7 (11/2023)  - ISS  - FS AC&HS  - Metformin 500mg BID     #HLD  - continue Atorvastatin 40mg at HS    #Hyponatremia--monitor   - Likely secondary to lung CA    #Sleep  - Melatonin 3mg PRN     #Skin  - Skin -- Right crani incision ,healed     #Pain Mgmt   - Tylenol PRN    #GI/Bowel Mgmt   - Continue Pantoprazole - Miralax - Senna - Maalox  PRN (will switch to standing if symptoms persist)    #/Bladder Mgmt --voiding     #FEN   - Diet - Regular + Thins  [CCHO]    - Dysphagia  SLP - evaluation and treatment  --Poor oral intake--F/u Nutritionist     #Health Maintenance  - Ocular lubricant gtts TID     #Precautions / PROPHYLAXIS:   - Falls, Seizure   - ortho: Weight bearing status: WBAT   - Lungs: Aspiration, Incentive Spirometer   - DVT: Lovenox     * L;abs 11/16---Stable anemia, normal renal function and elevated Alk phos,   --Down trending wbc monitor f/u pending labs ANC 1.25 on 11/15    11/16--Liaison with Hospitalist and Hemetology for management of LLE DVT     IDT 11/14  Lives with Friend, plans to live in a friend's house post distance  Could also move in to sister's house on DC  SLP--Reg diet with thin liquids, mod cog impairment, deficits with recalL  Function  Min assist with upper body dressing and mod assist with Lower body dressing, max assist with toileting  Ambulating 10ft with WC Follow through max assistance   Goals--Ambulating functional distance with device, min assist with ADLs  Barriers--poor endurance, fatigue, low motivation   Ext dc 11/22 to home    --------------------------------------------  OUTPATIENT/FOLLOW UP:    Margarito Beckman  Medical Oncology  [address] [place] [address]  Phone: [phone]  Fax: [phone]  Established Patient  Follow Up Time: 2 weeks    Zurdo Bernard  Radiation Oncology  [address] [place] [address]  Phone: [phone]  Fax: [phone]  Established Patient  Follow Up Time: 2 weeks  --------------------------------------------   Assessment/Plan:  HARJEET WHITMORE is a 52 year old female with PMH of GERD, S4 lung cancer with mets to brain (s/p 4 cycles of chemo and thoracic RT- completed 10/2022), steroid induced Diabetes Mellitus, gamma knife surgery to 7 brain mets, and s/p temporal crani/resection of metastatic lesion; who presented to [facility] on 10/31 with BL UE and BL LE weakness and abdominal pain for 2 weeks concerning for progressive metastatic disease.  Overall imaging was significant for metastatic disease in the brain, lung, liver and abdomen.  NSGY deemed no surgical intervention for worsening brain mets.  Heme/Onc suggested 1 dose of C1D1 Carboplatin and Paclitaxel. She also received Bevacizumab during her admission. Her hospital course was complicated by hyponatremia (likely 2/2 SIADH given lung CA), and fever (negative workup). She will continue chemotherapy/radiation treatment in the outpatient setting with Dr. Beckman. Patient now admitted for a multidisciplinary rehab program.   -------------    * Mon 1/20 due  Repeat LE duplex monitor LLE DVT  and labs to monitor low wbc   * Asymptomatic tachycardia continue low dose b blocker  * Family training today, need teaching on how to help patient considering LE weakness  * D/C will be brought forward 11/21 to enable her attend in person oncology appointment     #Malignant neoplasm with metastasis to brain//liver/abdomen  - Gait Instability, ADL impairments and Functional impairments: start Comprehensive Rehab Program of PT/OT/SLP  - 3 hours a day, 5 days a week  - continue Dexamethasone 2mg BID  - Resume chemo/radiation therapy in outpatient setting  - Follow up with Dr. Beckman     #Seizure Prophylaxis  - contnue Depakote 500mg BID    * Cough--monitor, declined antitussive meds    # Tachycardia   Echo 11/15--EF >75, Sinus Tachycardia    Left soleal DVT---11/13   CT chest no PE  --Elevated, downtrending trop, attributed to demand ischemia  Serial weekly venous duplex as recs by hematology due to increased risk of bleeding    Echo to evaluate LVEF, 11/14--report pending   Hematology has notified patient private heme Dr Beckman  Hospitalist following and will liaise with cardiology to make recs for Tachycardia      #Steroid Induced Diabetes Mellitus  - A1c: 7.7 (11/2023)  - ISS  - FS AC&HS  - Metformin 500mg BID     #HLD  - continue Atorvastatin 40mg at HS    #Hyponatremia--monitor   - Likely secondary to lung CA    #Sleep  - Melatonin 3mg PRN     #Skin  - Skin -- Right crani incision ,healed     #Pain Mgmt   - Tylenol PRN    #GI/Bowel Mgmt   - Continue Pantoprazole - Miralax - Senna - Maalox  PRN (will switch to standing if symptoms persist)    #/Bladder Mgmt --voiding     #FEN   - Diet - Regular + Thins  [CCHO]    - Dysphagia  SLP - evaluation and treatment  --Poor oral intake--F/u Nutritionist     #Health Maintenance  - Ocular lubricant gtts TID     #Precautions / PROPHYLAXIS:   - Falls, Seizure   - ortho: Weight bearing status: WBAT   - Lungs: Aspiration, Incentive Spirometer   - DVT: Lovenox     * L;abs 11/16---Stable anemia, normal renal function and elevated Alk phos,   --Down trending wbc monitor f/u pending labs ANC 1.25 on 11/15    11/16--Liaison with Hospitalist and Hemetology for management of LLE DVT   11/17-- I d/w Ms Lauren at patient's oncologists office, regarding appointment for f/u. She stated that patient is booked for f/u in person on 11/21 at 1pm, details as stated  I informed her that patient will be d/doug prior to this appointment and her family member will be at the oncology office to pick her after her oncology visit    Liaison with family  11/17--I confirmed details of family contact  with patient, she stated that Mr Angelina Hall ph  will be picking her on dc and she will be living with her  I called and d/w Mr Alfaro, she confirmed details as stated. but stated that she is unable to patient to the oncology appointment, and back to [facility] However she asked for patient to be taken to the oncology appointment and she would pick her from ther  I confirmed with SW team who agreed facilitate this  She also asked for family training due 11/2O to be done via video. I have asked therapist to facilitate this       IDT 11/14  Lives with Friend, plans to live in a friend's house post distance  Could also move in to sister's house on DC  SLP--Reg diet with thin liquids, mod cog impairment, deficits with recalL  Function  Min assist with upper body dressing and mod assist with Lower body dressing, max assist with toileting  Ambulating 10ft with WC Follow through max assistance   Goals--Ambulating functional distance with device, min assist with ADLs  Barriers--poor endurance, fatigue, low motivation   Ext dc 11/21 to oncologist office [address] [place] [address] and cousin Ms Angelina Hall meeting patient there for  to home     --------------------------------------------  OUTPATIENT/FOLLOW UP:    Margarito Beckman  Medical Oncology  [address] [place] [address]  Phone: [phone]  Fax: [phone]  Established Patient  Follow Up Time: 2 weeks    Zurdo Bernard  Radiation Oncology  [address] [place] [address]  Phone: [phone]  Fax: [phone]  Established Patient  Follow Up Time: 2 weeks    Cardiology  F/u for Tachycardia   Dr Field Tanmay Bledsoe [facility]  ------------------------------------------    Dr Beckman  Oncology  [address] [place] [address]  11/21 at 1pm    Non critical care  Time spent  63 mins, patient review, liaison with specialty providers, oncology--in house and patient's private oncologist, hospitalist, cardiology, detailed discussion with family Ms Alfaro and discharge planning

## 2023-11-17 NOTE — DISCHARGE NOTE PROVIDER - PROVIDER TOKENS
PROVIDER:[TOKEN:[[id number]],FOLLOWUP:[1 month]],PROVIDER:[TOKEN:[[id number]],FOLLOWUP:[1 week]],PROVIDER:[TOKEN:[[id number]],FOLLOWUP:[1 week]],PROVIDER:[TOKEN:[[id number]],FOLLOWUP:[1 week]]

## 2023-11-17 NOTE — PROGRESS NOTE ADULT - ASSESSMENT
Assessment:  Renée Rojas is a 52 year old woman with past medical history of Stage IV Lung cancer with metastasis to brain (s/p chemotherapy, radiation and gamma knife surgery) with recent right temporal craniotomy for resection of mass, then recent hospitalization at [facility] earlier this month with right sided weakness found to have worsening metastases to brain, now admitted to [facility].    Cardiology consulted for sinus tachycardia. ECG consistent with sinus tachycardia at 131 BPM. The patient reports decreased appetite, denies palpitations, chest pain or shortness of breath. Prior echocardiogram (8/2023) reported as normal LVEF 55-60%. Leg US indicates left leg DVT. CT chest with lung mass and metastatic nodules.    Recommendations:  [] Sinus tachycardia: Likely secondary to inciting event, tachycardia may also be secondary to left leg DVT, however no pulmonary embolism on CTPA. Follow up Hematology regarding anticoagulation recommendations given brain metastases and risk of bleeding with anticoagulation. TTE revealing tachycardia and visual EF >75%. Continue low dose beta blocker metoprolol 12.5 BID with hold parameters sbp <90 hr, <55. Palpated pulse today 100bpm.    [] Elevated troponins: Patient is asymptomatic, likely demand ischemia from chronic sinus tachycardia and medical illnesses. EKG nonischemic    Xin MILLERCNP-BC        Assessment:  Renée Rojas is a 52 year old woman with past medical history of Stage IV Lung cancer with metastasis to brain (s/p chemotherapy, radiation and gamma knife surgery) with recent right temporal craniotomy for resection of mass, then recent hospitalization at [facility] earlier this month with right sided weakness found to have worsening metastases to brain, now admitted to [facility].    Cardiology consulted for sinus tachycardia. ECG consistent with sinus tachycardia at 131 BPM. The patient reports decreased appetite, denies palpitations, chest pain or shortness of breath. Prior echocardiogram (8/2023) reported as normal LVEF 55-60%. Leg US indicates left leg DVT. CT chest with lung mass and metastatic nodules.    Recommendations:  [] Sinus tachycardia: Likely secondary to inciting event, tachycardia may also be secondary to left leg DVT, however no pulmonary embolism on CTPA. Follow up Hematology regarding anticoagulation recommendations given brain metastases and risk of bleeding with anticoagulation. TTE revealing tachycardia and visual EF >75%. Continue low dose beta blocker metoprolol 12.5 BID with hold parameters sbp <90 hr, <55. Palpated pulse today 100bpm.    [] Elevated troponins: Patient is asymptomatic, likely demand ischemia from chronic sinus tachycardia and medical illnesses. EKG nonischemic    Xin MILLERCN-BC     a cardio attending  no new cardiac recommendations would continue current course patient apparently feeling better  rajesh rodriguez md [facility]

## 2023-11-18 NOTE — PROGRESS NOTE ADULT - ASSESSMENT
imp    sinus tachycardia most probably driven by her pulmonary issue    slight anemia could be at least a slightly contributing factor      suggest    could attempt increase in bblocker    pt might be able to tolerate bp as low as 90/50

## 2023-11-18 NOTE — PROGRESS NOTE ADULT - SUBJECTIVE AND OBJECTIVE BOX
Cc: Gait dysfunction    HPI:   "can you get me some sugar"  patient interactive, able to verablize exactly why they are not going to agree to RVP swab  Patient with no new medical issues overnight.  Pain controlled, no chest pain, no N/V, no Fevers/Chills. No other new ROS  Has been tolerating rehabilitation program.    acetaminophen     Tablet .. 650 milliGRAM(s) Oral every 6 hours PRN  aluminum hydroxide/magnesium hydroxide/simethicone Suspension 30 milliLiter(s) Oral every 4 hours PRN  AQUAPHOR (petrolatum Ointment) 1 Application(s) Topical daily  artificial tears (preservative free) Ophthalmic Solution 1 Drop(s) Both EYES three times a day  atorvastatin 40 milliGRAM(s) Oral at bedtime  benzonatate 100 milliGRAM(s) Oral every 8 hours PRN  dexAMETHasone     Tablet 2 milliGRAM(s) Oral two times a day  dextrose 5%. 1000 milliLiter(s) IV Continuous <Continuous>  dextrose 5%. 1000 milliLiter(s) IV Continuous <Continuous>  dextrose 50% Injectable 25 Gram(s) IV Push once  dextrose 50% Injectable 12.5 Gram(s) IV Push once  dextrose 50% Injectable 25 Gram(s) IV Push once  dextrose Oral Gel 15 Gram(s) Oral once PRN  divalproex  milliGRAM(s) Oral every 12 hours  enoxaparin Injectable 40 milliGRAM(s) SubCutaneous every 24 hours  glucagon  Injectable 1 milliGRAM(s) IntraMuscular once  insulin lispro (ADMELOG) corrective regimen sliding scale   SubCutaneous three times a day before meals  melatonin 3 milliGRAM(s) Oral at bedtime PRN  metFORMIN 500 milliGRAM(s) Oral two times a day  pantoprazole    Tablet 40 milliGRAM(s) Oral before breakfast  polyethylene glycol 3350 17 Gram(s) Oral daily  senna 2 Tablet(s) Oral at bedtime PRN      T(C): 36.7 (11-17-23 @ 20:33), Max: 36.7 (11-17-23 @ 20:33)  HR: 103 (11-17-23 @ 20:33) (103 - 103)  BP: 100/68 (11-17-23 @ 20:33) (100/68 - 100/68)  RR: 15 (11-17-23 @ 20:33) (15 - 15)  SpO2: 98% (11-17-23 @ 20:33) (98% - 98%)    In NAD  HEENT- EOMI  Heart- RRR, S1S2  Lungs- CTA bl except for expiratory wheeze on the left lower lobe  Abd- + BS, NT  Ext- No calf pain  Neuro- Exam unchanged

## 2023-11-18 NOTE — PROGRESS NOTE ADULT - SUBJECTIVE AND OBJECTIVE BOX
Follow up for sinus tachycardia  SUBJ: Patient lying in bed with no complaints  PMH  GERD (Gastroesophageal Reflux Disease)    Hydrosalpinx    GERD (Gastroesophageal Reflux Disease)    Ulcer    Lung mass    Non-small cell lung cancer with metastasis        MEDICATIONS  (STANDING):  AQUAPHOR (petrolatum Ointment) 1 Application(s) Topical daily  artificial tears (preservative free) Ophthalmic Solution 1 Drop(s) Both EYES three times a day  atorvastatin 40 milliGRAM(s) Oral at bedtime  cefuroxime   Tablet 500 milliGRAM(s) Oral every 12 hours  dexAMETHasone     Tablet 2 milliGRAM(s) Oral two times a day  dextrose 5%. 1000 milliLiter(s) (50 mL/Hr) IV Continuous <Continuous>  dextrose 5%. 1000 milliLiter(s) (100 mL/Hr) IV Continuous <Continuous>  dextrose 50% Injectable 25 Gram(s) IV Push once  dextrose 50% Injectable 12.5 Gram(s) IV Push once  dextrose 50% Injectable 25 Gram(s) IV Push once  divalproex  milliGRAM(s) Oral every 12 hours  enoxaparin Injectable 40 milliGRAM(s) SubCutaneous every 24 hours  glucagon  Injectable 1 milliGRAM(s) IntraMuscular once  insulin lispro (ADMELOG) corrective regimen sliding scale   SubCutaneous three times a day before meals  metFORMIN 500 milliGRAM(s) Oral two times a day  metoprolol tartrate 12.5 milliGRAM(s) Oral two times a day  pantoprazole    Tablet 40 milliGRAM(s) Oral before breakfast  polyethylene glycol 3350 17 Gram(s) Oral daily    MEDICATIONS  (PRN):  acetaminophen     Tablet .. 650 milliGRAM(s) Oral every 6 hours PRN Temp greater or equal to 38C (100.4F), Mild Pain (1 - 3)  aluminum hydroxide/magnesium hydroxide/simethicone Suspension 30 milliLiter(s) Oral every 4 hours PRN Dyspepsia  benzonatate 100 milliGRAM(s) Oral every 8 hours PRN Cough  dextrose Oral Gel 15 Gram(s) Oral once PRN Blood Glucose LESS THAN 70 milliGRAM(s)/deciliter  melatonin 3 milliGRAM(s) Oral at bedtime PRN Insomnia  senna 2 Tablet(s) Oral at bedtime PRN Constipation        PHYSICAL EXAM:  Vital Signs Last 24 Hrs  T(C): 36.7 (18 Nov 2023 10:19), Max: 36.7 (17 Nov 2023 20:33)  T(F): 98 (18 Nov 2023 10:19), Max: 98 (17 Nov 2023 20:33)  HR: 126 (18 Nov 2023 10:19) (103 - 126)  BP: 107/67 (18 Nov 2023 10:19) (100/68 - 107/67)  BP(mean): --  RR: 16 (18 Nov 2023 10:19) (15 - 16)  SpO2: 100% (18 Nov 2023 10:19) (98% - 100%)    Parameters below as of 18 Nov 2023 10:19  Patient On (Oxygen Delivery Method): room air        GENERAL: NAD, well-groomed, well-developed  HEAD:  Atraumatic, Normocephalic  EYES: EOMI, PERRLA, conjunctiva and sclera clear  ENT: Moist mucous membranes,  NECK: Supple, No JVD, no bruits  CHEST/LUNG: Clear to percussion bilaterally; No rales, rhonchi, wheezing, or rubs  HEART: Regular rate and rhythm; No murmurs, rubs, or gallops PMI non displaced.  ABDOMEN: Soft, Nontender, Nondistended; Bowel sounds present  EXTREMITIES:  2+ Peripheral Pulses, No clubbing, cyanosis, or edema  SKIN: No rashes or lesions          :                  I&O's Summary    BNP    RADIOLOGY & ADDITIONAL STUDIES:    ECHO:

## 2023-11-18 NOTE — PROGRESS NOTE ADULT - SUBJECTIVE AND OBJECTIVE BOX
Medicine Progress Note    Patient is a 52y old  Female who presents with a chief complaint of Malignant neoplasm with metastasis to brain (2023 08:13)      SUBJECTIVE / OVERNIGHT EVENTS  seen and examined  Chart reviewed  No overnight events  Limb weakness improving with therapy  BM+  No pain  No complaints    ADDITIONAL REVIEW OF SYSTEMS:  denied fever/chills/CP/SOB/cough/palpitation/dizziness/abdominal pian/nausea/vomiting/diarrhoea/constipation/dysuria/leg or calf pain/headaches.all other ROS neg    MEDICATIONS  (STANDING):  AQUAPHOR (petrolatum Ointment) 1 Application(s) Topical daily  artificial tears (preservative free) Ophthalmic Solution 1 Drop(s) Both EYES three times a day  atorvastatin 40 milliGRAM(s) Oral at bedtime  dexAMETHasone     Tablet 2 milliGRAM(s) Oral two times a day  dextrose 5%. 1000 milliLiter(s) (50 mL/Hr) IV Continuous <Continuous>  dextrose 5%. 1000 milliLiter(s) (100 mL/Hr) IV Continuous <Continuous>  dextrose 50% Injectable 25 Gram(s) IV Push once  dextrose 50% Injectable 12.5 Gram(s) IV Push once  dextrose 50% Injectable 25 Gram(s) IV Push once  divalproex  milliGRAM(s) Oral every 12 hours  enoxaparin Injectable 40 milliGRAM(s) SubCutaneous every 24 hours  glucagon  Injectable 1 milliGRAM(s) IntraMuscular once  insulin lispro (ADMELOG) corrective regimen sliding scale   SubCutaneous three times a day before meals  metFORMIN 500 milliGRAM(s) Oral two times a day  metoprolol tartrate 12.5 milliGRAM(s) Oral two times a day  pantoprazole    Tablet 40 milliGRAM(s) Oral before breakfast  polyethylene glycol 3350 17 Gram(s) Oral daily    MEDICATIONS  (PRN):  acetaminophen     Tablet .. 650 milliGRAM(s) Oral every 6 hours PRN Temp greater or equal to 38C (100.4F), Mild Pain (1 - 3)  aluminum hydroxide/magnesium hydroxide/simethicone Suspension 30 milliLiter(s) Oral every 4 hours PRN Dyspepsia  benzonatate 100 milliGRAM(s) Oral every 8 hours PRN Cough  dextrose Oral Gel 15 Gram(s) Oral once PRN Blood Glucose LESS THAN 70 milliGRAM(s)/deciliter  melatonin 3 milliGRAM(s) Oral at bedtime PRN Insomnia  senna 2 Tablet(s) Oral at bedtime PRN Constipation    CAPILLARY BLOOD GLUCOSE      POCT Blood Glucose.: 111 mg/dL (2023 11:37)  POCT Blood Glucose.: 86 mg/dL (2023 08:19)  POCT Blood Glucose.: 131 mg/dL (2023 21:27)  POCT Blood Glucose.: 105 mg/dL (2023 17:08)    I&O's Summary      PHYSICAL EXAM:  Vital Signs Last 24 Hrs  T(C): 36.7 (2023 10:19), Max: 36.7 (2023 20:33)  T(F): 98 (2023 10:19), Max: 98 (2023 20:33)  HR: 126 (2023 10:19) (103 - 126)  BP: 107/67 (2023 10:19) (100/68 - 107/67)  BP(mean): --  RR: 16 (2023 10:19) (15 - 16)  SpO2: 100% (2023 10:19) (98% - 100%)    Parameters below as of 2023 10:19  Patient On (Oxygen Delivery Method): room air    GENERAL: Not in distress. Alert    HEENT: clear conjuctiva, MMM. no pallor or icterus  CARDIOVASCULAR: RRR S1, S2. No murmur/rubs/gallop. tachycardia  LUNGS: BLAE+, no rales, no wheezing, no rhonchi.    ABDOMEN: ND. Soft,  NT, no guarding / rebound / rigidity. BS normoactive  BACK: No spine tenderness.  EXTREMITIES: no edema. no leg or calf TP.  SKIN: warm and dry. craniotomy wound healing well  PSYCHIATRIC: Calm.  No agitation.  CNS: AAO*3. moves limbs, follows commands    LABS:                Urinalysis Basic - ( 2023 09:12 )    Color: Yellow / Appearance: Cloudy / S.007 / pH: x  Gluc: x / Ketone: Negative mg/dL  / Bili: Negative / Urobili: 1.0 E.U./dL   Blood: x / Protein: Negative mg/dL / Nitrite: Negative   Leuk Esterase: Large / RBC: 3 /HPF / WBC 8 /HPF   Sq Epi: x / Non Sq Epi: x / Bacteria: Few /HPF        Culture - Blood (collected 2023 06:36)  Source: .Blood Blood-Peripheral  Preliminary Report (2023 12:01):    No growth at 48 Hours    Culture - Blood (collected 2023 06:32)  Source: .Blood Blood-Peripheral  Preliminary Report (2023 12:01):    No growth at 48 Hours      COVID-19 PCR: NotDetec (10 Nov 2023 11:00)  SARS-CoV-2: NotDetec (2023 15:09)  SARS-CoV-2: NotDetec (19 Sep 2023 16:42)  SARS-CoV-2: NotDetec (04 Sep 2023 04:07)      RADIOLOGY & ADDITIONAL TESTS:  Imaging from Last 24 Hours:    Electrocardiogram/QTc Interval:    COORDINATION OF CARE:  Care Discussed with Consultants/Other Providers:

## 2023-11-18 NOTE — PROGRESS NOTE ADULT - ASSESSMENT
Imp: Patient with diagnosis of neoplasm decline in functional status        admitted for comprehensive acute rehabilitation.    Plan:  - Continue PT/OT/SLP  - DVT prophylaxis  - Skin- Turn q2h, check skin daily  - Continue current medications; patient medically stable.   -Active issues-   - Patient is stable to continue current rehabilitation program.   - patient decline RVP swab  - patient observed to pocket their food, one bite of food took about 5 minutes to clear from the oropharynx prior to the exam commencing  - ordering chest radiograph for their cough and left wheeze

## 2023-11-19 NOTE — PROGRESS NOTE ADULT - ASSESSMENT
52F with metastatic lung cancer to brain/liver on ongoing chemo/RT, steroid-induced DM, now in acute rehab after being hospitalized for progressive weakness    #Sinus tachycardia: asymptomatic  - HR in the 130's during therapy  - ECG (11/18/23): sinus tachycardia @114 bpm, right atrial enlargement  - ECG (11/15/23): sinus tachycardia, right atrial enlargement  - ECG (11/13/23): sinus tachycardia, right atrial enlargement  - TTE on 11/15 limited study. 2. The heart rate is tachycardic 120s BPM throughout the study. Hyperdynamic global left ventricular systolic function. Left ventricular ejection fraction, by visual estimation, is >75%.  - echocardiogram (8/2023) reported as normal LVEF 55-60%.   - Leg US indicates left leg DVT.  - CTAP: lung mass and metastatic nodules.  - UA: mild UTI  - CXR: left atelectasis. right apical mass. no infiltrate/effusion/pneumo  - per rehab team: patient was parking food, and complaints cough  - started on Ceftin 500 mg BID for 3-5 days for UTI  - f.u UC  - continue lopressor 12.5mg po BID  - encourage po intake,   - Trial of NS @50cc/h, 1 liter only as BP soft and looks dry  - monitor orthostatic VS and adjust meds periodically  - cardio f/u noted    #bicytopenia likely due to chemotherapy, immunotherapy.  - Last dose: Taxol/Carboplatin 11/4/23 and bevacizumab 11/5/23  - heme/onc reccs appreciated -- plan for repeat CBC w diff 11/20 and outpatient follow up on Tuesday 11/21 Dr. Beckman  - blood cultures negative to date since 11/16  - CXR unremarkable  - UA: mild UTI. started on Ceftin. f/u UC  - Hb remains stable in 9's, repeat 11/21    #LLE DVT   - distal DVT +soleal vein of left leg  - discussed with PM&R, and heme/onc reccs appreciated, will repeat venous duplex u/s next week to evaluate propagation of DVT. holding off on full AC.    #Metastatic lung cancer to brain/liver  #Progressive weakness due to above  #Abdominal pain likely due to above mets (liver mets, abdominal wall subcutaneous nodules, all in recent imaging)  - continue comprehensive rehab program  -c/w Decadron   -seizure prophylaxis: Depakote  -Pain control    #Steroid-induced DM  - A1c 7.7  - POCT at goal. 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

## 2023-11-19 NOTE — PROGRESS NOTE ADULT - ASSESSMENT
Imp: Patient with diagnosis of      gait dyfsunction   admitted for comprehensive acute rehabilitation.    Plan:  - Continue PT/OT/SLP  - DVT prophylaxis  - Skin- Turn q2h, check skin daily  - Continue current medications; patient medically stable.   -Active issues-   - Patient is stable to continue current rehabilitation program  - appreciate Cardiology recommendations, discussed with hospitalist team, likely for more conservative BP parameters  - 11/18 CXR possible infiltrate, started on ceftin for UA  -

## 2023-11-19 NOTE — PROGRESS NOTE ADULT - SUBJECTIVE AND OBJECTIVE BOX
Cc: Gait dysfunction    HPI: Patient with no new medical issues overnight.  Pain controlled, no chest pain, no N/V, no Fevers/Chills. No other new ROS  Has been tolerating rehabilitation program.    acetaminophen     Tablet .. 650 milliGRAM(s) Oral every 6 hours PRN  aluminum hydroxide/magnesium hydroxide/simethicone Suspension 30 milliLiter(s) Oral every 4 hours PRN  AQUAPHOR (petrolatum Ointment) 1 Application(s) Topical daily  artificial tears (preservative free) Ophthalmic Solution 1 Drop(s) Both EYES three times a day  atorvastatin 40 milliGRAM(s) Oral at bedtime  benzonatate 100 milliGRAM(s) Oral every 8 hours PRN  cefuroxime   Tablet 500 milliGRAM(s) Oral every 12 hours  dexAMETHasone     Tablet 2 milliGRAM(s) Oral two times a day  dextrose 5%. 1000 milliLiter(s) IV Continuous <Continuous>  dextrose 5%. 1000 milliLiter(s) IV Continuous <Continuous>  dextrose 50% Injectable 25 Gram(s) IV Push once  dextrose 50% Injectable 12.5 Gram(s) IV Push once  dextrose 50% Injectable 25 Gram(s) IV Push once  dextrose Oral Gel 15 Gram(s) Oral once PRN  divalproex  milliGRAM(s) Oral every 12 hours  enoxaparin Injectable 40 milliGRAM(s) SubCutaneous every 24 hours  glucagon  Injectable 1 milliGRAM(s) IntraMuscular once  insulin lispro (ADMELOG) corrective regimen sliding scale   SubCutaneous three times a day before meals  melatonin 3 milliGRAM(s) Oral at bedtime PRN  metFORMIN 500 milliGRAM(s) Oral two times a day  metoprolol tartrate 12.5 milliGRAM(s) Oral two times a day  pantoprazole    Tablet 40 milliGRAM(s) Oral before breakfast  polyethylene glycol 3350 17 Gram(s) Oral daily  senna 2 Tablet(s) Oral at bedtime PRN  sodium chloride 0.9%. 1000 milliLiter(s) IV Continuous <Continuous>      T(C): 36.3 (11-19-23 @ 08:10), Max: 36.5 (11-18-23 @ 19:54)  HR: 104 (11-19-23 @ 08:10) (91 - 121)  BP: 95/62 (11-19-23 @ 08:10) (87/57 - 117/85)  RR: 16 (11-19-23 @ 08:10) (15 - 16)  SpO2: 97% (11-19-23 @ 08:10) (96% - 100%)    In NAD  HEENT- EOMI  Heart- RRR, S1S2  Lungs- CTA bl.  Abd- + BS, NT  Ext- No calf pain  Neuro- Exam unchanged

## 2023-11-19 NOTE — PROGRESS NOTE ADULT - SUBJECTIVE AND OBJECTIVE BOX
Medicine Progress Note    Patient is a 52y old  Female who presents with a chief complaint of Malignant neoplasm with metastasis to brain (18 Nov 2023 14:41)      SUBJECTIVE / OVERNIGHT EVENTS:  seen and examined  Chart reviewed  No overnight events  Limb weakness improving with therapy  BM+  No pain  No complaints    ADDITIONAL REVIEW OF SYSTEMS:  denied fever/chills/CP/SOB/cough/palpitation/dizziness/abdominal pian/nausea/vomiting/diarrhoea/constipation/dysuria/leg or calf pain/headaches.all other ROS neg    MEDICATIONS  (STANDING):  AQUAPHOR (petrolatum Ointment) 1 Application(s) Topical daily  artificial tears (preservative free) Ophthalmic Solution 1 Drop(s) Both EYES three times a day  atorvastatin 40 milliGRAM(s) Oral at bedtime  cefuroxime   Tablet 500 milliGRAM(s) Oral every 12 hours  dexAMETHasone     Tablet 2 milliGRAM(s) Oral two times a day  dextrose 5%. 1000 milliLiter(s) (50 mL/Hr) IV Continuous <Continuous>  dextrose 5%. 1000 milliLiter(s) (100 mL/Hr) IV Continuous <Continuous>  dextrose 50% Injectable 25 Gram(s) IV Push once  dextrose 50% Injectable 12.5 Gram(s) IV Push once  dextrose 50% Injectable 25 Gram(s) IV Push once  divalproex  milliGRAM(s) Oral every 12 hours  enoxaparin Injectable 40 milliGRAM(s) SubCutaneous every 24 hours  glucagon  Injectable 1 milliGRAM(s) IntraMuscular once  insulin lispro (ADMELOG) corrective regimen sliding scale   SubCutaneous three times a day before meals  metFORMIN 500 milliGRAM(s) Oral two times a day  metoprolol tartrate 12.5 milliGRAM(s) Oral two times a day  pantoprazole    Tablet 40 milliGRAM(s) Oral before breakfast  polyethylene glycol 3350 17 Gram(s) Oral daily  sodium chloride 0.9%. 1000 milliLiter(s) (50 mL/Hr) IV Continuous <Continuous>    MEDICATIONS  (PRN):  acetaminophen     Tablet .. 650 milliGRAM(s) Oral every 6 hours PRN Temp greater or equal to 38C (100.4F), Mild Pain (1 - 3)  aluminum hydroxide/magnesium hydroxide/simethicone Suspension 30 milliLiter(s) Oral every 4 hours PRN Dyspepsia  benzonatate 100 milliGRAM(s) Oral every 8 hours PRN Cough  dextrose Oral Gel 15 Gram(s) Oral once PRN Blood Glucose LESS THAN 70 milliGRAM(s)/deciliter  melatonin 3 milliGRAM(s) Oral at bedtime PRN Insomnia  senna 2 Tablet(s) Oral at bedtime PRN Constipation    CAPILLARY BLOOD GLUCOSE      POCT Blood Glucose.: 104 mg/dL (19 Nov 2023 08:05)  POCT Blood Glucose.: 103 mg/dL (18 Nov 2023 21:14)  POCT Blood Glucose.: 107 mg/dL (18 Nov 2023 17:25)    I&O's Summary      PHYSICAL EXAM:  Vital Signs Last 24 Hrs  T(C): 36.3 (19 Nov 2023 08:10), Max: 36.5 (18 Nov 2023 19:54)  T(F): 97.4 (19 Nov 2023 08:10), Max: 97.7 (18 Nov 2023 19:54)  HR: 104 (19 Nov 2023 08:10) (91 - 121)  BP: 95/62 (19 Nov 2023 08:10) (87/57 - 117/85)  BP(mean): --  RR: 16 (19 Nov 2023 08:10) (15 - 16)  SpO2: 97% (19 Nov 2023 08:10) (96% - 100%)    Parameters below as of 19 Nov 2023 08:10  Patient On (Oxygen Delivery Method): room air    GENERAL: Not in distress. Alert    HEENT: clear conjuctiva, MM dry. no pallor or icterus  CARDIOVASCULAR: RRR S1, S2. No murmur/rubs/gallop. tachycardia  LUNGS: BLAE+, no rales, no wheezing, no rhonchi.    ABDOMEN: ND. Soft,  NT, no guarding / rebound / rigidity. BS normoactive  BACK: No spine tenderness.  EXTREMITIES: no edema. no leg or calf TP.  SKIN: warm and dry. craniotomy wound healing well  PSYCHIATRIC: Calm.  No agitation.  CNS: AAO*3. moves limbs, follows commands    LABS:                    COVID-19 PCR: NotDetec (10 Nov 2023 11:00)  SARS-CoV-2: NotDetec (03 Nov 2023 15:09)  SARS-CoV-2: NotDetec (19 Sep 2023 16:42)  SARS-CoV-2: NotDetec (04 Sep 2023 04:07)      RADIOLOGY & ADDITIONAL TESTS:  Imaging from Last 24 Hours:    < from: Xray Chest 1 View- PORTABLE-Urgent (Xray Chest 1 View- PORTABLE-Urgent .) (11.18.23 @ 08:38) >    IMPRESSION:    Right upper/apical lung mass.    See recent CT chest report for full findings.    < end of copied text >    < from: 12 Lead ECG (11.18.23 @ 11:46) >    Diagnosis Line Sinus tachycardia      When compared with ECG of 15-NOV-2023 10:05,  No significant change was found  Confirmed by KIRILL LUNA MD ([id number]) on 11/19/2023 9:32:44 AM    < end of copied text >      Electrocardiogram/QTc Interval:    COORDINATION OF CARE:  Care Discussed with Consultants/Other Providers:

## 2023-11-20 NOTE — PROGRESS NOTE ADULT - SUBJECTIVE AND OBJECTIVE BOX
Medicine Progress Note    Patient is a 52y old  Female who presents with a chief complaint of Malignant neoplasm with metastasis to brain (20 Nov 2023 08:06)      SUBJECTIVE / OVERNIGHT EVENTS:  seen and examined  Chart reviewed  No overnight events  Limb weakness improving with therapy  BM+  No pain  No complaints    ADDITIONAL REVIEW OF SYSTEMS:  denied fever/chills/CP/SOB/cough/palpitation/dizziness/abdominal pian/nausea/vomiting/diarrhoea/constipation/dysuria/leg or calf pain/headaches.all other ROS neg    MEDICATIONS  (STANDING):  AQUAPHOR (petrolatum Ointment) 1 Application(s) Topical daily  artificial tears (preservative free) Ophthalmic Solution 1 Drop(s) Both EYES three times a day  atorvastatin 40 milliGRAM(s) Oral at bedtime  cefuroxime   Tablet 500 milliGRAM(s) Oral every 12 hours  dexAMETHasone     Tablet 2 milliGRAM(s) Oral two times a day  dextrose 5%. 1000 milliLiter(s) (100 mL/Hr) IV Continuous <Continuous>  dextrose 5%. 1000 milliLiter(s) (50 mL/Hr) IV Continuous <Continuous>  dextrose 50% Injectable 12.5 Gram(s) IV Push once  dextrose 50% Injectable 25 Gram(s) IV Push once  dextrose 50% Injectable 25 Gram(s) IV Push once  divalproex  milliGRAM(s) Oral every 12 hours  enoxaparin Injectable 40 milliGRAM(s) SubCutaneous every 24 hours  glucagon  Injectable 1 milliGRAM(s) IntraMuscular once  insulin lispro (ADMELOG) corrective regimen sliding scale   SubCutaneous three times a day before meals  metFORMIN 500 milliGRAM(s) Oral two times a day  metoprolol tartrate 12.5 milliGRAM(s) Oral two times a day  pantoprazole    Tablet 40 milliGRAM(s) Oral before breakfast  polyethylene glycol 3350 17 Gram(s) Oral daily    MEDICATIONS  (PRN):  acetaminophen     Tablet .. 650 milliGRAM(s) Oral every 6 hours PRN Temp greater or equal to 38C (100.4F), Mild Pain (1 - 3)  aluminum hydroxide/magnesium hydroxide/simethicone Suspension 30 milliLiter(s) Oral every 4 hours PRN Dyspepsia  benzonatate 100 milliGRAM(s) Oral every 8 hours PRN Cough  dextrose Oral Gel 15 Gram(s) Oral once PRN Blood Glucose LESS THAN 70 milliGRAM(s)/deciliter  melatonin 3 milliGRAM(s) Oral at bedtime PRN Insomnia  senna 2 Tablet(s) Oral at bedtime PRN Constipation    CAPILLARY BLOOD GLUCOSE      POCT Blood Glucose.: 95 mg/dL (20 Nov 2023 07:53)  POCT Blood Glucose.: 97 mg/dL (19 Nov 2023 17:03)    I&O's Summary      PHYSICAL EXAM:  Vital Signs Last 24 Hrs  T(C): 37 (20 Nov 2023 07:57), Max: 37 (20 Nov 2023 07:57)  T(F): 98.6 (20 Nov 2023 07:57), Max: 98.6 (20 Nov 2023 07:57)  HR: 97 (20 Nov 2023 07:57) (94 - 99)  BP: 94/64 (20 Nov 2023 07:57) (94/64 - 101/70)  BP(mean): --  RR: 16 (20 Nov 2023 07:57) (16 - 16)  SpO2: 95% (20 Nov 2023 07:57) (95% - 98%)    Parameters below as of 20 Nov 2023 07:57  Patient On (Oxygen Delivery Method): room air    GENERAL: Not in distress. Alert    HEENT: clear conjuctiva, MMM. no pallor or icterus  CARDIOVASCULAR: RRR S1, S2. No murmur/rubs/gallop  LUNGS: BLAE+, no rales, no wheezing, no rhonchi.    ABDOMEN: ND. Soft,  NT, no guarding / rebound / rigidity. BS normoactive  BACK: No spine tenderness.  EXTREMITIES: no edema. no leg or calf TP.  SKIN: warm and dry  PSYCHIATRIC: Calm.  No agitation.  CNS: AAO. moves limbs, follows commands    LABS:                        9.2    3.82  )-----------( 182      ( 20 Nov 2023 05:53 )             29.9     11-20    140  |  107  |  1<L>  ----------------------------<  97  4.0   |  23  |  0.40<L>    Ca    9.4      20 Nov 2023 05:53    TPro  6.5  /  Alb  1.8<L>  /  TBili  0.1<L>  /  DBili  x   /  AST  101<H>  /  ALT  25  /  AlkPhos  145<H>  11-20          Urinalysis Basic - ( 20 Nov 2023 05:53 )    Color: x / Appearance: x / SG: x / pH: x  Gluc: 97 mg/dL / Ketone: x  / Bili: x / Urobili: x   Blood: x / Protein: x / Nitrite: x   Leuk Esterase: x / RBC: x / WBC x   Sq Epi: x / Non Sq Epi: x / Bacteria: x        Culture - Urine (collected 18 Nov 2023 17:43)  Source: Clean Catch Clean Catch (Midstream)  Preliminary Report (20 Nov 2023 08:23):    50,000 - 99,000 CFU/mL Enterococcus species      COVID-19 PCR: NotDetec (10 Nov 2023 11:00)  SARS-CoV-2: NotDetec (03 Nov 2023 15:09)  SARS-CoV-2: NotDetec (19 Sep 2023 16:42)  SARS-CoV-2: NotDetec (04 Sep 2023 04:07)      RADIOLOGY & ADDITIONAL TESTS:  Imaging from Last 24 Hours:    Electrocardiogram/QTc Interval:    COORDINATION OF CARE:  Care Discussed with Consultants/Other Providers:

## 2023-11-20 NOTE — PROGRESS NOTE ADULT - SUBJECTIVE AND OBJECTIVE BOX
HARJEET WHITMORE, 52y Female  MRN: [id number]  ATTENDING: Christopher Lucia    HPI:  52 year old female with PMH of GERD, S4 lung cancer with mets to brain (s/p 4 cycles of chemo and thoracic RT- completed 10/2022), steroid induced Diabetes Mellitus, gamma knife surgery to 7 brain mets, and s/p temporal crani/resection of metastatic lesion; who presented to [facility] on 10/31 with BL UE and BL LE weakness and abdominal pain for 2 weeks concerning for progressive metastatic disease.  Overall imaging was significant for metastatic disease in the brain, lung, liver and abdomen.  NSGY deemed no surgical intervention for worsening brain mets.  Heme/Onc suggested 1 dose of C1D1 Carboplatin and Paclitaxel. She also received Bevacizumab during her admission. Her hospital course was complicated by hyponatremia (likely 2/2 SIADH given lung CA), and fever.  She will continue chemotherapy/radiation treatment in the outpatient setting with Dr. Beckman.  PM&R was consulted and deemed her an appropriate candidate for IRF. She was medically stabilized and cleared for discharge to [facility]. Oncology consulted for LLE DVT and h/o lung cancer.     MEDICATIONS:  acetaminophen     Tablet .. 650 milliGRAM(s) Oral every 6 hours PRN  aluminum hydroxide/magnesium hydroxide/simethicone Suspension 30 milliLiter(s) Oral every 4 hours PRN  AQUAPHOR (petrolatum Ointment) 1 Application(s) Topical daily  artificial tears (preservative free) Ophthalmic Solution 1 Drop(s) Both EYES three times a day  atorvastatin 40 milliGRAM(s) Oral at bedtime  benzonatate 100 milliGRAM(s) Oral every 8 hours PRN  cefuroxime   Tablet 500 milliGRAM(s) Oral every 12 hours  dexAMETHasone     Tablet 2 milliGRAM(s) Oral two times a day  dextrose 5%. 1000 milliLiter(s) IV Continuous <Continuous>  dextrose 5%. 1000 milliLiter(s) IV Continuous <Continuous>  dextrose 50% Injectable 12.5 Gram(s) IV Push once  dextrose 50% Injectable 25 Gram(s) IV Push once  dextrose 50% Injectable 25 Gram(s) IV Push once  dextrose Oral Gel 15 Gram(s) Oral once PRN  divalproex  milliGRAM(s) Oral every 12 hours  enoxaparin Injectable 40 milliGRAM(s) SubCutaneous every 24 hours  glucagon  Injectable 1 milliGRAM(s) IntraMuscular once  insulin lispro (ADMELOG) corrective regimen sliding scale   SubCutaneous three times a day before meals  melatonin 3 milliGRAM(s) Oral at bedtime PRN  metFORMIN 500 milliGRAM(s) Oral two times a day  metoprolol tartrate 12.5 milliGRAM(s) Oral two times a day  pantoprazole    Tablet 40 milliGRAM(s) Oral before breakfast  polyethylene glycol 3350 17 Gram(s) Oral daily  senna 2 Tablet(s) Oral at bedtime PRN    All other medications reviewed.    SUBJECTIVE:  Somnolent, arousable.  Weak, but denies pain.    VITALS:  T(C): 37 (11-20-23 @ 07:57), Max: 37 (11-20-23 @ 07:57)  T(F): 98.6 (11-20-23 @ 07:57), Max: 98.6 (11-20-23 @ 07:57)  HR: 97 (11-20-23 @ 07:57) (94 - 104)  BP: 94/64 (11-20-23 @ 07:57) (94/64 - 101/70)      PHYSICAL EXAM:  Constitutional: alert, well developed  HEENT: normocephalic, anicteric sclerae, no mucositis or thrush  Respiratory: bilateral clear to auscultation anteriorly  Cardiovascular : S1, S2 regular, rhythmic, no murmurs, gallops or rubs  Abdomen: soft, distended, + normoactive BS, no palpable HS- megaly  Extremities: no tenderness;  -c/c/e, pulses equal bilaterally    LABS:  (11-20) WBC: 3.82 K/uL,Hemoglobin: 9.2 g/dL, Hematocrit: 29.9 %,    Platelet: 182 K/uL        RADIOLOGY:

## 2023-11-20 NOTE — DISCHARGE NOTE NURSING/CASE MANAGEMENT/SOCIAL WORK - NSDCPEFALRISK_GEN_ALL_CORE
For information on Fall & Injury Prevention, visit: [email or web address] OR  [email or web address] OR  https://www.cdc.gov/steadi/patient.html

## 2023-11-20 NOTE — PATIENT PROFILE ADULT - FUNCTIONAL ASSESSMENT - DAILY ACTIVITY ASSESSMENT TYPE
Comment: Patient has not followed up with genetic testing at  ro referral to HHT center at [facility].  After discussion, patient prefers referral to  for genetic testing/eval \\n\\nFrom previous visit:Clinically c/w and concerning for Hereditary Hemorrhagic\\nTelangiectasias given history and clinical findings. Will obtain recent hospital records and discuss with\\nhematology. Will refer to  for genetic testing Detail Level: Simple Render Risk Assessment In Note?: no Admission

## 2023-11-20 NOTE — PROGRESS NOTE ADULT - SUBJECTIVE AND OBJECTIVE BOX
Subjective  Seen and examined,   Patient was argumentative with SLP during an attempt at AM session,hence this was rescheduled to a later time  She reports being unhappy with blood draw this AM  Reports not being clear with her DC plan, despite my detailed discussion with her and her sister Ms Angelina Hall last Friday 11/17  I reaffirmed the plan, which is for DC tomorrow, she will go for her oncology appointment with Dr Beckman on d/c and sister will meet her at the oncologist's office    She was commenced on IVF yesterday for rehydration and she is completing a  3 day course of ceftin for UTI today  She reports no chest pain/palpitation and she is aware of plan for f/u with PCP and cardiology for Tachycardia, for which she is on low dose b blocker     She reports an uneventful night  Tolerating oral diet, admits to improvement of oral intake    ROS--No acute pain, no head ache, N/V. LBM 11/19    Therapy--Sustained improvement with ambulation and muscle strengthening  however knee still buckling during therapy  Due family training today     Due repeat LE venous duplex for Left soleal DVT serial monitoring   ICU Vital Signs Last 24 Hrs  T(C): 37 (20 Nov 2023 07:57), Max: 37 (20 Nov 2023 07:57)  T(F): 98.6 (20 Nov 2023 07:57), Max: 98.6 (20 Nov 2023 07:57)  HR: 97 (20 Nov 2023 07:57) (94 - 99)  BP: 94/64 (20 Nov 2023 07:57) (94/64 - 101/70)  RR: 16 (20 Nov 2023 07:57) (16 - 16)  SpO2: 95% (20 Nov 2023 07:57) (95% - 98%)  O2 Parameters below as of 20 Nov 2023 07:57  Patient On (Oxygen Delivery Method): room air      EXAM  Gen - Argumentative and dissmissive to therapist  HEENT - NAD  Neck - Supple,  Pulm -clear   Cardiovascular - HS I and I,    Abdomen - Soft, non tender  Extremities - No Cyanosis, no clubbing, no edema, no calf tenderness    Neuro  Alert and fully oriented,   Normal speech      Cranial Nerves - CN 2-12 intact     Motor -                     LEFT    UE -  5/5                    RIGHT UE - 4/5                    LEFT    LE -  5/5                    RIGHT LE - 4/5        Sensory - Intact  to LT bilaterally     Tone - normal  MSK: Right sided weakness, mainly right leg, chronic  Psychiatric - Mood stable, Affect WNL  Skin:  Right craniotomy scar, unremarkable      RECENT LABS/IMAGING                        9.2    3.82  )-----------( 182      ( 20 Nov 2023 05:53 )             29.9     11-20    140  |  107  |  1<L>  ----------------------------<  97  4.0   |  23  |  0.40<L>    Ca    9.4      20 Nov 2023 05:53    TPro  6.5  /  Alb  1.8<L>  /  TBili  0.1<L>  /  DBili  x   /  AST  101<H>  /  ALT  25  /  AlkPhos  145<H>  11-20      Urinalysis Basic - ( 20 Nov 2023 05:53 )    Color: x / Appearance: x / SG: x / pH: x  Gluc: 97 mg/dL / Ketone: x  / Bili: x / Urobili: x   Blood: x / Protein: x / Nitrite: x   Leuk Esterase: x / RBC: x / WBC x   Sq Epi: x / Non Sq Epi: x / Bacteria: x      MEDICATIONS  (STANDING):  AQUAPHOR (petrolatum Ointment) 1 Application(s) Topical daily  artificial tears (preservative free) Ophthalmic Solution 1 Drop(s) Both EYES three times a day  atorvastatin 40 milliGRAM(s) Oral at bedtime  cefuroxime   Tablet 500 milliGRAM(s) Oral every 12 hours  dexAMETHasone     Tablet 2 milliGRAM(s) Oral two times a day  dextrose 5%. 1000 milliLiter(s) (50 mL/Hr) IV Continuous <Continuous>  dextrose 5%. 1000 milliLiter(s) (100 mL/Hr) IV Continuous <Continuous>  dextrose 50% Injectable 12.5 Gram(s) IV Push once  dextrose 50% Injectable 25 Gram(s) IV Push once  dextrose 50% Injectable 25 Gram(s) IV Push once  divalproex  milliGRAM(s) Oral every 12 hours  enoxaparin Injectable 40 milliGRAM(s) SubCutaneous every 24 hours  glucagon  Injectable 1 milliGRAM(s) IntraMuscular once  insulin lispro (ADMELOG) corrective regimen sliding scale   SubCutaneous three times a day before meals  metFORMIN 500 milliGRAM(s) Oral two times a day  metoprolol tartrate 12.5 milliGRAM(s) Oral two times a day  pantoprazole    Tablet 40 milliGRAM(s) Oral before breakfast  polyethylene glycol 3350 17 Gram(s) Oral daily    MEDICATIONS  (PRN):  acetaminophen     Tablet .. 650 milliGRAM(s) Oral every 6 hours PRN Temp greater or equal to 38C (100.4F), Mild Pain (1 - 3)  aluminum hydroxide/magnesium hydroxide/simethicone Suspension 30 milliLiter(s) Oral every 4 hours PRN Dyspepsia  benzonatate 100 milliGRAM(s) Oral every 8 hours PRN Cough  dextrose Oral Gel 15 Gram(s) Oral once PRN Blood Glucose LESS THAN 70 milliGRAM(s)/deciliter  melatonin 3 milliGRAM(s) Oral at bedtime PRN Insomnia  senna 2 Tablet(s) Oral at bedtime PRN Constipation

## 2023-11-20 NOTE — PROGRESS NOTE ADULT - ASSESSMENT
52F with metastatic lung cancer to brain/liver on ongoing chemo/RT, steroid-induced DM, now in acute rehab after being hospitalized for progressive weakness    #Sinus tachycardia: asymptomatic  - HR in the 130's during therapy  - ECG (11/18/23): sinus tachycardia @114 bpm, right atrial enlargement  - ECG (11/15/23): sinus tachycardia, right atrial enlargement  - ECG (11/13/23): sinus tachycardia, right atrial enlargement  - TTE on 11/15 limited study. 2. The heart rate is tachycardic 120s BPM throughout the study. Hyperdynamic global left ventricular systolic function. Left ventricular ejection fraction, by visual estimation, is >75%.  - echocardiogram (8/2023) reported as normal LVEF 55-60%.   - Leg US indicates left leg DVT.  - CTAP: lung mass and metastatic nodules.  - UA: mild UTI  - CXR: left atelectasis. right apical mass. no infiltrate/effusion/pneumo  - per rehab team: patient was parking food, and complaints cough  - started on Ceftin 500 mg BID for 3 days for UTI. last dose today  - UC: 50,000 - 99,000 CFU/mL Enterococcus species  - continue lopressor 12.5mg po BID  - encourage po intake,   - s/p Trial of NS @50cc/h, 1 liter only as BP soft and looks dry  - monitor orthostatic VS and adjust meds periodically  - cardio f/u noted. patient needs to follow up with her primary MD in 1 week and cardiology in 2 weeks. patient is aware. please information about Dr. Sandoval or Dr. khan.   - educated about postural hypotension and fall prevention.     #bicytopenia likely due to chemotherapy, immunotherapy.  - Last dose: Taxol/Carboplatin 11/4/23 and bevacizumab 11/5/23  - heme/onc reccs appreciated --repeat CBC w diff 11/20 stable and outpatient follow up on Tuesday 11/21 Dr. Beckman  - blood cultures negative to date since 11/16  - CXR unremarkable  - UA: mild UTI. on Ceftin. UC neg today. DC ceftin after night time dose  - Hb remains stable in 9's, repeat 11/21    #LLE DVT   - distal DVT +soleal vein of left leg  - discussed with PM&R, and heme/onc reccs appreciated, will repeat venous duplex u/s ordered today 11/20 per heme . holding off on full AC. patient will see Heme tomorrow.     #Metastatic lung cancer to brain/liver  #Progressive weakness due to above  #Abdominal pain likely due to above mets (liver mets, abdominal wall subcutaneous nodules, all in recent imaging)  - continue comprehensive rehab program  -c/w Decadron   -seizure prophylaxis: Depakote  -Pain control    #Steroid-induced DM  - A1c 7.7  - POCT at goal. 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

## 2023-11-20 NOTE — CHART NOTE - NSCHARTNOTEFT_GEN_A_CORE
Nutrition Follow Up Note  Source: Medical Record [X] Patient [X] Family [ ]         Diet: Regular, Ensure Plus High Protein (provides 350 kcal, 20 g protein/serving) TID  Pt tolerating diet with variable PO intake, eating % of meals per nursing flow sheets. Diet was liberalized from consistent carb with Glucerna TID to regular w/ Ensure TID by MD. Obtained food preferences to optimize PO intake. Denies nausea, vomiting, diarrhea, constipation. Pt denies chewing/swallowing difficulties. Pt's blood glucose has been well-controlled x 24 hours (POCT glucose )    Enteral/Parenteral Nutrition: N/A    Current Weight: 120.8 lbs (11-8)    Pertinent Medications: MEDICATIONS  (STANDING):  AQUAPHOR (petrolatum Ointment) 1 Application(s) Topical daily  artificial tears (preservative free) Ophthalmic Solution 1 Drop(s) Both EYES three times a day  atorvastatin 40 milliGRAM(s) Oral at bedtime  cefuroxime   Tablet 500 milliGRAM(s) Oral every 12 hours  dexAMETHasone     Tablet 2 milliGRAM(s) Oral two times a day  dextrose 5%. 1000 milliLiter(s) (50 mL/Hr) IV Continuous <Continuous>  dextrose 5%. 1000 milliLiter(s) (100 mL/Hr) IV Continuous <Continuous>  dextrose 50% Injectable 12.5 Gram(s) IV Push once  dextrose 50% Injectable 25 Gram(s) IV Push once  dextrose 50% Injectable 25 Gram(s) IV Push once  divalproex  milliGRAM(s) Oral every 12 hours  enoxaparin Injectable 40 milliGRAM(s) SubCutaneous every 24 hours  glucagon  Injectable 1 milliGRAM(s) IntraMuscular once  insulin lispro (ADMELOG) corrective regimen sliding scale   SubCutaneous three times a day before meals  metFORMIN 500 milliGRAM(s) Oral two times a day  metoprolol tartrate 12.5 milliGRAM(s) Oral two times a day  pantoprazole    Tablet 40 milliGRAM(s) Oral before breakfast  polyethylene glycol 3350 17 Gram(s) Oral daily    MEDICATIONS  (PRN):  acetaminophen     Tablet .. 650 milliGRAM(s) Oral every 6 hours PRN Temp greater or equal to 38C (100.4F), Mild Pain (1 - 3)  aluminum hydroxide/magnesium hydroxide/simethicone Suspension 30 milliLiter(s) Oral every 4 hours PRN Dyspepsia  benzonatate 100 milliGRAM(s) Oral every 8 hours PRN Cough  dextrose Oral Gel 15 Gram(s) Oral once PRN Blood Glucose LESS THAN 70 milliGRAM(s)/deciliter  melatonin 3 milliGRAM(s) Oral at bedtime PRN Insomnia  senna 2 Tablet(s) Oral at bedtime PRN Constipation      Pertinent Labs:  11-20 Na140 mmol/L Glu 97 mg/dL K+ 4.0 mmol/L Cr  0.40 mg/dL<L> BUN 1 mg/dL<L> 11-15 Phos 2.2 mg/dL<L> 11-20 Alb 1.8 g/dL<L>        Skin: Skin intact per nursing flow sheets     Edema: No edema per nursing flow sheets     Last BM: on 11-19 Per nursing flowsheets     Estimated Needs:   [X] No Change since Previous Assessment  [ ] Recalculated:     Previous Nutrition Diagnosis:   1. Severe chronic malnutrition  2. Altered nutrition related lab values    Nutrition Diagnosis is [X] Ongoing  - addressed with liberalized diet, Ensure Plus High Protein (provides 350 kcal, 20 g protein/serving) TID    New Nutrition Diagnosis: [X] Not Applicable  [ ] Inadequate Protein Energy Intake   [ ] Inadequate Oral Intake   [ ] Excessive Energy Intake   [ ] Increased Nutrient Needs   [ ] Obesity   [ ] Altered GI Function   [ ] Unintended Weight Loss   [ ] Food & Nutrition Related Knowledge Deficit  [ ] Limited Adherence to nutrition related recommendations   [ ] Malnutrition      Interventions:   1. Recommend    Monitoring & Evaluation:   [X] Weights   [X] PO Intake   [X] Follow Up (Per Protocol)  [X] Tolerance to Diet Prescription   [X] Other: Labs & PCOT    RD Remains Available.  Casie Flores RD

## 2023-11-20 NOTE — CHART NOTE - NSCHARTNOTESELECT_GEN_ALL_CORE
Discussion of Left Soleal DVT and Follow up review/Event Note
Nutrition Services
IPOC/Event Note
Nutrition Services

## 2023-11-20 NOTE — DISCHARGE NOTE NURSING/CASE MANAGEMENT/SOCIAL WORK - NSDCFUADDAPPT_GEN_ALL_CORE_FT
Please follow up with your PCP as soon as possible.  MD: Dr. Margarito Beckman/ PCP/ Oncologist   Phone: [phone]     If you are in need of a PCP or a specialist in your area: contact the [facility] Physician Referral Service at [phone].

## 2023-11-20 NOTE — PROGRESS NOTE ADULT - ASSESSMENT
Assessment/Plan:  HARJEET WHITMORE is a 52 year old female with PMH of GERD, S4 lung cancer with mets to brain (s/p 4 cycles of chemo and thoracic RT- completed 10/2022), steroid induced Diabetes Mellitus, gamma knife surgery to 7 brain mets, and s/p temporal crani/resection of metastatic lesion; who presented to [facility] on 10/31 with BL UE and BL LE weakness and abdominal pain for 2 weeks concerning for progressive metastatic disease.  Overall imaging was significant for metastatic disease in the brain, lung, liver and abdomen.  NSGY deemed no surgical intervention for worsening brain mets.  Heme/Onc suggested 1 dose of C1D1 Carboplatin and Paclitaxel. She also received Bevacizumab during her admission. Her hospital course was complicated by hyponatremia (likely 2/2 SIADH given lung CA), and fever (negative workup). She will continue chemotherapy/radiation treatment in the outpatient setting with Dr. Beckman. Patient now admitted for a multidisciplinary rehab program.   -------------    *Complete Ceftin for UTI today  * Hospitalist to make recs on when to dc IVF  * B/.L LE venous duplex t LE duplex monitoring of LLE DVT   * Asymptomatic tachycardia continue low dose b blocker  * D/C tomorrow 11/21 to oncology appointment and sister will pick her from there    #Malignant neoplasm with metastasis to brain//liver/abdomen  - Gait Instability, ADL impairments and Functional impairments: start Comprehensive Rehab Program of PT/OT/SLP  - 3 hours a day, 5 days a week  - continue Dexamethasone 2mg BID  - Resume chemo/radiation therapy in outpatient setting  - Follow up with Dr. Beckman     #Seizure Prophylaxis  - contnue Depakote 500mg BID    * Cough--monitor, declined antitussive meds, resolved     # Tachycardia   Echo 11/15--EF >75, Sinus Tachycardia    Left soleal DVT---11/13   CT chest no PE  --Elevated, downtrending trop, attributed to demand ischemia  Serial weekly venous duplex as recs by hematology due to increased risk of bleeding   Hematology has notified patient private heme Dr Beckman  F/u with PCP within 1 wk and with cardiology Dr Morales within 2 wks    *Complete Ceftin for UTI today      * Dehydration 11/19--- Hospitalist to make recs on when to dc IVF       #Steroid Induced Diabetes Mellitus  - A1c: 7.7 (11/2023)  - ISS  - FS AC&HS  - Metformin 500mg BID     #HLD  - continue Atorvastatin 40mg at HS    #Hyponatremia--monitor   - Likely secondary to lung CA    #Sleep  - Melatonin 3mg PRN     #Skin  - Skin -- Right crani incision ,healed     #Pain Mgmt   - Tylenol PRN    #GI/Bowel Mgmt   - Continue Pantoprazole - Miralax - Senna - Maalox  PRN (will switch to standing if symptoms persist)    #/Bladder Mgmt --voiding     #FEN   - Diet - Regular + Thins  [CCHO]    - Dysphagia  SLP - evaluation and treatment  --Poor oral intake--F/u Nutritionist     #Health Maintenance  - Ocular lubricant gtts TID     #Precautions / PROPHYLAXIS:   - Falls, Seizure   - ortho: Weight bearing status: WBAT   - Lungs: Aspiration, Incentive Spirometer   - DVT: Lovenox     * L;abs 11/20---Stable anemia, normal renal function and elevated Alk phos,       11/16--Liaison with Hospitalist and Hemetology for management of LLE DVT   11/17-- I d/w Ms Lauren at patient's oncologists office, regarding appointment for f/u. She stated that patient is booked for f/u in person on 11/21 at 1pm, details as stated  I informed her that patient will be d/doug prior to this appointment and her family member will be at the oncology office to pick her after her oncology visit    Liaison with family  11/17--I confirmed details of family contact  with patient, she stated that Mr Angelina Hall ph  will be picking her on dc and she will be living with her  I called and d/w Mr Alfaro, she confirmed details as stated. but stated that she is unable to patient to the oncology appointment, and back to [facility] However she asked for patient to be taken to the oncology appointment and she would pick her from ther  I confirmed with SW team who agreed facilitate this  She also asked for family training due 11/2O to be done via video. I have asked therapist to facilitate this       IDT 11/14  Lives with Friend, plans to live in a friend's house post distance  Could also move in to sister's house on DC  SLP--Reg diet with thin liquids, mod cog impairment, deficits with recalL  Function  Min assist with upper body dressing and mod assist with Lower body dressing, max assist with toileting  Ambulating 10ft with WC Follow through max assistance   Goals--Ambulating functional distance with device, min assist with ADLs  Barriers--poor endurance, fatigue, low motivation   Ext dc 11/21 to oncologist office [address] [place] [address] and cousin Ms Angelina Hall meeting patient there for  to home     --------------------------------------------  OUTPATIENT/FOLLOW UP:    Margarito Beckman  Medical Oncology  [address] [place] [id number]  Phone: [phone]  Fax: [phone]  Established Patient  Follow Up Time: 2 weeks    Zurdo Bernard  Radiation Oncology  [address] [place] [id number]  Phone: [phone]  Fax: [phone]  Established Patient  Follow Up Time: 2 weeks    Cardiology  F/u for Tachycardia   Dr Field Tanmay Bledsoe [facility]  ------------------------------------------    Dr Beckman  Oncology  [address] [place] [address]  11/21 at 1pm    Non critical care  Time spent  63 mins, patient review, liaison with specialty providers, oncology--in house and patient's private oncologist, hospitalist, cardiology, detailed discussion with family Ms Alfaro and discharge planning

## 2023-11-20 NOTE — DISCHARGE NOTE NURSING/CASE MANAGEMENT/SOCIAL WORK - PATIENT PORTAL LINK FT
You can access the FollowMyHealth Patient Portal offered by [facility] by registering at the following website: [email or web address]. By joining [employer]’s FollowMyHealth portal, you will also be able to view your health information using other applications (apps) compatible with our system.

## 2023-11-21 PROBLEM — C77.1 SECONDARY MALIGNANT NEOPLASM OF BRONCHOPULMONARY LYMPH NODES: Status: ACTIVE | Noted: 2022-07-07

## 2023-11-21 PROBLEM — C78.7 METASTATIC ADENOCARCINOMA TO LIVER: Status: ACTIVE | Noted: 2023-01-01

## 2023-11-21 PROBLEM — C78.7 SECONDARY MALIGNANT NEOPLASM OF LIVER AND INTRAHEPATIC BILE DUCT: Status: ACTIVE | Noted: 2023-01-01

## 2023-11-21 NOTE — PROGRESS NOTE ADULT - PROBLEM SELECTOR PROBLEM 1
Non-small cell carcinoma of lung

## 2023-11-21 NOTE — PROGRESS NOTE ADULT - PROBLEM SELECTOR PLAN 1
Advanced NSCLC with recent evidence of POD in lung, brain, liver and abdomen.  Patient under the care of Dr. Beckman at [facility].  Developed left lower extremity soleal vein DVT; hypercoagulability secondary to extensive malignancy, treated with LMWH.  Patient is s/p Taxol/carboplatin/bevacizumab 11/4/2023.  Hematologic profile today stable with WBC at 3.82, hemoglobin 9.2 g/dL and platelet 180,000.  Awaiting repeat venous Doppler prior to discharge today.  Plan to follow-up with Dr. Beckman in office in a.m.
Metastatic NSCLC–patient needs to resume systemic treatment.  Follows up with Dr. Beckman at the [facility] tomorrow.  Hypercoagulability secondary to malignancy–will need indefinite anticoagulation.  Hematologic picture improving post systemic chemotherapy.  Repeat venous Doppler yesterday showed no evidence of DVT in either lower extremity.  Okay from oncologic perspective for discharge today.
Patient with advanced metastatic NSCLC, with POD-lung, brain, liver, abdominal metastases.  Her primary oncologist is Dr. Beckman-at this time, patient plans f/u with him for continued palliative cancer treatment.  Supportive H/O care while on rehab. DVT prophylaxis. Dr. Beckman was notified of patient's admission.  Continue to monitor CBC with diff.   Patient continues with rehab.
Patient with advanced metastatic NSCLC, with POD-lung, brain, liver, abdominal metastases.  Her primary oncologist is Dr. Beckman-at this time, patient plans f/u with him for continued palliative cancer treatment.  Supportive H/O care while on rehab. DVT prophylaxis. Dr. Beckman was notified of patient's admission.  Continue to monitor CBC with diff. (recent chemo).
Patient with advanced metastatic NSCLC, with POD-lung, brain, liver, abdominal metastases.  Her primary oncologist is Dr. Beckman-at this time, patient plans f/u with him for continued palliative cancer treatment.  Supportive H/O care while on rehab. DVT prophylaxis. Dr. Beckman was notified of patient's admission.  Hemoglobin, WBC, platelet count currently acceptable.
Patient with advanced metastatic NSCLC, with POD-lung, brain, liver, abdominal metastases.  Her primary oncologist is Dr. Beckman-at this time, patient plans f/u with him for continued palliative cancer treatment.  Supportive H/O care while on rehab. Left soleal vein DVT-considering bleeding risks with anticoagulation vs. thrombotic risks without anticoagulation in decision making-DVT prophylactic dosing LMWH for now. Repeat LE venous duplex study 1 week.  Leukopenia clinically secondary to chemo (received Taxol/Carboplatin 11/4/23 and bevacizumab 11/5/23). ANC currently acceptable. would repeat CBC with diff. Monday am.  Telephone call to Liss Beckman's nurse and discussed patient's updated H/O situation with her. Dr. Beckman would like to see patient in his office 11/21 afternoon if possible (to evaluate for further cancer treatment). Would repeat he LE venous duplex study Monday as well, prior to disposition plans.
Patient with advanced metastatic NSCLC, with POD-lung, brain, liver, abdominal metastases.  Her primary oncologist is Dr. Beckman-at this time, patient plans f/u with him for continued palliative cancer treatment.  Supportive H/O care while on rehab. Left soleal vein DVT-considering bleeding risks with anticoagulation vs. thrombotic risks without anticoagulation in decision making-DVT prophylactic dosing LMWH for now. Repeat LE venous duplex study 1 week.  Would add WBC differential to next CBC.
Patient with advanced metastatic NSCLC, with POD-lung, brain, liver, abdominal metastases.  Her primary oncologist is Dr. Beckman-at this time, patient plans f/u with him for continued palliative cancer treatment.  Supportive H/O care while on rehab. Left soleal vein DVT-considering bleeding risks with anticoagulation vs. thrombotic risks without anticoagulation in decision making. DVT prophylactic dosing LMWH for now. Repeat LE venous duplex study 1 week.  Hemoglobin, WBC, platelet count currently acceptable.

## 2023-11-21 NOTE — PROGRESS NOTE ADULT - SUBJECTIVE AND OBJECTIVE BOX
Subjective  Seen and examined,   Sleeping prior to review  Report no chest pain or palpitations  Aware of dc plan today    SW reports that a family member will be taking patient to the oncology office and sister will be picking her from there.    She reports an uneventful night  Tolerating oral diet, admits to improvement of oral intake    ROS--No acute pain, no head ache, N/V. LBM 11/20    Therapy--Sustained improvement with ambulation and muscle strengthening    Vital Signs Last 24 Hrs  T(C): 36.5 (21 Nov 2023 09:04), Max: 37 (20 Nov 2023 19:39)  T(F): 97.7 (21 Nov 2023 09:04), Max: 98.6 (20 Nov 2023 19:39)  HR: 113 (21 Nov 2023 09:04) (110 - 115)  BP: 104/71 (21 Nov 2023 09:04) (95/63 - 104/71)  RR: 16 (21 Nov 2023 09:04) (16 - 16)  SpO2: 97% (21 Nov 2023 09:04) (96% - 97%)  O2 Parameters below as of 21 Nov 2023 09:04  Patient On (Oxygen Delivery Method): room air      EXAM  Gen - Co operative   HEENT - NAD  Neck - Supple,  Pulm -clear   Cardiovascular - HS I and I,    Abdomen - Soft, non tender  Extremities - No Cyanosis, no clubbing, no edema, no calf tenderness    Neuro  Alert and fully oriented,   Normal speech      Cranial Nerves - CN 2-12 intact     Motor -                     LEFT    UE -  5/5                    RIGHT UE - 4/5                    LEFT    LE -  5/5                    RIGHT LE - 4/5        Sensory - Intact  to LT bilaterally     Tone - normal  MSK: Right sided weakness, mainly right leg, chronic  Psychiatric - Mood stable, Affect WNL  Skin:  Right craniotomy scar, unremarkable      RECENT LABS/IMAGING                        9.2    3.82  )-----------( 182      ( 20 Nov 2023 05:53 )             29.9     11-20    140  |  107  |  1<L>  ----------------------------<  97  4.0   |  23  |  0.40<L>    Ca    9.4      20 Nov 2023 05:53    TPro  6.5  /  Alb  1.8<L>  /  TBili  0.1<L>  /  DBili  x   /  AST  101<H>  /  ALT  25  /  AlkPhos  145<H>  11-20      Urinalysis Basic - ( 20 Nov 2023 05:53 )    Color: x / Appearance: x / SG: x / pH: x  Gluc: 97 mg/dL / Ketone: x  / Bili: x / Urobili: x   Blood: x / Protein: x / Nitrite: x   Leuk Esterase: x / RBC: x / WBC x   Sq Epi: x / Non Sq Epi: x / Bacteria: x      MEDICATIONS  (STANDING):  AQUAPHOR (petrolatum Ointment) 1 Application(s) Topical daily  artificial tears (preservative free) Ophthalmic Solution 1 Drop(s) Both EYES three times a day  atorvastatin 40 milliGRAM(s) Oral at bedtime  dexAMETHasone     Tablet 2 milliGRAM(s) Oral two times a day  dextrose 5%. 1000 milliLiter(s) (50 mL/Hr) IV Continuous <Continuous>  dextrose 5%. 1000 milliLiter(s) (100 mL/Hr) IV Continuous <Continuous>  dextrose 50% Injectable 25 Gram(s) IV Push once  dextrose 50% Injectable 12.5 Gram(s) IV Push once  dextrose 50% Injectable 25 Gram(s) IV Push once  divalproex  milliGRAM(s) Oral every 12 hours  enoxaparin Injectable 40 milliGRAM(s) SubCutaneous every 24 hours  glucagon  Injectable 1 milliGRAM(s) IntraMuscular once  metFORMIN 500 milliGRAM(s) Oral two times a day  metoprolol tartrate 12.5 milliGRAM(s) Oral two times a day  pantoprazole    Tablet 40 milliGRAM(s) Oral before breakfast  polyethylene glycol 3350 17 Gram(s) Oral daily    MEDICATIONS  (PRN):  acetaminophen     Tablet .. 650 milliGRAM(s) Oral every 6 hours PRN Temp greater or equal to 38C (100.4F), Mild Pain (1 - 3)  aluminum hydroxide/magnesium hydroxide/simethicone Suspension 30 milliLiter(s) Oral every 4 hours PRN Dyspepsia  benzonatate 100 milliGRAM(s) Oral every 8 hours PRN Cough  dextrose Oral Gel 15 Gram(s) Oral once PRN Blood Glucose LESS THAN 70 milliGRAM(s)/deciliter  melatonin 3 milliGRAM(s) Oral at bedtime PRN Insomnia  senna 2 Tablet(s) Oral at bedtime PRN Constipation

## 2023-11-21 NOTE — PROGRESS NOTE ADULT - REASON FOR ADMISSION
Malignant neoplasm with metastasis to brain

## 2023-11-21 NOTE — PROGRESS NOTE ADULT - ASSESSMENT
Assessment/Plan:  HARJEET WHITMORE is a 52 year old female with PMH of GERD, S4 lung cancer with mets to brain (s/p 4 cycles of chemo and thoracic RT- completed 10/2022), steroid induced Diabetes Mellitus, gamma knife surgery to 7 brain mets, and s/p temporal crani/resection of metastatic lesion; who presented to [facility] on 10/31 with BL UE and BL LE weakness and abdominal pain for 2 weeks concerning for progressive metastatic disease.  Overall imaging was significant for metastatic disease in the brain, lung, liver and abdomen.  NSGY deemed no surgical intervention for worsening brain mets.  Heme/Onc suggested 1 dose of C1D1 Carboplatin and Paclitaxel. She also received Bevacizumab during her admission. Her hospital course was complicated by hyponatremia (likely 2/2 SIADH given lung CA), and fever (negative workup). She will continue chemotherapy/radiation treatment in the outpatient setting with Dr. Beckman. Patient now admitted for a multidisciplinary rehab program.   -------------    * DC today, to family member who will be taking her to her oncology appointment today  * LLE DVT resolved  F/u with PCP within 1 wk and with cardiology Dr Morales within 2 wks      #Malignant neoplasm with metastasis to brain//liver/abdomen  - Gait Instability, ADL impairments and Functional impairments: start Comprehensive Rehab Program of PT/OT/SLP  - 3 hours a day, 5 days a week  - continue Dexamethasone 2mg BID  - Resume chemo/radiation therapy in outpatient setting  - Follow up with Dr. Beckman     #Seizure Prophylaxis  - contnue Depakote 500mg BID    * Cough--monitor, declined antitussive meds, resolved     # Tachycardia   Echo 11/15--EF >75, Sinus Tachycardia    Left soleal DVT---11/13   CT chest no PE  --Elevated, downtrending trop, attributed to demand ischemia  Serial weekly venous duplex as recs by hematology due to increased risk of bleeding   Hematology has notified patient private heme Dr Beckman  F/u with PCP within 1 wk and with cardiology Dr Morales within 2 wks    * UTI--s/p treatment    * Dehydration resolved     #Steroid Induced Diabetes Mellitus  - A1c: 7.7 (11/2023)  - ISS  - FS AC&HS  - Metformin 500mg BID     #HLD  - continue Atorvastatin 40mg at HS    #Hyponatremia--monitor   - Likely secondary to lung CA    #Sleep  - Melatonin 3mg PRN     #Skin  - Skin -- Right crani incision ,healed     #Pain Mgmt   - Tylenol PRN    #GI/Bowel Mgmt   - Continue Pantoprazole - Miralax - Senna - Maalox  PRN (will switch to standing if symptoms persist)    #/Bladder Mgmt --voiding     #FEN   - Diet - Regular + Thins  [CCHO]    - Dysphagia  SLP - evaluation and treatment  --Poor oral intake--F/u Nutritionist     #Health Maintenance  - Ocular lubricant gtts TID     #Precautions / PROPHYLAXIS:   - Falls, Seizure   - ortho: Weight bearing status: WBAT   - Lungs: Aspiration, Incentive Spirometer   - DVT: Lovenox     * L;abs 11/20---Stable anemia, normal renal function and elevated Alk phos,     11/16--Liaison with Hospitalist and Hemetology for management of LLE DVT   11/17-- I d/w Ms Lauren at patient's oncologists office, regarding appointment for f/u. She stated that patient is booked for f/u in person on 11/21 at 1pm, details as stated  I informed her that patient will be d/doug prior to this appointment and her family member will be at the oncology office to pick her after her oncology visit    Liaison with family  11/17--I confirmed details of family contact  with patient, she stated that Mr Angelina Hall ph  will be picking her on dc and she will be living with her  I called and d/w Mr Alfaro, she confirmed details as stated. but stated that she is unable to patient to the oncology appointment, and back to [facility] However she asked for patient to be taken to the oncology appointment and she would pick her from ther  I confirmed with SW team who agreed facilitate this  She also asked for family training due 11/2O to be done via video. I have asked therapist to facilitate this       IDT 11/14  Lives with Friend, plans to live in a friend's house post distance  Could also move in to sister's house on DC  SLP--Reg diet with thin liquids, mod cog impairment, deficits with recalL  Function  Min assist with upper body dressing and mod assist with Lower body dressing, max assist with toileting  Ambulating 10ft with WC Follow through max assistance   Goals--Ambulating functional distance with device, min assist with ADLs  Barriers--poor endurance, fatigue, low motivation   Ext dc 11/21 to oncologist office [address] [place] [address], [place] and cousin Ms Angelina Hall meeting patient there for  to home     --------------------------------------------  OUTPATIENT/FOLLOW UP:    Margarito Beckman  Medical Oncology  [address] [place] [id number]  Phone: [phone]  Fax: [phone]  Established Patient  Follow Up Time: 2 weeks    Zurdo Bernard  Radiation Oncology  [address] [place] [id number]  Phone: [phone]  Fax: [phone]  Established Patient  Follow Up Time: 2 weeks    Cardiology  F/u for Tachycardia   Dr Field Tanmay [facility]  ------------------------------------------    Dr Beckman  Oncology  [address] [place] [address]  11/21 at 1pm

## 2023-11-21 NOTE — PROGRESS NOTE ADULT - SUBJECTIVE AND OBJECTIVE BOX
HARJEET WHITMORE, 52y Female  MRN: [id number]  ATTENDING: Christopher Lucia    HPI:  52 year old female with PMH of GERD, S4 lung cancer with mets to brain (s/p 4 cycles of chemo and thoracic RT- completed 10/2022), steroid induced Diabetes Mellitus, gamma knife surgery to 7 brain mets, and s/p temporal crani/resection of metastatic lesion; who presented to [facility] on 10/31 with BL UE and BL LE weakness and abdominal pain for 2 weeks concerning for progressive metastatic disease.  Overall imaging was significant for metastatic disease in the brain, lung, liver and abdomen.  NSGY deemed no surgical intervention for worsening brain mets.  Heme/Onc suggested 1 dose of C1D1 Carboplatin and Paclitaxel. She also received Bevacizumab during her admission. Her hospital course was complicated by hyponatremia (likely 2/2 SIADH given lung CA), and fever.  She will continue chemotherapy/radiation treatment in the outpatient setting with Dr. Beckman.  PM&R was consulted and deemed her an appropriate candidate for IRF. She was medically stabilized and cleared for discharge to [facility]. Oncology consulted for LLE DVT and h/o lung cancer.     MEDICATIONS:  acetaminophen     Tablet .. 650 milliGRAM(s) Oral every 6 hours PRN  aluminum hydroxide/magnesium hydroxide/simethicone Suspension 30 milliLiter(s) Oral every 4 hours PRN  AQUAPHOR (petrolatum Ointment) 1 Application(s) Topical daily  artificial tears (preservative free) Ophthalmic Solution 1 Drop(s) Both EYES three times a day  atorvastatin 40 milliGRAM(s) Oral at bedtime  benzonatate 100 milliGRAM(s) Oral every 8 hours PRN  cefuroxime   Tablet 500 milliGRAM(s) Oral every 12 hours  dexAMETHasone     Tablet 2 milliGRAM(s) Oral two times a day  dextrose 5%. 1000 milliLiter(s) IV Continuous <Continuous>  dextrose 5%. 1000 milliLiter(s) IV Continuous <Continuous>  dextrose 50% Injectable 12.5 Gram(s) IV Push once  dextrose 50% Injectable 25 Gram(s) IV Push once  dextrose 50% Injectable 25 Gram(s) IV Push once  dextrose Oral Gel 15 Gram(s) Oral once PRN  divalproex  milliGRAM(s) Oral every 12 hours  enoxaparin Injectable 40 milliGRAM(s) SubCutaneous every 24 hours  glucagon  Injectable 1 milliGRAM(s) IntraMuscular once  insulin lispro (ADMELOG) corrective regimen sliding scale   SubCutaneous three times a day before meals  melatonin 3 milliGRAM(s) Oral at bedtime PRN  metFORMIN 500 milliGRAM(s) Oral two times a day  metoprolol tartrate 12.5 milliGRAM(s) Oral two times a day  pantoprazole    Tablet 40 milliGRAM(s) Oral before breakfast  polyethylene glycol 3350 17 Gram(s) Oral daily  senna 2 Tablet(s) Oral at bedtime PRN    All other medications reviewed.    SUBJECTIVE:  Awake, weak. Appears non-toxic, denies pain at rest.    VITALS:  T(C): 37 (11-20-23 @ 07:57), Max: 37 (11-20-23 @ 07:57)  T(F): 98.6 (11-20-23 @ 07:57), Max: 98.6 (11-20-23 @ 07:57)  HR: 97 (11-20-23 @ 07:57) (94 - 104)  BP: 94/64 (11-20-23 @ 07:57) (94/64 - 101/70)      PHYSICAL EXAM:  Constitutional: alert, well developed  HEENT: normocephalic, anicteric sclerae, no mucositis or thrush  Respiratory: bilateral clear to auscultation anteriorly  Cardiovascular : S1, S2 regular, rhythmic, no murmurs, gallops or rubs  Abdomen: soft, distended, + normoactive BS, no palpable HS- megaly  Extremities: no tenderness;  -c/c/e, pulses equal bilaterally    LABS:  (11-20) WBC: 3.82 K/uL,Hemoglobin: 9.2 g/dL, Hematocrit: 29.9 %,    Platelet: 182 K/uL        RADIOLOGY:

## 2023-11-21 NOTE — PROGRESS NOTE ADULT - PROVIDER SPECIALTY LIST ADULT
Cardiology
Physiatry
Rehab Medicine
Rehab Medicine
Heme/Onc
Hospitalist
Physiatry
Rehab Medicine
Cardiology
Cardiology
Heme/Onc
Heme/Onc
Hospitalist
Hospitalist
Rehab Medicine
Hospitalist
Heme/Onc
Hospitalist
Heme/Onc

## 2023-11-21 NOTE — PROGRESS NOTE ADULT - NUTRITIONAL ASSESSMENT
This patient has been assessed with a concern for Malnutrition and has been determined to have a diagnosis/diagnoses of Severe protein-calorie malnutrition.    This patient is being managed with:   Diet Consistent Carbohydrate w/Evening Snack-  Supplement Feeding Modality:  Oral  Glucerna Shake Cans or Servings Per Day:  1       Frequency:  Three Times a day  Entered: Nov 9 2023  2:14PM  
This patient has been assessed with a concern for Malnutrition and has been determined to have a diagnosis/diagnoses of Severe protein-calorie malnutrition.    This patient is being managed with:   Diet Regular-  Supplement Feeding Modality:  Oral  Glucerna Shake Cans or Servings Per Day:  1       Frequency:  Two Times a day  Entered: Nov 18 2023 12:32PM  
This patient has been assessed with a concern for Malnutrition and has been determined to have a diagnosis/diagnoses of Severe protein-calorie malnutrition.    This patient is being managed with:   Diet Consistent Carbohydrate w/Evening Snack-  Supplement Feeding Modality:  Oral  Glucerna Shake Cans or Servings Per Day:  1       Frequency:  Three Times a day  Entered: Nov 9 2023  2:14PM  
This patient has been assessed with a concern for Malnutrition and has been determined to have a diagnosis/diagnoses of Severe protein-calorie malnutrition.    This patient is being managed with:   Diet Regular-  Supplement Feeding Modality:  Oral  Ensure Enlive Cans or Servings Per Day:  1       Frequency:  Three Times a day  Entered: Nov 20 2023 10:25AM  
This patient has been assessed with a concern for Malnutrition and has been determined to have a diagnosis/diagnoses of Severe protein-calorie malnutrition.    This patient is being managed with:   Diet Regular-  Supplement Feeding Modality:  Oral  Glucerna Shake Cans or Servings Per Day:  1       Frequency:  Two Times a day  Entered: Nov 18 2023 12:32PM  
This patient has been assessed with a concern for Malnutrition and has been determined to have a diagnosis/diagnoses of Severe protein-calorie malnutrition.    This patient is being managed with:   Diet Consistent Carbohydrate w/Evening Snack-  Supplement Feeding Modality:  Oral  Glucerna Shake Cans or Servings Per Day:  1       Frequency:  Three Times a day  Entered: Nov 9 2023  2:14PM  

## 2023-12-14 PROBLEM — R00.0 TACHYCARDIA: Status: ACTIVE | Noted: 2023-01-01

## 2023-12-14 PROBLEM — C79.31 METASTATIC ADENOCARCINOMA TO BRAIN: Status: ACTIVE | Noted: 2023-01-01

## 2023-12-14 PROBLEM — C34.11 MALIGNANT NEOPLASM OF UPPER LOBE OF RIGHT LUNG: Status: ACTIVE | Noted: 2022-07-07

## 2023-12-14 PROBLEM — R63.4 WEIGHT LOSS: Status: ACTIVE | Noted: 2023-01-01

## 2023-12-14 NOTE — PHYSICAL EXAM
[Ambulatory and capable of all self care but unable to carry out any work activities] : Status 2- Ambulatory and capable of all self care but unable to carry out any work activities. Up and about more than 50% of waking hours [Normal] : affect appropriate [de-identified] : No icterus  [de-identified] : MMM O/P Clear [de-identified] : Supple No LAD  [de-identified] : S1 S2  but tachycardic  [de-identified] : No edema [de-identified] : No spine/CVA tenderness [de-identified] : Ambulatory [de-identified] : RLE weakness

## 2023-12-14 NOTE — HISTORY OF PRESENT ILLNESS
[Disease: _____________________] : Disease: [unfilled] [T: ___] : T[unfilled] [N: ___] : N[unfilled] [AJCC Stage: ____] : AJCC Stage: [unfilled] [de-identified] : 51F with significant smoking history who was evaluated for chronic GERD and a cough going on for about 6 months prior to her initial consultation.  A CXR was done and demonstrated a nodule. No follow up was done at that time. She returned in May 2022 for persistent GERD and a cough. A CXR was done and revealed a large RUL mass increased in size from the previous study. Her pulmonologist then sent her for a PET/CT, which revealed an intensely FDG-avid RUL mass. 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  Patient with clinical Stage IIIB, T4 N2 M0 disease. Pathology reviewed at tumor board and clarified the tumor represents non-small cell lung cancer, either adenocarcinoma with neuroendocrine features versus large cell neuroendocrine cancer.  Brain MRI was negative.  Tumor tested PDL1 Negative and molecular testing is negative for actionable mutations.  Patient started treatment with carboplatin/pemetrexed in July 2022.  Thoracic RT started in Sept 2022.  Completed 4 cycles with Carbo/Pem in late Sept 2022.  Thoracic RT completed late October 2022. Achieved DE.  Upon completion of definitive therapy, she declined recommended treatment with Durvalumab despite numerous discussions.  Patient was hospitalized in late May 2023 and found to have brain metastases.  She was started on steroids as well as levetiracetam for seizure prophylaxis with subsequent discharge in early June 2023.  Patient is status post GK-SRS to 7 brain metastases in 5 fractions from 6/21 - 6/29/2023. At that time, she had no other findings of progression of disease and 2nd line Nivolumab was recommended, however the patient repeatedly delayed commitment to start of treatment.  [de-identified] : - Lymph node, 4R EBUS guided FNA 6/22/2022: Positive for malignant cells.  Metastatic malignant neoplasm.  IHC is positive for TTF-1 and synaptophysin while negative for p40, chromogranin and CD56.  Ki-67 is 80%.  This is consistent with adenocarcinoma with neuroendocrine features of lung primary.\par  PD-L1 negative (<1%).  Foundation:  Positive for TP53, STK11, among others.   [FreeTextEntry1] : Carbo/Taxol/Bevacizumab/ C1D1 Cabo/Taxol on 11/4/2023, Bevacizumab on 11/5/2023  C2D1 Carbo/Taxol/Bevacizumab/Atezolizumab on 12/1/2023  [de-identified] : Patient completed definitive concurrent chemo/RT in October 2022; achieved IL. It was recommended that she be treated with consolidative durvalumab in the fall 2022 which she declined to begin. Patient was hospitalized in late May 2023 and found to have brain metastases.  She was started on steroids as well as levetiracetam for seizure prophylaxis with subsequent discharge in early June. Patient sought neuro-oncology as an outpatient but did not follow-up. Patient is status post GK-SRS to 7 brain metastases in 5 fractions from 6/21 - 6/29/2023.  At that time, she had no other findings of progression of disease and 2nd line Nivolumab was recommended, however the patient did not actually pursue start of treatment of her own volition.   The patient is s/p several hospitalizations since her visit in July 2023.   -[facility] 8/19-26: reported to ED following multiple syncopal episodes and headaches. MRI Brain 8/20 revealed multiple intracranial metastasis. Received Decadron 4mg bid and Keppra 500mg bid. She was to follow up with Rad/Onc in one week s/p discharge. -[facility] 8/26-31: Presented to the ED with dizziness and migraines 2/2 brain mets. She was placed on Decadron 6mg q6h and Depakote on admission 8/26 with no inpatient intervention recommended. Steroid taper was ordered to complete on 9/7. She had planned f/u with Rad-Onc for evaluation of RT/SRS therapy. She was prescribed metformin for BS management. She was discharged home.  -[facility]: 9/4-13: Admission for worsening epigastric pain in the setting of taking steroids without GI prophylaxis. CTA with no PE, stable right apical lung mass and rapid interval growth of a LLL mass and new hepatic mets.  She was discharged on protonix and depakote with directions to follow up with Dr. Bernard and Dr. Beckman -[facility] 9/19-10/5: Patient was admitted with complaint of LE weakness and intermittent blurred vision x 1 week. She underwent a right temoral craniotomy for resection of the metastatic lesion. Post-operative course was complicated by acute left cerebellar infarct found on post-op MRI, tachycardia, and thrombocytopenia. She was recommended for follow up with oncology for lung lesions, radiation oncology for brain, and dr. Metcalf for post-operative follow up. She will also follow up with Dr. Pam Randle in 6 months for findings on 10/4 CTA head. Plan for no ASA for secondary prevention until cleared by Dr. Metcalf. Wound closure due to be removed postop day 10-14 in rehab. Patient was evaluated by PM&R and therapy for functional deficits and gait/ ADL impairments and recommended acute rehabilitation. Patient was medically optimized for discharge to [facility] on 10[facility]5/23.  - Rehab: 10/5-18: Patient was stable upon rehab admission to  [facility]. Admitted with gait instability, ADL, and functional impairments.  Rehab Course was significant for brief unresponsive episode 10/8 during which staff noted clenching of the jaw. Patient was started on empiric antibiotics and given IVF. Workup at that time was negative. Patient was seen by Neurology who had low suspicion for seizure and recommended continuation of current dose of AEDs. Cardiology noted low suspicion for cardiogenic syncope/arrhythmia; patient may follow for outpatient Holter if experiencing repeat episodes. Mechanism presumed possible vasovagal syncope. Patient has no similar issues for the remainder of her rehab course. Patient was weaned off of insulin and started on MTF for blood glucose control. Craniotomy staples were removed on 10/13. Patient tolerated course of inpatient PT/OT/SLP rehab with significant functional improvements and met rehab goals prior to discharge. Patient was medically stable and optimized for discharge home with follow-up with Neurosurgery, Oncology, Radiation Oncology, Neurology, PM&R, and PCP.  Since her last office visit, the patient was again hospitalized on 10/31 with complaint of two weeks of RU and RL extremity weakness and abdominal pain. Neurosurgery was consulted after MRI head was completed with demonstration of right temporal postop changes with slight increase in the size of the enhancement within the postoperative bed since 10/2/2023, and multiple additional brain metastases that had increased in size. These findings were reviewed by neurosurgery, who recommended no acute interventions. Hematology/oncology were consulted and recommended starting inpatient chemotherapy x1 dose to monitor response. She is s/p C1D1 carboplatin and paclitaxel on 11/4/23 with inclusion of bevacizumab on 11/5/23. She also passed a speech and swallow exam during her hospitalization. Upon discharge to  Rehab on 11/8, she was advised to continue with dexamethasone 2mg bid. 12/1/2023 S/p C2 of Carbo/Taxol/Bevacizumab and addition of Atezolizumb.   Today 12/14/2023, presents herself for interval response after cycle 2. She has been feeling weak, states that able to eat little amount but not much, has been drinking water at home.  Has noted weight loss about 10lbs compares to last chemotherapy date (from 119 lbs on 12/1/2023 to 110lbs today on 12/14/2023). She is on dexamethasone 2mg bid, Depakote 500mg 1 tab q8h and Protonix 40mg once daily. lives with friend Zoe (sister's friend), her sister will come next week from [place].  HHA service will be started soon as per patient, her PT/OT has been stopped but will be resumed. Denies palpitation but mild shortness of breath when she moves around.  Denies chest pain or pressure but reports feel anxiety when she thinks about chemotherapy.  Plan for Cycle 3 of chemotherapy on 12/22/2023. plan for niurka head on 12/19/2023.

## 2023-12-14 NOTE — REVIEW OF SYSTEMS
[Fatigue] : fatigue [Recent Change In Weight] : ~T recent weight change [SOB on Exertion] : shortness of breath during exertion [Negative] : Heme/Lymph [Chest Pain] : no chest pain [Palpitations] : no palpitations [Shortness Of Breath] : no shortness of breath [Wheezing] : no wheezing [Cough] : no cough [Abdominal Pain] : no abdominal pain [Vomiting] : no vomiting [Constipation] : no constipation [FreeTextEntry2] : lost weight

## 2023-12-14 NOTE — RESULTS/DATA
[FreeTextEntry1] : -CXR 12/9/21:  A right upper lung rounded opacity is increased in size and conspicuity from prior imaging. Findings are concerning for neoplastic etiology. A CT chest is recommended for further evaluation.  -PET/CT 6/1/22:  Abnormal FDG-PET/CT scan. 1. FDG avid lobulated partially necrotic right upper lung mass compatible with malignancy. 2. FDG avid mediastinal and right perihilar masses, suspicious for metastases.  -MRI Brain 7/15/22:  NO EVIDENCE OF A MASS, ABNORMAL ENHANCEMENT, OR CURRENT EVIDENCE OF ACUTE ISCHEMIA. CHRONIC WHITE MATTER ISCHEMIC CHANGES  -CT C/A/P 12/1/22: Decreased right apical mass and confluent mediastinal/right hilar lymphadenopathy.Mildly increased right apical pleural thickening, possibly treatment related.  No new disease in the abdomen or pelvis.  -CT Chest Angio 1/9/23: CT Angio Chest revealed no pulmonary embolism. Mediastinal adenopathy is slightly enlarged from the prior study  -LUE Doppler 1/30/23:  No evidence of left upper extremity deep venous thrombosis.  -PET/CT 3/1/23: Compared to FDG-PET/CT scan dated 6/1/2022: 1. Overall interval decrease in size and metabolism of right upper lobe mass, right hilar adenopathy, and right-sided mediastinal lymphadenopathy, compatible with a partial response to interval therapy. Findings are similar in appearance to 1/9/2023 chest CT. Hypermetabolism is inseparable from apical pleura and extends into the right 1st-2nd and 2nd-3rd intercostal spaces. This activity may be secondary to inflammation related to interval radiation therapy versus extension of disease. 2. Remainder of study is unchanged.  -CT Brain 5/31/23: Multiple new supra and infratentorial low-attenuation masses, compatible with intracranial metastatic disease, new when compared with 7/15/2022. Recommend further evaluation with a contrast enhanced brain MRI study.  -CT Thoracic Spine 6/1/23: No acute fractures or dislocations seen. Sclerotic densities involving multiple vertebral bodies as described above.  -CT C/A/P 6/1/23: Stable right upper lobe mass measuring up to 3.5 cm with adjacent apical pleural thickening corresponding with known malignancy. Stable mediastinal and hilar adenopathy.. No evidence of suspicious mass or adenopathy in the abdomen and pelvis.  --MRI Brain 6/2/23:  Abnormal lesions involving the posterior fossa and supratentorial region are identified which are consistent with patient's known metastasis  -XR Right Hand 6/6/23: No fractures or dislocations. Preserved joint spaces and no joint margin erosions. Carpal bones normally aligned. Neutral ulnar variance. No lytic or blastic lesions. IV cannula with attached tubing in distal radial forearm soft tissues  -PET/CT 6/26/23:  Intensely FDG avid right upper lobe mass is most consistent with residual/recurrent tumor with no definite evidence of additional FDG avid lesions excluding the brain.  -MRI Head 8/20/23: Right cerebellar hemisphere metastasis on image 29 of series 9 measures 1 cm, smaller in size. The right temporal lobe and left occipital lobe metastasis demonstrates decreased enhancement and slightly smaller in size. Posterior right temporal lobe, left frontal lobe lesions are smaller in size. High right frontal lobe lesion on image 100 of series 9 measures 6.1 mm, larger in size, previously measuring 3.8 mm..  -CT Chest Angio 8/19/23: * No pulmonary embolism. * Enlarging right apical neoplasm * Development of right middle lobe consolidative opacity, infectious versus neoplastic. * Development of a 0.9 cm left lower lobe nodule, possibly metastatic  -CT Head 8/19/23: 1. High left posterior frontal, left posterior temporal occipital, and right temporal mass lesions, described in detail above, all appear slightly decreased in size compared with prior MRI imaging dated 6/2/2023 and 6/19/2023. However, prior smaller intraparenchymal mass lesions and a right cerebellar process appreciated on the prior MRI imaging of 6/2/2023 and 6/19/2023, are not clearly appreciated on the current examination. As such, recommend follow-up pre and postcontrast MRI imaging of the brain for more accurate characterization of patient's findings. 2. New decreased attenuation appreciated in a left posterior temporal occipital location on the current CTA examination may represent an edematous versus ischemic changes which are new in the interval since prior imaging. Once again, follow-up MR imaging would likely be helpful for further characterization of this finding, as well.  -CT Abdomen 9/2/23: Suboptimal opacification of subsegmental branches of the pulmonary arteries. No central pulmonary embolism. Grossly stable right apical lung mass. Rapid interval growth of a left lower lobe mass abutting the pleural surface. Small pleural effusion. New hepatic metastases.  Venous Doppler LE 10/1/23: No evidence of deep venous thrombosis in either lower extremity.  MRI Brain 10/2/23: Redemonstration of right temporal craniotomy. Residual 3.7 x 1.4 cm centrally necrotic/cystic peripherally enhancing lesion along the mesial aspect of the surgical cavity (20-14). Findings could represent residual enhancing neoplasm and/or postsurgical changes. Additional curvilinear, thickened region of enhancement capping the mesial aspect of the surgical cavity measures 7 mm in greatest thickness (20-13). Findings could represent residual enhancing neoplasm and/or postsurgical changes. Previously seen and described multifocal enhancing parenchymal lesions with internal susceptibility artifact in the bilateral frontal and right cerebellar hemispheres are not significantly changed. New small focus of restricted diffusion in the left mid cerebellar hemisphere suggesting the presence of acute infarction.  CT Neck 10/4/23: CT HEAD: 1. Redemonstrated postoperative changes related to a prior right temporal craniotomy for resection of a metastatic lesion. Postoperative findings are relatively stable. 2. Other metastatic lesions seen throughout the brain parenchyma with surrounding vasogenic edema, again relatively stable. 3. Redemonstrated acute infarct in the left cerebellar hemisphere, stable from recent MR imaging. CTA BRAIN: 1. No evidence of abrupt cut off of intraluminal contrast. 2. 1 to 2 mm medially oriented vascular outpouching from the right ICA terminus. This could represent a small saccular aneurysm versus a vascular infundibulum. CTA NECK: 1. No evidence of critical stenosis by NASCET criteria. 2. Multiple nodules seen in the bilateral lobes of the thyroid. Recommend nonemergent thyroid ultrasound for further characterization if clinically indicated. 3. Redemonstrated partially visualized right apex lung mass. Correlate with recent CT imaging of the chest for further evaluation.  CT Head 10/8/23: No new acute abnormalities are identified. Reidentified is a right temporal craniotomy for prior resection of a metastasis . Small amount of air and hemorrhage in the surgical cavity is present. Underlying lesion is not well evaluated on this noncontrast CT exam. Surrounding vasogenic edema is unchanged. Previously visualized minimal right to left midline shift has improved. Previously visualized partially calcified lesion in the left occipital lobe, with surrounding vasogenic edema is unchanged. Lesion in the left high frontal lobe with surrounding vasogenic edema is unchanged. Previously visualized acute infarct in the left cerebellum is not seen to good advantage on this study  -CT Head 10/31/23: HEAD CT: Post right temporal resection with multiple additional brain metastases again noted. These do not appear to be significantly changed in appearance. Tiny amount of extra-axial hemorrhage remaining versus postsurgical material along the inside of the craniotomy flap. Interval resolution of pneumocephalus post resection. Partial opacification of right mastoid air cells, new compared to previous exam 10/8/2023. MRI with gadolinium may provide helpful additional evaluation, if clinically indicated.  -CTA C/A/P 10/31/23: No pulmonary embolus. Overall progression of neoplastic disease as evidenced by enlarging dominant right upper lobe pulmonary mass with interval increase in size and number of bilateral pulmonary metastases and bilobar hepatic metastases when compared to prior imaging September 2023. Additionally, new scattered metastatic nodules also noted in the subcutaneous tissues of the abdominal wall.  -US Abdomen 10/31/23: Bilobar hepatic metastases as on same-day CT.  No sonographic evidence of acute cholecystitis.  -MRI Head 11/2/23: Right temporal postoperative changes with slight increase in the size of the enhancement within the postoperative bed since 10/2/2023 with slightly less vasogenic edema. Multiple additional brain metastases have increased in size since the prior exam as described above.  -LE Venous US 11/13/23: No evidence of deep venous thrombosis in the right lower extremity.  Below-knee deep vein thrombosis in the left soleal vein.  Images Reviewed/Interpreted:  -CT Chest Angio 11/14/23:  No pulmonary thromboembolism.  No change of right upper lobe lobulated mass with numerous metastatic nodules in both lungs.  Numerous metastatic hepatic nodules with interval increase of caudate lobe lesions.  Nodular thickening of left adrenal gland-adenoma versus metastasis.  Osseous metastasis of T1.  -LE Dopplers 11/20/23:  No evidence of deep venous thrombosis in either lower extremity.  -Labs From Rehab: 11/20/23: ~WBC 3.82  ~H/H 9.2/29.9  ~  ~ANC 1.83 ~Sodium 140   ~Potassium 4.0  ~BUN/Cr 1/0.40  ~Calcium 9.4

## 2023-12-14 NOTE — ASSESSMENT
[FreeTextEntry1] : NSCLC with adenocarcinoma with neuroendocrine features histology with clinically stage IIIB (T4N2) disease with bulky intrathoracic lymphadenopathy that was unresectable. Tumor tested PD-L1 negative and is negative for actionable mutations (TP53, STK11 positive, among others). Started chemotherapy with Carboplatin/Pemetrexed in July 2022 x 4 cycles completed Sept 2022. Treated with concurrent Thoracic RT started Sept through October 2022. Achieved AL. Restaging PET/CT March 2023 confirming response to interval therapy. She declined maintenance Durvalumab recommended on numerous occasions through March 2023. Hospitalized at [facility] on [facility]6/7/23 with symptomatic numerous brain metastases. Treated with GK-SRS to 7 brain metastases in 5 fractions from 6/21 - 6/29/2023. At that time, she had no other findings of progression of disease and 2nd line Nivolumab was recommended, however the patient repeatedly delayed commitment to start of treatment. Patient with several hospitalizations from August-October.2023. Imaging in August 2023 revealed brain metastasis; she is s/p right temporal craniotomy for resection of metastatic lesion on 9/30/23. Imaging in August revealed disease progression with enlarging right apical neoplasm; CT Abdomen in September 2023 revealed new hepatic metastasis.  Hospitalized at [facility] 10/31-11/8 for progressive weakness and abdominal pain. Neuro was consulted after MRI showed right temporal postop changes with slight increase in the size of the enhancement within the postoperative bed since 10/2/2023, and multiple additional brain metastases that have increased in size. These findings were reviewed by neurosurgery, who recommended no acute interventions. She was transferred to  rehab on 11/8 and discharged home just prior to her appointment on 11/21.  Recommend: -Patient currently residing with friend Zoe Hall: [phone].  -Given overall progression of disease and worsening of brain metastasis, patient started as inpatient: C1 with Carbo/Paclitaxel on 11/4 and Bevacizumab on 11/5. Plan for C2 on 12/1 with addition of Atezolizumab. Risks, benefits and side effects reviewed with patient who agreed to proceed and signed the consent form.  Drug info sheets provided.  Plan for Treatment:  Carboplatin AUC 5 and paclitaxel 150 mg/m2 and bevacizumab 15 mg/kg and atezolizumab 1200 mg administered IV every 3 weeks. -Patient is status post GK-SRS for numerous brain metastases completed late June 2023 and s/p right temporal craniotomy for resection of metastatic lesion on 9/30/23. MRI 11/2 with right temporal postoperative changes with slight increase in the size of the enhancement within the postoperative bed since 10/2/23 with slightly less vasogenic edema. Multiple additional brain mets have increased in size since the prior exam on 10/2/23. Patient needs follow up with Rad/Onc who will guide further outpatient treatment and tapering of steroids as deemed appropriate; currently on Dexamethasone 2mg bid. s/p cycle 2 of Carbo/Taxol/Bevacizumab/Atezolizumab on 12/1/2023 and presents for follow up post chemo.  -significant weight loss, will refer her to see a dietitian  -labs cbc and cmp  -scheduled for brain mri on 12/19/2023  -Follow up with Dr. Metcalf, Neurosurgery -Follow up with PM&R -ekg showed sinus tachycardia with HR of 144bpm. manually checked HR noted 130 bpm. Patient was asymptomatic but looked dehydrated. Recommended her to go to ED but refused, offered IV hydration at treatment room but declined it. She was alone at today visit, wished to go home without any intervention. At least she agreed to have blood work.  The risks and harms of untreated tachycardia have been explained in detail including but not limited to ischemia, heart attack (MI), stroke including sudden cardiac death. Although she has baseline brain mets, she has decision-making capacity about her beth and was not able to force her.  -Plan for restaging CT C/A/P w contrast after cycle 3. Plan for at the week of 12/28/2023.   All questions answered to the apparent satisfaction of the patient at visit today.   [Palliative] : Goals of care discussed with patient: Palliative

## 2023-12-19 NOTE — H&P ADULT - PROBLEM SELECTOR PLAN 1
- Febrile + tachycardic + suspected infection   - s/p Vanco, Zosyn, NS 1L in ED  - C/w Iv abx  - C/w IVF  - Check bcx, Ucx  - Trend labs, monitor fever curve

## 2023-12-19 NOTE — CONSULT NOTE ADULT - SUBJECTIVE AND OBJECTIVE BOX
p (1480)     HPI: 52F no AC/AP (prior DVT since resolved, not d/c on any acap) hx stage IV NSCLC w/ diffuse mets throughout C/A/P and to brain s/p R crani for tumor rsxn (09/2023), as well as mult rounds of chemo and RT, most recent RT fractions June 2023. Is on cycle 2 of Carbo/Paclitaxel and Bevacizumab with Atezolizumab. Presents to ED as xfer [facility] w/ AMS and febrile to 101-102 (now resolved). CTH at [facility] shows increased conspicuity of L posterior frontal and R parietal lesions, likely 2/2 hemorrhage (HU ~50-60) c/t 10/28 CTH. Further eval limited by motion artefact. Exam: EOV, regards, nonverbal, no FC, NEWTON spont.   --Anticoagulation:    =====================  PAST MEDICAL HISTORY   GERD (Gastroesophageal Reflux Disease)    Lung mass    Non-small cell lung cancer with metastasis      PAST SURGICAL HISTORY   No significant past surgical history    S/P endoscopy      No Known Allergies      MEDICATIONS:  Antibiotics:    Neuro:    Other:      SOCIAL HISTORY:   Occupation:   Marital Status:     FAMILY HISTORY:  No pertinent family history in first degree relatives        ROS: Negative except per HPI    LABS:  PT/INR - ( 19 Dec 2023 17:35 )   PT: 14.7 sec;   INR: 1.35 ratio         PTT - ( 19 Dec 2023 17:35 )  PTT:21.2 sec

## 2023-12-19 NOTE — ED ADULT TRIAGE NOTE - NS ED NURSE AMBULANCES
[facility]

## 2023-12-19 NOTE — ED ADULT TRIAGE NOTE - CHIEF COMPLAINT QUOTE
Ams found on floor unresponsive transfer from [place] Ams found on floor unresponsive transfer from [place]

## 2023-12-19 NOTE — ED PROVIDER NOTE - OBJECTIVE STATEMENT
52-year-old female with PMH T2 DM, GERD lung cancer and metastasis to the brain, GERD, S4 lung cancer with mets to brain (s/p 4 cycles of chemo and thoracic RT- completed 10/2022), steroid induced Diabetes Mellitus, gamma knife surgery to 7 brain mets, and s/p temporal crani/resection of metastatic lesion presents as transfer from [place] for continued care as patient is followed closely here with heme-onc and neurosurgery.  Patient presented to [place] [facility] and was violent and given 50 mg of IM Benadryl, 2 of Ativan and 2 Versed for imaging.  Patient  was a code stroke and imaging showed calcified lesions in the brain suggesting partially treated metastatic disease, primary/subacute hypodensities in the right temporoparietal region representing ischemic changes.    HPI from [place]:   "52-year-old female, initially arrives as a Josephine Valdez, with a reported history from EMS of "brain surgery "recently at [place], in setting of lung cancer with brain mets presents from home with a chief complaint of altered mental status and left-sided weakness.  Per EMS last known normal was around 8 AM, however per EMS is unclear if this was from waking up or from a true witnessed normal prior to that.  Patient has been uncooperative for EMS thrashing swearing intermittently and unable to give a linear history.  Collateral from EMS is limited upon ED arrival.  Likewise was unable to get address or contact information from family from EMS, they were unsure of the patient's name, however they report that family should be arriving to the ED. Pt arrives to ED with no ID, or other means to contact family" 52-year-old female with PMH T2 DM, GERD lung cancer and metastasis to the brain, GERD, S4 lung cancer with mets to brain (s/p 4 cycles of chemo and thoracic RT- completed 10/2022), steroid induced Diabetes Mellitus, gamma knife surgery to 7 brain mets, and s/p temporal crani/resection of metastatic lesion presents as transfer from [place] for continued care as patient is followed closely here with heme-onc and neurosurgery.  Patient presented to [place] [facility] and was violent and given 50 mg of IM Benadryl, 2 of Ativan and 2 Versed for imaging.  Patient  was a code stroke and imaging showed calcified lesions in the brain suggesting partially treated metastatic disease, primary/subacute hypodensities in the right temporoparietal region representing ischemic changes.    HPI from [place]:   "52-year-old female, initially arrives as a Josephine Valdez, with a reported history from EMS of "brain surgery "recently at [place], in setting of lung cancer with brain mets presents from home with a chief complaint of altered mental status and left-sided weakness.  Per EMS last known normal was around 8 AM, however per EMS is unclear if this was from waking up or from a true witnessed normal prior to that.  Patient has been uncooperative for EMS thrashing swearing intermittently and unable to give a linear history.  Collateral from EMS is limited upon ED arrival.  Likewise was unable to get address or contact information from family from EMS, they were unsure of the patient's name, however they report that family should be arriving to the ED. Pt arrives to ED with no ID, or other means to contact family" 52-year-old female with PMH T2 DM, GERD lung cancer and metastasis to the brain, GERD, S4 lung cancer with mets to brain (s/p 4 cycles of chemo and thoracic RT- completed 10/2022), steroid induced Diabetes Mellitus, gamma knife surgery to 7 brain mets, and s/p temporal crani/resection of metastatic lesion presents as transfer from [place] for continued care as patient is followed closely here with heme-onc and neurosurgery.  Patient presented to [place] [facility] and was violent and given 50 mg of IM Benadryl, 2 of Ativan and 2 Versed for imaging.  Patient  was a code stroke and imaging showed calcified lesions in the brain suggesting partially treated metastatic disease, primary/subacute hypodensities in the right temporoparietal region representing ischemic changes.    HPI from [place] earlier today:   "52-year-old female, initially arrives as a Josephine Valdez, with a reported history from EMS of "brain surgery "recently at [place], in setting of lung cancer with brain mets presents from home with a chief complaint of altered mental status and left-sided weakness.  Per EMS last known normal was around 8 AM, however per EMS is unclear if this was from waking up or from a true witnessed normal prior to that.  Patient has been uncooperative for EMS thrashing swearing intermittently and unable to give a linear history.  Collateral from EMS is limited upon ED arrival.  Likewise was unable to get address or contact information from family from EMS, they were unsure of the patient's name, however they report that family should be arriving to the ED. Pt arrives to ED with no ID, or other means to contact family" 52-year-old female with PMH T2 DM, GERD lung cancer and metastasis to the brain, GERD, S4 lung cancer with mets to brain (s/p 4 cycles of chemo and thoracic RT- completed 10/2022), steroid induced Diabetes Mellitus, gamma knife surgery to 7 brain mets, and s/p temporal crani/resection of metastatic lesion presents as transfer from [place] for continued care as patient is followed closely here with heme-onc and neurosurgery.  Patient presented to [place] [facility] and was violent and given 50 mg of IM Benadryl, 2 of Ativan and 2 Versed for imaging.  Patient  was a code stroke and imaging showed calcified lesions in the brain suggesting partially treated metastatic disease, primary/subacute hypodensities in the right temporoparietal region representing ischemic changes.    HPI from [place] earlier today:   "52-year-old female, initially arrives as a Josephine Valdez, with a reported history from EMS of "brain surgery "recently at [place], in setting of lung cancer with brain mets presents from home with a chief complaint of altered mental status and left-sided weakness.  Per EMS last known normal was around 8 AM, however per EMS is unclear if this was from waking up or from a true witnessed normal prior to that.  Patient has been uncooperative for EMS thrashing swearing intermittently and unable to give a linear history.  Collateral from EMS is limited upon ED arrival.  Likewise was unable to get address or contact information from family from EMS, they were unsure of the patient's name, however they report that family should be arriving to the ED. Pt arrives to ED with no ID, or other means to contact family"

## 2023-12-19 NOTE — ED PROVIDER NOTE - PHYSICAL EXAMINATION
Katelyn BARRIOS:  VITALS: Initial triage and subsequent vitals have been reviewed by me.  GEN APPEARANCE: alert, intermittently swearing, not following commands, appears to be moving x4    HEAD: surgical scars   EYES: PERRLa, EOMI, vision grossly intact.   EARS: Gross hearing intact.   NOSE: No nasal discharge, no external evidence of epistaxis.   NECK: Supple  CV: RRR, S1S2, no c/r/m/g. No cyanosis. Extremities warm, well perfused. Cap refill <2 seconds. No bruits.   LUNGS: CTAB. No wheezing. No rales. No rhonchi. No diminished breath sounds.   ABDOMEN: Soft, NTND. No guarding or rebound. No masses.   MSK/EXT: Spine appears normal, no spine point tenderness. No CVA ttp. Normal muscular development. Pelvis stable. No obvious joint or bony deformity, no peripheral edema.   NEURO: awake, thrashing, not following commands, appears to be moving extremities x4..   SKIN: Normal color for race, warm, dry and intact. No evidence of rash.  PSYCH: cannot assess

## 2023-12-19 NOTE — ED PROVIDER NOTE - PROGRESS NOTE DETAILS
David Germain MD PGY-2: Neurosurgery aware, will see patient. Imaging reviewed from [place]. States imaging shows no obvious reason for AMS. Recommending further workup for AMS but will review chart, see patient, and give final recs. David Germain MD PGY-2: Neurosurgery aware, will see patient. Imaging reviewed from [place]. States imaging shows no obvious reason for AMS. Recommending further workup for AMS but will review chart, see patient, and give final recs. David Germain MD PGY-2: Nsgy recommends 4hr repeat scan (will order now) as their review of imaging reveals there may be small amount of bleeding. Will likely recommend MRI after. If scan neg, recommends medicine admission for infectious workup and MRI. David Germain MD PGY-2: oncology aware. agrees with infectious workup. Will inform Dr. Beckman onc. Will see patient in the morning. spoke with nsgy, reviewed scan and ok with medicine admission. -Gemma Padgett PA-C repeat vbg qns. will repeat. vbg shows eletroltye abnormalities. hospitalist made aware.

## 2023-12-19 NOTE — ED ADULT NURSE NOTE - NSFALLRISKINTERV_ED_ALL_ED
Assistance OOB with selected safe patient handling equipment if applicable/Assistance with ambulation/Communicate fall risk and risk factors to all staff, patient, and family/Monitor gait and stability/Monitor for mental status changes and reorient to person, place, and time, as needed/Provide visual cue: yellow wristband, yellow gown, etc/Reinforce activity limits and safety measures with patient and family/Toileting schedule using arm’s reach rule for commode and bathroom/Use of alarms - bed, stretcher, chair and/or video monitoring/Call bell, personal items and telephone in reach/Instruct patient to call for assistance before getting out of bed/chair/stretcher/Non-slip footwear applied when patient is off stretcher/[place] to call system/Physically safe environment - no spills, clutter or unnecessary equipment/Purposeful Proactive Rounding/Room/bathroom lighting operational, light cord in reach Assistance OOB with selected safe patient handling equipment if applicable/Assistance with ambulation/Communicate fall risk and risk factors to all staff, patient, and family/Monitor gait and stability/Monitor for mental status changes and reorient to person, place, and time, as needed/Provide visual cue: yellow wristband, yellow gown, etc/Reinforce activity limits and safety measures with patient and family/Toileting schedule using arm’s reach rule for commode and bathroom/Use of alarms - bed, stretcher, chair and/or video monitoring/Call bell, personal items and telephone in reach/Instruct patient to call for assistance before getting out of bed/chair/stretcher/Non-slip footwear applied when patient is off stretcher/[place] to call system/Physically safe environment - no spills, clutter or unnecessary equipment/Purposeful Proactive Rounding/Room/bathroom lighting operational, light cord in reach Assistance OOB with selected safe patient handling equipment if applicable/Assistance with ambulation/Communicate fall risk and risk factors to all staff, patient, and family/Monitor gait and stability/Monitor for mental status changes and reorient to person, place, and time, as needed/Provide visual cue: yellow wristband, yellow gown, etc/Reinforce activity limits and safety measures with patient and family/Toileting schedule using arm’s reach rule for commode and bathroom/Use of alarms - bed, stretcher, chair and/or video monitoring/Call bell, personal items and telephone in reach/Instruct patient to call for assistance before getting out of bed/chair/stretcher/Non-slip footwear applied when patient is off stretcher/[place] to call system/Physically safe environment - no spills, clutter or unnecessary equipment/Purposeful Proactive Rounding/Room/bathroom lighting operational, light cord in reach

## 2023-12-19 NOTE — ED PROVIDER NOTE - CLINICAL SUMMARY MEDICAL DECISION MAKING FREE TEXT BOX
Katelyn BARRIOS:   Exam patient arrives tachycardic appearing altered thrashing and uncooperative with exam swearing and kicking at staff, she appears to be moving all 4 extremities, DDx unclear etiology of patient's symptoms, given possible brain surgery and altered mental status consider possible head bleed, less concern for focal stroke, however we will proceed with code stroke, to facilitate imaging patient was given 2 mg of Ativan and 50 of Benadryl and subsequently 2 of Versed once IV abscess was obtained.  There were no obvious significant signs of trauma on exam.  Will also proceed with sepsis order set get CT head, give meds as needed reassess, will attempt to try to find further collateral as to what exactly happened today, given reported history of brain mets and surgery in approximately 1 month ago would not tPA at this patient, likewise unable to get a comprehensive NIH stroke scale given to patient thrashing kicking screaming and spitting at staff and not following commands or being cooperative with exam.

## 2023-12-19 NOTE — H&P ADULT - NSHPLABSRESULTS_GEN_ALL_CORE
12-19    145  |  126<H>  |  5<L>  ----------------------------<  72  <2.0<LL>   |  10<LL>  |  <0.30<L>    Ca    3.9<LL>      19 Dec 2023 22:23    TPro  2.9<L>  /  Alb  1.1<L>  /  TBili  <0.1<L>  /  DBili  x   /  AST  27  /  ALT  6<L>  /  AlkPhos  46  12-19        Troponin T, High Sensitivity Result: 98 (12.19.23 @ 17:34)    Troponin T, High Sensitivity Result: 72 (12.19.23 @ 22:23)        - - - - - - - - - - - - - - - - - - - - - - - - - - - - - - - - - - - - - - - - - - - - - - - - - - - -       EKG personally reviewed:  Sinus Tachy     Images personally reviewed:     < from: CT Head No Cont (12.19.23 @ 18:55) >  IMPRESSION:  No acute hemorrhage or midline shift.    Re-demonstration of intraparenchymal metastatic lesions, slight decrease   in size compared to prior MR. Markedly decreased surrounding vasogenic   edema. Contrast-enhanced MR brain may be considered for further   evaluation, if clinically warranted.    Right temporal craniectomy with similar-appearing encephalomalacia of the   postoperative bed.    [facility]: impression  1)  suboptimal study degraded by motion.  2)  multiple partial calcified lesions are identified suggesting   partially treated metastatic disease. There is no visible vasogenic edema   or significant mass effect/shift.  3. Hypodensity in the right temporal parietal region may represent   subacute or chronic ischemic changes. Also there are mild microvascular   ischemic changes in both hemispheres with volume loss.    < from: CT Abdomen and Pelvis w/ IV Cont (12.19.23 @ 18:56) >  IMPRESSION:  No evidence for infection within the chest, abdomen, or pelvis.    Metastatic lung cancer in the right upper lobe, slightly decreased in   size as compared with 11/14/2023.    Extensive metastatic liver disease, mildly decreased in size as compared   with 10/31/2023.    T1 and T10 sclerotic vertebral body lesions which were not visualized on   11/14/2023 CT, probably now visible due to posttreatment change.    Decrease in size of subcutaneous nodules 12-19    145  |  126<H>  |  5<L>  ----------------------------<  72  <2.0<LL>   |  10<LL>  |  <0.30<L>    Ca    3.9<LL>      19 Dec 2023 22:23    TPro  2.9<L>  /  Alb  1.1<L>  /  TBili  <0.1<L>  /  DBili  x   /  AST  27  /  ALT  6<L>  /  AlkPhos  46  12-19        Troponin T, High Sensitivity Result: 98 (12.19.23 @ 17:34)    Troponin T, High Sensitivity Result: 72 (12.19.23 @ 22:23)        - - - - - - - - - - - - - - - - - - - - - - - - - - - - - - - - - - - - - - - - - - - - - - - - - - - -       EKG personally reviewed:  Sinus Tachy     Images personally reviewed:     < from: CT Head No Cont (12.19.23 @ 18:55) >  IMPRESSION:  No acute hemorrhage or midline shift.    Re-demonstration of intraparenchymal metastatic lesions, slight decrease   in size compared to prior MR. Markedly decreased surrounding vasogenic   edema. Contrast-enhanced MR brain may be considered for further   evaluation, if clinically warranted.    Right temporal craniectomy with similar-appearing encephalomalacia of the   postoperative bed.    [facility]: impression  1)  suboptimal study degraded by motion.  2)  multiple partial calcified lesions are identified suggesting   partially treated metastatic disease. There is no visible vasogenic edema   or significant mass effect/shift.  3. Hypodensity in the right temporal parietal region may represent   subacute or chronic ischemic changes. Also there are mild microvascular   ischemic changes in both hemispheres with volume loss.    < from: CT Abdomen and Pelvis w/ IV Cont (12.19.23 @ 18:56) >  IMPRESSION:  No evidence for infection within the chest, abdomen, or pelvis.    Metastatic lung cancer in the right upper lobe, slightly decreased in   size as compared with 11/14/2023.    Extensive metastatic liver disease, mildly decreased in size as compared   with 10/31/2023.    T1 and T10 sclerotic vertebral body lesions which were not visualized on   11/14/2023 CT, probably now visible due to posttreatment change.    Decrease in size of subcutaneous nodules

## 2023-12-19 NOTE — H&P ADULT - NSHPPHYSICALEXAM_GEN_ALL_CORE
T(C): 36.9 (12-20-23 @ 00:24), Max: 38.5 (12-19-23 @ 17:00)  HR: 104 (12-20-23 @ 01:02) (96 - 125)  BP: 105/73 (12-20-23 @ 00:24) (97/82 - 115/76)  RR: 16 (12-20-23 @ 01:02) (16 - 20)  SpO2: 97% (12-20-23 @ 00:24) (96% - 99%)    CONSTITUTIONAL: Thin appearing, no apparent distress  EYES: PERRLA and symmetric, EOMI  ENMT: MMM. Normal dentition  RESP: No respiratory distress, CTA b/l  CV: +S1S2, tachycardic, no MRG; no peripheral edema  GI: Soft, NTND  MSK: Normal pain free ROM x4 extremities   SKIN: No rashes   NEURO: CN II-XII grossly intact; nonsensical speech   PSYCH: Awake, alert, confused; A+O x 0

## 2023-12-19 NOTE — ED ADULT NURSE NOTE - NSFALLRISKINTERV_ED_ALL_ED
Communicate fall risk and risk factors to all staff, patient, and family/Provide visual cue: yellow wristband, yellow gown, etc/Reinforce activity limits and safety measures with patient and family/Call bell, personal items and telephone in reach/Instruct patient to call for assistance before getting out of bed/chair/stretcher/Non-slip footwear applied when patient is off stretcher/[place] to call system/Physically safe environment - no spills, clutter or unnecessary equipment/Purposeful Proactive Rounding/Room/bathroom lighting operational, light cord in reach Communicate fall risk and risk factors to all staff, patient, and family/Provide visual cue: yellow wristband, yellow gown, etc/Reinforce activity limits and safety measures with patient and family/Call bell, personal items and telephone in reach/Instruct patient to call for assistance before getting out of bed/chair/stretcher/Non-slip footwear applied when patient is off stretcher/[place] to call system/Physically safe environment - no spills, clutter or unnecessary equipment/Purposeful Proactive Rounding/Room/bathroom lighting operational, light cord in reach

## 2023-12-19 NOTE — ED ADULT NURSE NOTE - NS ED NURSE AMBULANCES2
[facility]

## 2023-12-19 NOTE — ED ADULT TRIAGE NOTE - CHIEF COMPLAINT QUOTE
Patient BIB EMS from home for AMS, Patient noted with glazed stare, looking up at the ceiling not responding to assessed questions. altered, holding left wrist and unable to follow command. Code stroke called. As per EMS patient had brain surgery last month

## 2023-12-19 NOTE — ED PROVIDER NOTE - PHYSICAL EXAMINATION
GENERAL: ill appearing in no acute distress   HEAD: normocephalic, atraumatic  HEENT: normal conjunctiva   CARDIAC: tachycardic rate and reg rhythm, normal S1S2, no appreciable murmurs, 2+ pulses in UE b/l  PULM: normal breath sounds, clear to ascultation bilaterally, no rales, rhonchi, wheezing  GI: abdomen nondistended, soft, nontender, no guarding, rebound tenderness  :  no suprapubic tenderness  NEURO: moaning, MAEx4, PERRL 4mm b/l, not following commands  MSK: no peripheral edema   SKIN: well-perfused, extremities warm  PSYCH: somnolent

## 2023-12-19 NOTE — ED PROVIDER NOTE - CLINICAL SUMMARY MEDICAL DECISION MAKING FREE TEXT BOX
52-year-old female I with history of lung cancer with metastasis to the brain and (see HPI) presents as a transfer from [place] for altered mental status.  Was a code stroke and had imaging showing no acute bleeding, calcifications in the brain with residual evidence of metastasis, lactate 3.5 at [place], negative urinalysis.  Got 1 L of fluids and Tylenol at [place].  Got vancomycin and Zosyn.  On exam here febrile rectally 101, moaning, not following commands, pupils equal round reactive to light 5 mm bilaterally, moving all extremities x 4.  Slowly/weakly pushing examiner away during physical exam.  Labs and imaging reviewed at [place] will give more IV fluids, antipyretics, coags, Trop, RVP. 52-year-old female I with history of lung cancer with metastasis to the brain and (see HPI) presents as a transfer from [place] for altered mental status.  Was a code stroke and had imaging showing no acute bleeding, calcifications in the brain with residual evidence of metastasis, lactate 3.5 at [place], negative urinalysis.  Got 1 L of fluids and Tylenol at [place].  Got vancomycin and Zosyn.  On exam here febrile rectally 101, moaning, not following commands, pupils equal round reactive to light 5 mm bilaterally, moving all extremities x 4.  Slowly/weakly pushing examiner away during physical exam.  Labs and imaging reviewed at [place] will give more IV fluids, antipyretics, coags, Trop, RVP. 52-year-old female I with history of lung cancer with metastasis to the brain and (see HPI) presents as a transfer from [place] for altered mental status.  Was a code stroke and had imaging showing no acute bleeding, calcifications in the brain with residual evidence of metastasis, lactate 3.5 at [place], negative urinalysis, negative RVP at .  Got 1 L of fluids and Tylenol at [place].  Got vancomycin and Zosyn.  On exam here febrile rectally 101, moaning, not following commands, pupils equal round reactive to light 5 mm bilaterally, moving all extremities x 4.  Slowly/weakly pushing examiner away during physical exam.  Xray at  shows right upper opacity. Labs and imaging reviewed at [place] will give more IV fluids, antipyretics, coags, Trop, RVP. Will order CT chest,abd, pelvis. Dispo admit to medicine unless nsgy intervention needed. 52-year-old female I with history of lung cancer with metastasis to the brain and (see HPI) presents as a transfer from [place] for altered mental status.  Was a code stroke and had imaging showing no acute bleeding, calcifications in the brain with residual evidence of metastasis, lactate 3.5 at [place], negative urinalysis, negative RVP at .  Got 1 L of fluids and Tylenol at [place].  Got vancomycin and Zosyn.  On exam here febrile rectally 101, moaning, not following commands, pupils equal round reactive to light 5 mm bilaterally, moving all extremities x 4.  Slowly/weakly pushing examiner away during physical exam.  Xray at  shows right upper opacity. Labs and imaging reviewed at [place] will give more IV fluids, antipyretics, coags, Trop, RVP. Will order CT chest,abd, pelvis. Dispo admit to medicine unless nsgy intervention needed. 52-year-old female I with history of lung cancer with metastasis to the brain and (see HPI) presents as a transfer from [place] for altered mental status.  Was a code stroke and had imaging showing no acute bleeding, calcifications in the brain with residual evidence of metastasis, lactate 3.5 at [place], negative urinalysis, negative RVP at .  Got 1 L of fluids and Tylenol at [place].  Got vancomycin and Zosyn.  On exam here febrile rectally 101, moaning, not following commands, pupils equal round reactive to light 5 mm bilaterally, moving all extremities x 4.  Slowly/weakly pushing examiner away during physical exam.  Xray at  shows right upper opacity. Labs and imaging reviewed at [place] will give more IV fluids, antipyretics, coags, Trop, RVP. Will order CT chest,abd, pelvis. Dispo admit to medicine unless nsgy intervention needed.  Mary Anne: 52 year old female with lung cx mets to brain, steroid induced dm, s/p temporal crani/resection here as transfer from  for ams.  sedating at 11 am for ct imaging showing concern for ich versus mets.  Patient chronically ill appearing, not following commands, tachycardiic, lungs cta b/l, abdomen soft, nondistended, moving all extremities, not able to complete a neuro exam.  somnolemnt.  will get type and screen. labs reviewed at  and nonactionable at this time, viral panel negative. will get nsx consult, remaining ct exams not completed. patient febilre as well, willl r/o febrile illness. 52-year-old female I with history of lung cancer with metastasis to the brain and (see HPI) presents as a transfer from [place] for altered mental status.  Was a code stroke and had imaging showing no acute bleeding, calcifications in the brain with residual evidence of metastasis, lactate 3.5 at [place], negative urinalysis, negative RVP at .  Got 1 L of fluids and Tylenol at [place].  Got vancomycin and Zosyn.  On exam here febrile rectally 101, moaning, not following commands, pupils equal round reactive to light 5 mm bilaterally, moving all extremities x 4.  Slowly/weakly pushing examiner away during physical exam.  Xray at  shows right upper opacity. Labs and imaging reviewed at [place] will give more IV fluids, antipyretics, coags, Trop, RVP. Will order CT chest,abd, pelvis. Dispo admit to medicine unless nsgy intervention needed.  Mary Anne: 52 year old female with lung cx mets to brain, steroid induced dm, s/p temporal crani/resection here as transfer from  for ams.  sedating at 11 am for ct imaging showing concern for ich versus mets.  Patient chronically ill appearing, not following commands, tachycardiic, lungs cta b/l, abdomen soft, nondistended, moving all extremities, not able to complete a neuro exam.  somnolemnt.  will get type and screen. labs reviewed at  and nonactionable at this time, viral panel negative. will get nsx consult, remaining ct exams not completed. patient febilre as well, willl r/o febrile illness.

## 2023-12-19 NOTE — ED PROVIDER NOTE - PROGRESS NOTE DETAILS
Katelyn BARRIOS:   Discussed CT head results with radiology Findings are less concerning for a bleed but more likely the setting of known brain mets with calcifications, per radiology tech patient was unable to sit still for CT, given dry CT findings and was concern for ischemic CVA will defer CTAs for now, about 2 and half hours into the patient's ED stay, 1 patient, patient was discovered to have a rectal temperature, at that time antibiotics antipyretics were ordered, at around the same time the same EMS crew that dropped the patient off early return to the ED or able to provide us with the name.  On chart review patiently was recently at [place] with MRN #[id number], has an extensive extensive history of pulmonary and intracerebral CA, was recently discharged and sent to [facility] for rehab.  I previously discussed this case with the neurosurgeon ICU attending at [place] for possible transfer however given no acute neurosurgical intervention did not recommend transfer at this time, after discussing with the hospitalist at [place] he was able to find more collateral in the patient's record, given that patient is well followed by heme-onc and neurosurgery, I did rediscuss with the transfer center and will do an ED to ED transfer to [place] for continued care given patient's oncologist and multiple physicians are at [place].  Patient was accepted by Dr. Moreland at the [place] ED, I discussed the plan with transfer with her emergency contact as listed in the chart.,  However this emergency contact was also unable to provide a comprehensive history of the events preceding the patient's arrival to the ER at [facility]. Katelyn BARRIOS:   Discussed CT head results with radiology Findings are less concerning for a bleed but more likely the setting of known brain mets with calcifications, per radiology tech patient was unable to sit still for CT, given dry CT findings and was concern for ischemic CVA will defer CTAs for now, about 2 and half hours into the patient's ED stay, 1 patient, patient was discovered to have a rectal temperature, at that time antibiotics antipyretics were ordered, at around the same time the same EMS crew that dropped the patient off early return to the ED or able to provide us with the name.  On chart review patiently was recently at [place] with MRN #[id number], has an extensive extensive history of pulmonary and intracerebral CA, was recently discharged and sent to [facility] for rehab.  I previously discussed this case with the neurosurgeon ICU attending at [place] for possible transfer however given no acute neurosurgical intervention did not recommend transfer at this time, after discussing with the hospitalist at [place] he was able to find more collateral in the patient's record, given that patient is well followed by heme-onc and neurosurgery, I did rediscuss with the transfer center and will do an ED to ED transfer to [place] for continued care given patient's oncologist and multiple physicians are at [place].  Patient was accepted by Dr. Moreland at the [place] ED, I discussed the plan with transfer with her emergency contact as listed in the chart.,  However this emergency contact was also unable to provide a comprehensive history of the events preceding the patient's arrival to the ER at [facility]. Katelyn BARRIOS:   Discussed CT head results with radiology Findings are less concerning for a bleed but more likely the setting of known brain mets with calcifications, per radiology tech patient was unable to sit still for CT, given dry CT findings and was concern for ischemic CVA will defer CTAs for now, about 2 and half hours into the patient's ED stay, 1 patient, patient was discovered to have a rectal temperature, at that time antibiotics antipyretics were ordered, at around the same time the same EMS crew that dropped the patient off early return to the ED or able to provide us with the name.  On chart review patiently was recently at [place] with MRN #[id number], has an extensive extensive history of pulmonary and intracerebral CA, was recently discharged and sent to [facility] for rehab.  I previously discussed this case with the neurosurgeon ICU attending at [place] for possible transfer however given no acute neurosurgical intervention did not recommend transfer at this time, after discussing with the hospitalist at [place] he was able to find more collateral in the patient's record, given that patient is well followed by heme-onc and neurosurgery, I did rediscuss with the transfer center and will do an ED to ED transfer to [place] for continued care given patient's oncologist and multiple physicians are at [place].  Patient was accepted by Dr. Moreland at the [place] ED, I discussed the plan with transfer with her emergency contact as listed in the chart.,  However this emergency contact was also unable to provide a comprehensive history of the events preceding the patient's arrival to the ER at [facility]. Katelyn BARRIOS:   Discussed CT head results with radiology Findings are less concerning for a bleed but more likely the setting of known brain mets with calcifications, per radiology tech patient was unable to sit still for CT, given dry CT findings and now w less concern for ischemic CVA will defer CTAs for now, about 2 and half hours into the patient's ED stay, patient was discovered to have a rectal temperature, at that time antibiotics antipyretics were ordered, at around the same time the same EMS crew that dropped the patient off earlier returned to the ED and were able to provide us with the name.  On chart review patiently was recently at [place] with MRN #[id number], has an extensive history of pulmonary and intracerebral CA, was recently discharged and sent to [facility] for rehab.  I previously discussed this case with the neurosurgeon ICU attending at [place] for possible transfer however given no acute neurosurgical intervention did not recommend transfer at this time, after discussing with the hospitalist at [place] he was able to find more collateral in the patient's record, given that patient is well followed by heme-onc and neurosurgery, I did rediscuss with the transfer center and will do an ED to ED transfer to [place] for continued care given patient's oncologist and multiple physicians are at [place].  Patient was accepted by Dr. Moreland at the [place] ED, I discussed the plan with transfer with her emergency contact as listed in the chart.,  However this emergency contact was also unable to provide a comprehensive history of the events preceding the patient's arrival to the ER at [facility]. Katelyn BARRIOS:   Discussed CT head results with radiology Findings are less concerning for a bleed but more likely the setting of known brain mets with calcifications, per radiology tech patient was unable to sit still for CT, given dry CT findings and now w less concern for ischemic CVA will defer CTAs for now, about 2 and half hours into the patient's ED stay, patient was discovered to have a rectal temperature, at that time antibiotics antipyretics were ordered, at around the same time the same EMS crew that dropped the patient off earlier returned to the ED and were able to provide us with the name.  On chart review patiently was recently at [place] with MRN #[id number], has an extensive history of pulmonary and intracerebral CA, was recently discharged and sent to [facility] for rehab.  I previously discussed this case with the neurosurgeon ICU attending at [place] for possible transfer however given no acute neurosurgical intervention did not recommend transfer at this time, after discussing with the hospitalist at [place] he was able to find more collateral in the patient's record, given that patient is well followed by heme-onc and neurosurgery, I did rediscuss with the transfer center and will do an ED to ED transfer to [place] for continued care given patient's oncologist and multiple physicians are at [place].  Patient was accepted by Dr. Moreland at the [place] ED, I discussed the plan with transfer with her emergency contact as listed in the chart.,  However this emergency contact was also unable to provide a comprehensive history of the events preceding the patient's arrival to the ER at [facility]. Katelyn BARRIOS:   Discussed CT head results with radiology Findings are less concerning for a bleed but more likely the setting of known brain mets with calcifications, per radiology tech patient was unable to sit still for CT, given dry CT findings and now w less concern for ischemic CVA will defer CTAs for now, about 2 and half hours into the patient's ED stay, patient was discovered to have a rectal temperature, at that time antibiotics antipyretics were ordered, at around the same time the same EMS crew that dropped the patient off earlier returned to the ED and were able to provide us with the name.  On chart review patiently was recently at [place] with MRN #[id number], has an extensive history of pulmonary and intracerebral CA, was recently discharged and sent to [facility] for rehab.  I previously discussed this case with the neurosurgeon ICU attending at [place] for possible transfer however given no acute neurosurgical intervention did not recommend transfer at this time, after discussing with the hospitalist at [place] he was able to find more collateral in the patient's record, given that patient is well followed by heme-onc and neurosurgery, I did rediscuss with the transfer center and will do an ED to ED transfer to [place] for continued care given patient's oncologist and multiple physicians are at [place].  Patient was accepted by Dr. Moreland at the [place] ED, I discussed the plan with transfer with her emergency contact as listed in the chart.,  However this emergency contact was also unable to provide a comprehensive history of the events preceding the patient's arrival to the ER at [facility].

## 2023-12-19 NOTE — ED PROVIDER NOTE - OBJECTIVE STATEMENT
Katelyn BARRIOS: 52-year-old female, initially arrives as a Josephine Valdez, with a reported history from EMS of "brain surgery "recently at [place], in setting of lung cancer with brain mets presents from home with a chief complaint of altered mental status and left-sided weakness.  Per EMS last known normal was around 8 AM, however per EMS is unclear if this was from waking up or from a true witnessed normal prior to that.  Patient has been uncooperative for EMS thrashing swearing intermittently and unable to give a linear history.  Collateral from EMS is limited upon ED arrival.  Likewise was unable to get address or contact information from family from EMS, they were unsure of the patient's name, however they report that family should be arriving to the ED. Pt arrives to ED with no ID, or other means to contact family. Katelyn BARRIOS: 52-year-old female, initially arrives as a Josephine Valdez, with a reported history from EMS of "brain surgery "recently at [place], in setting of lung cancer with brain mets presents from home with a chief complaint of altered mental status and left-sided weakness.  Per EMS last known normal was around 8 AM, however per EMS is unclear if this was from waking up or from a true witnessed normal prior to that.  Patient has been uncooperative for EMS thrashing swearing intermittently and unable to give a linear history.  Collateral from EMS is limited upon ED arrival.  Likewise was unable to get address or contact information from family from EMS, they were unsure of the patient's name, however they report that family should be arriving to the ED. Pt arrives to ED with no ID, or other means to contact family. Katelyn BARRIOS: 52-year-old female, initially arrives as a Josephine Valdez, with a reported history from EMS of "brain surgery "recently at [place], in setting of lung cancer with brain mets presents from home with a chief complaint of altered mental status and left-sided weakness.  Per EMS last known normal was around 8 AM, however per EMS is unclear if this was from waking up or from a true witnessed normal prior to that.  Patient has been uncooperative for EMS thrashing swearing intermittently and unable to give a linear history.  Collateral from EMS is limited upon ED arrival.  Likewise was unable to get address or contact information from family from EMS, they were unsure of the patient's name, however they report that family should be arriving to the ED. Pt arrives to ED with no ID, or other means to contact family.

## 2023-12-19 NOTE — PROVIDER CONTACT NOTE (CRITICAL VALUE NOTIFICATION) - ASSESSMENT
no change from baseline since arrival to [facility] no change from baseline since arrival to [facility]

## 2023-12-19 NOTE — ED ADULT NURSE NOTE - OBJECTIVE STATEMENT
Patient brought from home by EMS for AMS, Patient noted with glazed stare, looking up at the ceiling not responding to assessed questions. altered, holding left wrist and unable to follow command. Code stroke called. As per EMS patient had brain surgery last month. While attempting to place IV access pt  became combative and started yelling, kicking and cursing at staff. Pt began pulling at all the lines and spitting. As per MD orders pt was sedated for safety. No family in waiting room and no contact information. No past medical hx taken by EMS.

## 2023-12-19 NOTE — CHART NOTE - NSCHARTNOTEFT_GEN_A_CORE
53 yo F with PMH of smoking, stage 4 lung adenocarcinoma with brain metastases, steroid induced DM, GERD who presents with changes in mental status. Allscripts note indicates that patient hadbeen speaking incoherently, complaining of headaches for several days and had R sided weakness. EMS arrived. Patient was reportedly violent at [place] ED. Code stroke was called.  Patient was also given benadryl, ativan and versed for agitation to obtain CTH imaging. Patient was noted to be febrile, and started on vancomycin, zosyn.  Rapid RVP negative.     CTH: impression  1)  suboptimal study degraded by motion.  2)  multiple partial calcified lesions are identified suggesting   partially treated metastatic disease. There is no visible vasogenic edema   or significant mass effect/shift. See above.  3. Hypodensity in the right temporal parietal region may represent   subacute or chronic ischemic changes. Also there are mild microvascular   ischemic changes in both hemispheres with volume loss.    See other MRN . She was thereafter transferred to [facility] to continue her care.     Here rectal temp was 101.3F and patient notably tachycardic to 125.     Hx of Stage 4 lung adenocarcinoma:  - When patient was initially diagnosed with stage IIIB T4N2M0 lung adenocarcinoma with neuroendocrine featuers in 5/2022, patient received 4C carboplatin + pemetrexed from 7-9/2022, with thoracic RT, and achieved LA, but declined to receive immunotherapy. In 5/2023, she was found to have brain mets, and she received GK-SRS to 7 brain mets in 5 fractions in 6/2023. She again was advised immunotherapy, but delayed treatment. From 8/2023 to 9/2023, she had multiple ED visits for headache, dizziness, migraines related to brain mets, which were managed with anti-epileptics and steroids, c/b epigastric pain. She was found to have new liver mets. From 9/19-10/5/23, she was admitted for LE weakness and blurred vision, s/p R temporal craniotomy for resection of metastatic lesion. Post-op course c/b acute left cerebellar infarction post-op MRI, tachycardia and thrombocytopenia. Patient was thereafter sent to acute rehab at [facility].   - Patient was rehospitalized in November 2023 with worsening RUE and RLE weakness and abdominal pain.   MR brain on 11/2/23 found right temporal postoperative changes with slight increase in   the size of the enhancement within the postoperative bed since 10/2/2023 with slightly less vasogenic edema. Multiple additional brain metastases have increased in size since the prior exam.  - She started C1 of Carbo + Paclitaxel on 11/4, and had C1 bevacizumab on 11/5/23, and was on steroid taper with dexamethasone 2mg BID.  She had C2 carbo+taxol+ bevacizumab+ addition of atezolizumab on 12/1/23.   Of note, during last office visit on 12/14/23, patient was notably tachycardic and appeared dehydrated, but patient declined ED or IVF. Patient had decision-making capacity at that time.  - Patient was planned for MRI brain on 12/19/23.   - She was planned for restaging CT CAP w/ contrast after C3.     A/P:  - Check CBC with diff, CMP, coags, UA  - Continue empiric antibiotics. Follow up blood cultures. If patient has PICC line, send cultures.  - Obtain MRI brain w/wo contrast. Repeat EEG. Based on MRI results, consider rad-onc evaluation.  - Continue home depakote 500mg q12h. Per neurosurgery, increase dexamethasone dosing to 4mg q6h x24 hours for now.     Will notify inpatient oncology team to f/u in AM.  Note not finalized until signed by attending.   Please do not hesitate to page with questions.     Alva Childers MD PGY5   [phone]  Hematology-Oncology On-Call Fellow 51 yo F with PMH of smoking, stage 4 lung adenocarcinoma with brain metastases, steroid induced DM, GERD who presents with changes in mental status. Allscripts note indicates that patient hadbeen speaking incoherently, complaining of headaches for several days and had R sided weakness. EMS arrived. Patient was reportedly violent at [place] ED. Code stroke was called.  Patient was also given benadryl, ativan and versed for agitation to obtain CTH imaging. Patient was noted to be febrile, and started on vancomycin, zosyn.  Rapid RVP negative.     CTH: impression  1)  suboptimal study degraded by motion.  2)  multiple partial calcified lesions are identified suggesting   partially treated metastatic disease. There is no visible vasogenic edema   or significant mass effect/shift. See above.  3. Hypodensity in the right temporal parietal region may represent   subacute or chronic ischemic changes. Also there are mild microvascular   ischemic changes in both hemispheres with volume loss.    See other MRN . She was thereafter transferred to [facility] to continue her care.     Here rectal temp was 101.3F and patient notably tachycardic to 125.     Hx of Stage 4 lung adenocarcinoma:  - When patient was initially diagnosed with stage IIIB T4N2M0 lung adenocarcinoma with neuroendocrine featuers in 5/2022, patient received 4C carboplatin + pemetrexed from 7-9/2022, with thoracic RT, and achieved OK, but declined to receive immunotherapy. In 5/2023, she was found to have brain mets, and she received GK-SRS to 7 brain mets in 5 fractions in 6/2023. She again was advised immunotherapy, but delayed treatment. From 8/2023 to 9/2023, she had multiple ED visits for headache, dizziness, migraines related to brain mets, which were managed with anti-epileptics and steroids, c/b epigastric pain. She was found to have new liver mets. From 9/19-10/5/23, she was admitted for LE weakness and blurred vision, s/p R temporal craniotomy for resection of metastatic lesion. Post-op course c/b acute left cerebellar infarction post-op MRI, tachycardia and thrombocytopenia. Patient was thereafter sent to acute rehab at [facility].   - Patient was rehospitalized in November 2023 with worsening RUE and RLE weakness and abdominal pain.   MR brain on 11/2/23 found right temporal postoperative changes with slight increase in   the size of the enhancement within the postoperative bed since 10/2/2023 with slightly less vasogenic edema. Multiple additional brain metastases have increased in size since the prior exam.  - She started C1 of Carbo + Paclitaxel on 11/4, and had C1 bevacizumab on 11/5/23, and was on steroid taper with dexamethasone 2mg BID.  She had C2 carbo+taxol+ bevacizumab+ addition of atezolizumab on 12/1/23.   Of note, during last office visit on 12/14/23, patient was notably tachycardic and appeared dehydrated, but patient declined ED or IVF. Patient had decision-making capacity at that time.  - Patient was planned for MRI brain on 12/19/23.   - She was planned for restaging CT CAP w/ contrast after C3.     A/P:  - Check CBC with diff, CMP, coags, UA  - Continue empiric antibiotics. Follow up blood cultures. If patient has PICC line, send cultures.  - Obtain MRI brain w/wo contrast. Repeat EEG. Based on MRI results, consider rad-onc evaluation.  - Continue home depakote 500mg q12h. Per neurosurgery, increase dexamethasone dosing to 4mg q6h x24 hours for now.     Will notify inpatient oncology team to f/u in AM.  Note not finalized until signed by attending.   Please do not hesitate to page with questions.     Alva Childers MD PGY5   [phone]  Hematology-Oncology On-Call Fellow

## 2023-12-19 NOTE — CONSULT NOTE ADULT - ASSESSMENT
52F no AC/AP (prior DVT since resolved, not d/c on any acap) hx stage IV NSCLC w/ diffuse mets throughout C/A/P and to brain s/p R crani for tumor rsxn (09/2023), as well as mult rounds of chemo and RT, most recent RT fractions June 2023. Is on cycle 2 of Carbo/Paclitaxel and Bevacizumab with Atezolizumab. Presents to ED as xfer [facility] w/ AMS and febrile to 101-102 (now resolved). CTH at [facility] shows increased conspicuity of L posterior frontal and R parietal lesions, likely 2/2 hemorrhage (HU ~50-60) c/t 10/28 CTH. Further eval limited by motion artefact. Exam: EOV, regards, nonverbal, no FC, NEWTON spont.     -4h rpt CTH    -If rpt CTH stable, ok for q4h neuro checks, Adm med for infectious workup    -MRI wwo    -Neurology c/s to r/o seizures    -on dex 2 bid at home, incr to 4q6 x24h to assess for any improvement

## 2023-12-19 NOTE — CONSULT NOTE ADULT - ATTENDING COMMENTS
Pt seen on 12/21/23.  Consult as per above note.  Pt known to us s/p resection of right temporal lung met had been seen in office recently.  Pt admitted with mental status changes and found to have hyperdensity of left motor strip and occipital lesions, likely hemorrhage.  Pt was NEWTON spontaneously initially.  Pt was noted to have sz activity and dilated left pupil, and was transferred to ICU for EEG monitoring and further management.  On my exam, pt was vocal, but not verbal, moving left side spontaneously, no movement right side.  There was no change in appearance of hematoma, edema. There was no worsening of mass effect, no midline shift.  Right cerebral edema had substantially improved after tumor resection.  Pt was on Avastin and immunotherapy.  Continue to monitor and adjust sz medication.

## 2023-12-19 NOTE — ED ADULT NURSE NOTE - NS_ED_NURSE_RECEIVING_FACILITY _ED_ALL_ED
[facility]

## 2023-12-19 NOTE — ED ADULT NURSE NOTE - NSFALLCONCLUSION_ED_ALL_ED
Nephrology progress note    Patient is seen and examined, events over the last 24 h noted .  Had 1st HD yesterday with 1 L UF. SOB was still very severe after HD, placed on BiPAP. Received Ethacrynic acid 50 oral after.  CXR pulmonary edema.  I asked pt to agree for 2nd HD today, but he is expecting his family (wife and daughter from [place]) and wants to talk to them. He is DNI/DNR.     Allergies:  sulfa drugs (Other)    Hospital Medications:   MEDICATIONS  (STANDING):  apixaban 2.5 milliGRAM(s) Oral every 12 hours  aspirin enteric coated 81 milliGRAM(s) Oral daily  atorvastatin 40 milliGRAM(s) Oral at bedtime  brimonidine 0.2% Ophthalmic Solution 1 Drop(s) Both EYES at bedtime  calcitriol   Capsule 0.5 MICROGram(s) Oral daily  calcium acetate 1334 milliGRAM(s) Oral three times a day with meals  ceftolozane/tazobactam IVPB 375 milliGRAM(s) IV Intermittent <User Schedule>  chlorhexidine 4% Liquid 1 Application(s) Topical <User Schedule>  dextrose 5%. 1000 milliLiter(s) (50 mL/Hr) IV Continuous <Continuous>  dextrose 50% Injectable 12.5 Gram(s) IV Push once  dextrose 50% Injectable 25 Gram(s) IV Push once  dextrose 50% Injectable 25 Gram(s) IV Push once  famotidine    Tablet 20 milliGRAM(s) Oral daily  hydrALAZINE 50 milliGRAM(s) Oral three times a day  insulin lispro (HumaLOG) corrective regimen sliding scale   SubCutaneous three times a day before meals  insulin lispro (HumaLOG) corrective regimen sliding scale   SubCutaneous at bedtime  latanoprost 0.005% Ophthalmic Solution 1 Drop(s) Both EYES at bedtime  metoprolol tartrate 100 milliGRAM(s) Oral two times a day  NIFEdipine XL 90 milliGRAM(s) Oral daily  predniSONE   Tablet 60 milliGRAM(s) Oral daily  sodium bicarbonate 650 milliGRAM(s) Oral three times a day        VITALS:  T(F): 97.1 (20 @ 05:15), Max: 97.4 (20 @ 22:15)  HR: 76 (20 @ 08:57)  BP: 168/78 (20 @ 05:15)  RR: 20 (20 @ 05:15)  SpO2: 83% (20 @ 08:57)  Wt(kg): --     @ 07:01  -   @ 07:00  --------------------------------------------------------  IN: 0 mL / OUT: 200 mL / NET: -200 mL     @ 07:01  -   @ 07:00  --------------------------------------------------------  IN: 0 mL / OUT: 1500 mL / NET: -1500 mL          PHYSICAL EXAM:  Constitutional: On BiPAP, talking, coherent  HEENT: anicteric sclera  Respiratory: decreased BS b/l  Cardiovascular: S1, S2, RRR  Gastrointestinal: BS+, soft, NT/ND  Extremities: Mild peripheral edema  Neurological: Awake alert  Skin: No rashes  Vascular Access: IJ [place] cath    LABS:      122<L>  |  83<L>  |  117<HH>  ----------------------------<  289<H>  4.2   |  18  |  8.1<HH>    Ca    7.5<L>      2020 09:54  Phos  7.7       Mg     1.6         TPro  5.7<L>  /  Alb  3.1<L>  /  TBili  0.3  /  DBili      /  AST  15  /  ALT  10  /  AlkPhos  182<H>                            7.0    14.52 )-----------( 478      ( 2020 09:54 )             20.3       Urine Studies:  Urinalysis Basic - ( 2020 12:32 )    Color: Yellow / Appearance: Slightly Cloudy / S.020 / pH:   Gluc:  / Ketone: Negative  / Bili: Negative / Urobili: 0.2 mg/dL   Blood:  / Protein: 100 mg/dL / Nitrite: Negative   Leuk Esterase: Large / RBC: 6-10 /HPF / WBC 26-50 /HPF   Sq Epi:  / Non Sq Epi:  / Bacteria: Many      Protein/Creatinine Ratio Calculation: 2.0 Ratio ( @ 12:32)  Creatinine, Random Urine: 62 mg/dL ( @ 12:32)  Sodium, Random Urine: 34.0 mmoL/L ( @ 16:43)  Creatinine, Random Urine: 53 mg/dL ( @ 16:43)  Sodium, Random Urine: 44.0 mmoL/L ( @ 10:35)  Creatinine, Random Urine: 43 mg/dL ( @ 10:35)  Potassium, Random Urine: 14 mmol/L ( @ 10:35)    RADIOLOGY & ADDITIONAL STUDIES: Fall Risk

## 2023-12-19 NOTE — H&P ADULT - PROBLEM SELECTOR PLAN 3
- Hx/o Stage 4 lung adenocarcinoma with diffuse mets to C/A/P and brain s/p Rt craniotomy for tumor resection, & multiple rounds of chemo & RT  - Started C1 of Carbo + Paclitaxel on 11/4, and had C1 bevacizumab on 11/5/23, steroid tapered now on dexamethasone 2mg BID.  - She had C2 carbo+taxol+ bevacizumab+ addition of atezolizumab on 12/1/23.   - Patient was planned for MRI brain on 12/19/23.   - She was planned for restaging CT CAP w/ contrast after C3  - Further chemo/RT planning per Heme-Onc   - Dexamethasone 4mg q6 x 24hr (to see if will improve mentation) >> 2mg BID (home dose) Per Neuro- Surg    - Apprec Hem/Onc rec, f/u for further  rec

## 2023-12-19 NOTE — ED ADULT NURSE NOTE - OBJECTIVE STATEMENT
Pt is a 52 yr old female with pmh of lung CA with mets to the brain transferred from [place] for ams. Per ems- patient was originally found down on the floor at home by family/was not at baseline mental status and sent to [facility]. Pt had a full work up done at - but needed to be medicated with 50 IM Benadryl, 2mg IM Ativan, and 2mg IM Versed in order to obtain CT scans. According to ems patient was aggressive with staff at sending facility. Pt also received abx and fluids prior to transfer. Upon arrival to [facility] patient is asleep- but is arousable to pain and pushes back at staff when trying to care for patient. Pt vitals showing febrile to 101 rectally and tachycardic to 120's. Pt skin intact. Transfer for continuation of oncology care. Pt is a 52 yr old female with pmh of lung CA with mets to the brain transferred from [place] for ams. Per ems- patient was originally found down on the floor at home by family/was not at baseline mental status and sent to [place]. Pt had a full work up done at - but needed to be medicated with 50 IM Benadryl, 2mg IM Ativan, and 2mg IM Versed in order to obtain CT scans. According to ems patient was aggressive with staff at sending facility. Pt also received abx and fluids prior to transfer. Upon arrival to [facility] patient is asleep- but is arousable to pain and pushes back at staff when trying to care for patient. Pt vitals showing febrile to 101 rectally and tachycardic to 120's. Pt skin intact. Transfer for continuation of oncology care.

## 2023-12-19 NOTE — H&P ADULT - HISTORY OF PRESENT ILLNESS
Pt poor historian 2/2 AMS, history obtained form chart review     52y F PMH of smoking, stage 4 lung adenocarcinoma with diffuse mets to C/A/P and brain s/p Rt craniotomy for tumor resection, & multiple rounds of chemo & RT, steroid induced DM, GERD who presents to [place] ED for AMS. Per chart review pt had nonsensical speech and c/o HA & R sided weakness so was taken to ED. Patient was reportedly very agitated and violent at [place] ED. Code stroke was called.  Patient required sedation w/ benadryl, ativan and versed for agitation in order to obtain CTH.  CTH at [facility] shows increased conspicuity of L posterior frontal and R parietal lesions, likely 2/2 hemorrhage (HU ~50-60) c/t 10/28 CTH. Further eval limited by motion artifact Patient was noted to be febrile at [facility] and started on vancomycin, zosyn. Rapid RVP negative.  Pt ultimately transferred to [facility] for continued care given pt known to heme-onc & neuro-surg at [facility]           Pt poor historian 2/2 AMS, history obtained form chart review     52y F PMH of smoking, stage 4 lung adenocarcinoma with diffuse mets to C/A/P and brain s/p Rt craniotomy for tumor resection, & multiple rounds of chemo & RT, steroid induced DM, GERD who presents to [facility] ED for AMS. Per chart review pt had nonsensical speech and c/o HA & R sided weakness so was taken to ED. Patient was reportedly very agitated and violent at [facility] ED. Code stroke was called.  Patient required sedation w/ benadryl, ativan and versed for agitation in order to obtain CTH.  CTH at [facility] shows increased conspicuity of L posterior frontal and R parietal lesions, likely 2/2 hemorrhage (HU ~50-60) c/t 10/28 CTH. Further eval limited by motion artifact Patient was noted to be febrile at [facility] and started on vancomycin, zosyn. Rapid RVP negative.  Pt ultimately transferred to [facility] for continued care given pt known to heme-onc & neuro-surg at [facility]

## 2023-12-19 NOTE — H&P ADULT - PROBLEM SELECTOR PLAN 2
DDx: infection vs electrolyte abnormalities vs Cardio vs neuro d/o vs debility   - Reportedly more confused with nonsensical speech    - Febrile,  tachycardic, but nontoxic appearing   - Labs: chem --abnorm (?collection error), Trop 98 > 72 , RVP/COVID neg   -  CThead: stable  ;  CTH at [facility] showed ?Hypodensity in the right temporal parietal region may represent   subacute or chronic ischemic changes.    - Infection (likely): febrile, p/w AMS, immunosuppressed, although RVP(-) high r/o infection, c/w Vanco 1g (12/19 - ? ) , zosyn 3.375g (12/19 - ?), f/u Bcx, Ucx, monitor fever curve   - Electrolytes: chem grossly abnorm, could be 2/2 collection error, will obtain repeat , trend labs   - Cardiac (less likely): Noted to be tachycardic, ekg sinus tachy, trop elevated but down trended. Tachycardia likely iso infection, agitation. Tele monitor . If tachycardia persist despite fluid resuscitation, improvement  in agitation, Abx, consider doing getting echo   - Neuro: hx/o lung Ca w/diffuse mets, Agitated and confused on exam with nonsensical speech. CTH here stable though CTH at [facility] showed ?Hypodensity in the right temporal parietal region may represent   subacute or chronic ischemic changes.  Neuro etiology possible. Apprec Neuro-Surg rec, MRI brain w/wo (ordered), q4 neuro checks, steroids per Neuro-surg, Neuro consult  (called O/N) , C/w Depakote, check level. EEG (ordered) to eval for ?seizure    - Fall, Seizure precx,  PT eval DDx: infection vs electrolyte abnormalities vs Cardio vs neuro d/o vs debility   - Reportedly more confused with nonsensical speech    - Febrile,  tachycardic, but nontoxic appearing   - Labs: chem --abnorm (?collection error), Trop 98 > 72 , RVP/COVID neg   -  CThead: stable  ;  CTH at [facility] showed ?Hypodensity in the right temporal parietal region may represent   subacute or chronic ischemic changes.    - Infection (likely): febrile, p/w AMS, immunosuppressed, although RVP(-) high r/o infection, c/w Vanco 1g (12/19 - ? ) , zosyn 3.375g (12/19 - ?), f/u Bcx, Ucx, monitor fever curve   - Electrolytes: chem grossly abnorm, could be 2/2 collection error, will obtain repeat , trend labs   - Cardiac (less likely): Noted to be tachycardic, ekg sinus tachy, trop elevated but down trended. Tachycardia likely iso infection, agitation. Tele monitor . If tachycardia persist despite fluid resuscitation, improvement  in agitation, Abx, consider doing getting echo   - Neuro: hx/o lung Ca w/diffuse mets, Agitated and confused on exam with nonsensical speech. CTH here stable though CTH at [facility] showed ?Hypodensity in the right temporal parietal region may represent   subacute or chronic ischemic changes.  Neuro etiology possible. Apprec Neuro-Surg rec, MRI brain w/wo (ordered), q4 neuro checks, steroids per Neuro-surg, Neuro consult  (called O/N) , C/w Depakote, check level. EEG (ordered) to eval for ?seizure    - Fall, Seizure precx,  PT eval

## 2023-12-19 NOTE — ED ADULT NURSE NOTE - CHIEF COMPLAINT QUOTE
Ams found on floor unresponsive transfer from [place] Ams found on floor unresponsive transfer from [place]

## 2023-12-19 NOTE — H&P ADULT - ASSESSMENT
52y F PMH of smoking, stage 4 lung adenocarcinoma with diffuse mets to C/A/P and brain s/p Rt craniotomy for tumor resection, multiple rounds of chemo & RT, steroid induced DM, GERD who initially presented to [place] ED for AMS, transferred to [facility] for continuity of care  52y F PMH of smoking, stage 4 lung adenocarcinoma with diffuse mets to C/A/P and brain s/p Rt craniotomy for tumor resection, multiple rounds of chemo & RT, steroid induced DM, GERD who initially presented to [place] ED for AMS, transferred to [facility] for continuity of care

## 2023-12-19 NOTE — H&P ADULT - PROBLEM SELECTOR PLAN 4
- Hold home DM meds (Metformin)  - ISS ACHS  - Check FS, adjust insulin accordingly   - DASH/CC diet  -  A1c (11/1/23) was 7.7

## 2023-12-19 NOTE — ED ADULT TRIAGE NOTE - ARRIVAL FROM
[facility] Stream/Hospitals/Psychiatric Facilities [facility] Stream/Hospitals/Psychiatric Facilities

## 2023-12-20 NOTE — CONSULT NOTE ADULT - SUBJECTIVE AND OBJECTIVE BOX
Neurology - Consult Note    -  Spectra: [phone] ([facility]), [phone] ([facility])  -  Patient unable to participate in interview. History per chart review     HPI: Patient HARJEET WHITMORE is a 52y (1970) woman with a PMHx significant for smoking, stage 4 lung adenocarcinoma with diffuse mets to C/A/P and brain s/p Rt craniotomy for tumor resection, & multiple rounds of chemo & RT, steroid induced DM, GERD who presents to [place] ED for AMS. Per chart review pt had nonsensical speech and c/o HA & R sided weakness so was taken to ED. Patient was reportedly very agitated and violent at [place] ED. Code stroke was called.  Patient required sedation w/ benadryl, ativan and versed for agitation in order to obtain CTH. CTH as below. Patient was noted to be febrile at  and started on vancomycin, zosyn. Rapid RVP negative. Pt ultimately transferred to [facility] for continued care given pt known to heme-onc & neuro-surg at [facility]. Neurology consulted for seizure concern.      Review of Systems:    unable to obtain    Allergies:  No Known Allergies       PMHx/PSHx/Family Hx: As above, otherwise see below   GERD (Gastroesophageal Reflux Disease)    Hydrosalpinx    GERD (Gastroesophageal Reflux Disease)    Ulcer    Lung mass    Non-small cell lung cancer with metastasis        Social Hx:  unable to obtain    Medications:  MEDICATIONS  (STANDING):  artificial tears (preservative free) Ophthalmic Solution 1 Drop(s) Both EYES three times a day  atorvastatin 40 milliGRAM(s) Oral at bedtime  dexAMETHasone     Tablet 4 milliGRAM(s) Oral every 6 hours  divalproex  milliGRAM(s) Oral every 12 hours  enoxaparin Injectable 40 milliGRAM(s) SubCutaneous every 24 hours  metoprolol tartrate 12.5 milliGRAM(s) Oral two times a day  pantoprazole    Tablet 40 milliGRAM(s) Oral before breakfast  petrolatum white Ointment 1 Application(s) Topical two times a day  piperacillin/tazobactam IVPB.. 3.375 Gram(s) IV Intermittent every 8 hours  polyethylene glycol 3350 17 Gram(s) Oral daily  sodium chloride 0.9%. 1000 milliLiter(s) (100 mL/Hr) IV Continuous <Continuous>  vancomycin  IVPB 1000 milliGRAM(s) IV Intermittent every 24 hours    MEDICATIONS  (PRN):  acetaminophen     Tablet .. 650 milliGRAM(s) Oral every 6 hours PRN Temp greater or equal to 38C (100.4F), Mild Pain (1 - 3)  aluminum hydroxide/magnesium hydroxide/simethicone Suspension 30 milliLiter(s) Oral every 4 hours PRN Dyspepsia  melatonin 3 milliGRAM(s) Oral at bedtime PRN Insomnia  ondansetron Injectable 4 milliGRAM(s) IV Push every 8 hours PRN Nausea and/or Vomiting  senna 2 Tablet(s) Oral at bedtime PRN Constipation      Vitals:  T(C): 36.7 (12-20-23 @ 03:11), Max: 38.5 (12-19-23 @ 17:00)  HR: 104 (12-20-23 @ 01:02) (96 - 125)  BP: 115/82 (12-20-23 @ 03:11) (97/82 - 115/82)  RR: 16 (12-20-23 @ 01:02) (16 - 20)  SpO2: 97% (12-20-23 @ 00:24) (96% - 99%)    Physical Examination:  Examination limited by patient cooperation and violent tendencies  In depth examination deferred for safety    She is awake and alert.  She is found with 1 leg over guardrail of the bed, fully exposed, and screaming.  Her speech is nonsensical occasionally saying hi hi hi or ta-ta-ta.  There is no clear dysarthria.  When I attempted to approach her for examination she swatted at me.  She seems able to track without obvious disconjugate gaze. She moves all extremities.  She is thin.  She appears to be breathing comfortably on room air.  No gross edema visible from safe distance.  No obvious facial asymmetry noted.       Labs:    12-19    145  |  126<H>  |  5<L>  ----------------------------<  72  <2.0<LL>   |  10<LL>  |  <0.30<L>    Ca    3.9<LL>      19 Dec 2023 22:23    TPro  2.9<L>  /  Alb  1.1<L>  /  TBili  <0.1<L>  /  DBili  x   /  AST  27  /  ALT  6<L>  /  AlkPhos  46  12-19    CAPILLARY BLOOD GLUCOSE        LIVER FUNCTIONS - ( 19 Dec 2023 22:23 )  Alb: 1.1 g/dL / Pro: 2.9 g/dL / ALK PHOS: 46 U/L / ALT: 6 U/L / AST: 27 U/L / GGT: x             PT/INR - ( 19 Dec 2023 17:35 )   PT: 14.7 sec;   INR: 1.35 ratio         PTT - ( 19 Dec 2023 17:35 )  PTT:21.2 sec  CSF:                  Radiology:  CT Head No Cont:  (19 Dec 2023 18:55)  < from: CT Head No Cont (12.19.23 @ 18:55) >    ACC: [id number] EXAM:  CT BRAIN   ORDERED BY:  WARREN WHEAT     PROCEDURE DATE:  12/19/2023          INTERPRETATION:  CLINICAL INFORMATION: 4 hr interval scan due now 5pm   Altered mental status.    COMPARISON: CT head 10/31/2023. MR brain 11/2/2023.    TECHNIQUE:  Serial axial images were obtained from the skull base to the   vertex using multi-slice helical technique. Sagittal and coronal   reformats were obtained.    FINDINGS:    VENTRICLES AND SULCI: Age appropriate involutional changes. Ex-vacuo   dilatation of the right temporal horn and left occipital horn. No   hydrocephalus.  INTRA-AXIAL: Right temporal encephalomalacia and postoperative bed   subjacent to temporal craniectomy. A 2.6 x 2.4 x 1.7 cm partially   calcified left occipital lesion (AP x TV x CC) with markedly decreased   vasogenic edema, slightly decreased in size compared to MR 11/2/2023. A   1.9 x 1.4 x 1.7 cm left posterior frontal lesion with a hyperattenuating   thick incomplete rim with decreased vasogenic edema, slightly decreased   in size compared to 11/2/2023. No acute hemorrhage or midline shift. Mild   periventricular white matter hypoattenuation.  EXTRA-AXIAL: No mass or fluid collection. Basal cisterns are normal in   appearance.    VISUALIZED SINUSES:  Clear.  TYMPANOMASTOID CAVITIES:  Clear.  VISUALIZED ORBITS: Normal.  CALVARIUM: Status post right temporal craniectomy.    MISCELLANEOUS: None.      IMPRESSION:  No acute hemorrhage or midline shift.    Redemonstration of intraparenchymal metastatic lesions, slight decrease   in size compared to prior MR. Markedly decreased surrounding vasogenic   edema. Contrast-enhanced MR brain may be considered for further   evaluation, if clinically warranted.    Right temporal craniectomy with similar-appearing encephalomalacia of the   postoperative bed.    --- End of Report ---          SAMMY DAVIS MD; Resident Radiologist  This document has been electronically signed.  CAMRON HERNANDEZ MD; Attending Radiologist  This document has been electronicallysigned. Dec 19 2023  8:52PM    < end of copied text >      < from: CT Chest w/ IV Cont (12.19.23 @ 18:55) >    ACC: [id number] EXAM:  CT ABDOMEN AND PELVIS IC   ORDERED BY:  WARREN WHEAT     ACC: [id number] EXAM:  CT CHEST IC   ORDERED BY:  WARREN WHEAT     PROCEDURE DATE:  12/19/2023          INTERPRETATION:  CLINICAL INFORMATION: Lung cancer with brain metastases.   Altered mental status. Evaluate for infection.    COMPARISON: CT chest 11/14/2023. CT chest abdomen pelvis 10/31/2023.    CONTRAST/COMPLICATIONS:  IV Contrast: Omnipaque 350 (accession [id number]), IV contrast documented   in unlinked concurrent exam (accession [id number])  90 cc administered   10   cc discarded  Oral Contrast: NONE  Complications: None reported at time of study completion    PROCEDURE:  CT of the Chest, Abdomen and Pelvis was performed.  Sagittal and coronal reformats were performed.    FINDINGS:  CHEST:  LUNGS AND LARGE AIRWAYS: Patent central airways. Redemonstrated lobulated   right upper lobe mass which appears slightly decreased as compared with   prior measuring 6.1 x 4.0 cm, previously 6.5 x 5.0 cm on 11/14/2023.   Previously seen bilateral pulmonary metastases again noted, some of which   have decreased in size with reference left lower lobe nodule measuring   0.6 cm (4:123), previously 1.2 cm on 11/14/2023 and right upper lobe 0.6   cm nodule (4:73), previously 0.8 cm. Right upper lobe linear atelectasis.  PLEURA: No pleural effusion.  VESSELS: Within normal limits.  HEART: Heart size is normal. No pericardial effusion.  MEDIASTINUM AND YULIET: No lymphadenopathy.  CHEST WALL AND LOWER NECK: Within normal limits.    ABDOMEN AND PELVIS:  LIVER: Innumerable bilobar hepatic metastases, decreased in size as   compared with 10/31/2023, with reference left hepatic lobe lesion   measuring 5.2 cm (4:178), previously 6.4 cm and right hepatic lobe lesion   measuring 3.0 cm (4:140), previously 5.0 cm.  BILE DUCTS: Normal caliber.  GALLBLADDER: Within normal limits.  SPLEEN: Within normal limits.  PANCREAS: Within normal limits.  ADRENALS: Within normal limits.  KIDNEYS/URETERS: Within normal limits.    BLADDER: Minimally distended.  REPRODUCTIVE ORGANS: Tiny uterine fibroid. Otherwise, uterus and adnexa   are within normal limits.    BOWEL: No bowel obstruction. Appendix is normal.  PERITONEUM: No ascites.  VESSELS: Atherosclerotic changes.  Portal veins are patent.  RETROPERITONEUM/LYMPH NODES: No lymphadenopathy.  ABDOMINAL WALL: Previously seen scattered enhancing subcutaneous nodules   have decreased in size with reference nodule near the right iliac wing   (4:251) measuring 0.4 cm, previously 1.2 cm.  BONES: Newly visualized sclerotic lesions in the T1 and T10 vertebral   bodies.    IMPRESSION:  No evidence for infection within the chest, abdomen, or pelvis.    Metastatic lung cancer in the right upper lobe, slightly decreased in   size ascompared with 11/14/2023.    Extensive metastatic liver disease, mildly decreased in size as compared   with 10/31/2023.    T1 and T10 sclerotic vertebral body lesions which were not visualized on   11/14/2023 CT, probably now visible due to posttreatment change.    Decrease in size of subcutaneous nodules        --- End of Report ---           YASMEEN TRIPATHI MD; Resident Radiologist  This document has been electronically signed.  LORA FERNANDEZ MD; Attending Radiologist  This document has been electronically signed. Dec 19 2023  7:56PM    < end of copied text >       Neurology - Consult Note    -  Spectra: [phone] ([facility]), [phone] ([facility])  -  Patient unable to participate in interview. History per chart review     HPI: Patient HARJEET WHITMORE is a 52y (1970) woman with a PMHx significant for smoking, stage 4 lung adenocarcinoma with diffuse mets to C/A/P and brain s/p Rt craniotomy for tumor resection, & multiple rounds of chemo & RT, steroid induced DM, GERD who presents to [place] ED for AMS. Per chart review pt had nonsensical speech and c/o HA & R sided weakness so was taken to ED. Patient was reportedly very agitated and violent at [place] ED. Code stroke was called.  Patient required sedation w/ benadryl, ativan and versed for agitation in order to obtain CTH. CTH as below. Patient was noted to be febrile at  and started on vancomycin, zosyn. Rapid RVP negative. Pt ultimately transferred to [facility] for continued care given pt known to heme-onc & neuro-surg at [facility]. Neurology consulted for seizure concern.      Review of Systems:    unable to obtain    Allergies:  No Known Allergies       PMHx/PSHx/Family Hx: As above, otherwise see below   GERD (Gastroesophageal Reflux Disease)    Hydrosalpinx    GERD (Gastroesophageal Reflux Disease)    Ulcer    Lung mass    Non-small cell lung cancer with metastasis        Social Hx:  unable to obtain    Medications:  MEDICATIONS  (STANDING):  artificial tears (preservative free) Ophthalmic Solution 1 Drop(s) Both EYES three times a day  atorvastatin 40 milliGRAM(s) Oral at bedtime  dexAMETHasone     Tablet 4 milliGRAM(s) Oral every 6 hours  divalproex  milliGRAM(s) Oral every 12 hours  enoxaparin Injectable 40 milliGRAM(s) SubCutaneous every 24 hours  metoprolol tartrate 12.5 milliGRAM(s) Oral two times a day  pantoprazole    Tablet 40 milliGRAM(s) Oral before breakfast  petrolatum white Ointment 1 Application(s) Topical two times a day  piperacillin/tazobactam IVPB.. 3.375 Gram(s) IV Intermittent every 8 hours  polyethylene glycol 3350 17 Gram(s) Oral daily  sodium chloride 0.9%. 1000 milliLiter(s) (100 mL/Hr) IV Continuous <Continuous>  vancomycin  IVPB 1000 milliGRAM(s) IV Intermittent every 24 hours    MEDICATIONS  (PRN):  acetaminophen     Tablet .. 650 milliGRAM(s) Oral every 6 hours PRN Temp greater or equal to 38C (100.4F), Mild Pain (1 - 3)  aluminum hydroxide/magnesium hydroxide/simethicone Suspension 30 milliLiter(s) Oral every 4 hours PRN Dyspepsia  melatonin 3 milliGRAM(s) Oral at bedtime PRN Insomnia  ondansetron Injectable 4 milliGRAM(s) IV Push every 8 hours PRN Nausea and/or Vomiting  senna 2 Tablet(s) Oral at bedtime PRN Constipation      Vitals:  T(C): 36.7 (12-20-23 @ 03:11), Max: 38.5 (12-19-23 @ 17:00)  HR: 104 (12-20-23 @ 01:02) (96 - 125)  BP: 115/82 (12-20-23 @ 03:11) (97/82 - 115/82)  RR: 16 (12-20-23 @ 01:02) (16 - 20)  SpO2: 97% (12-20-23 @ 00:24) (96% - 99%)    Physical Examination:  Examination limited by patient cooperation and violent tendencies  In depth examination deferred for safety    She is awake and alert.  She is found with 1 leg over guardrail of the bed, fully exposed, and screaming.  Her speech is nonsensical occasionally saying hi hi hi or ta-ta-ta.  There is no clear dysarthria.  When I attempted to approach her for examination she swatted at me.  She seems able to track without obvious disconjugate gaze. She moves all extremities.  She is thin.  She appears to be breathing comfortably on room air.  No gross edema visible from safe distance.  No obvious facial asymmetry noted.       Labs:    12-19    145  |  126<H>  |  5<L>  ----------------------------<  72  <2.0<LL>   |  10<LL>  |  <0.30<L>    Ca    3.9<LL>      19 Dec 2023 22:23    TPro  2.9<L>  /  Alb  1.1<L>  /  TBili  <0.1<L>  /  DBili  x   /  AST  27  /  ALT  6<L>  /  AlkPhos  46  12-19    CAPILLARY BLOOD GLUCOSE        LIVER FUNCTIONS - ( 19 Dec 2023 22:23 )  Alb: 1.1 g/dL / Pro: 2.9 g/dL / ALK PHOS: 46 U/L / ALT: 6 U/L / AST: 27 U/L / GGT: x             PT/INR - ( 19 Dec 2023 17:35 )   PT: 14.7 sec;   INR: 1.35 ratio         PTT - ( 19 Dec 2023 17:35 )  PTT:21.2 sec  CSF:                  Radiology:  CT Head No Cont:  (19 Dec 2023 18:55)  < from: CT Head No Cont (12.19.23 @ 18:55) >    ACC: [id number] EXAM:  CT BRAIN   ORDERED BY:  WARREN WHEAT     PROCEDURE DATE:  12/19/2023          INTERPRETATION:  CLINICAL INFORMATION: 4 hr interval scan due now 5pm   Altered mental status.    COMPARISON: CT head 10/31/2023. MR brain 11/2/2023.    TECHNIQUE:  Serial axial images were obtained from the skull base to the   vertex using multi-slice helical technique. Sagittal and coronal   reformats were obtained.    FINDINGS:    VENTRICLES AND SULCI: Age appropriate involutional changes. Ex-vacuo   dilatation of the right temporal horn and left occipital horn. No   hydrocephalus.  INTRA-AXIAL: Right temporal encephalomalacia and postoperative bed   subjacent to temporal craniectomy. A 2.6 x 2.4 x 1.7 cm partially   calcified left occipital lesion (AP x TV x CC) with markedly decreased   vasogenic edema, slightly decreased in size compared to MR 11/2/2023. A   1.9 x 1.4 x 1.7 cm left posterior frontal lesion with a hyperattenuating   thick incomplete rim with decreased vasogenic edema, slightly decreased   in size compared to 11/2/2023. No acute hemorrhage or midline shift. Mild   periventricular white matter hypoattenuation.  EXTRA-AXIAL: No mass or fluid collection. Basal cisterns are normal in   appearance.    VISUALIZED SINUSES:  Clear.  TYMPANOMASTOID CAVITIES:  Clear.  VISUALIZED ORBITS: Normal.  CALVARIUM: Status post right temporal craniectomy.    MISCELLANEOUS: None.      IMPRESSION:  No acute hemorrhage or midline shift.    Redemonstration of intraparenchymal metastatic lesions, slight decrease   in size compared to prior MR. Markedly decreased surrounding vasogenic   edema. Contrast-enhanced MR brain may be considered for further   evaluation, if clinically warranted.    Right temporal craniectomy with similar-appearing encephalomalacia of the   postoperative bed.    --- End of Report ---          SAMMY DAVIS MD; Resident Radiologist  This document has been electronically signed.  CAMRON HERNANDEZ MD; Attending Radiologist  This document has been electronicallysigned. Dec 19 2023  8:52PM    < end of copied text >      < from: CT Chest w/ IV Cont (12.19.23 @ 18:55) >    ACC: [id number] EXAM:  CT ABDOMEN AND PELVIS IC   ORDERED BY:  WARREN WHEAT     ACC: [id number] EXAM:  CT CHEST IC   ORDERED BY:  WARREN WHEAT     PROCEDURE DATE:  12/19/2023          INTERPRETATION:  CLINICAL INFORMATION: Lung cancer with brain metastases.   Altered mental status. Evaluate for infection.    COMPARISON: CT chest 11/14/2023. CT chest abdomen pelvis 10/31/2023.    CONTRAST/COMPLICATIONS:  IV Contrast: Omnipaque 350 (accession [id number]), IV contrast documented   in unlinked concurrent exam (accession [id number])  90 cc administered   10   cc discarded  Oral Contrast: NONE  Complications: None reported at time of study completion    PROCEDURE:  CT of the Chest, Abdomen and Pelvis was performed.  Sagittal and coronal reformats were performed.    FINDINGS:  CHEST:  LUNGS AND LARGE AIRWAYS: Patent central airways. Redemonstrated lobulated   right upper lobe mass which appears slightly decreased as compared with   prior measuring 6.1 x 4.0 cm, previously 6.5 x 5.0 cm on 11/14/2023.   Previously seen bilateral pulmonary metastases again noted, some of which   have decreased in size with reference left lower lobe nodule measuring   0.6 cm (4:123), previously 1.2 cm on 11/14/2023 and right upper lobe 0.6   cm nodule (4:73), previously 0.8 cm. Right upper lobe linear atelectasis.  PLEURA: No pleural effusion.  VESSELS: Within normal limits.  HEART: Heart size is normal. No pericardial effusion.  MEDIASTINUM AND YULIET: No lymphadenopathy.  CHEST WALL AND LOWER NECK: Within normal limits.    ABDOMEN AND PELVIS:  LIVER: Innumerable bilobar hepatic metastases, decreased in size as   compared with 10/31/2023, with reference left hepatic lobe lesion   measuring 5.2 cm (4:178), previously 6.4 cm and right hepatic lobe lesion   measuring 3.0 cm (4:140), previously 5.0 cm.  BILE DUCTS: Normal caliber.  GALLBLADDER: Within normal limits.  SPLEEN: Within normal limits.  PANCREAS: Within normal limits.  ADRENALS: Within normal limits.  KIDNEYS/URETERS: Within normal limits.    BLADDER: Minimally distended.  REPRODUCTIVE ORGANS: Tiny uterine fibroid. Otherwise, uterus and adnexa   are within normal limits.    BOWEL: No bowel obstruction. Appendix is normal.  PERITONEUM: No ascites.  VESSELS: Atherosclerotic changes.  Portal veins are patent.  RETROPERITONEUM/LYMPH NODES: No lymphadenopathy.  ABDOMINAL WALL: Previously seen scattered enhancing subcutaneous nodules   have decreased in size with reference nodule near the right iliac wing   (4:251) measuring 0.4 cm, previously 1.2 cm.  BONES: Newly visualized sclerotic lesions in the T1 and T10 vertebral   bodies.    IMPRESSION:  No evidence for infection within the chest, abdomen, or pelvis.    Metastatic lung cancer in the right upper lobe, slightly decreased in   size ascompared with 11/14/2023.    Extensive metastatic liver disease, mildly decreased in size as compared   with 10/31/2023.    T1 and T10 sclerotic vertebral body lesions which were not visualized on   11/14/2023 CT, probably now visible due to posttreatment change.    Decrease in size of subcutaneous nodules        --- End of Report ---           YASMEEN TRIPATHI MD; Resident Radiologist  This document has been electronically signed.  LORA FERNANDEZ MD; Attending Radiologist  This document has been electronically signed. Dec 19 2023  7:56PM    < end of copied text >

## 2023-12-20 NOTE — PHYSICAL THERAPY INITIAL EVALUATION ADULT - RANGE OF MOTION EXAMINATION, REHAB EVAL
except L shoulder flexion ~70deg with pt resisting further range of motion, and L ankle in full plantar flexion/bilateral upper extremity ROM was WFL (within functional limits)/bilateral lower extremity ROM was WFL (within functional limits)

## 2023-12-20 NOTE — PROGRESS NOTE ADULT - PROBLEM SELECTOR PLAN 1
- Febrile + tachycardic + suspected infection   - s/p Vanco, Zosyn, NS 1L in ED - no confirmed infection as of now. Will dc Vanco pending MRSA swab as reliability of blood work is dependent on pt allowing blood draws  Continue empiric zosyn for now but pt is afebrile  - Check bcx, Ucx  - Trend labs, monitor fever curve

## 2023-12-20 NOTE — CHART NOTE - NSCHARTNOTEFT_GEN_A_CORE
case discussed w/ attending Dr. Marquez. Giving load of Vimpat 200mg 1x now, followed by 100mg q12h maintenance dose (to be given 4-6h after loading dose). Will continue to follow. Call [phone] with any questions. case discussed w/ attending Dr. Marquez. Giving load of Vimpat 200mg 1x now, followed by 100mg q12h maintenance dose (to be given 4-6h after loading dose). Will continue to follow. Call [phone] with any questions.

## 2023-12-20 NOTE — CONSULT NOTE ADULT - ASSESSMENT
HPI: Patient HARJEET WHITMORE is a 52y (1970) woman with a PMHx significant for smoking, stage 4 lung adenocarcinoma dx 2022 with diffuse mets to C/A/P and brain s/p Rt craniotomy for tumor resection, & multiple rounds of chemo & RT, steroid induced DM, GERD who presents to [place] ED for AMS. Per chart review pt had nonsensical speech and c/o HA & R sided weakness. Agitated, violent. CTH as noted. febrile at VS and started on vancomycin, zosyn. Rapid RVP negative. Pt ultimately transferred to [facility] for continued care given pt known to heme-onc & neuro-surg at [facility]. She is found to have tropinemia, hypokalemia, severe hypocalcemia, hypoalbuminemia   Neurology consulted for seizure concern.     Impression: Acute behavioral changes iso multiple electrolyte derangements and fever favoring a secondary encephalopathy. Her cancer with known brain mets raise concern for paraneoplastic process vs new/expanding masses causing focal process not well characterized on CT. Low concern for postictal psychosis given no reported history of seizure clusters.    Recommendations  []Would hold off on EEG for now as she is unlikely to allow placement of leads without sedation which would mask findings;   []Would prioritize diagnosis and correction of electrolyte abnormalities which are likely driving acute encephalopathic process  []Can pursue MRI brain with and without contrast. Would likely need sedation  []Steroids per neurosurgery  []Can check valproic acid level, albumin, and ammonia but no concern for valproic acid toxicity   []Tox screen, TSH, Free T4, B1, B6, B12, CBC w/ diff  []Paraneoplastic panel  []Further recommendations pending clinical course, response and results of above    Case not yet discussed with attending. Recommendations not final until attested. HPI: Patient HARJEET WHITMORE is a 52y (1970) woman with a PMHx significant for smoking, stage 4 lung adenocarcinoma dx 2022 with diffuse mets to C/A/P and brain s/p Rt craniotomy for tumor resection, & multiple rounds of chemo & RT, steroid induced DM, GERD who presents to [place] ED for AMS. Per chart review pt had nonsensical speech and c/o HA & R sided weakness. Agitated, violent. CTH as noted. febrile at VS and started on vancomycin, zosyn. Rapid RVP negative. Pt ultimately transferred to [facility] for continued care given pt known to heme-onc & neuro-surg at [facility]. She is found to have tropinemia, hypokalemia, severe hypocalcemia, hypoalbuminemia   Neurology consulted for seizure concern.     Impression: Acute behavioral changes iso multiple electrolyte derangements and fever favoring a secondary encephalopathy. Her cancer with known brain mets raise concern for paraneoplastic process vs new/expanding masses causing focal process not well characterized on CT. Low concern for postictal psychosis given no reported history of seizure clusters.    Recommendations  []Would hold off on EEG for now as she is unlikely to allow placement of leads without sedation which would mask findings;   []Would prioritize diagnosis and correction of electrolyte abnormalities which are likely driving acute encephalopathic process  []Can pursue MRI brain with and without contrast. Would likely need sedation  []Steroids per neurosurgery  []Can check valproic acid level, albumin, and ammonia but no concern for valproic acid toxicity   []Tox screen, TSH, Free T4, B1, B6, B12, CBC w/ diff  []Paraneoplastic panel  []Further recommendations pending clinical course, response and results of above    Case not yet discussed with attending. Recommendations not final until attested.

## 2023-12-20 NOTE — CHART NOTE - NSCHARTNOTEFT_GEN_A_CORE
52-year-old female with PMH T2 DM, GERD lung cancer and metastasis to the brain, GERD, S4 lung cancer with mets to brain steroid induced Diabetes Mellitus, gs/p temporal crani/resection of metastatic lesion presents as transfer from [place] forAMS - pulled out heplock. Midline ordered and awaiting  insertion. Patient will need a IJ heplock and repeat labs. Tylenol suppository ordered for fever 52-year-old female with PMH T2 DM, GERD lung cancer and metastasis to the brain, GERD, S4 lung cancer with mets to brain steroid induced Diabetes Mellitus, gs/p temporal crani/resection of metastatic lesion presents as transfer from [place] forAMS - pulled out heplock. Midline ordered and awaiting  insertion. Patient will need a IJ heplock and repeat labs. Tylenol suppository ordered for fever

## 2023-12-20 NOTE — PROGRESS NOTE ADULT - SUBJECTIVE AND OBJECTIVE BOX
[facility] Division of Hospital Medicine  Mery Little MD  MS Teams PREFERRED        SUBJECTIVE / OVERNIGHT EVENTS: Seen and examined at bedside this morning. NAD.    MEDICATIONS  (STANDING):  artificial tears (preservative free) Ophthalmic Solution 1 Drop(s) Both EYES three times a day  atorvastatin 40 milliGRAM(s) Oral at bedtime  dexAMETHasone     Tablet 4 milliGRAM(s) Oral every 6 hours  dextrose 5%. 1000 milliLiter(s) (50 mL/Hr) IV Continuous <Continuous>  dextrose 5%. 1000 milliLiter(s) (100 mL/Hr) IV Continuous <Continuous>  dextrose 50% Injectable 25 Gram(s) IV Push once  dextrose 50% Injectable 25 Gram(s) IV Push once  dextrose 50% Injectable 12.5 Gram(s) IV Push once  divalproex  milliGRAM(s) Oral every 12 hours  glucagon  Injectable 1 milliGRAM(s) IntraMuscular once  insulin lispro (ADMELOG) corrective regimen sliding scale   SubCutaneous at bedtime  insulin lispro (ADMELOG) corrective regimen sliding scale   SubCutaneous three times a day before meals  metoprolol tartrate 12.5 milliGRAM(s) Oral two times a day  pantoprazole    Tablet 40 milliGRAM(s) Oral before breakfast  petrolatum white Ointment 1 Application(s) Topical two times a day  piperacillin/tazobactam IVPB.. 3.375 Gram(s) IV Intermittent every 8 hours  polyethylene glycol 3350 17 Gram(s) Oral daily  sodium chloride 0.9%. 1000 milliLiter(s) (100 mL/Hr) IV Continuous <Continuous>  vancomycin  IVPB 1000 milliGRAM(s) IV Intermittent every 24 hours    MEDICATIONS  (PRN):  acetaminophen     Tablet .. 650 milliGRAM(s) Oral every 6 hours PRN Temp greater or equal to 38C (100.4F), Mild Pain (1 - 3)  aluminum hydroxide/magnesium hydroxide/simethicone Suspension 30 milliLiter(s) Oral every 4 hours PRN Dyspepsia  dextrose Oral Gel 15 Gram(s) Oral once PRN Blood Glucose LESS THAN 70 milliGRAM(s)/deciliter  melatonin 3 milliGRAM(s) Oral at bedtime PRN Insomnia  ondansetron Injectable 4 milliGRAM(s) IV Push every 8 hours PRN Nausea and/or Vomiting  senna 2 Tablet(s) Oral at bedtime PRN Constipation      I&O's Summary      PHYSICAL EXAM:  Vital Signs Last 24 Hrs  T(C): 36.4 (20 Dec 2023 13:26), Max: 38.5 (19 Dec 2023 17:00)  T(F): 97.5 (20 Dec 2023 13:26), Max: 101.3 (19 Dec 2023 17:00)  HR: 106 (20 Dec 2023 13:26) (96 - 125)  BP: 104/70 (20 Dec 2023 13:26) (97/82 - 125/77)  BP(mean): 90 (19 Dec 2023 18:15) (89 - 90)  RR: 16 (20 Dec 2023 13:26) (16 - 20)  SpO2: 98% (20 Dec 2023 13:26) (96% - 99%)    Parameters below as of 20 Dec 2023 13:26  Patient On (Oxygen Delivery Method): room air      CONSTITUTIONAL: limited due to pt behavior  Attempted to kick examiner during exam, pt is awake and alert, covered head with blanket  Biting staff  Deferred remainder of exam for safety.    LABS:    12-19    145  |  126<H>  |  5<L>  ----------------------------<  72  <2.0<LL>   |  10<LL>  |  <0.30<L>    Ca    3.9<LL>      19 Dec 2023 22:23    TPro  2.9<L>  /  Alb  1.1<L>  /  TBili  <0.1<L>  /  DBili  x   /  AST  27  /  ALT  6<L>  /  AlkPhos  46  12-19    PT/INR - ( 19 Dec 2023 17:35 )   PT: 14.7 sec;   INR: 1.35 ratio         PTT - ( 19 Dec 2023 17:35 )  PTT:21.2 sec      Urinalysis Basic - ( 19 Dec 2023 22:23 )    Color: x / Appearance: x / SG: x / pH: x  Gluc: 72 mg/dL / Ketone: x  / Bili: x / Urobili: x   Blood: x / Protein: x / Nitrite: x   Leuk Esterase: x / RBC: x / WBC x   Sq Epi: x / Non Sq Epi: x / Bacteria: x [facility] Division of Hospital Medicine  Mery Little MD  MS Teams PREFERRED        SUBJECTIVE / OVERNIGHT EVENTS: Seen and examined at bedside this morning. NAD.    MEDICATIONS  (STANDING):  artificial tears (preservative free) Ophthalmic Solution 1 Drop(s) Both EYES three times a day  atorvastatin 40 milliGRAM(s) Oral at bedtime  dexAMETHasone     Tablet 4 milliGRAM(s) Oral every 6 hours  dextrose 5%. 1000 milliLiter(s) (50 mL/Hr) IV Continuous <Continuous>  dextrose 5%. 1000 milliLiter(s) (100 mL/Hr) IV Continuous <Continuous>  dextrose 50% Injectable 25 Gram(s) IV Push once  dextrose 50% Injectable 25 Gram(s) IV Push once  dextrose 50% Injectable 12.5 Gram(s) IV Push once  divalproex  milliGRAM(s) Oral every 12 hours  glucagon  Injectable 1 milliGRAM(s) IntraMuscular once  insulin lispro (ADMELOG) corrective regimen sliding scale   SubCutaneous at bedtime  insulin lispro (ADMELOG) corrective regimen sliding scale   SubCutaneous three times a day before meals  metoprolol tartrate 12.5 milliGRAM(s) Oral two times a day  pantoprazole    Tablet 40 milliGRAM(s) Oral before breakfast  petrolatum white Ointment 1 Application(s) Topical two times a day  piperacillin/tazobactam IVPB.. 3.375 Gram(s) IV Intermittent every 8 hours  polyethylene glycol 3350 17 Gram(s) Oral daily  sodium chloride 0.9%. 1000 milliLiter(s) (100 mL/Hr) IV Continuous <Continuous>  vancomycin  IVPB 1000 milliGRAM(s) IV Intermittent every 24 hours    MEDICATIONS  (PRN):  acetaminophen     Tablet .. 650 milliGRAM(s) Oral every 6 hours PRN Temp greater or equal to 38C (100.4F), Mild Pain (1 - 3)  aluminum hydroxide/magnesium hydroxide/simethicone Suspension 30 milliLiter(s) Oral every 4 hours PRN Dyspepsia  dextrose Oral Gel 15 Gram(s) Oral once PRN Blood Glucose LESS THAN 70 milliGRAM(s)/deciliter  melatonin 3 milliGRAM(s) Oral at bedtime PRN Insomnia  ondansetron Injectable 4 milliGRAM(s) IV Push every 8 hours PRN Nausea and/or Vomiting  senna 2 Tablet(s) Oral at bedtime PRN Constipation      I&O's Summary      PHYSICAL EXAM:  Vital Signs Last 24 Hrs  T(C): 36.4 (20 Dec 2023 13:26), Max: 38.5 (19 Dec 2023 17:00)  T(F): 97.5 (20 Dec 2023 13:26), Max: 101.3 (19 Dec 2023 17:00)  HR: 106 (20 Dec 2023 13:26) (96 - 125)  BP: 104/70 (20 Dec 2023 13:26) (97/82 - 125/77)  BP(mean): 90 (19 Dec 2023 18:15) (89 - 90)  RR: 16 (20 Dec 2023 13:26) (16 - 20)  SpO2: 98% (20 Dec 2023 13:26) (96% - 99%)    Parameters below as of 20 Dec 2023 13:26  Patient On (Oxygen Delivery Method): room air      CONSTITUTIONAL: limited due to pt behavior  Attempted to kick examiner during exam, pt is awake and alert, covered head with blanket  Biting staff  Deferred remainder of exam for safety.    LABS:    12-19    145  |  126<H>  |  5<L>  ----------------------------<  72  <2.0<LL>   |  10<LL>  |  <0.30<L>    Ca    3.9<LL>      19 Dec 2023 22:23    TPro  2.9<L>  /  Alb  1.1<L>  /  TBili  <0.1<L>  /  DBili  x   /  AST  27  /  ALT  6<L>  /  AlkPhos  46  12-19    PT/INR - ( 19 Dec 2023 17:35 )   PT: 14.7 sec;   INR: 1.35 ratio         PTT - ( 19 Dec 2023 17:35 )  PTT:21.2 sec      Urinalysis Basic - ( 19 Dec 2023 22:23 )    Color: x / Appearance: x / SG: x / pH: x  Gluc: 72 mg/dL / Ketone: x  / Bili: x / Urobili: x   Blood: x / Protein: x / Nitrite: x   Leuk Esterase: x / RBC: x / WBC x   Sq Epi: x / Non Sq Epi: x / Bacteria: x Stable

## 2023-12-20 NOTE — CONSULT NOTE ADULT - ATTENDING COMMENTS
Ms. Rojas is a 52 year old unknown handed woman with a PMHx of advanced lung adenocarcinoma with diffuse mets to brain s/p Rt craniotomy for tumor resection, & multiple rounds of chemo & RT, steroid induced DM, GERD who presents to [place] ED for AMS.   Neurology consulted because of altered mental status. Etiology of altered mental status is unknown but may be due to the metabolic encephalopathy versus advanced metastatic disease versus seizure versus other possible etiologies. Therefore, patient requires further workup.    Continue Depakote and please check the Depakote level.    Patient would benefit from EEG stat to rule out seizure.   MRI brain   Continue medical management, neuro- check and fall precaution.  GI and DVT prophylaxis.    Patient is at high risk for complications and morbidity or mortality. There is high probability of imminent or life threatening deterioration in the patient's condition.  Patient is unable or incompetent to participate in giving a history and/or making decisions and discussion is necessary for determining treatment decisions.    My cumulative time taking care of this critically ill patient is 75 minutes  If you have any further questions, please do not hesitate to contact our team.  Thank you for allowing us to participate in this patient care. Ms. Rojas is a 52 year old unknown handed woman with a PMHx of advanced lung adenocarcinoma with diffuse mets to brain s/p Rt craniotomy for tumor resection, & multiple rounds of chemo & RT, steroid induced DM, GERD who presents to [place] ED for AMS.   Neurology consulted because of altered mental status. Etiology of altered mental status is unknown but may be due to the metabolic encephalopathy versus advanced metastatic disease versus seizure versus other possible etiologies. Therefore, patient requires further workup.    Continue Depakote and please check the Depakote level.    Patient would benefit from EEG stat to rule out seizure.   MRI brain   Continue medical management, neuro- check and fall precaution.  GI and DVT prophylaxis.    Patient is at high risk for complications and morbidity or mortality. There is high probability of imminent or life threatening deterioration in the patient's condition.  Patient is unable or incompetent to participate in giving a history and/or making decisions and discussion is necessary for determining treatment decisions.    My cumulative time taking care of this critically ill patient is 75 minutes  If you have any further questions, please do not hesitate to contact our team.  Thank you for allowing us to participate in this patient care.

## 2023-12-20 NOTE — PROVIDER CONTACT NOTE (OTHER) - RECOMMENDATIONS
Dalton BARRIOS notified and aware. Haldol and Ativan were given. MD aware that pt is missing dose of morning medication and cannot take PO

## 2023-12-20 NOTE — CONSULT NOTE ADULT - ATTENDING COMMENTS
53 yo F with metastatic NSCLC currently on Carbo/Taxol + Connie + Atezolizumab (last dose 12/1/23) admitted and transferred for AMS/sepsis. Oncology consulted due to NSCLC.  CTH at  reviewed and was suboptimal but showed multiple partial calcified lesions are identified suggesting partially treated metastatic disease. There is no visible vasogenic edema or significant mass effect/shift. She also had CT C/A/P done which shows mild response to treatment. Cont current medical care. No plans for in patient chemo. f/u at [facility] once recovered. 53 yo F with metastatic NSCLC currently on Carbo/Taxol + Connie + Atezolizumab (last dose 12/1/23) admitted and transferred for AMS/sepsis. Oncology consulted due to NSCLC.  CTH at  reviewed and was suboptimal but showed multiple partial calcified lesions are identified suggesting partially treated metastatic disease. There is no visible vasogenic edema or significant mass effect/shift. She also had CT C/A/P done which shows mild response to treatment. Cont current medical care. No plans for in patient chemo. f/u at [facility] once recovered.

## 2023-12-20 NOTE — PHYSICAL THERAPY INITIAL EVALUATION ADULT - PERTINENT HX OF CURRENT PROBLEM, REHAB EVAL
Pt is a 52y F admitted to [facility] on 12/19/23 PMH of smoking, stage 4 lung adenocarcinoma with diffuse mets to C/A/P and brain s/p Rt craniotomy for tumor resection, & multiple rounds of chemo & RT, steroid induced DM, GERD who presents to [place] ED for AMS. Per chart review pt had nonsensical speech and c/o HA & R sided weakness so was taken to ED. Pt was reportedly very agitated and violent at [place] ED. Code stroke was called.  Pt required sedation w/ benadryl, ativan and versed for agitation in order to obtain CTH. CTH at  shows increased conspicuity of L posterior frontal and R parietal lesions, likely 2/2 hemorrhage (HU ~50-60) c/t 10/28 CTH. Further eval limited by motion artifact Pt was noted to be febrile at  and started on vancomycin, zosyn. Rapid RVP negative. Pt ultimately transferred to [facility] for continued care given pt known to heme-onc & neuro-surg at [facility]. Hospital course: Sepsis, unspecified organism.   ·  Plan: - Febrile + tachycardic + suspected infection, DDx: infection vs electrolyte abnormalities vs Cardio vs neuro d/o vs debility, Reportedly more confused with nonsensical speech, CThead: stable; CTH at [facility] showed ?Hypodensity in the right temporal parietal region may represent subacute or chronic ischemic changes.  DVT ppx: Lovenox SQ CT chest/abd: No evidence for infection within the chest, abdomen, or pelvis. Metastatic lung cancer in the right upper lobe, slightly decreased in size as compared with 11/14/2023. Extensive metastatic liver disease, mildly decreased in size as compared with 10/31/2023. T1 and T10 sclerotic vertebral body lesions which were not visualized on 11/14/2023 CT, probably now visible due to posttreatment change. Decrease in size of subcutaneous nodules. CTH: No acute hemorrhage or midline shift. Redemonstration of intraparenchymal metastatic lesions, slight decrease in size compared to prior MR. Markedly decreased surrounding vasogenic edema. Contrast-enhanced MR brain may be considered for further evaluation, if clinically warranted. Right temporal craniectomy with similar-appearing encephalomalacia of the postoperative bed. Pt is a 52y F admitted to [facility] on 12/19/23 PMH of smoking, stage 4 lung adenocarcinoma with diffuse mets to C/A/P and brain s/p Rt craniotomy for tumor resection, & multiple rounds of chemo & RT, steroid induced DM, GERD who presents to [place] ED for AMS. Per chart review pt had nonsensical speech and c/o HA & R sided weakness so was taken to ED. Pt was reportedly very agitated and violent at [place] ED. Code stroke was called.  Pt required sedation w/ benadryl, ativan and versed for agitation in order to obtain CTH. CTH at  shows increased conspicuity of L posterior frontal and R parietal lesions, likely 2/2 hemorrhage (HU ~50-60) c/t 10/28 CTH. Further eval limited by motion artifact Pt was noted to be febrile at  and started on vancomycin, zosyn. Rapid RVP negative. Pt ultimately transferred to [facility] for continued care given pt known to heme-onc & neuro-surg at [facility]. Hospital course: Sepsis, unspecified organism.   ·  Plan: - Febrile + tachycardic + suspected infection, DDx: infection vs electrolyte abnormalities vs Cardio vs neuro d/o vs debility, Reportedly more confused with nonsensical speech, CThead: stable; CTH at [facility] showed ?Hypodensity in the right temporal parietal region may represent subacute or chronic ischemic changes.  DVT ppx: Lovenox SQ CT chest/abd: No evidence for infection within the chest, abdomen, or pelvis. Metastatic lung cancer in the right upper lobe, slightly decreased in size as compared with 11/14/2023. Extensive metastatic liver disease, mildly decreased in size as compared with 10/31/2023. T1 and T10 sclerotic vertebral body lesions which were not visualized on 11/14/2023 CT, probably now visible due to posttreatment change. Decrease in size of subcutaneous nodules. CTH: No acute hemorrhage or midline shift. Redemonstration of intraparenchymal metastatic lesions, slight decrease in size compared to prior MR. Markedly decreased surrounding vasogenic edema. Contrast-enhanced MR brain may be considered for further evaluation, if clinically warranted. Right temporal craniectomy with similar-appearing encephalomalacia of the postoperative bed.

## 2023-12-20 NOTE — PROGRESS NOTE ADULT - ASSESSMENT
IBSD is the biggest problem since her surgery also incontinence problem and fibromyalgia pain, sitting for long periods of time      Please advise   52y F PMH of smoking, stage 4 lung adenocarcinoma with diffuse mets to C/A/P and brain s/p Rt craniotomy for tumor resection, multiple rounds of chemo & RT, steroid induced DM, GERD who initially presented to [place] ED for AMS, transferred to [facility] for continuity of care  52y F PMH of smoking, stage 4 lung adenocarcinoma with diffuse mets to C/A/P and brain s/p Rt craniotomy for tumor resection, multiple rounds of chemo & RT, steroid induced DM, GERD who initially presented to [place] ED for AMS, transferred to [facility] for continuity of care

## 2023-12-20 NOTE — PROGRESS NOTE ADULT - PROBLEM SELECTOR PLAN 5
- DVT ppx: Lovenox SQ   - GI ppx: PPI   - Diet: DASH/ CC  - PT consult  Disposition: Palliative consulted, f/u recommendations

## 2023-12-20 NOTE — PROGRESS NOTE ADULT - ASSESSMENT
-Adm med for infectious workup    -MRI wwo    -Neurology c/s to r/o seizures    -on dex 2 bid at home, incr to 4q6 x24h to assess for any improvement     Exam stable (EOV, regards, nonverbal, no FC, BUE spont AG, BLE spont). INR 1.3, Troponins elevated 72, 98. severe electrolyte abnormalities (K+ <2, Chloride 126+), UA(-) CXR (-). on SQL per primary. On Dex 4q6.    -Adm med for infectious/metabolic workup    -MRI wwo    -Neurology c/s to r/o seizures    -hold all ACAP at least until MRI wwo    -rpt CTH long interval given SQL, INR    -on dex 2 bid at home, increased to 4q6 x24h to assess for any improvement

## 2023-12-20 NOTE — PHYSICAL THERAPY INITIAL EVALUATION ADULT - MANUAL MUSCLE TESTING RESULTS, REHAB EVAL
RUE/RLE 0; LUE/LLE unable to accurately assess due to poor command follow but LUE grossly 3 via spontaneous movements observed and LLE hip/knee flex at least 2

## 2023-12-20 NOTE — PROGRESS NOTE ADULT - PROBLEM SELECTOR PLAN 2
DDx: infection vs electrolyte abnormalities vs Cardio vs neuro d/o vs debility   - Reportedly more confused with nonsensical speech    - Febrile,  tachycardic, but nontoxic appearing   - Labs: chem --abnorm (?collection error), Trop 98 > 72 , RVP/COVID neg   -  CThead: stable  ;  CTH at [facility] showed ?Hypodensity in the right temporal parietal region may represent   subacute or chronic ischemic changes.    - Infection (likely): febrile, p/w AMS, immunosuppressed, although RVP(-) high r/o infection, c/w Vanco 1g (12/19 - ? ) , zosyn 3.375g (12/19 - ?), f/u Bcx, Ucx, monitor fever curve   - Electrolytes: chem grossly abnorm, could be 2/2 collection error, will obtain repeat , trend labs   - Cardiac (less likely): Noted to be tachycardic, ekg sinus tachy, trop elevated but down trended. Tachycardia likely iso infection, agitation. Tele monitor . If tachycardia persist despite fluid resuscitation, improvement  in agitation, Abx, consider doing getting echo   - Neuro: hx/o lung Ca w/diffuse mets, Agitated and confused on exam with nonsensical speech. CTH here stable though CTH at [facility] showed ?Hypodensity in the right temporal parietal region may represent   subacute or chronic ischemic changes.  Neuro etiology possible. Apprec Neuro-Surg rec, MRI brain w/wo (ordered), q4 neuro checks, steroids per Neuro-surg, Neuro consult  (called O/N) , C/w Depakote, check level. EEG (ordered) to eval for ?seizure    - Fall, Seizure precx,  PT eval DDx: infection vs electrolyte abnormalities vs Cardio vs neuro d/o vs debility   - Reportedly more confused with nonsensical speech    - Febrile,  tachycardic, but nontoxic appearing   - Labs: chem --abnorm (?collection error), Trop 98 > 72 , RVP/COVID neg   -  CThead: stable  ;  CTH at [facility] showed ?Hypodensity in the right temporal parietal region may represent   subacute or chronic ischemic changes.    - Infection (likely): febrile, p/w AMS, immunosuppressed, although RVP(-) high r/o infection, c/w Vanco 1g (12/19 - ? ) , zosyn 3.375g (12/19 - ?), f/u Bcx, Ucx, monitor fever curve   - Electrolytes: chem grossly abnorm, could be 2/2 collection error, will obtain repeat , trend labs   - Cardiac (less likely): Noted to be tachycardic, ekg sinus tachy, trop elevated but down trended. Tachycardia likely iso infection, agitation. Tele monitor . If tachycardia persist despite fluid resuscitation, improvement  in agitation, Abx, consider doing getting echo   - Neuro: hx/o lung Ca w/diffuse mets, Agitated and confused on exam with nonsensical speech. CTH here stable though CTH at [facility] showed ?Hypodensity in the right temporal parietal region may represent   subacute or chronic ischemic changes.  Neuro etiology possible. Apprec Neuro-Surg rec, MRI brain w/wo (ordered), q4 neuro checks, steroids per Neuro-surg, Neuro consult  (called O/N) , C/w Depakote, check level. EEG (ordered) to eval for ?seizure    - Fall, Seizure precx,  PT eval

## 2023-12-20 NOTE — CONSULT NOTE ADULT - ASSESSMENT
*PLEASE NOTE NOT COMPLETE*    51 yo F with metastatic NSCLC currently on Carbo/Taxol + Connie + Atezolizumab (last dose 12/1/23) transferred for AMS/sepsis. Oncology consulted due to NSCLC.    - Will hold treatment while inpatient at this time    **Please be aware note/recommendations not finalized until attending addendum complete.**    Leatha Roberts DO, MPH  Medical Oncology Fellow, PGY-8  *Can be reached via Teams, but please contact the on-call fellow after 5pm-8am on weekdays and on weekends. 51 yo F with metastatic NSCLC currently on Carbo/Taxol + Connie + Atezolizumab (last dose 12/1/23) admitted and transferred for AMS/sepsis. Oncology consulted due to NSCLC.  CTH at  reviewed and was suboptimal but showed multiple partial calcified lesions are identified suggesting partially treated metastatic disease. There is no visible vasogenic edema or significant mass effect/shift. She also had CT C/A/P done which shows mild response to treatment.    Recommendations:  - No plans for inpatient chemotherapy at this time  - AMS/Sepsis work up as per primary team  >> Agree with empiric Abx and will f/u cultures  - ANC 2800 on 12/14, but please obtain CBC with diff in AM to monitor  >> No current indications for GCSF at this time  - Maintain Hgb >7, Plt >50K given brain mets  - Agree with Abx and f/u cultures  - Neuro, neurosurgery on board, appreciate recs  >> Per Neuro, holding EEG as patient unlikely to tolerate (has been combative) + obtain MR brain w/wo + give loading dose of Vimpat + continue home Depakote and check level  >> Per NSG, will increase Dex 4mg q6 x24h to assess for any improvement    - Patient can follow up with their primary oncologist (Dr. Beckman) at [facility] upon discharge    Thank you for the consult and will continue to follow along.    **Please be aware note/recommendations not finalized until attending addendum complete.**    Leatha Roberts DO, MPH  Medical Oncology Fellow, PGY-8  *Can be reached via Teams, but please contact the on-call fellow after 5pm-8am on weekdays and on weekends. 51 yo F with metastatic NSCLC currently on Carbo/Taxol + Connie + Atezolizumab (last dose 12/1/23) admitted and transferred for AMS/sepsis. Oncology consulted due to NSCLC.  CTH at  reviewed and was suboptimal but showed multiple partial calcified lesions are identified suggesting partially treated metastatic disease. There is no visible vasogenic edema or significant mass effect/shift. She also had CT C/A/P done which shows mild response to treatment.    Recommendations:  - No plans for inpatient chemotherapy at this time  - AMS/Sepsis work up as per primary team  >> Agree with empiric Abx and will f/u cultures  - ANC 2800 on 12/14, but please obtain CBC with diff in AM to monitor  >> No current indications for GCSF at this time  - Maintain Hgb >7, Plt >50K given brain mets  - Agree with Abx and f/u cultures  - Neuro, neurosurgery on board, appreciate recs  >> Per Neuro, holding EEG as patient unlikely to tolerate (has been combative) + obtain MR brain w/wo + give loading dose of Vimpat + continue home Depakote and check level  >> Per NSG, will increase Dex 4mg q6 x24h to assess for any improvement    - Patient can follow up with their primary oncologist (Dr. Beckman) at [facility] upon discharge    Thank you for the consult and will continue to follow along.    **Please be aware note/recommendations not finalized until attending addendum complete.**    Leatha Roberts DO, MPH  Medical Oncology Fellow, PGY-8  *Can be reached via Teams, but please contact the on-call fellow after 5pm-8am on weekdays and on weekends.

## 2023-12-20 NOTE — PHYSICAL THERAPY INITIAL EVALUATION ADULT - ADDITIONAL COMMENTS
per  note: per sister: Patient lives with a family friend Thiago Hall in a private house with 3 steps to enter. 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 Prior to hospitalization patient needed assistance in all ADL's with her friend Thiago assisting with all. Patient ambulated with a cane, rolling walker and WC.

## 2023-12-20 NOTE — CHART NOTE - NSCHARTNOTEFT_GEN_A_CORE
MEDICINE NP    HARJEET WHITMORE  52y Female    Patient is a 52y old  Female who presents with a chief complaint of HA , weakness, AMS (20 Dec 2023 14:28)         > Event Summary:   Notified by RN, Patient with         -Vital Signs Last 24 Hrs  T(C): 39.7 (20 Dec 2023 21:20), Max: 39.7 (20 Dec 2023 21:20)  T(F): 103.4 (20 Dec 2023 21:20), Max: 103.4 (20 Dec 2023 21:20)  HR: 133 (20 Dec 2023 22:09) (96 - 133)  BP: 130/93 (20 Dec 2023 22:09) (104/70 - 130/93)  BP(mean): --  RR: 20 (20 Dec 2023 22:09) (16 - 20)  SpO2: 91% (20 Dec 2023 22:09) (91% - 98%)    Parameters below as of 20 Dec 2023 22:09  Patient On (Oxygen Delivery Method): nasal cannula  O2 Flow (L/min): 5      > Assess & Plan:               DUANE [facility]eto-BC  Medicine Department MEDICINE NP    HARJEET WHITMORE  52y Female    Patient is a 52y old  Female who presents with a chief complaint of HA , weakness, AMS (20 Dec 2023 14:28)         > Event Summary:   Notified by RN, Patient noted with left face twitching starting at ?8PM / shift change;  Patient s/p Tylenol 650mg [place] for Temp of 101.9 rectally.      Patient seen in ED -Organge, lethargic, withdraws to pain. AOX0 @ baseline and non -verbal. 's.   STAT BG = 67,  Temp T-103.4 rectally.  Cooling Measures/ Tylenol  650mg IVPB,  and Dextrose 25G IVP given.    O/N :  RN reports patient w/o PIVL access today and has placed a PIVL and sent STAT Labs.   STAT Labs reviewed - neg Lactate 0.7 and wbc 4.26.   +Hypomagnesemia 0.9, Magnesium Sulfate 2g IVPB x1 ordered.  IVF Bolus 500ml x1 now and c/w maintenance.    Chart review, Patient ordered for Depakote and Decadron PO which she had missed - Medications changed to IV/IVPB.   D/w Neurology, Dr. Marin,  and reviewed above events, recommends to c/w current AED's - Vimpat and Depakote  D/w HICS, Dr. Roberts, and reviewed above, agrees to plan, no further recommendations  Close monitoring continued and ICU c/s if patient decompensates     -Vital Signs Last 24 Hrs  T(C): 39.7 (20 Dec 2023 21:20), Max: 39.7 (20 Dec 2023 21:20)  T(F): 103.4 (20 Dec 2023 21:20), Max: 103.4 (20 Dec 2023 21:20)  HR: 133 (20 Dec 2023 22:09) (96 - 133)  BP: 130/93 (20 Dec 2023 22:09) (104/70 - 130/93)  RR: 20 (20 Dec 2023 22:09) (16 - 20)  SpO2: 91% (20 Dec 2023 22:09) (91% - 98%)    Parameters below as of 20 Dec 2023 22:09  Patient On (Oxygen Delivery Method): nasal cannula  O2 Flow (L/min): 5    >>ADDENDUM:  Patient noted with continued Facial twitching, hypoxia SpO2 70s on Tele, and continued tachycardia HR 130s, repeat T-102.7.  Concern for Airway  RRT called  RRT responded to bedside - See Flowsheet.   Patient seen by Neuro - s/p Ativan and Keppra IVP  Patient stabilized and transferred to [facility]  F/u Post RRT recommendations  HICS, Dr. Roberts made aware   Endorsed to floor ACP and Attending to follow in AM.          Ana Paula Hadley, [facility]-BC  Medicine Department  #[id number] MEDICINE NP    HARJEET WHITMORE  52y Female    Patient is a 52y old  Female who presents with a chief complaint of HA , weakness, AMS (20 Dec 2023 14:28)         > Event Summary:   Notified by RN, Patient noted with left face twitching starting at ?8PM / shift change;  Patient s/p Tylenol 650mg [place] for Temp of 101.9 rectally.      Patient seen in ED -Organge, lethargic, withdraws to pain. AOX0 @ baseline and non -verbal. 's.   STAT BG = 67,  Temp T-103.4 rectally.  Cooling Measures/ Tylenol  650mg IVPB,  and Dextrose 25G IVP given.    O/N :  RN reports patient w/o PIVL access today and has placed a PIVL and sent STAT Labs.   STAT Labs reviewed - neg Lactate 0.7 and wbc 4.26.   +Hypomagnesemia 0.9, Magnesium Sulfate 2g IVPB x1 ordered.  IVF Bolus 500ml x1 now and c/w maintenance.    Chart review, Patient ordered for Depakote and Decadron PO which she had missed - Medications changed to IV/IVPB.   D/w Neurology, Dr. Marin,  and reviewed above events, recommends to c/w current AED's - Vimpat and Depakote  D/w HICS, Dr. Roberts, and reviewed above, agrees to plan, no further recommendations  Close monitoring continued and ICU c/s if patient decompensates     -Vital Signs Last 24 Hrs  T(C): 39.7 (20 Dec 2023 21:20), Max: 39.7 (20 Dec 2023 21:20)  T(F): 103.4 (20 Dec 2023 21:20), Max: 103.4 (20 Dec 2023 21:20)  HR: 133 (20 Dec 2023 22:09) (96 - 133)  BP: 130/93 (20 Dec 2023 22:09) (104/70 - 130/93)  RR: 20 (20 Dec 2023 22:09) (16 - 20)  SpO2: 91% (20 Dec 2023 22:09) (91% - 98%)    Parameters below as of 20 Dec 2023 22:09  Patient On (Oxygen Delivery Method): nasal cannula  O2 Flow (L/min): 5    >>ADDENDUM:  Patient noted with continued Facial twitching, hypoxia SpO2 70s on Tele, and continued tachycardia HR 130s, repeat T-102.7.  Concern for Airway  RRT called  RRT responded to bedside - See Flowsheet.   Patient seen by Neuro - s/p Ativan and Keppra IVP  Patient stabilized and transferred to [facility]  F/u Post RRT recommendations  HICS, Dr. Roberts made aware   Endorsed to floor ACP and Attending to follow in AM.          Ana Paula Hadley, [facility]-BC  Medicine Department  #[id number] MEDICINE NP    HARJEET WHITMORE  52y Female    Patient is a 52y old  Female who presents with a chief complaint of HA , weakness, AMS (20 Dec 2023 14:28)         > Event Summary:   Notified by RN, Patient noted with left face twitching starting at ?8PM / shift change;  Patient s/p Tylenol 650mg [place] for Temp of 101.9 rectally.      Patient seen in ED -Organge, lethargic, withdraws to pain. AOX0 @ baseline and non -verbal. 's.   STAT BG = 67,  Temp T-103.4 rectally.  Cooling Measures/ Tylenol  650mg IVPB,  and Dextrose 25G IVP given.    O/N :  RN reports patient w/o PIVL access today and has placed a PIVL and sent STAT Labs.   STAT Labs reviewed - neg Lactate 0.7 and wbc 4.26.   +Hypomagnesemia 0.9, Magnesium Sulfate 2g IVPB x1 ordered.  IVF Bolus 500ml x1 now and c/w maintenance.    Chart review, Patient ordered for Depakote and Decadron PO which she had missed - Medications changed to IV/IVPB.   D/w Neurology, Dr. Marin,  and reviewed above events, recommends to c/w current AED's - Vimpat and Depakote  D/w HICS, Dr. Robetrs, and reviewed above, agrees to plan, no further recommendations  Close monitoring continued and ICU c/s if patient decompensates     -Vital Signs Last 24 Hrs  T(C): 39.7 (20 Dec 2023 21:20), Max: 39.7 (20 Dec 2023 21:20)  T(F): 103.4 (20 Dec 2023 21:20), Max: 103.4 (20 Dec 2023 21:20)  HR: 133 (20 Dec 2023 22:09) (96 - 133)  BP: 130/93 (20 Dec 2023 22:09) (104/70 - 130/93)  RR: 20 (20 Dec 2023 22:09) (16 - 20)  SpO2: 91% (20 Dec 2023 22:09) (91% - 98%)    Parameters below as of 20 Dec 2023 22:09  Patient On (Oxygen Delivery Method): nasal cannula  O2 Flow (L/min): 5    >>ADDENDUM:  Patient noted with continued Facial twitching, hypoxia SpO2 70s on Tele, and continued tachycardia HR 130s, repeat T-102.7.  Concern for Airway  RRT called  RRT responded to bedside - See Flowsheet.  Vancomycin added   Patient seen by Neuro - s/p Ativan and Keppra IVP  Patient stabilized and transferred to [facility]  F/u Post RRT recommendations  F/u BCX, UCX   HICS, Dr. Roberts made aware   Endorsed to floor ACP and Attending to follow in AM.          Ana Paula Hadley, [facility]-BC  Medicine Department  #[id number] MEDICINE NP    HARJEET WHITMORE  52y Female    Patient is a 52y old  Female who presents with a chief complaint of HA , weakness, AMS (20 Dec 2023 14:28)         > Event Summary:   Notified by RN, Patient noted with left face twitching starting at ?8PM / shift change;  Patient s/p Tylenol 650mg [place] for Temp of 101.9 rectally.      Patient seen in ED -Organge, lethargic, withdraws to pain. AOX0 @ baseline and non -verbal. 's.   STAT BG = 67,  Temp T-103.4 rectally.  Cooling Measures/ Tylenol  650mg IVPB,  and Dextrose 25G IVP given.    O/N :  RN reports patient w/o PIVL access today and has placed a PIVL and sent STAT Labs.   STAT Labs reviewed - neg Lactate 0.7 and wbc 4.26.   +Hypomagnesemia 0.9, Magnesium Sulfate 2g IVPB x1 ordered.  IVF Bolus 500ml x1 now and c/w maintenance.    Chart review, Patient ordered for Depakote and Decadron PO which she had missed - Medications changed to IV/IVPB.   D/w Neurology, Dr. Marin,  and reviewed above events, recommends to c/w current AED's - Vimpat and Depakote  D/w HICS, Dr. Roberts, and reviewed above, agrees to plan, no further recommendations  Close monitoring continued and ICU c/s if patient decompensates     -Vital Signs Last 24 Hrs  T(C): 39.7 (20 Dec 2023 21:20), Max: 39.7 (20 Dec 2023 21:20)  T(F): 103.4 (20 Dec 2023 21:20), Max: 103.4 (20 Dec 2023 21:20)  HR: 133 (20 Dec 2023 22:09) (96 - 133)  BP: 130/93 (20 Dec 2023 22:09) (104/70 - 130/93)  RR: 20 (20 Dec 2023 22:09) (16 - 20)  SpO2: 91% (20 Dec 2023 22:09) (91% - 98%)    Parameters below as of 20 Dec 2023 22:09  Patient On (Oxygen Delivery Method): nasal cannula  O2 Flow (L/min): 5    >>ADDENDUM:  Patient noted with continued Facial twitching, hypoxia SpO2 70s on Tele, and continued tachycardia HR 130s, repeat T-102.7.  Concern for Airway  RRT called  RRT responded to bedside - See Flowsheet.  Vancomycin added   Patient seen by Neuro - s/p Ativan and Keppra IVP  Patient stabilized and transferred to [facility]  F/u Post RRT recommendations  F/u BCX, UCX   HICS, Dr. Roberts made aware   Endorsed to floor ACP and Attending to follow in AM.          Ana Paula Hadley, [facility]-BC  Medicine Department  #[id number] MEDICINE NP    HARJEET WHITMORE  52y Female    Patient is a 52y old  Female who presents with a chief complaint of HA , weakness, AMS (20 Dec 2023 14:28)         > Event Summary:   Notified by RN, Patient noted with left face twitching starting at ?8PM / shift change;  Patient s/p Tylenol 650mg [place] for Temp of 101.9 rectally.      Patient seen in ED -Organge, lethargic, withdraws to pain. AOX0 @ baseline and non -verbal. 's.   STAT BG = 67,  Temp T-103.4 rectally.  Cooling Measures/ Tylenol  650mg IVPB,  and Dextrose 25G IVP given.    O/N :  RN reports patient w/o PIVL access today and has placed a PIVL and sent STAT Labs.   STAT Labs reviewed - neg Lactate 0.7 and wbc 4.26.   +Hypomagnesemia 0.9, Magnesium Sulfate 2g IVPB x1 ordered.  IVF Bolus 500ml x1 now and c/w maintenance.    Chart review, Patient ordered for Depakote and Decadron PO which she had missed - Medications changed to IV/IVPB.   D/w Neurology, Dr. Marin,  and reviewed above events, recommends to c/w current AED's - Vimpat and Depakote  D/w HICS, Dr. Roberts, and reviewed above, agrees to plan, no further recommendations  Close monitoring continued and ICU c/s if patient decompensates     -Vital Signs Last 24 Hrs  T(C): 39.7 (20 Dec 2023 21:20), Max: 39.7 (20 Dec 2023 21:20)  T(F): 103.4 (20 Dec 2023 21:20), Max: 103.4 (20 Dec 2023 21:20)  HR: 133 (20 Dec 2023 22:09) (96 - 133)  BP: 130/93 (20 Dec 2023 22:09) (104/70 - 130/93)  RR: 20 (20 Dec 2023 22:09) (16 - 20)  SpO2: 91% (20 Dec 2023 22:09) (91% - 98%)    Parameters below as of 20 Dec 2023 22:09  Patient On (Oxygen Delivery Method): nasal cannula  O2 Flow (L/min): 5    >>ADDENDUM:  Patient noted with continued Facial twitching, hypoxia SpO2 70s on Tele, and continued tachycardia HR 130s, repeat T-102.7.  Concern for Airway  RRT called  RRT responded to bedside - See Flowsheet.  Vancomycin added   Patient seen by Neuro - s/p Ativan and Keppra IVP  Patient stabilized and transferred to [facility]  F/u Post RRT recommendations  F/u BCX, UCX   HICS, Dr. Roberts made aware   Emergency Contact Listed - Mariposa Giless called and notified, he requested to call Patient's sister Lashay.   I called the number Mr. Mariposa Fuller provided with busy signal, team to follow up.  Endorsed to floor ACP and Attending to follow in AM.          Ana Paula Hadley, [facility]-BC  Medicine Department  #[id number] MEDICINE NP    HARJEET WHITMORE  52y Female    Patient is a 52y old  Female who presents with a chief complaint of HA , weakness, AMS (20 Dec 2023 14:28)         > Event Summary:   Notified by RN, Patient noted with left face twitching starting at ?8PM / shift change;  Patient s/p Tylenol 650mg [place] for Temp of 101.9 rectally.      Patient seen in ED -Organge, lethargic, withdraws to pain. AOX0 @ baseline and non -verbal. 's.   STAT BG = 67,  Temp T-103.4 rectally.  Cooling Measures/ Tylenol  650mg IVPB,  and Dextrose 25G IVP given.    O/N :  RN reports patient w/o PIVL access today and has placed a PIVL and sent STAT Labs.   STAT Labs reviewed - neg Lactate 0.7 and wbc 4.26.   +Hypomagnesemia 0.9, Magnesium Sulfate 2g IVPB x1 ordered.  IVF Bolus 500ml x1 now and c/w maintenance.    Chart review, Patient ordered for Depakote and Decadron PO which she had missed - Medications changed to IV/IVPB.   D/w Neurology, Dr. Marin,  and reviewed above events, recommends to c/w current AED's - Vimpat and Depakote  D/w HICS, Dr. Roberts, and reviewed above, agrees to plan, no further recommendations  Close monitoring continued and ICU c/s if patient decompensates     -Vital Signs Last 24 Hrs  T(C): 39.7 (20 Dec 2023 21:20), Max: 39.7 (20 Dec 2023 21:20)  T(F): 103.4 (20 Dec 2023 21:20), Max: 103.4 (20 Dec 2023 21:20)  HR: 133 (20 Dec 2023 22:09) (96 - 133)  BP: 130/93 (20 Dec 2023 22:09) (104/70 - 130/93)  RR: 20 (20 Dec 2023 22:09) (16 - 20)  SpO2: 91% (20 Dec 2023 22:09) (91% - 98%)    Parameters below as of 20 Dec 2023 22:09  Patient On (Oxygen Delivery Method): nasal cannula  O2 Flow (L/min): 5    >>ADDENDUM:  Patient noted with continued Facial twitching, hypoxia SpO2 70s on Tele, and continued tachycardia HR 130s, repeat T-102.7.  Concern for Airway  RRT called  RRT responded to bedside - See Flowsheet.  Vancomycin added   Patient seen by Neuro - s/p Ativan and Keppra IVP  Patient stabilized and transferred to [facility]  F/u Post RRT recommendations  F/u BCX, UCX   HICS, Dr. Roberts made aware   Emergency Contact Listed - Mariposa Giless called and notified, he requested to call Patient's sister Lashay.   I called the number Mr. Mariposa Fuller provided with busy signal, team to follow up.  Endorsed to floor ACP and Attending to follow in AM.          Ana Paula Hadley, [facility]-BC  Medicine Department  #[id number] MEDICINE NP    HARJEET WHITMORE  52y Female    Patient is a 52y old  Female who presents with a chief complaint of HA , weakness, AMS (20 Dec 2023 14:28)         > Event Summary:   Notified by RN, Patient noted with left face twitching starting at ?8PM / shift change;  Patient s/p Tylenol 650mg [place] for Temp of 101.9 rectally.      Patient seen in ED -Organge, lethargic, withdraws to pain. AOX0 @ baseline and non -verbal. 's.   STAT BG = 67,  Temp T-103.4 rectally.  Cooling Measures/ Tylenol  650mg IVPB,  and Dextrose 25G IVP given.    O/N :  RN reports patient w/o PIVL access today and has placed a PIVL and sent STAT Labs.   STAT Labs reviewed - neg Lactate 0.7 and wbc 4.26.   +Hypomagnesemia 0.9, Magnesium Sulfate 2g IVPB x1 ordered.  IVF Bolus 500ml x1 now and c/w maintenance.    Chart review, Patient ordered for Depakote and Decadron PO which she had missed - Medications changed to IV/IVPB.   D/w Neurology, Dr. Marin,  and reviewed above events, recommends to c/w current AED's - Vimpat and Depakote  D/w NSX, may continue Decadron as IV   vEEG Pending   D/w HICSDr. Roberts, and reviewed above, agrees to plan, no further recommendations  Close monitoring continued and ICU c/s if patient decompensates     -Vital Signs Last 24 Hrs  T(C): 39.7 (20 Dec 2023 21:20), Max: 39.7 (20 Dec 2023 21:20)  T(F): 103.4 (20 Dec 2023 21:20), Max: 103.4 (20 Dec 2023 21:20)  HR: 133 (20 Dec 2023 22:09) (96 - 133)  BP: 130/93 (20 Dec 2023 22:09) (104/70 - 130/93)  RR: 20 (20 Dec 2023 22:09) (16 - 20)  SpO2: 91% (20 Dec 2023 22:09) (91% - 98%)    Parameters below as of 20 Dec 2023 22:09  Patient On (Oxygen Delivery Method): nasal cannula  O2 Flow (L/min): 5    >>ADDENDUM:  Patient noted with continued Facial twitching, hypoxia SpO2 70s on Tele, and continued tachycardia HR 130s, repeat T-102.7.  Concern for Airway  RRT called  RRT responded to bedside - See Flowsheet.  Vancomycin added   Patient seen by Neuro - s/p Ativan and Keppra IVP  Patient stabilized and transferred to [facility]  F/u Post RRT recommendations  F/u BCX, UCX   HICS, Dr. Roberts made aware   Emergency Contact Listed - Mariposa Giless called and notified, he requested to call Patient's sister Lashay.     I called the number Mr. Mariposa Fuller provided for patient's sister with busy /disconnected signal received, team to follow up.  Endorsed to floor ACP and Attending to follow in AM.            Ana Paula Hadley, [facility]  Medicine Department  #[id number] MEDICINE NP    HARJEET WHITMORE  52y Female    Patient is a 52y old  Female who presents with a chief complaint of HA , weakness, AMS (20 Dec 2023 14:28)         > Event Summary:   Notified by RN, Patient noted with left face twitching starting at ?8PM / shift change;  Patient s/p Tylenol 650mg [place] for Temp of 101.9 rectally.      Patient seen in ED -Organge, lethargic, withdraws to pain. AOX0 @ baseline and non -verbal. 's.   STAT BG = 67,  Temp T-103.4 rectally.  Cooling Measures/ Tylenol  650mg IVPB,  and Dextrose 25G IVP given.    O/N :  RN reports patient w/o PIVL access today and has placed a PIVL and sent STAT Labs.   STAT Labs reviewed - neg Lactate 0.7 and wbc 4.26.   +Hypomagnesemia 0.9, Magnesium Sulfate 2g IVPB x1 ordered.  IVF Bolus 500ml x1 now and c/w maintenance.    Chart review, Patient ordered for Depakote and Decadron PO which she had missed - Medications changed to IV/IVPB.   D/w Neurology, Dr. Marin,  and reviewed above events, recommends to c/w current AED's - Vimpat and Depakote  D/w NSX, may continue Decadron as IV   vEEG Pending   D/w HICSDr. Roberts, and reviewed above, agrees to plan, no further recommendations  Close monitoring continued and ICU c/s if patient decompensates     -Vital Signs Last 24 Hrs  T(C): 39.7 (20 Dec 2023 21:20), Max: 39.7 (20 Dec 2023 21:20)  T(F): 103.4 (20 Dec 2023 21:20), Max: 103.4 (20 Dec 2023 21:20)  HR: 133 (20 Dec 2023 22:09) (96 - 133)  BP: 130/93 (20 Dec 2023 22:09) (104/70 - 130/93)  RR: 20 (20 Dec 2023 22:09) (16 - 20)  SpO2: 91% (20 Dec 2023 22:09) (91% - 98%)    Parameters below as of 20 Dec 2023 22:09  Patient On (Oxygen Delivery Method): nasal cannula  O2 Flow (L/min): 5    >>ADDENDUM:  Patient noted with continued Facial twitching, hypoxia SpO2 70s on Tele, and continued tachycardia HR 130s, repeat T-102.7.  Concern for Airway  RRT called  RRT responded to bedside - See Flowsheet.  Vancomycin added   Patient seen by Neuro - s/p Ativan and Keppra IVP  Patient stabilized and transferred to [facility]  F/u Post RRT recommendations  F/u BCX, UCX   HICS, Dr. Roberts made aware   Emergency Contact Listed - Mariposa Giless called and notified, he requested to call Patient's sister Lashay.     I called the number Mr. Mariposa Fuller provided for patient's sister with busy /disconnected signal received, team to follow up.  Endorsed to floor ACP and Attending to follow in AM.            Ana Paula Hadley, [facility]  Medicine Department  #[id number]

## 2023-12-20 NOTE — CONSULT NOTE ADULT - SUBJECTIVE AND OBJECTIVE BOX
CC: Patient is a 52y old  Female who presents with a chief complaint of HA , weakness, AMS (20 Dec 2023 07:58)    HPI:  Pt poor historian 2/2 AMS, history obtained form chart review     52y F PMH of smoking, stage 4 lung adenocarcinoma with diffuse mets to C/A/P and brain s/p Rt craniotomy for tumor resection, & multiple rounds of chemo & RT, steroid induced DM, GERD who presents to [place] ED for AMS. Per chart review pt had nonsensical speech and c/o HA & R sided weakness so was taken to ED. Patient was reportedly very agitated and violent at [place] ED. Code stroke was called.  Patient required sedation w/ benadryl, ativan and versed for agitation in order to obtain CTH.  CTH at VS shows increased conspicuity of L posterior frontal and R parietal lesions, likely 2/2 hemorrhage (HU ~50-60) c/t 10/28 CTH. Further eval limited by motion artifact Patient was noted to be febrile at VS and started on vancomycin, zosyn. Rapid RVP negative.  Pt ultimately transferred to [facility] for continued care given pt known to heme-onc & neuro-surg at [facility]           (19 Dec 2023 23:59)    PAST MEDICAL & SURGICAL HISTORY:  GERD (Gastroesophageal Reflux Disease)      Lung mass  right      Non-small cell lung cancer with metastasis      S/P endoscopy  2022        SOCIAL HISTORY:  Tobacco Usage:  (   ) never smoked   (   ) former smoker   (   ) current smoker  (     ) pack years    Tobacco Quit Date:  Substance Use (Street drugs): (  ) never used  (  ) other:  Alcohol Usage:    Family history reviewed and otherwise non-contributory  ALLERGIES:  No Known Allergies    MEDICATIONS:  acetaminophen     Tablet .. 650 milliGRAM(s) Oral every 6 hours PRN  aluminum hydroxide/magnesium hydroxide/simethicone Suspension 30 milliLiter(s) Oral every 4 hours PRN  artificial tears (preservative free) Ophthalmic Solution 1 Drop(s) Both EYES three times a day  atorvastatin 40 milliGRAM(s) Oral at bedtime  dexAMETHasone     Tablet 4 milliGRAM(s) Oral every 6 hours  dextrose 5%. 1000 milliLiter(s) IV Continuous <Continuous>  dextrose 5%. 1000 milliLiter(s) IV Continuous <Continuous>  dextrose 50% Injectable 25 Gram(s) IV Push once  dextrose 50% Injectable 12.5 Gram(s) IV Push once  dextrose 50% Injectable 25 Gram(s) IV Push once  dextrose Oral Gel 15 Gram(s) Oral once PRN  divalproex  milliGRAM(s) Oral every 12 hours  glucagon  Injectable 1 milliGRAM(s) IntraMuscular once  insulin lispro (ADMELOG) corrective regimen sliding scale   SubCutaneous at bedtime  insulin lispro (ADMELOG) corrective regimen sliding scale   SubCutaneous three times a day before meals  melatonin 3 milliGRAM(s) Oral at bedtime PRN  metoprolol tartrate 12.5 milliGRAM(s) Oral two times a day  ondansetron Injectable 4 milliGRAM(s) IV Push every 8 hours PRN  pantoprazole    Tablet 40 milliGRAM(s) Oral before breakfast  petrolatum white Ointment 1 Application(s) Topical two times a day  piperacillin/tazobactam IVPB.. 3.375 Gram(s) IV Intermittent every 8 hours  polyethylene glycol 3350 17 Gram(s) Oral daily  senna 2 Tablet(s) Oral at bedtime PRN  sodium chloride 0.9%. 1000 milliLiter(s) IV Continuous <Continuous>  vancomycin  IVPB 1000 milliGRAM(s) IV Intermittent every 24 hours    REVIEW OF SYSTEMS:  CONSTITUTIONAL: No fever, weight loss, or fatigue  EYES: No eye pain, visual disturbances, or discharge  ENMT:  No difficulty hearing, tinnitus, vertigo; No sinus or throat pain  NECK: No neck pain or neck stiffness  RESPIRATORY: No cough, wheezing, chills or hemoptysis; No shortness of breath  CARDIOVASCULAR: No chest pain, palpitations, dizziness, or leg swelling  GASTROINTESTINAL: No abdominal pain, No nausea, vomiting, or hematemesis; No diarrhea or constipation  GENITOURINARY: No dysuria, frequency, hematuria, or incontinence  NEUROLOGICAL: No headaches, memory loss, loss of strength, numbness, or tremors  SKIN: No itching, burning, rashes, or lesions   ENDOCRINE: No heat or cold intolerance; No hair loss  MUSCULOSKELETAL: No joint pain or swelling; No muscle, back, or extremity pain  PSYCHIATRIC: No depression, anxiety, mood swings, or difficulty sleeping  HEME/LYMPH: No easy bruising or bleeding  ALLERY AND IMMUNOLOGIC: No hives or eczema    All other ROS reviewed and negative except as otherwise stated    Vital Signs Last 24 Hrs  T(F): 97 (20 Dec 2023 08:39), Max: 101.3 (19 Dec 2023 17:00)  HR: 110 (20 Dec 2023 08:39) (96 - 125)  BP: 125/77 (20 Dec 2023 08:39) (97/82 - 125/77)  RR: 16 (20 Dec 2023 08:39) (16 - 20)  SpO2: 97% (20 Dec 2023 08:39) (96% - 99%)  I&O's Summary    PHYSICAL EXAM:  GENERAL: NAD, well-groomed  HEAD:  Atraumatic, Normocephalic  EYES: PERRLA, normal conjunctiva, anicteric  ENMT: Moist mucous membranes, no mucositis  NECK: Supple, No JVD  CHEST/LUNG: Clear to auscultation bilaterally; No rales, rhonchi, wheezing, or rubs  HEART: Regular rate and rhythm; S1/S2, No murmurs, rubs, or gallops  ABDOMEN: Soft, Nontender, Nondistended; Bowel sounds present  VASCULAR: Normal pulses, Normal capillary refill  EXTREMITIES:  2+ Peripheral Pulses, No cyanosis, No edema  LYMPH: No lymphadenopathy noted  SKIN: Warm, Intact  PSYCH: Normal mood and affect  NERVOUS SYSTEM:  A/O x3, CN 2-12 intact, No focal deficits    LABS:    12-19    145  |  126  |  5   ----------------------------<  72  <2.0   |  10  |  <0.30    Ca    3.9      19 Dec 2023 22:23    TPro  2.9  /  Alb  1.1  /  TBili  <0.1  /  DBili  x   /  AST  27  /  ALT  6   /  AlkPhos  46  12-19      PT/INR - ( 19 Dec 2023 17:35 )   PT: 14.7 sec;   INR: 1.35 ratio         PTT - ( 19 Dec 2023 17:35 )  PTT:21.2 sec            22:15 - VBG - pH: 7.40  | pCO2: <19   | pO2: 64    | Lactate: 0.7            POCT Blood Glucose.: 73 mg/dL (20 Dec 2023 11:22)  POCT Blood Glucose.: 67 mg/dL (20 Dec 2023 11:21)  POCT Blood Glucose.: 73 mg/dL (20 Dec 2023 07:14)      Urinalysis Basic - ( 19 Dec 2023 22:23 )    Color: x / Appearance: x / SG: x / pH: x  Gluc: 72 mg/dL / Ketone: x  / Bili: x / Urobili: x   Blood: x / Protein: x / Nitrite: x   Leuk Esterase: x / RBC: x / WBC x   Sq Epi: x / Non Sq Epi: x / Bacteria: x            RADIOLOGY & ADDITIONAL TESTS:    Care Discussed with Consultants/Other Providers: CC: Patient is a 52y old  Female who presents with a chief complaint of HA , weakness, AMS (20 Dec 2023 07:58)    HPI:  Pt poor historian 2/2 AMS, history obtained form chart review     52y F PMH of smoking, stage 4 lung adenocarcinoma with diffuse mets to C/A/P and brain s/p Rt craniotomy for tumor resection, & multiple rounds of chemo & RT, steroid induced DM, GERD who presents to [place] ED for AMS. Per chart review pt had nonsensical speech and c/o HA & R sided weakness so was taken to ED. Patient was reportedly very agitated and violent at [place] ED. Code stroke was called.  Patient required sedation w/ benadryl, ativan and versed for agitation in order to obtain CTH.  CTH at VS shows increased conspicuity of L posterior frontal and R parietal lesions, likely 2/2 hemorrhage (HU ~50-60) c/t 10/28 CTH. Further eval limited by motion artifact Patient was noted to be febrile at VS and started on vancomycin, zosyn. Rapid RVP negative.  Pt ultimately transferred to [facility] for continued care given pt known to heme-onc & neuro-surg at [facility]           (19 Dec 2023 23:59)    PAST MEDICAL & SURGICAL HISTORY:  GERD (Gastroesophageal Reflux Disease)      Lung mass  right      Non-small cell lung cancer with metastasis      S/P endoscopy  2022        SOCIAL HISTORY:  Tobacco Usage:  (   ) never smoked   (   ) former smoker   (   ) current smoker  (     ) pack years    Tobacco Quit Date:  Substance Use (Street drugs): (  ) never used  (  ) other:  Alcohol Usage:    Family history reviewed and otherwise non-contributory  ALLERGIES:  No Known Allergies    MEDICATIONS:  acetaminophen     Tablet .. 650 milliGRAM(s) Oral every 6 hours PRN  aluminum hydroxide/magnesium hydroxide/simethicone Suspension 30 milliLiter(s) Oral every 4 hours PRN  artificial tears (preservative free) Ophthalmic Solution 1 Drop(s) Both EYES three times a day  atorvastatin 40 milliGRAM(s) Oral at bedtime  dexAMETHasone     Tablet 4 milliGRAM(s) Oral every 6 hours  dextrose 5%. 1000 milliLiter(s) IV Continuous <Continuous>  dextrose 5%. 1000 milliLiter(s) IV Continuous <Continuous>  dextrose 50% Injectable 25 Gram(s) IV Push once  dextrose 50% Injectable 12.5 Gram(s) IV Push once  dextrose 50% Injectable 25 Gram(s) IV Push once  dextrose Oral Gel 15 Gram(s) Oral once PRN  divalproex  milliGRAM(s) Oral every 12 hours  glucagon  Injectable 1 milliGRAM(s) IntraMuscular once  insulin lispro (ADMELOG) corrective regimen sliding scale   SubCutaneous at bedtime  insulin lispro (ADMELOG) corrective regimen sliding scale   SubCutaneous three times a day before meals  melatonin 3 milliGRAM(s) Oral at bedtime PRN  metoprolol tartrate 12.5 milliGRAM(s) Oral two times a day  ondansetron Injectable 4 milliGRAM(s) IV Push every 8 hours PRN  pantoprazole    Tablet 40 milliGRAM(s) Oral before breakfast  petrolatum white Ointment 1 Application(s) Topical two times a day  piperacillin/tazobactam IVPB.. 3.375 Gram(s) IV Intermittent every 8 hours  polyethylene glycol 3350 17 Gram(s) Oral daily  senna 2 Tablet(s) Oral at bedtime PRN  sodium chloride 0.9%. 1000 milliLiter(s) IV Continuous <Continuous>  vancomycin  IVPB 1000 milliGRAM(s) IV Intermittent every 24 hours    REVIEW OF SYSTEMS:  CONSTITUTIONAL: No fever, weight loss, or fatigue  EYES: No eye pain, visual disturbances, or discharge  ENMT:  No difficulty hearing, tinnitus, vertigo; No sinus or throat pain  NECK: No neck pain or neck stiffness  RESPIRATORY: No cough, wheezing, chills or hemoptysis; No shortness of breath  CARDIOVASCULAR: No chest pain, palpitations, dizziness, or leg swelling  GASTROINTESTINAL: No abdominal pain, No nausea, vomiting, or hematemesis; No diarrhea or constipation  GENITOURINARY: No dysuria, frequency, hematuria, or incontinence  NEUROLOGICAL: No headaches, memory loss, loss of strength, numbness, or tremors  SKIN: No itching, burning, rashes, or lesions   ENDOCRINE: No heat or cold intolerance; No hair loss  MUSCULOSKELETAL: No joint pain or swelling; No muscle, back, or extremity pain  PSYCHIATRIC: No depression, anxiety, mood swings, or difficulty sleeping  HEME/LYMPH: No easy bruising or bleeding  ALLERY AND IMMUNOLOGIC: No hives or eczema    All other ROS reviewed and negative except as otherwise stated    Vital Signs Last 24 Hrs  T(F): 97 (20 Dec 2023 08:39), Max: 101.3 (19 Dec 2023 17:00)  HR: 110 (20 Dec 2023 08:39) (96 - 125)  BP: 125/77 (20 Dec 2023 08:39) (97/82 - 125/77)  RR: 16 (20 Dec 2023 08:39) (16 - 20)  SpO2: 97% (20 Dec 2023 08:39) (96% - 99%)  I&O's Summary    PHYSICAL EXAM:  GENERAL: NAD, well-groomed  HEAD:  Atraumatic, Normocephalic  EYES: PERRLA, normal conjunctiva, anicteric  ENMT: Moist mucous membranes, no mucositis  NECK: Supple, No JVD  CHEST/LUNG: Clear to auscultation bilaterally; No rales, rhonchi, wheezing, or rubs  HEART: Regular rate and rhythm; S1/S2, No murmurs, rubs, or gallops  ABDOMEN: Soft, Nontender, Nondistended; Bowel sounds present  VASCULAR: Normal pulses, Normal capillary refill  EXTREMITIES:  2+ Peripheral Pulses, No cyanosis, No edema  LYMPH: No lymphadenopathy noted  SKIN: Warm, Intact  PSYCH: Normal mood and affect  NERVOUS SYSTEM:  A/O x3, CN 2-12 intact, No focal deficits    LABS:    12-19    145  |  126  |  5   ----------------------------<  72  <2.0   |  10  |  <0.30    Ca    3.9      19 Dec 2023 22:23    TPro  2.9  /  Alb  1.1  /  TBili  <0.1  /  DBili  x   /  AST  27  /  ALT  6   /  AlkPhos  46  12-19      PT/INR - ( 19 Dec 2023 17:35 )   PT: 14.7 sec;   INR: 1.35 ratio         PTT - ( 19 Dec 2023 17:35 )  PTT:21.2 sec            22:15 - VBG - pH: 7.40  | pCO2: <19   | pO2: 64    | Lactate: 0.7            POCT Blood Glucose.: 73 mg/dL (20 Dec 2023 11:22)  POCT Blood Glucose.: 67 mg/dL (20 Dec 2023 11:21)  POCT Blood Glucose.: 73 mg/dL (20 Dec 2023 07:14)      Urinalysis Basic - ( 19 Dec 2023 22:23 )    Color: x / Appearance: x / SG: x / pH: x  Gluc: 72 mg/dL / Ketone: x  / Bili: x / Urobili: x   Blood: x / Protein: x / Nitrite: x   Leuk Esterase: x / RBC: x / WBC x   Sq Epi: x / Non Sq Epi: x / Bacteria: x            RADIOLOGY & ADDITIONAL TESTS:    Care Discussed with Consultants/Other Providers: CC: Patient is a 52y old  Female who presents with a chief complaint of HA , weakness, AMS (20 Dec 2023 07:58)  HPI:  Pt poor historian 2/2 AMS, only answering yes to all questions, history obtained form chart review     52y F PMH of smoking, stage 4 lung adenocarcinoma with diffuse mets to C/A/P and brain s/p Rt craniotomy for tumor resection, currently on treatment, steroid induced DM, GERD who initially presented to [place] ED for AMS. Per chart review pt had nonsensical speech and c/o HA & R sided weakness so was taken to ED. Patient was reportedly very agitated and violent at [place] ED. Code stroke was called.  Patient required sedation w/ benadryl, ativan and versed for agitation in order to obtain CTH.  CTH at VS shows increased conspicuity of L posterior frontal and R parietal lesions, likely 2/2 hemorrhage (HU ~50-60) c/t 10/28 CTH. Further eval limited by motion artifact Patient was noted to be febrile at VS and started on vancomycin, zosyn. Rapid RVP negative.  Pt ultimately transferred to [facility] for continued care given pt known to heme-onc & neuro-surg at [facility]       (19 Dec 2023 23:59)    PAST MEDICAL & SURGICAL HISTORY:  GERD (Gastroesophageal Reflux Disease)      Lung mass  right      Non-small cell lung cancer with metastasis      S/P endoscopy  2022        SOCIAL HISTORY:  Tobacco Usage:  (   ) never smoked   (   ) former smoker   (   ) current smoker  (     ) pack years    Tobacco Quit Date:  Substance Use (Street drugs): (  ) never used  (  ) other:  Alcohol Usage:    Family history reviewed and otherwise non-contributory  ALLERGIES:  No Known Allergies    MEDICATIONS:  acetaminophen     Tablet .. 650 milliGRAM(s) Oral every 6 hours PRN  aluminum hydroxide/magnesium hydroxide/simethicone Suspension 30 milliLiter(s) Oral every 4 hours PRN  artificial tears (preservative free) Ophthalmic Solution 1 Drop(s) Both EYES three times a day  atorvastatin 40 milliGRAM(s) Oral at bedtime  dexAMETHasone     Tablet 4 milliGRAM(s) Oral every 6 hours  dextrose 5%. 1000 milliLiter(s) IV Continuous <Continuous>  dextrose 5%. 1000 milliLiter(s) IV Continuous <Continuous>  dextrose 50% Injectable 25 Gram(s) IV Push once  dextrose 50% Injectable 12.5 Gram(s) IV Push once  dextrose 50% Injectable 25 Gram(s) IV Push once  dextrose Oral Gel 15 Gram(s) Oral once PRN  divalproex  milliGRAM(s) Oral every 12 hours  glucagon  Injectable 1 milliGRAM(s) IntraMuscular once  insulin lispro (ADMELOG) corrective regimen sliding scale   SubCutaneous at bedtime  insulin lispro (ADMELOG) corrective regimen sliding scale   SubCutaneous three times a day before meals  melatonin 3 milliGRAM(s) Oral at bedtime PRN  metoprolol tartrate 12.5 milliGRAM(s) Oral two times a day  ondansetron Injectable 4 milliGRAM(s) IV Push every 8 hours PRN  pantoprazole    Tablet 40 milliGRAM(s) Oral before breakfast  petrolatum white Ointment 1 Application(s) Topical two times a day  piperacillin/tazobactam IVPB.. 3.375 Gram(s) IV Intermittent every 8 hours  polyethylene glycol 3350 17 Gram(s) Oral daily  senna 2 Tablet(s) Oral at bedtime PRN  sodium chloride 0.9%. 1000 milliLiter(s) IV Continuous <Continuous>  vancomycin  IVPB 1000 milliGRAM(s) IV Intermittent every 24 hours    REVIEW OF SYSTEMS:  CONSTITUTIONAL: No fever, weight loss, or fatigue  EYES: No eye pain, visual disturbances, or discharge  ENMT:  No difficulty hearing, tinnitus, vertigo; No sinus or throat pain  NECK: No neck pain or neck stiffness  RESPIRATORY: No cough, wheezing, chills or hemoptysis; No shortness of breath  CARDIOVASCULAR: No chest pain, palpitations, dizziness, or leg swelling  GASTROINTESTINAL: No abdominal pain, No nausea, vomiting, or hematemesis; No diarrhea or constipation  GENITOURINARY: No dysuria, frequency, hematuria, or incontinence  NEUROLOGICAL: No headaches, memory loss, loss of strength, numbness, or tremors  SKIN: No itching, burning, rashes, or lesions   ENDOCRINE: No heat or cold intolerance; No hair loss  MUSCULOSKELETAL: No joint pain or swelling; No muscle, back, or extremity pain  PSYCHIATRIC: No depression, anxiety, mood swings, or difficulty sleeping  HEME/LYMPH: No easy bruising or bleeding  ALLERY AND IMMUNOLOGIC: No hives or eczema    All other ROS reviewed and negative except as otherwise stated    Vital Signs Last 24 Hrs  T(F): 97 (20 Dec 2023 08:39), Max: 101.3 (19 Dec 2023 17:00)  HR: 110 (20 Dec 2023 08:39) (96 - 125)  BP: 125/77 (20 Dec 2023 08:39) (97/82 - 125/77)  RR: 16 (20 Dec 2023 08:39) (16 - 20)  SpO2: 97% (20 Dec 2023 08:39) (96% - 99%)  I&O's Summary    PHYSICAL EXAM:  GENERAL: NAD, well-groomed  HEAD:  Atraumatic, Normocephalic  EYES: PERRLA, normal conjunctiva, anicteric  ENMT: Moist mucous membranes, no mucositis  NECK: Supple, No JVD  CHEST/LUNG: Clear to auscultation bilaterally; No rales, rhonchi, wheezing, or rubs  HEART: Regular rate and rhythm; S1/S2, No murmurs, rubs, or gallops  ABDOMEN: Soft, Nontender, Nondistended; Bowel sounds present  VASCULAR: Normal pulses, Normal capillary refill  EXTREMITIES:  2+ Peripheral Pulses, No cyanosis, No edema  LYMPH: No lymphadenopathy noted  SKIN: Warm, Intact  PSYCH: Normal mood and affect  NERVOUS SYSTEM:  A/O x3, CN 2-12 intact, No focal deficits    LABS:    12-19    145  |  126  |  5   ----------------------------<  72  <2.0   |  10  |  <0.30    Ca    3.9      19 Dec 2023 22:23    TPro  2.9  /  Alb  1.1  /  TBili  <0.1  /  DBili  x   /  AST  27  /  ALT  6   /  AlkPhos  46  12-19      PT/INR - ( 19 Dec 2023 17:35 )   PT: 14.7 sec;   INR: 1.35 ratio         PTT - ( 19 Dec 2023 17:35 )  PTT:21.2 sec            22:15 - VBG - pH: 7.40  | pCO2: <19   | pO2: 64    | Lactate: 0.7            POCT Blood Glucose.: 73 mg/dL (20 Dec 2023 11:22)  POCT Blood Glucose.: 67 mg/dL (20 Dec 2023 11:21)  POCT Blood Glucose.: 73 mg/dL (20 Dec 2023 07:14)      Urinalysis Basic - ( 19 Dec 2023 22:23 )    Color: x / Appearance: x / SG: x / pH: x  Gluc: 72 mg/dL / Ketone: x  / Bili: x / Urobili: x   Blood: x / Protein: x / Nitrite: x   Leuk Esterase: x / RBC: x / WBC x   Sq Epi: x / Non Sq Epi: x / Bacteria: x            RADIOLOGY & ADDITIONAL TESTS:    Care Discussed with Consultants/Other Providers: CC: Patient is a 52y old  Female who presents with a chief complaint of HA , weakness, AMS (20 Dec 2023 07:58)  HPI:  Pt poor historian 2/2 AMS, only answering yes to all questions, history obtained form chart review     52y F PMH of smoking, stage 4 lung adenocarcinoma with diffuse mets to C/A/P and brain s/p Rt craniotomy for tumor resection, currently on treatment, steroid induced DM, GERD who initially presented to [place] ED for AMS. Per chart review pt had nonsensical speech and c/o HA & R sided weakness so was taken to ED. Patient was reportedly very agitated and violent at [place] ED. Code stroke was called.  Patient required sedation w/ benadryl, ativan and versed for agitation in order to obtain CTH.  CTH at VS shows increased conspicuity of L posterior frontal and R parietal lesions, likely 2/2 hemorrhage (HU ~50-60) c/t 10/28 CTH. Further eval limited by motion artifact Patient was noted to be febrile at VS and started on vancomycin, zosyn. Rapid RVP negative.  Pt ultimately transferred to [facility] for continued care given pt known to heme-onc & neuro-surg at [facility]       (19 Dec 2023 23:59)    PAST MEDICAL & SURGICAL HISTORY:  GERD (Gastroesophageal Reflux Disease)      Lung mass  right      Non-small cell lung cancer with metastasis      S/P endoscopy  2022        SOCIAL HISTORY:  Tobacco Usage:  (   ) never smoked   (   ) former smoker   (   ) current smoker  (     ) pack years    Tobacco Quit Date:  Substance Use (Street drugs): (  ) never used  (  ) other:  Alcohol Usage:    Family history reviewed and otherwise non-contributory  ALLERGIES:  No Known Allergies    MEDICATIONS:  acetaminophen     Tablet .. 650 milliGRAM(s) Oral every 6 hours PRN  aluminum hydroxide/magnesium hydroxide/simethicone Suspension 30 milliLiter(s) Oral every 4 hours PRN  artificial tears (preservative free) Ophthalmic Solution 1 Drop(s) Both EYES three times a day  atorvastatin 40 milliGRAM(s) Oral at bedtime  dexAMETHasone     Tablet 4 milliGRAM(s) Oral every 6 hours  dextrose 5%. 1000 milliLiter(s) IV Continuous <Continuous>  dextrose 5%. 1000 milliLiter(s) IV Continuous <Continuous>  dextrose 50% Injectable 25 Gram(s) IV Push once  dextrose 50% Injectable 12.5 Gram(s) IV Push once  dextrose 50% Injectable 25 Gram(s) IV Push once  dextrose Oral Gel 15 Gram(s) Oral once PRN  divalproex  milliGRAM(s) Oral every 12 hours  glucagon  Injectable 1 milliGRAM(s) IntraMuscular once  insulin lispro (ADMELOG) corrective regimen sliding scale   SubCutaneous at bedtime  insulin lispro (ADMELOG) corrective regimen sliding scale   SubCutaneous three times a day before meals  melatonin 3 milliGRAM(s) Oral at bedtime PRN  metoprolol tartrate 12.5 milliGRAM(s) Oral two times a day  ondansetron Injectable 4 milliGRAM(s) IV Push every 8 hours PRN  pantoprazole    Tablet 40 milliGRAM(s) Oral before breakfast  petrolatum white Ointment 1 Application(s) Topical two times a day  piperacillin/tazobactam IVPB.. 3.375 Gram(s) IV Intermittent every 8 hours  polyethylene glycol 3350 17 Gram(s) Oral daily  senna 2 Tablet(s) Oral at bedtime PRN  sodium chloride 0.9%. 1000 milliLiter(s) IV Continuous <Continuous>  vancomycin  IVPB 1000 milliGRAM(s) IV Intermittent every 24 hours    REVIEW OF SYSTEMS:  CONSTITUTIONAL: No fever, weight loss, or fatigue  EYES: No eye pain, visual disturbances, or discharge  ENMT:  No difficulty hearing, tinnitus, vertigo; No sinus or throat pain  NECK: No neck pain or neck stiffness  RESPIRATORY: No cough, wheezing, chills or hemoptysis; No shortness of breath  CARDIOVASCULAR: No chest pain, palpitations, dizziness, or leg swelling  GASTROINTESTINAL: No abdominal pain, No nausea, vomiting, or hematemesis; No diarrhea or constipation  GENITOURINARY: No dysuria, frequency, hematuria, or incontinence  NEUROLOGICAL: No headaches, memory loss, loss of strength, numbness, or tremors  SKIN: No itching, burning, rashes, or lesions   ENDOCRINE: No heat or cold intolerance; No hair loss  MUSCULOSKELETAL: No joint pain or swelling; No muscle, back, or extremity pain  PSYCHIATRIC: No depression, anxiety, mood swings, or difficulty sleeping  HEME/LYMPH: No easy bruising or bleeding  ALLERY AND IMMUNOLOGIC: No hives or eczema    All other ROS reviewed and negative except as otherwise stated    Vital Signs Last 24 Hrs  T(F): 97 (20 Dec 2023 08:39), Max: 101.3 (19 Dec 2023 17:00)  HR: 110 (20 Dec 2023 08:39) (96 - 125)  BP: 125/77 (20 Dec 2023 08:39) (97/82 - 125/77)  RR: 16 (20 Dec 2023 08:39) (16 - 20)  SpO2: 97% (20 Dec 2023 08:39) (96% - 99%)  I&O's Summary    PHYSICAL EXAM:  GENERAL: NAD, well-groomed  HEAD:  Atraumatic, Normocephalic  EYES: PERRLA, normal conjunctiva, anicteric  ENMT: Moist mucous membranes, no mucositis  NECK: Supple, No JVD  CHEST/LUNG: Clear to auscultation bilaterally; No rales, rhonchi, wheezing, or rubs  HEART: Regular rate and rhythm; S1/S2, No murmurs, rubs, or gallops  ABDOMEN: Soft, Nontender, Nondistended; Bowel sounds present  VASCULAR: Normal pulses, Normal capillary refill  EXTREMITIES:  2+ Peripheral Pulses, No cyanosis, No edema  LYMPH: No lymphadenopathy noted  SKIN: Warm, Intact  PSYCH: Normal mood and affect  NERVOUS SYSTEM:  A/O x3, CN 2-12 intact, No focal deficits    LABS:    12-19    145  |  126  |  5   ----------------------------<  72  <2.0   |  10  |  <0.30    Ca    3.9      19 Dec 2023 22:23    TPro  2.9  /  Alb  1.1  /  TBili  <0.1  /  DBili  x   /  AST  27  /  ALT  6   /  AlkPhos  46  12-19      PT/INR - ( 19 Dec 2023 17:35 )   PT: 14.7 sec;   INR: 1.35 ratio         PTT - ( 19 Dec 2023 17:35 )  PTT:21.2 sec            22:15 - VBG - pH: 7.40  | pCO2: <19   | pO2: 64    | Lactate: 0.7            POCT Blood Glucose.: 73 mg/dL (20 Dec 2023 11:22)  POCT Blood Glucose.: 67 mg/dL (20 Dec 2023 11:21)  POCT Blood Glucose.: 73 mg/dL (20 Dec 2023 07:14)      Urinalysis Basic - ( 19 Dec 2023 22:23 )    Color: x / Appearance: x / SG: x / pH: x  Gluc: 72 mg/dL / Ketone: x  / Bili: x / Urobili: x   Blood: x / Protein: x / Nitrite: x   Leuk Esterase: x / RBC: x / WBC x   Sq Epi: x / Non Sq Epi: x / Bacteria: x            RADIOLOGY & ADDITIONAL TESTS:    Care Discussed with Consultants/Other Providers: CC: Patient is a 52y old  Female who presents with a chief complaint of HA , weakness, AMS (20 Dec 2023 07:58)  HPI:  Pt poor historian 2/2 AMS, only answering yes to all questions, history obtained form chart review     52y F PMH of smoking, stage 4 NSCLC with diffuse mets to C/A/P and brain s/p Rt craniotomy for tumor resection, currently on treatment, steroid induced DM, GERD who initially presented to [place] ED for AMS. Per chart review pt had nonsensical speech and c/o HA & R sided weakness so was taken to ED. Patient was reportedly very agitated and violent at [place] ED. Code stroke was called.  Patient required sedation w/ benadryl, ativan and versed for agitation in order to obtain CTH.  CTH at VS shows increased conspicuity of L posterior frontal and R parietal lesions, likely 2/2 hemorrhage (HU ~50-60) c/t 10/28 CTH. Further eval limited by motion artifact Patient was noted to be febrile at VS and started on vancomycin, zosyn. Rapid RVP negative.  Pt ultimately transferred to [facility] for continued care given pt known to heme-onc & neuro-surg at [facility]      Oncology consulted for NSCLC.     (19 Dec 2023 23:59)    PAST MEDICAL & SURGICAL HISTORY:  GERD (Gastroesophageal Reflux Disease)      Lung mass  right      Non-small cell lung cancer with metastasis      S/P endoscopy  2022        SOCIAL HISTORY:  Tobacco Usage:  (   ) never smoked   (   ) former smoker   (   ) current smoker  (     ) pack years    Tobacco Quit Date:  Substance Use (Street drugs): (  ) never used  (  ) other:  Alcohol Usage:    Family history reviewed and otherwise non-contributory  ALLERGIES:  No Known Allergies    MEDICATIONS:  acetaminophen     Tablet .. 650 milliGRAM(s) Oral every 6 hours PRN  aluminum hydroxide/magnesium hydroxide/simethicone Suspension 30 milliLiter(s) Oral every 4 hours PRN  artificial tears (preservative free) Ophthalmic Solution 1 Drop(s) Both EYES three times a day  atorvastatin 40 milliGRAM(s) Oral at bedtime  dexAMETHasone     Tablet 4 milliGRAM(s) Oral every 6 hours  dextrose 5%. 1000 milliLiter(s) IV Continuous <Continuous>  dextrose 5%. 1000 milliLiter(s) IV Continuous <Continuous>  dextrose 50% Injectable 25 Gram(s) IV Push once  dextrose 50% Injectable 12.5 Gram(s) IV Push once  dextrose 50% Injectable 25 Gram(s) IV Push once  dextrose Oral Gel 15 Gram(s) Oral once PRN  divalproex  milliGRAM(s) Oral every 12 hours  glucagon  Injectable 1 milliGRAM(s) IntraMuscular once  insulin lispro (ADMELOG) corrective regimen sliding scale   SubCutaneous at bedtime  insulin lispro (ADMELOG) corrective regimen sliding scale   SubCutaneous three times a day before meals  melatonin 3 milliGRAM(s) Oral at bedtime PRN  metoprolol tartrate 12.5 milliGRAM(s) Oral two times a day  ondansetron Injectable 4 milliGRAM(s) IV Push every 8 hours PRN  pantoprazole    Tablet 40 milliGRAM(s) Oral before breakfast  petrolatum white Ointment 1 Application(s) Topical two times a day  piperacillin/tazobactam IVPB.. 3.375 Gram(s) IV Intermittent every 8 hours  polyethylene glycol 3350 17 Gram(s) Oral daily  senna 2 Tablet(s) Oral at bedtime PRN  sodium chloride 0.9%. 1000 milliLiter(s) IV Continuous <Continuous>  vancomycin  IVPB 1000 milliGRAM(s) IV Intermittent every 24 hours    REVIEW OF SYSTEMS:  CONSTITUTIONAL: No fever, weight loss, or fatigue  EYES: No eye pain, visual disturbances, or discharge  ENMT:  No difficulty hearing, tinnitus, vertigo; No sinus or throat pain  NECK: No neck pain or neck stiffness  RESPIRATORY: No cough, wheezing, chills or hemoptysis; No shortness of breath  CARDIOVASCULAR: No chest pain, palpitations, dizziness, or leg swelling  GASTROINTESTINAL: No abdominal pain, No nausea, vomiting, or hematemesis; No diarrhea or constipation  GENITOURINARY: No dysuria, frequency, hematuria, or incontinence  NEUROLOGICAL: No headaches, memory loss, loss of strength, numbness, or tremors  SKIN: No itching, burning, rashes, or lesions   ENDOCRINE: No heat or cold intolerance; No hair loss  MUSCULOSKELETAL: No joint pain or swelling; No muscle, back, or extremity pain  PSYCHIATRIC: No depression, anxiety, mood swings, or difficulty sleeping  HEME/LYMPH: No easy bruising or bleeding  ALLERY AND IMMUNOLOGIC: No hives or eczema    All other ROS reviewed and negative except as otherwise stated    Vital Signs Last 24 Hrs  T(F): 97 (20 Dec 2023 08:39), Max: 101.3 (19 Dec 2023 17:00)  HR: 110 (20 Dec 2023 08:39) (96 - 125)  BP: 125/77 (20 Dec 2023 08:39) (97/82 - 125/77)  RR: 16 (20 Dec 2023 08:39) (16 - 20)  SpO2: 97% (20 Dec 2023 08:39) (96% - 99%)  I&O's Summary    PHYSICAL EXAM:  GENERAL: NAD, well-groomed  HEAD:  Atraumatic, Normocephalic  EYES: PERRLA, normal conjunctiva, anicteric  ENMT: Moist mucous membranes, no mucositis  NECK: Supple, No JVD  CHEST/LUNG: Clear to auscultation bilaterally; No rales, rhonchi, wheezing, or rubs  HEART: Regular rate and rhythm; S1/S2, No murmurs, rubs, or gallops  ABDOMEN: Soft, Nontender, Nondistended; Bowel sounds present  VASCULAR: Normal pulses, Normal capillary refill  EXTREMITIES:  2+ Peripheral Pulses, No cyanosis, No edema  LYMPH: No lymphadenopathy noted  SKIN: Warm, Intact  PSYCH: Normal mood and affect  NERVOUS SYSTEM:  A/O x3, CN 2-12 intact, No focal deficits    LABS:    CBC Full  -  ( 20 Dec 2023 20:01 )  WBC Count : 4.26 K/uL  RBC Count : 3.90 M/uL  Hemoglobin : 10.8 g/dL  Hematocrit : 34.6 %  Platelet Count - Automated : 151 K/uL  Mean Cell Volume : 88.7 fl  Mean Cell Hemoglobin : 27.7 pg  Mean Cell Hemoglobin Concentration : 31.2 gm/dL  Auto Neutrophil # : x  Auto Lymphocyte # : x  Auto Monocyte # : x  Auto Eosinophil # : x  Auto Basophil # : x  Auto Neutrophil % : x  Auto Lymphocyte % : x  Auto Monocyte % : x  Auto Eosinophil % : x  Auto Basophil % : x    12-19    145  |  126  |  5   ----------------------------<  72  <2.0   |  10  |  <0.30    Ca    3.9      19 Dec 2023 22:23    TPro  2.9  /  Alb  1.1  /  TBili  <0.1  /  DBili  x   /  AST  27  /  ALT  6   /  AlkPhos  46  12-19      PT/INR - ( 19 Dec 2023 17:35 )   PT: 14.7 sec;   INR: 1.35 ratio         PTT - ( 19 Dec 2023 17:35 )  PTT:21.2 sec            22:15 - VBG - pH: 7.40  | pCO2: <19   | pO2: 64    | Lactate: 0.7            POCT Blood Glucose.: 73 mg/dL (20 Dec 2023 11:22)  POCT Blood Glucose.: 67 mg/dL (20 Dec 2023 11:21)  POCT Blood Glucose.: 73 mg/dL (20 Dec 2023 07:14)      Urinalysis Basic - ( 19 Dec 2023 22:23 )    Color: x / Appearance: x / SG: x / pH: x  Gluc: 72 mg/dL / Ketone: x  / Bili: x / Urobili: x   Blood: x / Protein: x / Nitrite: x   Leuk Esterase: x / RBC: x / WBC x   Sq Epi: x / Non Sq Epi: x / Bacteria: x            RADIOLOGY & ADDITIONAL TESTS:    Care Discussed with Consultants/Other Providers: CC: Patient is a 52y old  Female who presents with a chief complaint of HA , weakness, AMS (20 Dec 2023 07:58)  HPI:  Pt poor historian 2/2 AMS, only answering yes to all questions, history obtained form chart review     52y F PMH of smoking, stage 4 NSCLC with diffuse mets to C/A/P and brain s/p Rt craniotomy for tumor resection, currently on treatment, steroid induced DM, GERD who initially presented to [place] ED for AMS. Per chart review pt had nonsensical speech and c/o HA & R sided weakness so was taken to ED. Patient was reportedly very agitated and violent at [place] ED. Code stroke was called.  Patient required sedation w/ benadryl, ativan and versed for agitation in order to obtain CTH.  CTH at VS shows increased conspicuity of L posterior frontal and R parietal lesions, likely 2/2 hemorrhage (HU ~50-60) c/t 10/28 CTH. Further eval limited by motion artifact Patient was noted to be febrile at VS and started on vancomycin, zosyn. Rapid RVP negative.  Pt ultimately transferred to [facility] for continued care given pt known to heme-onc & neuro-surg at [facility]      Oncology consulted for NSCLC.     (19 Dec 2023 23:59)    PAST MEDICAL & SURGICAL HISTORY:  GERD (Gastroesophageal Reflux Disease)      Lung mass  right      Non-small cell lung cancer with metastasis      S/P endoscopy  2022        SOCIAL HISTORY:  Tobacco Usage:  (   ) never smoked   (   ) former smoker   (   ) current smoker  (     ) pack years    Tobacco Quit Date:  Substance Use (Street drugs): (  ) never used  (  ) other:  Alcohol Usage:    Family history reviewed and otherwise non-contributory  ALLERGIES:  No Known Allergies    MEDICATIONS:  acetaminophen     Tablet .. 650 milliGRAM(s) Oral every 6 hours PRN  aluminum hydroxide/magnesium hydroxide/simethicone Suspension 30 milliLiter(s) Oral every 4 hours PRN  artificial tears (preservative free) Ophthalmic Solution 1 Drop(s) Both EYES three times a day  atorvastatin 40 milliGRAM(s) Oral at bedtime  dexAMETHasone     Tablet 4 milliGRAM(s) Oral every 6 hours  dextrose 5%. 1000 milliLiter(s) IV Continuous <Continuous>  dextrose 5%. 1000 milliLiter(s) IV Continuous <Continuous>  dextrose 50% Injectable 25 Gram(s) IV Push once  dextrose 50% Injectable 12.5 Gram(s) IV Push once  dextrose 50% Injectable 25 Gram(s) IV Push once  dextrose Oral Gel 15 Gram(s) Oral once PRN  divalproex  milliGRAM(s) Oral every 12 hours  glucagon  Injectable 1 milliGRAM(s) IntraMuscular once  insulin lispro (ADMELOG) corrective regimen sliding scale   SubCutaneous at bedtime  insulin lispro (ADMELOG) corrective regimen sliding scale   SubCutaneous three times a day before meals  melatonin 3 milliGRAM(s) Oral at bedtime PRN  metoprolol tartrate 12.5 milliGRAM(s) Oral two times a day  ondansetron Injectable 4 milliGRAM(s) IV Push every 8 hours PRN  pantoprazole    Tablet 40 milliGRAM(s) Oral before breakfast  petrolatum white Ointment 1 Application(s) Topical two times a day  piperacillin/tazobactam IVPB.. 3.375 Gram(s) IV Intermittent every 8 hours  polyethylene glycol 3350 17 Gram(s) Oral daily  senna 2 Tablet(s) Oral at bedtime PRN  sodium chloride 0.9%. 1000 milliLiter(s) IV Continuous <Continuous>  vancomycin  IVPB 1000 milliGRAM(s) IV Intermittent every 24 hours    REVIEW OF SYSTEMS:  CONSTITUTIONAL: No fever, weight loss, or fatigue  EYES: No eye pain, visual disturbances, or discharge  ENMT:  No difficulty hearing, tinnitus, vertigo; No sinus or throat pain  NECK: No neck pain or neck stiffness  RESPIRATORY: No cough, wheezing, chills or hemoptysis; No shortness of breath  CARDIOVASCULAR: No chest pain, palpitations, dizziness, or leg swelling  GASTROINTESTINAL: No abdominal pain, No nausea, vomiting, or hematemesis; No diarrhea or constipation  GENITOURINARY: No dysuria, frequency, hematuria, or incontinence  NEUROLOGICAL: No headaches, memory loss, loss of strength, numbness, or tremors  SKIN: No itching, burning, rashes, or lesions   ENDOCRINE: No heat or cold intolerance; No hair loss  MUSCULOSKELETAL: No joint pain or swelling; No muscle, back, or extremity pain  PSYCHIATRIC: No depression, anxiety, mood swings, or difficulty sleeping  HEME/LYMPH: No easy bruising or bleeding  ALLERY AND IMMUNOLOGIC: No hives or eczema    All other ROS reviewed and negative except as otherwise stated    Vital Signs Last 24 Hrs  T(F): 97 (20 Dec 2023 08:39), Max: 101.3 (19 Dec 2023 17:00)  HR: 110 (20 Dec 2023 08:39) (96 - 125)  BP: 125/77 (20 Dec 2023 08:39) (97/82 - 125/77)  RR: 16 (20 Dec 2023 08:39) (16 - 20)  SpO2: 97% (20 Dec 2023 08:39) (96% - 99%)  I&O's Summary    PHYSICAL EXAM:  GENERAL: NAD, well-groomed  HEAD:  Atraumatic, Normocephalic  EYES: PERRLA, normal conjunctiva, anicteric  ENMT: Moist mucous membranes, no mucositis  NECK: Supple, No JVD  CHEST/LUNG: Clear to auscultation bilaterally; No rales, rhonchi, wheezing, or rubs  HEART: Regular rate and rhythm; S1/S2, No murmurs, rubs, or gallops  ABDOMEN: Soft, Nontender, Nondistended; Bowel sounds present  VASCULAR: Normal pulses, Normal capillary refill  EXTREMITIES:  2+ Peripheral Pulses, No cyanosis, No edema  LYMPH: No lymphadenopathy noted  SKIN: Warm, Intact  PSYCH: Normal mood and affect  NERVOUS SYSTEM:  A/O x3, CN 2-12 intact, No focal deficits    LABS:    CBC Full  -  ( 20 Dec 2023 20:01 )  WBC Count : 4.26 K/uL  RBC Count : 3.90 M/uL  Hemoglobin : 10.8 g/dL  Hematocrit : 34.6 %  Platelet Count - Automated : 151 K/uL  Mean Cell Volume : 88.7 fl  Mean Cell Hemoglobin : 27.7 pg  Mean Cell Hemoglobin Concentration : 31.2 gm/dL  Auto Neutrophil # : x  Auto Lymphocyte # : x  Auto Monocyte # : x  Auto Eosinophil # : x  Auto Basophil # : x  Auto Neutrophil % : x  Auto Lymphocyte % : x  Auto Monocyte % : x  Auto Eosinophil % : x  Auto Basophil % : x    12-19    145  |  126  |  5   ----------------------------<  72  <2.0   |  10  |  <0.30    Ca    3.9      19 Dec 2023 22:23    TPro  2.9  /  Alb  1.1  /  TBili  <0.1  /  DBili  x   /  AST  27  /  ALT  6   /  AlkPhos  46  12-19      PT/INR - ( 19 Dec 2023 17:35 )   PT: 14.7 sec;   INR: 1.35 ratio         PTT - ( 19 Dec 2023 17:35 )  PTT:21.2 sec            22:15 - VBG - pH: 7.40  | pCO2: <19   | pO2: 64    | Lactate: 0.7            POCT Blood Glucose.: 73 mg/dL (20 Dec 2023 11:22)  POCT Blood Glucose.: 67 mg/dL (20 Dec 2023 11:21)  POCT Blood Glucose.: 73 mg/dL (20 Dec 2023 07:14)      Urinalysis Basic - ( 19 Dec 2023 22:23 )    Color: x / Appearance: x / SG: x / pH: x  Gluc: 72 mg/dL / Ketone: x  / Bili: x / Urobili: x   Blood: x / Protein: x / Nitrite: x   Leuk Esterase: x / RBC: x / WBC x   Sq Epi: x / Non Sq Epi: x / Bacteria: x            RADIOLOGY & ADDITIONAL TESTS:    Care Discussed with Consultants/Other Providers:

## 2023-12-21 NOTE — PROGRESS NOTE ADULT - ASSESSMENT
ASSESSMENT/PLAN:     NEURO:  EEG monitoring  On LEV 1.5 BID, LCM 100mg BID, VPA 250mg Q6  Na 135 - 145  Decadron 4mg Q6  tylenol and oxycodone PRN   Activity: [] OOB as tolerated [] Bedrest [X] PT [] OT [] PMNR    PULM:  Incentive spirometry, mobilize as tolerated    CV:  -160mmHg    RENAL:  IVF until good PO intake    GI:  Diet: NPO  sennna and miralax for bowel regimen  Last Bowel Movement:   GI prophylaxis [X] not indicated [] PPI [] other:    ENDO:   Goal euglycemia (-180)    HEME/ONC:  VTE prophylaxis: [X] SCDs [] chemoprophylaxis [] hold chemoprophylaxis due to: [] high risk of DVT/PE on admission due to:    ID:  Febrile. Pending Clx  Vancomycin 750mg BID    Will discuss goals of care with the family

## 2023-12-21 NOTE — PROVIDER CONTACT NOTE (CHANGE IN STATUS NOTIFICATION) - ASSESSMENT
decadron IVP given, rectal tylenol 650 by day rn, ivpb tylenol given 650, 25g dextrose IVP, valproate ivpb, 500cc saline bolus, 10pm labs obtained before RRT

## 2023-12-21 NOTE — CHART NOTE - NSCHARTNOTEFT_GEN_A_CORE
EEG preliminary read (not final) on the initial recording = x 45 mins    No seizures recorded. Unable to see right side of the patient on video.  Continuous left parietooccipital LPDs ~1hz.  Moderate slowing noted.    Final report to follow tomorrow morning after completion of study.    [facility] EEG Reading Room Ph#: [phone]  Epilepsy Answering Service after 5PM and before 8:30AM: Ph#: [phone] EEG preliminary read (not final) on the initial recording = x 45 mins    No seizures recorded. Unable to see right side of the patient on video.  Continuous left parietooccipital LPDs ~1hz.  Moderate slowing noted.    Final report to follow tomorrow morning after completion of study.    [facility] EEG Reading Room Ph#: [phone]  Epilepsy Answering Service after 5PM and before 8:30AM: Ph#: [phone]

## 2023-12-21 NOTE — PROGRESS NOTE ADULT - ASSESSMENT
-had seizure overnight, focal motor involving Rt face, RUE twitching, broke with 2mg ativan and 1g keppra.    -This AM had blown L pupil to 6 and fixed, R 4 and sluggish, trace mvmt twitching RUE, CTH obtained, was stable heme no e/o herniation. Pt likely having recurrent focal motor seizure, given 2mg ativan in 1mg increments, then addtl 1g keppra and incr to 1.5mg keppra bid on top of vimpat/VPA.     -desatted to 88% during transport to [facility] and back, back up to 98% with 6L NC.    -[facility].    -overall goals include:    -vEEG ASAP to optimize seizure mgmt  -MRI wwo  -manage lytes  -assess for improvement on dex 4q6 -had seizure overnight, focal motor involving Rt face, RUE twitching, broke with 2mg ativan and 1g keppra.    -This AM had blown L pupil to 6 and fixed, R 4 and sluggish, trace mvmt twitching RUE, CTH obtained, was stable heme no e/o herniation. Pt likely having recurrent focal motor seizure, given 2mg ativan in 1mg increments, then addtl 1g keppra and incr to 1.5mg keppra bid on top of vimpat/VPA.     -desatted to 88% during transport to [facility] and back, back up to 98% with 6L NC.    -[facility].    -overall goals include:    -vEEG ASAP to optimize seizure mgmt  -MRI wwo  -manage lytes  -assess for improvement on dex 4q6

## 2023-12-21 NOTE — CHART NOTE - NSCHARTNOTEFT_GEN_A_CORE
Called by primary team ~22:00 for right facial twitching with was low frequency and somewhat rhythmic however tongue movements suggested non epileptic nature of movements. Patient was hypoglycemic and pending administration of valproic acid and decadron. Recommended correction of glucose and continued for planned ASM. RRT called 22:50. Patient noted to be hypoxic but technical difficulties limiting confidence, additional pulse oximeter would later reveal normoxia. No facial movements observed at that time but rhythmic, ~1hz george of right hand was observed. This spread to forarm muscles but did not continue in its jacksonian march. With unclear oxygenation status and rhythmic movements that were spreading, ativan 1mg was given x 2 without resolution. Patient had recently received lacosamide and valproic acid. I recommended additional agent, levetriacetam. Oxygenation was intact and patient was protecting her airway. Appeared to be in focal status. Opted for 1g levetiracetam. Patient was febrile with hypomagnesia. I recommend acetaminophen as fever likely lowering seizure threshold. Magneisum given but medicine team concerned with excess acteaminophen, medicine team preferring cooling agents    Recommendations  []Continue Lacosamide 100mg Q12  []Continue Levetiracetam 500mg Q12 (please start ~0500 today)  []Continue Valproic acid 250mg Q6  []Follow up valproic acid level, albumin  []vEEG  []Aggressive antipyretics  []Antibiotics per primary  []Avoid medications known to lower seizure threshold such as carbapenems unless absolutely necessary  []Steroids per neurosurgery  [] Please note: if patient has a convulsion, please document length of episode, specifically what patient was doing paying attention to eye opening vs closure, gaze deviation, shaking of extremities, tongue bite, urinary incontinence, any derangement of vital signs. Generalized motor seizures can have dilated and unreactive pupils, absent oculocephalic reflexes (no dolls eyes), and open eyelids    Rest of recs per prior neurology note Called by primary team ~22:00 for right facial twitching with was low frequency and somewhat rhythmic however tongue movements suggested non epileptic nature of movements. Patient was hypoglycemic and pending administration of valproic acid and decadron. Recommended correction of glucose and continued for planned ASM. RRT called 22:50. Patient noted to be hypoxic but technical difficulties limiting confidence, additional pulse oximeter would later reveal normoxia. No facial movements observed at that time but rhythmic, ~1hz george of right hand was observed. This spread to forarm muscles but did not continue in its jacksonian march. With unclear oxygenation status and rhythmic movements that were spreading, ativan 1mg was given x 2 without resolution. Patient had recently received lacosamide and valproic acid. I recommended additional agent, levetriacetam. Oxygenation was intact and patient was protecting her airway. Appeared to be in focal status. Opted for 1g levetiracetam. Patient was febrile with hypomagnesia. I recommend acetaminophen as fever likely lowering seizure threshold. Magneisum given but medicine team concerned with excess acteaminophen, medicine team preferring cooling agents    Recommendations  []Continue Lacosamide 100mg Q12  []Continue Levetiracetam 500mg Q12 (please start ~0500 today)  []Continue Valproic acid 250mg Q6  []Follow up valproic acid level, albumin  []Avoid benzodiazepines if possible   []vEEG  []Aggressive antipyretics  []Antibiotics per primary  []Avoid medications known to lower seizure threshold such as carbapenems unless absolutely necessary  []Steroids per neurosurgery  [] Please note: if patient has a convulsion, please document length of episode, specifically what patient was doing paying attention to eye opening vs closure, gaze deviation, shaking of extremities, tongue bite, urinary incontinence, any derangement of vital signs. Generalized motor seizures can have dilated and unreactive pupils, absent oculocephalic reflexes (no dolls eyes), and open eyelids    Please call [phone] with any questions     Rest of recs per prior neurology note Called by primary team ~22:00 for right facial twitching with was low frequency and somewhat rhythmic however tongue movements suggested non epileptic nature of movements. Patient was hypoglycemic and pending administration of valproic acid and decadron. Recommended correction of glucose and continued for planned ASM. RRT called 22:50. Patient noted to be hypoxic but technical difficulties limiting confidence, additional pulse oximeter would later reveal normoxia. No facial movements observed at that time but rhythmic, ~1hz george of right hand was observed. This spread to forarm muscles but did not continue in its jacksonian march. With unclear oxygenation status and rhythmic movements that were spreading, ativan 1mg was given x 2 without resolution. Patient had recently received lacosamide and valproic acid. I recommended additional agent, levetriacetam. Oxygenation was intact and patient was protecting her airway. Appeared to be in focal status. Opted for 1g levetiracetam. Patient was febrile with hypomagnesia. I recommend acetaminophen as fever likely lowering seizure threshold. Magneisum given but medicine team concerned with excess acteaminophen, medicine team preferring cooling agents    Recommendations  []Continue Lacosamide 100mg Q12  []Continue Levetiracetam 500mg Q12 (please start ~0500 today)  []Continue Valproic acid 250mg Q6  []Follow up valproic acid level, albumin  []Avoid benzodiazepines if possible   []vEEG  []Aggressive antipyretics  []Antibiotics per primary  []Avoid medications known to lower seizure threshold such as carbapenems unless absolutely necessary  []Steroids per neurosurgery  [] Please note: if patient has a convulsion, please document length of episode, specifically what patient was doing paying attention to eye opening vs closure, gaze deviation, shaking of extremities, tongue bite, urinary incontinence, any derangement of vital signs. Generalized motor seizures can have dilated and unreactive pupils, absent oculocephalic reflexes (no dolls eyes), and open eyelids    Please call [phone] with any questions     Rest of recs per prior neurology note

## 2023-12-21 NOTE — PROGRESS NOTE ADULT - SUBJECTIVE AND OBJECTIVE BOX
Neurology Progress Note    SUBJECTIVE/OBJECTIVE/INTERVAL EVENTS: Patient seen and examined at bedside w/ neuro attending and team. Patient found to be obtunded. Grimacing and withdrawing to noxious stimuli. Non-verbal.    INTERVAL HISTORY: concern for status epilepticus, Keppra increased, transferred to ICU for close monitoring    REVIEW OF SYSTEMS: unable to assess due to mental status    VITALS & EXAMINATION:  Vital Signs Last 24 Hrs  T(C): 36 (21 Dec 2023 12:00), Max: 39.7 (20 Dec 2023 21:20)  T(F): 96.8 (21 Dec 2023 12:00), Max: 103.4 (20 Dec 2023 21:20)  HR: 99 (21 Dec 2023 12:00) (98 - 133)  BP: 113/80 (21 Dec 2023 12:00) (104/70 - 131/85)  BP(mean): 92 (21 Dec 2023 12:00) (86 - 98)  RR: 32 (21 Dec 2023 12:00) (15 - 32)  SpO2: 100% (21 Dec 2023 12:00) (91% - 100%)    Parameters below as of 21 Dec 2023 08:50  Patient On (Oxygen Delivery Method): nasal cannula  O2 Flow (L/min): 6      General: INCOMPLETE  Constitutional: obtunded female, appears stated age, protecting airway  Head: Normocephalic;   Eyes: clear sclera;   Extremities: No cyanosis;   Resp: breathing comfortably     Neurological (>12):  MS: eyes closed, not following commands or opening eyes to voice  Language: non-verbal  CNs: PERRL (R 1-2mm, L 1-2mm). No gaze preference. No facial asymmetry b/l  Motor - Normal bulk  throughout. No pronator drift. Withdraws b/l LE in response to noxious stimuli (flexes the knees and hips)  Sensation: Intact to LT b/l. Grimacing to nailbed pressure in all extremities  Reflexes L/R:  Biceps(C5) 2/2  BR(C6) 2/2   Triceps(C7)  2/2 Patellar(L4)   2/2   Ankles 2/2   Toes: mute b/l  Coordination: no abnormal movements observed during encounter    LABORATORY:  CBC                       9.8    3.87  )-----------( 128      ( 21 Dec 2023 07:24 )             33.2     Chem 12-21    136  |  104  |  6<L>  ----------------------------<  139<H>  3.2<L>   |  18<L>  |  0.34<L>    Ca    8.6      21 Dec 2023 07:26  Phos  3.5     12-20  Mg     .9     12-20    TPro  6.6  /  Alb  2.5<L>  /  TBili  0.3  /  DBili  x   /  AST  50<H>  /  ALT  13  /  AlkPhos  112  12-21    LFTs LIVER FUNCTIONS - ( 21 Dec 2023 07:26 )  Alb: 2.5 g/dL / Pro: 6.6 g/dL / ALK PHOS: 112 U/L / ALT: 13 U/L / AST: 50 U/L / GGT: x           Coagulopathy PT/INR - ( 20 Dec 2023 22:20 )   PT: 15.3 sec;   INR: 1.47 ratio         PTT - ( 20 Dec 2023 22:20 )  PTT:28.1 sec  Lipid Panel   A1c   Cardiac enzymes CARDIAC MARKERS ( 20 Dec 2023 20:01 )  x     / x     / 482 U/L / x     / 6.0 ng/mL      U/A Urinalysis Basic - ( 21 Dec 2023 07:26 )    Color: x / Appearance: x / SG: x / pH: x  Gluc: 139 mg/dL / Ketone: x  / Bili: x / Urobili: x   Blood: x / Protein: x / Nitrite: x   Leuk Esterase: x / RBC: x / WBC x   Sq Epi: x / Non Sq Epi: x / Bacteria: x    STUDIES & IMAGING: (EEG, CT, MR, U/S, TTE/PASCUAL):  awaiting EEG Neurology Progress Note    SUBJECTIVE/OBJECTIVE/INTERVAL EVENTS: Patient seen and examined at bedside w/ neuro attending and team. Patient found to be obtunded. Grimacing and withdrawing to noxious stimuli. Non-verbal.    INTERVAL HISTORY: concern for status epilepticus, Keppra increased, transferred to ICU for close monitoring    REVIEW OF SYSTEMS: unable to assess due to mental status    VITALS & EXAMINATION:  Vital Signs Last 24 Hrs  T(C): 36 (21 Dec 2023 12:00), Max: 39.7 (20 Dec 2023 21:20)  T(F): 96.8 (21 Dec 2023 12:00), Max: 103.4 (20 Dec 2023 21:20)  HR: 99 (21 Dec 2023 12:00) (98 - 133)  BP: 113/80 (21 Dec 2023 12:00) (104/70 - 131/85)  BP(mean): 92 (21 Dec 2023 12:00) (86 - 98)  RR: 32 (21 Dec 2023 12:00) (15 - 32)  SpO2: 100% (21 Dec 2023 12:00) (91% - 100%)    Parameters below as of 21 Dec 2023 08:50  Patient On (Oxygen Delivery Method): nasal cannula  O2 Flow (L/min): 6      General: Constitutional: obtunded female, appears stated age, protecting airway  Head: Normocephalic;   Eyes: clear sclera;   Extremities: No cyanosis;   Resp: breathing comfortably     Neurological (>12):  MS: eyes closed, not following commands or opening eyes to voice  Language: non-verbal  CNs: PERRL (R 1-2mm, L 1-2mm). No gaze preference. No facial asymmetry b/l  Motor - Normal bulk  throughout. No pronator drift. Withdraws b/l LE in response to noxious stimuli (flexes the knees and hips)  Sensation: Intact to LT b/l. Grimacing to nailbed pressure in all extremities  Reflexes L/R:  Biceps(C5) 2/2  BR(C6) 2/2   Triceps(C7)  2/2 Patellar(L4)   2/2   Ankles 2/2   Toes: mute b/l  Coordination: no abnormal movements observed during encounter    LABORATORY:  CBC                       9.8    3.87  )-----------( 128      ( 21 Dec 2023 07:24 )             33.2     Chem 12-21    136  |  104  |  6<L>  ----------------------------<  139<H>  3.2<L>   |  18<L>  |  0.34<L>    Ca    8.6      21 Dec 2023 07:26  Phos  3.5     12-20  Mg     .9     12-20    TPro  6.6  /  Alb  2.5<L>  /  TBili  0.3  /  DBili  x   /  AST  50<H>  /  ALT  13  /  AlkPhos  112  12-21    LFTs LIVER FUNCTIONS - ( 21 Dec 2023 07:26 )  Alb: 2.5 g/dL / Pro: 6.6 g/dL / ALK PHOS: 112 U/L / ALT: 13 U/L / AST: 50 U/L / GGT: x           Coagulopathy PT/INR - ( 20 Dec 2023 22:20 )   PT: 15.3 sec;   INR: 1.47 ratio         PTT - ( 20 Dec 2023 22:20 )  PTT:28.1 sec  Lipid Panel   A1c   Cardiac enzymes CARDIAC MARKERS ( 20 Dec 2023 20:01 )  x     / x     / 482 U/L / x     / 6.0 ng/mL      U/A Urinalysis Basic - ( 21 Dec 2023 07:26 )    Color: x / Appearance: x / SG: x / pH: x  Gluc: 139 mg/dL / Ketone: x  / Bili: x / Urobili: x   Blood: x / Protein: x / Nitrite: x   Leuk Esterase: x / RBC: x / WBC x   Sq Epi: x / Non Sq Epi: x / Bacteria: x    STUDIES & IMAGING: (EEG, CT, MR, U/S, TTE/PASCUAL):  awaiting EEG

## 2023-12-21 NOTE — CHART NOTE - NSCHARTNOTEFT_GEN_A_CORE
Per epilepsy team, patient EEG with no seizures, however high risk for seizures seen in L posterior quadrant. LPDs mostly around 1 Hz with overriding fast activity and occasionally in bursts. Case discussed w/ attending Dr. Marquez. Would increase standing Vimpat to 200mg BID. Continue on Keppra 1500mg BID. Thank you. Call [phone] with any questions. Per epilepsy team, patient EEG with no seizures, however high risk for seizures seen in L posterior quadrant. LPDs mostly around 1 Hz with overriding fast activity and occasionally in bursts. Case discussed w/ attending Dr. Marquez. Would increase standing Vimpat to 200mg BID. Continue on Keppra 1500mg BID. Thank you. Call [phone] with any questions.

## 2023-12-21 NOTE — PROGRESS NOTE ADULT - SUBJECTIVE AND OBJECTIVE BOX
52y F PMH of smoking, stage 4 lung adenocarcinoma with diffuse mets to C/A/P and brain s/p Rt craniotomy for tumor resection, & multiple rounds of chemo & RT, steroid induced DM, GERD who presents to [place] ED for AMS. Per chart review pt had nonsensical speech and c/o HA & R sided weakness so was taken to ED. Patient was reportedly very agitated and violent at [facility] ED. Code stroke was called.  Patient required sedation w/ benadryl, ativan and versed for agitation in order to obtain CTH.  CTH at  shows increased conspicuity of L posterior frontal and R parietal lesions, likely 2/2 hemorrhage (HU ~50-60) c/t 10/28 CTH. Further eval limited by motion artifact Patient was noted to be febrile at  and started on vancomycin, zosyn. Rapid RVP negative.  Pt ultimately transferred to [facility] for continued care given pt known to heme-onc & neuro-surg at [facility]      HOSPITAL COURSE:      Admission Scores  GCS:   HH:   MF:   NIHSS:   RASS:    CAM-ICU:   ICH:    24 hour Events:       Allergies    No Known Allergies    Intolerances        REVIEW OF SYSTEMS: [ ] Unable to Assess due to neurologic exam   [ ] All ROS addressed below are non-contributory, except:  Neuro: [ ] Headache [ ] Back pain [ ] Numbness [ ] Weakness [ ] Ataxia [ ] Dizziness [ ] Aphasia [ ] Dysarthria [ ] Visual disturbance  Resp: [ ] Shortness of breath/dyspnea, [ ] Orthopnea [ ] Cough  CV: [ ] Chest pain [ ] Palpitation [ ] Lightheadedness [ ] Syncope  Renal: [ ] Thirst [ ] Edema  GI: [ ] Nausea [ ] Emesis [ ] Abdominal pain [ ] Constipation [ ] Diarrhea  Hem: [ ] Hematemesis [ ] bright red blood per rectum  ID: [ ] Fever [ ] Chills [ ] Dysuria  ENT: [ ] Rhinorrhea      DEVICES:   [ ] Restraints [ ] ET tube [ ] central line [ ] arterial line [ ] ott [ ] NGT/OGT [ ] EVD [ ] LD [ ] ANILA/HMV [ ] Trach [ ] PEG [ ] Chest Tube     VITALS:   Vital Signs Last 24 Hrs  T(C): 36.5 (21 Dec 2023 05:27), Max: 39.7 (20 Dec 2023 21:20)  T(F): 97.7 (21 Dec 2023 05:27), Max: 103.4 (20 Dec 2023 21:20)  HR: 117 (21 Dec 2023 05:27) (106 - 133)  BP: 131/85 (21 Dec 2023 05:27) (104/70 - 131/85)  BP(mean): --  RR: 18 (21 Dec 2023 05:27) (16 - 20)  SpO2: 99% (21 Dec 2023 05:27) (91% - 99%)    Parameters below as of 21 Dec 2023 05:27  Patient On (Oxygen Delivery Method): nasal cannula      CAPILLARY BLOOD GLUCOSE      POCT Blood Glucose.: 140 mg/dL (21 Dec 2023 05:14)  POCT Blood Glucose.: 160 mg/dL (20 Dec 2023 22:54)  POCT Blood Glucose.: 170 mg/dL (20 Dec 2023 22:23)  POCT Blood Glucose.: 201 mg/dL (20 Dec 2023 21:53)  POCT Blood Glucose.: 399 mg/dL (20 Dec 2023 21:50)  POCT Blood Glucose.: 66 mg/dL (20 Dec 2023 21:23)  POCT Blood Glucose.: 67 mg/dL (20 Dec 2023 21:21)  POCT Blood Glucose.: 77 mg/dL (20 Dec 2023 17:50)  POCT Blood Glucose.: 73 mg/dL (20 Dec 2023 11:22)  POCT Blood Glucose.: 67 mg/dL (20 Dec 2023 11:21)    I&O's Summary      Respiratory:    ABG - ( 21 Dec 2023 00:36 )  pH, Arterial: 7.41  pH, Blood: x     /  pCO2: 34    /  pO2: 93    / HCO3: 22    / Base Excess: -2.4  /  SaO2: 98.5                LABS:                        9.8    3.87  )-----------( 128      ( 21 Dec 2023 07:24 )             33.2     12-21    136  |  104  |  6<L>  ----------------------------<  139<H>  3.2<L>   |  18<L>  |  0.34<L>             MEDICATION LEVELS:   Valproic Acid Level, Serum: 19 ug/mL (12-21 @ 07:24)  Valproic Acid Level, Serum: 18 ug/mL (12-20 @ 22:20)    IVF FLUIDS/MEDICATIONS:   MEDICATIONS  (STANDING):  artificial tears (preservative free) Ophthalmic Solution 1 Drop(s) Both EYES three times a day  atorvastatin 40 milliGRAM(s) Oral at bedtime  dexAMETHasone  Injectable 4 milliGRAM(s) IV Push every 6 hours  dextrose 5%. 1000 milliLiter(s) (50 mL/Hr) IV Continuous <Continuous>  dextrose 5%. 1000 milliLiter(s) (100 mL/Hr) IV Continuous <Continuous>  dextrose 50% Injectable 25 Gram(s) IV Push once  dextrose 50% Injectable 25 Gram(s) IV Push once  dextrose 50% Injectable 12.5 Gram(s) IV Push once  glucagon  Injectable 1 milliGRAM(s) IntraMuscular once  insulin lispro (ADMELOG) corrective regimen sliding scale   SubCutaneous every 6 hours  lacosamide IVPB 100 milliGRAM(s) IV Intermittent every 12 hours  levETIRAcetam  IVPB 1500 milliGRAM(s) IV Intermittent every 12 hours  levETIRAcetam  IVPB 2000 milliGRAM(s) IV Intermittent every 12 hours  LORazepam   Injectable 2 milliGRAM(s) IV Push once  metoprolol tartrate 12.5 milliGRAM(s) Oral two times a day  pantoprazole    Tablet 40 milliGRAM(s) Oral before breakfast  petrolatum white Ointment 1 Application(s) Topical two times a day  piperacillin/tazobactam IVPB.. 3.375 Gram(s) IV Intermittent every 8 hours  polyethylene glycol 3350 17 Gram(s) Oral daily  sodium chloride 0.9%. 1000 milliLiter(s) (100 mL/Hr) IV Continuous <Continuous>  sodium chloride 0.9%. 1000 milliLiter(s) (100 mL/Hr) IV Continuous <Continuous>  valproate sodium  IVPB 250 milliGRAM(s) IV Intermittent every 6 hours  vancomycin  IVPB      vancomycin  IVPB 750 milliGRAM(s) IV Intermittent every 12 hours    MEDICATIONS  (PRN):  acetaminophen     Tablet .. 650 milliGRAM(s) Oral every 6 hours PRN Temp greater or equal to 38C (100.4F), Mild Pain (1 - 3)  acetaminophen  Suppository .. 650 milliGRAM(s) Rectal every 6 hours PRN Temp greater or equal to 38C (100.4F), Moderate Pain (4 - 6)  aluminum hydroxide/magnesium hydroxide/simethicone Suspension 30 milliLiter(s) Oral every 4 hours PRN Dyspepsia  dextrose Oral Gel 15 Gram(s) Oral once PRN Blood Glucose LESS THAN 70 milliGRAM(s)/deciliter  melatonin 3 milliGRAM(s) Oral at bedtime PRN Insomnia  ondansetron Injectable 4 milliGRAM(s) IV Push every 8 hours PRN Nausea and/or Vomiting  senna 2 Tablet(s) Oral at bedtime PRN Constipation        IMAGING:      EXAMINATION:  PHYSICAL EXAM:    Constitutional: No Acute Distress     Neurological: Awake, alert oriented to person, place and time, Following Commands, PERRL, EOMI, No Gaze Preference, Face Symmetrical, Speech Fluent, No dysmetria, No ataxia, No nystagmus     Motor exam:          Upper extremity                         Delt     Bicep     Tricep    HG                                                 R         5/5        5/5        5/5       5/5                                               L          5/5        5/5        5/5       5/5          Lower extremity                        HF         KF        KE       DF         PF                                                  R        5/5        5/5        5/5       5/5         5/5                                               L         5/5        5/5       5/5       5/5          5/5                                                 Sensation: [ ] intact to light touch  [ ] decreased:     Reflexes: Deep Tendon Reflexes Intact     Pulmonary: Clear to Auscultation, No rales, No rhonchi, No wheezes     Cardiovascular: S1, S2, Regular rate and rhythm     Gastrointestinal: Soft, Non-tender, Non-distended     Extremities: No calf tenderness     Incision:    52y F PMH of smoking, stage 4 lung adenocarcinoma with diffuse mets to C/A/P and brain s/p Rt craniotomy for tumor resection, & multiple rounds of chemo & RT, steroid induced DM, GERD who presents to [place] ED for AMS. Per chart review pt had nonsensical speech and c/o HA & R sided weakness so was taken to ED. Patient was reportedly very agitated and violent at [place] ED. Code stroke was called.  Patient required sedation w/ benadryl, ativan and versed for agitation in order to obtain CTH.  CTH at  shows increased conspicuity of L posterior frontal and R parietal lesions, likely 2/2 hemorrhage (HU ~50-60) c/t 10/28 CTH. Further eval limited by motion artifact Patient was noted to be febrile at  and started on vancomycin, zosyn. Rapid RVP negative.  Pt ultimately transferred to [facility] for continued care given pt known to heme-onc & neuro-surg at [facility]      HOSPITAL COURSE:      Admission Scores  GCS:   HH:   MF:   NIHSS:   RASS:    CAM-ICU:   ICH:    24 hour Events:       Allergies    No Known Allergies    Intolerances        REVIEW OF SYSTEMS: [ ] Unable to Assess due to neurologic exam   [ ] All ROS addressed below are non-contributory, except:  Neuro: [ ] Headache [ ] Back pain [ ] Numbness [ ] Weakness [ ] Ataxia [ ] Dizziness [ ] Aphasia [ ] Dysarthria [ ] Visual disturbance  Resp: [ ] Shortness of breath/dyspnea, [ ] Orthopnea [ ] Cough  CV: [ ] Chest pain [ ] Palpitation [ ] Lightheadedness [ ] Syncope  Renal: [ ] Thirst [ ] Edema  GI: [ ] Nausea [ ] Emesis [ ] Abdominal pain [ ] Constipation [ ] Diarrhea  Hem: [ ] Hematemesis [ ] bright red blood per rectum  ID: [ ] Fever [ ] Chills [ ] Dysuria  ENT: [ ] Rhinorrhea      DEVICES:   [ ] Restraints [ ] ET tube [ ] central line [ ] arterial line [ ] ott [ ] NGT/OGT [ ] EVD [ ] LD [ ] ANILA/HMV [ ] Trach [ ] PEG [ ] Chest Tube     VITALS:   Vital Signs Last 24 Hrs  T(C): 36.5 (21 Dec 2023 05:27), Max: 39.7 (20 Dec 2023 21:20)  T(F): 97.7 (21 Dec 2023 05:27), Max: 103.4 (20 Dec 2023 21:20)  HR: 117 (21 Dec 2023 05:27) (106 - 133)  BP: 131/85 (21 Dec 2023 05:27) (104/70 - 131/85)  BP(mean): --  RR: 18 (21 Dec 2023 05:27) (16 - 20)  SpO2: 99% (21 Dec 2023 05:27) (91% - 99%)    Parameters below as of 21 Dec 2023 05:27  Patient On (Oxygen Delivery Method): nasal cannula      CAPILLARY BLOOD GLUCOSE      POCT Blood Glucose.: 140 mg/dL (21 Dec 2023 05:14)  POCT Blood Glucose.: 160 mg/dL (20 Dec 2023 22:54)  POCT Blood Glucose.: 170 mg/dL (20 Dec 2023 22:23)  POCT Blood Glucose.: 201 mg/dL (20 Dec 2023 21:53)  POCT Blood Glucose.: 399 mg/dL (20 Dec 2023 21:50)  POCT Blood Glucose.: 66 mg/dL (20 Dec 2023 21:23)  POCT Blood Glucose.: 67 mg/dL (20 Dec 2023 21:21)  POCT Blood Glucose.: 77 mg/dL (20 Dec 2023 17:50)  POCT Blood Glucose.: 73 mg/dL (20 Dec 2023 11:22)  POCT Blood Glucose.: 67 mg/dL (20 Dec 2023 11:21)    I&O's Summary      Respiratory:    ABG - ( 21 Dec 2023 00:36 )  pH, Arterial: 7.41  pH, Blood: x     /  pCO2: 34    /  pO2: 93    / HCO3: 22    / Base Excess: -2.4  /  SaO2: 98.5                LABS:                        9.8    3.87  )-----------( 128      ( 21 Dec 2023 07:24 )             33.2     12-21    136  |  104  |  6<L>  ----------------------------<  139<H>  3.2<L>   |  18<L>  |  0.34<L>             MEDICATION LEVELS:   Valproic Acid Level, Serum: 19 ug/mL (12-21 @ 07:24)  Valproic Acid Level, Serum: 18 ug/mL (12-20 @ 22:20)    IVF FLUIDS/MEDICATIONS:   MEDICATIONS  (STANDING):  artificial tears (preservative free) Ophthalmic Solution 1 Drop(s) Both EYES three times a day  atorvastatin 40 milliGRAM(s) Oral at bedtime  dexAMETHasone  Injectable 4 milliGRAM(s) IV Push every 6 hours  dextrose 5%. 1000 milliLiter(s) (50 mL/Hr) IV Continuous <Continuous>  dextrose 5%. 1000 milliLiter(s) (100 mL/Hr) IV Continuous <Continuous>  dextrose 50% Injectable 25 Gram(s) IV Push once  dextrose 50% Injectable 25 Gram(s) IV Push once  dextrose 50% Injectable 12.5 Gram(s) IV Push once  glucagon  Injectable 1 milliGRAM(s) IntraMuscular once  insulin lispro (ADMELOG) corrective regimen sliding scale   SubCutaneous every 6 hours  lacosamide IVPB 100 milliGRAM(s) IV Intermittent every 12 hours  levETIRAcetam  IVPB 1500 milliGRAM(s) IV Intermittent every 12 hours  levETIRAcetam  IVPB 2000 milliGRAM(s) IV Intermittent every 12 hours  LORazepam   Injectable 2 milliGRAM(s) IV Push once  metoprolol tartrate 12.5 milliGRAM(s) Oral two times a day  pantoprazole    Tablet 40 milliGRAM(s) Oral before breakfast  petrolatum white Ointment 1 Application(s) Topical two times a day  piperacillin/tazobactam IVPB.. 3.375 Gram(s) IV Intermittent every 8 hours  polyethylene glycol 3350 17 Gram(s) Oral daily  sodium chloride 0.9%. 1000 milliLiter(s) (100 mL/Hr) IV Continuous <Continuous>  sodium chloride 0.9%. 1000 milliLiter(s) (100 mL/Hr) IV Continuous <Continuous>  valproate sodium  IVPB 250 milliGRAM(s) IV Intermittent every 6 hours  vancomycin  IVPB      vancomycin  IVPB 750 milliGRAM(s) IV Intermittent every 12 hours    MEDICATIONS  (PRN):  acetaminophen     Tablet .. 650 milliGRAM(s) Oral every 6 hours PRN Temp greater or equal to 38C (100.4F), Mild Pain (1 - 3)  acetaminophen  Suppository .. 650 milliGRAM(s) Rectal every 6 hours PRN Temp greater or equal to 38C (100.4F), Moderate Pain (4 - 6)  aluminum hydroxide/magnesium hydroxide/simethicone Suspension 30 milliLiter(s) Oral every 4 hours PRN Dyspepsia  dextrose Oral Gel 15 Gram(s) Oral once PRN Blood Glucose LESS THAN 70 milliGRAM(s)/deciliter  melatonin 3 milliGRAM(s) Oral at bedtime PRN Insomnia  ondansetron Injectable 4 milliGRAM(s) IV Push every 8 hours PRN Nausea and/or Vomiting  senna 2 Tablet(s) Oral at bedtime PRN Constipation        IMAGING:      EXAMINATION:  PHYSICAL EXAM:    Constitutional: No Acute Distress     Neurological: Awake, alert oriented to person, place and time, Following Commands, PERRL, EOMI, No Gaze Preference, Face Symmetrical, Speech Fluent, No dysmetria, No ataxia, No nystagmus     Motor exam:          Upper extremity                         Delt     Bicep     Tricep    HG                                                 R         5/5        5/5        5/5       5/5                                               L          5/5        5/5        5/5       5/5          Lower extremity                        HF         KF        KE       DF         PF                                                  R        5/5        5/5        5/5       5/5         5/5                                               L         5/5        5/5       5/5       5/5          5/5                                                 Sensation: [ ] intact to light touch  [ ] decreased:     Reflexes: Deep Tendon Reflexes Intact     Pulmonary: Clear to Auscultation, No rales, No rhonchi, No wheezes     Cardiovascular: S1, S2, Regular rate and rhythm     Gastrointestinal: Soft, Non-tender, Non-distended     Extremities: No calf tenderness     Incision:    52y F PMH of smoking, stage 4 lung adenocarcinoma with diffuse mets to C/A/P and brain s/p Rt craniotomy for tumor resection, & multiple rounds of chemo & RT, steroid induced DM, GERD who presents to [place] ED for AMS. Per chart review pt had nonsensical speech and c/o HA & R sided weakness so was taken to ED. Patient was reportedly very agitated and violent at [place] ED. Code stroke was called.  Patient required sedation w/ benadryl, ativan and versed for agitation in order to obtain CTH.  CTH at  shows increased conspicuity of L posterior frontal and R parietal lesions, likely 2/2 hemorrhage (HU ~50-60) c/t 10/28 CTH. Further eval limited by motion artifact Patient was noted to be febrile at  and started on vancomycin, zosyn. Rapid RVP negative.  Pt ultimately transferred to [facility] for continued care given pt known to heme-onc & neuro-surg at [facility]      HOSPITAL COURSE:  12/19 admitted with fever, altered mental status and poor nutrition/electrolytes derangements  12/21 transferred to ICU for repetitive seizures and worsening neuroexam    24 hour Events:       Allergies    No Known Allergies    Intolerances        REVIEW OF SYSTEMS: [X] Unable to Assess due to neurologic exam   [ ] All ROS addressed below are non-contributory, except:  Neuro: [ ] Headache [ ] Back pain [ ] Numbness [ ] Weakness [ ] Ataxia [ ] Dizziness [ ] Aphasia [ ] Dysarthria [ ] Visual disturbance  Resp: [ ] Shortness of breath/dyspnea, [ ] Orthopnea [ ] Cough  CV: [ ] Chest pain [ ] Palpitation [ ] Lightheadedness [ ] Syncope  Renal: [ ] Thirst [ ] Edema  GI: [ ] Nausea [ ] Emesis [ ] Abdominal pain [ ] Constipation [ ] Diarrhea  Hem: [ ] Hematemesis [ ] bright red blood per rectum  ID: [ ] Fever [ ] Chills [ ] Dysuria  ENT: [ ] Rhinorrhea      DEVICES:   [ ] Restraints [ ] ET tube [ ] central line [ ] arterial line [ ] ott [ ] NGT/OGT [ ] EVD [ ] LD [ ] ANILA/HMV [ ] Trach [ ] PEG [ ] Chest Tube     VITALS:   ICU Vital Signs Last 24 Hrs  T(C): 36.9 (21 Dec 2023 08:50), Max: 39.7 (20 Dec 2023 21:20)  T(F): 98.4 (21 Dec 2023 08:50), Max: 103.4 (20 Dec 2023 21:20)  HR: 98 (21 Dec 2023 11:00) (98 - 133)  BP: 106/76 (21 Dec 2023 11:00) (104/70 - 131/85)  BP(mean): 87 (21 Dec 2023 11:00) (86 - 98)  RR: 17 (21 Dec 2023 11:00) (15 - 20)  SpO2: 100% (21 Dec 2023 11:00) (91% - 100%)    O2 Parameters below as of 21 Dec 2023 08:50  Patient On (Oxygen Delivery Method): nasal cannula  O2 Flow (L/min): 6      CAPILLARY BLOOD GLUCOSE      POCT Blood Glucose.: 140 mg/dL (21 Dec 2023 05:14)  POCT Blood Glucose.: 160 mg/dL (20 Dec 2023 22:54)  POCT Blood Glucose.: 170 mg/dL (20 Dec 2023 22:23)  POCT Blood Glucose.: 201 mg/dL (20 Dec 2023 21:53)  POCT Blood Glucose.: 399 mg/dL (20 Dec 2023 21:50)  POCT Blood Glucose.: 66 mg/dL (20 Dec 2023 21:23)  POCT Blood Glucose.: 67 mg/dL (20 Dec 2023 21:21)  POCT Blood Glucose.: 77 mg/dL (20 Dec 2023 17:50)  POCT Blood Glucose.: 73 mg/dL (20 Dec 2023 11:22)  POCT Blood Glucose.: 67 mg/dL (20 Dec 2023 11:21)    I&O's Summary    21 Dec 2023 07:01  -  21 Dec 2023 12:04  --------------------------------------------------------  IN: 150 mL / OUT: 0 mL / NET: 150 mL      LABS:                              9.8    3.87  )-----------( 128      ( 21 Dec 2023 07:24 )             33.2     12-21    136  |  104  |  6<L>  ----------------------------<  139<H>  3.2<L>   |  18<L>  |  0.34<L>      IVF FLUIDS/MEDICATIONS:   MEDICATIONS  (STANDING):  artificial tears (preservative free) Ophthalmic Solution 1 Drop(s) Both EYES three times a day  atorvastatin 40 milliGRAM(s) Oral at bedtime  dexAMETHasone  Injectable 4 milliGRAM(s) IV Push every 6 hours  dextrose 5%. 1000 milliLiter(s) (50 mL/Hr) IV Continuous <Continuous>  dextrose 5%. 1000 milliLiter(s) (100 mL/Hr) IV Continuous <Continuous>  dextrose 50% Injectable 25 Gram(s) IV Push once  dextrose 50% Injectable 25 Gram(s) IV Push once  dextrose 50% Injectable 12.5 Gram(s) IV Push once  glucagon  Injectable 1 milliGRAM(s) IntraMuscular once  insulin lispro (ADMELOG) corrective regimen sliding scale   SubCutaneous every 6 hours  lacosamide IVPB 100 milliGRAM(s) IV Intermittent every 12 hours  levETIRAcetam  IVPB 1500 milliGRAM(s) IV Intermittent every 12 hours  levETIRAcetam  IVPB 2000 milliGRAM(s) IV Intermittent every 12 hours  LORazepam   Injectable 2 milliGRAM(s) IV Push once  metoprolol tartrate 12.5 milliGRAM(s) Oral two times a day  pantoprazole    Tablet 40 milliGRAM(s) Oral before breakfast  petrolatum white Ointment 1 Application(s) Topical two times a day  piperacillin/tazobactam IVPB.. 3.375 Gram(s) IV Intermittent every 8 hours  polyethylene glycol 3350 17 Gram(s) Oral daily  sodium chloride 0.9%. 1000 milliLiter(s) (100 mL/Hr) IV Continuous <Continuous>  sodium chloride 0.9%. 1000 milliLiter(s) (100 mL/Hr) IV Continuous <Continuous>  valproate sodium  IVPB 250 milliGRAM(s) IV Intermittent every 6 hours  vancomycin  IVPB      vancomycin  IVPB 750 milliGRAM(s) IV Intermittent every 12 hours    MEDICATIONS  (PRN):  acetaminophen     Tablet .. 650 milliGRAM(s) Oral every 6 hours PRN Temp greater or equal to 38C (100.4F), Mild Pain (1 - 3)  acetaminophen  Suppository .. 650 milliGRAM(s) Rectal every 6 hours PRN Temp greater or equal to 38C (100.4F), Moderate Pain (4 - 6)  aluminum hydroxide/magnesium hydroxide/simethicone Suspension 30 milliLiter(s) Oral every 4 hours PRN Dyspepsia  dextrose Oral Gel 15 Gram(s) Oral once PRN Blood Glucose LESS THAN 70 milliGRAM(s)/deciliter  melatonin 3 milliGRAM(s) Oral at bedtime PRN Insomnia  ondansetron Injectable 4 milliGRAM(s) IV Push every 8 hours PRN Nausea and/or Vomiting  senna 2 Tablet(s) Oral at bedtime PRN Constipation    IMAGING:      EXAMINATION:  PHYSICAL EXAM:  cachectic, no eyes opening, not following commands, localizes on the left side, withdrawing on the right side. pupils are 3mm, equal and reactive.                                                 Pulmonary: Clear to Auscultation, No rales, No rhonchi, No wheezes     Cardiovascular: S1, S2, Regular rate and rhythm     Gastrointestinal: Soft, Non-tender, Non-distended     Extremities: No calf tenderness    52y F PMH of smoking, stage 4 lung adenocarcinoma with diffuse mets to C/A/P and brain s/p Rt craniotomy for tumor resection, & multiple rounds of chemo & RT, steroid induced DM, GERD who presents to [place] ED for AMS. Per chart review pt had nonsensical speech and c/o HA & R sided weakness so was taken to ED. Patient was reportedly very agitated and violent at [place] ED. Code stroke was called.  Patient required sedation w/ benadryl, ativan and versed for agitation in order to obtain CTH.  CTH at  shows increased conspicuity of L posterior frontal and R parietal lesions, likely 2/2 hemorrhage (HU ~50-60) c/t 10/28 CTH. Further eval limited by motion artifact Patient was noted to be febrile at  and started on vancomycin, zosyn. Rapid RVP negative.  Pt ultimately transferred to [facility] for continued care given pt known to heme-onc & neuro-surg at [facility]      HOSPITAL COURSE:  12/19 admitted with fever, altered mental status and poor nutrition/electrolytes derangements  12/21 transferred to ICU for repetitive seizures and worsening neuroexam    24 hour Events:       Allergies    No Known Allergies    Intolerances        REVIEW OF SYSTEMS: [X] Unable to Assess due to neurologic exam   [ ] All ROS addressed below are non-contributory, except:  Neuro: [ ] Headache [ ] Back pain [ ] Numbness [ ] Weakness [ ] Ataxia [ ] Dizziness [ ] Aphasia [ ] Dysarthria [ ] Visual disturbance  Resp: [ ] Shortness of breath/dyspnea, [ ] Orthopnea [ ] Cough  CV: [ ] Chest pain [ ] Palpitation [ ] Lightheadedness [ ] Syncope  Renal: [ ] Thirst [ ] Edema  GI: [ ] Nausea [ ] Emesis [ ] Abdominal pain [ ] Constipation [ ] Diarrhea  Hem: [ ] Hematemesis [ ] bright red blood per rectum  ID: [ ] Fever [ ] Chills [ ] Dysuria  ENT: [ ] Rhinorrhea      DEVICES:   [ ] Restraints [ ] ET tube [ ] central line [ ] arterial line [ ] ott [ ] NGT/OGT [ ] EVD [ ] LD [ ] ANILA/HMV [ ] Trach [ ] PEG [ ] Chest Tube     VITALS:   ICU Vital Signs Last 24 Hrs  T(C): 36.9 (21 Dec 2023 08:50), Max: 39.7 (20 Dec 2023 21:20)  T(F): 98.4 (21 Dec 2023 08:50), Max: 103.4 (20 Dec 2023 21:20)  HR: 98 (21 Dec 2023 11:00) (98 - 133)  BP: 106/76 (21 Dec 2023 11:00) (104/70 - 131/85)  BP(mean): 87 (21 Dec 2023 11:00) (86 - 98)  RR: 17 (21 Dec 2023 11:00) (15 - 20)  SpO2: 100% (21 Dec 2023 11:00) (91% - 100%)    O2 Parameters below as of 21 Dec 2023 08:50  Patient On (Oxygen Delivery Method): nasal cannula  O2 Flow (L/min): 6      CAPILLARY BLOOD GLUCOSE      POCT Blood Glucose.: 140 mg/dL (21 Dec 2023 05:14)  POCT Blood Glucose.: 160 mg/dL (20 Dec 2023 22:54)  POCT Blood Glucose.: 170 mg/dL (20 Dec 2023 22:23)  POCT Blood Glucose.: 201 mg/dL (20 Dec 2023 21:53)  POCT Blood Glucose.: 399 mg/dL (20 Dec 2023 21:50)  POCT Blood Glucose.: 66 mg/dL (20 Dec 2023 21:23)  POCT Blood Glucose.: 67 mg/dL (20 Dec 2023 21:21)  POCT Blood Glucose.: 77 mg/dL (20 Dec 2023 17:50)  POCT Blood Glucose.: 73 mg/dL (20 Dec 2023 11:22)  POCT Blood Glucose.: 67 mg/dL (20 Dec 2023 11:21)    I&O's Summary    21 Dec 2023 07:01  -  21 Dec 2023 12:04  --------------------------------------------------------  IN: 150 mL / OUT: 0 mL / NET: 150 mL      LABS:                              9.8    3.87  )-----------( 128      ( 21 Dec 2023 07:24 )             33.2     12-21    136  |  104  |  6<L>  ----------------------------<  139<H>  3.2<L>   |  18<L>  |  0.34<L>      IVF FLUIDS/MEDICATIONS:   MEDICATIONS  (STANDING):  artificial tears (preservative free) Ophthalmic Solution 1 Drop(s) Both EYES three times a day  atorvastatin 40 milliGRAM(s) Oral at bedtime  dexAMETHasone  Injectable 4 milliGRAM(s) IV Push every 6 hours  dextrose 5%. 1000 milliLiter(s) (50 mL/Hr) IV Continuous <Continuous>  dextrose 5%. 1000 milliLiter(s) (100 mL/Hr) IV Continuous <Continuous>  dextrose 50% Injectable 25 Gram(s) IV Push once  dextrose 50% Injectable 25 Gram(s) IV Push once  dextrose 50% Injectable 12.5 Gram(s) IV Push once  glucagon  Injectable 1 milliGRAM(s) IntraMuscular once  insulin lispro (ADMELOG) corrective regimen sliding scale   SubCutaneous every 6 hours  lacosamide IVPB 100 milliGRAM(s) IV Intermittent every 12 hours  levETIRAcetam  IVPB 1500 milliGRAM(s) IV Intermittent every 12 hours  levETIRAcetam  IVPB 2000 milliGRAM(s) IV Intermittent every 12 hours  LORazepam   Injectable 2 milliGRAM(s) IV Push once  metoprolol tartrate 12.5 milliGRAM(s) Oral two times a day  pantoprazole    Tablet 40 milliGRAM(s) Oral before breakfast  petrolatum white Ointment 1 Application(s) Topical two times a day  piperacillin/tazobactam IVPB.. 3.375 Gram(s) IV Intermittent every 8 hours  polyethylene glycol 3350 17 Gram(s) Oral daily  sodium chloride 0.9%. 1000 milliLiter(s) (100 mL/Hr) IV Continuous <Continuous>  sodium chloride 0.9%. 1000 milliLiter(s) (100 mL/Hr) IV Continuous <Continuous>  valproate sodium  IVPB 250 milliGRAM(s) IV Intermittent every 6 hours  vancomycin  IVPB      vancomycin  IVPB 750 milliGRAM(s) IV Intermittent every 12 hours    MEDICATIONS  (PRN):  acetaminophen     Tablet .. 650 milliGRAM(s) Oral every 6 hours PRN Temp greater or equal to 38C (100.4F), Mild Pain (1 - 3)  acetaminophen  Suppository .. 650 milliGRAM(s) Rectal every 6 hours PRN Temp greater or equal to 38C (100.4F), Moderate Pain (4 - 6)  aluminum hydroxide/magnesium hydroxide/simethicone Suspension 30 milliLiter(s) Oral every 4 hours PRN Dyspepsia  dextrose Oral Gel 15 Gram(s) Oral once PRN Blood Glucose LESS THAN 70 milliGRAM(s)/deciliter  melatonin 3 milliGRAM(s) Oral at bedtime PRN Insomnia  ondansetron Injectable 4 milliGRAM(s) IV Push every 8 hours PRN Nausea and/or Vomiting  senna 2 Tablet(s) Oral at bedtime PRN Constipation    IMAGING:      EXAMINATION:  PHYSICAL EXAM:  cachectic, no eyes opening, not following commands, localizes on the left side, withdrawing on the right side. pupils are 3mm, equal and reactive.                                                 Pulmonary: Clear to Auscultation, No rales, No rhonchi, No wheezes     Cardiovascular: S1, S2, Regular rate and rhythm     Gastrointestinal: Soft, Non-tender, Non-distended     Extremities: No calf tenderness

## 2023-12-21 NOTE — PROGRESS NOTE ADULT - ASSESSMENT
HPI: Patient HARJEET WHITMORE is a 52y (1970) woman with a PMHx significant for smoking, stage 4 lung adenocarcinoma dx 2022 with diffuse mets to C/A/P and brain s/p Rt craniotomy for tumor resection, & multiple rounds of chemo & RT, steroid induced DM, GERD who presents to [place] ED for AMS. Per chart review pt had nonsensical speech and c/o HA & R sided weakness. Agitated, violent. CTH as noted. febrile at VS and started on vancomycin, zosyn. Rapid RVP negative. Pt ultimately transferred to [facility] for continued care given pt known to heme-onc & neuro-surg at [facility]. She is found to have tropinemia, hypokalemia, severe hypocalcemia, hypoalbuminemia   Neurology consulted for seizure concern.     Impression: Acute behavioral changes i/s/o brain metastases. Found to have clinical seizures (L facial twitching, RUE jerking). Transferred to ICU for close monitoring and AED management    Recommendations  [] urgent EEG 24h video monitoring   [] c/w Vimpat 100mg BID IV  [] c/w Keppra 1500mg BID IV  [] steroids per neurosurgery. On Decadron 4mg QID IV  [] when medically stabilized, can pursue MRI brain with and without contrast. Would likely need sedation  [] please monitor valproic acid level and albumin  [] Tox screen, TSH, Free T4, B1, B6, B12, CBC w/ diff, ammonia, paraneoplastic panel  [] will continue to follow    Case discussed and staffed at bedside w/ attending Dr. Marquez HPI: Patient HARJEET WHITMORE is a 52y (1970) woman with a PMHx significant for smoking, stage 4 lung adenocarcinoma dx 2022 with diffuse mets to C/A/P and brain s/p Rt craniotomy for tumor resection, & multiple rounds of chemo & RT, steroid induced DM, GERD who presents to [place] ED for AMS. Per chart review pt had nonsensical speech and c/o HA & R sided weakness. Agitated, violent. CTH as noted. febrile at VS and started on vancomycin, zosyn. Rapid RVP negative. Pt ultimately transferred to [facility] for continued care given pt known to heme-onc & neuro-surg at [facility]. She is found to have tropinemia, hypokalemia, severe hypocalcemia, hypoalbuminemia   Neurology consulted for seizure concern.     Impression: Acute behavioral changes i/s/o brain metastases. Found to have clinical seizures (L facial twitching, RUE jerking). Transferred to ICU for close monitoring and AED management    Recommendations  [] urgent EEG 24h video monitoring   [] c/w Vimpat 100mg BID IV  [] c/w Keppra 1500mg BID IV  [] steroids per neurosurgery. On Decadron 4mg QID IV  [] when medically stabilized, can pursue MRI brain with and without contrast. Would likely need sedation  [] please monitor valproic acid level and albumin  [] Tox screen, TSH, Free T4, B1, B6, B12, CBC w/ diff, ammonia, paraneoplastic panel  [] will continue to follow    Case discussed and staffed at bedside w/ attending Dr. Marquez

## 2023-12-21 NOTE — PATIENT PROFILE ADULT - FALL HARM RISK - HARM RISK INTERVENTIONS
Communicate Risk of Fall with Harm to all staff/Reinforce activity limits and safety measures with patient and family/Tailored Fall Risk Interventions/Visual Cue: Yellow wristband and red socks/Bed in lowest position, wheels locked, appropriate side rails in place/Call bell, personal items and telephone in reach/Instruct patient to call for assistance before getting out of bed or chair/Non-slip footwear when patient is out of bed/[place] to call system/Physically safe environment - no spills, clutter or unnecessary equipment/Purposeful Proactive Rounding/Room/bathroom lighting operational, light cord in reach Communicate Risk of Fall with Harm to all staff/Reinforce activity limits and safety measures with patient and family/Tailored Fall Risk Interventions/Visual Cue: Yellow wristband and red socks/Bed in lowest position, wheels locked, appropriate side rails in place/Call bell, personal items and telephone in reach/Instruct patient to call for assistance before getting out of bed or chair/Non-slip footwear when patient is out of bed/[place] to call system/Physically safe environment - no spills, clutter or unnecessary equipment/Purposeful Proactive Rounding/Room/bathroom lighting operational, light cord in reach Assistance OOB with selected safe patient handling equipment/Communicate Risk of Fall with Harm to all staff/Discuss with provider need for PT consult/Monitor gait and stability/Provide patient with walking aids - walker, cane, crutches/Reinforce activity limits and safety measures with patient and family/Tailored Fall Risk Interventions/Visual Cue: Yellow wristband and red socks/Bed in lowest position, wheels locked, appropriate side rails in place/Call bell, personal items and telephone in reach/Instruct patient to call for assistance before getting out of bed or chair/Non-slip footwear when patient is out of bed/[place] to call system/Physically safe environment - no spills, clutter or unnecessary equipment/Purposeful Proactive Rounding/Room/bathroom lighting operational, light cord in reach Assistance OOB with selected safe patient handling equipment/Communicate Risk of Fall with Harm to all staff/Discuss with provider need for PT consult/Monitor gait and stability/Provide patient with walking aids - walker, cane, crutches/Reinforce activity limits and safety measures with patient and family/Tailored Fall Risk Interventions/Visual Cue: Yellow wristband and red socks/Bed in lowest position, wheels locked, appropriate side rails in place/Call bell, personal items and telephone in reach/Instruct patient to call for assistance before getting out of bed or chair/Non-slip footwear when patient is out of bed/[place] to call system/Physically safe environment - no spills, clutter or unnecessary equipment/Purposeful Proactive Rounding/Room/bathroom lighting operational, light cord in reach

## 2023-12-21 NOTE — CONSULT NOTE ADULT - PROBLEM SELECTOR RECOMMENDATION 4
will continue to follow for goc  case discussed with [employer] fellow: at this time continuing work up, will evaluate appropriateness of consult and evaluate if palliative should follow up with family regarding GOC as patient recently admitted to [facility].   HCP in patient window identifies Jonny Beltran [phone]-> all consents and discussions should be conducted with this individual   Can be reached by TEAMS M-F 9-5 Yasmeen Jennings Any other time please page [phone] if needed will continue to follow for goc  case discussed with [employer]SC[employer] fellow: at this time continuing work up, will evaluate appropriateness of consult and evaluate if palliative should follow up with family regarding GOC as patient recently admitted to [facility].   HCP in patient window identifies Jonny Beltran [phone]-> all consents and discussions should be conducted with this individual   Can be reached by TEAMS M-F 9-5 Yasmeen Jennings Any other time please page [phone] if needed

## 2023-12-21 NOTE — CONSULT NOTE ADULT - PROBLEM SELECTOR RECOMMENDATION 2
ongoing work up  appreciate neuro follow up   defer to primary team for ongoing management and potential treatment options in future

## 2023-12-21 NOTE — PATIENT PROFILE ADULT - NSPROPTRIGHTBILLOFRIGHTS_GEN_A_NUR
unable to assess at this time, pt not able to participate or answer questions/patient representative

## 2023-12-21 NOTE — PROVIDER CONTACT NOTE (CHANGE IN STATUS NOTIFICATION) - SITUATION
pt all was tachycardic in 130s, destatting o2 fluctuating from 90s-60s on RA, febrile, 103.6 rectal temp, focal seizures that started with R face, then spread throughout right upper extremity. increased lethargy

## 2023-12-21 NOTE — PATIENT PROFILE ADULT - FUNCTIONAL ASSESSMENT - BASIC MOBILITY 6.
1-calculated by average/Not able to assess (calculate score using [facility] averaging method)  1-calculated by average/Not able to assess (calculate score using [facility] averaging method)

## 2023-12-21 NOTE — PROGRESS NOTE ADULT - SUBJECTIVE AND OBJECTIVE BOX
Patient seen and examined at bedside.    --Anticoagulation--    T(C): 36.9 (12-21-23 @ 08:50), Max: 39.7 (12-20-23 @ 21:20)  HR: 104 (12-21-23 @ 10:00) (99 - 133)  BP: 106/75 (12-21-23 @ 10:00) (104/70 - 131/85)  RR: 15 (12-21-23 @ 10:00) (15 - 20)  SpO2: 100% (12-21-23 @ 10:00) (91% - 100%)  Wt(kg): --    Exam: worse c/t yday. no EO, L pupil 6 and fixed, R pupil 4 and sluggish. facial twitching on Lt lower face, LUE spont AG, RUE trace wd, BLE wd.     after ativan 2mg, PERRL, o/w similar.

## 2023-12-21 NOTE — CONSULT NOTE ADULT - SUBJECTIVE AND OBJECTIVE BOX
Scribe Attestation (For Scribes USE Only)... Attending Attestation (For Attendings USE Only).../Scribe Attestation (For Scribes USE Only)... Date of Service: 12-21-23 @ 16:12    HPI:  52y F PMH of smoking, stage 4 lung adenocarcinoma with diffuse mets to C/A/P and brain s/p Rt craniotomy for tumor resection, & multiple rounds of chemo & RT, steroid induced DM, GERD who presents to [facility] ED for AMS. Per chart review pt had nonsensical speech and c/o HA & R sided weakness so was taken to ED. Patient was reportedly very agitated and violent at [facility] ED. Code stroke was called.  Patient required sedation w/ benadryl, ativan and versed for agitation in order to obtain CTH.  CTH at VS shows increased conspicuity of L posterior frontal and R parietal lesions, likely 2/2 hemorrhage (HU ~50-60) c/t 10/28 CTH. Further eval limited by motion artifact Patient was noted to be febrile at VS and started on vancomycin, zosyn. Rapid RVP negative.  Pt ultimately transferred to [facility] for continued care given pt known to heme-onc & neuro-surg at [facility]    (Taken from [facility] Palliative care consulted for GOC.     PERTINENT PM/SXH:   GERD (Gastroesophageal Reflux Disease)    Hydrosalpinx    GERD (Gastroesophageal Reflux Disease)    Ulcer    Lung mass    Non-small cell lung cancer with metastasis      No significant past surgical history    S/P endoscopy      FAMILY HISTORY:      ITEMS NOT CHECKED ARE NOT PRESENT    SOCIAL HISTORY:   Significant other/partner[ x] Fiance   Children[ ]  Amish/Spirituality:  Substance hx:  [ ]   Tobacco hx:  [ ]   Alcohol hx: [ ]   Home Opioid hx:  [ ] I-Stop Reference No:  Living Situation: [ ]Home  [ ]Long term care  [ ]Rehab [x ]Other: OSH     ADVANCE DIRECTIVES:    DNR/MOLST  [ ]  Living Will  [ ]   DECISION MAKER(s):  [ x] Health Care Proxy(s)  [ ] Surrogate(s)  [ ] Guardian           Name(s): Phone Number(s): Jonny Mcnulty     BASELINE (I)ADL(s) (prior to admission):  [place]: [ ]Total  [ ] Moderate [x ]Dependent    Allergies    No Known Allergies    Intolerances    MEDICATIONS  (STANDING):  artificial tears (preservative free) Ophthalmic Solution 1 Drop(s) Both EYES three times a day  atorvastatin 40 milliGRAM(s) Oral at bedtime  chlorhexidine 4% Liquid 1 Application(s) Topical <User Schedule>  dexAMETHasone  Injectable 4 milliGRAM(s) IV Push every 6 hours  dextrose 5%. 1000 milliLiter(s) (50 mL/Hr) IV Continuous <Continuous>  dextrose 5%. 1000 milliLiter(s) (100 mL/Hr) IV Continuous <Continuous>  dextrose 50% Injectable 25 Gram(s) IV Push once  dextrose 50% Injectable 12.5 Gram(s) IV Push once  dextrose 50% Injectable 25 Gram(s) IV Push once  glucagon  Injectable 1 milliGRAM(s) IntraMuscular once  insulin lispro (ADMELOG) corrective regimen sliding scale   SubCutaneous every 6 hours  lacosamide IVPB 100 milliGRAM(s) IV Intermittent every 12 hours  levETIRAcetam  IVPB 1500 milliGRAM(s) IV Intermittent every 12 hours  metoprolol tartrate 12.5 milliGRAM(s) Oral two times a day  pantoprazole    Tablet 40 milliGRAM(s) Oral before breakfast  petrolatum white Ointment 1 Application(s) Topical two times a day  piperacillin/tazobactam IVPB.. 3.375 Gram(s) IV Intermittent every 8 hours  polyethylene glycol 3350 17 Gram(s) Oral daily  sodium chloride 0.9%. 1000 milliLiter(s) (100 mL/Hr) IV Continuous <Continuous>  valproate sodium  IVPB 250 milliGRAM(s) IV Intermittent every 6 hours  vancomycin  IVPB      vancomycin  IVPB 750 milliGRAM(s) IV Intermittent every 12 hours    MEDICATIONS  (PRN):  acetaminophen     Tablet .. 650 milliGRAM(s) Oral every 6 hours PRN Temp greater or equal to 38C (100.4F), Mild Pain (1 - 3)  acetaminophen  Suppository .. 650 milliGRAM(s) Rectal every 6 hours PRN Temp greater or equal to 38C (100.4F), Moderate Pain (4 - 6)  aluminum hydroxide/magnesium hydroxide/simethicone Suspension 30 milliLiter(s) Oral every 4 hours PRN Dyspepsia  dextrose Oral Gel 15 Gram(s) Oral once PRN Blood Glucose LESS THAN 70 milliGRAM(s)/deciliter  melatonin 3 milliGRAM(s) Oral at bedtime PRN Insomnia  ondansetron Injectable 4 milliGRAM(s) IV Push every 8 hours PRN Nausea and/or Vomiting  senna 2 Tablet(s) Oral at bedtime PRN Constipation    PRESENT SYMPTOMS: [x ]Unable to self-report see CPOT, PAINADs, RDOS  Source if other than patient:  [ ]Family   [ x]Team     Pain: [ ]yes [ ]no  QOL impact -   Location -                    Aggravating factors -  Quality -  Radiation -  Timing-  Severity (0-10 scale):  Minimal acceptable level (0-10 scale):       Dyspnea:                           [ ]Mild [ ]Moderate [ ]Severe  Anxiety:                             [ ]Mild [ ]Moderate [ ]Severe  Fatigue:                             [ ]Mild [ ]Moderate [ ]Severe  Nausea:                             [ ]Mild [ ]Moderate [ ]Severe  Loss of appetite:              [ ]Mild [ ]Moderate [ ]Severe  Constipation:                    [ ]Mild [ ]Moderate [ ]Severe    PCSSQ [Palliative Care Spiritual Screening Question]   Severity (0-10):  Score of 4 or > indicate consideration of Chaplaincy referral.  Chaplaincy Referral: [ ] yes [ ] refused [ ] following    Caregiver [place]? : [ ] yes [ ] no [ ] deferred:  Social work referral [ ] Patient & Family Centered Care Referral [ ]     Anticipatory Grief Present?: [ ] yes [ ] no  [ ] deferred: Palliative Social work referral [ ]  Patient & Family Centered Care Referral [ ]       Other Symptoms:  [ ]All other review of systems negative   [x ] Unable to obtain due to poor mentation    PHYSICAL EXAM:  Vital Signs Last 24 Hrs  T(C): 36 (21 Dec 2023 12:00), Max: 39.7 (20 Dec 2023 21:20)  T(F): 96.8 (21 Dec 2023 12:00), Max: 103.4 (20 Dec 2023 21:20)  HR: 106 (21 Dec 2023 15:00) (96 - 133)  BP: 115/56 (21 Dec 2023 15:00) (106/75 - 131/85)  BP(mean): 79 (21 Dec 2023 15:00) (79 - 99)  RR: 26 (21 Dec 2023 15:00) (15 - 32)  SpO2: 100% (21 Dec 2023 15:00) (91% - 100%)    Parameters below as of 21 Dec 2023 08:50  Patient On (Oxygen Delivery Method): nasal cannula  O2 Flow (L/min): 6   I&O's Summary    21 Dec 2023 07:01  -  21 Dec 2023 16:12  --------------------------------------------------------  IN: 1150 mL / OUT: 0 mL / NET: 1150 mL        GENERAL:  [ ]Alert  [x]Oriented x 0  [ x]Lethargic  [ ]Cachexia  [ ]Unarousable  [x]Verbal  [ ]Non-Verbal  Behavioral:   [ ]Anxiety  [ ]Delirium [ ]Agitation [ ]Other  HEENT:  [ ]Normal   [ x]Dry mouth   [ ]ET Tube/Trach  [ ]Oral lesions  PULMONARY:   [x]Clear [ ]Tachypnea  [ ]Audible excessive secretions   [ ]Rhonchi        [ ]Right [ ]Left [ ]Bilateral  [ ]Crackles        [ ]Right [ ]Left [ ]Bilateral  [ ]Wheezing     [ ]Right [ ]Left [ ]Bilateral  [x ]Diminished BS [ ] Right [ ]Left [ x]Bilateral  CARDIOVASCULAR:    [x]Regular [ ]Irregular [ ]Tachy  [ ]Austin [ ]Murmur [ ]Other  GASTROINTESTINAL:  [x]Soft  [ ]Distended   [x]+BS  [x]Non tender [ ]Tender  [ ]PEG [ ]OGT/ NGT   Last BM:    GENITOURINARY:  [ ]Normal [ x]Incontinent   [ ]Oliguria/Anuria   [ ]Mas  MUSCULOSKELETAL:   [ ]Normal   [x]Weakness  [ x]Bed/Wheelchair bound [ ]Edema  NEUROLOGIC:   [ ]No focal deficits  [x ] Cognitive impairment  [ ] Dysphagia [ ]Dysarthria [ ] Paresis [ ]Other   SKIN:   [x]Normal  [ ]Rash   [ ]Pressure ulcer(s) [ ]y [ ]n present on admission    CRITICAL CARE:  [ ] Shock Present  [ ]Septic [ ]Cardiogenic [ ]Neurologic [ ]Hypovolemic  [ ]  Vasopressors [ ]  Inotropes   [ ]Respiratory failure present [ ]Mechanical ventilation [ ]Non-invasive ventilatory support [ ]High flow    [ ]Acute  [ ]Chronic [ ]Hypoxic  [ ]Hypercarbic [ ]Other  [ ]Other organ failure     LABS:                        9.8    3.87  )-----------( 128      ( 21 Dec 2023 07:24 )             33.2   12-21    136  |  104  |  6<L>  ----------------------------<  139<H>  3.2<L>   |  18<L>  |  0.34<L>    Ca    8.6      21 Dec 2023 07:26  Phos  3.5     12-20  Mg     .9     12-20    TPro  6.6  /  Alb  2.5<L>  /  TBili  0.3  /  DBili  x   /  AST  50<H>  /  ALT  13  /  AlkPhos  112  12-21  PT/INR - ( 20 Dec 2023 22:20 )   PT: 15.3 sec;   INR: 1.47 ratio         PTT - ( 20 Dec 2023 22:20 )  PTT:28.1 sec    Urinalysis Basic - ( 21 Dec 2023 07:26 )    Color: x / Appearance: x / SG: x / pH: x  Gluc: 139 mg/dL / Ketone: x  / Bili: x / Urobili: x   Blood: x / Protein: x / Nitrite: x   Leuk Esterase: x / RBC: x / WBC x   Sq Epi: x / Non Sq Epi: x / Bacteria: x      RADIOLOGY & ADDITIONAL STUDIES:  < from: CT Head No Cont (12.21.23 @ 07:56) >  ACC: [id number] EXAM:  CT BRAIN   ORDERED BY:  NICOLA NOLASCO     PROCEDURE DATE:  12/21/2023          INTERPRETATION:  INDICATIONS:  exam change, patient with known brain   metastasis    TECHNIQUE:  Serial axial images were obtained from the skull baseto the   vertex without intravenous contrast. Sagittal and Coronal reformats were   performed    COMPARISON EXAMINATION: 12/20/2023    FINDINGS:  VENTRICLES AND SULCI: Mild ex vacuo dilatation of the temporal horn of   the right lateral ventricle  INTRA-AXIAL:  Right temporal parietal encephalomalacia as seen on the   prior subjacent to patient's craniotomy. Left parietal occipital and left   posterior frontal foci of increased attenuation similar in appearance   compared with the prior. Small right periventricular lesion seen on the   prior as well unchanged.  EXTRA-AXIAL:  No mass or collection is seen.  VISUALIZED SINUSES:  Clear.  VISUALIZED MASTOIDS:  Clear.  CALVARIUM: Right temporal parietal craniotomy  MISCELLANEOUS:  None.    IMPRESSION:  No significant interval change compared with the prior   12/20/2023    --- End of Report ---            RAMOS MCDERMOTT MD; Attending Radiologist  This document has been electronically signed. Dec 21 2023  9:22AM    < end of copied text >    PROTEIN CALORIE MALNUTRITION PRESENT: [ ]mild [ ]moderate [ ]severe [ ]underweight [ ]morbid obesity  https://www.andeal.org/vault/2440/web/files/ONC/Table_Clinical%20Characteristics%20to%20Document%20Malnutrition-White%20JV%20et%20al%202012.pdf    Height (cm): 167 (12-21-23 @ 08:50), 167 (11-28-23 @ 08:43), 167.6 (11-08-23 @ 18:29)  Weight (kg): 49.8 (12-21-23 @ 08:50), 49.9 (12-14-23 @ 11:00), 54.8 (11-08-23 @ 18:29)  BMI (kg/m2): 17.9 (12-21-23 @ 08:50), 17.9 (12-14-23 @ 11:00), 19.6 (11-28-23 @ 08:43)    [ x]PPSV2 < or = to 30% [ ]significant weight loss  [ ]poor nutritional intake  [ ]anasarca[ ]Artificial Nutrition      Other REFERRALS:  [ ]Hospice  [ ]Child Life  [ ]Social Work  [ ]Case management [ ]Holistic Therapy     Care Coordination Assessment 201 [C. Provider] (11-09-23 @ 13:45)      Palliative Performance Scale:  http://npcrc.org/files/news/palliative_performance_scale_ppsv2.pdf  (Ctrl +  left click to view)  Respiratory Distress Observation Tool:  https://homecareinformation.net/handouts/hen/Respiratory_Distress_Observation_Scale.pdf (Ctrl +  left click to view)  PAINAD Score:  http://[email or web address]/cog/painad.pdf (Ctrl +  left click to view)   Date of Service: 12-21-23 @ 16:12    HPI:  52y F PMH of smoking, stage 4 lung adenocarcinoma with diffuse mets to C/A/P and brain s/p Rt craniotomy for tumor resection, & multiple rounds of chemo & RT, steroid induced DM, GERD who presents to [facility] ED for AMS. Per chart review pt had nonsensical speech and c/o HA & R sided weakness so was taken to ED. Patient was reportedly very agitated and violent at [facility] ED. Code stroke was called.  Patient required sedation w/ benadryl, ativan and versed for agitation in order to obtain CTH.  CTH at VS shows increased conspicuity of L posterior frontal and R parietal lesions, likely 2/2 hemorrhage (HU ~50-60) c/t 10/28 CTH. Further eval limited by motion artifact Patient was noted to be febrile at VS and started on vancomycin, zosyn. Rapid RVP negative.  Pt ultimately transferred to [facility] for continued care given pt known to heme-onc & neuro-surg at [facility]    ([facility] Palliative care consulted for GOC.     PERTINENT PM/SXH:   GERD (Gastroesophageal Reflux Disease)    Hydrosalpinx    GERD (Gastroesophageal Reflux Disease)    Ulcer    Lung mass    Non-small cell lung cancer with metastasis      No significant past surgical history    S/P endoscopy      FAMILY HISTORY:      ITEMS NOT CHECKED ARE NOT PRESENT    SOCIAL HISTORY:   Significant other/partner[ x] Fiance   Children[ ]  Scientology/Spirituality:  Substance hx:  [ ]   Tobacco hx:  [ ]   Alcohol hx: [ ]   Home Opioid hx:  [ ] I-Stop Reference No:  Living Situation: [ ]Home  [ ]Long term care  [ ]Rehab [x ]Other: OSH     ADVANCE DIRECTIVES:    DNR/MOLST  [ ]  Living Will  [ ]   DECISION MAKER(s):  [ x] Health Care Proxy(s)  [ ] Surrogate(s)  [ ] Guardian           Name(s): Phone Number(s): Jonny Mcnulty     BASELINE (I)ADL(s) (prior to admission):  [place]: [ ]Total  [ ] Moderate [x ]Dependent    Allergies    No Known Allergies    Intolerances    MEDICATIONS  (STANDING):  artificial tears (preservative free) Ophthalmic Solution 1 Drop(s) Both EYES three times a day  atorvastatin 40 milliGRAM(s) Oral at bedtime  chlorhexidine 4% Liquid 1 Application(s) Topical <User Schedule>  dexAMETHasone  Injectable 4 milliGRAM(s) IV Push every 6 hours  dextrose 5%. 1000 milliLiter(s) (50 mL/Hr) IV Continuous <Continuous>  dextrose 5%. 1000 milliLiter(s) (100 mL/Hr) IV Continuous <Continuous>  dextrose 50% Injectable 25 Gram(s) IV Push once  dextrose 50% Injectable 12.5 Gram(s) IV Push once  dextrose 50% Injectable 25 Gram(s) IV Push once  glucagon  Injectable 1 milliGRAM(s) IntraMuscular once  insulin lispro (ADMELOG) corrective regimen sliding scale   SubCutaneous every 6 hours  lacosamide IVPB 100 milliGRAM(s) IV Intermittent every 12 hours  levETIRAcetam  IVPB 1500 milliGRAM(s) IV Intermittent every 12 hours  metoprolol tartrate 12.5 milliGRAM(s) Oral two times a day  pantoprazole    Tablet 40 milliGRAM(s) Oral before breakfast  petrolatum white Ointment 1 Application(s) Topical two times a day  piperacillin/tazobactam IVPB.. 3.375 Gram(s) IV Intermittent every 8 hours  polyethylene glycol 3350 17 Gram(s) Oral daily  sodium chloride 0.9%. 1000 milliLiter(s) (100 mL/Hr) IV Continuous <Continuous>  valproate sodium  IVPB 250 milliGRAM(s) IV Intermittent every 6 hours  vancomycin  IVPB      vancomycin  IVPB 750 milliGRAM(s) IV Intermittent every 12 hours    MEDICATIONS  (PRN):  acetaminophen     Tablet .. 650 milliGRAM(s) Oral every 6 hours PRN Temp greater or equal to 38C (100.4F), Mild Pain (1 - 3)  acetaminophen  Suppository .. 650 milliGRAM(s) Rectal every 6 hours PRN Temp greater or equal to 38C (100.4F), Moderate Pain (4 - 6)  aluminum hydroxide/magnesium hydroxide/simethicone Suspension 30 milliLiter(s) Oral every 4 hours PRN Dyspepsia  dextrose Oral Gel 15 Gram(s) Oral once PRN Blood Glucose LESS THAN 70 milliGRAM(s)/deciliter  melatonin 3 milliGRAM(s) Oral at bedtime PRN Insomnia  ondansetron Injectable 4 milliGRAM(s) IV Push every 8 hours PRN Nausea and/or Vomiting  senna 2 Tablet(s) Oral at bedtime PRN Constipation    PRESENT SYMPTOMS: [x ]Unable to self-report see CPOT, PAINADs, RDOS  Source if other than patient:  [ ]Family   [ x]Team     Pain: [ ]yes [ ]no  QOL impact -   Location -                    Aggravating factors -  Quality -  Radiation -  Timing-  Severity (0-10 scale):  Minimal acceptable level (0-10 scale):       Dyspnea:                           [ ]Mild [ ]Moderate [ ]Severe  Anxiety:                             [ ]Mild [ ]Moderate [ ]Severe  Fatigue:                             [ ]Mild [ ]Moderate [ ]Severe  Nausea:                             [ ]Mild [ ]Moderate [ ]Severe  Loss of appetite:              [ ]Mild [ ]Moderate [ ]Severe  Constipation:                    [ ]Mild [ ]Moderate [ ]Severe    PCSSQ [Palliative Care Spiritual Screening Question]   Severity (0-10):  Score of 4 or > indicate consideration of Chaplaincy referral.  Chaplaincy Referral: [ ] yes [ ] refused [ ] following    Caregiver [place]? : [ ] yes [ ] no [ ] deferred:  Social work referral [ ] Patient & Family Centered Care Referral [ ]     Anticipatory Grief Present?: [ ] yes [ ] no  [ ] deferred: Palliative Social work referral [ ]  Patient & Family Centered Care Referral [ ]       Other Symptoms:  [ ]All other review of systems negative   [x ] Unable to obtain due to poor mentation    PHYSICAL EXAM:  Vital Signs Last 24 Hrs  T(C): 36 (21 Dec 2023 12:00), Max: 39.7 (20 Dec 2023 21:20)  T(F): 96.8 (21 Dec 2023 12:00), Max: 103.4 (20 Dec 2023 21:20)  HR: 106 (21 Dec 2023 15:00) (96 - 133)  BP: 115/56 (21 Dec 2023 15:00) (106/75 - 131/85)  BP(mean): 79 (21 Dec 2023 15:00) (79 - 99)  RR: 26 (21 Dec 2023 15:00) (15 - 32)  SpO2: 100% (21 Dec 2023 15:00) (91% - 100%)    Parameters below as of 21 Dec 2023 08:50  Patient On (Oxygen Delivery Method): nasal cannula  O2 Flow (L/min): 6   I&O's Summary    21 Dec 2023 07:01  -  21 Dec 2023 16:12  --------------------------------------------------------  IN: 1150 mL / OUT: 0 mL / NET: 1150 mL        GENERAL:  [ ]Alert  [x]Oriented x 0  [ x]Lethargic  [ ]Cachexia  [ ]Unarousable  [x]Verbal  [ ]Non-Verbal  Behavioral:   [ ]Anxiety  [ ]Delirium [ ]Agitation [ ]Other  HEENT:  [ ]Normal   [ x]Dry mouth   [ ]ET Tube/Trach  [ ]Oral lesions  PULMONARY:   [x]Clear [ ]Tachypnea  [ ]Audible excessive secretions   [ ]Rhonchi        [ ]Right [ ]Left [ ]Bilateral  [ ]Crackles        [ ]Right [ ]Left [ ]Bilateral  [ ]Wheezing     [ ]Right [ ]Left [ ]Bilateral  [x ]Diminished BS [ ] Right [ ]Left [ x]Bilateral  CARDIOVASCULAR:    [x]Regular [ ]Irregular [ ]Tachy  [ ]Austin [ ]Murmur [ ]Other  GASTROINTESTINAL:  [x]Soft  [ ]Distended   [x]+BS  [x]Non tender [ ]Tender  [ ]PEG [ ]OGT/ NGT   Last BM:    GENITOURINARY:  [ ]Normal [ x]Incontinent   [ ]Oliguria/Anuria   [ ]Mas  MUSCULOSKELETAL:   [ ]Normal   [x]Weakness  [ x]Bed/Wheelchair bound [ ]Edema  NEUROLOGIC:   [ ]No focal deficits  [x ] Cognitive impairment  [ ] Dysphagia [ ]Dysarthria [ ] Paresis [ ]Other   SKIN:   [x]Normal  [ ]Rash   [ ]Pressure ulcer(s) [ ]y [ ]n present on admission    CRITICAL CARE:  [ ] Shock Present  [ ]Septic [ ]Cardiogenic [ ]Neurologic [ ]Hypovolemic  [ ]  Vasopressors [ ]  Inotropes   [ ]Respiratory failure present [ ]Mechanical ventilation [ ]Non-invasive ventilatory support [ ]High flow    [ ]Acute  [ ]Chronic [ ]Hypoxic  [ ]Hypercarbic [ ]Other  [ ]Other organ failure     LABS:                        9.8    3.87  )-----------( 128      ( 21 Dec 2023 07:24 )             33.2   12-21    136  |  104  |  6<L>  ----------------------------<  139<H>  3.2<L>   |  18<L>  |  0.34<L>    Ca    8.6      21 Dec 2023 07:26  Phos  3.5     12-20  Mg     .9     12-20    TPro  6.6  /  Alb  2.5<L>  /  TBili  0.3  /  DBili  x   /  AST  50<H>  /  ALT  13  /  AlkPhos  112  12-21  PT/INR - ( 20 Dec 2023 22:20 )   PT: 15.3 sec;   INR: 1.47 ratio         PTT - ( 20 Dec 2023 22:20 )  PTT:28.1 sec    Urinalysis Basic - ( 21 Dec 2023 07:26 )    Color: x / Appearance: x / SG: x / pH: x  Gluc: 139 mg/dL / Ketone: x  / Bili: x / Urobili: x   Blood: x / Protein: x / Nitrite: x   Leuk Esterase: x / RBC: x / WBC x   Sq Epi: x / Non Sq Epi: x / Bacteria: x      RADIOLOGY & ADDITIONAL STUDIES:  < from: CT Head No Cont (12.21.23 @ 07:56) >  ACC: [id number] EXAM:  CT BRAIN   ORDERED BY:  NICOLA NOLASCO     PROCEDURE DATE:  12/21/2023          INTERPRETATION:  INDICATIONS:  exam change, patient with known brain   metastasis    TECHNIQUE:  Serial axial images were obtained from the skull baseto the   vertex without intravenous contrast. Sagittal and Coronal reformats were   performed    COMPARISON EXAMINATION: 12/20/2023    FINDINGS:  VENTRICLES AND SULCI: Mild ex vacuo dilatation of the temporal horn of   the right lateral ventricle  INTRA-AXIAL:  Right temporal parietal encephalomalacia as seen on the   prior subjacent to patient's craniotomy. Left parietal occipital and left   posterior frontal foci of increased attenuation similar in appearance   compared with the prior. Small right periventricular lesion seen on the   prior as well unchanged.  EXTRA-AXIAL:  No mass or collection is seen.  VISUALIZED SINUSES:  Clear.  VISUALIZED MASTOIDS:  Clear.  CALVARIUM: Right temporal parietal craniotomy  MISCELLANEOUS:  None.    IMPRESSION:  No significant interval change compared with the prior   12/20/2023    --- End of Report ---            RAMOS MCDERMOTT MD; Attending Radiologist  This document has been electronically signed. Dec 21 2023  9:22AM    < end of copied text >    PROTEIN CALORIE MALNUTRITION PRESENT: [ ]mild [ ]moderate [ ]severe [ ]underweight [ ]morbid obesity  https://www.andeal.org/vault/2440/web/files/ONC/Table_Clinical%20Characteristics%20to%20Document%20Malnutrition-White%20JV%20et%20al%202012.pdf    Height (cm): 167 (12-21-23 @ 08:50), 167 (11-28-23 @ 08:43), 167.6 (11-08-23 @ 18:29)  Weight (kg): 49.8 (12-21-23 @ 08:50), 49.9 (12-14-23 @ 11:00), 54.8 (11-08-23 @ 18:29)  BMI (kg/m2): 17.9 (12-21-23 @ 08:50), 17.9 (12-14-23 @ 11:00), 19.6 (11-28-23 @ 08:43)    [ x]PPSV2 < or = to 30% [ ]significant weight loss  [ ]poor nutritional intake  [ ]anasarca[ ]Artificial Nutrition      Other REFERRALS:  [ ]Hospice  [ ]Child Life  [ ]Social Work  [ ]Case management [ ]Holistic Therapy     Care Coordination Assessment 201 [C. Provider] (11-09-23 @ 13:45)      Palliative Performance Scale:  http://npcrc.org/files/news/palliative_performance_scale_ppsv2.pdf  (Ctrl +  left click to view)  Respiratory Distress Observation Tool:  https://homecareinformation.net/handouts/hen/Respiratory_Distress_Observation_Scale.pdf (Ctrl +  left click to view)  PAINAD Score:  http://geriatrictoolkit.[place].[facility]/cog/painad.pdf (Ctrl +  left click to view)

## 2023-12-21 NOTE — CHART NOTE - NSCHARTNOTEFT_GEN_A_CORE
EEG preliminary read (not final) on the initial recording hour(s) = x 10     No seizures recorded.  Continuous LPDs mostly around 1 Hz with overriding fast activity and occasionally in bursts which suggests high risk for left posterior quadrant focal seizures.  Moderate slowing noted.    Final report to follow tomorrow morning after completion of study.    [facility] EEG Reading Room Ph#: [phone]  Epilepsy Answering Service after 5PM and before 8:30AM: Ph#: [phone] EEG preliminary read (not final) on the initial recording hour(s) = x 10     No seizures recorded.  Continuous LPDs mostly around 1 Hz with overriding fast activity and occasionally in bursts which suggests high risk for left posterior quadrant focal seizures.  Moderate slowing noted.    Final report to follow tomorrow morning after completion of study.    [facility] EEG Reading Room Ph#: [phone]  Epilepsy Answering Service after 5PM and before 8:30AM: Ph#: [phone]

## 2023-12-21 NOTE — RAPID RESPONSE TEAM SUMMARY - NSSITUATIONBACKGROUNDRRT_GEN_ALL_CORE
RTT called for hypoxia. 52 F PMH of smoking, stage 4 lung adenocarcinoma with diffuse mets to C/A/P and brain s/p Rt craniotomy for tumor resection, multiple rounds of chemo & RT, steroid induced DM, GERD who initially presented to [place] ED for AMS, transferred to [facility] for continuity of care. RTT called for hypoxia. On arrival, patient was on NRB but without a reliable pulse oximeter wave form. Patient was noted to be have AMS as previously noted on admission. Vitals: Rectal temp 102.5F; 's on multiple repeat; 's; SpO2 97% on NRB. Exam notable for poor pulmonary excursion to fully assess lung sounds and but with breath 52 F PMH of smoking, stage 4 lung adenocarcinoma with diffuse mets to C/A/P and brain s/p Rt craniotomy for tumor resection, multiple rounds of chemo & RT, steroid induced DM, GERD who initially presented to [place] ED for AMS, transferred to [facility] for continuity of care. RTT called for hypoxia. On arrival, patient was on NRB but without a reliable pulse oximeter wave form. Patient was noted to be have AMS as previously noted on admission. Vitals: Rectal temp 102.5F; 's on multiple repeat; 's; SpO2 97% on NRB. Exam notable for poor pulmonary excursion to fully assess lung sounds and but with breath 52 F PMH of smoking, stage 4 lung adenocarcinoma with diffuse mets to C/A/P and brain s/p Rt craniotomy for tumor resection, multiple rounds of chemo & RT, steroid induced DM, GERD who initially presented to [place] ED for AMS, transferred to [facility] for continuity of care. RTT called for hypoxia. On arrival, patient was on NRB but without a reliable pulse oximeter wave form. Patient was noted to be have AMS as previously noted on admission. Vitals: Rectal temp 102.5F; 's on multiple repeat; 's; SpO2 97% on NRB. Exam notable for poor pulmonary excursion and audible breath sounds. Responded to tactile and painful stimuli. Facial twitch noted as well (as per primary team, twitching began around change of shift from day to night) which subsequently progressed and involved her R arm/hands). For fever ordered fluid bolus x1 and cooling blanket (held acetaminophen given already received [id number] within past ~4 hours); for suspected focal seizure ->ordered ativan 1mg x1.  Labs concerning for magnesium 0.9. Ordered MgSO4 2g x1. Neurology resident was contacted and arrived; neuro recs ativan 1mg x1 followed by keppra 1g x1 for suspected focal seizure. During encounter, it was very difficult obtaining a second IV access and labs. A second IV access could not be successfully obtained despite using US guidance given difficult or finding a good vein. Ordered ABG and blood cultures. Recent labs were already obtained within 2-4 hours. De-escalated patient's supplemental O2 from nasal cannula to NRB about twice given discordant SpO2 readings from multiple machines -->ordered earlobe pulse oximeter --> reliable waveform indicated SpO2 100%; noted to be lethargic/somnolent but responsive to painful stimuli. At this time, patient appears to be septic and has seizure-like activity likely driven by infection/fever. Ordered continue Zosyn and to add vancomycin (only got one dose yesterday?). Patient was hemodynamically stable and transferred to medicine floor service with telemetry capabilities. Advised primary team to  monitor patient for airway protection and to consider calling RRT and MICU consult if airway protection becomes a concern.  52 F PMH of smoking, stage 4 lung adenocarcinoma with diffuse mets to C/A/P and brain s/p Rt craniotomy for tumor resection, multiple rounds of chemo & RT, steroid induced DM, GERD who initially presented to [place] ED for AMS, transferred to [facility] for continuity of care. RTT called for hypoxia. On arrival, patient was on NRB but without a reliable pulse oximeter wave form. Patient was noted to be have AMS as previously noted on admission. Vitals: Rectal temp 102.5F; 's on multiple repeat; 's; SpO2 97% on NRB. Exam notable for poor pulmonary excursion and audible breath sounds. Responded to tactile and painful stimuli. Facial twitch noted as well (as per primary team, twitching began around change of shift from day to night) which subsequently progressed and involved her R arm/hands). For fever ordered fluid bolus x1 and cooling blanket (held acetaminophen given already received [id number] within past ~4 hours); for suspected focal seizure ->ordered ativan 1mg x1.  Labs concerning for magnesium 0.9. Ordered MgSO4 2g x1. Neurology resident was contacted and arrived; neuro recs ativan 1mg x1 followed by keppra 1g x1 for suspected focal seizure. During encounter, it was very difficult obtaining a second IV access and labs. A second IV access could not be successfully obtained despite using US guidance given difficult or finding a good vein. Ordered ABG and blood cultures. Recent labs were already obtained within 2-4 hours. De-escalated patient's supplemental O2 from nasal cannula to NRB about twice given discordant SpO2 readings from multiple machines -->ordered earlobe pulse oximeter --> reliable waveform indicated SpO2 100%; noted to be lethargic/somnolent but responsive to painful stimuli. At this time, patient appears to be septic and has seizure-like activity likely driven by infection/fever. Ordered continue Zosyn and to add vancomycin (only got one dose yesterday?). Patient was hemodynamically stable and transferred to medicine floor service with telemetry capabilities. Advised primary team to  monitor patient for airway protection and to consider calling RRT and MICU consult if airway protection becomes a concern.

## 2023-12-21 NOTE — PROGRESS NOTE ADULT - SUBJECTIVE AND OBJECTIVE BOX
52y F PMH of smoking, stage 4 lung adenocarcinoma with diffuse mets to C/A/P and brain s/p Rt craniotomy for tumor resection, & multiple rounds of chemo & RT, steroid induced DM, GERD who presents to [place] ED for AMS. Per chart review pt had nonsensical speech and c/o HA & R sided weakness so was taken to ED. Patient was reportedly very agitated and violent at [place] ED. Code stroke was called.  Patient required sedation w/ benadryl, ativan and versed for agitation in order to obtain CTH.  CTH at  shows increased conspicuity of L posterior frontal and R parietal lesions, likely 2/2 hemorrhage (HU ~50-60) c/t 10/28 CTH. Further eval limited by motion artifact Patient was noted to be febrile at  and started on vancomycin, zosyn. Rapid RVP negative.  Pt ultimately transferred to [facility] for continued care given pt known to heme-onc & neuro-surg at [facility] . While on medicine service patient became hypoxic? and had seizure activity requiring transfer to ICU for further monitoring.    HOSPITAL COURSE:  12/19 admitted with fever, altered mental status and poor nutrition/electrolytes derangements  12/21 transferred to ICU for repetitive seizures and worsening neuroexam, Vimpat increased per neurology    Allergies    No Known Allergies    Intolerances      REVIEW OF SYSTEMS: [X] Unable to Assess due to neurologic exam   [ ] All ROS addressed below are non-contributory, except:  Neuro: [ ] Headache [ ] Back pain [ ] Numbness [ ] Weakness [ ] Ataxia [ ] Dizziness [ ] Aphasia [ ] Dysarthria [ ] Visual disturbance  Resp: [ ] Shortness of breath/dyspnea, [ ] Orthopnea [ ] Cough  CV: [ ] Chest pain [ ] Palpitation [ ] Lightheadedness [ ] Syncope  Renal: [ ] Thirst [ ] Edema  GI: [ ] Nausea [ ] Emesis [ ] Abdominal pain [ ] Constipation [ ] Diarrhea  Hem: [ ] Hematemesis [ ] bright red blood per rectum  ID: [ ] Fever [ ] Chills [ ] Dysuria  ENT: [ ] Rhinorrhea      ICU Vital Signs Last 24 Hrs  T(C): 36.2 (21 Dec 2023 16:00), Max: 39.7 (20 Dec 2023 21:20)  T(F): 97.2 (21 Dec 2023 16:00), Max: 103.4 (20 Dec 2023 21:20)  HR: 113 (21 Dec 2023 18:00) (96 - 133)  BP: 106/50 (21 Dec 2023 18:00) (99/54 - 131/85)  BP(mean): 71 (21 Dec 2023 18:00) (71 - 99)  ABP: --  ABP(mean): --  RR: 29 (21 Dec 2023 18:00) (15 - 32)  SpO2: 98% (21 Dec 2023 18:00) (91% - 100%)    O2 Parameters below as of 21 Dec 2023 08:50  Patient On (Oxygen Delivery Method): nasal cannula  O2 Flow (L/min): 6      CAPILLARY BLOOD GLUCOSE    POCT Blood Glucose.: 126 mg/dL (21 Dec 2023 16:58)  POCT Blood Glucose.: 139 mg/dL (21 Dec 2023 11:23)  POCT Blood Glucose.: 140 mg/dL (21 Dec 2023 05:14)  POCT Blood Glucose.: 160 mg/dL (20 Dec 2023 22:54)  POCT Blood Glucose.: 170 mg/dL (20 Dec 2023 22:23)  POCT Blood Glucose.: 201 mg/dL (20 Dec 2023 21:53)  POCT Blood Glucose.: 399 mg/dL (20 Dec 2023 21:50)  POCT Blood Glucose.: 66 mg/dL (20 Dec 2023 21:23)  POCT Blood Glucose.: 67 mg/dL (20 Dec 2023 21:21)      I&O's Summary    21 Dec 2023 07:01  -  21 Dec 2023 18:57  --------------------------------------------------------  IN: 1600 mL / OUT: 0 mL / NET: 1600 mL      LABS:                        9.2    4.52  )-----------( 139      ( 21 Dec 2023 17:02 )             29.3       12-21    139  |  105  |  7   ----------------------------<  173<H>  3.1<L>   |  22  |  0.39<L>    Ca    7.7<L>      21 Dec 2023 17:02  Phos  4.7     12-21  Mg     1.2     12-21    TPro  6.3  /  Alb  2.5<L>  /  TBili  0.2  /  DBili  x   /  AST  42<H>  /  ALT  13  /  AlkPhos  100  12-21    MEDICATIONS  (STANDING):  artificial tears (preservative free) Ophthalmic Solution 1 Drop(s) Both EYES three times a day  atorvastatin 40 milliGRAM(s) Oral at bedtime  chlorhexidine 4% Liquid 1 Application(s) Topical <User Schedule>  dextrose 5%. 1000 milliLiter(s) (50 mL/Hr) IV Continuous <Continuous>  dextrose 5%. 1000 milliLiter(s) (100 mL/Hr) IV Continuous <Continuous>  dextrose 50% Injectable 25 Gram(s) IV Push once  dextrose 50% Injectable 12.5 Gram(s) IV Push once  dextrose 50% Injectable 25 Gram(s) IV Push once  glucagon  Injectable 1 milliGRAM(s) IntraMuscular once  insulin lispro (ADMELOG) corrective regimen sliding scale   SubCutaneous every 6 hours  lacosamide IVPB 200 milliGRAM(s) IV Intermittent every 12 hours  levETIRAcetam  IVPB 1500 milliGRAM(s) IV Intermittent every 12 hours  metoprolol tartrate 12.5 milliGRAM(s) Oral two times a day  pantoprazole    Tablet 40 milliGRAM(s) Oral before breakfast  petrolatum white Ointment 1 Application(s) Topical two times a day  piperacillin/tazobactam IVPB.. 3.375 Gram(s) IV Intermittent every 8 hours  polyethylene glycol 3350 17 Gram(s) Oral daily  sodium chloride 0.9%. 1000 milliLiter(s) (100 mL/Hr) IV Continuous <Continuous>  valproate sodium  IVPB 250 milliGRAM(s) IV Intermittent every 6 hours  vancomycin  IVPB      vancomycin  IVPB 750 milliGRAM(s) IV Intermittent every 12 hours    MEDICATIONS  (PRN):  acetaminophen     Tablet .. 650 milliGRAM(s) Oral every 6 hours PRN Temp greater or equal to 38C (100.4F), Mild Pain (1 - 3)  acetaminophen  Suppository .. 650 milliGRAM(s) Rectal every 6 hours PRN Temp greater or equal to 38C (100.4F), Moderate Pain (4 - 6)  aluminum hydroxide/magnesium hydroxide/simethicone Suspension 30 milliLiter(s) Oral every 4 hours PRN Dyspepsia  dextrose Oral Gel 15 Gram(s) Oral once PRN Blood Glucose LESS THAN 70 milliGRAM(s)/deciliter  melatonin 3 milliGRAM(s) Oral at bedtime PRN Insomnia  ondansetron Injectable 4 milliGRAM(s) IV Push every 8 hours PRN Nausea and/or Vomiting  senna 2 Tablet(s) Oral at bedtime PRN Constipation          IMAGING: All relevant imaging reviewed      EXAMINATION:  PHYSICAL EXAM:  cachectic, no eyes opening, not following commands, localizes on the left side, withdrawing on the right side. pupils are 3mm, equal and reactive.                                                 Pulmonary: Clear to Auscultation, No rales, No rhonchi, No wheezes     Cardiovascular: S1, S2, Regular rate and rhythm     Gastrointestinal: Soft, Non-tender, Non-distended     Extremities: No calf tenderness    52y F PMH of smoking, stage 4 lung adenocarcinoma with diffuse mets to C/A/P and brain s/p Rt craniotomy for tumor resection, & multiple rounds of chemo & RT, steroid induced DM, GERD who presents to [place] ED for AMS. Per chart review pt had nonsensical speech and c/o HA & R sided weakness so was taken to ED. Patient was reportedly very agitated and violent at [place] ED. Code stroke was called.  Patient required sedation w/ benadryl, ativan and versed for agitation in order to obtain CTH.  CTH at  shows increased conspicuity of L posterior frontal and R parietal lesions, likely 2/2 hemorrhage (HU ~50-60) c/t 10/28 CTH. Further eval limited by motion artifact Patient was noted to be febrile at  and started on vancomycin, zosyn. Rapid RVP negative.  Pt ultimately transferred to [facility] for continued care given pt known to heme-onc & neuro-surg at [facility] . While on medicine service patient became hypoxic? and had seizure activity requiring transfer to ICU for further monitoring.    HOSPITAL COURSE:  12/19 admitted with fever, altered mental status and poor nutrition/electrolytes derangements  12/21 transferred to ICU for repetitive seizures and worsening neuroexam, Vimpat increased per neurology    Allergies    No Known Allergies    Intolerances      REVIEW OF SYSTEMS: [X] Unable to Assess due to neurologic exam   [ ] All ROS addressed below are non-contributory, except:  Neuro: [ ] Headache [ ] Back pain [ ] Numbness [ ] Weakness [ ] Ataxia [ ] Dizziness [ ] Aphasia [ ] Dysarthria [ ] Visual disturbance  Resp: [ ] Shortness of breath/dyspnea, [ ] Orthopnea [ ] Cough  CV: [ ] Chest pain [ ] Palpitation [ ] Lightheadedness [ ] Syncope  Renal: [ ] Thirst [ ] Edema  GI: [ ] Nausea [ ] Emesis [ ] Abdominal pain [ ] Constipation [ ] Diarrhea  Hem: [ ] Hematemesis [ ] bright red blood per rectum  ID: [ ] Fever [ ] Chills [ ] Dysuria  ENT: [ ] Rhinorrhea      ICU Vital Signs Last 24 Hrs  T(C): 36.2 (21 Dec 2023 16:00), Max: 39.7 (20 Dec 2023 21:20)  T(F): 97.2 (21 Dec 2023 16:00), Max: 103.4 (20 Dec 2023 21:20)  HR: 113 (21 Dec 2023 18:00) (96 - 133)  BP: 106/50 (21 Dec 2023 18:00) (99/54 - 131/85)  BP(mean): 71 (21 Dec 2023 18:00) (71 - 99)  ABP: --  ABP(mean): --  RR: 29 (21 Dec 2023 18:00) (15 - 32)  SpO2: 98% (21 Dec 2023 18:00) (91% - 100%)    O2 Parameters below as of 21 Dec 2023 08:50  Patient On (Oxygen Delivery Method): nasal cannula  O2 Flow (L/min): 6      CAPILLARY BLOOD GLUCOSE    POCT Blood Glucose.: 126 mg/dL (21 Dec 2023 16:58)  POCT Blood Glucose.: 139 mg/dL (21 Dec 2023 11:23)  POCT Blood Glucose.: 140 mg/dL (21 Dec 2023 05:14)  POCT Blood Glucose.: 160 mg/dL (20 Dec 2023 22:54)  POCT Blood Glucose.: 170 mg/dL (20 Dec 2023 22:23)  POCT Blood Glucose.: 201 mg/dL (20 Dec 2023 21:53)  POCT Blood Glucose.: 399 mg/dL (20 Dec 2023 21:50)  POCT Blood Glucose.: 66 mg/dL (20 Dec 2023 21:23)  POCT Blood Glucose.: 67 mg/dL (20 Dec 2023 21:21)      I&O's Summary    21 Dec 2023 07:01  -  21 Dec 2023 18:57  --------------------------------------------------------  IN: 1600 mL / OUT: 0 mL / NET: 1600 mL      LABS:                        9.2    4.52  )-----------( 139      ( 21 Dec 2023 17:02 )             29.3       12-21    139  |  105  |  7   ----------------------------<  173<H>  3.1<L>   |  22  |  0.39<L>    Ca    7.7<L>      21 Dec 2023 17:02  Phos  4.7     12-21  Mg     1.2     12-21    TPro  6.3  /  Alb  2.5<L>  /  TBili  0.2  /  DBili  x   /  AST  42<H>  /  ALT  13  /  AlkPhos  100  12-21    MEDICATIONS  (STANDING):  artificial tears (preservative free) Ophthalmic Solution 1 Drop(s) Both EYES three times a day  atorvastatin 40 milliGRAM(s) Oral at bedtime  chlorhexidine 4% Liquid 1 Application(s) Topical <User Schedule>  dextrose 5%. 1000 milliLiter(s) (50 mL/Hr) IV Continuous <Continuous>  dextrose 5%. 1000 milliLiter(s) (100 mL/Hr) IV Continuous <Continuous>  dextrose 50% Injectable 25 Gram(s) IV Push once  dextrose 50% Injectable 12.5 Gram(s) IV Push once  dextrose 50% Injectable 25 Gram(s) IV Push once  glucagon  Injectable 1 milliGRAM(s) IntraMuscular once  insulin lispro (ADMELOG) corrective regimen sliding scale   SubCutaneous every 6 hours  lacosamide IVPB 200 milliGRAM(s) IV Intermittent every 12 hours  levETIRAcetam  IVPB 1500 milliGRAM(s) IV Intermittent every 12 hours  metoprolol tartrate 12.5 milliGRAM(s) Oral two times a day  pantoprazole    Tablet 40 milliGRAM(s) Oral before breakfast  petrolatum white Ointment 1 Application(s) Topical two times a day  piperacillin/tazobactam IVPB.. 3.375 Gram(s) IV Intermittent every 8 hours  polyethylene glycol 3350 17 Gram(s) Oral daily  sodium chloride 0.9%. 1000 milliLiter(s) (100 mL/Hr) IV Continuous <Continuous>  valproate sodium  IVPB 250 milliGRAM(s) IV Intermittent every 6 hours  vancomycin  IVPB      vancomycin  IVPB 750 milliGRAM(s) IV Intermittent every 12 hours    MEDICATIONS  (PRN):  acetaminophen     Tablet .. 650 milliGRAM(s) Oral every 6 hours PRN Temp greater or equal to 38C (100.4F), Mild Pain (1 - 3)  acetaminophen  Suppository .. 650 milliGRAM(s) Rectal every 6 hours PRN Temp greater or equal to 38C (100.4F), Moderate Pain (4 - 6)  aluminum hydroxide/magnesium hydroxide/simethicone Suspension 30 milliLiter(s) Oral every 4 hours PRN Dyspepsia  dextrose Oral Gel 15 Gram(s) Oral once PRN Blood Glucose LESS THAN 70 milliGRAM(s)/deciliter  melatonin 3 milliGRAM(s) Oral at bedtime PRN Insomnia  ondansetron Injectable 4 milliGRAM(s) IV Push every 8 hours PRN Nausea and/or Vomiting  senna 2 Tablet(s) Oral at bedtime PRN Constipation          IMAGING: All relevant imaging reviewed      EXAMINATION:  PHYSICAL EXAM:  cachectic, no eyes opening, not following commands, localizes on the left side, withdrawing on the right side. pupils are 3mm, equal and reactive.                                                 Pulmonary: Clear to Auscultation, No rales, No rhonchi, No wheezes     Cardiovascular: S1, S2, Regular rate and rhythm     Gastrointestinal: Soft, Non-tender, Non-distended     Extremities: No calf tenderness

## 2023-12-21 NOTE — CONSULT NOTE ADULT - ASSESSMENT
52y F PMH of smoking, stage 4 lung adenocarcinoma with diffuse mets to C/A/P and brain s/p Rt craniotomy for tumor resection, & multiple rounds of chemo & RT, steroid induced DM, GERD who presents to [place] ED for AMS. Per chart review pt had nonsensical speech and c/o HA & R sided weakness so was taken to ED. Patient was reportedly very agitated and violent at [place] ED. Code stroke was called.  Patient required sedation w/ benadryl, ativan and versed for agitation in order to obtain CTH.  CTH at [facility] shows increased conspicuity of L posterior frontal and R parietal lesions, likely 2/2 hemorrhage (HU ~50-60) c/t 10/28 CTH. Further eval limited by motion artifact Patient was noted to be febrile at VS and started on vancomycin, zosyn. Rapid RVP negative.  Pt ultimately transferred to [facility] for continued care given pt known to heme-onc & neuro-surg at [facility]    (Taken from NSCU note). Palliative care consulted for GOC.  52y F PMH of smoking, stage 4 lung adenocarcinoma with diffuse mets to C/A/P and brain s/p Rt craniotomy for tumor resection, & multiple rounds of chemo & RT, steroid induced DM, GERD who presents to [place] ED for AMS. Per chart review pt had nonsensical speech and c/o HA & R sided weakness so was taken to ED. Patient was reportedly very agitated and violent at [place] ED. Code stroke was called.  Patient required sedation w/ benadryl, ativan and versed for agitation in order to obtain CTH.  CTH at VS shows increased conspicuity of L posterior frontal and R parietal lesions, likely 2/2 hemorrhage (HU ~50-60) c/t 10/28 CTH. Further eval limited by motion artifact Patient was noted to be febrile at VS and started on vancomycin, zosyn. Rapid RVP negative.  Pt ultimately transferred to [facility] for continued care given pt known to heme-onc & neuro-surg at [facility]    (Taken from NSCU note). Palliative care consulted for GOC.

## 2023-12-21 NOTE — PROGRESS NOTE ADULT - ASSESSMENT
52y F PMH of smoking, stage 4 lung adenocarcinoma with diffuse mets to C/A/P and brain s/p Rt craniotomy for tumor resection, & multiple rounds of chemo & RT, steroid induced DM, GERD under NSCU care after seizure activity    NEURO:  - pt. admitted with AMS 2/2 to possible infectious, metabolic, neuro etiology  - CTH at [facility] showed ?Hypodensity in the right temporal parietal region may represent subacute or chronic ischemic changes  - q4 hour neuro checks  - EEG monitoring  - On LEV 1.5 BID, LCM 200mg BID, VPA 250mg Q6  - Decadron 4mg Q6  - tylenol and oxycodone PRN   - MR w/ and w/o pending  - appreciate neuro recs  Activity: [] OOB as tolerated [] Bedrest [X] PT [] OT [] PMNR    PULM:  - Non-small cell lung cancer with metastasis  - Hx/o Stage 4 lung adenocarcinoma with diffuse mets to C/A/P and brain s/p Rt craniotomy for tumor resection, & multiple rounds of chemo & RT  - She had C2 carbo+taxol+ bevacizumab+ addition of atezolizumab on 12/1/23.   - No further inpt. chemo/RT planning per Heme-Onc   - Dexamethasone 4mg q6 x 24hr (to see if will improve mentation) >> 2mg BID (home dose) Per Neuro- Surg    - Apprec Hem/Onc rec, f/u for further  rec.    CV:  - -160mmHg  - TTE with EF 45-50%    RENAL:  - IVF until good PO intake  - NS @     GI:  - Diet: NPO  - sennna and miralax for bowel regimen  - Last Bowel Movement:   - GI prophylaxis on protonix 40mg daily while on steroids    ENDO:   - Goal euglycemia (-180)  - A1C 7.7  - on ISS    HEME/ONC:  - VTE prophylaxis: [X] SCDs [] chemoprophylaxis [] hold chemoprophylaxis due to: [] high risk of DVT/PE on admission due to:    ID:  - Vanco, Zosyn for empiric coverage  - C/w Iv abx  - C/w IVF  - f/u bcx  - Ucx with enterococcus, continue vanco  - Trend labs, monitor fever curve.    Will discuss goals of care with the family 52y F PMH of smoking, stage 4 lung adenocarcinoma with diffuse mets to C/A/P and brain s/p Rt craniotomy for tumor resection, & multiple rounds of chemo & RT, steroid induced DM, GERD under NSCU care after seizure activity    NEURO:  - pt. admitted with AMS 2/2 to possible infectious, metabolic, neuro etiology  - CTH at [facility] showed ?Hypodensity in the right temporal parietal region may represent subacute or chronic ischemic changes  - q4 hour neuro checks  - EEG monitoring  - On LEV 1.5 BID, LCM 200mg BID, VPA 250mg Q6  - Decadron 4mg Q6  - tylenol and oxycodone PRN   - MR w/ and w/o pending  - appreciate neuro recs  Activity: [] OOB as tolerated [] Bedrest [X] PT [] OT [] PMNR    PULM:  - Non-small cell lung cancer with metastasis  - Hx/o Stage 4 lung adenocarcinoma with diffuse mets to C/A/P and brain s/p Rt craniotomy for tumor resection, & multiple rounds of chemo & RT  - She had C2 carbo+taxol+ bevacizumab+ addition of atezolizumab on 12/1/23.   - No further inpt. chemo/RT planning per Heme-Onc   - Dexamethasone 4mg q6 x 24hr (to see if will improve mentation) >> 2mg BID (home dose) Per Neuro- Surg    - Apprec Hem/Onc rec, f/u for further  rec.    CV:  - -160mmHg  - TTE with EF 45-50%    RENAL:  - IVF until good PO intake  - NS @     GI:  - Diet: NPO  - sennna and miralax for bowel regimen  - Last Bowel Movement:   - GI prophylaxis on protonix 40mg daily while on steroids    ENDO:   - Goal euglycemia (-180)  - A1C 7.7  - on ISS    HEME/ONC:  - VTE prophylaxis: [X] SCDs [] chemoprophylaxis [] hold chemoprophylaxis due to: [] high risk of DVT/PE on admission due to:    ID:  - Vanco, Zosyn for empiric coverage  - C/w Iv abx  - C/w IVF  - f/u bcx  - Ucx with enterococcus, continue vanco  - Trend labs, monitor fever curve.    Will discuss goals of care with the family 52y F PMH of smoking, stage 4 lung adenocarcinoma with diffuse mets to C/A/P and brain s/p Rt craniotomy for tumor resection, & multiple rounds of chemo & RT, steroid induced DM, GERD under NSCU care after seizure activity    NEURO:  - pt. admitted with AMS 2/2 to possible infectious, metabolic, neuro etiology  - CTH at [facility] showed ?Hypodensity in the right temporal parietal region may represent subacute or chronic ischemic changes  - q4 hour neuro checks  - EEG monitoring w/o seizure, LPD recorded  - On LEV 1.5 BID, LCM 200mg BID, VPA 250mg Q6  - Decadron 4mg Q6  - tylenol and oxycodone PRN   - MR w/ and w/o pending  - appreciate neuro recs  Activity: [] OOB as tolerated [] Bedrest [X] PT [] OT [] PMNR    PULM:  - Non-small cell lung cancer with metastasis  - Hx/o Stage 4 lung adenocarcinoma with diffuse mets to C/A/P and brain s/p Rt craniotomy for tumor resection, & multiple rounds of chemo & RT  - She had C2 carbo+taxol+ bevacizumab+ addition of atezolizumab on 12/1/23.   - No further inpt. chemo/RT planning per Heme-Onc   - Dexamethasone 4mg q6 x 24hr (to see if will improve mentation) >> 2mg BID (home dose) Per Neuro- Surg    - Apprec Hem/Onc rec, f/u for further  rec.    CV:  - -160mmHg  - TTE with EF 45-50%    RENAL:  - Monitor Cr  - replete lytes PRN    GI:  - Diet: NPO  - sennna and miralax for bowel regimen  - Last Bowel Movement:   - GI prophylaxis on protonix 40mg daily while on steroids    ENDO:   - Goal euglycemia (-180)  - A1C 7.7  - on ISS    HEME/ONC:  - VTE prophylaxis: [X] SCDs [] chemoprophylaxis [] hold chemoprophylaxis due to: [] high risk of DVT/PE on admission due to:    ID:  - Vanco, Zosyn for empiric coverage  - C/w Iv abx  - C/w IVF  - f/u bcx  - Ucx with enterococcus, continue vanco  - Trend labs, monitor fever curve.   52y F PMH of smoking, stage 4 lung adenocarcinoma with diffuse mets to C/A/P and brain s/p Rt craniotomy for tumor resection, & multiple rounds of chemo & RT, steroid induced DM, GERD under NSCU care after seizure activity    NEURO:  - pt. admitted with AMS 2/2 to possible infectious, metabolic, neuro etiology  - CTH at [facility] showed ?Hypodensity in the right temporal parietal region may represent subacute or chronic ischemic changes  - q4 hour neuro checks  - EEG monitoring w/o seizure, LPD recorded  - On LEV 1.5 BID, LCM 200mg BID, VPA 250mg Q6  - Decadron 4mg Q6  - tylenol and oxycodone PRN   - MR w/ and w/o pending  - appreciate neuro recs  Activity: [] OOB as tolerated [] Bedrest [X] PT [] OT [] PMNR    PULM:  - Non-small cell lung cancer with metastasis  - Hx/o Stage 4 lung adenocarcinoma with diffuse mets to C/A/P and brain s/p Rt craniotomy for tumor resection, & multiple rounds of chemo & RT  - She had C2 carbo+taxol+ bevacizumab+ addition of atezolizumab on 12/1/23.   - No further inpt. chemo/RT planning per Heme-Onc   - Dexamethasone 4mg q6 x 24hr (to see if will improve mentation) >> 2mg BID (home dose) Per Neuro- Surg    - Apprec Hem/Onc rec, f/u for further  rec.    CV:  - -160mmHg  - TTE with EF 45-50%    RENAL:  - Monitor Cr  - replete lytes PRN    GI:  - Diet: NPO  - sennna and miralax for bowel regimen  - Last Bowel Movement:   - GI prophylaxis on protonix 40mg daily while on steroids    ENDO:   - Goal euglycemia (-180)  - A1C 7.7  - on ISS    HEME/ONC:  - VTE prophylaxis: [X] SCDs [] chemoprophylaxis [] hold chemoprophylaxis due to: [] high risk of DVT/PE on admission due to:    ID:  - Vanco, Zosyn for empiric coverage  - C/w Iv abx  - C/w IVF  - f/u bcx  - Ucx with enterococcus, continue vanco  - Trend labs, monitor fever curve.

## 2023-12-21 NOTE — PROGRESS NOTE ADULT - ATTENDING COMMENTS
Patient seen and examined at bedside.       Detailed neuro exam:  Please note, the patient did not cooperate for detailed neuro exams in the setting of altered mental status and lethargy  General: Patient is comfortably lying on the bed without acute distress.  Mental and Psychological Status: The patient is lethargic s/p lorazepam and nonverbal. Unable to follow simple commands or any complex commands.   Cranial Nerve Exam: Unable to assess visual threat and extraocular movements. Pupils are round, equal, and reactive. Further cranial nerve exams are limited.  Motor Exam: On noxious stimuli strength she withdrew at all extremities except no response at right upper extremity. There are no resting tremors. The tone is increased through out. .   Sensory Examination: Sensation is intact to pinprick throughout.  Deep Tendon Reflexes and Plantar Responses: Patient did not participate in the setting of lethargy.    Posture, Station and Gait: Patient did not participate in the setting of lethargy.    Coordination: Patient did not participate in the setting of lethargy.      Impression and Plan:  Ms. Rojas is a 52 year old unknown handed woman with a PMHx of advanced lung adenocarcinoma with diffuse mets to brain s/p Rt craniotomy for tumor resection, & multiple rounds of chemo & RT, steroid induced DM, GERD who presents to [place] ED for AMS.   Neurology consulted because of altered mental status. Etiology of altered mental status is multifactorial including  metabolic encephalopathy, seizure and advanced metastatic disease. For seizure, yesterday she was started on Vimpat 200 mg stat then 100 mg BID despite that she has clinical seizure therefore, she received the lorazepam and Keppra. Today morning again, she has more seizure therefore, she received the lorazepam 2 mg, additonal Keppra 2 mg. Keppra dose was increased from 1gm BID to 1500 mg BID. Also increased the Vimpat from 100 mg BID to 200 mg BID.      Continue Depakote and please check the Depakote level tomorrow morning.   Patient would benefit from EEG stat to rule out seizure.   MRI brain once patient is stable.   Continue medical management, neuro- check and fall precaution.  GI and DVT prophylaxis.    Patient is at high risk for complications and morbidity or mortality. There is high probability of imminent or life threatening deterioration in the patient's condition.  Patient is unable or incompetent to participate in giving a history and/or making decisions and discussion is necessary for determining treatment decisions.    My cumulative time taking care of this critically ill patient is 60 minutes  If you have any further questions, please do not hesitate to contact our team.  Thank you for allowing us to participate in this patient care. Patient seen and examined at bedside.       Detailed neuro exam:  Please note, the patient did not cooperate for detailed neuro exams in the setting of altered mental status and lethargy  General: Patient is comfortably lying on the bed without acute distress.  Mental and Psychological Status: The patient is lethargic s/p lorazepam and nonverbal. Unable to follow simple commands or any complex commands.   Cranial Nerve Exam: Unable to assess visual threat and extraocular movements. Pupils are round, equal, and reactive. Further cranial nerve exams are limited.  Motor Exam: On noxious stimuli strength she withdrew at all extremities except no response at right upper extremity. There are no resting tremors. The tone is increased through out. .   Sensory Examination: Sensation is intact to pinprick throughout.  Deep Tendon Reflexes and Plantar Responses: Patient did not participate in the setting of lethargy.    Posture, Station and Gait: Patient did not participate in the setting of lethargy.    Coordination: Patient did not participate in the setting of lethargy.      Impression and Plan:  Ms. Rojas is a 52 year old unknown handed woman with a PMHx of advanced lung adenocarcinoma with diffuse mets to brain s/p Rt craniotomy for tumor resection, & multiple rounds of chemo & RT, steroid induced DM, GERD who presents to [place] ED for AMS.   Neurology consulted because of altered mental status. Etiology of altered mental status is multifactorial including  metabolic encephalopathy, seizure and advanced metastatic disease. For seizure, yesterday she was started on Vimpat 200 mg stat then 100 mg BID despite that she has clinical seizure therefore, she received the lorazepam and Keppra. Today morning again, she has more seizure therefore, she received the lorazepam 2 mg, additonal Keppra 2 mg. Keppra dose was increased from 1gm BID to 1500 mg BID. Also increased the Vimpat from 100 mg BID to 200 mg BID.      Continue Depakote and please check the Depakote level tomorrow morning.   Patient would benefit from EEG stat to rule out seizure.   MRI brain once patient is stable.   Continue medical management, neuro- check and fall precaution.  GI and DVT prophylaxis.    Patient is at high risk for complications and morbidity or mortality. There is high probability of imminent or life threatening deterioration in the patient's condition.  Patient is unable or incompetent to participate in giving a history and/or making decisions and discussion is necessary for determining treatment decisions.    My cumulative time taking care of this critically ill patient is 60 minutes  If you have any further questions, please do not hesitate to contact our team.  Thank you for allowing us to participate in this patient care.

## 2023-12-22 NOTE — PROGRESS NOTE ADULT - PROBLEM SELECTOR PLAN 3
Initially met sepsis criteria on admission but no clear infectious etiologies  - started on vanco/zosyn  - BCx remains NGTD  - dc all abx and monitor  - fever could've been from seizure

## 2023-12-22 NOTE — PROGRESS NOTE ADULT - PROBLEM SELECTOR PLAN 4
metastatic NSCLC currently on Carbo/Taxol + Connie + Atezolizumab (last dose 12/1/23)  - appreciate onc recs: no plan for inpatient chemoRx  - palliative care appreciated: full code, full medical management

## 2023-12-22 NOTE — PROGRESS NOTE ADULT - PROBLEM SELECTOR PLAN 1
Pt with clinical seizures (L facial twitching, RUE jerking). Transferred to ICU for close monitoring and AED management. Now stabilized on Vimpat and Keppra.  - vEEG without active seizure  - c/w Vimpat 200mg BID IV  - c/w Keppra 1500mg BID IV  - steroids per neurosurgery. On Decadron 4mg QID IV  - appreciate neurology recs: recommend MRI brain with and without contrast  - monitor valproic acid level and albumin Pt with clinical seizures (L facial twitching, RUE jerking). Transferred to ICU for close monitoring and AED management. Now stabilized on Vimpat and Keppra.  - vEEG without active seizure  - c/w Vimpat 200mg BID IV  - c/w Keppra 1500mg BID IV  - steroids per neurosurgery. On Decadron 2mg IV BID  - appreciate neurology recs: recommend MRI brain with and without contrast  - monitor valproic acid level and albumin

## 2023-12-22 NOTE — DIETITIAN INITIAL EVALUATION ADULT - ADD RECOMMEND
1. Obtain subjective wt/diet history from pt/family PRN.   2. Defer advancement of PO diet vs. tube feeds or alternative route of nutrition to medical team. If PO diet warranted, consider addition of Glucerna shakes 2-3x daily if pt amenable.   3. If EN feeds warranted, monitor GI tolerance. RD to remain available to adjust EN formulary, volume/rate PRN.   4. Monitor for signs/symptoms of REFEEDING SYNDROME (trend Phos/K/Mg, replete PRN/prior to diet advancement).   5. Recommend multivitamin (if no medication contraindications) to aid in prevention of micronutrient deficiencies.   6. Malnutrition sticker placed.  7. Monitor wt trends/labs/skin integrity/hydration status/bowel regularity.

## 2023-12-22 NOTE — PROGRESS NOTE ADULT - PROBLEM SELECTOR PLAN 4
see GOC note above  pt full code  HCP in patient window, family friend Jonny is documented HCP as per sisters additional HCP reflecting both sisters as HCP was completed after, encouraged them to bring in

## 2023-12-22 NOTE — PROGRESS NOTE ADULT - ATTENDING COMMENTS
Patient seen and examined at bedside.       Detailed neuro exam:  Please note, the patient did not cooperate for detailed neuro exams in the setting of altered mental status and lethargy  General: Patient is comfortably lying on the bed without acute distress.  Mental and Psychological Status: The patient is lethargic s/p lorazepam and nonverbal. Unable to follow simple commands or any complex commands.   Cranial Nerve Exam: Unable to assess visual threat and extraocular movements. Pupils are round, equal, and reactive. Further cranial nerve exams are limited.  Motor Exam: On noxious stimuli strength she withdrew at all extremities except no response at right upper extremity. There are no resting tremors. The tone is increased through out. .   Sensory Examination: Sensation is intact to pinprick throughout.  Deep Tendon Reflexes and Plantar Responses: Patient did not participate in the setting of lethargy.    Posture, Station and Gait: Patient did not participate in the setting of lethargy.    Coordination: Patient did not participate in the setting of lethargy.      Impression and Plan:  Ms. Rojas is a 52 year old unknown handed woman with a PMHx of advanced lung adenocarcinoma with diffuse mets to brain s/p Rt craniotomy for tumor resection, & multiple rounds of chemo & RT, steroid induced DM, GERD who presents to [place] ED for AMS.   Neurology consulted because of altered mental status. Etiology of altered mental status is multifactorial including  metabolic encephalopathy, seizure and advanced metastatic disease. For seizure, yesterday she was started on Vimpat 200 mg stat then 100 mg BID despite that she has clinical seizure therefore, she received the lorazepam and Keppra. Today morning again, she has more seizure therefore, she received the lorazepam 2 mg, additonal Keppra 2 mg. Keppra dose was increased from 1gm BID to 1500 mg BID. Also increased the Vimpat from 100 mg BID to 200 mg BID.  Patient remains seizure free after adjusting seizure medication.     Continue Depakote and Today level was not drawn. Please check the Depakote level tomorrow morning.   Patient would benefit from EEG stat to rule out seizure.   MRI brain once patient is stable.   Continue medical management, neuro- check and fall precaution.  GI and DVT prophylaxis.    Patient is at high risk for complications and morbidity or mortality. There is high probability of imminent or life threatening deterioration in the patient's condition.  Patient is unable or incompetent to participate in giving a history and/or making decisions and discussion is necessary for determining treatment decisions.    My cumulative time taking care of this critically ill patient is 55 minutes  If you have any further questions, please do not hesitate to contact our team.  Thank you for allowing us to participate in this patient care. Patient seen and examined at bedside.       Detailed neuro exam:  Please note, the patient did not cooperate for detailed neuro exams in the setting of altered mental status and lethargy  General: Patient is comfortably lying on the bed without acute distress.  Mental and Psychological Status: The patient is lethargic s/p lorazepam and nonverbal. Unable to follow simple commands or any complex commands.   Cranial Nerve Exam: Unable to assess visual threat and extraocular movements. Pupils are round, equal, and reactive. Further cranial nerve exams are limited.  Motor Exam: On noxious stimuli strength she withdrew at all extremities except no response at right upper extremity. There are no resting tremors. The tone is increased through out. .   Sensory Examination: Sensation is intact to pinprick throughout.  Deep Tendon Reflexes and Plantar Responses: Patient did not participate in the setting of lethargy.    Posture, Station and Gait: Patient did not participate in the setting of lethargy.    Coordination: Patient did not participate in the setting of lethargy.      Impression and Plan:  Ms. Rojas is a 52 year old unknown handed woman with a PMHx of advanced lung adenocarcinoma with diffuse mets to brain s/p Rt craniotomy for tumor resection, & multiple rounds of chemo & RT, steroid induced DM, GERD who presents to [place] ED for AMS.   Neurology consulted because of altered mental status. Etiology of altered mental status is multifactorial including  metabolic encephalopathy, seizure and advanced metastatic disease. For seizure, yesterday she was started on Vimpat 200 mg stat then 100 mg BID despite that she has clinical seizure therefore, she received the lorazepam and Keppra. Today morning again, she has more seizure therefore, she received the lorazepam 2 mg, additonal Keppra 2 mg. Keppra dose was increased from 1gm BID to 1500 mg BID. Also increased the Vimpat from 100 mg BID to 200 mg BID.  Patient remains seizure free after adjusting seizure medication.     Continue Depakote and Today level was not drawn. Please check the Depakote level tomorrow morning.   Patient would benefit from EEG stat to rule out seizure.   MRI brain once patient is stable.   Continue medical management, neuro- check and fall precaution.  GI and DVT prophylaxis.    Patient is at high risk for complications and morbidity or mortality. There is high probability of imminent or life threatening deterioration in the patient's condition.  Patient is unable or incompetent to participate in giving a history and/or making decisions and discussion is necessary for determining treatment decisions.    My cumulative time taking care of this critically ill patient is 55 minutes  If you have any further questions, please do not hesitate to contact our team.  Thank you for allowing us to participate in this patient care.

## 2023-12-22 NOTE — DIETITIAN INITIAL EVALUATION ADULT - PERTINENT MEDS FT
MEDICATIONS  (STANDING):  artificial tears (preservative free) Ophthalmic Solution 1 Drop(s) Both EYES three times a day  atorvastatin 40 milliGRAM(s) Oral at bedtime  chlorhexidine 4% Liquid 1 Application(s) Topical <User Schedule>  dexAMETHasone  Injectable 2 milliGRAM(s) IV Push every 12 hours  dextrose 5%. 1000 milliLiter(s) (50 mL/Hr) IV Continuous <Continuous>  dextrose 5%. 1000 milliLiter(s) (100 mL/Hr) IV Continuous <Continuous>  dextrose 50% Injectable 25 Gram(s) IV Push once  dextrose 50% Injectable 25 Gram(s) IV Push once  dextrose 50% Injectable 12.5 Gram(s) IV Push once  glucagon  Injectable 1 milliGRAM(s) IntraMuscular once  insulin lispro (ADMELOG) corrective regimen sliding scale   SubCutaneous every 6 hours  lacosamide IVPB 200 milliGRAM(s) IV Intermittent every 12 hours  levETIRAcetam  IVPB 1500 milliGRAM(s) IV Intermittent every 12 hours  metoprolol tartrate Injectable 2.5 milliGRAM(s) IV Push every 12 hours  pantoprazole  Injectable 40 milliGRAM(s) IV Push daily  petrolatum white Ointment 1 Application(s) Topical two times a day  piperacillin/tazobactam IVPB.. 3.375 Gram(s) IV Intermittent every 8 hours  polyethylene glycol 3350 17 Gram(s) Oral daily  valproate sodium  IVPB 250 milliGRAM(s) IV Intermittent every 6 hours  vancomycin  IVPB      vancomycin  IVPB 750 milliGRAM(s) IV Intermittent every 12 hours    MEDICATIONS  (PRN):  acetaminophen     Tablet .. 650 milliGRAM(s) Oral every 6 hours PRN Temp greater or equal to 38C (100.4F), Mild Pain (1 - 3)  acetaminophen  Suppository .. 650 milliGRAM(s) Rectal every 6 hours PRN Temp greater or equal to 38C (100.4F), Moderate Pain (4 - 6)  aluminum hydroxide/magnesium hydroxide/simethicone Suspension 30 milliLiter(s) Oral every 4 hours PRN Dyspepsia  dextrose Oral Gel 15 Gram(s) Oral once PRN Blood Glucose LESS THAN 70 milliGRAM(s)/deciliter  melatonin 3 milliGRAM(s) Oral at bedtime PRN Insomnia  ondansetron Injectable 4 milliGRAM(s) IV Push every 8 hours PRN Nausea and/or Vomiting  senna 2 Tablet(s) Oral at bedtime PRN Constipation

## 2023-12-22 NOTE — EEG REPORT - NS EEG TEXT BOX
HARJEET WHITMORE [id number]     Study Date: 	12-21-23 1054 - 0446  12-22-23  Duration:  x 17 HR 46 MINS    --------------------------------------------------------------------------------------------------  History:  CC/ HPI Patient is a 52y old  Female who presents with a chief complaint of Pt is a 51 yo F with PMH: stage IV lung adenocarcinoma with diffuse mets to C/A/P and brain s/p R craniotomy for tumor resection and multiple rounds of chemo and RT, steroid induced DM, GERD. Presented to OSH for AMS. Code stroke was called. CTH at OSH showed increased conspicuity of L posterior frontal and R parietal lesion, likely 2/2 hemorrhage. Noted with fever, altered mental status and poor nutrition/electrolytes derangements. Transferred to ICU for repetitive seizures and worsening neuro exam.      (22 Dec 2023 07:23)    MEDICATIONS  (STANDING):  artificial tears (preservative free) Ophthalmic Solution 1 Drop(s) Both EYES three times a day  atorvastatin 40 milliGRAM(s) Oral at bedtime  chlorhexidine 4% Liquid 1 Application(s) Topical <User Schedule>  dexAMETHasone  Injectable 2 milliGRAM(s) IV Push every 12 hours  dextrose 5%. 1000 milliLiter(s) (50 mL/Hr) IV Continuous <Continuous>  dextrose 5%. 1000 milliLiter(s) (100 mL/Hr) IV Continuous <Continuous>  dextrose 50% Injectable 25 Gram(s) IV Push once  dextrose 50% Injectable 25 Gram(s) IV Push once  dextrose 50% Injectable 12.5 Gram(s) IV Push once  glucagon  Injectable 1 milliGRAM(s) IntraMuscular once  insulin lispro (ADMELOG) corrective regimen sliding scale   SubCutaneous every 6 hours  lacosamide IVPB 200 milliGRAM(s) IV Intermittent every 12 hours  levETIRAcetam  IVPB 1500 milliGRAM(s) IV Intermittent every 12 hours  metoprolol tartrate Injectable 2.5 milliGRAM(s) IV Push every 12 hours  pantoprazole  Injectable 40 milliGRAM(s) IV Push daily  petrolatum white Ointment 1 Application(s) Topical two times a day  piperacillin/tazobactam IVPB.. 3.375 Gram(s) IV Intermittent every 8 hours  polyethylene glycol 3350 17 Gram(s) Oral daily  valproate sodium  IVPB 250 milliGRAM(s) IV Intermittent every 6 hours  vancomycin  IVPB      vancomycin  IVPB 750 milliGRAM(s) IV Intermittent every 12 hours    --------------------------------------------------------------------------------------------------  Study Interpretation:    [Abbreviation Key:  PDR=alpha rhythm/posterior dominant rhythm. A-P=anterior posterior.  Amplitude: ‘very low’:<20; ‘low’:20-49; ‘medium’:; ‘high’:>150uV.  Persistence for periodic/rhythmic patterns (% of epoch) ‘rare’:<1%; ‘occasional’:1-10%; ‘frequent’:10-50%; ‘abundant’:50-90%; ‘continuous’:>90%.  Persistence for sporadic discharges: ‘rare’:<1/hr; ‘occasional’:1/min-1/hr; ‘frequent’:>1/min; ‘abundant’:>1/10 sec.  RPP=rhythmic and periodic patterns; GRDA=generalized rhythmic delta activity; FIRDA=frontal intermittent GRDA; LRDA=lateralized rhythmic delta activity; TIRDA=temporal intermittent rhythmic delta activity;  LPD=PLED=lateralized periodic discharges; GPD=generalized periodic discharges; BIPDs =bilateral independent periodic discharges; Mf=multifocal; SIRPDs=stimulus induced rhythmic, periodic, or ictal appearing discharges; BIRDs=brief potentially ictal rhythmic discharges >4 Hz, lasting .5-10s; PFA (paroxysmal bursts >13 Hz or =8 Hz <10s).  Modifiers: +F=with fast component; +S=with spike component; +R=with rhythmic component.  S-B=burst suppression pattern.  Max=maximal. N1-drowsy; N2-stage II sleep; N3-slow wave sleep. SSS/BETS=small sharp spikes/benign epileptiform transients of sleep. HV=hyperventilation; PS=photic stimulation]    FINDINGS:      Background:  Continuity: continuous  Symmetry: symmetric  PDR: Absent  Reactivity: present  Voltage: normal (between 20-150uV)  Anterior Posterior Gradient: Absent  Other background findings: none  Breach: absent    Background Slowing:  Generalized slowing: diffuse irregular delta and theta activity.  Focal slowing: left hemispheric irregular delta activity.    State Changes:   -Drowsiness noted with increased slowing, attenuation of fast activity  -N2 sleep transients were not recorded.    Sporadic and Rhythmic and Periodic (RPPs) Epileptiform Discharges:    Continuous right right posterior quadrant LPDs (max P3/P7) mostly around 1 Hz frequency with overriding fast activity and occasionally in bursts    Electrographic and Electroclinical seizures:  None    Other Clinical Events:  None    Activation Procedures:   -Hyperventilation was not performed.    -Photic stimulation was not performed.    Artifacts:  Intermittent myogenic and movement artifacts were noted.    ECG:  The heart rate on single channel ECG was predominantly between 60-70 BPM.    EEG Classification / Summary:  Abnormal EEG study  Continuous right right posterior quadrant LPDs (max P3/P7) mostly around 1 Hz frequency with overriding fast activity and occasionally in bursts  Focal left hemispheric slowing  Moderate generalized background slowing    -----------------------------------------------------------------------------------------------------    Clinical Impression:  Increased risk for focal seizures from left posterior quadrant   Left hemispheric focal cerebral dysfunction  Moderate diffuse/multi-focal cerebral dysfunction, not specific as to etiology.  There were no definitive seizures recorded.  This is a preliminary report pending attending review and attestation.    Christiano Ward MD  Fellow, [facility]      -------------------------------------------------------------------------------------------------------  [facility] EEG Reading Room Ph#: [phone]  Epilepsy Answering Service after 5PM and before 8:30AM: Ph#: [phone]   HARJEET WHITMORE [id number]     Study Date: 	12-21-23 1054 - 0446  12-22-23  Duration:  x 17 HR 46 MINS    --------------------------------------------------------------------------------------------------  History:  CC/ HPI Patient is a 52y old  Female who presents with a chief complaint of Pt is a 53 yo F with PMH: stage IV lung adenocarcinoma with diffuse mets to C/A/P and brain s/p R craniotomy for tumor resection and multiple rounds of chemo and RT, steroid induced DM, GERD. Presented to OSH for AMS. Code stroke was called. CTH at OSH showed increased conspicuity of L posterior frontal and R parietal lesion, likely 2/2 hemorrhage. Noted with fever, altered mental status and poor nutrition/electrolytes derangements. Transferred to ICU for repetitive seizures and worsening neuro exam.      (22 Dec 2023 07:23)    MEDICATIONS  (STANDING):  artificial tears (preservative free) Ophthalmic Solution 1 Drop(s) Both EYES three times a day  atorvastatin 40 milliGRAM(s) Oral at bedtime  chlorhexidine 4% Liquid 1 Application(s) Topical <User Schedule>  dexAMETHasone  Injectable 2 milliGRAM(s) IV Push every 12 hours  dextrose 5%. 1000 milliLiter(s) (50 mL/Hr) IV Continuous <Continuous>  dextrose 5%. 1000 milliLiter(s) (100 mL/Hr) IV Continuous <Continuous>  dextrose 50% Injectable 25 Gram(s) IV Push once  dextrose 50% Injectable 25 Gram(s) IV Push once  dextrose 50% Injectable 12.5 Gram(s) IV Push once  glucagon  Injectable 1 milliGRAM(s) IntraMuscular once  insulin lispro (ADMELOG) corrective regimen sliding scale   SubCutaneous every 6 hours  lacosamide IVPB 200 milliGRAM(s) IV Intermittent every 12 hours  levETIRAcetam  IVPB 1500 milliGRAM(s) IV Intermittent every 12 hours  metoprolol tartrate Injectable 2.5 milliGRAM(s) IV Push every 12 hours  pantoprazole  Injectable 40 milliGRAM(s) IV Push daily  petrolatum white Ointment 1 Application(s) Topical two times a day  piperacillin/tazobactam IVPB.. 3.375 Gram(s) IV Intermittent every 8 hours  polyethylene glycol 3350 17 Gram(s) Oral daily  valproate sodium  IVPB 250 milliGRAM(s) IV Intermittent every 6 hours  vancomycin  IVPB      vancomycin  IVPB 750 milliGRAM(s) IV Intermittent every 12 hours    --------------------------------------------------------------------------------------------------  Study Interpretation:    [Abbreviation Key:  PDR=alpha rhythm/posterior dominant rhythm. A-P=anterior posterior.  Amplitude: ‘very low’:<20; ‘low’:20-49; ‘medium’:; ‘high’:>150uV.  Persistence for periodic/rhythmic patterns (% of epoch) ‘rare’:<1%; ‘occasional’:1-10%; ‘frequent’:10-50%; ‘abundant’:50-90%; ‘continuous’:>90%.  Persistence for sporadic discharges: ‘rare’:<1/hr; ‘occasional’:1/min-1/hr; ‘frequent’:>1/min; ‘abundant’:>1/10 sec.  RPP=rhythmic and periodic patterns; GRDA=generalized rhythmic delta activity; FIRDA=frontal intermittent GRDA; LRDA=lateralized rhythmic delta activity; TIRDA=temporal intermittent rhythmic delta activity;  LPD=PLED=lateralized periodic discharges; GPD=generalized periodic discharges; BIPDs =bilateral independent periodic discharges; Mf=multifocal; SIRPDs=stimulus induced rhythmic, periodic, or ictal appearing discharges; BIRDs=brief potentially ictal rhythmic discharges >4 Hz, lasting .5-10s; PFA (paroxysmal bursts >13 Hz or =8 Hz <10s).  Modifiers: +F=with fast component; +S=with spike component; +R=with rhythmic component.  S-B=burst suppression pattern.  Max=maximal. N1-drowsy; N2-stage II sleep; N3-slow wave sleep. SSS/BETS=small sharp spikes/benign epileptiform transients of sleep. HV=hyperventilation; PS=photic stimulation]    FINDINGS:      Background:  Continuity: continuous  Symmetry: symmetric  PDR: Absent  Reactivity: present  Voltage: normal (between 20-150uV)  Anterior Posterior Gradient: Absent  Other background findings: none  Breach: absent    Background Slowing:  Generalized slowing: diffuse irregular delta and theta activity.  Focal slowing: left hemispheric irregular delta activity.    State Changes:   -Drowsiness noted with increased slowing, attenuation of fast activity  -N2 sleep transients were not recorded.    Sporadic and Rhythmic and Periodic (RPPs) Epileptiform Discharges:    Continuous right right posterior quadrant LPDs (max P3/P7) mostly around 1 Hz frequency with overriding fast activity and occasionally in bursts    Electrographic and Electroclinical seizures:  None    Other Clinical Events:  None    Activation Procedures:   -Hyperventilation was not performed.    -Photic stimulation was not performed.    Artifacts:  Intermittent myogenic and movement artifacts were noted.    ECG:  The heart rate on single channel ECG was predominantly between 60-70 BPM.    EEG Classification / Summary:  Abnormal EEG study  Continuous right right posterior quadrant LPDs (max P3/P7) mostly around 1 Hz frequency with overriding fast activity and occasionally in bursts  Focal left hemispheric slowing  Moderate generalized background slowing    -----------------------------------------------------------------------------------------------------    Clinical Impression:  Increased risk for focal seizures from left posterior quadrant   Left hemispheric focal cerebral dysfunction  Moderate diffuse/multi-focal cerebral dysfunction, not specific as to etiology.  There were no definitive seizures recorded.  This is a preliminary report pending attending review and attestation.    Christiano Ward MD  Fellow, [facility]      -------------------------------------------------------------------------------------------------------  [facility] EEG Reading Room Ph#: [phone]  Epilepsy Answering Service after 5PM and before 8:30AM: Ph#: [phone]   HARJEET WHITMORE [id number]     Study Date: 	12-21-23 1054 - 0446  12-22-23  Duration:  x 17 HR 46 MINS    --------------------------------------------------------------------------------------------------  History:  CC/ HPI Patient is a 52y old  Female who presents with a chief complaint of Pt is a 51 yo F with PMH: stage IV lung adenocarcinoma with diffuse mets to C/A/P and brain s/p R craniotomy for tumor resection and multiple rounds of chemo and RT, steroid induced DM, GERD. Presented to OSH for AMS. Code stroke was called. CTH at OSH showed increased conspicuity of L posterior frontal and R parietal lesion, likely 2/2 hemorrhage. Noted with fever, altered mental status and poor nutrition/electrolytes derangements. Transferred to ICU for repetitive seizures and worsening neuro exam.      (22 Dec 2023 07:23)    MEDICATIONS  (STANDING):  artificial tears (preservative free) Ophthalmic Solution 1 Drop(s) Both EYES three times a day  atorvastatin 40 milliGRAM(s) Oral at bedtime  chlorhexidine 4% Liquid 1 Application(s) Topical <User Schedule>  dexAMETHasone  Injectable 2 milliGRAM(s) IV Push every 12 hours  dextrose 5%. 1000 milliLiter(s) (50 mL/Hr) IV Continuous <Continuous>  dextrose 5%. 1000 milliLiter(s) (100 mL/Hr) IV Continuous <Continuous>  dextrose 50% Injectable 25 Gram(s) IV Push once  dextrose 50% Injectable 25 Gram(s) IV Push once  dextrose 50% Injectable 12.5 Gram(s) IV Push once  glucagon  Injectable 1 milliGRAM(s) IntraMuscular once  insulin lispro (ADMELOG) corrective regimen sliding scale   SubCutaneous every 6 hours  lacosamide IVPB 200 milliGRAM(s) IV Intermittent every 12 hours  levETIRAcetam  IVPB 1500 milliGRAM(s) IV Intermittent every 12 hours  metoprolol tartrate Injectable 2.5 milliGRAM(s) IV Push every 12 hours  pantoprazole  Injectable 40 milliGRAM(s) IV Push daily  petrolatum white Ointment 1 Application(s) Topical two times a day  piperacillin/tazobactam IVPB.. 3.375 Gram(s) IV Intermittent every 8 hours  polyethylene glycol 3350 17 Gram(s) Oral daily  valproate sodium  IVPB 250 milliGRAM(s) IV Intermittent every 6 hours  vancomycin  IVPB      vancomycin  IVPB 750 milliGRAM(s) IV Intermittent every 12 hours    --------------------------------------------------------------------------------------------------  Study Interpretation:    [Abbreviation Key:  PDR=alpha rhythm/posterior dominant rhythm. A-P=anterior posterior.  Amplitude: ‘very low’:<20; ‘low’:20-49; ‘medium’:; ‘high’:>150uV.  Persistence for periodic/rhythmic patterns (% of epoch) ‘rare’:<1%; ‘occasional’:1-10%; ‘frequent’:10-50%; ‘abundant’:50-90%; ‘continuous’:>90%.  Persistence for sporadic discharges: ‘rare’:<1/hr; ‘occasional’:1/min-1/hr; ‘frequent’:>1/min; ‘abundant’:>1/10 sec.  RPP=rhythmic and periodic patterns; GRDA=generalized rhythmic delta activity; FIRDA=frontal intermittent GRDA; LRDA=lateralized rhythmic delta activity; TIRDA=temporal intermittent rhythmic delta activity;  LPD=PLED=lateralized periodic discharges; GPD=generalized periodic discharges; BIPDs =bilateral independent periodic discharges; Mf=multifocal; SIRPDs=stimulus induced rhythmic, periodic, or ictal appearing discharges; BIRDs=brief potentially ictal rhythmic discharges >4 Hz, lasting .5-10s; PFA (paroxysmal bursts >13 Hz or =8 Hz <10s).  Modifiers: +F=with fast component; +S=with spike component; +R=with rhythmic component.  S-B=burst suppression pattern.  Max=maximal. N1-drowsy; N2-stage II sleep; N3-slow wave sleep. SSS/BETS=small sharp spikes/benign epileptiform transients of sleep. HV=hyperventilation; PS=photic stimulation]    FINDINGS:      Background:  Continuity: continuous  Symmetry: symmetric  PDR: Absent  Reactivity: present  Voltage: normal (between 20-150uV)  Anterior Posterior Gradient: Absent  Other background findings: none  Breach: absent    Background Slowing:  Generalized slowing: diffuse irregular delta and theta activity.  Focal slowing: left hemispheric irregular delta activity.    State Changes:   -Drowsiness noted with increased slowing, attenuation of fast activity  -N2 sleep transients were not recorded.    Sporadic and Rhythmic and Periodic (RPPs) Epileptiform Discharges:    Continuous left posterior quadrant LPDs (max P3/P7) mostly around 1 Hz frequency with overriding fast activity and occasionally in bursts    Electrographic and Electroclinical seizures:  None    Other Clinical Events:  None    Activation Procedures:   -Hyperventilation was not performed.    -Photic stimulation was not performed.    Artifacts:  Intermittent myogenic and movement artifacts were noted.    ECG:  The heart rate on single channel ECG was predominantly between 60-70 BPM.    EEG Classification / Summary:  Abnormal EEG study  Continuous left posterior quadrant LPDs (max P3/P7) mostly around 1 Hz frequency with overriding fast activity and occasionally in bursts  Focal left hemispheric slowing  Moderate generalized background slowing    -----------------------------------------------------------------------------------------------------    Clinical Impression:  Increased risk for focal seizures from left posterior quadrant   Left hemispheric focal cerebral dysfunction  Moderate diffuse/multi-focal cerebral dysfunction, not specific as to etiology.  There were no definitive seizures recorded.    Christiano Ward MD  Fellow, [facility]    Jono Kunz MD  EEG/Epilepsy Attending       -------------------------------------------------------------------------------------------------------  [facility] EEG Reading Room Ph#: [phone]  Epilepsy Answering Service after 5PM and before 8:30AM: Ph#: [phone]   HARJEET WHITMORE [id number]     Study Date: 	12-21-23 1054 - 0446  12-22-23  Duration:  x 17 HR 46 MINS    --------------------------------------------------------------------------------------------------  History:  CC/ HPI Patient is a 52y old  Female who presents with a chief complaint of Pt is a 51 yo F with PMH: stage IV lung adenocarcinoma with diffuse mets to C/A/P and brain s/p R craniotomy for tumor resection and multiple rounds of chemo and RT, steroid induced DM, GERD. Presented to OSH for AMS. Code stroke was called. CTH at OSH showed increased conspicuity of L posterior frontal and R parietal lesion, likely 2/2 hemorrhage. Noted with fever, altered mental status and poor nutrition/electrolytes derangements. Transferred to ICU for repetitive seizures and worsening neuro exam.      (22 Dec 2023 07:23)    MEDICATIONS  (STANDING):  artificial tears (preservative free) Ophthalmic Solution 1 Drop(s) Both EYES three times a day  atorvastatin 40 milliGRAM(s) Oral at bedtime  chlorhexidine 4% Liquid 1 Application(s) Topical <User Schedule>  dexAMETHasone  Injectable 2 milliGRAM(s) IV Push every 12 hours  dextrose 5%. 1000 milliLiter(s) (50 mL/Hr) IV Continuous <Continuous>  dextrose 5%. 1000 milliLiter(s) (100 mL/Hr) IV Continuous <Continuous>  dextrose 50% Injectable 25 Gram(s) IV Push once  dextrose 50% Injectable 25 Gram(s) IV Push once  dextrose 50% Injectable 12.5 Gram(s) IV Push once  glucagon  Injectable 1 milliGRAM(s) IntraMuscular once  insulin lispro (ADMELOG) corrective regimen sliding scale   SubCutaneous every 6 hours  lacosamide IVPB 200 milliGRAM(s) IV Intermittent every 12 hours  levETIRAcetam  IVPB 1500 milliGRAM(s) IV Intermittent every 12 hours  metoprolol tartrate Injectable 2.5 milliGRAM(s) IV Push every 12 hours  pantoprazole  Injectable 40 milliGRAM(s) IV Push daily  petrolatum white Ointment 1 Application(s) Topical two times a day  piperacillin/tazobactam IVPB.. 3.375 Gram(s) IV Intermittent every 8 hours  polyethylene glycol 3350 17 Gram(s) Oral daily  valproate sodium  IVPB 250 milliGRAM(s) IV Intermittent every 6 hours  vancomycin  IVPB      vancomycin  IVPB 750 milliGRAM(s) IV Intermittent every 12 hours    --------------------------------------------------------------------------------------------------  Study Interpretation:    [Abbreviation Key:  PDR=alpha rhythm/posterior dominant rhythm. A-P=anterior posterior.  Amplitude: ‘very low’:<20; ‘low’:20-49; ‘medium’:; ‘high’:>150uV.  Persistence for periodic/rhythmic patterns (% of epoch) ‘rare’:<1%; ‘occasional’:1-10%; ‘frequent’:10-50%; ‘abundant’:50-90%; ‘continuous’:>90%.  Persistence for sporadic discharges: ‘rare’:<1/hr; ‘occasional’:1/min-1/hr; ‘frequent’:>1/min; ‘abundant’:>1/10 sec.  RPP=rhythmic and periodic patterns; GRDA=generalized rhythmic delta activity; FIRDA=frontal intermittent GRDA; LRDA=lateralized rhythmic delta activity; TIRDA=temporal intermittent rhythmic delta activity;  LPD=PLED=lateralized periodic discharges; GPD=generalized periodic discharges; BIPDs =bilateral independent periodic discharges; Mf=multifocal; SIRPDs=stimulus induced rhythmic, periodic, or ictal appearing discharges; BIRDs=brief potentially ictal rhythmic discharges >4 Hz, lasting .5-10s; PFA (paroxysmal bursts >13 Hz or =8 Hz <10s).  Modifiers: +F=with fast component; +S=with spike component; +R=with rhythmic component.  S-B=burst suppression pattern.  Max=maximal. N1-drowsy; N2-stage II sleep; N3-slow wave sleep. SSS/BETS=small sharp spikes/benign epileptiform transients of sleep. HV=hyperventilation; PS=photic stimulation]    FINDINGS:      Background:  Continuity: continuous  Symmetry: symmetric  PDR: Absent  Reactivity: present  Voltage: normal (between 20-150uV)  Anterior Posterior Gradient: Absent  Other background findings: none  Breach: absent    Background Slowing:  Generalized slowing: diffuse irregular delta and theta activity.  Focal slowing: left hemispheric irregular delta activity.    State Changes:   -Drowsiness noted with increased slowing, attenuation of fast activity  -N2 sleep transients were not recorded.    Sporadic and Rhythmic and Periodic (RPPs) Epileptiform Discharges:    Continuous left posterior quadrant LPDs (max P3/P7) mostly around 1 Hz frequency with overriding fast activity and occasionally in bursts    Electrographic and Electroclinical seizures:  None    Other Clinical Events:  None    Activation Procedures:   -Hyperventilation was not performed.    -Photic stimulation was not performed.    Artifacts:  Intermittent myogenic and movement artifacts were noted.    ECG:  The heart rate on single channel ECG was predominantly between 60-70 BPM.    EEG Classification / Summary:  Abnormal EEG study  Continuous left posterior quadrant LPDs (max P3/P7) mostly around 1 Hz frequency with overriding fast activity and occasionally in bursts  Focal left hemispheric slowing  Moderate generalized background slowing    -----------------------------------------------------------------------------------------------------    Clinical Impression:  Increased risk for focal seizures from left posterior quadrant   Left hemispheric focal cerebral dysfunction  Moderate diffuse/multi-focal cerebral dysfunction, not specific as to etiology.  There were no definitive seizures recorded.    Christiano Ward MD  Fellow, [facility]    Jono Kunz MD  EEG/Epilepsy Attending       -------------------------------------------------------------------------------------------------------  [facility] EEG Reading Room Ph#: [phone]  Epilepsy Answering Service after 5PM and before 8:30AM: Ph#: [phone]   HARJEET WHITMORE [id number]     Study Date: 	12-21-23 2544 - 7202 12-22-23  Duration:  x 20 HR 55 MINS    --------------------------------------------------------------------------------------------------  History:  CC/ HPI Patient is a 52y old  Female who presents with a chief complaint of Pt is a 53 yo F with PMH: stage IV lung adenocarcinoma with diffuse mets to C/A/P and brain s/p R craniotomy for tumor resection and multiple rounds of chemo and RT, steroid induced DM, GERD. Presented to OSH for AMS. Code stroke was called. CTH at OSH showed increased conspicuity of L posterior frontal and R parietal lesion, likely 2/2 hemorrhage. Noted with fever, altered mental status and poor nutrition/electrolytes derangements. Transferred to ICU for repetitive seizures and worsening neuro exam.      (22 Dec 2023 07:23)    MEDICATIONS  (STANDING):  artificial tears (preservative free) Ophthalmic Solution 1 Drop(s) Both EYES three times a day  atorvastatin 40 milliGRAM(s) Oral at bedtime  chlorhexidine 4% Liquid 1 Application(s) Topical <User Schedule>  dexAMETHasone  Injectable 2 milliGRAM(s) IV Push every 12 hours  dextrose 5%. 1000 milliLiter(s) (50 mL/Hr) IV Continuous <Continuous>  dextrose 5%. 1000 milliLiter(s) (100 mL/Hr) IV Continuous <Continuous>  dextrose 50% Injectable 25 Gram(s) IV Push once  dextrose 50% Injectable 25 Gram(s) IV Push once  dextrose 50% Injectable 12.5 Gram(s) IV Push once  glucagon  Injectable 1 milliGRAM(s) IntraMuscular once  insulin lispro (ADMELOG) corrective regimen sliding scale   SubCutaneous every 6 hours  lacosamide IVPB 200 milliGRAM(s) IV Intermittent every 12 hours  levETIRAcetam  IVPB 1500 milliGRAM(s) IV Intermittent every 12 hours  metoprolol tartrate Injectable 2.5 milliGRAM(s) IV Push every 12 hours  pantoprazole  Injectable 40 milliGRAM(s) IV Push daily  petrolatum white Ointment 1 Application(s) Topical two times a day  piperacillin/tazobactam IVPB.. 3.375 Gram(s) IV Intermittent every 8 hours  polyethylene glycol 3350 17 Gram(s) Oral daily  valproate sodium  IVPB 250 milliGRAM(s) IV Intermittent every 6 hours  vancomycin  IVPB      vancomycin  IVPB 750 milliGRAM(s) IV Intermittent every 12 hours    --------------------------------------------------------------------------------------------------  Study Interpretation:    [Abbreviation Key:  PDR=alpha rhythm/posterior dominant rhythm. A-P=anterior posterior.  Amplitude: ‘very low’:<20; ‘low’:20-49; ‘medium’:; ‘high’:>150uV.  Persistence for periodic/rhythmic patterns (% of epoch) ‘rare’:<1%; ‘occasional’:1-10%; ‘frequent’:10-50%; ‘abundant’:50-90%; ‘continuous’:>90%.  Persistence for sporadic discharges: ‘rare’:<1/hr; ‘occasional’:1/min-1/hr; ‘frequent’:>1/min; ‘abundant’:>1/10 sec.  RPP=rhythmic and periodic patterns; GRDA=generalized rhythmic delta activity; FIRDA=frontal intermittent GRDA; LRDA=lateralized rhythmic delta activity; TIRDA=temporal intermittent rhythmic delta activity;  LPD=PLED=lateralized periodic discharges; GPD=generalized periodic discharges; BIPDs =bilateral independent periodic discharges; Mf=multifocal; SIRPDs=stimulus induced rhythmic, periodic, or ictal appearing discharges; BIRDs=brief potentially ictal rhythmic discharges >4 Hz, lasting .5-10s; PFA (paroxysmal bursts >13 Hz or =8 Hz <10s).  Modifiers: +F=with fast component; +S=with spike component; +R=with rhythmic component.  S-B=burst suppression pattern.  Max=maximal. N1-drowsy; N2-stage II sleep; N3-slow wave sleep. SSS/BETS=small sharp spikes/benign epileptiform transients of sleep. HV=hyperventilation; PS=photic stimulation]    FINDINGS:      Background:  Continuity: continuous  Symmetry: symmetric  PDR: Absent  Reactivity: present  Voltage: normal (between 20-150uV)  Anterior Posterior Gradient: Absent  Other background findings: none  Breach: absent    Background Slowing:  Generalized slowing: diffuse irregular delta and theta activity.  Focal slowing: left hemispheric irregular delta activity.    State Changes:   -Drowsiness noted with increased slowing, attenuation of fast activity  -N2 sleep transients were not recorded.    Sporadic and Rhythmic and Periodic (RPPs) Epileptiform Discharges:    Continuous left posterior quadrant LPDs (max P3/P7) mostly around 1 Hz frequency with overriding fast activity and occasionally in bursts    Electrographic and Electroclinical seizures:  None    Other Clinical Events:  None    Activation Procedures:   -Hyperventilation was not performed.    -Photic stimulation was not performed.    Artifacts:  Intermittent myogenic and movement artifacts were noted.    ECG:  The heart rate on single channel ECG was predominantly between 60-70 BPM.    EEG Classification / Summary:  Abnormal EEG study  Continuous left posterior quadrant LPDs (max P3/P7) mostly around 1 Hz frequency with overriding fast activity and occasionally in bursts  Focal left hemispheric slowing  Moderate generalized background slowing    -----------------------------------------------------------------------------------------------------    Clinical Impression:  Increased risk for focal seizures from left posterior quadrant   Left hemispheric focal cerebral dysfunction  Moderate diffuse/multi-focal cerebral dysfunction, not specific as to etiology.  There were no definitive seizures recorded.    Christiano Ward MD  Fellow, [facility]    Jono Kunz MD  EEG/Epilepsy Attending       -------------------------------------------------------------------------------------------------------  [facility] EEG Reading Room Ph#: [phone]  Epilepsy Answering Service after 5PM and before 8:30AM: Ph#: [phone]   HARJEET WHITMORE [id number]     Study Date: 	12-21-23 2783 - 0023 12-22-23  Duration:  x 20 HR 55 MINS    --------------------------------------------------------------------------------------------------  History:  CC/ HPI Patient is a 52y old  Female who presents with a chief complaint of Pt is a 51 yo F with PMH: stage IV lung adenocarcinoma with diffuse mets to C/A/P and brain s/p R craniotomy for tumor resection and multiple rounds of chemo and RT, steroid induced DM, GERD. Presented to OSH for AMS. Code stroke was called. CTH at OSH showed increased conspicuity of L posterior frontal and R parietal lesion, likely 2/2 hemorrhage. Noted with fever, altered mental status and poor nutrition/electrolytes derangements. Transferred to ICU for repetitive seizures and worsening neuro exam.      (22 Dec 2023 07:23)    MEDICATIONS  (STANDING):  artificial tears (preservative free) Ophthalmic Solution 1 Drop(s) Both EYES three times a day  atorvastatin 40 milliGRAM(s) Oral at bedtime  chlorhexidine 4% Liquid 1 Application(s) Topical <User Schedule>  dexAMETHasone  Injectable 2 milliGRAM(s) IV Push every 12 hours  dextrose 5%. 1000 milliLiter(s) (50 mL/Hr) IV Continuous <Continuous>  dextrose 5%. 1000 milliLiter(s) (100 mL/Hr) IV Continuous <Continuous>  dextrose 50% Injectable 25 Gram(s) IV Push once  dextrose 50% Injectable 25 Gram(s) IV Push once  dextrose 50% Injectable 12.5 Gram(s) IV Push once  glucagon  Injectable 1 milliGRAM(s) IntraMuscular once  insulin lispro (ADMELOG) corrective regimen sliding scale   SubCutaneous every 6 hours  lacosamide IVPB 200 milliGRAM(s) IV Intermittent every 12 hours  levETIRAcetam  IVPB 1500 milliGRAM(s) IV Intermittent every 12 hours  metoprolol tartrate Injectable 2.5 milliGRAM(s) IV Push every 12 hours  pantoprazole  Injectable 40 milliGRAM(s) IV Push daily  petrolatum white Ointment 1 Application(s) Topical two times a day  piperacillin/tazobactam IVPB.. 3.375 Gram(s) IV Intermittent every 8 hours  polyethylene glycol 3350 17 Gram(s) Oral daily  valproate sodium  IVPB 250 milliGRAM(s) IV Intermittent every 6 hours  vancomycin  IVPB      vancomycin  IVPB 750 milliGRAM(s) IV Intermittent every 12 hours    --------------------------------------------------------------------------------------------------  Study Interpretation:    [Abbreviation Key:  PDR=alpha rhythm/posterior dominant rhythm. A-P=anterior posterior.  Amplitude: ‘very low’:<20; ‘low’:20-49; ‘medium’:; ‘high’:>150uV.  Persistence for periodic/rhythmic patterns (% of epoch) ‘rare’:<1%; ‘occasional’:1-10%; ‘frequent’:10-50%; ‘abundant’:50-90%; ‘continuous’:>90%.  Persistence for sporadic discharges: ‘rare’:<1/hr; ‘occasional’:1/min-1/hr; ‘frequent’:>1/min; ‘abundant’:>1/10 sec.  RPP=rhythmic and periodic patterns; GRDA=generalized rhythmic delta activity; FIRDA=frontal intermittent GRDA; LRDA=lateralized rhythmic delta activity; TIRDA=temporal intermittent rhythmic delta activity;  LPD=PLED=lateralized periodic discharges; GPD=generalized periodic discharges; BIPDs =bilateral independent periodic discharges; Mf=multifocal; SIRPDs=stimulus induced rhythmic, periodic, or ictal appearing discharges; BIRDs=brief potentially ictal rhythmic discharges >4 Hz, lasting .5-10s; PFA (paroxysmal bursts >13 Hz or =8 Hz <10s).  Modifiers: +F=with fast component; +S=with spike component; +R=with rhythmic component.  S-B=burst suppression pattern.  Max=maximal. N1-drowsy; N2-stage II sleep; N3-slow wave sleep. SSS/BETS=small sharp spikes/benign epileptiform transients of sleep. HV=hyperventilation; PS=photic stimulation]    FINDINGS:      Background:  Continuity: continuous  Symmetry: symmetric  PDR: Absent  Reactivity: present  Voltage: normal (between 20-150uV)  Anterior Posterior Gradient: Absent  Other background findings: none  Breach: absent    Background Slowing:  Generalized slowing: diffuse irregular delta and theta activity.  Focal slowing: left hemispheric irregular delta activity.    State Changes:   -Drowsiness noted with increased slowing, attenuation of fast activity  -N2 sleep transients were not recorded.    Sporadic and Rhythmic and Periodic (RPPs) Epileptiform Discharges:    Continuous left posterior quadrant LPDs (max P3/P7) mostly around 1 Hz frequency with overriding fast activity and occasionally in bursts    Electrographic and Electroclinical seizures:  None    Other Clinical Events:  None    Activation Procedures:   -Hyperventilation was not performed.    -Photic stimulation was not performed.    Artifacts:  Intermittent myogenic and movement artifacts were noted.    ECG:  The heart rate on single channel ECG was predominantly between 60-70 BPM.    EEG Classification / Summary:  Abnormal EEG study  Continuous left posterior quadrant LPDs (max P3/P7) mostly around 1 Hz frequency with overriding fast activity and occasionally in bursts  Focal left hemispheric slowing  Moderate generalized background slowing    -----------------------------------------------------------------------------------------------------    Clinical Impression:  Increased risk for focal seizures from left posterior quadrant   Left hemispheric focal cerebral dysfunction  Moderate diffuse/multi-focal cerebral dysfunction, not specific as to etiology.  There were no definitive seizures recorded.    Christiano Ward MD  Fellow, [facility]    Jono Kunz MD  EEG/Epilepsy Attending       -------------------------------------------------------------------------------------------------------  [facility] EEG Reading Room Ph#: [phone]  Epilepsy Answering Service after 5PM and before 8:30AM: Ph#: [phone]   HARJEET WHITMORE [id number]     Study Date: 	12-21-23 1054 - 1500 12-22-23  Duration:  x 27 HR 55 MINS    --------------------------------------------------------------------------------------------------  History:  CC/ HPI Patient is a 52y old  Female who presents with a chief complaint of Pt is a 53 yo F with PMH: stage IV lung adenocarcinoma with diffuse mets to C/A/P and brain s/p R craniotomy for tumor resection and multiple rounds of chemo and RT, steroid induced DM, GERD. Presented to OSH for AMS. Code stroke was called. CTH at OSH showed increased conspicuity of L posterior frontal and R parietal lesion, likely 2/2 hemorrhage. Noted with fever, altered mental status and poor nutrition/electrolytes derangements. Transferred to ICU for repetitive seizures and worsening neuro exam.      (22 Dec 2023 07:23)    MEDICATIONS  (STANDING):  artificial tears (preservative free) Ophthalmic Solution 1 Drop(s) Both EYES three times a day  atorvastatin 40 milliGRAM(s) Oral at bedtime  chlorhexidine 4% Liquid 1 Application(s) Topical <User Schedule>  dexAMETHasone  Injectable 2 milliGRAM(s) IV Push every 12 hours  dextrose 5%. 1000 milliLiter(s) (50 mL/Hr) IV Continuous <Continuous>  dextrose 5%. 1000 milliLiter(s) (100 mL/Hr) IV Continuous <Continuous>  dextrose 50% Injectable 25 Gram(s) IV Push once  dextrose 50% Injectable 25 Gram(s) IV Push once  dextrose 50% Injectable 12.5 Gram(s) IV Push once  glucagon  Injectable 1 milliGRAM(s) IntraMuscular once  insulin lispro (ADMELOG) corrective regimen sliding scale   SubCutaneous every 6 hours  lacosamide IVPB 200 milliGRAM(s) IV Intermittent every 12 hours  levETIRAcetam  IVPB 1500 milliGRAM(s) IV Intermittent every 12 hours  metoprolol tartrate Injectable 2.5 milliGRAM(s) IV Push every 12 hours  pantoprazole  Injectable 40 milliGRAM(s) IV Push daily  petrolatum white Ointment 1 Application(s) Topical two times a day  piperacillin/tazobactam IVPB.. 3.375 Gram(s) IV Intermittent every 8 hours  polyethylene glycol 3350 17 Gram(s) Oral daily  valproate sodium  IVPB 250 milliGRAM(s) IV Intermittent every 6 hours  vancomycin  IVPB      vancomycin  IVPB 750 milliGRAM(s) IV Intermittent every 12 hours    --------------------------------------------------------------------------------------------------  Study Interpretation:    [Abbreviation Key:  PDR=alpha rhythm/posterior dominant rhythm. A-P=anterior posterior.  Amplitude: ‘very low’:<20; ‘low’:20-49; ‘medium’:; ‘high’:>150uV.  Persistence for periodic/rhythmic patterns (% of epoch) ‘rare’:<1%; ‘occasional’:1-10%; ‘frequent’:10-50%; ‘abundant’:50-90%; ‘continuous’:>90%.  Persistence for sporadic discharges: ‘rare’:<1/hr; ‘occasional’:1/min-1/hr; ‘frequent’:>1/min; ‘abundant’:>1/10 sec.  RPP=rhythmic and periodic patterns; GRDA=generalized rhythmic delta activity; FIRDA=frontal intermittent GRDA; LRDA=lateralized rhythmic delta activity; TIRDA=temporal intermittent rhythmic delta activity;  LPD=PLED=lateralized periodic discharges; GPD=generalized periodic discharges; BIPDs =bilateral independent periodic discharges; Mf=multifocal; SIRPDs=stimulus induced rhythmic, periodic, or ictal appearing discharges; BIRDs=brief potentially ictal rhythmic discharges >4 Hz, lasting .5-10s; PFA (paroxysmal bursts >13 Hz or =8 Hz <10s).  Modifiers: +F=with fast component; +S=with spike component; +R=with rhythmic component.  S-B=burst suppression pattern.  Max=maximal. N1-drowsy; N2-stage II sleep; N3-slow wave sleep. SSS/BETS=small sharp spikes/benign epileptiform transients of sleep. HV=hyperventilation; PS=photic stimulation]    FINDINGS:      Background:  Continuity: continuous  Symmetry: symmetric  PDR: Absent  Reactivity: present  Voltage: normal (between 20-150uV)  Anterior Posterior Gradient: Absent  Other background findings: none  Breach: absent    Background Slowing:  Generalized slowing: diffuse irregular delta and theta activity.  Focal slowing: left hemispheric irregular delta activity.    State Changes:   -Drowsiness noted with increased slowing, attenuation of fast activity  -N2 sleep transients were not recorded.    Sporadic and Rhythmic and Periodic (RPPs) Epileptiform Discharges:    Continuous left posterior quadrant LPDs (max P3/P7) mostly around 1 Hz frequency with overriding fast activity and occasionally in bursts    Electrographic and Electroclinical seizures:  None    Other Clinical Events:  None    Activation Procedures:   -Hyperventilation was not performed.    -Photic stimulation was not performed.    Artifacts:  Intermittent myogenic and movement artifacts were noted.    ECG:  The heart rate on single channel ECG was predominantly between 60-70 BPM.    EEG Classification / Summary:  Abnormal EEG study  Continuous left posterior quadrant LPDs (max P3/P7) mostly around 1 Hz frequency with overriding fast activity and occasionally in bursts  Focal left hemispheric slowing  Moderate generalized background slowing    -----------------------------------------------------------------------------------------------------    Clinical Impression:  Increased risk for focal seizures from left posterior quadrant   Left hemispheric focal cerebral dysfunction  Moderate diffuse/multi-focal cerebral dysfunction, not specific as to etiology.  There were no definitive seizures recorded.    Christiano Ward MD  Fellow, [facility]    Jono Kunz MD  EEG/Epilepsy Attending       -------------------------------------------------------------------------------------------------------  [facility] EEG Reading Room Ph#: [phone]  Epilepsy Answering Service after 5PM and before 8:30AM: Ph#: [phone]   HARJEET WHITMORE [id number]     Study Date: 	12-21-23 1054 - 1500 12-22-23  Duration:  x 27 HR 55 MINS    --------------------------------------------------------------------------------------------------  History:  CC/ HPI Patient is a 52y old  Female who presents with a chief complaint of Pt is a 53 yo F with PMH: stage IV lung adenocarcinoma with diffuse mets to C/A/P and brain s/p R craniotomy for tumor resection and multiple rounds of chemo and RT, steroid induced DM, GERD. Presented to OSH for AMS. Code stroke was called. CTH at OSH showed increased conspicuity of L posterior frontal and R parietal lesion, likely 2/2 hemorrhage. Noted with fever, altered mental status and poor nutrition/electrolytes derangements. Transferred to ICU for repetitive seizures and worsening neuro exam.      (22 Dec 2023 07:23)    MEDICATIONS  (STANDING):  artificial tears (preservative free) Ophthalmic Solution 1 Drop(s) Both EYES three times a day  atorvastatin 40 milliGRAM(s) Oral at bedtime  chlorhexidine 4% Liquid 1 Application(s) Topical <User Schedule>  dexAMETHasone  Injectable 2 milliGRAM(s) IV Push every 12 hours  dextrose 5%. 1000 milliLiter(s) (50 mL/Hr) IV Continuous <Continuous>  dextrose 5%. 1000 milliLiter(s) (100 mL/Hr) IV Continuous <Continuous>  dextrose 50% Injectable 25 Gram(s) IV Push once  dextrose 50% Injectable 25 Gram(s) IV Push once  dextrose 50% Injectable 12.5 Gram(s) IV Push once  glucagon  Injectable 1 milliGRAM(s) IntraMuscular once  insulin lispro (ADMELOG) corrective regimen sliding scale   SubCutaneous every 6 hours  lacosamide IVPB 200 milliGRAM(s) IV Intermittent every 12 hours  levETIRAcetam  IVPB 1500 milliGRAM(s) IV Intermittent every 12 hours  metoprolol tartrate Injectable 2.5 milliGRAM(s) IV Push every 12 hours  pantoprazole  Injectable 40 milliGRAM(s) IV Push daily  petrolatum white Ointment 1 Application(s) Topical two times a day  piperacillin/tazobactam IVPB.. 3.375 Gram(s) IV Intermittent every 8 hours  polyethylene glycol 3350 17 Gram(s) Oral daily  valproate sodium  IVPB 250 milliGRAM(s) IV Intermittent every 6 hours  vancomycin  IVPB      vancomycin  IVPB 750 milliGRAM(s) IV Intermittent every 12 hours    --------------------------------------------------------------------------------------------------  Study Interpretation:    [Abbreviation Key:  PDR=alpha rhythm/posterior dominant rhythm. A-P=anterior posterior.  Amplitude: ‘very low’:<20; ‘low’:20-49; ‘medium’:; ‘high’:>150uV.  Persistence for periodic/rhythmic patterns (% of epoch) ‘rare’:<1%; ‘occasional’:1-10%; ‘frequent’:10-50%; ‘abundant’:50-90%; ‘continuous’:>90%.  Persistence for sporadic discharges: ‘rare’:<1/hr; ‘occasional’:1/min-1/hr; ‘frequent’:>1/min; ‘abundant’:>1/10 sec.  RPP=rhythmic and periodic patterns; GRDA=generalized rhythmic delta activity; FIRDA=frontal intermittent GRDA; LRDA=lateralized rhythmic delta activity; TIRDA=temporal intermittent rhythmic delta activity;  LPD=PLED=lateralized periodic discharges; GPD=generalized periodic discharges; BIPDs =bilateral independent periodic discharges; Mf=multifocal; SIRPDs=stimulus induced rhythmic, periodic, or ictal appearing discharges; BIRDs=brief potentially ictal rhythmic discharges >4 Hz, lasting .5-10s; PFA (paroxysmal bursts >13 Hz or =8 Hz <10s).  Modifiers: +F=with fast component; +S=with spike component; +R=with rhythmic component.  S-B=burst suppression pattern.  Max=maximal. N1-drowsy; N2-stage II sleep; N3-slow wave sleep. SSS/BETS=small sharp spikes/benign epileptiform transients of sleep. HV=hyperventilation; PS=photic stimulation]    FINDINGS:      Background:  Continuity: continuous  Symmetry: symmetric  PDR: Absent  Reactivity: present  Voltage: normal (between 20-150uV)  Anterior Posterior Gradient: Absent  Other background findings: none  Breach: absent    Background Slowing:  Generalized slowing: diffuse irregular delta and theta activity.  Focal slowing: left hemispheric irregular delta activity.    State Changes:   -Drowsiness noted with increased slowing, attenuation of fast activity  -N2 sleep transients were not recorded.    Sporadic and Rhythmic and Periodic (RPPs) Epileptiform Discharges:    Continuous left posterior quadrant LPDs (max P3/P7) mostly around 1 Hz frequency with overriding fast activity and occasionally in bursts    Electrographic and Electroclinical seizures:  None    Other Clinical Events:  None    Activation Procedures:   -Hyperventilation was not performed.    -Photic stimulation was not performed.    Artifacts:  Intermittent myogenic and movement artifacts were noted.    ECG:  The heart rate on single channel ECG was predominantly between 60-70 BPM.    EEG Classification / Summary:  Abnormal EEG study  Continuous left posterior quadrant LPDs (max P3/P7) mostly around 1 Hz frequency with overriding fast activity and occasionally in bursts  Focal left hemispheric slowing  Moderate generalized background slowing    -----------------------------------------------------------------------------------------------------    Clinical Impression:  Increased risk for focal seizures from left posterior quadrant   Left hemispheric focal cerebral dysfunction  Moderate diffuse/multi-focal cerebral dysfunction, not specific as to etiology.  There were no definitive seizures recorded.    Christiano Ward MD  Fellow, [facility]    Jono Kunz MD  EEG/Epilepsy Attending       -------------------------------------------------------------------------------------------------------  [facility] EEG Reading Room Ph#: [phone]  Epilepsy Answering Service after 5PM and before 8:30AM: Ph#: [phone]

## 2023-12-22 NOTE — DIETITIAN INITIAL EVALUATION ADULT - ORAL INTAKE PTA/DIET HISTORY
-Pt obtunded/unable to speak. History of poor PO intake PTA noted as per review of RD note 11/2023.   -Interim diet history, provision of PO intake and tolerance to chewing/swallowing unclear.   -No indication of prior vitamin/supplement intake PTA.

## 2023-12-22 NOTE — PROGRESS NOTE ADULT - ASSESSMENT
HPI: Patient HARJEET WHITMORE is a 52y (1970) woman with a PMHx significant for smoking, stage 4 lung adenocarcinoma dx 2022 with diffuse mets to C/A/P and brain s/p Rt craniotomy for tumor resection, & multiple rounds of chemo & RT, steroid induced DM, GERD who presents to [place] ED for AMS. Per chart review pt had nonsensical speech and c/o HA & R sided weakness. Agitated, violent. CTH as noted. febrile at VS and started on vancomycin, zosyn. Rapid RVP negative. Pt ultimately transferred to [facility] for continued care given pt known to heme-onc & neuro-surg at [facility]. She is found to have tropinemia, hypokalemia, severe hypocalcemia, hypoalbuminemia   Neurology consulted for seizure concern.     Impression: Acute behavioral changes i/s/o brain metastases. Found to have clinical seizures (L facial twitching, RUE jerking). Transferred to ICU for close monitoring and AED management. Now stabilized on Vimpat and Keppra.    Recommendations  [] ok to discontinue vEEG monitoring   [] c/w Vimpat 200mg BID IV  [] c/w Keppra 1500mg BID IV  [] steroids per neurosurgery. On Decadron 4mg QID IV  [] MRI brain with and without contrast  [] please monitor valproic acid level and albumin  [] Tox screen, TSH 0.18 (low), Free T4 nl, B1, B6, B12 nl, ammonia, paraneoplastic panel  [] will continue to follow    Case discussed and staffed at bedside w/ attending Dr. Marquez HPI: Patient HARJEET WHITMORE is a 52y (1970) woman with a PMHx significant for smoking, stage 4 lung adenocarcinoma dx 2022 with diffuse mets to C/A/P and brain s/p Rt craniotomy for tumor resection, & multiple rounds of chemo & RT, steroid induced DM, GERD who presents to [place] ED for AMS. Per chart review pt had nonsensical speech and c/o HA & R sided weakness. Agitated, violent. CTH as noted. febrile at VS and started on vancomycin, zosyn. Rapid RVP negative. Pt ultimately transferred to [facility] for continued care given pt known to heme-onc & neuro-surg at [facility]. She is found to have tropinemia, hypokalemia, severe hypocalcemia, hypoalbuminemia   Neurology consulted for seizure concern.     Impression: Acute behavioral changes i/s/o brain metastases. Found to have clinical seizures (L facial twitching, RUE jerking). Transferred to ICU for close monitoring and AED management. Now stabilized on Vimpat and Keppra.    Recommendations  [] ok to discontinue vEEG monitoring   [] c/w Vimpat 200mg BID IV  [] c/w Keppra 1500mg BID IV  [] steroids per neurosurgery. On Decadron 4mg QID IV  [] MRI brain with and without contrast  [] please monitor valproic acid level and albumin  [] Tox screen, TSH 0.18 (low), Free T4 nl, B1, B6, B12 nl, ammonia, paraneoplastic panel  [] will continue to follow    Case discussed and staffed at bedside w/ attending Dr. Marquez

## 2023-12-22 NOTE — PROGRESS NOTE ADULT - PROBLEM SELECTOR PLAN 7
The patient herself has expressed her willingness to attempt any and all cancer therapies and treatments being offered.   - remains full code, full medical resuscitation The patient herself has expressed her willingness to attempt any and all cancer therapies and treatments being offered.   - remains full code, full medical resuscitation  - will need to discuss means of nutrition if no improvement in mental status next 24 hrs. Given agitation previously, unclear if pt will keep NGT in. Will discuss with family

## 2023-12-22 NOTE — CONSULT NOTE ADULT - SUBJECTIVE AND OBJECTIVE BOX
[facility] DIV[facility] OF INFECTIOUS DISEASES  KELLEN Alonso S. Shah, Y. Patel, G. Gurjit  [phone]  ([phone] - weekdays after 5pm and weekends)    HARJEET WHITMORE  52y, Female  [id number]    HPI:  Patient is a 52 year old female with PMH of smoking, stage 4 lung adenocarcinoma with diffuse mets to C/A/P and brain s/p Rt craniotomy for tumor resection, & multiple rounds of chemo & RT, steroid induced DM, GERD who presented to [place] ED for AMS. Per chart review pt had nonsensical speech and c/o HA & R sided weakness so was taken to ED. Patient was reportedly very agitated and violent at [place] ED. Code stroke was called.  Patient required sedation w/ benadryl, ativan and versed for agitation in order to obtain CTH.  CTH at  shows increased conspicuity of L posterior frontal and R parietal lesions, likely 2/2 hemorrhage (HU ~50-60) c/t 10/28 CTH. Further eval limited by motion artifact Patient was noted to be febrile at VS and started on vancomycin, zosyn. Rapid RVP negative.  Pt ultimately transferred to [facility] for continued care given pt known to heme-onc & neuro-surg at [facility]  (19 Dec 2023 23:59) Pt poor historian 2/2 AMS, history obtained form chart review   ROS: unable to obtain    Allergies: No Known Allergies    PMH -- GERD (Gastroesophageal Reflux Disease)  Hydrosalpinx  GERD (Gastroesophageal Reflux Disease)  Ulcer  Lung mass  Non-small cell lung cancer with metastasis    PSH -- S/P endoscopy  FH -- No pertinent family history in first degree relatives  Social History -- smoker, unknown if alcohol or illicit drug use    Physical Exam--  Vital Signs Last 24 Hrs  T(F): 97.8 (22 Dec 2023 16:22), Max: 97.8 (22 Dec 2023 16:22)  HR: 111 (22 Dec 2023 16:22) (76 - 116)  BP: 116/76 (22 Dec 2023 16:22) (94/66 - 141/60)  RR: 19 (22 Dec 2023 16:22) (12 - 31)  SpO2: 99% (22 Dec 2023 16:22) (95% - 100%)  General: no acute distress   HEENT: NC/AT, anicteric, neck supple  Lungs: decreased breath sounds b/l  Heart: S1, S2 present, normal rate   Abdomen: Soft. Nondistended. Nontender.   Neuro: awake, not following   Extremities: No cyanosis. RUE edema., no LE edema   Skin: Warm. Dry. No visible rash.    Laboratory & Imaging Data--  CBC:                       9.2    4.52  )-----------( 139      ( 21 Dec 2023 17:02 )             29.3     WBC Count: 4.52 K/uL (12-21-23 @ 17:02)  WBC Count: 3.87 K/uL (12-21-23 @ 07:24)  WBC Count: 4.26 K/uL (12-20-23 @ 20:01)    CMP: 12-21    139  |  105  |  7   ----------------------------<  173<H>  3.1<L>   |  22  |  0.39<L>    Ca    7.7<L>      21 Dec 2023 17:02  Phos  4.7     12-21  Mg     1.2     12-21    TPro  6.3  /  Alb  2.5<L>  /  TBili  0.2  /  DBili  x   /  AST  42<H>  /  ALT  13  /  AlkPhos  100  12-21    LIVER FUNCTIONS - ( 21 Dec 2023 17:02 )  Alb: 2.5 g/dL / Pro: 6.3 g/dL / ALK PHOS: 100 U/L / ALT: 13 U/L / AST: 42 U/L / GGT: x           Urinalysis + Microscopic Examination (12.01.23 @ 09:32)    pH Urine: 7.0   Urine Appearance: Clear   Color: Yellow   Specific Gravity: 1.014   Protein, Urine: Negative mg/dL   Glucose Qualitative, Urine: Negative mg/dL   Ketone - Urine: Negative mg/dL   Blood, Urine: Negative   Bilirubin: Negative   Urobilinogen: 1.0 mg/dL   Leukocyte Esterase Concentration: Moderate   Nitrite: Negative   White Blood Cell - Urine: 6 /HPF   Red Blood Cell - Urine: 2 /HPF   Bacteria: Negative /HPF   Cast: 2 /LPF   Epithelial Cells: 6 /HPF    Microbiology: reviewed  Culture - Blood (collected 12-21-23 @ 15:30)  Source: .Blood Blood-Peripheral  Preliminary Report (12-22-23 @ 18:03):    No growth at 24 hours    Culture - Blood (collected 12-20-23 @ 23:56)  Source: .Blood Blood  Preliminary Report (12-22-23 @ 03:02):    No growth at 24 hours    Radiology--reviewed  < from: [facility] Duplex Lower Ext Vein Scan, Bilat (12.21.23 @ 19:22) >  IMPRESSION:  No evidence of deep venous thrombosis in either lower extremity.    The left common femoral and proximal femoral veins were not imaged.    < end of copied text >  < from: CT Head No Cont (12.21.23 @ 07:56) >  IMPRESSION:  No significant interval change compared with the prior   12/20/2023    < end of copied text >  < from: CT Head No Cont (12.20.23 @ 17:59) >  IMPRESSION:    No acute/interval pathology compared to 12/19/2023.No acute hemorrhage   or mass effect.    < end of copied text >  < from: CT Chest Abdomen and Pelvis w/ IV Cont (12.19.23 @ 18:56) >  IMPRESSION:  No evidence for infection within the chest, abdomen, or pelvis.    Metastatic lung cancer in the right upper lobe, slightly decreased in   size ascompared with 11/14/2023.    Extensive metastatic liver disease, mildly decreased in size as compared   with 10/31/2023.    T1 and T10 sclerotic vertebral body lesions which were not visualized on   11/14/2023 CT, probably now visible due to posttreatment change.    Decrease in size of subcutaneous nodules    < end of copied text >    < from: CT Head No Cont (12.19.23 @ 18:55) >  IMPRESSION:  No acute hemorrhage or midline shift.    Redemonstration of intraparenchymal metastatic lesions, slight decrease   in size compared to prior MR. Markedly decreased surrounding vasogenic   edema. Contrast-enhanced MR brain may be considered for further   evaluation, if clinically warranted.    Right temporal craniectomy with similar-appearing encephalomalacia of the   postoperative bed.    < end of copied text >    Active Medications--  acetaminophen     Tablet .. 650 milliGRAM(s) Oral every 6 hours PRN  acetaminophen  Suppository .. 650 milliGRAM(s) Rectal every 6 hours PRN  aluminum hydroxide/magnesium hydroxide/simethicone Suspension 30 milliLiter(s) Oral every 4 hours PRN  artificial tears (preservative free) Ophthalmic Solution 1 Drop(s) Both EYES three times a day  atorvastatin 40 milliGRAM(s) Oral at bedtime  chlorhexidine 4% Liquid 1 Application(s) Topical <User Schedule>  dexAMETHasone  Injectable 2 milliGRAM(s) IV Push every 12 hours  dextrose 5% + lactated ringers. 1000 milliLiter(s) IV Continuous <Continuous>  dextrose 5%. 1000 milliLiter(s) IV Continuous <Continuous>  dextrose 5%. 1000 milliLiter(s) IV Continuous <Continuous>  dextrose 50% Injectable 25 Gram(s) IV Push once  dextrose 50% Injectable 25 Gram(s) IV Push once  dextrose 50% Injectable 12.5 Gram(s) IV Push once  dextrose Oral Gel 15 Gram(s) Oral once PRN  glucagon  Injectable 1 milliGRAM(s) IntraMuscular once  insulin lispro (ADMELOG) corrective regimen sliding scale   SubCutaneous every 6 hours  lacosamide IVPB 200 milliGRAM(s) IV Intermittent every 12 hours  levETIRAcetam  IVPB 1500 milliGRAM(s) IV Intermittent every 12 hours  melatonin 3 milliGRAM(s) Oral at bedtime PRN  metoprolol tartrate Injectable 2.5 milliGRAM(s) IV Push every 12 hours  ondansetron Injectable 4 milliGRAM(s) IV Push every 8 hours PRN  pantoprazole  Injectable 40 milliGRAM(s) IV Push daily  petrolatum white Ointment 1 Application(s) Topical two times a day  polyethylene glycol 3350 17 Gram(s) Oral daily  senna 2 Tablet(s) Oral at bedtime PRN  valproate sodium  IVPB 250 milliGRAM(s) IV Intermittent every 6 hours    Prior/Completed Antimicrobials:  piperacillin/tazobactam IVPB...  vancomycin  IVPB  vancomycin  IVPB.   [facility] DISE[facility]ES  KELLEN Alonso S. Shah, Y. Patel, G. Gurjit  [phone]  ([phone] - weekdays after 5pm and weekends)    HARJEET WHITMORE  52y, Female  [id number]    HPI:  Patient is a 52 year old female with PMH of smoking, stage 4 lung adenocarcinoma with diffuse mets to C/A/P and brain s/p Rt craniotomy for tumor resection, & multiple rounds of chemo & RT, steroid induced DM, GERD who presented to [place] ED for AMS. Per chart review pt had nonsensical speech and c/o HA & R sided weakness so was taken to ED. Patient was reportedly very agitated and violent at [place] ED. Code stroke was called.  Patient required sedation w/ benadryl, ativan and versed for agitation in order to obtain CTH.  CTH at  shows increased conspicuity of L posterior frontal and R parietal lesions, likely 2/2 hemorrhage (HU ~50-60) c/t 10/28 CTH. Further eval limited by motion artifact Patient was noted to be febrile at VS and started on vancomycin, zosyn. Rapid RVP negative.  Pt ultimately transferred to [facility] for continued care given pt known to heme-onc & neuro-surg at [facility]  (19 Dec 2023 23:59) Pt poor historian 2/2 AMS, history obtained form chart review   ROS: unable to obtain    Allergies: No Known Allergies    PMH -- GERD (Gastroesophageal Reflux Disease)  Hydrosalpinx  GERD (Gastroesophageal Reflux Disease)  Ulcer  Lung mass  Non-small cell lung cancer with metastasis    PSH -- S/P endoscopy  FH -- No pertinent family history in first degree relatives  Social History -- smoker, unknown if alcohol or illicit drug use    Physical Exam--  Vital Signs Last 24 Hrs  T(F): 97.8 (22 Dec 2023 16:22), Max: 97.8 (22 Dec 2023 16:22)  HR: 111 (22 Dec 2023 16:22) (76 - 116)  BP: 116/76 (22 Dec 2023 16:22) (94/66 - 141/60)  RR: 19 (22 Dec 2023 16:22) (12 - 31)  SpO2: 99% (22 Dec 2023 16:22) (95% - 100%)  General: no acute distress   HEENT: NC/AT, anicteric, neck supple  Lungs: decreased breath sounds b/l  Heart: S1, S2 present, normal rate   Abdomen: Soft. Nondistended. Nontender.   Neuro: awake, not following   Extremities: No cyanosis. RUE edema., no LE edema   Skin: Warm. Dry. No visible rash.    Laboratory & Imaging Data--  CBC:                       9.2    4.52  )-----------( 139      ( 21 Dec 2023 17:02 )             29.3     WBC Count: 4.52 K/uL (12-21-23 @ 17:02)  WBC Count: 3.87 K/uL (12-21-23 @ 07:24)  WBC Count: 4.26 K/uL (12-20-23 @ 20:01)    CMP: 12-21    139  |  105  |  7   ----------------------------<  173<H>  3.1<L>   |  22  |  0.39<L>    Ca    7.7<L>      21 Dec 2023 17:02  Phos  4.7     12-21  Mg     1.2     12-21    TPro  6.3  /  Alb  2.5<L>  /  TBili  0.2  /  DBili  x   /  AST  42<H>  /  ALT  13  /  AlkPhos  100  12-21    LIVER FUNCTIONS - ( 21 Dec 2023 17:02 )  Alb: 2.5 g/dL / Pro: 6.3 g/dL / ALK PHOS: 100 U/L / ALT: 13 U/L / AST: 42 U/L / GGT: x           Urinalysis + Microscopic Examination (12.01.23 @ 09:32)    pH Urine: 7.0   Urine Appearance: Clear   Color: Yellow   Specific Gravity: 1.014   Protein, Urine: Negative mg/dL   Glucose Qualitative, Urine: Negative mg/dL   Ketone - Urine: Negative mg/dL   Blood, Urine: Negative   Bilirubin: Negative   Urobilinogen: 1.0 mg/dL   Leukocyte Esterase Concentration: Moderate   Nitrite: Negative   White Blood Cell - Urine: 6 /HPF   Red Blood Cell - Urine: 2 /HPF   Bacteria: Negative /HPF   Cast: 2 /LPF   Epithelial Cells: 6 /HPF    Microbiology: reviewed  Culture - Blood (collected 12-21-23 @ 15:30)  Source: .Blood Blood-Peripheral  Preliminary Report (12-22-23 @ 18:03):    No growth at 24 hours    Culture - Blood (collected 12-20-23 @ 23:56)  Source: .Blood Blood  Preliminary Report (12-22-23 @ 03:02):    No growth at 24 hours    Radiology--reviewed  < from: [facility] Duplex Lower Ext Vein Scan, Bilat (12.21.23 @ 19:22) >  IMPRESSION:  No evidence of deep venous thrombosis in either lower extremity.    The left common femoral and proximal femoral veins were not imaged.    < end of copied text >  < from: CT Head No Cont (12.21.23 @ 07:56) >  IMPRESSION:  No significant interval change compared with the prior   12/20/2023    < end of copied text >  < from: CT Head No Cont (12.20.23 @ 17:59) >  IMPRESSION:    No acute/interval pathology compared to 12/19/2023.No acute hemorrhage   or mass effect.    < end of copied text >  < from: CT Chest Abdomen and Pelvis w/ IV Cont (12.19.23 @ 18:56) >  IMPRESSION:  No evidence for infection within the chest, abdomen, or pelvis.    Metastatic lung cancer in the right upper lobe, slightly decreased in   size ascompared with 11/14/2023.    Extensive metastatic liver disease, mildly decreased in size as compared   with 10/31/2023.    T1 and T10 sclerotic vertebral body lesions which were not visualized on   11/14/2023 CT, probably now visible due to posttreatment change.    Decrease in size of subcutaneous nodules    < end of copied text >    < from: CT Head No Cont (12.19.23 @ 18:55) >  IMPRESSION:  No acute hemorrhage or midline shift.    Redemonstration of intraparenchymal metastatic lesions, slight decrease   in size compared to prior MR. Markedly decreased surrounding vasogenic   edema. Contrast-enhanced MR brain may be considered for further   evaluation, if clinically warranted.    Right temporal craniectomy with similar-appearing encephalomalacia of the   postoperative bed.    < end of copied text >    Active Medications--  acetaminophen     Tablet .. 650 milliGRAM(s) Oral every 6 hours PRN  acetaminophen  Suppository .. 650 milliGRAM(s) Rectal every 6 hours PRN  aluminum hydroxide/magnesium hydroxide/simethicone Suspension 30 milliLiter(s) Oral every 4 hours PRN  artificial tears (preservative free) Ophthalmic Solution 1 Drop(s) Both EYES three times a day  atorvastatin 40 milliGRAM(s) Oral at bedtime  chlorhexidine 4% Liquid 1 Application(s) Topical <User Schedule>  dexAMETHasone  Injectable 2 milliGRAM(s) IV Push every 12 hours  dextrose 5% + lactated ringers. 1000 milliLiter(s) IV Continuous <Continuous>  dextrose 5%. 1000 milliLiter(s) IV Continuous <Continuous>  dextrose 5%. 1000 milliLiter(s) IV Continuous <Continuous>  dextrose 50% Injectable 25 Gram(s) IV Push once  dextrose 50% Injectable 25 Gram(s) IV Push once  dextrose 50% Injectable 12.5 Gram(s) IV Push once  dextrose Oral Gel 15 Gram(s) Oral once PRN  glucagon  Injectable 1 milliGRAM(s) IntraMuscular once  insulin lispro (ADMELOG) corrective regimen sliding scale   SubCutaneous every 6 hours  lacosamide IVPB 200 milliGRAM(s) IV Intermittent every 12 hours  levETIRAcetam  IVPB 1500 milliGRAM(s) IV Intermittent every 12 hours  melatonin 3 milliGRAM(s) Oral at bedtime PRN  metoprolol tartrate Injectable 2.5 milliGRAM(s) IV Push every 12 hours  ondansetron Injectable 4 milliGRAM(s) IV Push every 8 hours PRN  pantoprazole  Injectable 40 milliGRAM(s) IV Push daily  petrolatum white Ointment 1 Application(s) Topical two times a day  polyethylene glycol 3350 17 Gram(s) Oral daily  senna 2 Tablet(s) Oral at bedtime PRN  valproate sodium  IVPB 250 milliGRAM(s) IV Intermittent every 6 hours    Prior/Completed Antimicrobials:  piperacillin/tazobactam IVPB...  vancomycin  IVPB  vancomycin  IVPB.

## 2023-12-22 NOTE — DIETITIAN INITIAL EVALUATION ADULT - PROBLEM SELECTOR PLAN 2
DDx: infection vs electrolyte abnormalities vs Cardio vs neuro d/o vs debility   - Reportedly more confused with nonsensical speech    - Febrile,  tachycardic, but nontoxic appearing   - Labs: chem --abnorm (?collection error), Trop 98 > 72 , RVP/COVID neg   -  CThead: stable  ;  CTH at [facility] showed ?Hypodensity in the right temporal parietal region may represent   subacute or chronic ischemic changes.    - Infection (likely): febrile, p/w AMS, immunosuppressed, although RVP(-) high r/o infection, c/w Vanco 1g (12/19 - ? ) , zosyn 3.375g (12/19 - ?), f/u Bcx, Ucx, monitor fever curve   - Electrolytes: chem grossly abnorm, could be 2/2 collection error, will obtain repeat , trend labs   - Cardiac (less likely): Noted to be tachycardic, ekg sinus tachy, trop elevated but down trended. Tachycardia likely iso infection, agitation. Tele monitor . If tachycardia persist despite fluid resuscitation, improvement  in agitation, Abx, consider doing getting echo   - Neuro: hx/o lung Ca w/diffuse mets, Agitated and confused on exam with nonsensical speech. CTH here stable though CTH at [facility] showed ?Hypodensity in the right temporal parietal region may represent   subacute or chronic ischemic changes.  Neuro etiology possible. Apprec Neuro-Surg rec, MRI brain w/wo (ordered), q4 neuro checks, steroids per Neuro-surg, Neuro consult  (called O/N) , C/w Depakote, check level. EEG (ordered) to eval for ?seizure    - Fall, Seizure precx,  PT eval DDx: infection vs electrolyte abnormalities vs Cardio vs neuro d/o vs debility   - Reportedly more confused with nonsensical speech    - Febrile,  tachycardic, but nontoxic appearing   - Labs: chem --abnorm (?collection error), Trop 98 > 72 , RVP/COVID neg   -  CThead: stable  ;  CTH at [facility] showed ?Hypodensity in the right temporal parietal region may represent   subacute or chronic ischemic changes.    - Infection (likely): febrile, p/w AMS, immunosuppressed, although RVP(-) high r/o infection, c/w Vanco 1g (12/19 - ? ) , zosyn 3.375g (12/19 - ?), f/u Bcx, Ucx, monitor fever curve   - Electrolytes: chem grossly abnorm, could be 2/2 collection error, will obtain repeat , trend labs   - Cardiac (less likely): Noted to be tachycardic, ekg sinus tachy, trop elevated but down trended. Tachycardia likely iso infection, agitation. Tele monitor . If tachycardia persist despite fluid resuscitation, improvement  in agitation, Abx, consider doing getting echo   - Neuro: hx/o lung Ca w/diffuse mets, Agitated and confused on exam with nonsensical speech. CTH here stable though CTH at [facility] showed ?Hypodensity in the right temporal parietal region may represent   subacute or chronic ischemic changes.  Neuro etiology possible. Apprec Neuro-Surg rec, MRI brain w/wo (ordered), q4 neuro checks, steroids per Neuro-surg, Neuro consult  (called O/N) , C/w Depakote, check level. EEG (ordered) to eval for ?seizure    - Fall, Seizure precx,  PT eval

## 2023-12-22 NOTE — PROGRESS NOTE ADULT - CONVERSATION DETAILS
Attempted to call HCP Jonny PORTER left. Met with patient's two sisters at bedside. Introduced myself and the role of palliative care. Discussed plan of care and HPI. They shared they lost their mom on Sunday and believe this caused an exacerbation for their sister. Discussed the importance of reviewing GOC with a change in status such as a hospitalization. Both sister's endorse they believe they have a more recent HCP indicating them as the primary and secondary. Discussed the importance of them bringing that document but until then Jonny will remain the HCP. Encouraged them to reach out to him as he has been difficult to reach from many care teams informing him of her current status and the importance of being available via phone as HCP. They verbalized understanding, will continue to look for the form, and speak with Jonny regarding his role.   Discussed if Renée had spoken with them regarding GOC. They shared this is a conversation they have had many times. The patient herself has expressed her willingness to attempt any and all cancer therapies and treatments being offered. Heme/onc provided updates regarding cancer treatment and plan to pursue outpatient treatment when patient's clinical status improves. They confirmed she is full code with aggressive medical interventions. Emotional support provided.

## 2023-12-22 NOTE — DIETITIAN INITIAL EVALUATION ADULT - NSFNSGIIOFT_GEN_A_CORE
-No documented BM's recorded thus far. On bowel regimen (Miralax, senna).   -Continues on antibiotics as ordered.   -On steroid as prescribed (Decadron). A1c 7.7%. Steroid induced DM noted.  -No documented BM's recorded thus far. On bowel regimen (Miralax, senna). On Zofran PRN, Protonix as ordered.   -Continues on antibiotics as ordered.   -On steroid as prescribed (Decadron). A1c 7.7%. Steroid induced DM noted. Hx of taking Metformin PTA; currently prescribed insulin lispro sliding scale.

## 2023-12-22 NOTE — DIETITIAN INITIAL EVALUATION ADULT - SIGNS/SYMPTOMS
severe body fat/muscle depletion; wt loss 27.4% x 8 mo, PO intake <75% x >1 mo, 2+ edema, BMI 17.9 Stage IV lung adenocarcinoma, hx of craniotomy

## 2023-12-22 NOTE — PROGRESS NOTE ADULT - SUBJECTIVE AND OBJECTIVE BOX
SUBJECTIVE AND OBJECTIVE: Pt seen and examined at bedside. Pt increasingly more awake throughout the day. Remains altered and non-verbal on exam. Sisters at bedside.   Indication for Geriatrics and Palliative Care Services/INTERVAL HPI: GOC    OVERNIGHT EVENTS: No acute events o/n     DNR on chart:  Allergies    No Known Allergies    Intolerances    MEDICATIONS  (STANDING):  artificial tears (preservative free) Ophthalmic Solution 1 Drop(s) Both EYES three times a day  atorvastatin 40 milliGRAM(s) Oral at bedtime  chlorhexidine 4% Liquid 1 Application(s) Topical <User Schedule>  dexAMETHasone  Injectable 2 milliGRAM(s) IV Push every 12 hours  dextrose 5%. 1000 milliLiter(s) (50 mL/Hr) IV Continuous <Continuous>  dextrose 5%. 1000 milliLiter(s) (100 mL/Hr) IV Continuous <Continuous>  dextrose 50% Injectable 25 Gram(s) IV Push once  dextrose 50% Injectable 12.5 Gram(s) IV Push once  dextrose 50% Injectable 25 Gram(s) IV Push once  glucagon  Injectable 1 milliGRAM(s) IntraMuscular once  insulin lispro (ADMELOG) corrective regimen sliding scale   SubCutaneous every 6 hours  lacosamide IVPB 200 milliGRAM(s) IV Intermittent every 12 hours  levETIRAcetam  IVPB 1500 milliGRAM(s) IV Intermittent every 12 hours  metoprolol tartrate Injectable 2.5 milliGRAM(s) IV Push every 12 hours  pantoprazole  Injectable 40 milliGRAM(s) IV Push daily  petrolatum white Ointment 1 Application(s) Topical two times a day  piperacillin/tazobactam IVPB.. 3.375 Gram(s) IV Intermittent every 8 hours  polyethylene glycol 3350 17 Gram(s) Oral daily  valproate sodium  IVPB 250 milliGRAM(s) IV Intermittent every 6 hours  vancomycin  IVPB      vancomycin  IVPB 750 milliGRAM(s) IV Intermittent every 12 hours    MEDICATIONS  (PRN):  acetaminophen     Tablet .. 650 milliGRAM(s) Oral every 6 hours PRN Temp greater or equal to 38C (100.4F), Mild Pain (1 - 3)  acetaminophen  Suppository .. 650 milliGRAM(s) Rectal every 6 hours PRN Temp greater or equal to 38C (100.4F), Moderate Pain (4 - 6)  aluminum hydroxide/magnesium hydroxide/simethicone Suspension 30 milliLiter(s) Oral every 4 hours PRN Dyspepsia  dextrose Oral Gel 15 Gram(s) Oral once PRN Blood Glucose LESS THAN 70 milliGRAM(s)/deciliter  melatonin 3 milliGRAM(s) Oral at bedtime PRN Insomnia  ondansetron Injectable 4 milliGRAM(s) IV Push every 8 hours PRN Nausea and/or Vomiting  senna 2 Tablet(s) Oral at bedtime PRN Constipation      ITEMS UNCHECKED ARE NOT PRESENT    PRESENT SYMPTOMS: [ ]Unable to self-report - see [ ] CPOT [ x] PAINADS [x ] RDOS  Source if other than patient:  [ ]Family   [ x]Team     Pain:  [ ]yes [ ]no  QOL impact -   Location -                    Aggravating factors -  Quality -  Radiation -  Timing-  Severity (0-10 scale):  Minimal acceptable level (0-10 scale):     CPOT:    https://www.[facility].org/getattachment/[email or web address]/Critical-Care-Pain-Observation-Tool-(CPOT)    PAINAD Score: See PAINAD tool and score below       Dyspnea:                           [ ]Mild [ ]Moderate [ ]Severe    RDOS: See RDOS tool and score below   0 to 2  minimal or no respiratory distress   3  mild distress  4 to 6 moderate distress  >7 severe distress      Anxiety:                             [ ]Mild [ ]Moderate [ ]Severe  Fatigue:                             [ ]Mild [ ]Moderate [ ]Severe  Nausea:                             [ ]Mild [ ]Moderate [ ]Severe  Loss of appetite:              [ ]Mild [ ]Moderate [ ]Severe  Constipation:                    [ ]Mild [ ]Moderate [ ]Severe    PCSSQ[Palliative Care Spiritual Screening Question]   Severity (0-10):  Score of 4 or > indicate consideration of Chaplaincy referral.  Chaplaincy Referral: [ ] yes [ ] refused [ ] following [ ] Deferred     Caregiver [place]? : [ ] yes [ ] no [ ] Deferred [ ] Declined             Social work referral [ ] Patient & Family Centered Care Referral [ ]     Anticipatory Grief present?:  [ ] yes [ ] no  [ ] Deferred                  Social work referral [ ] Chaplaincy Referral [ ]    		  Other Symptoms:  [x ]All other review of systems negative- unable to participate as currently non-verbal     Palliative Performance Status Version 2:   See PPSv2 tool and score below         PHYSICAL EXAM:  Vital Signs Last 24 Hrs  T(C): 36.5 (22 Dec 2023 12:00), Max: 36.5 (22 Dec 2023 12:00)  T(F): 97.7 (22 Dec 2023 12:00), Max: 97.7 (22 Dec 2023 12:00)  HR: 93 (22 Dec 2023 15:00) (76 - 116)  BP: 115/65 (22 Dec 2023 15:00) (94/66 - 141/60)  BP(mean): 93 (22 Dec 2023 15:00) (70 - 97)  RR: 18 (22 Dec 2023 15:00) (12 - 31)  SpO2: 95% (22 Dec 2023 15:00) (95% - 100%)    Parameters below as of 21 Dec 2023 20:00  Patient On (Oxygen Delivery Method): nasal cannula  O2 Flow (L/min): 5   I&O's Summary    21 Dec 2023 07:01  -  22 Dec 2023 07:00  --------------------------------------------------------  IN: 2800 mL / OUT: 300 mL / NET: 2500 mL    22 Dec 2023 07:01  -  22 Dec 2023 15:49  --------------------------------------------------------  IN: 400 mL / OUT: 600 mL / NET: -200 mL       GENERAL: [ ]Cachexia    [x ]Alert/restless  [ ]Oriented x   [ ]Lethargic  [ ]Unarousable  [ ]Verbal  [ ]Non-Verbal  Behavioral:   [ ]Anxiety  [ ]Delirium [ ]Agitation [ ]Other  HEENT:  [ ]Normal   [ x]Dry mouth   [ ]ET Tube/Trach  [ ]Oral lesions  PULMONARY:   [x ]Clear [ ]Tachypnea  [ ]Audible excessive secretions   [ ]Rhonchi        [ ]Right [ ]Left [ ]Bilateral  [ ]Crackles        [ ]Right [ ]Left [ ]Bilateral  [ ]Wheezing     [ ]Right [ ]Left [ ]Bilateral  [ ]Diminished BS [ ] Right [ ]Left [ ]Bilateral  CARDIOVASCULAR:    [ x]Regular [ ]Irregular [ ]Tachy  [ ]Austin [ ]Murmur [ ]Other  GASTROINTESTINAL:  [ x]Soft  [ ]Distended   [x ]+BS  [x ]Non tender [ ]Tender  [ ]Other [ ]PEG [ ]OGT/ NGT   Last BM:   GENITOURINARY:  [ ]Normal [x ]Incontinent   [ ]Oliguria/Anuria   [ ]Mas  MUSCULOSKELETAL:   [ ]Normal   [ x]Weakness  [x ]Bed/Wheelchair bound [ ]Edema  NEUROLOGIC:   [ ]No focal deficits  [ x] Cognitive impairment  [ ] Dysphagia [ ]Dysarthria [ ] Paresis [ ]Other   SKIN:   [ ]Normal  [ ]Rash  [ ]Other  [ ]Pressure ulcer(s) [ ]y [ ]n present on admission    CRITICAL CARE:  [ ]Shock Present  [ ]Septic [ ]Cardiogenic [ ]Neurologic [ ]Hypovolemic  [ ]Vasopressors [ ]Inotropes  [ ]Respiratory failure present [ ]Mechanical Ventilation [ ]Non-invasive ventilatory support [ ]High-Flow   [ ]Acute  [ ]Chronic [ ]Hypoxic  [ ]Hypercarbic [ ]Other  [ ]Other organ failure     LABS:                        9.2    4.52  )-----------( 139      ( 21 Dec 2023 17:02 )             29.3   12-21    139  |  105  |  7   ----------------------------<  173<H>  3.1<L>   |  22  |  0.39<L>    Ca    7.7<L>      21 Dec 2023 17:02  Phos  4.7     12-21  Mg     1.2     12-21    TPro  6.3  /  Alb  2.5<L>  /  TBili  0.2  /  DBili  x   /  AST  42<H>  /  ALT  13  /  AlkPhos  100  12-21  PT/INR - ( 20 Dec 2023 22:20 )   PT: 15.3 sec;   INR: 1.47 ratio         PTT - ( 20 Dec 2023 22:20 )  PTT:28.1 sec    Urinalysis Basic - ( 21 Dec 2023 17:02 )    Color: x / Appearance: x / SG: x / pH: x  Gluc: 173 mg/dL / Ketone: x  / Bili: x / Urobili: x   Blood: x / Protein: x / Nitrite: x   Leuk Esterase: x / RBC: x / WBC x   Sq Epi: x / Non Sq Epi: x / Bacteria: x      RADIOLOGY & ADDITIONAL STUDIES:  < from: CT Head No Cont (12.21.23 @ 07:56) >    ACC: [id number] EXAM:  CT BRAIN   ORDERED BY:  NIOCLA NOLASCO     PROCEDURE DATE:  12/21/2023          INTERPRETATION:  INDICATIONS:  exam change, patient with known brain   metastasis    TECHNIQUE:  Serial axial images were obtained from the skull baseto the   vertex without intravenous contrast. Sagittal and Coronal reformats were   performed    COMPARISON EXAMINATION: 12/20/2023    FINDINGS:  VENTRICLES AND SULCI: Mild ex vacuo dilatation of the temporal horn of   the right lateral ventricle  INTRA-AXIAL:  Right temporal parietal encephalomalacia as seen on the   prior subjacent to patient's craniotomy. Left parietal occipital and left   posterior frontal foci of increased attenuation similar in appearance   compared with the prior. Small right periventricular lesion seen on the   prior as well unchanged.  EXTRA-AXIAL:  No mass or collection is seen.  VISUALIZED SINUSES:  Clear.  VISUALIZED MASTOIDS:  Clear.  CALVARIUM: Right temporal parietal craniotomy  MISCELLANEOUS:  None.    IMPRESSION:  No significant interval change compared with the prior   12/20/2023    --- End of Report ---            RAMOS MCDERMOTT MD; Attending Radiologist  This document has been electronically signed. Dec 21 2023  9:22AM    < end of copied text >    Protein Calorie Malnutrition Present: [ ]mild [ ]moderate [ ]severe [ ]underweight [ ]morbid obesity  https://www.andeal.org/vault/2440/web/files/ONC/Table_Clinical%20Characteristics%20to%20Document%20Malnutrition-White%20JV%20et%20al%202012.pdf    Height (cm): 167 (12-21-23 @ 08:50), 167 (11-28-23 @ 08:43), 167.6 (11-08-23 @ 18:29)  Weight (kg): 49.8 (12-21-23 @ 08:50), 49.9 (12-14-23 @ 11:00), 54.8 (11-08-23 @ 18:29)  BMI (kg/m2): 17.9 (12-21-23 @ 08:50), 17.9 (12-14-23 @ 11:00), 19.6 (11-28-23 @ 08:43)    [ x]PPSV2 < or = 30%  [ ]significant weight loss [ ]poor nutritional intake [ ]anasarca[ ]Artificial Nutrition    Other REFERRALS:  [ ]Hospice  [ ]Child Life  [ ]Social Work  [ ]Case management [ ]Holistic Therapy     Goals of Care Document:CHRIS Hernandes (06-06-23 @ 15:27)  Goals of Care Conversation:   Participants:  · Participants  Patient    Advance Directives:  · Does patient have Advance Directive  No  · Do you want to complete the HCP and name a Bashir Care Agent  Yes  Wants James Meza tel # [phone] to be HCP  · Name of person who assisted  RN Iris  · Does the Patient have a Court Appointed Guardian (6910-[address])  No  · Caregiver:  declines    Conversation Discussion:  · Conversation Details  53 yo F with Pmhx of NSCLC with adenocarcinoma with neuroendocrine feature s/p chemo and radiation presents to the ED with RLE weakness and dizziness, found to have brain mass.     Pending Finance / SW to resolve insurance issue as patient has a lot of follow-up appointments upon discharge.     r 3rd finger pain--> f/u xray     Problem/Plan - 1:  ·  Problem: Brain mass.   ·  Plan: CT head showed multiple brain masses c/w brain mets in the setting of hx of lung cancer   -Neurology and neurosurgery following   -MR brain with similar findings of CT   -F/u with neurosurgery recs - started on decadron 2mg q6H and keppra 500mg BID for seizure ppx   -Neuro checks q4hrs   -Seizure precautions   -Fall precautions  6/6/23 Did GOC with patient  Patient Wants Aba villarreal at [phone] to be HCP--> FORM completed  Patient Wants FULL CODE  Told by OTF her ins of medicaid will be effective in 1 week--> SW/ Np to help get Keppra and Steroids and PPI   Will work on appointment for [facility] with Dr Gonzalez and [facility] [phone]      Patient wants James Meza [phone] to be HCP---> Form completed.  Patient wants to be FULL CODE    What Matters Most To Patient and Family:  · What matters most to patient and family  getting treatment with Dr Beckman at [facility]    Personal Advance Directives Treatment Guidelines:   Treatment Guidelines:  · Decision Maker  Patient  · Treatment Guideline Comments  Wants treatment with Dr Beckman at [facility]    Location of Discussion:   Time Spent on Advance Care Planning:  Attending or DB Only.     I personally spent 30 minutes on advance care planning services with the patient. This time is separate and distinct from any other care management services provided on this date.    Location of Discussion:  · Location of discussion  Face to face      Electronic Signatures:  Heaven Hernandes)  (Signed 06-Jun-2023 15:41)  	Authored: Goals of Care Conversation, Personal Advance Directives Treatment Guidelines, Location of Discussion      Last Updated: 06-Jun-2023 15:41 by Heaven Hernandes)     SUBJECTIVE AND OBJECTIVE: Pt seen and examined at bedside. Pt increasingly more awake throughout the day. Remains altered and non-verbal on exam. Sisters at bedside.   Indication for Geriatrics and Palliative Care Services/INTERVAL HPI: GOC    OVERNIGHT EVENTS: No acute events o/n     DNR on chart:  Allergies    No Known Allergies    Intolerances    MEDICATIONS  (STANDING):  artificial tears (preservative free) Ophthalmic Solution 1 Drop(s) Both EYES three times a day  atorvastatin 40 milliGRAM(s) Oral at bedtime  chlorhexidine 4% Liquid 1 Application(s) Topical <User Schedule>  dexAMETHasone  Injectable 2 milliGRAM(s) IV Push every 12 hours  dextrose 5%. 1000 milliLiter(s) (50 mL/Hr) IV Continuous <Continuous>  dextrose 5%. 1000 milliLiter(s) (100 mL/Hr) IV Continuous <Continuous>  dextrose 50% Injectable 25 Gram(s) IV Push once  dextrose 50% Injectable 12.5 Gram(s) IV Push once  dextrose 50% Injectable 25 Gram(s) IV Push once  glucagon  Injectable 1 milliGRAM(s) IntraMuscular once  insulin lispro (ADMELOG) corrective regimen sliding scale   SubCutaneous every 6 hours  lacosamide IVPB 200 milliGRAM(s) IV Intermittent every 12 hours  levETIRAcetam  IVPB 1500 milliGRAM(s) IV Intermittent every 12 hours  metoprolol tartrate Injectable 2.5 milliGRAM(s) IV Push every 12 hours  pantoprazole  Injectable 40 milliGRAM(s) IV Push daily  petrolatum white Ointment 1 Application(s) Topical two times a day  piperacillin/tazobactam IVPB.. 3.375 Gram(s) IV Intermittent every 8 hours  polyethylene glycol 3350 17 Gram(s) Oral daily  valproate sodium  IVPB 250 milliGRAM(s) IV Intermittent every 6 hours  vancomycin  IVPB      vancomycin  IVPB 750 milliGRAM(s) IV Intermittent every 12 hours    MEDICATIONS  (PRN):  acetaminophen     Tablet .. 650 milliGRAM(s) Oral every 6 hours PRN Temp greater or equal to 38C (100.4F), Mild Pain (1 - 3)  acetaminophen  Suppository .. 650 milliGRAM(s) Rectal every 6 hours PRN Temp greater or equal to 38C (100.4F), Moderate Pain (4 - 6)  aluminum hydroxide/magnesium hydroxide/simethicone Suspension 30 milliLiter(s) Oral every 4 hours PRN Dyspepsia  dextrose Oral Gel 15 Gram(s) Oral once PRN Blood Glucose LESS THAN 70 milliGRAM(s)/deciliter  melatonin 3 milliGRAM(s) Oral at bedtime PRN Insomnia  ondansetron Injectable 4 milliGRAM(s) IV Push every 8 hours PRN Nausea and/or Vomiting  senna 2 Tablet(s) Oral at bedtime PRN Constipation      ITEMS UNCHECKED ARE NOT PRESENT    PRESENT SYMPTOMS: [ ]Unable to self-report - see [ ] CPOT [ x] PAINADS [x ] RDOS  Source if other than patient:  [ ]Family   [ x]Team     Pain:  [ ]yes [ ]no  QOL impact -   Location -                    Aggravating factors -  Quality -  Radiation -  Timing-  Severity (0-10 scale):  Minimal acceptable level (0-10 scale):     CPOT:    https://www.[facility].org/getattachment/[email or web address]/Critical-Care-Pain-Observation-Tool-(CPOT)    PAINAD Score: See PAINAD tool and score below       Dyspnea:                           [ ]Mild [ ]Moderate [ ]Severe    RDOS: See RDOS tool and score below   0 to 2  minimal or no respiratory distress   3  mild distress  4 to 6 moderate distress  >7 severe distress      Anxiety:                             [ ]Mild [ ]Moderate [ ]Severe  Fatigue:                             [ ]Mild [ ]Moderate [ ]Severe  Nausea:                             [ ]Mild [ ]Moderate [ ]Severe  Loss of appetite:              [ ]Mild [ ]Moderate [ ]Severe  Constipation:                    [ ]Mild [ ]Moderate [ ]Severe    PCSSQ[Palliative Care Spiritual Screening Question]   Severity (0-10):  Score of 4 or > indicate consideration of Chaplaincy referral.  Chaplaincy Referral: [ ] yes [ ] refused [ ] following [ ] Deferred     Caregiver [place]? : [ ] yes [ ] no [ ] Deferred [ ] Declined             Social work referral [ ] Patient & Family Centered Care Referral [ ]     Anticipatory Grief present?:  [ ] yes [ ] no  [ ] Deferred                  Social work referral [ ] Chaplaincy Referral [ ]    		  Other Symptoms:  [x ]All other review of systems negative- unable to participate as currently non-verbal     Palliative Performance Status Version 2:   See PPSv2 tool and score below         PHYSICAL EXAM:  Vital Signs Last 24 Hrs  T(C): 36.5 (22 Dec 2023 12:00), Max: 36.5 (22 Dec 2023 12:00)  T(F): 97.7 (22 Dec 2023 12:00), Max: 97.7 (22 Dec 2023 12:00)  HR: 93 (22 Dec 2023 15:00) (76 - 116)  BP: 115/65 (22 Dec 2023 15:00) (94/66 - 141/60)  BP(mean): 93 (22 Dec 2023 15:00) (70 - 97)  RR: 18 (22 Dec 2023 15:00) (12 - 31)  SpO2: 95% (22 Dec 2023 15:00) (95% - 100%)    Parameters below as of 21 Dec 2023 20:00  Patient On (Oxygen Delivery Method): nasal cannula  O2 Flow (L/min): 5   I&O's Summary    21 Dec 2023 07:01  -  22 Dec 2023 07:00  --------------------------------------------------------  IN: 2800 mL / OUT: 300 mL / NET: 2500 mL    22 Dec 2023 07:01  -  22 Dec 2023 15:49  --------------------------------------------------------  IN: 400 mL / OUT: 600 mL / NET: -200 mL       GENERAL: [ ]Cachexia    [x ]Alert/restless  [ ]Oriented x   [ ]Lethargic  [ ]Unarousable  [ ]Verbal  [ ]Non-Verbal  Behavioral:   [ ]Anxiety  [ ]Delirium [ ]Agitation [ ]Other  HEENT:  [ ]Normal   [ x]Dry mouth   [ ]ET Tube/Trach  [ ]Oral lesions  PULMONARY:   [x ]Clear [ ]Tachypnea  [ ]Audible excessive secretions   [ ]Rhonchi        [ ]Right [ ]Left [ ]Bilateral  [ ]Crackles        [ ]Right [ ]Left [ ]Bilateral  [ ]Wheezing     [ ]Right [ ]Left [ ]Bilateral  [ ]Diminished BS [ ] Right [ ]Left [ ]Bilateral  CARDIOVASCULAR:    [ x]Regular [ ]Irregular [ ]Tachy  [ ]Austin [ ]Murmur [ ]Other  GASTROINTESTINAL:  [ x]Soft  [ ]Distended   [x ]+BS  [x ]Non tender [ ]Tender  [ ]Other [ ]PEG [ ]OGT/ NGT   Last BM:   GENITOURINARY:  [ ]Normal [x ]Incontinent   [ ]Oliguria/Anuria   [ ]Mas  MUSCULOSKELETAL:   [ ]Normal   [ x]Weakness  [x ]Bed/Wheelchair bound [ ]Edema  NEUROLOGIC:   [ ]No focal deficits  [ x] Cognitive impairment  [ ] Dysphagia [ ]Dysarthria [ ] Paresis [ ]Other   SKIN:   [ ]Normal  [ ]Rash  [ ]Other  [ ]Pressure ulcer(s) [ ]y [ ]n present on admission    CRITICAL CARE:  [ ]Shock Present  [ ]Septic [ ]Cardiogenic [ ]Neurologic [ ]Hypovolemic  [ ]Vasopressors [ ]Inotropes  [ ]Respiratory failure present [ ]Mechanical Ventilation [ ]Non-invasive ventilatory support [ ]High-Flow   [ ]Acute  [ ]Chronic [ ]Hypoxic  [ ]Hypercarbic [ ]Other  [ ]Other organ failure     LABS:                        9.2    4.52  )-----------( 139      ( 21 Dec 2023 17:02 )             29.3   12-21    139  |  105  |  7   ----------------------------<  173<H>  3.1<L>   |  22  |  0.39<L>    Ca    7.7<L>      21 Dec 2023 17:02  Phos  4.7     12-21  Mg     1.2     12-21    TPro  6.3  /  Alb  2.5<L>  /  TBili  0.2  /  DBili  x   /  AST  42<H>  /  ALT  13  /  AlkPhos  100  12-21  PT/INR - ( 20 Dec 2023 22:20 )   PT: 15.3 sec;   INR: 1.47 ratio         PTT - ( 20 Dec 2023 22:20 )  PTT:28.1 sec    Urinalysis Basic - ( 21 Dec 2023 17:02 )    Color: x / Appearance: x / SG: x / pH: x  Gluc: 173 mg/dL / Ketone: x  / Bili: x / Urobili: x   Blood: x / Protein: x / Nitrite: x   Leuk Esterase: x / RBC: x / WBC x   Sq Epi: x / Non Sq Epi: x / Bacteria: x      RADIOLOGY & ADDITIONAL STUDIES:  < from: CT Head No Cont (12.21.23 @ 07:56) >    ACC: [id number] EXAM:  CT BRAIN   ORDERED BY:  NICOLA NOLASCO     PROCEDURE DATE:  12/21/2023          INTERPRETATION:  INDICATIONS:  exam change, patient with known brain   metastasis    TECHNIQUE:  Serial axial images were obtained from the skull baseto the   vertex without intravenous contrast. Sagittal and Coronal reformats were   performed    COMPARISON EXAMINATION: 12/20/2023    FINDINGS:  VENTRICLES AND SULCI: Mild ex vacuo dilatation of the temporal horn of   the right lateral ventricle  INTRA-AXIAL:  Right temporal parietal encephalomalacia as seen on the   prior subjacent to patient's craniotomy. Left parietal occipital and left   posterior frontal foci of increased attenuation similar in appearance   compared with the prior. Small right periventricular lesion seen on the   prior as well unchanged.  EXTRA-AXIAL:  No mass or collection is seen.  VISUALIZED SINUSES:  Clear.  VISUALIZED MASTOIDS:  Clear.  CALVARIUM: Right temporal parietal craniotomy  MISCELLANEOUS:  None.    IMPRESSION:  No significant interval change compared with the prior   12/20/2023    --- End of Report ---            RAMOS MCDERMOTT MD; Attending Radiologist  This document has been electronically signed. Dec 21 2023  9:22AM    < end of copied text >    Protein Calorie Malnutrition Present: [ ]mild [ ]moderate [ ]severe [ ]underweight [ ]morbid obesity  https://www.andeal.org/vault/2440/web/files/ONC/Table_Clinical%20Characteristics%20to%20Document%20Malnutrition-White%20JV%20et%20al%202012.pdf    Height (cm): 167 (12-21-23 @ 08:50), 167 (11-28-23 @ 08:43), 167.6 (11-08-23 @ 18:29)  Weight (kg): 49.8 (12-21-23 @ 08:50), 49.9 (12-14-23 @ 11:00), 54.8 (11-08-23 @ 18:29)  BMI (kg/m2): 17.9 (12-21-23 @ 08:50), 17.9 (12-14-23 @ 11:00), 19.6 (11-28-23 @ 08:43)    [ x]PPSV2 < or = 30%  [ ]significant weight loss [ ]poor nutritional intake [ ]anasarca[ ]Artificial Nutrition    Other REFERRALS:  [ ]Hospice  [ ]Child Life  [ ]Social Work  [ ]Case management [ ]Holistic Therapy     Goals of Care Document:CHRIS Hernandes (06-06-23 @ 15:27)  Goals of Care Conversation:   Participants:  · Participants  Patient    Advance Directives:  · Does patient have Advance Directive  No  · Do you want to complete the HCP and name a Bashir Care Agent  Yes  Wants James Meza tel # [phone] to be HCP  · Name of person who assisted  RN Iris  · Does the Patient have a Court Appointed Guardian ([address])  No  · Caregiver:  declines    Conversation Discussion:  · Conversation Details  51 yo F with Pmhx of NSCLC with adenocarcinoma with neuroendocrine feature s/p chemo and radiation presents to the ED with RLE weakness and dizziness, found to have brain mass.     Pending Finance / SW to resolve insurance issue as patient has a lot of follow-up appointments upon discharge.     r 3rd finger pain--> f/u xray     Problem/Plan - 1:  ·  Problem: Brain mass.   ·  Plan: CT head showed multiple brain masses c/w brain mets in the setting of hx of lung cancer   -Neurology and neurosurgery following   -MR brain with similar findings of CT   -F/u with neurosurgery recs - started on decadron 2mg q6H and keppra 500mg BID for seizure ppx   -Neuro checks q4hrs   -Seizure precautions   -Fall precautions  6/6/23 Did GOC with patient  Patient Wants Aba villarreal at [phone] to be HCP--> FORM completed  Patient Wants FULL CODE  Told by OTF her ins of medicaid will be effective in 1 week--> SW/ Np to help get Keppra and Steroids and PPI   Will work on appointment for [facility] with Dr Gonzalez and [facility] [phone]      Patient wants James Meza [phone] to be HCP---> Form completed.  Patient wants to be FULL CODE    What Matters Most To Patient and Family:  · What matters most to patient and family  getting treatment with Dr Beckman at [facility]    Personal Advance Directives Treatment Guidelines:   Treatment Guidelines:  · Decision Maker  Patient  · Treatment Guideline Comments  Wants treatment with Dr Beckman at [facility]    Location of Discussion:   Time Spent on Advance Care Planning:  Attending or DB Only.     I personally spent 30 minutes on advance care planning services with the patient. This time is separate and distinct from any other care management services provided on this date.    Location of Discussion:  · Location of discussion  Face to face      Electronic Signatures:  Heaven Hernandes)  (Signed 06-Jun-2023 15:41)  	Authored: Goals of Care Conversation, Personal Advance Directives Treatment Guidelines, Location of Discussion      Last Updated: 06-Jun-2023 15:41 by Heaven Hernandes)

## 2023-12-22 NOTE — PROGRESS NOTE ADULT - PROBLEM SELECTOR PLAN 2
AMS/behavioral changes in setting of progression of brain mets as well as clinical seizure  - AED management as above  - obtain MRI brain  - steroid as above

## 2023-12-22 NOTE — DIETITIAN INITIAL EVALUATION ADULT - ENTERAL
If EN feeds warranted, consider Glucerna 1.2 at GOAL rate 55ml/hr x 24 hrs. To provide (based on dosing wt 49.8kg): 1320ml, 1584kcal (31.8kcal/kg) and 79g protein (1.59g protein/kg).

## 2023-12-22 NOTE — PROGRESS NOTE ADULT - ASSESSMENT
ASSESSMENT/PLAN:     NEURO:  EEG monitoring  On LEV 1.5 BID, LCM 100mg BID, VPA 250mg Q6  Na 135 - 145  Decadron 4mg Q6  tylenol and oxycodone PRN   Activity: [] OOB as tolerated [] Bedrest [X] PT [] OT [] PMNR    PULM:  Incentive spirometry, mobilize as tolerated    CV:  -160mmHg    RENAL:  IVF until good PO intake    GI:  Diet: NPO  sennna and miralax for bowel regimen  Last Bowel Movement:   GI prophylaxis [X] not indicated [] PPI [] other:    ENDO:   Goal euglycemia (-180)    HEME/ONC:  VTE prophylaxis: [X] SCDs [] chemoprophylaxis [] hold chemoprophylaxis due to: [] high risk of DVT/PE on admission due to:    ID:  Febrile. Pending Clx  Vancomycin 750mg BID + Zosyn 3.375 q8    Will discuss goals of care with the family ASSESSMENT/PLAN:     NEURO:  EEG monitoring  On LEV 1.5 BID, LCM 200mg BID, VPA 250mg Q6  Na 135 - 145  Decadron 4mg Q6  tylenol and oxycodone PRN   Activity: [] OOB as tolerated [] Bedrest [X] PT [] OT [] PMNR    PULM:  Incentive spirometry, mobilize as tolerated    CV:  -160mmHg    RENAL:  IVF until good PO intake    GI:  Diet: NPO  sennna and miralax for bowel regimen  Last Bowel Movement:   GI prophylaxis [X] not indicated [] PPI [] other:    ENDO:   Goal euglycemia (-180)    HEME/ONC:  VTE prophylaxis: [X] SCDs [] chemoprophylaxis [] hold chemoprophylaxis due to: [] high risk of DVT/PE on admission due to:    ID:  Febrile. Pending Clx  Vancomycin 750mg BID + Zosyn 3.375 q8    Will discuss goals of care with the family

## 2023-12-22 NOTE — PROGRESS NOTE ADULT - PROBLEM SELECTOR PLAN 5
will sign off as goals are established  case discussed with NSCU and oncology teams  appreciate MICU SW support   Can be reached by TEAMS M-F 9-5 Yasmeen Jennings Any other time please page [phone] if needed will sign off as goals are established  case discussed with NSCU and oncology teams  appreciate MICU SW support   Can be reached by TEAMS M-F 9-5 Yasmeen Jennings Any other time please page [phone] if needed

## 2023-12-22 NOTE — CONSULT NOTE ADULT - ASSESSMENT
Patient is a 52 year old female with PMH of stage 4 lung adenocarcinoma dx 2022 with diffuse mets to C/A/P and brain s/p Rt craniotomy for tumor resection, & multiple rounds of chemo & RT, steroid induced DM, GERD who presents to [place] ED for AMS, transferred to [facility] for further management found to have seizure, s/p initiation of AEDs and ICU monitor, now transferred to medicine floor. ID  consulted for antibiotics management.     Noted met sepsis criteria on admission  - was febrile to 101F with tachycardia on 12/19, last fever 12/20 pm 103F  - noted with episode of hypothermia overnight 12/22--now temps wnl  No clear infectious source was found, suspected possibly due to seizure  No leukocytosis     UA negative  Bcx NGTD x2   LE duplex negative for DVT  CTH with intraparenchymal metastatic lesions, dec vasogenic edema, R temporal craniectomy with encephalomalacia  CT C/A/P with no infectious pathology noted; has extensive metastatic liver disease  Neurology following for seizure management, MRI brain pending   Neurosurgery following, on steroids     Bcx NGTD x2  s/p vancomycin and pip-tazo 12/19-12/21  can monitor off antibiotics for now   Monitor temps/WBC, if any fevers - repeat Bcx, obtain CXR and can start empiric pip-tazo    Dr. Da Paige will be covering for our group from 12/23-12/25. If you have any questions, concerns or new micro data, please reach out to them at [phone].    Olinda Acuna M.D.  [facility], Division of Infectious Diseases  [phone]  After 5pm on weekdays and all day on weekends - please call [phone]       Patient is a 52 year old female with PMH of stage 4 lung adenocarcinoma dx 2022 with diffuse mets to C/A/P and brain s/p Rt craniotomy for tumor resection, & multiple rounds of chemo & RT, steroid induced DM, GERD who presents to [place] ED for AMS, transferred to [facility] for further management found to have seizure, s/p initiation of AEDs and ICU monitor, now transferred to medicine floor. ID  consulted for antibiotics management.     Noted met sepsis criteria on admission  - was febrile to 101F with tachycardia on 12/19, last fever 12/20 pm 103F  - noted with episode of hypothermia overnight 12/22--now temps wnl  No clear infectious source was found, suspected possibly due to seizure  No leukocytosis     UA negative  Bcx NGTD x2   LE duplex negative for DVT  CTH with intraparenchymal metastatic lesions, dec vasogenic edema, R temporal craniectomy with encephalomalacia  CT C/A/P with no infectious pathology noted; has extensive metastatic liver disease  Neurology following for seizure management, MRI brain pending   Neurosurgery following, on steroids     Bcx NGTD x2  s/p vancomycin and pip-tazo 12/19-12/21  can monitor off antibiotics for now   Monitor temps/WBC, if any fevers - repeat Bcx, obtain CXR and can start empiric pip-tazo    Dr. Da Paige will be covering for our group from 12/23-12/25. If you have any questions, concerns or new micro data, please reach out to them at [phone].    Olinda Acuna M.D.  [facility], Division of Infectious Diseases  [phone]  After 5pm on weekdays and all day on weekends - please call [phone]

## 2023-12-22 NOTE — DIETITIAN INITIAL EVALUATION ADULT - PERTINENT LABORATORY DATA
12-21    139  |  105  |  7   ----------------------------<  173<H>  3.1<L>   |  22  |  0.39<L>    Ca    7.7<L>      21 Dec 2023 17:02  Phos  4.7     12-21  Mg     1.2     12-21    TPro  6.3  /  Alb  2.5<L>  /  TBili  0.2  /  DBili  x   /  AST  42<H>  /  ALT  13  /  AlkPhos  100  12-21  POCT Blood Glucose.: 113 mg/dL (12-22-23 @ 06:38)  A1C with Estimated Average Glucose Result: 7.7 % (11-01-23 @ 07:08)  A1C with Estimated Average Glucose Result: 8.0 % (09-20-23 @ 06:52)  A1C with Estimated Average Glucose Result: 7.3 % (08-28-23 @ 07:00)

## 2023-12-22 NOTE — DIETITIAN INITIAL EVALUATION ADULT - ETIOLOGY
Increased physiological demand of nutrient in setting of chronic catabolic illness Chronic catabolic illness with concern for inadequate energy/protein intake, subsequent increased nutrient needs

## 2023-12-22 NOTE — PROGRESS NOTE ADULT - ASSESSMENT
52F with hx of stage 4 lung adenocarcinoma dx 2022 with diffuse mets to C/A/P and brain s/p Rt craniotomy for tumor resection, & multiple rounds of chemo & RT, steroid induced DM, GERD who presents to [place] ED for AMS, transferred to [facility] for further management found to have seizure, s/p initiation of AEDs and ICU monitor, now transferred to medicine floor 52F with hx of stage 4 lung adenocarcinoma dx 2022 with diffuse mets to C/A/P and brain s/p Rt craniotomy for tumor resection, & multiple rounds of chemo & RT, steroid induced DM, GERD who presents to [place] ED for AMS, transferred to [facility] for further management found to have seizure, s/p initiation of AEDs and ICU monitor, now transferred to medicine floor

## 2023-12-22 NOTE — PROGRESS NOTE ADULT - ASSESSMENT
52y F PMH of smoking, stage 4 lung adenocarcinoma with diffuse mets to C/A/P and brain s/p Rt craniotomy for tumor resection, & multiple rounds of chemo & RT, steroid induced DM, GERD who presents to [place] ED for AMS. Per chart review pt had nonsensical speech and c/o HA & R sided weakness so was taken to ED. Patient was reportedly very agitated and violent at [place] ED. Code stroke was called.  Patient required sedation w/ benadryl, ativan and versed for agitation in order to obtain CTH.  CTH at VS shows increased conspicuity of L posterior frontal and R parietal lesions, likely 2/2 hemorrhage (HU ~50-60) c/t 10/28 CTH. Further eval limited by motion artifact Patient was noted to be febrile at VS and started on vancomycin, zosyn. Rapid RVP negative.  Pt ultimately transferred to [facility] for continued care given pt known to heme-onc & neuro-surg at [facility]    (Taken from NSCU note). Palliative care consulted for GOC.  52y F PMH of smoking, stage 4 lung adenocarcinoma with diffuse mets to C/A/P and brain s/p Rt craniotomy for tumor resection, & multiple rounds of chemo & RT, steroid induced DM, GERD who presents to [place] ED for AMS. Per chart review pt had nonsensical speech and c/o HA & R sided weakness so was taken to ED. Patient was reportedly very agitated and violent at [place] ED. Code stroke was called.  Patient required sedation w/ benadryl, ativan and versed for agitation in order to obtain CTH.  CTH at VS shows increased conspicuity of L posterior frontal and R parietal lesions, likely 2/2 hemorrhage (HU ~50-60) c/t 10/28 CTH. Further eval limited by motion artifact Patient was noted to be febrile at VS and started on vancomycin, zosyn. Rapid RVP negative.  Pt ultimately transferred to [facility] for continued care given pt known to heme-onc & neuro-surg at [facility]    (Taken from NSCU note). Palliative care consulted for GOC.

## 2023-12-22 NOTE — PROGRESS NOTE ADULT - SUBJECTIVE AND OBJECTIVE BOX
Jacque Cherry MD  Division of Hospital Medicine  Please contact via MS Teams (prefer message first)  Office: [phone]    Patient is a 52y old  Female who presents with a chief complaint of HA , weakness, AMS (22 Dec 2023 15:48)      SUBJECTIVE / OVERNIGHT EVENTS:  no acute events overnight, vss, afebrile  s/p vEEG without active seizure, transferred to medicine floor  pt awake, alert but unable to follow any commands    ROS:  unable to obtain 2/2 mental status    MEDICATIONS  (STANDING):  artificial tears (preservative free) Ophthalmic Solution 1 Drop(s) Both EYES three times a day  atorvastatin 40 milliGRAM(s) Oral at bedtime  chlorhexidine 4% Liquid 1 Application(s) Topical <User Schedule>  dexAMETHasone  Injectable 2 milliGRAM(s) IV Push every 12 hours  dextrose 5%. 1000 milliLiter(s) (50 mL/Hr) IV Continuous <Continuous>  dextrose 5%. 1000 milliLiter(s) (100 mL/Hr) IV Continuous <Continuous>  dextrose 50% Injectable 25 Gram(s) IV Push once  dextrose 50% Injectable 25 Gram(s) IV Push once  dextrose 50% Injectable 12.5 Gram(s) IV Push once  glucagon  Injectable 1 milliGRAM(s) IntraMuscular once  insulin lispro (ADMELOG) corrective regimen sliding scale   SubCutaneous every 6 hours  lacosamide IVPB 200 milliGRAM(s) IV Intermittent every 12 hours  levETIRAcetam  IVPB 1500 milliGRAM(s) IV Intermittent every 12 hours  metoprolol tartrate Injectable 2.5 milliGRAM(s) IV Push every 12 hours  pantoprazole  Injectable 40 milliGRAM(s) IV Push daily  petrolatum white Ointment 1 Application(s) Topical two times a day  piperacillin/tazobactam IVPB.. 3.375 Gram(s) IV Intermittent every 8 hours  polyethylene glycol 3350 17 Gram(s) Oral daily  valproate sodium  IVPB 250 milliGRAM(s) IV Intermittent every 6 hours  vancomycin  IVPB      vancomycin  IVPB 750 milliGRAM(s) IV Intermittent every 12 hours    MEDICATIONS  (PRN):  acetaminophen     Tablet .. 650 milliGRAM(s) Oral every 6 hours PRN Temp greater or equal to 38C (100.4F), Mild Pain (1 - 3)  acetaminophen  Suppository .. 650 milliGRAM(s) Rectal every 6 hours PRN Temp greater or equal to 38C (100.4F), Moderate Pain (4 - 6)  aluminum hydroxide/magnesium hydroxide/simethicone Suspension 30 milliLiter(s) Oral every 4 hours PRN Dyspepsia  dextrose Oral Gel 15 Gram(s) Oral once PRN Blood Glucose LESS THAN 70 milliGRAM(s)/deciliter  melatonin 3 milliGRAM(s) Oral at bedtime PRN Insomnia  ondansetron Injectable 4 milliGRAM(s) IV Push every 8 hours PRN Nausea and/or Vomiting  senna 2 Tablet(s) Oral at bedtime PRN Constipation      CAPILLARY BLOOD GLUCOSE      POCT Blood Glucose.: 79 mg/dL (22 Dec 2023 16:54)  POCT Blood Glucose.: 128 mg/dL (22 Dec 2023 11:20)  POCT Blood Glucose.: 113 mg/dL (22 Dec 2023 06:38)  POCT Blood Glucose.: 125 mg/dL (22 Dec 2023 01:53)    I&O's Summary    21 Dec 2023 07:01  -  22 Dec 2023 07:00  --------------------------------------------------------  IN: 2800 mL / OUT: 300 mL / NET: 2500 mL    22 Dec 2023 07:01  -  22 Dec 2023 17:26  --------------------------------------------------------  IN: 400 mL / OUT: 600 mL / NET: -200 mL        PHYSICAL EXAM:  Vital Signs Last 24 Hrs  T(C): 36.6 (22 Dec 2023 16:22), Max: 36.6 (22 Dec 2023 16:22)  T(F): 97.8 (22 Dec 2023 16:22), Max: 97.8 (22 Dec 2023 16:22)  HR: 111 (22 Dec 2023 16:22) (76 - 116)  BP: 116/76 (22 Dec 2023 16:22) (94/66 - 141/60)  BP(mean): 93 (22 Dec 2023 15:00) (70 - 97)  RR: 19 (22 Dec 2023 16:22) (12 - 31)  SpO2: 99% (22 Dec 2023 16:22) (95% - 100%)    Parameters below as of 22 Dec 2023 16:22  Patient On (Oxygen Delivery Method): nasal cannula  O2 Flow (L/min): 2    GENERAL: NAD, well-developed  HEAD:  Atraumatic, Normocephalic  EYES: EOMI, PERRLA, conjunctiva and sclera clear  NECK: Supple, No JVD  CHEST/LUNG: Clear to auscultation bilaterally; No wheeze  HEART: Regular rate and rhythm; No murmurs, rubs, or gallops  ABDOMEN: Soft, Nontender, Nondistended; Bowel sounds present  EXTREMITIES:  2+ Peripheral Pulses, No clubbing, cyanosis, or edema  NEUROLOGY: AAOx0  SKIN: No rashes or lesions    LABS:  personally reviewed                        9.2    4.52  )-----------( 139      ( 21 Dec 2023 17:02 )             29.3     12-21    139  |  105  |  7   ----------------------------<  173<H>  3.1<L>   |  22  |  0.39<L>    Ca    7.7<L>      21 Dec 2023 17:02  Phos  4.7     12-21  Mg     1.2     12-21    TPro  6.3  /  Alb  2.5<L>  /  TBili  0.2  /  DBili  x   /  AST  42<H>  /  ALT  13  /  AlkPhos  100  12-21    PT/INR - ( 20 Dec 2023 22:20 )   PT: 15.3 sec;   INR: 1.47 ratio         PTT - ( 20 Dec 2023 22:20 )  PTT:28.1 sec  CARDIAC MARKERS ( 20 Dec 2023 20:01 )  x     / x     / 482 U/L / x     / 6.0 ng/mL      Urinalysis Basic - ( 21 Dec 2023 17:02 )    Color: x / Appearance: x / SG: x / pH: x  Gluc: 173 mg/dL / Ketone: x  / Bili: x / Urobili: x   Blood: x / Protein: x / Nitrite: x   Leuk Esterase: x / RBC: x / WBC x   Sq Epi: x / Non Sq Epi: x / Bacteria: x        RADIOLOGY & ADDITIONAL TESTS:    Imaging Personally Reviewed:< from: CT Head No Cont (12.19.23 @ 18:55) >  Redemonstration of intraparenchymal metastatic lesions, slight decrease   in size compared to prior MR. Markedly decreased surrounding vasogenic   edema. Contrast-enhanced MR brain may be considered for further   evaluation, if clinically warranted.    Right temporal craniectomy with similar-appearing encephalomalacia of the   postoperative bed.    < end of copied text >  < from: CT Chest w/ IV Cont (12.19.23 @ 18:55) >  No evidence for infection within the chest, abdomen, or pelvis.    Metastatic lung cancer in the right upper lobe, slightly decreased in   size ascompared with 11/14/2023.    Extensive metastatic liver disease, mildly decreased in size as compared   with 10/31/2023.    T1 and T10 sclerotic vertebral body lesions which were not visualized on   11/14/2023 CT, probably now visible due to posttreatment change.    Decrease in size of subcutaneous nodules    < end of copied text >      Consultant(s) Notes Reviewed:  neurology    Care Discussed with Consultants/Other Providers:   Jacque Cherry MD  Division of Hospital Medicine  Please contact via MS Teams (prefer message first)  Office: [phone]    Patient is a 52y old  Female who presents with a chief complaint of HA , weakness, AMS (22 Dec 2023 15:48)      SUBJECTIVE / OVERNIGHT EVENTS:  no acute events overnight, vss, afebrile  s/p vEEG without active seizure, transferred to medicine floor  pt awake, alert but unable to follow any commands    ROS:  unable to obtain 2/2 mental status    MEDICATIONS  (STANDING):  artificial tears (preservative free) Ophthalmic Solution 1 Drop(s) Both EYES three times a day  atorvastatin 40 milliGRAM(s) Oral at bedtime  chlorhexidine 4% Liquid 1 Application(s) Topical <User Schedule>  dexAMETHasone  Injectable 2 milliGRAM(s) IV Push every 12 hours  dextrose 5%. 1000 milliLiter(s) (50 mL/Hr) IV Continuous <Continuous>  dextrose 5%. 1000 milliLiter(s) (100 mL/Hr) IV Continuous <Continuous>  dextrose 50% Injectable 25 Gram(s) IV Push once  dextrose 50% Injectable 25 Gram(s) IV Push once  dextrose 50% Injectable 12.5 Gram(s) IV Push once  glucagon  Injectable 1 milliGRAM(s) IntraMuscular once  insulin lispro (ADMELOG) corrective regimen sliding scale   SubCutaneous every 6 hours  lacosamide IVPB 200 milliGRAM(s) IV Intermittent every 12 hours  levETIRAcetam  IVPB 1500 milliGRAM(s) IV Intermittent every 12 hours  metoprolol tartrate Injectable 2.5 milliGRAM(s) IV Push every 12 hours  pantoprazole  Injectable 40 milliGRAM(s) IV Push daily  petrolatum white Ointment 1 Application(s) Topical two times a day  piperacillin/tazobactam IVPB.. 3.375 Gram(s) IV Intermittent every 8 hours  polyethylene glycol 3350 17 Gram(s) Oral daily  valproate sodium  IVPB 250 milliGRAM(s) IV Intermittent every 6 hours  vancomycin  IVPB      vancomycin  IVPB 750 milliGRAM(s) IV Intermittent every 12 hours    MEDICATIONS  (PRN):  acetaminophen     Tablet .. 650 milliGRAM(s) Oral every 6 hours PRN Temp greater or equal to 38C (100.4F), Mild Pain (1 - 3)  acetaminophen  Suppository .. 650 milliGRAM(s) Rectal every 6 hours PRN Temp greater or equal to 38C (100.4F), Moderate Pain (4 - 6)  aluminum hydroxide/magnesium hydroxide/simethicone Suspension 30 milliLiter(s) Oral every 4 hours PRN Dyspepsia  dextrose Oral Gel 15 Gram(s) Oral once PRN Blood Glucose LESS THAN 70 milliGRAM(s)/deciliter  melatonin 3 milliGRAM(s) Oral at bedtime PRN Insomnia  ondansetron Injectable 4 milliGRAM(s) IV Push every 8 hours PRN Nausea and/or Vomiting  senna 2 Tablet(s) Oral at bedtime PRN Constipation      CAPILLARY BLOOD GLUCOSE      POCT Blood Glucose.: 79 mg/dL (22 Dec 2023 16:54)  POCT Blood Glucose.: 128 mg/dL (22 Dec 2023 11:20)  POCT Blood Glucose.: 113 mg/dL (22 Dec 2023 06:38)  POCT Blood Glucose.: 125 mg/dL (22 Dec 2023 01:53)    I&O's Summary    21 Dec 2023 07:01  -  22 Dec 2023 07:00  --------------------------------------------------------  IN: 2800 mL / OUT: 300 mL / NET: 2500 mL    22 Dec 2023 07:01  -  22 Dec 2023 17:26  --------------------------------------------------------  IN: 400 mL / OUT: 600 mL / NET: -200 mL        PHYSICAL EXAM:  Vital Signs Last 24 Hrs  T(C): 36.6 (22 Dec 2023 16:22), Max: 36.6 (22 Dec 2023 16:22)  T(F): 97.8 (22 Dec 2023 16:22), Max: 97.8 (22 Dec 2023 16:22)  HR: 111 (22 Dec 2023 16:22) (76 - 116)  BP: 116/76 (22 Dec 2023 16:22) (94/66 - 141/60)  BP(mean): 93 (22 Dec 2023 15:00) (70 - 97)  RR: 19 (22 Dec 2023 16:22) (12 - 31)  SpO2: 99% (22 Dec 2023 16:22) (95% - 100%)    Parameters below as of 22 Dec 2023 16:22  Patient On (Oxygen Delivery Method): nasal cannula  O2 Flow (L/min): 2    GENERAL: NAD, well-developed  HEAD:  Atraumatic, Normocephalic  EYES: EOMI, PERRLA, conjunctiva and sclera clear  NECK: Supple, No JVD  CHEST/LUNG: Clear to auscultation bilaterally; No wheeze  HEART: Regular rate and rhythm; No murmurs, rubs, or gallops  ABDOMEN: Soft, Nontender, Nondistended; Bowel sounds present  EXTREMITIES:  2+ Peripheral Pulses, No clubbing, cyanosis, or edema  NEUROLOGY: AAOx0  SKIN: No rashes or lesions    LABS:  personally reviewed                        9.2    4.52  )-----------( 139      ( 21 Dec 2023 17:02 )             29.3     12-21    139  |  105  |  7   ----------------------------<  173<H>  3.1<L>   |  22  |  0.39<L>    Ca    7.7<L>      21 Dec 2023 17:02  Phos  4.7     12-21  Mg     1.2     12-21    TPro  6.3  /  Alb  2.5<L>  /  TBili  0.2  /  DBili  x   /  AST  42<H>  /  ALT  13  /  AlkPhos  100  12-21    PT/INR - ( 20 Dec 2023 22:20 )   PT: 15.3 sec;   INR: 1.47 ratio         PTT - ( 20 Dec 2023 22:20 )  PTT:28.1 sec  CARDIAC MARKERS ( 20 Dec 2023 20:01 )  x     / x     / 482 U/L / x     / 6.0 ng/mL      Urinalysis Basic - ( 21 Dec 2023 17:02 )    Color: x / Appearance: x / SG: x / pH: x  Gluc: 173 mg/dL / Ketone: x  / Bili: x / Urobili: x   Blood: x / Protein: x / Nitrite: x   Leuk Esterase: x / RBC: x / WBC x   Sq Epi: x / Non Sq Epi: x / Bacteria: x        RADIOLOGY & ADDITIONAL TESTS:    Imaging Personally Reviewed:< from: CT Head No Cont (12.19.23 @ 18:55) >  Redemonstration of intraparenchymal metastatic lesions, slight decrease   in size compared to prior MR. Markedly decreased surrounding vasogenic   edema. Contrast-enhanced MR brain may be considered for further   evaluation, if clinically warranted.    Right temporal craniectomy with similar-appearing encephalomalacia of the   postoperative bed.    < end of copied text >  < from: CT Chest w/ IV Cont (12.19.23 @ 18:55) >  No evidence for infection within the chest, abdomen, or pelvis.    Metastatic lung cancer in the right upper lobe, slightly decreased in   size ascompared with 11/14/2023.    Extensive metastatic liver disease, mildly decreased in size as compared   with 10/31/2023.    T1 and T10 sclerotic vertebral body lesions which were not visualized on   11/14/2023 CT, probably now visible due to posttreatment change.    Decrease in size of subcutaneous nodules    < end of copied text >      Consultant(s) Notes Reviewed:  neurology    Care Discussed with Consultants/Other Providers:

## 2023-12-22 NOTE — PROGRESS NOTE ADULT - PROBLEM SELECTOR PLAN 3
pt remains altered, likely post-ictal  defer to neuro for exam   [] c/w Vimpat 200mg BID IV  [] c/w Keppra 1500mg BID IV

## 2023-12-22 NOTE — DIETITIAN INITIAL EVALUATION ADULT - REASON FOR ADMISSION
Pt is a 51 yo F with PMH: stage IV lung adenocarcinoma with diffuse mets to C/A/P and brain s/p R craniotomy for tumor resection and multiple rounds of chemo and RT, steroid induced DM, GERD. Presented to OSH for AMS. Code stroke was called. CTH at OSH showed increased conspicuity of L posterior frontal and R parietal lesion, likely 2/2 hemorrhage. Noted with fever, altered mental status and poor nutrition/electrolytes derangements. Transferred to ICU for repetitive seizures and worsening neuro exam.

## 2023-12-22 NOTE — PROGRESS NOTE ADULT - SUBJECTIVE AND OBJECTIVE BOX
Neurology Progress Note    SUBJECTIVE/OBJECTIVE/INTERVAL EVENTS: Patient seen and examined at bedside w/ neuro attending and team. Patient is awake, non-verbal. Cooperative with exam.    INTERVAL HISTORY: EEG report with no seizures, high risk for seizures in L posterior quadrant    VITALS & EXAMINATION:  Vital Signs Last 24 Hrs  T(C): 36.5 (22 Dec 2023 12:00), Max: 36.5 (22 Dec 2023 12:00)  T(F): 97.7 (22 Dec 2023 12:00), Max: 97.7 (22 Dec 2023 12:00)  HR: 84 (22 Dec 2023 13:00) (76 - 116)  BP: 107/76 (22 Dec 2023 13:00) (94/66 - 141/60)  BP(mean): 85 (22 Dec 2023 13:00) (70 - 99)  RR: 14 (22 Dec 2023 13:00) (12 - 31)  SpO2: 99% (22 Dec 2023 13:00) (95% - 100%)    Parameters below as of 21 Dec 2023 20:00  Patient On (Oxygen Delivery Method): nasal cannula  O2 Flow (L/min): 5      General:  Constitutional: Female, appears stated age, nontoxic, not in distress,    Head: Normocephalic;   Eyes: clear sclera;   Extremities: No cyanosis;   Resp: breathing comfortably     Neurological (>12):  MS: Awake, alert.  Not answering questions or following commands.  Language: non-verbal  CNs: PERRL (R 3mm, L 3mm). Blinks to threats b/l. EOMI. No facial asymmetry b/l. Hearing grossly normal b/l. Tongue midline  Motor - Normal bulk and paratonia throughout. No spontaneous movements noted in RUE. Flexes knees and hips against gravity b/l  Sensation: Intact to LT b/l  Reflexes L/R:  Biceps(C5) 2/2  BR(C6) 2/2   Triceps(C7)  2/2 Patellar(L4)   2/2   Ankles 2/2   Toes: mute b/l  Coordination: grossly intact in b/l UE    LABORATORY:  CBC                       9.2    4.52  )-----------( 139      ( 21 Dec 2023 17:02 )             29.3     Chem 12-21    139  |  105  |  7   ----------------------------<  173<H>  3.1<L>   |  22  |  0.39<L>    Ca    7.7<L>      21 Dec 2023 17:02  Phos  4.7     12-21  Mg     1.2     12-21    TPro  6.3  /  Alb  2.5<L>  /  TBili  0.2  /  DBili  x   /  AST  42<H>  /  ALT  13  /  AlkPhos  100  12-21    LFTs LIVER FUNCTIONS - ( 21 Dec 2023 17:02 )  Alb: 2.5 g/dL / Pro: 6.3 g/dL / ALK PHOS: 100 U/L / ALT: 13 U/L / AST: 42 U/L / GGT: x           Coagulopathy PT/INR - ( 20 Dec 2023 22:20 )   PT: 15.3 sec;   INR: 1.47 ratio         PTT - ( 20 Dec 2023 22:20 )  PTT:28.1 sec  Lipid Panel   A1c   Cardiac enzymes CARDIAC MARKERS ( 20 Dec 2023 20:01 )  x     / x     / 482 U/L / x     / 6.0 ng/mL      U/A Urinalysis Basic - ( 21 Dec 2023 17:02 )    Color: x / Appearance: x / SG: x / pH: x  Gluc: 173 mg/dL / Ketone: x  / Bili: x / Urobili: x   Blood: x / Protein: x / Nitrite: x   Leuk Esterase: x / RBC: x / WBC x   Sq Epi: x / Non Sq Epi: x / Bacteria: x      STUDIES & IMAGING: (EEG, CT, MR, U/S, TTE/PASCUAL):  EEG report 12/22/23:  EEG Classification / Summary:  Abnormal EEG study  Continuous left posterior quadrant LPDs (max P3/P7) mostly around 1 Hz frequency with overriding fast activity and occasionally in bursts  Focal left hemispheric slowing  Moderate generalized background slowing    -----------------------------------------------------------------------------------------------------    Clinical Impression:  Increased risk for focal seizures from left posterior quadrant   Left hemispheric focal cerebral dysfunction  Moderate diffuse/multi-focal cerebral dysfunction, not specific as to etiology.  There were no definitive seizures recorded.

## 2023-12-22 NOTE — DIETITIAN INITIAL EVALUATION ADULT - OTHER INFO
Dosing wt (12/21): 109.6 lbs  [facility] (12/15): 110 lbs; (12/2): 119.6 lbs; (10/31): 128.1 lbs; (8/26): 151 lbs  Wt loss trend noted; net loss of 41.4 lbs/27.4% x 8 mo, significant.  Dosing wt (12/21): 109.6 lbs  Wt trend [facility] (12/15): 110 lbs; (12/2): 119.6 lbs; (10/31): 128.1 lbs; (8/26): 151 lbs  Wt loss trend noted; net loss of 41.4 lbs/27.4% x 8 mo, significant.

## 2023-12-23 NOTE — PROGRESS NOTE ADULT - ASSESSMENT
-vEEG shows incr risk for focal seizures in L posterior, LPDs.   -MRI wwo  -manage lytes -vEEG shows incr risk for focal seizures in L posterior, LPDs.  but no epileptiform events  -MRI wwo  -manage lytes

## 2023-12-23 NOTE — PROGRESS NOTE ADULT - ATTENDING COMMENTS
Patient seen and examined at bedside.     Detailed neuro exam:  Please note, the patient did not cooperate for detailed neuro exams in the setting of altered mental status (AMS).  General: Patient is comfortably lying on the bed without acute distress.  Mental and Psychological Status: The patient is lethargic s/p lorazepam and nonverbal. Unable to follow simple commands or any complex commands.   Cranial Nerve Exam: Unable to assess visual threat and extraocular movements. Pupils are round, equal, and reactive. Further cranial nerve exams are limited.  Motor Exam: On noxious stimuli strength she withdrew at all extremities except no response at right upper extremity. There are no resting tremors. The tone is increased through out. .   Sensory Examination: Sensation is intact to pinprick throughout.  Deep Tendon Reflexes and Plantar Responses: Patient did not participate in the setting of AMS.   Posture, Station and Gait: Patient did not participate in the setting of AMS.    Coordination: Patient did not participate in the setting of AMS.      Impression and Plan:  Ms. Rojas is a 52 year old unknown handed woman with a PMHx of advanced lung adenocarcinoma with diffuse mets to brain s/p Rt craniotomy for tumor resection, & multiple rounds of chemo & RT, steroid induced DM, GERD who presents to [place] ED for AMS.   Neurology consulted because of altered mental status. Etiology of altered mental status is multifactorial including  metabolic encephalopathy, seizure and advanced metastatic disease. For seizure, yesterday she was started on Vimpat 200 mg stat then 100 mg BID despite that she has clinical seizure therefore, she received the lorazepam and Keppra. Today morning again, she has more seizure therefore, she received the lorazepam 2 mg, additonal Keppra 2 mg. Keppra dose was increased from 1gm BID to 1500 mg BID. Also increased the Vimpat from 100 mg BID to 200 mg BID.  Patient remains seizure free after adjusting seizure medication.     Continue Depakote and Today level was not drawn. Please check the Depakote level tomorrow morning.   MRI brain once patient is stable.   Continue medical management, neuro- check and fall precaution.  GI and DVT prophylaxis.    Patient is at high risk for complications and morbidity or mortality. There is high probability of imminent or life threatening deterioration in the patient's condition.  Patient is unable or incompetent to participate in giving a history and/or making decisions and discussion is necessary for determining treatment decisions.    My cumulative time taking care of this critically ill patient is 55 minutes  If you have any further questions, please do not hesitate to contact our team.  Thank you for allowing us to participate in this patient care. Patient seen and examined at bedside.     Detailed neuro exam:  Please note, the patient did not cooperate for detailed neuro exams in the setting of altered mental status (AMS).  General: Patient is comfortably lying on the bed without acute distress.  Mental and Psychological Status: The patient is lethargic s/p lorazepam and nonverbal. Unable to follow simple commands or any complex commands.   Cranial Nerve Exam: Unable to assess visual threat and extraocular movements. Pupils are round, equal, and reactive. Further cranial nerve exams are limited.  Motor Exam: On noxious stimuli strength she withdrew at all extremities except no response at right upper extremity. There are no resting tremors. The tone is increased through out. .   Sensory Examination: Sensation is intact to pinprick throughout.  Deep Tendon Reflexes and Plantar Responses: Patient did not participate in the setting of AMS.   Posture, Station and Gait: Patient did not participate in the setting of AMS.    Coordination: Patient did not participate in the setting of AMS.      Impression and Plan:  Ms. Rojas is a 52 year old unknown handed woman with a PMHx of advanced lung adenocarcinoma with diffuse mets to brain s/p Rt craniotomy for tumor resection, & multiple rounds of chemo & RT, steroid induced DM, GERD who presents to [place] ED for AMS.   Neurology consulted because of altered mental status. Etiology of altered mental status is multifactorial including  metabolic encephalopathy, seizure and advanced metastatic disease. For seizure, yesterday she was started on Vimpat 200 mg stat then 100 mg BID despite that she has clinical seizure therefore, she received the lorazepam and Keppra. Today morning again, she has more seizure therefore, she received the lorazepam 2 mg, additonal Keppra 2 mg. Keppra dose was increased from 1gm BID to 1500 mg BID. Also increased the Vimpat from 100 mg BID to 200 mg BID.  Patient remains seizure free after adjusting seizure medication.     Continue Depakote and Today level was not drawn. Please check the Depakote level tomorrow morning.   MRI brain once patient is stable.   Continue medical management, neuro- check and fall precaution.  GI and DVT prophylaxis.    Patient is at high risk for complications and morbidity or mortality. There is high probability of imminent or life threatening deterioration in the patient's condition.  Patient is unable or incompetent to participate in giving a history and/or making decisions and discussion is necessary for determining treatment decisions.    My cumulative time taking care of this critically ill patient is 55 minutes  If you have any further questions, please do not hesitate to contact our team.  Thank you for allowing us to participate in this patient care.

## 2023-12-23 NOTE — PROGRESS NOTE ADULT - SUBJECTIVE AND OBJECTIVE BOX
Patient seen and examined at bedside.    --Anticoagulation--    T(C): 36.9 (12-21-23 @ 08:50), Max: 39.7 (12-20-23 @ 21:20)  HR: 104 (12-21-23 @ 10:00) (99 - 133)  BP: 106/75 (12-21-23 @ 10:00) (104/70 - 131/85)  RR: 15 (12-21-23 @ 10:00) (15 - 20)  SpO2: 100% (12-21-23 @ 10:00) (91% - 100%)  Wt(kg): --    Exam:        Patient seen and examined at bedside.    --Anticoagulation--    T(C): 36.9 (12-21-23 @ 08:50), Max: 39.7 (12-20-23 @ 21:20)  HR: 104 (12-21-23 @ 10:00) (99 - 133)  BP: 106/75 (12-21-23 @ 10:00) (104/70 - 131/85)  RR: 15 (12-21-23 @ 10:00) (15 - 20)  SpO2: 100% (12-21-23 @ 10:00) (91% - 100%)  Wt(kg): --    Exam: Awake, EOS, non verbal, not FC, EOMI. LUE spont AG, BLE spont not AG.

## 2023-12-23 NOTE — PROGRESS NOTE ADULT - SUBJECTIVE AND OBJECTIVE BOX
Patient is a 52y old  Female who presents with a chief complaint of HA , weakness, AMS (23 Dec 2023 05:31)      SUBJECTIVE / OVERNIGHT EVENTS: Patient seen and examined at bedside. She is non verbal, but alert, no following commands. no acute event overnight.     ROS:  All other review of systems negative    Allergies    No Known Allergies    Intolerances        MEDICATIONS  (STANDING):  artificial tears (preservative free) Ophthalmic Solution 1 Drop(s) Both EYES three times a day  atorvastatin 40 milliGRAM(s) Oral at bedtime  chlorhexidine 4% Liquid 1 Application(s) Topical <User Schedule>  dexAMETHasone  Injectable 2 milliGRAM(s) IV Push every 12 hours  dextrose 5% + lactated ringers. 1000 milliLiter(s) (75 mL/Hr) IV Continuous <Continuous>  dextrose 5%. 1000 milliLiter(s) (50 mL/Hr) IV Continuous <Continuous>  dextrose 5%. 1000 milliLiter(s) (100 mL/Hr) IV Continuous <Continuous>  dextrose 50% Injectable 25 Gram(s) IV Push once  dextrose 50% Injectable 25 Gram(s) IV Push once  dextrose 50% Injectable 12.5 Gram(s) IV Push once  glucagon  Injectable 1 milliGRAM(s) IntraMuscular once  insulin lispro (ADMELOG) corrective regimen sliding scale   SubCutaneous every 6 hours  lacosamide IVPB 200 milliGRAM(s) IV Intermittent every 12 hours  levETIRAcetam  IVPB 1500 milliGRAM(s) IV Intermittent every 12 hours  magnesium sulfate  IVPB 1 Gram(s) IV Intermittent once  metoprolol tartrate Injectable 2.5 milliGRAM(s) IV Push every 12 hours  pantoprazole  Injectable 40 milliGRAM(s) IV Push daily  petrolatum white Ointment 1 Application(s) Topical two times a day  polyethylene glycol 3350 17 Gram(s) Oral daily  potassium chloride  10 mEq/100 mL IVPB 10 milliEquivalent(s) IV Intermittent every 1 hour  valproate sodium  IVPB 250 milliGRAM(s) IV Intermittent every 6 hours    MEDICATIONS  (PRN):  acetaminophen     Tablet .. 650 milliGRAM(s) Oral every 6 hours PRN Temp greater or equal to 38C (100.4F), Mild Pain (1 - 3)  acetaminophen  Suppository .. 650 milliGRAM(s) Rectal every 6 hours PRN Temp greater or equal to 38C (100.4F), Moderate Pain (4 - 6)  aluminum hydroxide/magnesium hydroxide/simethicone Suspension 30 milliLiter(s) Oral every 4 hours PRN Dyspepsia  dextrose Oral Gel 15 Gram(s) Oral once PRN Blood Glucose LESS THAN 70 milliGRAM(s)/deciliter  melatonin 3 milliGRAM(s) Oral at bedtime PRN Insomnia  ondansetron Injectable 4 milliGRAM(s) IV Push every 8 hours PRN Nausea and/or Vomiting  senna 2 Tablet(s) Oral at bedtime PRN Constipation      Vital Signs Last 24 Hrs  T(C): 36.3 (23 Dec 2023 11:23), Max: 36.6 (22 Dec 2023 16:22)  T(F): 97.3 (23 Dec 2023 11:23), Max: 97.9 (22 Dec 2023 19:37)  HR: 101 (23 Dec 2023 11:23) (79 - 111)  BP: 129/82 (23 Dec 2023 11:23) (100/72 - 140/80)  BP(mean): --  RR: 18 (23 Dec 2023 11:23) (18 - 19)  SpO2: 96% (23 Dec 2023 11:23) (92% - 99%)    Parameters below as of 23 Dec 2023 11:23  Patient On (Oxygen Delivery Method): room air      CAPILLARY BLOOD GLUCOSE      POCT Blood Glucose.: 117 mg/dL (23 Dec 2023 12:10)  POCT Blood Glucose.: 86 mg/dL (23 Dec 2023 06:41)  POCT Blood Glucose.: 169 mg/dL (23 Dec 2023 01:50)  POCT Blood Glucose.: 177 mg/dL (23 Dec 2023 01:23)  POCT Blood Glucose.: 68 mg/dL (23 Dec 2023 00:52)  POCT Blood Glucose.: 67 mg/dL (23 Dec 2023 00:50)  POCT Blood Glucose.: 79 mg/dL (22 Dec 2023 16:54)    I&O's Summary    22 Dec 2023 07:01  -  23 Dec 2023 07:00  --------------------------------------------------------  IN: 1800 mL / OUT: 600 mL / NET: 1200 mL    23 Dec 2023 07:01  -  23 Dec 2023 15:51  --------------------------------------------------------  IN: 0 mL / OUT: 0 mL / NET: 0 mL        PHYSICAL EXAM:  GENERAL: NAD, well-developed  HEAD:  Atraumatic, Normocephalic  EYES: EOMI, PERRLA, conjunctiva and sclera clear  NECK: Supple, No JVD  CHEST/LUNG: Clear to auscultation bilaterally; No wheeze  HEART: Regular rate and rhythm; No murmurs, rubs, or gallops  ABDOMEN: Soft, Nontender, Nondistended; Bowel sounds present  EXTREMITIES:  2+ Peripheral Pulses, No clubbing, cyanosis, or edema  NEUROLOGY: AAOx0, non verbal     LABS:                        9.7    5.90  )-----------( 158      ( 23 Dec 2023 12:35 )             31.6     12-23    145  |  111<H>  |  <4<L>  ----------------------------<  123<H>  3.3<L>   |  24  |  0.35<L>    Ca    9.1      23 Dec 2023 12:35  Phos  2.8     12-23  Mg     1.4     12-23    TPro  6.5  /  Alb  2.8<L>  /  TBili  0.2  /  DBili  x   /  AST  45<H>  /  ALT  15  /  AlkPhos  97  12-23          Urinalysis Basic - ( 23 Dec 2023 12:35 )    Color: x / Appearance: x / SG: x / pH: x  Gluc: 123 mg/dL / Ketone: x  / Bili: x / Urobili: x   Blood: x / Protein: x / Nitrite: x   Leuk Esterase: x / RBC: x / WBC x   Sq Epi: x / Non Sq Epi: x / Bacteria: x        RADIOLOGY & ADDITIONAL TESTS:    Case Discussed with Family, meeting set for tomorrow 1PM

## 2023-12-23 NOTE — PROGRESS NOTE ADULT - ASSESSMENT
52F with hx of stage 4 lung adenocarcinoma dx 2022 with diffuse mets to C/A/P and brain s/p Rt craniotomy for tumor resection, & multiple rounds of chemo & RT, steroid induced DM, GERD who presents to [place] ED for AMS, transferred to [facility] for further management found to have seizure, s/p initiation of AEDs and ICU monitor, now transferred to medicine floor 52F with hx of stage 4 lung adenocarcinoma dx 2022 with diffuse mets to C/A/P and brain s/p Rt craniotomy for tumor resection, & multiple rounds of chemo & RT, steroid induced DM, GERD who presents to [place] ED for AMS, transferred to [facility] for further management found to have seizure, s/p initiation of AEDs and ICU monitor, now transferred to medicine floor

## 2023-12-23 NOTE — PROGRESS NOTE ADULT - ASSESSMENT
Patient HARJEET WHITMORE is a 52y (1970) woman with a PMHx significant for smoking, stage 4 lung adenocarcinoma dx 2022 with diffuse mets to C/A/P and brain s/p Rt craniotomy for tumor resection, & multiple rounds of chemo & RT, steroid induced DM, GERD who presents to [place] ED for AMS. Per chart review pt had nonsensical speech and c/o HA & R sided weakness. Agitated, violent. CTH as noted. febrile at VS and started on vancomycin, zosyn. Rapid RVP negative. Pt ultimately transferred to [facility] for continued care given pt known to heme-onc & neuro-surg at [facility]. She is found to have tropinemia, hypokalemia, severe hypocalcemia, hypoalbuminemia   Neurology consulted for seizure concern.     Impression: Acute behavioral changes i/s/o brain metastases. Found to have clinical seizures (L facial twitching, RUE jerking). Transferred to ICU for close monitoring and AED management. Now stabilized on Vimpat and Keppra. Patient transferred to General Medical Floor.    Recommendations  [] c/w Vimpat 200mg BID IV  [] c/w Keppra 1500mg BID IV  [] on VPA 250mg q6h IV - level not drawn, ordered for tomorrow AM  [] steroids per neurosurgery. On Decadron 2mg IVP q12h  [] MRI brain with and without contrast is pending  [] please monitor valproic acid level and albumin  [] Tox screen, TSH 0.18 (low), Free T4 nl, B1, B6, B12 nl, ammonia, paraneoplastic panel  [] will continue to follow    Case discussed and staffed at bedside w/ attending Dr. Marquez Patient HARJEET WHITMORE is a 52y (1970) woman with a PMHx significant for smoking, stage 4 lung adenocarcinoma dx 2022 with diffuse mets to C/A/P and brain s/p Rt craniotomy for tumor resection, & multiple rounds of chemo & RT, steroid induced DM, GERD who presents to [place] ED for AMS. Per chart review pt had nonsensical speech and c/o HA & R sided weakness. Agitated, violent. CTH as noted. febrile at VS and started on vancomycin, zosyn. Rapid RVP negative. Pt ultimately transferred to [facility] for continued care given pt known to heme-onc & neuro-surg at [facility]. She is found to have tropinemia, hypokalemia, severe hypocalcemia, hypoalbuminemia   Neurology consulted for seizure concern.     Impression: Acute behavioral changes i/s/o brain metastases. Found to have clinical seizures (L facial twitching, RUE jerking). Transferred to ICU for close monitoring and AED management. Now stabilized on Vimpat and Keppra. Patient transferred to General Medical Floor.    Recommendations  [] c/w Vimpat 200mg BID IV  [] c/w Keppra 1500mg BID IV  [] on VPA 250mg q6h IV - level not drawn, ordered for tomorrow AM  [] steroids per neurosurgery. On Decadron 2mg IVP q12h  [] MRI brain with and without contrast is pending  [] please monitor valproic acid level and albumin  [] Tox screen, TSH 0.18 (low), Free T4 nl, B1, B6, B12 nl, ammonia, paraneoplastic panel  [] will continue to follow    Case discussed and staffed at bedside w/ attending Dr. Marquez

## 2023-12-23 NOTE — PROGRESS NOTE ADULT - NSPROGADDITIONALINFOA_GEN_ALL_CORE
time spent reviewing prior charts, meds, discussing plan with patient= 80 min     d/w Medicine ACP Derek

## 2023-12-23 NOTE — PROGRESS NOTE ADULT - SUBJECTIVE AND OBJECTIVE BOX
Neurology Progress Note    SUBJECTIVE/OBJECTIVE/INTERVAL EVENTS: Patient seen and examined at bedside w/ neuro attending and team.     INTERVAL HISTORY:  Seen this AM at bedside with Dr. Marquez. Now on General Medical Floor. vEEG is discontinued. Hypoglycemia overnight per chart. Lying supine in bed. Attends to examiner but no verbal output.    REVIEW OF SYSTEMS: MARYANNE    VITALS & EXAMINATION:  Vital Signs Last 24 Hrs  T(C): 36.9 (23 Dec 2023 15:55), Max: 36.9 (23 Dec 2023 15:55)  T(F): 98.4 (23 Dec 2023 15:55), Max: 98.4 (23 Dec 2023 15:55)  HR: 111 (23 Dec 2023 15:55) (79 - 111)  BP: 126/85 (23 Dec 2023 15:55) (100/72 - 140/80)  BP(mean): --  RR: 18 (23 Dec 2023 15:55) (18 - 19)  SpO2: 96% (23 Dec 2023 11:23) (92% - 96%)    Parameters below as of 23 Dec 2023 11:23  Patient On (Oxygen Delivery Method): room air    General:  Constitutional: Female, appears stated age, nontoxic, not in distress, chronically ill-appearing   Head: Normocephalic;   Eyes: clear sclera;   Extremities: No cyanosis;   Resp: breathing comfortably     Neurological (>12):  MS: Awake, alert.  Not answering questions or following commands.  Language: non-verbal  CNs: PERRL (R 3mm, L 3mm). Blinks to threats b/l. EOMI. No facial asymmetry b/l. Hearing grossly normal b/l. Tongue midline  Motor - Normal bulk and paratonia throughout. No spontaneous movements noted in RUE. Flexes knees and hips against gravity b/l  Sensation: Intact to LT b/l  Reflexes L/R:  Biceps(C5) 2/2  BR(C6) 2/2   Triceps(C7)  2/2 Patellar(L4)   2/2   Ankles 2/2   Toes: mute b/l  Coordination: grossly intact in b/l UE      LABORATORY:  CBC                       9.7    5.90  )-----------( 158      ( 23 Dec 2023 12:35 )             31.6     Chem 12-23    145  |  111<H>  |  <4<L>  ----------------------------<  123<H>  3.3<L>   |  24  |  0.35<L>    Ca    9.1      23 Dec 2023 12:35  Phos  2.8     12-23  Mg     1.4     12-23    TPro  6.5  /  Alb  2.8<L>  /  TBili  0.2  /  DBili  x   /  AST  45<H>  /  ALT  15  /  AlkPhos  97  12-23    LFTs LIVER FUNCTIONS - ( 23 Dec 2023 12:35 )  Alb: 2.8 g/dL / Pro: 6.5 g/dL / ALK PHOS: 97 U/L / ALT: 15 U/L / AST: 45 U/L / GGT: x           Coagulopathy   Lipid Panel   A1c   Cardiac enzymes     U/A Urinalysis Basic - ( 23 Dec 2023 12:35 )    Color: x / Appearance: x / SG: x / pH: x  Gluc: 123 mg/dL / Ketone: x  / Bili: x / Urobili: x   Blood: x / Protein: x / Nitrite: x   Leuk Esterase: x / RBC: x / WBC x   Sq Epi: x / Non Sq Epi: x / Bacteria: x      CSF  Immunological  Other    STUDIES & IMAGING: (EEG, CT, MR, U/S, TTE/PASCUAL):  EEG Classification / Summary: 12/22/2023  Abnormal EEG study  Continuous left posterior quadrant LPDs (max P3/P7) mostly around 1 Hz frequency with overriding fast activity and occasionally in bursts  Focal left hemispheric slowing  Moderate generalized background slowing    -----------------------------------------------------------------------------------------------------    Clinical Impression:  Increased risk for focal seizures from left posterior quadrant   Left hemispheric focal cerebral dysfunction  Moderate diffuse/multi-focal cerebral dysfunction, not specific as to etiology.  There were no definitive seizures recorded. Neurology Progress Note    SUBJECTIVE/OBJECTIVE/INTERVAL EVENTS: Patient seen and examined at bedside w/ neuro attending and team.     INTERVAL HISTORY:  Seen this AM at bedside with Dr. Marquez. Now on General Medical Floor. vEEG is discontinued. Hypoglycemia overnight per chart. Lying supine in bed. Attends to examiner but no verbal output.    REVIEW OF SYSTEMS: MARYANNE    VITALS & EXAMINATION:  Vital Signs Last 24 Hrs  T(C): 36.9 (23 Dec 2023 15:55), Max: 36.9 (23 Dec 2023 15:55)  T(F): 98.4 (23 Dec 2023 15:55), Max: 98.4 (23 Dec 2023 15:55)  HR: 111 (23 Dec 2023 15:55) (79 - 111)  BP: 126/85 (23 Dec 2023 15:55) (100/72 - 140/80)  BP(mean): --  RR: 18 (23 Dec 2023 15:55) (18 - 19)  SpO2: 96% (23 Dec 2023 11:23) (92% - 96%)    Parameters below as of 23 Dec 2023 11:23  Patient On (Oxygen Delivery Method): room air    General:  Constitutional: Female, appears stated age, nontoxic, not in distress, chronically ill-appearing   Head: Normocephalic;   Eyes: clear sclera;   Extremities: No cyanosis;   Resp: breathing comfortably     Neurological (>12):  MS: Awake, alert.  Not answering questions or following commands.  Language: non-verbal  CNs: PERRL (R 3mm, L 3mm). Blinks to threats b/l. EOMI. No facial asymmetry b/l. Hearing grossly normal b/l. Tongue midline  Motor - Normal bulk and paratonia throughout. Moving R side - RUE/RLE are antigravity.  Sensation: Intact to LT b/l  Reflexes L/R:  Biceps(C5) 2/2  BR(C6) 2/2   Triceps(C7)  2/2 Patellar(L4)   2/2   Ankles 2/2   Toes: mute b/l  Coordination: grossly intact in b/l UE      LABORATORY:  CBC                       9.7    5.90  )-----------( 158      ( 23 Dec 2023 12:35 )             31.6     Chem 12-23    145  |  111<H>  |  <4<L>  ----------------------------<  123<H>  3.3<L>   |  24  |  0.35<L>    Ca    9.1      23 Dec 2023 12:35  Phos  2.8     12-23  Mg     1.4     12-23    TPro  6.5  /  Alb  2.8<L>  /  TBili  0.2  /  DBili  x   /  AST  45<H>  /  ALT  15  /  AlkPhos  97  12-23    LFTs LIVER FUNCTIONS - ( 23 Dec 2023 12:35 )  Alb: 2.8 g/dL / Pro: 6.5 g/dL / ALK PHOS: 97 U/L / ALT: 15 U/L / AST: 45 U/L / GGT: x           Coagulopathy   Lipid Panel   A1c   Cardiac enzymes     U/A Urinalysis Basic - ( 23 Dec 2023 12:35 )    Color: x / Appearance: x / SG: x / pH: x  Gluc: 123 mg/dL / Ketone: x  / Bili: x / Urobili: x   Blood: x / Protein: x / Nitrite: x   Leuk Esterase: x / RBC: x / WBC x   Sq Epi: x / Non Sq Epi: x / Bacteria: x      CSF  Immunological  Other    STUDIES & IMAGING: (EEG, CT, MR, U/S, TTE/PASCUAL):  EEG Classification / Summary: 12/22/2023  Abnormal EEG study  Continuous left posterior quadrant LPDs (max P3/P7) mostly around 1 Hz frequency with overriding fast activity and occasionally in bursts  Focal left hemispheric slowing  Moderate generalized background slowing    -----------------------------------------------------------------------------------------------------    Clinical Impression:  Increased risk for focal seizures from left posterior quadrant   Left hemispheric focal cerebral dysfunction  Moderate diffuse/multi-focal cerebral dysfunction, not specific as to etiology.  There were no definitive seizures recorded.

## 2023-12-23 NOTE — PROGRESS NOTE ADULT - PROBLEM SELECTOR PLAN 7
The patient herself has expressed her willingness to attempt any and all cancer therapies and treatments being offered.   - remains full code, full medical resuscitation  - will need to discuss means of nutrition if no improvement in mental status next 24 hrs.   - d/w sister family meeting @ 1pm tomorrow.

## 2023-12-24 NOTE — RESULTS/DATA
[FreeTextEntry1] : ACC: [id number] EXAM: MR BRAIN WAW IC ORDERED BY: MICHELLE MORA  PROCEDURE DATE: 11/02/2023    INTERPRETATION: CLINICAL INDICATION: Brain metastases, worsening exam, lung cancer   Magnetic resonance imaging of the brain was carried out with transaxial SPGR, FLAIR, fast spin echo T2 weighted images, axial susceptibility weighted series, diffusion weighted series and sagittal T1 weighted series on a 1.5 Mylene magnet. Post contrast axial, coronal and sagittal T1 weighted images were obtained. 6 cc of Gadavist were intravenously injected, 1.5 cc were discarded.  Comparison is made with the prior MRI of 10/2/2023.    There has been a right temporal craniotomy. The enhancement in the right temporal postoperative bed is increased in size compared to the prior exam, measuring 4.7 cm in AP diameter by 3.2 cm transversely by 2.6 cm in craniocaudal diameter compared with the prior of 4.0 cm in AP diameter by 3.4 cm transversely by 2.7 cm in craniocaudal diameter. The vasogenic edema is slightly improved.  A small nodular enhancing lesion in the right inferior cerebellum is slightly larger measuring 1.7 cm in AP diameter by 1.4 cm transversely by 1.2 cm in craniocaudal diameter compared with the prior of 1.2 cm in AP diameter by 0.9 cm transversely by 1 cm in craniocaudal diameter.  The left occipital ring-enhancing mass is also larger measuring 3.8 cm in AP diameter and 3.0 cm transversely by 2.9 cm in craniocaudal diameter compared with the prior of 3.2 cm in AP diameter by 2.5 cm transversely by 2.7 cm in craniocaudal diameter. Vasogenic edema is worse.  A solid right posterior temporal enhancing lesion is larger measuring 1.8 cm in AP diameter by 1.5 cm transversely by 1.0 cm in craniocaudal diameter compared with the prior of 1.6 cm in AP diameter by 1.3 cm transversely by 0.7 cm in craniocaudal diameter.  A large left posterior frontal parietal lesion is larger measuring 2.9 cm in AP diameter by 2.2 cm transversely by 2.9 cm in craniocaudal diameter compared with the prior of 2.4 cm in AP diameter by 1.7 cm transversely by 2.1 cm in craniocaudal diameter.  Additional small left frontal cortical and right medial frontal enhancing lesions are identified which are slightly larger  Ventricles are normal in size and position. No acute infarcts are seen.  IMPRESSION: Right temporal postoperative changes with slight increase in the size of the enhancement within the postoperative bed since 10/2/2023 with slightly less vasogenic edema. Multiple additional brain metastases have increased in size since the prior exam as described above.

## 2023-12-24 NOTE — PROGRESS NOTE ADULT - NSPROGADDITIONALINFOA_GEN_ALL_CORE
time spent reviewing prior charts, meds, discussing plan with patient= 80 min     d/w [facility] ACP Maricel

## 2023-12-24 NOTE — PROGRESS NOTE ADULT - SUBJECTIVE AND OBJECTIVE BOX
Patient is a 52y old  Female who presents with a chief complaint of HA , weakness, AMS (24 Dec 2023 07:57)      SUBJECTIVE / OVERNIGHT EVENTS: Patient seen and examined at bedside. Patient remains aphasic, no acute events overnight.      ROS:  All other review of systems negative    Allergies    No Known Allergies    Intolerances        MEDICATIONS  (STANDING):  artificial tears (preservative free) Ophthalmic Solution 1 Drop(s) Both EYES three times a day  atorvastatin 40 milliGRAM(s) Oral at bedtime  chlorhexidine 4% Liquid 1 Application(s) Topical <User Schedule>  dexAMETHasone  Injectable 2 milliGRAM(s) IV Push every 12 hours  dextrose 5% + lactated ringers. 1000 milliLiter(s) (75 mL/Hr) IV Continuous <Continuous>  dextrose 5%. 1000 milliLiter(s) (50 mL/Hr) IV Continuous <Continuous>  dextrose 5%. 1000 milliLiter(s) (100 mL/Hr) IV Continuous <Continuous>  dextrose 50% Injectable 25 Gram(s) IV Push once  dextrose 50% Injectable 25 Gram(s) IV Push once  dextrose 50% Injectable 12.5 Gram(s) IV Push once  glucagon  Injectable 1 milliGRAM(s) IntraMuscular once  insulin lispro (ADMELOG) corrective regimen sliding scale   SubCutaneous every 6 hours  lacosamide IVPB 200 milliGRAM(s) IV Intermittent every 12 hours  levETIRAcetam  IVPB 1500 milliGRAM(s) IV Intermittent every 12 hours  metoprolol tartrate Injectable 2.5 milliGRAM(s) IV Push every 12 hours  pantoprazole  Injectable 40 milliGRAM(s) IV Push daily  petrolatum white Ointment 1 Application(s) Topical two times a day  polyethylene glycol 3350 17 Gram(s) Oral daily  potassium chloride  10 mEq/100 mL IVPB 10 milliEquivalent(s) IV Intermittent every 1 hour  potassium chloride  10 mEq/100 mL IVPB 10 milliEquivalent(s) IV Intermittent every 1 hour  valproate sodium  IVPB 250 milliGRAM(s) IV Intermittent every 6 hours    MEDICATIONS  (PRN):  acetaminophen     Tablet .. 650 milliGRAM(s) Oral every 6 hours PRN Temp greater or equal to 38C (100.4F), Mild Pain (1 - 3)  acetaminophen  Suppository .. 650 milliGRAM(s) Rectal every 6 hours PRN Temp greater or equal to 38C (100.4F), Moderate Pain (4 - 6)  aluminum hydroxide/magnesium hydroxide/simethicone Suspension 30 milliLiter(s) Oral every 4 hours PRN Dyspepsia  dextrose Oral Gel 15 Gram(s) Oral once PRN Blood Glucose LESS THAN 70 milliGRAM(s)/deciliter  melatonin 3 milliGRAM(s) Oral at bedtime PRN Insomnia  ondansetron Injectable 4 milliGRAM(s) IV Push every 8 hours PRN Nausea and/or Vomiting  senna 2 Tablet(s) Oral at bedtime PRN Constipation      Vital Signs Last 24 Hrs  T(C): 36.4 (24 Dec 2023 13:00), Max: 36.9 (23 Dec 2023 15:55)  T(F): 97.5 (24 Dec 2023 13:00), Max: 98.4 (23 Dec 2023 15:55)  HR: 87 (24 Dec 2023 13:00) (82 - 111)  BP: 133/87 (24 Dec 2023 13:00) (115/80 - 147/87)  BP(mean): --  RR: 18 (24 Dec 2023 13:00) (18 - 18)  SpO2: 96% (24 Dec 2023 13:00) (92% - 97%)    Parameters below as of 24 Dec 2023 13:00  Patient On (Oxygen Delivery Method): room air      CAPILLARY BLOOD GLUCOSE      POCT Blood Glucose.: 88 mg/dL (24 Dec 2023 12:05)  POCT Blood Glucose.: 90 mg/dL (24 Dec 2023 06:46)  POCT Blood Glucose.: 113 mg/dL (24 Dec 2023 00:08)  POCT Blood Glucose.: 94 mg/dL (23 Dec 2023 17:07)    I&O's Summary    23 Dec 2023 07:01  -  24 Dec 2023 07:00  --------------------------------------------------------  IN: 2100 mL / OUT: 0 mL / NET: 2100 mL        PHYSICAL EXAM:  GENERAL: NAD, well-developed  HEAD:  Atraumatic, Normocephalic  EYES: EOMI, PERRLA, conjunctiva and sclera clear  NECK: Supple, No JVD  CHEST/LUNG: Clear to auscultation bilaterally; No wheeze  HEART: Regular rate and rhythm; No murmurs, rubs, or gallops  ABDOMEN: Soft, Nontender, Nondistended; Bowel sounds present  EXTREMITIES:  2+ Peripheral Pulses, No clubbing, cyanosis, or edema  NEUROLOGY: AAOx0, alert, aphasic     LABS:                        9.7    5.92  )-----------( 153      ( 24 Dec 2023 06:59 )             32.2     12-24    146<H>  |  109<H>  |  <4<L>  ----------------------------<  78  3.0<L>   |  23  |  0.33<L>    Ca    9.3      24 Dec 2023 06:56  Phos  2.7     12-24  Mg     1.5     12-24    TPro  6.6  /  Alb  2.8<L>  /  TBili  0.3  /  DBili  x   /  AST  47<H>  /  ALT  16  /  AlkPhos  101  12-24          Urinalysis Basic - ( 24 Dec 2023 06:56 )    Color: x / Appearance: x / SG: x / pH: x  Gluc: 78 mg/dL / Ketone: x  / Bili: x / Urobili: x   Blood: x / Protein: x / Nitrite: x   Leuk Esterase: x / RBC: x / WBC x   Sq Epi: x / Non Sq Epi: x / Bacteria: x        RADIOLOGY & ADDITIONAL TESTS:      Care Discussed with Consultants/Other Providers: Medicine ACP and neurology     Case Discussed with family

## 2023-12-24 NOTE — HISTORY OF PRESENT ILLNESS
[FreeTextEntry1] : Renée Arechiga is a 52 year old female with PMH of GERD, S4 lung cancer with mets to brain (s/p 4 cycles of chemo and thoracic RT- completed 10/2022), steroid induced Diabetes Mellitus, gamma knife surgery to 7 brain mets, and s/p temporal crani/resection of metastatic lesion on 9/30/2023. Readmitted to  back to [facility] on 10/31 with BL UE and BL LE weakness and abdominal pain for 2 weeks concerning for progressive metastatic disease.  Overall imaging was significant for metastatic disease in the brain, lung, liver and abdomen.   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  She continue chemotherapy/radiation treatment in the outpatient setting with Dr. Beckman/Dr. Bernard. Pt recovered in [facility] [facility] and back to home now.   Pathology: - Lymph node, 4R EBUS guided FNA 6/22/2022: Positive for malignant cells. Metastatic malignant neoplasm. IHC is positive for TTF-1 and synaptophysin while negative for p40, chromogranin and CD56. Ki-67 is 80%. 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  Pt came in for first follow up in the office in a wheelchair with her friend. She had her first chemo few days ago, she was weak and dizzy after that. Reports when she sleeps dizziness fading away. Today headache is bad due to the transportation and movement.  Unable to walk with dizziness. She is on Dexamethasone 2mg Bid, managed by Dr. Beckman. She reports ongoing right sided weakness worse since last week with decreased fine motor to right extremity. Gait is unsteady and worse with ambulation. Continues to have right mild headache. Vision is stable. Dr. Bernard ordered MRI head for 12/22/2023. PT at home will be doing twice/week, yet to approve by Medicaid. Pt doing some home therapy.

## 2023-12-24 NOTE — PROGRESS NOTE ADULT - CONVERSATION DETAILS
d/w Patient's sister over the phone who states family would like to proceed with NGT for now, they are also agreeable to restraints if needed. She understands NGT is only a short term solution. Patient remains aphasic. will need to continue GOC next week and monitor mental status closely

## 2023-12-24 NOTE — ASSESSMENT
[FreeTextEntry1] : IMPRESSION:  52 year old female with PMH of GERD, S4 lung cancer with mets to brain (s/p 4 cycles of chemo and thoracic RT- completed 10/2022), steroid induced Diabetes Mellitus, gamma knife surgery to 7 brain mets, and s/p temporal crani/resection of metastatic lesion on 9/30/2023. Overall imaging was significant for metastatic disease in the brain, lung, liver and abdomen.   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  She continues on chemotherapy(carbo/taxol/avastin) /radiation treatment in the outpatient setting with Dr. Beckman/Dr. Bernard. Pt recovered in [place] re[facility] and back to home now.  She is on Dexamethasone 2mg Bid, managed by Dr. Beckman. She reports ongoing right sided weakness worse since last week with decreased fine motor to right extremity. Gait is unsteady and worse with ambulation. Continues to have right mild headache. Vision is stable. Dr. Bernard ordered MRI head for 12/19/2023. PT at home will be doing twice/week, yet to approve by Medicaid. Pt doing some home therapy. MRI head from 10/2/2023 with slightly less vasogenic edema. Multiple additional brain metastases have increased in size since the prior exam.  PLAN Encouraged fluid intake for improving fatigue. Continue PT/OT at home. MRI head 12/19/23 with Dr. Bernard F/U with Dr. Beckman and Dr. Bernard for chemo and follow up. F/U in 1 month

## 2023-12-24 NOTE — PROGRESS NOTE ADULT - ASSESSMENT
52F with hx of stage 4 lung adenocarcinoma dx 2022 with diffuse mets to C/A/P and brain s/p Rt craniotomy for tumor resection, & multiple rounds of chemo & RT, steroid induced DM, GERD who presents to [place] ED for AMS, transferred to [facility] for further management found to have seizure, s/p initiation of AEDs and ICU monitor, now transferred to medicine floor 52F with hx of stage 4 lung adenocarcinoma dx 2022 with diffuse mets to C/A/P and brain s/p Rt craniotomy for tumor resection, & multiple rounds of chemo & RT, steroid induced DM, GERD who presents to [place] ED for AMS, transferred to [facility] for further management found to have seizure, s/p initiation of AEDs and ICU monitor, now transferred to medicine floor

## 2023-12-24 NOTE — PROGRESS NOTE ADULT - PROBLEM SELECTOR PLAN 7
The patient herself has expressed her willingness to attempt any and all cancer therapies and treatments being offered.   - remains full code, full medical resuscitation  - will place NGT per family wishes, need to f/u with CXR and nutrition consult   ***will need to start tube feeds at a slow rate, repeleted electrolytes, f/u BMP in AM

## 2023-12-24 NOTE — PROGRESS NOTE ADULT - PROBLEM SELECTOR PLAN 1
Pt with clinical seizures (L facial twitching, RUE jerking). Transferred to ICU for close monitoring and AED management. Now stabilized on Vimpat and Keppra.  - vEEG without active seizure  - c/w Vimpat 200mg BID IV  - c/w Keppra 1500mg BID IV  - steroids per neurosurgery. On Decadron 2mg IV BID  - appreciate neurology recs: recommend MRI brain with and without contrast  - monitor valproic acid level and albumin

## 2023-12-24 NOTE — PROGRESS NOTE ADULT - ASSESSMENT
52F no AC/AP (prior DVT since resolved, not d/c on any acap) hx stage IV NSCLC w/ diffuse mets throughout C/A/P and to brain s/p R crani for tumor rsxn (09/2023), as well as mult rounds of chemo and RT, most recent RT fractions June 2023. Is on cycle 2 of Carbo/Paclitaxel and Bevacizumab with Atezolizumab. Presented to ED as xfer VS w/ AMS and febrile to 101-102 (now resolved). CTH w/ increased conspicuity of L posterior frontal and R parietal lesions, likely 2/2 hemorrhage (HU ~50-60) c/t 10/28 CTH. Exam: EOV, regards, nonverbal, no FC, NEWTON spont.   - MRI w/wo pending   - AEDs per neurology   - Dex 2BID

## 2023-12-24 NOTE — PROGRESS NOTE ADULT - SUBJECTIVE AND OBJECTIVE BOX
Patient seen and examined at bedside.    --Anticoagulation--    T(C): 36.4 (12-24-23 @ 04:50), Max: 36.9 (12-23-23 @ 15:55)  HR: 92 (12-24-23 @ 04:50) (90 - 111)  BP: 128/92 (12-24-23 @ 04:50) (126/85 - 147/87)  RR: 18 (12-24-23 @ 04:50) (18 - 18)  SpO2: 95% (12-24-23 @ 04:50) (92% - 96%)  Wt(kg): --    Exam: EOV, no FC, spontaneous x4

## 2023-12-25 NOTE — SWALLOW BEDSIDE ASSESSMENT ADULT - SWALLOW EVAL: RECOMMENDED DIET
Pt's mental status does not currently support PO intake however sister and HCP expressed understanding of aspiration risk and wishes for pt to remain on pleasure feeds with puree and moderately thick liquids diet.

## 2023-12-25 NOTE — PROGRESS NOTE ADULT - ASSESSMENT
52F with hx of stage 4 lung adenocarcinoma dx 2022 with diffuse mets to C/A/P and brain s/p Rt craniotomy for tumor resection, & multiple rounds of chemo & RT, steroid induced DM, GERD who presents to [place] ED for AMS, transferred to [facility] for further management found to have seizure, s/p initiation of AEDs and ICU monitor, now transferred to medicine floor 52F with hx of stage 4 lung adenocarcinoma dx 2022 with diffuse mets to C/A/P and brain s/p Rt craniotomy for tumor resection, & multiple rounds of chemo & RT, steroid induced DM, GERD who presents to [place] ED for AMS, transferred to [facility] for further management found to have seizure, s/p initiation of AEDs and ICU monitor, now transferred to medicine floor

## 2023-12-25 NOTE — PROGRESS NOTE ADULT - PROBLEM SELECTOR PLAN 7
The patient herself has expressed her willingness to attempt any and all cancer therapies and treatments being offered.   - remains full code,   -Patient: Pulled out NGT while on wrist restraints. I spoke to patient's Sister Lashay [phone], explained that patient remains very high risk of aspiration on NGT due to agitation. Sister verbalizes understanding. PEG and pleasure feeds were both discussed, explained that both of these options have increased dylan of aspiration. Patient's sister would like to start pleasure feeds and understands the risk of aspiration. She also requests patient be seen by S&S. Started purred food with thickened fluid. At this time PEG tube is not in line with family/patient's GOC.  - S&S consulted  - palliative reconsulted The patient herself has expressed her willingness to attempt any and all cancer therapies and treatments being offered.   - remains full code,   -Patient: Pulled out NGT while on wrist restraints. I spoke to patient's Sister Lashay [phone], explained that patient remains very high risk of aspiration on NGT due to agitation. Sister verbalizes understanding. PEG and pleasure feeds were both discussed, explained that both of these options have increased dylan of aspiration. Patient's sister would like to start pleasure feeds and understands the risk of aspiration. She also requests patient be seen by S&S. Started purred food with thickened fluid. At this time PEG tube is not in line with family/patient's GOC.  - S&S consulted  - palliative reconsulted

## 2023-12-25 NOTE — SWALLOW BEDSIDE ASSESSMENT ADULT - SWALLOW EVAL: DIAGNOSIS
Pt is 51 y/o F admitted for HA, weakness, AMS. Now presenting with mental status that does not support PO intake. Pt sleepy and family requesting deferral of exam. Pt is 53 y/o F admitted for HA, weakness, AMS. Now presenting with mental status that does not support PO intake. Pt sleepy and family requesting deferral of exam.

## 2023-12-25 NOTE — PROGRESS NOTE ADULT - SUBJECTIVE AND OBJECTIVE BOX
Patient is a 52y old  Female who presents with a chief complaint of HA , weakness, AMS (25 Dec 2023 07:12)      SUBJECTIVE / OVERNIGHT EVENTS: PAtient seen and examined at bedside. She appears at likely new baseline mental status , remains aphasic. removed NGT yesterday while on wrist restraints     ROS:  All other review of systems negative    Allergies    No Known Allergies    Intolerances        MEDICATIONS  (STANDING):  artificial tears (preservative free) Ophthalmic Solution 1 Drop(s) Both EYES three times a day  atorvastatin 40 milliGRAM(s) Oral at bedtime  chlorhexidine 4% Liquid 1 Application(s) Topical <User Schedule>  dexAMETHasone  Injectable 2 milliGRAM(s) IV Push every 12 hours  dextrose 5% + lactated ringers. 1000 milliLiter(s) (75 mL/Hr) IV Continuous <Continuous>  dextrose 5%. 1000 milliLiter(s) (50 mL/Hr) IV Continuous <Continuous>  dextrose 5%. 1000 milliLiter(s) (100 mL/Hr) IV Continuous <Continuous>  dextrose 50% Injectable 25 Gram(s) IV Push once  dextrose 50% Injectable 12.5 Gram(s) IV Push once  dextrose 50% Injectable 25 Gram(s) IV Push once  glucagon  Injectable 1 milliGRAM(s) IntraMuscular once  insulin lispro (ADMELOG) corrective regimen sliding scale   SubCutaneous every 6 hours  lacosamide IVPB 200 milliGRAM(s) IV Intermittent every 12 hours  levETIRAcetam  IVPB 1500 milliGRAM(s) IV Intermittent every 12 hours  metoprolol tartrate Injectable 2.5 milliGRAM(s) IV Push every 12 hours  pantoprazole  Injectable 40 milliGRAM(s) IV Push daily  petrolatum white Ointment 1 Application(s) Topical two times a day  polyethylene glycol 3350 17 Gram(s) Oral daily  potassium chloride  10 mEq/100 mL IVPB 10 milliEquivalent(s) IV Intermittent every 1 hour  valproate sodium  IVPB 250 milliGRAM(s) IV Intermittent every 6 hours    MEDICATIONS  (PRN):  acetaminophen     Tablet .. 650 milliGRAM(s) Oral every 6 hours PRN Temp greater or equal to 38C (100.4F), Mild Pain (1 - 3)  acetaminophen  Suppository .. 650 milliGRAM(s) Rectal every 6 hours PRN Temp greater or equal to 38C (100.4F), Moderate Pain (4 - 6)  aluminum hydroxide/magnesium hydroxide/simethicone Suspension 30 milliLiter(s) Oral every 4 hours PRN Dyspepsia  dextrose Oral Gel 15 Gram(s) Oral once PRN Blood Glucose LESS THAN 70 milliGRAM(s)/deciliter  melatonin 3 milliGRAM(s) Oral at bedtime PRN Insomnia  ondansetron Injectable 4 milliGRAM(s) IV Push every 8 hours PRN Nausea and/or Vomiting  senna 2 Tablet(s) Oral at bedtime PRN Constipation      Vital Signs Last 24 Hrs  T(C): 36.5 (25 Dec 2023 08:21), Max: 36.8 (24 Dec 2023 21:14)  T(F): 97.7 (25 Dec 2023 08:21), Max: 98.2 (24 Dec 2023 21:14)  HR: 77 (25 Dec 2023 08:21) (77 - 106)  BP: 123/80 (25 Dec 2023 08:21) (123/80 - 171/65)  BP(mean): --  RR: 18 (25 Dec 2023 08:21) (18 - 18)  SpO2: 98% (25 Dec 2023 08:21) (98% - 99%)    Parameters below as of 25 Dec 2023 08:21  Patient On (Oxygen Delivery Method): room air      CAPILLARY BLOOD GLUCOSE      POCT Blood Glucose.: 108 mg/dL (25 Dec 2023 11:51)  POCT Blood Glucose.: 85 mg/dL (25 Dec 2023 06:02)  POCT Blood Glucose.: 123 mg/dL (25 Dec 2023 00:22)  POCT Blood Glucose.: 94 mg/dL (24 Dec 2023 17:28)    I&O's Summary    24 Dec 2023 07:01  -  25 Dec 2023 07:00  --------------------------------------------------------  IN: 1350 mL / OUT: 0 mL / NET: 1350 mL    25 Dec 2023 07:01  -  25 Dec 2023 13:48  --------------------------------------------------------  IN: 60 mL / OUT: 0 mL / NET: 60 mL        PHYSICAL EXAM:  GENERAL: NAD, well-developed  HEAD:  Atraumatic, Normocephalic  EYES: EOMI, PERRLA, conjunctiva and sclera clear  NECK: Supple, No JVD  CHEST/LUNG: Clear to auscultation bilaterally; No wheeze  HEART: Regular rate and rhythm; No murmurs, rubs, or gallops  ABDOMEN: Soft, Nontender, Nondistended; Bowel sounds present  EXTREMITIES:  2+ Peripheral Pulses, No clubbing, cyanosis, or edema  NEUROLOGY: AAOx0, alert     LABS:                        10.2   5.47  )-----------( 161      ( 25 Dec 2023 10:54 )             34.0     12-25    143  |  108  |  <4<L>  ----------------------------<  116<H>  3.7   |  22  |  0.33<L>    Ca    9.2      25 Dec 2023 10:54  Phos  2.7     12-24  Mg     1.4     12-25    TPro  6.6  /  Alb  2.8<L>  /  TBili  0.3  /  DBili  x   /  AST  47<H>  /  ALT  16  /  AlkPhos  101  12-24          Urinalysis Basic - ( 25 Dec 2023 10:54 )    Color: x / Appearance: x / SG: x / pH: x  Gluc: 116 mg/dL / Ketone: x  / Bili: x / Urobili: x   Blood: x / Protein: x / Nitrite: x   Leuk Esterase: x / RBC: x / WBC x   Sq Epi: x / Non Sq Epi: x / Bacteria: x

## 2023-12-25 NOTE — SWALLOW BEDSIDE ASSESSMENT ADULT - H & P REVIEW
52y F PMH of smoking, stage 4 lung adenocarcinoma with diffuse mets to C/A/P and brain s/p Rt craniotomy for tumor resection, & multiple rounds of chemo & RT, steroid induced DM, GERD who presents to [facility] for AMS. Per chart review pt had nonsensical speech and c/o HA & R sided weakness so was taken to ED. Patient was reportedly very agitated and violent at [facility] ED. Code stroke was called.  Patient required sedation w/ benadryl, ativan and versed for agitation in order to obtain CTH. CTH at VS shows increased conspicuity of L posterior frontal and R parietal lesions, likely 2/2 hemorrhage (HU ~50-60) c/t 10/28 CTH. Further eval limited by motion artifact Patient was noted to be febrile at VS and started on vancomycin, zosyn. Rapid RVP negative. Pt admitted for HA , weakness, AMS/yes 52y F PMH of smoking, stage 4 lung adenocarcinoma with diffuse mets to C/A/P and brain s/p Rt craniotomy for tumor resection, & multiple rounds of chemo & RT, steroid induced DM, GERD who presents to [facility] for AMS. Per chart review pt had nonsensical speech and c/o HA & R sided weakness so was taken to ED. Patient was reportedly very agitated and violent at [facility] ED. Code stroke was called.  Patient required sedation w/ benadryl, ativan and versed for agitation in order to obtain CTH. CTH at VS shows increased conspicuity of L posterior frontal and R parietal lesions, likely 2/2 hemorrhage (HU ~50-60) c/t 10/28 CTH. Further eval limited by motion artifact Patient was noted to be febrile at VS and started on vancomycin, zosyn. Rapid RVP negative. Pt admitted for HA , weakness, AMS/yes

## 2023-12-25 NOTE — PROGRESS NOTE ADULT - SUBJECTIVE AND OBJECTIVE BOX
Patient seen and examined at bedside.    --Anticoagulation--    T(C): 36.4 (12-25-23 @ 04:23), Max: 36.8 (12-24-23 @ 21:14)  HR: 98 (12-25-23 @ 04:23) (82 - 106)  BP: 157/83 (12-25-23 @ 04:23) (115/80 - 171/65)  RR: 18 (12-25-23 @ 04:23) (18 - 18)  SpO2: 98% (12-25-23 @ 04:23) (96% - 99%)  Wt(kg): --    Exam: EOV, no FC, nonverbal, spontaneous x4

## 2023-12-25 NOTE — SWALLOW BEDSIDE ASSESSMENT ADULT - SLP GENERAL OBSERVATIONS
Pt asleep in bed, on room air, appeared lethargic, did not wake to auditory stimulation. Sister requested deferral of exam.

## 2023-12-25 NOTE — PROGRESS NOTE ADULT - NSPROGADDITIONALINFOA_GEN_ALL_CORE
time spent reviewing prior charts, meds, discussing plan with patient= 60 min     d/w Medicine ACP Maricel

## 2023-12-25 NOTE — SWALLOW BEDSIDE ASSESSMENT ADULT - COMMENTS
At this time, patient appears to be septic and has seizure-like activity likely driven by infection/fever. Ordered continue Zosyn and to add vancomycin (only got one dose yesterday?). Patient was hemodynamically stable and transferred to medicine floor service with telemetry capabilities. Advised primary team to  monitor patient for airway protection and to consider calling RRT and MICU consult if airway protection becomes a concern.  Pt subsequently  transferred to ICU for repetitive seizures and worsening neuroexam, Vimpat increased per neurology  12/22: Transferred out of MICU.  Initially met sepsis criteria on admission but no clear infectious. Started on abx. - fever could've been from seizure.  12/24: Per medicine: Patient pulled out NGT while on wrist restraints. I spoke to patient's Sister Lashay [phone], explained that patient remains very high risk of aspiration on NGT due to agitation. Sister verbalizes understanding. PEG and pleasure feeds were both discussed, explained that both of these options have increased dylan of aspiration. Patient's sister would like to start pleasure feeds and understands the risk of aspiration. She also requests patient be seen by S&S. Started purred food with thickened fluid. At this time PEG tube is not in line with family/patient's GOC.    Pt seen by this service 10/2023: 53 yo F with lung ca; brain mets admitted with progression of disease. Patient presents with a grossly functional oropharyngeal swallow. Adequate oral management and no overt signs of laryngeal penetration/aspiration across all consistencies trialed. Of note, patient is edentulous with U/L dentures; c/o pain on posterior lower gum. Small sore noted just beyond denture, suspect from ill-fitting lower denture. Patient will ask fiance to bring in denture adhesive. If persistent complaint, Dental consult may be considered.  Regular diet with thin liquids was recommended. At this time, patient appears to be septic and has seizure-like activity likely driven by infection/fever. Ordered continue Zosyn and to add vancomycin (only got one dose yesterday?). Patient was hemodynamically stable and transferred to medicine floor service with telemetry capabilities. Advised primary team to  monitor patient for airway protection and to consider calling RRT and MICU consult if airway protection becomes a concern.  Pt subsequently  transferred to ICU for repetitive seizures and worsening neuroexam, Vimpat increased per neurology  12/22: Transferred out of MICU.  Initially met sepsis criteria on admission but no clear infectious. Started on abx. - fever could've been from seizure.  12/24: Per medicine: Patient pulled out NGT while on wrist restraints. I spoke to patient's Sister Lashay [phone], explained that patient remains very high risk of aspiration on NGT due to agitation. Sister verbalizes understanding. PEG and pleasure feeds were both discussed, explained that both of these options have increased dylan of aspiration. Patient's sister would like to start pleasure feeds and understands the risk of aspiration. She also requests patient be seen by S&S. Started purred food with thickened fluid. At this time PEG tube is not in line with family/patient's GOC.    Pt seen by this service 10/2023: 53 yo F with lung ca; brain mets admitted with progression of disease. Patient presents with a grossly functional oropharyngeal swallow. Adequate oral management and no overt signs of laryngeal penetration/aspiration across all consistencies trialed. Of note, patient is edentulous with U/L dentures; c/o pain on posterior lower gum. Small sore noted just beyond denture, suspect from ill-fitting lower denture. Patient will ask fiance to bring in denture adhesive. If persistent complaint, Dental consult may be considered.  Regular diet with thin liquids was recommended.

## 2023-12-25 NOTE — SWALLOW BEDSIDE ASSESSMENT ADULT - SLP PERTINENT HISTORY OF CURRENT PROBLEM
Neuro following on 12/20: Impression: Acute behavioral changes iso multiple electrolyte derangements and fever favoring a secondary encephalopathy. Her cancer with known brain mets raise concern for paraneoplastic process vs new/expanding masses causing focal process not well characterized on CT. Low concern for postictal psychosis given no reported history of seizure clusters. 12/21:  RTT called for hypoxia. On arrival, patient was on NRB but without a reliable pulse oximeter wave form. Patient was noted to be have AMS as previously noted on admission. Vitals: Rectal temp 102.5F; 's on multiple repeat; 's; SpO2 97% on NRB. Exam notable for poor pulmonary excursion and audible breath sounds. Responded to tactile and painful stimuli. Facial twitch noted as well (as per primary team, twitching began around change of shift from day to night) which subsequently progressed and involved her R arm/hands).

## 2023-12-25 NOTE — SWALLOW BEDSIDE ASSESSMENT ADULT - ADDITIONAL RECOMMENDATIONS
Recommend palliative care follow up to assist with establishing GOC  Service will f/u x1 to reassess swallow function pending improvement in clinical status.

## 2023-12-26 NOTE — PROGRESS NOTE ADULT - SUBJECTIVE AND OBJECTIVE BOX
Patient is a 52y old  Female who presents with a chief complaint of HA , weakness, AMS (26 Dec 2023 12:42)      SUBJECTIVE / OVERNIGHT EVENTS: Patient seen and examined at bedside. She is alert, however remains aphasic/ not following commands. No acute events overnight     ROS:  All other review of systems negative    Allergies    No Known Allergies    Intolerances        MEDICATIONS  (STANDING):  artificial tears (preservative free) Ophthalmic Solution 1 Drop(s) Both EYES three times a day  atorvastatin 40 milliGRAM(s) Oral at bedtime  chlorhexidine 4% Liquid 1 Application(s) Topical <User Schedule>  dexAMETHasone  Injectable 2 milliGRAM(s) IV Push every 12 hours  dextrose 5% + lactated ringers. 1000 milliLiter(s) (75 mL/Hr) IV Continuous <Continuous>  dextrose 5%. 1000 milliLiter(s) (50 mL/Hr) IV Continuous <Continuous>  dextrose 5%. 1000 milliLiter(s) (100 mL/Hr) IV Continuous <Continuous>  dextrose 50% Injectable 25 Gram(s) IV Push once  dextrose 50% Injectable 25 Gram(s) IV Push once  dextrose 50% Injectable 12.5 Gram(s) IV Push once  glucagon  Injectable 1 milliGRAM(s) IntraMuscular once  insulin lispro (ADMELOG) corrective regimen sliding scale   SubCutaneous every 6 hours  lacosamide IVPB 200 milliGRAM(s) IV Intermittent every 12 hours  levETIRAcetam  IVPB 1500 milliGRAM(s) IV Intermittent every 12 hours  magnesium sulfate  IVPB 2 Gram(s) IV Intermittent once  metoprolol tartrate Injectable 2.5 milliGRAM(s) IV Push every 12 hours  pantoprazole  Injectable 40 milliGRAM(s) IV Push daily  petrolatum white Ointment 1 Application(s) Topical two times a day  polyethylene glycol 3350 17 Gram(s) Oral daily  potassium chloride  10 mEq/100 mL IVPB 10 milliEquivalent(s) IV Intermittent every 1 hour  valproate sodium  IVPB 250 milliGRAM(s) IV Intermittent every 6 hours    MEDICATIONS  (PRN):  acetaminophen     Tablet .. 650 milliGRAM(s) Oral every 6 hours PRN Temp greater or equal to 38C (100.4F), Mild Pain (1 - 3)  acetaminophen  Suppository .. 650 milliGRAM(s) Rectal every 6 hours PRN Temp greater or equal to 38C (100.4F), Moderate Pain (4 - 6)  aluminum hydroxide/magnesium hydroxide/simethicone Suspension 30 milliLiter(s) Oral every 4 hours PRN Dyspepsia  dextrose Oral Gel 15 Gram(s) Oral once PRN Blood Glucose LESS THAN 70 milliGRAM(s)/deciliter  melatonin 3 milliGRAM(s) Oral at bedtime PRN Insomnia  ondansetron Injectable 4 milliGRAM(s) IV Push every 8 hours PRN Nausea and/or Vomiting  senna 2 Tablet(s) Oral at bedtime PRN Constipation      Vital Signs Last 24 Hrs  T(C): 36.4 (26 Dec 2023 11:01), Max: 36.8 (26 Dec 2023 05:29)  T(F): 97.5 (26 Dec 2023 11:01), Max: 98.3 (26 Dec 2023 05:29)  HR: 90 (26 Dec 2023 11:01) (80 - 90)  BP: 143/89 (26 Dec 2023 11:01) (119/82 - 152/89)  BP(mean): --  RR: 18 (26 Dec 2023 11:01) (18 - 18)  SpO2: 97% (26 Dec 2023 11:01) (91% - 99%)    Parameters below as of 26 Dec 2023 11:01  Patient On (Oxygen Delivery Method): room air      CAPILLARY BLOOD GLUCOSE      POCT Blood Glucose.: 123 mg/dL (26 Dec 2023 12:19)  POCT Blood Glucose.: 96 mg/dL (26 Dec 2023 06:36)  POCT Blood Glucose.: 122 mg/dL (26 Dec 2023 00:23)  POCT Blood Glucose.: 101 mg/dL (25 Dec 2023 17:22)    I&O's Summary    25 Dec 2023 07:01  -  26 Dec 2023 07:00  --------------------------------------------------------  IN: 2160 mL / OUT: 0 mL / NET: 2160 mL        PHYSICAL EXAM:  GENERAL: NAD, well-developed  HEAD:  Atraumatic, Normocephalic  EYES: EOMI, PERRLA, conjunctiva and sclera clear  NECK: Supple, No JVD  CHEST/LUNG: Clear to auscultation bilaterally; No wheeze  HEART: Regular rate and rhythm; No murmurs, rubs, or gallops  ABDOMEN: Soft, Nontender, Nondistended; Bowel sounds present  EXTREMITIES:  2+ Peripheral Pulses, No clubbing, cyanosis, or edema  NEUROLOGY: AAOx0, opens eyes is alert, not following commands     LABS:                        10.2   5.47  )-----------( 161      ( 25 Dec 2023 10:54 )             34.0     12-26    144  |  108  |  <4<L>  ----------------------------<  107<H>  4.3   |  19<L>  |  0.32<L>    Ca    8.7      26 Dec 2023 10:25  Phos  3.8     12-26  Mg     1.3     12-26            Urinalysis Basic - ( 26 Dec 2023 10:25 )    Color: x / Appearance: x / SG: x / pH: x  Gluc: 107 mg/dL / Ketone: x  / Bili: x / Urobili: x   Blood: x / Protein: x / Nitrite: x   Leuk Esterase: x / RBC: x / WBC x   Sq Epi: x / Non Sq Epi: x / Bacteria: x

## 2023-12-26 NOTE — PROGRESS NOTE ADULT - SUBJECTIVE AND OBJECTIVE BOX
NEUROLOGY FOLLOW-UP CONSULT NOTE    RFC: AMS    Interval history: No acute neurologic events overnight. Patient is alert but not answering any questions. In response to questions, she covers her face with her blanket and does not participate in neurological exam.    Meds:  MEDICATIONS  (STANDING):  artificial tears (preservative free) Ophthalmic Solution 1 Drop(s) Both EYES three times a day  atorvastatin 40 milliGRAM(s) Oral at bedtime  chlorhexidine 4% Liquid 1 Application(s) Topical <User Schedule>  dexAMETHasone  Injectable 2 milliGRAM(s) IV Push every 12 hours  dextrose 5% + lactated ringers. 1000 milliLiter(s) (75 mL/Hr) IV Continuous <Continuous>  dextrose 5%. 1000 milliLiter(s) (50 mL/Hr) IV Continuous <Continuous>  dextrose 5%. 1000 milliLiter(s) (100 mL/Hr) IV Continuous <Continuous>  dextrose 50% Injectable 25 Gram(s) IV Push once  dextrose 50% Injectable 12.5 Gram(s) IV Push once  dextrose 50% Injectable 25 Gram(s) IV Push once  glucagon  Injectable 1 milliGRAM(s) IntraMuscular once  insulin lispro (ADMELOG) corrective regimen sliding scale   SubCutaneous every 6 hours  lacosamide IVPB 200 milliGRAM(s) IV Intermittent every 12 hours  levETIRAcetam  IVPB 1500 milliGRAM(s) IV Intermittent every 12 hours  metoprolol tartrate Injectable 2.5 milliGRAM(s) IV Push every 12 hours  pantoprazole  Injectable 40 milliGRAM(s) IV Push daily  petrolatum white Ointment 1 Application(s) Topical two times a day  polyethylene glycol 3350 17 Gram(s) Oral daily  potassium chloride  10 mEq/100 mL IVPB 10 milliEquivalent(s) IV Intermittent every 1 hour  valproate sodium  IVPB 250 milliGRAM(s) IV Intermittent every 6 hours    MEDICATIONS  (PRN):  acetaminophen     Tablet .. 650 milliGRAM(s) Oral every 6 hours PRN Temp greater or equal to 38C (100.4F), Mild Pain (1 - 3)  acetaminophen  Suppository .. 650 milliGRAM(s) Rectal every 6 hours PRN Temp greater or equal to 38C (100.4F), Moderate Pain (4 - 6)  aluminum hydroxide/magnesium hydroxide/simethicone Suspension 30 milliLiter(s) Oral every 4 hours PRN Dyspepsia  dextrose Oral Gel 15 Gram(s) Oral once PRN Blood Glucose LESS THAN 70 milliGRAM(s)/deciliter  melatonin 3 milliGRAM(s) Oral at bedtime PRN Insomnia  ondansetron Injectable 4 milliGRAM(s) IV Push every 8 hours PRN Nausea and/or Vomiting  senna 2 Tablet(s) Oral at bedtime PRN Constipation      PMHx/PSHx/FHx/SHx:  Altered mental status    GERD (Gastroesophageal Reflux Disease)    Hydrosalpinx    GERD (Gastroesophageal Reflux Disease)    Ulcer    Lung mass    Non-small cell lung cancer with metastasis    AMS (altered mental status)    Toxic encephalopathy    Toxic metabolic encephalopathy    Non-small cell lung cancer with metastasis    Steroid-induced diabetes    Sepsis, unspecified organism    Functional quadriplegia    Altered mental status    Encounter for palliative care    Counseling regarding advanced directives    Advance care planning    Seizure    No significant past surgical history    S/P endoscopy    AMS TRS FROM [facility]        Allergies:  No Known Allergies      ROS: All systems negative except as documented in Interval history    O:  T(C): 36.4 (12-26-23 @ 11:01), Max: 36.8 (12-26-23 @ 05:29)  HR: 90 (12-26-23 @ 11:01) (80 - 90)  BP: 143/89 (12-26-23 @ 11:01) (119/82 - 152/89)  RR: 18 (12-26-23 @ 11:01) (18 - 18)  SpO2: 97% (12-26-23 @ 11:01) (91% - 99%)    Focused neurologic exam:  MS - opens eyes, no verbal output, resist eye opening, does not participate in exam, unable to assess (speech, rep/naming, attn/conc/recent and remote memory/fund of knowledge) due to mental status, does not follow commands.  CN - MARYANNE to assess due to patient refuses to participate in exam  Motor - Normal bulk/tone, moves extremities spontaneously  Sens - LT intact all  DTR's -MARYANNE DTR due to pt not participating exam and downgoing b/l plantar response  Coord - MARYANNE due to mental status  Gait and station - MARYANNE due to mental status    Pertinent labs/studies:    LABS:  cret                        10.2   5.47  )-----------( 161      ( 25 Dec 2023 10:54 )             34.0     12-26    144  |  108  |  <4<L>  ----------------------------<  107<H>  4.3   |  19<L>  |  0.32<L>    Ca    8.7      26 Dec 2023 10:25  Phos  3.8     12-26  Mg     1.3     12-26      < from: CT Head No Cont (12.21.23 @ 07:56) >  FINDINGS:  VENTRICLES AND SULCI: Mild ex vacuo dilatation of the temporal horn of   the right lateral ventricle  INTRA-AXIAL:  Right temporal parietal encephalomalacia as seen on the   prior subjacent to patient's craniotomy. Left parietal occipital and left   posterior frontal foci of increased attenuation similar in appearance   compared with the prior. Small right periventricular lesion seen on the   prior as well unchanged.  EXTRA-AXIAL:  No mass or collection is seen.  VISUALIZED SINUSES:  Clear.  VISUALIZED MASTOIDS:  Clear.  CALVARIUM: Right temporal parietal craniotomy  MISCELLANEOUS:  None.    IMPRESSION:  No significant interval change compared with the prior   12/20/2023    < end of copied text >    Study Date: 	12-21-23 1054 - 1500 12-22-23  Duration:  x 27 HR 55 MINSEEG Classification / Summary:  Abnormal EEG study  Continuous left posterior quadrant LPDs (max P3/P7) mostly around 1 Hz frequency with overriding fast activity and occasionally in bursts  Focal left hemispheric slowing  Moderate generalized background slowing    -----------------------------------------------------------------------------------------------------    Clinical Impression:  Increased risk for focal seizures from left posterior quadrant   Left hemispheric focal cerebral dysfunction  Moderate diffuse/multi-focal cerebral dysfunction, not specific as to etiology.  There were no definitive seizures recorded. NEUROLOGY FOLLOW-UP CONSULT NOTE    RFC: AMS    Interval history: No acute neurologic events overnight. Patient is alert but not answering any questions. In response to questions, she covers her face with her blanket and does not participate in neurological exam. Discussed results and plans with sister Lashay over the phone. All questions answered.    Meds:  MEDICATIONS  (STANDING):  artificial tears (preservative free) Ophthalmic Solution 1 Drop(s) Both EYES three times a day  atorvastatin 40 milliGRAM(s) Oral at bedtime  chlorhexidine 4% Liquid 1 Application(s) Topical <User Schedule>  dexAMETHasone  Injectable 2 milliGRAM(s) IV Push every 12 hours  dextrose 5% + lactated ringers. 1000 milliLiter(s) (75 mL/Hr) IV Continuous <Continuous>  dextrose 5%. 1000 milliLiter(s) (50 mL/Hr) IV Continuous <Continuous>  dextrose 5%. 1000 milliLiter(s) (100 mL/Hr) IV Continuous <Continuous>  dextrose 50% Injectable 25 Gram(s) IV Push once  dextrose 50% Injectable 12.5 Gram(s) IV Push once  dextrose 50% Injectable 25 Gram(s) IV Push once  glucagon  Injectable 1 milliGRAM(s) IntraMuscular once  insulin lispro (ADMELOG) corrective regimen sliding scale   SubCutaneous every 6 hours  lacosamide IVPB 200 milliGRAM(s) IV Intermittent every 12 hours  levETIRAcetam  IVPB 1500 milliGRAM(s) IV Intermittent every 12 hours  metoprolol tartrate Injectable 2.5 milliGRAM(s) IV Push every 12 hours  pantoprazole  Injectable 40 milliGRAM(s) IV Push daily  petrolatum white Ointment 1 Application(s) Topical two times a day  polyethylene glycol 3350 17 Gram(s) Oral daily  potassium chloride  10 mEq/100 mL IVPB 10 milliEquivalent(s) IV Intermittent every 1 hour  valproate sodium  IVPB 250 milliGRAM(s) IV Intermittent every 6 hours    MEDICATIONS  (PRN):  acetaminophen     Tablet .. 650 milliGRAM(s) Oral every 6 hours PRN Temp greater or equal to 38C (100.4F), Mild Pain (1 - 3)  acetaminophen  Suppository .. 650 milliGRAM(s) Rectal every 6 hours PRN Temp greater or equal to 38C (100.4F), Moderate Pain (4 - 6)  aluminum hydroxide/magnesium hydroxide/simethicone Suspension 30 milliLiter(s) Oral every 4 hours PRN Dyspepsia  dextrose Oral Gel 15 Gram(s) Oral once PRN Blood Glucose LESS THAN 70 milliGRAM(s)/deciliter  melatonin 3 milliGRAM(s) Oral at bedtime PRN Insomnia  ondansetron Injectable 4 milliGRAM(s) IV Push every 8 hours PRN Nausea and/or Vomiting  senna 2 Tablet(s) Oral at bedtime PRN Constipation      PMHx/PSHx/FHx/SHx:  Altered mental status    GERD (Gastroesophageal Reflux Disease)    Hydrosalpinx    GERD (Gastroesophageal Reflux Disease)    Ulcer    Lung mass    Non-small cell lung cancer with metastasis    AMS (altered mental status)    Toxic encephalopathy    Toxic metabolic encephalopathy    Non-small cell lung cancer with metastasis    Steroid-induced diabetes    Sepsis, unspecified organism    Functional quadriplegia    Altered mental status    Encounter for palliative care    Counseling regarding advanced directives    Advance care planning    Seizure    No significant past surgical history    S/P endoscopy    AMS TRS [facility]        Allergies:  No Known Allergies      ROS: All systems negative except as documented in Interval history    O:  T(C): 36.4 (12-26-23 @ 11:01), Max: 36.8 (12-26-23 @ 05:29)  HR: 90 (12-26-23 @ 11:01) (80 - 90)  BP: 143/89 (12-26-23 @ 11:01) (119/82 - 152/89)  RR: 18 (12-26-23 @ 11:01) (18 - 18)  SpO2: 97% (12-26-23 @ 11:01) (91% - 99%)    Focused neurologic exam:  MS - opens eyes, no verbal output, resist eye opening, does not participate in exam, unable to assess (speech, rep/naming, attn/conc/recent and remote memory/fund of knowledge) due to mental status, does not follow commands.  CN - MARYANNE to assess due to patient refuses to participate in exam  Motor - Normal bulk/tone, moves extremities spontaneously  Sens - LT intact all  DTR's -MARYANNE DTR due to pt not participating exam and downgoing b/l plantar response  Coord - MARYANNE due to mental status  Gait and station - MARYANNE due to mental status    Pertinent labs/studies:    LABS:  cret                        10.2   5.47  )-----------( 161      ( 25 Dec 2023 10:54 )             34.0     12-26    144  |  108  |  <4<L>  ----------------------------<  107<H>  4.3   |  19<L>  |  0.32<L>    Ca    8.7      26 Dec 2023 10:25  Phos  3.8     12-26  Mg     1.3     12-26      < from: CT Head No Cont (12.21.23 @ 07:56) >  FINDINGS:  VENTRICLES AND SULCI: Mild ex vacuo dilatation of the temporal horn of   the right lateral ventricle  INTRA-AXIAL:  Right temporal parietal encephalomalacia as seen on the   prior subjacent to patient's craniotomy. Left parietal occipital and left   posterior frontal foci of increased attenuation similar in appearance   compared with the prior. Small right periventricular lesion seen on the   prior as well unchanged.  EXTRA-AXIAL:  No mass or collection is seen.  VISUALIZED SINUSES:  Clear.  VISUALIZED MASTOIDS:  Clear.  CALVARIUM: Right temporal parietal craniotomy  MISCELLANEOUS:  None.    IMPRESSION:  No significant interval change compared with the prior   12/20/2023    < end of copied text >    Study Date: 	12-21-23 3592 - 6882 12-22-23  Duration:  x 27 HR 55 MINSEEG Classification / Summary:  Abnormal EEG study  Continuous left posterior quadrant LPDs (max P3/P7) mostly around 1 Hz frequency with overriding fast activity and occasionally in bursts  Focal left hemispheric slowing  Moderate generalized background slowing    -----------------------------------------------------------------------------------------------------    Clinical Impression:  Increased risk for focal seizures from left posterior quadrant   Left hemispheric focal cerebral dysfunction  Moderate diffuse/multi-focal cerebral dysfunction, not specific as to etiology.  There were no definitive seizures recorded. NEUROLOGY FOLLOW-UP CONSULT NOTE    RFC: AMS    Interval history: No acute neurologic events overnight. Patient is alert but not answering any questions. In response to questions, she covers her face with her blanket and does not participate in neurological exam. Discussed results and plans with sister Lashay over the phone. All questions answered.    Meds:  MEDICATIONS  (STANDING):  artificial tears (preservative free) Ophthalmic Solution 1 Drop(s) Both EYES three times a day  atorvastatin 40 milliGRAM(s) Oral at bedtime  chlorhexidine 4% Liquid 1 Application(s) Topical <User Schedule>  dexAMETHasone  Injectable 2 milliGRAM(s) IV Push every 12 hours  dextrose 5% + lactated ringers. 1000 milliLiter(s) (75 mL/Hr) IV Continuous <Continuous>  dextrose 5%. 1000 milliLiter(s) (50 mL/Hr) IV Continuous <Continuous>  dextrose 5%. 1000 milliLiter(s) (100 mL/Hr) IV Continuous <Continuous>  dextrose 50% Injectable 25 Gram(s) IV Push once  dextrose 50% Injectable 12.5 Gram(s) IV Push once  dextrose 50% Injectable 25 Gram(s) IV Push once  glucagon  Injectable 1 milliGRAM(s) IntraMuscular once  insulin lispro (ADMELOG) corrective regimen sliding scale   SubCutaneous every 6 hours  lacosamide IVPB 200 milliGRAM(s) IV Intermittent every 12 hours  levETIRAcetam  IVPB 1500 milliGRAM(s) IV Intermittent every 12 hours  metoprolol tartrate Injectable 2.5 milliGRAM(s) IV Push every 12 hours  pantoprazole  Injectable 40 milliGRAM(s) IV Push daily  petrolatum white Ointment 1 Application(s) Topical two times a day  polyethylene glycol 3350 17 Gram(s) Oral daily  potassium chloride  10 mEq/100 mL IVPB 10 milliEquivalent(s) IV Intermittent every 1 hour  valproate sodium  IVPB 250 milliGRAM(s) IV Intermittent every 6 hours    MEDICATIONS  (PRN):  acetaminophen     Tablet .. 650 milliGRAM(s) Oral every 6 hours PRN Temp greater or equal to 38C (100.4F), Mild Pain (1 - 3)  acetaminophen  Suppository .. 650 milliGRAM(s) Rectal every 6 hours PRN Temp greater or equal to 38C (100.4F), Moderate Pain (4 - 6)  aluminum hydroxide/magnesium hydroxide/simethicone Suspension 30 milliLiter(s) Oral every 4 hours PRN Dyspepsia  dextrose Oral Gel 15 Gram(s) Oral once PRN Blood Glucose LESS THAN 70 milliGRAM(s)/deciliter  melatonin 3 milliGRAM(s) Oral at bedtime PRN Insomnia  ondansetron Injectable 4 milliGRAM(s) IV Push every 8 hours PRN Nausea and/or Vomiting  senna 2 Tablet(s) Oral at bedtime PRN Constipation      PMHx/PSHx/FHx/SHx:  Altered mental status    GERD (Gastroesophageal Reflux Disease)    Hydrosalpinx    GERD (Gastroesophageal Reflux Disease)    Ulcer    Lung mass    Non-small cell lung cancer with metastasis    AMS (altered mental status)    Toxic encephalopathy    Toxic metabolic encephalopathy    Non-small cell lung cancer with metastasis    Steroid-induced diabetes    Sepsis, unspecified organism    Functional quadriplegia    Altered mental status    Encounter for palliative care    Counseling regarding advanced directives    Advance care planning    Seizure    No significant past surgical history    S/P endoscopy    AMS TRS FROM [facility]        Allergies:  No Known Allergies      ROS: All systems negative except as documented in Interval history    O:  T(C): 36.4 (12-26-23 @ 11:01), Max: 36.8 (12-26-23 @ 05:29)  HR: 90 (12-26-23 @ 11:01) (80 - 90)  BP: 143/89 (12-26-23 @ 11:01) (119/82 - 152/89)  RR: 18 (12-26-23 @ 11:01) (18 - 18)  SpO2: 97% (12-26-23 @ 11:01) (91% - 99%)    Focused neurologic exam:  MS - opens eyes, no verbal output, resist eye opening, does not participate in exam, unable to assess (speech, rep/naming, attn/conc/recent and remote memory/fund of knowledge) due to mental status, does not follow commands.  CN - MARYANNE to assess due to patient refuses to participate in exam  Motor - Normal bulk/tone, moves extremities spontaneously  Sens - LT intact all  DTR's -MARYANNE DTR due to pt not participating exam and downgoing b/l plantar response  Coord - MARYANNE due to mental status  Gait and station - MARYANNE due to mental status    Pertinent labs/studies:    LABS:  cret                        10.2   5.47  )-----------( 161      ( 25 Dec 2023 10:54 )             34.0     12-26    144  |  108  |  <4<L>  ----------------------------<  107<H>  4.3   |  19<L>  |  0.32<L>    Ca    8.7      26 Dec 2023 10:25  Phos  3.8     12-26  Mg     1.3     12-26      < from: CT Head No Cont (12.21.23 @ 07:56) >  FINDINGS:  VENTRICLES AND SULCI: Mild ex vacuo dilatation of the temporal horn of   the right lateral ventricle  INTRA-AXIAL:  Right temporal parietal encephalomalacia as seen on the   prior subjacent to patient's craniotomy. Left parietal occipital and left   posterior frontal foci of increased attenuation similar in appearance   compared with the prior. Small right periventricular lesion seen on the   prior as well unchanged.  EXTRA-AXIAL:  No mass or collection is seen.  VISUALIZED SINUSES:  Clear.  VISUALIZED MASTOIDS:  Clear.  CALVARIUM: Right temporal parietal craniotomy  MISCELLANEOUS:  None.    IMPRESSION:  No significant interval change compared with the prior   12/20/2023    < end of copied text >    Study Date: 	12-21-23 6355 - 3231 12-22-23  Duration:  x 27 HR 55 MINSEEG Classification / Summary:  Abnormal EEG study  Continuous left posterior quadrant LPDs (max P3/P7) mostly around 1 Hz frequency with overriding fast activity and occasionally in bursts  Focal left hemispheric slowing  Moderate generalized background slowing    -----------------------------------------------------------------------------------------------------    Clinical Impression:  Increased risk for focal seizures from left posterior quadrant   Left hemispheric focal cerebral dysfunction  Moderate diffuse/multi-focal cerebral dysfunction, not specific as to etiology.  There were no definitive seizures recorded.

## 2023-12-26 NOTE — PROGRESS NOTE ADULT - SUBJECTIVE AND OBJECTIVE BOX
Patient seen and examined at bedside.    --Anticoagulation--    T(C): 36.4 (12-26-23 @ 11:01), Max: 36.8 (12-26-23 @ 05:29)  HR: 90 (12-26-23 @ 11:01) (80 - 90)  BP: 143/89 (12-26-23 @ 11:01) (119/82 - 152/89)  RR: 18 (12-26-23 @ 11:01) (18 - 18)  SpO2: 97% (12-26-23 @ 11:01) (91% - 99%)  Wt(kg): --    Exam: EOV, no FC, nonverbal, spontaneous and purposeful x4

## 2023-12-26 NOTE — PROGRESS NOTE ADULT - PROBLEM SELECTOR PLAN 7
The patient herself has expressed her willingness to attempt any and all cancer therapies and treatments being offered.   - remains full code,   -Patient: Pulled out NGT while on wrist restraints. I spoke to patient's Sister Lashay [phone], explained that patient remains very high risk of aspiration on NGT due to agitation. Sister verbalizes understanding. PEG and pleasure feeds were both discussed, explained that both of these options have increased dylan of aspiration. Patient's sister would like to start pleasure feeds and understands the risk of aspiration. She also requests patient be seen by S&S. Started purred food with thickened fluid. At this time PEG tube is not in line with family/patient's GOC.  - S&S consulted  - palliative reconsulted The patient herself has expressed her willingness to attempt any and all cancer therapies and treatments being offered.   - remains full code,   -Patient: Pulled out NGT while on wrist restraints. I spoke to patient's Sister Lashay [phone], explained that patient remains very high risk of aspiration on NGT due to agitation. Sister verbalizes understanding. PEG and pleasure feeds were both discussed, explained that both of these options have increased dylan of aspiration. Patient's sister would like to start pleasure feeds and understands the risk of aspiration. She also requests patient be seen by S&S. Started purred food with thickened fluid. At this time PEG tube is not in line with family/patient's GOC.  - S&S consulted  - palliative reconsulted The patient herself has expressed her willingness to attempt any and all cancer therapies and treatments being offered.   - remains full code,   -Patient: Pulled out NGT while on wrist restraints. I spoke to patient's Sister Lashay [phone], explained that patient remains very high risk of aspiration on NGT due to agitation. Sister verbalizes understanding. PEG and pleasure feeds were both discussed, explained that both of these options have increased dylan of aspiration. Patient's sister would like to start pleasure feeds and understands the risk of aspiration. She also requests patient be seen by S&S. Started purred food with thickened fluid. At this time PEG tube is not in line with family/patient's GOC.    - 12/26: has extensive conversation with sisters over the phone: they want to take a day to have a final decision for comfort care vs aggressive medical care including PEG. For now c/w comfort feeds as above. Patient's family made aware that patient has not been eating given aphasia also not following commands. and without nutrition patient has prognosis of days to weeks, Family expresses understanding The patient herself has expressed her willingness to attempt any and all cancer therapies and treatments being offered.   - remains full code,   -Patient: Pulled out NGT while on wrist restraints. I spoke to patient's Sister Lashay [phone], explained that patient remains very high risk of aspiration on NGT due to agitation. Sister verbalizes understanding. PEG and pleasure feeds were both discussed, explained that both of these options have increased dylan of aspiration. Patient's sister would like to start pleasure feeds and understands the risk of aspiration. She also requests patient be seen by S&S. Started purred food with thickened fluid. At this time PEG tube is not in line with family/patient's GOC.    - 12/26: has extensive conversation with sisters over the phone: they want to take a day to have a final decision for comfort care vs aggressive medical care including PEG. For now c/w comfort feeds as above. Patient's family made aware that patient has not been eating given aphasia also not following commands. and without nutrition patient has prognosis of days to weeks, Family expresses understanding

## 2023-12-26 NOTE — PROGRESS NOTE ADULT - PROBLEM SELECTOR PLAN 2
AMS/behavioral changes in setting of progression of brain mets as well as clinical seizure  - AED management as above  - obtain MRI brain  - steroid as above AMS/behavioral changes in setting of progression of brain mets as well as clinical seizure  - AED management as above  - obtain MRI brain ([facility] fro 4PM), unclear if patient will tolerate without Anesthesia however will attempt w/o anesthesia prior first, family in agreement   - steroid as above AMS/behavioral changes in setting of progression of brain mets as well as clinical seizure  - AED management as above  - obtain MRI brain ([facility] fro 4PM), unclear if patient will tolerate without Anesthesia however will attempt w/o anesthesia prior first, family in agreement   - steroid as above

## 2023-12-26 NOTE — PROGRESS NOTE ADULT - NSPROGADDITIONALINFOA_GEN_ALL_CORE
d/w Medicine aCP Dolores d/w Medicine aCP Dolores    time spent reviewing prior charts, meds, discussing plan with patient= 90 min

## 2023-12-26 NOTE — PROGRESS NOTE ADULT - ASSESSMENT
Patient is a 52 year old female with PMH of stage 4 lung adenocarcinoma dx 2022 with diffuse mets to C/A/P and brain s/p Rt craniotomy for tumor resection, & multiple rounds of chemo & RT, steroid induced DM, GERD who presents to [place] ED for AMS, transferred to [facility] for further management found to have seizure, s/p initiation of AEDs and ICU monitor, now transferred to medicine floor. ID  consulted for antibiotics management.     Noted met sepsis criteria on admission  - was febrile to 101F with tachycardia on 12/19, last fever 12/20 pm 103F  - noted with episode of hypothermia overnight 12/22--now temps wnl  No clear infectious source was found, suspected possibly due to seizure  No leukocytosis     UA negative  Bcx negative x2   LE duplex negative for DVT  CTH with intraparenchymal metastatic lesions, dec vasogenic edema, R temporal craniectomy with encephalomalacia  CT C/A/P with no infectious pathology noted; has extensive metastatic liver disease  Neurology following for seizure management, MRI brain pending   Neurosurgery following, on steroids     s/p vancomycin and pip-tazo 12/19-12/21  remains afebrile, no leukocytosis, nontoxic appearing  No infectious source found to date   continue off antibiotics     Stable from ID standpoint at this time.  ID will sign off at this time but remains available for any further questions/concerns.    Olinda Acuna M.D.  [facility], Division of Infectious Diseases  [phone]  After 5pm on weekdays and all day on weekends - please call [phone]       Patient is a 52 year old female with PMH of stage 4 lung adenocarcinoma dx 2022 with diffuse mets to C/A/P and brain s/p Rt craniotomy for tumor resection, & multiple rounds of chemo & RT, steroid induced DM, GERD who presents to [place] ED for AMS, transferred to [facility] for further management found to have seizure, s/p initiation of AEDs and ICU monitor, now transferred to medicine floor. ID  consulted for antibiotics management.     Noted met sepsis criteria on admission  - was febrile to 101F with tachycardia on 12/19, last fever 12/20 pm 103F  - noted with episode of hypothermia overnight 12/22--now temps wnl  No clear infectious source was found, suspected possibly due to seizure  No leukocytosis     UA negative  Bcx negative x2   LE duplex negative for DVT  CTH with intraparenchymal metastatic lesions, dec vasogenic edema, R temporal craniectomy with encephalomalacia  CT C/A/P with no infectious pathology noted; has extensive metastatic liver disease  Neurology following for seizure management, MRI brain pending   Neurosurgery following, on steroids     s/p vancomycin and pip-tazo 12/19-12/21  remains afebrile, no leukocytosis, nontoxic appearing  No infectious source found to date   continue off antibiotics     Stable from ID standpoint at this time.  ID will sign off at this time but remains available for any further questions/concerns.    Olinda Acuna M.D.  [facility], Division of Infectious Diseases  [phone]  After 5pm on weekdays and all day on weekends - please call [phone]

## 2023-12-26 NOTE — PROGRESS NOTE ADULT - SUBJECTIVE AND OBJECTIVE BOX
[facility] DIVISION OF INFECTIOUS DISEASES  KELLEN Alonso Y. Patel, S. Shah, G. Gurjit  [phone]  ([phone] - weekdays after 5pm and weekends)    Name: HARJEET WHITMORE  Age/Gender: 52y Female  MRN: [id number]    Interval History:  Patient seen and examined this morning.   Resting comfortably, unable to obtain ROS.   Notes reviewed  No concerning overnight events  Afebrile   Allergies: No Known Allergies      Objective:  Vitals:   T(F): 98.3 (12-26-23 @ 05:29), Max: 98.3 (12-26-23 @ 05:29)  HR: 83 (12-26-23 @ 05:29) (80 - 89)  BP: 152/89 (12-26-23 @ 05:29) (119/82 - 152/89)  RR: 18 (12-26-23 @ 05:29) (18 - 18)  SpO2: 95% (12-26-23 @ 05:29) (91% - 99%)  Physical Examination:  General: no acute distress, nontoxic appearing   HEENT: NC/AT, anicteric, neck supple  Respiratory: no acc muscle use, breathing comfortably  Cardiovascular: S1 and S2 present  Gastrointestinal: soft, nontender, nondistended  Extremities: no LE edema, no cyanosis  Skin: no visible rash    Laboratory Studies:  CBC:                       10.2   5.47  )-----------( 161      ( 25 Dec 2023 10:54 )             34.0     WBC Trend:  5.47 12-25-23 @ 10:54  5.92 12-24-23 @ 06:59  5.90 12-23-23 @ 12:35  4.52 12-21-23 @ 17:02  3.87 12-21-23 @ 07:24  4.26 12-20-23 @ 20:01    CMP: 12-25    143  |  108  |  <4<L>  ----------------------------<  116<H>  3.7   |  22  |  0.33<L>    Ca    9.2      25 Dec 2023 10:54  Mg     1.4     12-25      Creatinine: 0.33 mg/dL (12-25-23 @ 10:54)  Creatinine: 0.33 mg/dL (12-24-23 @ 06:56)  Creatinine: 0.35 mg/dL (12-23-23 @ 12:35)  Creatinine: 0.39 mg/dL (12-21-23 @ 17:02)  Creatinine: 0.34 mg/dL (12-21-23 @ 07:26)  Creatinine: 0.37 mg/dL (12-20-23 @ 20:01)  Creatinine: <0.30 mg/dL (12-19-23 @ 22:23)    Microbiology: reviewed   Culture - Blood (collected 12-21-23 @ 15:30)  Source: .Blood Blood-Peripheral  Preliminary Report (12-25-23 @ 18:01):    No growth at 4 days    Culture - Blood (collected 12-20-23 @ 23:56)  Source: .Blood Blood  Final Report (12-26-23 @ 03:01):    No growth at 5 days    Radiology: reviewed     Medications:  acetaminophen     Tablet .. 650 milliGRAM(s) Oral every 6 hours PRN  acetaminophen  Suppository .. 650 milliGRAM(s) Rectal every 6 hours PRN  aluminum hydroxide/magnesium hydroxide/simethicone Suspension 30 milliLiter(s) Oral every 4 hours PRN  artificial tears (preservative free) Ophthalmic Solution 1 Drop(s) Both EYES three times a day  atorvastatin 40 milliGRAM(s) Oral at bedtime  chlorhexidine 4% Liquid 1 Application(s) Topical <User Schedule>  dexAMETHasone  Injectable 2 milliGRAM(s) IV Push every 12 hours  dextrose 5% + lactated ringers. 1000 milliLiter(s) IV Continuous <Continuous>  dextrose 5%. 1000 milliLiter(s) IV Continuous <Continuous>  dextrose 5%. 1000 milliLiter(s) IV Continuous <Continuous>  dextrose 50% Injectable 25 Gram(s) IV Push once  dextrose 50% Injectable 25 Gram(s) IV Push once  dextrose 50% Injectable 12.5 Gram(s) IV Push once  dextrose Oral Gel 15 Gram(s) Oral once PRN  glucagon  Injectable 1 milliGRAM(s) IntraMuscular once  insulin lispro (ADMELOG) corrective regimen sliding scale   SubCutaneous every 6 hours  lacosamide IVPB 200 milliGRAM(s) IV Intermittent every 12 hours  levETIRAcetam  IVPB 1500 milliGRAM(s) IV Intermittent every 12 hours  melatonin 3 milliGRAM(s) Oral at bedtime PRN  metoprolol tartrate Injectable 2.5 milliGRAM(s) IV Push every 12 hours  ondansetron Injectable 4 milliGRAM(s) IV Push every 8 hours PRN  pantoprazole  Injectable 40 milliGRAM(s) IV Push daily  petrolatum white Ointment 1 Application(s) Topical two times a day  polyethylene glycol 3350 17 Gram(s) Oral daily  potassium chloride  10 mEq/100 mL IVPB 10 milliEquivalent(s) IV Intermittent every 1 hour  senna 2 Tablet(s) Oral at bedtime PRN  valproate sodium  IVPB 250 milliGRAM(s) IV Intermittent every 6 hours    Prior/Completed Antimicrobials:  piperacillin/tazobactam IVPB...  vancomycin  IVPB  vancomycin  IVPB.   [facility] DIVISION OF INFECTIOUS DISEASES  KELLEN Alonso Y. Patel, S. Shah, G. Gurjit  [phone]  ([phone] - weekdays after 5pm and weekends)    Name: HARJEET WHITMORE  Age/Gender: 52y Female  MRN: [id number]    Interval History:  Patient seen and examined this morning.   Resting comfortably, unable to obtain ROS.   Notes reviewed  No concerning overnight events  Afebrile   Allergies: No Known Allergies      Objective:  Vitals:   T(F): 98.3 (12-26-23 @ 05:29), Max: 98.3 (12-26-23 @ 05:29)  HR: 83 (12-26-23 @ 05:29) (80 - 89)  BP: 152/89 (12-26-23 @ 05:29) (119/82 - 152/89)  RR: 18 (12-26-23 @ 05:29) (18 - 18)  SpO2: 95% (12-26-23 @ 05:29) (91% - 99%)  Physical Examination:  General: no acute distress, nontoxic appearing   HEENT: NC/AT, anicteric, neck supple  Respiratory: no acc muscle use, breathing comfortably  Cardiovascular: S1 and S2 present  Gastrointestinal: soft, nontender, nondistended  Extremities: no LE edema, no cyanosis  Skin: no visible rash    Laboratory Studies:  CBC:                       10.2   5.47  )-----------( 161      ( 25 Dec 2023 10:54 )             34.0     WBC Trend:  5.47 12-25-23 @ 10:54  5.92 12-24-23 @ 06:59  5.90 12-23-23 @ 12:35  4.52 12-21-23 @ 17:02  3.87 12-21-23 @ 07:24  4.26 12-20-23 @ 20:01    CMP: 12-25    143  |  108  |  <4<L>  ----------------------------<  116<H>  3.7   |  22  |  0.33<L>    Ca    9.2      25 Dec 2023 10:54  Mg     1.4     12-25      Creatinine: 0.33 mg/dL (12-25-23 @ 10:54)  Creatinine: 0.33 mg/dL (12-24-23 @ 06:56)  Creatinine: 0.35 mg/dL (12-23-23 @ 12:35)  Creatinine: 0.39 mg/dL (12-21-23 @ 17:02)  Creatinine: 0.34 mg/dL (12-21-23 @ 07:26)  Creatinine: 0.37 mg/dL (12-20-23 @ 20:01)  Creatinine: <0.30 mg/dL (12-19-23 @ 22:23)    Microbiology: reviewed   Culture - Blood (collected 12-21-23 @ 15:30)  Source: .Blood Blood-Peripheral  Preliminary Report (12-25-23 @ 18:01):    No growth at 4 days    Culture - Blood (collected 12-20-23 @ 23:56)  Source: .Blood Blood  Final Report (12-26-23 @ 03:01):    No growth at 5 days    Radiology: reviewed     Medications:  acetaminophen     Tablet .. 650 milliGRAM(s) Oral every 6 hours PRN  acetaminophen  Suppository .. 650 milliGRAM(s) Rectal every 6 hours PRN  aluminum hydroxide/magnesium hydroxide/simethicone Suspension 30 milliLiter(s) Oral every 4 hours PRN  artificial tears (preservative free) Ophthalmic Solution 1 Drop(s) Both EYES three times a day  atorvastatin 40 milliGRAM(s) Oral at bedtime  chlorhexidine 4% Liquid 1 Application(s) Topical <User Schedule>  dexAMETHasone  Injectable 2 milliGRAM(s) IV Push every 12 hours  dextrose 5% + lactated ringers. 1000 milliLiter(s) IV Continuous <Continuous>  dextrose 5%. 1000 milliLiter(s) IV Continuous <Continuous>  dextrose 5%. 1000 milliLiter(s) IV Continuous <Continuous>  dextrose 50% Injectable 25 Gram(s) IV Push once  dextrose 50% Injectable 25 Gram(s) IV Push once  dextrose 50% Injectable 12.5 Gram(s) IV Push once  dextrose Oral Gel 15 Gram(s) Oral once PRN  glucagon  Injectable 1 milliGRAM(s) IntraMuscular once  insulin lispro (ADMELOG) corrective regimen sliding scale   SubCutaneous every 6 hours  lacosamide IVPB 200 milliGRAM(s) IV Intermittent every 12 hours  levETIRAcetam  IVPB 1500 milliGRAM(s) IV Intermittent every 12 hours  melatonin 3 milliGRAM(s) Oral at bedtime PRN  metoprolol tartrate Injectable 2.5 milliGRAM(s) IV Push every 12 hours  ondansetron Injectable 4 milliGRAM(s) IV Push every 8 hours PRN  pantoprazole  Injectable 40 milliGRAM(s) IV Push daily  petrolatum white Ointment 1 Application(s) Topical two times a day  polyethylene glycol 3350 17 Gram(s) Oral daily  potassium chloride  10 mEq/100 mL IVPB 10 milliEquivalent(s) IV Intermittent every 1 hour  senna 2 Tablet(s) Oral at bedtime PRN  valproate sodium  IVPB 250 milliGRAM(s) IV Intermittent every 6 hours    Prior/Completed Antimicrobials:  piperacillin/tazobactam IVPB...  vancomycin  IVPB  vancomycin  IVPB.

## 2023-12-26 NOTE — PROGRESS NOTE ADULT - PROBLEM SELECTOR PLAN 1
Pt with clinical seizures (L facial twitching, RUE jerking). Transferred to ICU for close monitoring and AED management. Now stabilized on Vimpat and Keppra.  - vEEG without active seizure  - c/w Vimpat 200mg BID IV  - c/w Keppra 1500mg BID IV  - steroids per neurosurgery. On Decadron 2mg IV BID  - appreciate neurology recs: recommend MRI brain with and without contrast and VEEG  - monitor valproic acid level and albumin

## 2023-12-26 NOTE — CHART NOTE - NSCHARTNOTEFT_GEN_A_CORE
Nutrition Follow Up Note  Patient seen for: Malnutrition follow-up     Chart reviewed, events noted.    Source: [] Patient       [x] Medical Record        [x] RN        [] Family at bedside       [] Other:    -If unable to interview patient: [] Trach/Vent/BiPAP  [x] Disoriented/confused/inappropriate to interview    Diet Order:   Diet, Pureed:   Moderately Thick Liquids (MODTHICKLIQS) (23)    - Is current order appropriate/adequate? [x] Yes  []  No:     - PO intake :   [] >75%  Adequate    [] 50-75%  Fair       [x] <50%  Poor    - Nutrition-related concerns:  - Per RN patient eating 0% of meals, unable to even open mouth to eat. Pt had NGT in place but pulled out  before any feeds were initiated  - Speech eval  deferred, continues recommended for pureed/moderately thick liquids as pleasure feeds  ---> Per speech , PEG not in line with pt's goals of care, will monitor GOC as able. Palliative following  - Ordered for D5+lactated ringers for hydration per RN  - Hx T2DM, ordered for sliding scale insulin, A1C 7.7% ()  - Hypomagnesemic, ordered for Mg sulfate  - Ordered for potassium chloride    GI: Denies N/V, diarrhea/constipation per RN. Last BM  per chart.   Bowel Regimen? [x] Yes - miralax and senna  [] No  - Ordered for protonix, zofran, and aluminum hydroxide/mg hydroxide/simethicone suspension     Weights:   Daily Weight in k.8 (-21)  Drug Dosing Weight  Height (cm): 167 (21 Dec 2023 08:50)  Weight (kg): 49.8 (21 Dec 2023 08:50)  BMI (kg/m2): 17.9 (21 Dec 2023 08:50)  BSA (m2): 1.55 (21 Dec 2023 08:50)  "Wt trend (per Lamberto DE LA CRUZ): (12/15): 110 lbs; (): 119.6 lbs; (10/31): 128.1 lbs; (): 151 lbs  Wt loss trend noted; net loss of 41.4 lbs/27.4% x 8 mo, significant" per RD note     Nutritionally Pertinent MEDICATIONS  (STANDING):  atorvastatin  dexAMETHasone  Injectable  dextrose 5% + lactated ringers.  dextrose 5%.  dextrose 5%.  dextrose 50% Injectable  dextrose 50% Injectable  dextrose 50% Injectable  glucagon  Injectable  insulin lispro (ADMELOG) corrective regimen sliding scale  magnesium sulfate  IVPB  metoprolol tartrate Injectable  pantoprazole  Injectable  polyethylene glycol 3350  potassium chloride  10 mEq/100 mL IVPB    Pertinent Labs:  @ 10:25: Na 144, BUN <4<L>, Cr 0.32<L>, <H>, K+ 4.3, Phos 3.8, Mg 1.3<L>, Alk Phos --, ALT/SGPT --, AST/SGOT --, HbA1c --    A1C with Estimated Average Glucose Result: 7.7 % (23 @ 07:08)  A1C with Estimated Average Glucose Result: 8.0 % (23 @ 06:52)  A1C with Estimated Average Glucose Result: 7.3 % (23 @ 07:00)  A1C with Estimated Average Glucose Result: 7.2 % (23 @ 07:28)    Finger Sticks:  POCT Blood Glucose.: 123 mg/dL ( @ 12:19)  POCT Blood Glucose.: 96 mg/dL ( @ 06:36)  POCT Blood Glucose.: 122 mg/dL ( @ 00:23)  POCT Blood Glucose.: 101 mg/dL ( @ 17:22)    Skin per nursing documentation: Per flowsheet, no pressure injuries noted   Edema: (+2) bilateral arm    Estimated Needs:   [x] no change since previous assessment  Needs based on dosing wt 49.8 kg ()  Estimated kcal needs: 30-35 kcal/kg (9467-4676 kcal/day)  Estimated protein needs: 1.4-1.6 g pro/kg (70-80 g pro/day)  Fluid needs deferred to team.     Previous Nutrition Diagnosis: (1) Severe chronic malnutrition, (2) Underweight  Nutrition Diagnosis is: [x] ongoing - Being addressed with PO diet, oral nutrition supplements, micronutrients     New Nutrition Diagnosis: [x] Not applicable    Nutrition Care Plan:  [x] In Progress  [] Achieved  [] Not applicable    Nutrition Interventions:     Education Provided:       [] Yes:  [x] No: Not appropriate       Recommendations:       1) Continue current diet order, defer diet texture/fluid consistency to team, obtain and honor preferences as able   2) If warranted and within goals of care, EN regimen recommended: consider Glucerna 1.2 at GOAL rate 55ml/hr x 24 hrs. To provide (based on dosing wt 49.8kg): 1320ml, 1584kcal (31.8kcal/kg) and 79g protein (1.59g protein/kg)  ---> RD remains available to adjust EN regimen/formulary prn    3) Pt at high risk for refeeding syndrome; monitor potassium, magnesium, phosphorus, glucose and fluid balance closely. Continue thiamine and MVI; replete lytes as appropriate prior to initiating/advancing tube feeds.    4) Consider adding thiamine 100 mg daily secondary to poor PO intake/ malnutrition    5) Add multivitamin daily for micronutrient coverage pending no medical contraindications     Monitoring and Evaluation:   Continue to monitor nutritional intake, tolerance to diet prescription, weights, labs, skin integrity    RD remains available upon request and will follow up per protocol  Payton Aden RD - available on [facility] TEAMS Nutrition Follow Up Note  Patient seen for: Malnutrition follow-up     Chart reviewed, events noted.    Source: [] Patient       [x] Medical Record        [x] RN        [] Family at bedside       [] Other:    -If unable to interview patient: [] Trach/Vent/BiPAP  [x] Disoriented/confused/inappropriate to interview    Diet Order:   Diet, Pureed:   Moderately Thick Liquids (MODTHICKLIQS) (23)    - Is current order appropriate/adequate? [x] Yes  []  No:     - PO intake :   [] >75%  Adequate    [] 50-75%  Fair       [x] <50%  Poor    - Nutrition-related concerns:  - Per RN patient eating 0% of meals, unable to even open mouth to eat. Pt had NGT in place but pulled out  before any feeds were initiated  - Speech eval  deferred, continues recommended for pureed/moderately thick liquids as pleasure feeds  ---> Per speech , PEG not in line with pt's goals of care, will monitor GOC as able. Palliative following  - Ordered for D5+lactated ringers for hydration per RN  - Hx T2DM, ordered for sliding scale insulin, A1C 7.7% ()  - Hypomagnesemic, ordered for Mg sulfate  - Ordered for potassium chloride    GI: Denies N/V, diarrhea/constipation per RN. Last BM  per chart.   Bowel Regimen? [x] Yes - miralax and senna  [] No  - Ordered for protonix, zofran, and aluminum hydroxide/mg hydroxide/simethicone suspension     Weights:   Daily Weight in k.8 (-21)  Drug Dosing Weight  Height (cm): 167 (21 Dec 2023 08:50)  Weight (kg): 49.8 (21 Dec 2023 08:50)  BMI (kg/m2): 17.9 (21 Dec 2023 08:50)  BSA (m2): 1.55 (21 Dec 2023 08:50)  "Wt trend (per Lamberto DE LA CRUZ): (12/15): 110 lbs; (): 119.6 lbs; (10/31): 128.1 lbs; (): 151 lbs  Wt loss trend noted; net loss of 41.4 lbs/27.4% x 8 mo, significant" per RD note     Nutritionally Pertinent MEDICATIONS  (STANDING):  atorvastatin  dexAMETHasone  Injectable  dextrose 5% + lactated ringers.  dextrose 5%.  dextrose 5%.  dextrose 50% Injectable  dextrose 50% Injectable  dextrose 50% Injectable  glucagon  Injectable  insulin lispro (ADMELOG) corrective regimen sliding scale  magnesium sulfate  IVPB  metoprolol tartrate Injectable  pantoprazole  Injectable  polyethylene glycol 3350  potassium chloride  10 mEq/100 mL IVPB    Pertinent Labs:  @ 10:25: Na 144, BUN <4<L>, Cr 0.32<L>, <H>, K+ 4.3, Phos 3.8, Mg 1.3<L>, Alk Phos --, ALT/SGPT --, AST/SGOT --, HbA1c --    A1C with Estimated Average Glucose Result: 7.7 % (23 @ 07:08)  A1C with Estimated Average Glucose Result: 8.0 % (23 @ 06:52)  A1C with Estimated Average Glucose Result: 7.3 % (23 @ 07:00)  A1C with Estimated Average Glucose Result: 7.2 % (23 @ 07:28)    Finger Sticks:  POCT Blood Glucose.: 123 mg/dL ( @ 12:19)  POCT Blood Glucose.: 96 mg/dL ( @ 06:36)  POCT Blood Glucose.: 122 mg/dL ( @ 00:23)  POCT Blood Glucose.: 101 mg/dL ( @ 17:22)    Skin per nursing documentation: Per flowsheet, no pressure injuries noted   Edema: (+2) bilateral arm    Estimated Needs:   [x] no change since previous assessment  Needs based on dosing wt 49.8 kg ()  Estimated kcal needs: 30-35 kcal/kg (1794-1764 kcal/day)  Estimated protein needs: 1.4-1.6 g pro/kg (70-80 g pro/day)  Fluid needs deferred to team.     Previous Nutrition Diagnosis: (1) Severe chronic malnutrition, (2) Underweight  Nutrition Diagnosis is: [x] ongoing - Being addressed with PO diet, oral nutrition supplements, micronutrients     New Nutrition Diagnosis: [x] Not applicable    Nutrition Care Plan:  [x] In Progress  [] Achieved  [] Not applicable    Nutrition Interventions:     Education Provided:       [] Yes:  [x] No: Not appropriate       Recommendations:       1) Continue current diet order, defer diet texture/fluid consistency to team, obtain and honor preferences as able   2) If warranted and within goals of care, EN regimen recommended: consider Glucerna 1.2 at GOAL rate 55ml/hr x 24 hrs. To provide (based on dosing wt 49.8kg): 1320ml, 1584kcal (31.8kcal/kg) and 79g protein (1.59g protein/kg)  ---> RD remains available to adjust EN regimen/formulary prn    3) Pt at high risk for refeeding syndrome; monitor potassium, magnesium, phosphorus, glucose and fluid balance closely. Continue thiamine and MVI; replete lytes as appropriate prior to initiating/advancing tube feeds.    4) Consider adding thiamine 100 mg daily secondary to poor PO intake/ malnutrition    5) Add multivitamin daily for micronutrient coverage pending no medical contraindications     Monitoring and Evaluation:   Continue to monitor nutritional intake, tolerance to diet prescription, weights, labs, skin integrity    RD remains available upon request and will follow up per protocol  Payton Aden RD - available on [facility] TEAMS

## 2023-12-26 NOTE — PROGRESS NOTE ADULT - ASSESSMENT
52F with hx of stage 4 lung adenocarcinoma dx 2022 with diffuse mets to C/A/P and brain s/p Rt craniotomy for tumor resection, & multiple rounds of chemo & RT, steroid induced DM, GERD who presents to [place] ED for AMS, transferred to [facility] for further management found to have seizure, s/p initiation of AEDs and ICU monitor, now transferred to medicine floor 52F with hx of stage 4 lung adenocarcinoma dx 2022 with diffuse mets to C/A/P and brain s/p Rt craniotomy for tumor resection, & multiple rounds of chemo & RT, steroid induced DM, GERD who presents to [place] ED for AMS, transferred to [facility] for further management found to have seizure, s/p initiation of AEDs and ICU monitor, now transferred to medicine floor

## 2023-12-26 NOTE — PROGRESS NOTE ADULT - ASSESSMENT
52F no AC/AP (prior DVT since resolved, not d/c on any acap) hx stage IV NSCLC w/ diffuse mets throughout C/A/P and to brain s/p R crani for tumor rsxn (09/2023), as well as mult rounds of chemo and RT, most recent RT fractions June 2023. Is on cycle 2 of Carbo/Paclitaxel and Bevacizumab with Atezolizumab. Presented to ED as xfer VS w/ AMS and febrile to 101-102 (now resolved). CTH w/ increased conspicuity of L posterior frontal and R parietal lesions, likely 2/2 hemorrhage (HU ~50-60) c/t 10/28 CTH. Exam: EOV, regards, nonverbal, no FC, NEWTON spont.   - MRI w/wo pending. May require sedation/anesthesia. Please page neurosurgery [phone] once completed.  - AEDs per neurology. Stabilized on Vimpat and Keppra   - Continue Dex 2BID  52F no AC/AP (prior DVT since resolved, not d/c on any acap) hx stage IV NSCLC w/ diffuse mets throughout C/A/P and to brain s/p R crani for tumor rsxn (09/2023), as well as mult rounds of chemo and RT, most recent RT fractions June 2023. Is on cycle 2 of Carbo/Paclitaxel and Bevacizumab with Atezolizumab. Presented to ED as xfer VS w/ AMS and febrile to 101-102 (now resolved). CTH w/ increased conspicuity of L posterior frontal and R parietal lesions, likely 2/2 hemorrhage (HU ~50-60) c/t 10/28 CTH. Exam: EOV, regards, nonverbal, no FC, NEWTON spont.   - MRI w/wo pending. May require sedation/anesthesia. Please page neurosurgery [phone] once completed.  - AEDs per neurology. Stabilized on Vimpat and Keppra   - Continue Dex 2BID

## 2023-12-26 NOTE — PROGRESS NOTE ADULT - ASSESSMENT
Patient HARJEET WHITMORE is a 52y (1970) woman with a PMHx significant for smoking, stage 4 lung adenocarcinoma dx 2022 with diffuse mets to C/A/P and brain s/p Rt craniotomy for tumor resection, & multiple rounds of chemo & RT, steroid induced DM, GERD who presents to [place] ED for AMS. Per chart review pt had nonsensical speech and c/o HA & R sided weakness. Agitated, violent. CTH as noted. febrile at VS and started on vancomycin, zosyn. Rapid RVP negative. Pt ultimately transferred to [facility] for continued care given pt known to heme-onc & neuro-surg at [facility]. She is found to have tropinemia, hypokalemia, severe hypocalcemia, hypoalbuminemia   Neurology consulted for seizure concern.     Impression: Acute behavioral changes i/s/o brain metastases. Found to have clinical seizures (L facial twitching, RUE jerking).   12/26: Patient is alert but does not follow commands or participate in neurological exam. Some inconsistent Left shoulder movements    Recommendations  [] vEEG to evaluate for focal slowing, epileptiform discharges, or seizures  [] c/w Vimpat 200mg BID IV  [] c/w Keppra 1500mg BID IV  [] continue VPA 250mg q6h IV   [] VPA level 12/26 (67), pending albumin level.   [] steroids per neurosurgery. On Decadron 2mg IVP q12h  [] MRI brain with and without contrast is pending  [] please monitor valproic acid level and albumin  [] Tox screen, TSH 0.18 (low), Free T4 nl, B1, B6, B12 nl, ammonia, paraneoplastic panel  [] will continue to follow    Plans discussed with neurology attending, Dr. Lopez Patient HARJEET WHITMORE is a 52y (1970) woman with a PMHx significant for smoking, stage 4 lung adenocarcinoma dx 2022 with diffuse mets to C/A/P and brain s/p Rt craniotomy for tumor resection, & multiple rounds of chemo & RT, steroid induced DM, GERD who presents to [place] [facility] for AMS. Per chart review pt had nonsensical speech and c/o HA & R sided weakness. Agitated, violent. CTH as noted. febrile at VS and started on vancomycin, zosyn. Rapid RVP negative. Pt ultimately transferred to [facility] for continued care given pt known to heme-onc & neuro-surg at [facility]. She is found to have tropinemia, hypokalemia, severe hypocalcemia, hypoalbuminemia   Neurology consulted for seizure concern.     Impression: Acute behavioral changes i/s/o brain metastases. Found to have clinical seizures (L facial twitching, RUE jerking).   12/26: Patient is alert but does not follow commands or participate in neurological exam. Some inconsistent Left shoulder movements    Recommendations  [] vEEG to evaluate for focal slowing, epileptiform discharges, or seizures  [] c/w Vimpat 200mg BID IV  [] c/w Keppra 1500mg BID IV  [] continue VPA 250mg q6h IV   [] VPA level 12/26 (67), pending albumin level.   [] steroids per neurosurgery. On Decadron 2mg IVP q12h  [] MRI brain with and without contrast is pending  [] please monitor valproic acid level and albumin  [] Tox screen, TSH 0.18 (low), Free T4 nl, B1, B6, B12 nl, ammonia, paraneoplastic panel  [] will continue to follow    Plans discussed with neurology attending, Dr. Lopez Patient HARJEET WHITMORE is a 52y (1970) woman with a PMHx significant for smoking, stage 4 lung adenocarcinoma dx 2022 with diffuse mets to C/A/P and brain s/p Rt craniotomy for tumor resection, & multiple rounds of chemo & RT, steroid induced DM, GERD who presents to [place] ED for AMS. Per chart review pt had nonsensical speech and c/o HA & R sided weakness. Agitated, violent. CTH as noted. febrile at VS and started on vancomycin, zosyn. Rapid RVP negative. Pt ultimately transferred to [facility] for continued care given pt known to heme-onc & neuro-surg at [facility]. She is found to have tropinemia, hypokalemia, severe hypocalcemia, hypoalbuminemia   Neurology consulted for seizure concern.     Impression: Acute behavioral changes i/s/o brain metastases. Found to have clinical seizures (L facial twitching, RUE jerking).   12/26: Patient is alert but does not follow commands or participate in neurological exam. Some inconsistent Left shoulder movements    Recommendations  [] vEEG to evaluate for focal slowing, epileptiform discharges, or seizures  [] c/w Vimpat 200mg BID IV  [] c/w Keppra 1500mg BID IV  [] continue VPA 250mg q6h IV   [] VPA level 12/26 (67), pending albumin level.   [] steroids per neurosurgery. On Decadron 2mg IVP q12h  [] MRI brain with and without contrast is pending  [] please monitor valproic acid level and albumin  [] Tox screen, TSH 0.18 (low), Free T4 nl, B1, B6, B12 nl, ammonia, paraneoplastic panel  [] Plans discussed with primary team  [] Given concern for seizure, advise the patient with regards to risks and driving privileges associated with the [place] Guidelines. Advise patient regarding the risk of seizures and general seizure safety recommendations including not to be bathing alone, climbing to high places and operating heavy machinery, until cleared by follow-up outpatient Neurology. Reinforce the importance of compliance with medications. Discuss sleep hygiene and the risks of sleep disruption. Discuss the risk of death associated with seizures / SUDEP.  [] Please note: if patient has a convulsion, please document length of episode, specifically what patient was doing paying attention to eye opening vs closure, gaze deviation, shaking of extremities, tongue bite, urinary incontinence, any derangement of vital signs.  [] will continue to follow    Plans discussed with neurology attending, Dr. Lopez Patient HARJEET WHITMORE is a 52y (1970) woman with a PMHx significant for smoking, stage 4 lung adenocarcinoma dx 2022 with diffuse mets to C/A/P and brain s/p Rt craniotomy for tumor resection, & multiple rounds of chemo & RT, steroid induced DM, GERD who presents to [place] ED for AMS. Per chart review pt had nonsensical speech and c/o HA & R sided weakness. Agitated, violent. CTH as noted. febrile at VS and started on vancomycin, zosyn. Rapid RVP negative. Pt ultimately transferred to [facility] for continued care given pt known to heme-onc & neuro-surg at [facility]. She is found to have tropinemia, hypokalemia, severe hypocalcemia, hypoalbuminemia   Neurology consulted for seizure concern.     Impression: Acute behavioral changes i/s/o brain metastases. Found to have clinical seizures (L facial twitching, RUE jerking).   12/26: Patient is alert but does not follow commands or participate in neurological exam. Some inconsistent Left shoulder movements    Recommendations  [] vEEG to evaluate for focal slowing, epileptiform discharges, or seizures  [] c/w Vimpat 200mg BID IV  [] c/w Keppra 1500mg BID IV  [] continue VPA 250mg q6h IV   [] VPA level 12/26 (67), pending albumin level.   [] steroids per neurosurgery. On Decadron 2mg IVP q12h  [] MRI brain with and without contrast is pending  [] please monitor valproic acid level and albumin  [] Tox screen, TSH 0.18 (low), Free T4 nl, B1, B6, B12 nl, ammonia, paraneoplastic panel  [] Plans discussed with primary team  [] Given concern for seizure, advise the patient with regards to risks and driving privileges associated with the [place] Guidelines. Advise patient regarding the risk of seizures and general seizure safety recommendations including not to be bathing alone, climbing to high places and operating heavy machinery, until cleared by follow-up outpatient Neurology. Reinforce the importance of compliance with medications. Discuss sleep hygiene and the risks of sleep disruption. Discuss the risk of death associated with seizures / SUDEP.  [] Please note: if patient has a convulsion, please document length of episode, specifically what patient was doing paying attention to eye opening vs closure, gaze deviation, shaking of extremities, tongue bite, urinary incontinence, any derangement of vital signs.  [] will continue to follow    Plans discussed with neurology attending, Dr. Lopez

## 2023-12-27 NOTE — PROGRESS NOTE ADULT - SUBJECTIVE AND OBJECTIVE BOX
SUBJECTIVE AND OBJECTIVE: Patient seen and examined at bedside. patient obtunded on exam, unable to participate   Indication for Geriatrics and Palliative Care Services/INTERVAL HPI: GOC     OVERNIGHT EVENTS: Pt increasingly agitated overnight requiring IVP Ativan     DNR on chart:Yes  Yes      Allergies    No Known Allergies    Intolerances    MEDICATIONS  (STANDING):  artificial tears (preservative free) Ophthalmic Solution 1 Drop(s) Both EYES three times a day  chlorhexidine 4% Liquid 1 Application(s) Topical <User Schedule>  lacosamide IVPB 200 milliGRAM(s) IV Intermittent every 12 hours  levETIRAcetam  IVPB 1500 milliGRAM(s) IV Intermittent every 12 hours  pantoprazole  Injectable 40 milliGRAM(s) IV Push daily  petrolatum white Ointment 1 Application(s) Topical two times a day  polyethylene glycol 3350 17 Gram(s) Oral daily  valproate sodium  IVPB 250 milliGRAM(s) IV Intermittent every 6 hours    MEDICATIONS  (PRN):  acetaminophen     Tablet .. 650 milliGRAM(s) Oral every 6 hours PRN Temp greater or equal to 38C (100.4F), Mild Pain (1 - 3)  acetaminophen  Suppository .. 650 milliGRAM(s) Rectal every 6 hours PRN Temp greater or equal to 38C (100.4F), Moderate Pain (4 - 6)  aluminum hydroxide/magnesium hydroxide/simethicone Suspension 30 milliLiter(s) Oral every 4 hours PRN Dyspepsia  glycopyrrolate Injectable 0.4 milliGRAM(s) IV Push every 6 hours PRN excessive, audible, secretions  LORazepam   Injectable 0.5 milliGRAM(s) IV Push every 2 hours PRN Anxiety, agitation, delirium  melatonin 3 milliGRAM(s) Oral at bedtime PRN Insomnia  morphine  - Injectable 1 milliGRAM(s) IV Push every 2 hours PRN Moderate Pain (4 - 6)  morphine  - Injectable 2 milliGRAM(s) IV Push every 2 hours PRN Severe Pain (7 - 10)  morphine  - Injectable 2 milliGRAM(s) IV Push every 2 hours PRN Dyspnea  ondansetron Injectable 4 milliGRAM(s) IV Push every 8 hours PRN Nausea and/or Vomiting  senna 2 Tablet(s) Oral at bedtime PRN Constipation      ITEMS UNCHECKED ARE NOT PRESENT    PRESENT SYMPTOMS: [x ]Unable to self-report - see [ ] CPOT [ ] PAINADS [ ] RDOS  Source if other than patient:  [ ]Family   [x ]Team     Pain:  [ ]yes [ ]no  QOL impact -   Location -                    Aggravating factors -  Quality -  Radiation -  Timing-  Severity (0-10 scale):  Minimal acceptable level (0-10 scale):     CPOT:    https://www.[facility].org/getattachment/[email or web address]/Critical-Care-Pain-Observation-Tool-(CPOT)    PAINAD Score: See PAINAD tool and score below       Dyspnea:                           [ ]Mild [ ]Moderate [ ]Severe    RDOS: See RDOS tool and score below   0 to 2  minimal or no respiratory distress   3  mild distress  4 to 6 moderate distress  >7 severe distress      Anxiety:                             [ ]Mild [ ]Moderate [ ]Severe  Fatigue:                             [ ]Mild [ ]Moderate [ ]Severe  Nausea:                             [ ]Mild [ ]Moderate [ ]Severe  Loss of appetite:              [ ]Mild [ ]Moderate [ ]Severe  Constipation:                    [ ]Mild [ ]Moderate [ ]Severe    PCSSQ[Palliative Care Spiritual Screening Question]   Severity (0-10):  Score of 4 or > indicate consideration of Chaplaincy referral.  Chaplaincy Referral: [x ] yes [ ] refused [ x] following [ ] Deferred     Caregiver [place]? : [ ] yes [ ] no [x ] Deferred [ ] Declined             Social work referral [ ] Patient & Family Centered Care Referral [ ]     Anticipatory Grief present?:  [x ] yes [ ] no  [ ] Deferred                  Social work referral [ ] Chaplaincy Referral [x ]    		  Other Symptoms:  [ x]All other review of systems negative- pt obtunded, unable to participate on exam     Palliative Performance Status Version 2:   See PPSv2 tool and score below         PHYSICAL EXAM:  Vital Signs Last 24 Hrs  T(C): 36.3 (27 Dec 2023 08:25), Max: 36.7 (26 Dec 2023 19:39)  T(F): 97.4 (27 Dec 2023 08:25), Max: 98 (26 Dec 2023 19:39)  HR: 88 (27 Dec 2023 08:25) (60 - 98)  BP: 130/66 (27 Dec 2023 08:25) (128/78 - 130/66)  BP(mean): --  RR: 18 (27 Dec 2023 08:25) (18 - 18)  SpO2: 96% (27 Dec 2023 08:25) (96% - 97%)    Parameters below as of 27 Dec 2023 08:25  Patient On (Oxygen Delivery Method): room air     I&O's Summary    26 Dec 2023 07:01  -  27 Dec 2023 07:00  --------------------------------------------------------  IN: 125 mL / OUT: 200 mL / NET: -75 mL       GENERAL: [ ]Cachexia    [ ]Alert  [ ]Oriented x   [ ]Lethargic  [x ]Unarousable  [ ]Verbal  [ ]Non-Verbal  Behavioral:   [ ]Anxiety  [ ]Delirium [ ]Agitation [ ]Other  HEENT:  [ ]Normal  [x ]Dry mouth   [ ]ET Tube/Trach  [ ]Oral lesions  PULMONARY:   [ ]Clear [ ]Tachypnea  [ ]Audible excessive secretions   [ ]Rhonchi        [ ]Right [ ]Left [ ]Bilateral  [ ]Crackles        [ ]Right [ ]Left [ ]Bilateral  [ ]Wheezing     [ ]Right [ ]Left [ ]Bilateral  [x ]Diminished BS [ ] Right [ ]Left [x ]Bilateral  CARDIOVASCULAR:    [x ]Regular [ ]Irregular [ ]Tachy  [ ]Austin [ ]Murmur [ ]Other  GASTROINTESTINAL:  [ x]Soft  [ ]Distended   [x ]+BS  x ]Non tender [ ]Tender  [ ]Other [ ]PEG [ ]OGT/ NGT   Last BM:   GENITOURINARY:  [ ]Normal [ x]Incontinent   [ ]Oliguria/Anuria   [ ]Mas  MUSCULOSKELETAL:   [ ]Normal   [ x]Weakness  [x ]Bed/Wheelchair bound [ ]Edema  NEUROLOGIC:   [ ]No focal deficits  [ x] Cognitive impairment  [ ] Dysphagia [ ]Dysarthria [ ] Paresis [ ]Other   SKIN:   [ ]Normal  [ ]Rash  [ ]Other  [ ]Pressure ulcer(s) [ ]y [ ]n present on admission    CRITICAL CARE:  [ ]Shock Present  [ ]Septic [ ]Cardiogenic [ ]Neurologic [ ]Hypovolemic  [ ]Vasopressors [ ]Inotropes  [ ]Respiratory failure present [ ]Mechanical Ventilation [ ]Non-invasive ventilatory support [ ]High-Flow   [ ]Acute  [ ]Chronic [ ]Hypoxic  [ ]Hypercarbic [ ]Other  [ ]Other organ failure     LABS:                        9.6    7.48  )-----------( 132      ( 27 Dec 2023 10:32 )             31.7   12-27    143  |  109<H>  |  4<L>  ----------------------------<  107<H>  3.5   |  25  |  0.33<L>    Ca    8.8      27 Dec 2023 10:37  Phos  3.8     12-26  Mg     1.3     12-26    TPro  6.7  /  Alb  2.8<L>  /  TBili  0.3  /  DBili  x   /  AST  42<H>  /  ALT  14  /  AlkPhos  84  12-27      Urinalysis Basic - ( 27 Dec 2023 10:37 )    Color: x / Appearance: x / SG: x / pH: x  Gluc: 107 mg/dL / Ketone: x  / Bili: x / Urobili: x   Blood: x / Protein: x / Nitrite: x   Leuk Esterase: x / RBC: x / WBC x   Sq Epi: x / Non Sq Epi: x / Bacteria: x      RADIOLOGY & ADDITIONAL STUDIES:    Protein Calorie Malnutrition Present: [ ]mild [ ]moderate [ ]severe [ ]underweight [ ]morbid obesity  https://www.andeal.org/vault/2440/web/files/ONC/Table_Clinical%20Characteristics%20to%20Document%20Malnutrition-White%20JV%20et%20al%202012.pdf    Height (cm): 167 (12-21-23 @ 08:50), 167 (11-28-23 @ 08:43), 167.6 (11-08-23 @ 18:29)  Weight (kg): 49.8 (12-21-23 @ 08:50), 49.9 (12-14-23 @ 11:00), 54.8 (11-08-23 @ 18:29)  BMI (kg/m2): 17.9 (12-21-23 @ 08:50), 17.9 (12-14-23 @ 11:00), 19.6 (11-28-23 @ 08:43)    [x]PPSV2 < or = 30%  [ ]significant weight loss [ ]poor nutritional intake [ ]anasarca[ ]Artificial Nutrition    Other REFERRALS:  [ ]Hospice  [ ]Child Life  [ ]Social Work  [ ]Case management [ ]Holistic Therapy     Goals of Care Document:            SUBJECTIVE AND OBJECTIVE: Patient seen and examined at bedside. patient obtunded on exam, unable to participate   Indication for Geriatrics and Palliative Care Services/INTERVAL HPI: GOC     OVERNIGHT EVENTS: Pt increasingly agitated overnight requiring IVP Ativan     DNR on chart:Yes  Yes      Allergies    No Known Allergies    Intolerances    MEDICATIONS  (STANDING):  artificial tears (preservative free) Ophthalmic Solution 1 Drop(s) Both EYES three times a day  chlorhexidine 4% Liquid 1 Application(s) Topical <User Schedule>  lacosamide IVPB 200 milliGRAM(s) IV Intermittent every 12 hours  levETIRAcetam  IVPB 1500 milliGRAM(s) IV Intermittent every 12 hours  pantoprazole  Injectable 40 milliGRAM(s) IV Push daily  petrolatum white Ointment 1 Application(s) Topical two times a day  polyethylene glycol 3350 17 Gram(s) Oral daily  valproate sodium  IVPB 250 milliGRAM(s) IV Intermittent every 6 hours    MEDICATIONS  (PRN):  acetaminophen     Tablet .. 650 milliGRAM(s) Oral every 6 hours PRN Temp greater or equal to 38C (100.4F), Mild Pain (1 - 3)  acetaminophen  Suppository .. 650 milliGRAM(s) Rectal every 6 hours PRN Temp greater or equal to 38C (100.4F), Moderate Pain (4 - 6)  aluminum hydroxide/magnesium hydroxide/simethicone Suspension 30 milliLiter(s) Oral every 4 hours PRN Dyspepsia  glycopyrrolate Injectable 0.4 milliGRAM(s) IV Push every 6 hours PRN excessive, audible, secretions  LORazepam   Injectable 0.5 milliGRAM(s) IV Push every 2 hours PRN Anxiety, agitation, delirium  melatonin 3 milliGRAM(s) Oral at bedtime PRN Insomnia  morphine  - Injectable 1 milliGRAM(s) IV Push every 2 hours PRN Moderate Pain (4 - 6)  morphine  - Injectable 2 milliGRAM(s) IV Push every 2 hours PRN Severe Pain (7 - 10)  morphine  - Injectable 2 milliGRAM(s) IV Push every 2 hours PRN Dyspnea  ondansetron Injectable 4 milliGRAM(s) IV Push every 8 hours PRN Nausea and/or Vomiting  senna 2 Tablet(s) Oral at bedtime PRN Constipation      ITEMS UNCHECKED ARE NOT PRESENT    PRESENT SYMPTOMS: [x ]Unable to self-report - see [ ] CPOT [ ] PAINADS [ ] RDOS  Source if other than patient:  [ ]Family   [x ]Team     Pain:  [ ]yes [ ]no  QOL impact -   Location -                    Aggravating factors -  Quality -  Radiation -  Timing-  Severity (0-10 scale):  Minimal acceptable level (0-10 scale):     CPOT:    https://www.[facility].org/getattachment/[email or web address]/Critical-Care-Pain-Observation-Tool-(CPOT)    PAINAD Score: See PAINAD tool and score below       Dyspnea:                           [ ]Mild [ ]Moderate [ ]Severe    RDOS: See RDOS tool and score below   0 to 2  minimal or no respiratory distress   3  mild distress  4 to 6 moderate distress  >7 severe distress      Anxiety:                             [ ]Mild [ ]Moderate [ ]Severe  Fatigue:                             [ ]Mild [ ]Moderate [ ]Severe  Nausea:                             [ ]Mild [ ]Moderate [ ]Severe  Loss of appetite:              [ ]Mild [ ]Moderate [ ]Severe  Constipation:                    [ ]Mild [ ]Moderate [ ]Severe    PCSSQ[Palliative Care Spiritual Screening Question]   Severity (0-10):  Score of 4 or > indicate consideration of Chaplaincy referral.  Chaplaincy Referral: [x ] yes [ ] refused [ x] following [ ] Deferred     Caregiver [place]? : [ ] yes [ ] no [x ] Deferred [ ] Declined             Social work referral [ ] Patient & Family Centered Care Referral [ ]     Anticipatory Grief present?:  [x ] yes [ ] no  [ ] Deferred                  Social work referral [ ] Chaplaincy Referral [x ]    		  Other Symptoms:  [ x]All other review of systems negative- pt obtunded, unable to participate on exam     Palliative Performance Status Version 2:   See PPSv2 tool and score below         PHYSICAL EXAM:  Vital Signs Last 24 Hrs  T(C): 36.3 (27 Dec 2023 08:25), Max: 36.7 (26 Dec 2023 19:39)  T(F): 97.4 (27 Dec 2023 08:25), Max: 98 (26 Dec 2023 19:39)  HR: 88 (27 Dec 2023 08:25) (60 - 98)  BP: 130/66 (27 Dec 2023 08:25) (128/78 - 130/66)  BP(mean): --  RR: 18 (27 Dec 2023 08:25) (18 - 18)  SpO2: 96% (27 Dec 2023 08:25) (96% - 97%)    Parameters below as of 27 Dec 2023 08:25  Patient On (Oxygen Delivery Method): room air     I&O's Summary    26 Dec 2023 07:01  -  27 Dec 2023 07:00  --------------------------------------------------------  IN: 125 mL / OUT: 200 mL / NET: -75 mL       GENERAL: [ ]Cachexia    [ ]Alert  [ ]Oriented x   [ ]Lethargic  [x ]Unarousable  [ ]Verbal  [ ]Non-Verbal  Behavioral:   [ ]Anxiety  [ ]Delirium [ ]Agitation [ ]Other  HEENT:  [ ]Normal  [x ]Dry mouth   [ ]ET Tube/Trach  [ ]Oral lesions  PULMONARY:   [ ]Clear [ ]Tachypnea  [ ]Audible excessive secretions   [ ]Rhonchi        [ ]Right [ ]Left [ ]Bilateral  [ ]Crackles        [ ]Right [ ]Left [ ]Bilateral  [ ]Wheezing     [ ]Right [ ]Left [ ]Bilateral  [x ]Diminished BS [ ] Right [ ]Left [x ]Bilateral  CARDIOVASCULAR:    [x ]Regular [ ]Irregular [ ]Tachy  [ ]Austin [ ]Murmur [ ]Other  GASTROINTESTINAL:  [ x]Soft  [ ]Distended   [x ]+BS  x ]Non tender [ ]Tender  [ ]Other [ ]PEG [ ]OGT/ NGT   Last BM:   GENITOURINARY:  [ ]Normal [ x]Incontinent   [ ]Oliguria/Anuria   [ ]Mas  MUSCULOSKELETAL:   [ ]Normal   [ x]Weakness  [x ]Bed/Wheelchair bound [ ]Edema  NEUROLOGIC:   [ ]No focal deficits  [ x] Cognitive impairment  [ ] Dysphagia [ ]Dysarthria [ ] Paresis [ ]Other   SKIN:   [ ]Normal  [ ]Rash  [ ]Other  [ ]Pressure ulcer(s) [ ]y [ ]n present on admission    CRITICAL CARE:  [ ]Shock Present  [ ]Septic [ ]Cardiogenic [ ]Neurologic [ ]Hypovolemic  [ ]Vasopressors [ ]Inotropes  [ ]Respiratory failure present [ ]Mechanical Ventilation [ ]Non-invasive ventilatory support [ ]High-Flow   [ ]Acute  [ ]Chronic [ ]Hypoxic  [ ]Hypercarbic [ ]Other  [ ]Other organ failure     LABS:                        9.6    7.48  )-----------( 132      ( 27 Dec 2023 10:32 )             31.7   12-27    143  |  109<H>  |  4<L>  ----------------------------<  107<H>  3.5   |  25  |  0.33<L>    Ca    8.8      27 Dec 2023 10:37  Phos  3.8     12-26  Mg     1.3     12-26    TPro  6.7  /  Alb  2.8<L>  /  TBili  0.3  /  DBili  x   /  AST  42<H>  /  ALT  14  /  AlkPhos  84  12-27      Urinalysis Basic - ( 27 Dec 2023 10:37 )    Color: x / Appearance: x / SG: x / pH: x  Gluc: 107 mg/dL / Ketone: x  / Bili: x / Urobili: x   Blood: x / Protein: x / Nitrite: x   Leuk Esterase: x / RBC: x / WBC x   Sq Epi: x / Non Sq Epi: x / Bacteria: x      RADIOLOGY & ADDITIONAL STUDIES:    Protein Calorie Malnutrition Present: [ ]mild [ ]moderate [ ]severe [ ]underweight [ ]morbid obesity  https://www.andeal.org/vault/2440/web/files/ONC/Table_Clinical%20Characteristics%20to%20Document%20Malnutrition-White%20JV%20et%20al%202012.pdf    Height (cm): 167 (12-21-23 @ 08:50), 167 (11-28-23 @ 08:43), 167.6 (11-08-23 @ 18:29)  Weight (kg): 49.8 (12-21-23 @ 08:50), 49.9 (12-14-23 @ 11:00), 54.8 (11-08-23 @ 18:29)  BMI (kg/m2): 17.9 (12-21-23 @ 08:50), 17.9 (12-14-23 @ 11:00), 19.6 (11-28-23 @ 08:43)    [x]PPSV2 < or = 30%  [ ]significant weight loss [ ]poor nutritional intake [ ]anasarca[ ]Artificial Nutrition    Other REFERRALS:  [ ]Hospice  [ ]Child Life  [ ]Social Work  [ ]Case management [ ]Holistic Therapy     Goals of Care Document:

## 2023-12-27 NOTE — PROGRESS NOTE ADULT - ASSESSMENT
52y F PMH of smoking, stage 4 lung adenocarcinoma with diffuse mets to C/A/P and brain s/p Rt craniotomy for tumor resection, & multiple rounds of chemo & RT, steroid induced DM, GERD who presents to [place] ED for AMS. Per chart review pt had nonsensical speech and c/o HA & R sided weakness so was taken to ED. Patient was reportedly very agitated and violent at [place] ED. Code stroke was called.  Patient required sedation w/ benadryl, ativan and versed for agitation in order to obtain CTH.  CTH at [facility] shows increased conspicuity of L posterior frontal and R parietal lesions, likely 2/2 hemorrhage (HU ~50-60) c/t 10/28 CTH. Further eval limited by motion artifact Patient was noted to be febrile at [facility] and started on vancomycin, zosyn. Rapid RVP negative.  Pt ultimately transferred to [facility] for continued care given pt known to heme-onc & neuro-surg at [facility]    (Taken from NSCU note). Palliative care consulted for GOC.  52y F PMH of smoking, stage 4 lung adenocarcinoma with diffuse mets to C/A/P and brain s/p Rt craniotomy for tumor resection, & multiple rounds of chemo & RT, steroid induced DM, GERD who presents to [place] ED for AMS. Per chart review pt had nonsensical speech and c/o HA & R sided weakness so was taken to ED. Patient was reportedly very agitated and violent at [place] ED. Code stroke was called.  Patient required sedation w/ benadryl, ativan and versed for agitation in order to obtain CTH.  CTH at VS shows increased conspicuity of L posterior frontal and R parietal lesions, likely 2/2 hemorrhage (HU ~50-60) c/t 10/28 CTH. Further eval limited by motion artifact Patient was noted to be febrile at [facility] and started on vancomycin, zosyn. Rapid RVP negative.  Pt ultimately transferred to [facility] for continued care given pt known to heme-onc & neuro-surg at [facility]    (Taken from NSCU note). Palliative care consulted for GOC.

## 2023-12-27 NOTE — PROGRESS NOTE ADULT - PROBLEM SELECTOR PLAN 1
see GOC discussion above  - comfort care see [facility] discussion above  - comfort care  - As per my d/w Dr. Marquez 12/24,  there was no definitive evidence of seizures on EEG 12/22, she does remain high risk of seizures from left posterior quadrant.   - I updated neurology about family's wishes, VEEG will not , palliative care to help with any further symptom management.    - Patient appears very comfortable currently on the current IV meds. see [facility] discussion above  - comfort care  - As per my d/w Dr. Marquez 12/24,  there was no definitive evidence of seizures on EEG 12/22, she does remain high risk of seizures from left posterior quadrant.   - I updated neurology about family's wishes, VEEG will not , palliative care to help with any further symptom management.    - Patient appears very comfortable currently on the current IV meds.

## 2023-12-27 NOTE — PROGRESS NOTE ADULT - CONVERSATION/DISCUSSION
Diagnosis/Prognosis/MOLST Discussed
Diagnosis/Prognosis
Diagnosis/Prognosis/Treatment Options
Diagnosis/Prognosis/Treatment Options

## 2023-12-27 NOTE — PROGRESS NOTE ADULT - SUBJECTIVE AND OBJECTIVE BOX
NEUROLOGY FOLLOW-UP CONSULT NOTE    RFC: AMS    Interval history: No acute neurologic events overnight. Patient is alert but not answering any questions. In response to questions, she covers her face with her blanket and does not participate in neurological exam. Discussed with primary team attending Dr. Garibay. Patient's GOC is comfort care, pending transfer to PCU bed.    Meds:  MEDICATIONS  (STANDING):  artificial tears (preservative free) Ophthalmic Solution 1 Drop(s) Both EYES three times a day  chlorhexidine 4% Liquid 1 Application(s) Topical <User Schedule>  lacosamide IVPB 200 milliGRAM(s) IV Intermittent every 12 hours  levETIRAcetam  IVPB 1500 milliGRAM(s) IV Intermittent every 12 hours  pantoprazole  Injectable 40 milliGRAM(s) IV Push daily  petrolatum white Ointment 1 Application(s) Topical two times a day  polyethylene glycol 3350 17 Gram(s) Oral daily  valproate sodium  IVPB 250 milliGRAM(s) IV Intermittent every 6 hours    MEDICATIONS  (PRN):  acetaminophen     Tablet .. 650 milliGRAM(s) Oral every 6 hours PRN Temp greater or equal to 38C (100.4F), Mild Pain (1 - 3)  acetaminophen  Suppository .. 650 milliGRAM(s) Rectal every 6 hours PRN Temp greater or equal to 38C (100.4F), Moderate Pain (4 - 6)  aluminum hydroxide/magnesium hydroxide/simethicone Suspension 30 milliLiter(s) Oral every 4 hours PRN Dyspepsia  glycopyrrolate Injectable 0.4 milliGRAM(s) IV Push every 6 hours PRN excessive, audible, secretions  LORazepam   Injectable 0.5 milliGRAM(s) IV Push every 2 hours PRN Anxiety, agitation, delirium  melatonin 3 milliGRAM(s) Oral at bedtime PRN Insomnia  morphine  - Injectable 1 milliGRAM(s) IV Push every 2 hours PRN Moderate Pain (4 - 6)  morphine  - Injectable 2 milliGRAM(s) IV Push every 2 hours PRN Severe Pain (7 - 10)  morphine  - Injectable 2 milliGRAM(s) IV Push every 2 hours PRN Dyspnea  ondansetron Injectable 4 milliGRAM(s) IV Push every 8 hours PRN Nausea and/or Vomiting  senna 2 Tablet(s) Oral at bedtime PRN Constipation      PMHx/PSHx/FHx/SHx:  Altered mental status    GERD (Gastroesophageal Reflux Disease)    Hydrosalpinx    GERD (Gastroesophageal Reflux Disease)    Ulcer    Lung mass    Non-small cell lung cancer with metastasis    AMS (altered mental status)    Toxic encephalopathy    Toxic metabolic encephalopathy    Non-small cell lung cancer with metastasis    Steroid-induced diabetes    Sepsis, unspecified organism    Functional quadriplegia    Altered mental status    Encounter for palliative care    Counseling regarding advanced directives    Advance care planning    Seizure    No significant past surgical history    S/P endoscopy    AMS TRS FROM [facility]        Allergies:  No Known Allergies      ROS: All systems negative except as documented in Interval history    O:  T(C): 36.3 (12-27-23 @ 08:25), Max: 36.7 (12-26-23 @ 19:39)  T(F): 97.4 (12-27-23 @ 08:25), Max: 98 (12-26-23 @ 19:39)  HR: 88 (12-27-23 @ 08:25) (60 - 98)  BP: 130/66 (12-27-23 @ 08:25) (128/78 - 130/66)  RR: 18 (12-27-23 @ 08:25) (18 - 18)  SpO2: 96% (12-27-23 @ 08:25) (96% - 97%)  Wt(kg): --    Focused neurologic exam:  MS - opens eyes, no verbal output, resist eye opening, does not participate in exam, unable to assess (speech, rep/naming, attn/conc/recent and remote memory/fund of knowledge) due to mental status, does not follow commands.  CN - MARYANNE to assess due to patient refuses to participate in exam  Motor - Normal bulk/tone, moves extremities spontaneously  Sens - LT intact all  DTR's -MARYANNE DTR due to pt not participating exam and downgoing b/l plantar response  Coord - MARYANNE due to mental status  Gait and station - MARYANNE due to mental status    Pertinent labs/studies:      LABS:  cret                        9.6    7.48  )-----------( 132      ( 27 Dec 2023 10:32 )             31.7     12-27    < from: MR Head No Cont (12.26.23 @ 17:44) >  IMPRESSION:    1. Right temporal lobe operative bed demonstrates further contraction and   persistent marginal vasogenic edema pattern compared to 11/2/2023    2. Larger enhancing metastases demonstrated on 11/2/2023 on the current   examination demonstrate diminished mass effect and vasogenic edema   consistent with treatment response    3. Small enhancing metastases demonstrated on 11/2/2023 are not   identified by the current nongadolinium technique    4. No adverse interval change 11/2/2023. Gadolinium-enhanced imaging   recommended in evaluation of metastases. Patient motion limited scan    < end of copied text >  143  |  109<H>  |  4<L>  ----------------------------<  107<H>  3.5   |  25  |  0.33<L>    Ca    8.8      27 Dec 2023 10:37  Phos  3.8     12-26  Mg     1.3     12-26    TPro  6.7  /  Alb  2.8<L>  /  TBili  0.3  /  DBili  x   /  AST  42<H>  /  ALT  14  /  AlkPhos  84  12-27          < from: CT Head No Cont (12.21.23 @ 07:56) >  FINDINGS:  VENTRICLES AND SULCI: Mild ex vacuo dilatation of the temporal horn of   the right lateral ventricle  INTRA-AXIAL:  Right temporal parietal encephalomalacia as seen on the   prior subjacent to patient's craniotomy. Left parietal occipital and left   posterior frontal foci of increased attenuation similar in appearance   compared with the prior. Small right periventricular lesion seen on the   prior as well unchanged.  EXTRA-AXIAL:  No mass or collection is seen.  VISUALIZED SINUSES:  Clear.  VISUALIZED MASTOIDS:  Clear.  CALVARIUM: Right temporal parietal craniotomy  MISCELLANEOUS:  None.    IMPRESSION:  No significant interval change compared with the prior   12/20/2023    < end of copied text >    Study Date: 	12-21-23 1054 - 1500 12-22-23  Duration:  x 27 HR 55 MINSEEG Classification / Summary:  Abnormal EEG study  Continuous left posterior quadrant LPDs (max P3/P7) mostly around 1 Hz frequency with overriding fast activity and occasionally in bursts  Focal left hemispheric slowing  Moderate generalized background slowing    -----------------------------------------------------------------------------------------------------    Clinical Impression:  Increased risk for focal seizures from left posterior quadrant   Left hemispheric focal cerebral dysfunction  Moderate diffuse/multi-focal cerebral dysfunction, not specific as to etiology.  There were no definitive seizures recorded.

## 2023-12-27 NOTE — PROGRESS NOTE ADULT - PROBLEM SELECTOR PLAN 3
AMS in the setting of brain mets and clinical seizures  plan of care is to transition to comfort measures  pt given 1mg Ativan IVP for agitation o/n-> pt obtunded o/n, decreased Ativan to 0.5mg IVP for agitation, delirium, anxiety

## 2023-12-27 NOTE — PROGRESS NOTE ADULT - PROBLEM SELECTOR PLAN 3
see [facility] discussion above  - comfort care  - no blood draws see [facility] discussion above  - comfort care  - no blood draws

## 2023-12-27 NOTE — PROGRESS NOTE ADULT - CONVERSATION DETAILS
Spoke with patient's 3 sisters via phone. Discussed ongoing goc that has been conducted with the primary team. The patient's family confirmed that based on MRI results and patient's clinical condition they want to ensure her comfort and minimize any suffering. Patient is increasingly obtunded and during wakeful hours is restless and agitated. Her family feels that this quality of life would not improve her quality of life. Renée's 3 sisters confirmed DNR/I. Discussed transition to comfort measures: family is amenable to focus on symptom management deescalating any life prolonging interventions including blood draws, IVF, and non-essential medications for comfort. Discussed the reasoning for continuation of IV seizure medications. Discussed initiation of comfort medications on [facility] as we await the availability of a PCU bed, family was amenable. Discussed PCU as a temporary unit to manage symptoms of agitation and to determine appropriate next steps including inpatient hospice. Family verbalized understanding. Emotional support provided.

## 2023-12-27 NOTE — PROGRESS NOTE ADULT - ASSESSMENT
52F with hx of stage 4 lung adenocarcinoma dx 2022 with diffuse mets to C/A/P and brain s/p Rt craniotomy for tumor resection, & multiple rounds of chemo & RT, steroid induced DM, GERD who presents to [place] ED for AMS, transferred to [facility] for further management found to have seizure, s/p initiation of AEDs and ICU monitor, now transferred to medicine floor 52F with hx of stage 4 lung adenocarcinoma dx 2022 with diffuse mets to C/A/P and brain s/p Rt craniotomy for tumor resection, & multiple rounds of chemo & RT, steroid induced DM, GERD who presents to [place] ED for AMS, transferred to [facility] for further management found to have seizure, s/p initiation of AEDs and ICU monitor, now transferred to medicine floor

## 2023-12-27 NOTE — CHART NOTE - NSCHARTNOTEFT_GEN_A_CORE
MRI brain reviewed and discussed with Dr. Metcalf. Study is limited due to not being a non-contrasted MRI.   - There is no indication for neurosurgical intervention at this time  - Defer care to primary team. Would rec oncology to follow up and comment on prognosis and possibility of systemic therapy  - [facility] conversation with family MRI brain reviewed and discussed with Dr. Metcalf. Study is limited due to not being a non-contrasted MRI.   - There is no indication for neurosurgical intervention at this time  - Defer care to primary team. Would rec oncology to follow up and comment on prognosis and possibility of systemic therapy  - [facility] conversation with family

## 2023-12-27 NOTE — PROGRESS NOTE ADULT - CONVERSATION DETAILS
had an extensive d/w Patient's sister Korin (who is the designated decision maker per family) with Medicine MICHEAL Jama present during meeting. Family updated about the MRI findings. Patient sister was again explained the risks and benefits of PEG tube, expressed understanding. Feeding tube is not inline with the family's GOC. Korin wants complete comfort care for the patient, MOLST form completed: DNR/DNI. no blood draws.     I have updated the palliative care team for a possible transition to PCU pending bed. had an extensive d/w Patient's sister Korin (who is the designated decision maker per family) with Medicine MICHEAL Jama present during meeting. Family updated about the MRI findings of improved mets post chemo in comparison 11/2. Patient sister was again explained the risks and benefits of PEG tube, expressed understanding. Feeding tube is not inline with the family's GOC. Korin wants complete comfort care for the patient, MOLST form completed: DNR/DNI. no blood draws.     I have updated the palliative care team for a possible transition to PCU pending bed.

## 2023-12-27 NOTE — PROGRESS NOTE ADULT - ASSESSMENT
Patient HARJEET WHITMORE is a 52y (1970) woman with a PMHx significant for smoking, stage 4 lung adenocarcinoma dx 2022 with diffuse mets to C/A/P and brain s/p Rt craniotomy for tumor resection, & multiple rounds of chemo & RT, steroid induced DM, GERD who presents to [place] ED for AMS. Per chart review pt had nonsensical speech and c/o HA & R sided weakness. Agitated, violent. CTH as noted. febrile at VS and started on vancomycin, zosyn. Rapid RVP negative. Pt ultimately transferred to [facility] for continued care given pt known to heme-onc & neuro-surg at [facility]. She is found to have tropinemia, hypokalemia, severe hypocalcemia, hypoalbuminemia   Neurology consulted for seizure concern.     Impression: Acute behavioral changes i/s/o brain metastases. Found to have clinical seizures (L facial twitching, RUE jerking).   12/27: Patient is alert but does not follow commands or participate in neurological exam. Goals of care is comfort care now.    Recommendations  [] defer vEEG to primary team if still align with goals of care as patient is comfort care now.  [] MRI brain w/o 12/26: result above  [] c/w Vimpat 200mg BID IV  [] c/w Keppra 1500mg BID IV  [] continue VPA 250mg q6h IV   [] VPA level 12/26 (67), pending albumin level.   [] steroids per neurosurgery. On Decadron 2mg IVP q12h  [] please monitor valproic acid level and albumin if align with goals of care  [] Tox screen, TSH 0.18 (low), Free T4 nl, B1, B6, B12 nl, ammonia, paraneoplastic panel  [] Plans discussed with primary team attending, Dr. Garibay  [] Given concern for seizure, advise the patient with regards to risks and driving privileges associated with the [place] Guidelines. Advise patient regarding the risk of seizures and general seizure safety recommendations including not to be bathing alone, climbing to high places and operating heavy machinery, until cleared by follow-up outpatient Neurology. Reinforce the importance of compliance with medications. Discuss sleep hygiene and the risks of sleep disruption. Discuss the risk of death associated with seizures / SUDEP.  [] Please note: if patient has a convulsion, please document length of episode, specifically what patient was doing paying attention to eye opening vs closure, gaze deviation, shaking of extremities, tongue bite, urinary incontinence, any derangement of vital signs.  [] No further inpatient neurology recommendation, will sign off, please call consult service [phone] with any questions.      Plans discussed with neurology attending, Dr. Lopez Patient HARJEET WHITMORE is a 52y (1970) woman with a PMHx significant for smoking, stage 4 lung adenocarcinoma dx 2022 with diffuse mets to C/A/P and brain s/p Rt craniotomy for tumor resection, & multiple rounds of chemo & RT, steroid induced DM, GERD who presents to [place] ED for AMS. Per chart review pt had nonsensical speech and c/o HA & R sided weakness. Agitated, violent. CTH as noted. febrile at VS and started on vancomycin, zosyn. Rapid RVP negative. Pt ultimately transferred to [facility] for continued care given pt known to heme-onc & neuro-surg at [facility]. She is found to have tropinemia, hypokalemia, severe hypocalcemia, hypoalbuminemia   Neurology consulted for seizure concern.     Impression: Acute behavioral changes i/s/o brain metastases. Found to have clinical seizures (L facial twitching, RUE jerking).   12/27: Patient is alert but does not follow commands or participate in neurological exam. Goals of care is comfort care now.    Recommendations  [] defer vEEG to primary team if still align with goals of care as patient is comfort care now.  [] MRI brain w/o 12/26: result above  [] c/w Vimpat 200mg BID IV  [] c/w Keppra 1500mg BID IV  [] continue VPA 250mg q6h IV   [] VPA level 12/26 (67), pending albumin level.   [] steroids per neurosurgery. On Decadron 2mg IVP q12h  [] please monitor valproic acid level and albumin if align with goals of care  [] Tox screen, TSH 0.18 (low), Free T4 nl, B1, B6, B12 nl, ammonia, paraneoplastic panel  [] Plans discussed with primary team attending, Dr. Garibay  [] Given concern for seizure, advise the patient with regards to risks and driving privileges associated with the [place] Guidelines. Advise patient regarding the risk of seizures and general seizure safety recommendations including not to be bathing alone, climbing to high places and operating heavy machinery, until cleared by follow-up outpatient Neurology. Reinforce the importance of compliance with medications. Discuss sleep hygiene and the risks of sleep disruption. Discuss the risk of death associated with seizures / SUDEP.  [] Please note: if patient has a convulsion, please document length of episode, specifically what patient was doing paying attention to eye opening vs closure, gaze deviation, shaking of extremities, tongue bite, urinary incontinence, any derangement of vital signs.  [] No further inpatient neurology recommendation, will sign off, please call consult service [phone] with any questions.      Plans discussed with neurology attending, Dr. Lopez Patient HARJEET WHITMORE is a 52y (1970) woman with a PMHx significant for smoking, stage 4 lung adenocarcinoma dx 2022 with diffuse mets to C/A/P and brain s/p Rt craniotomy for tumor resection, & multiple rounds of chemo & RT, steroid induced DM, GERD who presents to [facility] for AMS. Per chart review pt had nonsensical speech and c/o HA & R sided weakness. Agitated, violent. CTH as noted. febrile at VS and started on vancomycin, zosyn. Rapid RVP negative. Pt ultimately transferred to [facility] for continued care given pt known to heme-onc & neuro-surg at [facility]. She is found to have tropinemia, hypokalemia, severe hypocalcemia, hypoalbuminemia   Neurology consulted for seizure concern.     Impression: Acute behavioral changes i/s/o brain metastases. Found to have clinical seizures (L facial twitching, RUE jerking).   12/27: Patient is alert but does not follow commands or participate in neurological exam. Goals of care is comfort care now.    Recommendations  [] defer vEEG to primary team if still align with goals of care as patient is comfort care now.  [] MRI brain w/o 12/26: result above  [] c/w Vimpat 200mg BID IV  [] c/w Keppra 1500mg BID IV  [] continue VPA 250mg q6h IV   [] VPA level 12/26 (67), pending albumin level.   [] steroids per neurosurgery. On Decadron 2mg IVP q12h  [] please monitor valproic acid level and albumin if align with goals of care  [] Tox screen, TSH 0.18 (low), Free T4 nl, B1, B6, B12 nl, ammonia, paraneoplastic panel  [] Plans discussed with primary team attending, Dr. Garibay  [] Given concern for seizure, advise the patient with regards to risks and driving privileges associated with the [place] Guidelines. Advise patient regarding the risk of seizures and general seizure safety recommendations including not to be bathing alone, climbing to high places and operating heavy machinery, until cleared by follow-up outpatient Neurology. Reinforce the importance of compliance with medications. Discuss sleep hygiene and the risks of sleep disruption. Discuss the risk of death associated with seizures / SUDEP.  [] Please note: if patient has a convulsion, please document length of episode, specifically what patient was doing paying attention to eye opening vs closure, gaze deviation, shaking of extremities, tongue bite, urinary incontinence, any derangement of vital signs.  [] No further inpatient neurology recommendation, will sign off, please call consult service [phone] with any questions.    Plans discussed with neurology attending, Dr. Lopez     Patient HARJEET WHITMORE is a 52y (1970) woman with a PMHx significant for smoking, stage 4 lung adenocarcinoma dx 2022 with diffuse mets to C/A/P and brain s/p Rt craniotomy for tumor resection, & multiple rounds of chemo & RT, steroid induced DM, GERD who presents to [facility] for AMS. Per chart review pt had nonsensical speech and c/o HA & R sided weakness. Agitated, violent. CTH as noted. febrile at VS and started on vancomycin, zosyn. Rapid RVP negative. Pt ultimately transferred to [facility] for continued care given pt known to heme-onc & neuro-surg at [facility]. She is found to have tropinemia, hypokalemia, severe hypocalcemia, hypoalbuminemia   Neurology consulted for seizure concern.     Impression: Acute behavioral changes i/s/o brain metastases. Found to have clinical seizures (L facial twitching, RUE jerking).   12/27: Patient is alert but does not follow commands or participate in neurological exam. Goals of care is comfort care now.    Recommendations  [] defer vEEG to primary team if still align with goals of care as patient is comfort care now.  [] MRI brain w/o 12/26: result above  [] c/w Vimpat 200mg BID IV  [] c/w Keppra 1500mg BID IV  [] continue VPA 250mg q6h IV   [] VPA level 12/26 (67), pending albumin level.   [] steroids per neurosurgery. On Decadron 2mg IVP q12h  [] please monitor valproic acid level and albumin if align with goals of care  [] Tox screen, TSH 0.18 (low), Free T4 nl, B1, B6, B12 nl, ammonia, paraneoplastic panel  [] Plans discussed with primary team attending, Dr. Garbiay  [] Given concern for seizure, advise the patient with regards to risks and driving privileges associated with the [place] Guidelines. Advise patient regarding the risk of seizures and general seizure safety recommendations including not to be bathing alone, climbing to high places and operating heavy machinery, until cleared by follow-up outpatient Neurology. Reinforce the importance of compliance with medications. Discuss sleep hygiene and the risks of sleep disruption. Discuss the risk of death associated with seizures / SUDEP.  [] Please note: if patient has a convulsion, please document length of episode, specifically what patient was doing paying attention to eye opening vs closure, gaze deviation, shaking of extremities, tongue bite, urinary incontinence, any derangement of vital signs.  [] No further inpatient neurology recommendation, will sign off, please call consult service [phone] with any questions.    Plans discussed with neurology attending, Dr. Lopez

## 2023-12-27 NOTE — PROGRESS NOTE ADULT - PROBLEM SELECTOR PLAN 5
will continue to follow for GOC/symptoms  case discussed with primary and PCU teams   for comfort measures only:   - start morphine 2mg IVP q2h PRN severe pain/dyspnea  - start morphine 1mg IVP q2h PRN mod pain  - start ativan 0.5mg IVP q2h PRN agitation/anxiety  - start glycopyrrolate 0.4mg q6h PRN secretions  - start dulcolax 10mg supp daily PRN constipation  - limit blood draws and non-essential medications. continue IV seizure medications  please page palliative care if >3 doses in 12 hour nursing shift or if symptoms unmanaged [phone]  Can be reached by TEAMS M-F 9-5 Yasmeen Jennings Any other time please page [phone] if needed will continue to follow for GOC/symptoms  case discussed with primary and PCU teams   for comfort measures only:   - start morphine 2mg IVP q2h PRN severe pain/dyspnea  - start morphine 1mg IVP q2h PRN mod pain  - start ativan 0.5mg IVP q2h PRN agitation/anxiety  - start glycopyrrolate 0.4mg q6h PRN secretions  - start dulcolax 10mg supp daily PRN constipation  - limit blood draws and non-essential medications. continue IV seizure medications  please page palliative care if >3 doses in 12 hour nursing shift or if symptoms unmanaged [phone]  Can be reached by TEAMS M-F 9-5 Yasmeen Jennings Any other time please page [phone] if needed

## 2023-12-27 NOTE — PROGRESS NOTE ADULT - PROBLEM SELECTOR PLAN 4
see GOC above  HCP Anant defers to patient's 3 sisters: Kiersten, Bambi, and Korin BARGER completed by primary team reflecting DNR/I  plan of care is for initiation of comfort measures only, transfer to PCU when bed is available  chaplaincy consult placed for spiritual support

## 2023-12-27 NOTE — PROGRESS NOTE ADULT - SUPERVISING ATTENDING
Sunitha Lopez MD Sunitha Lopez MD - NP Prabhjot reported R thumb and index finger twitching on exam, I reviewed video, likely focal seizure, discussed with Dr. Garibay - patient is comfort care

## 2023-12-27 NOTE — PROGRESS NOTE ADULT - NSPROGADDITIONALINFOA_GEN_ALL_CORE
time spent reviewing prior charts, meds, discussing plan with patient= 90 min     d/w Medicine ACP Kiersten

## 2023-12-27 NOTE — PROGRESS NOTE ADULT - SUBJECTIVE AND OBJECTIVE BOX
Patient is a 52y old  Female who presents with a chief complaint of HA , weakness, AMS (26 Dec 2023 13:44)      SUBJECTIVE / OVERNIGHT EVENTS: Patient seen and examined at bedside. no opening eyes. responds to noxious stimuli     ROS:  All other review of systems negative    Allergies    No Known Allergies    Intolerances        MEDICATIONS  (STANDING):  artificial tears (preservative free) Ophthalmic Solution 1 Drop(s) Both EYES three times a day  atorvastatin 40 milliGRAM(s) Oral at bedtime  chlorhexidine 4% Liquid 1 Application(s) Topical <User Schedule>  dexAMETHasone  Injectable 2 milliGRAM(s) IV Push every 12 hours  dextrose 5% + lactated ringers. 1000 milliLiter(s) (75 mL/Hr) IV Continuous <Continuous>  dextrose 5%. 1000 milliLiter(s) (50 mL/Hr) IV Continuous <Continuous>  dextrose 5%. 1000 milliLiter(s) (100 mL/Hr) IV Continuous <Continuous>  dextrose 50% Injectable 25 Gram(s) IV Push once  dextrose 50% Injectable 25 Gram(s) IV Push once  dextrose 50% Injectable 12.5 Gram(s) IV Push once  glucagon  Injectable 1 milliGRAM(s) IntraMuscular once  insulin lispro (ADMELOG) corrective regimen sliding scale   SubCutaneous every 6 hours  lacosamide IVPB 200 milliGRAM(s) IV Intermittent every 12 hours  levETIRAcetam  IVPB 1500 milliGRAM(s) IV Intermittent every 12 hours  metoprolol tartrate Injectable 2.5 milliGRAM(s) IV Push every 12 hours  pantoprazole  Injectable 40 milliGRAM(s) IV Push daily  petrolatum white Ointment 1 Application(s) Topical two times a day  polyethylene glycol 3350 17 Gram(s) Oral daily  potassium chloride  10 mEq/100 mL IVPB 10 milliEquivalent(s) IV Intermittent every 1 hour  valproate sodium  IVPB 250 milliGRAM(s) IV Intermittent every 6 hours    MEDICATIONS  (PRN):  acetaminophen     Tablet .. 650 milliGRAM(s) Oral every 6 hours PRN Temp greater or equal to 38C (100.4F), Mild Pain (1 - 3)  acetaminophen  Suppository .. 650 milliGRAM(s) Rectal every 6 hours PRN Temp greater or equal to 38C (100.4F), Moderate Pain (4 - 6)  aluminum hydroxide/magnesium hydroxide/simethicone Suspension 30 milliLiter(s) Oral every 4 hours PRN Dyspepsia  dextrose Oral Gel 15 Gram(s) Oral once PRN Blood Glucose LESS THAN 70 milliGRAM(s)/deciliter  melatonin 3 milliGRAM(s) Oral at bedtime PRN Insomnia  ondansetron Injectable 4 milliGRAM(s) IV Push every 8 hours PRN Nausea and/or Vomiting  senna 2 Tablet(s) Oral at bedtime PRN Constipation      Vital Signs Last 24 Hrs  T(C): 36.3 (27 Dec 2023 08:25), Max: 36.7 (26 Dec 2023 19:39)  T(F): 97.4 (27 Dec 2023 08:25), Max: 98 (26 Dec 2023 19:39)  HR: 88 (27 Dec 2023 08:25) (60 - 98)  BP: 130/66 (27 Dec 2023 08:25) (128/78 - 130/66)  BP(mean): --  RR: 18 (27 Dec 2023 08:25) (18 - 18)  SpO2: 96% (27 Dec 2023 08:25) (96% - 97%)    Parameters below as of 27 Dec 2023 08:25  Patient On (Oxygen Delivery Method): room air      CAPILLARY BLOOD GLUCOSE      POCT Blood Glucose.: 87 mg/dL (27 Dec 2023 07:56)  POCT Blood Glucose.: 73 mg/dL (27 Dec 2023 06:43)  POCT Blood Glucose.: 78 mg/dL (27 Dec 2023 00:35)  POCT Blood Glucose.: 74 mg/dL (26 Dec 2023 17:12)  POCT Blood Glucose.: 123 mg/dL (26 Dec 2023 12:19)    I&O's Summary    26 Dec 2023 07:01  -  27 Dec 2023 07:00  --------------------------------------------------------  IN: 125 mL / OUT: 200 mL / NET: -75 mL        PHYSICAL EXAM:  GENERAL: NAD, well-developed  EYES: EOMI, PERRLA, conjunctiva and sclera clear  NECK: Supple, No JVD  CHEST/LUNG: Clear to auscultation bilaterally; No wheeze  HEART: Regular rate and rhythm; No murmurs, rubs, or gallops  ABDOMEN: Soft, Nontender, Nondistended; Bowel sounds present  EXTREMITIES:  2+ Peripheral Pulses, No clubbing, cyanosis, or edema  NEUROLOGY: AAOx0, opens eyes to noxious stimuli     LABS:                        9.6    7.48  )-----------( 132      ( 27 Dec 2023 10:32 )             31.7     12-27    143  |  109<H>  |  4<L>  ----------------------------<  107<H>  3.5   |  25  |  0.33<L>    Ca    8.8      27 Dec 2023 10:37  Phos  3.8     12-26  Mg     1.3     12-26    TPro  6.7  /  Alb  2.8<L>  /  TBili  0.3  /  DBili  x   /  AST  42<H>  /  ALT  14  /  AlkPhos  84  12-27          Urinalysis Basic - ( 27 Dec 2023 10:37 )    Color: x / Appearance: x / SG: x / pH: x  Gluc: 107 mg/dL / Ketone: x  / Bili: x / Urobili: x   Blood: x / Protein: x / Nitrite: x   Leuk Esterase: x / RBC: x / WBC x   Sq Epi: x / Non Sq Epi: x / Bacteria: x

## 2023-12-28 NOTE — PROGRESS NOTE ADULT - ASSESSMENT
52F with hx of stage 4 lung adenocarcinoma dx 2022 with diffuse mets to C/A/P and brain s/p Rt craniotomy for tumor resection, & multiple rounds of chemo & RT, steroid induced DM, GERD who presents to [place] ED for AMS, transferred to [facility] for further management found to have seizure, s/p initiation of AEDs and ICU monitor, now transferred to medicine floor 52F with hx of stage 4 lung adenocarcinoma dx 2022 with diffuse mets to C/A/P and brain s/p Rt craniotomy for tumor resection, & multiple rounds of chemo & RT, steroid induced DM, GERD who presents to [place] ED for AMS, transferred to [facility] for further management found to have seizure, s/p initiation of AEDs and ICU monitor, now transferred to medicine floor

## 2023-12-28 NOTE — PROGRESS NOTE ADULT - PROBLEM SELECTOR PLAN 3
AMS in the setting of brain mets and clinical seizures  plan of care is to transition to comfort measures  pt comfortable on exam, lethargic

## 2023-12-28 NOTE — PROGRESS NOTE ADULT - PROBLEM SELECTOR PLAN 3
see [facility] discussion above  - comfort care  - no blood draws see [facility] discussion above  - comfort care  - no blood draws

## 2023-12-28 NOTE — PROGRESS NOTE ADULT - PROBLEM SELECTOR PLAN 1
see [facility] discussion above  - comfort care  - As per my d/w Dr. Marquez 12/24,  there was no definitive evidence of seizures on EEG 12/22, she does remain high risk of seizures from left posterior quadrant.   - I updated neurology about family's wishes, VEEG will not , palliative care to help with any further symptom management.    - Patient appears very comfortable currently on the current IV meds.  - pending PCU bed see [facility] discussion above  - comfort care  - As per my d/w Dr. Marquez 12/24,  there was no definitive evidence of seizures on EEG 12/22, she does remain high risk of seizures from left posterior quadrant.   - I updated neurology about family's wishes, VEEG will not , palliative care to help with any further symptom management.    - Patient appears very comfortable currently on the current IV meds.  - pending PCU bed

## 2023-12-28 NOTE — PROGRESS NOTE ADULT - ASSESSMENT
52y F PMH of smoking, stage 4 lung adenocarcinoma with diffuse mets to C/A/P and brain s/p Rt craniotomy for tumor resection, & multiple rounds of chemo & RT, steroid induced DM, GERD who presents to [place] ED for AMS. Per chart review pt had nonsensical speech and c/o HA & R sided weakness so was taken to ED. Patient was reportedly very agitated and violent at [place] ED. Code stroke was called.  Patient required sedation w/ benadryl, ativan and versed for agitation in order to obtain CTH.  CTH at VS shows increased conspicuity of L posterior frontal and R parietal lesions, likely 2/2 hemorrhage (HU ~50-60) c/t 10/28 CTH. Further eval limited by motion artifact Patient was noted to be febrile at VS and started on vancomycin, zosyn. Rapid RVP negative.  Pt ultimately transferred to [facility] for continued care given pt known to heme-onc & neuro-surg at [facility]    (Taken from NSCU note). Palliative care consulted for GOC.  52y F PMH of smoking, stage 4 lung adenocarcinoma with diffuse mets to C/A/P and brain s/p Rt craniotomy for tumor resection, & multiple rounds of chemo & RT, steroid induced DM, GERD who presents to [place] ED for AMS. Per chart review pt had nonsensical speech and c/o HA & R sided weakness so was taken to ED. Patient was reportedly very agitated and violent at [place] ED. Code stroke was called.  Patient required sedation w/ benadryl, ativan and versed for agitation in order to obtain CTH.  CTH at VS shows increased conspicuity of L posterior frontal and R parietal lesions, likely 2/2 hemorrhage (HU ~50-60) c/t 10/28 CTH. Further eval limited by motion artifact Patient was noted to be febrile at VS and started on vancomycin, zosyn. Rapid RVP negative.  Pt ultimately transferred to [facility] for continued care given pt known to heme-onc & neuro-surg at [facility]    (Taken from NSCU note). Palliative care consulted for GOC.

## 2023-12-28 NOTE — PROGRESS NOTE ADULT - SUBJECTIVE AND OBJECTIVE BOX
Patient is a 53y old  Female who presents with a chief complaint of HA , weakness, AMS (27 Dec 2023 13:01)      SUBJECTIVE / OVERNIGHT EVENTS: Patient seen and examined at bedside. She appears comfortable. No events overnight.     ROS:  All other review of systems negative    Allergies    No Known Allergies    Intolerances        MEDICATIONS  (STANDING):  artificial tears (preservative free) Ophthalmic Solution 1 Drop(s) Both EYES three times a day  chlorhexidine 4% Liquid 1 Application(s) Topical <User Schedule>  lacosamide IVPB 200 milliGRAM(s) IV Intermittent every 12 hours  levETIRAcetam  IVPB 1500 milliGRAM(s) IV Intermittent every 12 hours  pantoprazole  Injectable 40 milliGRAM(s) IV Push daily  petrolatum white Ointment 1 Application(s) Topical two times a day  polyethylene glycol 3350 17 Gram(s) Oral daily  valproate sodium  IVPB 250 milliGRAM(s) IV Intermittent every 6 hours    MEDICATIONS  (PRN):  acetaminophen     Tablet .. 650 milliGRAM(s) Oral every 6 hours PRN Temp greater or equal to 38C (100.4F), Mild Pain (1 - 3)  acetaminophen  Suppository .. 650 milliGRAM(s) Rectal every 6 hours PRN Temp greater or equal to 38C (100.4F), Moderate Pain (4 - 6)  aluminum hydroxide/magnesium hydroxide/simethicone Suspension 30 milliLiter(s) Oral every 4 hours PRN Dyspepsia  glycopyrrolate Injectable 0.4 milliGRAM(s) IV Push every 6 hours PRN excessive, audible, secretions  LORazepam   Injectable 0.5 milliGRAM(s) IV Push every 2 hours PRN Anxiety, agitation, delirium  melatonin 3 milliGRAM(s) Oral at bedtime PRN Insomnia  morphine  - Injectable 1 milliGRAM(s) IV Push every 2 hours PRN Moderate Pain (4 - 6)  morphine  - Injectable 2 milliGRAM(s) IV Push every 2 hours PRN Severe Pain (7 - 10)  morphine  - Injectable 2 milliGRAM(s) IV Push every 2 hours PRN Dyspnea  ondansetron Injectable 4 milliGRAM(s) IV Push every 8 hours PRN Nausea and/or Vomiting  senna 2 Tablet(s) Oral at bedtime PRN Constipation      Vital Signs Last 24 Hrs  T(C): 36.9 (28 Dec 2023 05:01), Max: 36.9 (28 Dec 2023 05:01)  T(F): 98.4 (28 Dec 2023 05:01), Max: 98.4 (28 Dec 2023 05:01)  HR: 73 (28 Dec 2023 05:01) (73 - 80)  BP: 129/81 (28 Dec 2023 05:01) (128/62 - 129/81)  BP(mean): --  RR: 18 (28 Dec 2023 05:01) (18 - 18)  SpO2: 97% (28 Dec 2023 05:01) (96% - 97%)    Parameters below as of 28 Dec 2023 05:01  Patient On (Oxygen Delivery Method): room air      CAPILLARY BLOOD GLUCOSE      POCT Blood Glucose.: 100 mg/dL (27 Dec 2023 12:54)    I&O's Summary    27 Dec 2023 07:01  -  28 Dec 2023 07:00  --------------------------------------------------------  IN: 0 mL / OUT: 0 mL / NET: 0 mL        PHYSICAL EXAM:  GENERAL: NAD, well-developed and comfortable   NEUROLOGY: AAOx0    LABS:                        9.6    7.48  )-----------( 132      ( 27 Dec 2023 10:32 )             31.7     12-27    143  |  109<H>  |  4<L>  ----------------------------<  107<H>  3.5   |  25  |  0.33<L>    Ca    8.8      27 Dec 2023 10:37    TPro  6.7  /  Alb  2.8<L>  /  TBili  0.3  /  DBili  x   /  AST  42<H>  /  ALT  14  /  AlkPhos  84  12-27          Urinalysis Basic - ( 27 Dec 2023 10:37 )    Color: x / Appearance: x / SG: x / pH: x  Gluc: 107 mg/dL / Ketone: x  / Bili: x / Urobili: x   Blood: x / Protein: x / Nitrite: x   Leuk Esterase: x / RBC: x / WBC x   Sq Epi: x / Non Sq Epi: x / Bacteria: x

## 2023-12-28 NOTE — PROGRESS NOTE ADULT - PROBLEM SELECTOR PLAN 6
PT consult
PT consult
see GOC discussion above  - comfort care
see GOC discussion above  - comfort care
PT consult

## 2023-12-28 NOTE — PROGRESS NOTE ADULT - SUBJECTIVE AND OBJECTIVE BOX
SUBJECTIVE AND OBJECTIVE: Patient seen and examined at bedside. Pt sleeping on exam. Appears comfortable  Indication for Geriatrics and Palliative Care Services/INTERVAL HPI: GOC/Symptoms      OVERNIGHT EVENTS: No acute events o/n. Patient used 0 prns in 24 hours (8a-8a)     DNR on chart:Yes  Yes      Allergies    No Known Allergies    Intolerances    MEDICATIONS  (STANDING):  artificial tears (preservative free) Ophthalmic Solution 1 Drop(s) Both EYES three times a day  chlorhexidine 4% Liquid 1 Application(s) Topical <User Schedule>  lacosamide IVPB 200 milliGRAM(s) IV Intermittent every 12 hours  levETIRAcetam  IVPB 1500 milliGRAM(s) IV Intermittent every 12 hours  pantoprazole  Injectable 40 milliGRAM(s) IV Push daily  petrolatum white Ointment 1 Application(s) Topical two times a day  polyethylene glycol 3350 17 Gram(s) Oral daily  valproate sodium  IVPB 250 milliGRAM(s) IV Intermittent every 6 hours    MEDICATIONS  (PRN):  acetaminophen     Tablet .. 650 milliGRAM(s) Oral every 6 hours PRN Temp greater or equal to 38C (100.4F), Mild Pain (1 - 3)  acetaminophen  Suppository .. 650 milliGRAM(s) Rectal every 6 hours PRN Temp greater or equal to 38C (100.4F), Moderate Pain (4 - 6)  aluminum hydroxide/magnesium hydroxide/simethicone Suspension 30 milliLiter(s) Oral every 4 hours PRN Dyspepsia  glycopyrrolate Injectable 0.4 milliGRAM(s) IV Push every 6 hours PRN excessive, audible, secretions  LORazepam   Injectable 0.5 milliGRAM(s) IV Push every 2 hours PRN Anxiety, agitation, delirium  melatonin 3 milliGRAM(s) Oral at bedtime PRN Insomnia  morphine  - Injectable 1 milliGRAM(s) IV Push every 2 hours PRN Moderate Pain (4 - 6)  morphine  - Injectable 2 milliGRAM(s) IV Push every 2 hours PRN Severe Pain (7 - 10)  morphine  - Injectable 2 milliGRAM(s) IV Push every 2 hours PRN Dyspnea  ondansetron Injectable 4 milliGRAM(s) IV Push every 8 hours PRN Nausea and/or Vomiting  senna 2 Tablet(s) Oral at bedtime PRN Constipation      ITEMS UNCHECKED ARE NOT PRESENT    PRESENT SYMPTOMS: [ x]Unable to self-report - see [ ] CPOT [x ] PAINADS [x] RDOS  Source if other than patient:  [ ]Family   [x ]Team     Pain:  [ ]yes [ ]no  QOL impact -   Location -                    Aggravating factors -  Quality -  Radiation -  Timing-  Severity (0-10 scale):  Minimal acceptable level (0-10 scale):     CPOT:    https://www.[facility].org/getattachment/[email or web address]/Critical-Care-Pain-Observation-Tool-(CPOT)    PAINAD Score: See PAINAD tool and score below       Dyspnea:                           [ ]Mild [ ]Moderate [ ]Severe    RDOS: See RDOS tool and score below   0 to 2  minimal or no respiratory distress   3  mild distress  4 to 6 moderate distress  >7 severe distress      Anxiety:                             [ ]Mild [ ]Moderate [ ]Severe  Fatigue:                             [ ]Mild [ ]Moderate [ ]Severe  Nausea:                             [ ]Mild [ ]Moderate [ ]Severe  Loss of appetite:              [ ]Mild [ ]Moderate [ ]Severe  Constipation:                    [ ]Mild [ ]Moderate [ ]Severe    PCSSQ[Palliative Care Spiritual Screening Question]   Severity (0-10):  Score of 4 or > indicate consideration of Chaplaincy referral.  Chaplaincy Referral: [x ] yes [ ] refused [x ] following [ ] Deferred     Caregiver [employer]? : [ ] yes [ ] no [x ] Deferred [ ] Declined             Social work referral [ ] Patient & Family Centered Care Referral [ ]     Anticipatory Grief present?:  [ ] yes [ ] no  [x ] Deferred                  Social work referral [ ] Chaplaincy Referral [ ]    		  Other Symptoms:  [x ]All other review of systems negative     Palliative Performance Status Version 2:   See PPSv2 tool and score below         PHYSICAL EXAM:  Vital Signs Last 24 Hrs  T(C): 36.4 (28 Dec 2023 12:21), Max: 36.9 (28 Dec 2023 05:01)  T(F): 97.6 (28 Dec 2023 12:21), Max: 98.4 (28 Dec 2023 05:01)  HR: 80 (28 Dec 2023 12:21) (73 - 80)  BP: 120/78 (28 Dec 2023 12:21) (120/78 - 129/81)  BP(mean): --  RR: 18 (28 Dec 2023 12:21) (18 - 18)  SpO2: 98% (28 Dec 2023 12:21) (96% - 98%)    Parameters below as of 28 Dec 2023 12:21  Patient On (Oxygen Delivery Method): room air     I&O's Summary    27 Dec 2023 07:01  -  28 Dec 2023 07:00  --------------------------------------------------------  IN: 0 mL / OUT: 0 mL / NET: 0 mL       GENERAL: [ ]Cachexia    [ ]Alert  [ ]Oriented x   [ ]Lethargic  [x ]Unarousable  [ ]Verbal  [ ]Non-Verbal  Behavioral:   [ ]Anxiety  [ ]Delirium [ ]Agitation [ ]Other  HEENT:  [ ]Normal   [x ]Dry mouth   [ ]ET Tube/Trach  [ ]Oral lesions  PULMONARY:   [ ]Clear [ ]Tachypnea  [ ]Audible excessive secretions   [ ]Rhonchi        [ ]Right [ ]Left [ ]Bilateral  [ ]Crackles        [ ]Right [ ]Left [ ]Bilateral  [ ]Wheezing     [ ]Right [ ]Left [ ]Bilateral  [x ]Diminished BS [ ] Right [ ]Left [ x]Bilateral  CARDIOVASCULAR:    [x ]Regular [ ]Irregular [ ]Tachy  [ ]Austin [ ]Murmur [ ]Other  GASTROINTESTINAL:  [x]Soft  [ ]Distended   [ x]+BS  [x ]Non tender [ ]Tender  [ ]Other [ ]PEG [ ]OGT/ NGT   Last BM:   GENITOURINARY:  [ ]Normal [ ]Incontinent   [ ]Oliguria/Anuria   [ x]Mas  MUSCULOSKELETAL:   [ ]Normal   [x ]Weakness  [x ]Bed/Wheelchair bound [ ]Edema  NEUROLOGIC:   [ ]No focal deficits  [x ] Cognitive impairment  [ ] Dysphagia [ ]Dysarthria [ ] Paresis [ ]Other   SKIN:   [x ]Normal  [ ]Rash  [ ]Other  [ ]Pressure ulcer(s) [ ]y [ ]n present on admission    CRITICAL CARE:  [ ]Shock Present  [ ]Septic [ ]Cardiogenic [ ]Neurologic [ ]Hypovolemic  [ ]Vasopressors [ ]Inotropes  [ ]Respiratory failure present [ ]Mechanical Ventilation [ ]Non-invasive ventilatory support [ ]High-Flow   [ ]Acute  [ ]Chronic [ ]Hypoxic  [ ]Hypercarbic [ ]Other  [ ]Other organ failure     LABS:                        9.6    7.48  )-----------( 132      ( 27 Dec 2023 10:32 )             31.7   12-27    143  |  109<H>  |  4<L>  ----------------------------<  107<H>  3.5   |  25  |  0.33<L>    Ca    8.8      27 Dec 2023 10:37    TPro  6.7  /  Alb  2.8<L>  /  TBili  0.3  /  DBili  x   /  AST  42<H>  /  ALT  14  /  AlkPhos  84  12-27      Urinalysis Basic - ( 27 Dec 2023 10:37 )    Color: x / Appearance: x / SG: x / pH: x  Gluc: 107 mg/dL / Ketone: x  / Bili: x / Urobili: x   Blood: x / Protein: x / Nitrite: x   Leuk Esterase: x / RBC: x / WBC x   Sq Epi: x / Non Sq Epi: x / Bacteria: x      RADIOLOGY & ADDITIONAL STUDIES:  < from: MR Head No Cont (12.26.23 @ 17:44) >  ACC: [id number] EXAM:  MR BRAIN   ORDERED BY: BOB VAUGHN     PROCEDURE DATE:  12/26/2023          INTERPRETATION:  MR of the brain without gadolinium contrast    CLINICAL INFORMATION:   AMS  MMR  Admitting Dxs: R41.82 ALTERED MENTAL   STATUS, UNSPECIFIED    TECHNIQUE:   Sagittal and axial T1-weighted images, axial FLAIR images,   axial susceptibility-weighted/gradient echo images, coronal T2-weighted   images and axial diffusion weighted images of the brain were obtained.       Note:   Artifactfrom gross patient motion causes image blurring and   limits evaluation. Sequences were repeated for patient motion. Fast   imaging techniques were employed.  CONTRAST:    None    COMPARISON:   CT head 12/21/2023 and MR brain 11/2/2023    FINDINGS:    BRAIN:   The brain demonstrates focal encephalomalacia within the   posterior right temporal lobe deep to a right parietal craniotomy.   Compared to 11/2/2023 the surgical cavity has partly collapsed. It   demonstrate scoliotic signal hyperintensityon the long TR images   greatest at the inferior medial margin of the operative bed. There is   also mixed linear T1 hyperintensity and indistinct low signal intensity   on the susceptibility weighted images that suggest blood breakdown   products atthe surgical bed. No residual mass effect is seen.    Left occipital mass also demonstrates susceptibility effect consistent   with internal hemorrhage. Associated vasogenic edema has improved since   11/2/2023. Right parietal anterior inferior region of vasogenic edema is   also improved compared to 11/2/2023. No mass lesion is appreciated by the   current nongadolinium technique at this location. Residual mass lesion of   approximately 1.5 cm diameter is present at the left posterior frontal   parasagittal vertex with improvement in vasogenic edema compared to   11/2/2023. Additional locations of enhancing metastases identified in the   right anterior corona radiata and left lateral parietal lobe are   relatively inconspicuous on the current examination. No acute cerebral   cortical infarct is found. Interval lesion is recognized by the current   nongadolinium technique.  No new mass effect is developed in the brain.     Additional patchy indistinct lesions in the deep hemispheric whitematter   appear unchanged from 11/2/2023.    CSF SPACES:   The ventricles, sulci and basal cisterns appear mild to   moderately dilated reflecting diffuse brain volume loss.    VESSELS:   The vertebral and internal carotid arteries demonstrate   expected flow voids indicating their patency.    HEAD AND NECK STRUCTURES:   The orbits are unremarkable.  Paranasal   sinuses are clear.  The nasal cavity appears intact.  The central skull   base appears intact.  The nasopharynx is symmetric.  The temporal bones   appear clear of disease.      IMPRESSION:    1. Right temporal lobe operative bed demonstrates further contraction and   persistent marginal vasogenic edema pattern compared to 11/2/2023    2. Larger enhancing metastases demonstrated on 11/2/2023 on the current   examination demonstrate diminished mass effect and vasogenic edema   consistent with treatment response    3. Small enhancing metastases demonstrated on 11/2/2023 are not   identified by the current nongadolinium technique    4. No adverse interval change 11/2/2023. Gadolinium-enhanced imaging   recommended in evaluation of metastases. Patient motion limited scan    --- End of Report ---            MERCEDES MEDINA MD; Attending Radiologist  This document has been electronically signed. Dec 27 2023  7:56AM    < end of copied text >    Protein Calorie Malnutrition Present: [ ]mild [ ]moderate [ ]severe [ ]underweight [ ]morbid obesity  https://www.andeal.org/vault/7120/web/files/ONC/Table_Clinical%20Characteristics%20to%20Document%20Malnutrition-White%20JV%20et%20al%202012.pdf    Height (cm): 167 (12-21-23 @ 08:50), 167 (11-28-23 @ 08:43), 167.6 (11-08-23 @ 18:29)  Weight (kg): 49.8 (12-21-23 @ 08:50), 49.9 (12-14-23 @ 11:00), 54.8 (11-08-23 @ 18:29)  BMI (kg/m2): 17.9 (12-21-23 @ 08:50), 17.9 (12-14-23 @ 11:00), 19.6 (11-28-23 @ 08:43)    [x ]PPSV2 < or = 30%  [ ]significant weight loss [ ]poor nutritional intake [ ]anasarca[ ]Artificial Nutrition    Other REFERRALS:  [ ]Hospice  [ ]Child Life  [ ]Social Work  [ ]Case management [ ]Holistic Therapy     Goals of Care Document  Electronic Signatures:  Goals of Care:   GOALS OF CARE:  · Participants	Family  · Relative	Bambi Zarate Stephanie    Advance Directives:  · Does patient have Advance Directive	Yes  · Indicate Type	Health Care Proxy (HCP); Medical Orders for Life-Sustaining Treatment (MOLST)  · Agent's Name	Anant (defers to patient's three sisters, is aware of situation)  · Are any of the items on the chart	Yes  · Specify which ones are on chart	Medical Orders for Life-Sustaining Treatment (MOLST)  · Does Patient Have a Surrogate	Yes  · Surrogate's Name	Bambi Zarate and Stephanie    Conversation Discussion:  · Conversation	Diagnosis; Prognosis; Treatment Options  · Conversation Details	Spoke with patient's 3 sisters via phone. Discussed ongoing goc that has been conducted with the primary team. The patient's family confirmed that based on MRI results and patient's clinical condition they want to ensure her comfort and minimize any suffering. Patient is increasingly obtunded and during wakeful hours is restless and agitated. Her family feels that this quality of life would not improve her quality of life. Renée's 3 sisters confirmed DNR/I. Discussed transition to comfort measures: family is amenable to focus on symptom management deescalating any life prolonging interventions including blood draws, IVF, and non-essential medications for comfort. Discussed the reasoning for continuation of IV seizure medications. Discussed initiation of comfort medications on [facility] as we await the availability of a PCU bed, family was amenable. Discussed PCU as a temporary unit to manage symptoms of agitation and to determine appropriate next steps including inpatient hospice. Family verbalized understanding. Emotional support provided.    Location of Discussion:  · Location of discussion	Telephone    Time Spent on Advance Care Planning:  Attending or DB Only.     I personally spent 26 minutes on advance care planning services with the patient. This time is separate and distinct from any other care management services provided on this date.      Last Updated: 06-Jun-2023 15:41 by Heaven Hernandes)     SUBJECTIVE AND OBJECTIVE: Patient seen and examined at bedside. Pt sleeping on exam. Appears comfortable  Indication for Geriatrics and Palliative Care Services/INTERVAL HPI: GOC/Symptoms      OVERNIGHT EVENTS: No acute events o/n. Patient used 0 prns in 24 hours (8a-8a)     DNR on chart:Yes  Yes      Allergies    No Known Allergies    Intolerances    MEDICATIONS  (STANDING):  artificial tears (preservative free) Ophthalmic Solution 1 Drop(s) Both EYES three times a day  chlorhexidine 4% Liquid 1 Application(s) Topical <User Schedule>  lacosamide IVPB 200 milliGRAM(s) IV Intermittent every 12 hours  levETIRAcetam  IVPB 1500 milliGRAM(s) IV Intermittent every 12 hours  pantoprazole  Injectable 40 milliGRAM(s) IV Push daily  petrolatum white Ointment 1 Application(s) Topical two times a day  polyethylene glycol 3350 17 Gram(s) Oral daily  valproate sodium  IVPB 250 milliGRAM(s) IV Intermittent every 6 hours    MEDICATIONS  (PRN):  acetaminophen     Tablet .. 650 milliGRAM(s) Oral every 6 hours PRN Temp greater or equal to 38C (100.4F), Mild Pain (1 - 3)  acetaminophen  Suppository .. 650 milliGRAM(s) Rectal every 6 hours PRN Temp greater or equal to 38C (100.4F), Moderate Pain (4 - 6)  aluminum hydroxide/magnesium hydroxide/simethicone Suspension 30 milliLiter(s) Oral every 4 hours PRN Dyspepsia  glycopyrrolate Injectable 0.4 milliGRAM(s) IV Push every 6 hours PRN excessive, audible, secretions  LORazepam   Injectable 0.5 milliGRAM(s) IV Push every 2 hours PRN Anxiety, agitation, delirium  melatonin 3 milliGRAM(s) Oral at bedtime PRN Insomnia  morphine  - Injectable 1 milliGRAM(s) IV Push every 2 hours PRN Moderate Pain (4 - 6)  morphine  - Injectable 2 milliGRAM(s) IV Push every 2 hours PRN Severe Pain (7 - 10)  morphine  - Injectable 2 milliGRAM(s) IV Push every 2 hours PRN Dyspnea  ondansetron Injectable 4 milliGRAM(s) IV Push every 8 hours PRN Nausea and/or Vomiting  senna 2 Tablet(s) Oral at bedtime PRN Constipation      ITEMS UNCHECKED ARE NOT PRESENT    PRESENT SYMPTOMS: [ x]Unable to self-report - see [ ] CPOT [x ] PAINADS [x] RDOS  Source if other than patient:  [ ]Family   [x ]Team     Pain:  [ ]yes [ ]no  QOL impact -   Location -                    Aggravating factors -  Quality -  Radiation -  Timing-  Severity (0-10 scale):  Minimal acceptable level (0-10 scale):     CPOT:    https://www.[facility].org/getattachment/[email or web address]/Critical-Care-Pain-Observation-Tool-(CPOT)    PAINAD Score: See PAINAD tool and score below       Dyspnea:                           [ ]Mild [ ]Moderate [ ]Severe    RDOS: See RDOS tool and score below   0 to 2  minimal or no respiratory distress   3  mild distress  4 to 6 moderate distress  >7 severe distress      Anxiety:                             [ ]Mild [ ]Moderate [ ]Severe  Fatigue:                             [ ]Mild [ ]Moderate [ ]Severe  Nausea:                             [ ]Mild [ ]Moderate [ ]Severe  Loss of appetite:              [ ]Mild [ ]Moderate [ ]Severe  Constipation:                    [ ]Mild [ ]Moderate [ ]Severe    PCSSQ[Palliative Care Spiritual Screening Question]   Severity (0-10):  Score of 4 or > indicate consideration of Chaplaincy referral.  Chaplaincy Referral: [x ] yes [ ] refused [x ] following [ ] Deferred     Caregiver [place]? : [ ] yes [ ] no [x ] Deferred [ ] Declined             Social work referral [ ] Patient & Family Centered Care Referral [ ]     Anticipatory Grief present?:  [ ] yes [ ] no  [x ] Deferred                  Social work referral [ ] Chaplaincy Referral [ ]    		  Other Symptoms:  [x ]All other review of systems negative     Palliative Performance Status Version 2:   See PPSv2 tool and score below         PHYSICAL EXAM:  Vital Signs Last 24 Hrs  T(C): 36.4 (28 Dec 2023 12:21), Max: 36.9 (28 Dec 2023 05:01)  T(F): 97.6 (28 Dec 2023 12:21), Max: 98.4 (28 Dec 2023 05:01)  HR: 80 (28 Dec 2023 12:21) (73 - 80)  BP: 120/78 (28 Dec 2023 12:21) (120/78 - 129/81)  BP(mean): --  RR: 18 (28 Dec 2023 12:21) (18 - 18)  SpO2: 98% (28 Dec 2023 12:21) (96% - 98%)    Parameters below as of 28 Dec 2023 12:21  Patient On (Oxygen Delivery Method): room air     I&O's Summary    27 Dec 2023 07:01  -  28 Dec 2023 07:00  --------------------------------------------------------  IN: 0 mL / OUT: 0 mL / NET: 0 mL       GENERAL: [ ]Cachexia    [ ]Alert  [ ]Oriented x   [ ]Lethargic  [x ]Unarousable  [ ]Verbal  [ ]Non-Verbal  Behavioral:   [ ]Anxiety  [ ]Delirium [ ]Agitation [ ]Other  HEENT:  [ ]Normal   [x ]Dry mouth   [ ]ET Tube/Trach  [ ]Oral lesions  PULMONARY:   [ ]Clear [ ]Tachypnea  [ ]Audible excessive secretions   [ ]Rhonchi        [ ]Right [ ]Left [ ]Bilateral  [ ]Crackles        [ ]Right [ ]Left [ ]Bilateral  [ ]Wheezing     [ ]Right [ ]Left [ ]Bilateral  [x ]Diminished BS [ ] Right [ ]Left [ x]Bilateral  CARDIOVASCULAR:    [x ]Regular [ ]Irregular [ ]Tachy  [ ]Austin [ ]Murmur [ ]Other  GASTROINTESTINAL:  [x]Soft  [ ]Distended   [ x]+BS  [x ]Non tender [ ]Tender  [ ]Other [ ]PEG [ ]OGT/ NGT   Last BM:   GENITOURINARY:  [ ]Normal [ ]Incontinent   [ ]Oliguria/Anuria   [ x]Mas  MUSCULOSKELETAL:   [ ]Normal   [x ]Weakness  [x ]Bed/Wheelchair bound [ ]Edema  NEUROLOGIC:   [ ]No focal deficits  [x ] Cognitive impairment  [ ] Dysphagia [ ]Dysarthria [ ] Paresis [ ]Other   SKIN:   [x ]Normal  [ ]Rash  [ ]Other  [ ]Pressure ulcer(s) [ ]y [ ]n present on admission    CRITICAL CARE:  [ ]Shock Present  [ ]Septic [ ]Cardiogenic [ ]Neurologic [ ]Hypovolemic  [ ]Vasopressors [ ]Inotropes  [ ]Respiratory failure present [ ]Mechanical Ventilation [ ]Non-invasive ventilatory support [ ]High-Flow   [ ]Acute  [ ]Chronic [ ]Hypoxic  [ ]Hypercarbic [ ]Other  [ ]Other organ failure     LABS:                        9.6    7.48  )-----------( 132      ( 27 Dec 2023 10:32 )             31.7   12-27    143  |  109<H>  |  4<L>  ----------------------------<  107<H>  3.5   |  25  |  0.33<L>    Ca    8.8      27 Dec 2023 10:37    TPro  6.7  /  Alb  2.8<L>  /  TBili  0.3  /  DBili  x   /  AST  42<H>  /  ALT  14  /  AlkPhos  84  12-27      Urinalysis Basic - ( 27 Dec 2023 10:37 )    Color: x / Appearance: x / SG: x / pH: x  Gluc: 107 mg/dL / Ketone: x  / Bili: x / Urobili: x   Blood: x / Protein: x / Nitrite: x   Leuk Esterase: x / RBC: x / WBC x   Sq Epi: x / Non Sq Epi: x / Bacteria: x      RADIOLOGY & ADDITIONAL STUDIES:  < from: MR Head No Cont (12.26.23 @ 17:44) >  ACC: [id number] EXAM:  MR BRAIN   ORDERED BY: BOB VAUGHN     PROCEDURE DATE:  12/26/2023          INTERPRETATION:  MR of the brain without gadolinium contrast    CLINICAL INFORMATION:   AMS  MMR  Admitting Dxs: R41.82 ALTERED MENTAL   STATUS, UNSPECIFIED    TECHNIQUE:   Sagittal and axial T1-weighted images, axial FLAIR images,   axial susceptibility-weighted/gradient echo images, coronal T2-weighted   images and axial diffusion weighted images of the brain were obtained.       Note:   Artifactfrom gross patient motion causes image blurring and   limits evaluation. Sequences were repeated for patient motion. Fast   imaging techniques were employed.  CONTRAST:    None    COMPARISON:   CT head 12/21/2023 and MR brain 11/2/2023    FINDINGS:    BRAIN:   The brain demonstrates focal encephalomalacia within the   posterior right temporal lobe deep to a right parietal craniotomy.   Compared to 11/2/2023 the surgical cavity has partly collapsed. It   demonstrate scoliotic signal hyperintensityon the long TR images   greatest at the inferior medial margin of the operative bed. There is   also mixed linear T1 hyperintensity and indistinct low signal intensity   on the susceptibility weighted images that suggest blood breakdown   products atthe surgical bed. No residual mass effect is seen.    Left occipital mass also demonstrates susceptibility effect consistent   with internal hemorrhage. Associated vasogenic edema has improved since   11/2/2023. Right parietal anterior inferior region of vasogenic edema is   also improved compared to 11/2/2023. No mass lesion is appreciated by the   current nongadolinium technique at this location. Residual mass lesion of   approximately 1.5 cm diameter is present at the left posterior frontal   parasagittal vertex with improvement in vasogenic edema compared to   11/2/2023. Additional locations of enhancing metastases identified in the   right anterior corona radiata and left lateral parietal lobe are   relatively inconspicuous on the current examination. No acute cerebral   cortical infarct is found. Interval lesion is recognized by the current   nongadolinium technique.  No new mass effect is developed in the brain.     Additional patchy indistinct lesions in the deep hemispheric whitematter   appear unchanged from 11/2/2023.    CSF SPACES:   The ventricles, sulci and basal cisterns appear mild to   moderately dilated reflecting diffuse brain volume loss.    VESSELS:   The vertebral and internal carotid arteries demonstrate   expected flow voids indicating their patency.    HEAD AND NECK STRUCTURES:   The orbits are unremarkable.  Paranasal   sinuses are clear.  The nasal cavity appears intact.  The central skull   base appears intact.  The nasopharynx is symmetric.  The temporal bones   appear clear of disease.      IMPRESSION:    1. Right temporal lobe operative bed demonstrates further contraction and   persistent marginal vasogenic edema pattern compared to 11/2/2023    2. Larger enhancing metastases demonstrated on 11/2/2023 on the current   examination demonstrate diminished mass effect and vasogenic edema   consistent with treatment response    3. Small enhancing metastases demonstrated on 11/2/2023 are not   identified by the current nongadolinium technique    4. No adverse interval change 11/2/2023. Gadolinium-enhanced imaging   recommended in evaluation of metastases. Patient motion limited scan    --- End of Report ---            MERCEDES MEDINA MD; Attending Radiologist  This document has been electronically signed. Dec 27 2023  7:56AM    < end of copied text >    Protein Calorie Malnutrition Present: [ ]mild [ ]moderate [ ]severe [ ]underweight [ ]morbid obesity  https://www.andeal.org/vault/4210/web/files/ONC/Table_Clinical%20Characteristics%20to%20Document%20Malnutrition-White%20JV%20et%20al%202012.pdf    Height (cm): 167 (12-21-23 @ 08:50), 167 (11-28-23 @ 08:43), 167.6 (11-08-23 @ 18:29)  Weight (kg): 49.8 (12-21-23 @ 08:50), 49.9 (12-14-23 @ 11:00), 54.8 (11-08-23 @ 18:29)  BMI (kg/m2): 17.9 (12-21-23 @ 08:50), 17.9 (12-14-23 @ 11:00), 19.6 (11-28-23 @ 08:43)    [x ]PPSV2 < or = 30%  [ ]significant weight loss [ ]poor nutritional intake [ ]anasarca[ ]Artificial Nutrition    Other REFERRALS:  [ ]Hospice  [ ]Child Life  [ ]Social Work  [ ]Case management [ ]Holistic Therapy     Goals of Care Document  Electronic Signatures:  Goals of Care:   GOALS OF CARE:  · Participants	Family  · Relative	Bambi Zarate Stephanie    Advance Directives:  · Does patient have Advance Directive	Yes  · Indicate Type	Health Care Proxy (HCP); Medical Orders for Life-Sustaining Treatment (MOLST)  · Agent's Name	Anant (defers to patient's three sisters, is aware of situation)  · Are any of the items on the chart	Yes  · Specify which ones are on chart	Medical Orders for Life-Sustaining Treatment (MOLST)  · Does Patient Have a Surrogate	Yes  · Surrogate's Name	Bambi Zarate and Stephanie    Conversation Discussion:  · Conversation	Diagnosis; Prognosis; Treatment Options  · Conversation Details	Spoke with patient's 3 sisters via phone. Discussed ongoing goc that has been conducted with the primary team. The patient's family confirmed that based on MRI results and patient's clinical condition they want to ensure her comfort and minimize any suffering. Patient is increasingly obtunded and during wakeful hours is restless and agitated. Her family feels that this quality of life would not improve her quality of life. Renée's 3 sisters confirmed DNR/I. Discussed transition to comfort measures: family is amenable to focus on symptom management deescalating any life prolonging interventions including blood draws, IVF, and non-essential medications for comfort. Discussed the reasoning for continuation of IV seizure medications. Discussed initiation of comfort medications on [facility] as we await the availability of a PCU bed, family was amenable. Discussed PCU as a temporary unit to manage symptoms of agitation and to determine appropriate next steps including inpatient hospice. Family verbalized understanding. Emotional support provided.    Location of Discussion:  · Location of discussion	Telephone    Time Spent on Advance Care Planning:  Attending or DB Only.     I personally spent 26 minutes on advance care planning services with the patient. This time is separate and distinct from any other care management services provided on this date.      Last Updated: 06-Jun-2023 15:41 by Heaven Hernandes)

## 2023-12-28 NOTE — PROGRESS NOTE ADULT - PROBLEM SELECTOR PLAN 8
DVT ppx: SCDs, hold chemical DVT ppx as per NSG  Dispo: PT consult
DVT ppx: SCDs, hold chemical DVT ppx as per NSG  Dispo: PT consult and MRI head
DVT ppx: SCDs, hold chemical DVT ppx as per NSG  Dispo: PT consult and MRI head
see GOC discussion above  - comfort care
DVT ppx: SCDs, hold chemical DVT ppx as per NSG  Dispo: PT consult
DVT ppx: SCDs, hold chemical DVT ppx as per NSG  Dispo: PT consult and MRI head
see GOC discussion above  - comfort care

## 2023-12-28 NOTE — PROGRESS NOTE ADULT - PROBLEM SELECTOR PLAN 5
will continue to follow for GOC/symptoms  case discussed with primary and PCU teams   for comfort measures only:   - c/w morphine 2mg IVP q2h PRN severe pain/dyspnea  - c/w  morphine 1mg IVP q2h PRN mod pain  - c/w ativan 0.5mg IVP q2h PRN agitation/anxiety  - c/w  glycopyrrolate 0.4mg q6h PRN secretions  - c/w  dulcolax 10mg supp daily PRN constipation  - limit blood draws and non-essential medications. continue IV seizure medications  please page palliative care if >3 doses in 12 hour nursing shift or if symptoms unmanaged [id number]  Can be reached by TEAMS M-F 9-5 Yasmeen Jennings Any other time please page [phone] if needed will continue to follow for GOC/symptoms  case discussed with primary and PCU teams   for comfort measures only:   - c/w morphine 2mg IVP q2h PRN severe pain/dyspnea  - c/w  morphine 1mg IVP q2h PRN mod pain  - c/w ativan 0.5mg IVP q2h PRN agitation/anxiety  - c/w  glycopyrrolate 0.4mg q6h PRN secretions  - c/w  dulcolax 10mg supp daily PRN constipation  - limit blood draws and non-essential medications. continue IV seizure medications  please page palliative care if >3 doses in 12 hour nursing shift or if symptoms unmanaged [id number]  Can be reached by TEAMS M-F 9-5 Yasmeen Jennings Any other time please page [phone] if needed

## 2023-12-28 NOTE — PROGRESS NOTE ADULT - PROBLEM SELECTOR PLAN 4
see GOC above  HCP Anant defers to patient's 3 sisters: Kiersten, Bambi, and Korin BARGER completed by primary team reflecting DNR/I  plan of care is for initiation of comfort measures only, transfer to PCU when bed is available  chaplaincy consult placed for spiritual support see GOC above  HCP Anant defers to patient's 3 sisters: Kiersten, Rupertobeth, and Korin  CHARBEL completed by primary team reflecting DNR/I  plan of care is for initiation of comfort measures only, transfer to PCU when bed is available  chaplaincy consult placed for spiritual support  updates provided to patient's sister Korin

## 2023-12-28 NOTE — PROGRESS NOTE ADULT - TIME BILLING
patient encounter, reviewing tests and imaging, independently obtaining a history, performing a physical examination, discussing the plan with the patient, ordering medications/tests, documenting clinical information, and coordinating care. This excludes any time spent on teaching.
Time-based billing (NON-critical care).     The necessity of the time spent during the encounter on this date of service was due to:     - Ordering, reviewing, and interpreting labs, testing, and imaging.  - Independently obtaining a review of systems and performing a physical exam  - Reviewing prior hospitalization and where necessary, outpatient records.  - Counselling and educating patient and family regarding interpretation of aforementioned items and plan of care.
Symptom assessment and management, supportive counseling, coordination of care

## 2023-12-29 NOTE — PROVIDER CONTACT NOTE (OTHER) - BACKGROUND
Pt admitted for AMS, Hx of lung CA
Patient has DNR
Pt arrival was agitated, pulled off her clothes and ripped IV line. Pt was extremely combative and would grab staff members. Attempted to put in IV line but repeatedly kept pulling off the line.

## 2023-12-29 NOTE — PROGRESS NOTE ADULT - PROBLEM SELECTOR PROBLEM 5
Prophylactic measure
Steroid-induced diabetes
Encounter for palliative care
Steroid-induced diabetes
Encounter for palliative care
Steroid-induced diabetes
Steroid-induced diabetes
Encounter for palliative care
Encounter for palliative care

## 2023-12-29 NOTE — PROVIDER CONTACT NOTE (OTHER) - ACTION/TREATMENT ORDERED:
See patient expiration note
Dalton BARRIOS notified and aware. Haldol and Ativan were given. MD aware that pt is missing dose of morning medication and cannot take PO
Will see soon

## 2023-12-29 NOTE — PROVIDER CONTACT NOTE (OTHER) - REASON
Pt has no IV, multiple attempt made, pt is refusing all po med
Pt pulling IV and agitation
pronouncement

## 2023-12-29 NOTE — PROGRESS NOTE ADULT - PROBLEM SELECTOR PLAN 5
.  Patient is actively dying, anticipate patient will pass on unit vs. inpatient hospice candidate.   HCP Anant defers to sister Shareese, family is grieving the recent loss of their mother.  following.

## 2023-12-29 NOTE — PROGRESS NOTE ADULT - ASSESSMENT
52F with hx of stage 4 lung adenocarcinoma dx 2022 with diffuse mets to C/A/P and brain s/p Rt craniotomy for tumor resection, & multiple rounds of chemo & RT, steroid induced DM, GERD who presents to [facility] for AMS, transferred to [facility] for further management found to have seizure, s/p initiation of AEDs and ICU monitor. Clinical seizures present, behavioral changes are related to brain mets. Patient transferred to PCU for symptom management and end of life care.  52F with hx of stage 4 lung adenocarcinoma dx 2022 with diffuse mets to C/A/P and brain s/p Rt craniotomy for tumor resection, & multiple rounds of chemo & RT, steroid induced DM, GERD who presents to [place] ED for AMS, transferred to [facility] for further management found to have seizure, s/p initiation of AEDs and ICU monitor. Clinical seizures present, behavioral changes are related to brain mets. Patient transferred to PCU for symptom management and end of life care.

## 2023-12-29 NOTE — PROVIDER CONTACT NOTE (OTHER) - ASSESSMENT
No Response to external Stimuli  No spontaneous respirations  No apical Heart rate  Negative papillary response
Pt is AxO 0 , agitated, tachycardic to 150s on tele, but kept pulling off leads.
Pt is lethargic, agitated and aggressive when interacting with pt.

## 2023-12-29 NOTE — PROVIDER CONTACT NOTE (OTHER) - SITUATION
Pt has no IV, multiple attempt made, pt is refusing all po med. Pt lethargic s/p haldol IM overnight
Pt arrival was agitated, pulled off her clothes and ripped IV line. Pt was extremely combative and would grab staff members. Attempted to put in IV line but repeatedly kept pulling off the line.
Patient without spontaneous respirations or pulse x1 minute

## 2023-12-29 NOTE — PROGRESS NOTE ADULT - PROBLEM SELECTOR PROBLEM 1
Seizure
Dyspnea
Seizure
Seizure
Sepsis, unspecified organism
Functional quadriplegia
Seizure
Functional quadriplegia
Seizure
Functional quadriplegia

## 2023-12-29 NOTE — PROVIDER CONTACT NOTE (OTHER) - RECOMMENDATIONS
Patient pronounced dead @ 3694 with sister present at the bedside Patient pronounced dead @ 3477 with sister present at the bedside Patient pronounced dead @ 4689 with sister present at the bedside Patient pronounced dead @ 8669 with sister present at the bedside

## 2023-12-29 NOTE — DISCHARGE NOTE FOR THE EXPIRED PATIENT - HOSPITAL COURSE
52F with hx of stage 4 lung adenocarcinoma dx  with diffuse mets to C/A/P and brain s/p Rt craniotomy for tumor resection, & multiple rounds of chemo & RT, steroid induced DM, GERD who presents to [place] ED for AMS, transferred to [facility] for further management found to have seizure, s/p initiation of AEDs and ICU monitor. Clinical seizures present, behavioral changes are related to brain mets. Patient transferred to PCU for symptom management and end of life care. On arrival to PCU  patient requiring escalating doses of IV morphine, IV ativan and IV robinul in setting of tachypnea and excessive secretions. At 3:48PM  patient . Sister was at bedside. Dr. Ty attending notified. 52F with hx of stage 4 lung adenocarcinoma dx  with diffuse mets to C/A/P and brain s/p Rt craniotomy for tumor resection, & multiple rounds of chemo & RT, steroid induced DM, GERD who presents to [place] ED for AMS, transferred to [facility] for further management found to have seizure, s/p initiation of AEDs and ICU monitor. Clinical seizures present, behavioral changes are related to brain mets. Patient transferred to PCU for symptom management and end of life care. On arrival to PCU  patient requiring escalating doses of IV morphine, IV ativan and IV robinul in setting of tachypnea and excessive secretions. At 3:48PM  patient . Sister was at bedside. Dr. Ty attending notified.

## 2023-12-29 NOTE — PROGRESS NOTE ADULT - PROBLEM SELECTOR PLAN 4
Patient is active, anticipate patient will pass on unit vs. inpatient hospice candidate.   HCP Anant defers to sister Shareese, family is grieving the recent loss of their mother.  following. .  - morphine 2mg IVP q1h prn moderate pain  - morphine 4mg IVP q1h prn severe pain

## 2023-12-29 NOTE — PROGRESS NOTE ADULT - PROVIDER SPECIALTY LIST ADULT
Hospitalist
Infectious Disease
NSICU
NSICU
Neurology
Neurology
Neurosurgery
Neurology
Palliative Care
Neurosurgery
NSICU
Neurosurgery
Neurosurgery
Hospitalist
Palliative Care
Hospitalist
Palliative Care
Palliative Care

## 2023-12-29 NOTE — PROGRESS NOTE ADULT - PROBLEM SELECTOR PLAN 3
- morphine 2mg IVP q1h prn moderate pain  - morphine 4mg IVP q1h prn severe pain .  PPSV 10%  - requires full assistance with ADLs

## 2023-12-29 NOTE — PROGRESS NOTE ADULT - PROBLEM SELECTOR PLAN 1
Dyspnea refractory to 4mg morphine IV, will attempt dilaudid and begin 1mg IV dilaudid if effective  Current orders:  - morphine 4mg IVP q1h prn respiratory distress  - glycopyrrolate 0.4mg q6h prn respiratory secretions  - ativan 0.5mg IV q1h prn respiratory distress refractory to morphine  - bowel regimen .  Dyspnea refractory to 4mg morphine IV, will attempt dilaudid and begin 1mg IV dilaudid if effective  Current orders:  - morphine 2mg IV q6h ATC  - morphine 4mg IVP q1h prn respiratory distress  - glycopyrrolate 0.4mg q6h ATC  - ativan 0.5mg IV q1h prn respiratory distress refractory to morphine  - bowel regimen

## 2023-12-29 NOTE — PROGRESS NOTE ADULT - SUBJECTIVE AND OBJECTIVE BOX
GAP TEAM PALLIATIVE CARE UNIT PROGRESS NOTE:      [  ] Patient on hospice program.    INDICATION FOR PALLIATIVE CARE UNIT SERVICES/Interval HPI: 52F with hx of stage 4 lung adenocarcinoma dx 2022 with diffuse mets to C/A/P and brain s/p Rt craniotomy for tumor resection, & multiple rounds of chemo & RT, steroid induced DM, GERD who presents to [facility] ED for AMS, transferred to [facility] for further management found to have seizure, s/p initiation of AEDs and ICU monitor. Clinical seizures present, behavioral changes are related to brain mets. Patient transferred to PCU for symptom management and end of life care.     OVERNIGHT EVENTS: Chart reviewed and patient seen and examined at the bedside. The patient required PRN dilaudid x1 for respiratory distress and ativan x3 within a 24hr period 8am-8am.    DNR on chart: Yes  Yes    Allergies    No Known Allergies    Intolerances    MEDICATIONS  (STANDING):  artificial tears (preservative free) Ophthalmic Solution 1 Drop(s) Both EYES three times a day  glycopyrrolate Injectable 0.4 milliGRAM(s) IV Push every 6 hours  lacosamide IVPB 200 milliGRAM(s) IV Intermittent every 12 hours  levETIRAcetam  IVPB 1500 milliGRAM(s) IV Intermittent every 12 hours  morphine  - Injectable 2 milliGRAM(s) IV Push every 6 hours  pantoprazole  Injectable 40 milliGRAM(s) IV Push daily  valproate sodium  IVPB 250 milliGRAM(s) IV Intermittent every 6 hours    MEDICATIONS  (PRN):  acetaminophen  Suppository .. 650 milliGRAM(s) Rectal every 6 hours PRN Temp greater or equal to 38C (100.4F)  bisacodyl Suppository 10 milliGRAM(s) Rectal daily PRN Constipation  LORazepam   Injectable 0.5 milliGRAM(s) IV Push every 1 hour PRN Anxiety, agitation, delirium  LORazepam   Injectable 2 milliGRAM(s) IV Push every 5 minutes PRN seizure  morphine  - Injectable 4 milliGRAM(s) IV Push every 1 hour PRN Dyspnea  morphine  - Injectable 2 milliGRAM(s) IV Push every 1 hour PRN Moderate Pain (4 - 6)  morphine  - Injectable 4 milliGRAM(s) IV Push every 1 hour PRN Severe Pain (7 - 10)  ondansetron Injectable 4 milliGRAM(s) IV Push every 8 hours PRN Nausea and/or Vomiting    ITEMS UNCHECKED ARE NOT PRESENT    PRESENT SYMPTOMS: [x ]Unable to self-report see PAINAD, RDOS below  Source if other than patient:  [ ]Family   [x ]Team   PCSSQ [Palliative Care Spiritual Screening Question]   Severity (0-10):  Score of 4 or > indicate consideration of Chaplaincy referral.  Chaplaincy Referral: [x ] yes [ ] refused [ ] following [ ] deferred  Caregiver [place]? : [ ] yes [ ] no [x ] deferred:  Social work referral [ ] Patient & Family Centered Care Referral [ ]   Anticipatory Grief present?:  [ x] yes [ ] no [ ] deferred:  Social work referral [ ] Patient & Family Centered Care Referral [ ]  	  Other Symptoms:  [ ]All other review of systems negative - unable to assess due to patients mental status    PHYSICAL EXAM:   Vital Signs Last 24 Hrs  T(C): 37.6 (29 Dec 2023 08:33), Max: 37.6 (29 Dec 2023 08:33)  T(F): 99.6 (29 Dec 2023 08:33), Max: 99.6 (29 Dec 2023 08:33)  HR: 141 (29 Dec 2023 08:33) (94 - 141)  BP: 92/63 (29 Dec 2023 08:33) (92/63 - 139/81)  BP(mean): --  RR: 24 (29 Dec 2023 08:33) (18 - 24)  SpO2: 92% (29 Dec 2023 08:33) (92% - 98%)    Parameters below as of 29 Dec 2023 08:33  Patient On (Oxygen Delivery Method): room air     I&O's Summary    28 Dec 2023 07:01  -  29 Dec 2023 07:00  --------------------------------------------------------  IN: 50 mL / OUT: 0 mL / NET: 50 mL    GENERAL: [ ] Cachexia  [ ]Alert  [ ]Oriented x   [ ]Lethargic  [ ]Unarousable  [ ]Verbal  [x ]Non-Verbal  Behavioral:   [ ] Anxiety  [ ] Delirium [ ] Agitation [ ] Other  HEENT:  [ ]Normal   [ ]Dry mouth   [ ]ET Tube/Trach  [ ]Oral lesions  PULMONARY:   [ ]Clear [ ]Tachypnea  [ ]Audible excessive secretions   [x]Rhonchi        [ ]Right [ ]Left [x ]Bilateral  [ ]Crackles        [ ]Right [ ]Left [ ]Bilateral  [ ]Wheezing     [ ]Right [ ]Left [ ]Bilateral  [ ]Diminished BS [ ]Right [ ]Left [ ]Bilateral    CARDIOVASCULAR:    [ ]Regular [ ]Irregular [ x]Tachy  [ ]Austin [ ]Murmur [ ]Other  GASTROINTESTINAL:  [ x]Soft  [ ]Distended   [ x]+BS  [x ]Non tender [ ]Tender  [ ]Other [ ]PEG [ ]OGT/ NGT   Last BM:  12/25  GENITOURINARY:  [ ]Normal [ ] Incontinent   [ ]Oliguria/Anuria   [ ]Mas  MUSCULOSKELETAL:   [ ]Normal   [ ]Weakness  [x ]Bed/Wheelchair bound [ ]Edema  NEUROLOGIC:   [ ]No focal deficits  [x ] Cognitive impairment  [ ] Dysphagia [ ]Dysarthria [ ] Paresis [ ]Other   SKIN:   [ ]Normal  [ ]Rash  [ ]Other  [ ]Pressure ulcer(s)  [ ]y [ ]n  Present on admission     LABS: Labs personally reviewed and interpreted  12/27:   WBC 3.49 Hb 9.6 Plt 132  Na 143 K 3.5 Cl 109  Cr 0.33 eGFR 124    RADIOLOGY & ADDITIONAL STUDIES: Imaging personally reviewed and interpreted    ACC: [id number] EXAM:  MR BRAIN   ORDERED BY: BOB VAUGHN     PROCEDURE DATE:  12/26/2023          INTERPRETATION:  MR of the brain without gadolinium contrast    CLINICAL INFORMATION:   AMS  MMR  Admitting Dxs: R41.82 ALTERED MENTAL   STATUS, UNSPECIFIED    TECHNIQUE:   Sagittal and axial T1-weighted images, axial FLAIR images,   axial susceptibility-weighted/gradient echo images, coronal T2-weighted   images and axial diffusion weighted images of the brain were obtained.       Note:   Artifactfrom gross patient motion causes image blurring and   limits evaluation. Sequences were repeated for patient motion. Fast   imaging techniques were employed.  CONTRAST:    None    COMPARISON:   CT head 12/21/2023 and MR brain 11/2/2023    FINDINGS:    BRAIN:   The brain demonstrates focal encephalomalacia within the   posterior right temporal lobe deep to a right parietal craniotomy.   Compared to 11/2/2023 the surgical cavity has partly collapsed. It   demonstrate scoliotic signal hyperintensityon the long TR images   greatest at the inferior medial margin of the operative bed. There is   also mixed linear T1 hyperintensity and indistinct low signal intensity   on the susceptibility weighted images that suggest blood breakdown   products atthe surgical bed. No residual mass effect is seen.    Left occipital mass also demonstrates susceptibility effect consistent   with internal hemorrhage. Associated vasogenic edema has improved since   11/2/2023. Right parietal anterior inferior region of vasogenic edema is   also improved compared to 11/2/2023. No mass lesion is appreciated by the   current nongadolinium technique at this location. Residual mass lesion of   approximately 1.5 cm diameter is present at the left posterior frontal   parasagittal vertex with improvement in vasogenic edema compared to   11/2/2023. Additional locations of enhancing metastases identified in the   right anterior corona radiata and left lateral parietal lobe are   relatively inconspicuous on the current examination. No acute cerebral   cortical infarct is found. Interval lesion is recognized by the current   nongadolinium technique.  No new mass effect is developed in the brain.     Additional patchy indistinct lesions in the deep hemispheric whitematter   appear unchanged from 11/2/2023.    CSF SPACES:   The ventricles, sulci and basal cisterns appear mild to   moderately dilated reflecting diffuse brain volume loss.    VESSELS:   The vertebral and internal carotid arteries demonstrate   expected flow voids indicating their patency.    HEAD AND NECK STRUCTURES:   The orbits are unremarkable.  Paranasal   sinuses are clear.  The nasal cavity appears intact.  The central skull   base appears intact.  The nasopharynx is symmetric.  The temporal bones   appear clear of disease.      IMPRESSION:    1. Right temporal lobe operative bed demonstrates further contraction and   persistent marginal vasogenic edema pattern compared to 11/2/2023    2. Larger enhancing metastases demonstrated on 11/2/2023 on the current   examination demonstrate diminished mass effect and vasogenic edema   consistent with treatment response    3. Small enhancing metastases demonstrated on 11/2/2023 are not   identified by the current nongadolinium technique    4. No adverse interval change 11/2/2023. Gadolinium-enhanced imaging   recommended in evaluation of metastases. Patient motion limited scan    --- End of Report ---    MERCEDES MEDINA MD; Attending Radiologist  This document has been electronically signed. Dec 27 2023  7:56AM      ACC: [id number] EXAM:  DUPLEX SCAN EXT VEINS LOWER BI   ORDERED BY:  TYREE SZYMANSKI     PROCEDURE DATE:  12/21/2023          INTERPRETATION:  CLINICAL INFORMATION: Seizure.    COMPARISON: Venous Doppler dated 11/20/2023.    TECHNIQUE: Duplex sonography of the BILATERAL LOWER extremity veins with   color and spectral Doppler, with and without compression.    FINDINGS:    RIGHT:  Normal compressibility of the RIGHT common femoral, femoral and popliteal   veins.  Doppler examination shows normal spontaneous and phasic flow.  No RIGHT calf vein thrombosis is detected.    LEFT:    The left common femoral veins, deep femoral vein and proximal femoral   veins were not imaged secondary to patient's positioning.  Normal compressibility of the LEFT mid and distal femoral and popliteal   veins.  Doppler examination shows normal spontaneous and phasic flow.  Limited visualized of the LEFT calf veins. No LEFT calf vein thrombosis   detected.    IMPRESSION:  No evidence of deep venous thrombosis in either lower extremity.    The left common femoral and proximal femoral veins were not imaged.    --- End of Report ---    CHRISTINA NARAYANAN MD; Attending Radiologist  This document has been electronically signed. Dec 21 2023  8:23PM      ACC: [id number] EXAM:  CT BRAIN   ORDERED BY:  NICOLA NOLASCO     PROCEDURE DATE:  12/21/2023          INTERPRETATION:  INDICATIONS:  exam change, patient with known brain   metastasis    TECHNIQUE:  Serial axial images were obtained from the skull baseto the   vertex without intravenous contrast. Sagittal and Coronal reformats were   performed    COMPARISON EXAMINATION: 12/20/2023    FINDINGS:  VENTRICLES AND SULCI: Mild ex vacuo dilatation of the temporal horn of   the right lateral ventricle  INTRA-AXIAL:  Right temporal parietal encephalomalacia as seen on the   prior subjacent to patient's craniotomy. Left parietal occipital and left   posterior frontal foci of increased attenuation similar in appearance   compared with the prior. Small right periventricular lesion seen on the   prior as well unchanged.  EXTRA-AXIAL:  No mass or collection is seen.  VISUALIZED SINUSES:  Clear.  VISUALIZED MASTOIDS:  Clear.  CALVARIUM: Right temporal parietal craniotomy  MISCELLANEOUS:  None.    IMPRESSION:  No significant interval change compared with the prior   12/20/2023    --- End of Report ---    RAMOS MCDERMOTT MD; Attending Radiologist  This document has been electronically signed. Dec 21 2023  9:22AM    PROTEIN CALORIE MALNUTRITION: [ ] mild [ ] moderate [ x] severe  [ ] underweight [ ] morbid obesity    https://www.andeal.org/vault/2440/web/files/ONC/Table_Clinical%20Characteristics%20to%20Document%20Malnutrition-White%20JV%20et%20al%202012.pdf    Height (cm): 167 (12-21-23 @ 08:50), 167 (11-28-23 @ 08:43), 167.6 (11-08-23 @ 18:29)  Weight (kg): 49.8 (12-21-23 @ 08:50), 49.9 (12-14-23 @ 11:00), 54.8 (11-08-23 @ 18:29)  BMI (kg/m2): 17.9 (12-21-23 @ 08:50), 17.9 (12-14-23 @ 11:00), 19.6 (11-28-23 @ 08:43)    [ x] PPSV2 < or = 30% [ ] significant weight loss [ ] poor nutritional intake [ ] anasarca   Artificial Nutrition [ ]     Other REFERRALS:    [ ] Hospice  [ ]Child Life  [ ]Social Work  [ ]Case management [ ]Holistic Therapy [ ] Physical Therapy [ ] Dietary     Progress Notes - Care Coordination [C. Provider] (12-29-23 @ 11:00)      Palliative Performance Scale:  http://npcrc.org/files/news/palliative_performance_scale_ppsv2.pdf  (Ctrl +  left click to view)  Respiratory Distress Observation Tool:  https://homecareinformation.net/handouts/hen/Respiratory_Distress_Observation_Scale.pdf (Ctrl +  left click to view)  PAINAD Score:  http://geriatrictoolkit.[place].[facility]/cog/painad.pdf (Ctrl +  left click to view)   GAP TEAM PALLIATIVE CARE UNIT PROGRESS NOTE:      [  ] Patient on hospice program.    INDICATION FOR PALLIATIVE CARE UNIT SERVICES/Interval HPI: 52F with hx of stage 4 lung adenocarcinoma dx 2022 with diffuse mets to C/A/P and brain s/p Rt craniotomy for tumor resection, & multiple rounds of chemo & RT, steroid induced DM, GERD who presents to [place] ED for AMS, transferred to [facility] for further management found to have seizure, s/p initiation of AEDs and ICU monitor. Clinical seizures present, behavioral changes are related to brain mets. Patient transferred to PCU for symptom management and end of life care.     OVERNIGHT EVENTS: Chart reviewed and patient seen and examined at the bedside. The patient required PRN dilaudid x1 for respiratory distress and ativan x3 within a 24hr period 8am-8am.    DNR on chart: Yes  Yes    Allergies    No Known Allergies    Intolerances    MEDICATIONS  (STANDING):  artificial tears (preservative free) Ophthalmic Solution 1 Drop(s) Both EYES three times a day  glycopyrrolate Injectable 0.4 milliGRAM(s) IV Push every 6 hours  lacosamide IVPB 200 milliGRAM(s) IV Intermittent every 12 hours  levETIRAcetam  IVPB 1500 milliGRAM(s) IV Intermittent every 12 hours  morphine  - Injectable 2 milliGRAM(s) IV Push every 6 hours  pantoprazole  Injectable 40 milliGRAM(s) IV Push daily  valproate sodium  IVPB 250 milliGRAM(s) IV Intermittent every 6 hours    MEDICATIONS  (PRN):  acetaminophen  Suppository .. 650 milliGRAM(s) Rectal every 6 hours PRN Temp greater or equal to 38C (100.4F)  bisacodyl Suppository 10 milliGRAM(s) Rectal daily PRN Constipation  LORazepam   Injectable 0.5 milliGRAM(s) IV Push every 1 hour PRN Anxiety, agitation, delirium  LORazepam   Injectable 2 milliGRAM(s) IV Push every 5 minutes PRN seizure  morphine  - Injectable 4 milliGRAM(s) IV Push every 1 hour PRN Dyspnea  morphine  - Injectable 2 milliGRAM(s) IV Push every 1 hour PRN Moderate Pain (4 - 6)  morphine  - Injectable 4 milliGRAM(s) IV Push every 1 hour PRN Severe Pain (7 - 10)  ondansetron Injectable 4 milliGRAM(s) IV Push every 8 hours PRN Nausea and/or Vomiting    ITEMS UNCHECKED ARE NOT PRESENT    PRESENT SYMPTOMS: [x ]Unable to self-report see PAINAD, RDOS below  Source if other than patient:  [ ]Family   [x ]Team   PCSSQ [Palliative Care Spiritual Screening Question]   Severity (0-10):  Score of 4 or > indicate consideration of Chaplaincy referral.  Chaplaincy Referral: [x ] yes [ ] refused [ ] following [ ] deferred  Caregiver [place]? : [ ] yes [ ] no [x ] deferred:  Social work referral [ ] Patient & Family Centered Care Referral [ ]   Anticipatory Grief present?:  [ x] yes [ ] no [ ] deferred:  Social work referral [ ] Patient & Family Centered Care Referral [ ]  	  Other Symptoms:  [ ]All other review of systems negative - unable to assess due to patients mental status    PHYSICAL EXAM:   Vital Signs Last 24 Hrs  T(C): 37.6 (29 Dec 2023 08:33), Max: 37.6 (29 Dec 2023 08:33)  T(F): 99.6 (29 Dec 2023 08:33), Max: 99.6 (29 Dec 2023 08:33)  HR: 141 (29 Dec 2023 08:33) (94 - 141)  BP: 92/63 (29 Dec 2023 08:33) (92/63 - 139/81)  BP(mean): --  RR: 24 (29 Dec 2023 08:33) (18 - 24)  SpO2: 92% (29 Dec 2023 08:33) (92% - 98%)    Parameters below as of 29 Dec 2023 08:33  Patient On (Oxygen Delivery Method): room air     I&O's Summary    28 Dec 2023 07:01  -  29 Dec 2023 07:00  --------------------------------------------------------  IN: 50 mL / OUT: 0 mL / NET: 50 mL    GENERAL: [ ] Cachexia  [ ]Alert  [ ]Oriented x   [ ]Lethargic  [ ]Unarousable  [ ]Verbal  [x ]Non-Verbal  Behavioral:   [ ] Anxiety  [ ] Delirium [ ] Agitation [ ] Other  HEENT:  [ ]Normal   [ ]Dry mouth   [ ]ET Tube/Trach  [ ]Oral lesions  PULMONARY:   [ ]Clear [ ]Tachypnea  [ ]Audible excessive secretions   [x]Rhonchi        [ ]Right [ ]Left [x ]Bilateral  [ ]Crackles        [ ]Right [ ]Left [ ]Bilateral  [ ]Wheezing     [ ]Right [ ]Left [ ]Bilateral  [ ]Diminished BS [ ]Right [ ]Left [ ]Bilateral    CARDIOVASCULAR:    [ ]Regular [ ]Irregular [ x]Tachy  [ ]Austin [ ]Murmur [ ]Other  GASTROINTESTINAL:  [ x]Soft  [ ]Distended   [ x]+BS  [x ]Non tender [ ]Tender  [ ]Other [ ]PEG [ ]OGT/ NGT   Last BM:  12/25  GENITOURINARY:  [ ]Normal [ ] Incontinent   [ ]Oliguria/Anuria   [ ]Mas  MUSCULOSKELETAL:   [ ]Normal   [ ]Weakness  [x ]Bed/Wheelchair bound [ ]Edema  NEUROLOGIC:   [ ]No focal deficits  [x ] Cognitive impairment  [ ] Dysphagia [ ]Dysarthria [ ] Paresis [ ]Other   SKIN:   [ ]Normal  [ ]Rash  [ ]Other  [ ]Pressure ulcer(s)  [ ]y [ ]n  Present on admission     LABS: Labs personally reviewed and interpreted  12/27:   WBC 3.49 Hb 9.6 Plt 132  Na 143 K 3.5 Cl 109  Cr 0.33 eGFR 124    RADIOLOGY & ADDITIONAL STUDIES: Imaging personally reviewed and interpreted    ACC: [id number] EXAM:  MR BRAIN   ORDERED BY: BOB VAUGHN     PROCEDURE DATE:  12/26/2023          INTERPRETATION:  MR of the brain without gadolinium contrast    CLINICAL INFORMATION:   AMS  MMR  Admitting Dxs: R41.82 ALTERED MENTAL   STATUS, UNSPECIFIED    TECHNIQUE:   Sagittal and axial T1-weighted images, axial FLAIR images,   axial susceptibility-weighted/gradient echo images, coronal T2-weighted   images and axial diffusion weighted images of the brain were obtained.       Note:   Artifactfrom gross patient motion causes image blurring and   limits evaluation. Sequences were repeated for patient motion. Fast   imaging techniques were employed.  CONTRAST:    None    COMPARISON:   CT head 12/21/2023 and MR brain 11/2/2023    FINDINGS:    BRAIN:   The brain demonstrates focal encephalomalacia within the   posterior right temporal lobe deep to a right parietal craniotomy.   Compared to 11/2/2023 the surgical cavity has partly collapsed. It   demonstrate scoliotic signal hyperintensityon the long TR images   greatest at the inferior medial margin of the operative bed. There is   also mixed linear T1 hyperintensity and indistinct low signal intensity   on the susceptibility weighted images that suggest blood breakdown   products atthe surgical bed. No residual mass effect is seen.    Left occipital mass also demonstrates susceptibility effect consistent   with internal hemorrhage. Associated vasogenic edema has improved since   11/2/2023. Right parietal anterior inferior region of vasogenic edema is   also improved compared to 11/2/2023. No mass lesion is appreciated by the   current nongadolinium technique at this location. Residual mass lesion of   approximately 1.5 cm diameter is present at the left posterior frontal   parasagittal vertex with improvement in vasogenic edema compared to   11/2/2023. Additional locations of enhancing metastases identified in the   right anterior corona radiata and left lateral parietal lobe are   relatively inconspicuous on the current examination. No acute cerebral   cortical infarct is found. Interval lesion is recognized by the current   nongadolinium technique.  No new mass effect is developed in the brain.     Additional patchy indistinct lesions in the deep hemispheric whitematter   appear unchanged from 11/2/2023.    CSF SPACES:   The ventricles, sulci and basal cisterns appear mild to   moderately dilated reflecting diffuse brain volume loss.    VESSELS:   The vertebral and internal carotid arteries demonstrate   expected flow voids indicating their patency.    HEAD AND NECK STRUCTURES:   The orbits are unremarkable.  Paranasal   sinuses are clear.  The nasal cavity appears intact.  The central skull   base appears intact.  The nasopharynx is symmetric.  The temporal bones   appear clear of disease.      IMPRESSION:    1. Right temporal lobe operative bed demonstrates further contraction and   persistent marginal vasogenic edema pattern compared to 11/2/2023    2. Larger enhancing metastases demonstrated on 11/2/2023 on the current   examination demonstrate diminished mass effect and vasogenic edema   consistent with treatment response    3. Small enhancing metastases demonstrated on 11/2/2023 are not   identified by the current nongadolinium technique    4. No adverse interval change 11/2/2023. Gadolinium-enhanced imaging   recommended in evaluation of metastases. Patient motion limited scan    --- End of Report ---    MERCEDES MEDINA MD; Attending Radiologist  This document has been electronically signed. Dec 27 2023  7:56AM      ACC: [id number] EXAM:  DUPLEX SCAN EXT VEINS LOWER BI   ORDERED BY:  TYREE SZYMANSKI     PROCEDURE DATE:  12/21/2023          INTERPRETATION:  CLINICAL INFORMATION: Seizure.    COMPARISON: Venous Doppler dated 11/20/2023.    TECHNIQUE: Duplex sonography of the BILATERAL LOWER extremity veins with   color and spectral Doppler, with and without compression.    FINDINGS:    RIGHT:  Normal compressibility of the RIGHT common femoral, femoral and popliteal   veins.  Doppler examination shows normal spontaneous and phasic flow.  No RIGHT calf vein thrombosis is detected.    LEFT:    The left common femoral veins, deep femoral vein and proximal femoral   veins were not imaged secondary to patient's positioning.  Normal compressibility of the LEFT mid and distal femoral and popliteal   veins.  Doppler examination shows normal spontaneous and phasic flow.  Limited visualized of the LEFT calf veins. No LEFT calf vein thrombosis   detected.    IMPRESSION:  No evidence of deep venous thrombosis in either lower extremity.    The left common femoral and proximal femoral veins were not imaged.    --- End of Report ---    CHRISTINA NARAYANAN MD; Attending Radiologist  This document has been electronically signed. Dec 21 2023  8:23PM      ACC: [id number] EXAM:  CT BRAIN   ORDERED BY:  NICOLA NOLASCO     PROCEDURE DATE:  12/21/2023          INTERPRETATION:  INDICATIONS:  exam change, patient with known brain   metastasis    TECHNIQUE:  Serial axial images were obtained from the skull baseto the   vertex without intravenous contrast. Sagittal and Coronal reformats were   performed    COMPARISON EXAMINATION: 12/20/2023    FINDINGS:  VENTRICLES AND SULCI: Mild ex vacuo dilatation of the temporal horn of   the right lateral ventricle  INTRA-AXIAL:  Right temporal parietal encephalomalacia as seen on the   prior subjacent to patient's craniotomy. Left parietal occipital and left   posterior frontal foci of increased attenuation similar in appearance   compared with the prior. Small right periventricular lesion seen on the   prior as well unchanged.  EXTRA-AXIAL:  No mass or collection is seen.  VISUALIZED SINUSES:  Clear.  VISUALIZED MASTOIDS:  Clear.  CALVARIUM: Right temporal parietal craniotomy  MISCELLANEOUS:  None.    IMPRESSION:  No significant interval change compared with the prior   12/20/2023    --- End of Report ---    RAMOS MCDERMOTT MD; Attending Radiologist  This document has been electronically signed. Dec 21 2023  9:22AM    PROTEIN CALORIE MALNUTRITION: [ ] mild [ ] moderate [ x] severe  [ ] underweight [ ] morbid obesity    https://www.andeal.org/vault/2440/web/files/ONC/Table_Clinical%20Characteristics%20to%20Document%20Malnutrition-White%20JV%20et%20al%202012.pdf    Height (cm): 167 (12-21-23 @ 08:50), 167 (11-28-23 @ 08:43), 167.6 (11-08-23 @ 18:29)  Weight (kg): 49.8 (12-21-23 @ 08:50), 49.9 (12-14-23 @ 11:00), 54.8 (11-08-23 @ 18:29)  BMI (kg/m2): 17.9 (12-21-23 @ 08:50), 17.9 (12-14-23 @ 11:00), 19.6 (11-28-23 @ 08:43)    [ x] PPSV2 < or = 30% [ ] significant weight loss [ ] poor nutritional intake [ ] anasarca   Artificial Nutrition [ ]     Other REFERRALS:    [ ] Hospice  [ ]Child Life  [ ]Social Work  [ ]Case management [ ]Holistic Therapy [ ] Physical Therapy [ ] Dietary     Progress Notes - Care Coordination [C. Provider] (12-29-23 @ 11:00)      Palliative Performance Scale:  http://npcrc.org/files/news/palliative_performance_scale_ppsv2.pdf  (Ctrl +  left click to view)  Respiratory Distress Observation Tool:  https://homecareinformation.net/handouts/hen/Respiratory_Distress_Observation_Scale.pdf (Ctrl +  left click to view)  PAINAD Score:  http://geriatrictoolkit.[place].[facility] [email or web address] (Ctrl +  left click to view)

## 2023-12-29 NOTE — PROGRESS NOTE ADULT - ATTENDING COMMENTS
52F with hx of stage 4 lung adenocarcinoma dx 2022 with diffuse mets to C/A/P and brain s/p Rt craniotomy for tumor resection, & multiple rounds of chemo & RT, steroid induced DM, GERD who presents to [place] ED for AMS, transferred to [facility] for further management found to have seizure, s/p initiation of AEDs and ICU monitor. Clinical seizures present, behavioral changes are related to brain mets. Patient transferred to PCU for symptom management and end of life care.     Pt requiring escalating doses of IV morphine, IV ativan and IV robinul in setting of tachypnea and excessive secretions. Started ATC IV robinul this AM. Rotated to IV dilaudid as tachypnea not well controlled despite titration of IV morphine. Pt with improvement in symptoms after rotation. Will start IV dilaudid 1mg q4h ATC and IV ativan 1mg q4h ATC. Pt actively dying. Patient's HCP and sister present at bedside. Sister Shareese shared their mother passed away 2 weeks and she has not been able to process as now her younger sister is also dying. Extensive support provided. Chaplaincy also following. Case discussed with IDT. 52F with hx of stage 4 lung adenocarcinoma dx 2022 with diffuse mets to C/A/P and brain s/p Rt craniotomy for tumor resection, & multiple rounds of chemo & RT, steroid induced DM, GERD who presents to [place] ED for AMS, transferred to [facility] for further management found to have seizure, s/p initiation of AEDs and ICU monitor. Clinical seizures present, behavioral changes are related to brain mets. Patient transferred to PCU for symptom management and end of life care.     Pt requiring escalating doses of IV morphine, IV ativan and IV robinul in setting of tachypnea and excessive secretions. Started ATC IV robinul this AM. Rotated to IV dilaudid as tachypnea not well controlled despite titration of IV morphine. Pt with improvement in symptoms after rotation. Will start IV dilaudid 1mg q4h ATC and IV ativan 1mg q4h ATC. Pt actively dying. Patient's HCP and sister present at bedside. Sister Shareese shared their mother passed away 2 weeks and she has not been able to process as now her younger sister is also dying. Extensive support provided. Chaplaincy also following. Case discussed with IDT.

## 2023-12-29 NOTE — PROGRESS NOTE ADULT - PROBLEM SELECTOR PLAN 2
PPSV 10%  - requires full assistance with ADLs .  - Vimpat 200mg IVPB q12hr   - Keppra 1500mg IVPB q12hr   - Valproate sodium 250mg IVPB q6hr   - ativan 2mg IV prn seizure

## 2023-12-29 NOTE — PROGRESS NOTE ADULT - RESPIRATORY DISTRESS OBSERVATION: RESPIRATORY RATE
Less than or equal to 18 breaths

## 2023-12-29 NOTE — PROGRESS NOTE ADULT - REASON FOR ADMISSION
HA , weakness, AMS

## 2023-12-30 LAB
PYRIDOXAL PHOS SERPL-MCNC: 1.1 UG/L — LOW (ref 3.4–65.2)
PYRIDOXAL PHOS SERPL-MCNC: 1.1 UG/L — LOW (ref 3.4–65.2)

## 2024-01-03 ENCOUNTER — APPOINTMENT (OUTPATIENT)
Dept: HEMATOLOGY ONCOLOGY | Facility: CLINIC | Age: 54
End: 2024-01-03

## 2024-01-12 ENCOUNTER — APPOINTMENT (OUTPATIENT)
Dept: HEMATOLOGY ONCOLOGY | Facility: CLINIC | Age: 54
End: 2024-01-12

## 2024-01-12 ENCOUNTER — APPOINTMENT (OUTPATIENT)
Dept: INFUSION THERAPY | Facility: HOSPITAL | Age: 54
End: 2024-01-12

## 2024-01-19 ENCOUNTER — APPOINTMENT (OUTPATIENT)
Dept: NEUROSURGERY | Facility: CLINIC | Age: 54
End: 2024-01-19

## 2024-01-24 ENCOUNTER — APPOINTMENT (OUTPATIENT)
Dept: HEMATOLOGY ONCOLOGY | Facility: CLINIC | Age: 54
End: 2024-01-24

## 2024-02-19 NOTE — DISCHARGE NOTE NURSING/CASE MANAGEMENT/SOCIAL WORK - MODE OF TRANSPORTATION
Tracy SERRATO Kimberlyjosselin called to request a refill on her medication.      Last office visit : 2/9/2024   Next office visit : 2/22/2024     Requested Prescriptions     Pending Prescriptions Disp Refills    cefUROXime (CEFTIN) 250 MG tablet 14 tablet 0     Sig: Take 1 tablet by mouth 2 times daily for 7 days            Lacy Branch MA     Ambulatory

## 2024-03-01 NOTE — ED ADULT TRIAGE NOTE - WEIGHT IN LBS
Patient informed and states understanding. Denies any questions. Patient informed to contact our office with any concerns or questions.   [phone]

## 2024-03-07 NOTE — PATIENT PROFILE ADULT - NSTOBACCONEVERSMOKERY/N_GEN_A
Heme-Onc Daily Progress Note       SUBJECTIVE     Interval History:   Flow neg  WBC 44k    Ms. Burks is doing ok. Planning to continue abx on d/c and she is planning to seek addtl opinions at [place] on d/c  We discussed that her heme w/u has been neg so far and we can continue to follow this up outpt     All other systems were reviewed and are negative other than as stated in the HPI    Objective     Vital signs in last 24 hours:  Temp:  [36.2 °C (97.1 °F)-36.6 °C (97.8 °F)] 36.2 °C (97.1 °F)  Heart Rate:  [] 94  Resp:  [18] 18  BP: (120-140)/(55-65) 136/65    Current Medications:  •  acetaminophen, 650 mg, oral, q6h PRN  •  amLODIPine, 5 mg, oral, Daily  •  aspirin, 81 mg, oral, Daily  •  atorvastatin, 40 mg, oral, Daily (6p)  •  cefTRIAXone, 2 g, intravenous, q24h INT  •  clopidogreL, 75 mg, oral, Daily  •  docusate sodium, 100 mg, oral, BID  •  enoxaparin, 40 mg, subcutaneous, Daily (6p)  •  gabapentin, 300 mg, oral, TID  •  hydrochlorothiazide, 12.5 mg, oral, Daily  •  hydrocortisone, , Topical, 3x daily PRN  •  insulin glargine U-100, 22 Units, subcutaneous, Nightly  •  insulin lispro U-100, 10 Units, subcutaneous, TID with meals  •  insulin lispro U-100, 2-10 Units, subcutaneous, ACHS+3AM  •  levothyroxine, 25 mcg, oral, Daily (6:30a)  •  lidocaine, 1 patch, Topical, Daily  •  losartan, 50 mg, oral, Nightly  •  melatonin, 6 mg, oral, Nightly  •  polyethylene glycol, 17 g, oral, Daily PRN  •  sodium chloride, 10 mL, intravenous, q8h INT  •  sodium chloride, 10 mL, intravenous, PRN  •  sodium chloride, 10 mL, intravenous, q8h INT  •  traMADoL, 50 mg, oral, q6h PRN   OBJECTIVE     Physical Exam:  General: Awake and alert.  Skin: Warm and dry.  No rash on examined skin.  HEENT: Normocephalic.  Sclera anicteric.  Conjunctiva pink. Mucous membranes moist. No mucositis.  Heart: Regular rate and rhythm. No murmur.  Lungs: Clear to auscultation.  Abdomen: Soft.  No distension. No tenderness. No hepatomegaly or  splenomegaly.   Extremities: L foot w/ guaze/ dressing on toes. +1 b/l LE edema  Lymphatic: No enlarged cervical, supraclavicular, axillary or inguinal nodes.  Psychiatric: Normal affect.  Neurologic: Memory intact. Grossly normal       VTE Assessment: I have reassessed and the patient's VTE risk and treatment plan is appropriate.     LABS & IMAGING     Labs  Lab Results   Component Value Date    WBC 44.23 (HH) 03/07/2024    HGB 12.0 03/07/2024    HCT 39.2 03/07/2024    MCV 88.9 03/07/2024     (H) 03/07/2024     Lab Results   Component Value Date    DIFFTYPE Manu 03/07/2024    SEGSABS 26.98 (H) 03/07/2024    LYMPHOABS 3.98 (H) 03/07/2024    MONOABS 2.65 (H) 03/07/2024    EOSABS 2.21 (H) 03/07/2024    BASOABS 0.88 (H) 03/07/2024     Lab Results   Component Value Date    GLUCOSE 167 (H) 03/06/2024    CALCIUM 9.0 03/06/2024     03/06/2024    K 4.0 03/06/2024    CO2 27 03/06/2024     03/06/2024    BUN 19 03/06/2024    CREATININE 0.6 03/06/2024     Lab Results   Component Value Date    ALT 17 03/01/2024    AST 17 03/01/2024    ALKPHOS 77 03/01/2024    BILITOT 0.5 03/01/2024       Imaging  I have reviewed the Imaging from the last 24 hrs.  X-RAY HIP WITH OR WITHOUT PELVIS 2-3 VW RIGHT   Final Result   IMPRESSION: No acute osseous abnormalities.         X-RAY KNEE RIGHT 1 OR 2 VIEWS   Final Result   IMPRESSION:   See comment         CT ABDOMEN PELVIS WITH IV CONTRAST   Final Result   IMPRESSION:      1. No abnormality of the coccyx. There is overlying skin thickening and a   possible small 1.9 x 1.1 cm fluid collection in the region of the gluteal cleft.   2. Other findings as detailed above.         MRI FOOT LEFT WITHOUT CONTRAST   Final Result   IMPRESSION:   Plantar wound with multifocal acute osteomyelitis and pathological fractures in   the left foot as described in detail below.   Pockets of abscess are seen extending from the plantar wound about the regions   of osteomyelitis.   Please see the  body of the report for additional findings and descriptions.      COMMENT:      Comparison: X-rays dated 2/29/2024. MRI from January 1624.   Technique:   Noncontrast MRI left forefoot.      Findings:   Since the previous MRI patient has undergone second toe amputation. However,   there is new osteomyelitis in pathological fracture involving the head, neck and   distal to mid shaft of the second metatarsal. There is also nonspecific marrow   changes possibly representing early osteomyelitis extending to the second   metatarsal base.   -   Marked bony erosion, pathological displaced fracture in and osteomyelitis is   present throughout the third metatarsal with the distal shaft/neck fracture   displaced laterally. Nonspecific marrow edema without T1 replacement in the   proximal portions of the third metatarsal shaft are noted.   There is acute osteomyelitis in the third toe and the proximal phalanx and   possibly very small amounts in the middle phalanx but not in the distal phalanx.   -   Acute osteomyelitis in the head and neck of the fourth metatarsal. Erosions and   small osteomyelitis in the base of the fourth proximal phalanx with septic   arthritis of the fourth metatarsal phalangeal joint. Suspected pathological   fracture through osteomyelitis of the fourth proximal phalanx best seen on   sagittal image 7.   -   In the fifth proximal phalanx there is marrow edema like signal but no   convincing T1 marrow replacement. Findings are nonspecific.   In the fifth metatarsal head medially there is a small amount of bone marrow   edema as well as some early patchy T1 marrow replacement axial image 19 where   early ostomy myelitis cannot be excluded. Otherwise no fifth metatarsal   osteomyelitis.   -   At the first metatarsophalangeal joint there is osteoarthritis with mild areas   of patchy bone marrow edema in the first metatarsal distally as well as the   proximal phalanx. In the lateral aspect of the first  metatarsal head there is a   small focus of questionable early bony erosion and T1 marrow replacement on   axial image 13 where small focus of osteitis cannot be excluded.   There is a nonspecific first metatarsophalangeal joint effusion.   Nonspecific bone marrow edema in the sesamoids more so in the fibular sesamoid   with there is some questionable early T1 marrow replacement and small focus of   early isthmus cannot be excluded.   ---   No evidence of osteomyelitis in the imaged tarsal bones noting mild degenerative   joint disease.   -   There is a wound in the plantar forefoot deep to the third metatarsophalangeal   joint with multiple pockets of abscess about the regions of osteomyelitis   described above.   Diffuse abnormal muscle edema could be related to myositis in the appropriate   clinical setting although denervation changes are also probably present.   -   The Lisfranc ligament appears intact.      Diffuse nonspecific soft tissue edema is noted. This should be correlated with   the clinical extent of soft tissue infection.            X-RAY CHEST 1 VIEW   Final Result   IMPRESSION:   No acute disease in chest.      Right rotator cuff calcific tendinitis.      COMMENT:      Comparison: Chest x-ray 12/13/2021.      Technique: A single frontal AP portable upright projection of the chest was   obtained.      FINDINGS:      The lungs are hypoexpanded but grossly clear without focal airspace   consolidation, pleural effusion or pneumothorax. The cardiomediastinal   silhouette is within normal limits. The upper abdomen is unremarkable. There is   no acute osseous abnormality.  There is amorphous calcification adjacent to the   right greater tuberosity which can be seen in the setting of rotator cuff   calcific tendinitis.         X-RAY FOOT LEFT 3+ VIEWS   Final Result   IMPRESSION:   Prior second toe amputation with new acute osteomyelitis throughout the second   metatarsal where there is pathological  fracturing.   Acute osteomyelitis and displaced pathological fracturing of the third   metatarsal.   Probable mild osteomyelitis in the base of the proximal third phalanx.   Probable osteomyelitis involving the thi fourth metatarsal head medially as well   as probably in the fourth proximal phalanx.   Suggest MRI for more definitive assessment of the extent of infection.   Plantar calcaneal spurring and mild degenerative disease. Extensive   atherosclerotic vascular calcifications.   Wound overlying the second toe amputation site with irregular soft tissue.   Possible small amounts of gas in the third toe.      4 views of the left foot.   COMMENT:      Comparison: X-rays from 1 1/15/2024.      See impression         ASSESSMENT & PLAN     In summary, this is a 65 y/o female w/ uncontrolled DM II presenting w/ L foot osteo being treated w/ IV abx. Consulted for leukocytosis      # Leukocytosis   - Pt reports she was first noted to have an elevated WBC in sept/oct when she underwent procedure for pilonidal cyst. Per chart review WBC was 21.1k at that time, neutrophil predom.  Her white count did normalize from October-December per chart review.   - This admission- WBC 56.92k on admission 2/29/24. Neutrophil predom w/ bands, eos, basophils, myelocytes also elevated. Since then WBC has fluctuated in 30-40ks.     - Her L foot osteo seems quite severe on MRI, so likely her leukocytosis is attributable to this. However hematologic malignancies also on the differential. I have a low concern for an acute leukemia d/t stability of other counts, but a chronic leukemia or MPN is possible- but less likely given unremarkable w/u thus far  - Peripheral smear benign, flow cytometry neg- f/u JAK2, and BCR-ABL testing  - Cont to treat L foot osteo w/ IV abx per ID and podiatry  - CTM daily CBC w/ diff  - can continue to follow results as outpt      # Anemia  - mild, possibly r/t ongoing infection/ inflammation  - folate deficient-  start supplementation (ordered)  - CTM daily CBC      # DVT ppx  - ppx acx, SCDs             RADHA Hunter  3/7/2024          No

## 2024-03-12 NOTE — PATIENT PROFILE ADULT - FUNCTIONAL ASSESSMENT - DAILY ACTIVITY 6.
-please take Tylenol or ibuprofen as needed for headache  -as discuss the CT scan of your brain and cervical spine show no acute abnormalities  -please follow-up with your PCP with any further concerns   3 = A little assistance

## 2024-03-14 NOTE — PHYSICAL THERAPY INITIAL EVALUATION ADULT - REFERRING PHYSICIAN, REHAB EVAL
Paulo, PT eval and treat, [Normal Growth] : growth [Normal Development] : development [No Elimination Concerns] : elimination [No Feeding Concerns] : feeding [No Skin Concerns] : skin [Normal Sleep Pattern] : sleep [School Readiness] : school readiness [Mental Health] : mental health [Nutrition and Physical Activity] : nutrition and physical activity [Oral Health] : oral health [Safety] : safety [Mother] : mother [Patient] : patient [I have examined the above-named student and completed the preparticipation physical evaluation. The athlete does not present apparent clinical contraindications to practice and participate in sport(s) as outlined above. A copy of the physical exam is on r] : I have examined the above-named student and completed the preparticipation physical evaluation. The athlete does not present apparent clinical contraindications to practice and participate in sport(s) as outlined above. A copy of the physical exam is on record in my office and can be made available to the school at the request of the parents. If conditions arise after the athlete has been cleared for participation, the physician may rescind the clearance until the problem is resolved and the potential consequences are completely explained to the athlete (and parents/guardians). [Full Activity without restrictions including Physical Education & Athletics] : Full Activity without restrictions including Physical Education & Athletics [FreeTextEntry1] : [age] year old female growing and developing well.  Continue balanced diet with all food groups. Brush teeth twice a day with toothbrush. Recommend visit to dentist. Help child to maintain consistent daily routines and sleep schedule. School discussed. Ensure home is safe. Teach child about personal safety. Use consistent, positive discipline. Limit screen time to no more than 2 hours per day. Encourage physical activity. Child needs to ride in a belt-positioning booster seat until  4 feet 9 inches has been reached and are between 8 and 12 years of age.  Immunizations - Up to date.   Will follow-up in one year for next well check visit. Discussed with father to reach out sooner if there is difficulty getting evaluation to help with school, etc.

## 2024-04-06 NOTE — PROVIDER CONTACT NOTE (OTHER) - DATE AND TIME:
[facility] INPATIENT CONSULT NOTE    REASON FOR CONSULT: dysphagia, concern for esophageal food impaction.,     CONSULTING SERVICE: Emergency medicine    31 yo M with extensive psychiatric history who presents today after eating chicken and sensation of it getting stuck in his esophagus.     ASSESSMENT:  Dysphagia with inability to tolerate secretions/liquids. Concern for esophageal food impaction  Chronic GERD not on antacid therapy    RECOMMENDATIONS  Plan for urgent EGD today  Recommend keeping strict NPO  The risks of the procedure including but not limited to the risk of sedation, aspiration, bleeding, missed lesion, infection, perforation were discussed with the patient and/or the patient's legal representative (POA) whom agreed to proceed with proposed procedure.      HISTORY OF PRESENT ILLNESS  Mr. Cleveland Echeverria is a 32 year old male admitted 4/6/2024 with a history of bipolar disorder, anxiety and PTSD who presented to the ER this afternoon with concern for esophgaeal food impaction. He notes he was eating some boneless chicken wings and swallowed a piece of meat and it became stuck in the esophagus. He feels pressure in the center of his chets and has been unable to tolerate even liquids or secretions. He was given a trial of glucagon, with water and carbonated beverage but it will not pass. He notes this happened once before but he vomited it up. He complains of chronic acid reflux but does not take anything for it. He denies regular NSAID use or prescription blood thinners. He has never had an EGD before. The patient was seen urgently in  ER this afternoon per request of the ER physician for concern for esophageal food impaction. Neck Xray and chest xray today negative.     Review of Systems:  Constitutional: Negative except as documented in HPI   HENT: Negative except as documented in HPI   Respiratory: Negative except as documented in HPI   Cardiovascular: Negative except as documented in  HPI   Gastrointestinal: Negative except as documented in HPI   Musculoskeletal: Negative except as documented in HPI   Skin: Negative except as documented in HPI   Neurological: Negative except as documented in HPI     Past Medical History:  Past Medical History:   Diagnosis Date    Anxiety     Bipolar I disorder, most recent episode depressed (CMD)     PTSD (post-traumatic stress disorder)     Schizophrenia (CMD)     Schizophrenia (CMD)      Past Surgical History:  Past Surgical History:   Procedure Laterality Date    Explore scrotum      Scrotum surgery per mother     Family History:  Patient denies known family history of Gi conditions.     Social History:  Social History     Tobacco Use    Smoking status: Every Day     Current packs/day: 1.00     Types: Cigarettes    Smokeless tobacco: Never   Vaping Use    Vaping Use: Every day   Substance Use Topics    Alcohol use: Not Currently    Drug use: Yes     Types: Marijuana     Allergies: ALLERGIES:  Patient has no known allergies.    MEDICATIONS  No current facility-administered medications for this encounter.    CONTINUOUS:   No current facility-administered medications for this encounter.    PRN:   No current facility-administered medications for this encounter.     Objective   VITAL SIGNS  Vitals:    04/06/24 1511 04/06/24 1612 04/06/24 1613 04/06/24 1702   BP: 120/85 99/60     Pulse: 69   68   Resp: 18   18   Temp: 98.7 °F (37.1 °C)      TempSrc: Oral      SpO2: 98%  99%    Weight: (!) 144.2 kg (317 lb 14.5 oz)      Height: 6' (1.829 m)        PHYSICAL EXAMINATION  General: NAD, Alert and oriented x 4  HEENT: atraumatic anicteric sclera, MMM  CV: RRR  Pulmonary: Clear to auscultation, bilaterally, no wheezing, rhonchi or rales  Abdomen: soft, non tender, non distended, normal bowel sounds, no rebound or guarding  Extremities: no peripheral edema  Neurological: alert, grossly oriented strength grossly intact  Psychiatric: Cooperative     DIAGNOSTICS  I have  reviewed diagnostics. Studies of note include:   No results found  No results found    Invalid input(s): \"CL\"  No results found    Invalid input(s): \"ALKPHOS\", \"BILITOT\", \"BILIDIR\"  No results found for: \"INR\", \"PT\"    RECENT IMAGING AND PROCEDURES  XR NECK SOFT TISSUE 2 VIEWS  Narrative: EXAM: XR NECK SOFT TISSUE 2 VIEWS    CLINICAL INDICATION: dysphagia    COMPARISON: No priors.    FINDINGS: Cervical spine is normally aligned. The nasopharynx, oropharynx,  hypopharynx, larynx and upper airway are unremarkable. No radiopaque  foreign bodies.  Impression: 1.   Normal cervical soft tissues.    Electronically Signed by: SEBASTIAN HUBBARD M.D.   Signed on: 4/6/2024 4:22 PM   Workstation ID: [id number]  XR CHEST AP OR PA  Narrative: EXAM: XR CHEST AP OR PA    CLINICAL INDICATION: dysphagia, choking, shortness of breath    COMPARISON: None.    FINDINGS: A single frontal view of the chest is obtained.  The cardiac  silhouette and pulmonary vasculature are within normal limits. The lungs  show no focal consolidation or pneumothorax. No pleural effusions are  identified.    Impression: 1.   No acute intrathoracic abnormalities identified.    Electronically Signed by: SEBASTIAN HUBBARD M.D.   Signed on: 4/6/2024 4:22 PM   Workstation ID: [id number]    Jaime Barker MD  [facility] [place] [facility]    20-Dec-2023 09:00

## 2024-06-26 NOTE — ASU DISCHARGE PLAN (ADULT/PEDIATRIC) - NS MD DC FALL RISK RISK
CHIEF COMPLAINT:    An interactive audio and video telecommunication system which permits real time communications between the patient (at the originating site) and provider (at the distant site) was utilized to provide this telehealth service.    Verbal consent was requested and obtained fromNAME@ on this date,DATE@ , for a telehealth visit.     HISTORY OF PRESENT ILLNESS:    This is a  77 y.o. female who presents for follow-up for UDS review.  On uroflow, the patient voided a total of 22 cc leaving a residual of 309 cc in her bladder.  On CMG she had the first desire to void at 120 cc, strong desire at 348 cc and capacity of 453 ml. patient leaked at bladder volume of 150 cc with Valsalva and a leak point pressure of 77 cm of water. there was no evidence of detrusor overactivity .on pressure flow study, patient voided only 65 cc with peak flow of 7 cc/s leaving a residual of 388 cc in her bladder.  Note that the pressure flow study was done with reduction of prolapse.    This urodynamic shows evidence of stress urinary incontinence with reduction of prolapse and evidence of persistent incomplete bladder emptying despite reduction of prolapse.        Extract from my last note 6/13/24  Date  The plan by Dr. Rivers is to discuss this case with Dr. Wright who the patient is scheduled to have an appointment with on July 9, 2024, to discuss surgical treatment options.      The patient is to get a urodynamic test done and follow up after results are available via a virtual appointment.     Urine culture will be conducted since patient was unable to urinate initially during appointment and unable to give samples. If sample comes back with a positive culture, she will be switched to Ciprofloxacin as per Dr. Rivers.     Follow up after UDS testing done.      Review of Systems   All other systems reviewed and are negative.      IMPRESSION AND PLAN     Vilma Sagastume is a 77 y.o. who presents with pelvic organ prolapse  posthysterectomy, urinary urgency, recurrent UTIs and a rectovaginal fistula.   Urodynamics was reviewed with the patient.  It does show evidence of incomplete bladder emptying with and without reduction of prolapse.  It does also show evidence of stress urinary incontinence with low leak point pressure.  Patient was counseled regarding the need for anti-incontinence procedure at the time of prolapse repair she was also counseled that her bladder emptying might not improve with reduction of the prolapse surgery based on the urodynamic findings.  However we will not be able to determine that until after her prolapse is repaired.     As far as the prolapse repair is concerned we discussed with her today obliterative procedure with colpocleisis and perineoplasty versus a reconstructive procedure with anterior posterior repair and possible sacrospinous ligament fixation to be done after completion of the rectovaginal fistula repair by Dr. Wright.  I will await her interview with Dr. Wright and his plan regarding how he will approach the fistula to determine what best would be my approach to fix her prolapse.  She will need an anti-incontinence procedure at the time of the prolapse repair due to the finding Of the urodynamics.  All questions and concerns were answered and addressed.  The patient expressed understanding and agrees with the plan.       SANJAY Rivers MD  [phone]     For information on Fall & Injury Prevention, visit: [email or web address] OR  [email or web address] OR  https://www.cdc.gov/steadi/patient.html

## 2024-07-24 NOTE — OCCUPATIONAL THERAPY INITIAL EVALUATION ADULT - ORIENTATION, REHAB EVAL
Cystoscopy    Date/Time: 7/24/2024 8:30 AM    Performed by: Milana Encinas MD  Authorized by: Milana Encinas MD    Consent Done?:  Yes (Written)  Timeout: prior to procedure the correct patient, procedure, and site was verified    Local anesthesia used?: Yes    Indications comment:  Ureteral stone  Position:  Supine  Patient sedated?: No    Scope type:  Flexible cystoscope  Stent removed: Yes (left side)    External exam normal: Yes    Urethra normal: Yes    Prostate normal: Yes    Bladder neck normal: Yes    Normal bladder: hematuria present.     patient tolerated the procedure well with no immediate complications  Comments:      Follow up in 2-3 months with renal US     oriented to person, place, time and situation

## 2024-09-06 NOTE — H&P PST ADULT - NS MD HP INPLANTS MED DEV
Pt arrived to OPIT and port accessed using sterile technique. Pt premedicated as ordered. Gamunex- C 30g administered. Pt tolerated well.     Electronically signed by Ciera George RN on 9/6/2024 at 9:17 AM     None

## 2024-09-10 NOTE — DIETITIAN INITIAL EVALUATION ADULT - NSICDXPASTMEDICALHX_GEN_ALL_CORE_FT
10-Sep-2024 21:23 PAST MEDICAL HISTORY:  GERD (Gastroesophageal Reflux Disease)     Lung mass right    Non-small cell lung cancer with metastasis

## 2024-10-14 NOTE — PROGRESS NOTE ADULT - SUBJECTIVE AND OBJECTIVE BOX
Detail Level: Generalized HARJEET WHITMORE  [id number]      Chief Complaint: Stage IV Lung Cancer with metastases/Sinus tachycardia/DVT/Elevated troponin    Interval History: The patient reports feeling ok this morning, denies palpitations, chest pain or shortness of breath.    Tele: not on telemetry (in rehab)    Current meds:   acetaminophen     Tablet .. 650 milliGRAM(s) Oral every 6 hours PRN  aluminum hydroxide/magnesium hydroxide/simethicone Suspension 30 milliLiter(s) Oral every 4 hours PRN  AQUAPHOR (petrolatum Ointment) 1 Application(s) Topical daily  artificial tears (preservative free) Ophthalmic Solution 1 Drop(s) Both EYES three times a day  atorvastatin 40 milliGRAM(s) Oral at bedtime  dexAMETHasone     Tablet 2 milliGRAM(s) Oral two times a day  dextrose 5%. 1000 milliLiter(s) IV Continuous <Continuous>  dextrose 5%. 1000 milliLiter(s) IV Continuous <Continuous>  dextrose 50% Injectable 12.5 Gram(s) IV Push once  dextrose 50% Injectable 25 Gram(s) IV Push once  dextrose 50% Injectable 25 Gram(s) IV Push once  dextrose Oral Gel 15 Gram(s) Oral once PRN  divalproex  milliGRAM(s) Oral every 12 hours  enoxaparin Injectable 40 milliGRAM(s) SubCutaneous every 24 hours  glucagon  Injectable 1 milliGRAM(s) IntraMuscular once  insulin lispro (ADMELOG) corrective regimen sliding scale   SubCutaneous three times a day before meals  melatonin 3 milliGRAM(s) Oral at bedtime PRN  metFORMIN 500 milliGRAM(s) Oral two times a day  pantoprazole    Tablet 40 milliGRAM(s) Oral before breakfast  polyethylene glycol 3350 17 Gram(s) Oral daily  senna 2 Tablet(s) Oral at bedtime PRN      Objective:     Vital Signs:   T(C): 36.5 (11-13-23 @ 20:10), Max: 36.8 (11-13-23 @ 14:48)  HR: 116 (11-14-23 @ 05:40) (116 - 132)  BP: 112/76 (11-14-23 @ 05:40) (93/65 - 112/76)  RR: 16 (11-13-23 @ 20:10) (16 - 16)  SpO2: 95% (11-13-23 @ 20:10) (95% - 96%)  Wt(kg): --    Physical Exam:   General: thin, chronically ill appearing  Neck: supple   CVS: JVP ~ 7 cm H20, tachycardic, s1, s2  Pulm: unlabored respirations, clear to ausculation   Ext: no lower extremity edema b/l  Neuro: awake, alert and oriented  Psych: Normal affect      Labs:   13 Nov 2023 06:00    136    |  102    |  6      ----------------------------<  116    3.8     |  27     |  0.51     Ca    10.8       13 Nov 2023 06:00    TPro  7.0    /  Alb  2.0    /  TBili  0.2    /  DBili  x      /  AST  95     /  ALT  34     /  AlkPhos  245    13 Nov 2023 06:00                          11.1   6.34  )-----------( 131      ( 13 Nov 2023 06:00 )             35.1         TTE (8/2023):   1. Left ventricular ejection fraction, by visual estimation, is 55 to 60%.   2. Normal global left ventricular systolic function.   3. Mild thickening of the anterior and posterior mitral valve leaflets.   4. Moderate mitral valve regurgitation.   5. Moderate aortic regurgitation.   6. Sclerotic aortic valve with normal opening.      ECG (11/13/23): sinus tachycardia, right atrial enlargement

## 2024-11-04 NOTE — ED ADULT TRIAGE NOTE - MODE OF ARRIVAL
Forms printed and faxed to forms completion.     FMLA or Disability Forms were received and faxed to the Forms Completion Department today at [phone]. (Please include all appropriate authorization forms with your fax).    Did you have the patient complete (in full) and sign the “Authorization For Disclosure of Health Information Forms Completion” form? No, Reason: uploaded on [employer]    DUE TO VERY HIGH FORM VOLUMES, please communicate to the patient that the Forms Completion team estimates their form may take longer than our usual 14 business day turnaround goal.    If you have questions about this encounter, please contact the Forms Completion Dept at [phone], Option 3.    
Walk in

## 2024-11-21 NOTE — PHYSICAL THERAPY INITIAL EVALUATION ADULT - TRANSFER TRAINING, PT EVAL
Her/She
GOAL: Pt will transfer independently within 2 weeks
GOAL:pt will complete all tx ind in 4wks.

## 2025-01-14 NOTE — PROGRESS NOTE ADULT - SUBJECTIVE AND OBJECTIVE BOX
52y F PMH of smoking, stage 4 lung adenocarcinoma with diffuse mets to C/A/P and brain s/p Rt craniotomy for tumor resection, & multiple rounds of chemo & RT, steroid induced DM, GERD who presents to [place] ED for AMS. Per chart review pt had nonsensical speech and c/o HA & R sided weakness so was taken to ED. Patient was reportedly very agitated and violent at [place] ED. Code stroke was called.  Patient required sedation w/ benadryl, ativan and versed for agitation in order to obtain CTH.  CTH at  shows increased conspicuity of L posterior frontal and R parietal lesions, likely 2/2 hemorrhage (HU ~50-60) c/t 10/28 CTH. Further eval limited by motion artifact Patient was noted to be febrile at  and started on vancomycin, zosyn. Rapid RVP negative.  Pt ultimately transferred to [facility] for continued care given pt known to heme-onc & neuro-surg at [facility]      HOSPITAL COURSE:  12/19 admitted with fever, altered mental status and poor nutrition/electrolytes derangements  12/21 transferred to ICU for repetitive seizures and worsening neuroexam  12/22 EEG: increased risk for seizures with no seizures recorded. Will transfer back to floor. Follow up with oncology and palliative care    24 hour Events:       Allergies    No Known Allergies    Intolerances    REVIEW OF SYSTEMS: [X] Unable to Assess due to neurologic exam   [ ] All ROS addressed below are non-contributory, except:  Neuro: [ ] Headache [ ] Back pain [ ] Numbness [ ] Weakness [ ] Ataxia [ ] Dizziness [ ] Aphasia [ ] Dysarthria [ ] Visual disturbance  Resp: [ ] Shortness of breath/dyspnea, [ ] Orthopnea [ ] Cough  CV: [ ] Chest pain [ ] Palpitation [ ] Lightheadedness [ ] Syncope  Renal: [ ] Thirst [ ] Edema  GI: [ ] Nausea [ ] Emesis [ ] Abdominal pain [ ] Constipation [ ] Diarrhea  Hem: [ ] Hematemesis [ ] bright red blood per rectum  ID: [ ] Fever [ ] Chills [ ] Dysuria  ENT: [ ] Rhinorrhea      DEVICES:   [ ] Restraints [ ] ET tube [ ] central line [ ] arterial line [ ] ott [ ] NGT/OGT [ ] EVD [ ] LD [ ] ANILA/HMV [ ] Trach [ ] PEG [ ] Chest Tube     ICU Vital Signs Last 24 Hrs  T(C): 35.9 (22 Dec 2023 08:00), Max: 36.4 (22 Dec 2023 00:00)  T(F): 96.6 (22 Dec 2023 08:00), Max: 97.5 (22 Dec 2023 00:00)  HR: 91 (22 Dec 2023 09:00) (76 - 116)  BP: 103/50 (22 Dec 2023 09:00) (94/66 - 141/60)  BP(mean): 70 (22 Dec 2023 09:00) (70 - 99)  RR: 15 (22 Dec 2023 09:00) (12 - 32)  SpO2: 99% (22 Dec 2023 09:00) (95% - 100%)    O2 Parameters below as of 21 Dec 2023 20:00  Patient On (Oxygen Delivery Method): nasal cannula  O2 Flow (L/min): 5    I&O's Summary    21 Dec 2023 07:01  -  22 Dec 2023 07:00  --------------------------------------------------------  IN: 2800 mL / OUT: 300 mL / NET: 2500 mL    22 Dec 2023 07:01  -  22 Dec 2023 11:16  --------------------------------------------------------  IN: 250 mL / OUT: 0 mL / NET: 250 mL                          9.2    4.52  )-----------( 139      ( 21 Dec 2023 17:02 )             29.3     12-21    139  |  105  |  7   ----------------------------<  173<H>  3.1<L>   |  22  |  0.39<L>      MEDICATIONS  (STANDING):  artificial tears (preservative free) Ophthalmic Solution 1 Drop(s) Both EYES three times a day  atorvastatin 40 milliGRAM(s) Oral at bedtime  chlorhexidine 4% Liquid 1 Application(s) Topical <User Schedule>  dexAMETHasone  Injectable 2 milliGRAM(s) IV Push every 12 hours  dextrose 5%. 1000 milliLiter(s) (50 mL/Hr) IV Continuous <Continuous>  dextrose 5%. 1000 milliLiter(s) (100 mL/Hr) IV Continuous <Continuous>  dextrose 50% Injectable 25 Gram(s) IV Push once  dextrose 50% Injectable 25 Gram(s) IV Push once  dextrose 50% Injectable 12.5 Gram(s) IV Push once  glucagon  Injectable 1 milliGRAM(s) IntraMuscular once  insulin lispro (ADMELOG) corrective regimen sliding scale   SubCutaneous every 6 hours  lacosamide IVPB 200 milliGRAM(s) IV Intermittent every 12 hours  levETIRAcetam  IVPB 1500 milliGRAM(s) IV Intermittent every 12 hours  metoprolol tartrate Injectable 2.5 milliGRAM(s) IV Push every 12 hours  pantoprazole  Injectable 40 milliGRAM(s) IV Push daily  petrolatum white Ointment 1 Application(s) Topical two times a day  piperacillin/tazobactam IVPB.. 3.375 Gram(s) IV Intermittent every 8 hours  polyethylene glycol 3350 17 Gram(s) Oral daily  valproate sodium  IVPB 250 milliGRAM(s) IV Intermittent every 6 hours  vancomycin  IVPB      vancomycin  IVPB 750 milliGRAM(s) IV Intermittent every 12 hours    MEDICATIONS  (PRN):  acetaminophen     Tablet .. 650 milliGRAM(s) Oral every 6 hours PRN Temp greater or equal to 38C (100.4F), Mild Pain (1 - 3)  acetaminophen  Suppository .. 650 milliGRAM(s) Rectal every 6 hours PRN Temp greater or equal to 38C (100.4F), Moderate Pain (4 - 6)  aluminum hydroxide/magnesium hydroxide/simethicone Suspension 30 milliLiter(s) Oral every 4 hours PRN Dyspepsia  dextrose Oral Gel 15 Gram(s) Oral once PRN Blood Glucose LESS THAN 70 milliGRAM(s)/deciliter  melatonin 3 milliGRAM(s) Oral at bedtime PRN Insomnia  ondansetron Injectable 4 milliGRAM(s) IV Push every 8 hours PRN Nausea and/or Vomiting  senna 2 Tablet(s) Oral at bedtime PRN Constipation      EXAMINATION:  PHYSICAL EXAM:  cachectic, no eyes opening, not following commands, localizes on the left side, withdrawing on the right side. pupils are 3mm, equal and reactive.                                                 Pulmonary: Clear to Auscultation, No rales, No rhonchi, No wheezes     Cardiovascular: S1, S2, Regular rate and rhythm     Gastrointestinal: Soft, Non-tender, Non-distended     Extremities: No calf tenderness    52y F PMH of smoking, stage 4 lung adenocarcinoma with diffuse mets to C/A/P and brain s/p Rt craniotomy for tumor resection, & multiple rounds of chemo & RT, steroid induced DM, GERD who presents to [place] ED for AMS. Per chart review pt had nonsensical speech and c/o HA & R sided weakness so was taken to ED. Patient was reportedly very agitated and violent at [place] ED. Code stroke was called.  Patient required sedation w/ benadryl, ativan and versed for agitation in order to obtain CTH.  CTH at  shows increased conspicuity of L posterior frontal and R parietal lesions, likely 2/2 hemorrhage (HU ~50-60) c/t 10/28 CTH. Further eval limited by motion artifact Patient was noted to be febrile at  and started on vancomycin, zosyn. Rapid RVP negative.  Pt ultimately transferred to [facility] for continued care given pt known to heme-onc & neuro-surg at [facility]      HOSPITAL COURSE:  12/19 admitted with fever, altered mental status and poor nutrition/electrolytes derangements  12/21 transferred to ICU for repetitive seizures and worsening neuroexam  12/22 EEG: increased risk for seizures with no seizures recorded. Will transfer back to floor. Follow up with oncology and palliative care    24 hour Events:       Allergies    No Known Allergies    Intolerances    REVIEW OF SYSTEMS: [X] Unable to Assess due to neurologic exam   [ ] All ROS addressed below are non-contributory, except:  Neuro: [ ] Headache [ ] Back pain [ ] Numbness [ ] Weakness [ ] Ataxia [ ] Dizziness [ ] Aphasia [ ] Dysarthria [ ] Visual disturbance  Resp: [ ] Shortness of breath/dyspnea, [ ] Orthopnea [ ] Cough  CV: [ ] Chest pain [ ] Palpitation [ ] Lightheadedness [ ] Syncope  Renal: [ ] Thirst [ ] Edema  GI: [ ] Nausea [ ] Emesis [ ] Abdominal pain [ ] Constipation [ ] Diarrhea  Hem: [ ] Hematemesis [ ] bright red blood per rectum  ID: [ ] Fever [ ] Chills [ ] Dysuria  ENT: [ ] Rhinorrhea      DEVICES:   [ ] Restraints [ ] ET tube [ ] central line [ ] arterial line [ ] ott [ ] NGT/OGT [ ] EVD [ ] LD [ ] ANILA/HMV [ ] Trach [ ] PEG [ ] Chest Tube     ICU Vital Signs Last 24 Hrs  T(C): 35.9 (22 Dec 2023 08:00), Max: 36.4 (22 Dec 2023 00:00)  T(F): 96.6 (22 Dec 2023 08:00), Max: 97.5 (22 Dec 2023 00:00)  HR: 91 (22 Dec 2023 09:00) (76 - 116)  BP: 103/50 (22 Dec 2023 09:00) (94/66 - 141/60)  BP(mean): 70 (22 Dec 2023 09:00) (70 - 99)  RR: 15 (22 Dec 2023 09:00) (12 - 32)  SpO2: 99% (22 Dec 2023 09:00) (95% - 100%)    O2 Parameters below as of 21 Dec 2023 20:00  Patient On (Oxygen Delivery Method): nasal cannula  O2 Flow (L/min): 5    I&O's Summary    21 Dec 2023 07:01  -  22 Dec 2023 07:00  --------------------------------------------------------  IN: 2800 mL / OUT: 300 mL / NET: 2500 mL    22 Dec 2023 07:01  -  22 Dec 2023 11:16  --------------------------------------------------------  IN: 250 mL / OUT: 0 mL / NET: 250 mL                          9.2    4.52  )-----------( 139      ( 21 Dec 2023 17:02 )             29.3     12-21    139  |  105  |  7   ----------------------------<  173<H>  3.1<L>   |  22  |  0.39<L>      MEDICATIONS  (STANDING):  artificial tears (preservative free) Ophthalmic Solution 1 Drop(s) Both EYES three times a day  atorvastatin 40 milliGRAM(s) Oral at bedtime  chlorhexidine 4% Liquid 1 Application(s) Topical <User Schedule>  dexAMETHasone  Injectable 2 milliGRAM(s) IV Push every 12 hours  dextrose 5%. 1000 milliLiter(s) (50 mL/Hr) IV Continuous <Continuous>  dextrose 5%. 1000 milliLiter(s) (100 mL/Hr) IV Continuous <Continuous>  dextrose 50% Injectable 25 Gram(s) IV Push once  dextrose 50% Injectable 25 Gram(s) IV Push once  dextrose 50% Injectable 12.5 Gram(s) IV Push once  glucagon  Injectable 1 milliGRAM(s) IntraMuscular once  insulin lispro (ADMELOG) corrective regimen sliding scale   SubCutaneous every 6 hours  lacosamide IVPB 200 milliGRAM(s) IV Intermittent every 12 hours  levETIRAcetam  IVPB 1500 milliGRAM(s) IV Intermittent every 12 hours  metoprolol tartrate Injectable 2.5 milliGRAM(s) IV Push every 12 hours  pantoprazole  Injectable 40 milliGRAM(s) IV Push daily  petrolatum white Ointment 1 Application(s) Topical two times a day  piperacillin/tazobactam IVPB.. 3.375 Gram(s) IV Intermittent every 8 hours  polyethylene glycol 3350 17 Gram(s) Oral daily  valproate sodium  IVPB 250 milliGRAM(s) IV Intermittent every 6 hours  vancomycin  IVPB      vancomycin  IVPB 750 milliGRAM(s) IV Intermittent every 12 hours    MEDICATIONS  (PRN):  acetaminophen     Tablet .. 650 milliGRAM(s) Oral every 6 hours PRN Temp greater or equal to 38C (100.4F), Mild Pain (1 - 3)  acetaminophen  Suppository .. 650 milliGRAM(s) Rectal every 6 hours PRN Temp greater or equal to 38C (100.4F), Moderate Pain (4 - 6)  aluminum hydroxide/magnesium hydroxide/simethicone Suspension 30 milliLiter(s) Oral every 4 hours PRN Dyspepsia  dextrose Oral Gel 15 Gram(s) Oral once PRN Blood Glucose LESS THAN 70 milliGRAM(s)/deciliter  melatonin 3 milliGRAM(s) Oral at bedtime PRN Insomnia  ondansetron Injectable 4 milliGRAM(s) IV Push every 8 hours PRN Nausea and/or Vomiting  senna 2 Tablet(s) Oral at bedtime PRN Constipation      EXAMINATION:  PHYSICAL EXAM:  cachectic, no eyes opening, not following commands, localizes on the left side, withdrawing on the right side. pupils are 3mm, equal and reactive.                                                 Pulmonary: Clear to Auscultation, No rales, No rhonchi, No wheezes     Cardiovascular: S1, S2, Regular rate and rhythm     Gastrointestinal: Soft, Non-tender, Non-distended     Extremities: No calf tenderness    Past month

## 2025-05-14 NOTE — ED PROVIDER NOTE - IV ALTEPLASE EXCL ABS HIDDEN
Sharon Vega is a 74 y.o. female who is currently ordered Vancomycin IV with management by the Pharmacy Consult service.  Relevant clinical data and objective / subjective history reviewed.  Vancomycin Assessment:  Indication and Goal AUC/Trough: Bone/joint infection (goal -600, trough >10), -600, trough >10  Clinical Status: stable  Micro:   pending  Renal Function:  SCr: 0.66 mg/dL  CrCl: 75.1 mL/min  Renal replacement: Not on dialysis  Days of Therapy: 1  Current Dose: 1250 mg once  Vancomycin Plan:  New Dosin mg q12  Estimated AUC: 415 mcg*hr/mL  Estimated Trough: 12.5 mcg/mL  Next Level:  6am  Renal Function Monitoring: Daily BMP and UOP  Pharmacy will continue to follow closely for s/sx of nephrotoxicity, infusion reactions and appropriateness of therapy.  BMP and CBC will be ordered per protocol. We will continue to follow the patient’s culture results and clinical progress daily.    Beau Cornejo, Pharmacist  
show

## (undated) DEVICE — BIPOLAR FORCEP SYMMETRY BAYONET 7" X 1.5MM SMOOTH (SILVER)

## (undated) DEVICE — TAPE SILK 3"

## (undated) DEVICE — SOL IRR POUR NS 0.9% 500ML

## (undated) DEVICE — SUT VICRYL 3-0 18" X-1 (POP-OFF)

## (undated) DEVICE — CODMAN PERFORATOR 14MM (BLUE)

## (undated) DEVICE — TUBING BIPOLAR IRRIGATOR AND CORD SET

## (undated) DEVICE — SUT VICRYL 2-0 18" CP-2 UNDYED (POP-OFF)

## (undated) DEVICE — GLV 7 PROTEXIS (WHITE)

## (undated) DEVICE — BLADE SCALPEL SAFETYLOCK #11

## (undated) DEVICE — Device

## (undated) DEVICE — DRAPE TOWEL BLUE 17" X 24"

## (undated) DEVICE — SUT NUROLON 4-0 8-18" TF (POP-OFF)

## (undated) DEVICE — GUIDE KIT TRAJECTORY BX EXTERNAL

## (undated) DEVICE — PREP CHLORAPREP HI-LITE ORANGE 26ML

## (undated) DEVICE — GLV 7 DERMAPRENE ULTRA

## (undated) DEVICE — STRYKER SONOPET IQ TUBING SET

## (undated) DEVICE — MIDAS REX MR7 LUBRICANT DIFFUSER CARTRIDGE

## (undated) DEVICE — DRAPE MICROSCOPE OPMI VISIONGUARD 48X118"

## (undated) DEVICE — NDL COUNTER FOAM AND MAGNET 40-70

## (undated) DEVICE — SUT ETHILON 3-0 18" FS-1

## (undated) DEVICE — VENODYNE/SCD SLEEVE CALF LARGE

## (undated) DEVICE — STRYKER SONOPET IQ TIP 12CM STANDARD

## (undated) DEVICE — AESCULAP SCALPFIX 10 CLIPS

## (undated) DEVICE — POSITIONER FOAM EGG CRATE ULNAR 2PCS (PINK)

## (undated) DEVICE — STAPLER SKIN VISI-STAT 35 WIDE

## (undated) DEVICE — DRSG TELFA 3 X 8

## (undated) DEVICE — GOWN SLEEVES

## (undated) DEVICE — GLV 8.5 PROTEXIS (WHITE)

## (undated) DEVICE — VISITEC 4X4

## (undated) DEVICE — DRAIN LIMITORR DRAIN SYSTEM 30ML

## (undated) DEVICE — SOL IRR POUR H2O 250ML

## (undated) DEVICE — DRAPE IRRIGATION POUCH 19X23"

## (undated) DEVICE — DRAPE MAYO STAND 30"

## (undated) DEVICE — ELCTR 4-DISC 20MM 49" (RED, BLUE, GREEN, BLACK)

## (undated) DEVICE — ELCTR BOVIE TIP BLADE INSULATED 2.75" EDGE

## (undated) DEVICE — BLADE SCALPEL SAFETYLOCK #10

## (undated) DEVICE — DRAPE INSTRUMENT POUCH 6.75" X 11"

## (undated) DEVICE — TRAP SPECIMEN SPUTUM 40CC

## (undated) DEVICE — SYR ASEPTO

## (undated) DEVICE — DRAIN JACKSON PRATT 3 SPRING RESERVOIR W 10FR PVC DRAIN

## (undated) DEVICE — BLADE SCALPEL SAFETYLOCK #15

## (undated) DEVICE — GOWN TRIMAX LG

## (undated) DEVICE — SPECIMEN CONTAINER 100ML

## (undated) DEVICE — TUBING CODMAN INTEGRATED BIPOLAR CORD & TUBING SET FLYING LEADS

## (undated) DEVICE — MIDAS REX LEGEND BALL FLUTED MEDNEXT SM BORE 3.0MM X 10CM

## (undated) DEVICE — SUT NUROLON 2-0 18" CP-2 (POP-OFF)

## (undated) DEVICE — DRAIN RESERVOIR FOR JACKSON PRATT 100CC CARDINAL

## (undated) DEVICE — WOUND IRR SURGIPHOR

## (undated) DEVICE — DRAIN JACKSON PRATT 7MM FLAT FULL NO TROCAR

## (undated) DEVICE — DRAIN JACKSON PRATT 10MM FLAT FULL NO TROCAR

## (undated) DEVICE — DRSG XEROFORM 1 X 8"

## (undated) DEVICE — TUBING SUCTION 20FT

## (undated) DEVICE — LAP PAD 18 X 18"

## (undated) DEVICE — MIDAS REX LEGEND BALL FLUTED SM BORE 6.0MM X 10CM

## (undated) DEVICE — SNAP ON SPHERZ 5 PACK

## (undated) DEVICE — DRSG CURITY GAUZE SPONGE 4 X 4" 12-PLY

## (undated) DEVICE — MIDAS REX LEGEND TAPERED SM BORE 2.3MM X 8CM

## (undated) DEVICE — DRAIN JACKSON PRATT 7FR ROUND END NO TROCAR

## (undated) DEVICE — PREP DURAPREP 26CC

## (undated) DEVICE — MEDICATION LABELS W MARKER

## (undated) DEVICE — BIPOLAR FORCEP CODMAN VERSATRU 8" X 0.5MM (BLUE)

## (undated) DEVICE — DRAPE 3/4 SHEET W REINFORCEMENT 56X77"

## (undated) DEVICE — DRAPE 1/2 SHEET 40X57"

## (undated) DEVICE — DRAPE CAMERA VIDEO 7"X96"

## (undated) DEVICE — CANISTER SUCTION 2000CC

## (undated) DEVICE — GLV 8 PROTEXIS (WHITE)

## (undated) DEVICE — SUCTION YANKAUER NO CONTROL VENT

## (undated) DEVICE — LONE STAR ELASTIC STAY HOOK 12MM BLUNT

## (undated) DEVICE — FOLEY TRAY 16FR LF URINE METER SURESTEP

## (undated) DEVICE — MIDAS REX LEGEND TAPERED SM BORE 1.1MM X 8CM

## (undated) DEVICE — SYR LUER LOK 20CC

## (undated) DEVICE — MARKING PEN W RULER

## (undated) DEVICE — GLV 6.5 PROTEXIS (WHITE)

## (undated) DEVICE — ELCTR BIPOLAR PROBE

## (undated) DEVICE — GLV 7.5 PROTEXIS (WHITE)

## (undated) DEVICE — DRAPE LIGHT HANDLE COVER (BLUE)